# Patient Record
Sex: FEMALE | Race: ASIAN | NOT HISPANIC OR LATINO | Employment: UNEMPLOYED | URBAN - METROPOLITAN AREA
[De-identification: names, ages, dates, MRNs, and addresses within clinical notes are randomized per-mention and may not be internally consistent; named-entity substitution may affect disease eponyms.]

---

## 2017-05-05 ENCOUNTER — OFFICE VISIT (OUTPATIENT)
Dept: INTERNAL MEDICINE | Facility: CLINIC | Age: 47
End: 2017-05-05
Payer: COMMERCIAL

## 2017-05-05 ENCOUNTER — RADIANT APPOINTMENT (OUTPATIENT)
Dept: GENERAL RADIOLOGY | Facility: CLINIC | Age: 47
End: 2017-05-05
Attending: INTERNAL MEDICINE
Payer: COMMERCIAL

## 2017-05-05 VITALS
SYSTOLIC BLOOD PRESSURE: 124 MMHG | TEMPERATURE: 98.4 F | DIASTOLIC BLOOD PRESSURE: 80 MMHG | BODY MASS INDEX: 31.89 KG/M2 | HEART RATE: 98 BPM | WEIGHT: 173.3 LBS | HEIGHT: 62 IN | OXYGEN SATURATION: 99 %

## 2017-05-05 DIAGNOSIS — D17.30 LIPOMA OF SKIN AND SUBCUTANEOUS TISSUE: Primary | ICD-10-CM

## 2017-05-05 DIAGNOSIS — M25.562 CHRONIC PAIN OF LEFT KNEE: ICD-10-CM

## 2017-05-05 DIAGNOSIS — G89.29 CHRONIC PAIN OF LEFT KNEE: ICD-10-CM

## 2017-05-05 DIAGNOSIS — M25.562 CHRONIC PAIN OF LEFT KNEE: Primary | ICD-10-CM

## 2017-05-05 DIAGNOSIS — G89.29 CHRONIC PAIN OF LEFT KNEE: Primary | ICD-10-CM

## 2017-05-05 PROCEDURE — 73560 X-RAY EXAM OF KNEE 1 OR 2: CPT | Mod: LT

## 2017-05-05 PROCEDURE — 99214 OFFICE O/P EST MOD 30 MIN: CPT | Performed by: INTERNAL MEDICINE

## 2017-05-05 NOTE — PATIENT INSTRUCTIONS
I have placed a general surgery referral for you.    Please schedule an appointment at your earliest convenience - phone number below.    ---    For left knee:    START with left knee xray.    If negative, next step is trial of physical therapy.

## 2017-05-05 NOTE — MR AVS SNAPSHOT
After Visit Summary   5/5/2017    Truman Foreman    MRN: [id number]           Patient Information     Date Of Birth          1970        Visit Information        Provider Department      5/5/2017 2:30 PM Jacki Renner MD [facility]        Today's Diagnoses     Lipoma of skin and subcutaneous tissue    -  1    Chronic pain of left knee          Care Instructions    I have placed a general surgery referral for you.    Please schedule an appointment at your earliest convenience - phone number below.    ---    For left knee:    START with left knee xray.    If negative, next step is trial of physical therapy.             Follow-ups after your visit        Additional Services     GENERAL SURG ADULT REFERRAL       Your provider has referred you to: SURESH: [facility] Steve Vences [phone]   [email or web address]    Please be aware that coverage of these services is subject to the terms and limitations of your health insurance plan.  Call member services at your health plan with any benefit or coverage questions.      Please bring the following with you to your appointment:    (1) Any X-Rays, CTs or MRIs which have been performed.  Contact the facility where they were done to arrange for  prior to your scheduled appointment.   (2) List of current medications   (3) This referral request   (4) Any documents/labs given to you for this referral                  Future tests that were ordered for you today     Open Future Orders        Priority Expected Expires Ordered    XR Knee Standing Left 2 Views Routine 5/5/2017 5/5/2018 5/5/2017            Who to contact     If you have questions or need follow up information about today's clinic visit or your schedule please contact [facility] directly at [phone].  Normal or non-critical lab and imaging results will be communicated to you by Viki  "letter or phone within 4 business days after the clinic has received the results. If you do not hear from us within 7 days, please contact the clinic through [email or web address] or phone. If you have a critical or abnormal lab result, we will notify you by phone as soon as possible.  Submit refill requests through [email or web address] or call your pharmacy and they will forward the refill request to us. Please allow 3 business days for your refill to be completed.          Additional Information About Your Visit        [email or web address] Information     [email or web address] lets you send messages to your doctor, view your test results, renew your prescriptions, schedule appointments and more. To sign up, go to [email or web address] . Click on \"Log in\" on the left side of the screen, which will take you to the Welcome page. Then click on \"Sign up Now\" on the right side of the page.     You will be asked to enter the access code listed below, as well as some personal information. Please follow the directions to create your username and password.     Your access code is: [email or web address]  Expires: 8/3/2017  3:06 PM     Your access code will  in 90 days. If you need help or a new code, please call your [place] clinic or [phone].        Care EveryWhere ID     This is your Care EveryWhere ID. This could be used by other organizations to access your [place] medical records  [id number]        Your Vitals Were     Pulse Temperature Height Last Period Pulse Oximetry BMI (Body Mass Index)    98 98.4  F (36.9  C) (Oral) 5' 1.5\" (1.562 m) 2017 (Approximate) 99% 32.21 kg/m2       Blood Pressure from Last 3 Encounters:   17 124/80    Weight from Last 3 Encounters:   17 173 lb 4.8 oz (78.6 kg)              We Performed the Following     GENERAL SURG ADULT REFERRAL        Primary Care Provider    None Specified       No primary provider on file.        Thank you!     Thank you for choosing [facility]  for your care. Our goal " is always to provide you with excellent care. Hearing back from our patients is one way we can continue to improve our services. Please take a few minutes to complete the written survey that you may receive in the mail after your visit with us. Thank you!             Your Updated Medication List - Protect others around you: Learn how to safely use, store and throw away your medicines at www.disposemymeds.org.      Notice  As of 5/5/2017  3:06 PM    You have not been prescribed any medications.

## 2017-05-05 NOTE — PROGRESS NOTES
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    Patient does not exercise regularly.     The medication, allergy, and problem lists have been reviewed and updated as appropriate.       OBJECTIVE:                                                      /80 (BP Location: Left arm, Patient Position: Chair, Cuff Size: Adult Regular)  Pulse 98  Temp 98.4  F (36.9  C) (Oral)  Ht 5' 1.5\" (1.562 m)  Wt 173 lb 4.8 oz (78.6 kg)  LMP 03/05/2017 (Approximate)  SpO2 99%  BMI 32.21 kg/m2  Constitutional: well-appearing  Integumentary:  - subcutaneous, oval shaped lump, ~1.5x1cm in size, left lateral upper thigh; no overlying skin changes   - subcutaneous, oval shaped lump, ~2cm in diameter, right medial leg above knee; no overlying skin changes  - right posterior thigh: large, abnormally shaped tissue growth  Right knee:  Inspection: mild bony enlargement; alignment normal; no redness, swelling, or effusions  Non-tender: patellar facets, MCL, LCL, lateral joint line, medial joint line, IT band, posterior knee "   Active Range of Motion: normal  Strength: normal  Special tests: normal valgus stress test, normal varus, negative posterior drawer  Left knee:  Inspection: mild bony enlargement; alignment normal; no redness, swelling, or effusions  Tender: along MCL  Non-tender: patellar facets, LCL, lateral joint line, medial joint line, IT band, posterior knee   Active Range of Motion: normal  Strength: normal  Special tests: normal valgus stress test, normal varus, negative posterior drawer      ASSESSMENT/PLAN:                                                      (D17.30) Lipoma of skin and subcutaneous tissue  (primary encounter diagnosis)  Comment:    - suspect left lateral upper thigh and right medial leg lumps are benign lipomas.   - suspect abnormal tissue growth right posterior thigh is also benign adipose tissue.    - since latter is symptomatic, patient may benefit from resection.   Plan: referred to general surgery for further evaluation and possible resection.     (M25.562,  G89.29) Chronic pain of left knee  Comment: etiology unclear, but differential includes mild OA, MCL strain or tear, and (less likely) medial meniscal tear.  Plan:    - left knee xray.   - if unrevealing, recommend trial of PT evaluation and treatment.   - if symptoms worsen, change, or do not improve with PT, consider ortho referral and/or knee MRI.    The instructions on the AVS were discussed and explained to the patient. Patient expressed understanding of instructions.    A total of 25 minutes were spent face-to-face with this patient during this encounter and over half of that time was spent on counseling and coordination of care re: above diagnoses and plans of care.     Jacki Renner MD   [address] [address]  T: [phone], F: [phone]

## 2017-05-05 NOTE — NURSING NOTE
"Chief Complaint   Patient presents with     Swelling     3 Swellings on the L upper thigh, above R knee and behind the R thigh.       Initial /80 (BP Location: Left arm, Patient Position: Chair, Cuff Size: Adult Regular)  Pulse 98  Temp 98.4  F (36.9  C) (Oral)  Ht 5' 1.5\" (1.562 m)  Wt 173 lb 4.8 oz (78.6 kg)  LMP 03/05/2017 (Approximate)  SpO2 99%  BMI 32.21 kg/m2 Estimated body mass index is 32.21 kg/(m^2) as calculated from the following:    Height as of this encounter: 5' 1.5\" (1.562 m).    Weight as of this encounter: 173 lb 4.8 oz (78.6 kg).  Medication Reconciliation: complete       Kaminibose MA      "

## 2017-05-26 ENCOUNTER — OFFICE VISIT (OUTPATIENT)
Dept: SURGERY | Facility: CLINIC | Age: 47
End: 2017-05-26
Payer: COMMERCIAL

## 2017-05-26 ENCOUNTER — THERAPY VISIT (OUTPATIENT)
Dept: PHYSICAL THERAPY | Facility: CLINIC | Age: 47
End: 2017-05-26
Payer: COMMERCIAL

## 2017-05-26 VITALS
HEART RATE: 81 BPM | WEIGHT: 174 LBS | DIASTOLIC BLOOD PRESSURE: 79 MMHG | BODY MASS INDEX: 32.85 KG/M2 | SYSTOLIC BLOOD PRESSURE: 131 MMHG | HEIGHT: 61 IN

## 2017-05-26 DIAGNOSIS — M25.562 LEFT MEDIAL KNEE PAIN: Primary | ICD-10-CM

## 2017-05-26 DIAGNOSIS — M79.89 SOFT TISSUE MASS: Primary | ICD-10-CM

## 2017-05-26 PROCEDURE — 97161 PT EVAL LOW COMPLEX 20 MIN: CPT | Mod: GP | Performed by: PHYSICAL THERAPIST

## 2017-05-26 PROCEDURE — 99203 OFFICE O/P NEW LOW 30 MIN: CPT | Performed by: SURGERY

## 2017-05-26 PROCEDURE — 97110 THERAPEUTIC EXERCISES: CPT | Mod: GP | Performed by: PHYSICAL THERAPIST

## 2017-05-26 ASSESSMENT — ACTIVITIES OF DAILY LIVING (ADL)
RISE FROM A CHAIR: ACTIVITY IS MINIMALLY DIFFICULT
KNEE_ACTIVITY_OF_DAILY_LIVING_SCORE: 81.43
GO UP STAIRS: ACTIVITY IS NOT DIFFICULT
STIFFNESS: THE SYMPTOM AFFECTS MY ACTIVITY SLIGHTLY
KNEE_ACTIVITY_OF_DAILY_LIVING_SUM: 57
SQUAT: ACTIVITY IS MINIMALLY DIFFICULT
WALK: ACTIVITY IS NOT DIFFICULT
SIT WITH YOUR KNEE BENT: ACTIVITY IS MINIMALLY DIFFICULT
KNEEL ON THE FRONT OF YOUR KNEE: ACTIVITY IS NOT DIFFICULT
GO DOWN STAIRS: ACTIVITY IS NOT DIFFICULT
GIVING WAY, BUCKLING OR SHIFTING OF KNEE: THE SYMPTOM AFFECTS MY ACTIVITY SLIGHTLY
PAIN: I HAVE THE SYMPTOM BUT IT DOES NOT AFFECT MY ACTIVITY
AS_A_RESULT_OF_YOUR_KNEE_INJURY,_HOW_WOULD_YOU_RATE_YOUR_CURRENT_LEVEL_OF_DAILY_ACTIVITY?: NEARLY NORMAL
HOW_WOULD_YOU_RATE_THE_OVERALL_FUNCTION_OF_YOUR_KNEE_DURING_YOUR_USUAL_DAILY_ACTIVITIES?: NEARLY NORMAL
STAND: ACTIVITY IS NOT DIFFICULT
WEAKNESS: THE SYMPTOM AFFECTS MY ACTIVITY SLIGHTLY
RAW_SCORE: 57
SWELLING: I HAVE THE SYMPTOM BUT IT DOES NOT AFFECT MY ACTIVITY
HOW_WOULD_YOU_RATE_THE_CURRENT_FUNCTION_OF_YOUR_KNEE_DURING_YOUR_USUAL_DAILY_ACTIVITIES_ON_A_SCALE_FROM_0_TO_100_WITH_100_BEING_YOUR_LEVEL_OF_KNEE_FUNCTION_PRIOR_TO_YOUR_INJURY_AND_0_BEING_THE_INABILITY_TO_PERFORM_ANY_OF_YOUR_USUAL_DAILY_ACTIVITIES?: 20
LIMPING: THE SYMPTOM AFFECTS MY ACTIVITY SLIGHTLY

## 2017-05-26 NOTE — LETTER
"May 26, 2017      RE:  Hortencia Baldwin-:  [birth year]    HISTORY OF PRESENT ILLNESS:  Hortencia Baldwin is a 46 year old female who is seen in consultation at the request of Dr. Renner for evaluation of multiple soft tissue masses concerning for lipomas. She reports she has three areas of concern. One on her right lower inner buttock has been present for >20years. It has been asymptomatic until recently. She has a new job and has been sitting >10hrs/day and with this the area is very uncomfortable. She does not think it has changed in size considerably. She has another area on her right lower thigh just above the knee, this is much smaller and asymptomatic. A third spot is on her left upper outer thigh which is occasionally tender.      REVIEW OF SYSTEMS:  10 point review of systems completed and otherwise negative aside from as listed in HPI.      PMH: none     PSH: left breast excisional biopsy >20years ago     FH: reviewed, noncontributory    Vitals: /79  Pulse 81  Ht 5' 1\" (1.549 m)  Wt 174 lb (78.9 kg)  LMP 2017 (Approximate)  BMI 32.88 kg/m2  BMI= Body mass index is 32.88 kg/(m^2).     EXAM:  GENERAL: healthy, alert and no distress   PSYCH: pleasant, normal affect  HEENT: moist mucus membranes, no scleral icterus  CARDIOVASCULAR:  RRR  RESPIRATORY: non labored breathing  NECK: Neck supple. No adenopathy. Thyroid symmetric, normal size,  GI: soft, nontender, nondistended, no hernias palpable, no hepatosplenomegaly, normal bowel sounds  Extremities: warm and well perfused, no edema  SKIN: 3cm soft tissue mass on right lower buttock, feels well circumscribed, nontender to palpation. 1.5cm soft tissue mass on right lower anterior thigh just proximal to knee, well circumscribed. 2cm soft tissue mass on left upper outer thigh.   LYMPH: no axillary adenopathy        ASSESSMENT:  Hortencia Baldwin is a 46 year old female who presents with multiple soft tissue masses which are clinically consistent with lipoma. " We had a discussion of the risks, benefits, indications and alternatives to surgery and she would like to proceed with excision. She would like to have conscious sedation for the procedure. .         PLAN:  Excision of three soft tissue masses     It was my pleasure to participate in the care of Hortencia Baldwin in clinic today. Thank you for this consultation.         Avani Szymanski MD

## 2017-05-26 NOTE — PROGRESS NOTES
"[facility] General Surgery Clinic Consultation    CHIEF COMPLAINT:  Chief Complaint   Patient presents with     Consult     New pt. Lump on posterior thigh (RIGHT THIGH       HISTORY OF PRESENT ILLNESS:  Hortencia Baldwin is a 46 year old female who is seen in consultation at the request of Dr. Renner for evaluation of multiple soft tissue masses concerning for lipomas. She reports she has three areas of concern. One on her right lower inner buttock has been present for >20years. It has been asymptomatic until recently. She has a new job and has been sitting >10hrs/day and with this the area is very uncomfortable. She does not think it has changed in size considerably. She has another area on her right lower thigh just above the knee, this is much smaller and asymptomatic. A third spot is on her left upper outer thigh which is occasionally tender.     REVIEW OF SYSTEMS:  10 point review of systems completed and otherwise negative aside from as listed in HPI.     PMH: none    PSH: left breast excisional biopsy >20years ago    FH: reviewed, noncontributory    Social History   Substance Use Topics     Smoking status: Current Every Day Smoker     Packs/day: 0.50     Years: 10.00     Smokeless tobacco: Not on file     Alcohol use Yes      Comment: Occasionally       Patient Active Problem List   Diagnosis     Left medial knee pain       No Known Allergies    No current outpatient prescriptions on file.       Vitals: /79  Pulse 81  Ht 5' 1\" (1.549 m)  Wt 174 lb (78.9 kg)  LMP 03/05/2017 (Approximate)  BMI 32.88 kg/m2  BMI= Body mass index is 32.88 kg/(m^2).    EXAM:  GENERAL: healthy, alert and no distress   PSYCH: pleasant, normal affect  HEENT: moist mucus membranes, no scleral icterus  CARDIOVASCULAR:  RRR  RESPIRATORY: non labored breathing  NECK: Neck supple. No adenopathy. Thyroid symmetric, normal size,  GI: soft, nontender, nondistended, no hernias palpable, no hepatosplenomegaly, normal bowel " sounds  Extremities: warm and well perfused, no edema  SKIN: 3cm soft tissue mass on right lower buttock, feels well circumscribed, nontender to palpation. 1.5cm soft tissue mass on right lower anterior thigh just proximal to knee, well circumscribed. 2cm soft tissue mass on left upper outer thigh.   LYMPH: no axillary adenopathy      ASSESSMENT:  Hortencia Baldwin is a 46 year old female who presents with multiple soft tissue masses which are clinically consistent with lipoma. We had a discussion of the risks, benefits, indications and alternatives to surgery and she would like to proceed with excision. She would like to have conscious sedation for the procedure. .       PLAN:  Excision of three soft tissue masses    It was my pleasure to participate in the care of Hortencia Baldwin in clinic today. Thank you for this consultation.     Total time with patient visit: 25 minutes including discussions about the plan of care and care coordination with the patient.    Avani Szymanski MD    Please route or send letter to:  Primary Care Provider (PCP) and Referring Provider

## 2017-05-26 NOTE — PROGRESS NOTES
Initial evaluation was completed.  Subjective:    Patient is a 46 year old female presenting with rehab left knee hpi.           She reports insidious onset of left knee pain about 2 years.  Pain is not noted every day but the problem seems to be getting worse.  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 Walking feels fine.  Pain ranges from 0/10 to 8/10.  Sometimes she gets to pain at rest lying down. Rubbing it or getting up to walk is helpful.  She walks on a treadmill 15 minutes twice per week..    Patient reports pain:  Medial.         Pain is worse during the day.   and relieved by rest.          General health as reported by patient is good and fair.                      Red flags:  None as reported by the patient.                        Objective:    System                                                Knee Evaluation:  ROM:  AROM: normal  PROM: normal            Strength:     Extension:  Left: 5-/5    Pain:+      Right: 5/5   Strong/pain free  Pain:  Flexion:  Left: 4+/5    Pain:-      Right: 4+/5    Pain:-    Quad Set Left: Good    Pain:   Quad Set Right: Good    Pain:  Ligament Testing:  Normal                  Palpation:    Left knee tenderness present at:  Medial Joint Line and Patellar Medial  Left knee tenderness not present at:  Lateral Joint Line; Patellar Tendon; Popliteal; Patellar Lateral; Patellar Superior and Patellar Inferior    Right knee tenderness not present at:  Medial Joint Line; Lateral Joint Line; Patellar Tendon; Popliteal; Patellar Medial; Patellar Lateral; Patellar Superior and Patellar Inferior  Edema:  Normal            General     ROS    Assessment/Plan:      Patient is a 46 year old female with left side knee complaints. Her pain, like the 'cracking' form the left knee appears to be an inconsistent issue.  Pain was duplicated most with palpation of the left medial joint  line and distally 3 inches.  Pain was also duplicated after multiple knee bends ans squats but not on the first few reps.  She has fairly good strength but further improving this along with more frequent movement at work should be helpful..   Patient has the following significant findings with corresponding treatment plan.                Diagnosis 1:  knee  Pain -  self management and education  Decreased strength - therapeutic exercise, therapeutic activities and home program  Decreased function - therapeutic activities and home program    Therapy Evaluation Codes:   1) History comprised of:   Personal factors that impact the plan of care:      None.    Comorbidity factors that impact the plan of care are:      None.     Medications impacting care: None.  2) Examination of Body Systems comprised of:   Body structures and functions that impact the plan of care:      Knee.   Activity limitations that impact the plan of care are:      transitions.  3) Clinical presentation characteristics are:   Stable/Uncomplicated.  4) Decision-Making    Low complexity using standardized patient assessment instrument and/or measureable assessment of functional outcome.  Cumulative Therapy Evaluation is: Low complexity.    Previous and current functional limitations:  (See Goal Flow Sheet for this information)    Short term and Long term goals: (See Goal Flow Sheet for this information)     Communication ability:  Patient appears to be able to clearly communicate and understand verbal and written communication and follow directions correctly.  Treatment Explanation - The following has been discussed with the patient:   RX ordered/plan of care  Anticipated outcomes  Possible risks and side effects  This patient would benefit from PT intervention to resume normal activities.   Rehab potential is good.    Frequency:  1 X week, once daily  Duration:  for 3-6 weeks  Discharge Plan:  Achieve all LTG.  Independent in home treatment  program.  Reach maximal therapeutic benefit.    Please refer to the daily flowsheet for treatment today, total treatment time and time spent performing 1:1 timed codes.

## 2017-05-26 NOTE — MR AVS SNAPSHOT
"              After Visit Summary   5/26/2017    Hortencia Baldwin    MRN: [id number]           Patient Information     Date Of Birth          1970        Visit Information        Provider Department      5/26/2017 10:50 AM Praveen Desai, PT [facility]        Today's Diagnoses     Left medial knee pain    -  1       Follow-ups after your visit        Your next 10 appointments already scheduled     May 26, 2017  1:00 PM CDT   CONSULT with Avani Szymanski MD   [facility]    [address]  [facility] [address]   [phone]            Jun 02, 2017  9:30 AM CDT   INO Extremity with Praveen Desai PT   [facility] Therapy ([facility]  )    [address] [id number] [phone]              Who to contact     If you have questions or need follow up information about today's clinic visit or your schedule please contact [facility] PHYSICAL THERAPY directly at [phone].  Normal or non-critical lab and imaging results will be communicated to you by Learn with Homerhart, letter or phone within 4 business days after the clinic has received the results. If you do not hear from us within 7 days, please contact the clinic through Learn with Homerhart or phone. If you have a critical or abnormal lab result, we will notify you by phone as soon as possible.  Submit refill requests through ForeUp or call your pharmacy and they will forward the refill request to us. Please allow 3 business days for your refill to be completed.          Additional Information About Your Visit        MyChart Information     ForeUp lets you send messages to your doctor, view your test results, renew your prescriptions, schedule appointments and more. To sign up, go to [email or web address] . Click on \"Log in\" on the left side of the " "screen, which will take you to the Welcome page. Then click on \"Sign up Now\" on the right side of the page.     You will be asked to enter the access code listed below, as well as some personal information. Please follow the directions to create your username and password.     Your access code is: [email or web address]  Expires: 8/3/2017  3:06 PM     Your access code will  in 90 days. If you need help or a new code, please call your [place] clinic or [phone].        Care EveryWhere ID     This is your Care EveryWhere ID. This could be used by other organizations to access your [place] medical records  [id number]        Your Vitals Were     Last Period                   2017 (Approximate)            Blood Pressure from Last 3 Encounters:   17 124/80    Weight from Last 3 Encounters:   17 78.6 kg (173 lb 4.8 oz)              We Performed the Following     HC PT EVAL, LOW COMPLEXITY     INO INITIAL EVAL REPORT     THERAPEUTIC EXERCISES        Primary Care Provider Office Phone # Fax #    Jacki Renner [phone]       [facility] [address] [facility] [address]        Thank you!     Thank you for choosing [facility] PH[facility]  for your care. Our goal is always to provide you with excellent care. Hearing back from our patients is one way we can continue to improve our services. Please take a few minutes to complete the written survey that you may receive in the mail after your visit with us. Thank you!             Your Updated Medication List - Protect others around you: Learn how to safely use, store and throw away your medicines at www.disposemymeds.org.      Notice  As of 2017 12:16 PM    You have not been prescribed any medications.      "

## 2017-05-26 NOTE — MR AVS SNAPSHOT
"              After Visit Summary   5/26/2017    Hortencia Baldwin    MRN: [id number]           Patient Information     Date Of Birth          1970        Visit Information        Provider Department      5/26/2017 1:00 PM Avani Szymanski MD [facility] [place] [facility] General Surgery      Today's Diagnoses     Soft tissue mass    -  1       Follow-ups after your visit        Your next 10 appointments already scheduled     Jun 02, 2017  9:30 AM CDT   INO Extremity with Praveen Desai, SOLANGE   [place] [facility] ([facility]  )    [address] [id number] [phone]              Who to contact     If you have questions or need follow up information about today's clinic visit or your schedule please contact [facility] [place] directly at [phone].  Normal or non-critical lab and imaging results will be communicated to you by MyChart, letter or phone within 4 business days after the clinic has received the results. If you do not hear from us within 7 days, please contact the clinic through [email or web address] or phone. If you have a critical or abnormal lab result, we will notify you by phone as soon as possible.  Submit refill requests through Widdle or call your pharmacy and they will forward the refill request to us. Please allow 3 business days for your refill to be completed.          Additional Information About Your Visit        MyChart Information     Widdle lets you send messages to your doctor, view your test results, renew your prescriptions, schedule appointments and more. To sign up, go to [email or web address] . Click on \"Log in\" on the left side of the screen, which will take you to the Welcome page. Then click on \"Sign up Now\" on the right side of the page.     You will be asked to enter the access code listed below, as well as some personal information. Please follow the directions to " "create your username and password.     Your access code is: [email or web address]  Expires: 8/3/2017  3:06 PM     Your access code will  in 90 days. If you need help or a new code, please call your [facility] or [phone].        Care EveryWhere ID     This is your Care EveryWhere ID. This could be used by other organizations to access your [place] medical records  [id number]        Your Vitals Were     Pulse Height Last Period BMI (Body Mass Index)          81 5' 1\" (1.549 m) 2017 (Approximate) 32.88 kg/m2         Blood Pressure from Last 3 Encounters:   17 131/79   17 124/80    Weight from Last 3 Encounters:   17 174 lb (78.9 kg)   17 173 lb 4.8 oz (78.6 kg)              Today, you had the following     No orders found for display       Primary Care Provider Office Phone # Fax #    Jacki Renner [phone]       [facility] [address] [facility] [address]        Thank you!     Thank you for choosing [facility] [place]  for your care. Our goal is always to provide you with excellent care. Hearing back from our patients is one way we can continue to improve our services. Please take a few minutes to complete the written survey that you may receive in the mail after your visit with us. Thank you!             Your Updated Medication List - Protect others around you: Learn how to safely use, store and throw away your medicines at www.disposemymeds.org.      Notice  As of 2017  1:09 PM    You have not been prescribed any medications.      "

## 2017-05-30 ENCOUNTER — OFFICE VISIT (OUTPATIENT)
Dept: INTERNAL MEDICINE | Facility: CLINIC | Age: 47
End: 2017-05-30
Payer: COMMERCIAL

## 2017-05-30 VITALS
WEIGHT: 180 LBS | TEMPERATURE: 98.4 F | BODY MASS INDEX: 33.99 KG/M2 | HEIGHT: 61 IN | HEART RATE: 82 BPM | OXYGEN SATURATION: 100 % | SYSTOLIC BLOOD PRESSURE: 128 MMHG | DIASTOLIC BLOOD PRESSURE: 70 MMHG

## 2017-05-30 DIAGNOSIS — D17.9 MULTIPLE LIPOMAS: ICD-10-CM

## 2017-05-30 DIAGNOSIS — D64.9 ANEMIA, UNSPECIFIED: ICD-10-CM

## 2017-05-30 DIAGNOSIS — D64.9 ANEMIA, UNSPECIFIED: Primary | ICD-10-CM

## 2017-05-30 DIAGNOSIS — Z01.818 PREOPERATIVE EXAMINATION: Primary | ICD-10-CM

## 2017-05-30 LAB
ALBUMIN SERPL-MCNC: 3.7 G/DL (ref 3.4–5)
ALP SERPL-CCNC: 72 U/L (ref 40–150)
ALT SERPL W P-5'-P-CCNC: 16 U/L (ref 0–50)
ANION GAP SERPL CALCULATED.3IONS-SCNC: 8 MMOL/L (ref 3–14)
AST SERPL W P-5'-P-CCNC: 11 U/L (ref 0–45)
BILIRUB SERPL-MCNC: 0.4 MG/DL (ref 0.2–1.3)
BUN SERPL-MCNC: 11 MG/DL (ref 7–30)
CALCIUM SERPL-MCNC: 8.1 MG/DL (ref 8.5–10.1)
CHLORIDE SERPL-SCNC: 107 MMOL/L (ref 94–109)
CO2 SERPL-SCNC: 26 MMOL/L (ref 20–32)
CREAT SERPL-MCNC: 0.63 MG/DL (ref 0.52–1.04)
ERYTHROCYTE [DISTWIDTH] IN BLOOD BY AUTOMATED COUNT: 14.8 % (ref 10–15)
FERRITIN SERPL-MCNC: 20 NG/ML (ref 8–252)
GFR SERPL CREATININE-BSD FRML MDRD: ABNORMAL ML/MIN/1.7M2
GLUCOSE SERPL-MCNC: 85 MG/DL (ref 70–99)
HCT VFR BLD AUTO: 33.7 % (ref 35–47)
HGB BLD-MCNC: 10.7 G/DL (ref 11.7–15.7)
IRON SATN MFR SERPL: 27 % (ref 15–46)
IRON SERPL-MCNC: 95 UG/DL (ref 35–180)
MCH RBC QN AUTO: 35.4 PG (ref 26.5–33)
MCHC RBC AUTO-ENTMCNC: 31.8 G/DL (ref 31.5–36.5)
MCV RBC AUTO: 112 FL (ref 78–100)
MRSA DNA SPEC QL NAA+PROBE: NORMAL
PLATELET # BLD AUTO: 291 10E9/L (ref 150–450)
POTASSIUM SERPL-SCNC: 3.7 MMOL/L (ref 3.4–5.3)
PROT SERPL-MCNC: 6.9 G/DL (ref 6.8–8.8)
RBC # BLD AUTO: 3.02 10E12/L (ref 3.8–5.2)
SODIUM SERPL-SCNC: 141 MMOL/L (ref 133–144)
SPECIMEN SOURCE: NORMAL
TIBC SERPL-MCNC: 357 UG/DL (ref 240–430)
VIT B12 SERPL-MCNC: 776 PG/ML (ref 193–986)
WBC # BLD AUTO: 7.4 10E9/L (ref 4–11)

## 2017-05-30 PROCEDURE — 83540 ASSAY OF IRON: CPT | Performed by: INTERNAL MEDICINE

## 2017-05-30 PROCEDURE — 99214 OFFICE O/P EST MOD 30 MIN: CPT | Performed by: INTERNAL MEDICINE

## 2017-05-30 PROCEDURE — 87641 MR-STAPH DNA AMP PROBE: CPT | Performed by: INTERNAL MEDICINE

## 2017-05-30 PROCEDURE — 83550 IRON BINDING TEST: CPT | Performed by: INTERNAL MEDICINE

## 2017-05-30 PROCEDURE — 82728 ASSAY OF FERRITIN: CPT | Performed by: INTERNAL MEDICINE

## 2017-05-30 PROCEDURE — 80053 COMPREHEN METABOLIC PANEL: CPT | Performed by: INTERNAL MEDICINE

## 2017-05-30 PROCEDURE — 87640 STAPH A DNA AMP PROBE: CPT | Mod: 59 | Performed by: INTERNAL MEDICINE

## 2017-05-30 PROCEDURE — 36415 COLL VENOUS BLD VENIPUNCTURE: CPT | Performed by: INTERNAL MEDICINE

## 2017-05-30 PROCEDURE — 85027 COMPLETE CBC AUTOMATED: CPT | Performed by: INTERNAL MEDICINE

## 2017-05-30 PROCEDURE — 82607 VITAMIN B-12: CPT | Performed by: INTERNAL MEDICINE

## 2017-05-30 NOTE — LETTER
[address] [address]  [phone]      05/30/17      Hortencia Baldwin  [address] [place] [facility] [address]        Dear ,    It was a pleasure seeing you again the other day! I hope this finds you well.    I wanted to let you know that your labs came back.    You have a mildly low red blood cell count, or anemia. I'm not sure what this is due to, but you may proceed with surgery and we can follow-up on it after surgery (please come back to see me for follow-up).     The remainder or your labs are normal and negative.     Please feel free to contact me with any questions or concerns.      Take care,    Jacki Renner MD   [address] [place] [address]  T: [phone], F: [phone]

## 2017-05-30 NOTE — PATIENT INSTRUCTIONS
Nasal swab today.    ---    Labs - please proceed to our first floor laboratory to have these drawn (show them your orange ticket).     Results:    If normal: we will release results in TransUniont or send them in the mail. You will not be called for these results.    If abnormal, but non-urgent: we will release results in MyChart or send them in the mail. You will not be called for these results.    If abnormal and urgent: we will call you.

## 2017-05-30 NOTE — NURSING NOTE
"Chief Complaint   Patient presents with     Pre-Op Exam     5/31/17 [facility] - Lipoma removal.       Initial /70 (BP Location: Left arm, Patient Position: Chair, Cuff Size: Adult Regular)  Pulse 82  Temp 98.4  F (36.9  C) (Oral)  Ht 5' 1\" (1.549 m)  Wt 180 lb (81.6 kg)  LMP 05/24/2017 (Exact Date)  SpO2 100%  Breastfeeding? No  BMI 34.01 kg/m2 Estimated body mass index is 34.01 kg/(m^2) as calculated from the following:    Height as of this encounter: 5' 1\" (1.549 m).    Weight as of this encounter: 180 lb (81.6 kg).  Medication Reconciliation: complete     Kaminibose MA      "

## 2017-05-30 NOTE — PROGRESS NOTES
Subjective:    Patient is a 46 year old female presenting with rehab left ankle/foot hpi.                                      Pertinent medical history includes:  Overweight, smoking, sleep disorder/apnea and other (chest pain, dizziness/concussions).        Current occupation is Assembly  .    Primary job tasks include:  Prolonged sitting.                   Knee Activity of Daily Living Score: 81.43            Objective:    System    Physical Exam    General     ROS    Assessment/Plan:

## 2017-05-30 NOTE — MR AVS SNAPSHOT
After Visit Summary   5/30/2017    Hortencia Baldwin    MRN: [id number]           Patient Information     Date Of Birth          1970        Visit Information        Provider Department      5/30/2017 11:30 AM Jacki Renner MD [facility]        Today's Diagnoses     Preoperative examination    -  1    Lipoma of lower extremity, unspecified laterality          Care Instructions    Nasal swab today.    ---    Labs - please proceed to our first floor laboratory to have these drawn (show them your orange ticket).     Results:    If normal: we will release results in MyChart or send them in the mail. You will not be called for these results.    If abnormal, but non-urgent: we will release results in MyChart or send them in the mail. You will not be called for these results.    If abnormal and urgent: we will call you.                  Follow-ups after your visit        Your next 10 appointments already scheduled     May 31, 2017 10:15 AM CDT   [facility] Same Day Surgery with Avani Szymanski MD   [facility] ([facility])    [facility] ([facility])   [phone]            May 31, 2017   Procedure with Avani Szymanski MD   [facility] PeriOP Services (--)    [address] [place] [address] [address], [address]  [facility] [address]   [phone]            Jun 02, 2017  9:30 AM CDT   INO Extremity with Praveen Desai PT   [place] [facility]  )    [address] [place] [id number] [phone]              Who to contact     If you have questions or need follow up information about today's clinic visit or your schedule please contact [facility] directly at [phone].  Normal or non-critical lab and imaging results will be communicated to you by MyChart, letter or phone within 4 business  "days after the clinic has received the results. If you do not hear from us within 7 days, please contact the clinic through CelePost or phone. If you have a critical or abnormal lab result, we will notify you by phone as soon as possible.  Submit refill requests through CelePost or call your pharmacy and they will forward the refill request to us. Please allow 3 business days for your refill to be completed.          Additional Information About Your Visit        CelePost Information     CelePost lets you send messages to your doctor, view your test results, renew your prescriptions, schedule appointments and more. To sign up, go to [email or web address] . Click on \"Log in\" on the left side of the screen, which will take you to the Welcome page. Then click on \"Sign up Now\" on the right side of the page.     You will be asked to enter the access code listed below, as well as some personal information. Please follow the directions to create your username and password.     Your access code is: [email or web address]  Expires: 8/3/2017  3:06 PM     Your access code will  in 90 days. If you need help or a new code, please call your [facility] clinic or [phone].        Care EveryWhere ID     This is your Care EveryWhere ID. This could be used by other organizations to access your [facility] medical records  [id number]        Your Vitals Were     Pulse Temperature Height Last Period Pulse Oximetry Breastfeeding?    82 98.4  F (36.9  C) (Oral) 5' 1\" (1.549 m) 2017 (Exact Date) 100% No    BMI (Body Mass Index)                   34.01 kg/m2            Blood Pressure from Last 3 Encounters:   17 128/70   17 131/79   17 124/80    Weight from Last 3 Encounters:   17 180 lb (81.6 kg)   17 174 lb (78.9 kg)   17 173 lb 4.8 oz (78.6 kg)              We Performed the Following     CBC with platelets     Comprehensive metabolic panel     Methicillin Resistant Staph Aureus PCR        Primary Care " Provider Office Phone # Fax #    Jacki Renner [phone]       [facility] [address] [facility] [address]        Thank you!     Thank you for choosing [facility]  for your care. Our goal is always to provide you with excellent care. Hearing back from our patients is one way we can continue to improve our services. Please take a few minutes to complete the written survey that you may receive in the mail after your visit with us. Thank you!             Your Updated Medication List - Protect others around you: Learn how to safely use, store and throw away your medicines at www.disposemymeds.org.      Notice  As of 5/30/2017 11:48 AM    You have not been prescribed any medications.

## 2017-05-30 NOTE — PROGRESS NOTES
SUBJECTIVE:                                                      HPI: Hortencia Baldwin is a pleasant 46 year old female who presents for preoperative evaluation.    Surgeon: Stephon  Date: 5/31/17  Location: [facility]  Surgery: excision of lipoma, right buttock, right lower thigh, left upper outer thigh (low risk)  Indication: multiple lipomas    Past Medical History:   Diagnosis Date     Obesity (BMI 30-39.9)      Personal or family history of excessive or prolonged bleeding? No  Personal or family history of blood clots? No  History of snoring/Sleep Apnea? Snores, but no known SHADI  History of blood transfusions? Yes - no adverse reactions    Clinical Predictors of Increased Risk: none    Past Surgical History:   Procedure Laterality Date     LUMPECTOMY BREAST Left 1990s    benign     Artificial assistive devices, such as pacemakers, artificial cardiac valves, or artificial joints? No    No Known Allergies     Personal or family history of allergy to anesthesia? No  Allergy to local or topical analgesics? No  Allergy to latex? No  Allergy to adhesives/skin preparations (chlorhexadine)? No    MEDS: None    Over the counter medications? Advil as needed - last dose a couple of weeks ago  Vitamin Supplements? No  Herbal Supplements? No    Family History   Problem Relation Age of Onset     Hypertension Mother      DIABETES No family hx of      CEREBROVASCULAR DISEASE No family hx of      Myocardial Infarction No family hx of      Coronary Artery Disease Early Onset No family hx of      Breast Cancer No family hx of      Ovarian Cancer No family hx of      Colon Cancer No family hx of      Occupational History     Assembly for [employer]      Social History Main Topics     Smoking status: Current Every Day Smoker     Packs/day: 0.50     Years: 10.00     Types: Cigarettes     Smokeless tobacco: Never Used     Alcohol use Yes      Comment: 1-2 drinks/month     Drug use: No     Sexual activity: Yes     Partners: Male  "    Social History Narrative    .    3 kids - 2 adult, one is 14 (as of 2017).     Exercises 1-2x/week.          Functional capacity: Can do heavy work around the house such as scrubbing floors or lifting or moving heavy furniture (4-10 METs)    ROS:  Constitutional: denies unintentional weight loss or gain; denies fevers, chills, or sweats     Cardiovascular: denies chest pain, palpitations, or edema  Respiratory: denies cough, wheezing, shortness of breath, or dyspnea on exertion  Gastrointestinal: denies nausea, vomiting, constipation, diarrhea, or abdominal pain  Genitourinary: denies urinary frequency, urgency, dysuria, or hematuria  Integumentary: denies rash or pruritus  Musculoskeletal: denies back pain, muscle pain, joint pain, or joint swelling  Neurologic: denies focal weakness, numbness, or tingling  Hematologic/Immunologic: denies history of anemia or blood transfusions  Endocrine: denies heat or cold intolerance; denies polyuria, polydipsia  Psychiatric: denies depression or anxiety    OBJECTIVE:                                                      /70 (BP Location: Left arm, Patient Position: Chair, Cuff Size: Adult Regular)  Pulse 82  Temp 98.4  F (36.9  C) (Oral)  Ht 5' 1\" (1.549 m)  Wt 180 lb (81.6 kg)  LMP 05/24/2017 (Exact Date)  SpO2 100%  Breastfeeding? No  BMI 34.01 kg/m2  Constitutional: well-appearing  Eyes: normal conjunctivae and lids; pupils equal, round, and reactive to light  Ears, Nose, Mouth, and Throat: normal ears and nose; tympanic membranes visualized and normal; normal lips, teeth, and gums; no oropharyngeal lesions or ulcers  Neck: supple and symmetric; no lymphadenopathy; no thyromegaly or masses  Respiratory: normal respiratory effort; clear to auscultation bilaterally  Cardiovascular: regular rate and rhythm; pedal pulses palpable; no edema  Gastrointestinal: soft, non-tender, non-distended, and bowel sounds present; no organomegaly or " masses  Musculoskeletal: normal gait and station; no clubbing or cyanosis  Psych: normal judgment and insight; normal mood and affect; recent and remote memory intact; oriented to time, place, and person    ASSESSMENT/PLAN:                                                      Hortencia Baldwin is a pleasant 46 year old female who presents for a preoperative evaluation. She is at low clinical risk for a low risk procedure.    (Z01.818) Preoperative examination  (primary encounter diagnosis)  (D17.9) Multiple lipomas  Plan:    - CBC, CMP, and nasal staff PCR today.   - no additional workup, cardiac or otherwise, indicated.    RECOMMEND PROCEEDING WITH SURGERY: YES.    Thank you for referring Hortencia Baldwin to me for consultation and allowing me to participate in her care.    The instructions on the AVS were discussed and explained to the patient. Patient expressed understanding of instructions.    A total of 25 minutes were spent face-to-face with this patient during this encounter and over half of that time was spent on counseling and coordination of care re: above diagnoses and plans of care.     Jacki Renner MD   [address] [address]  T: [phone], F: [phone]

## 2017-05-31 ENCOUNTER — HOSPITAL ENCOUNTER (OUTPATIENT)
Facility: CLINIC | Age: 47
Discharge: HOME | End: 2017-05-31
Attending: SURGERY | Admitting: SURGERY
Payer: COMMERCIAL

## 2017-05-31 ENCOUNTER — ANESTHESIA EVENT (OUTPATIENT)
Dept: SURGERY | Facility: CLINIC | Age: 47
End: 2017-05-31
Payer: COMMERCIAL

## 2017-05-31 ENCOUNTER — ANESTHESIA (OUTPATIENT)
Dept: SURGERY | Facility: CLINIC | Age: 47
End: 2017-05-31
Payer: COMMERCIAL

## 2017-05-31 ENCOUNTER — OFFICE VISIT (OUTPATIENT)
Dept: SURGERY | Facility: PHYSICIAN GROUP | Age: 47
End: 2017-05-31
Payer: COMMERCIAL

## 2017-05-31 ENCOUNTER — SURGERY (OUTPATIENT)
Age: 47
End: 2017-05-31

## 2017-05-31 VITALS
HEIGHT: 61 IN | DIASTOLIC BLOOD PRESSURE: 69 MMHG | TEMPERATURE: 97.7 F | WEIGHT: 178.5 LBS | RESPIRATION RATE: 16 BRPM | SYSTOLIC BLOOD PRESSURE: 110 MMHG | OXYGEN SATURATION: 99 % | BODY MASS INDEX: 33.7 KG/M2

## 2017-05-31 DIAGNOSIS — M79.89 SOFT TISSUE MASS: Primary | ICD-10-CM

## 2017-05-31 LAB — HCG SERPL QL: NEGATIVE

## 2017-05-31 PROCEDURE — 37000008 ZZH ANESTHESIA TECHNICAL FEE, 1ST 30 MIN: Performed by: SURGERY

## 2017-05-31 PROCEDURE — 27043 EXC HIP PELVIS LES SC 3 CM/>: CPT | Performed by: SURGERY

## 2017-05-31 PROCEDURE — 88304 TISSUE EXAM BY PATHOLOGIST: CPT | Mod: 26 | Performed by: SURGERY

## 2017-05-31 PROCEDURE — 25000128 H RX IP 250 OP 636: Performed by: SURGERY

## 2017-05-31 PROCEDURE — 25000125 ZZHC RX 250: Performed by: NURSE ANESTHETIST, CERTIFIED REGISTERED

## 2017-05-31 PROCEDURE — 27327 EXC THIGH/KNEE LES SC < 3 CM: CPT | Performed by: SURGERY

## 2017-05-31 PROCEDURE — 25000128 H RX IP 250 OP 636: Performed by: NURSE ANESTHETIST, CERTIFIED REGISTERED

## 2017-05-31 PROCEDURE — 37000009 ZZH ANESTHESIA TECHNICAL FEE, EACH ADDTL 15 MIN: Performed by: SURGERY

## 2017-05-31 PROCEDURE — 36000052 ZZH SURGERY LEVEL 2 EA 15 ADDTL MIN: Performed by: SURGERY

## 2017-05-31 PROCEDURE — 27210794 ZZH OR GENERAL SUPPLY STERILE: Performed by: SURGERY

## 2017-05-31 PROCEDURE — 88304 TISSUE EXAM BY PATHOLOGIST: CPT | Performed by: SURGERY

## 2017-05-31 PROCEDURE — 25000132 ZZH RX MED GY IP 250 OP 250 PS 637: Performed by: PHYSICIAN ASSISTANT

## 2017-05-31 PROCEDURE — 71000012 ZZH RECOVERY PHASE 1 LEVEL 1 FIRST HR: Performed by: SURGERY

## 2017-05-31 PROCEDURE — 36000050 ZZH SURGERY LEVEL 2 1ST 30 MIN: Performed by: SURGERY

## 2017-05-31 PROCEDURE — 40000170 ZZH STATISTIC PRE-PROCEDURE ASSESSMENT II: Performed by: SURGERY

## 2017-05-31 PROCEDURE — 84703 CHORIONIC GONADOTROPIN ASSAY: CPT | Performed by: ANESTHESIOLOGY

## 2017-05-31 PROCEDURE — 25000125 ZZHC RX 250: Performed by: SURGERY

## 2017-05-31 PROCEDURE — S0020 INJECTION, BUPIVICAINE HYDRO: HCPCS | Performed by: SURGERY

## 2017-05-31 PROCEDURE — 36415 COLL VENOUS BLD VENIPUNCTURE: CPT | Performed by: ANESTHESIOLOGY

## 2017-05-31 PROCEDURE — 71000027 ZZH RECOVERY PHASE 2 EACH 15 MINS: Performed by: SURGERY

## 2017-05-31 RX ORDER — ASPIRIN 81 MG
100 TABLET, DELAYED RELEASE (ENTERIC COATED) ORAL 2 TIMES DAILY PRN
Qty: 60 TABLET | Refills: 1 | Status: SHIPPED | OUTPATIENT
Start: 2017-05-31 | End: 2018-01-23

## 2017-05-31 RX ORDER — PROPOFOL 10 MG/ML
INJECTION, EMULSION INTRAVENOUS CONTINUOUS PRN
Status: DISCONTINUED | OUTPATIENT
Start: 2017-05-31 | End: 2017-05-31

## 2017-05-31 RX ORDER — MEPERIDINE HYDROCHLORIDE 25 MG/ML
12.5 INJECTION INTRAMUSCULAR; INTRAVENOUS; SUBCUTANEOUS
Status: DISCONTINUED | OUTPATIENT
Start: 2017-05-31 | End: 2017-05-31 | Stop reason: HOSPADM

## 2017-05-31 RX ORDER — ONDANSETRON 2 MG/ML
4 INJECTION INTRAMUSCULAR; INTRAVENOUS EVERY 30 MIN PRN
Status: DISCONTINUED | OUTPATIENT
Start: 2017-05-31 | End: 2017-05-31 | Stop reason: HOSPADM

## 2017-05-31 RX ORDER — FENTANYL CITRATE 50 UG/ML
25-50 INJECTION, SOLUTION INTRAMUSCULAR; INTRAVENOUS EVERY 5 MIN PRN
Status: DISCONTINUED | OUTPATIENT
Start: 2017-05-31 | End: 2017-05-31 | Stop reason: HOSPADM

## 2017-05-31 RX ORDER — SODIUM CHLORIDE, SODIUM LACTATE, POTASSIUM CHLORIDE, CALCIUM CHLORIDE 600; 310; 30; 20 MG/100ML; MG/100ML; MG/100ML; MG/100ML
INJECTION, SOLUTION INTRAVENOUS CONTINUOUS PRN
Status: DISCONTINUED | OUTPATIENT
Start: 2017-05-31 | End: 2017-05-31

## 2017-05-31 RX ORDER — PHYSOSTIGMINE SALICYLATE 1 MG/ML
1.2 INJECTION INTRAVENOUS
Status: DISCONTINUED | OUTPATIENT
Start: 2017-05-31 | End: 2017-05-31 | Stop reason: HOSPADM

## 2017-05-31 RX ORDER — LIDOCAINE HYDROCHLORIDE 20 MG/ML
INJECTION, SOLUTION INFILTRATION; PERINEURAL PRN
Status: DISCONTINUED | OUTPATIENT
Start: 2017-05-31 | End: 2017-05-31

## 2017-05-31 RX ORDER — ONDANSETRON 4 MG/1
4 TABLET, ORALLY DISINTEGRATING ORAL EVERY 30 MIN PRN
Status: DISCONTINUED | OUTPATIENT
Start: 2017-05-31 | End: 2017-05-31 | Stop reason: HOSPADM

## 2017-05-31 RX ORDER — FENTANYL CITRATE 50 UG/ML
INJECTION, SOLUTION INTRAMUSCULAR; INTRAVENOUS PRN
Status: DISCONTINUED | OUTPATIENT
Start: 2017-05-31 | End: 2017-05-31

## 2017-05-31 RX ORDER — HYDROCODONE BITARTRATE AND ACETAMINOPHEN 5; 325 MG/1; MG/1
1-2 TABLET ORAL
Status: COMPLETED | OUTPATIENT
Start: 2017-05-31 | End: 2017-05-31

## 2017-05-31 RX ORDER — SODIUM CHLORIDE, SODIUM LACTATE, POTASSIUM CHLORIDE, CALCIUM CHLORIDE 600; 310; 30; 20 MG/100ML; MG/100ML; MG/100ML; MG/100ML
INJECTION, SOLUTION INTRAVENOUS CONTINUOUS
Status: DISCONTINUED | OUTPATIENT
Start: 2017-05-31 | End: 2017-05-31 | Stop reason: HOSPADM

## 2017-05-31 RX ORDER — HYDROCODONE BITARTRATE AND ACETAMINOPHEN 5; 325 MG/1; MG/1
1-2 TABLET ORAL EVERY 4 HOURS PRN
Qty: 30 TABLET | Refills: 0 | Status: SHIPPED | OUTPATIENT
Start: 2017-05-31 | End: 2018-01-23

## 2017-05-31 RX ORDER — NALOXONE HYDROCHLORIDE 0.4 MG/ML
.1-.4 INJECTION, SOLUTION INTRAMUSCULAR; INTRAVENOUS; SUBCUTANEOUS
Status: DISCONTINUED | OUTPATIENT
Start: 2017-05-31 | End: 2017-05-31 | Stop reason: HOSPADM

## 2017-05-31 RX ORDER — ONDANSETRON 2 MG/ML
INJECTION INTRAMUSCULAR; INTRAVENOUS PRN
Status: DISCONTINUED | OUTPATIENT
Start: 2017-05-31 | End: 2017-05-31

## 2017-05-31 RX ORDER — FENTANYL CITRATE 50 UG/ML
25-50 INJECTION, SOLUTION INTRAMUSCULAR; INTRAVENOUS
Status: DISCONTINUED | OUTPATIENT
Start: 2017-05-31 | End: 2017-05-31 | Stop reason: HOSPADM

## 2017-05-31 RX ORDER — LIDOCAINE HYDROCHLORIDE 10 MG/ML
INJECTION, SOLUTION EPIDURAL; INFILTRATION; INTRACAUDAL; PERINEURAL
Status: DISCONTINUED
Start: 2017-05-31 | End: 2017-05-31 | Stop reason: HOSPADM

## 2017-05-31 RX ADMIN — MIDAZOLAM HYDROCHLORIDE 2 MG: 1 INJECTION, SOLUTION INTRAMUSCULAR; INTRAVENOUS at 10:48

## 2017-05-31 RX ADMIN — FENTANYL CITRATE 25 MCG: 50 INJECTION, SOLUTION INTRAMUSCULAR; INTRAVENOUS at 10:57

## 2017-05-31 RX ADMIN — DEXMEDETOMIDINE HYDROCHLORIDE 4 MCG: 100 INJECTION, SOLUTION INTRAVENOUS at 11:09

## 2017-05-31 RX ADMIN — LIDOCAINE HYDROCHLORIDE 40 MG: 20 INJECTION, SOLUTION INFILTRATION; PERINEURAL at 10:49

## 2017-05-31 RX ADMIN — SODIUM CHLORIDE, POTASSIUM CHLORIDE, SODIUM LACTATE AND CALCIUM CHLORIDE: 600; 310; 30; 20 INJECTION, SOLUTION INTRAVENOUS at 10:48

## 2017-05-31 RX ADMIN — HYDROCODONE BITARTRATE AND ACETAMINOPHEN 1 TABLET: 5; 325 TABLET ORAL at 12:41

## 2017-05-31 RX ADMIN — PROPOFOL 100 MCG/KG/MIN: 10 INJECTION, EMULSION INTRAVENOUS at 10:49

## 2017-05-31 RX ADMIN — DEXMEDETOMIDINE HYDROCHLORIDE 4 MCG: 100 INJECTION, SOLUTION INTRAVENOUS at 11:07

## 2017-05-31 RX ADMIN — FENTANYL CITRATE 50 MCG: 50 INJECTION, SOLUTION INTRAMUSCULAR; INTRAVENOUS at 10:48

## 2017-05-31 RX ADMIN — BUPIVACAINE HYDROCHLORIDE 8 ML GIVEN: 5 INJECTION, SOLUTION EPIDURAL; INTRACAUDAL; PERINEURAL at 11:19

## 2017-05-31 RX ADMIN — BUPIVACAINE HYDROCHLORIDE 6 ML GIVEN: 5 INJECTION, SOLUTION EPIDURAL; INTRACAUDAL; PERINEURAL at 11:33

## 2017-05-31 RX ADMIN — ONDANSETRON 4 MG: 2 INJECTION INTRAMUSCULAR; INTRAVENOUS at 11:35

## 2017-05-31 RX ADMIN — FENTANYL CITRATE 25 MCG: 50 INJECTION, SOLUTION INTRAMUSCULAR; INTRAVENOUS at 11:04

## 2017-05-31 ASSESSMENT — LIFESTYLE VARIABLES: TOBACCO_USE: 1

## 2017-05-31 NOTE — IP AVS SNAPSHOT
[facility] Same Day Surgery    [address]    [place] [address]    Phone:  [phone]    Fax:  [phone]                                       After Visit Summary   5/31/2017    Hortencia Baldwin    MRN: [id number]           After Visit Summary Signature Page     I have received my discharge instructions, and my questions have been answered. I have discussed any challenges I see with this plan with the nurse or doctor.    ..........................................................................................................................................  Patient/Patient Representative Signature      ..........................................................................................................................................  Patient Representative Print Name and Relationship to Patient    ..................................................               ................................................  Date                                            Time    ..........................................................................................................................................  Reviewed by Signature/Title    ...................................................              ..............................................  Date                                                            Time

## 2017-05-31 NOTE — ANESTHESIA POSTPROCEDURE EVALUATION
Patient: Hortencia Baldwin    Procedure(s):  EXCISION OF LIPOMAS RIGHT BUTTOCKS RIGHT LOWER THIGH, LEFT UPPER OUTER THIGH - Wound Class: I-Clean    Diagnosis:MULTIPLE LIPOMAS  Diagnosis Additional Information: No value filed.    Anesthesia Type:  MAC    Note:  Anesthesia Post Evaluation    Patient location during evaluation: PACU  Patient participation: Able to fully participate in evaluation  Level of consciousness: awake  Pain management: adequate  Airway patency: patent  Cardiovascular status: acceptable  Respiratory status: acceptable  Hydration status: acceptable  PONV: controlled     Anesthetic complications: None          Last vitals:  Vitals:    05/31/17 0931 05/31/17 1209 05/31/17 1215   BP: 126/81 112/76 115/68   Resp: 16 16 15   Temp:  36.5  C (97.7  F)    SpO2: 98% 96% 98%         Electronically Signed By: Hany uDff MD  May 31, 2017  12:30 PM

## 2017-05-31 NOTE — IP AVS SNAPSHOT
MRN:[id number]                      After Visit Summary   5/31/2017    Hortencia Baldwin    MRN: [id number]           Thank you!     Thank you for choosing [facility] for your care. Our goal is always to provide you with excellent care. Hearing back from our patients is one way we can continue to improve our services. Please take a few minutes to complete the written survey that you may receive in the mail after you visit with us. Thank you!        Patient Information     Date Of Birth          1970        About your hospital stay     You were admitted on:  May 31, 2017 [facility]    You were discharged on:  May 31, 2017       Who to Call     For medical emergencies, please call 911.  For non-urgent questions about your medical care, please call your primary care provider or clinic, [phone]  For questions related to your surgery, please call your surgery clinic        Attending Provider     Provider Specialty    Avani Szymanski MD Surgery       Primary Care Provider Office Phone # Fax #    Jacki Renner [phone]      After Care Instructions     Diet Instructions       Resume pre-procedure diet            Discharge Instructions       Please follow-up in 2 weeks in Dr. Szymanski's office for your first postoperative appointment. Call [phone] to schedule.  Our [facility] name [facility]. The address is [address] [place] [address] [address], [place], [address]            Discharge Instructions       CONSTIPATION PREVENTION  Fill the prescription for docusate (colace) provided to you at the hospital in order to prevent constipation. You can stop taking this medication once you are no longer taking your narcotic pain medication and are having regular bowel movements.  If you need an additional stool softener or laxative in order to have regular bowel movements go to a pharmacy and purchase one of the  following:    - Senokot oral once daily  - Milk of magnesium once daily  - Miralax 1 scoop/packet 1-2 times daily (laxative)    In addition to the above options, if constipation continues, you can add:   - 4 oz Prune juice or Plum juice daily for constipation            Dressing       Keep your dressings clean and dry.  Remove outer gauze dressing on Friday. White steri strips that are over the incision are to remain in place and will typically fall off on their own in 7-10 days. Do not attempt to replace these if they should fall off sooner. Do not submerge or soak your incision under water for 2 weeks. You can shower normally, allowing warm water and soap to run over the incision on Friday after you have removed the outer dressing. After showering always pat the incision dry.            No Alcohol       For 24 hours post procedure            No driving or operating machinery        until the day after procedure            Weight bearing status - As tolerated                 Your next 10 appointments already scheduled     Jun 02, 2017  9:30 AM CDT   INO Extremity with Praveen Desai, PT   [facility]    [address] [place] [id number] [phone]              Further instructions from your care team       Same Day Surgery Discharge Instructions for  Sedation and General Anesthesia       It's not unusual to feel dizzy, light-headed or faint for up to 24 hours after surgery or while taking pain medication.  If you have these symptoms: sit for a few minutes before standing and have someone assist you when you get up to walk or use the bathroom.      You should rest and relax for the next 24 hours. We recommend you make arrangements to have an adult stay with you for at least 24 hours after your discharge.  Avoid hazardous and strenuous activity.      DO NOT DRIVE any vehicle or operate mechanical equipment for 24 hours  "following the end of your surgery.  Even though you may feel normal, your reactions may be affected by the medication you have received.      Do not drink alcoholic beverages for 24 hours following surgery.       Slowly progress to your regular diet as you feel able. It's not unusual to feel nauseated and/or vomit after receiving anesthesia.  If you develop these symptoms, drink clear liquids (apple juice, ginger ale, broth, 7-up, etc. ) until you feel better.  If your nausea and vomiting persists for 24 hours, please notify your surgeon.        All narcotic pain medications, along with inactivity and anesthesia, can cause constipation. Drinking plenty of liquids and increasing fiber intake will help.      For any questions of a medical nature, call your surgeon.      Do not make important decisions for 24 hours.      If you had general anesthesia, you may have a sore throat for a couple of days related to the breathing tube used during surgery.  You may use Cepacol lozenges to help with this discomfort.  If it worsens or if you develop a fever, contact your surgeon.       If you feel your pain is not well managed with the pain medications prescribed by your surgeon, please contact your surgeon's office to let them know so they can address your concerns.           Pending Results     Date and Time Order Name Status Description    5/31/2017 1102 Surgical pathology exam In process             Admission Information     Date & Time Provider Department Dept. Phone    5/31/2017 Avani Szymanski MD [facility] Same Day Surgery [phone]      Your Vitals Were     Blood Pressure Temperature Respirations Height Weight Last Period    106/81 97.7  F (36.5  C) (Temporal) 14 1.549 m (5' 1\") 81 kg (178 lb 8 oz) 05/24/2017 (Exact Date)    Pulse Oximetry BMI (Body Mass Index)                99% 33.73 kg/m2          MyChart Information     GIVTED lets you send messages to your doctor, view your test results, renew your " "prescriptions, schedule appointments and more. To sign up, go to [email or web address] . Click on \"Log in\" on the left side of the screen, which will take you to the Welcome page. Then click on \"Sign up Now\" on the right side of the page.     You will be asked to enter the access code listed below, as well as some personal information. Please follow the directions to create your username and password.     Your access code is: [email or web address]  Expires: 8/3/2017  3:06 PM     Your access code will  in 90 days. If you need help or a new code, please call your [facility] clinic or [phone].        Care EveryWhere ID     This is your Care EveryWhere ID. This could be used by other organizations to access your [facility] medical records  [id number]           Review of your medicines      START taking        Dose / Directions    docusate sodium 100 MG tablet   Commonly known as:  COLACE   Used for:  Soft tissue mass        Dose:  100 mg   Take 100 mg by mouth 2 times daily as needed for constipation   Quantity:  60 tablet   Refills:  1       HYDROcodone-acetaminophen 5-325 MG per tablet   Commonly known as:  NORCO   Used for:  Soft tissue mass   Notes to Patient:  ONE TABLET @ 12:41 HOURS        Dose:  1-2 tablet   Take 1-2 tablets by mouth every 4 hours as needed for other (Moderate to Severe Pain)   Quantity:  30 tablet   Refills:  0            Where to get your medicines      These medications were sent to [facility] - [address]  [address] [facility] [address], [place] [address]     Phone:  [phone]     docusate sodium 100 MG tablet         Some of these will need a paper prescription and others can be bought over the counter. Ask your nurse if you have questions.     Bring a paper prescription for each of these medications     HYDROcodone-acetaminophen 5-325 MG per tablet                Protect others around you: Learn how to safely use, store and throw away your medicines at " www.disposemymeds.org.             Medication List: This is a list of all your medications and when to take them. Check marks below indicate your daily home schedule. Keep this list as a reference.      Medications           Morning Afternoon Evening Bedtime As Needed    docusate sodium 100 MG tablet   Commonly known as:  COLACE   Take 100 mg by mouth 2 times daily as needed for constipation                                HYDROcodone-acetaminophen 5-325 MG per tablet   Commonly known as:  NORCO   Take 1-2 tablets by mouth every 4 hours as needed for other (Moderate to Severe Pain)   Last time this was given:  1 tablet on 5/31/2017 12:41 PM   Notes to Patient:  ONE TABLET @ 12:41 HOURS

## 2017-05-31 NOTE — OP NOTE
General Surgery Operative Note     Pre-Operative Diagnosis- multiple soft tissue masses    Post-Operative Diagnosis- same    Procedure- excision of right upper inner thigh soft tissue mass, excision of right lower anterior thigh soft tissue mass, excision of left upper outer thigh soft tissue mass    Surgeon- Avani Szymanski MD    Assistant- Dari Castillo PA-C, a PA was needed for exposure, assistance with hemostasis and wound closure.     Anesthesia- 1% lidocaine with epi    Specimen-  1. right upper inner thigh soft tissue mass, 2. right lower anterior thigh soft tissue mass, 3.  left upper outer thigh soft tissue mass    Procedure  Consent was obtained. Conscious sedation was administered per anesthesia. A surgical time out was performed. The patient was positioned supine with her right knee flexed and externally rotated. The patient was prepped and draped in the usual sterile fashion with Chloraprep. Using sterile technique, local anesthetic was used to infiltrate the area. After adequate anesthesia was confirmed, a number 15 scalpel was used to make an incision overlying the lesion including an ellipse of skin. Using sharp dissection a 3 cm x 2cm soft tissue mass consistent with lipoma was found and circumferentially freed.  It was removed in two sections. Electrocautery was used for hemostasis. There was no bleeding and the wound bed was dry.   The skin was closed in multiple layers with a 3-0 vicryl and 4-0 monocryl. Steri Strips and a sterile dressing were placed.  The drapes were removed and patient repositioned with right leg straight on the bed. The right lower thigh and left upper thigh were then prepped and draped in standard fashion. Again a 15 blade was used to make a skin incision directly overlying each lesion. Each lesion was removed in its entirety and appeared consistent with a lipoma. hemostasis was achieved with electrocautery. The incisions were then closed in multiple layers using 3-0  vicryl and 4-0 monocryl. The patient tolerated the procedure well without complications. All sponge, instrument, and needle counts were correct at the conclusion of the case.      Avani Szymanski MD  [facility]  [phone]    Please route or send letter to:  Primary Care Provider (PCP) and Referring Provider

## 2017-05-31 NOTE — LETTER
[facility] SAME DAY SURGERY  [address]  [place] [address]  [phone]          May 31, 2017    RE:  Hortencia Baldwin                                                                                                                                                       [address] [facility] [address]            To whom it may concern:    Hortencia Baldwin is under my professional care as she had surgery today, 5/31/2017. She will need to be out of work for 1 week. After that time she is able to return without restrictions.     Sincerely,      Avani Szymanski MD  [facility]  [phone]

## 2017-05-31 NOTE — ANESTHESIA PREPROCEDURE EVALUATION
Anesthesia Evaluation     . Pt has had prior anesthetic. Type: General           ROS/MED HX    ENT/Pulmonary:     (+)tobacco use, Current use , . .    Neurologic:       Cardiovascular:         METS/Exercise Tolerance:     Hematologic:         Musculoskeletal:         GI/Hepatic:         Renal/Genitourinary:         Endo:     (+) Obesity, .      Psychiatric:         Infectious Disease:         Malignancy:         Other:                                    Anesthesia Plan      History & Physical Review  History and physical reviewed and following examination; no interval change.    ASA Status:  2 .        Plan for MAC with Intravenous induction. Maintenance will be TIVA.    PONV prophylaxis:  Ondansetron (or other 5HT-3) and Dexamethasone or Solumedrol       Postoperative Care  Postoperative pain management:  IV analgesics and Peripheral nerve block (Continuous).      Consents  Anesthetic plan, risks, benefits and alternatives discussed with:  Patient..                          .

## 2017-05-31 NOTE — H&P (VIEW-ONLY)
SUBJECTIVE:                                                      HPI: Hortencia Baldwin is a pleasant 46 year old female who presents for preoperative evaluation.    Surgeon: Stephon  Date: 5/31/17  Location: [facility]  Surgery: excision of lipoma, right buttock, right lower thigh, left upper outer thigh (low risk)  Indication: multiple lipomas    Past Medical History:   Diagnosis Date     Obesity (BMI 30-39.9)      Personal or family history of excessive or prolonged bleeding? No  Personal or family history of blood clots? No  History of snoring/Sleep Apnea? Snores, but no known SHADI  History of blood transfusions? Yes - no adverse reactions    Clinical Predictors of Increased Risk: none    Past Surgical History:   Procedure Laterality Date     LUMPECTOMY BREAST Left 1990s    benign     Artificial assistive devices, such as pacemakers, artificial cardiac valves, or artificial joints? No    No Known Allergies     Personal or family history of allergy to anesthesia? No  Allergy to local or topical analgesics? No  Allergy to latex? No  Allergy to adhesives/skin preparations (chlorhexadine)? No    MEDS: None    Over the counter medications? Advil as needed - last dose a couple of weeks ago  Vitamin Supplements? No  Herbal Supplements? No    Family History   Problem Relation Age of Onset     Hypertension Mother      DIABETES No family hx of      CEREBROVASCULAR DISEASE No family hx of      Myocardial Infarction No family hx of      Coronary Artery Disease Early Onset No family hx of      Breast Cancer No family hx of      Ovarian Cancer No family hx of      Colon Cancer No family hx of      Occupational History     Assembly for [employer]      Social History Main Topics     Smoking status: Current Every Day Smoker     Packs/day: 0.50     Years: 10.00     Types: Cigarettes     Smokeless tobacco: Never Used     Alcohol use Yes      Comment: 1-2 drinks/month     Drug use: No     Sexual activity: Yes     Partners: Male  "    Social History Narrative    .    3 kids - 2 adult, one is 14 (as of 2017).     Exercises 1-2x/week.          Functional capacity: Can do heavy work around the house such as scrubbing floors or lifting or moving heavy furniture (4-10 METs)    ROS:  Constitutional: denies unintentional weight loss or gain; denies fevers, chills, or sweats     Cardiovascular: denies chest pain, palpitations, or edema  Respiratory: denies cough, wheezing, shortness of breath, or dyspnea on exertion  Gastrointestinal: denies nausea, vomiting, constipation, diarrhea, or abdominal pain  Genitourinary: denies urinary frequency, urgency, dysuria, or hematuria  Integumentary: denies rash or pruritus  Musculoskeletal: denies back pain, muscle pain, joint pain, or joint swelling  Neurologic: denies focal weakness, numbness, or tingling  Hematologic/Immunologic: denies history of anemia or blood transfusions  Endocrine: denies heat or cold intolerance; denies polyuria, polydipsia  Psychiatric: denies depression or anxiety    OBJECTIVE:                                                      /70 (BP Location: Left arm, Patient Position: Chair, Cuff Size: Adult Regular)  Pulse 82  Temp 98.4  F (36.9  C) (Oral)  Ht 5' 1\" (1.549 m)  Wt 180 lb (81.6 kg)  LMP 05/24/2017 (Exact Date)  SpO2 100%  Breastfeeding? No  BMI 34.01 kg/m2  Constitutional: well-appearing  Eyes: normal conjunctivae and lids; pupils equal, round, and reactive to light  Ears, Nose, Mouth, and Throat: normal ears and nose; tympanic membranes visualized and normal; normal lips, teeth, and gums; no oropharyngeal lesions or ulcers  Neck: supple and symmetric; no lymphadenopathy; no thyromegaly or masses  Respiratory: normal respiratory effort; clear to auscultation bilaterally  Cardiovascular: regular rate and rhythm; pedal pulses palpable; no edema  Gastrointestinal: soft, non-tender, non-distended, and bowel sounds present; no organomegaly or " masses  Musculoskeletal: normal gait and station; no clubbing or cyanosis  Psych: normal judgment and insight; normal mood and affect; recent and remote memory intact; oriented to time, place, and person    ASSESSMENT/PLAN:                                                      Hortencia Baldwin is a pleasant 46 year old female who presents for a preoperative evaluation. She is at low clinical risk for a low risk procedure.    (Z01.818) Preoperative examination  (primary encounter diagnosis)  (D17.9) Multiple lipomas  Plan:    - CBC, CMP, and nasal staff PCR today.   - no additional workup, cardiac or otherwise, indicated.    RECOMMEND PROCEEDING WITH SURGERY: YES.    Thank you for referring Hortencia Baldwin to me for consultation and allowing me to participate in her care.    The instructions on the AVS were discussed and explained to the patient. Patient expressed understanding of instructions.    A total of 25 minutes were spent face-to-face with this patient during this encounter and over half of that time was spent on counseling and coordination of care re: above diagnoses and plans of care.     Jacki Renner MD   [address] [address]  T: [phone], F: [phone]

## 2017-06-01 LAB — COPATH REPORT: NORMAL

## 2017-06-03 ENCOUNTER — HEALTH MAINTENANCE LETTER (OUTPATIENT)
Age: 47
End: 2017-06-03

## 2017-06-16 ENCOUNTER — OFFICE VISIT (OUTPATIENT)
Dept: SURGERY | Facility: CLINIC | Age: 47
End: 2017-06-16
Payer: COMMERCIAL

## 2017-06-16 DIAGNOSIS — Z09 SURGERY FOLLOW-UP: Primary | ICD-10-CM

## 2017-06-16 PROCEDURE — 99024 POSTOP FOLLOW-UP VISIT: CPT | Performed by: SURGERY

## 2017-06-16 NOTE — MR AVS SNAPSHOT
"              After Visit Summary   2017    Hortencia Baldwin    MRN: [id number]           Patient Information     Date Of Birth          1970        Visit Information        Provider Department      2017 1:00 PM Avani Szymanski MD [facility] [place] [facility] General Surgery      Today's Diagnoses     Surgery follow-up    -  1       Follow-ups after your visit        Who to contact     If you have questions or need follow up information about today's clinic visit or your schedule please contact [facility] directly at [phone].  Normal or non-critical lab and imaging results will be communicated to you by WHOOPhart, letter or phone within 4 business days after the clinic has received the results. If you do not hear from us within 7 days, please contact the clinic through WHOOPhart or phone. If you have a critical or abnormal lab result, we will notify you by phone as soon as possible.  Submit refill requests through Ayudarum or call your pharmacy and they will forward the refill request to us. Please allow 3 business days for your refill to be completed.          Additional Information About Your Visit        MyChart Information     Ayudarum lets you send messages to your doctor, view your test results, renew your prescriptions, schedule appointments and more. To sign up, go to [email or web address] . Click on \"Log in\" on the left side of the screen, which will take you to the Welcome page. Then click on \"Sign up Now\" on the right side of the page.     You will be asked to enter the access code listed below, as well as some personal information. Please follow the directions to create your username and password.     Your access code is: [email or web address]  Expires: 8/3/2017  3:06 PM     Your access code will  in 90 days. If you need help or a new code, please call your [facility] clinic or [phone].        Care EveryWhere ID     This is your Care " EveryWhere ID. This could be used by other organizations to access your [facility] medical records  [id number]        Your Vitals Were     Last Period                   05/24/2017 (Exact Date)            Blood Pressure from Last 3 Encounters:   05/31/17 110/69   05/30/17 128/70   05/26/17 131/79    Weight from Last 3 Encounters:   05/31/17 178 lb 8 oz (81 kg)   05/30/17 180 lb (81.6 kg)   05/26/17 174 lb (78.9 kg)              Today, you had the following     No orders found for display       Primary Care Provider Office Phone # Fax #    Jacki Renner [phone]       [facility] [address] [place] [address]        Thank you!     Thank you for choosing [facility]  for your care. Our goal is always to provide you with excellent care. Hearing back from our patients is one way we can continue to improve our services. Please take a few minutes to complete the written survey that you may receive in the mail after your visit with us. Thank you!             Your Updated Medication List - Protect others around you: Learn how to safely use, store and throw away your medicines at www.disposemymeds.org.          This list is accurate as of: 6/16/17  1:06 PM.  Always use your most recent med list.                   Brand Name Dispense Instructions for use    docusate sodium 100 MG tablet    COLACE    60 tablet    Take 100 mg by mouth 2 times daily as needed for constipation       HYDROcodone-acetaminophen 5-325 MG per tablet    NORCO    30 tablet    Take 1-2 tablets by mouth every 4 hours as needed for other (Moderate to Severe Pain)

## 2017-06-16 NOTE — PROGRESS NOTES
Surgery Postoperative Note    S: Ms. Foreman returns after having three lipomas removed. She reports she is doing well. Minimal pain at incision sites. The right upper thigh/buttock lesion is occasionally tender after sitting at work for long periods of time. She continues to cover this incision with a bandaid while at work. No drainage from any sites.     Right leg: incisions healing well  Left leg: incisions healing well     Pathology: A: Right buttock, lipoma, excision-   - Benign lipoma.  Negative for malignancy.     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     A/P  Hortencia Baldwin is recovering from a excision of multiple lipomas. I advised her to slowly return to regular activity. I expect she will make a complete recovery without issues. We reviewed her pathology today as well.     Thank you for the opportunity to help in her care.    Avani Szymanski  [facility]  [phone]    Please route or send letter to:  Primary Care Provider (PCP) and Referring Provider

## 2017-06-16 NOTE — LETTER
2017    Surgery Postoperative Note    RE: Hortencia Baldwin-:  [birth year]     S: Ms. Foreman returns after having three lipomas removed. She reports she is doing well. Minimal pain at incision sites. The right upper thigh/buttock lesion is occasionally tender after sitting at work for long periods of time. She continues to cover this incision with a bandaid while at work. No drainage from any sites.      Right leg: incisions healing well  Left leg: incisions healing well      Pathology: A: Right buttock, lipoma, excision-   - Benign lipoma.  Negative for malignancy.     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      A/P  Hortencia Baldwin is recovering from a excision of multiple lipomas. I advised her to slowly return to regular activity. I expect she will make a complete recovery without issues. We reviewed her pathology today as well.      Thank you for the opportunity to help in her care.     Avani Szymanski  [facility]  [phone]

## 2017-11-28 ENCOUNTER — OFFICE VISIT (OUTPATIENT)
Dept: ORTHOPEDICS | Facility: CLINIC | Age: 47
End: 2017-11-28
Payer: COMMERCIAL

## 2017-11-28 VITALS
WEIGHT: 182 LBS | DIASTOLIC BLOOD PRESSURE: 74 MMHG | HEIGHT: 61 IN | BODY MASS INDEX: 34.36 KG/M2 | SYSTOLIC BLOOD PRESSURE: 126 MMHG

## 2017-11-28 DIAGNOSIS — M77.01 MEDIAL EPICONDYLITIS, RIGHT: Primary | ICD-10-CM

## 2017-11-28 DIAGNOSIS — M79.631 PAIN OF RIGHT FOREARM: ICD-10-CM

## 2017-11-28 PROCEDURE — 99203 OFFICE O/P NEW LOW 30 MIN: CPT | Performed by: PEDIATRICS

## 2017-11-28 NOTE — LETTER
11/28/2017         RE: Hortencia Baldwin  [address]  [facility] [address]        Dear Colleague,    Thank you for referring your patient, Hortencia Baldwin, to the [facility]. Please see a copy of my visit note below.    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  Pain began on the medial aspect of her elbow.  She does now have pain on the lateral aspect and down into her forearm.  Pain has been present for about 4 months with no known injury.  Did have some deep massage done recently and this has left bruising over her arm.   Patient is right hand dominant.     **  Patient feels that her right elbow and forearm pain may be related to the repetitive nature of her work.  Does have pain with flexion at the elbow.  Pain seems to be more medial.  Does not have pain in the lateral elbow. Does not have any numbness and tingling.     Tried coining with not relief.     Injury: gradual onset    Location of Pain: right elbow and forearm   Duration of Pain: 4 month(s)  Rating of Pain at worst: 8/10  Rating of Pain Currently: 8/10  Symptoms are better with: Nothing  Symptoms are worse with: use, any activity or movement.   Additional Features:   Positive: swelling and bruising   Negative: popping, grinding, catching, locking, instability, paresthesias, numbness, weakness, pain in other joints and systemic symptoms  Other evaluation and/or treatments so far consists of: Nothing  Prior History of related problems: denies     Social History: , currently unemployed.     Review of Systems  Musculoskeletal: as above  Remainder of review of systems is negative including constitutional, CV, pulmonary, GI, Skin and Neurologic except as noted in HPI or medical history.    This document serves as a record of the services and decisions personally  "performed and made by Coleman Gutierrez DO, CAQ. It was created on his behalf by Ant Brandon, a trained medical scribe. The creation of this document is based the provider's statements to the medical scribe.  Ant Brandon November 28, 2017 5:08 PM       Past Medical History:   Diagnosis Date     Obesity (BMI 30-39.9)      Past Surgical History:   Procedure Laterality Date     EXCISE MASS LOWER EXTREMITY Bilateral 5/31/2017    Procedure: EXCISE MASS LOWER EXTREMITY;  EXCISION OF LIPOMAS RIGHT BUTTOCKS RIGHT LOWER THIGH, LEFT UPPER OUTER THIGH;  Surgeon: Avani Szymanski MD;  Location:  [facility]     LUMPECTOMY BREAST Left 1990s    benign     Family History   Problem Relation Age of Onset     Hypertension Mother      DIABETES No family hx of      CEREBROVASCULAR DISEASE No family hx of      Myocardial Infarction No family hx of      Coronary Artery Disease Early Onset No family hx of      Breast Cancer No family hx of      Ovarian Cancer No family hx of      Colon Cancer No family hx of      Social History     Social History     Marital status: Single     Spouse name: N/A     Number of children: N/A     Years of education: N/A     Occupational History     Assembly for [employer]      Social History Main Topics     Smoking status: Current Every Day Smoker     Packs/day: 0.50     Years: 10.00     Types: Cigarettes     Smokeless tobacco: Never Used     Alcohol use Yes      Comment: 1-2 drinks/month     Drug use: No     Sexual activity: Yes     Partners: Male     Other Topics Concern     Not on file     Social History Narrative    .    3 kids - 2 adult, one is 14 (as of 2017).     Exercises 1-2x/week.            Objective  /74  Ht 5' 1\" (1.549 m)  Wt 182 lb (82.6 kg)  BMI 34.39 kg/m2    GENERAL APPEARANCE: healthy, alert and no distress   GAIT: NORMAL  SKIN: ecchymosis several linear areas on right forearm from coining   NEURO: Normal strength and tone, mentation intact and speech " normal  PSYCH:  mentation appears normal and affect normal/bright  HEENT: no scleral icterus  CV: no extremity edema   RESP: nonlabored breathing      Right Elbow exam:    Inspection:       no ecchymosis       no edema or effusion    ROM:       flexion symmetric, does have pain in the forearm        extension full, symmetric       forearm supination symmetric, no change in pain        forearm pronation symmetric, no change in pain        Elbows at side pronation and supination, pain in the medial elbow        Does have increased pain with arm extended gripping        Does have pain with arm extended flexion at the wrist     Tender:       medial epicondyle       Volar and radial forearm       Common flexor tendon     Non-Tender:       remainder of elbow area    Sensation:       intact throughout forearm and hand     Special Test:       Medial elbow pain with resisted rotation    Skin:       well perfused       capillary refill less than 2 seconds  See above        Radiology:  None today    Assessment:  1. Medial epicondylitis, right    2. Pain of right forearm        Plan:  Discussed the assessment with the patient and her significant other.    Discussed:  *Symptom Treatment   *Activity Modification   *Rehab - Home exercises vs Formal OT  *Support - forearm strap  *Imaging - not necessary at this point      Topical Treatments: Ice prn   Over the counter medication: Patient's preferred OTC medication as directed on packaging.  Activity Modification: as discussed   Rehab: Occupational Therapy: [facility] - [phone]; referred   Medical Equipment: forearm strap shown to the patient; provided. May use with activities for comfort.   Follow up: in ~4 weeks if not improving; otherwise, as needed   Questions answered. The patient indicates understanding of these issues and agrees with the plan.     Coleman Gutierrez, , CAQ       Disclaimer: This note consists of symbols derived from keyboarding,  dictation and/or voice recognition software. As a result, there may be errors in the script that have gone undetected. Please consider this when interpreting information found in this chart.    The information in this document, created by the medical scribe for me, accurately reflects the services I personally performed and the decisions made by me. I have reviewed and approved this document for accuracy.   Coleman Gutierrez DO, SAMIRA      Again, thank you for allowing me to participate in the care of your patient.        Sincerely,        Coleman Gutierrez DO

## 2017-11-28 NOTE — PROGRESS NOTES
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  Pain began on the medial aspect of her elbow.  She does now have pain on the lateral aspect and down into her forearm.  Pain has been present for about 4 months with no known injury.  Did have some deep massage done recently and this has left bruising over her arm.   Patient is right hand dominant.     **  Patient feels that her right elbow and forearm pain may be related to the repetitive nature of her work.  Does have pain with flexion at the elbow.  Pain seems to be more medial.  Does not have pain in the lateral elbow. Does not have any numbness and tingling.     Tried coining with not relief.     Injury: gradual onset    Location of Pain: right elbow and forearm   Duration of Pain: 4 month(s)  Rating of Pain at worst: 8/10  Rating of Pain Currently: 8/10  Symptoms are better with: Nothing  Symptoms are worse with: use, any activity or movement.   Additional Features:   Positive: swelling and bruising   Negative: popping, grinding, catching, locking, instability, paresthesias, numbness, weakness, pain in other joints and systemic symptoms  Other evaluation and/or treatments so far consists of: Nothing  Prior History of related problems: denies     Social History: , currently unemployed.     Review of Systems  Musculoskeletal: as above  Remainder of review of systems is negative including constitutional, CV, pulmonary, GI, Skin and Neurologic except as noted in HPI or medical history.    This document serves as a record of the services and decisions personally performed and made by Coleman Gutierrez DO, CAQ. It was created on his behalf by Ant Brandon, a trained medical scribe. The creation of this document is based the provider's statements to the medical scribe.  Ant Brandon November 28, 2017 5:08 PM       Past  "Medical History:   Diagnosis Date     Obesity (BMI 30-39.9)      Past Surgical History:   Procedure Laterality Date     EXCISE MASS LOWER EXTREMITY Bilateral 5/31/2017    Procedure: EXCISE MASS LOWER EXTREMITY;  EXCISION OF LIPOMAS RIGHT BUTTOCKS RIGHT LOWER THIGH, LEFT UPPER OUTER THIGH;  Surgeon: Avani Szymanski MD;  Location: [facility]     LUMPECTOMY BREAST Left 1990s    benign     Family History   Problem Relation Age of Onset     Hypertension Mother      DIABETES No family hx of      CEREBROVASCULAR DISEASE No family hx of      Myocardial Infarction No family hx of      Coronary Artery Disease Early Onset No family hx of      Breast Cancer No family hx of      Ovarian Cancer No family hx of      Colon Cancer No family hx of      Social History     Social History     Marital status: Single     Spouse name: N/A     Number of children: N/A     Years of education: N/A     Occupational History     Assembly for [employer]      Social History Main Topics     Smoking status: Current Every Day Smoker     Packs/day: 0.50     Years: 10.00     Types: Cigarettes     Smokeless tobacco: Never Used     Alcohol use Yes      Comment: 1-2 drinks/month     Drug use: No     Sexual activity: Yes     Partners: Male     Other Topics Concern     Not on file     Social History Narrative    .    3 kids - 2 adult, one is 14 (as of 2017).     Exercises 1-2x/week.            Objective  /74  Ht 5' 1\" (1.549 m)  Wt 182 lb (82.6 kg)  BMI 34.39 kg/m2    GENERAL APPEARANCE: healthy, alert and no distress   GAIT: NORMAL  SKIN: ecchymosis several linear areas on right forearm from coining   NEURO: Normal strength and tone, mentation intact and speech normal  PSYCH:  mentation appears normal and affect normal/bright  HEENT: no scleral icterus  CV: no extremity edema   RESP: nonlabored breathing      Right Elbow exam:    Inspection:       no ecchymosis       no edema or effusion    ROM:       flexion symmetric, does have pain " in the forearm        extension full, symmetric       forearm supination symmetric, no change in pain        forearm pronation symmetric, no change in pain        Elbows at side pronation and supination, pain in the medial elbow        Does have increased pain with arm extended gripping        Does have pain with arm extended flexion at the wrist     Tender:       medial epicondyle       Volar and radial forearm       Common flexor tendon     Non-Tender:       remainder of elbow area    Sensation:       intact throughout forearm and hand     Special Test:       Medial elbow pain with resisted rotation    Skin:       well perfused       capillary refill less than 2 seconds  See above        Radiology:  None today    Assessment:  1. Medial epicondylitis, right    2. Pain of right forearm        Plan:  Discussed the assessment with the patient and her significant other.    Discussed:  *Symptom Treatment   *Activity Modification   *Rehab - Home exercises vs Formal OT  *Support - forearm strap  *Imaging - not necessary at this point      Topical Treatments: Ice prn   Over the counter medication: Patient's preferred OTC medication as directed on packaging.  Activity Modification: as discussed   Rehab: Occupational Therapy: [facility] - [phone]; referred   Medical Equipment: forearm strap shown to the patient; provided. May use with activities for comfort.   Follow up: in ~4 weeks if not improving; otherwise, as needed   Questions answered. The patient indicates understanding of these issues and agrees with the plan.     Coleman Gutierrez, DO, CAQ       Disclaimer: This note consists of symbols derived from keyboarding, dictation and/or voice recognition software. As a result, there may be errors in the script that have gone undetected. Please consider this when interpreting information found in this chart.    The information in this document, created by the medical scribe for me, accurately  reflects the services I personally performed and the decisions made by me. I have reviewed and approved this document for accuracy.   Coleman Gutierrez DO, CAQ

## 2017-11-28 NOTE — MR AVS SNAPSHOT
After Visit Summary   11/28/2017    Hortencia Baldwin    MRN: [id number]           Patient Information     Date Of Birth          1970        Visit Information        Provider Department      11/28/2017 4:20 PM Coleman Gutierrez,  [facility]        Today's Diagnoses     Medial epicondylitis, right    -  1    Pain of right forearm           Follow-ups after your visit        Additional Services     [facility] PT, HAND, AND CHIROPRACTIC REFERRAL       **This order will print in the [facility] Scheduling Office**    Physical Therapy, Hand Therapy and Chiropractic Care are available through:    *[facility]  *[facility]  *[facility]    Call one number to schedule at any of the above locations: [phone].    Your provider has referred you to: Physical Therapy/Occupational Theraoy at [facility] or [facility]    Indication/Reason for Referral: Elbow Pain  Onset of Illness:   Therapy Orders: Evaluate and Treat  Special Programs: None  Special Request: None    Bina Curran      Additional Comments for the Therapist or Chiropractor:       Please be aware that coverage of these services is subject to the terms and limitations of your health insurance plan.  Call member services at your health plan with any benefit or coverage questions.      Please bring the following to your appointment:    *Your personal calendar for scheduling future appointments  *Comfortable clothing                  Who to contact     If you have questions or need follow up information about today's clinic visit or your schedule please contact [facility] directly at [phone].  Normal or non-critical lab and imaging results will be communicated to you by MyChart, letter or phone within 4 business days after the clinic has received the results. If you do not hear from us within 7 days, please contact the clinic through [employer] or  "phone. If you have a critical or abnormal lab result, we will notify you by phone as soon as possible.  Submit refill requests through [employer] or call your pharmacy and they will forward the refill request to us. Please allow 3 business days for your refill to be completed.          Additional Information About Your Visit        [employer]ter [employer]art Information     Re[employer]ie lets you send messages to your doctor, view your test results, renew your prescriptions, schedule appointments and more. To sign up, go to [email or web address] . Click on \"Log in\" on the left side of the screen, which will take you to the Welcome page. Then click on \"Sign up Now\" on the right side of the page.     You will be asked to enter the access code listed below, as well as some personal information. Please follow the directions to create your username and password.     Your access code is: [email or web address]  Expires: 2018  5:24 PM     Your access code will  in 90 days. If you need help or a new code, please call your [facility] clinic or [phone].        Care EveryWhere ID     This is your Care EveryWhere ID. This could be used by other organizations to access your [facility] medical records  [id number]        Your Vitals Were     Height BMI (Body Mass Index)                5' 1\" (1.549 m) 34.39 kg/m2           Blood Pressure from Last 3 Encounters:   17 126/74   17 110/69   17 128/70    Weight from Last 3 Encounters:   17 182 lb (82.6 kg)   17 178 lb 8 oz (81 kg)   17 180 lb (81.6 kg)              We Performed the Following     [facility] PT, HAND, AND CHIROPRACTIC REFERRAL        Primary Care Provider Office Phone # Fax #    Jacki Renner [phone]       [address] [place] [address]        Equal Access to Services     GIL LUA : Jimi Marquez, sirena johnstno, jaquelin garcia. So [facility] " [phone].    ATENCIÓN: Si joan echols, tiene a phillips disposición servicios gratuitos de asistencia lingüística. [place] [facility] [phone].    We comply with applicable federal civil rights laws and [place] laws. We do not discriminate on the basis of race, color, national origin, age, disability, sex, sexual orientation, or gender identity.            Thank you!     Thank you for choosing [facility]  for your care. Our goal is always to provide you with excellent care. Hearing back from our patients is one way we can continue to improve our services. Please take a few minutes to complete the written survey that you may receive in the mail after your visit with us. Thank you!             Your Updated Medication List - Protect others around you: Learn how to safely use, store and throw away your medicines at www.disposemymeds.org.          This list is accurate as of: 11/28/17  5:24 PM.  Always use your most recent med list.                   Brand Name Dispense Instructions for use Diagnosis    docusate sodium 100 MG tablet    COLACE    60 tablet    Take 100 mg by mouth 2 times daily as needed for constipation    Soft tissue mass       HYDROcodone-acetaminophen 5-325 MG per tablet    NORCO    30 tablet    Take 1-2 tablets by mouth every 4 hours as needed for other (Moderate to Severe Pain)    Soft tissue mass

## 2017-12-14 ENCOUNTER — THERAPY VISIT (OUTPATIENT)
Dept: OCCUPATIONAL THERAPY | Facility: CLINIC | Age: 47
End: 2017-12-14
Payer: COMMERCIAL

## 2017-12-14 DIAGNOSIS — M77.01 MEDIAL EPICONDYLITIS OF ELBOW, RIGHT: ICD-10-CM

## 2017-12-14 DIAGNOSIS — M25.521 RIGHT ELBOW PAIN: Primary | ICD-10-CM

## 2017-12-14 PROCEDURE — G8985 CARRY GOAL STATUS: HCPCS | Mod: GO | Performed by: OCCUPATIONAL THERAPIST

## 2017-12-14 PROCEDURE — G8984 CARRY CURRENT STATUS: HCPCS | Mod: GO | Performed by: OCCUPATIONAL THERAPIST

## 2017-12-14 PROCEDURE — 97110 THERAPEUTIC EXERCISES: CPT | Mod: GO | Performed by: OCCUPATIONAL THERAPIST

## 2017-12-14 PROCEDURE — 97165 OT EVAL LOW COMPLEX 30 MIN: CPT | Mod: GO | Performed by: OCCUPATIONAL THERAPIST

## 2017-12-14 PROCEDURE — 97140 MANUAL THERAPY 1/> REGIONS: CPT | Mod: GO | Performed by: OCCUPATIONAL THERAPIST

## 2017-12-14 NOTE — PROGRESS NOTES
Hand Therapy Initial Evaluation    Current Date:  2017    Subjective:  Hortencia Baldwin is a 47 year old right hand dominant female.    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 Since onset symptoms are gradually getting worse.  Special tests:  none.  Previous treatment: Self treatment with icy/hot patch, exercise and NSAID's.  General health as reported by patient is fair.  Pertinent medical history includes:overweight, smoking, dizziness/concussions, chest pain  Medical allergies:none.  Surgical history: other: breat and thigh.  Medication history: pain.    Occupational Profile Information:  Current occupation is    Currently working in normal job without restrictions  Job Tasks: prolonged sitting, repetitive tasks  Prior functional level:  no limitations  Barriers include:none  Mobility: No difficulty  Transportation: drives    Functional Outcome Measure:  Upper Extremity Functional Index  SCORE:   Column Totals: 46/80  (A lower score indicates greater disability.)    Objective:  Resisted Testin17   Elbow Ext -   Elbow Flex +   Supination +   Pronation ++   Wrist Ext and RD -   Wrist Ext and UD -   Wrist Flex and RD ++   Wrist Flex and UD ++   FDS + slight index finger     Strength:   (Measured in pounds)    17   Trials Left Right   1  2  3 42  47  45 58-  58-  57-   Average: 45 58     With elbow extended:    17   Trials Left Right   1 42 53+     Special Tests:  Date 17   Elbow Flexion Test -   Tinels Guyon's Canal -   Tinels Cubital Tunnel -     Palpation:  TP trigger point, + mild pain, ++ moderate pain, +++ severe pain  Date 17   Triceps + slight   MEP ++   Flexors -   Volar wrist -   Cubital Tunnel -   Pronator  Teres -     Sensation:  WNL throughout all nerve distributions; per patient report    Pain Report:  VAS(0-10) 12/14/17   At Rest: 0/10   With Use: 7-8/10   Location:  Medial elbow and forearm  Pain Quality:  Sharp  Frequency: intermittent    Pain is worst:  daytime  Exacerbated by:  Gripping, twisting, lifting, carrying  Relieved by:  none  Progression:  Gradually worsening  Assessment:  Patient presents with symptoms consistent with diagnosis of medial epicondylitis, with conservative intervention.     Patient's limitations or Problem List includes:  Pain, Weakness and Tightness in musculature of the right elbow which interferes with the patient's ability to perform Self Care Tasks (dressing, eating, bathing, hygiene), Work Tasks, Sleep Patterns, Recreational Activities and Household Chores as compared to previous level of function.    Rehab Potential:  Excellent - Return to full activity, no limitations    Patient will benefit from skilled Occupational Therapy to increase flexibility and overall strength and decrease pain to return to previous activity level and resume normal daily tasks and to reach their rehab potential.    Barriers to Learning:  No barrier    Communication Issues:  Patient appears to be able to clearly communicate and understand verbal and written communication and follow directions correctly.    Chart Review: Chart Review and Simple history review with patient    Identified Performance Deficits: bathing/showering, dressing, hygiene and grooming, home establishment and management, meal preparation and cleanup, sleep, work and leisure activities    Assessment of Occupational Performance:  5 or more Performance Deficits     Clinical Decision Making (Complexity): Low complexity    Treatment Explanation:  The following has been discussed with the patient:  RX ordered/plan of care  Anticipated outcomes  Possible risks and side effects    Plan:  Frequency:  1 X week, once daily  Duration:  for 6  weeks  Treatment Plan:    Modalities:  US  Therapeutic Exercise:  AROM, PROM, Tendon Gliding, Isotonics and Isometrics  Neuromuscular re-education:  Nerve Gliding, Posture and Kinesiotaping  Manual Techniques:  Friction massage and Myofascial release  Orthotic Fabrication:  Forearm based orthosis  Self Care:  Self Care Tasks, Ergonomic Considerations and Diagnostic Education    Discharge Plan:  Achieve all LTG.  Independent in home treatment program.  Reach maximal therapeutic benefit.    Home Exercise Program:  Warmth for stiffness to flexors  Ice after activity for pain to MEP  TFM to MEP  MFR to forearm flexors with tennis ball  Flexor and extensor wad stretches  Trial kinesiotaping to MEP  Lift with elbows at sides and forearms neutral     Next Visit:  See in 1 week  Assess response to HEP and kinesiotaping   Consider US to MEP and wrist neutral orthosis sleeping if no change  Progress to eccentric wrist flexor and pronation strengthening as tolerated  Trial kinesiotaping to MEP/flexors

## 2017-12-14 NOTE — MR AVS SNAPSHOT
"              After Visit Summary   12/14/2017    Hortencia Baldwin    MRN: [id number]           Patient Information     Date Of Birth          1970        Visit Information        Provider Department      12/14/2017 11:30 AM Shellie Jolly [facility] [place]        Today's Diagnoses     Right elbow pain    -  1    Medial epicondylitis of elbow, right           Follow-ups after your visit        Your next 10 appointments already scheduled     Dec 19, 2017 10:30 AM CST   INO Hand with Shellie Jolly   [facility] [place] ([facility] [place] [facility])    [address]  [place] [address]   [phone]            Dec 26, 2017 10:00 AM CST   INO Hand with Shellie Jolly   [facility] [place] ([facility] [place] [facility])    [address]  [place] [address]   [phone]              Who to contact     If you have questions or need follow up information about today's clinic visit or your schedule please contact [facility] [place] directly at [phone].  Normal or non-critical lab and imaging results will be communicated to you by MyChart, letter or phone within 4 business days after the clinic has received the results. If you do not hear from us within 7 days, please contact the clinic through [email or web address].comhart or phone. If you have a critical or abnormal lab result, we will notify you by phone as soon as possible.  Submit refill requests through Alaris or call your pharmacy and they will forward the refill request to us. Please allow 3 business days for your refill to be completed.          Additional Information About Your Visit        MyChart Information     Alaris lets you send messages to your doctor, view your test results, renew your prescriptions, schedule appointments and more. To sign up, go to [email or web address] . Click on \"Log in\" on the " "left side of the screen, which will take you to the Welcome page. Then click on \"Sign up Now\" on the right side of the page.     You will be asked to enter the access code listed below, as well as some personal information. Please follow the directions to create your username and password.     Your access code is: [email or web address]  Expires: 2018  5:24 PM     Your access code will  in 90 days. If you need help or a new code, please call your [place] clinic or [phone].        Care EveryWhere ID     This is your Care EveryWhere ID. This could be used by other organizations to access your [place] medical records  [id number]         Blood Pressure from Last 3 Encounters:   17 126/74   17 110/69   17 128/70    Weight from Last 3 Encounters:   17 82.6 kg (182 lb)   17 81 kg (178 lb 8 oz)   17 81.6 kg (180 lb)              We Performed the Following     HC OT EVAL, LOW COMPLEXITY     INO INITIAL EVAL REPORT     MANUAL THER TECH,1+REGIONS,EA 15 MIN     THERAPEUTIC EXERCISES        Primary Care Provider Office Phone # Fax #    Jacki Renner [phone]       [address] [place] [address]        Equal Access to Services     GIL LUA : Hadii aad ku hadasho Soomaali, waaxda luqadaha, qaybta kaalmada adeegyada, jaquelin ceravntes. So [facility] [phone].    ATENCIÓN: Si habla español, tiene a phillips disposición servicios gratuitos de asistencia lingüística. Llame al [phone].    We comply with applicable federal civil rights laws and [place] laws. We do not discriminate on the basis of race, color, national origin, age, disability, sex, sexual orientation, or gender identity.            Thank you!     Thank you for choosing [facility]  for your care. Our goal is always to provide you with excellent care. Hearing back from our patients is one way we can continue to improve our " services. Please take a few minutes to complete the written survey that you may receive in the mail after your visit with us. Thank you!             Your Updated Medication List - Protect others around you: Learn how to safely use, store and throw away your medicines at www.disposemymeds.org.          This list is accurate as of: 12/14/17 12:13 PM.  Always use your most recent med list.                   Brand Name Dispense Instructions for use Diagnosis    docusate sodium 100 MG tablet    COLACE    60 tablet    Take 100 mg by mouth 2 times daily as needed for constipation    Soft tissue mass       HYDROcodone-acetaminophen 5-325 MG per tablet    NORCO    30 tablet    Take 1-2 tablets by mouth every 4 hours as needed for other (Moderate to Severe Pain)    Soft tissue mass       order for DME     1 Device    Equipment being ordered: arm band    Medial epicondylitis, right

## 2017-12-19 ENCOUNTER — THERAPY VISIT (OUTPATIENT)
Dept: OCCUPATIONAL THERAPY | Facility: CLINIC | Age: 47
End: 2017-12-19
Payer: COMMERCIAL

## 2017-12-19 DIAGNOSIS — M25.521 RIGHT ELBOW PAIN: ICD-10-CM

## 2017-12-19 DIAGNOSIS — M77.01 MEDIAL EPICONDYLITIS OF ELBOW, RIGHT: ICD-10-CM

## 2017-12-19 PROCEDURE — 97110 THERAPEUTIC EXERCISES: CPT | Mod: GO | Performed by: OCCUPATIONAL THERAPIST

## 2017-12-19 PROCEDURE — 97140 MANUAL THERAPY 1/> REGIONS: CPT | Mod: GO | Performed by: OCCUPATIONAL THERAPIST

## 2017-12-19 NOTE — MR AVS SNAPSHOT
"              After Visit Summary   12/19/2017    Hortencia Baldwin    MRN: [id number]           Patient Information     Date Of Birth          1970        Visit Information        Provider Department      12/19/2017 10:30 AM Shellie Jolly [facility] [place]        Today's Diagnoses     Right elbow pain        Medial epicondylitis of elbow, right           Follow-ups after your visit        Your next 10 appointments already scheduled     Dec 22, 2017  4:20 PM CST   SHORT with Esther Holloway PA-C   [facility] ([facility]    [address]  [place] [address]   [phone]            Dec 26, 2017 10:00 AM CST   INO Hand with Shellie Jolly   [facility] [place] ([facility] [place] [facility])    [address]  [place] [address] [phone]              Who to contact     If you have questions or need follow up information about today's clinic visit or your schedule please contact [facility] [place] directly at [phone].  Normal or non-critical lab and imaging results will be communicated to you by MyChart, letter or phone within 4 business days after the clinic has received the results. If you do not hear from us within 7 days, please contact the clinic through Splendid Labhart or phone. If you have a critical or abnormal lab result, we will notify you by phone as soon as possible.  Submit refill requests through Eka [employer] or call your pharmacy and they will forward the refill request to us. Please allow 3 business days for your refill to be completed.          Additional Information About Your Visit        MyChart Information     [employer] lets you send messages to your doctor, view your test results, renew your prescriptions, schedule appointments and more. To sign up, go to [email or web address] . Click on \"Log in\" on the left side of the screen, " "which will take you to the Welcome page. Then click on \"Sign up Now\" on the right side of the page.     You will be asked to enter the access code listed below, as well as some personal information. Please follow the directions to create your username and password.     Your access code is: [email or web address]  Expires: 2018  5:24 PM     Your access code will  in 90 days. If you need help or a new code, please call your [place] clinic or [phone].        Care EveryWhere ID     This is your Care EveryWhere ID. This could be used by other organizations to access your [place] medical records  [id number]         Blood Pressure from Last 3 Encounters:   17 126/74   17 110/69   17 128/70    Weight from Last 3 Encounters:   17 82.6 kg (182 lb)   17 81 kg (178 lb 8 oz)   17 81.6 kg (180 lb)              We Performed the Following     MANUAL THER TECH,1+REGIONS,EA 15 MIN     THERAPEUTIC EXERCISES        Primary Care Provider Office Phone # Fax #    Jacki Renner [phone]       [address] [facility] [address]        Equal Access to Services     GIL LUA : Hadii aad ku hadasho Soomaali, waaxda luqadaha, qaybta kaalmada adeegyada, waxay idiin haykhadran megha cervantes. So [facility] [phone].    ATENCIÓN: Si habla español, tiene a phillips disposición servicios gratuitos de asistencia lingüística. Llame al [phone].    We comply with applicable federal civil rights laws and [place] laws. We do not discriminate on the basis of race, color, national origin, age, disability, sex, sexual orientation, or gender identity.            Thank you!     Thank you for choosing [facility] [place]  for your care. Our goal is always to provide you with excellent care. Hearing back from our patients is one way we can continue to improve our services. Please take a few minutes to complete the written survey that you may receive in " the mail after your visit with us. Thank you!             Your Updated Medication List - Protect others around you: Learn how to safely use, store and throw away your medicines at www.disposemymeds.org.          This list is accurate as of: 12/19/17 10:57 AM.  Always use your most recent med list.                   Brand Name Dispense Instructions for use Diagnosis    docusate sodium 100 MG tablet    COLACE    60 tablet    Take 100 mg by mouth 2 times daily as needed for constipation    Soft tissue mass       HYDROcodone-acetaminophen 5-325 MG per tablet    NORCO    30 tablet    Take 1-2 tablets by mouth every 4 hours as needed for other (Moderate to Severe Pain)    Soft tissue mass       order for DME     1 Device    Equipment being ordered: arm band    Medial epicondylitis, right

## 2017-12-19 NOTE — PROGRESS NOTES
SOAP note objective information for 12/19/2017:    Palpation:  TP trigger point, + mild pain, ++ moderate pain, +++ severe pain  Date 12/14/17 12/19/17   Triceps + slight -   MEP ++ +   Flexors - +   Volar wrist - -   Cubital Tunnel - -   Pronator Teres - -     Pain Report:  VAS(0-10) 12/14/17 12/19/17   At Rest: 0/10    With Use: 7-8/10 5-6/10   Location:  Medial elbow and forearm    Home Exercise Program:  Warmth for stiffness to flexors  Ice after activity for pain to MEP  TFM to MEP  MFR to forearm flexors with tennis ball  Flexor and extensor wad stretches  Eccentric wrist flexor strengthening  Kinesiotaping to MEP  Lift with elbows at sides and forearms neutral     Next Visit:  Continue 1x/week with US to MEP, TFM, MFR and kinesiotaping  Assess response to eccentric wrist flexor strengthening  Progress to pronation strengthening    Please refer to the daily flowsheet for treatment today, total treatment time and time spent performing 1:1 timed codes.

## 2017-12-21 NOTE — PROGRESS NOTES
"  SUBJECTIVE:   Hortencia Baldwin is a 47 year old female who presents to clinic today for the following health issues:      Concern - Derm  Onset: x2-3 days, x6 months    Description:   Lump on back and thigh. Tender to touch.    Intensity: mild    Has had a few lipomas removed in the past  Interested in having these removed also        Problem list and histories reviewed & adjusted, as indicated.  Additional history: as documented    Patient Active Problem List   Diagnosis     Left medial knee pain     Obesity (BMI 30-39.9)     Right elbow pain     Medial epicondylitis of elbow, right     Past Surgical History:   Procedure Laterality Date     EXCISE MASS LOWER EXTREMITY Bilateral 5/31/2017    Procedure: EXCISE MASS LOWER EXTREMITY;  EXCISION OF LIPOMAS RIGHT BUTTOCKS RIGHT LOWER THIGH, LEFT UPPER OUTER THIGH;  Surgeon: Avani Szymanski MD;  Location:  [facility]     LUMPECTOMY BREAST Left 1990s    benign       Social History   Substance Use Topics     Smoking status: Current Every Day Smoker     Packs/day: 0.50     Years: 10.00     Types: Cigarettes     Smokeless tobacco: Never Used     Alcohol use Yes      Comment: 1-2 drinks/month     Family History   Problem Relation Age of Onset     Hypertension Mother      DIABETES No family hx of      CEREBROVASCULAR DISEASE No family hx of      Myocardial Infarction No family hx of      Coronary Artery Disease Early Onset No family hx of      Breast Cancer No family hx of      Ovarian Cancer No family hx of      Colon Cancer No family hx of              Reviewed and updated as needed this visit by clinical staffTobacco  Allergies  Meds  Med Hx  Surg Hx  Fam Hx  Soc Hx      Reviewed and updated as needed this visit by Provider         ROS:  Constitutional, skin systems are negative, except as otherwise noted.      OBJECTIVE:                                                    /83  Pulse 106  Ht 5' 1\" (1.549 m)  Wt 183 lb (83 kg)  SpO2 98%  BMI 34.58 kg/m2  Body mass " index is 34.58 kg/(m^2).  GENERAL APPEARANCE: healthy, alert and no distress  SKIN: small subQ masses noted of low back and right thigh  PSYCH: mentation appears normal and affect normal/bright       ASSESSMENT:                                                      1. Lipoma, unspecified site    2. Need for vaccination         PLAN:                                                    Gen surgery referral for removal of lipomas.     The patient was in agreement with the plan today and had no questions or concerns prior to leaving the clinic.     Esther Holloway PA-C  [facility]

## 2017-12-22 ENCOUNTER — OFFICE VISIT (OUTPATIENT)
Dept: FAMILY MEDICINE | Facility: CLINIC | Age: 47
End: 2017-12-22
Payer: COMMERCIAL

## 2017-12-22 VITALS
BODY MASS INDEX: 34.55 KG/M2 | DIASTOLIC BLOOD PRESSURE: 83 MMHG | WEIGHT: 183 LBS | HEIGHT: 61 IN | HEART RATE: 106 BPM | OXYGEN SATURATION: 98 % | SYSTOLIC BLOOD PRESSURE: 138 MMHG

## 2017-12-22 DIAGNOSIS — Z23 NEED FOR VACCINATION: ICD-10-CM

## 2017-12-22 DIAGNOSIS — D17.9 LIPOMA, UNSPECIFIED SITE: Primary | ICD-10-CM

## 2017-12-22 PROCEDURE — 99213 OFFICE O/P EST LOW 20 MIN: CPT | Mod: 25 | Performed by: PHYSICIAN ASSISTANT

## 2017-12-22 PROCEDURE — 90471 IMMUNIZATION ADMIN: CPT | Performed by: PHYSICIAN ASSISTANT

## 2017-12-22 PROCEDURE — 90715 TDAP VACCINE 7 YRS/> IM: CPT | Performed by: PHYSICIAN ASSISTANT

## 2017-12-22 NOTE — MR AVS SNAPSHOT
After Visit Summary   12/22/2017    Hortencia Baldwin    MRN: [id number]           Patient Information     Date Of Birth          1970        Visit Information        Provider Department      12/22/2017 4:20 PM Esther Holloway PA-C [facility] [place]        Today's Diagnoses     Need for vaccination    -  1    Lipoma, unspecified site           Follow-ups after your visit        Additional Services     GENERAL SURG ADULT REFERRAL       Your provider has referred you to: [facility]: [facility] [place] [phone]   [email or web address]    Has seen Dr. Avani Szymanski in the past    Please be aware that coverage of these services is subject to the terms and limitations of your health insurance plan.  Call member services at your health plan with any benefit or coverage questions.      Please bring the following with you to your appointment:    (1) Any X-Rays, CTs or MRIs which have been performed.  Contact the facility where they were done to arrange for  prior to your scheduled appointment.   (2) List of current medications   (3) This referral request   (4) Any documents/labs given to you for this referral                  Your next 10 appointments already scheduled     Dec 26, 2017 10:00 AM CST   [place] with Shellie Jolly   [facility] [place] [facility] [place]    [address]  [place] [address] [phone]              Who to contact     Normal or non-critical lab and imaging results will be communicated to you by MyChart, letter or phone within 4 business days after the clinic has received the results. If you do not hear from us within 7 days, please contact the clinic through MyChart or phone. If you have a critical or abnormal lab result, we will notify you by phone as soon as possible.  Submit refill requests through [employer] or call your pharmacy and they will forward the refill  "request to us. Please allow 3 business days for your refill to be completed.          If you need to speak with a  for additional information , please call: [phone]             Additional Information About Your Visit        [employer] Information     [employer] lets you send messages to your doctor, view your test results, renew your prescriptions, schedule appointments and more. To sign up, go to [email or web address] . Click on \"Log in\" on the left side of the screen, which will take you to the Welcome page. Then click on \"Sign up Now\" on the right side of the page.     You will be asked to enter the access code listed below, as well as some personal information. Please follow the directions to create your username and password.     Your access code is: [email or web address]  Expires: 2018  5:24 PM     Your access code will  in 90 days. If you need help or a new code, please call your [place] clinic or [phone].        Care EveryWhere ID     This is your Care EveryWhere ID. This could be used by other organizations to access your [place] medical records  [id number]        Your Vitals Were     Pulse Height Pulse Oximetry BMI (Body Mass Index)          106 5' 1\" (1.549 m) 98% 34.58 kg/m2         Blood Pressure from Last 3 Encounters:   17 138/83   17 126/74   17 110/69    Weight from Last 3 Encounters:   17 183 lb (83 kg)   17 182 lb (82.6 kg)   17 178 lb 8 oz (81 kg)              We Performed the Following     GENERAL SURG ADULT REFERRAL     TDAP VACCINE (ADACEL)     VACCINE ADMINISTRATION, INITIAL        Primary Care Provider Office Phone # Fax #    Jacki Renner [phone]       [address] [facility] [address]        Equal Access to Services     GIL LUA : Jimi Marquez, sirena johnston, qajaquelin pena . [facility] [phone].    ATENCIÓN: Si joan " español, tiene a phillips disposición servicios gratuitos de asistencia lingüística. Cortez marcano [phone].    We comply with applicable federal civil rights laws and [place] laws. We do not discriminate on the basis of race, color, national origin, age, disability, sex, sexual orientation, or gender identity.            Thank you!     Thank you for choosing [facility]  for your care. Our goal is always to provide you with excellent care. Hearing back from our patients is one way we can continue to improve our services. Please take a few minutes to complete the written survey that you may receive in the mail after your visit with us. Thank you!             Your Updated Medication List - Protect others around you: Learn how to safely use, store and throw away your medicines at www.disposemymeds.org.          This list is accurate as of: 12/22/17  4:55 PM.  Always use your most recent med list.                   Brand Name Dispense Instructions for use Diagnosis    docusate sodium 100 MG tablet    COLACE    60 tablet    Take 100 mg by mouth 2 times daily as needed for constipation    Soft tissue mass       HYDROcodone-acetaminophen 5-325 MG per tablet    NORCO    30 tablet    Take 1-2 tablets by mouth every 4 hours as needed for other (Moderate to Severe Pain)    Soft tissue mass       order for DME     1 Device    Equipment being ordered: arm band    Medial epicondylitis, right

## 2018-01-23 ENCOUNTER — OFFICE VISIT (OUTPATIENT)
Dept: SURGERY | Facility: CLINIC | Age: 48
End: 2018-01-23
Payer: COMMERCIAL

## 2018-01-23 VITALS
BODY MASS INDEX: 33.13 KG/M2 | WEIGHT: 180 LBS | HEART RATE: 85 BPM | DIASTOLIC BLOOD PRESSURE: 70 MMHG | HEIGHT: 62 IN | SYSTOLIC BLOOD PRESSURE: 110 MMHG

## 2018-01-23 DIAGNOSIS — M79.89 SOFT TISSUE MASS: Primary | ICD-10-CM

## 2018-01-23 PROCEDURE — 99214 OFFICE O/P EST MOD 30 MIN: CPT | Performed by: SURGERY

## 2018-01-23 NOTE — NURSING NOTE
Location: Right upper leg and left side of back    Pain: Yes    Growing size: No    Had it previously removed: No    First noticed it: 2-3 week(s) ago    Laura Harrell MA

## 2018-01-23 NOTE — MR AVS SNAPSHOT
"              After Visit Summary   2018    Hortencia Baldwin    MRN: [id number]           Patient Information     Date Of Birth          1970        Visit Information        Provider Department      2018 9:00 AM Avani Szymanski MD [facility]       Follow-ups after your visit        Who to contact     If you have questions or need follow up information about today's clinic visit or your schedule please contact [facility] directly at [phone].  Normal or non-critical lab and imaging results will be communicated to you by CAPNIAhart, letter or phone within 4 business days after the clinic has received the results. If you do not hear from us within 7 days, please contact the clinic through CAPNIAhart or phone. If you have a critical or abnormal lab result, we will notify you by phone as soon as possible.  Submit refill requests through SpeakingPal or call your pharmacy and they will forward the refill request to us. Please allow 3 business days for your refill to be completed.          Additional Information About Your Visit        MyChart Information     [employer] lets you send messages to your doctor, view your test results, renew your prescriptions, schedule appointments and more. To sign up, go to [email or web address] . Click on \"Log in\" on the left side of the screen, which will take you to the Welcome page. Then click on \"Sign up Now\" on the right side of the page.     You will be asked to enter the access code listed below, as well as some personal information. Please follow the directions to create your username and password.     Your access code is: [email or web address]  Expires: 2018  5:24 PM     Your access code will  in 90 days. If you need help or a new code, please call your [place] clinic or [phone].        Care EveryWhere ID     This is your Care EveryWhere ID. This could be used by other organizations to " "access your [place] medical records  [id number]        Your Vitals Were     Pulse Height BMI (Body Mass Index)             85 5' 2\" (1.575 m) 32.92 kg/m2          Blood Pressure from Last 3 Encounters:   01/23/18 110/70   12/22/17 138/83   11/28/17 126/74    Weight from Last 3 Encounters:   01/23/18 180 lb (81.6 kg)   12/22/17 183 lb (83 kg)   11/28/17 182 lb (82.6 kg)              Today, you had the following     No orders found for display       Primary Care Provider Office Phone # Fax #    Jacki Renner [phone]       [address] [facility] [address]        Equal Access to Services     GIL LUA : Hadii irma bealo Sofelisha, waaxda luqadaha, qaybta kaalmada adeegyada, jaquelin dixon . So [facility] [phone].    ATENCIÓN: Si habla español, tiene a phillips disposición servicios gratuitos de asistencia lingüística. Llame al [phone].    We comply with applicable federal civil rights laws and [place] laws. We do not discriminate on the basis of race, color, national origin, age, disability, sex, sexual orientation, or gender identity.            Thank you!     Thank you for choosing [facility]  for your care. Our goal is always to provide you with excellent care. Hearing back from our patients is one way we can continue to improve our services. Please take a few minutes to complete the written survey that you may receive in the mail after your visit with us. Thank you!             Your Updated Medication List - Protect others around you: Learn how to safely use, store and throw away your medicines at www.disposemymeds.org.          This list is accurate as of: 1/23/18  9:42 AM.  Always use your most recent med list.                   Brand Name Dispense Instructions for use Diagnosis    order for DME     1 Device    Equipment being ordered: arm band    Medial epicondylitis, right         "

## 2018-01-24 NOTE — PROGRESS NOTES
[facility] General Surgery Clinic Consultation    CHIEF COMPLAINT:  Chief Complaint   Patient presents with     Consult     Lump on right leg; left mid back        HISTORY OF PRESENT ILLNESS:  Hortencia Baldwin is a 47 year old female who is seen in consultation at the request of  for evaluation of a new palpable mass on her right upper thigh and a separate mass on her left lower back. These are both small but painful with palpation. She does not think they have changed much in size. I removed a lipoma for Irma previously and she would like these new lesions removed as they are painful.    REVIEW OF SYSTEMS:  10 point review of systems completed and otherwise negative aside from as listed in HPI.     Past Medical History:   Diagnosis Date     Obesity (BMI 30-39.9)        Past Surgical History:   Procedure Laterality Date     EXCISE MASS LOWER EXTREMITY Bilateral 5/31/2017    Procedure: EXCISE MASS LOWER EXTREMITY;  EXCISION OF LIPOMAS RIGHT BUTTOCKS RIGHT LOWER THIGH, LEFT UPPER OUTER THIGH;  Surgeon: Avani Szymanski MD;  [facility]:  [facility]     LUMPECTOMY BREAST Left 1990s    benign       Family History   Problem Relation Age of Onset     Hypertension Mother      DIABETES No family hx of      CEREBROVASCULAR DISEASE No family hx of      Myocardial Infarction No family hx of      Coronary Artery Disease Early Onset No family hx of      Breast Cancer No family hx of      Ovarian Cancer No family hx of      Colon Cancer No family hx of        Social History   Substance Use Topics     Smoking status: Current Every Day Smoker     Packs/day: 0.50     Years: 10.00     Types: Cigarettes     Smokeless tobacco: Never Used     Alcohol use Yes      Comment: 1-2 drinks/month       Patient Active Problem List   Diagnosis     Left medial knee pain     Obesity (BMI 30-39.9)     Right elbow pain     Medial epicondylitis of elbow, right       No Known Allergies    Current Outpatient Prescriptions   Medication Sig Dispense Refill  "    order for DME Equipment being ordered: arm band 1 Device 0       Vitals: /70  Pulse 85  Ht 1.575 m (5' 2\")  Wt 81.6 kg (180 lb)  BMI 32.92 kg/m2  BMI= Body mass index is 32.92 kg/(m^2).    EXAM:  GENERAL: healthy, alert and no distress   PSYCH: pleasant, normal affect  HEENT: moist mucus membranes, no scleral icterus  CARDIOVASCULAR:  RRR  RESPIRATORY: non labored breathing  NECK: Neck supple. No adenopathy. 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     LYMPH: no axillary adenopathy    All labs and imaging personally reviewed and significant for none    ASSESSMENT:  Hortencia Baldwin is a 47 year old with a PMH s/f prior lipoma excision who presents with two symptomatic likely lipomas. We discussed options and she would like them excised. .       PLAN:  Excision of two soft tissue masses at her convenience.     It was my pleasure to participate in the care of Hortencia Baldwin in clinic today. Thank you for this consultation.         Avani Szymanski MD    Please route or send letter to:  Primary Care Provider (PCP) and Referring Provider    "

## 2018-01-26 NOTE — PROGRESS NOTES
[facility]  [address] [address]  [place] [address]  [phone]  Dept: [phone]    PRE-OP EVALUATION:  Today's date: 2018    Hortencia Baldwin (: 1970) presents for pre-operative evaluation assessment as requested by Dr. Szymanski.  She requires evaluation and anesthesia risk assessment prior to undergoing surgery/procedure for treatment of lipoma .  Proposed procedure: EXCISION OF RIGHT UPPER ANTERIOR THIGH SOFT TISSUE MASS,EXCISION OF LEFT LOWER BACK SOFT TISSUE MASS    Date of Surgery/ Procedure: 18  Time of Surgery/ Procedure: 840am  Hospital/Surgical Facility: [facility]  Primary Physician: Jacki Renner  Type of Anesthesia Anticipated: General    Patient has a Health Care Directive or Living Will:  NO    1. NO - Do you have a history of heart attack, stroke, stent, bypass or surgery on an artery in the head, neck, heart or legs?  2. YES - Do you ever have any pain or discomfort in your chest? Randomly, once monthly  3. NO - Do you have a history of  Heart Failure?  4. YES - Are you troubled by shortness of breath when: walking on the level, up a slight hill or at night? Starts after climbing 2 flights of stairs, not accompanied with any chest pain, shortness of breath only when walking on an incline, no problems with dancing  5. NO - Do you currently have a cold, bronchitis or other respiratory infection?  6. NO - Do you have a cough, shortness of breath or wheezing?  7. NO - Do you sometimes get pains in the calves of your legs when you walk?  8. NO - Do you or anyone in your family have previous history of blood clots?  9. NO - Do you or does anyone in your family have a serious bleeding problem such as prolonged bleeding following surgeries or cuts?  10. NO - Have you ever had problems with anemia or been told to take iron pills?  11. NO - Have you had any abnormal blood loss such as black, tarry or bloody stools, or abnormal vaginal bleeding?  12. NO - Have you  ever had a blood transfusion?  13. NO - Have you or any of your relatives ever had problems with anesthesia?  14. NO - Do you have sleep apnea, excessive snoring or daytime drowsiness?  15. NO - Do you have any prosthetic heart valves?  16. NO - Do you have prosthetic joints?  17. NO - Is there any chance that you may be pregnant?      HPI:                                                      Brief HPI related to upcoming procedure: Soft tissue mass of thigh, right, and left lower back      See problem list for active medical problems.  Problems all longstanding and stable, except as noted/documented.  See ROS for pertinent symptoms related to these conditions.                                                                                                  .    MEDICAL HISTORY:                                                      Patient Active Problem List    Diagnosis Date Noted     Right elbow pain 12/14/2017     Priority: Medium     Medial epicondylitis of elbow, right 12/14/2017     Priority: Medium     Obesity (BMI 30-39.9)      Priority: Medium     Left medial knee pain 05/26/2017     Priority: Medium      Past Medical History:   Diagnosis Date     Obesity (BMI 30-39.9)      Past Surgical History:   Procedure Laterality Date     EXCISE MASS LOWER EXTREMITY Bilateral 5/31/2017    Procedure: EXCISE MASS LOWER EXTREMITY;  EXCISION OF LIPOMAS RIGHT BUTTOCKS RIGHT LOWER THIGH, LEFT UPPER OUTER THIGH;  Surgeon: Avani Szymanski MD;  Location: [facility]     LUMPECTOMY BREAST Left 1990s    benign     Current Outpatient Prescriptions   Medication Sig Dispense Refill     order for DME Equipment being ordered: arm band (Patient not taking: Reported on 1/29/2018) 1 Device 0     OTC products: None, except as noted above    No Known Allergies   Latex Allergy: NO    Social History   Substance Use Topics     Smoking status: Current Every Day Smoker     Packs/day: 0.50     Years: 10.00     Types: Cigarettes     Smokeless  "tobacco: Never Used     Alcohol use Yes      Comment: 1-2 drinks/month     History   Drug Use No       REVIEW OF SYSTEMS:                                                    Constitutional, neuro, ENT, endocrine, pulmonary, cardiac, gastrointestinal, genitourinary, musculoskeletal, integument and psychiatric systems are negative, except as otherwise noted.    EXAM:                                                    /84  Pulse 96  Ht 5' 2\" (1.575 m)  Wt 183 lb (83 kg)  SpO2 99%  BMI 33.47 kg/m2    GENERAL APPEARANCE: healthy, alert and no distress     EYES: EOMI, PERRL     HENT: ear canals and TM's normal and nose and mouth without ulcers or lesions     NECK: no adenopathy, no asymmetry, masses, or scars and thyroid normal to palpation     RESP: lungs clear to auscultation - no rales, rhonchi or wheezes     CV: regular rates and rhythm, normal S1 S2, no S3 or S4 and no murmur, click or rub     ABDOMEN:  soft, nontender, no HSM or masses and bowel sounds normal     MS: extremities normal- no gross deformities noted, no evidence of inflammation in joints, FROM in all extremities.     SKIN: no suspicious lesions or rashes     NEURO: Normal strength and tone, sensory exam grossly normal, mentation intact and speech normal     PSYCH: mentation appears normal. and affect normal/bright     LYMPHATICS: No axillary, cervical, or supraclavicular nodes    DIAGNOSTICS:                                                      EKG: appears normal, NSR, normal axis, normal intervals, no acute ST/T changes c/w ischemia, no LVH by voltage criteria  Labs Resulted Today:   Results for orders placed or performed in visit on 01/29/18   CBC with platelets   Result Value Ref Range    WBC 8.3 4.0 - 11.0 10e9/L    RBC Count 3.03 (L) 3.8 - 5.2 10e12/L    Hemoglobin 10.9 (L) 11.7 - 15.7 g/dL    Hematocrit 32.8 (L) 35.0 - 47.0 %     (H) 78 - 100 fl    MCH 36.0 (H) 26.5 - 33.0 pg    MCHC 33.2 31.5 - 36.5 g/dL    RDW 14.0 10.0 - 15.0 " %    Platelet Count 255 150 - 450 10e9/L       Recent Labs   Lab Test  05/30/17   1155   HGB  10.7*   PLT  291   NA  141   POTASSIUM  3.7   CR  0.63        IMPRESSION:                                                    Reason for surgery/procedure: Soft tissue of mass, right thigh and left lower back  Diagnosis/reason for consult: Pre op consult    The proposed surgical procedure is considered INTERMEDIATE risk.    REVISED CARDIAC RISK INDEX  The patient has the following serious cardiovascular risks for perioperative complications such as (MI, PE, VFib and 3  AV Block):  No serious cardiac risks  INTERPRETATION: 0 risks: Class I (very low risk - 0.4% complication rate)    The patient has the following additional risks for perioperative complications:  No identified additional risks      ICD-10-CM    1. Preop general physical exam Z01.818 CBC with platelets   2. Subcutaneous mass R22.9    3. SOB (shortness of breath) R06.02 EKG 12-lead complete w/read - Clinics   4. Anemia, unspecified type D64.9 Folate     Reticulocyte Count     Blood Morphology Pathologist Review       RECOMMENDATIONS:                                                      APPROVAL GIVEN to proceed with proposed procedure, without further diagnostic evaluation       Will obtain a folate and peripheral smear in the next month for anemia. No real changes in her labs from May 2017.    Patient's EKG appears normal. Her shortness of breath with stair climbing is likely related to deconditioning with stairs. She does not note any shortness of breath with walking or dancing. States she goes dancing for 2-3 hours at a time.      Signed Electronically by: Esther Holloway PA-C    Copy of this evaluation report is provided to requesting physician.    [place] Preop Guidelines

## 2018-01-26 NOTE — PATIENT INSTRUCTIONS
Follow up in the next 2 months for repeat blood work.  I would like to check a couple more things regarding your anemia.      Before Your Surgery      Call your surgeon if there is any change in your health. This includes signs of a cold or flu (such as a sore throat, runny nose, cough, rash or fever).    Do not smoke, drink alcohol or take over the counter medicine (unless your surgeon or primary care doctor tells you to) for the 24 hours before and after surgery.    If you take prescribed drugs: Follow your doctor s orders about which medicines to take and which to stop until after surgery.    Eating and drinking prior to surgery: follow the instructions from your surgeon    Take a shower or bath the night before surgery. Use the soap your surgeon gave you to gently clean your skin. If you do not have soap from your surgeon, use your regular soap. Do not shave or scrub the surgery site.  Wear clean pajamas and have clean sheets on your bed.

## 2018-01-29 ENCOUNTER — OFFICE VISIT (OUTPATIENT)
Dept: FAMILY MEDICINE | Facility: CLINIC | Age: 48
End: 2018-01-29
Payer: COMMERCIAL

## 2018-01-29 VITALS
DIASTOLIC BLOOD PRESSURE: 84 MMHG | WEIGHT: 183 LBS | HEIGHT: 62 IN | SYSTOLIC BLOOD PRESSURE: 131 MMHG | BODY MASS INDEX: 33.68 KG/M2 | HEART RATE: 96 BPM | OXYGEN SATURATION: 99 %

## 2018-01-29 DIAGNOSIS — R22.9 SUBCUTANEOUS MASS: ICD-10-CM

## 2018-01-29 DIAGNOSIS — D64.9 ANEMIA, UNSPECIFIED TYPE: ICD-10-CM

## 2018-01-29 DIAGNOSIS — R06.02 SOB (SHORTNESS OF BREATH): ICD-10-CM

## 2018-01-29 DIAGNOSIS — Z01.818 PREOP GENERAL PHYSICAL EXAM: Primary | ICD-10-CM

## 2018-01-29 LAB
ERYTHROCYTE [DISTWIDTH] IN BLOOD BY AUTOMATED COUNT: 14 % (ref 10–15)
HCT VFR BLD AUTO: 32.8 % (ref 35–47)
HGB BLD-MCNC: 10.9 G/DL (ref 11.7–15.7)
MCH RBC QN AUTO: 36 PG (ref 26.5–33)
MCHC RBC AUTO-ENTMCNC: 33.2 G/DL (ref 31.5–36.5)
MCV RBC AUTO: 108 FL (ref 78–100)
PLATELET # BLD AUTO: 255 10E9/L (ref 150–450)
RBC # BLD AUTO: 3.03 10E12/L (ref 3.8–5.2)
WBC # BLD AUTO: 8.3 10E9/L (ref 4–11)

## 2018-01-29 PROCEDURE — 36415 COLL VENOUS BLD VENIPUNCTURE: CPT | Performed by: PHYSICIAN ASSISTANT

## 2018-01-29 PROCEDURE — 85027 COMPLETE CBC AUTOMATED: CPT | Performed by: PHYSICIAN ASSISTANT

## 2018-01-29 PROCEDURE — 93000 ELECTROCARDIOGRAM COMPLETE: CPT | Performed by: PHYSICIAN ASSISTANT

## 2018-01-29 PROCEDURE — 99214 OFFICE O/P EST MOD 30 MIN: CPT | Performed by: PHYSICIAN ASSISTANT

## 2018-01-29 NOTE — MR AVS SNAPSHOT
After Visit Summary   1/29/2018    Hortencia Baldwin    MRN: [id number]           Patient Information     Date Of Birth          1970        Visit Information        Provider Department      1/29/2018 3:00 PM Esther Holloway PA-C [facility]        Today's Diagnoses     Preop general physical exam    -  1    Subcutaneous mass        SOB (shortness of breath)        Anemia, unspecified type          Care Instructions    Follow up in the next 2 months for repeat blood work.  I would like to check a couple more things regarding your anemia.      Before Your Surgery      Call your surgeon if there is any change in your health. This includes signs of a cold or flu (such as a sore throat, runny nose, cough, rash or fever).    Do not smoke, drink alcohol or take over the counter medicine (unless your surgeon or primary care doctor tells you to) for the 24 hours before and after surgery.    If you take prescribed drugs: Follow your doctor s orders about which medicines to take and which to stop until after surgery.    Eating and drinking prior to surgery: follow the instructions from your surgeon    Take a shower or bath the night before surgery. Use the soap your surgeon gave you to gently clean your skin. If you do not have soap from your surgeon, use your regular soap. Do not shave or scrub the surgery site.  Wear clean pajamas and have clean sheets on your bed.           Follow-ups after your visit        Your next 10 appointments already scheduled     Feb 05, 2018   Procedure with Avani Szymanski MD   [facility] (--)    [address]  [facility] [address]   [phone]            Feb 05, 2018  8:45 AM CST   [facility] Same Day Surgery with Avani Szymanski MD   [facility] Surgery [facility]uling ([facility])    [facility] Surgery [facility] ([facility])   [phone]              Future tests  "that were ordered for you today     Open Future Orders        Priority Expected Expires Ordered    Folate Routine  3/29/2018 2018    Reticulocyte Count Routine  3/29/2018 2018    Blood Morphology Pathologist Review Routine  3/29/2018 2018            Who to contact     Normal or non-critical lab and imaging results will be communicated to you by [employer], letter or phone within 4 business days after the clinic has received the results. If you do not hear from us within 7 days, please contact the clinic through [email or web address] or phone. If you have a critical or abnormal lab result, we will notify you by phone as soon as possible.  Submit refill requests through [employer] or call your pharmacy and they will forward the refill request to us. Please allow 3 business days for your refill to be completed.          If you need to speak with a  for additional information , please call: [phone]             Additional Information About Your Visit        [employer] Information     [employer] lets you send messages to your doctor, view your test results, renew your prescriptions, schedule appointments and more. To sign up, go to [email or web address] . Click on \"Log in\" on the left side of the screen, which will take you to the Welcome page. Then click on \"Sign up Now\" on the right side of the page.     You will be asked to enter the access code listed below, as well as some personal information. Please follow the directions to create your username and password.     Your access code is: [email or web address]  Expires: 2018  5:24 PM     Your access code will  in 90 days. If you need help or a new code, please call your [place] clinic or [phone].        Care EveryWhere ID     This is your Care EveryWhere ID. This could be used by other organizations to access your [place] medical records  [id number]        Your Vitals Were     Pulse Height Pulse Oximetry BMI (Body Mass Index)          96 5' 2\" " (1.575 m) 99% 33.47 kg/m2         Blood Pressure from Last 3 Encounters:   01/29/18 131/84   01/23/18 110/70   12/22/17 138/83    Weight from Last 3 Encounters:   01/29/18 183 lb (83 kg)   01/23/18 180 lb (81.6 kg)   12/22/17 183 lb (83 kg)              We Performed the Following     CBC with platelets     EKG 12-lead complete w/read - Clinics        Primary Care Provider Office Phone # Fax #    Jacki Renner [phone]       [address] [facility] [address]        Equal Access to Services     [facility] : Hadii aad ku hadasho Soleftyali, waaxda luqadaha, qaybta kaalmada adeorlyyada, jaquelin dixon . [facility] [phone].    ATENCIÓN: Si habla español, tiene a phillips disposición servicios gratuitos de asistencia lingüística. Llame al [phone].    We comply with applicable federal civil rights laws and [place] laws. We do not discriminate on the basis of race, color, national origin, age, disability, sex, sexual orientation, or gender identity.            Thank you!     Thank you for choosing [facility]  for your care. Our goal is always to provide you with excellent care. Hearing back from our patients is one way we can continue to improve our services. Please take a few minutes to complete the written survey that you may receive in the mail after your visit with us. Thank you!             Your Updated Medication List - Protect others around you: Learn how to safely use, store and throw away your medicines at www.disposemymeds.org.          This list is accurate as of 1/29/18  4:15 PM.  Always use your most recent med list.                   Brand Name Dispense Instructions for use Diagnosis    order for DME     1 Device    Equipment being ordered: arm band    Medial epicondylitis, right

## 2018-02-01 NOTE — H&P (VIEW-ONLY)
[facility]  [address] [address] [facility]  [place] [address]  [phone]  Dept: [phone]    PRE-OP EVALUATION:  Today's date: 2018    Hortencia Baldwin (: 1970) presents for pre-operative evaluation assessment as requested by Dr. Szymanski.  She requires evaluation and anesthesia risk assessment prior to undergoing surgery/procedure for treatment of lipoma .  Proposed procedure: EXCISION OF RIGHT UPPER ANTERIOR THIGH SOFT TISSUE MASS,EXCISION OF LEFT LOWER BACK SOFT TISSUE MASS    Date of Surgery/ Procedure: 18  Time of Surgery/ Procedure: 840am  Hospital/Surgical Facility: [facility]  Primary Physician: Jacki Renner  Type of Anesthesia Anticipated: General    Patient has a Health Care Directive or Living Will:  NO    1. NO - Do you have a history of heart attack, stroke, stent, bypass or surgery on an artery in the head, neck, heart or legs?  2. YES - Do you ever have any pain or discomfort in your chest? Randomly, once monthly  3. NO - Do you have a history of  Heart Failure?  4. YES - Are you troubled by shortness of breath when: walking on the level, up a slight hill or at night? Starts after climbing 2 flights of stairs, not accompanied with any chest pain, shortness of breath only when walking on an incline, no problems with dancing  5. NO - Do you currently have a cold, bronchitis or other respiratory infection?  6. NO - Do you have a cough, shortness of breath or wheezing?  7. NO - Do you sometimes get pains in the calves of your legs when you walk?  8. NO - Do you or anyone in your family have previous history of blood clots?  9. NO - Do you or does anyone in your family have a serious bleeding problem such as prolonged bleeding following surgeries or cuts?  10. NO - Have you ever had problems with anemia or been told to take iron pills?  11. NO - Have you had any abnormal blood loss such as black, tarry or bloody stools, or abnormal vaginal bleeding?  12. NO - Have you  ever had a blood transfusion?  13. NO - Have you or any of your relatives ever had problems with anesthesia?  14. NO - Do you have sleep apnea, excessive snoring or daytime drowsiness?  15. NO - Do you have any prosthetic heart valves?  16. NO - Do you have prosthetic joints?  17. NO - Is there any chance that you may be pregnant?      HPI:                                                      Brief HPI related to upcoming procedure: Soft tissue mass of thigh, right, and left lower back      See problem list for active medical problems.  Problems all longstanding and stable, except as noted/documented.  See ROS for pertinent symptoms related to these conditions.                                                                                                  .    MEDICAL HISTORY:                                                      Patient Active Problem List    Diagnosis Date Noted     Right elbow pain 12/14/2017     Priority: Medium     Medial epicondylitis of elbow, right 12/14/2017     Priority: Medium     Obesity (BMI 30-39.9)      Priority: Medium     Left medial knee pain 05/26/2017     Priority: Medium      Past Medical History:   Diagnosis Date     Obesity (BMI 30-39.9)      Past Surgical History:   Procedure Laterality Date     EXCISE MASS LOWER EXTREMITY Bilateral 5/31/2017    Procedure: EXCISE MASS LOWER EXTREMITY;  EXCISION OF LIPOMAS RIGHT BUTTOCKS RIGHT LOWER THIGH, LEFT UPPER OUTER THIGH;  Surgeon: Avani Szymanski MD;  Location: [facility]     LUMPECTOMY BREAST Left 1990s    benign     Current Outpatient Prescriptions   Medication Sig Dispense Refill     order for DME Equipment being ordered: arm band (Patient not taking: Reported on 1/29/2018) 1 Device 0     OTC products: None, except as noted above    No Known Allergies   Latex Allergy: NO    Social History   Substance Use Topics     Smoking status: Current Every Day Smoker     Packs/day: 0.50     Years: 10.00     Types: Cigarettes     Smokeless  "tobacco: Never Used     Alcohol use Yes      Comment: 1-2 drinks/month     History   Drug Use No       REVIEW OF SYSTEMS:                                                    Constitutional, neuro, ENT, endocrine, pulmonary, cardiac, gastrointestinal, genitourinary, musculoskeletal, integument and psychiatric systems are negative, except as otherwise noted.    EXAM:                                                    /84  Pulse 96  Ht 5' 2\" (1.575 m)  Wt 183 lb (83 kg)  SpO2 99%  BMI 33.47 kg/m2    GENERAL APPEARANCE: healthy, alert and no distress     EYES: EOMI, PERRL     HENT: ear canals and TM's normal and nose and mouth without ulcers or lesions     NECK: no adenopathy, no asymmetry, masses, or scars and thyroid normal to palpation     RESP: lungs clear to auscultation - no rales, rhonchi or wheezes     CV: regular rates and rhythm, normal S1 S2, no S3 or S4 and no murmur, click or rub     ABDOMEN:  soft, nontender, no HSM or masses and bowel sounds normal     MS: extremities normal- no gross deformities noted, no evidence of inflammation in joints, FROM in all extremities.     SKIN: no suspicious lesions or rashes     NEURO: Normal strength and tone, sensory exam grossly normal, mentation intact and speech normal     PSYCH: mentation appears normal. and affect normal/bright     LYMPHATICS: No axillary, cervical, or supraclavicular nodes    DIAGNOSTICS:                                                      EKG: appears normal, NSR, normal axis, normal intervals, no acute ST/T changes c/w ischemia, no LVH by voltage criteria  Labs Resulted Today:   Results for orders placed or performed in visit on 01/29/18   CBC with platelets   Result Value Ref Range    WBC 8.3 4.0 - 11.0 10e9/L    RBC Count 3.03 (L) 3.8 - 5.2 10e12/L    Hemoglobin 10.9 (L) 11.7 - 15.7 g/dL    Hematocrit 32.8 (L) 35.0 - 47.0 %     (H) 78 - 100 fl    MCH 36.0 (H) 26.5 - 33.0 pg    MCHC 33.2 31.5 - 36.5 g/dL    RDW 14.0 10.0 - 15.0 " %    Platelet Count 255 150 - 450 10e9/L       Recent Labs   Lab Test  05/30/17   1155   HGB  10.7*   PLT  291   NA  141   POTASSIUM  3.7   CR  0.63        IMPRESSION:                                                    Reason for surgery/procedure: Soft tissue of mass, right thigh and left lower back  Diagnosis/reason for consult: Pre op consult    The proposed surgical procedure is considered INTERMEDIATE risk.    REVISED CARDIAC RISK INDEX  The patient has the following serious cardiovascular risks for perioperative complications such as (MI, PE, VFib and 3  AV Block):  No serious cardiac risks  INTERPRETATION: 0 risks: Class I (very low risk - 0.4% complication rate)    The patient has the following additional risks for perioperative complications:  No identified additional risks      ICD-10-CM    1. Preop general physical exam Z01.818 CBC with platelets   2. Subcutaneous mass R22.9    3. SOB (shortness of breath) R06.02 EKG 12-lead complete w/read - Clinics   4. Anemia, unspecified type D64.9 Folate     Reticulocyte Count     Blood Morphology Pathologist Review       RECOMMENDATIONS:                                                      APPROVAL GIVEN to proceed with proposed procedure, without further diagnostic evaluation       Will obtain a folate and peripheral smear in the next month for anemia. No real changes in her labs from May 2017.    Patient's EKG appears normal. Her shortness of breath with stair climbing is likely related to deconditioning with stairs. She does not note any shortness of breath with walking or dancing. States she goes dancing for 2-3 hours at a time.      Signed Electronically by: Esther Holloway PA-C    Copy of this evaluation report is provided to requesting physician.    [place] Preop Guidelines

## 2018-02-05 ENCOUNTER — ANESTHESIA EVENT (OUTPATIENT)
Dept: SURGERY | Facility: CLINIC | Age: 48
End: 2018-02-05
Payer: COMMERCIAL

## 2018-02-05 ENCOUNTER — SURGERY (OUTPATIENT)
Age: 48
End: 2018-02-05

## 2018-02-05 ENCOUNTER — ANESTHESIA (OUTPATIENT)
Dept: SURGERY | Facility: CLINIC | Age: 48
End: 2018-02-05
Payer: COMMERCIAL

## 2018-02-05 ENCOUNTER — HOSPITAL ENCOUNTER (OUTPATIENT)
Facility: CLINIC | Age: 48
Discharge: HOME | End: 2018-02-05
Attending: SURGERY | Admitting: SURGERY
Payer: COMMERCIAL

## 2018-02-05 ENCOUNTER — APPOINTMENT (OUTPATIENT)
Dept: SURGERY | Facility: PHYSICIAN GROUP | Age: 48
End: 2018-02-05
Payer: COMMERCIAL

## 2018-02-05 VITALS
OXYGEN SATURATION: 99 % | WEIGHT: 184.9 LBS | TEMPERATURE: 96.4 F | DIASTOLIC BLOOD PRESSURE: 63 MMHG | RESPIRATION RATE: 16 BRPM | BODY MASS INDEX: 34.03 KG/M2 | HEIGHT: 62 IN | SYSTOLIC BLOOD PRESSURE: 107 MMHG

## 2018-02-05 LAB — HCG UR QL: NEGATIVE

## 2018-02-05 PROCEDURE — 37000009 ZZH ANESTHESIA TECHNICAL FEE, EACH ADDTL 15 MIN: Performed by: SURGERY

## 2018-02-05 PROCEDURE — 81025 URINE PREGNANCY TEST: CPT | Performed by: ANESTHESIOLOGY

## 2018-02-05 PROCEDURE — 88304 TISSUE EXAM BY PATHOLOGIST: CPT | Mod: 26 | Performed by: SURGERY

## 2018-02-05 PROCEDURE — 27210995 ZZH RX 272: Performed by: SURGERY

## 2018-02-05 PROCEDURE — 71000012 ZZH RECOVERY PHASE 1 LEVEL 1 FIRST HR: Performed by: SURGERY

## 2018-02-05 PROCEDURE — 25000125 ZZHC RX 250: Performed by: NURSE ANESTHETIST, CERTIFIED REGISTERED

## 2018-02-05 PROCEDURE — 36000052 ZZH SURGERY LEVEL 2 EA 15 ADDTL MIN: Performed by: SURGERY

## 2018-02-05 PROCEDURE — 71000027 ZZH RECOVERY PHASE 2 EACH 15 MINS: Performed by: SURGERY

## 2018-02-05 PROCEDURE — 27327 EXC THIGH/KNEE LES SC < 3 CM: CPT | Mod: 51 | Performed by: SURGERY

## 2018-02-05 PROCEDURE — 21930 EXC BACK LES SC < 3 CM: CPT | Performed by: SURGERY

## 2018-02-05 PROCEDURE — 36000050 ZZH SURGERY LEVEL 2 1ST 30 MIN: Performed by: SURGERY

## 2018-02-05 PROCEDURE — 27210794 ZZH OR GENERAL SUPPLY STERILE: Performed by: SURGERY

## 2018-02-05 PROCEDURE — 88304 TISSUE EXAM BY PATHOLOGIST: CPT | Performed by: SURGERY

## 2018-02-05 PROCEDURE — 25000128 H RX IP 250 OP 636: Performed by: NURSE ANESTHETIST, CERTIFIED REGISTERED

## 2018-02-05 PROCEDURE — 40000170 ZZH STATISTIC PRE-PROCEDURE ASSESSMENT II: Performed by: SURGERY

## 2018-02-05 PROCEDURE — 25000128 H RX IP 250 OP 636: Performed by: SURGERY

## 2018-02-05 PROCEDURE — 25000125 ZZHC RX 250: Performed by: SURGERY

## 2018-02-05 PROCEDURE — 37000008 ZZH ANESTHESIA TECHNICAL FEE, 1ST 30 MIN: Performed by: SURGERY

## 2018-02-05 RX ORDER — LIDOCAINE HYDROCHLORIDE 10 MG/ML
INJECTION, SOLUTION EPIDURAL; INFILTRATION; INTRACAUDAL; PERINEURAL
Status: DISCONTINUED
Start: 2018-02-05 | End: 2018-02-05 | Stop reason: HOSPADM

## 2018-02-05 RX ORDER — ONDANSETRON 4 MG/1
4 TABLET, ORALLY DISINTEGRATING ORAL EVERY 30 MIN PRN
Status: DISCONTINUED | OUTPATIENT
Start: 2018-02-05 | End: 2018-02-05 | Stop reason: HOSPADM

## 2018-02-05 RX ORDER — FENTANYL CITRATE 50 UG/ML
25-50 INJECTION, SOLUTION INTRAMUSCULAR; INTRAVENOUS
Status: DISCONTINUED | OUTPATIENT
Start: 2018-02-05 | End: 2018-02-05 | Stop reason: HOSPADM

## 2018-02-05 RX ORDER — FENTANYL CITRATE 50 UG/ML
INJECTION, SOLUTION INTRAMUSCULAR; INTRAVENOUS PRN
Status: DISCONTINUED | OUTPATIENT
Start: 2018-02-05 | End: 2018-02-05

## 2018-02-05 RX ORDER — ONDANSETRON 2 MG/ML
INJECTION INTRAMUSCULAR; INTRAVENOUS PRN
Status: DISCONTINUED | OUTPATIENT
Start: 2018-02-05 | End: 2018-02-05

## 2018-02-05 RX ORDER — HYDROCODONE BITARTRATE AND ACETAMINOPHEN 5; 325 MG/1; MG/1
1 TABLET ORAL
Status: DISCONTINUED | OUTPATIENT
Start: 2018-02-05 | End: 2018-02-05 | Stop reason: HOSPADM

## 2018-02-05 RX ORDER — BUPIVACAINE HYDROCHLORIDE 5 MG/ML
INJECTION, SOLUTION EPIDURAL; INTRACAUDAL
Status: DISCONTINUED
Start: 2018-02-05 | End: 2018-02-05 | Stop reason: HOSPADM

## 2018-02-05 RX ORDER — SODIUM CHLORIDE, SODIUM LACTATE, POTASSIUM CHLORIDE, CALCIUM CHLORIDE 600; 310; 30; 20 MG/100ML; MG/100ML; MG/100ML; MG/100ML
INJECTION, SOLUTION INTRAVENOUS CONTINUOUS
Status: DISCONTINUED | OUTPATIENT
Start: 2018-02-05 | End: 2018-02-05 | Stop reason: HOSPADM

## 2018-02-05 RX ORDER — FENTANYL CITRATE 50 UG/ML
25-50 INJECTION, SOLUTION INTRAMUSCULAR; INTRAVENOUS EVERY 5 MIN PRN
Status: DISCONTINUED | OUTPATIENT
Start: 2018-02-05 | End: 2018-02-05 | Stop reason: HOSPADM

## 2018-02-05 RX ORDER — ONDANSETRON 2 MG/ML
4 INJECTION INTRAMUSCULAR; INTRAVENOUS EVERY 30 MIN PRN
Status: DISCONTINUED | OUTPATIENT
Start: 2018-02-05 | End: 2018-02-05 | Stop reason: HOSPADM

## 2018-02-05 RX ORDER — PROPOFOL 10 MG/ML
INJECTION, EMULSION INTRAVENOUS CONTINUOUS PRN
Status: DISCONTINUED | OUTPATIENT
Start: 2018-02-05 | End: 2018-02-05

## 2018-02-05 RX ORDER — DEXAMETHASONE SODIUM PHOSPHATE 4 MG/ML
INJECTION, SOLUTION INTRA-ARTICULAR; INTRALESIONAL; INTRAMUSCULAR; INTRAVENOUS; SOFT TISSUE PRN
Status: DISCONTINUED | OUTPATIENT
Start: 2018-02-05 | End: 2018-02-05

## 2018-02-05 RX ORDER — MAGNESIUM HYDROXIDE 1200 MG/15ML
LIQUID ORAL PRN
Status: DISCONTINUED | OUTPATIENT
Start: 2018-02-05 | End: 2018-02-05 | Stop reason: HOSPADM

## 2018-02-05 RX ORDER — SODIUM CHLORIDE, SODIUM LACTATE, POTASSIUM CHLORIDE, CALCIUM CHLORIDE 600; 310; 30; 20 MG/100ML; MG/100ML; MG/100ML; MG/100ML
INJECTION, SOLUTION INTRAVENOUS CONTINUOUS PRN
Status: DISCONTINUED | OUTPATIENT
Start: 2018-02-05 | End: 2018-02-05

## 2018-02-05 RX ORDER — NALOXONE HYDROCHLORIDE 0.4 MG/ML
.1-.4 INJECTION, SOLUTION INTRAMUSCULAR; INTRAVENOUS; SUBCUTANEOUS
Status: DISCONTINUED | OUTPATIENT
Start: 2018-02-05 | End: 2018-02-05 | Stop reason: HOSPADM

## 2018-02-05 RX ORDER — KETOROLAC TROMETHAMINE 30 MG/ML
30 INJECTION, SOLUTION INTRAMUSCULAR; INTRAVENOUS ONCE
Status: DISCONTINUED | OUTPATIENT
Start: 2018-02-05 | End: 2018-02-05 | Stop reason: HOSPADM

## 2018-02-05 RX ORDER — EPHEDRINE SULFATE 50 MG/ML
INJECTION, SOLUTION INTRAMUSCULAR; INTRAVENOUS; SUBCUTANEOUS PRN
Status: DISCONTINUED | OUTPATIENT
Start: 2018-02-05 | End: 2018-02-05

## 2018-02-05 RX ORDER — LIDOCAINE HYDROCHLORIDE 20 MG/ML
INJECTION, SOLUTION INFILTRATION; PERINEURAL PRN
Status: DISCONTINUED | OUTPATIENT
Start: 2018-02-05 | End: 2018-02-05

## 2018-02-05 RX ADMIN — Medication 5 MG: at 08:51

## 2018-02-05 RX ADMIN — LIDOCAINE HYDROCHLORIDE 50 MG: 20 INJECTION, SOLUTION INFILTRATION; PERINEURAL at 08:33

## 2018-02-05 RX ADMIN — DEXMEDETOMIDINE HYDROCHLORIDE 8 MCG: 100 INJECTION, SOLUTION INTRAVENOUS at 08:33

## 2018-02-05 RX ADMIN — SODIUM CHLORIDE, POTASSIUM CHLORIDE, SODIUM LACTATE AND CALCIUM CHLORIDE: 600; 310; 30; 20 INJECTION, SOLUTION INTRAVENOUS at 08:31

## 2018-02-05 RX ADMIN — DEXMEDETOMIDINE HYDROCHLORIDE 8 MCG: 100 INJECTION, SOLUTION INTRAVENOUS at 08:37

## 2018-02-05 RX ADMIN — MIDAZOLAM 2 MG: 1 INJECTION INTRAMUSCULAR; INTRAVENOUS at 08:31

## 2018-02-05 RX ADMIN — Medication 2 APPLICATOR: at 08:49

## 2018-02-05 RX ADMIN — ONDANSETRON 4 MG: 2 INJECTION INTRAMUSCULAR; INTRAVENOUS at 08:42

## 2018-02-05 RX ADMIN — SODIUM CHLORIDE 1000 ML: 900 IRRIGANT IRRIGATION at 08:41

## 2018-02-05 RX ADMIN — FENTANYL CITRATE 50 MCG: 50 INJECTION, SOLUTION INTRAMUSCULAR; INTRAVENOUS at 08:31

## 2018-02-05 RX ADMIN — PROPOFOL 100 MCG/KG/MIN: 10 INJECTION, EMULSION INTRAVENOUS at 08:34

## 2018-02-05 RX ADMIN — BUPIVACAINE HYDROCHLORIDE 4 ML GIVEN: 5 INJECTION, SOLUTION EPIDURAL; INTRACAUDAL; PERINEURAL at 08:45

## 2018-02-05 RX ADMIN — DEXAMETHASONE SODIUM PHOSPHATE 4 MG: 4 INJECTION, SOLUTION INTRA-ARTICULAR; INTRALESIONAL; INTRAMUSCULAR; INTRAVENOUS; SOFT TISSUE at 08:42

## 2018-02-05 ASSESSMENT — LIFESTYLE VARIABLES: TOBACCO_USE: 1

## 2018-02-05 NOTE — ANESTHESIA POSTPROCEDURE EVALUATION
Patient: Hortencia Baldwin    Procedure(s):  EXCISION OF RIGHT UPPER ANTERIOR THIGH SOFT TISSUE MASS,EXCISION OF LEFT LOWER BACK SOFT TISSUE MASS - Wound Class: II-Clean Contaminated   - Wound Class: I-Clean    Diagnosis:LIPOMAS  Diagnosis Additional Information: No value filed.    Anesthesia Type:  MAC    Note:  Anesthesia Post Evaluation    Patient location during evaluation: PACU  Patient participation: Able to fully participate in evaluation  Level of consciousness: awake  Pain management: adequate  Airway patency: patent  Cardiovascular status: acceptable  Respiratory status: acceptable  Hydration status: acceptable  PONV: controlled     Anesthetic complications: None          Last vitals:  Vitals:    02/05/18 0808   BP: 122/73   Resp: 16   Temp: 36.4  C (97.5  F)   SpO2: 98%         Electronically Signed By: Hany Duff MD  February 5, 2018  9:11 AM

## 2018-02-05 NOTE — ANESTHESIA CARE TRANSFER NOTE
Patient: Hortencia Baldwin    Procedure(s):  EXCISION OF RIGHT UPPER ANTERIOR THIGH SOFT TISSUE MASS,EXCISION OF LEFT LOWER BACK SOFT TISSUE MASS - Wound Class: II-Clean Contaminated   - Wound Class: I-Clean    Diagnosis: LIPOMAS  Diagnosis Additional Information: No value filed.    Anesthesia Type:   MAC     Note:  Airway :Face Mask  Patient transferred to:PACU  Comments: Pt exhibits spont resps, all monitors and alarms on in pacu, report given to RN, vss.Handoff Report: Identifed the Patient, Identified the Reponsible Provider, Reviewed the pertinent medical history, Discussed the surgical course, Reviewed Intra-OP anesthesia mangement and issues during anesthesia, Set expectations for post-procedure period and Allowed opportunity for questions and acknowledgement of understanding      Vitals: (Last set prior to Anesthesia Care Transfer)    CRNA VITALS  2/5/2018 0832 - 2/5/2018 0908      2/5/2018             Pulse: 85    SpO2: 100 %    Resp Rate (set): 10                Electronically Signed By: NEEL Hylton CRNA  February 5, 2018  9:08 AM

## 2018-02-05 NOTE — IP AVS SNAPSHOT
MRN:[id number]                      After Visit Summary   2/5/2018    Hortencia Baldwin    MRN: [id number]           Thank you!     Thank you for choosing [facility] for your care. Our goal is always to provide you with excellent care. Hearing back from our patients is one way we can continue to improve our services. Please take a few minutes to complete the written survey that you may receive in the mail after you visit with us. Thank you!        Patient Information     Date Of Birth          1970        About your hospital stay     You were admitted on:  February 5, 2018 You last received care in the:  [facility] Same Day [facility]    You were discharged on:  February 5, 2018       Who to Call     For medical emergencies, please call 911.  For non-urgent questions about your medical care, please call your primary care provider or clinic, [phone]  For questions related to your surgery, please call your surgery clinic        Attending Provider     Provider Specialty    Avani Szymanski MD Surgery       Primary Care Provider Office Phone # Fax #    Esther Holloway PA-C [phone]      After Care Instructions     Discharge Instructions       No follow up needed.  Call office if you have any questions or concerns at [phone]            Dressing       Dermabond   General Care:  Keep the wound clean and dry. You may shower or bathe tomorrow as usual, but do not use soaps, lotions, or ointments on the wound area. Do not scrub the wound. After bathing, pat the wound dry with a soft towel.  Do not scratch, rub, or pick at the strips or film. Do not place tape directly over the strips or film.  Do not apply liquids (such as peroxide), ointments, or creams to the wound while the strips or film are in place.  Most  wounds heal without problems. However, an infection sometimes occurs despite proper treatment. Therefore, watch for the signs of infection (redness, increased  pain, drainage).  The skin glue strips or film will fall off naturally in 5 to 10 days.                  Your next 10 appointments already scheduled     Feb 05, 2018   Procedure with Avani Szymanski MD   [facility] [place] [facility] (--)    [address], [address]  [place] [id number] [phone]              Further instructions from your care team         DISCHARGE INSTRUCTIONS  Before your discharge from the hospital, your nurse will review the following instructions for your home care.  Please read this and ask your nurse or doctor any questions before your discharge.  WOUND CARE  Dermabond   General Care:  Keep the wound clean and dry. You may shower or bathe tomorrow as usual, but do not use soaps, lotions, or ointments on the wound area. Do not scrub the wound. After bathing, pat the wound dry with a soft towel.  Do not scratch, rub, or pick at the strips or film. Do not place tape directly over the strips or film.  Do not apply liquids (such as peroxide), ointments, or creams to the wound while the strips or film are in place.  Most  wounds heal without problems. However, an infection sometimes occurs despite proper treatment. Therefore, watch for the signs of infection (redness, increased pain, drainage).  The skin glue strips or film will fall off naturally in 5 to 10 days    ACTIVITY  You may climb stairs.  You may  exercise if you are comfortable.  You may drive without restrictions when you are not using prescription pain medication and are comfortable in the car.  You may return to work / school when you are comfortable without prescription pain medication.  BATHING  You may shower.  Do not soak the wound or swim until the incision has been dry for 2-3 days.   DIET  Return to the diet you were on before surgery.  CALL YOUR PHYSICIAN IF YOU HAVE  Chills or fever above 101  F.  Significant or malodorous drainage from the incision(s)   Significant bleeding  Pain not relieved by your pain  medication or rest.   Increasing pain after the first 48 hours  HELPFUL HINTS  Prescription pain medications make most people constipated.  It is never a bad idea to take a mild laxative (Miralax / Milk of magnesia) while you are using your prescription medication.  You may take ibuprofen instead of or in addition to your prescription.  If you are taking the maximum amount of your prescription medication, you should not take any additional Tylenol.  FOLLOW-UP  No follow up is needed. You should call if you have questions of concerns. ([phone]).          Same Day Surgery Discharge Instructions for  Sedation and General Anesthesia       It's not unusual to feel dizzy, light-headed or faint for up to 24 hours after surgery or while taking pain medication.  If you have these symptoms: sit for a few minutes before standing and have someone assist you when you get up to walk or use the bathroom.      You should rest and relax for the next 24 hours. We recommend you make arrangements to have an adult stay with you for at least 24 hours after your discharge.  Avoid hazardous and strenuous activity.      DO NOT DRIVE any vehicle or operate mechanical equipment for 24 hours following the end of your surgery.  Even though you may feel normal, your reactions may be affected by the medication you have received.      Do not drink alcoholic beverages for 24 hours following surgery.       Slowly progress to your regular diet as you feel able. It's not unusual to feel nauseated and/or vomit after receiving anesthesia.  If you develop these symptoms, drink clear liquids (apple juice, ginger ale, broth, 7-up, etc. ) until you feel better.  If your nausea and vomiting persists for 24 hours, please notify your surgeon.        All narcotic pain medications, along with inactivity and anesthesia, can cause constipation. Drinking plenty of liquids and increasing fiber intake will help.      For any questions of a medical nature, call  "your surgeon.      Do not make important decisions for 24 hours.      If you had general anesthesia, you may have a sore throat for a couple of days related to the breathing tube used during surgery.  You may use Cepacol lozenges to help with this discomfort.  If it worsens or if you develop a fever, contact your surgeon.       If you feel your pain is not well managed with the pain medications prescribed by your surgeon, please contact your surgeon's office to let them know so they can address your concerns.          **If you have concerns or questions about your procedure, please contact Dr Szymanski at  [phone]**           Pending Results     No orders found from 2/3/2018 to 2018.            Admission Information     Date & Time Provider Department Dept. Phone    2018 Avani Szymanski MD [facility] Same Day Surgery [phone]      Your Vitals Were     Blood Pressure Temperature Respirations Height Weight Last Period    107/57 96.8  F (36  C) 16 1.575 m (5' 2\") 83.9 kg (184 lb 14.4 oz) 2017    Pulse Oximetry BMI (Body Mass Index)                100% 33.82 kg/m2          MyChart Information     [employer] lets you send messages to your doctor, view your test results, renew your prescriptions, schedule appointments and more. To sign up, go to [email or web address] . Click on \"Log in\" on the left side of the screen, which will take you to the Welcome page. Then click on \"Sign up Now\" on the right side of the page.     You will be asked to enter the access code listed below, as well as some personal information. Please follow the directions to create your username and password.     Your access code is: [email or web address]  Expires: 2018  5:24 PM     Your access code will  in 90 days. If you need help or a new code, please call your [facility] or [phone].        Care EveryWhere ID     This is your Care EveryWhere ID. This could be used by other organizations to access your " [facility] medical records  [id number]        Equal Access to Services     GIL LUA : Hadii imra Marquez, wahuber johnston, qaanumta yohanmaradha grace, jaquelin chanelwilmantammy cervantes. So [facility] [phone].    ATENCIÓN: Si habla español, tiene a phillips disposición servicios gratuitos de asistencia lingüística. Llame al [phone].    We comply with applicable federal civil rights laws and [place] laws. We do not discriminate on the basis of race, color, national origin, age, disability, sex, sexual orientation, or gender identity.               Review of your medicines      CONTINUE these medicines which have NOT CHANGED        Dose / Directions    order for DME   Used for:  Medial epicondylitis, right        Equipment being ordered: arm band   Quantity:  1 Device   Refills:  0                Protect others around you: Learn how to safely use, store and throw away your medicines at www.disposemymeds.org.             Medication List: This is a list of all your medications and when to take them. Check marks below indicate your daily home schedule. Keep this list as a reference.      Medications           Morning Afternoon Evening Bedtime As Needed    order for DME   Equipment being ordered: arm band

## 2018-02-05 NOTE — ANESTHESIA PREPROCEDURE EVALUATION
Anesthesia Evaluation     . Pt has had prior anesthetic.     No history of anesthetic complications          ROS/MED HX    ENT/Pulmonary:     (+)tobacco use, Current use 1/2 packs/day  , . .    Neurologic:       Cardiovascular:         METS/Exercise Tolerance:     Hematologic:     (+) Anemia (HGB 10.9 on 1/29/2018), -      Musculoskeletal:         GI/Hepatic:         Renal/Genitourinary:         Endo:     (+) Obesity (BMI 34), .      Psychiatric:         Infectious Disease:         Malignancy:         Other:                     Physical Exam  Normal systems: cardiovascular and pulmonary    Airway   Mallampati: II  TM distance: >3 FB  Neck ROM: full    Dental     Cardiovascular       Pulmonary                     Anesthesia Plan      History & Physical Review  History and physical reviewed and following examination; no interval change.    ASA Status:  2 .    NPO Status:  > 8 hours    Plan for MAC          Postoperative Care  Postoperative pain management:  Oral pain medications.      Consents  Anesthetic plan, risks, benefits and alternatives discussed with:  Patient..        DPreop diagnosis: LIPOMAS  Procedure(s):  EXCISE MASS LOWER EXTREMITY  EXCISE LESION TRUNK  No Known Allergies    No current facility-administered medications on file prior to encounter.   Current Outpatient Prescriptions on File Prior to Encounter:  order for DME Equipment being ordered: arm band (Patient not taking: Reported on 1/29/2018)     Hemoglobin   Date Value Ref Range Status   01/29/2018 10.9 (L) 11.7 - 15.7 g/dL Final                         .

## 2018-02-05 NOTE — DISCHARGE INSTRUCTIONS
DISCHARGE INSTRUCTIONS  Before your discharge from the hospital, your nurse will review the following instructions for your home care.  Please read this and ask your nurse or doctor any questions before your discharge.  WOUND CARE  Dermabond   General Care:  Keep the wound clean and dry. You may shower or bathe tomorrow as usual, but do not use soaps, lotions, or ointments on the wound area. Do not scrub the wound. After bathing, pat the wound dry with a soft towel.  Do not scratch, rub, or pick at the strips or film. Do not place tape directly over the strips or film.  Do not apply liquids (such as peroxide), ointments, or creams to the wound while the strips or film are in place.  Most  wounds heal without problems. However, an infection sometimes occurs despite proper treatment. Therefore, watch for the signs of infection (redness, increased pain, drainage).  The skin glue strips or film will fall off naturally in 5 to 10 days    ACTIVITY  You may climb stairs.  You may  exercise if you are comfortable.  You may drive without restrictions when you are not using prescription pain medication and are comfortable in the car.  You may return to work / school when you are comfortable without prescription pain medication.  BATHING  You may shower.  Do not soak the wound or swim until the incision has been dry for 2-3 days.   DIET  Return to the diet you were on before surgery.  CALL YOUR PHYSICIAN IF YOU HAVE  Chills or fever above 101  F.  Significant or malodorous drainage from the incision(s)   Significant bleeding  Pain not relieved by your pain medication or rest.   Increasing pain after the first 48 hours  HELPFUL HINTS  Prescription pain medications make most people constipated.  It is never a bad idea to take a mild laxative (Miralax / Milk of magnesia) while you are using your prescription medication.  You may take ibuprofen instead of or in addition to your prescription.  If you are taking the maximum amount  of your prescription medication, you should not take any additional Tylenol.  FOLLOW-UP  No follow up is needed. You should call if you have questions of concerns. ([phone]).          Same Day Surgery Discharge Instructions for  Sedation and General Anesthesia       It's not unusual to feel dizzy, light-headed or faint for up to 24 hours after surgery or while taking pain medication.  If you have these symptoms: sit for a few minutes before standing and have someone assist you when you get up to walk or use the bathroom.      You should rest and relax for the next 24 hours. We recommend you make arrangements to have an adult stay with you for at least 24 hours after your discharge.  Avoid hazardous and strenuous activity.      DO NOT DRIVE any vehicle or operate mechanical equipment for 24 hours following the end of your surgery.  Even though you may feel normal, your reactions may be affected by the medication you have received.      Do not drink alcoholic beverages for 24 hours following surgery.       Slowly progress to your regular diet as you feel able. It's not unusual to feel nauseated and/or vomit after receiving anesthesia.  If you develop these symptoms, drink clear liquids (apple juice, ginger ale, broth, 7-up, etc. ) until you feel better.  If your nausea and vomiting persists for 24 hours, please notify your surgeon.        All narcotic pain medications, along with inactivity and anesthesia, can cause constipation. Drinking plenty of liquids and increasing fiber intake will help.      For any questions of a medical nature, call your surgeon.      Do not make important decisions for 24 hours.      If you had general anesthesia, you may have a sore throat for a couple of days related to the breathing tube used during surgery.  You may use Cepacol lozenges to help with this discomfort.  If it worsens or if you develop a fever, contact your surgeon.       If you feel your pain is not well managed with  the pain medications prescribed by your surgeon, please contact your surgeon's office to let them know so they can address your concerns.          **If you have concerns or questions about your procedure, please contact Dr Szymanski at  [phone]**

## 2018-02-05 NOTE — OP NOTE
General Surgery Operative Note     Pre-Operative Diagnosis- two soft tissue masses    Post-Operative Diagnosis- same    Procedure- excision of soft tissue mass from upper inner right thigh, excision of soft tissue mass from left back    Surgeon- Avani Szymanski MD    Assistant- Alva Edmond PA-C, A PA assistance was needed for retraction and wound closure.     Anesthesia- Conscious sedation and local anesthetic    Specimen-1. Soft tissue mass right anterior thigh, 2. Soft tissue mass from left back.     Procedure  Consent was obtained. Conscious sedation was given. A surgical time out was performed. The patient was positioned supine initially and then rolled to right lateral for excision of the 2nd mass. The patient was prepped and draped in the usual sterile fashion with Chloraprep. Using sterile technique, 1% lidocaine with epinephrine was used to infiltrate the area. After adequate anesthesia was confirmed, a number 15 scalpel was used to make an incision overlying the lesion on the right anterior thigh. Using sharp dissection a 1 cm x 1cm soft tissue mass consistent with lipoma was found and circumferentially freed.  It was removed in its entirety. There was no bleeding and the wound bed was dry.   The skin was closed in multiple layers with a 3-0 vicryl and 4-0 monocryl. The patient was then rolled onto her right side. The lesion on her left lower back was excised in the same fashion. 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 There was some inflammation surrounding this lesion. Cautery was used to obtain hemostasis. The incision was closed with 3-0 vicryl and 4-0 monocryl. dermabond was applied to both incisions.   Patient tolerated the procedure well without complications. All sponge, instrument, and needle counts were correct at the conclusion of the case.      Avani Szymanski MD  [facility]  [phone]    Please route or send letter to:  Primary Care Provider  (PCP) and Referring Provider

## 2018-02-05 NOTE — IP AVS SNAPSHOT
[facility] Same Day Surgery    [address]    [place] [address]    Phone:  [phone]    Fax:  [phone]                                       After Visit Summary   2/5/2018    Hortencia Baldwin    MRN: [id number]           After Visit Summary Signature Page     I have received my discharge instructions, and my questions have been answered. I have discussed any challenges I see with this plan with the nurse or doctor.    ..........................................................................................................................................  Patient/Patient Representative Signature      ..........................................................................................................................................  Patient Representative Print Name and Relationship to Patient    ..................................................               ................................................  Date                                            Time    ..........................................................................................................................................  Reviewed by Signature/Title    ...................................................              ..............................................  Date                                                            Time

## 2018-02-05 NOTE — BRIEF OP NOTE
[facility] Operative Note    Pre-operative diagnosis: LIPOMAS   Post-operative diagnosis Lipoma upper right thigh, left back      Procedure: Procedure(s):  EXCISION OF RIGHT UPPER ANTERIOR THIGH SOFT TISSUE MASS,EXCISION OF LEFT LOWER BACK SOFT TISSUE MASS - Wound Class: II-Clean Contaminated   - Wound Class: I-Clean   Surgeon(s): Surgeon(s) and Role:  Panel 1:     * Avani Szymanski MD - Primary     * Alva Edmond PA-C - Assisting    Panel 2:     * Avani Szymanski MD - Primary     * Alva Edmond PA-C - Assisting   Estimated blood loss: 2 mL    Specimens:   ID Type Source Tests Collected by Time Destination   A : Right upper thigh mass Tissue Leg, Right SURGICAL PATHOLOGY EXAM Avani Szymanski MD 2/5/2018  8:39 AM    B : Left lower back mass Tissue Back SURGICAL PATHOLOGY EXAM Avani Szymanski MD 2/5/2018  8:50 AM       Findings: Same as above.      Alva Edmond PA-C

## 2018-02-06 LAB — COPATH REPORT: NORMAL

## 2018-02-08 ENCOUNTER — TELEPHONE (OUTPATIENT)
Dept: SURGERY | Facility: CLINIC | Age: 48
End: 2018-02-08

## 2018-02-08 NOTE — TELEPHONE ENCOUNTER
"Post Surgical Follow Up Call -     \"Hi, my name is Deisy Pina, I'm a registered nurse, and I am calling from [facility] to follow up and see how things are going for you after your recent surgery.\"    Tell me how you are doing now that you are home?\" Patient reports doing well.  No questions or concerns.      Discharge Instructions    \"Do you have any questions regarding your discharge instructions?\"  Denies    If applicable:  \"Are you currently taking your post op medication?\"  Patient reports that she had some pain pills left over from her prior surgery, but she has not had to use them.  She has only been using advil.     If yes, review dosing schedule with patient.  NA    If applicable:  Discuss any pathology results within normal limits.  Yes: Results reviewed with patient, benign findings.    Check EPIC schedule to see if patient has Post Op visit already scheduled.  Per discharge instructions, patient does not need to follow up.    Patient will call with any additional questions or concerns.     Call Summary      \"If you have questions, we encourage you contact us through the main clinic number (give number).\"    \"Thank you for your time and take care!\"    Deisy Pina RN    "

## 2018-03-19 ENCOUNTER — OFFICE VISIT (OUTPATIENT)
Dept: FAMILY MEDICINE | Facility: CLINIC | Age: 48
End: 2018-03-19
Payer: COMMERCIAL

## 2018-03-19 VITALS
OXYGEN SATURATION: 98 % | TEMPERATURE: 98.6 F | HEART RATE: 94 BPM | DIASTOLIC BLOOD PRESSURE: 85 MMHG | BODY MASS INDEX: 33.58 KG/M2 | WEIGHT: 183.6 LBS | SYSTOLIC BLOOD PRESSURE: 132 MMHG

## 2018-03-19 DIAGNOSIS — D64.9 ANEMIA, UNSPECIFIED TYPE: ICD-10-CM

## 2018-03-19 LAB
FOLATE SERPL-MCNC: 18.7 NG/ML
RETICS # AUTO: 77.6 10E9/L (ref 25–95)
RETICS/RBC NFR AUTO: 2.6 % (ref 0.5–2)

## 2018-03-19 PROCEDURE — 82746 ASSAY OF FOLIC ACID SERUM: CPT | Performed by: PHYSICIAN ASSISTANT

## 2018-03-19 PROCEDURE — 85045 AUTOMATED RETICULOCYTE COUNT: CPT | Performed by: PHYSICIAN ASSISTANT

## 2018-03-19 PROCEDURE — 85025 COMPLETE CBC W/AUTO DIFF WBC: CPT | Performed by: PHYSICIAN ASSISTANT

## 2018-03-19 PROCEDURE — 99213 OFFICE O/P EST LOW 20 MIN: CPT | Performed by: PHYSICIAN ASSISTANT

## 2018-03-19 PROCEDURE — 85060 BLOOD SMEAR INTERPRETATION: CPT | Performed by: PHYSICIAN ASSISTANT

## 2018-03-19 PROCEDURE — 36415 COLL VENOUS BLD VENIPUNCTURE: CPT | Performed by: PHYSICIAN ASSISTANT

## 2018-03-19 NOTE — PATIENT INSTRUCTIONS
If your labs come back normal with a stable hemoglobin I'll have you see our hematologist for further evaluation.

## 2018-03-19 NOTE — MR AVS SNAPSHOT
"              After Visit Summary   3/19/2018    Hortencia Baldwin    MRN: [id number]           Patient Information     Date Of Birth          1970        Visit Information        Provider Department      3/19/2018 1:40 PM Esther Holloway PA-C [facility]        Today's Diagnoses     Anemia, unspecified type          Care Instructions    If your labs come back normal with a stable hemoglobin I'll have you see our hematologist for further evaluation.           Follow-ups after your visit        Who to contact     Normal or non-critical lab and imaging results will be communicated to you by [email or web address], letter or phone within 4 business days after the clinic has received the results. If you do not hear from us within 7 days, please contact the clinic through [email or web address] or phone. If you have a critical or abnormal lab result, we will notify you by phone as soon as possible.  Submit refill requests through Kleo or call your pharmacy and they will forward the refill request to us. Please allow 3 business days for your refill to be completed.          If you need to speak with a  for additional information , please call: [phone]             Additional Information About Your Visit        MyChart Information     Kleo lets you send messages to your doctor, view your test results, renew your prescriptions, schedule appointments and more. To sign up, go to [email or web address] . Click on \"Log in\" on the left side of the screen, which will take you to the Welcome page. Then click on \"Sign up Now\" on the right side of the page.     You will be asked to enter the access code listed below, as well as some personal information. Please follow the directions to create your username and password.     Your access code is: [email or web address]  Expires: 2018  1:56 PM     Your access code will  in 90 days. If you need help or a new code, please call your [facility] clinic or [phone].   "      Care EveryWhere ID     This is your Care EveryWhere ID. This could be used by other organizations to access your [facility] medical records  [id number]        Your Vitals Were     Pulse Temperature Pulse Oximetry BMI (Body Mass Index)          94 98.6  F (37  C) (Tympanic) 98% 33.58 kg/m2         Blood Pressure from Last 3 Encounters:   03/19/18 132/85   02/05/18 107/63   01/29/18 131/84    Weight from Last 3 Encounters:   03/19/18 183 lb 9.6 oz (83.3 kg)   02/05/18 184 lb 14.4 oz (83.9 kg)   01/29/18 183 lb (83 kg)              We Performed the Following     Blood Morphology Pathologist Review     CBC with platelets differential     Folate     Reticulocyte Count        Primary Care Provider Office Phone # Fax #    Esther Holloway PA-C [phone]       [address]  [place] [address]        Equal Access to Services     DOLORES LUA : Hadii aad ku hadasho Soomaali, waaxda luqadaha, qaybta kaalmada adeegyada, waxay idiin hayaan megha dixon . So [facility] [phone].    ATENCIÓN: Si habla español, tiene a phillips disposición servicios gratuitos de asistencia lingüística. Llame al [phone].    We comply with applicable federal civil rights laws and [place] laws. We do not discriminate on the basis of race, color, national origin, age, disability, sex, sexual orientation, or gender identity.            Thank you!     Thank you for choosing [facility]  for your care. Our goal is always to provide you with excellent care. Hearing back from our patients is one way we can continue to improve our services. Please take a few minutes to complete the written survey that you may receive in the mail after your visit with us. Thank you!             Your Updated Medication List - Protect others around you: Learn how to safely use, store and throw away your medicines at www.disposemymeds.org.          This list is accurate as of 3/19/18  1:56 PM.  Always use your most recent med  list.                   Brand Name Dispense Instructions for use Diagnosis    order for DME     1 Device    Equipment being ordered: arm band    Medial epicondylitis, right

## 2018-03-19 NOTE — PROGRESS NOTES
SUBJECTIVE:   Hortencia Baldwin is a 47 year old female who presents to clinic today for the following health issues:    Anemia      Amount of exercise or physical activity: 2-3 days/week for an average of 15-30 minutes    Problems taking medications regularly: No    Medication side effects: none    Diet: regular (no restrictions)      HGB stable since at least 2010:  Vitamin B12, TIBC, and ferritin levels normal in May 2017            Problem list and histories reviewed & adjusted, as indicated.  Additional history: as documented    Patient Active Problem List   Diagnosis     Left medial knee pain     Obesity (BMI 30-39.9)     Right elbow pain     Medial epicondylitis of elbow, right     Anemia, unspecified type     Past Surgical History:   Procedure Laterality Date     EXCISE LESION TRUNK N/A 2/5/2018    Procedure: EXCISE LESION TRUNK;;  Surgeon: Avani Szymanski MD;  Location: [facility]     EXCISE MASS LOWER EXTREMITY Bilateral 5/31/2017    Procedure: EXCISE MASS LOWER EXTREMITY;  EXCISION OF LIPOMAS RIGHT BUTTOCKS RIGHT LOWER THIGH, LEFT UPPER OUTER THIGH;  Surgeon: Avani Szymanski MD;  Location: [facility]     EXCISE MASS LOWER EXTREMITY Right 2/5/2018    Procedure: EXCISE MASS LOWER EXTREMITY;  EXCISION OF RIGHT UPPER ANTERIOR THIGH SOFT TISSUE MASS,EXCISION OF LEFT LOWER BACK SOFT TISSUE MASS;  Surgeon: Avani Szymanski MD;  Location: [facility]     LUMPECTOMY BREAST Left 1990s    benign       Social History   Substance Use Topics     Smoking status: Current Every Day Smoker     Packs/day: 0.50     Years: 10.00     Types: Cigarettes     Smokeless tobacco: Never Used     Alcohol use Yes      Comment: 1-2 drinks/month     Family History   Problem Relation Age of Onset     Hypertension Mother      DIABETES No family hx of      CEREBROVASCULAR DISEASE No family hx of      Myocardial Infarction No family hx of      Coronary Artery Disease Early Onset No family hx of      Breast Cancer No family hx of      Ovarian Cancer No  family hx of      Colon Cancer No family hx of            Reviewed and updated as needed this visit by clinical staff  Tobacco  Allergies  Meds       Reviewed and updated as needed this visit by Provider         ROS:  Constitutional, hematology systems are negative, except as otherwise noted.    OBJECTIVE:                                                    /85  Pulse 94  Temp 98.6  F (37  C) (Tympanic)  Wt 183 lb 9.6 oz (83.3 kg)  SpO2 98%  BMI 33.58 kg/m2  Body mass index is 33.58 kg/(m^2).  GENERAL APPEARANCE: healthy, alert and no distress  MS: extremities normal- no gross deformities noted  SKIN: no suspicious lesions or rashes  NEURO: Normal strength and tone  PSYCH: mentation appears normal and affect normal/bright       ASSESSMENT:                                                      1. Anemia, unspecified type         PLAN:                                                    Care Everywhere reviewed. Patient has had macrocytic anemia dating at least back to 2010. She did have labs in 2015 as well. HGB has always been stable.    Patient Instructions   If your labs come back normal with a stable hemoglobin I'll have you see our hematologist for further evaluation.        The patient was in agreement with the plan today and had no questions or concerns prior to leaving the clinic.     Esther Holloway PA-C  [facility]

## 2018-03-20 LAB — COPATH REPORT: NORMAL

## 2018-03-21 ENCOUNTER — TELEPHONE (OUTPATIENT)
Dept: FAMILY MEDICINE | Facility: CLINIC | Age: 48
End: 2018-03-21

## 2018-03-21 DIAGNOSIS — D53.9 MACROCYTIC ANEMIA: Primary | ICD-10-CM

## 2018-03-22 LAB
BASOPHILS # BLD AUTO: 0.2 10E9/L (ref 0–0.2)
BASOPHILS NFR BLD AUTO: 3 %
DIFFERENTIAL METHOD BLD: ABNORMAL
EOSINOPHIL # BLD AUTO: 0.2 10E9/L (ref 0–0.7)
EOSINOPHIL NFR BLD AUTO: 4 %
ERYTHROCYTE [DISTWIDTH] IN BLOOD BY AUTOMATED COUNT: 14.3 % (ref 10–15)
HCT VFR BLD AUTO: 31.7 % (ref 35–47)
HGB BLD-MCNC: 10.4 G/DL (ref 11.7–15.7)
LYMPHOCYTES # BLD AUTO: 2.1 10E9/L (ref 0.8–5.3)
LYMPHOCYTES NFR BLD AUTO: 38 %
MCH RBC QN AUTO: 35.7 PG (ref 26.5–33)
MCHC RBC AUTO-ENTMCNC: 32.8 G/DL (ref 31.5–36.5)
MCV RBC AUTO: 109 FL (ref 78–100)
MONOCYTES # BLD AUTO: 0.4 10E9/L (ref 0–1.3)
MONOCYTES NFR BLD AUTO: 7 %
NEUTROPHILS # BLD AUTO: 2.5 10E9/L (ref 1.6–8.3)
NEUTROPHILS NFR BLD AUTO: 48 %
PLATELET # BLD AUTO: 271 10E9/L (ref 150–450)
RBC # BLD AUTO: 2.91 10E12/L (ref 3.8–5.2)
WBC # BLD AUTO: 5.4 10E9/L (ref 4–11)

## 2018-03-25 PROBLEM — M77.01 MEDIAL EPICONDYLITIS OF ELBOW, RIGHT: Status: RESOLVED | Noted: 2017-12-14 | Resolved: 2018-03-25

## 2018-03-25 PROBLEM — M25.521 RIGHT ELBOW PAIN: Status: RESOLVED | Noted: 2017-12-14 | Resolved: 2018-03-25

## 2018-03-26 NOTE — PROGRESS NOTES
Discharge Summary - Hand Therapy  Pt no showed for appointment 12/26/17 and has not returned for therapy.  Assume all goals met to pt satisfaction.  D/C Hand Therapy.

## 2018-03-30 NOTE — PROGRESS NOTES
Patient no showed for his appt.  Records reviewed: He has long standing history of macrocytic anemia, dating back and stable since 2010 (for 8 years).  Results for MARSHALL CABRERA (MRN [id number]) as of 4/5/2018 13:40   Ref. Range 5/30/2017 11:55 1/29/2018 15:44 3/19/2018 13:38   Hemoglobin Latest Ref Range: 11.7 - 15.7 g/dL 10.7 (L) 10.9 (L) 10.4 (L)   Results for MARSHALL CABRERA (MRN [id number]) as of 4/5/2018 13:40   Ref. Range 5/30/2017 11:55 1/29/2018 15:44 3/19/2018 13:38   MCV Latest Ref Range: 78 - 100 fl 112 (H) 108 (H) 109 (H)     Of note, I reviewed outside medical records and she had a CBC in [facility] on 3/5/2010 and her Hb was 10.9 g/dl at that time, with MCV of 109. RBC folate was checked in 2015 in [facility] and was normal.  Absolute reticulocyte count is normal.  WBC and absolute differential counts have remained normal. Platelet count has been normal. Folate, B12 level, iron studies have been unremarkable in the past. LFTs were within normal limits in May 2017.  peripheral blood smear on 3/20/2018 showed findings c/w mild macrocytic anemia. The red blood cells are decreased in number, and macrocytic. The white blood cells are normal in number, morphology and absolute differential count.  The platelets were normal in number with normal morphology and occasional larger well granulated forms.   He will need macrocytosis workup. ? ETOH use.

## 2018-04-05 ENCOUNTER — ONCOLOGY VISIT (OUTPATIENT)
Dept: ONCOLOGY | Facility: CLINIC | Age: 48
End: 2018-04-05
Payer: COMMERCIAL

## 2018-04-05 DIAGNOSIS — D53.9 MACROCYTIC ANEMIA: Primary | ICD-10-CM

## 2018-04-05 PROCEDURE — 99207 ZZC NO SHOW FOR SCHEDULED APPT: CPT | Performed by: INTERNAL MEDICINE

## 2018-04-05 NOTE — MR AVS SNAPSHOT
After Visit Summary   2018    Hortencia Baldwin    MRN: [id number]           Patient Information     Date Of Birth          1970        Visit Information        Provider Department      2018 3:00 PM Mia Stewart MD [facility]        Today's Diagnoses     Macrocytic anemia    -  1       Follow-ups after your visit        Who to contact     If you have questions or need follow up information about today's clinic visit or your schedule please contact [facility] directly at [phone].  Normal or non-critical lab and imaging results will be communicated to you by Finale Dessertshart, letter or phone within 4 business days after the clinic has received the results. If you do not hear from us within 7 days, please contact the clinic through Finale Dessertshart or phone. If you have a critical or abnormal lab result, we will notify you by phone as soon as possible.  Submit refill requests through Social Club Hub or call your pharmacy and they will forward the refill request to us. Please allow 3 business days for your refill to be completed.          Additional Information About Your Visit        MyChart Information     Social [employer] Hub is an electronic gateway that provides easy, online access to your medical records. With [employer] Hub, you can request a clinic appointment, read your test results, renew a prescription or communicate with your care team.     To sign up for [employer] Hub visit the website at [email or web address]   You will be asked to enter the access code listed below, as well as some personal information. Please follow the directions to create your username and password.     Your access code is: [email or web address]  Expires: 2018  1:56 PM     Your access code will  in 90 days. If you need help or a new code, please contact your [facility] or call [phone] for assistance.        Care EveryWhere ID     This is your Care EveryWhere ID. This could  be used by other organizations to access your [facility] medical records  [id number]         Blood Pressure from Last 3 Encounters:   03/19/18 132/85   02/05/18 107/63   01/29/18 131/84    Weight from Last 3 Encounters:   03/19/18 83.3 kg (183 lb 9.6 oz)   02/05/18 83.9 kg (184 lb 14.4 oz)   01/29/18 83 kg (183 lb)              Today, you had the following     No orders found for display       Primary Care Provider Office Phone # Fax #    Esther Holloway PA-C [phone]       [address]  [place] [address]        Equal Access to Services     [facility] : Hadii aad ku hadasho Soomaali, waaxda luqadaha, qaybta kaalmada adeegyada, waxmarie turnerin hayолег dixon . So [facility] [phone].    ATENCIÓN: Si habla español, tiene a phillips disposición servicios gratuitos de asistencia lingüística. [facility] [phone].    We comply with applicable federal civil rights laws and [place] laws. We do not discriminate on the basis of race, color, national origin, age, disability, sex, sexual orientation, or gender identity.            Thank you!     Thank you for choosing [facility]  for your care. Our goal is always to provide you with excellent care. Hearing back from our patients is one way we can continue to improve our services. Please take a few minutes to complete the written survey that you may receive in the mail after your visit with us. Thank you!             Your Updated Medication List - Protect others around you: Learn how to safely use, store and throw away your medicines at www.disposemymeds.org.          This list is accurate as of 4/5/18  3:20 PM.  Always use your most recent med list.                   Brand Name Dispense Instructions for use Diagnosis    order for DME     1 Device    Equipment being ordered: arm band    Medial epicondylitis, right

## 2018-04-05 NOTE — LETTER
4/5/2018         RE: Marshall Baldwin  [id number] [facility] [address]        Dear Colleague,    Thank you for referring your patient, Marshall Baldwin, to the [facility]. Please see a copy of my visit note below.    Patient no showed for his appt.  Records reviewed: He has long standing history of macrocytic anemia, dating back and stable since 2010 (for 8 years).  Results for MARSHALL CABRERA (MRN [id number]) as of 4/5/2018 13:40   Ref. Range 5/30/2017 11:55 1/29/2018 15:44 3/19/2018 13:38   Hemoglobin Latest Ref Range: 11.7 - 15.7 g/dL 10.7 (L) 10.9 (L) 10.4 (L)   Results for MARSHALL CABRERA (MRN [id number]) as of 4/5/2018 13:40   Ref. Range 5/30/2017 11:55 1/29/2018 15:44 3/19/2018 13:38   MCV Latest Ref Range: 78 - 100 fl 112 (H) 108 (H) 109 (H)     Of note, I reviewed outside medical records and she had a CBC in [facility] on 3/5/2010 and her Hb was 10.9 g/dl at that time, with MCV of 109. RBC folate was checked in 2015 in [facility] and was normal.  Absolute reticulocyte count is normal.  WBC and absolute differential counts have remained normal. Platelet count has been normal. Folate, B12 level, iron studies have been unremarkable in the past. LFTs were within normal limits in May 2017.  peripheral blood smear on 3/20/2018 showed findings c/w mild macrocytic anemia. The red blood cells are decreased in number, and macrocytic. The white blood cells are normal in number, morphology and absolute differential count.  The platelets were normal in number with normal morphology and occasional larger well granulated forms.   He will need macrocytosis workup. ? ETOH use.      Again, thank you for allowing me to participate in the care of your patient.        Sincerely,        Mia Stewart MD, MD

## 2021-12-09 ENCOUNTER — TRANSFERRED RECORDS (OUTPATIENT)
Dept: HEALTH INFORMATION MANAGEMENT | Facility: CLINIC | Age: 51
End: 2021-12-09

## 2022-09-13 NOTE — DISCHARGE INSTRUCTIONS
Same Day Surgery Discharge Instructions for  Sedation and General Anesthesia       It's not unusual to feel dizzy, light-headed or faint for up to 24 hours after surgery or while taking pain medication.  If you have these symptoms: sit for a few minutes before standing and have someone assist you when you get up to walk or use the bathroom.      You should rest and relax for the next 24 hours. We recommend you make arrangements to have an adult stay with you for at least 24 hours after your discharge.  Avoid hazardous and strenuous activity.      DO NOT DRIVE any vehicle or operate mechanical equipment for 24 hours following the end of your surgery.  Even though you may feel normal, your reactions may be affected by the medication you have received.      Do not drink alcoholic beverages for 24 hours following surgery.       Slowly progress to your regular diet as you feel able. It's not unusual to feel nauseated and/or vomit after receiving anesthesia.  If you develop these symptoms, drink clear liquids (apple juice, ginger ale, broth, 7-up, etc. ) until you feel better.  If your nausea and vomiting persists for 24 hours, please notify your surgeon.        All narcotic pain medications, along with inactivity and anesthesia, can cause constipation. Drinking plenty of liquids and increasing fiber intake will help.      For any questions of a medical nature, call your surgeon.      Do not make important decisions for 24 hours.      If you had general anesthesia, you may have a sore throat for a couple of days related to the breathing tube used during surgery.  You may use Cepacol lozenges to help with this discomfort.  If it worsens or if you develop a fever, contact your surgeon.       If you feel your pain is not well managed with the pain medications prescribed by your surgeon, please contact your surgeon's office to let them know so they can address your concerns.         
complains of pain/discomfort

## 2023-03-29 ENCOUNTER — TELEPHONE (OUTPATIENT)
Dept: TRANSPLANT | Facility: CLINIC | Age: 53
End: 2023-03-29
Payer: COMMERCIAL

## 2023-03-29 DIAGNOSIS — D46.9 MDS (MYELODYSPLASTIC SYNDROME) (H): Primary | ICD-10-CM

## 2023-04-24 ENCOUNTER — CARE COORDINATION (OUTPATIENT)
Dept: TRANSPLANT | Facility: CLINIC | Age: 53
End: 2023-04-24
Payer: COMMERCIAL

## 2023-04-24 DIAGNOSIS — D46.9 MDS (MYELODYSPLASTIC SYNDROME) (H): Primary | ICD-10-CM

## 2023-04-24 NOTE — PROGRESS NOTES
[facility] BMT and Cell Therapy Program  RN Coordinator Pre-Visit Documentation      Hortencia Baldwin is a 52 year old female who has been referred to the [facility] BMT and Cell Therapy Program for hematopoietic cell transplant or immune effector cell therapy.      Referring MD Name: Shaheed Ramey, telephone [phone]    Referring RN Coordinator: n/a    Reason for referral: allo consult for MDS/ warm hemolytic anemia    Link to BMT & CT Program Algorithms      For allos only:    Previous HLA typing? No    Previous formal donor search? No    PRA needed? Yes    CMV IGG needed? Yes    and ABO needed? Yes    Potential family donors to type? Unknown    Need URD consents? Yes    For CAR-T Candidates Only:    Orders placed for virologies: N/A      All relevant clinical notes, labs, imaging, and pathology may be reviewed in Epic Bookmarks under name: Janelle Harvey      Patient Care Team       Relationship Specialty Notifications Start End    Esther Holloway PA-C PCP - General Physician Assistant  2/5/18     Phone: [phone] Fax: [phone]         [address] [place] [address]            Janelle Harvey, ZENIA

## 2023-04-25 ENCOUNTER — ALLIED HEALTH/NURSE VISIT (OUTPATIENT)
Dept: TRANSPLANT | Facility: CLINIC | Age: 53
End: 2023-04-25
Attending: INTERNAL MEDICINE
Payer: COMMERCIAL

## 2023-04-25 VITALS
BODY MASS INDEX: 32.74 KG/M2 | HEART RATE: 91 BPM | SYSTOLIC BLOOD PRESSURE: 138 MMHG | RESPIRATION RATE: 16 BRPM | DIASTOLIC BLOOD PRESSURE: 82 MMHG | HEIGHT: 62 IN | TEMPERATURE: 98.5 F | OXYGEN SATURATION: 100 % | WEIGHT: 177.9 LBS

## 2023-04-25 DIAGNOSIS — D46.9 MDS (MYELODYSPLASTIC SYNDROME) (H): Primary | ICD-10-CM

## 2023-04-25 DIAGNOSIS — Z71.9 VISIT FOR COUNSELING: Primary | ICD-10-CM

## 2023-04-25 PROCEDURE — 81382 HLA II TYPING 1 LOC HR: CPT | Performed by: INTERNAL MEDICINE

## 2023-04-25 PROCEDURE — G0463 HOSPITAL OUTPT CLINIC VISIT: HCPCS | Performed by: INTERNAL MEDICINE

## 2023-04-25 PROCEDURE — 36415 COLL VENOUS BLD VENIPUNCTURE: CPT | Performed by: INTERNAL MEDICINE

## 2023-04-25 PROCEDURE — 99205 OFFICE O/P NEW HI 60 MIN: CPT | Performed by: INTERNAL MEDICINE

## 2023-04-25 RX ORDER — AMOXICILLIN 500 MG
1200 CAPSULE ORAL DAILY
COMMUNITY
End: 2024-03-01

## 2023-04-25 RX ORDER — FOLIC ACID 1 MG/1
1 TABLET ORAL DAILY
COMMUNITY
Start: 2023-03-24 | End: 2023-12-20

## 2023-04-25 RX ORDER — MYCOPHENOLATE MOFETIL 500 MG/1
500 TABLET ORAL 2 TIMES DAILY
COMMUNITY
End: 2023-12-20

## 2023-04-25 ASSESSMENT — PAIN SCALES - GENERAL: PAINLEVEL: NO PAIN (0)

## 2023-04-25 NOTE — PROGRESS NOTES
Blood and Marrow Transplant   New Transplant Visit with   Clinical     Focus: Emotional support check in and resources     Data: Pt is a 52 year old female diagnosed with MDS. Pt presents for an allogeneic stem cell transplant discussion.Pt is not a current candidate for transplant, but may be in the future.     Interventions: BMT Social Work Intern met with Pt over the phone to see how she was going, check on any questions she may have, and inquire about interest in BMT resources being mailed to her. Pt reported that she does not have any questions at this time and she would like the resources sent out to her. SW confirmed address.     Assessment: Pt presented as pleasant. Pt continues to be supported by her sister and family.     Plan: Repeat NT with social work will be needed if pt becomes a transplant candidate. SW will be mailing pt Transplantation Guidebook and resources. SW intern encouraged pt to contact SW with any additional questions, concerns, resources and/or for support.       JONATHON Elizabeth.  BMT Social Work Intern

## 2023-04-25 NOTE — PROGRESS NOTES
BMT / Cell Therapy Consultation      Hortencia Baldwin is a 52 year old female referred by Dr. Ramey for MDS and AIHA.      Disease presentation and baseline characteristics:      DIAGNOSIS:  1.  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  Molecular: 1. ASXL1: (6%) 2. SF3B1: (45%), see separate report in epic  3.  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  Flow cytometry: No monoclonal B or atypical T cell population identified  5.  Peripheral blood: Macrocytic anemia with increased anisopoikilocytosis  6.  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 Transfusions as needed  2. 3/2022 to 4/2022, EPO without response  3. 4/2022 to present: luspatercept  4. 9/2022 to 12/2022: weekly rituximab with steroids, now tapered off  5. 12/2022 to present: MMF currently on 500mg 2xd         HPI:  Please see my entry above for hematologic history. Irma relates her diagnosis and recent history. She was being evaluated to have a benign lump removed, but her PCP checked labs, and she had a hemoglobin of 4.  She had a workup that revealed a diagnosis of MDS, and subsequent AIHA. Hemoglobin is running in the 6-7 range, and she gets RBCs about once every 4-6 weeks. She can tell when she is anemic when she feels her heartbeat in her. Sometimes she gets very orthostatic when anemic.       ASSESSMENT AND PLAN:    Hortencia Baldwin is a 52 year old female with a malignancy that is currently incurable by standard chemotherapeutic approaches. To date, the only modality that offers a chance at long-term survival and potential cure is allogeneic hematopoietic stem cell transplantation.  Based upon my assessment of disease risk and comorbidities, Hortencia Sethi is a suitable candidate for allogeneic HCT.  However, the optimal timing of transplantation is  yet to be determined.    We had a detailed discussion about the rationale for transplant in this situation, requirements for proceeding, which include insurance approval, identification of an adequate source of donor hematopoetic progenitor cells, availability of a full-time caregiver along with residence within 30 minutes of the [facility] through at least day +100 post transplant, and compliance with the immunosuppression and supportive care medication regimen prescribed by providers at the [facility].      We had a alexandro discussion about the risks of the transplant itself, including toxicities from the preparative regimen, severe infections, the need for red blood cell and platelet transfusion support, the risk of graft rejection, the risk of acute and chronic yskzr-larwlk-qhzx disease, the need for immunosuppressive medications, the potential for severe organ toxicity including lungs, liver, skin, and kidneys, the possibility of long-term disability, and the risk of death due to complications of the transplant.  We discussed the risk of disease relapse despite going forward with a transplant.  Hortencia Sethi expressed understanding of these risks.    Under consideration at this time is a myeloablative preparative regimen, with matched related/unrelated PBSC as the donor source.  Clinical trial options discussed include 2015-29.  Timing of transplant is likely to be after assessing response to MDS and ABELARDO therapies, pending adequate disease control and organ function.      Will a transplant be safe?  HCTCI estimated to be 1-2. Simplified comorbidity index estimated to be 0-1. Estimated 2-year non-relapse mortality is 7-15%.  With PTCy as the backbone of GVHD prophylaxis,  life-threatening grade III-IV acute GVHD and moderate/severe chronic GVHD incidence is <10%.      Will a transplant be efficacious?   CIBMTR Survival Calculator estimates 1 year overall survival to be 74-90%. Relapse of the underlying disease is the most  frequent cause of transplant failure, which might be mitigated by maintenance strategies.      Recommendations:     Agree with consideration to start HMA given suboptimal response to AIHA therapies. Could consider oral decitabine.    Consider repeat bone marrow biopsy with molecular studies as new baseline before HMA start.    Her MDS is low risk but refractory, transfusion-dependent anemia would be a reason to consider allogeneic HSCT earlier in disease course.    HLA type and begin preliminary donor search.    Optimal timing of transplant is still to be determined. Would await response to HMA after 3-4 cycles to determine whether we should consider an earlier allogeneic HSCT in her case.        I spent 60 minutes in the care of this patient today, which included time necessary for preparation for the visit, obtaining history, ordering medications/tests/procedures as medically indicated, review of pertinent medical literature, counseling of the patient, communication of recommendations to the care team, and documentation time.    Stephanie Yi MD    Securely message me with the Vocera Web Console ([email or web address] or Microsoft Teams        ROS:    10 point ROS neg other than the symptoms noted above in the HPI.        Past Medical History:   Diagnosis Date     Left medial knee pain      Medial epicondylitis, right elbow      Obesity (BMI 30-39.9)      Right elbow pain        Past Surgical History:   Procedure Laterality Date     EXCISE LESION TRUNK N/A 2/5/2018    Procedure: EXCISE LESION TRUNK;;  Surgeon: Avani Szymanski MD;  Location:  [facility]     EXCISE MASS LOWER EXTREMITY Bilateral 5/31/2017    Procedure: EXCISE MASS LOWER EXTREMITY;  EXCISION OF LIPOMAS RIGHT BUTTOCKS RIGHT LOWER THIGH, LEFT UPPER OUTER THIGH;  Surgeon: Avani Szymanski MD;  Location:  [facility]     EXCISE MASS LOWER EXTREMITY Right 2/5/2018    Procedure: EXCISE MASS LOWER EXTREMITY;  EXCISION OF RIGHT UPPER ANTERIOR  THIGH SOFT TISSUE MASS,EXCISION OF LEFT LOWER BACK SOFT TISSUE MASS;  Surgeon: Avani Szymanski MD;  Location: [facility]     LUMPECTOMY BREAST Left 1990s    benign       Family History   Problem Relation Age of Onset     Hypertension Mother      Diabetes No family hx of      Cerebrovascular Disease No family hx of      Myocardial Infarction No family hx of      Coronary Artery Disease Early Onset No family hx of      Breast Cancer No family hx of      Ovarian Cancer No family hx of      Colon Cancer No family hx of      Father had prostate cancer, doing well. She has one half-brother and one half-sister. , 3 kids, aging in range from 20 - 33.        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 She spends 2-3 hours a day on her feet.       Social History     Tobacco Use     Smoking status: Every Day     Packs/day: 0.50     Years: 10.00     Pack years: 5.00     Types: Cigarettes     Smokeless tobacco: Never   Substance Use Topics     Alcohol use: Yes     Comment: 1-2 drinks/month     Drug use: No         No Known Allergies     Current Outpatient Medications   Medication Sig Dispense Refill     order for DME Equipment being ordered: arm band (Patient not taking: Reported on 1/29/2018) 1 Device 0         Physical Exam:     Vital Signs: There were no vitals taken for this visit.        KPS:  80    General Appearance: Normal posture, gait, alertness, and orientation.  Eyes: Conjunctiva are normal, not injected or icteric.  Mouth and Throat: No mucositis or lichenoid change.  Respiratory: Normal respiratory effort, no wheeze.  Cardiovascular: Normal perfusion, no significant edema.  Abdomen: No distention or mass.  Musculoskeletal: Muscle mass normal, range of motion normal.  Neurological: Grossly normal motor and sensory function.  Skin: No changes in color, texture, or any lesions or abnormalities.  Psych: Mood and affect are appropriate.          Blood Counts       Recent Labs   Lab Test 03/19/18  1338 01/29/18  1544  05/30/17  1155   HGB 10.4* 10.9* 10.7*   HCT 31.7* 32.8* 33.7*   WBC 5.4 8.3 7.4    255 291       ABO/RH    No lab results found.      Chemistries     Basic Panel  Recent Labs   Lab Test 05/30/17  1155      POTASSIUM 3.7   CHLORIDE 107   CO2 26   BUN 11   CR 0.63   GLC 85        Calcium, Magnesium, Phosphorus  Recent Labs   Lab Test 05/30/17  1155   NIKO 8.1*        LFTs  Recent Labs   Lab Test 05/30/17  1155   BILITOTAL 0.4   ALKPHOS 72   AST 11   ALT 16   ALBUMIN 3.7       Known issues that I take into account for medical decisions, with salient changes to the plan considering these complexities noted above.    Patient Active Problem List   Diagnosis     Left medial knee pain     Obesity (BMI 30-39.9)     Anemia, unspecified type     Macrocytic anemia       ------------------------------------------------------------------------------------------------------------------------------------------------    Patient Care Team       Relationship Specialty Notifications Start End    Esther Holloway PA-C PCP - General Physician Assistant  2/5/18     Phone: [phone] Fax: [phone]         [address] [place] [address]

## 2023-04-25 NOTE — LETTER
4/25/2023         RE: Hortencia Baldwin  [address]  [place] [address]        Dear Colleague,    Thank you for referring your patient, Hortencia Baldwin, to the [facility]OW [facility] [place]. Please see a copy of my visit note below.           BMT / Cell Therapy Consultation      Hortencia Baldwin is a 52 year old female referred by Dr. Ramey for MDS and AIHA.      Disease presentation and baseline characteristics:      DIAGNOSIS:  1.  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  Molecular: 1. ASXL1: (6%) 2. SF3B1: (45%), see separate report in epic  3.  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  Flow cytometry: No monoclonal B or atypical T cell population identified  5.  Peripheral blood: Macrocytic anemia with increased anisopoikilocytosis  6.  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 Transfusions as needed  2. 3/2022 to 4/2022, EPO without response  3. 4/2022 to present: luspatercept  4. 9/2022 to 12/2022: weekly rituximab with steroids, now tapered off  5. 12/2022 to present: MMF currently on 500mg 2xd         HPI:  Please see my entry above for hematologic history. Irma relates her diagnosis and recent history. She was being evaluated to have a benign lump removed, but her PCP checked labs, and she had a hemoglobin of 4.  She had a workup that revealed a diagnosis of MDS, and subsequent AIHA. Hemoglobin is running in the 6-7 range, and she gets RBCs about once every 4-6 weeks. She can tell when she is anemic when she feels her heartbeat in her. Sometimes she gets very orthostatic when anemic.       ASSESSMENT AND PLAN:    Hortencia Baldwin is a 52 year old female with a malignancy that is currently incurable by standard chemotherapeutic approaches. To date, the only  modality that offers a chance at long-term survival and potential cure is allogeneic hematopoietic stem cell transplantation.  Based upon my assessment of disease risk and comorbidities, Hortencia Sethi is a suitable candidate for allogeneic HCT.  However, the optimal timing of transplantation is yet to be determined.    We had a detailed discussion about the rationale for transplant in this situation, requirements for proceeding, which include insurance approval, identification of an adequate source of donor hematopoetic progenitor cells, availability of a full-time caregiver along with residence within 30 minutes of the [facility]  through at least day +100 post transplant, and compliance with the immunosuppression and supportive care medication regimen prescribed by providers at the [facility].      We had a alexandro discussion about the risks of the transplant itself, including toxicities from the preparative regimen, severe infections, the need for red blood cell and platelet transfusion support, the risk of graft rejection, the risk of acute and chronic eflut-tynlim-ewwc disease, the need for immunosuppressive medications, the potential for severe organ toxicity including lungs, liver, skin, and kidneys, the possibility of long-term disability, and the risk of death due to complications of the transplant.  We discussed the risk of disease relapse despite going forward with a transplant.  Hortencia Sethi expressed understanding of these risks.    Under consideration at this time is a myeloablative preparative regimen, with matched related/unrelated PBSC as the donor source.  Clinical trial options discussed include 2015-29.  Timing of transplant is likely to be after assessing response to MDS and ABELARDO therapies, pending adequate disease control and organ function.      Will a transplant be safe?  HCTCI estimated to be 1-2. Simplified comorbidity index estimated to be 0-1. Estimated 2-year non-relapse mortality is 7-15%.  With PTCy as the  backbone of GVHD prophylaxis,  life-threatening grade III-IV acute GVHD and moderate/severe chronic GVHD incidence is <10%.      Will a transplant be efficacious?   CIBMTR Survival Calculator estimates 1 year overall survival to be 74-90%. Relapse of the underlying disease is the most frequent cause of transplant failure, which might be mitigated by maintenance strategies.      Recommendations:   Agree with consideration to start HMA given suboptimal response to AIHA therapies. Could consider oral decitabine.  Consider repeat bone marrow biopsy with molecular studies as new baseline before HMA start.  Her MDS is low risk but refractory, transfusion-dependent anemia would be a reason to consider allogeneic HSCT earlier in disease course.  HLA type and begin preliminary donor search.  Optimal timing of transplant is still to be determined. Would await response to HMA after 3-4 cycles to determine whether we should consider an earlier allogeneic HSCT in her case.        I spent 60 minutes in the care of this patient today, which included time necessary for preparation for the visit, obtaining history, ordering medications/tests/procedures as medically indicated, review of pertinent medical literature, counseling of the patient, communication of recommendations to the care team, and documentation time.    Stephanie Yi MD    Securely message me with the Vocera Web Console ([email or web address] or Microsoft Teams        ROS:    10 point ROS neg other than the symptoms noted above in the HPI.        Past Medical History:   Diagnosis Date    Left medial knee pain     Medial epicondylitis, right elbow     Obesity (BMI 30-39.9)     Right elbow pain        Past Surgical History:   Procedure Laterality Date    EXCISE LESION TRUNK N/A 2/5/2018    Procedure: EXCISE LESION TRUNK;;  Surgeon: Avani Szymanski MD;  [facility]    EXCISE MASS LOWER EXTREMITY Bilateral 5/31/2017    Procedure: EXCISE MASS  LOWER EXTREMITY;  EXCISION OF LIPOMAS RIGHT BUTTOCKS RIGHT LOWER THIGH, LEFT UPPER OUTER THIGH;  Surgeon: Avani Szymanski MD;  [facility]: [facility]    EXCISE MASS LOWER EXTREMITY Right 2/5/2018    Procedure: EXCISE MASS LOWER EXTREMITY;  EXCISION OF RIGHT UPPER ANTERIOR THIGH SOFT TISSUE MASS,EXCISION OF LEFT LOWER BACK SOFT TISSUE MASS;  Surgeon: Avani Szymanski MD;  Location: [facility]    LUMPECTOMY BREAST Left 1990s    benign       Family History   Problem Relation Age of Onset    Hypertension Mother     Diabetes No family hx of     Cerebrovascular Disease No family hx of     Myocardial Infarction No family hx of     Coronary Artery Disease Early Onset No family hx of     Breast Cancer No family hx of     Ovarian Cancer No family hx of     Colon Cancer No family hx of      Father had prostate cancer, doing well. She has one half-brother and one half-sister. , 3 kids, aging in range from 20 - 33.        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       Social History     Tobacco Use    Smoking status: Every Day     Packs/day: 0.50     Years: 10.00     Pack years: 5.00     Types: Cigarettes    Smokeless tobacco: Never   Substance Use Topics    Alcohol use: Yes     Comment: 1-2 drinks/month    Drug use: No         No Known Allergies     Current Outpatient Medications   Medication Sig Dispense Refill    order for DME Equipment being ordered: arm band (Patient not taking: Reported on 1/29/2018) 1 Device 0         Physical Exam:     Vital Signs: There were no vitals taken for this visit.        KPS:  80    General Appearance: Normal posture, gait, alertness, and orientation.  Eyes: Conjunctiva are normal, not injected or icteric.  Mouth and Throat: No mucositis or lichenoid change.  Respiratory: Normal respiratory effort, no wheeze.  Cardiovascular: Normal perfusion, no significant edema.  Abdomen: No distention or mass.  Musculoskeletal: Muscle mass normal, range of motion  normal.  Neurological: Grossly normal motor and sensory function.  Skin: No changes in color, texture, or any lesions or abnormalities.  Psych: Mood and affect are appropriate.          Blood Counts       Recent Labs   Lab Test 03/19/18  1338 01/29/18  1544 05/30/17  1155   HGB 10.4* 10.9* 10.7*   HCT 31.7* 32.8* 33.7*   WBC 5.4 8.3 7.4    255 291       ABO/RH    No lab results found.      Chemistries     Basic Panel  Recent Labs   Lab Test 05/30/17  1155      POTASSIUM 3.7   CHLORIDE 107   CO2 26   BUN 11   CR 0.63   GLC 85        Calcium, Magnesium, Phosphorus  Recent Labs   Lab Test 05/30/17  1155   NIKO 8.1*        LFTs  Recent Labs   Lab Test 05/30/17  1155   BILITOTAL 0.4   ALKPHOS 72   AST 11   ALT 16   ALBUMIN 3.7       Known issues that I take into account for medical decisions, with salient changes to the plan considering these complexities noted above.    Patient Active Problem List   Diagnosis    Left medial knee pain    Obesity (BMI 30-39.9)    Anemia, unspecified type    Macrocytic anemia       ------------------------------------------------------------------------------------------------------------------------------------------------    Patient Care Team         Relationship Specialty Notifications Start End    Esther Holloway PA-C PCP - General Physician Assistant  2/5/18     Phone: [phone] Fax: [phone]         [address] [place] [address]                Stephanie Yi MD

## 2023-04-25 NOTE — NURSING NOTE
Labs drawn in clinic by LPN via venipuncture in the right hand with 21 G.    Patient tolerated well and had no issues.    Shady Tai LPN   SBIRT referral

## 2023-04-25 NOTE — NURSING NOTE
"Oncology Rooming Note    April 25, 2023 12:06 PM   Hortencia Baldwin is a 52 year old female who presents for:    Chief Complaint   Patient presents with     Oncology Clinic Visit     MDS     Initial Vitals: /82 (BP Location: Right arm, Patient Position: Sitting, Cuff Size: Adult Large)   Pulse 91   Temp 98.5  F (36.9  C) (Oral)   Resp 16   Ht 1.575 m (5' 2\")   Wt 80.7 kg (177 lb 14.4 oz)   SpO2 100%   BMI 32.54 kg/m   Estimated body mass index is 32.54 kg/m  as calculated from the following:    Height as of this encounter: 1.575 m (5' 2\").    Weight as of this encounter: 80.7 kg (177 lb 14.4 oz). Body surface area is 1.88 meters squared.  No Pain (0) Comment: Data Unavailable   No LMP recorded. (Menstrual status: Irregular Periods).  Allergies reviewed: Yes  Medications reviewed: Yes    Medications: Medication refills not needed today.  Pharmacy name entered into [facility]:    [id number] - [place], [place] - [address] AT [facility] [id number] - [place], [place] - [address] AT [facility]    Clinical concerns: Pt is new today with Dr Yi.       Tiffany Boles, RAY  4/25/2023              "

## 2023-04-26 NOTE — PROGRESS NOTES
Spoke with Irma and her sister following new transplant visit with Dr. Yi. Reviewed plan of care per NT conversation for Stem Cell Transplant. Explained role of the Nurse Coordinator throughout the BMT process as well as general time line and expectations for transplant. Discussed necessity of caregiver and program's proximity requirements. All questions were answered.     Plan: No Treatment, pending treatment response.    Timeline Notes:Dr Yi is not recommending transplant at this time. Irma may need a transplant in the future so it was decided to get HLA typing to have on file in case it is needed in the future.    Contact information provided for :  yes    Allo:    HLA typing drawn: Yes      PRA typing drawn:  No      CMV-IgG and ABO-Rh drawn or in record: No      Contact information provided for : No      Will sibling typing kits need to be sent? Yes - has half siblings only. Sister was going to call in for testing.      Financial Release for URD search obtained:  No    CAR-T:    Virologies drawn:  N/A    AUTO for MM:    Outpatient Infusion: N/A    EOC Reason updated: yes

## 2023-05-01 LAB
A*: NORMAL
A*LOCUS SEROLOGIC EQUIVALENT: 11
A*LOCUS: NORMAL
A*SEROLOGIC EQUIVALENT: 68
ABTEST METHOD: NORMAL
B*: NORMAL
B*LOCUS SEROLOGIC EQUIVALENT: 35
B*LOCUS: NORMAL
B*SEROLOGIC EQUIVALENT: 3901
BW-1: NORMAL
C*: NORMAL
C*LOCUS SEROLOGIC EQUIVALENT: 4
C*LOCUS: NORMAL
C*SEROLOGIC EQUIVALENT: 7
DPA1*: NORMAL
DPA1*LOCUS: NORMAL
DPB1*: NORMAL
DPB1*LOCUS NMDP: NORMAL
DPB1*LOCUS: NORMAL
DPB1*NMDP: NORMAL
DQA1*: NORMAL
DQA1*LOCUS: NORMAL
DQB1*: NORMAL
DQB1*LOCUS SEROLOGIC EQUIVALENT: 4
DQB1*LOCUS: NORMAL
DQB1*SEROLOGIC EQUIVALENT: 5
DRB1*: NORMAL
DRB1*LOCUS SEROLOGIC EQUIVALENT: 1
DRB1*LOCUS: NORMAL
DRB1*SEROLOGIC EQUIVALENT: 4
DRB4*LOCUS SEROLOGIC EQUIVALENT: 53
DRB4*LOCUS: NORMAL
DRSSO TEST METHOD: NORMAL

## 2023-09-27 ENCOUNTER — TELEPHONE (OUTPATIENT)
Dept: TRANSPLANT | Facility: CLINIC | Age: 53
End: 2023-09-27
Payer: COMMERCIAL

## 2023-09-27 DIAGNOSIS — D46.9 MDS (MYELODYSPLASTIC SYNDROME) (H): Primary | ICD-10-CM

## 2023-11-06 LAB
ABO/RH(D): NORMAL
ANTIBODY SCREEN: NEGATIVE
SPECIMEN EXPIRATION DATE: NORMAL

## 2023-11-06 NOTE — PROGRESS NOTES
BMT / Cell Therapy Consultation      Hortencia Baldwin is a 53 year old female referred by Dr. Ramey for MDS and AIHA.      Disease presentation and baseline characteristics:  She was being evaluated to have a benign lump removed, but her PCP checked labs, and she had a hemoglobin of 4.  She had a workup that revealed a diagnosis of MDS, and subsequent AIHA. Hemoglobin is running in the 6-7 range, and she gets RBCs about once every 4-6 weeks. She can tell when she is anemic when she feels her heartbeat in her. Sometimes she gets very orthostatic when anemic.     DIAGNOSIS:  1.  Myelodysplastic syndrome with ring sideroblasts, SF3B1 and ASXL1 mutations present on bone marrow biopsy, diagnosed 12/2021. 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  Molecular: 1. ASXL1: (6%) 2. SF3B1: (45%), see separate report in epic  3.  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  Flow cytometry: No monoclonal B or atypical T cell population identified  5.  Peripheral blood: Macrocytic anemia with increased anisopoikilocytosis  6.  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 Transfusions as needed  2. 3/2022 to 4/2022, EPO without response  3. 4/2022 to present: luspatercept  4. 9/2022 to 12/2022: weekly rituximab with steroids, now tapered off  12/2022 to 5/2023: MMF, then tapered off  6. 5/2023 to present: Decitabine and cedazuridine -- complicated by cytopenias, requiring transfusions every 2-3 weeks.         HPI:  Please see my entry above for hematologic history.   Irma returns returns with her boyfriend today after 4 cycles of oral decitabine.  Unfortunately, she has been feeling fatigued and continues to need blood transfusions every few weeks and has required some platelet transfusions as well.  Overall, she is interested in moving forward with  transplant, but has some logistical questions for our .       ASSESSMENT AND PLAN:  Hortencia Baldwin is a 53 year old woman returns today to again discuss transplant. She has a malignancy (MDS) that is currently incurable by standard chemotherapeutic approaches. To date, the only modality that offers a chance at long-term survival and potential cure is allogeneic hematopoietic stem cell transplantation.  Based upon my assessment of disease risk and comorbidities, Hortencia Sethi is a suitable candidate for allogeneic HCT.      As in her initial visit, we discussed that although she has low risk MDS by IPSS-RA, she has several factors that merit early consideration of transplant. Namely, her known ASXL1 mutation, which is an independent risk factor in MDS (See N Engl J Med. 2011; 364(63): 1696-0882, or Leuk Res. 2018 Au:60-62. doi: 10.1016/j.leukres.2018.07.010. Epub 2018 ) and her refractory anemia. Further, she developed multi-lineage dysplasia on BMBx from 2023, which was new from previous biopsies, which suggests clonal evolution. Although we have yet to see a BMBx after 4 cycles of HMA, she has continued to need transfusions due to pancytopenia, which is possibly related to HMA, although we would have hoped that her counts would have improved to the point that she would not need transfusions related to HMA. This further suggests that HMA may not be an appropriate long-term option. Overall, we suggest moving forward with transplant upon identification of a donor and adequate organ testing.    In addition to the above, we reviewed the rationale for transplant in this situation, requirements for proceeding, which include insurance approval, identification of an adequate source of donor hematopoetic progenitor cells, availability of a full-time caregiver along with residence within 30 minutes of the [facility] through at least day +100 post transplant, and compliance with the immunosuppression and  supportive care medication regimen prescribed by providers at the [facility].      We reiterated the risks of the transplant itself, including toxicities from the preparative regimen, severe infections, the need for red blood cell and platelet transfusion support, the risk of graft rejection, the risk of acute and chronic tljuw-lxvotm-fcbh disease, the need for immunosuppressive medications, the potential for severe organ toxicity including lungs, liver, skin, and kidneys, the possibility of long-term disability, and the risk of death due to complications of the transplant.  We discussed the risk of disease relapse despite going forward with a transplant.  Hortencia Sethi expressed understanding of these risks.    Under consideration at this time is a myeloablative preparative regimen, with matched related/unrelated PBSC as the donor source.  Clinical trial options discussed include 2015-29.  Timing of transplant depends upon re-assessing response to HMA, and  further evaluation of organ function.      Will a transplant be safe?  HCTCI estimated to be 1-2. Simplified comorbidity index estimated to be 0. Estimated 2-year non-relapse mortality is 7-15%.  With PTCy as the backbone of GVHD prophylaxis,  life-threatening grade III-IV acute GVHD and moderate/severe chronic GVHD incidence is <10%.      Will a transplant be efficacious?   CIBMTR Survival Calculator estimates 1 year overall survival to be 78-91%. Relapse of the underlying disease is the most frequent cause of transplant failure, which might be mitigated by maintenance strategies.      Recommendations:   While her MDS is low risk, she has an ASXL1 mutation, new multilineage dysplasia on BMBx from May (evolving clonal hematopoiesis), and refractory, transfusion-dependent anemia, all of which support proceeding with allogeneic HSCT at the current time  We will coordinate MUD search, ideally with ABO matched donor  Plan for 1 more cycle of HMA as bridge to transplant, which  we will discuss with Dr. Ramey      I spent 60 minutes in the care of this patient today, which included time necessary for preparation for the visit, obtaining history, ordering medications/tests/procedures as medically indicated, review of pertinent medical literature, counseling of the patient, communication of recommendations to the care team, and documentation time.    Patient seen and staffed with Dr. Yi who agrees with the above assessment and plan.     Jasper Underwood MD PhD  Advanced Fellow in Heme/Onc/Transplant    _______________________________________________    ATTENDING NOTE    I have seen and personally evaluated the patient today.  I reviewed vitals, medications, laboratory results, and I viewed pertinent imaging studies.  After doing so, I formulated a plan with Dr. Bush as documented in this note.  I spent >50% of a 60 minute in person visit personally communicating my assessment and plan. I personally performed all of the medical decision making associated with this visit regarding monitoring on immune function, prevention of infections, prevention/management of GVHD, and organ toxicities of transplantation. I was face to face with the patient for the entire visit.       Stephanie Yi MD         ROS: Pertinent positive and negative systems described in HPI; the remainder of the 14 systems are negative       Past Medical History:   Diagnosis Date    Left medial knee pain     Medial epicondylitis, right elbow     Obesity (BMI 30-39.9)     Right elbow pain        Past Surgical History:   Procedure Laterality Date    EXCISE LESION TRUNK N/A 2/5/2018    Procedure: EXCISE LESION TRUNK;;  Surgeon: Avani Szymanski MD;  Location: [facility]    EXCISE MASS LOWER EXTREMITY Bilateral 5/31/2017    Procedure: EXCISE MASS LOWER EXTREMITY;  EXCISION OF LIPOMAS RIGHT BUTTOCKS RIGHT LOWER THIGH, LEFT UPPER OUTER THIGH;  Surgeon: Avani Szymanski MD;  Location: [facility]    EXCISE MASS LOWER  "EXTREMITY Right 2/5/2018    Procedure: EXCISE MASS LOWER EXTREMITY;  EXCISION OF RIGHT UPPER ANTERIOR THIGH SOFT TISSUE MASS,EXCISION OF LEFT LOWER BACK SOFT TISSUE MASS;  Surgeon: Avani Szymanski MD;  [facility]    LUMPECTOMY BREAST Left 1990s    benign       Family History   Problem Relation Age of Onset    Hypertension Mother     Diabetes No family hx of     Cerebrovascular Disease No family hx of     Myocardial Infarction No family hx of     Coronary Artery Disease Early Onset No family hx of     Breast Cancer No family hx of     Ovarian Cancer No family hx of     Colon Cancer No family hx of      Father had prostate cancer, doing well. She has one half-brother and one half-sister. , 3 kids, aging in range from 20 - 33.          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     No Known Allergies     Current Outpatient Medications   Medication Sig Dispense Refill    cholecalciferol (VITAMIN D3) 25 mcg (1000 units) capsule Take 1,000 Units by mouth daily      Omega-3 Fatty Acids (FISH OIL) 1200 MG capsule Take 1,200 mg by mouth daily      folic acid (FOLVITE) 1 MG tablet Take 1 mg by mouth daily (Patient not taking: Reported on 11/7/2023)      mycophenolate (GENERIC EQUIVALENT) 500 MG tablet Take 500 mg by mouth 2 times daily (Patient not taking: Reported on 11/7/2023)           Physical Exam:     /82 (BP Location: Right arm, Patient Position: Sitting, Cuff Size: Adult Regular)   Pulse 95   Temp 98.5  F (36.9  C) (Oral)   Resp 16   Ht 1.562 m (5' 1.5\")   Wt 83.6 kg (184 lb 6.4 oz)   LMP 11/05/2017   SpO2 100%   BMI 34.28 kg/m  "   General: Well appearing.  HEENT: Sclerae anicteric.  Lungs: Breathing comfortably. No cough.  CV: Warm and well perfused  MSK: Moving all extremities without deficits.  Neuro: Grossly non-focal.  Skin/access: Normal skin tone.  Psych: Alert and oriented. No distress.    Blood Counts       Recent Labs   Lab Test 03/19/18  1338 01/29/18  1544 05/30/17  1155   HGB 10.4* 10.9* 10.7*   HCT 31.7* 32.8* 33.7*   WBC 5.4 8.3 7.4    255 291         Chemistries     Basic Panel  Recent Labs   Lab Test 05/30/17  1155      POTASSIUM 3.7   CHLORIDE 107   CO2 26   BUN 11   CR 0.63   GLC 85        Calcium, Magnesium, Phosphorus  Recent Labs   Lab Test 05/30/17  1155   NIKO 8.1*        LFTs  Recent Labs   Lab Test 05/30/17  1155   BILITOTAL 0.4   ALKPHOS 72   AST 11   ALT 16   ALBUMIN 3.7       Known issues that I take into account for medical decisions, with salient changes to the plan considering these complexities noted above.    Patient Active Problem List   Diagnosis    Left medial knee pain    Obesity (BMI 30-39.9)    Anemia, unspecified type    Macrocytic anemia       ------------------------------------------------------------------------------------------------------------------------------------------------    Patient Care Team         Relationship Specialty Notifications Start End    Esther Holloway PA-C PCP - General Physician Assistant  2/5/18     Phone: [phone] Fax: [phone]         [address] [place] [address]

## 2023-11-07 ENCOUNTER — OFFICE VISIT (OUTPATIENT)
Dept: TRANSPLANT | Facility: CLINIC | Age: 53
End: 2023-11-07
Attending: INTERNAL MEDICINE
Payer: COMMERCIAL

## 2023-11-07 ENCOUNTER — LAB (OUTPATIENT)
Dept: LAB | Facility: CLINIC | Age: 53
End: 2023-11-07
Attending: INTERNAL MEDICINE
Payer: COMMERCIAL

## 2023-11-07 VITALS
DIASTOLIC BLOOD PRESSURE: 82 MMHG | HEART RATE: 95 BPM | BODY MASS INDEX: 34.81 KG/M2 | SYSTOLIC BLOOD PRESSURE: 130 MMHG | HEIGHT: 61 IN | WEIGHT: 184.4 LBS | TEMPERATURE: 98.5 F | RESPIRATION RATE: 16 BRPM | OXYGEN SATURATION: 100 %

## 2023-11-07 DIAGNOSIS — D46.9 MDS (MYELODYSPLASTIC SYNDROME) (H): Primary | ICD-10-CM

## 2023-11-07 DIAGNOSIS — D46.9 MDS (MYELODYSPLASTIC SYNDROME) (H): ICD-10-CM

## 2023-11-07 PROCEDURE — 99213 OFFICE O/P EST LOW 20 MIN: CPT | Performed by: INTERNAL MEDICINE

## 2023-11-07 PROCEDURE — 86901 BLOOD TYPING SEROLOGIC RH(D): CPT

## 2023-11-07 PROCEDURE — 36415 COLL VENOUS BLD VENIPUNCTURE: CPT

## 2023-11-07 PROCEDURE — 86828 HLA CLASS I&II ANTIBODY QUAL: CPT

## 2023-11-07 PROCEDURE — 86833 HLA CLASS II HIGH DEFIN QUAL: CPT

## 2023-11-07 PROCEDURE — 86832 HLA CLASS I HIGH DEFIN QUAL: CPT

## 2023-11-07 PROCEDURE — 86828 HLA CLASS I&II ANTIBODY QUAL: CPT | Mod: XU

## 2023-11-07 PROCEDURE — 86850 RBC ANTIBODY SCREEN: CPT

## 2023-11-07 PROCEDURE — 99215 OFFICE O/P EST HI 40 MIN: CPT | Mod: GC | Performed by: INTERNAL MEDICINE

## 2023-11-07 PROCEDURE — 86644 CMV ANTIBODY: CPT

## 2023-11-07 ASSESSMENT — PAIN SCALES - GENERAL: PAINLEVEL: NO PAIN (0)

## 2023-11-07 NOTE — NURSING NOTE
"Oncology Rooming Note    November 7, 2023 3:41 PM   Hortencia Baldwin is a 53 year old female who presents for:    Chief Complaint   Patient presents with    Oncology Clinic Visit     myelodysplastic syndrome     Initial Vitals: /82 (BP Location: Right arm, Patient Position: Sitting, Cuff Size: Adult Regular)   Pulse 95   Temp 98.5  F (36.9  C) (Oral)   Resp 16   Ht 1.562 m (5' 1.5\")   Wt 83.6 kg (184 lb 6.4 oz)   LMP 11/05/2017   SpO2 100%   BMI 34.28 kg/m   Estimated body mass index is 34.28 kg/m  as calculated from the following:    Height as of this encounter: 1.562 m (5' 1.5\").    Weight as of this encounter: 83.6 kg (184 lb 6.4 oz). Body surface area is 1.9 meters squared.  No Pain (0) Comment: Data Unavailable   Patient's last menstrual period was 11/05/2017.  Allergies reviewed: Yes  Medications reviewed: Yes    Medications: Medication refills not needed today.  Pharmacy name entered into [facility]:    [id number] - [place], [place] - [address] AT [facility] [address] & [facility]  [facility] #[id number] - [place], [place] - [address] AT [facility] [address]    Clinical concerns: New patient consult.        Robel Vaz"

## 2023-11-07 NOTE — LETTER
11/7/2023         RE: Hortencia Baldwin  [address]  [address]  [facility] [place] [address]        Dear Colleague,    Thank you for referring your patient, Hortencia Baldwin, to the [facility] BLOOD AND MARROW TRANSPLANT PROGRAM [place]. Please see a copy of my visit note below.           BMT / Cell Therapy Consultation      Hortencia Balwdin is a 53 year old female referred by Dr. Ramey for MDS and AIHA.      Disease presentation and baseline characteristics:  She was being evaluated to have a benign lump removed, but her PCP checked labs, and she had a hemoglobin of 4.  She had a workup that revealed a diagnosis of MDS, and subsequent AIHA. Hemoglobin is running in the 6-7 range, and she gets RBCs about once every 4-6 weeks. She can tell when she is anemic when she feels her heartbeat in her. Sometimes she gets very orthostatic when anemic.     DIAGNOSIS:  1.  Myelodysplastic syndrome with ring sideroblasts, SF3B1 and ASXL1 mutations present on bone marrow biopsy, diagnosed 12/2021. 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  Molecular: 1. ASXL1: (6%) 2. SF3B1: (45%), see separate report in epic  3.  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  Flow cytometry: No monoclonal B or atypical T cell population identified  5.  Peripheral blood: Macrocytic anemia with increased anisopoikilocytosis  6.  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 Transfusions as needed  2. 3/2022 to 4/2022, EPO without response  3. 4/2022 to present: luspatercept  4. 9/2022 to 12/2022: weekly rituximab with steroids, now tapered off  12/2022 to 5/2023: MMF, then tapered off  6. 5/2023 to present: Decitabine and cedazuridine -- complicated by cytopenias, requiring transfusions every 2-3 weeks.         HPI:  Please see my entry above for hematologic history.    Irma returns returns with her boyfriend today after 4 cycles of oral decitabine.  Unfortunately, she has been feeling fatigued and continues to need blood transfusions every few weeks and has required some platelet transfusions as well.  Overall, she is interested in moving forward with transplant, but has some logistical questions for our .       ASSESSMENT AND PLAN:  Hortencia Baldwin is a 53 year old woman returns today to again discuss transplant. She has a malignancy (MDS) that is currently incurable by standard chemotherapeutic approaches. To date, the only modality that offers a chance at long-term survival and potential cure is allogeneic hematopoietic stem cell transplantation.  Based upon my assessment of disease risk and comorbidities, Hortencia Sethi is a suitable candidate for allogeneic HCT.      As in her initial visit, we discussed that although she has low risk MDS by IPSS-RA, she has several factors that merit early consideration of transplant. Namely, her known ASXL1 mutation, which is an independent risk factor in MDS (See N Engl J Med. 2011; 364(41): 0694-1785, or Leuk Res. 2018 Au:60-62. doi: 10.1016/j.leukres.2018.07.010. Epub 2018 ) and her refractory anemia. Further, she developed multi-lineage dysplasia on BMBx from 2023, which was new from previous biopsies, which suggests clonal evolution. Although we have yet to see a BMBx after 4 cycles of HMA, she has continued to need transfusions due to pancytopenia, which is possibly related to HMA, although we would have hoped that her counts would have improved to the point that she would not need transfusions related to HMA. This further suggests that HMA may not be an appropriate long-term option. Overall, we suggest moving forward with transplant upon identification of a donor and adequate organ testing.    In addition to the above, we reviewed the rationale for transplant in this situation, requirements for proceeding,  which include insurance approval, identification of an adequate source of donor hematopoetic progenitor cells, availability of a full-time caregiver along with residence within 30 minutes of the [facility] through at least day +100 post transplant, and compliance with the immunosuppression and supportive care medication regimen prescribed by providers at the [facility]      We reiterated the risks of the transplant itself, including toxicities from the preparative regimen, severe infections, the need for red blood cell and platelet transfusion support, the risk of graft rejection, the risk of acute and chronic wgyxm-gvjnae-xdxp disease, the need for immunosuppressive medications, the potential for severe organ toxicity including lungs, liver, skin, and kidneys, the possibility of long-term disability, and the risk of death due to complications of the transplant.  We discussed the risk of disease relapse despite going forward with a transplant.  Hortencia Sethi expressed understanding of these risks.    Under consideration at this time is a myeloablative preparative regimen, with matched related/unrelated PBSC as the donor source.  Clinical trial options discussed include 2015-29.  Timing of transplant depends upon re-assessing response to HMA, and  further evaluation of organ function.      Will a transplant be safe?  HCTCI estimated to be 1-2. Simplified comorbidity index estimated to be 0. Estimated 2-year non-relapse mortality is 7-15%.  With PTCy as the backbone of GVHD prophylaxis,  life-threatening grade III-IV acute GVHD and moderate/severe chronic GVHD incidence is <10%.      Will a transplant be efficacious?   CIBMTR Survival Calculator estimates 1 year overall survival to be 78-91%. Relapse of the underlying disease is the most frequent cause of transplant failure, which might be mitigated by maintenance strategies.      Recommendations:   While her MDS is low risk, she has an ASXL1 mutation, new multilineage dysplasia  on BMBx from May (evolving clonal hematopoiesis), and refractory, transfusion-dependent anemia, all of which support proceeding with allogeneic HSCT at the current time  We will coordinate MUD search, ideally with ABO matched donor  Plan for 1 more cycle of HMA as bridge to transplant, which we will discuss with Dr. Ramey      I spent 60 minutes in the care of this patient today, which included time necessary for preparation for the visit, obtaining history, ordering medications/tests/procedures as medically indicated, review of pertinent medical literature, counseling of the patient, communication of recommendations to the care team, and documentation time.    Patient seen and staffed with Dr. Yi who agrees with the above assessment and plan.     Jasper Underwood MD PhD  Advanced Fellow in Heme/Onc/Transplant    _______________________________________________    ATTENDING NOTE    I have seen and personally evaluated the patient today.  I reviewed vitals, medications, laboratory results, and I viewed pertinent imaging studies.  After doing so, I formulated a plan with Dr. Bush as documented in this note.  I spent >50% of a 60 minute in person visit personally communicating my assessment and plan. I personally performed all of the medical decision making associated with this visit regarding monitoring on immune function, prevention of infections, prevention/management of GVHD, and organ toxicities of transplantation. I was face to face with the patient for the entire visit.       Stephanie Yi MD         ROS: Pertinent positive and negative systems described in HPI; the remainder of the 14 systems are negative       Past Medical History:   Diagnosis Date    Left medial knee pain     Medial epicondylitis, right elbow     Obesity (BMI 30-39.9)     Right elbow pain        Past Surgical History:   Procedure Laterality Date    EXCISE LESION TRUNK N/A 2/5/2018    Procedure: EXCISE LESION TRUNK;;   Surgeon: Avani Szymanski MD;  Location: [facility]    EXCISE MASS LOWER EXTREMITY Bilateral 5/31/2017    Procedure: EXCISE MASS LOWER EXTREMITY;  EXCISION OF LIPOMAS RIGHT BUTTOCKS RIGHT LOWER THIGH, LEFT UPPER OUTER THIGH;  Surgeon: Avani Szymanski MD;  Location: [facility]    EXCISE MASS LOWER EXTREMITY Right 2/5/2018    Procedure: EXCISE MASS LOWER EXTREMITY;  EXCISION OF RIGHT UPPER ANTERIOR THIGH SOFT TISSUE MASS,EXCISION OF LEFT LOWER BACK SOFT TISSUE MASS;  Surgeon: Avani Szymanski MD;  Location: [facility]    LUMPECTOMY BREAST Left 1990s    benign       Family History   Problem Relation Age of Onset    Hypertension Mother     Diabetes No family hx of     Cerebrovascular Disease No family hx of     Myocardial Infarction No family hx of     Coronary Artery Disease Early Onset No family hx of     Breast Cancer No family hx of     Ovarian Cancer No family hx of     Colon Cancer No family hx of      Father had prostate cancer, doing well. She has one half-brother and one half-sister. , 3 kids, aging in range from 20 - 33.          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     No Known Allergies     Current Outpatient Medications   Medication Sig Dispense Refill    cholecalciferol (VITAMIN D3) 25 mcg (1000 units) capsule Take 1,000 Units by mouth daily      Omega-3 Fatty Acids (FISH OIL) 1200 MG capsule Take 1,200 mg by mouth daily      folic acid (FOLVITE) 1 MG tablet Take 1 mg by mouth daily (Patient not taking: Reported on 11/7/2023)      mycophenolate (GENERIC EQUIVALENT) 500 MG tablet Take 500 mg by mouth 2 times daily (Patient not taking: Reported  "on 11/7/2023)           Physical Exam:     /82 (BP Location: Right arm, Patient Position: Sitting, Cuff Size: Adult Regular)   Pulse 95   Temp 98.5  F (36.9  C) (Oral)   Resp 16   Ht 1.562 m (5' 1.5\")   Wt 83.6 kg (184 lb 6.4 oz)   LMP 11/05/2017   SpO2 100%   BMI 34.28 kg/m    General: Well appearing.  HEENT: Sclerae anicteric.  Lungs: Breathing comfortably. No cough.  CV: Warm and well perfused  MSK: Moving all extremities without deficits.  Neuro: Grossly non-focal.  Skin/access: Normal skin tone.  Psych: Alert and oriented. No distress.    Blood Counts       Recent Labs   Lab Test 03/19/18  1338 01/29/18  1544 05/30/17  1155   HGB 10.4* 10.9* 10.7*   HCT 31.7* 32.8* 33.7*   WBC 5.4 8.3 7.4    255 291         Chemistries     Basic Panel  Recent Labs   Lab Test 05/30/17  1155      POTASSIUM 3.7   CHLORIDE 107   CO2 26   BUN 11   CR 0.63   GLC 85        Calcium, Magnesium, Phosphorus  Recent Labs   Lab Test 05/30/17  1155   NIKO 8.1*        LFTs  Recent Labs   Lab Test 05/30/17  1155   BILITOTAL 0.4   ALKPHOS 72   AST 11   ALT 16   ALBUMIN 3.7       Known issues that I take into account for medical decisions, with salient changes to the plan considering these complexities noted above.    Patient Active Problem List   Diagnosis    Left medial knee pain    Obesity (BMI 30-39.9)    Anemia, unspecified type    Macrocytic anemia       ------------------------------------------------------------------------------------------------------------------------------------------------    Patient Care Team         Relationship Specialty Notifications Start End    Esther Holloway PA-C PCP - General Physician Assistant  2/5/18     Phone: [phone] Fax: [phone] [address] [place] [address]            Stephanie Yi MD  "

## 2023-11-07 NOTE — NURSING NOTE
Chief Complaint   Patient presents with    Lab Only     Labs drawn via  by vascular access.     Raquel Grimm RN

## 2023-11-08 ENCOUNTER — VIRTUAL VISIT (OUTPATIENT)
Dept: TRANSPLANT | Facility: CLINIC | Age: 53
End: 2023-11-08
Attending: INTERNAL MEDICINE
Payer: COMMERCIAL

## 2023-11-08 DIAGNOSIS — Z71.9 VISIT FOR COUNSELING: Primary | ICD-10-CM

## 2023-11-08 LAB
CMV IGG SERPL IA-ACNC: 5.6 U/ML
CMV IGG SERPL IA-ACNC: ABNORMAL

## 2023-11-08 NOTE — PROGRESS NOTES
Blood and Marrow Transplant - New Evaluation Appointment    Spoke with Irma and her partner following visit with Dr. Yi. I explained the role of the nurse coordinator throughout the process, as well as general time line and expectations for next steps. We discussed the necessity of a caregiver and the program's proximity requirements. All questions were answered.     Plan: Allogeneic Transplant, pending identification of donor    Timeline Notes:approximately 2 months     Contact information provided for :  no    Allo:  HLA typing drawn: No - previously on file    PRA typing drawn:  Yes    CMV-IgG and ABO-Rh drawn or in record: Yes    Contact information provided for : Yes    Will sibling typing kits need to be sent? No    Financial Release for URD search obtained:  Yes    CAR-T:  Virologies drawn:  N/A    Auto for MM/Amyloidosis:  Outpatient Infusion: N/A    Phase Status updated: yes

## 2023-11-08 NOTE — PROGRESS NOTES
BMT Clinical Social Work New Transplant Evaluation    Information for this evaluation on November 8, 2023 was collected via Pt report, consultation with medical team, and medical chart review. Today's visit took place via telephone.    Present:  Patient: Hortencia Baldwin   Clinical  (CSW): CLIFTON Ryan, [facility]    Medical Team:   Nurse Coordinator: Janelle Harvey RN  BMT Physician: Stephanie Yi MD    Diagnosis: Myelodysplastic Syndrome (MDS)  Diagnosis Date: 12/21      Presenting Information:   Pt is a 53 year old female diagnosed with MDS who presents to [facility] for consultation regarding an allogeneic stem cell transplant. Pt was alone for today's telephone visit.      Contact Information:  Pt Phone: [phone]  Pt Email: [email or web address]  Pt's son Rickey Phone: [phone]    Special Needs:  No needs identified at this time. Hortencia Sethi lives in [place], [place] with her boyfriend Srinivas and does not need to relocate.    Family Constellation:   Significant Other: Srinivas uSggs  Children: Rickey Neal, Ezra Oakleyuyen and Mariana Neal  Parents: Mother is local in a [place], father lives in [place]  Siblings: 2- 1/2 siblings; sister Yoana is local    Education/Employment:  Hortencia Sethi is currently working as a . She is uncertain if she has any short or long term disability options. She is currently using FMLA as needed.  Srinivas is also working full time.     Finances/Insurance:  Hortencia Sethi shared that she is worried about income due to the length she and Srinivas maybe out of work. We discussed options including talking to her HR about [employer], applying for SSDI, and/or applying for grants. We discussed there are limited grants available, but some would help support some expenses. CSW discussed that we could talk about these more once she comes for work-up week.     CSW provided information regarding the insurance authorization process and the role of the BMT financial  case-mangers. CSW provided contact info for the BMT financial case-managers and referred pt to them for future insurance questions.     Caregiver:  CSW discussed with Pt the caregiver role and expectation at length. Pt is agreeable to having a full time caregiver for a minimum of 100 days until cleared by the BMT physician. Pt's identified caregiver is her boyfriend Srinivas. Caregiver education and information provided. No caregiver concerns identified.     Healthcare Directive:  No. CSW provided education and forms. CSW encouraged Pt to have discussions with their family regarding their health care wishes.     Resources Provided:  BMT Information Book   BMT Resources Packet:   Healthcare Directive:   Tour of Unit NO   Transplant unit description and information provided.     Identified Concerns:   No concerns identified at this time.     Summary:   Pt presents to [facility] for consultation regarding an allogeneic stem cell transplant. Pt/family asked good questions regarding psychosocial factors related to BMT; all of which were answered. Pt presented as friendly and open. Pt's affect was engaged, but expressed worry about finances.     Plan:   CSW provided contact information and encouraged Pt to contact CSW with questions, concerns, resources and for support. CSW will continue to follow Pt to provide supportive counseling and assistance with resources as needed.      CLIFTON Ryan, [facility]  Phone [phone]  Pager [phone]

## 2023-11-08 NOTE — LETTER
11/8/2023         RE: Hortencia Baldwin  [address]  [address]  [facility] [address]        Dear Colleague,    Thank you for referring your patient, Hortencia Baldwin, to the [facility] BLOOD AND MARROW TRANSPLANT PROGRAM [place]. Please see a copy of my visit note below.    BMT Clinical Social Work New Transplant Evaluation    Information for this evaluation on November 8, 2023 was collected via Pt report, consultation with medical team, and medical chart review. Today's visit took place via telephone.    Present:  Patient: Hortencia Baldwin   Clinical  (CSW): CLIFTON Ryan, [facility]    Medical Team:   Nurse Coordinator: Janelle Harvey RN  BMT Physician: Stephanie Yi MD    Diagnosis: Myelodysplastic Syndrome (MDS)  Diagnosis Date: 12/21      Presenting Information:   Pt is a 53 year old female diagnosed with MDS who presents to [facility] for consultation regarding an allogeneic stem cell transplant. Pt was alone for today's telephone visit.      Contact Information:  Pt Phone: [phone]  Pt Email: [email or web address]  Pt's son Rickey Phone: [phone]    Special Needs:  No needs identified at this time. Hortencia Sethi lives in [place], [place] with her boyfriend Srinivas and does not need to relocate.    Family Constellation:   Significant Other: Srinivas Suggs  Children: Rickey Neal, Ezra Foreman and Mariana Neal  Parents: Mother is local in a [place], father lives in [place]  Siblings: 2- 1/2 siblings; sister Yoana is local    Education/Employment:  Hortencia Sethi is currently working as a . She is uncertain if she has any short or long term disability options. She is currently using FMLA as needed.  Srinivas is also working full time.     Finances/Insurance:  Hortencia Sethi shared that she is worried about income due to the length she and Srinivas maybe out of work. We discussed options including talking to her HR about [employer] and L[employer], applying for SSDI, and/or applying for  grants. We discussed there are limited grants available, but some would help support some expenses. CSW discussed that we could talk about these more once she comes for work-up week.     CSW provided information regarding the insurance authorization process and the role of the BMT financial case-mangers. CSW provided contact info for the BMT financial case-managers and referred pt to them for future insurance questions.     Caregiver:  CSW discussed with Pt the caregiver role and expectation at length. Pt is agreeable to having a full time caregiver for a minimum of 100 days until cleared by the BMT physician. Pt's identified caregiver is her boyfriend Srinivas. Caregiver education and information provided. No caregiver concerns identified.     Healthcare Directive:  No. CSW provided education and forms. CSW encouraged Pt to have discussions with their family regarding their health care wishes.     Resources Provided:  BMT Information Book   BMT Resources Packet:   Healthcare Directive:   Tour of Unit NO   Transplant unit description and information provided.     Identified Concerns:   No concerns identified at this time.     Summary:   Pt presents to [facility] for consultation regarding an allogeneic stem cell transplant. Pt/family asked good questions regarding psychosocial factors related to BMT; all of which were answered. Pt presented as friendly and open. Pt's affect was engaged, but expressed worry about finances.     Plan:   CSW provided contact information and encouraged Pt to contact CSW with questions, concerns, resources and for support. CSW will continue to follow Pt to provide supportive counseling and assistance with resources as needed.      CLIFTON Ryan, [facility]  Phone [phone]  Pager [phone]

## 2023-11-10 LAB
FLOWPRA1 CELL: NORMAL
FLOWPRA1 RESULT: NORMAL
FLOWPRA1 TEST METHOD: NORMAL
FLOWPRA2 CELL: NORMAL
FLOWPRA2 RESULT: NORMAL
FLOWPRA2 TEST METHOD: NORMAL
SA 1 CELL: NORMAL
SA 1 TEST METHOD: NORMAL
SA 2 CELL: NORMAL
SA 2 TEST METHOD: NORMAL
SA1 HI RISK ABY: NORMAL
SA1 MOD RISK ABY: NORMAL
SA2 HI RISK ABY: NORMAL
SA2 MOD RISK ABY: NORMAL
ZZZFLOWPRA1 COMMENTS: NORMAL
ZZZFLOWPRA2 COMMENTS: NORMAL
ZZZSA 1  COMMENTS: NORMAL
ZZZSA 2 COMMENTS: NORMAL

## 2023-12-07 ENCOUNTER — TRANSFERRED RECORDS (OUTPATIENT)
Dept: HEALTH INFORMATION MANAGEMENT | Facility: CLINIC | Age: 53
End: 2023-12-07

## 2023-12-13 NOTE — ANESTHESIA CARE TRANSFER NOTE
Patient: Hortencia Baldwin    Procedure(s):  EXCISION OF LIPOMAS RIGHT BUTTOCKS RIGHT LOWER THIGH, LEFT UPPER OUTER THIGH - Wound Class: I-Clean    Diagnosis: MULTIPLE LIPOMAS  Diagnosis Additional Information: No value filed.    Anesthesia Type:   MAC     Note:  Airway :Room Air  Patient transferred to:PACU  Comments: Patient to PACU on room air, report to RN, VSS.      Vitals: (Last set prior to Anesthesia Care Transfer)    CRNA VITALS  5/31/2017 1137 - 5/31/2017 1211      5/31/2017             Pulse: 68    SpO2: 98 %    Resp Rate (set): 10                Electronically Signed By: NEEL Montes CRNA  May 31, 2017  12:11 PM   .

## 2023-12-14 ENCOUNTER — MEDICAL CORRESPONDENCE (OUTPATIENT)
Dept: TRANSPLANT | Facility: CLINIC | Age: 53
End: 2023-12-14
Payer: COMMERCIAL

## 2023-12-14 DIAGNOSIS — Z01.818 PREOP EXAMINATION: Primary | ICD-10-CM

## 2023-12-14 DIAGNOSIS — D46.9 MDS (MYELODYSPLASTIC SYNDROME) (H): ICD-10-CM

## 2023-12-14 DIAGNOSIS — Z11.59 SCREENING FOR VIRAL DISEASE: ICD-10-CM

## 2023-12-14 DIAGNOSIS — Z86.2 PERSONAL HISTORY OF DISEASES OF BLOOD AND BLOOD-FORMING ORGANS: ICD-10-CM

## 2023-12-15 DIAGNOSIS — D46.9 MDS (MYELODYSPLASTIC SYNDROME) (H): Primary | ICD-10-CM

## 2023-12-15 RX ORDER — HEPARIN SODIUM,PORCINE 10 UNIT/ML
5-20 VIAL (ML) INTRAVENOUS DAILY PRN
Status: CANCELLED | OUTPATIENT
Start: 2023-12-19

## 2023-12-15 RX ORDER — HEPARIN SODIUM (PORCINE) LOCK FLUSH IV SOLN 100 UNIT/ML 100 UNIT/ML
5 SOLUTION INTRAVENOUS
Status: CANCELLED | OUTPATIENT
Start: 2023-12-19

## 2023-12-15 NOTE — TELEPHONE ENCOUNTER
Action Taken  Date/Description  TJ     Pre Visit December 15, 2023  1:57 PM   Referring Provider/Location:    Dx and Code: D46.9 (ICD-10-CM) - MDS (myelodysplastic syndrome) (H)   Appt Date:  12.22.23  Provider: Smita    Records from [facility] are in [facility]  Images at [facility]  BMBx's @ [facility]   12.9.21 - Case: [id number]     2.11.23 - Case: [id number]   12.7.23 - Case: [id number]     Call to Pt and confirmed no previous radiation and that all outside records are at [facility]     Records Requested  TJ   Clinic name Comments/Action Taken   [facility] December 15, 2023 2:09 PM    Faxed request for all imaging to be pushed to PACS     Imaging Received  December 22, 2023  8:11 AM  TJ   Action: Confirmed images from [facility] received and resolved to PACS.

## 2023-12-18 ENCOUNTER — TELEPHONE (OUTPATIENT)
Dept: TRANSPLANT | Facility: CLINIC | Age: 53
End: 2023-12-18
Payer: COMMERCIAL

## 2023-12-18 ENCOUNTER — LAB (OUTPATIENT)
Dept: LAB | Facility: CLINIC | Age: 53
End: 2023-12-18
Payer: COMMERCIAL

## 2023-12-18 DIAGNOSIS — D46.9 MDS (MYELODYSPLASTIC SYNDROME) (H): Primary | ICD-10-CM

## 2023-12-18 LAB
ABO/RH(D): NORMAL
ANTIBODY SCREEN: NEGATIVE
BMT WORKUP IRRADIATED BLOOD REQUIRED: NORMAL
SPECIMEN EXPIRATION DATE: NORMAL
SPECIMEN EXPIRATION DATE: NORMAL

## 2023-12-18 PROCEDURE — 86833 HLA CLASS II HIGH DEFIN QUAL: CPT | Performed by: INTERNAL MEDICINE

## 2023-12-18 PROCEDURE — 88321 CONSLTJ&REPRT SLD PREP ELSWR: CPT | Performed by: STUDENT IN AN ORGANIZED HEALTH CARE EDUCATION/TRAINING PROGRAM

## 2023-12-18 PROCEDURE — 999N001093 HLA VIRTUAL CROSSMATCH (VXM), LIVING DONOR: Performed by: INTERNAL MEDICINE

## 2023-12-18 PROCEDURE — 86832 HLA CLASS I HIGH DEFIN QUAL: CPT | Performed by: INTERNAL MEDICINE

## 2023-12-19 ENCOUNTER — ALLIED HEALTH/NURSE VISIT (OUTPATIENT)
Dept: TRANSPLANT | Facility: CLINIC | Age: 53
End: 2023-12-19
Attending: INTERNAL MEDICINE
Payer: COMMERCIAL

## 2023-12-19 ENCOUNTER — RESULTS ONLY (OUTPATIENT)
Dept: TRANSPLANT | Facility: CLINIC | Age: 53
End: 2023-12-19

## 2023-12-19 ENCOUNTER — LAB (OUTPATIENT)
Dept: LAB | Facility: CLINIC | Age: 53
End: 2023-12-19
Attending: INTERNAL MEDICINE
Payer: COMMERCIAL

## 2023-12-19 ENCOUNTER — OFFICE VISIT (OUTPATIENT)
Dept: CONSULT | Facility: CLINIC | Age: 53
End: 2023-12-19
Attending: INTERNAL MEDICINE
Payer: COMMERCIAL

## 2023-12-19 VITALS
OXYGEN SATURATION: 97 % | BODY MASS INDEX: 34.21 KG/M2 | TEMPERATURE: 98.2 F | SYSTOLIC BLOOD PRESSURE: 138 MMHG | HEART RATE: 109 BPM | DIASTOLIC BLOOD PRESSURE: 81 MMHG | WEIGHT: 184 LBS | RESPIRATION RATE: 16 BRPM

## 2023-12-19 DIAGNOSIS — Z01.818 PREOP EXAMINATION: ICD-10-CM

## 2023-12-19 DIAGNOSIS — D46.9 MDS (MYELODYSPLASTIC SYNDROME) (H): Primary | ICD-10-CM

## 2023-12-19 DIAGNOSIS — Z11.59 SCREENING FOR VIRAL DISEASE: ICD-10-CM

## 2023-12-19 DIAGNOSIS — Z71.9 VISIT FOR COUNSELING: Primary | ICD-10-CM

## 2023-12-19 DIAGNOSIS — D46.9 MDS (MYELODYSPLASTIC SYNDROME) (H): ICD-10-CM

## 2023-12-19 DIAGNOSIS — Z86.2 PERSONAL HISTORY OF DISEASES OF BLOOD AND BLOOD-FORMING ORGANS: ICD-10-CM

## 2023-12-19 LAB
ALBUMIN SERPL BCG-MCNC: 4.2 G/DL (ref 3.5–5.2)
ALBUMIN UR-MCNC: NEGATIVE MG/DL
ALP SERPL-CCNC: 154 U/L (ref 40–150)
ALT SERPL W P-5'-P-CCNC: 155 U/L (ref 0–50)
ANION GAP SERPL CALCULATED.3IONS-SCNC: 11 MMOL/L (ref 7–15)
APPEARANCE UR: CLEAR
APTT PPP: 28 SECONDS (ref 22–38)
AST SERPL W P-5'-P-CCNC: 79 U/L (ref 0–45)
ATRIAL RATE - MUSE: 97 BPM
BASOPHILS # BLD AUTO: 0 10E3/UL (ref 0–0.2)
BASOPHILS NFR BLD AUTO: 1 %
BILIRUB SERPL-MCNC: 0.5 MG/DL
BILIRUB UR QL STRIP: NEGATIVE
BUN SERPL-MCNC: 8 MG/DL (ref 6–20)
CALCIUM SERPL-MCNC: 9.4 MG/DL (ref 8.6–10)
CHLORIDE SERPL-SCNC: 102 MMOL/L (ref 98–107)
CMV IGG SERPL IA-ACNC: 6.8 U/ML
CMV IGG SERPL IA-ACNC: ABNORMAL
COLOR UR AUTO: ABNORMAL
CREAT SERPL-MCNC: 0.5 MG/DL (ref 0.51–0.95)
DEPRECATED HCO3 PLAS-SCNC: 27 MMOL/L (ref 22–29)
DIASTOLIC BLOOD PRESSURE - MUSE: NORMAL MMHG
EBV VCA IGG SER IA-ACNC: >750 U/ML
EBV VCA IGG SER IA-ACNC: POSITIVE
EGFRCR SERPLBLD CKD-EPI 2021: >90 ML/MIN/1.73M2
EOSINOPHIL # BLD AUTO: 0 10E3/UL (ref 0–0.7)
EOSINOPHIL NFR BLD AUTO: 1 %
ERYTHROCYTE [DISTWIDTH] IN BLOOD BY AUTOMATED COUNT: 22.7 % (ref 10–15)
FERRITIN SERPL-MCNC: 5191 NG/ML (ref 11–328)
GLUCOSE SERPL-MCNC: 113 MG/DL (ref 70–99)
GLUCOSE UR STRIP-MCNC: NEGATIVE MG/DL
HBV CORE AB SERPL QL IA: NONREACTIVE
HBV SURFACE AB SERPL IA-ACNC: 42.2 M[IU]/ML
HBV SURFACE AB SERPL IA-ACNC: REACTIVE M[IU]/ML
HBV SURFACE AG SERPL QL IA: NONREACTIVE
HCG SERPL QL: NEGATIVE
HCT VFR BLD AUTO: 25.5 % (ref 35–47)
HCV AB SERPL QL IA: NONREACTIVE
HGB BLD-MCNC: 8.6 G/DL (ref 11.7–15.7)
HGB UR QL STRIP: NEGATIVE
HIV 1+2 AB+HIV1 P24 AG SERPL QL IA: NONREACTIVE
HSV1 IGG SERPL QL IA: 35.9 INDEX
HSV1 IGG SERPL QL IA: ABNORMAL
HSV2 IGG SERPL QL IA: 5.88 INDEX
HSV2 IGG SERPL QL IA: ABNORMAL
IMM GRANULOCYTES # BLD: 0 10E3/UL
IMM GRANULOCYTES NFR BLD: 1 %
INR PPP: 0.93 (ref 0.85–1.15)
INTERPRETATION ECG - MUSE: NORMAL
KETONES UR STRIP-MCNC: NEGATIVE MG/DL
LAB DIRECTOR DISCLAIMER: NORMAL
LAB DIRECTOR METHODOLOGY: NORMAL
LAB DIRECTOR RESULTS: NORMAL
LEUKOCYTE ESTERASE UR QL STRIP: NEGATIVE
LYMPHOCYTES # BLD AUTO: 0.8 10E3/UL (ref 0.8–5.3)
LYMPHOCYTES NFR BLD AUTO: 24 %
MCH RBC QN AUTO: 35.2 PG (ref 26.5–33)
MCHC RBC AUTO-ENTMCNC: 33.7 G/DL (ref 31.5–36.5)
MCV RBC AUTO: 105 FL (ref 78–100)
MONOCYTES # BLD AUTO: 0.4 10E3/UL (ref 0–1.3)
MONOCYTES NFR BLD AUTO: 12 %
NEUTROPHILS # BLD AUTO: 2 10E3/UL (ref 1.6–8.3)
NEUTROPHILS NFR BLD AUTO: 61 %
NITRATE UR QL: NEGATIVE
NRBC # BLD AUTO: 0 10E3/UL
NRBC BLD AUTO-RTO: 1 /100
P AXIS - MUSE: 60 DEGREES
PH UR STRIP: 5.5 [PH] (ref 5–7)
PLATELET # BLD AUTO: 124 10E3/UL (ref 150–450)
POTASSIUM SERPL-SCNC: 3.5 MMOL/L (ref 3.4–5.3)
PR INTERVAL - MUSE: 160 MS
PROT SERPL-MCNC: 7 G/DL (ref 6.4–8.3)
QRS DURATION - MUSE: 86 MS
QT - MUSE: 356 MS
QTC - MUSE: 452 MS
R AXIS - MUSE: 42 DEGREES
RBC # BLD AUTO: 2.44 10E6/UL (ref 3.8–5.2)
RBC URINE: 0 /HPF
SODIUM SERPL-SCNC: 140 MMOL/L (ref 135–145)
SP GR UR STRIP: 1.01 (ref 1–1.03)
SPECIMEN DESCRIPTION: NORMAL
SQUAMOUS EPITHELIAL: 2 /HPF
SYSTOLIC BLOOD PRESSURE - MUSE: NORMAL MMHG
T AXIS - MUSE: 46 DEGREES
T PALLIDUM AB SER QL: NONREACTIVE
URATE SERPL-MCNC: 4.4 MG/DL (ref 2.4–5.7)
UROBILINOGEN UR STRIP-MCNC: NORMAL MG/DL
VENTRICULAR RATE- MUSE: 97 BPM
WBC # BLD AUTO: 3.3 10E3/UL (ref 4–11)
WBC URINE: <1 /HPF

## 2023-12-19 PROCEDURE — 86753 PROTOZOA ANTIBODY NOS: CPT

## 2023-12-19 PROCEDURE — 87340 HEPATITIS B SURFACE AG IA: CPT

## 2023-12-19 PROCEDURE — 86828 HLA CLASS I&II ANTIBODY QUAL: CPT

## 2023-12-19 PROCEDURE — 86803 HEPATITIS C AB TEST: CPT

## 2023-12-19 PROCEDURE — 86704 HEP B CORE ANTIBODY TOTAL: CPT

## 2023-12-19 PROCEDURE — 82310 ASSAY OF CALCIUM: CPT

## 2023-12-19 PROCEDURE — 86901 BLOOD TYPING SEROLOGIC RH(D): CPT

## 2023-12-19 PROCEDURE — 81001 URINALYSIS AUTO W/SCOPE: CPT

## 2023-12-19 PROCEDURE — 85025 COMPLETE CBC W/AUTO DIFF WBC: CPT

## 2023-12-19 PROCEDURE — 86696 HERPES SIMPLEX TYPE 2 TEST: CPT

## 2023-12-19 PROCEDURE — 87389 HIV-1 AG W/HIV-1&-2 AB AG IA: CPT

## 2023-12-19 PROCEDURE — 87798 DETECT AGENT NOS DNA AMP: CPT

## 2023-12-19 PROCEDURE — 81372 HLA I TYPING COMPLETE LR: CPT

## 2023-12-19 PROCEDURE — 81375 HLA II TYPING AG EQUIV LR: CPT

## 2023-12-19 PROCEDURE — 84703 CHORIONIC GONADOTROPIN ASSAY: CPT

## 2023-12-19 PROCEDURE — 93010 ELECTROCARDIOGRAM REPORT: CPT | Performed by: INTERNAL MEDICINE

## 2023-12-19 PROCEDURE — 86706 HEP B SURFACE ANTIBODY: CPT

## 2023-12-19 PROCEDURE — 86778 TOXOPLASMA ANTIBODY IGM: CPT

## 2023-12-19 PROCEDURE — 85610 PROTHROMBIN TIME: CPT

## 2023-12-19 PROCEDURE — 36415 COLL VENOUS BLD VENIPUNCTURE: CPT

## 2023-12-19 PROCEDURE — 82728 ASSAY OF FERRITIN: CPT

## 2023-12-19 PROCEDURE — 86665 EPSTEIN-BARR CAPSID VCA: CPT

## 2023-12-19 PROCEDURE — 86644 CMV ANTIBODY: CPT

## 2023-12-19 PROCEDURE — 84550 ASSAY OF BLOOD/URIC ACID: CPT

## 2023-12-19 PROCEDURE — 86850 RBC ANTIBODY SCREEN: CPT

## 2023-12-19 PROCEDURE — 86780 TREPONEMA PALLIDUM: CPT

## 2023-12-19 PROCEDURE — 99214 OFFICE O/P EST MOD 30 MIN: CPT

## 2023-12-19 PROCEDURE — 81265 STR MARKERS SPECIMEN ANAL: CPT

## 2023-12-19 PROCEDURE — 86777 TOXOPLASMA ANTIBODY: CPT

## 2023-12-19 PROCEDURE — 99211 OFF/OP EST MAY X REQ PHY/QHP: CPT | Mod: 25

## 2023-12-19 PROCEDURE — 85730 THROMBOPLASTIN TIME PARTIAL: CPT

## 2023-12-19 PROCEDURE — 82947 ASSAY GLUCOSE BLOOD QUANT: CPT

## 2023-12-19 PROCEDURE — 81382 HLA II TYPING 1 LOC HR: CPT

## 2023-12-19 PROCEDURE — 86790 VIRUS ANTIBODY NOS: CPT

## 2023-12-19 PROCEDURE — 93005 ELECTROCARDIOGRAM TRACING: CPT

## 2023-12-19 ASSESSMENT — ANXIETY QUESTIONNAIRES
GAD7 TOTAL SCORE: 1
1. FEELING NERVOUS, ANXIOUS, OR ON EDGE: SEVERAL DAYS
IF YOU CHECKED OFF ANY PROBLEMS ON THIS QUESTIONNAIRE, HOW DIFFICULT HAVE THESE PROBLEMS MADE IT FOR YOU TO DO YOUR WORK, TAKE CARE OF THINGS AT HOME, OR GET ALONG WITH OTHER PEOPLE: NOT DIFFICULT AT ALL
5. BEING SO RESTLESS THAT IT IS HARD TO SIT STILL: NOT AT ALL
2. NOT BEING ABLE TO STOP OR CONTROL WORRYING: NOT AT ALL
GAD7 TOTAL SCORE: 1
7. FEELING AFRAID AS IF SOMETHING AWFUL MIGHT HAPPEN: NOT AT ALL
6. BECOMING EASILY ANNOYED OR IRRITABLE: NOT AT ALL
3. WORRYING TOO MUCH ABOUT DIFFERENT THINGS: NOT AT ALL

## 2023-12-19 ASSESSMENT — PAIN SCALES - GENERAL: PAINLEVEL: EXTREME PAIN (8)

## 2023-12-19 ASSESSMENT — PATIENT HEALTH QUESTIONNAIRE - PHQ9
SUM OF ALL RESPONSES TO PHQ QUESTIONS 1-9: 5
5. POOR APPETITE OR OVEREATING: NOT AT ALL

## 2023-12-19 NOTE — NURSING NOTE
I verified name and date of birth. Then performed an EKG and transmitted  the results right away.  Brooke Sofia MA

## 2023-12-19 NOTE — PROGRESS NOTES
Blood and Marrow Transplant - Workup Calendar Review    Hortencia Baldwin is a 53 year old female  The encounter diagnosis was MDS (myelodysplastic syndrome) (H).    Teaching Topic: work up routine  Person(s) involved: Patient    Patient demonstrates understanding of the following:  - Reason for the appointment, diagnosis and treatment plan: Yes    Reviewed calendar and locations of procedures. Patient understands to check in for appointments at the  and to contact the BMT Office [phone] if there are schedule questions.    24 hr urine collection needed? No    Instructional Materials Used/Given: copy of calendar, map of campus    Specific Concerns: No    Time spent with patient: 10 minutes     BMT Teaching Flowsheet    Hortencia Baldwin is a 53 year old female  The encounter diagnosis was MDS (myelodysplastic syndrome) (H).    Teaching Topic: 2015-29 Arm Bu/Flu    Person(s) involved in teaching: Patient    Motivation Level  Asks Questions: Yes  Eager to Learn: Yes  Cooperative: Yes  Receptive (willing/able to accept information): Yes  Any cultural factors/Anabaptist beliefs that may influence understanding or compliance? No    Patient demonstrates understanding of the following:   Reason for the appointment, diagnosis and treatment plan: Yes  Knowledge of proper use of medications and conditions for which they are ordered (with special attention to potential side effects or drug interactions): Yes  Which situations necessitate calling provider and whom to contact: Yes  Proper use and care of (medical equipment, care aids, etc.) Yes  Pain management techniques: Yes  How and/when to access community resources: Yes    Teaching/ learning concerns addressed: None at this time.     Infection Control:  Patient instructed on hand hygiene: Yes  Signs and symptoms of infection taught: Yes    Instructional Materials Used/Given:   Patient was given and reviewed BMT Allo Transplant Teaching Binder, including: medication  "pamphlets, sample treatment calendars, consents, contact information for \"When to Call for Help\" (including after-hours contact), hospital inpatient information, graft versus host disease (GVHD) informational, and post-transplant precautions and guidelines.  Signs, symptoms, and treatment of GVHD was reviewed specifically.  Patient was encouraged to call with any additional questions.    Patient was provided with handouts for caring for their central venous catheter (proper hand hygiene, changing end caps, flushing line, changing CVC dressing). Yes    Patient was encouraged to view step-by-step instructional videos for central venous catheter care and report any questions or concerns. Yes .    For CAR-T patient: Product specific wallet card was given. Patient instructed to remain within close proximity of facility (within 30-40 minutes per program standard) for at least four weeks post infusion; Must remain within 2 hours of facility until 8 weeks post-infusion. CAR-T patient is instructed not to operate motorized vehicle or heavy machinery for a period of 8 weeks.    Time spent with patient: 75 minutes.  Specific Concerns: No, explain: None at this time.    Bobbi Ruth RN, MSN  BMT Nurse Coordinator  Phone: [phone]  "

## 2023-12-19 NOTE — LETTER
12/19/2023         RE: Hortencia Baldwin  [address]  [address]  [facility] [address]        Dear Colleague,    Thank you for referring your patient, Hortencia Baldwin, to the [facility] BLOOD AND MARROW TRANSPLANT PROGRAM [place]. Please see a copy of my visit note below.    [facility]  BMTCT OPEN VISIT    December 19, 2023      Hortencia Baldwin is a 53 year old female undergoing evaluation prior to hematopoietic cell transplant or immune effector cell therapy.    Reason for BMTCT: MDS AIHA    Recent chemotherapy: 5/2023 to present: Decitabine and cedazuridine -- complicated by cytopenias, requiring transfusions every 2-3 weeks.     Recent infections: none    Blood thinner use? If yes, why? No    Treatment for diabetes? No      Today, the patient notes the following symptoms:  Review Of Systems  Skin: negative  Eyes: negative  Ears/Nose/Throat: negative  Respiratory: Shortness of breath- with exertion  Cardiovascular: tachycardia, dyspnea on exertion, and syncope or near-syncope  Gastrointestinal: diarrhea  Genitourinary: negative  Musculoskeletal: joint pain, achy knee pain, neck pain with work  Neurologic: headaches  Psychiatric: negative  Hematologic/Lymphatic/Immunologic: night sweats every night  Endocrine: night sweats      Hortencia Baldwin's History    Past Medical History:   Diagnosis Date    Left medial knee pain     Medial epicondylitis, right elbow     Obesity (BMI 30-39.9)     Right elbow pain        Past Surgical History:   Procedure Laterality Date    EXCISE LESION TRUNK N/A 2/5/2018    Procedure: EXCISE LESION TRUNK;;  Surgeon: Avani Szymanski MD;  Location:  [facility]    EXCISE MASS LOWER EXTREMITY Bilateral 5/31/2017    Procedure: EXCISE MASS LOWER EXTREMITY;  EXCISION OF LIPOMAS RIGHT BUTTOCKS RIGHT LOWER THIGH, LEFT UPPER OUTER THIGH;  Surgeon: Avani Szymanski MD;  Location:  [facility]    EXCISE MASS LOWER EXTREMITY Right 2/5/2018    Procedure: EXCISE MASS LOWER EXTREMITY;   EXCISION OF RIGHT UPPER ANTERIOR THIGH SOFT TISSUE MASS,EXCISION OF LEFT LOWER BACK SOFT TISSUE MASS;  Surgeon: Avani Szymanski MD;  Location: [facility]    LUMPECTOMY BREAST Left 1990s    benign       Family History   Problem Relation Age of Onset    Hypertension Mother     Diabetes No family hx of     Cerebrovascular Disease No family hx of     Myocardial Infarction No family hx of     Coronary Artery Disease Early Onset No family hx of     Breast Cancer No family hx of     Ovarian Cancer No family hx of     Colon Cancer No family hx of    Father had colon cancer    Social History     Tobacco Use    Smoking status: Every Day     Packs/day: 0.50     Years: 10.00     Additional pack years: 0.00     Total pack years: 5.00     Types: Cigarettes     Passive exposure: Current    Smokeless tobacco: Never   Substance Use Topics    Alcohol use: Yes     Comment: 1-2 drinks/month   Current smoker, 0.5 packs per day. No concerns about abstaining from cigarettes while hospitalized.   No current alcohol use.     Hortencia Baldwin's Medications and Allergies    Current Outpatient Medications   Medication    cholecalciferol (VITAMIN D3) 25 mcg (1000 units) capsule    folic acid (FOLVITE) 1 MG tablet    mycophenolate (GENERIC EQUIVALENT) 500 MG tablet    Omega-3 Fatty Acids (FISH OIL) 1200 MG capsule     No current facility-administered medications for this visit.        No Known Allergies      Physical Examination    /81 (BP Location: Left arm, Patient Position: Sitting, Cuff Size: Adult Large)   Pulse 109   Temp 98.2  F (36.8  C) (Oral)   Resp 16   Wt 83.5 kg (184 lb)   LMP 11/05/2017   SpO2 97%   BMI 34.21 kg/m      Exam:  Constitutional: healthy, alert, and no distress  Head: Normocephalic. No masses, lesions, tenderness or abnormalities  ENT: ENT exam normal, no neck nodes or sinus tenderness. Note bone loss to teeth per pt report.  Cardiovascular: negative, No lifts, heaves, or thrills. RRR. No murmurs, clicks gallops  or rub  Respiratory: negative, Good diaphragmatic excursion. Lungs clear  Gastrointestinal: Abdomen soft, non-tender. BS normal. No masses, organomegaly  : Deferred  Musculoskeletal: extremities normal- no gross deformities noted, gait normal, and normal muscle tone  Skin: no suspicious lesions or rashes  Neurologic: Gait normal.  Sensation grossly WNL.  Psychiatric: mentation appears normal and affect normal/bright  Hematologic/Lymphatic/Immunologic: Normal cervical lymph nodes        Frailty Screening    Weight loss: Have you lost >10 pounds (or >5% body weight) unintentionally over the last year? No      Exhaustion: How often in the past week did you feel that:  I feel that everything I do is an effort : .Exhaustion: 2 = a moderate amount of the time (3-4 days = frailty)  I feel I cannot get going: Exhaustion: 0 = rarely or none of the time (<1 day)    Weakness:  Hand  strength (measured by MA; calculate average): 10.6     Male BMI Frailty Criteria for  Male  Strength Female BMI Frailty Criteria for  Female  Strength   ?24 ?29 ?23 ?17   24.1 - 26 ?30 23.1 - 26 ?17.3   26.1 - 28 ?30 26.1 - 29 ?18   >28 ?32 >29 ?21     Slowness:  15 foot walk time (measured by MA):  4    Height Frailty Criteria for  15 Foot Walk Time   Men   ?173 cm ? 7 seconds    >173 cm  ? 6 seconds   Women   ?159 cm ? 7 seconds   >159 cm  ? 6 seconds     Physical activity:     *Please complete 2 calculations for kcal (see frailty worksheet for equations)     Energy expenditure for frailty: 125.4 kcal expended per week     Gender Frailty Criteria for Low Physical Activity   Male <383 kcal/week   Female <270 kcal/weel     IPAQ score: 100 MET minutes per week       Hortencia Baldwin met the following criteria for prefrailty (score 1-2) or frailty (score 3+): Frailty: Exhaustion, Weakness, and Physical Activity  Frailty Score is: 3      Additional assessments not to be used in frailty calculation:   What types of physical activity can you  tolerate? Very short walks, usually walks less than 3-5 minutes at a time    Sit to stand test (time to complete 5 reps): 16 seconds    Standing balance in 10 seconds:  Record the Total number of seconds(0-30)--add a+b+c ( take best attempt for each)  First attempt: 30 seconds      Overall Assessment    I have reviewed the diagnostic data, medications, frailty screening, and general processes prior to BMTCT.  I have notified the Primary BMT Physician/and or Attending Physician in the clinic of any issues. We also discussed in detail the database and biorepository research for which Hortencia Baldwin is eligible. We discussed the potential risks and potential benefits of each of these protocols individually. We explained potential alternatives to the protocols discussed. We explained to the patient that participation is voluntary and that consent may be withdrawn at any time.       Consents Signed:  Blood transfusion consent form  Ethnicity form  [facility] database  [facility] biorepository  [facility] BMTCT Database    Present during the discussion was Hortencia Baldwin. Copies of the signed consent forms will be provided to the patient on admission. No procedures specific to any studies were performed prior to the patient signing the consent form.    Hortencia Baldwin had the opportunity to ask questions, and I answered all of the questions to the best of my ability.      Ronit Ruth NP

## 2023-12-19 NOTE — PROGRESS NOTES
CLINICAL SOCIAL WORK   PSYCHOSOCIAL ASSESSMENT  BLOOD AND MARROW TRANSPLANT SERVICE      Assessment completed on December 19, 2023 of living situation, support system, financial status, functional status, coping, stressors, need for resources and social work intervention provided as needed.  Information for this assessment was provided by Pt report in addition to medical chart review and consultation with medical team.     Present at assessment: Patient, Hortencia Baldwin was present for this assessment conducted by SALTY Ryan .     Diagnosis: Myelodysplastic Syndrome (MDS)    Date of Diagnosis: 12/21    Transplant type: Allogeneic    Donor: Unrelated  allogeneic donor stem cell transplant    Physician: Stephanie Yi MD    Nurse Coordinator: Janelle Harvey RN    Work-up Nurse Coordinator: Bobbi Ruth RN    : CLIFTON Ryan, [facility]     Permanent Address:   [facility] [address]  [address] [address]    Contact Information:  Pt Cell Phone: [phone]  Pt Email: [email or web address]  Pt's son Rickey Phone: [phone]    Presenting Information:  Hortencia Sethi is a 53 year old female diagnosed with MDS who presents for evaluation for an allogeneic transplant at the [facility] ([facility]  Pt was alone for today's visit.     Decision Making:   Self     Health Care Directive:   Declined completing We discussed the value of a HCD and the process if a HCD is not present regarding decision making, pt will think about completing.     Relationship Status:   Partnered to Srinivas. Hortencia Sethi indicated their relationship as stable and supportive.    Special Lodging Needs: None identified at this time. Hortencia Sethi lives in [place] with Srinivas and does not need to relocate.     Family/Support System: Pt endorsed a good support system including family and close friends who will be available to support Pt throughout transplant process.     Partner: Srinivas  Tram    Children: Rickey Neal, Ezra Foreman and Mariana Neal    Grandchildren: not discussed    Parents: Mother Julio Foreman and Father Francis Umana lives in [place], they just connected 4 years ago    Siblings: 2 1/2 siblings. 1 brother and 1 sister Yoana Whelan    Friends: some close friends and family    Caregiver: LUZ MARIA discussed with pt the caregiver role and expectation at length. Pt is agreeable to having a full time caregiver for a minimum of 100 days until cleared by the BMT physician. Pt's identified caregivers are her daughter, partner and son. Pt signed the caregiver contract which will be scanned into the EMR. Caregiver education and resources provided. No caregiver concerns identified. Pt confirmed understanding caregiving requirement, including driving restrictions, as discussed during psychosocial assessment.     Name & Numbers  Srinivas Suggs [phone]  Keira Neal [phone]  Rickey Neal [phone]    Transportation Mode:  Private Car . Pt is aware of driving restrictions post-BMT and the need for the caregiver is to drive until cleared to drive by the  BMT physician. SW provided information on parking info and monthly parking pass options. Pt will utilize the [employer] for transportation to and from the [facility] and BMT Clinic/Hospital.    Insurance:  No Insurance issues identified. The pt noted some confusion on what will happen once she loses her job in 12 weeks. SW shared with the pt that she will need to talk to her HR about this and if she will go on COBRA or has benefits through her LTC (which she believes she has). Pt aware to reach out and update LUZ MARIA should something change.  Pt denied specific insurance concerns at this time. SW reiterated information about the BMT Financial  should specific insurance questions arise as Pt moves through transplant process.     Sources of Income:  Income concerns identified  Hortencia Sethi shared she is currently working, but will stop once  admitted. She has FMLA benefits for 11 weeks and then STD for 12 weeks which pays 60%. The pt does think she has LTD, but is not certain. She did share that she will lose her job after her FMLA benefits ends. Srinivas is planning to maintain working during the BMT process to keep some income coming into the home.     We discussed freda options including [employer], [id number] and [employer] Pre/post freda. Applications were provided for all the grants and encouraged her to complete them and return.     Employment:   Position:   Last Day of Work: currently working still     Spouse's Employment:  Employer:    Position: currently working     Mental Health: No mental health issues identified       PHQ-9 assessment, score was 5 ,which indicates mild signs of depression.  GAD7 assessment, score was 1, which indicates no current signs of anxiety.    Hortencia Sethi shared that she has no history of depression or anxiety. She identifies that at times she feels sad or anxious, but nothing that others have notices or has been impactful in her life. The pt shared that her youngest son's father just  a few months ago and this has been very sad for her. She feels overall she is coping well, but thinks about this at times.      We talked about how some patients may see an increase in feelings of anxiety or depression while hospitalized for extended periods along with isolation. Encouraged Hortencia Sethi and her family to let us know if they are noticing an increase in symptoms. We talked about the variety of modalities available to use as coping mechanisms (including but not limited to guided imagery, relaxation techniques, progressive muscle relaxation, counseling/talk therapy and medication).    Chemical Use: Current use of tobacco by patient.  Hortencia Sethi shared she is currently smoking a 1/2 pack of cigarettes a day. She is aware she will need to abstain during the BMT process and does not feel this will be an  "issue for her. She denies the use of alcohol, marijuana or other drugs.   Based on the information provided, there appear to be no specific risks or concerns identified at this time.     Trauma/Loss/Abuse History: Multiple losses associated with cancer diagnosis and treatment, including health, employment, changes to physical appearance, etc.     Spirituality:  Patient identifies with [place] community. Hortencia Sethi shared she believes in God and is also Synagogue.      Coping: Pt noted that she is currently feeling \"positive, fearful, hopeful, nervous, and ready to begin\".  Pt shared that her main coping mechanisms are talking with friends and family and prayer/spiritual beliefs.  Pt noted that she also trey by positive self-talk and smoking. SW and Pt discussed additional positive coping mechanisms that Pt can utilize while in the hospital.     Caregiver Coping: No caregiver was present for this assessment    Education Provided: Transplant process expectations, Caregiver requirements, Caregiver self-care, Financial issues related to transplant, Financial resources/grants available, Common psychosocial stressors pre/post transplant, Support group(s) available, Tour/layout of the inpatient unit/non-use of cell phones, Hospital resources available, Web site information, Resources for transplant patients and their families as well as the Clinical Social Work role.     Interventions Provided: Supportive counseling and education     Recreation/Leisure Activities:  Hortencia Sethi shared she enjoys watching movies, being with friends/family and playing [employer]    Plans for Hospital Stay Leisure:  While IP Hortencia Sethi plans to watch TV, play games on her phone and talk to friends.    Assessment and Recommendations for Team:  Pt is a 53 year old female diagnosed with MDS who is here undergoing preparation for a planned allogeneic transplant.     Pt is a pleasant and articulate female who overall feels comfortable communicating with the " medical team, but is uncertain at times. She shared that she came to the [place] from [place] when she was 15 so does feels her language is limited at times, but overall does expressed understanding of the BMT process. Pt is pleasant, calm and able to articulate concerns/coping mechanisms in an appropriate manner. Hortencia Sethi was alert, interactive, and affect was full, they displayed appropriate eye contact, memory and thought processes. Pt has a strong supportive network of family and friends who are involved.     Pt will benefit from ongoing psychosocial support in regards to coping with the adjustment to the BMT process. CSW has discussed  psychosocial support options in regards to coping with the adjustment to the BMT process and support groups opportunities.      Pt has a good support system and a good caregiver plan. Pt verbalizes understanding of the transplant process and wanting to proceed. SW provided contact information and encouraged Pt to contact SW with questions, concerns, resources and for support. Per this assessment, I did not identify any barriers to this patient moving forward with transplant.        Important Information:   - Hortencia Sethi was given freda applications to complete.  - Hortencia Sethi would like a bike in her room.     Follow up Planned:   Initiate financial resources  Psychosocial support    CLIFTON Ryan, [facility]  Pager: [phone]  Phone: [phone]

## 2023-12-19 NOTE — PROGRESS NOTES
[facility]  BMTCT OPEN VISIT    December 19, 2023      Hortencia Baldwin is a 53 year old female undergoing evaluation prior to hematopoietic cell transplant or immune effector cell therapy.    Reason for BMTCT: MDS AIHA    Recent chemotherapy: 5/2023 to present: Decitabine and cedazuridine -- complicated by cytopenias, requiring transfusions every 2-3 weeks.     Recent infections: none    Blood thinner use? If yes, why? No    Treatment for diabetes? No      Today, the patient notes the following symptoms:  Review Of Systems  Skin: negative  Eyes: negative  Ears/Nose/Throat: negative  Respiratory: Shortness of breath- with exertion  Cardiovascular: tachycardia, dyspnea on exertion, and syncope or near-syncope  Gastrointestinal: diarrhea  Genitourinary: negative  Musculoskeletal: joint pain, achy knee pain, neck pain with work  Neurologic: headaches  Psychiatric: negative  Hematologic/Lymphatic/Immunologic: night sweats every night  Endocrine: night sweats      Hortencia Baldwin's History    Past Medical History:   Diagnosis Date    Left medial knee pain     Medial epicondylitis, right elbow     Obesity (BMI 30-39.9)     Right elbow pain        Past Surgical History:   Procedure Laterality Date    EXCISE LESION TRUNK N/A 2/5/2018    Procedure: EXCISE LESION TRUNK;;  Surgeon: Avani Szymanski MD;  Location: [facility]    EXCISE MASS LOWER EXTREMITY Bilateral 5/31/2017    Procedure: EXCISE MASS LOWER EXTREMITY;  EXCISION OF LIPOMAS RIGHT BUTTOCKS RIGHT LOWER THIGH, LEFT UPPER OUTER THIGH;  Surgeon: Avani Szymanski MD;  Location: [facility]    EXCISE MASS LOWER EXTREMITY Right 2/5/2018    Procedure: EXCISE MASS LOWER EXTREMITY;  EXCISION OF RIGHT UPPER ANTERIOR THIGH SOFT TISSUE MASS,EXCISION OF LEFT LOWER BACK SOFT TISSUE MASS;  Surgeon: Avani Szymanski MD;  Location: [facility]    LUMPECTOMY BREAST Left 1990s    benign       Family History   Problem Relation Age of Onset    Hypertension Mother     Diabetes No family hx of      Cerebrovascular Disease No family hx of     Myocardial Infarction No family hx of     Coronary Artery Disease Early Onset No family hx of     Breast Cancer No family hx of     Ovarian Cancer No family hx of     Colon Cancer No family hx of    Father had colon cancer    Social History     Tobacco Use    Smoking status: Every Day     Packs/day: 0.50     Years: 10.00     Additional pack years: 0.00     Total pack years: 5.00     Types: Cigarettes     Passive exposure: Current    Smokeless tobacco: Never   Substance Use Topics    Alcohol use: Yes     Comment: 1-2 drinks/month   Current smoker, 0.5 packs per day. No concerns about abstaining from cigarettes while hospitalized.   No current alcohol use.     Hortencia Baldwin's Medications and Allergies    Current Outpatient Medications   Medication    cholecalciferol (VITAMIN D3) 25 mcg (1000 units) capsule    folic acid (FOLVITE) 1 MG tablet    mycophenolate (GENERIC EQUIVALENT) 500 MG tablet    Omega-3 Fatty Acids (FISH OIL) 1200 MG capsule     No current facility-administered medications for this visit.        No Known Allergies      Physical Examination    /81 (BP Location: Left arm, Patient Position: Sitting, Cuff Size: Adult Large)   Pulse 109   Temp 98.2  F (36.8  C) (Oral)   Resp 16   Wt 83.5 kg (184 lb)   LMP 11/05/2017   SpO2 97%   BMI 34.21 kg/m      Exam:  Constitutional: healthy, alert, and no distress  Head: Normocephalic. No masses, lesions, tenderness or abnormalities  ENT: ENT exam normal, no neck nodes or sinus tenderness. Note bone loss to teeth per pt report.  Cardiovascular: negative, No lifts, heaves, or thrills. RRR. No murmurs, clicks gallops or rub  Respiratory: negative, Good diaphragmatic excursion. Lungs clear  Gastrointestinal: Abdomen soft, non-tender. BS normal. No masses, organomegaly  : Deferred  Musculoskeletal: extremities normal- no gross deformities noted, gait normal, and normal muscle tone  Skin: no suspicious lesions or  rashes  Neurologic: Gait normal.  Sensation grossly WNL.  Psychiatric: mentation appears normal and affect normal/bright  Hematologic/Lymphatic/Immunologic: Normal cervical lymph nodes        Frailty Screening    Weight loss: Have you lost >10 pounds (or >5% body weight) unintentionally over the last year? No      Exhaustion: How often in the past week did you feel that:  I feel that everything I do is an effort : .Exhaustion: 2 = a moderate amount of the time (3-4 days = frailty)  I feel I cannot get going: Exhaustion: 0 = rarely or none of the time (<1 day)    Weakness:  Hand  strength (measured by MA; calculate average): 10.6     Male BMI Frailty Criteria for  Male  Strength Female BMI Frailty Criteria for  Female  Strength   ?24 ?29 ?23 ?17   24.1 - 26 ?30 23.1 - 26 ?17.3   26.1 - 28 ?30 26.1 - 29 ?18   >28 ?32 >29 ?21     Slowness:  15 foot walk time (measured by MA):  4    Height Frailty Criteria for  15 Foot Walk Time   Men   ?173 cm ? 7 seconds    >173 cm  ? 6 seconds   Women   ?159 cm ? 7 seconds   >159 cm  ? 6 seconds     Physical activity:     *Please complete 2 calculations for kcal (see frailty worksheet for equations)     Energy expenditure for frailty: 125.4 kcal expended per week     Gender Frailty Criteria for Low Physical Activity   Male <383 kcal/week   Female <270 kcal/weel     IPAQ score: 100 MET minutes per week       Hortencia Baldwin met the following criteria for prefrailty (score 1-2) or frailty (score 3+): Frailty: Exhaustion, Weakness, and Physical Activity  Frailty Score is: 3      Additional assessments not to be used in frailty calculation:   What types of physical activity can you tolerate? Very short walks, usually walks less than 3-5 minutes at a time    Sit to stand test (time to complete 5 reps): 16 seconds    Standing balance in 10 seconds:  Record the Total number of seconds(0-30)--add a+b+c ( take best attempt for each)  First attempt: 30 seconds      Overall  Assessment    I have reviewed the diagnostic data, medications, frailty screening, and general processes prior to BMTCT.  I have notified the Primary BMT Physician/and or Attending Physician in the clinic of any issues. We also discussed in detail the database and biorepository research for which Hortencia Baldwin is eligible. We discussed the potential risks and potential benefits of each of these protocols individually. We explained potential alternatives to the protocols discussed. We explained to the patient that participation is voluntary and that consent may be withdrawn at any time.       Consents Signed:  Blood transfusion consent form  Ethnicity form  [facility] database  [facility]  [facility] Database    Present during the discussion was Hortencia Baldwin. Copies of the signed consent forms will be provided to the patient on admission. No procedures specific to any studies were performed prior to the patient signing the consent form.    Hortencia Baldwin had the opportunity to ask questions, and I answered all of the questions to the best of my ability.      Ronit Ruth NP

## 2023-12-19 NOTE — NURSING NOTE
Chief Complaint   Patient presents with    Blood Draw     Labs drawn via  by RN. VS taken.     Labs collected from venipuncture by RN. Vitals taken. Checked in for appointment(s).     Deidra Corbett RN

## 2023-12-20 ENCOUNTER — HOSPITAL ENCOUNTER (OUTPATIENT)
Dept: CARDIOLOGY | Facility: CLINIC | Age: 53
Discharge: HOME | End: 2023-12-20
Attending: INTERNAL MEDICINE
Payer: COMMERCIAL

## 2023-12-20 ENCOUNTER — OFFICE VISIT (OUTPATIENT)
Dept: TRANSPLANT | Facility: CLINIC | Age: 53
End: 2023-12-20
Attending: INTERNAL MEDICINE
Payer: COMMERCIAL

## 2023-12-20 ENCOUNTER — HOSPITAL ENCOUNTER (OUTPATIENT)
Dept: CT IMAGING | Facility: CLINIC | Age: 53
Discharge: HOME | End: 2023-12-20
Attending: INTERNAL MEDICINE
Payer: COMMERCIAL

## 2023-12-20 DIAGNOSIS — Z86.2 PERSONAL HISTORY OF DISEASES OF BLOOD AND BLOOD-FORMING ORGANS: ICD-10-CM

## 2023-12-20 DIAGNOSIS — D46.9 MDS (MYELODYSPLASTIC SYNDROME) (H): Primary | ICD-10-CM

## 2023-12-20 DIAGNOSIS — Z01.818 PREOP EXAMINATION: ICD-10-CM

## 2023-12-20 DIAGNOSIS — Z11.59 SCREENING FOR VIRAL DISEASE: ICD-10-CM

## 2023-12-20 DIAGNOSIS — D46.9 MDS (MYELODYSPLASTIC SYNDROME) (H): ICD-10-CM

## 2023-12-20 LAB
ABSSPTEST METHOD: NORMAL
ATRIAL RATE - MUSE: 95 BPM
DIASTOLIC BLOOD PRESSURE - MUSE: NORMAL MMHG
DRSSPDPA1*: NORMAL
DRSSPDPA1*LOCUS: NORMAL
DRSSPDPB1*2 NMDP: NORMAL
DRSSPDPB1*2: NORMAL
DRSSPDPB1*LOCUS: NORMAL
DRSSPDPB1*LOCUSNMDP: NORMAL
DRSSPTEST METHOD: NORMAL
HTLV I+II AB SER QL IA: NEGATIVE
INTERPRETATION ECG - MUSE: NORMAL
LVEF ECHO: NORMAL
P AXIS - MUSE: 59 DEGREES
PR INTERVAL - MUSE: 160 MS
QRS DURATION - MUSE: 90 MS
QT - MUSE: 356 MS
QTC - MUSE: 447 MS
R AXIS - MUSE: 40 DEGREES
SSPA* LOCUS: NORMAL
SSPA*: NORMAL
SSPB* LOCUS: NORMAL
SSPB*: NORMAL
SSPBW-1: NORMAL
SSPC* LOCUS: NORMAL
SSPC*: NORMAL
SSPDQA1*: NORMAL
SSPDQA1*LOCUS: NORMAL
SSPDQB1*: NORMAL
SSPDQB1*LOCUS: NORMAL
SSPDRB1* LOCUS: NORMAL
SSPDRB1*: NORMAL
SSPDRB4* LOCUS: NORMAL
SSPTEST METHOD: NORMAL
SYSTOLIC BLOOD PRESSURE - MUSE: NORMAL MMHG
T AXIS - MUSE: 47 DEGREES
VENTRICULAR RATE- MUSE: 95 BPM
ZZZABSSP COMMENTS: NORMAL
ZZZDRSSP COMMENTS: NORMAL
ZZZSSP COMMENTS: NORMAL

## 2023-12-20 PROCEDURE — 71250 CT THORAX DX C-: CPT

## 2023-12-20 PROCEDURE — 71250 CT THORAX DX C-: CPT | Mod: 26 | Performed by: RADIOLOGY

## 2023-12-20 PROCEDURE — 93356 MYOCRD STRAIN IMG SPCKL TRCK: CPT

## 2023-12-20 PROCEDURE — 93306 TTE W/DOPPLER COMPLETE: CPT | Mod: 26 | Performed by: INTERNAL MEDICINE

## 2023-12-20 PROCEDURE — 93356 MYOCRD STRAIN IMG SPCKL TRCK: CPT | Mod: GC | Performed by: INTERNAL MEDICINE

## 2023-12-20 NOTE — PROGRESS NOTES
"Pharmacy Assessment - Pre-Stem Cell Transplant    Assessments & Recommendations:  1) CMV+ letermovir indicated  2) Avoid Tylenol 72h before and after busulfan.  Entered as \"allergy\" in chart.   3) Utilize ABW25 for busulfan dosing and IBW for post-tx Cytoxan.   4) Hold fish oil when plt<50k.     If this patient is admitted under observation, the patient may bring in their own supply of the following medication for use in the hospital:  1) none  -Per \"Medications Not Supplied by Pharmacy\" policy (available on [employer])    History of Present Illness:  Hortencia Baldwin is a 53 year old year old female diagnosed with MDS.  she has been treated with oral decitabine/cedazuridine.  she is now being work up for [facility] allogeneic Stem Cell Transplant on protocol 2015-29, which utilizes busulfan/fludarabine as a conditioning regimen.    Pertinent labs/tests:  Viral Testing:  CMV(+) / HSV(+) / EBV(+)   Ejection Fraction: pending  QTc: 452msec (12/19/23)    Weights:   Wt Readings from Last 3 Encounters:   12/19/23 83.5 kg (184 lb)   11/07/23 83.6 kg (184 lb 6.4 oz)   04/25/23 80.7 kg (177 lb 14.4 oz)   Ideal body weight: 48.9 kg (107 lb 14.3 oz)  Adjusted ideal body weight: 62.7 kg (138 lb 5.4 oz)  % IBW:  171%  There is no height or weight on file to calculate BMI.    Primary BMT Physician: Dr Yi  BMT RN Coordinator:  Bobbi Ruth    Past Medical History:  Past Medical History:   Diagnosis Date    Left medial knee pain     Medial epicondylitis, right elbow     Obesity (BMI 30-39.9)     Right elbow pain        Medication Allergies:  Allergies   Allergen Reactions    Acetaminophen Other (See Comments)     Avoid acetaminophen 72h before busulfan until 72h after busulfan.  \"Allergy\" can be removed 72h after busulfan.        Current Medications (pre-admit):  Current Outpatient Medications   Medication Sig Dispense Refill    cholecalciferol (VITAMIN D3) 25 mcg (1000 units) capsule Take 1,000 Units by mouth daily      Omega-3 Fatty " Acids (FISH OIL) 1200 MG capsule Take 1,200 mg by mouth daily         Herbal Medication/Nutritional Supplements:vitD3, fish oil    Smoking/Past Drug Use: Didn't discuss.     Nausea/Vomiting, Pain, or other issues: None noted.      Summary:  I met with Hortencia Baldwin for approximately 35 minutes.  We discussed allergies, home medications, preparative regimen (busulfan, fludarabine), GVH prophylaxis (siro, MMF, ptCy), anti-infective prophylaxis (acyclovir/letermovir, levofloxacin, micafungin, Bactrim), supportive meds (Keppra, ursodiol, allopurinol, GCSF), vaccines, med box/pharmacy expectations.     Harjeet Erwin, PharmD

## 2023-12-20 NOTE — LETTER
"    12/20/2023         RE: Hortencia Baldwin  [address]  [address]  [place] [address]        Dear Colleague,    Thank you for referring your patient, Hortencia Baldwin, to the [facility] [place]. Please see a copy of my visit note below.    Pharmacy Assessment - Pre-Stem Cell Transplant    Assessments & Recommendations:  1) CMV+ letermovir indicated  2) Avoid Tylenol 72h before and after busulfan.  Entered as \"allergy\" in chart.   3) Utilize ABW25 for busulfan dosing and IBW for post-tx Cytoxan.   4) Hold fish oil when plt<50k.     If this patient is admitted under observation, the patient may bring in their own supply of the following medication for use in the hospital:  1) none  -Per \"Medications Not Supplied by Pharmacy\" policy (available on [employer])    History of Present Illness:  Hortencia Baldwin is a 53 year old year old female diagnosed with MDS.  she has been treated with oral decitabine/cedazuridine.  she is now being work up for [facility] allogeneic Stem Cell Transplant on protocol 2015-29, which utilizes busulfan/fludarabine as a conditioning regimen.    Pertinent labs/tests:  Viral Testing:  CMV(+) / HSV(+) / EBV(+)   Ejection Fraction: pending  QTc: 452msec (12/19/23)    Weights:   Wt Readings from Last 3 Encounters:   12/19/23 83.5 kg (184 lb)   11/07/23 83.6 kg (184 lb 6.4 oz)   04/25/23 80.7 kg (177 lb 14.4 oz)   Ideal body weight: 48.9 kg (107 lb 14.3 oz)  Adjusted ideal body weight: 62.7 kg (138 lb 5.4 oz)  % IBW:  171%  There is no height or weight on file to calculate BMI.    Primary BMT Physician: Dr Yi  BMT RN Coordinator:  Bobbi Ruth    Past Medical History:  Past Medical History:   Diagnosis Date    Left medial knee pain     Medial epicondylitis, right elbow     Obesity (BMI 30-39.9)     Right elbow pain        Medication Allergies:  Allergies   Allergen Reactions    Acetaminophen Other (See Comments)     Avoid acetaminophen 72h before " "busulfan until 72h after busulfan.  \"Allergy\" can be removed 72h after busulfan.        Current Medications (pre-admit):  Current Outpatient Medications   Medication Sig Dispense Refill    cholecalciferol (VITAMIN D3) 25 mcg (1000 units) capsule Take 1,000 Units by mouth daily      Omega-3 Fatty Acids (FISH OIL) 1200 MG capsule Take 1,200 mg by mouth daily         Herbal Medication/Nutritional Supplements:vitD3, fish oil    Smoking/Past Drug Use: Didn't discuss.     Nausea/Vomiting, Pain, or other issues: None noted.      Summary:  I met with Hortencia Baldwin for approximately 35 minutes.  We discussed allergies, home medications, preparative regimen (busulfan, fludarabine), GVH prophylaxis (siro, MMF, ptCy), anti-infective prophylaxis (acyclovir/letermovir, levofloxacin, micafungin, Bactrim), supportive meds (Keppra, ursodiol, allopurinol, GCSF), vaccines, med box/pharmacy expectations.     Harjeet Erwin, PharmD    "

## 2023-12-21 LAB
FLOWPRA1 CELL: NORMAL
FLOWPRA1 RESULT: NORMAL
FLOWPRA1 TEST METHOD: NORMAL
FLOWPRA2 CELL: NORMAL
FLOWPRA2 RESULT: NORMAL
FLOWPRA2 TEST METHOD: NORMAL
SA 1 CELL: NORMAL
SA 1 TEST METHOD: NORMAL
SA 2 CELL: NORMAL
SA 2 TEST METHOD: NORMAL
SA1 HI RISK ABY: NORMAL
SA1 MOD RISK ABY: NORMAL
SA2 HI RISK ABY: NORMAL
SA2 MOD RISK ABY: NORMAL
T GONDII IGG SER-ACNC: <3 IU/ML
T GONDII IGM SER-ACNC: <3 AU/ML
ZZZFLOWPRA1 COMMENTS: NORMAL
ZZZFLOWPRA2 COMMENTS: NORMAL
ZZZSA 1  COMMENTS: NORMAL
ZZZSA 2 COMMENTS: NORMAL

## 2023-12-22 ENCOUNTER — TELEPHONE (OUTPATIENT)
Dept: TRANSPLANT | Facility: CLINIC | Age: 53
End: 2023-12-22

## 2023-12-22 ENCOUNTER — PRE VISIT (OUTPATIENT)
Dept: RADIATION ONCOLOGY | Facility: CLINIC | Age: 53
End: 2023-12-22

## 2023-12-22 LAB
CROSSMATCHDATEVXM: NORMAL
DONOR VXM: NORMAL
DSA VXM B1: NORMAL
DSA VXM B2: NORMAL
DSA VXMT1: NORMAL
DSA VXMT2: NORMAL
HBV DNA SERPL QL NAA+PROBE: NORMAL
HCV RNA SERPL QL NAA+PROBE: NORMAL
HIV1+2 RNA SERPL QL NAA+PROBE: NORMAL
PATH REPORT.COMMENTS IMP SPEC: ABNORMAL
PATH REPORT.COMMENTS IMP SPEC: YES
PATH REPORT.FINAL DX SPEC: ABNORMAL
PATH REPORT.GROSS SPEC: ABNORMAL
PATH REPORT.MICROSCOPIC SPEC OTHER STN: ABNORMAL
PATH REPORT.RELEVANT HX SPEC: ABNORMAL
PATH REPORT.SITE OF ORIGIN SPEC: ABNORMAL
RESULT VXM B1: NORMAL
RESULT VXM B2: NORMAL
RESULT VXM T1: NORMAL
RESULT VXM T2: NORMAL
SERUM DATE VXM B1: NORMAL
SERUM DATE VXM B2: NORMAL
SERUM DATE VXM T1: NORMAL
SERUM DATE VXM T2: NORMAL
TRYPANOSOMA CRUZI: NORMAL
WNV RNA SERPL DONR QL NAA+PROBE: NORMAL
ZZZCOMMENT VXMB1: NORMAL
ZZZCOMMENT VXMB2: NORMAL
ZZZCOMMENT VXMT1: NORMAL
ZZZCOMMENT VXMT2: NORMAL

## 2023-12-22 NOTE — TELEPHONE ENCOUNTER
Called patient to notify her of rescheduled PFT appointment on 12/27. Patient verbalized understanding with no questions or concerns at this time. Email appointment reminder sent to patient, per Irma's request.    Bobbi Ruth RN, MSN  BMT Nurse Coordinator  Phone: [phone]

## 2023-12-26 ENCOUNTER — MEDICAL CORRESPONDENCE (OUTPATIENT)
Dept: TRANSPLANT | Facility: CLINIC | Age: 53
End: 2023-12-26

## 2023-12-26 ENCOUNTER — LAB (OUTPATIENT)
Dept: LAB | Facility: CLINIC | Age: 53
End: 2023-12-26
Attending: STUDENT IN AN ORGANIZED HEALTH CARE EDUCATION/TRAINING PROGRAM
Payer: COMMERCIAL

## 2023-12-26 ENCOUNTER — OFFICE VISIT (OUTPATIENT)
Dept: TRANSPLANT | Facility: CLINIC | Age: 53
End: 2023-12-26
Attending: STUDENT IN AN ORGANIZED HEALTH CARE EDUCATION/TRAINING PROGRAM
Payer: COMMERCIAL

## 2023-12-26 VITALS
TEMPERATURE: 98.3 F | HEART RATE: 91 BPM | DIASTOLIC BLOOD PRESSURE: 85 MMHG | BODY MASS INDEX: 34.08 KG/M2 | OXYGEN SATURATION: 99 % | RESPIRATION RATE: 16 BRPM | WEIGHT: 183.3 LBS | SYSTOLIC BLOOD PRESSURE: 137 MMHG

## 2023-12-26 DIAGNOSIS — D46.9 MDS (MYELODYSPLASTIC SYNDROME) (H): Primary | ICD-10-CM

## 2023-12-26 DIAGNOSIS — D46.9 MDS (MYELODYSPLASTIC SYNDROME) (H): ICD-10-CM

## 2023-12-26 LAB
ALBUMIN SERPL BCG-MCNC: 4.3 G/DL (ref 3.5–5.2)
ALP SERPL-CCNC: 176 U/L (ref 40–150)
ALT SERPL W P-5'-P-CCNC: 295 U/L (ref 0–50)
ANION GAP SERPL CALCULATED.3IONS-SCNC: 10 MMOL/L (ref 7–15)
AST SERPL W P-5'-P-CCNC: 104 U/L (ref 0–45)
BILIRUB SERPL-MCNC: 0.4 MG/DL
BUN SERPL-MCNC: 14.1 MG/DL (ref 6–20)
C PNEUM DNA SPEC QL NAA+PROBE: ABNORMAL
CALCIUM SERPL-MCNC: 9.3 MG/DL (ref 8.6–10)
CHLORIDE SERPL-SCNC: 102 MMOL/L (ref 98–107)
CREAT SERPL-MCNC: 0.53 MG/DL (ref 0.51–0.95)
DEPRECATED HCO3 PLAS-SCNC: 28 MMOL/L (ref 22–29)
EGFRCR SERPLBLD CKD-EPI 2021: >90 ML/MIN/1.73M2
FLUAV AG SPEC QL IA: NEGATIVE
FLUAV H1 2009 PAND RNA SPEC QL NAA+PROBE: ABNORMAL
FLUAV H1 RNA SPEC QL NAA+PROBE: ABNORMAL
FLUAV H3 RNA SPEC QL NAA+PROBE: ABNORMAL
FLUAV RNA SPEC QL NAA+PROBE: ABNORMAL
FLUBV AG SPEC QL IA: NEGATIVE
FLUBV RNA SPEC QL NAA+PROBE: ABNORMAL
GLUCOSE SERPL-MCNC: 106 MG/DL (ref 70–99)
HADV DNA SPEC QL NAA+PROBE: ABNORMAL
HCOV PNL SPEC NAA+PROBE: ABNORMAL
HMPV RNA SPEC QL NAA+PROBE: ABNORMAL
HPIV1 RNA SPEC QL NAA+PROBE: ABNORMAL
HPIV2 RNA SPEC QL NAA+PROBE: ABNORMAL
HPIV3 RNA SPEC QL NAA+PROBE: ABNORMAL
HPIV4 RNA SPEC QL NAA+PROBE: ABNORMAL
M PNEUMO DNA SPEC QL NAA+PROBE: ABNORMAL
POTASSIUM SERPL-SCNC: 3.9 MMOL/L (ref 3.4–5.3)
PROT SERPL-MCNC: 6.7 G/DL (ref 6.4–8.3)
RSV RNA SPEC QL NAA+PROBE: ABNORMAL
RSV RNA SPEC QL NAA+PROBE: ABNORMAL
RV+EV RNA SPEC QL NAA+PROBE: ABNORMAL
SODIUM SERPL-SCNC: 140 MMOL/L (ref 135–145)

## 2023-12-26 PROCEDURE — 87635 SARS-COV-2 COVID-19 AMP PRB: CPT | Performed by: STUDENT IN AN ORGANIZED HEALTH CARE EDUCATION/TRAINING PROGRAM

## 2023-12-26 PROCEDURE — 36415 COLL VENOUS BLD VENIPUNCTURE: CPT

## 2023-12-26 PROCEDURE — 99000 SPECIMEN HANDLING OFFICE-LAB: CPT | Performed by: PATHOLOGY

## 2023-12-26 PROCEDURE — 99215 OFFICE O/P EST HI 40 MIN: CPT

## 2023-12-26 PROCEDURE — 36415 COLL VENOUS BLD VENIPUNCTURE: CPT | Performed by: PATHOLOGY

## 2023-12-26 PROCEDURE — 80053 COMPREHEN METABOLIC PANEL: CPT | Performed by: STUDENT IN AN ORGANIZED HEALTH CARE EDUCATION/TRAINING PROGRAM

## 2023-12-26 PROCEDURE — 99213 OFFICE O/P EST LOW 20 MIN: CPT

## 2023-12-26 PROCEDURE — 83615 LACTATE (LD) (LDH) ENZYME: CPT | Performed by: STUDENT IN AN ORGANIZED HEALTH CARE EDUCATION/TRAINING PROGRAM

## 2023-12-26 PROCEDURE — 87804 INFLUENZA ASSAY W/OPTIC: CPT | Performed by: PATHOLOGY

## 2023-12-26 ASSESSMENT — PAIN SCALES - GENERAL: PAINLEVEL: NO PAIN (0)

## 2023-12-26 NOTE — LETTER
12/26/2023         RE: Hortencia Baldwin  [address]  [address]  [facility] [address]        Dear Colleague,    Thank you for referring your patient, Hortencia Baldwin, to the [facility] BLOOD AND MARROW TRANSPLANT PROGRAM [place]. Please see a copy of my visit note below.           BMT / Cell Therapy Follow Up       Hortencia Baldwin is a 53 year old female referred by Dr. Ramey for MDS and AIHA.    History     Disease presentation and baseline characteristics:  She was being evaluated to have a benign lump removed, but her PCP checked labs, and she had a hemoglobin of 4.  She had a workup that revealed a diagnosis of MDS, and subsequent AIHA. Hemoglobin is running in the 6-7 range, and she gets RBCs about once every 4-6 weeks. She can tell when she is anemic when she feels her heartbeat in her. Sometimes she gets very orthostatic when anemic.     DIAGNOSIS:  1.  Myelodysplastic syndrome with ring sideroblasts, SF3B1 and ASXL1 mutations present on bone marrow biopsy, diagnosed 12/2021. 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  Molecular: 1. ASXL1: (6%) 2. SF3B1: (45%), see separate report in epic  3.  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  Flow cytometry: No monoclonal B or atypical T cell population identified  5.  Peripheral blood: Macrocytic anemia with increased anisopoikilocytosis  6.  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 Transfusions as needed  2. 3/2022 to 4/2022, EPO without response  3. 4/2022 to present: luspatercept  4. 9/2022 to 12/2022: weekly rituximab with steroids, now tapered off  5. 12/2022 to 5/2023: MMF, then tapered off  6. 5/2023 to 12/1/2023: Decitabine and cedazuridine -- complicated by cytopenias, requiring transfusions every 2-3 weeks.         Interval History  Irma presents to clinic with  her significant other/ boyfriend. She has been feeling well except for runny nose and cough for the past 2-3 days. Her boyfriend has similar symptoms too. Her last cycle of decitabine-cedazridine was on 12/1/2023. She has not required any transfusions in the past month.     Her LFTs are notably elevated today. She denies any right upper quadrant pain, fevers. She has not been on any medications for the past several weeks. She used to take tylenol for pain (500mg every couple of days) but has not taken any for the past 3 days. No other over the counter or herbal medications.     Review of Systems: Negative except as mentioned above    Past Medical History   Past Medical History:   Diagnosis Date    Left medial knee pain     Medial epicondylitis, right elbow     Obesity (BMI 30-39.9)     Right elbow pain       Past Surgical History   Past Surgical History:   Procedure Laterality Date    EXCISE LESION TRUNK N/A 2/5/2018    Procedure: EXCISE LESION TRUNK;;  Surgeon: Avani Szymanski MD;  Location: [facility]    EXCISE MASS LOWER EXTREMITY Bilateral 5/31/2017    Procedure: EXCISE MASS LOWER EXTREMITY;  EXCISION OF LIPOMAS RIGHT BUTTOCKS RIGHT LOWER THIGH, LEFT UPPER OUTER THIGH;  Surgeon: Avani Szymanski MD;  Location: [facility]    EXCISE MASS LOWER EXTREMITY Right 2/5/2018    Procedure: EXCISE MASS LOWER EXTREMITY;  EXCISION OF RIGHT UPPER ANTERIOR THIGH SOFT TISSUE MASS,EXCISION OF LEFT LOWER BACK SOFT TISSUE MASS;  Surgeon: Avani Szymanski MD;  Location: [facility]    LUMPECTOMY BREAST Left 1990s    benign      Family History   Family History   Problem Relation Age of Onset    Hypertension Mother     Diabetes No family hx of     Cerebrovascular Disease No family hx of     Myocardial Infarction No family hx of     Coronary Artery Disease Early Onset No family hx of     Breast Cancer No family hx of     Ovarian Cancer No family hx of     Colon Cancer No family hx of         Social History  Social History     Tobacco Use     "Smoking status: Every Day     Packs/day: 0.50     Years: 10.00     Additional pack years: 0.00     Total pack years: 5.00     Types: Cigarettes     Passive exposure: Current    Smokeless tobacco: Never   Substance Use Topics    Alcohol use: Yes     Comment: 1-2 drinks/month    Drug use: No   Significant Other: Srinivas Es  Children: Rickey Neal, Ezra Foreman and Mariana Neal  Parents: Mother is local in a NH, father lives in [place]  Siblings: 2- 1/2 siblings; sister Yoana is local     Education/Employment:  Hortencia Sethi is currently working as a .     Medications  None      Allergies   Allergies   Allergen Reactions    Acetaminophen Other (See Comments)     Avoid acetaminophen 72h before busulfan until 72h after busulfan.  \"Allergy\" can be removed 72h after busulfan.           Physical Exam     Vital Signs: /85 (BP Location: Right arm, Patient Position: Sitting, Cuff Size: Adult Regular)   Pulse 91   Temp 98.3  F (36.8  C) (Oral)   Resp 16   Wt 83.1 kg (183 lb 4.8 oz)   LMP 11/05/2017   SpO2 99%   BMI 34.08 kg/m      General: Alert, no distress  Eyes: Conjunctiva normal  Respiratory: Normal respiratory effort.  Cardiovascular: Normal perfusion, no significant edema.  Neurological: Grossly normal.  Psych: Mood and affect are appropriate.    Assessment and Plan     MDS with del20q, ASXL1 and SF3B1 mutations, low risk, transfusion dependant  -Planned for allogenic transplant:  6/8 unrelated donor, peripheral blood stem cell graft, ABO matched, CMV donor negative, recipient positive.   -She has a positive DSA with to HLA-DRB1 with MFI 1654. Okay to proceed since the Mfi is low and only against one allele.  - Since she has a 6/8 donor, she would be transplanted according to [id number] but not on trial. Discussed with Dr. Yi and Dr. Paez  - Prep regimen: Flu/Bu   - GVHD ppx: PTCy on D+3 and D+4, MMF (till D35) and TACROLIMUS (avoiding sirolimus due to high risk of VOD)    URI due to COVID  - " Cycle threshold 31. Very mild symptoms. Okay to monitor clinically     Elevated liver enzymes  - RUQ US (12/27) was normal. No recent medication use. Does have iron overload. Could also be related to acute viral illness  - Repeat LFTs prior to transplant along with acute Hepatitis panel and ferritin.    Iron overload  - Ferritin checked pre-transplant is around 5000.  - Would recommend phlebotomy post transplant when retic count is restored and Hb > 10g/dL    Infectious disease prophylaxis  - Micafungin high dose till D+45 (elevated ferritin and current smoking)    Current smoker - advised smoking cessation     BMT and Cell Therapy Informed Consent Discussion     In today's visit, we discussed in detail the research for which Hortencia Baldwin is eligible. We discussed the potential risks and potential benefits of each protocol individually. We explained potential alternatives to the protocols discussed. We explained to the patient that participation is voluntary and that consent may be withdrawn at any time.     The patient completed the last round of treatment on 12/1/2023.    HCT-CI score: 0 We counseled the patient about the impact of this on the risk of treatment related and overall mortality. The score fit within treatment protocol eligibility criteria.    KPS: 90% Can perform normal activity, minor signs of disease    Active infections:  None  Prior infections that require additional special prophylaxis considerations. I reviewed and discussed infectious disease evaluation with the patient and the management plan during treatment. Would recommend high dose micafungin for 45 days due to high ferritin.     Reproductive status: What methods of birth control does the patient plan to use during the treatment period beginning with conditioning and ending with the discontinuation of immune suppression (indicate with an X all that apply):  _x_ The patient is confirmed to be sterile or post-menopausal  __ Sexual  abstinence  __ Condoms  __ Implants  __ Injectables  __ Oral contraceptives  __ Intrauterine devices (IUD)  __ Other (describe)    The patient received appropriate reproductive counseling and agreed with the need for effective contraception during the treatment procedures.    Dental health suitable to proceed: Yes    After our detailed discussion above, the patient signed the following consents for treatment and protocols:  [place] 2015-29 (according to but no on)    Known issues that I take into account for medical decisions, with salient changes to the plan considering these complexities noted above.    Patient Active Problem List   Diagnosis    Left medial knee pain    Obesity (BMI 30-39.9)    Anemia, unspecified type    Macrocytic anemia    MDS (myelodysplastic syndrome) (H)         I spent 60 minutes in the care of this patient today, which included time necessary for preparation for the visit, obtaining history, ordering medications/tests/procedures as medically indicated, review of pertinent medical literature, counseling of the patient, communication of recommendations to the care team, and documentation time.      ____________________________________________________________________    BMT/Cell Therapy Workup Summary    Workup Nurse Coordinator: Francy Crawford  Primary BMT Physician: Stephanie Yi  [facility] BMT Physician (if different): Coco Garay   Date of Summary:  12/26/2023    Patient Demographics     Patient ID:  Hortencia Baldwin   Age:  53 year old   Sex:  female  Reason for Transplant: MDS  Protocol: [place]2015-29 Bu/Flu (according to but not on)    Donor Characteristics     Self or Related or Unrelated Donor: Unrelated donor   Donor Match: 6/8  Donor Age: 28  Donor Sex: M  Donor ABO/Rh: O positive   Donor CMV Serostatus: Negative     Donor-Specific Antibodies:  Recent Labs   Lab Test 12/18/23  0928   RM6HATJWJ A:1 2 23 24 25 32 69 B:7 8 13 27 37 38 42 44 45 47 48 49 51 52 53 55 56 57 58 59 60 61 62 63 67 73  75 76 77 81 82 Cw:8 9   NW7GGIXKUG A:80B:41 46 54 64 78Cw:1 10   II3LWHUHZ DR:7 8 9 11 12 13 14 17 18 DRw:52 DQ:7 8 9   RR4BWLFPHF DR:103 16DRw:51       Virtual Crossmatch:    Recent Labs   Lab Test 12/18/23  0928   RESVXMT1 DSA Absent   DSAVXMT1 None   RESVXMB1 DSA Present   DSAVXMB1 HLA DRB5 oyw=7390       Blood Counts     Recent Labs   Lab Test 12/19/23  1347 03/19/18  1338 01/29/18  1544   HGB 8.6* 10.4* 10.9*   HCT 25.5* 31.7* 32.8*   WBC 3.3* 5.4 8.3   ANEUTAUTO 2.0  --   --    ALYMPAUTO 0.8  --   --    AMONOAUTO 0.4  --   --    AEOSAUTO 0.0  --   --    ABSBASO 0.0  --   --    NRBCMAN 0.0  --   --    * 271 255       Recent Labs   Lab Test 12/19/23  1347   ABORH O POS       No lab results found.    Chemistries     Basic Panel  Recent Labs   Lab Test 12/19/23  1347 05/30/17  1155    141   POTASSIUM 3.5 3.7   CHLORIDE 102 107   CO2 27 26   BUN 8.0 11   CR 0.50* 0.63   * 85        Calcium, Magnesium, Phosphorus  Recent Labs   Lab Test 12/19/23  1347 05/30/17  1155   NIKO 9.4 8.1*        LFTs  Recent Labs   Lab Test 12/19/23  1347 05/30/17  1155   BILITOTAL 0.5 0.4   ALKPHOS 154* 72   AST 79* 11   * 16   ALBUMIN 4.2 3.7       Urine Studies     Recent Labs   Lab Test 12/19/23  1347   COLOR Light Yellow   APPEARANCE Clear   URINEGLC Negative   URINEBILI Negative   URINEKETONE Negative   SG 1.012   UBLD Negative   URINEPH 5.5   PROTEIN Negative   UUROI Normal   NITRITE Negative   LEUKEST Negative   RBCU 0   WBCU <1   USQEI 2*       Creatinine Clearance    No lab results found.    Infectious Disease Markers     [facility] IDM  Recent Labs   Lab Test 12/19/23  1347   TCRUZI Non-reactive       CMV  Recent Labs   Lab Test 12/19/23  1347   CMVIGG Positive, suggests recent or past exposure.*       EBV  Recent Labs   Lab Test 12/19/23  1347   EBVCAG Positive*       HSV 1/2  Recent Labs   Lab Test 12/19/23  1347   J7OBQUJ 35.90*   H1IGG Positive.  IgG antibody to HSV-1 detected.*   H0OQSCA  5.88*   H2IGG Positive.  IgG antibody to HSV-2 detected.*       VZV  No lab results found.    HTLV  No lab results found.    Toxoplasma  Recent Labs   Lab Test 12/19/23  1347   TOXAM <3.0   TOXGONGIAB <3.0       COVID  Recent Labs   Lab Test 12/26/23  1709   [id number] Positive*         Bone Marrow Biopsy     Bone marrow biopsy 12/7/2023 (done at [facility])  Persistent myelodysplastic syndrome. The bone marrow is 70% cellular and blasts comprise 1% the aspirate smear differential. Sideroblasts are increased in proportion with frequent ring sideroblasts. Aspirate shows trilineage dysplasia.   Cytogenetics:  46,XX,del[20(q11.2)[1]/46,XX[20].      Peripheral blood: Normocytic anemia, neutropenia and mild thrombocytopenia  NGS: Not done      Bone marrow biopsy 12/09/2021  Lab Results   Component Value Date    FINALDX  12/18/2023     Bone marrow, posterior iliac crest, decalcified trephine biopsy, aspirate clot, touch imprints, aspirate smears, and peripheral blood ([id number], obtained 12/09/2021):    Myelodysplastic syndrome with mutated SF3B1(per ICC 2022)/myelodysplastic neoplasm (MDS) with low blasts and SF3B1 mutation (WHO 2022)/myelodysplastic syndrome with ring sideroblasts and single lineage dysplasia (WHO 2017) with the following features:  - Hypercellular marrow for age (overall 70% to 80%) with trilineage hematopoiesis, dyserythropoiesis with increased number of ring sideroblasts, and less than 1% blasts  - Peripheral blood showing macrocytic anemia  - See comment        COMDX  12/18/2023     Per submitted report, flow cytometry analysis on concurrent specimen showed mixed lymphocytes, but no atypical T cells or monoclonal B-cell population.  No further information regarding the myelodysplastic provide for our review.    Per submitted report, cytogenetic analysis on concurrent the specimen showed abnormal female karyotype; the abnormal clone contained a deletion of most of the long arm of one of  chromosome 20 as the sole chromosomal abnormality.    Per submitted report, molecular studies by next generation sequencing on concurrent the specimen detected the following mutations:  -SF3B1 mutation (VAF: 45%)  -ASXL1 mutation (VAF: 6%)  Further details regarding mutations are not provided for us.    Please note that ASXL1 mutation is generally associated with poor prognosis in myeloid neoplasm. Therefore, close follow up is recommended.     The full outside diagnostic report(s) will be scanned into Epic under the Media tab. Overall, we agree with the reported diagnosis and appreciate the opportunity to review this case.           Chest X-Ray - 2 view     No results found for this or any previous visit.      Chest CT without Contrast     Results for orders placed during the hospital encounter of 12/20/23    CT CHEST W/O CONTRAST    Status: Normal 12/20/2023    Narrative  CT chest without contrast    INDICATION: Pre bone marrow transplant workup. Myelodysplastic  syndrome.    COMPARISON: None available on PACS.    FINDINGS: No contrast. Included thyroid appears normal. Breast tissues  appear grossly unremarkable. Focal atherosclerosis of the right  brachiocephalic artery as well as aortic arch. Trace coronary artery  calcification in the left anterior descending artery. Heart size  normal. Thoracic aorta and main pulmonary artery calibers are normal.  No pleural or pericardial effusion. Included upper abdomen shows trace  abdominal aortic catheter is chronic calcification. No suspicious  upper abdominal mass.  Bone detail show mild osteopenia. Degenerative spurring in the lower  thoracic spine.    Detail of the lungs shows no suspicious nodule or other opacity or  abnormal air collection.    Impression  IMPRESSION: Atherosclerotic calcifications including coronary artery  calcifications. Osteopenia.    RACHEL VILLALPANDO MD      SYSTEM ID:  [id number]      PFTs     FVC%  Recent Labs   Lab Test 12/27/23  1202     90       FEV1%  Recent Labs   Lab Test 23 91       DLCO%  Recent Labs   Lab Test 23  1202   [id number] 119       EKG     ECG results from 23   EKG 12-lead complete w/read - Clinics     Value    Systolic Blood Pressure     Diastolic Blood Pressure     Ventricular Rate 95    Atrial Rate 95    NY Interval 160    QRS Duration 90        QTc 447    P Axis 59    R AXIS 40    T Axis 47    Interpretation ECG      Sinus rhythm  Normal ECG  When compared with ECG of 19-DEC-2023 15:07, (unconfirmed)  No significant change was found  Confirmed by MD LILY, SINGH ([id number]) on 2023 8:45:18 PM         ECHOCARDIOGRAM     Results for orders placed during the hospital encounter of 23    ECHOCARDIOGRAM COMPLETE    Status: Normal 2023    Narrative  [id number]  [id number]  [id number]  [id number]^ALPHONSE^TALHA^NONA    [facility]  Echocardiography Laboratory  [address] [place] [id number]    Name: MARSHALL CABRERA  MRN: [id number]  : 1970  Study Date: 2023 01:03 PM  Age: 53 yrs  Gender: Female  Patient Location: [facility]  Reason For Study: Preop examination, Personal history of diseases of blood and  blo  Ordering Physician: TALHA CUNHA  Referring Physician: TALHA CUNHA  Performed By: Maria Elena Yepez    BSA: 1.8 m2  Height: 61 in  Weight: 184 lb  HR: 95  BP: 127/54 mmHg  ______________________________________________________________________________  Procedure  Echocardiogram with two-dimensional, color and spectral Doppler performed.  ______________________________________________________________________________  Interpretation Summary  Global and regional left ventricular function is normal with an EF of 60-65%.  Global peak LV longitudinal strain is averaged at -17.3%. This suggests  abnormal strain (normal <-18%). Though this is likely confounded by abnormal  tracking  Mild concentric wall thickening consistent with left  ventricular hypertrophy  is present.  The right ventricle is normal size. Global right ventricular function is  normal.  The right ventricular systolic pressure is 29mmHg above the right atrial  pressure. Pulmonary artery systolic pressure is normal.  No significant valvular abnormalities present.  No pericardial effusion is present.  There is no prior study for direct comparison.  ______________________________________________________________________________  Left Ventricle  Global and regional left ventricular function is normal with an EF of 60-65%.  Left ventricular size is normal. Mild concentric wall thickening consistent  with left ventricular hypertrophy is present. Left ventricular diastolic  function is normal. Global peak LV longitudinal strain is averaged at -17.3%.  This suggests abnormal strain (normal <-18%).    Right Ventricle  The right ventricle is normal size. Global right ventricular function is  normal.    Atria  Both atria appear normal. The atrial septum is intact as assessed by color  Doppler .    Mitral Valve  The mitral valve is normal. On Doppler interrogation, there is no significant  stenosis or regurgitation.    Aortic Valve  Aortic valve is normal in structure and function. On Doppler interrogation,  there is no significant stenosis or regurgitation.    Tricuspid Valve  The tricuspid valve is normal. Mild to moderate tricuspid insufficiency is  present. The right ventricular systolic pressure is 29mmHg above the right  atrial pressure. Pulmonary artery systolic pressure is normal.    Pulmonic Valve  The pulmonic valve is normal. Trace pulmonic insufficiency is present.    Vessels  The thoracic aorta is normal. The pulmonary artery and bifurcation cannot be  assessed. Sinuses of Valsalva 2.7 cm. Ascending aorta 3.1 cm. IVC diameter and  respiratory changes fall into an intermediate range suggesting an RA pressure  of 8 mmHg.    Pericardium  No pericardial effusion is  present.    Miscellaneous  No significant valvular abnormalities present.    Compared to Previous Study  There is no prior study for direct comparison.    Attestation  I have personally viewed the imaging and agree with the interpretation and  report as documented by the fellow, Clay Paniagua, and/or edited by me.  ______________________________________________________________________________  MMode/2D Measurements & Calculations  IVSd: 1.2 cm  LVIDd: 4.3 cm  LVIDs: 3.0 cm  LVPWd: 1.5 cm  FS: 29.3 %  LV mass(C)d: 213.5 grams  LV mass(C)dI: 117.1 grams/m2  Ao root diam: 2.7 cm  LA dimension: 3.5 cm  asc Aorta Diam: 3.1 cm  LA/Ao: 1.3  LVOT diam: 2.1 cm  LVOT area: 3.4 cm2  Ao root diam index Ht(cm/m): 1.7  Ao root diam index BSA (cm/m2): 1.5  Asc Ao diam index BSA (cm/m2): 1.7  Asc Ao diam index Ht(cm/m): 2.0    LA Volume (BP): 58.8 ml  LA Volume Index (BP): 32.3 ml/m2  RV Base: 3.6 cm  RWT: 0.69  TAPSE: 2.0 cm    Doppler Measurements & Calculations  MV E max saud: 62.7 cm/sec  MV A max saud: 88.7 cm/sec  MV E/A: 0.71  MV max PG: 3.1 mmHg  MV mean P.1 mmHg  MV V2 VTI: 21.5 cm  MVA(VTI): 3.6 cm2  MV dec time: 0.15 sec    Ao V2 max: 159.8 cm/sec  Ao max PG: 10.2 mmHg  Ao V2 mean: 113.4 cm/sec  Ao mean P.8 mmHg  Ao V2 VTI: 30.5 cm  JOSH(I,D): 2.5 cm2  JOSH(V,D): 2.7 cm2  LV V1 max P.2 mmHg  LV V1 max: 124.8 cm/sec  LV V1 VTI: 22.7 cm  SV(LVOT): 77.3 ml  SI(LVOT): 42.4 ml/m2  PA V2 max: 139.2 cm/sec  PA max P.8 mmHg  PA acc time: 0.12 sec  TR max saud: 270.4 cm/sec  TR max P.3 mmHg  AV Saud Ratio (DI): 0.78  JOSH Index (cm2/m2): 1.4  E/E' av.2  Lateral E/e': 5.0  Medial E/e': 9.5  RV S Saud: 11.2 cm/sec    Measurements from QLAB  LV GLS Endo Peak A2C (AS): -17.4 %  LV GLS Endo Peak A3C (AS): -16.1 %  LV GLS Endo Peak A4C (AS): -18.2 %    LV GLS Endo Peak Avg (AS): -17.3 %  ______________________________________________________________________________  Report approved by: Heidi Flowers 2023 02:31  PM

## 2023-12-26 NOTE — NURSING NOTE
"Oncology Rooming Note    December 26, 2023 2:53 PM   Hortencia Baldwin is a 53 year old female who presents for:    Chief Complaint   Patient presents with    Oncology Clinic Visit     BMT     Initial Vitals: /85 (BP Location: Right arm, Patient Position: Sitting, Cuff Size: Adult Regular)   Pulse 91   Temp 98.3  F (36.8  C) (Oral)   Resp 16   Wt 83.1 kg (183 lb 4.8 oz)   LMP 11/05/2017   SpO2 99%   BMI 34.08 kg/m   Estimated body mass index is 34.08 kg/m  as calculated from the following:    Height as of 11/7/23: 1.562 m (5' 1.5\").    Weight as of this encounter: 83.1 kg (183 lb 4.8 oz). Body surface area is 1.9 meters squared.  No Pain (0) Comment: Data Unavailable   Patient's last menstrual period was 11/05/2017.  Allergies reviewed: Yes  Medications reviewed: Yes    Medications: Medication refills not needed today.  Pharmacy name entered into LogLogic:    [id number] - [place], [place] - [address] [facility]  [id number] - [place], [place] - [address] [facility]    Frailty Screening:   Is the patient here for a new oncology consult visit in cancer care? 2. No      Clinical concerns: Pt is recovering from a cold that started a few days ago. They still have a slight runny nose and a little cough.      January Hopper"

## 2023-12-26 NOTE — PROGRESS NOTES
BMT / Cell Therapy Follow Up       Hortencia Baldwin is a 53 year old female referred by Dr. Ramey for MDS and AIHA.    History     Disease presentation and baseline characteristics:  She was being evaluated to have a benign lump removed, but her PCP checked labs, and she had a hemoglobin of 4.  She had a workup that revealed a diagnosis of MDS, and subsequent AIHA. Hemoglobin is running in the 6-7 range, and she gets RBCs about once every 4-6 weeks. She can tell when she is anemic when she feels her heartbeat in her. Sometimes she gets very orthostatic when anemic.     DIAGNOSIS:  1.  Myelodysplastic syndrome with ring sideroblasts, SF3B1 and ASXL1 mutations present on bone marrow biopsy, diagnosed 12/2021. 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  Molecular: 1. ASXL1: (6%) 2. SF3B1: (45%), see separate report in epic  3.  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  Flow cytometry: No monoclonal B or atypical T cell population identified  5.  Peripheral blood: Macrocytic anemia with increased anisopoikilocytosis  6.  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 Transfusions as needed  2. 3/2022 to 4/2022, EPO without response  3. 4/2022 to present: luspatercept  4. 9/2022 to 12/2022: weekly rituximab with steroids, now tapered off  5. 12/2022 to 5/2023: MMF, then tapered off  6. 5/2023 to 12/1/2023: Decitabine and cedazuridine -- complicated by cytopenias, requiring transfusions every 2-3 weeks.         Interval History  Irma presents to clinic with her significant other/ boyfriend. She has been feeling well except for runny nose and cough for the past 2-3 days. Her boyfriend has similar symptoms too. Her last cycle of decitabine-cedazridine was on 12/1/2023. She has not required any transfusions in the past month.     Her LFTs are notably elevated today. She denies any right upper quadrant pain, fevers. She has not been on  any medications for the past several weeks. She used to take tylenol for pain (500mg every couple of days) but has not taken any for the past 3 days. No other over the counter or herbal medications.     Review of Systems: Negative except as mentioned above    Past Medical History    Past Medical History:   Diagnosis Date    Left medial knee pain     Medial epicondylitis, right elbow     Obesity (BMI 30-39.9)     Right elbow pain       Past Surgical History    Past Surgical History:   Procedure Laterality Date    EXCISE LESION TRUNK N/A 2/5/2018    Procedure: EXCISE LESION TRUNK;;  Surgeon: Avani Szymanski MD;  Location: [facility]    EXCISE MASS LOWER EXTREMITY Bilateral 5/31/2017    Procedure: EXCISE MASS LOWER EXTREMITY;  EXCISION OF LIPOMAS RIGHT BUTTOCKS RIGHT LOWER THIGH, LEFT UPPER OUTER THIGH;  Surgeon: Avani Szymanski MD;  Location: [facility]    EXCISE MASS LOWER EXTREMITY Right 2/5/2018    Procedure: EXCISE MASS LOWER EXTREMITY;  EXCISION OF RIGHT UPPER ANTERIOR THIGH SOFT TISSUE MASS,EXCISION OF LEFT LOWER BACK SOFT TISSUE MASS;  Surgeon: Avani Szymanski MD;  Location: [facility]    LUMPECTOMY BREAST Left 1990s    benign      Family History    Family History   Problem Relation Age of Onset    Hypertension Mother     Diabetes No family hx of     Cerebrovascular Disease No family hx of     Myocardial Infarction No family hx of     Coronary Artery Disease Early Onset No family hx of     Breast Cancer No family hx of     Ovarian Cancer No family hx of     Colon Cancer No family hx of         Social History   Social History     Tobacco Use    Smoking status: Every Day     Packs/day: 0.50     Years: 10.00     Additional pack years: 0.00     Total pack years: 5.00     Types: Cigarettes     Passive exposure: Current    Smokeless tobacco: Never   Substance Use Topics    Alcohol use: Yes     Comment: 1-2 drinks/month    Drug use: No   Significant Other: Srinivas Suggs  Children: Rickey Neal, Ezra Foreman and Mariana  "Neal  Parents: Mother is local in a NH, father lives in [place]  Siblings: 2- 1/2 siblings; sister Yoana is local     Education/Employment:  Hortencia Sethi is currently working as a .     Medications   None      Allergies    Allergies   Allergen Reactions    Acetaminophen Other (See Comments)     Avoid acetaminophen 72h before busulfan until 72h after busulfan.  \"Allergy\" can be removed 72h after busulfan.           Physical Exam     Vital Signs: /85 (BP Location: Right arm, Patient Position: Sitting, Cuff Size: Adult Regular)   Pulse 91   Temp 98.3  F (36.8  C) (Oral)   Resp 16   Wt 83.1 kg (183 lb 4.8 oz)   LMP 2017   SpO2 99%   BMI 34.08 kg/m      General: Alert, no distress  Eyes: Conjunctiva normal  Respiratory: Normal respiratory effort.  Cardiovascular: Normal perfusion, no significant edema.  Neurological: Grossly normal.  Psych: Mood and affect are appropriate.    Assessment and Plan     MDS with del20q, ASXL1 and SF3B1 mutations, transfusion dependant  - Hortencia had low risk MDS when she was initially diagnosed in . She has received several lines of treatment including Epo, luspatercept and decitabine cedazuridine without improvement. She is now transfusion dependant. Based on her low Hb without transfusions and high risk ASXL1 mutation she falls in the moderate high risk category per IPSS-M.   - In addition, please note the following documentation from the prior visit with Dr. Yi on 2023 \"As in her initial visit, we discussed that although she has low risk MDS by IPSS-RA, she has several factors that merit early consideration of transplant. Namely, her known ASXL1 mutation, which is an independent risk factor in MDS (See N Engl J Med. 2011; 364(26): 4906-0692, or Leuk Res. 2018 Au:60-62. doi: 10.1016/j.leukres.2018.07.010. Epub 2018) and her refractory anemia. Further, she developed multi-lineage dysplasia on BMBx from 2023, which was new from previous " "biopsies, which suggests clonal evolution. Although we have yet to see a BMBx after 4 cycles of HMA, she has continued to need transfusions due to pancytopenia, which is possibly related to HMA, although we would have hoped that her counts would have improved to the point that she would not need transfusions related to HMA. This further suggests that HMA may not be an appropriate long-term option. Overall, we suggest moving forward with transplant upon identification of a donor and adequate organ testing.\"  - Please note that her bone marrow biopsy after 4 cycles of HMA continues to show multi-lineage dysplasia.   -Planned for allogenic transplant:  6/8 unrelated donor, peripheral blood stem cell graft, ABO matched, CMV donor negative, recipient positive.   -She has a positive DSA with to HLA-DRB1 with MFI 1654. Okay to proceed since the Mfi is low and only against one allele.  - Since she has a 6/8 donor, she would be transplanted according to EW1600-20 but not on trial.   - Prep regimen: Flu/Bu   - GVHD ppx: PTCy on D+3 and D+4, MMF (till D35) and TACROLIMUS (avoiding sirolimus due to high risk of VOD)  - Above discussed with Dr. Yi and Dr. Philippe POWERS due to COVID  - Cycle threshold 31. Very mild symptoms. Okay to monitor clinically     Elevated liver enzymes  - RUQ US (12/27) was normal. No recent medication use. Does have iron overload. Could also be related to acute viral illness  - Repeat LFTs prior to transplant along with acute Hepatitis panel and ferritin.    Iron overload  - Ferritin checked pre-transplant is around 5000.  - Would recommend phlebotomy post transplant when retic count is restored and Hb > 10g/dL    Infectious disease prophylaxis  - Micafungin high dose till D+45 (elevated ferritin and current smoking)    Current smoker - advised smoking cessation     BMT and Cell Therapy Informed Consent Discussion     In today's visit, we discussed in detail the research for which Hortencia Baldwin is " eligible. We discussed the potential risks and potential benefits of each protocol individually. We explained potential alternatives to the protocols discussed. We explained to the patient that participation is voluntary and that consent may be withdrawn at any time.     The patient completed the last round of treatment on 12/1/2023.    HCT-CI score: 0 We counseled the patient about the impact of this on the risk of treatment related and overall mortality. The score fit within treatment protocol eligibility criteria.    KPS: 90% Can perform normal activity, minor signs of disease    Active infections:  None  Prior infections that require additional special prophylaxis considerations. I reviewed and discussed infectious disease evaluation with the patient and the management plan during treatment. Would recommend high dose micafungin for 45 days due to high ferritin.     Reproductive status: What methods of birth control does the patient plan to use during the treatment period beginning with conditioning and ending with the discontinuation of immune suppression (indicate with an X all that apply):  _x_ The patient is confirmed to be sterile or post-menopausal  __ Sexual abstinence  __ Condoms  __ Implants  __ Injectables  __ Oral contraceptives  __ Intrauterine devices (IUD)  __ Other (describe)    The patient received appropriate reproductive counseling and agreed with the need for effective contraception during the treatment procedures.    Dental health suitable to proceed: Yes    After our detailed discussion above, the patient signed the following consents for treatment and protocols:  [place] 2015-29 (according to but no on)    Known issues that I take into account for medical decisions, with salient changes to the plan considering these complexities noted above.    Patient Active Problem List   Diagnosis    Left medial knee pain    Obesity (BMI 30-39.9)    Anemia, unspecified type    Macrocytic anemia    MDS  (myelodysplastic syndrome) (H)         I spent 60 minutes in the care of this patient today, which included time necessary for preparation for the visit, obtaining history, ordering medications/tests/procedures as medically indicated, review of pertinent medical literature, counseling of the patient, communication of recommendations to the care team, and documentation time.      ____________________________________________________________________    BMT/Cell Therapy Workup Summary    Workup Nurse Coordinator: Francy Crawford  Primary BMT Physician: Stephanie Yi  Closing BMT Physician (if different): Coco Garay   Date of Summary:  12/26/2023    Patient Demographics     Patient ID:  Hortencia Baldwin   Age:  53 year old   Sex:  female  Reason for Transplant: MDS  Protocol: IF1370-56 Bu/Flu (according to but not on)    Donor Characteristics     Self or Related or Unrelated Donor: Unrelated donor   Donor Match: 6/8  Donor Age: 28  Donor Sex: M  Donor ABO/Rh: O positive   Donor CMV Serostatus: Negative     Donor-Specific Antibodies:  Recent Labs   Lab Test 12/18/23  0928   UT8LKNMHS A:1 2 23 24 25 32 69 B:7 8 13 27 37 38 42 44 45 47 48 49 51 52 53 55 56 57 58 59 60 61 62 63 67 73 75 76 77 81 82 Cw:8 9   GC4EYSIEGB A:80B:41 46 54 64 78Cw:1 10   SC9UVOGRN DR:7 8 9 11 12 13 14 17 18 DRw:52 DQ:7 8 9   BD2XZCMOVG DR:103 16DRw:51       Virtual Crossmatch:    Recent Labs   Lab Test 12/18/23  0928   RESVXMT1 DSA Absent   DSAVXMT1 None   RESVXMB1 DSA Present   DSAVXMB1 HLA DRB5 anp=8112       Blood Counts     Recent Labs   Lab Test 12/19/23  1347 03/19/18  1338 01/29/18  1544   HGB 8.6* 10.4* 10.9*   HCT 25.5* 31.7* 32.8*   WBC 3.3* 5.4 8.3   ANEUTAUTO 2.0  --   --    ALYMPAUTO 0.8  --   --    AMONOAUTO 0.4  --   --    AEOSAUTO 0.0  --   --    ABSBASO 0.0  --   --    NRBCMAN 0.0  --   --    * 271 867       Recent Labs   Lab Test 12/19/23  1347   ABORH O POS       No lab results found.    Chemistries     Basic  Panel  Recent Labs   Lab Test 12/19/23  1347 05/30/17  1155    141   POTASSIUM 3.5 3.7   CHLORIDE 102 107   CO2 27 26   BUN 8.0 11   CR 0.50* 0.63   * 85        Calcium, Magnesium, Phosphorus  Recent Labs   Lab Test 12/19/23  1347 05/30/17  1155   NIKO 9.4 8.1*        LFTs  Recent Labs   Lab Test 12/19/23  1347 05/30/17  1155   BILITOTAL 0.5 0.4   ALKPHOS 154* 72   AST 79* 11   * 16   ALBUMIN 4.2 3.7       Urine Studies     Recent Labs   Lab Test 12/19/23  1347   COLOR Light Yellow   APPEARANCE Clear   URINEGLC Negative   URINEBILI Negative   URINEKETONE Negative   SG 1.012   UBLD Negative   URINEPH 5.5   PROTEIN Negative   UUROI Normal   NITRITE Negative   LEUKEST Negative   RBCU 0   WBCU <1   USQEI 2*       Creatinine Clearance    No lab results found.    Infectious Disease Markers     [facility] IDM  Recent Labs   Lab Test 12/19/23  1347   TCRUZI Non-reactive       CMV  Recent Labs   Lab Test 12/19/23  1347   CMVIGG Positive, suggests recent or past exposure.*       EBV  Recent Labs   Lab Test 12/19/23  1347   EBVCAG Positive*       HSV 1/2  Recent Labs   Lab Test 12/19/23  1347   X0YTRJH 35.90*   H1IGG Positive.  IgG antibody to HSV-1 detected.*   I1GFHHU 5.88*   H2IGG Positive.  IgG antibody to HSV-2 detected.*       VZV  No lab results found.    HTLV  No lab results found.    Toxoplasma  Recent Labs   Lab Test 12/19/23  1347   TOXAM <3.0   TOXGONGIAB <3.0       COVID  Recent Labs   Lab Test 12/26/23  1709   PAJIY89IFY Positive*         Bone Marrow Biopsy     Bone marrow biopsy 12/7/2023 ([facility] at [facility])  Persistent myelodysplastic syndrome. The bone marrow is 70% cellular and blasts comprise 1% the aspirate smear differential. Sideroblasts are increased in proportion with frequent ring sideroblasts. Aspirate shows trilineage dysplasia.   Cytogenetics:  46,XX,del[20(q11.2)[1]/46,XX[20].      Peripheral blood: Normocytic anemia, neutropenia and mild  thrombocytopenia  NGS: Not done      Bone marrow biopsy 12/09/2021  Lab Results   Component Value Date    FINALDX  12/18/2023     Bone marrow, posterior iliac crest, decalcified trephine biopsy, aspirate clot, touch imprints, aspirate smears, and peripheral blood ([id number], obtained 12/09/2021):    Myelodysplastic syndrome with mutated SF3B1(per ICC 2022)/myelodysplastic neoplasm (MDS) with low blasts and SF3B1 mutation (WHO 2022)/myelodysplastic syndrome with ring sideroblasts and single lineage dysplasia (WHO 2017) with the following features:  - Hypercellular marrow for age (overall 70% to 80%) with trilineage hematopoiesis, dyserythropoiesis with increased number of ring sideroblasts, and less than 1% blasts  - Peripheral blood showing macrocytic anemia  - See comment        COMDX  12/18/2023     Per submitted report, flow cytometry analysis on concurrent specimen showed mixed lymphocytes, but no atypical T cells or monoclonal B-cell population.  No further information regarding the myelodysplastic provide for our review.    Per submitted report, cytogenetic analysis on concurrent the specimen showed abnormal female karyotype; the abnormal clone contained a deletion of most of the long arm of one of chromosome 20 as the sole chromosomal abnormality.    Per submitted report, molecular studies by next generation sequencing on concurrent the specimen detected the following mutations:  -SF3B1 mutation (VAF: 45%)  -ASXL1 mutation (VAF: 6%)  Further details regarding mutations are not provided for us.    Please note that ASXL1 mutation is generally associated with poor prognosis in myeloid neoplasm. Therefore, close follow up is recommended.     The full outside diagnostic report(s) will be scanned into Epic under the Media tab. Overall, we agree with the reported diagnosis and appreciate the opportunity to review this case.           Chest X-Ray - 2 view     No results found for this or any previous  visit.      Chest CT without Contrast     Results for orders placed during the hospital encounter of 12/20/23    CT CHEST W/O CONTRAST    Status: Normal 12/20/2023    Narrative  CT chest without contrast    INDICATION: Pre bone marrow transplant workup. Myelodysplastic  syndrome.    COMPARISON: None available on PACS.    FINDINGS: No contrast. Included thyroid appears normal. Breast tissues  appear grossly unremarkable. Focal atherosclerosis of the right  brachiocephalic artery as well as aortic arch. Trace coronary artery  calcification in the left anterior descending artery. Heart size  normal. Thoracic aorta and main pulmonary artery calibers are normal.  No pleural or pericardial effusion. Included upper abdomen shows trace  abdominal aortic catheter is chronic calcification. No suspicious  upper abdominal mass.  Bone detail show mild osteopenia. Degenerative spurring in the lower  thoracic spine.    Detail of the lungs shows no suspicious nodule or other opacity or  abnormal air collection.    Impression  IMPRESSION: Atherosclerotic calcifications including coronary artery  calcifications. Osteopenia.    RACHEL VILLALPANDO MD      SYSTEM ID:  [id number]      PFTs     FVC%  Recent Labs   Lab Test 12/27/23  1202   20003 90       FEV1%  Recent Labs   Lab Test 12/27/23  1202   20016 91       DLCO%  Recent Labs   Lab Test 12/27/23  1202   34895 119       EKG     ECG results from 12/19/23   EKG 12-lead complete w/read - Clinics     Value    Systolic Blood Pressure     Diastolic Blood Pressure     Ventricular Rate 95    Atrial Rate 95    MT Interval 160    QRS Duration 90        QTc 447    P Axis 59    R AXIS 40    T Axis 47    Interpretation ECG      Sinus rhythm  Normal ECG  When compared with ECG of 19-DEC-2023 15:07, (unconfirmed)  No significant change was found  Confirmed by MD LILY, SINGH ([id number]) on 12/20/2023 8:45:18 PM         ECHOCARDIOGRAM     Results for orders placed during the hospital encounter  of 23    ECHOCARDIOGRAM COMPLETE    Status: Normal 2023    Narrative  [id number]  [id number]  [id number]  [id number]^ALPHONSE^TALHA^NONA    [facility],[place]  Echocardiography Laboratory  [address] [place] [id number]    Name: MARSHALL CABRERA  MRN: [id number]  : 1970  Study Date: 2023 01:03 PM  Age: 53 yrs  Gender: Female  Patient Location: [facility]  Reason For Study: Preop examination, Personal history of diseases of blood and  blo  Ordering Physician: TALHA CUNHA  Referring Physician: TALHA CUNHA  Performed By: Maria Elena Yepez    BSA: 1.8 m2  Height: 61 in  Weight: 184 lb  HR: 95  BP: 127/54 mmHg  ______________________________________________________________________________  Procedure  Echocardiogram with two-dimensional, color and spectral Doppler performed.  ______________________________________________________________________________  Interpretation Summary  Global and regional left ventricular function is normal with an EF of 60-65%.  Global peak LV longitudinal strain is averaged at -17.3%. This suggests  abnormal strain (normal <-18%). Though this is likely confounded by abnormal  tracking  Mild concentric wall thickening consistent with left ventricular hypertrophy  is present.  The right ventricle is normal size. Global right ventricular function is  normal.  The right ventricular systolic pressure is 29mmHg above the right atrial  pressure. Pulmonary artery systolic pressure is normal.  No significant valvular abnormalities present.  No pericardial effusion is present.  There is no prior study for direct comparison.  ______________________________________________________________________________  Left Ventricle  Global and regional left ventricular function is normal with an EF of 60-65%.  Left ventricular size is normal. Mild concentric wall thickening consistent  with left ventricular hypertrophy is present. Left ventricular  diastolic  function is normal. Global peak LV longitudinal strain is averaged at -17.3%.  This suggests abnormal strain (normal <-18%).    Right Ventricle  The right ventricle is normal size. Global right ventricular function is  normal.    Atria  Both atria appear normal. The atrial septum is intact as assessed by color  Doppler .    Mitral Valve  The mitral valve is normal. On Doppler interrogation, there is no significant  stenosis or regurgitation.    Aortic Valve  Aortic valve is normal in structure and function. On Doppler interrogation,  there is no significant stenosis or regurgitation.    Tricuspid Valve  The tricuspid valve is normal. Mild to moderate tricuspid insufficiency is  present. The right ventricular systolic pressure is 29mmHg above the right  atrial pressure. Pulmonary artery systolic pressure is normal.    Pulmonic Valve  The pulmonic valve is normal. Trace pulmonic insufficiency is present.    Vessels  The thoracic aorta is normal. The pulmonary artery and bifurcation cannot be  assessed. Sinuses of Valsalva 2.7 cm. Ascending aorta 3.1 cm. IVC diameter and  respiratory changes fall into an intermediate range suggesting an RA pressure  of 8 mmHg.    Pericardium  No pericardial effusion is present.    Miscellaneous  No significant valvular abnormalities present.    Compared to Previous Study  There is no prior study for direct comparison.    Attestation  I have personally viewed the imaging and agree with the interpretation and  report as documented by the fellow, Clay Paniagua, and/or edited by me.  ______________________________________________________________________________  MMode/2D Measurements & Calculations  IVSd: 1.2 cm  LVIDd: 4.3 cm  LVIDs: 3.0 cm  LVPWd: 1.5 cm  FS: 29.3 %  LV mass(C)d: 213.5 grams  LV mass(C)dI: 117.1 grams/m2  Ao root diam: 2.7 cm  LA dimension: 3.5 cm  asc Aorta Diam: 3.1 cm  LA/Ao: 1.3  LVOT diam: 2.1 cm  LVOT area: 3.4 cm2  Ao root diam index Ht(cm/m): 1.7  Ao root  diam index BSA (cm/m2): 1.5  Asc Ao diam index BSA (cm/m2): 1.7  Asc Ao diam index Ht(cm/m): 2.0    LA Volume (BP): 58.8 ml  LA Volume Index (BP): 32.3 ml/m2  RV Base: 3.6 cm  RWT: 0.69  TAPSE: 2.0 cm    Doppler Measurements & Calculations  MV E max saud: 62.7 cm/sec  MV A max saud: 88.7 cm/sec  MV E/A: 0.71  MV max PG: 3.1 mmHg  MV mean P.1 mmHg  MV V2 VTI: 21.5 cm  MVA(VTI): 3.6 cm2  MV dec time: 0.15 sec    Ao V2 max: 159.8 cm/sec  Ao max PG: 10.2 mmHg  Ao V2 mean: 113.4 cm/sec  Ao mean P.8 mmHg  Ao V2 VTI: 30.5 cm  JOSH(I,D): 2.5 cm2  JOSH(V,D): 2.7 cm2  LV V1 max P.2 mmHg  LV V1 max: 124.8 cm/sec  LV V1 VTI: 22.7 cm  SV(LVOT): 77.3 ml  SI(LVOT): 42.4 ml/m2  PA V2 max: 139.2 cm/sec  PA max P.8 mmHg  PA acc time: 0.12 sec  TR max saud: 270.4 cm/sec  TR max P.3 mmHg  AV Saud Ratio (DI): 0.78  JOSH Index (cm2/m2): 1.4  E/E' av.2  Lateral E/e': 5.0  Medial E/e': 9.5  RV S Saud: 11.2 cm/sec    Measurements from QLAB  LV GLS Endo Peak A2C (AS): -17.4 %  LV GLS Endo Peak A3C (AS): -16.1 %  LV GLS Endo Peak A4C (AS): -18.2 %    LV GLS Endo Peak Avg (AS): -17.3 %  ______________________________________________________________________________  Report approved by: Heidi Flowers 2023 02:31 PM

## 2023-12-27 ENCOUNTER — OFFICE VISIT (OUTPATIENT)
Dept: PULMONOLOGY | Facility: CLINIC | Age: 53
End: 2023-12-27
Payer: COMMERCIAL

## 2023-12-27 ENCOUNTER — ANCILLARY PROCEDURE (OUTPATIENT)
Dept: ULTRASOUND IMAGING | Facility: CLINIC | Age: 53
End: 2023-12-27
Attending: STUDENT IN AN ORGANIZED HEALTH CARE EDUCATION/TRAINING PROGRAM
Payer: COMMERCIAL

## 2023-12-27 ENCOUNTER — TELEPHONE (OUTPATIENT)
Dept: NURSING | Facility: CLINIC | Age: 53
End: 2023-12-27

## 2023-12-27 ENCOUNTER — CARE COORDINATION (OUTPATIENT)
Dept: TRANSPLANT | Facility: CLINIC | Age: 53
End: 2023-12-27

## 2023-12-27 DIAGNOSIS — Z01.818 PREOP EXAMINATION: ICD-10-CM

## 2023-12-27 DIAGNOSIS — D46.9 MDS (MYELODYSPLASTIC SYNDROME) (H): ICD-10-CM

## 2023-12-27 DIAGNOSIS — Z86.2 PERSONAL HISTORY OF DISEASES OF BLOOD AND BLOOD-FORMING ORGANS: ICD-10-CM

## 2023-12-27 DIAGNOSIS — D46.9 MDS (MYELODYSPLASTIC SYNDROME) (H): Primary | ICD-10-CM

## 2023-12-27 DIAGNOSIS — Z11.59 SCREENING FOR VIRAL DISEASE: ICD-10-CM

## 2023-12-27 LAB
CYCLE THRESHOLD (CT): 31.7
SARS-COV-2 RNA RESP QL NAA+PROBE: POSITIVE

## 2023-12-27 PROCEDURE — 94375 RESPIRATORY FLOW VOLUME LOOP: CPT | Performed by: INTERNAL MEDICINE

## 2023-12-27 PROCEDURE — 93975 VASCULAR STUDY: CPT | Performed by: RADIOLOGY

## 2023-12-27 PROCEDURE — 94729 DIFFUSING CAPACITY: CPT | Performed by: INTERNAL MEDICINE

## 2023-12-27 PROCEDURE — 76705 ECHO EXAM OF ABDOMEN: CPT | Mod: XU | Performed by: RADIOLOGY

## 2023-12-27 PROCEDURE — 94726 PLETHYSMOGRAPHY LUNG VOLUMES: CPT | Performed by: INTERNAL MEDICINE

## 2023-12-27 NOTE — TELEPHONE ENCOUNTER
Coronavirus (COVID-19) Notification    Caller Name (Patient, parent, daughter/son, grandparent, etc)  Irma    Reason for call  Notify of Positive Coronavirus (COVID-19) lab results, assess symptoms,  review [facility] recommendations    Lab Result    Lab test:  2019-nCoV rRt-PCR or SARS-CoV-2 PCR    Oropharyngeal AND/OR nasopharyngeal swabs is POSITIVE for 2019-nCoV RNA/SARS-COV-2 PCR (COVID-19 virus)      Gather patient reported symptoms   Assessment   Current Symptoms at time of phone call, reported by patient: (if no symptoms, document: No symptoms] Runny nose, cough   Date of symptom(s) onset (if applicable) 12/23/23     If at time of call, Patients symptoms have worsened, the Patient should contact 911 or have someone drive them to Emergency Dept promptly:    If Patient calling 911, inform 911 personal that you have tested positive for the Coronavirus (COVID-19).  Place mask on and await 911 to arrive.  If Emergency Dept, If possible, please have another adult drive you to the Emergency Dept but you need to wear mask when in contact with other people.      Treatment Options:   Is patient interested in discussing COVID treatment? No.        Review information with Patient    Your result was positive. This means you have COVID-19 (coronavirus).    How can I protect others?    These guidelines are for isolating before returning to work, school or .    If you DO have symptoms  Stay home and away from others   For at least 5 days after your symptoms started, AND  You are fever free for 24 hours (with no medicine that reduces fever), AND  Your other symptoms are better  Wear a mask for 10 full days anytime you are around others    If you DON'T have symptoms  Stay home and away from others for at least 5 days after your positive test  Wear a mask for 10 full days anytime you are around others    There may be different guidelines for healthcare facilities.  Please check with the specific sites before  arriving.    If you have been told by a doctor that you were severely ill with COVID-19 or are immunocompromised, you should isolate for at least 10 days.    You should not go back to work until you meet the guidelines above for ending your home isolation. You don't need to be retested for COVID-19 before going back to work--studies show that you won't spread the virus if it's been at least 10 days since your symptoms started (or 20 days, if you have a weak immune system).    Employers, schools, and daycares: This is an official notice for this person's medical guidelines for returning in-person.  They must meet the above guidelines before going back to work, school or  in person.    You will receive a positive COVID-19 letter via [employer] or the mail soon with additional self-care information.    Would you like me to review some of that information with you now?  No    If you were tested for an upcoming procedure, please contact your provider for next steps.    Davy Renee

## 2023-12-27 NOTE — PROGRESS NOTES
[facility] - Care Coordination    Patient with positive COVID test. RNCC discussed with Dr. Garay. Will plan to bring patient on 1/2 for repeat labs, COVID test and a visit with Dr. Garay. Called Micro lab to run cycle threshold on sample drawn 12/26. This will be resulted later this evening or 12/28 morning.     RNCC called patient who reported she was feeling better. Will call if symptoms worsen.     Francy Crawford RN BSN  BMT RN Care Coordinator   Phone [phone]

## 2023-12-28 LAB
DLCOCOR-%PRED-PRE: 119 %
DLCOCOR-PRE: 23.06 ML/MIN/MMHG
DLCOUNC-%PRED-PRE: 97 %
DLCOUNC-PRE: 18.75 ML/MIN/MMHG
DLCOUNC-PRED: 19.32 ML/MIN/MMHG
ERV-%PRED-PRE: 21 %
ERV-PRE: 0.23 L
ERV-PRED: 1.07 L
EXPTIME-PRE: 7.22 SEC
FEF2575-%PRED-PRE: 101 %
FEF2575-PRE: 2.37 L/SEC
FEF2575-PRED: 2.34 L/SEC
FEFMAX-%PRED-PRE: 89 %
FEFMAX-PRE: 5.66 L/SEC
FEFMAX-PRED: 6.31 L/SEC
FEV1-%PRED-PRE: 91 %
FEV1-PRE: 2.15 L
FEV1FEV6-PRE: 82 %
FEV1FEV6-PRED: 82 %
FEV1FVC-PRE: 82 %
FEV1FVC-PRED: 81 %
FEV1SVC-PRE: 81 %
FEV1SVC-PRED: 75 %
FIFMAX-PRE: 4.64 L/SEC
FRCPLETH-%PRED-PRE: 83 %
FRCPLETH-PRE: 2.15 L
FRCPLETH-PRED: 2.58 L
FVC-%PRED-PRE: 90 %
FVC-PRE: 2.62 L
FVC-PRED: 2.88 L
IC-%PRED-PRE: 114 %
IC-PRE: 2.44 L
IC-PRED: 2.13 L
RVPLETH-%PRED-PRE: 113 %
RVPLETH-PRE: 1.93 L
RVPLETH-PRED: 1.7 L
TLCPLETH-%PRED-PRE: 99 %
TLCPLETH-PRE: 4.59 L
TLCPLETH-PRED: 4.6 L
VA-%PRED-PRE: 88 %
VA-PRE: 3.96 L
VC-%PRED-PRE: 85 %
VC-PRE: 2.67 L
VC-PRED: 3.12 L

## 2023-12-29 ENCOUNTER — TELEPHONE (OUTPATIENT)
Dept: TRANSPLANT | Facility: CLINIC | Age: 53
End: 2023-12-29

## 2023-12-29 LAB — LDH SERPL L TO P-CCNC: 244 U/L (ref 0–250)

## 2024-01-02 ENCOUNTER — APPOINTMENT (OUTPATIENT)
Dept: LAB | Facility: CLINIC | Age: 54
End: 2024-01-02
Attending: STUDENT IN AN ORGANIZED HEALTH CARE EDUCATION/TRAINING PROGRAM
Payer: COMMERCIAL

## 2024-01-02 ENCOUNTER — APPOINTMENT (OUTPATIENT)
Dept: LAB | Facility: CLINIC | Age: 54
End: 2024-01-02
Payer: COMMERCIAL

## 2024-01-02 ENCOUNTER — ONCOLOGY VISIT (OUTPATIENT)
Dept: TRANSPLANT | Facility: CLINIC | Age: 54
End: 2024-01-02
Attending: STUDENT IN AN ORGANIZED HEALTH CARE EDUCATION/TRAINING PROGRAM
Payer: COMMERCIAL

## 2024-01-02 VITALS
SYSTOLIC BLOOD PRESSURE: 123 MMHG | OXYGEN SATURATION: 99 % | DIASTOLIC BLOOD PRESSURE: 83 MMHG | HEART RATE: 78 BPM | TEMPERATURE: 98.4 F | RESPIRATION RATE: 16 BRPM

## 2024-01-02 DIAGNOSIS — D46.9 MDS (MYELODYSPLASTIC SYNDROME) (H): Primary | ICD-10-CM

## 2024-01-02 LAB
ALBUMIN SERPL BCG-MCNC: 4.1 G/DL (ref 3.5–5.2)
ALBUMIN SERPL BCG-MCNC: 4.3 G/DL (ref 3.5–5.2)
ALP SERPL-CCNC: 144 U/L (ref 40–150)
ALP SERPL-CCNC: 152 U/L (ref 40–150)
ALT SERPL W P-5'-P-CCNC: 111 U/L (ref 0–50)
ALT SERPL W P-5'-P-CCNC: 117 U/L (ref 0–50)
ANION GAP SERPL CALCULATED.3IONS-SCNC: 10 MMOL/L (ref 7–15)
AST SERPL W P-5'-P-CCNC: 49 U/L (ref 0–45)
AST SERPL W P-5'-P-CCNC: ABNORMAL U/L
BASOPHILS # BLD AUTO: 0.1 10E3/UL (ref 0–0.2)
BASOPHILS NFR BLD AUTO: 1 %
BILIRUB DIRECT SERPL-MCNC: <0.2 MG/DL (ref 0–0.3)
BILIRUB SERPL-MCNC: 0.4 MG/DL
BILIRUB SERPL-MCNC: 0.5 MG/DL
BUN SERPL-MCNC: 7.7 MG/DL (ref 6–20)
CALCIUM SERPL-MCNC: 8.9 MG/DL (ref 8.6–10)
CHLORIDE SERPL-SCNC: 106 MMOL/L (ref 98–107)
CREAT SERPL-MCNC: 0.47 MG/DL (ref 0.51–0.95)
DEPRECATED HCO3 PLAS-SCNC: 24 MMOL/L (ref 22–29)
EGFRCR SERPLBLD CKD-EPI 2021: >90 ML/MIN/1.73M2
EOSINOPHIL # BLD AUTO: 0.1 10E3/UL (ref 0–0.7)
EOSINOPHIL NFR BLD AUTO: 2 %
ERYTHROCYTE [DISTWIDTH] IN BLOOD BY AUTOMATED COUNT: ABNORMAL %
FERRITIN SERPL-MCNC: 4341 NG/ML (ref 11–328)
GLUCOSE SERPL-MCNC: 107 MG/DL (ref 70–99)
HAV IGM SERPL QL IA: NONREACTIVE
HBV CORE IGM SERPL QL IA: NONREACTIVE
HBV SURFACE AG SERPL QL IA: NONREACTIVE
HCT VFR BLD AUTO: 26.2 % (ref 35–47)
HCV AB SERPL QL IA: NONREACTIVE
HGB BLD-MCNC: 8.9 G/DL (ref 11.7–15.7)
IMM GRANULOCYTES # BLD: 0.1 10E3/UL
IMM GRANULOCYTES NFR BLD: 1 %
IRON BINDING CAPACITY (ROCHE): ABNORMAL
IRON SATN MFR SERPL: ABNORMAL %
IRON SERPL-MCNC: 220 UG/DL (ref 37–145)
LYMPHOCYTES # BLD AUTO: 1.7 10E3/UL (ref 0.8–5.3)
LYMPHOCYTES NFR BLD AUTO: 30 %
MCH RBC QN AUTO: 36.6 PG (ref 26.5–33)
MCHC RBC AUTO-ENTMCNC: 34 G/DL (ref 31.5–36.5)
MCV RBC AUTO: 108 FL (ref 78–100)
MONOCYTES # BLD AUTO: 0.6 10E3/UL (ref 0–1.3)
MONOCYTES NFR BLD AUTO: 12 %
NEUTROPHILS # BLD AUTO: 3 10E3/UL (ref 1.6–8.3)
NEUTROPHILS NFR BLD AUTO: 54 %
NRBC # BLD AUTO: 0.1 10E3/UL
NRBC BLD AUTO-RTO: 1 /100
PLATELET # BLD AUTO: 199 10E3/UL (ref 150–450)
POTASSIUM SERPL-SCNC: 4.1 MMOL/L (ref 3.4–5.3)
PROT SERPL-MCNC: 6.6 G/DL (ref 6.4–8.3)
PROT SERPL-MCNC: 6.8 G/DL (ref 6.4–8.3)
RBC # BLD AUTO: 2.43 10E6/UL (ref 3.8–5.2)
SARS-COV-2 RNA RESP QL NAA+PROBE: NEGATIVE
SODIUM SERPL-SCNC: 140 MMOL/L (ref 135–145)
WBC # BLD AUTO: 5.5 10E3/UL (ref 4–11)

## 2024-01-02 PROCEDURE — 83550 IRON BINDING TEST: CPT | Performed by: STUDENT IN AN ORGANIZED HEALTH CARE EDUCATION/TRAINING PROGRAM

## 2024-01-02 PROCEDURE — 82247 BILIRUBIN TOTAL: CPT | Performed by: STUDENT IN AN ORGANIZED HEALTH CARE EDUCATION/TRAINING PROGRAM

## 2024-01-02 PROCEDURE — 99214 OFFICE O/P EST MOD 30 MIN: CPT | Performed by: STUDENT IN AN ORGANIZED HEALTH CARE EDUCATION/TRAINING PROGRAM

## 2024-01-02 PROCEDURE — 84155 ASSAY OF PROTEIN SERUM: CPT | Performed by: STUDENT IN AN ORGANIZED HEALTH CARE EDUCATION/TRAINING PROGRAM

## 2024-01-02 PROCEDURE — 80074 ACUTE HEPATITIS PANEL: CPT | Performed by: STUDENT IN AN ORGANIZED HEALTH CARE EDUCATION/TRAINING PROGRAM

## 2024-01-02 PROCEDURE — 36415 COLL VENOUS BLD VENIPUNCTURE: CPT | Performed by: STUDENT IN AN ORGANIZED HEALTH CARE EDUCATION/TRAINING PROGRAM

## 2024-01-02 PROCEDURE — 87635 SARS-COV-2 COVID-19 AMP PRB: CPT | Performed by: STUDENT IN AN ORGANIZED HEALTH CARE EDUCATION/TRAINING PROGRAM

## 2024-01-02 PROCEDURE — 82728 ASSAY OF FERRITIN: CPT | Performed by: STUDENT IN AN ORGANIZED HEALTH CARE EDUCATION/TRAINING PROGRAM

## 2024-01-02 PROCEDURE — 83540 ASSAY OF IRON: CPT | Performed by: STUDENT IN AN ORGANIZED HEALTH CARE EDUCATION/TRAINING PROGRAM

## 2024-01-02 PROCEDURE — 99213 OFFICE O/P EST LOW 20 MIN: CPT | Performed by: STUDENT IN AN ORGANIZED HEALTH CARE EDUCATION/TRAINING PROGRAM

## 2024-01-02 PROCEDURE — 84155 ASSAY OF PROTEIN SERUM: CPT | Mod: 91 | Performed by: STUDENT IN AN ORGANIZED HEALTH CARE EDUCATION/TRAINING PROGRAM

## 2024-01-02 PROCEDURE — 85025 COMPLETE CBC W/AUTO DIFF WBC: CPT | Performed by: STUDENT IN AN ORGANIZED HEALTH CARE EDUCATION/TRAINING PROGRAM

## 2024-01-02 ASSESSMENT — PAIN SCALES - GENERAL: PAINLEVEL: MILD PAIN (2)

## 2024-01-02 NOTE — LETTER
"    1/2/2024         RE: Hortencia Baldwin  [address]  [address]  [facility] [address]        Dear Colleague,    Thank you for referring your patient, Hortencia Baldwin, to the [facility] BLOOD [facility]OW [facility]ANS[facility] PROGRAM [place]. Please see a copy of my visit note below.        BMT / Cell Therapy Follow Up     Interval History  Irma presents to clinic for a follow up visit. Please see the note from 12/26 for close visit. She is here for repeat labs, including COVID test and to sign consent forms.   She states that her URI symptoms are significantly improved. She does not have a runny nose or sinus congestion. She continues to have an occasional dry cough. No other new complaints.     Review of Systems: Negative except as mentioned above    Medications  None      Allergies   Allergies   Allergen Reactions    Acetaminophen Other (See Comments)     Avoid acetaminophen 72h before busulfan until 72h after busulfan.  \"Allergy\" can be removed 72h after busulfan.           Physical Exam     Vital Signs: /83 (BP Location: Left arm, Patient Position: Sitting, Cuff Size: Adult Regular)   Pulse 78   Temp 98.4  F (36.9  C) (Oral)   Resp 16   LMP 11/05/2017   SpO2 99%     General: Alert, no distress  Eyes: Conjunctiva normal  Respiratory: Normal respiratory effort.  Cardiovascular: Normal perfusion, no significant edema.  Neurological: Grossly normal.  Psych: Mood and affect are appropriate.    Assessment and Plan     MDS with del20q, ASXL1 and SF3B1 mutations, low risk, transfusion dependant  - Planned for allogenic transplant:  6/8 unrelated donor, peripheral blood stem cell graft, ABO matched, CMV donor negative, recipient positive.   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 Okay to proceed since the Mfi is low and only against one allele.  - Since she has a 6/8 donor, she would be transplanted according to [id number] but not on trial.  - Prep regimen: Flu/Bu   - GVHD ppx: PTCy on " D+3 and D+4, MMF (till D35) and TACROLIMUS (avoiding sirolimus due to high risk of VOD)  - Above discussed with  and Dr. Paez.     URI due to COVID  - Tested COVID positive on 12/26/23. Symptoms have now resolved. Repeat COVID test on 1/2/24 is negative.      Elevated liver enzymes  - Transaminases improved today compared to prior, though ALT still 2.5x baseline.   - RUQ US (12/27) was normal. Elevated ferritin and transfusion dependence, likely has a component of iron overload. Acute hepatitis panel was negative. LFTs may have been elevated transiently in the setting of acute viral illness (Covid) on 12/27/23    Iron overload  - Ferritin checked pre-transplant is around 5000.  - Phlebotomy post transplant when retic count is restored and Hb > 10g/dL    Infectious disease prophylaxis  - Micafungin high dose till D+45 (elevated ferritin and current smoking)      I spent 30 minutes in the care of this patient today, which included time necessary for preparation for the visit, obtaining history, ordering medications/tests/procedures as medically indicated, review of pertinent medical literature, counseling of the patient, communication of recommendations to the care team, and documentation time.     Ccoo Garay  Department of Hematology, Oncology and Transplantation  [employer] Pager 1300/Text via George

## 2024-01-02 NOTE — NURSING NOTE
"Oncology Rooming Note    January 2, 2024 3:31 PM   Hortencia Baldwin is a 53 year old female who presents for:    Chief Complaint   Patient presents with    Blood Draw     Labs drawn via  by RN in lab.     Oncology Clinic Visit     MDS     Initial Vitals: /83 (BP Location: Left arm, Patient Position: Sitting, Cuff Size: Adult Regular)   Pulse 78   Temp 98.4  F (36.9  C) (Oral)   Resp 16   LMP 11/05/2017   SpO2 99%  Estimated body mass index is 34.08 kg/m  as calculated from the following:    Height as of 11/7/23: 1.562 m (5' 1.5\").    Weight as of 12/26/23: 83.1 kg (183 lb 4.8 oz). There is no height or weight on file to calculate BSA.  Mild Pain (2) Comment: 2   Patient's last menstrual period was 11/05/2017.  Allergies reviewed: Yes  Medications reviewed: Yes    Medications: Medication refills not needed today.  Pharmacy name entered into [facility]:    [id number] - [place], [place] - [address] AT [facility] [address] [facility]  [id number] - [place], [place] - [address] AT [facility] [address]    Frailty Screening:   Is the patient here for a new oncology consult visit in cancer care? 2. No      Clinical concerns: None       Tiffany Boles LPN  1/2/2024              "

## 2024-01-02 NOTE — NURSING NOTE
Chief Complaint   Patient presents with    Blood Draw     Labs drawn via  by RN in lab.      Dari Rodas RN

## 2024-01-02 NOTE — PROGRESS NOTES
"    BMT / Cell Therapy Follow Up     Interval History  Irma presents to clinic for a follow up visit. Please see the note from 12/26 for close visit. She is here for repeat labs, including COVID test and to sign consent forms.   She states that her URI symptoms are significantly improved. She does not have a runny nose or sinus congestion. She continues to have an occasional dry cough. No other new complaints.     Review of Systems: Negative except as mentioned above    Medications   None      Allergies    Allergies   Allergen Reactions    Acetaminophen Other (See Comments)     Avoid acetaminophen 72h before busulfan until 72h after busulfan.  \"Allergy\" can be removed 72h after busulfan.           Physical Exam     Vital Signs: /83 (BP Location: Left arm, Patient Position: Sitting, Cuff Size: Adult Regular)   Pulse 78   Temp 98.4  F (36.9  C) (Oral)   Resp 16   LMP 11/05/2017   SpO2 99%     General: Alert, no distress  Eyes: Conjunctiva normal  Respiratory: Normal respiratory effort.  Cardiovascular: Normal perfusion, no significant edema.  Neurological: Grossly normal.  Psych: Mood and affect are appropriate.    Assessment and Plan     MDS with del20q, ASXL1 and SF3B1 mutations, low risk, transfusion dependant  - Planned for allogenic transplant:  6/8 unrelated donor, peripheral blood stem cell graft, ABO matched, CMV donor negative, recipient positive.   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 Okay to proceed since the Mfi is low and only against one allele.  - Since she has a 6/8 donor, she would be transplanted according to [id number] but not on trial.  - Prep regimen: Flu/Bu   - GVHD ppx: PTCy on D+3 and D+4, MMF (till D35) and TACROLIMUS (avoiding sirolimus due to high risk of VOD)  - Above discussed with  and Dr. Paez.     URI due to COVID  - Tested COVID positive on 12/26/23. Symptoms have now resolved. Repeat COVID test on 1/2/24 is negative.      Elevated liver " enzymes  - Transaminases improved today compared to prior, though ALT still 2.5x baseline.   - RUQ US (12/27) was normal. Elevated ferritin and transfusion dependence, likely has a component of iron overload. Acute hepatitis panel was negative. LFTs may have been elevated transiently in the setting of acute viral illness (Covid) on 12/27/23    Iron overload  - Ferritin checked pre-transplant is around 5000.  - Phlebotomy post transplant when retic count is restored and Hb > 10g/dL    Infectious disease prophylaxis  - Micafungin high dose till D+45 (elevated ferritin and current smoking)      I spent 30 minutes in the care of this patient today, which included time necessary for preparation for the visit, obtaining history, ordering medications/tests/procedures as medically indicated, review of pertinent medical literature, counseling of the patient, communication of recommendations to the care team, and documentation time.     Coco Garay  Department of Hematology, Oncology and Transplantation  [facility] Pager 1300/Text via George

## 2024-01-03 ENCOUNTER — TELEPHONE (OUTPATIENT)
Dept: NURSING | Facility: CLINIC | Age: 54
End: 2024-01-03
Payer: COMMERCIAL

## 2024-01-03 ENCOUNTER — LAB (OUTPATIENT)
Dept: LAB | Facility: CLINIC | Age: 54
End: 2024-01-03
Payer: COMMERCIAL

## 2024-01-03 DIAGNOSIS — C94.6 MYELODYSPLASTIC DISEASE (H): Primary | ICD-10-CM

## 2024-01-03 DIAGNOSIS — D46.9 MDS (MYELODYSPLASTIC SYNDROME) (H): Primary | ICD-10-CM

## 2024-01-03 DIAGNOSIS — Z86.2 PERSONAL HISTORY OF DISEASES OF BLOOD AND BLOOD-FORMING ORGANS: ICD-10-CM

## 2024-01-03 PROCEDURE — 88321 CONSLTJ&REPRT SLD PREP ELSWR: CPT | Performed by: STUDENT IN AN ORGANIZED HEALTH CARE EDUCATION/TRAINING PROGRAM

## 2024-01-04 ENCOUNTER — TELEPHONE (OUTPATIENT)
Dept: INTERVENTIONAL RADIOLOGY/VASCULAR | Facility: CLINIC | Age: 54
End: 2024-01-04
Payer: COMMERCIAL

## 2024-01-04 ENCOUNTER — CARE COORDINATION (OUTPATIENT)
Dept: TRANSPLANT | Facility: CLINIC | Age: 54
End: 2024-01-04
Payer: COMMERCIAL

## 2024-01-04 NOTE — TELEPHONE ENCOUNTER
Writer has spoken with Hortencia Sethi regarding planned procedure with IR via telephone.      Hortencia Sethi  acknowledges understanding of pre-procedure instructions.         I have provided Hortencia Sethi with IR number ([phone]) for questions or concerns.    Iliana ZAMORA  Interventional Radiology RN   [phone]

## 2024-01-04 NOTE — TELEPHONE ENCOUNTER
INTERVENTIONAL RADIOLOGY INSTRUCTIONS     You are scheduled for an upcoming procedure in the   Interventional Radiology Department at [facility].     Date: Friday January 5      Procedure: Placement of Tunneled Central Venous Catheter     Address: [facility]                 [address] [place] [address]         There is  parking for patients with limited mobility located at the front entrance of [facility].    [facility] Patient/Visitor Parking Ramp   is located on the corner of [address]:   [address] [place] [address].                                                                                   [place] [phone]                                                                                                         Check into the [facility] Waiting Room at: 07:30 am    On the date of procedure, please do not eat any food after: 11:30 pm on Thursday evening January 4    On the date of this procedure, you may drink clear liquids until 05:30 am     Clear liquid examples are: water, apple juice, black coffee or tea (no cream, sugar or milk), Gatorade & Jello.    Please take all of your medications as prescribed on the day of this procedure, unless you are contacted by a nurse from our department to hold.    Please take a shower on Thursday evening and Friday morning.     After your shower, using a clean damp washcloth, place 1-2 ounces of a surgical soap on the washcloth.     Apply the surgical soap to your chest (nipple line to ear lobe on both sides of your chest and neck) and leave on your skin for 5 minutes, then rinse off completely.     Put on clean clothes.     If you do not have surgical soap, you may purchase it from any pharmacy ([facility], [facility], [facility]).     One common brand is Hibiclens.      Please confirm that you have received these instructions  by replying to this [employer] message, or if you have any questions, please call the Interventional Radiology team at [phone].         Thank you!    Iliana ZAMORA  Interventional Radiology Intake Nurse Coordinator  [phone]

## 2024-01-04 NOTE — PROGRESS NOTES
Inpatient Admission Information:      Admit Date:  1/5/2024   Diagnosis:  MDS   Transplant Type:  Allo URD   Protocol:  [id number] ATBNO   Sedated bmbx needed?  No   NMDP lab (lab 7033) needed?  Yes  **If YES, JEN to please order with admission labs.  **If YES, this lab MUST BE DRAWN PRIOR TO ANY BMT PREP (chemo/TBI).   Notes:  COVID positive on 12/26, negative on 1/2       New Eval Work-Up   MD Kd Luis Bobbi         Consult Type Date   1     2     3           Long Term Follow-Up   MD Jarrod Dupree      EOC updated? Yes  Care team updated? Yes     Notified patient of admission to hospital on 1/5 for planned Allogeneic transplant.  Irma understands to check-in to the [facility] Waiting Room at 7:30am with a line time of 9:00am.  Via teachback, patient reiterated the instructions provided by writer which included  pre-procedure instructions for the line placement.  Irma verbalized that she has been feeling well, no symptoms of concern at this time.  Patient has no questions or concerns.   Bobbi Ruth, RN, MSN  BMT Nurse Coordinator  Phone: [phone]

## 2024-01-05 ENCOUNTER — APPOINTMENT (OUTPATIENT)
Dept: MEDSURG UNIT | Facility: CLINIC | Age: 54
DRG: 014 | End: 2024-01-05
Attending: STUDENT IN AN ORGANIZED HEALTH CARE EDUCATION/TRAINING PROGRAM
Payer: COMMERCIAL

## 2024-01-05 ENCOUNTER — APPOINTMENT (OUTPATIENT)
Dept: INTERVENTIONAL RADIOLOGY/VASCULAR | Facility: CLINIC | Age: 54
DRG: 014 | End: 2024-01-05
Attending: STUDENT IN AN ORGANIZED HEALTH CARE EDUCATION/TRAINING PROGRAM
Payer: COMMERCIAL

## 2024-01-05 ENCOUNTER — HOSPITAL ENCOUNTER (INPATIENT)
Facility: CLINIC | Age: 54
LOS: 30 days | Discharge: HOME | DRG: 014 | End: 2024-02-04
Attending: STUDENT IN AN ORGANIZED HEALTH CARE EDUCATION/TRAINING PROGRAM | Admitting: INTERNAL MEDICINE
Payer: COMMERCIAL

## 2024-01-05 DIAGNOSIS — D46.9 MDS (MYELODYSPLASTIC SYNDROME) (H): Primary | ICD-10-CM

## 2024-01-05 LAB
ABO/RH(D): NORMAL
ALBUMIN SERPL BCG-MCNC: 3.8 G/DL (ref 3.5–5.2)
ALP SERPL-CCNC: 132 U/L (ref 40–150)
ALT SERPL W P-5'-P-CCNC: 88 U/L (ref 0–50)
ANION GAP SERPL CALCULATED.3IONS-SCNC: 10 MMOL/L (ref 7–15)
ANTIBODY SCREEN: NEGATIVE
APTT PPP: 28 SECONDS (ref 22–38)
AST SERPL W P-5'-P-CCNC: 43 U/L (ref 0–45)
BASOPHILS # BLD AUTO: 0 10E3/UL (ref 0–0.2)
BASOPHILS NFR BLD AUTO: 1 %
BILIRUB SERPL-MCNC: 0.5 MG/DL
BUN SERPL-MCNC: 8.3 MG/DL (ref 6–20)
CALCIUM SERPL-MCNC: 8.7 MG/DL (ref 8.6–10)
CHLORIDE SERPL-SCNC: 105 MMOL/L (ref 98–107)
CREAT SERPL-MCNC: 0.47 MG/DL (ref 0.51–0.95)
DEPRECATED HCO3 PLAS-SCNC: 26 MMOL/L (ref 22–29)
EGFRCR SERPLBLD CKD-EPI 2021: >90 ML/MIN/1.73M2
EOSINOPHIL # BLD AUTO: 0.1 10E3/UL (ref 0–0.7)
EOSINOPHIL NFR BLD AUTO: 2 %
ERYTHROCYTE [DISTWIDTH] IN BLOOD BY AUTOMATED COUNT: ABNORMAL %
GLUCOSE SERPL-MCNC: 134 MG/DL (ref 70–99)
HCT VFR BLD AUTO: 24 % (ref 35–47)
HGB BLD-MCNC: 8.1 G/DL (ref 11.7–15.7)
IMM GRANULOCYTES # BLD: 0 10E3/UL
IMM GRANULOCYTES NFR BLD: 1 %
INR PPP: 1.01 (ref 0.85–1.15)
LYMPHOCYTES # BLD AUTO: 1.6 10E3/UL (ref 0.8–5.3)
LYMPHOCYTES NFR BLD AUTO: 35 %
MAGNESIUM SERPL-MCNC: 1.8 MG/DL (ref 1.7–2.3)
MCH RBC QN AUTO: 38 PG (ref 26.5–33)
MCHC RBC AUTO-ENTMCNC: 33.8 G/DL (ref 31.5–36.5)
MCV RBC AUTO: 113 FL (ref 78–100)
MONOCYTES # BLD AUTO: 0.5 10E3/UL (ref 0–1.3)
MONOCYTES NFR BLD AUTO: 10 %
NEUTROPHILS # BLD AUTO: 2.3 10E3/UL (ref 1.6–8.3)
NEUTROPHILS NFR BLD AUTO: 51 %
NRBC # BLD AUTO: 0 10E3/UL
NRBC BLD AUTO-RTO: 1 /100
PLATELET # BLD AUTO: 154 10E3/UL (ref 150–450)
POTASSIUM SERPL-SCNC: 3.5 MMOL/L (ref 3.4–5.3)
PROT SERPL-MCNC: 5.9 G/DL (ref 6.4–8.3)
RBC # BLD AUTO: 2.13 10E6/UL (ref 3.8–5.2)
SODIUM SERPL-SCNC: 141 MMOL/L (ref 135–145)
SPECIMEN EXPIRATION DATE: NORMAL
SPECIMEN STATUS: NORMAL
URATE SERPL-MCNC: 4.8 MG/DL (ref 2.4–5.7)
WBC # BLD AUTO: 4.4 10E3/UL (ref 4–11)

## 2024-01-05 PROCEDURE — 76937 US GUIDE VASCULAR ACCESS: CPT | Mod: 26 | Performed by: PHYSICIAN ASSISTANT

## 2024-01-05 PROCEDURE — 99152 MOD SED SAME PHYS/QHP 5/>YRS: CPT | Performed by: PHYSICIAN ASSISTANT

## 2024-01-05 PROCEDURE — 999N000142 HC STATISTIC PROCEDURE PREP ONLY

## 2024-01-05 PROCEDURE — 250N000013 HC RX MED GY IP 250 OP 250 PS 637

## 2024-01-05 PROCEDURE — 83735 ASSAY OF MAGNESIUM: CPT

## 2024-01-05 PROCEDURE — 85610 PROTHROMBIN TIME: CPT

## 2024-01-05 PROCEDURE — 99152 MOD SED SAME PHYS/QHP 5/>YRS: CPT

## 2024-01-05 PROCEDURE — 206N000001 HC R&B BMT UMMC

## 2024-01-05 PROCEDURE — 87081 CULTURE SCREEN ONLY: CPT

## 2024-01-05 PROCEDURE — 250N000012 HC RX MED GY IP 250 OP 636 PS 637

## 2024-01-05 PROCEDURE — 258N000003 HC RX IP 258 OP 636

## 2024-01-05 PROCEDURE — 250N000009 HC RX 250: Performed by: PHYSICIAN ASSISTANT

## 2024-01-05 PROCEDURE — 99418 PROLNG IP/OBS E/M EA 15 MIN: CPT | Mod: AI

## 2024-01-05 PROCEDURE — 99153 MOD SED SAME PHYS/QHP EA: CPT

## 2024-01-05 PROCEDURE — 80053 COMPREHEN METABOLIC PANEL: CPT

## 2024-01-05 PROCEDURE — C1750 CATH, HEMODIALYSIS,LONG-TERM: HCPCS

## 2024-01-05 PROCEDURE — 0JH63XZ INSERTION OF TUNNELED VASCULAR ACCESS DEVICE INTO CHEST SUBCUTANEOUS TISSUE AND FASCIA, PERCUTANEOUS APPROACH: ICD-10-PCS | Performed by: PHYSICIAN ASSISTANT

## 2024-01-05 PROCEDURE — 272N000504 HC NEEDLE CR4

## 2024-01-05 PROCEDURE — C1751 CATH, INF, PER/CENT/MIDLINE: HCPCS

## 2024-01-05 PROCEDURE — 250N000011 HC RX IP 250 OP 636

## 2024-01-05 PROCEDURE — 85730 THROMBOPLASTIN TIME PARTIAL: CPT

## 2024-01-05 PROCEDURE — 999N000128 HC STATISTIC PERIPHERAL IV START W/O US GUIDANCE

## 2024-01-05 PROCEDURE — 999N000132 HC STATISTIC PP CARE STAGE 1

## 2024-01-05 PROCEDURE — 272N000192 HC ACCESSORY CR2

## 2024-01-05 PROCEDURE — 250N000011 HC RX IP 250 OP 636: Performed by: NURSE PRACTITIONER

## 2024-01-05 PROCEDURE — 99223 1ST HOSP IP/OBS HIGH 75: CPT | Mod: AI

## 2024-01-05 PROCEDURE — 84550 ASSAY OF BLOOD/URIC ACID: CPT

## 2024-01-05 PROCEDURE — 258N000003 HC RX IP 258 OP 636: Performed by: NURSE PRACTITIONER

## 2024-01-05 PROCEDURE — 85025 COMPLETE CBC W/AUTO DIFF WBC: CPT

## 2024-01-05 PROCEDURE — 77001 FLUOROGUIDE FOR VEIN DEVICE: CPT | Mod: 26 | Performed by: PHYSICIAN ASSISTANT

## 2024-01-05 PROCEDURE — 250N000011 HC RX IP 250 OP 636: Performed by: PHYSICIAN ASSISTANT

## 2024-01-05 PROCEDURE — 36558 INSERT TUNNELED CV CATH: CPT | Performed by: PHYSICIAN ASSISTANT

## 2024-01-05 PROCEDURE — 02H633Z INSERTION OF INFUSION DEVICE INTO RIGHT ATRIUM, PERCUTANEOUS APPROACH: ICD-10-PCS | Performed by: PHYSICIAN ASSISTANT

## 2024-01-05 PROCEDURE — C1769 GUIDE WIRE: HCPCS

## 2024-01-05 PROCEDURE — 86900 BLOOD TYPING SEROLOGIC ABO: CPT

## 2024-01-05 RX ORDER — DEXTROSE MONOHYDRATE 50 MG/ML
10-20 INJECTION, SOLUTION INTRAVENOUS
Status: DISCONTINUED | OUTPATIENT
Start: 2024-01-16 | End: 2024-01-27

## 2024-01-05 RX ORDER — NALOXONE HYDROCHLORIDE 0.4 MG/ML
0.2 INJECTION, SOLUTION INTRAMUSCULAR; INTRAVENOUS; SUBCUTANEOUS
Status: DISCONTINUED | OUTPATIENT
Start: 2024-01-05 | End: 2024-01-05

## 2024-01-05 RX ORDER — LEVOFLOXACIN 250 MG/1
250 TABLET, FILM COATED ORAL
Status: DISCONTINUED | OUTPATIENT
Start: 2024-01-10 | End: 2024-01-18

## 2024-01-05 RX ORDER — MEPERIDINE HYDROCHLORIDE 25 MG/ML
25-50 INJECTION INTRAMUSCULAR; INTRAVENOUS; SUBCUTANEOUS
Status: ACTIVE | OUTPATIENT
Start: 2024-01-11 | End: 2024-01-12

## 2024-01-05 RX ORDER — CEFEPIME HYDROCHLORIDE 2 G/1
2 INJECTION, POWDER, FOR SOLUTION INTRAVENOUS
Status: DISCONTINUED | OUTPATIENT
Start: 2024-01-05 | End: 2024-01-12 | Stop reason: ALTCHOICE

## 2024-01-05 RX ORDER — ACETAMINOPHEN 500 MG
500 TABLET ORAL ONCE
Status: COMPLETED | OUTPATIENT
Start: 2024-01-11 | End: 2024-01-11

## 2024-01-05 RX ORDER — LORAZEPAM 0.5 MG/1
.5-1 TABLET ORAL EVERY 4 HOURS PRN
Status: DISCONTINUED | OUTPATIENT
Start: 2024-01-05 | End: 2024-02-04 | Stop reason: HOSPADM

## 2024-01-05 RX ORDER — FLUMAZENIL 0.1 MG/ML
0.2 INJECTION, SOLUTION INTRAVENOUS
Status: DISCONTINUED | OUTPATIENT
Start: 2024-01-05 | End: 2024-01-05

## 2024-01-05 RX ORDER — NALOXONE HYDROCHLORIDE 0.4 MG/ML
0.2 INJECTION, SOLUTION INTRAMUSCULAR; INTRAVENOUS; SUBCUTANEOUS
Status: DISCONTINUED | OUTPATIENT
Start: 2024-01-05 | End: 2024-02-04 | Stop reason: HOSPADM

## 2024-01-05 RX ORDER — ACETAMINOPHEN 325 MG/1
650 TABLET ORAL EVERY 4 HOURS PRN
Status: DISCONTINUED | OUTPATIENT
Start: 2024-01-12 | End: 2024-01-12

## 2024-01-05 RX ORDER — ACYCLOVIR 800 MG/1
800 TABLET ORAL
Status: DISCONTINUED | OUTPATIENT
Start: 2024-01-07 | End: 2024-01-25

## 2024-01-05 RX ORDER — CEFAZOLIN SODIUM 2 G/100ML
2 INJECTION, SOLUTION INTRAVENOUS
Status: COMPLETED | OUTPATIENT
Start: 2024-01-05 | End: 2024-01-05

## 2024-01-05 RX ORDER — FUROSEMIDE 10 MG/ML
20 INJECTION INTRAMUSCULAR; INTRAVENOUS EVERY 8 HOURS PRN
Status: DISPENSED | OUTPATIENT
Start: 2024-01-14 | End: 2024-01-16

## 2024-01-05 RX ORDER — PROCHLORPERAZINE MALEATE 5 MG
5 TABLET ORAL EVERY 6 HOURS PRN
Status: DISCONTINUED | OUTPATIENT
Start: 2024-01-05 | End: 2024-01-27

## 2024-01-05 RX ORDER — URSODIOL 300 MG/1
300 CAPSULE ORAL 3 TIMES DAILY
Status: DISCONTINUED | OUTPATIENT
Start: 2024-01-05 | End: 2024-01-23

## 2024-01-05 RX ORDER — NALOXONE HYDROCHLORIDE 0.4 MG/ML
0.4 INJECTION, SOLUTION INTRAMUSCULAR; INTRAVENOUS; SUBCUTANEOUS
Status: DISCONTINUED | OUTPATIENT
Start: 2024-01-05 | End: 2024-01-05

## 2024-01-05 RX ORDER — NALOXONE HYDROCHLORIDE 0.4 MG/ML
0.4 INJECTION, SOLUTION INTRAMUSCULAR; INTRAVENOUS; SUBCUTANEOUS
Status: DISCONTINUED | OUTPATIENT
Start: 2024-01-05 | End: 2024-02-04 | Stop reason: HOSPADM

## 2024-01-05 RX ORDER — ONDANSETRON 8 MG/1
8 TABLET, FILM COATED ORAL EVERY 8 HOURS
Status: COMPLETED | OUTPATIENT
Start: 2024-01-14 | End: 2024-01-18

## 2024-01-05 RX ORDER — PANTOPRAZOLE SODIUM 40 MG/1
40 TABLET, DELAYED RELEASE ORAL DAILY
Status: DISCONTINUED | OUTPATIENT
Start: 2024-01-05 | End: 2024-01-19

## 2024-01-05 RX ORDER — DEXAMETHASONE 4 MG/1
8 TABLET ORAL ONCE
Qty: 2 TABLET | Refills: 0 | Status: COMPLETED | OUTPATIENT
Start: 2024-01-09 | End: 2024-01-09

## 2024-01-05 RX ORDER — ONDANSETRON 8 MG/1
8 TABLET, FILM COATED ORAL EVERY 8 HOURS
Status: COMPLETED | OUTPATIENT
Start: 2024-01-05 | End: 2024-01-11

## 2024-01-05 RX ORDER — OXYCODONE HYDROCHLORIDE 5 MG/1
5 TABLET ORAL EVERY 4 HOURS PRN
Status: DISCONTINUED | OUTPATIENT
Start: 2024-01-05 | End: 2024-01-20

## 2024-01-05 RX ORDER — HEPARIN SODIUM (PORCINE) LOCK FLUSH IV SOLN 100 UNIT/ML 100 UNIT/ML
5 SOLUTION INTRAVENOUS
Status: COMPLETED | OUTPATIENT
Start: 2024-01-05 | End: 2024-01-05

## 2024-01-05 RX ORDER — SODIUM CHLORIDE 9 MG/ML
INJECTION, SOLUTION INTRAVENOUS CONTINUOUS
Status: DISCONTINUED | OUTPATIENT
Start: 2024-01-05 | End: 2024-01-16

## 2024-01-05 RX ORDER — LEVETIRACETAM 500 MG/1
500 TABLET ORAL 2 TIMES DAILY
Status: COMPLETED | OUTPATIENT
Start: 2024-01-05 | End: 2024-01-10

## 2024-01-05 RX ORDER — SULFAMETHOXAZOLE/TRIMETHOPRIM 800-160 MG
1 TABLET ORAL
Status: DISCONTINUED | OUTPATIENT
Start: 2024-02-12 | End: 2024-02-04 | Stop reason: HOSPADM

## 2024-01-05 RX ORDER — LORAZEPAM 2 MG/ML
.5-1 INJECTION INTRAMUSCULAR EVERY 4 HOURS PRN
Status: DISCONTINUED | OUTPATIENT
Start: 2024-01-05 | End: 2024-02-04 | Stop reason: HOSPADM

## 2024-01-05 RX ORDER — FUROSEMIDE 10 MG/ML
10 INJECTION INTRAMUSCULAR; INTRAVENOUS
Status: DISPENSED | OUTPATIENT
Start: 2024-01-14 | End: 2024-01-16

## 2024-01-05 RX ORDER — ACYCLOVIR 800 MG/1
800 TABLET ORAL 2 TIMES DAILY
Status: DISCONTINUED | OUTPATIENT
Start: 2024-01-25 | End: 2024-01-29

## 2024-01-05 RX ORDER — FENTANYL CITRATE 50 UG/ML
25-50 INJECTION, SOLUTION INTRAMUSCULAR; INTRAVENOUS EVERY 5 MIN PRN
Status: DISCONTINUED | OUTPATIENT
Start: 2024-01-05 | End: 2024-01-05

## 2024-01-05 RX ORDER — ALLOPURINOL 300 MG/1
300 TABLET ORAL DAILY
Qty: 6 TABLET | Refills: 0 | Status: COMPLETED | OUTPATIENT
Start: 2024-01-05 | End: 2024-01-10

## 2024-01-05 RX ORDER — LIDOCAINE 40 MG/G
CREAM TOPICAL
Status: DISCONTINUED | OUTPATIENT
Start: 2024-01-05 | End: 2024-01-05

## 2024-01-05 RX ORDER — DIPHENHYDRAMINE HCL 25 MG
25 CAPSULE ORAL ONCE
Status: COMPLETED | OUTPATIENT
Start: 2024-01-11 | End: 2024-01-11

## 2024-01-05 RX ORDER — HEPARIN SODIUM,PORCINE 10 UNIT/ML
5 VIAL (ML) INTRAVENOUS
Status: DISCONTINUED | OUTPATIENT
Start: 2024-01-05 | End: 2024-02-04 | Stop reason: HOSPADM

## 2024-01-05 RX ADMIN — OXYCODONE HYDROCHLORIDE 5 MG: 5 TABLET ORAL at 15:36

## 2024-01-05 RX ADMIN — OXYCODONE HYDROCHLORIDE 5 MG: 5 TABLET ORAL at 20:27

## 2024-01-05 RX ADMIN — SODIUM CHLORIDE: 9 INJECTION, SOLUTION INTRAVENOUS at 08:58

## 2024-01-05 RX ADMIN — PANTOPRAZOLE SODIUM 40 MG: 40 TABLET, DELAYED RELEASE ORAL at 11:44

## 2024-01-05 RX ADMIN — TRAMADOL HYDROCHLORIDE 25 MG: 50 TABLET, COATED ORAL at 13:37

## 2024-01-05 RX ADMIN — URSODIOL 300 MG: 300 CAPSULE ORAL at 20:27

## 2024-01-05 RX ADMIN — MICAFUNGIN SODIUM 150 MG: 50 INJECTION, POWDER, LYOPHILIZED, FOR SOLUTION INTRAVENOUS at 17:40

## 2024-01-05 RX ADMIN — MIDAZOLAM 1 MG: 1 INJECTION INTRAMUSCULAR; INTRAVENOUS at 09:38

## 2024-01-05 RX ADMIN — ALLOPURINOL 300 MG: 300 TABLET ORAL at 15:36

## 2024-01-05 RX ADMIN — Medication: at 17:40

## 2024-01-05 RX ADMIN — Medication 5 ML: at 09:37

## 2024-01-05 RX ADMIN — CEFAZOLIN SODIUM 2 G: 2 INJECTION, SOLUTION INTRAVENOUS at 08:57

## 2024-01-05 RX ADMIN — Medication 5 ML: at 11:49

## 2024-01-05 RX ADMIN — MIDAZOLAM 1 MG: 1 INJECTION INTRAMUSCULAR; INTRAVENOUS at 09:17

## 2024-01-05 RX ADMIN — ONDANSETRON HYDROCHLORIDE 8 MG: 8 TABLET, FILM COATED ORAL at 23:24

## 2024-01-05 RX ADMIN — FENTANYL CITRATE 50 MCG: 50 INJECTION, SOLUTION INTRAMUSCULAR; INTRAVENOUS at 09:16

## 2024-01-05 RX ADMIN — LEVETIRACETAM 500 MG: 500 TABLET, FILM COATED ORAL at 20:27

## 2024-01-05 RX ADMIN — FENTANYL CITRATE 50 MCG: 50 INJECTION, SOLUTION INTRAMUSCULAR; INTRAVENOUS at 09:32

## 2024-01-05 RX ADMIN — LIDOCAINE HYDROCHLORIDE 30 ML: 10 INJECTION, SOLUTION EPIDURAL; INFILTRATION; INTRACAUDAL; PERINEURAL at 09:16

## 2024-01-05 RX ADMIN — Medication 5 ML: at 17:40

## 2024-01-05 RX ADMIN — URSODIOL 300 MG: 300 CAPSULE ORAL at 14:59

## 2024-01-05 RX ADMIN — DEXAMETHASONE 10 MG: 2 TABLET ORAL at 23:24

## 2024-01-05 ASSESSMENT — ACTIVITIES OF DAILY LIVING (ADL)
ADLS_ACUITY_SCORE: 21
WEAR_GLASSES_OR_BLIND: YES
DIFFICULTY_EATING/SWALLOWING: NO
DOING_ERRANDS_INDEPENDENTLY_DIFFICULTY: NO
FALL_HISTORY_WITHIN_LAST_SIX_MONTHS: NO
ADLS_ACUITY_SCORE: 21
WALKING_OR_CLIMBING_STAIRS_DIFFICULTY: NO
HEARING_DIFFICULTY_OR_DEAF: NO
DIFFICULTY_COMMUNICATING: NO
CONCENTRATING,_REMEMBERING_OR_MAKING_DECISIONS_DIFFICULTY: YES
ADLS_ACUITY_SCORE: 35
ADLS_ACUITY_SCORE: 21
ADLS_ACUITY_SCORE: 21
CHANGE_IN_FUNCTIONAL_STATUS_SINCE_ONSET_OF_CURRENT_ILLNESS/INJURY: YES
TOILETING_ISSUES: NO
DRESSING/BATHING_DIFFICULTY: NO
ADLS_ACUITY_SCORE: 35
ADLS_ACUITY_SCORE: 21
ADLS_ACUITY_SCORE: 35

## 2024-01-05 NOTE — PLAN OF CARE
Goal Outcome Evaluation:      Plan of Care Reviewed With: patient    Overall Patient Progress: no changeOverall Patient Progress: no change    Outcome Evaluation: IDT will continue to follow and support the pt for safe d/c planning and emotional support.

## 2024-01-05 NOTE — PROCEDURES
[facility]    Procedure: IR Procedure Note    Date/Time: 1/5/2024 9:50 AM    Performed by: Cristian Del Valle PA-C  Authorized by: Cristian Del Valle PA-C      UNIVERSAL PROTOCOL   Site Marked: NA  Prior Images Obtained and Reviewed:  Yes  Required items: Required blood products, implants, devices and special equipment available    Patient identity confirmed:  Verbally with patient, arm band, provided demographic data and hospital-assigned identification number  Patient was reevaluated immediately before administering moderate or deep sedation or anesthesia  Confirmation Checklist:  Patient's identity using two indicators, relevant allergies, procedure was appropriate and matched the consent or emergent situation and correct equipment/implants were available  Time out: Immediately prior to the procedure a time out was called    Universal Protocol: the Joint Commission Universal Protocol was followed    Preparation: Patient was prepped and draped in usual sterile fashion       ANESTHESIA    Anesthesia:  Local infiltration  Local Anesthetic:  Lidocaine 1% without epinephrine  Anesthetic Total (mL):  8      SEDATION  Patient Sedated: Yes    Sedation Type:  Moderate (conscious) sedation  Sedation:  Fentanyl and midazolam  Vital signs: Vital signs monitored during sedation    Fluoroscopy Time: 0 minute(s)  See dictated procedure note for full details.  Findings: Moderate sedation for TCVC - 2 mg of midazolam and 100 mcg of fentanyl given over 30 minutes. Patient tolerate procedure well. No immediate complications.    Specimens: none    Complications: None    Condition: Stable    Plan: 1 hour bedrest      PROCEDURE  Describe Procedure: Completed image-guided placement of 9.5 Serbian, 25 cm double lumen tunneled central venous catheter via right IJ. Aspirates and flushes freely, heparin locked and ready for immediate use. Tip in high right atrium.      Patient Tolerance:   Patient tolerated the procedure well with no immediate complications  Length of time physician/provider present for 1:1 monitoring during sedation: 30

## 2024-01-05 NOTE — H&P
BMT History & Physical       Patient Demographics   Patient ID:  Hortencia Baldwin   Age:  53 year old   Sex:  female  Reason for Admission/CC: MDS  Date:  1/5/2024  Service: BMT   Informant:  Patient and Chart  Resuscitation Status: Full Code    Patient ID:  Hortencia Baldwin is a 53 year old female with a history of MDS, undergoing MA (Bu/Flu) 6/8 URD Allo transplant, day -6    Transplant Essential Data:   Diagnosis MDS    BMTCT Type ALLO    Prep Regimen Bu/Flu     Donor Match and  Source 6/8 URD    GVHD Prophylaxis PT Cy, Tac/MMF    Primary BMT MD Garay     Clinical Trials MT 2015-29 (according to but not on trial)            HPI: Irma Foreman is a 53 year old female with a PMH of MDS, warm AIHA, transfusion and transfusion dependent anemia presented to  to undergo a myeloablative allogeneic transplant. She was recently COVID+ on 12/26 but tested negative on 1/2 with resolution of nearly all URI symptoms. She reports a slight runny nose and intermittent loose stools, which she reports is her baseline. She is a current smoker but said that she is quitting as of today. Denies sinus congestion, sore throat, CP, SOB, abdominal pain, or urinary symptoms. She is nervous about chemotherapy but ready to proceed with transplant.       Diagnosis and Treatment Summary     DIAGNOSIS:  1.  Myelodysplastic syndrome with ring sideroblasts, SF3B1 and ASXL1 mutations present on bone marrow biopsy, diagnosed 12/2021. 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  Molecular: 1. ASXL1: (6%) 2. SF3B1: (45%), see separate report in epic  3.  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  Flow cytometry: No monoclonal B or atypical T cell population identified  5.  Peripheral blood: Macrocytic anemia with increased anisopoikilocytosis  6.  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 Transfusions as needed  2. 3/2022 to 4/2022, EPO without response  3. 4/2022  to present: luspatercept  4. 9/2022 to 12/2022: weekly rituximab with steroids, now tapered off  5. 12/2022 to 5/2023: MMF, then tapered off  6. 5/2023 to 12/1/2023: Decitabine and cedazuridine -- complicated by cytopenias, requiring transfusions every 2-3 weeks.       Donor Characteristics       Self or Related or Unrelated Donor: Unrelated donor   Donor Match: 6/8  Donor Age: 28  Donor Sex: M  Donor ABO/Rh: O+  Donor CMV Serostatus: Negative     Donor-Specific Antibodies:  Recent Labs   Lab Test 12/18/23  0928   [id number] A:1 2 23 24 25 32 69 B:7 8 13 27 37 38 42 44 45 47 48 49 51 52 53 55 56 57 58 59 60 61 62 63 67 73 75 76 77 81 82 Cw:8 9   CR5CJBKCYS A:80B:41 46 54 64 78Cw:1 10   KH8LTJOXB DR:7 8 9 11 12 13 14 17 18 DRw:52 DQ:7 8 9   EM9YHLLCMQ DR:103 16DRw:51         Virtual Crossmatch:    Recent Labs   Lab Test 12/18/23  0928   RESVXMT1 DSA Absent   DSAVXMT1 None   RESVXMB1 DSA Present   DSAVXMB1 HLA DRB5 jvt=9278         Blood Counts       Recent Labs   Lab Test 01/02/24  1432 12/19/23  1347 03/19/18  1338   HGB 8.9* 8.6* 10.4*   HCT 26.2* 25.5* 31.7*   WBC 5.5 3.3* 5.4   ANEUTAUTO 3.0 2.0  --    ALYMPAUTO 1.7 0.8  --    AMONOAUTO 0.6 0.4  --    AEOSAUTO 0.1 0.0  --    ABSBASO 0.1 0.0  --    NRBCMAN 0.1 0.0  --     124* 271         Recent Labs   Lab Test 12/19/23  1347   ABORH O POS         No lab results found.      Chemistries     Basic Panel  Recent Labs   Lab Test 01/02/24  1432 12/26/23  1709 12/19/23  1347    140 140   POTASSIUM 4.1 3.9 3.5   CHLORIDE 106 102 102   CO2 24 28 27   BUN 7.7 14.1 8.0   CR 0.47* 0.53 0.50*   * 106* 113*        Calcium, Magnesium, Phosphorus  Recent Labs   Lab Test 01/02/24  1432 12/26/23  1709 12/19/23  1347   NIKO 8.9 9.3 9.4        LFTs  Recent Labs   Lab Test 01/02/24  1718 01/02/24  1432 12/26/23  1709 12/19/23  1347   BILITOTAL 0.5 0.4 0.4 0.5   ALKPHOS 152* 144 176* 154*   AST 49*  --  104* 79*   * 111* 295* 155*   ALBUMIN 4.3 4.1 4.3 4.2        LDH  Recent Labs   Lab Test 12/26/23  1709          B2-Microglobulin  No lab results found.    Vitamin D  No lab results found.      Urine Studies       Recent Labs   Lab Test 12/19/23  1347   COLOR Light Yellow   APPEARANCE Clear   URINEGLC Negative   URINEBILI Negative   URINEKETONE Negative   SG 1.012   UBLD Negative   URINEPH 5.5   PROTEIN Negative   UUROI Normal   NITRITE Negative   LEUKEST Negative   RBCU 0   WBCU <1   USQEI 2*       Creatinine Clearance    No lab results found.      Infectious Disease Markers     [facility] IDM    Recent Labs   Lab Test 12/19/23  1347   TCRUZI Non-reactive       CMV  Recent Labs   Lab Test 12/19/23  1347   CMVIGG Positive, suggests recent or past exposure.*         EBV    Recent Labs   Lab Test 12/19/23  1347   EBVCAG Positive*       HSV 1/2    Recent Labs   Lab Test 12/19/23  1347   X7JONLM 35.90*   H1IGG Positive.  IgG antibody to HSV-1 detected.*   Y3DSKBT 5.88*   H2IGG Positive.  IgG antibody to HSV-2 detected.*         VZV    No lab results found.      HTLV    No lab results found.      Toxoplasma  (not routinely checked)      COVID    Recent Labs   Lab Test 01/02/24  1432   [id number] Negative       Bone Marrow Biopsy     Bone marrow biopsy 12/7/2023 ([place] at [facility] [place] [facility] [place]  Persistent myelodysplastic syndrome. The bone marrow is 70% cellular and blasts comprise 1% the aspirate smear differential. Sideroblasts are increased in proportion with frequent ring sideroblasts. Aspirate shows trilineage dysplasia.   Cytogenetics:  46,XX,del[20(q11.2)[1]/46,XX[20].      Peripheral blood: Normocytic anemia, neutropenia and mild thrombocytopenia  NGS: Not done        Bone marrow biopsy 12/09/2021         Lab Results   Component Value Date     FINALDX   12/18/2023       Bone marrow, posterior iliac crest, decalcified trephine biopsy, aspirate clot, touch imprints, aspirate smears, and peripheral blood ([id number], obtained 12/09/2021):      Myelodysplastic syndrome with mutated SF3B1(per ICC 2022)/myelodysplastic neoplasm (MDS) with low blasts and SF3B1 mutation (WHO 2022)/myelodysplastic syndrome with ring sideroblasts and single lineage dysplasia (WHO 2017) with the following features:  - Hypercellular marrow for age (overall 70% to 80%) with trilineage hematopoiesis, dyserythropoiesis with increased number of ring sideroblasts, and less than 1% blasts  - Peripheral blood showing macrocytic anemia  - See comment           COMDX   12/18/2023       Per submitted report, flow cytometry analysis on concurrent specimen showed mixed lymphocytes, but no atypical T cells or monoclonal B-cell population.  No further information regarding the myelodysplastic provide for our review.     Per submitted report, cytogenetic analysis on concurrent the specimen showed abnormal female karyotype; the abnormal clone contained a deletion of most of the long arm of one of chromosome 20 as the sole chromosomal abnormality.     Per submitted report, molecular studies by next generation sequencing on concurrent the specimen detected the following mutations:  -SF3B1 mutation (VAF: 45%)  -ASXL1 mutation (VAF: 6%)  Further details regarding mutations are not provided for us.     Please note that ASXL1 mutation is generally associated with poor prognosis in myeloid neoplasm. Therefore, close follow up is recommended.      The full outside diagnostic report(s) will be scanned into Epic under the Media tab. Overall, we agree with the reported diagnosis and appreciate the opportunity to review this case.         Chest CT without Contrast       Results for orders placed during the hospital encounter of 12/20/23    CT CHEST W/O CONTRAST    Status: Normal 12/20/2023    Narrative  CT chest without contrast    INDICATION: Pre bone marrow transplant workup. Myelodysplastic  syndrome.    COMPARISON: None available on PACS.    FINDINGS: No contrast. Included thyroid appears normal.  Breast tissues  appear grossly unremarkable. Focal atherosclerosis of the right  brachiocephalic artery as well as aortic arch. Trace coronary artery  calcification in the left anterior descending artery. Heart size  normal. Thoracic aorta and main pulmonary artery calibers are normal.  No pleural or pericardial effusion. Included upper abdomen shows trace  abdominal aortic catheter is chronic calcification. No suspicious  upper abdominal mass.  Bone detail show mild osteopenia. Degenerative spurring in the lower  thoracic spine.    Detail of the lungs shows no suspicious nodule or other opacity or  abnormal air collection.    Impression  IMPRESSION: Atherosclerotic calcifications including coronary artery  calcifications. Osteopenia.    RACHEL VILLALPANDO MD      SYSTEM ID:  [id number]        PFTs     FVC%  Recent Labs   Lab Test 23  1202    90       FEV1%  Recent Labs   Lab Test 23 91       DLCO%  Recent Labs   Lab Test 23  1202    119       EKG       ECG results from 23   EKG 12-lead complete w/read - Clinics     Value    Systolic Blood Pressure     Diastolic Blood Pressure     Ventricular Rate 95    Atrial Rate 95    NE Interval 160    QRS Duration 90        QTc 447    P Axis 59    R AXIS 40    T Axis 47    Interpretation ECG      Sinus rhythm  Normal ECG  When compared with ECG of 19-DEC-2023 15:07, (unconfirmed)  No significant change was found  Confirmed by MD LILY, SINGH ([id number]) on 2023 8:45:18 PM           ECHOCARDIOGRAM       Results for orders placed during the hospital encounter of 23    ECHOCARDIOGRAM COMPLETE    Status: Normal 2023    Narrative  [id number]  [id number]  [id number]  [id number]^ALPHONSE^TALHA^NOAN    [facility],[place]  Echocardiography Laboratory  [address] [place] [id number]    Name: MARSHALL CABRERA  MRN: [id number]  : 1970  Study Date: 2023 01:03 PM  Age: 53 yrs  Gender:  Female  Patient Location: [facility]  Reason For Study: Preop examination, Personal history of diseases of blood and  blo  Ordering Physician: TALHA CUNHA  Referring Physician: TALAH CUNHA  Performed By: Maria Elena Yepez    BSA: 1.8 m2  Height: 61 in  Weight: 184 lb  HR: 95  BP: 127/54 mmHg  ______________________________________________________________________________  Procedure  Echocardiogram with two-dimensional, color and spectral Doppler performed.  ______________________________________________________________________________  Interpretation Summary  Global and regional left ventricular function is normal with an EF of 60-65%.  Global peak LV longitudinal strain is averaged at -17.3%. This suggests  abnormal strain (normal <-18%). Though this is likely confounded by abnormal  tracking  Mild concentric wall thickening consistent with left ventricular hypertrophy  is present.  The right ventricle is normal size. Global right ventricular function is  normal.  The right ventricular systolic pressure is 29mmHg above the right atrial  pressure. Pulmonary artery systolic pressure is normal.  No significant valvular abnormalities present.  No pericardial effusion is present.  There is no prior study for direct comparison.  ______________________________________________________________________________  Left Ventricle  Global and regional left ventricular function is normal with an EF of 60-65%.  Left ventricular size is normal. Mild concentric wall thickening consistent  with left ventricular hypertrophy is present. Left ventricular diastolic  function is normal. Global peak LV longitudinal strain is averaged at -17.3%.  This suggests abnormal strain (normal <-18%).    Right Ventricle  The right ventricle is normal size. Global right ventricular function is  normal.    Atria  Both atria appear normal. The atrial septum is intact as assessed by color  Doppler .    Mitral Valve  The mitral valve is normal. On  Doppler interrogation, there is no significant  stenosis or regurgitation.    Aortic Valve  Aortic valve is normal in structure and function. On Doppler interrogation,  there is no significant stenosis or regurgitation.    Tricuspid Valve  The tricuspid valve is normal. Mild to moderate tricuspid insufficiency is  present. The right ventricular systolic pressure is 29mmHg above the right  atrial pressure. Pulmonary artery systolic pressure is normal.    Pulmonic Valve  The pulmonic valve is normal. Trace pulmonic insufficiency is present.    Vessels  The thoracic aorta is normal. The pulmonary artery and bifurcation cannot be  assessed. Sinuses of Valsalva 2.7 cm. Ascending aorta 3.1 cm. IVC diameter and  respiratory changes fall into an intermediate range suggesting an RA pressure  of 8 mmHg.    Pericardium  No pericardial effusion is present.    Miscellaneous  No significant valvular abnormalities present.    Compared to Previous Study  There is no prior study for direct comparison.    Attestation  I have personally viewed the imaging and agree with the interpretation and  report as documented by the fellow, Clay Paniagua, and/or edited by me.  ______________________________________________________________________________  MMode/2D Measurements & Calculations  IVSd: 1.2 cm  LVIDd: 4.3 cm  LVIDs: 3.0 cm  LVPWd: 1.5 cm  FS: 29.3 %  LV mass(C)d: 213.5 grams  LV mass(C)dI: 117.1 grams/m2  Ao root diam: 2.7 cm  LA dimension: 3.5 cm  asc Aorta Diam: 3.1 cm  LA/Ao: 1.3  LVOT diam: 2.1 cm  LVOT area: 3.4 cm2  Ao root diam index Ht(cm/m): 1.7  Ao root diam index BSA (cm/m2): 1.5  Asc Ao diam index BSA (cm/m2): 1.7  Asc Ao diam index Ht(cm/m): 2.0    LA Volume (BP): 58.8 ml  LA Volume Index (BP): 32.3 ml/m2  RV Base: 3.6 cm  RWT: 0.69  TAPSE: 2.0 cm    Doppler Measurements & Calculations  MV E max qi: 62.7 cm/sec  MV A max qi: 88.7 cm/sec  MV E/A: 0.71  MV max PG: 3.1 mmHg  MV mean P.1 mmHg  MV V2 VTI: 21.5 cm  MVA(VTI):  3.6 cm2  MV dec time: 0.15 sec    Ao V2 max: 159.8 cm/sec  Ao max PG: 10.2 mmHg  Ao V2 mean: 113.4 cm/sec  Ao mean P.8 mmHg  Ao V2 VTI: 30.5 cm  JOSH(I,D): 2.5 cm2  JOSH(V,D): 2.7 cm2  LV V1 max P.2 mmHg  LV V1 max: 124.8 cm/sec  LV V1 VTI: 22.7 cm  SV(LVOT): 77.3 ml  SI(LVOT): 42.4 ml/m2  PA V2 max: 139.2 cm/sec  PA max P.8 mmHg  PA acc time: 0.12 sec  TR max saud: 270.4 cm/sec  TR max P.3 mmHg  AV Saud Ratio (DI): 0.78  JOSH Index (cm2/m2): 1.4  E/E' av.2  Lateral E/e': 5.0  Medial E/e': 9.5  RV S Saud: 11.2 cm/sec    Measurements from QLAB  LV GLS Endo Peak A2C (AS): -17.4 %  LV GLS Endo Peak A3C (AS): -16.1 %  LV GLS Endo Peak A4C (AS): -18.2 %    LV GLS Endo Peak Avg (AS): -17.3 %  ______________________________________________________________________________  Report approved by: Heidi Flowers 2023 02:31 PM      Family History:   Family History   Problem Relation Age of Onset    Hypertension Mother     Diabetes No family hx of     Cerebrovascular Disease No family hx of     Myocardial Infarction No family hx of     Coronary Artery Disease Early Onset No family hx of     Breast Cancer No family hx of     Ovarian Cancer No family hx of     Colon Cancer No family hx of        Social History:   Social History     Socioeconomic History    Marital status: Single     Spouse name: Not on file    Number of children: Not on file    Years of education: Not on file    Highest education level: Not on file   Occupational History    Occupation: Assembly [employer]   Tobacco Use    Smoking status: Every Day     Packs/day: 0.50     Years: 10.00     Additional pack years: 0.00     Total pack years: 5.00     Types: Cigarettes     Passive exposure: Current    Smokeless tobacco: Never   Substance and Sexual Activity    Alcohol use: Yes     Comment: 1-2 drinks/month    Drug use: No    Sexual activity: Yes     Partners: Male   Other Topics Concern    Parent/sibling w/ CABG, MI or angioplasty  "before 65F 55M? Not Asked   Social History Narrative    .    3 kids - 2 adult, one is 14 (as of 2017).     Exercises 1-2x/week.      Social Determinants of Health     Financial Resource Strain: Not on file   Food Insecurity: Not on file   Transportation Needs: Not on file   Physical Activity: Not on file   Stress: Not on file   Social Connections: Not on file   Interpersonal Safety: Not on file   Housing Stability: Not on file       Past Medical History:   Past Medical History:   Diagnosis Date    Left medial knee pain     Medial epicondylitis, right elbow     Obesity (BMI 30-39.9)     Right elbow pain         Past Surgical History:   Past Surgical History:   Procedure Laterality Date    EXCISE LESION TRUNK N/A 2/5/2018    Procedure: EXCISE LESION TRUNK;;  Surgeon: Avani Szymanski MD;  Location: [facility]    EXCISE MASS LOWER EXTREMITY Bilateral 5/31/2017    Procedure: EXCISE MASS LOWER EXTREMITY;  EXCISION OF LIPOMAS RIGHT BUTTOCKS RIGHT LOWER THIGH, LEFT UPPER OUTER THIGH;  Surgeon: Avani Szymanski MD;  Location:  [facility]    EXCISE MASS LOWER EXTREMITY Right 2/5/2018    Procedure: EXCISE MASS LOWER EXTREMITY;  EXCISION OF RIGHT UPPER ANTERIOR THIGH SOFT TISSUE MASS,EXCISION OF LEFT LOWER BACK SOFT TISSUE MASS;  Surgeon: Avani Szymanski MD;  Location: [facility]    IR CVC TUNNEL PLACEMENT > 5 YRS OF AGE  1/5/2024    LUMPECTOMY BREAST Left 1990s    benign       Allergies:   Allergies   Allergen Reactions    Acetaminophen Other (See Comments)     Avoid acetaminophen 72h before busulfan until 72h after busulfan.  \"Allergy\" can be removed 72h after busulfan.        Home Medications      Prior to Admission medications    Medication Sig Start Date End Date Taking? Authorizing Provider   cholecalciferol (VITAMIN D3) 25 mcg (1000 units) capsule Take 1,000 Units by mouth daily   Yes Reported, Patient   Omega-3 Fatty Acids (FISH OIL) 1200 MG capsule Take 1,200 mg by mouth daily   Yes Reported, Patient       Review of " "Systems    Review of Systems:  Negative unless stated in the HPI.     PHYSICAL EXAM      Weight     Wt Readings from Last 3 Encounters:   01/05/24 83.8 kg (184 lb 11.2 oz)   12/26/23 83.1 kg (183 lb 4.8 oz)   12/19/23 83.5 kg (184 lb)        KPS: 80    /73   Pulse 74   Temp (!) 96.5  F (35.8  C) (Oral)   Resp 16   Ht 1.575 m (5' 2.01\")   Wt 83.8 kg (184 lb 11.2 oz)   LMP 11/05/2017   SpO2 98%   BMI 33.77 kg/m       General: NAD   Eyes: KEVON, sclera anicteric   Nose/Mouth/Throat: OP clear, buccal mucosa moist, no ulcerations   Lungs: CTA bilaterally  Cardiovascular: RRR, no M/R/G   Abdominal/Rectal: +BS, soft, NT, ND  Lymphatics: No edema  Skin: wart under right foot   Neuro: A&O   Additional Findings: Gastelum site NT, no drainage.    Current aGVHD staging:  Skin 0, UGI 0, LGI 0, Liver 0 (keep in note through day +180 for allos)      LABS AND IMAGING: I have assessed all abnormal lab values for their clinical significance and any values considered clinically significant have been addressed in the assessment and plan.        Lab Results   Component Value Date    WBC 5.5 01/02/2024    ANEUTAUTO 3.0 01/02/2024    HGB 8.9 (L) 01/02/2024    HCT 26.2 (L) 01/02/2024     01/02/2024     01/02/2024    POTASSIUM 4.1 01/02/2024    CHLORIDE 106 01/02/2024    CO2 24 01/02/2024     (H) 01/02/2024    BUN 7.7 01/02/2024    CR 0.47 (L) 01/02/2024    INR 0.93 12/19/2023           SYSTEMS-BASED ASSESSMENT AND PLAN     Hortencia Baldwin is a 53 year old female with a history of MDS, undergoing MA (Bu/Flu) 6/8 URD Allo transplant, day -6        BMT/IEC PROTOCOL for MT 2015-29 **according to but not on trial**   - Chemo protocol:  Day -6 through day -1: Keppra and Allopurinol   Day -5 through -2: Bu/Flu   Day -1: Rest day   Day 0: Transplant. Recipient: O+, CMV+. Donor: O+, CMV-   - Restaging plan: per protocol       HEME/COAG  # Iron overload: hx of transfusion dependent anemia, pre-transplant ferritin " around 5,000. Recommend phlebotomy post transplant when retic count is restored and Hgb >10    - Risk of cytopenias due to chemotherapy and radiation  - Transfusion parameters: hemoglobin <7, platelets <10    IMMUNOCOMPROMISED  - Relevant infection history:  URI from COVID (12/26), negative test on 1/2/24   - Vaccination status: Influenza vaccination after day +60, COVID vaccination after day +100, followed by remaining vaccinations at 12, 14, 24, and 26 months.  - Prophylaxis plan: ACV/letermovir, Micafungin through day +45 (current smoker), levofloxacin while neutropenic, Bactrim to start at day +28    RISK OF GVHD  - Prophylaxis: PT Cy, Tac/MMF **avoiding sirolimus d/t high risk VOD**    CARDIOVASCULAR  - Risk of cardiomyopathy:  Baseline EF 60-65%  - Risk of arrhythmia: Baseline EKG showed sinus rhythm     RESPIRATORY  - Current smoker: states she quit today (1/5/24), offered nicotine replacement but she declined.   - Baseline PFTs: The FEV1, FVC, FEV1/FVC ratio and BLW52-14% are within normal limits.  The inspiratory flow rates are within normal limits.  Lung volumes are within normal limits.  The diffusing capacity is normal.   - Risk of respiratory complications: Frequent ambulation and incentive spirometer.    GI/NUTRITION  # Elevated liver enzymes: down trending ALT: 88 (117), AST: 43 (49) could be related to Tylenol use, iron overload or recent viral illness   RUQ U/S (12/27) was normal  Acute hepatitis panel negative   - Ulcer prophylaxis: PPI  - VOD ppx: Ursodiol   - Risk of malnutrition: dietitian to follow     RENAL/ELECTROLYTES/  - Risk of renal injury: IV hydration   - Electrolyte management: replace per sliding scale    DIABETES/ENDOCRINE  - Risk of steroid-induced hyperglyemia: Monitor BG, sliding scale if needed    MUSCULOSKELETAL/FRAILTY  - Baseline Frailty Score: 3  - Patient with substantial risk of sarcopenia  - Daily PT/OT as needed while inpatient  - Cancer Rehab as needed  "outpatient    SYMPTOM MANAGEMENT  - Nausea from chemo/radiation: Prochlorperazine, ondansetron, lorazepam.  - # Pain Assessment:      1/5/2024     9:30 AM   Current Pain Score   Patient currently in pain? denies   - Hortencia Sethi is experiencing pain due to bone marrow biopsy and line placement. Pain management was discussed and the plan was created in a collaborative fashion.  Hortencia Sethi's response to the current recommendations: engaged  - Please see the plan for pain management as documented above      SOCIAL DETERMINANTS  - Caregiver: Srinivas Suggs, Keira Neal & Rickey Neal     Disposition: remain admitted through engraftment     Known issues that I take into account for medical decisions, with salient changes to the plan considering these complexities noted above.    Patient Active Problem List   Diagnosis    Left medial knee pain    Obesity (BMI 30-39.9)    Anemia, unspecified type    Macrocytic anemia    MDS (myelodysplastic syndrome) (H)     Clinically Significant Risk Factors Present on Admission                       # Obesity: Estimated body mass index is 33.77 kg/m  as calculated from the following:    Height as of this encounter: 1.575 m (5' 2.01\").    Weight as of this encounter: 83.8 kg (184 lb 11.2 oz).                 Today's summary:     I spent 60 minutes in the care of this patient today, which included time necessary for preparation for the visit, obtaining history, ordering medications/tests/procedures as medically indicated, review of pertinent medical literature, counseling of the patient, communication of recommendations to the care team, and documentation time.    King Vaz PA-C        "

## 2024-01-05 NOTE — CONSULTS
Below is the pt's psychosocial assessment for the staff to review as needed.    CLINICAL SOCIAL WORK   PSYCHOSOCIAL ASSESSMENT  BLOOD AND MARROW TRANSPLANT SERVICE        Assessment completed on December 19, 2023 of living situation, support system, financial status, functional status, coping, stressors, need for resources and social work intervention provided as needed.  Information for this assessment was provided by Pt report in addition to medical chart review and consultation with medical team.      Present at assessment: Patient, Hortencia Baldwin was present for this assessment conducted by Violet Robles [facility] .      Diagnosis: Myelodysplastic Syndrome (MDS)     Date of Diagnosis: 12/21     Transplant type: Allogeneic     Donor: Unrelated  allogeneic donor stem cell transplant     Physician: Stephanie Yi MD     Nurse Coordinator: Janelle Harvey RN     Work-up Nurse Coordinator: Bobbi Ruth RN     : CLIFTON Ryan, [facility]      Permanent Address:   [facility] [address] [place]  [address] [address]     Contact Information:  Pt Cell Phone: [phone]  Pt Email: [email or web address]  Pt's son Rickey Phone: [phone]     Presenting Information:  Hortencia Sethi is a 53 year old female diagnosed with MDS who presents for evaluation for an allogeneic transplant at the [facility] ([facility]).  Pt was alone for today's visit.      Decision Making:   Self      Health Care Directive:   Declined completing We discussed the value of a HCD and the process if a HCD is not present regarding decision making, pt will think about completing.      Relationship Status:   Partnered to Srinivas. Hortencia Sethi indicated their relationship as stable and supportive.     Special Lodging Needs: None identified at this time. Hortencia Sethi lives in [place] with Srinivas and does not need to relocate.      Family/Support System: Pt endorsed a good support system including family and close friends  who will be available to support Pt throughout transplant process.      Partner: Srinivas Suggs     Children: Rickey Neal, Ezra Foreman and Mariana Nela     Grandchildren: not discussed     Parents: Mother Julio Foreman and Father Francis Umana lives in [place], they just connected 4 years ago     Siblings: 2 1/2 siblings. 1 brother and 1 sister Yoana Whelan     Friends: some close friends and family     Caregiver: LUZ MARIA discussed with pt the caregiver role and expectation at length. Pt is agreeable to having a full time caregiver for a minimum of 100 days until cleared by the BMT physician. Pt's identified caregivers are her daughter, partner and son. Pt signed the caregiver contract which will be scanned into the EMR. Caregiver education and resources provided. No caregiver concerns identified. Pt confirmed understanding caregiving requirement, including driving restrictions, as discussed during psychosocial assessment.      Name & Numbers  Srinivas Suggs [phone]  Keira Neal [phone]  Rickey Neal [phone]     Transportation Mode:  Private Car . Pt is aware of driving restrictions post-BMT and the need for the caregiver is to drive until cleared to drive by the  BMT physician. SW provided information on parking info and monthly parking pass options. Pt will utilize the [email or web address] security shuttle for transportation to and from the [facility] and BMT Clinic/Hospital.     Insurance:  No Insurance issues identified. The pt noted some confusion on what will happen once she loses her job in 12 weeks. LUZ MARIA shared with the pt that she will need to talk to her HR about this and if she will go on COBRA or has benefits through her LTC (which she believes she has). Pt aware to reach out and update LUZ MARIA should something change.  Pt denied specific insurance concerns at this time. SW reiterated information about the BMT Financial  should specific insurance questions arise as Pt moves through transplant process.      Sources of  Income:  Income concerns identified  Hortencia Sethi shared she is currently working, but will stop once admitted. She has FMLA benefits for 11 weeks and then STD for 12 weeks which pays 60%. The pt does think she has LTD, but is not certain. She did share that she will lose her job after her FMLA benefits ends. Srinivas is planning to maintain working during the BMT process to keep some income coming into the home.      We discussed freda options including [employer], [facility] and [facility] Pre/post freda. Applications were provided for all the grants and encouraged her to complete them and return.      Employment:   Position:   Last Day of Work: currently working still     Spouse's Employment:  Employer:    Position: currently working      Mental Health: No mental health issues identified        PHQ-9 assessment, score was 5 ,which indicates mild signs of depression.  GAD7 assessment, score was 1, which indicates no current signs of anxiety.     Hortencia Sethi shared that she has no history of depression or anxiety. She identifies that at times she feels sad or anxious, but nothing that others have notices or has been impactful in her life. The pt shared that her youngest son's father just  a few months ago and this has been very sad for her. She feels overall she is coping well, but thinks about this at times.       We talked about how some patients may see an increase in feelings of anxiety or depression while hospitalized for extended periods along with isolation. Encouraged Hortencia Sethi and her family to let us know if they are noticing an increase in symptoms. We talked about the variety of modalities available to use as coping mechanisms (including but not limited to guided imagery, relaxation techniques, progressive muscle relaxation, counseling/talk therapy and medication).     Chemical Use: Current use of tobacco by patient.  Hortencia Sethi shared she is currently smoking a 1/2 pack of cigarettes  "a day. She is aware she will need to abstain during the BMT process and does not feel this will be an issue for her. She denies the use of alcohol, marijuana or other drugs.   Based on the information provided, there appear to be no specific risks or concerns identified at this time.      Trauma/Loss/Abuse History: Multiple losses associated with cancer diagnosis and treatment, including health, employment, changes to physical appearance, etc.      Spirituality:  Patient identifies with [place] community. Hortencia Sethi shared she believes in God and is also Latter day.       Coping: Pt noted that she is currently feeling \"positive, fearful, hopeful, nervous, and ready to begin\".  Pt shared that her main coping mechanisms are talking with friends and family and prayer/spiritual beliefs.  Pt noted that she also trey by positive self-talk and smoking. SW and Pt discussed additional positive coping mechanisms that Pt can utilize while in the hospital.      Caregiver Coping: No caregiver was present for this assessment     Education Provided: Transplant process expectations, Caregiver requirements, Caregiver self-care, Financial issues related to transplant, Financial resources/grants available, Common psychosocial stressors pre/post transplant, Support group(s) available, Tour/layout of the inpatient unit/non-use of cell phones, Hospital resources available, Web site information, Resources for transplant patients and their families as well as the Clinical Social Work role.      Interventions Provided: Supportive counseling and education      Recreation/Leisure Activities:  Hortencia Sethi shared she enjoys watching movies, being with friends/family and playing guitar     Plans for Hospital Stay Leisure:  While IP Hortencia Sethi plans to watch TV, play games on her phone and talk to friends.     Assessment and Recommendations for Team:  Pt is a 53 year old female diagnosed with MDS who is here undergoing preparation for a planned allogeneic " transplant.      Pt is a pleasant and articulate female who overall feels comfortable communicating with the medical team, but is uncertain at times. She shared that she came to the [place] from [place] when she was 15 so does feels her language is limited at times, but overall does expressed understanding of the BMT process. Pt is pleasant, calm and able to articulate concerns/coping mechanisms in an appropriate manner. Hortencia Sethi was alert, interactive, and affect was full, they displayed appropriate eye contact, memory and thought processes. Pt has a strong supportive network of family and friends who are involved.      Pt will benefit from ongoing psychosocial support in regards to coping with the adjustment to the BMT process. CSW has discussed  psychosocial support options in regards to coping with the adjustment to the BMT process and support groups opportunities.       Pt has a good support system and a good caregiver plan. Pt verbalizes understanding of the transplant process and wanting to proceed. SW provided contact information and encouraged Pt to contact SW with questions, concerns, resources and for support. Per this assessment, I did not identify any barriers to this patient moving forward with transplant.          Important Information:   - Hortencia Sethi was given freda applications to complete.  - Hortencia Sethi would like a bike in her room.      Follow up Planned:   Initiate financial resources  Psychosocial support     CLIFTON Ryan, [facility]  Pager: [phone]  Phone: [phone]

## 2024-01-05 NOTE — IR NOTE
Patient Name: Hortencia Baldwin  Medical Record Number: [id number]  Today's Date: 1/5/2024    Procedure: Tunneled line placement  Proceduralist: Cristian Del Valle PA-C  Pathology present: no    Procedure Start: 0925  Procedure end: 0945  Sedation Time: 0915 - 0945 (30 min)  Sedation Medications: 2 mg Versed and 100 mg Fentanyl    ** Flushed with Heparin and ready to use **    Report given to: LEO, RN  : no    Other Notes: Pt arrived to IR room 2 from 2a. Consent reviewed. Pt denies any questions or concerns regarding procedure. Pt positioned supine and monitored per protocol. Pt tolerated procedure without any noted complications. Pt transferred back to .

## 2024-01-05 NOTE — PROGRESS NOTES
Patient admitted to: 5409  Admitted from: IR  Arrived by: Liter  Reason for admission: Scheduled Allogenic transplant  Patient accompanied by: unaccompanied  Belongings: clothing, cell phone kept with patient  Teaching: Unit routine, visiting policy, treatment plan  Skin double check completed by: Daly Fairbanks on bottom of Right foot  Wart on Outside of left foot

## 2024-01-05 NOTE — PHARMACY-ADMISSION MEDICATION HISTORY
Pharmacist Admission Medication History    Admission medication history is complete. The information provided in this note is only as accurate as the sources available at the time of the update.    Information Source(s): Clinic pharmacist note, RN assessment, Dispense history (Surescripts)     Medication History Completed By: Marya Abbott [facility] 1/5/2024 5:49 PM    PTA Med List   Medication Sig Last Dose    cholecalciferol (VITAMIN D3) 25 mcg (1000 units) capsule Take 1,000 Units by mouth daily Past Month    Omega-3 Fatty Acids (FISH OIL) 1200 MG capsule Take 1,200 mg by mouth daily Past Month

## 2024-01-05 NOTE — PROGRESS NOTES
Pt arrived to 2A from home for CVC placement. VSS. Denies pain. Awaiting consent. Lab resulted 1/2/24. H&P current, updated 1/4/24. Allergies reviewed with pt. Appropriately NPO.  Prep completed. PIV infusing NS and Ancef. Patient to be admitted to 5C room 1043 post procedure.

## 2024-01-05 NOTE — PLAN OF CARE
"Afebrile, vital signs stable. Pain at CVC insertion site 4/10 received 25 mg tramadol to no relief. 7/10 this afternoon received 5 mg oxycodone to some relief. Eating and drinking without issue. Reports no cough, fever.   Up independently.  Patient's insurance company has called her related to line placement procedure. Social consult placed for  to follow up with patients insurance questions.  Plan for Fludarabine and Busulfan overnight.          Problem: Adult Inpatient Plan of Care  Goal: Plan of Care Review  Description: The Plan of Care Review/Shift note should be completed every shift.  The Outcome Evaluation is a brief statement about your assessment that the patient is improving, declining, or no change.  This information will be displayed automatically on your shift  note.  Outcome: Progressing  Goal: Patient-Specific Goal (Individualized)  Description: You can add care plan individualizations to a care plan. Examples of Individualization might be:  \"Parent requests to be called daily at 9am for status\", \"I have a hard time hearing out of my right ear\", or \"Do not touch me to wake me up as it startles  me\".  Outcome: Progressing  Goal: Absence of Hospital-Acquired Illness or Injury  Outcome: Progressing  Goal: Optimal Comfort and Wellbeing  Outcome: Progressing  Goal: Readiness for Transition of Care  Outcome: Progressing     "

## 2024-01-05 NOTE — PRE-PROCEDURE
GENERAL PRE-PROCEDURE:   Procedure:  TCVC Placement  Date/Time:  1/5/2024 8:19 AM    Verbal consent obtained?: Yes    Written consent obtained?: Yes    Risks and benefits: Risks, benefits and alternatives were discussed    Consent given by:  Patient  Patient states understanding of procedure being performed: Yes    Patient's understanding of procedure matches consent: Yes    Procedure consent matches procedure scheduled: Yes    Expected level of sedation:  Moderate  Appropriately NPO:  Yes  Mallampati  :  Grade 2- soft palate, base of uvula, tonsillar pillars, and portion of posterior pharyngeal wall visible  Lungs:  Lungs clear with good breath sounds bilaterally  Heart:  Normal heart sounds and rate  History & Physical reviewed:  History and physical reviewed and no updates needed  Statement of review:  I have reviewed the lab findings, diagnostic data, medications, and the plan for sedation

## 2024-01-06 LAB
ANION GAP SERPL CALCULATED.3IONS-SCNC: 10 MMOL/L (ref 7–15)
BASOPHILS # BLD AUTO: 0 10E3/UL (ref 0–0.2)
BASOPHILS NFR BLD AUTO: 1 %
BUN SERPL-MCNC: 10.4 MG/DL (ref 6–20)
BUSULFAN SERPL-MCNC: 1186 NG/ML
BUSULFAN SERPL-MCNC: 2320 NG/ML
BUSULFAN SERPL-MCNC: 3071 NG/ML
BUSULFAN SERPL-MCNC: 3722 NG/ML
CALCIUM SERPL-MCNC: 9.2 MG/DL (ref 8.6–10)
CHLORIDE SERPL-SCNC: 105 MMOL/L (ref 98–107)
CREAT SERPL-MCNC: 0.5 MG/DL (ref 0.51–0.95)
DEPRECATED HCO3 PLAS-SCNC: 26 MMOL/L (ref 22–29)
EGFRCR SERPLBLD CKD-EPI 2021: >90 ML/MIN/1.73M2
EOSINOPHIL # BLD AUTO: 0 10E3/UL (ref 0–0.7)
EOSINOPHIL NFR BLD AUTO: 0 %
ERYTHROCYTE [DISTWIDTH] IN BLOOD BY AUTOMATED COUNT: ABNORMAL %
GLUCOSE BLDC GLUCOMTR-MCNC: 127 MG/DL (ref 70–99)
GLUCOSE SERPL-MCNC: 135 MG/DL (ref 70–99)
HCT VFR BLD AUTO: 25.3 % (ref 35–47)
HGB BLD-MCNC: 8.2 G/DL (ref 11.7–15.7)
IMM GRANULOCYTES # BLD: 0.1 10E3/UL
IMM GRANULOCYTES NFR BLD: 1 %
LYMPHOCYTES # BLD AUTO: 0.6 10E3/UL (ref 0.8–5.3)
LYMPHOCYTES NFR BLD AUTO: 11 %
MAGNESIUM SERPL-MCNC: 1.8 MG/DL (ref 1.7–2.3)
MCH RBC QN AUTO: 36.9 PG (ref 26.5–33)
MCHC RBC AUTO-ENTMCNC: 32.4 G/DL (ref 31.5–36.5)
MCV RBC AUTO: 114 FL (ref 78–100)
MONOCYTES # BLD AUTO: 0.2 10E3/UL (ref 0–1.3)
MONOCYTES NFR BLD AUTO: 3 %
NEUTROPHILS # BLD AUTO: 4.8 10E3/UL (ref 1.6–8.3)
NEUTROPHILS NFR BLD AUTO: 84 %
NRBC # BLD AUTO: 0.1 10E3/UL
NRBC BLD AUTO-RTO: 1 /100
PHOSPHATE SERPL-MCNC: 3.6 MG/DL (ref 2.5–4.5)
PLATELET # BLD AUTO: 154 10E3/UL (ref 150–450)
POTASSIUM SERPL-SCNC: 4.4 MMOL/L (ref 3.4–5.3)
RBC # BLD AUTO: 2.22 10E6/UL (ref 3.8–5.2)
SODIUM SERPL-SCNC: 141 MMOL/L (ref 135–145)
WBC # BLD AUTO: 5.7 10E3/UL (ref 4–11)

## 2024-01-06 PROCEDURE — 250N000011 HC RX IP 250 OP 636: Mod: JZ | Performed by: INTERNAL MEDICINE

## 2024-01-06 PROCEDURE — 250N000013 HC RX MED GY IP 250 OP 250 PS 637

## 2024-01-06 PROCEDURE — 99418 PROLNG IP/OBS E/M EA 15 MIN: CPT | Mod: 24 | Performed by: STUDENT IN AN ORGANIZED HEALTH CARE EDUCATION/TRAINING PROGRAM

## 2024-01-06 PROCEDURE — 206N000001 HC R&B BMT UMMC

## 2024-01-06 PROCEDURE — 258N000003 HC RX IP 258 OP 636

## 2024-01-06 PROCEDURE — 84100 ASSAY OF PHOSPHORUS: CPT | Performed by: INTERNAL MEDICINE

## 2024-01-06 PROCEDURE — 99233 SBSQ HOSP IP/OBS HIGH 50: CPT | Mod: 24 | Performed by: STUDENT IN AN ORGANIZED HEALTH CARE EDUCATION/TRAINING PROGRAM

## 2024-01-06 PROCEDURE — 83735 ASSAY OF MAGNESIUM: CPT | Performed by: INTERNAL MEDICINE

## 2024-01-06 PROCEDURE — 250N000011 HC RX IP 250 OP 636

## 2024-01-06 PROCEDURE — 80299 QUANTITATIVE ASSAY DRUG: CPT

## 2024-01-06 PROCEDURE — 250N000012 HC RX MED GY IP 250 OP 636 PS 637

## 2024-01-06 PROCEDURE — 85025 COMPLETE CBC W/AUTO DIFF WBC: CPT

## 2024-01-06 PROCEDURE — 258N000003 HC RX IP 258 OP 636: Performed by: INTERNAL MEDICINE

## 2024-01-06 PROCEDURE — 3E04305 INTRODUCTION OF OTHER ANTINEOPLASTIC INTO CENTRAL VEIN, PERCUTANEOUS APPROACH: ICD-10-PCS | Performed by: INTERNAL MEDICINE

## 2024-01-06 PROCEDURE — 80048 BASIC METABOLIC PNL TOTAL CA: CPT

## 2024-01-06 RX ADMIN — URSODIOL 300 MG: 300 CAPSULE ORAL at 09:00

## 2024-01-06 RX ADMIN — MICAFUNGIN SODIUM 150 MG: 50 INJECTION, POWDER, LYOPHILIZED, FOR SOLUTION INTRAVENOUS at 17:43

## 2024-01-06 RX ADMIN — ONDANSETRON HYDROCHLORIDE 8 MG: 8 TABLET, FILM COATED ORAL at 14:57

## 2024-01-06 RX ADMIN — LEVETIRACETAM 500 MG: 500 TABLET, FILM COATED ORAL at 19:48

## 2024-01-06 RX ADMIN — FLUDARABINE PHOSPHATE 76 MG: 25 INJECTION, SOLUTION INTRAVENOUS at 00:00

## 2024-01-06 RX ADMIN — ONDANSETRON HYDROCHLORIDE 8 MG: 8 TABLET, FILM COATED ORAL at 23:29

## 2024-01-06 RX ADMIN — URSODIOL 300 MG: 300 CAPSULE ORAL at 14:57

## 2024-01-06 RX ADMIN — LEVETIRACETAM 500 MG: 500 TABLET, FILM COATED ORAL at 08:59

## 2024-01-06 RX ADMIN — URSODIOL 300 MG: 300 CAPSULE ORAL at 19:48

## 2024-01-06 RX ADMIN — OXYCODONE HYDROCHLORIDE 5 MG: 5 TABLET ORAL at 06:28

## 2024-01-06 RX ADMIN — PANTOPRAZOLE SODIUM 40 MG: 40 TABLET, DELAYED RELEASE ORAL at 08:59

## 2024-01-06 RX ADMIN — ONDANSETRON HYDROCHLORIDE 8 MG: 8 TABLET, FILM COATED ORAL at 09:00

## 2024-01-06 RX ADMIN — DEXAMETHASONE 10 MG: 2 TABLET ORAL at 23:29

## 2024-01-06 RX ADMIN — OXYCODONE HYDROCHLORIDE 5 MG: 5 TABLET ORAL at 01:20

## 2024-01-06 RX ADMIN — ALLOPURINOL 300 MG: 300 TABLET ORAL at 09:00

## 2024-01-06 RX ADMIN — Medication 5 ML: at 04:25

## 2024-01-06 RX ADMIN — BUSULFAN 204 MG: 6 INJECTION INTRAVENOUS at 01:09

## 2024-01-06 ASSESSMENT — ACTIVITIES OF DAILY LIVING (ADL)
ADLS_ACUITY_SCORE: 21

## 2024-01-06 NOTE — PLAN OF CARE
"BP (!) 141/82 (BP Location: Right arm)   Pulse 82   Temp 98.2  F (36.8  C) (Oral)   Resp 16   Ht 1.575 m (5' 2.01\")   Wt 84.3 kg (185 lb 12.8 oz)   LMP 11/05/2017   SpO2 97%   BMI 33.97 kg/m      Afebrile; VSS on RA. Discomfort at CVC insertion site improved today from yesterday per pt; declined pharmacological intervention. Nausea controlled on scheduled Zofran. Eating and drinking with encouragement. No BM on shift; still needs stool sample sent for C.diff r/o. Activity level changed to SBA given report of lightheadedness; pt calling appropriately OOB. Continue POC.     Goal Outcome Evaluation:    Plan of Care Reviewed With: patient  Overall Patient Progress: improvingOverall Patient Progress: improving  Problem: Adult Inpatient Plan of Care  Goal: Plan of Care Review  Description: The Plan of Care Review/Shift note should be completed every shift.  The Outcome Evaluation is a brief statement about your assessment that the patient is improving, declining, or no change.  This information will be displayed automatically on your shift  note.  Outcome: Progressing  Flowsheets (Taken 1/6/2024 5314)  Plan of Care Reviewed With: patient  Overall Patient Progress: improving  Goal: Patient-Specific Goal (Individualized)  Description: You can add care plan individualizations to a care plan. Examples of Individualization might be:  \"Parent requests to be called daily at 9am for status\", \"I have a hard time hearing out of my right ear\", or \"Do not touch me to wake me up as it startles  me\".  Outcome: Progressing  Goal: Absence of Hospital-Acquired Illness or Injury  Outcome: Progressing  Intervention: Identify and Manage Fall Risk  Recent Flowsheet Documentation  Taken 1/6/2024 1242 by Renée Bedoya RN  Safety Promotion/Fall Prevention:   safety round/check completed   clutter free environment maintained   nonskid shoes/slippers when out of bed   patient and family education   room near nurse's station   room " organization consistent  Taken 1/6/2024 0900 by Renée Bedoya RN  Safety Promotion/Fall Prevention:   safety round/check completed   clutter free environment maintained   nonskid shoes/slippers when out of bed   patient and family education   room near nurse's station   room organization consistent  Intervention: Prevent Skin Injury  Recent Flowsheet Documentation  Taken 1/6/2024 0900 by Renée Bedoya RN  Body Position: position changed independently  Skin Protection: adhesive use limited  Device Skin Pressure Protection: tubing/devices free from skin contact  Intervention: Prevent Infection  Recent Flowsheet Documentation  Taken 1/6/2024 1242 by Renée Bedoya RN  Infection Prevention:   cohorting utilized   environmental surveillance performed   equipment surfaces disinfected   hand hygiene promoted   personal protective equipment utilized   rest/sleep promoted   single patient room provided   visitors restricted/screened  Taken 1/6/2024 0900 by Renée Bedoya RN  Infection Prevention:   cohorting utilized   environmental surveillance performed   equipment surfaces disinfected   hand hygiene promoted   personal protective equipment utilized   rest/sleep promoted   single patient room provided   visitors restricted/screened  Goal: Optimal Comfort and Wellbeing  Outcome: Progressing  Intervention: Monitor Pain and Promote Comfort  Recent Flowsheet Documentation  Taken 1/6/2024 1242 by Renée Bedoya RN  Pain Management Interventions: medication offered but refused  Taken 1/6/2024 0900 by Renée Bedoya RN  Pain Management Interventions: repositioned  Goal: Readiness for Transition of Care  Outcome: Progressing     Problem: Stem Cell/Bone Marrow Transplant  Goal: Optimal Coping with Transplant  Outcome: Progressing  Goal: Symptom-Free Urinary Elimination  Outcome: Progressing  Intervention: Prevent or Manage Bladder Irritation  Recent Flowsheet Documentation  Taken 1/6/2024 1242 by Aureliano  ZENIA Chinchilla  Pain Management Interventions: medication offered but refused  Taken 1/6/2024 0900 by Renée Bedoya RN  Pain Management Interventions: repositioned  Goal: Diarrhea Symptom Control  Outcome: Progressing  Intervention: Manage Diarrhea  Recent Flowsheet Documentation  Taken 1/6/2024 0900 by Renée Bedoya RN  Skin Protection: adhesive use limited  Perineal Care: perineal hygiene encouraged  Goal: Improved Activity Tolerance  Outcome: Progressing  Intervention: Promote Improved Energy  Recent Flowsheet Documentation  Taken 1/6/2024 0900 by Renée Bedoya RN  Activity Management: activity adjusted per tolerance  Goal: Blood Counts Within Acceptable Range  Outcome: Progressing  Intervention: Monitor and Manage Hematologic Symptoms  Recent Flowsheet Documentation  Taken 1/6/2024 1242 by Renée Bedoya RN  Medication Review/Management:   medications reviewed   high-risk medications identified  Taken 1/6/2024 0900 by Renée Bedoya RN  Bleeding Precautions:   blood pressure closely monitored   foot protection facilitated   gentle oral care promoted   monitored for signs of bleeding  Medication Review/Management:   medications reviewed   high-risk medications identified  Goal: Absence of Hypersensitivity Reaction  Outcome: Progressing  Goal: Absence of Infection  Outcome: Progressing  Intervention: Prevent and Manage Infection  Recent Flowsheet Documentation  Taken 1/6/2024 1242 by Renée Bedoya RN  Infection Prevention:   cohorting utilized   environmental surveillance performed   equipment surfaces disinfected   hand hygiene promoted   personal protective equipment utilized   rest/sleep promoted   single patient room provided   visitors restricted/screened  Isolation Precautions:   cytotoxic precautions maintained   protective environment maintained  Taken 1/6/2024 0900 by Renée Bedoya RN  Infection Prevention:   cohorting utilized   environmental surveillance performed   equipment  surfaces disinfected   hand hygiene promoted   personal protective equipment utilized   rest/sleep promoted   single patient room provided   visitors restricted/screened  Isolation Precautions:   cytotoxic precautions maintained   protective environment maintained  Goal: Improved Oral Mucous Membrane Health and Integrity  Outcome: Progressing  Intervention: Promote Oral Comfort and Health  Recent Flowsheet Documentation  Taken 1/6/2024 0900 by Renée Bedoya RN  Oral Mucous Membrane Protection: nonirritating oral fluids promoted  Oral Care:   oral rinse provided   teeth brushed  Goal: Nausea and Vomiting Symptom Relief  Outcome: Progressing  Goal: Optimal Nutrition Intake  Outcome: Progressing

## 2024-01-06 NOTE — PLAN OF CARE
"/67 (BP Location: Right arm)   Pulse 76   Temp 97.8  F (36.6  C) (Oral)   Resp 16   Ht 1.575 m (5' 2.01\")   Wt 83.8 kg (184 lb 11.2 oz)   LMP 11/05/2017   SpO2 94%   BMI 33.77 kg/m      AVSS on RA. Pt reports painful CVC site from placement yesterday, 5 mg oxycodone given x3 with relief. She denies N/V, diarrhea, SOB, dizziness. Independent and steady on feet. First doses of fludarabine and busulfan given and tolerated well. Adequate/spontaneous UOP, no BM, still needs c diff sample. No replacements needed per labs. Pt slept a little in between cares and busulfan lab draws. First 3 busulfan labs drawn, day nurse to draw 4th. Plan for fludarabine and busulfan again tonight.    Problem: Adult Inpatient Plan of Care  Goal: Plan of Care Review  Description: The Plan of Care Review/Shift note should be completed every shift.  The Outcome Evaluation is a brief statement about your assessment that the patient is improving, declining, or no change.  This information will be displayed automatically on your shift  note.  Outcome: Progressing  Goal: Absence of Hospital-Acquired Illness or Injury  Outcome: Progressing  Intervention: Identify and Manage Fall Risk  Recent Flowsheet Documentation  Taken 1/6/2024 0100 by Sonya Ascencio RN  Safety Promotion/Fall Prevention:   assistive device/personal items within reach   clutter free environment maintained   nonskid shoes/slippers when out of bed   patient and family education   room organization consistent   safety round/check completed  Taken 1/5/2024 2000 by Sonya Ascencio RN  Safety Promotion/Fall Prevention:   assistive device/personal items within reach   clutter free environment maintained   nonskid shoes/slippers when out of bed   patient and family education   room organization consistent   safety round/check completed  Intervention: Prevent Skin Injury  Recent Flowsheet Documentation  Taken 1/5/2024 2000 by Sonya Ascencio RN  Body Position: position " changed independently  Intervention: Prevent Infection  Recent Flowsheet Documentation  Taken 1/6/2024 0100 by Sonya Ascencio RN  Infection Prevention:   environmental surveillance performed   hand hygiene promoted   personal protective equipment utilized   rest/sleep promoted   single patient room provided   visitors restricted/screened  Taken 1/5/2024 2000 by Sonya Ascencio RN  Infection Prevention:   environmental surveillance performed   equipment surfaces disinfected   hand hygiene promoted   personal protective equipment utilized   rest/sleep promoted   single patient room provided   visitors restricted/screened  Goal: Optimal Comfort and Wellbeing  Outcome: Progressing  Intervention: Monitor Pain and Promote Comfort  Recent Flowsheet Documentation  Taken 1/6/2024 0422 by Sonya Ascencio RN  Pain Management Interventions:   care clustered   quiet environment facilitated   rest  Taken 1/6/2024 0116 by Sonya Ascencio RN  Pain Management Interventions:   medication (see MAR)   care clustered   quiet environment facilitated   rest  Taken 1/5/2024 2027 by Sonya Ascencio RN  Pain Management Interventions:   medication (see MAR)   quiet environment facilitated   rest     Problem: Stem Cell/Bone Marrow Transplant  Goal: Symptom-Free Urinary Elimination  Outcome: Progressing  Intervention: Prevent or Manage Bladder Irritation  Recent Flowsheet Documentation  Taken 1/6/2024 0422 by Sonya Ascencio RN  Pain Management Interventions:   care clustered   quiet environment facilitated   rest  Taken 1/6/2024 0116 by Sonya Ascencio RN  Pain Management Interventions:   medication (see MAR)   care clustered   quiet environment facilitated   rest  Taken 1/5/2024 2027 by Sonya Ascencio RN  Pain Management Interventions:   medication (see MAR)   quiet environment facilitated   rest  Goal: Diarrhea Symptom Control  Outcome: Progressing  Intervention: Manage Diarrhea  Recent Flowsheet Documentation  Taken 1/5/2024 2000  by Sonya Ascencio RN  Fluid/Electrolyte Management: fluids provided  Goal: Improved Activity Tolerance  Outcome: Progressing  Intervention: Promote Improved Energy  Recent Flowsheet Documentation  Taken 1/5/2024 2000 by Sonya Ascencio RN  Sleep/Rest Enhancement:   awakenings minimized   consistent schedule promoted   noise level reduced   regular sleep/rest pattern promoted   room darkened  Activity Management:   up ad jeff   activity encouraged   activity adjusted per tolerance  Environmental Support:   calm environment promoted   distractions minimized   environmental consistency promoted   personal routine supported  Goal: Absence of Infection  Outcome: Progressing  Intervention: Prevent and Manage Infection  Recent Flowsheet Documentation  Taken 1/6/2024 0100 by Sonya Ascencio RN  Infection Prevention:   environmental surveillance performed   hand hygiene promoted   personal protective equipment utilized   rest/sleep promoted   single patient room provided   visitors restricted/screened  Isolation Precautions:   cytotoxic precautions maintained   enteric precautions maintained   protective environment maintained  Taken 1/5/2024 2000 by Sonya Ascencio RN  Infection Prevention:   environmental surveillance performed   equipment surfaces disinfected   hand hygiene promoted   personal protective equipment utilized   rest/sleep promoted   single patient room provided   visitors restricted/screened  Isolation Precautions:   cytotoxic precautions maintained   enteric precautions maintained   protective environment maintained  Goal: Nausea and Vomiting Symptom Relief  Outcome: Progressing  Goal: Optimal Nutrition Intake  Outcome: Progressing

## 2024-01-06 NOTE — PROGRESS NOTES
"  BMT Daily Progress Note  Patient ID:  Irma Foreman is a 53 year old female with a PMH of MDS, warm AIHA, transfusion and transfusion dependent anemia presented to  to undergo a myeloablative allogeneic transplant. She was recently COVID+ on 12/26 but tested negative on 1/2 with resolution of nearly all URI symptoms. Undergoing MA (Bu/Flu) 6/8 URD Allo transplant, day -5      HPI Ms Foreman is up eating full breakfast. No gi complaints. She states she has quit harder drugs in the past so is not so worried about quitting smoking, she declines nicotine patch or gum again today. She has a wart on the bottom of her right foot, and one on her right hand. Foot wart is very tender when she walks on it.   Her hands are dry, tight skin. She had some lotion from her PCP that was helping but isn't sure what it was.    Review of Systems    Negative unless stated in the HPI.     PHYSICAL EXAM      Weight     Wt Readings from Last 3 Encounters:   01/05/24 83.8 kg (184 lb 11.2 oz)   12/26/23 83.1 kg (183 lb 4.8 oz)   12/19/23 83.5 kg (184 lb)        KPS: 80    /67 (BP Location: Right arm)   Pulse 76   Temp 97.8  F (36.6  C) (Oral)   Resp 16   Ht 1.575 m (5' 2.01\")   Wt 83.8 kg (184 lb 11.2 oz)   LMP 11/05/2017   SpO2 94%   BMI 33.77 kg/m       General: NAD   Eyes: KEVON, sclera anicteric   Nose/Mouth/Throat: OP clear, buccal mucosa moist, no ulcerations   Lungs: CTA bilaterally  Cardiovascular: RRR, no M/R/G   Abdominal/Rectal: +BS, soft, NT, ND  Lymphatics: No edema  Skin: wart under right foot dime sized white, cauliflower and slightly ulcerated. Pin point sized, flesh colored wart on right hand. Hands dry, one excoriation on left dorsum, palms slightly hyperpigmented  Neuro: A&O   Additional Findings: Gastelum site NT, no drainage.    Current aGVHD staging:  Skin 0, UGI 0, LGI 0, Liver 0 (keep in note through day +180 for allos)      LABS AND IMAGING: I have assessed all abnormal lab values for their clinical " significance and any values considered clinically significant have been addressed in the assessment and plan.        Lab Results   Component Value Date    WBC 5.7 01/06/2024    ANEUTAUTO 4.8 01/06/2024    HGB 8.2 (L) 01/06/2024    HCT 25.3 (L) 01/06/2024     01/06/2024     01/06/2024    POTASSIUM 4.4 01/06/2024    CHLORIDE 105 01/06/2024    CO2 26 01/06/2024     (H) 01/06/2024    BUN 10.4 01/06/2024    CR 0.50 (L) 01/06/2024    MAG 1.8 01/06/2024    INR 1.01 01/05/2024           SYSTEMS-BASED ASSESSMENT AND PLAN     Hortencia Baldwin is a 53 year old female with a history of MDS, undergoing MA (Bu/Flu) 6/8 URD Allo transplant, day -5      BMT/IEC PROTOCOL for MT 2015-29 **according to but not on trial**   - Chemo protocol:  Day -6 through day -1: Keppra and Allopurinol   Day -5 through -2: Bu/Flu   Day -1: Rest day   Day 0: Transplant. Recipient: O+, CMV+. Donor: O+, CMV-   - Restaging plan: per protocol       HEME/COAG  # Iron overload: hx of transfusion dependent anemia, pre-transplant ferritin around 5,000. Recommend phlebotomy post transplant when retic count is restored and Hgb >10    - Risk of cytopenias due to chemotherapy and radiation  - Transfusion parameters: hemoglobin <7, platelets <10    IMMUNOCOMPROMISED  - Relevant infection history:  URI from COVID (12/26), negative test on 1/2/24   - Vaccination status: Influenza vaccination after day +60, COVID vaccination after day +100, followed by remaining vaccinations at 12, 14, 24, and 26 months.  - Prophylaxis plan: ACV/letermovir, Micafungin through day +45 (current smoker), levofloxacin while neutropenic, Bactrim to start at day +28  Foot, hand warts. Could consider derm consult Monday    RISK OF GVHD  - Prophylaxis: PT Cy, Tac/MMF **avoiding sirolimus d/t high risk VOD**    CARDIOVASCULAR  - Risk of cardiomyopathy:  Baseline EF 60-65%  - Risk of arrhythmia: Baseline EKG showed sinus rhythm     RESPIRATORY  - Current smoker: states she  quit today (1/5/24), offered nicotine replacement but she declined.   - Baseline PFTs: The FEV1, FVC, FEV1/FVC ratio and NRP87-26% are within normal limits.  The inspiratory flow rates are within normal limits.  Lung volumes are within normal limits.  The diffusing capacity is normal.   - Risk of respiratory complications: Frequent ambulation and incentive spirometer.    GI/NUTRITION  # Elevated liver enzymes: down trending ALT: 88 (117), AST: 43 (49) could be related to Tylenol use, iron overload or recent viral illness   RUQ U/S (12/27) was normal  Acute hepatitis panel negative   - Ulcer prophylaxis: PPI  - VOD ppx: Ursodiol   - Risk of malnutrition: dietitian to follow     RENAL/ELECTROLYTES/  - Risk of renal injury: IV hydration   - Electrolyte management: replace per sliding scale    DERM:  Right hand dryness, try aquafor with gloves on overnight and as tolerated during the day  Wart on foot, one on hand, see ID    DIABETES/ENDOCRINE  - Risk of steroid-induced hyperglyemia: Monitor BG, sliding scale if needed    MUSCULOSKELETAL/FRAILTY  - Baseline Frailty Score: 3  - Patient with substantial risk of sarcopenia  - Daily PT/OT as needed while inpatient  - Cancer Rehab as needed outpatient    SYMPTOM MANAGEMENT  - Nausea from chemo/radiation: Prochlorperazine, ondansetron, lorazepam.  - # Pain Assessment:      1/6/2024     7:13 AM   Current Pain Score   Patient currently in pain? denies   - Hortencia Sethi is experiencing pain due to bone marrow biopsy and line placement. Pain management was discussed and the plan was created in a collaborative fashion.  Hortencia Sethi's response to the current recommendations: engaged  - Please see the plan for pain management as documented above      SOCIAL DETERMINANTS  - Caregiver: Srinivas Suggs, Keira Neal & Rickey Neal     Disposition: remain admitted through engraftment     Known issues that I take into account for medical decisions, with salient changes to the plan considering these  "complexities noted above.    Patient Active Problem List   Diagnosis    Left medial knee pain    Obesity (BMI 30-39.9)    Anemia, unspecified type    Macrocytic anemia    MDS (myelodysplastic syndrome) (H)     Clinically Significant Risk Factors Present on Admission                       # Obesity: Estimated body mass index is 33.77 kg/m  as calculated from the following:    Height as of this encounter: 1.575 m (5' 2.01\").    Weight as of this encounter: 83.8 kg (184 lb 11.2 oz).                 Today's summary: remain admitted through chemo prep, transplant and engraftment    I spent 30 minutes in the care of this patient today, which included time necessary for preparation for the visit, obtaining history, ordering medications/tests/procedures as medically indicated, review of pertinent medical literature, counseling of the patient, communication of recommendations to the care team, and documentation time.    MARIIA BoggsC  [phone]      "

## 2024-01-06 NOTE — PROGRESS NOTES
Stop time on MAR & chart I & O  Chemo drug: fludarabine  Tolerated: well  Intervention: Decadron and Zofran given as premeds, good blood return noted before and after infusion  Response: pt denies N/V, offers no complaints  Plan: busulfan next      Stop time on MAR & chart I & O  Chemo drug: busulfan (into purple lumen)  Tolerated: well  Intervention: Decadron and Zofran given as premeds, good blood return noted before and after infusion  Response: pt denies N/V, offers no complaints  Plan: serial lab draws x4 per protocol (from red lumen). Fludarabine and busulfan again tonight.

## 2024-01-06 NOTE — PHARMACY-CONSULT NOTE
Busulfan - Area Under the Curve  Therapeutic Drug Monitoring Pharmacokinetic Note      Busulfan is a chemotherapeutic agent used for conditioning regimens in HSCT patients.    Therapeutic drug monitoring (TDM) using area under the plasma concentration curve (AUC) analysis is recommended due to high inter-individual variability in plasma levels.       A high busulfan AUC is associated with an increased risk for sinusoidal obstruction syndrome, and a suboptimal AUC is associated with an increased risk for graft rejection or disease relapse. TDM uses busulfan levels to optimize the targeted drug exposure and minimize drug-related toxicity.      The Goal Cumulative AUC (cAUC) for all 4 doses for this protocol is 82.1 mghr/L (range 78 - 86.2 mghr/L).  Predicted cAUC outside of this range require a dose adjustment.    Per protocol 2015-29, AUC calculations will be performed on Day 1 following dose 1 and if needed on Day(s) 2/3 following dose(s) 2/3.      Initial Dose = 204 mg IV q24h according to protocol is based on Weight-Based/BSA-Based/Nomogram-Based Dosing. (ADJUST FOR OBESITY: These busulfan dosing regimens REQUIRE adjustment for actual body weight (ABW) >125% of ideal body weight (IBW).)  Model used to determine initial dose: weight based per protocol 130 mg/m2/dose based on AdjBW25 BSA.    Current Dose = 204 mg IV q24h     Date levels were drawn: 1/6/24 Dose number: 1   Model used for TDM: [employer] Clin Canc Res 2014  Based on busulfan drug levels and current dose, predicted cAUC = 99.1 mghr/L  T1/2 = 2.64 hr   Clearance = 0.105 L/hr/kg     ASSESSMENT: The predicted busulfan cAUC is outside the goal range.     A new dose of 156 mg IV q24h will result in a predicted cAUC within goal range.     PLAN: Discussed result with BMT attending physician Dr. Ruth.     Recommend to decrease current busulfan dose 204 mg to 156 mg IV Q24H. This recommendation is based on an ideal AUC cumulative goal of 82.1 mghr/L.  Repeat  levels will be performed on 1/7/24 after dose #2.    Thank you,   Yissel Escobar, PharmD  Hematology/Oncology & BMT Clinical Pharmacist  January 6, 2024

## 2024-01-07 LAB
ANION GAP SERPL CALCULATED.3IONS-SCNC: 11 MMOL/L (ref 7–15)
BACTERIA SPEC CULT: NORMAL
BASOPHILS # BLD AUTO: ABNORMAL 10*3/UL
BASOPHILS # BLD MANUAL: 0 10E3/UL (ref 0–0.2)
BASOPHILS NFR BLD AUTO: ABNORMAL %
BASOPHILS NFR BLD MANUAL: 0 %
BUN SERPL-MCNC: 10.6 MG/DL (ref 6–20)
BUSULFAN SERPL-MCNC: 1049 NG/ML
BUSULFAN SERPL-MCNC: 1621 NG/ML
BUSULFAN SERPL-MCNC: 2232 NG/ML
BUSULFAN SERPL-MCNC: 2511 NG/ML
BUSULFAN SERPL-MCNC: 3000 NG/ML
BUSULFAN SERPL-MCNC: <20 NG/ML
CALCIUM SERPL-MCNC: 9 MG/DL (ref 8.6–10)
CHLORIDE SERPL-SCNC: 106 MMOL/L (ref 98–107)
CREAT SERPL-MCNC: 0.5 MG/DL (ref 0.51–0.95)
DEPRECATED HCO3 PLAS-SCNC: 24 MMOL/L (ref 22–29)
EGFRCR SERPLBLD CKD-EPI 2021: >90 ML/MIN/1.73M2
EOSINOPHIL # BLD AUTO: ABNORMAL 10*3/UL
EOSINOPHIL # BLD MANUAL: 0 10E3/UL (ref 0–0.7)
EOSINOPHIL NFR BLD AUTO: ABNORMAL %
EOSINOPHIL NFR BLD MANUAL: 0 %
ERYTHROCYTE [DISTWIDTH] IN BLOOD BY AUTOMATED COUNT: ABNORMAL %
GLUCOSE SERPL-MCNC: 171 MG/DL (ref 70–99)
HCT VFR BLD AUTO: 25.8 % (ref 35–47)
HGB BLD-MCNC: 8.4 G/DL (ref 11.7–15.7)
IMM GRANULOCYTES # BLD: ABNORMAL 10*3/UL
IMM GRANULOCYTES NFR BLD: ABNORMAL %
LYMPHOCYTES # BLD AUTO: ABNORMAL 10*3/UL
LYMPHOCYTES # BLD MANUAL: 0.4 10E3/UL (ref 0.8–5.3)
LYMPHOCYTES NFR BLD AUTO: ABNORMAL %
LYMPHOCYTES NFR BLD MANUAL: 7 %
MAGNESIUM SERPL-MCNC: 1.9 MG/DL (ref 1.7–2.3)
MCH RBC QN AUTO: 37.5 PG (ref 26.5–33)
MCHC RBC AUTO-ENTMCNC: 32.6 G/DL (ref 31.5–36.5)
MCV RBC AUTO: 115 FL (ref 78–100)
METAMYELOCYTES # BLD MANUAL: 0.1 10E3/UL
METAMYELOCYTES NFR BLD MANUAL: 2 %
MONOCYTES # BLD AUTO: ABNORMAL 10*3/UL
MONOCYTES # BLD MANUAL: 0 10E3/UL (ref 0–1.3)
MONOCYTES NFR BLD AUTO: ABNORMAL %
MONOCYTES NFR BLD MANUAL: 0 %
NEUTROPHILS # BLD AUTO: ABNORMAL 10*3/UL
NEUTROPHILS # BLD MANUAL: 5.5 10E3/UL (ref 1.6–8.3)
NEUTROPHILS NFR BLD AUTO: ABNORMAL %
NEUTROPHILS NFR BLD MANUAL: 91 %
NRBC # BLD AUTO: 0.1 10E3/UL
NRBC # BLD AUTO: 0.1 10E3/UL
NRBC BLD AUTO-RTO: 1 /100
NRBC BLD MANUAL-RTO: 1 %
PHOSPHATE SERPL-MCNC: 2.8 MG/DL (ref 2.5–4.5)
PLAT MORPH BLD: ABNORMAL
PLATELET # BLD AUTO: 165 10E3/UL (ref 150–450)
POLYCHROMASIA BLD QL SMEAR: SLIGHT
POTASSIUM SERPL-SCNC: 4.2 MMOL/L (ref 3.4–5.3)
RBC # BLD AUTO: 2.24 10E6/UL (ref 3.8–5.2)
RBC MORPH BLD: ABNORMAL
SODIUM SERPL-SCNC: 141 MMOL/L (ref 135–145)
TOXIC GRANULES BLD QL SMEAR: PRESENT
WBC # BLD AUTO: 6 10E3/UL (ref 4–11)

## 2024-01-07 PROCEDURE — 80299 QUANTITATIVE ASSAY DRUG: CPT | Performed by: HOSPITALIST

## 2024-01-07 PROCEDURE — 80299 QUANTITATIVE ASSAY DRUG: CPT

## 2024-01-07 PROCEDURE — 258N000003 HC RX IP 258 OP 636: Performed by: INTERNAL MEDICINE

## 2024-01-07 PROCEDURE — 99418 PROLNG IP/OBS E/M EA 15 MIN: CPT | Mod: 24 | Performed by: STUDENT IN AN ORGANIZED HEALTH CARE EDUCATION/TRAINING PROGRAM

## 2024-01-07 PROCEDURE — 206N000001 HC R&B BMT UMMC

## 2024-01-07 PROCEDURE — 250N000011 HC RX IP 250 OP 636

## 2024-01-07 PROCEDURE — 250N000013 HC RX MED GY IP 250 OP 250 PS 637

## 2024-01-07 PROCEDURE — 99233 SBSQ HOSP IP/OBS HIGH 50: CPT | Mod: 24 | Performed by: STUDENT IN AN ORGANIZED HEALTH CARE EDUCATION/TRAINING PROGRAM

## 2024-01-07 PROCEDURE — 84100 ASSAY OF PHOSPHORUS: CPT | Performed by: INTERNAL MEDICINE

## 2024-01-07 PROCEDURE — 83735 ASSAY OF MAGNESIUM: CPT | Performed by: INTERNAL MEDICINE

## 2024-01-07 PROCEDURE — 258N000003 HC RX IP 258 OP 636

## 2024-01-07 PROCEDURE — 250N000011 HC RX IP 250 OP 636: Mod: JZ | Performed by: INTERNAL MEDICINE

## 2024-01-07 PROCEDURE — 250N000012 HC RX MED GY IP 250 OP 636 PS 637

## 2024-01-07 PROCEDURE — 85007 BL SMEAR W/DIFF WBC COUNT: CPT

## 2024-01-07 PROCEDURE — 85027 COMPLETE CBC AUTOMATED: CPT

## 2024-01-07 PROCEDURE — 80048 BASIC METABOLIC PNL TOTAL CA: CPT

## 2024-01-07 RX ADMIN — Medication 5 ML: at 05:47

## 2024-01-07 RX ADMIN — ONDANSETRON HYDROCHLORIDE 8 MG: 8 TABLET, FILM COATED ORAL at 23:27

## 2024-01-07 RX ADMIN — URSODIOL 300 MG: 300 CAPSULE ORAL at 08:51

## 2024-01-07 RX ADMIN — ACYCLOVIR 800 MG: 800 TABLET ORAL at 11:36

## 2024-01-07 RX ADMIN — ACYCLOVIR 800 MG: 800 TABLET ORAL at 17:49

## 2024-01-07 RX ADMIN — MICAFUNGIN SODIUM 150 MG: 50 INJECTION, POWDER, LYOPHILIZED, FOR SOLUTION INTRAVENOUS at 17:49

## 2024-01-07 RX ADMIN — DEXAMETHASONE 10 MG: 2 TABLET ORAL at 23:26

## 2024-01-07 RX ADMIN — Medication 5 ML: at 19:50

## 2024-01-07 RX ADMIN — PANTOPRAZOLE SODIUM 40 MG: 40 TABLET, DELAYED RELEASE ORAL at 08:51

## 2024-01-07 RX ADMIN — ONDANSETRON HYDROCHLORIDE 8 MG: 8 TABLET, FILM COATED ORAL at 14:40

## 2024-01-07 RX ADMIN — ONDANSETRON HYDROCHLORIDE 8 MG: 8 TABLET, FILM COATED ORAL at 08:51

## 2024-01-07 RX ADMIN — URSODIOL 300 MG: 300 CAPSULE ORAL at 14:40

## 2024-01-07 RX ADMIN — ACYCLOVIR 800 MG: 800 TABLET ORAL at 14:40

## 2024-01-07 RX ADMIN — URSODIOL 300 MG: 300 CAPSULE ORAL at 19:30

## 2024-01-07 RX ADMIN — FLUDARABINE PHOSPHATE 76 MG: 25 INJECTION, SOLUTION INTRAVENOUS at 00:08

## 2024-01-07 RX ADMIN — LEVETIRACETAM 500 MG: 500 TABLET, FILM COATED ORAL at 19:31

## 2024-01-07 RX ADMIN — ALLOPURINOL 300 MG: 300 TABLET ORAL at 08:51

## 2024-01-07 RX ADMIN — LEVETIRACETAM 500 MG: 500 TABLET, FILM COATED ORAL at 08:52

## 2024-01-07 RX ADMIN — ACYCLOVIR 800 MG: 800 TABLET ORAL at 08:52

## 2024-01-07 RX ADMIN — ACYCLOVIR 800 MG: 800 TABLET ORAL at 22:14

## 2024-01-07 RX ADMIN — BUSULFAN 156 MG: 6 INJECTION INTRAVENOUS at 01:34

## 2024-01-07 ASSESSMENT — ACTIVITIES OF DAILY LIVING (ADL)
ADLS_ACUITY_SCORE: 21

## 2024-01-07 NOTE — PHARMACY-CONSULT NOTE
Busulfan - Area Under the Curve  Therapeutic Drug Monitoring Pharmacokinetic Note        Busulfan is a chemotherapeutic agent used for conditioning regimens in HSCT patients. Therapeutic drug monitoring (TDM) using area under the plasma concentration curve (AUC) analysis is recommended due to high inter-individual variability in plasma levels.        A high busulfan AUC is associated with an increased risk for sinusoidal obstruction syndrome, and a suboptimal AUC is associated with an increased risk for graft rejection or disease relapse. TDM uses busulfan levels to optimize the targeted drug exposure and minimize drug-related toxicity.       The Goal Cumulative AUC (cAUC) for all 4 doses for this protocol is 82.1 mghr/L (range 78 - 86.2 mghr/L).  Predicted cAUC outside of this range require a dose adjustment.    Per protocol 2015-29, AUC calculations will be performed on Day 1 following dose 1 and if needed on Day(s) 2/3 following dose(s) 2/3.       Initial Dose = 204 mg IV q24h according to protocol is based on weight/BSA based dosing. (ADJUST FOR OBESITY: These busulfan dosing regimens REQUIRE adjustment for actual body weight (ABW) >125% of ideal body weight (IBW).)    Model used to determine initial dose: weight/BSA based per protocol 130 mg/m2/dose based on AdjBW25 BSA.    Date levels were drawn: 1/6/24  Dose number: 1   Model used for TDM: [employer] Clin Canc Res 2014  Based on busulfan drug levels and current dose, predicted cAUC = 99.1 mghr/L  T1/2 = 2.64 hr   Clearance = 0.105 L/hr/kg     Current Dose = 156 mg IV q24h     Date levels were drawn: 1/7/24  Dose number: 2  Model used for TDM: [employer] Clin Canc Res 2014  Based on busulfan drug levels and current dose, predicted cAUC = 78 mghr/L  T1/2 = 2.57 hr   Clearance = 0.11 L/hr/kg      ASSESSMENT: The predicted busulfan cAUC is within the goal range of range 78 - 86.2 mghr/L.        PLAN: Discussed result with BMT attending physician Dr. Ruth.     Recommend  to continue current busulfan dose of 156 mg IV q24h for the patient's remaining 2 doses. This recommendation is based on an ideal AUC cumulative goal of 82.1 mghr/L (range 78 - 86.2 mghr/L). No additional levels will be collected for this patient.     Thank you,   Rickey Metzger, PharmD

## 2024-01-07 NOTE — PROGRESS NOTES
Stop time on MAR & chart I & O  Chemo drug: Fludarabine  Tolerated: Well  Intervention: Decadron and Zofran given as premeds, blood return noted pre and post infusion.  Response: Patient denies pain or nausea  Plan: Continue with BMT plan of care/ Calendar, notify BMT provider with changes

## 2024-01-07 NOTE — PROGRESS NOTES
"  BMT Daily Progress Note  Patient ID:  Irma Foreman is a 53 year old female with a PMH of MDS, warm AIHA, transfusion and transfusion dependent anemia presented to  to undergo a myeloablative allogeneic transplant. She was recently COVID+ on 12/26 but tested negative on 1/2 with resolution of nearly all URI symptoms. Undergoing MA (Bu/Flu) 6/8 URD Allo transplant, day -4      HPI Ms Foreman is up eating full breakfast. No GI issues. Notes some difficulty with sleep on steroids, declines melatonin or other meds for now.   Her hands are dry, tight skin.     Review of Systems    Negative unless stated in the HPI.     PHYSICAL EXAM      Weight     Wt Readings from Last 3 Encounters:   01/06/24 84.3 kg (185 lb 12.8 oz)   12/26/23 83.1 kg (183 lb 4.8 oz)   12/19/23 83.5 kg (184 lb)        KPS: 80    /74 (BP Location: Right arm)   Pulse 86   Temp 98  F (36.7  C) (Oral)   Resp 16   Ht 1.575 m (5' 2.01\")   Wt 84.3 kg (185 lb 12.8 oz)   LMP 11/05/2017   SpO2 97%   BMI 33.97 kg/m       General: NAD   Eyes: KEVON, sclera anicteric   Nose/Mouth/Throat: OP clear, buccal mucosa moist, no ulcerations   Lungs: CTA bilaterally  Cardiovascular: RRR, no M/R/G   Abdominal/Rectal: +BS, soft, NT, ND  Lymphatics: No edema  Skin: wart under right foot dime sized white, cauliflower and slightly ulcerated. Two pin point sized, flesh colored warts on right hand. Hands dry, one excoriation on left dorsum, palms slightly hyperpigmented  Neuro: A&O   Additional Findings: Gastelum site NT, no drainage.    Current aGVHD staging:  Skin 0, UGI 0, LGI 0, Liver 0 (keep in note through day +180 for allos)      LABS AND IMAGING: I have assessed all abnormal lab values for their clinical significance and any values considered clinically significant have been addressed in the assessment and plan.        Lab Results   Component Value Date    WBC 6.0 01/07/2024    ANEUTAUTO 4.8 01/06/2024    HGB 8.4 (L) 01/07/2024    HCT 25.8 (L) 01/07/2024    "  01/07/2024     01/07/2024    POTASSIUM 4.2 01/07/2024    CHLORIDE 106 01/07/2024    CO2 24 01/07/2024     (H) 01/07/2024    BUN 10.6 01/07/2024    CR 0.50 (L) 01/07/2024    MAG 1.9 01/07/2024    INR 1.01 01/05/2024           SYSTEMS-BASED ASSESSMENT AND PLAN     Hortencia Baldwin is a 53 year old female with a history of MDS, undergoing MA (Bu/Flu) 6/8 URD Allo transplant, day -4      BMT/IEC PROTOCOL for MT 2015-29 **according to but not on trial**   - Chemo protocol:  Day -6 through day -1: Keppra and Allopurinol   Day -5 through -2: Bu/Flu. Busulfan levels adjusted by pharm, attending  Day -1: Rest day   Day 0: Transplant. Recipient: O+, CMV+. Donor: O+, CMV-   - Restaging plan: per protocol       HEME/COAG  # Iron overload: hx of transfusion dependent anemia, pre-transplant ferritin around 5,000. Recommend phlebotomy post transplant when retic count is restored and Hgb >10    - Risk of cytopenias due to chemotherapy and radiation  - Transfusion parameters: hemoglobin <7, platelets <10    IMMUNOCOMPROMISED  - Relevant infection history:  URI from COVID (12/26), negative test on 1/2/24   - Vaccination status: Influenza vaccination after day +60, COVID vaccination after day +100, followed by remaining vaccinations at 12, 14, 24, and 26 months.  - Prophylaxis plan: ACV/letermovir, Micafungin through day +45 (current smoker), levofloxacin while neutropenic, Bactrim to start at day +28  Foot, hand warts. Could consider derm consult Monday just to see if they have any recommendations with upcoming neutropenia    RISK OF GVHD  - Prophylaxis: PT Cy, Tac/MMF **avoiding sirolimus d/t high risk VOD**    CARDIOVASCULAR  - Risk of cardiomyopathy:  Baseline EF 60-65%  - Risk of arrhythmia: Baseline EKG showed sinus rhythm     RESPIRATORY  - Current smoker: states she quit today (1/5/24), offered nicotine replacement but she declined.   - Baseline PFTs: The FEV1, FVC, FEV1/FVC ratio and DDD93-43% are within  normal limits.  The inspiratory flow rates are within normal limits.  Lung volumes are within normal limits.  The diffusing capacity is normal.   - Risk of respiratory complications: Frequent ambulation and incentive spirometer.    GI/NUTRITION  # Elevated liver enzymes: down trending ALT: 88 (117), AST: 43 (49) could be related to Tylenol use, iron overload or recent viral illness   RUQ U/S (12/27) was normal  Acute hepatitis panel negative   - Ulcer prophylaxis: PPI  - VOD ppx: Ursodiol   - Risk of malnutrition: dietitian to follow     RENAL/ELECTROLYTES/  - Risk of renal injury: IV hydration   - Electrolyte management: replace per sliding scale    DERM:  Right hand dryness, try aquafor with gloves on overnight and as tolerated during the day  Wart on foot, one on hand, see ID    DIABETES/ENDOCRINE  - Risk of steroid-induced hyperglyemia: Monitor BG, sliding scale if needed    MUSCULOSKELETAL/FRAILTY  - Baseline Frailty Score: 3  - Patient with substantial risk of sarcopenia  - Daily PT/OT as needed while inpatient  - Cancer Rehab as needed outpatient    SYMPTOM MANAGEMENT  - Nausea from chemo/radiation: Prochlorperazine, ondansetron, lorazepam.  - # Pain Assessment:      1/7/2024     4:00 AM   Current Pain Score   Patient currently in pain? denies   - Hortencia Sethi is experiencing pain due to bone marrow biopsy and line placement. Pain management was discussed and the plan was created in a collaborative fashion.  Hortencia Sethi's response to the current recommendations: engaged  - Please see the plan for pain management as documented above      SOCIAL DETERMINANTS  - Caregiver: Keira Cardenas & Rickey Neal     Disposition: remain admitted through engraftment     Known issues that I take into account for medical decisions, with salient changes to the plan considering these complexities noted above.    Patient Active Problem List   Diagnosis    Left medial knee pain    Obesity (BMI 30-39.9)    Anemia, unspecified type  "   Macrocytic anemia    MDS (myelodysplastic syndrome) (H)     Clinically Significant Risk Factors                         # Obesity: Estimated body mass index is 33.97 kg/m  as calculated from the following:    Height as of this encounter: 1.575 m (5' 2.01\").    Weight as of this encounter: 84.3 kg (185 lb 12.8 oz)., PRESENT ON ADMISSION               Today's summary: remain admitted through chemo prep, transplant and engraftment    I spent 30 minutes in the care of this patient today, which included time necessary for preparation for the visit, obtaining history, ordering medications/tests/procedures as medically indicated, review of pertinent medical literature, counseling of the patient, communication of recommendations to the care team, and documentation time.    CHLOE Boggs-C  [phone]      "

## 2024-01-07 NOTE — PROGRESS NOTES
Stop time on MAR & chart I & O  Chemo drug: Busulfan  Tolerated: Well  Intervention: Decadron and Zofran given as premeds. Blood return was noted before and after infusion.  Response: Patient denies pain and nausea.  Plan: Lab draws from red lumen per protocol, Continue with BMT plan of care/calendar, notify BMT provider with changes.

## 2024-01-07 NOTE — PLAN OF CARE
"/74 (BP Location: Right arm)   Pulse 86   Temp 98  F (36.7  C) (Oral)   Resp 16   Ht 1.575 m (5' 2.01\")   Wt 84.3 kg (185 lb 12.8 oz)   LMP 11/05/2017   SpO2 97%   BMI 33.97 kg/m       Patient afebrile, vital signs stable, no reports of pain or nausea. Patient received fludarabine and busulfan overnight. Final Busulfan lab with occur at 0901. No replacements this morning. Patient denies lightheadedness, patient assist level independent. Continue with plan of care.      Goal Outcome Evaluation:  Problem: Adult Inpatient Plan of Care  Goal: Optimal Comfort and Wellbeing  Outcome: Progressing     Problem: Stem Cell/Bone Marrow Transplant  Goal: Optimal Coping with Transplant  Outcome: Progressing  Intervention: Optimize Patient/Family Adjustment to Transplant  Recent Flowsheet Documentation  Taken 1/6/2024 2000 by Christ Jensen, RN  Supportive Measures:   active listening utilized   positive reinforcement provided   self-care encouraged   verbalization of feelings encouraged  Goal: Symptom-Free Urinary Elimination  Outcome: Progressing                           "

## 2024-01-08 ENCOUNTER — HOME INFUSION (PRE-WILLOW HOME INFUSION) (OUTPATIENT)
Dept: PHARMACY | Facility: CLINIC | Age: 54
End: 2024-01-08
Payer: COMMERCIAL

## 2024-01-08 ENCOUNTER — APPOINTMENT (OUTPATIENT)
Dept: PHYSICAL THERAPY | Facility: CLINIC | Age: 54
DRG: 014 | End: 2024-01-08
Payer: COMMERCIAL

## 2024-01-08 LAB
ABO/RH(D): NORMAL
ALBUMIN SERPL BCG-MCNC: 4 G/DL (ref 3.5–5.2)
ALP SERPL-CCNC: 122 U/L (ref 40–150)
ALT SERPL W P-5'-P-CCNC: 87 U/L (ref 0–50)
ANION GAP SERPL CALCULATED.3IONS-SCNC: 11 MMOL/L (ref 7–15)
ANTIBODY SCREEN: NEGATIVE
AST SERPL W P-5'-P-CCNC: 39 U/L (ref 0–45)
BASOPHILS # BLD AUTO: 0 10E3/UL (ref 0–0.2)
BASOPHILS NFR BLD AUTO: 0 %
BILIRUB DIRECT SERPL-MCNC: <0.2 MG/DL (ref 0–0.3)
BILIRUB SERPL-MCNC: 0.5 MG/DL
BUN SERPL-MCNC: 13.8 MG/DL (ref 6–20)
C DIFF TOX B STL QL: NEGATIVE
CALCIUM SERPL-MCNC: 9.2 MG/DL (ref 8.6–10)
CHLORIDE SERPL-SCNC: 107 MMOL/L (ref 98–107)
CREAT SERPL-MCNC: 0.53 MG/DL (ref 0.51–0.95)
DEPRECATED HCO3 PLAS-SCNC: 24 MMOL/L (ref 22–29)
EGFRCR SERPLBLD CKD-EPI 2021: >90 ML/MIN/1.73M2
EOSINOPHIL # BLD AUTO: 0 10E3/UL (ref 0–0.7)
EOSINOPHIL NFR BLD AUTO: 0 %
ERYTHROCYTE [DISTWIDTH] IN BLOOD BY AUTOMATED COUNT: ABNORMAL %
GLUCOSE SERPL-MCNC: 149 MG/DL (ref 70–99)
HCT VFR BLD AUTO: 24.3 % (ref 35–47)
HGB BLD-MCNC: 7.8 G/DL (ref 11.7–15.7)
IMM GRANULOCYTES # BLD: 0.1 10E3/UL
IMM GRANULOCYTES NFR BLD: 1 %
INR PPP: 1.05 (ref 0.85–1.15)
LYMPHOCYTES # BLD AUTO: 0.2 10E3/UL (ref 0.8–5.3)
LYMPHOCYTES NFR BLD AUTO: 4 %
MAGNESIUM SERPL-MCNC: 1.9 MG/DL (ref 1.7–2.3)
MCH RBC QN AUTO: 37 PG (ref 26.5–33)
MCHC RBC AUTO-ENTMCNC: 32.1 G/DL (ref 31.5–36.5)
MCV RBC AUTO: 115 FL (ref 78–100)
MONOCYTES # BLD AUTO: 0 10E3/UL (ref 0–1.3)
MONOCYTES NFR BLD AUTO: 1 %
NEUTROPHILS # BLD AUTO: 5.4 10E3/UL (ref 1.6–8.3)
NEUTROPHILS NFR BLD AUTO: 94 %
NRBC # BLD AUTO: 0 10E3/UL
NRBC BLD AUTO-RTO: 0 /100
PATH REPORT.COMMENTS IMP SPEC: ABNORMAL
PATH REPORT.COMMENTS IMP SPEC: YES
PATH REPORT.FINAL DX SPEC: ABNORMAL
PATH REPORT.GROSS SPEC: ABNORMAL
PATH REPORT.MICROSCOPIC SPEC OTHER STN: ABNORMAL
PATH REPORT.RELEVANT HX SPEC: ABNORMAL
PATH REPORT.RELEVANT HX SPEC: ABNORMAL
PATH REPORT.SITE OF ORIGIN SPEC: ABNORMAL
PHOSPHATE SERPL-MCNC: 3.4 MG/DL (ref 2.5–4.5)
PLATELET # BLD AUTO: 159 10E3/UL (ref 150–450)
POTASSIUM SERPL-SCNC: 4 MMOL/L (ref 3.4–5.3)
PROT SERPL-MCNC: 6.2 G/DL (ref 6.4–8.3)
RBC # BLD AUTO: 2.11 10E6/UL (ref 3.8–5.2)
SODIUM SERPL-SCNC: 142 MMOL/L (ref 135–145)
SPECIMEN EXPIRATION DATE: NORMAL
WBC # BLD AUTO: 5.7 10E3/UL (ref 4–11)

## 2024-01-08 PROCEDURE — 87493 C DIFF AMPLIFIED PROBE: CPT | Performed by: INTERNAL MEDICINE

## 2024-01-08 PROCEDURE — 250N000012 HC RX MED GY IP 250 OP 636 PS 637

## 2024-01-08 PROCEDURE — 97530 THERAPEUTIC ACTIVITIES: CPT | Mod: GP

## 2024-01-08 PROCEDURE — 82248 BILIRUBIN DIRECT: CPT

## 2024-01-08 PROCEDURE — 99418 PROLNG IP/OBS E/M EA 15 MIN: CPT | Mod: 24

## 2024-01-08 PROCEDURE — 85025 COMPLETE CBC W/AUTO DIFF WBC: CPT

## 2024-01-08 PROCEDURE — 86900 BLOOD TYPING SEROLOGIC ABO: CPT

## 2024-01-08 PROCEDURE — 206N000001 HC R&B BMT UMMC

## 2024-01-08 PROCEDURE — 250N000011 HC RX IP 250 OP 636

## 2024-01-08 PROCEDURE — 85610 PROTHROMBIN TIME: CPT

## 2024-01-08 PROCEDURE — 258N000003 HC RX IP 258 OP 636: Performed by: INTERNAL MEDICINE

## 2024-01-08 PROCEDURE — 83735 ASSAY OF MAGNESIUM: CPT

## 2024-01-08 PROCEDURE — 80053 COMPREHEN METABOLIC PANEL: CPT

## 2024-01-08 PROCEDURE — 250N000013 HC RX MED GY IP 250 OP 250 PS 637

## 2024-01-08 PROCEDURE — 250N000011 HC RX IP 250 OP 636: Mod: JZ | Performed by: INTERNAL MEDICINE

## 2024-01-08 PROCEDURE — 258N000003 HC RX IP 258 OP 636

## 2024-01-08 PROCEDURE — 84100 ASSAY OF PHOSPHORUS: CPT

## 2024-01-08 PROCEDURE — 99233 SBSQ HOSP IP/OBS HIGH 50: CPT | Mod: 24

## 2024-01-08 PROCEDURE — 97161 PT EVAL LOW COMPLEX 20 MIN: CPT | Mod: GP

## 2024-01-08 RX ADMIN — FLUDARABINE PHOSPHATE 76 MG: 25 INJECTION, SOLUTION INTRAVENOUS at 23:50

## 2024-01-08 RX ADMIN — LEVETIRACETAM 500 MG: 500 TABLET, FILM COATED ORAL at 07:52

## 2024-01-08 RX ADMIN — ONDANSETRON HYDROCHLORIDE 8 MG: 8 TABLET, FILM COATED ORAL at 07:53

## 2024-01-08 RX ADMIN — ONDANSETRON HYDROCHLORIDE 8 MG: 8 TABLET, FILM COATED ORAL at 16:05

## 2024-01-08 RX ADMIN — ACYCLOVIR 800 MG: 800 TABLET ORAL at 13:32

## 2024-01-08 RX ADMIN — URSODIOL 300 MG: 300 CAPSULE ORAL at 07:52

## 2024-01-08 RX ADMIN — ACYCLOVIR 800 MG: 800 TABLET ORAL at 11:07

## 2024-01-08 RX ADMIN — ACYCLOVIR 800 MG: 800 TABLET ORAL at 17:21

## 2024-01-08 RX ADMIN — Medication 5 ML: at 05:24

## 2024-01-08 RX ADMIN — ONDANSETRON HYDROCHLORIDE 8 MG: 8 TABLET, FILM COATED ORAL at 23:09

## 2024-01-08 RX ADMIN — FLUDARABINE PHOSPHATE 76 MG: 25 INJECTION, SOLUTION INTRAVENOUS at 00:01

## 2024-01-08 RX ADMIN — URSODIOL 300 MG: 300 CAPSULE ORAL at 13:32

## 2024-01-08 RX ADMIN — ALLOPURINOL 300 MG: 300 TABLET ORAL at 07:53

## 2024-01-08 RX ADMIN — BUSULFAN 156 MG: 6 INJECTION INTRAVENOUS at 01:43

## 2024-01-08 RX ADMIN — ACYCLOVIR 800 MG: 800 TABLET ORAL at 07:52

## 2024-01-08 RX ADMIN — PANTOPRAZOLE SODIUM 40 MG: 40 TABLET, DELAYED RELEASE ORAL at 07:53

## 2024-01-08 RX ADMIN — MICAFUNGIN SODIUM 150 MG: 50 INJECTION, POWDER, LYOPHILIZED, FOR SOLUTION INTRAVENOUS at 17:21

## 2024-01-08 RX ADMIN — LEVETIRACETAM 500 MG: 500 TABLET, FILM COATED ORAL at 20:18

## 2024-01-08 RX ADMIN — DEXAMETHASONE 10 MG: 2 TABLET ORAL at 23:10

## 2024-01-08 RX ADMIN — ACYCLOVIR 800 MG: 800 TABLET ORAL at 21:50

## 2024-01-08 RX ADMIN — URSODIOL 300 MG: 300 CAPSULE ORAL at 20:18

## 2024-01-08 ASSESSMENT — ACTIVITIES OF DAILY LIVING (ADL)
ADLS_ACUITY_SCORE: 21

## 2024-01-08 NOTE — PROGRESS NOTES
Stop time on MAR & chart I & O  Chemo drug: Fludarabine  Tolerated: Well  Intervention: Decadron and Zofran given as premeds. Blood return was noted before and after infusion.  Response: Patient denies pain and nausea. No symptoms reported  Plan: Continue with BMT plan of care/calendar, notify BMT provider with changes.

## 2024-01-08 NOTE — PROGRESS NOTES
"   01/08/24 1046   Appointment Info   Signing Clinician's Name / Credentials (PT) Parris Padilla PT, DPT   Rehab Comments (PT) monitor BP   Living Environment   People in Home significant other   Current Living Arrangements apartment   Home Accessibility stairs to enter home   Number of Stairs, Main Entrance 5   Stair Railings, Main Entrance railings on both sides of stairs   Transportation Anticipated car, drives self;family or friend will provide   Living Environment Comments Pt lives in 2nd floor apartment with partner, dtr will be staying with them post transplant to coordinate 24 hr cares. 5 stairs to 2nd level, then all needs met, no elevator   Self-Care   Usual Activity Tolerance good   Current Activity Tolerance good   Regular Exercise No   Equipment Currently Used at Home none   Fall history within last six months no   Activity/Exercise/Self-Care Comment Pt IND with all mobility and ADLs, works as  full time. partner also works full time out of the home. Denies falls or mobility concerns at baseline.   General Information   Onset of Illness/Injury or Date of Surgery 01/05/24   Referring Physician King Vaz PA-C   Patient/Family Therapy Goals Statement (PT) maintain strength and ind   Pertinent History of Current Problem (include personal factors and/or comorbidities that impact the POC) per EMR \"Irma Foreman is a 53 year old female with a PMH of MDS, warm AIHA, transfusion and transfusion dependent anemia presented to [facility] to undergo a myeloablative allogeneic transplant. She was recently COVID+ on 12/26 but tested negative on 1/2 with resolution of nearly all URI symptoms. Undergoing MA (Bu/Flu) 6/8 URD Allo transplant, \"   Existing Precautions/Restrictions immunosuppressed   General Observations ambulate BMT adult   Cognition   Affect/Mental Status (Cognition) WFL   Cognitive Status Comments limited medical literacy and language barrier, states some challenge when discussing " "higher medical terminology in English. Reinforced use of  as needed to increase confidence and understanding   Pain Assessment   Patient Currently in Pain No   Integumentary/Edema   Integumentary/Edema other (describe)   Integumentary/Edema Comments Wart present bottom of R foot, noted in chart previously   Posture    Posture Forward head position   Range of Motion (ROM)   Range of Motion ROM is WFL   Strength (Manual Muscle Testing)   Strength (Manual Muscle Testing) strength is WFL;Able to perform R SLR;Able to perform L SLR   Strength Comments quick to fatigue   Bed Mobility   Comment, (Bed Mobility) ind   Transfers   Comment, (Transfers) ind   Gait/Stairs (Locomotion)   Comment, (Gait/Stairs) ind in room   Balance   Balance Comments impaired higher level balance, supervision with dynamic tasks   Sensory Examination   Sensory Perception patient reports no sensory changes   Sensory Perception Comments states feeling \"light\" all over   Coordination   Coordination no deficits were identified   Clinical Impression   Criteria for Skilled Therapeutic Intervention Yes, treatment indicated   PT Diagnosis (PT) at risk for deconditioning related to prolonged hospital admission   Influenced by the following impairments higher level balance, mm fatigue and endurance   Functional limitations due to impairments community mobility, functional endurance   Clinical Presentation (PT Evaluation Complexity) stable   Clinical Presentation Rationale clinical judgement   Clinical Decision Making (Complexity) low complexity   Planned Therapy Interventions (PT) balance training;bed mobility training;gait training;home exercise program;motor coordination training;neuromuscular re-education;patient/family education;ROM (range of motion);stair training;strengthening;transfer training;progressive activity/exercise;risk factor education;home program guidelines   Risk & Benefits of therapy have been explained evaluation/treatment " results reviewed;care plan/treatment goals reviewed;risks/benefits reviewed;current/potential barriers reviewed;participants voiced agreement with care plan;participants included;patient

## 2024-01-08 NOTE — PROGRESS NOTES
Therapy: Line care and IV ABX (Micafungin)  Insurance: [employer]   Ded: $Does not apply    Co-Insurance: 80%  Max Out of Pocket: $$2000.00  Met: $0.00    This patient has coverage for Line care and IV ABX (Micafungin) through their [employer] plan.The patient has is covered at 80% until out of pocket max of $2000.00 met $0.0 .Once the out of pocket is met pt is covered at 100%  .   If line care therapy only nursing is not covered. Line care supplies would still be covered and above benefits apply.    In reference to hospital admission at [facility] on 01/05/2024 and referral from Natasha Barroso.  Please contact Intake with any questions, [phone] or In Basket pool,  Home Infusion ([phone]).    O-Z Flap Text: The defect edges were debeveled with a #15 scalpel blade.  Given the location of the defect, shape of the defect and the proximity to free margins an O-Z flap was deemed most appropriate.  Using a sterile surgical marker, an appropriate transposition flap was drawn incorporating the defect and placing the expected incisions within the relaxed skin tension lines where possible. The area thus outlined was incised deep to adipose tissue with a #15 scalpel blade.  The skin margins were undermined to an appropriate distance in all directions utilizing iris scissors.

## 2024-01-08 NOTE — PLAN OF CARE
"/87 (BP Location: Right arm)   Pulse 83   Temp 98.1  F (36.7  C) (Oral)   Resp 16   Ht 1.575 m (5' 2.01\")   Wt 83.4 kg (183 lb 14.4 oz)   LMP 11/05/2017   SpO2 99%   BMI 33.63 kg/m      Irma is here for an allo transplant, currently day -3. Will have one more dose of Fludarabine and busulfan overnight before transplant on Thursday. Reported some lightheadedness this morning, stated this happened the previous two mornings as well, orthos negative. Denies nausea and pain. Good urine output. Two hard BMs this morning. Cdiff negative. Good oral intake. CHG wipes done and linens changed.    Problem: Adult Inpatient Plan of Care  Goal: Plan of Care Review  Description: The Plan of Care Review/Shift note should be completed every shift.  The Outcome Evaluation is a brief statement about your assessment that the patient is improving, declining, or no change.  This information will be displayed automatically on your shift  note.  Outcome: Progressing  Goal: Patient-Specific Goal (Individualized)  Description: You can add care plan individualizations to a care plan. Examples of Individualization might be:  \"Parent requests to be called daily at 9am for status\", \"I have a hard time hearing out of my right ear\", or \"Do not touch me to wake me up as it startles  me\".  Outcome: Progressing  Goal: Absence of Hospital-Acquired Illness or Injury  Outcome: Progressing  Intervention: Identify and Manage Fall Risk  Recent Flowsheet Documentation  Taken 1/8/2024 1200 by Kami Bains, RN  Safety Promotion/Fall Prevention:   assistive device/personal items within reach   nonskid shoes/slippers when out of bed   clutter free environment maintained   safety round/check completed   patient and family education  Taken 1/8/2024 1100 by Kami Bains, RN  Safety Promotion/Fall Prevention: safety round/check completed  Taken 1/8/2024 1000 by Kami Bains, RN  Safety Promotion/Fall Prevention: safety round/check " completed  Taken 1/8/2024 0748 by Kami Bains RN  Safety Promotion/Fall Prevention:   safety round/check completed   clutter free environment maintained   nonskid shoes/slippers when out of bed   assistive device/personal items within reach  Intervention: Prevent Skin Injury  Recent Flowsheet Documentation  Taken 1/8/2024 1200 by Kami Bains RN  Body Position: position changed independently  Taken 1/8/2024 0748 by Kami Bains RN  Body Position: position changed independently  Skin Protection: adhesive use limited  Device Skin Pressure Protection: tubing/devices free from skin contact  Intervention: Prevent Infection  Recent Flowsheet Documentation  Taken 1/8/2024 1200 by Kami Bains RN  Infection Prevention:   cohorting utilized   environmental surveillance performed   equipment surfaces disinfected   hand hygiene promoted   personal protective equipment utilized   rest/sleep promoted   single patient room provided   visitors restricted/screened  Taken 1/8/2024 0748 by Kami Bains RN  Infection Prevention:   cohorting utilized   environmental surveillance performed   equipment surfaces disinfected   hand hygiene promoted   personal protective equipment utilized   rest/sleep promoted   single patient room provided   visitors restricted/screened  Goal: Optimal Comfort and Wellbeing  Outcome: Progressing  Goal: Readiness for Transition of Care  Outcome: Progressing

## 2024-01-08 NOTE — PROGRESS NOTES
CLINICAL NUTRITION SERVICES - ASSESSMENT NOTE     Nutrition Prescription    RECOMMENDATIONS FOR MDs/PROVIDERS TO ORDER:  Encourage oral intake     Malnutrition Status:    Patient does not meet two criteria at this time but is at risk     Recommendations already ordered by Registered Dietitian (RD):  Continue high kcal/protein diet + snack/supplements PRN     Future/Additional Recommendations:  Monitor appetite, oral intake, use of supplements  Monitor ability to maintain weight     If unable to take adequate     Dosing weight:  58.3 kg  Access: Central    Initial parameters (per day)  Volume:  1080 mL  Dextrose: 125 g  AA: 90 g  Lipids: 250 mL 20%, 5 days per week     Dextrose titration:   Monitor lytes and if within acceptable parameters (Mg++ > or = 1.5, K+ is > or = 3, and PO4 > or = 1.9), increase dextrose by 50-55 g/day to goal of 230 g dextrose.    Additives: 10 mL infuvite + 1 mL MTE-4    Goal PN provides 230 g dextrose, 90 g AA, and 250 mL 20% lipids 5 days per week for total provision of 1499 Kcals (25 Kcals/kg), 1.5 g/kg protein, GIR =2.73 mg/kg/minute, and 23 % fat kcals on average daily.        REASON FOR ASSESSMENT  Hortencia Baldwin is a/an 53 year old female assessed by the dietitian for Nutrition Risk Monitoring    CLINICAL HISTORY   53 year old female with a history of MDS, undergoing MA (Bu/Flu) 6/8 URD Allo transplant, admitted on 1/5/24 (day -6 of transplant)    NUTRITION HISTORY  Irma reported she feels she is eating ok. She has lost her taste- recent Covid-19 infection on 12/26- negative. She usually consumes about 2 meals and per day and she found she very much enjoys the breakfast sandwich, tater tots, salads and cream of rice cereal on the menu. No chewing/swallowing concerns. No issues with bowels at this time.     CURRENT NUTRITION ORDERS  Diet: High Kcal/High Protein + snacks/supplements PRN   Intake/Tolerance: 100%     LABS  Na 142, K+ 4, Mg 1.9, Po4 3.4 (WNL)  Glucose 149  "(H)    MEDICATIONS  Acyclovir  Allopurinol   Decadron  Fludarabine  Zofran  Protonix  Ursodiol    ANTHROPOMETRICS  Height: 157.5 cm (5' 2.008\")  Most Recent Weight: 83.4 kg (183 lb 14.4 oz)    IBW: 50 kg  BMI: Obesity Grade I BMI 30-34.9  Weight History:   Wt Readings from Last 25 Encounters:   01/08/24 83.4 kg (183 lb 14.4 oz)   12/26/23 83.1 kg (183 lb 4.8 oz)   12/19/23 83.5 kg (184 lb)   11/07/23 83.6 kg (184 lb 6.4 oz)   04/25/23 80.7 kg (177 lb 14.4 oz)   03/19/18 83.3 kg (183 lb 9.6 oz)   02/05/18 83.9 kg (184 lb 14.4 oz)   01/29/18 83 kg (183 lb)   01/23/18 81.6 kg (180 lb)   12/22/17 83 kg (183 lb)   11/28/17 82.6 kg (182 lb)   05/31/17 81 kg (178 lb 8 oz)   05/30/17 81.6 kg (180 lb)   05/26/17 78.9 kg (174 lb)   05/05/17 78.6 kg (173 lb 4.8 oz)     Dosing Weight: 58.3 kg (adjusted)    ASSESSED NUTRITION NEEDS  Estimated Energy Needs: 8462-2715 kcals/day (25 - 30 kcals/kg)  Justification: Maintenance  Estimated Protein Needs: 90 grams protein/day (1.5 )  Justification: Increased needs  Estimated Fluid Needs: 4946-8691 mL/day (1 mL/kcal)   Justification: Maintenance    PHYSICAL FINDINGS  See malnutrition section below.    MALNUTRITION  % Intake: No decreased intake noted  % Weight Loss: None noted  Subcutaneous Fat Loss: None observed  Muscle Loss: Dorsal hand:  mild   Fluid Accumulation/Edema: None noted  Malnutrition Diagnosis: Patient does not meet two of the established criteria necessary for diagnosing malnutrition but is at risk for malnutrition    NUTRITION DIAGNOSIS  Predicted inadequate nutrient intake (energy/protein) related to upcoming transplant, dysgeusia and increased metabolic demand with potential for nutrition side effects      INTERVENTIONS  Implementation  Nutrition Education: RD role in care, 5c menu hacks, supplement list    Collaboration with other providers- 5c rounds      Goals  Patient to consume % of nutritionally adequate meal trays TID, or the equivalent with " supplements/snacks.    Weight to maintain >/= 79 kg      Monitoring/Evaluation  Progress toward goals will be monitored and evaluated per protocol.    Bessie Alvarenga RD, LD  5[facility]/BMT pager: [phone]

## 2024-01-08 NOTE — PLAN OF CARE
"/71 (BP Location: Right arm)   Pulse 68   Temp 98.3  F (36.8  C) (Oral)   Resp 16   Ht 1.575 m (5' 2.01\")   Wt 84.1 kg (185 lb 4.8 oz)   LMP 11/05/2017   SpO2 96%   BMI 33.88 kg/m       Patient afebrile, vital signs stable, no reports of pain or nausea. Patient received Fludarabine and Busulfan overnight and tolerated them well. No replacements needed this morning. Patient will need C-diff sample today. Patient assist level independent. Continue with plan of care.     Goal Outcome Evaluation:  Problem: Adult Inpatient Plan of Care  Goal: Absence of Hospital-Acquired Illness or Injury  Outcome: Progressing  Intervention: Identify and Manage Fall Risk  Recent Flowsheet Documentation  Taken 1/8/2024 0400 by Christ Jensen RN  Safety Promotion/Fall Prevention:   safety round/check completed   nonskid shoes/slippers when out of bed   lighting adjusted   clutter free environment maintained  Taken 1/8/2024 0000 by Christ Jensen RN  Safety Promotion/Fall Prevention:   safety round/check completed   nonskid shoes/slippers when out of bed   lighting adjusted   clutter free environment maintained  Taken 1/7/2024 2200 by Christ Jensen RN  Safety Promotion/Fall Prevention: safety round/check completed  Taken 1/7/2024 2000 by Christ Jnesen RN  Safety Promotion/Fall Prevention:   safety round/check completed   nonskid shoes/slippers when out of bed   lighting adjusted   patient and family education  Intervention: Prevent Skin Injury  Recent Flowsheet Documentation  Taken 1/8/2024 0400 by Christ Jensen RN  Body Position: position changed independently  Taken 1/8/2024 0000 by Christ Jensen RN  Body Position: position changed independently  Taken 1/7/2024 2000 by Christ Jensen RN  Body Position: position changed independently  Intervention: Prevent and Manage VTE (Venous Thromboembolism) Risk  Recent Flowsheet Documentation  Taken 1/7/2024 2000 by Christ Jensen RN  VTE " Prevention/Management: SCDs (sequential compression devices) off  Intervention: Prevent Infection  Recent Flowsheet Documentation  Taken 1/8/2024 0400 by Christ Jensen RN  Infection Prevention:   cohorting utilized   environmental surveillance performed   equipment surfaces disinfected   hand hygiene promoted   personal protective equipment utilized   rest/sleep promoted   single patient room provided   visitors restricted/screened  Taken 1/8/2024 0000 by Christ Jensen RN  Infection Prevention:   cohorting utilized   environmental surveillance performed   equipment surfaces disinfected   hand hygiene promoted   personal protective equipment utilized   rest/sleep promoted   single patient room provided   visitors restricted/screened  Taken 1/7/2024 2000 by Christ Jensen RN  Infection Prevention:   cohorting utilized   environmental surveillance performed   equipment surfaces disinfected   hand hygiene promoted   personal protective equipment utilized   rest/sleep promoted   single patient room provided   visitors restricted/screened  Goal: Optimal Comfort and Wellbeing  Outcome: Progressing     Problem: Stem Cell/Bone Marrow Transplant  Goal: Optimal Coping with Transplant  Outcome: Progressing  Goal: Symptom-Free Urinary Elimination  Outcome: Progressing  Goal: Diarrhea Symptom Control  Outcome: Progressing

## 2024-01-08 NOTE — PLAN OF CARE
"/75 (BP Location: Right arm)   Pulse 81   Temp 98.5  F (36.9  C) (Oral)   Resp 16   Ht 1.575 m (5' 2.01\")   Wt 84.1 kg (185 lb 4.8 oz)   LMP 11/05/2017   SpO2 99%   BMI 33.88 kg/m      Afebrile; VSS on RA. Pt had some lightheadedness yesterday, though denied lightheadedness or dizziness today. Mild discomfort at CVC insertion site; gradually improving per pt and pt declined pharmacological intervention throughout shift. Denied nausea; on scheduled antiemetics. Eating and drinking. No BM on shift; still needs stool sample sent for C.diff r/o. Continue POC.      Goal Outcome Evaluation:  Overall Patient Progress: improvingOverall Patient Progress: improving    Problem: Adult Inpatient Plan of Care  Goal: Plan of Care Review  Description: The Plan of Care Review/Shift note should be completed every shift.  The Outcome Evaluation is a brief statement about your assessment that the patient is improving, declining, or no change.  This information will be displayed automatically on your shift  note.  Outcome: Progressing  Flowsheets (Taken 1/7/2024 1812)  Overall Patient Progress: improving  Goal: Patient-Specific Goal (Individualized)  Description: You can add care plan individualizations to a care plan. Examples of Individualization might be:  \"Parent requests to be called daily at 9am for status\", \"I have a hard time hearing out of my right ear\", or \"Do not touch me to wake me up as it startles  me\".  Outcome: Progressing  Goal: Absence of Hospital-Acquired Illness or Injury  Outcome: Progressing  Intervention: Identify and Manage Fall Risk  Recent Flowsheet Documentation  Taken 1/7/2024 1214 by Renée Bedoya, RN  Safety Promotion/Fall Prevention:   safety round/check completed   clutter free environment maintained   nonskid shoes/slippers when out of bed   patient and family education   room organization consistent   room near nurse's station  Taken 1/7/2024 0900 by Renée Bedoya, RN  Safety " Promotion/Fall Prevention:   safety round/check completed   clutter free environment maintained   nonskid shoes/slippers when out of bed   patient and family education   room organization consistent   room near nurse's station  Intervention: Prevent Skin Injury  Recent Flowsheet Documentation  Taken 1/7/2024 0900 by Renée Bedoya RN  Body Position: position changed independently  Skin Protection: adhesive use limited  Device Skin Pressure Protection: tubing/devices free from skin contact  Intervention: Prevent Infection  Recent Flowsheet Documentation  Taken 1/7/2024 1214 by Renée Bedoya RN  Infection Prevention:   cohorting utilized   environmental surveillance performed   equipment surfaces disinfected   hand hygiene promoted   personal protective equipment utilized   rest/sleep promoted   single patient room provided   visitors restricted/screened  Taken 1/7/2024 0900 by Renée Bedoya RN  Infection Prevention:   cohorting utilized   environmental surveillance performed   equipment surfaces disinfected   hand hygiene promoted   personal protective equipment utilized   rest/sleep promoted   single patient room provided   visitors restricted/screened  Goal: Optimal Comfort and Wellbeing  Outcome: Progressing  Intervention: Monitor Pain and Promote Comfort  Recent Flowsheet Documentation  Taken 1/7/2024 0800 by Renée Bedoya RN  Pain Management Interventions: medication offered but refused  Goal: Readiness for Transition of Care  Outcome: Progressing     Problem: Stem Cell/Bone Marrow Transplant  Goal: Optimal Coping with Transplant  Outcome: Progressing  Goal: Symptom-Free Urinary Elimination  Outcome: Progressing  Intervention: Prevent or Manage Bladder Irritation  Recent Flowsheet Documentation  Taken 1/7/2024 0800 by Renée Bedoya RN  Pain Management Interventions: medication offered but refused  Goal: Diarrhea Symptom Control  Outcome: Progressing  Intervention: Manage Diarrhea  Recent  Flowsheet Documentation  Taken 1/7/2024 0900 by Renée Bedoya RN  Skin Protection: adhesive use limited  Perineal Care: perineal hygiene encouraged  Goal: Improved Activity Tolerance  Outcome: Progressing  Intervention: Promote Improved Energy  Recent Flowsheet Documentation  Taken 1/7/2024 0900 by Renée Bedoya RN  Activity Management: activity adjusted per tolerance  Goal: Blood Counts Within Acceptable Range  Outcome: Progressing  Intervention: Monitor and Manage Hematologic Symptoms  Recent Flowsheet Documentation  Taken 1/7/2024 1214 by Renée Bedoya RN  Medication Review/Management:   medications reviewed   high-risk medications identified  Taken 1/7/2024 0900 by Renée Bedoya RN  Bleeding Precautions:   blood pressure closely monitored   foot protection facilitated   gentle oral care promoted   monitored for signs of bleeding  Medication Review/Management:   medications reviewed   high-risk medications identified  Goal: Absence of Hypersensitivity Reaction  Outcome: Progressing  Goal: Absence of Infection  Outcome: Progressing  Intervention: Prevent and Manage Infection  Recent Flowsheet Documentation  Taken 1/7/2024 1214 by Renée Bedoya RN  Infection Prevention:   cohorting utilized   environmental surveillance performed   equipment surfaces disinfected   hand hygiene promoted   personal protective equipment utilized   rest/sleep promoted   single patient room provided   visitors restricted/screened  Isolation Precautions:   protective environment maintained   cytotoxic precautions maintained  Taken 1/7/2024 0900 by Renée Bedoya RN  Infection Prevention:   cohorting utilized   environmental surveillance performed   equipment surfaces disinfected   hand hygiene promoted   personal protective equipment utilized   rest/sleep promoted   single patient room provided   visitors restricted/screened  Isolation Precautions:   protective environment maintained   cytotoxic precautions  maintained  Goal: Improved Oral Mucous Membrane Health and Integrity  Outcome: Progressing  Intervention: Promote Oral Comfort and Health  Recent Flowsheet Documentation  Taken 1/7/2024 0900 by Renée Bedoya, RN  Oral Care:   oral rinse provided   teeth brushed  Goal: Nausea and Vomiting Symptom Relief  Outcome: Progressing  Goal: Optimal Nutrition Intake  Outcome: Progressing

## 2024-01-08 NOTE — PROGRESS NOTES
Stop time on MAR & chart I & O  Chemo drug: Busulfan  Tolerated: Well  Intervention: Decadron and Zofran given as premeds. Blood return was noted before and after infusion.  Response: Patient denies pain and nausea. No other symptoms reported.  Plan: Busulfan level within therapeutic range per pharm note, continue with BMT plan of care/calendar, notify BMT provider with changes.

## 2024-01-09 ENCOUNTER — APPOINTMENT (OUTPATIENT)
Dept: PHYSICAL THERAPY | Facility: CLINIC | Age: 54
DRG: 014 | End: 2024-01-09
Attending: STUDENT IN AN ORGANIZED HEALTH CARE EDUCATION/TRAINING PROGRAM
Payer: COMMERCIAL

## 2024-01-09 LAB
ANION GAP SERPL CALCULATED.3IONS-SCNC: 8 MMOL/L (ref 7–15)
BASOPHILS # BLD AUTO: ABNORMAL 10*3/UL
BASOPHILS # BLD MANUAL: 0 10E3/UL (ref 0–0.2)
BASOPHILS NFR BLD AUTO: ABNORMAL %
BASOPHILS NFR BLD MANUAL: 0 %
BUN SERPL-MCNC: 14.2 MG/DL (ref 6–20)
CALCIUM SERPL-MCNC: 8.9 MG/DL (ref 8.6–10)
CHLORIDE SERPL-SCNC: 105 MMOL/L (ref 98–107)
CREAT SERPL-MCNC: 0.56 MG/DL (ref 0.51–0.95)
DEPRECATED HCO3 PLAS-SCNC: 27 MMOL/L (ref 22–29)
EGFRCR SERPLBLD CKD-EPI 2021: >90 ML/MIN/1.73M2
EOSINOPHIL # BLD AUTO: ABNORMAL 10*3/UL
EOSINOPHIL # BLD MANUAL: 0 10E3/UL (ref 0–0.7)
EOSINOPHIL NFR BLD AUTO: ABNORMAL %
EOSINOPHIL NFR BLD MANUAL: 0 %
ERYTHROCYTE [DISTWIDTH] IN BLOOD BY AUTOMATED COUNT: 22.8 % (ref 10–15)
GLUCOSE SERPL-MCNC: 139 MG/DL (ref 70–99)
HCT VFR BLD AUTO: 24.4 % (ref 35–47)
HGB BLD-MCNC: 8.2 G/DL (ref 11.7–15.7)
IMM GRANULOCYTES # BLD: ABNORMAL 10*3/UL
IMM GRANULOCYTES NFR BLD: ABNORMAL %
LYMPHOCYTES # BLD AUTO: ABNORMAL 10*3/UL
LYMPHOCYTES # BLD MANUAL: 0.1 10E3/UL (ref 0.8–5.3)
LYMPHOCYTES NFR BLD AUTO: ABNORMAL %
LYMPHOCYTES NFR BLD MANUAL: 2 %
MCH RBC QN AUTO: 38.1 PG (ref 26.5–33)
MCHC RBC AUTO-ENTMCNC: 33.6 G/DL (ref 31.5–36.5)
MCV RBC AUTO: 114 FL (ref 78–100)
MONOCYTES # BLD AUTO: ABNORMAL 10*3/UL
MONOCYTES # BLD MANUAL: 0.1 10E3/UL (ref 0–1.3)
MONOCYTES NFR BLD AUTO: ABNORMAL %
MONOCYTES NFR BLD MANUAL: 1 %
NEUTROPHILS # BLD AUTO: ABNORMAL 10*3/UL
NEUTROPHILS # BLD MANUAL: 5.6 10E3/UL (ref 1.6–8.3)
NEUTROPHILS NFR BLD AUTO: ABNORMAL %
NEUTROPHILS NFR BLD MANUAL: 97 %
NRBC # BLD AUTO: 0 10E3/UL
NRBC BLD AUTO-RTO: 1 /100
PLAT MORPH BLD: ABNORMAL
PLATELET # BLD AUTO: 168 10E3/UL (ref 150–450)
POTASSIUM SERPL-SCNC: 4.1 MMOL/L (ref 3.4–5.3)
RBC # BLD AUTO: 2.15 10E6/UL (ref 3.8–5.2)
RBC MORPH BLD: ABNORMAL
SODIUM SERPL-SCNC: 140 MMOL/L (ref 135–145)
TOXIC GRANULES BLD QL SMEAR: PRESENT
WBC # BLD AUTO: 5.8 10E3/UL (ref 4–11)

## 2024-01-09 PROCEDURE — 258N000003 HC RX IP 258 OP 636: Performed by: INTERNAL MEDICINE

## 2024-01-09 PROCEDURE — 206N000001 HC R&B BMT UMMC

## 2024-01-09 PROCEDURE — 250N000013 HC RX MED GY IP 250 OP 250 PS 637

## 2024-01-09 PROCEDURE — 85027 COMPLETE CBC AUTOMATED: CPT

## 2024-01-09 PROCEDURE — 99233 SBSQ HOSP IP/OBS HIGH 50: CPT | Mod: 24

## 2024-01-09 PROCEDURE — 85007 BL SMEAR W/DIFF WBC COUNT: CPT

## 2024-01-09 PROCEDURE — 258N000003 HC RX IP 258 OP 636

## 2024-01-09 PROCEDURE — 250N000011 HC RX IP 250 OP 636: Mod: JZ

## 2024-01-09 PROCEDURE — 250N000012 HC RX MED GY IP 250 OP 636 PS 637

## 2024-01-09 PROCEDURE — 80048 BASIC METABOLIC PNL TOTAL CA: CPT

## 2024-01-09 PROCEDURE — 97110 THERAPEUTIC EXERCISES: CPT | Mod: GP

## 2024-01-09 PROCEDURE — 250N000011 HC RX IP 250 OP 636: Mod: JZ | Performed by: INTERNAL MEDICINE

## 2024-01-09 PROCEDURE — 99418 PROLNG IP/OBS E/M EA 15 MIN: CPT | Mod: 24

## 2024-01-09 PROCEDURE — 97530 THERAPEUTIC ACTIVITIES: CPT | Mod: GP

## 2024-01-09 RX ADMIN — ACYCLOVIR 800 MG: 800 TABLET ORAL at 08:17

## 2024-01-09 RX ADMIN — PANTOPRAZOLE SODIUM 40 MG: 40 TABLET, DELAYED RELEASE ORAL at 08:16

## 2024-01-09 RX ADMIN — LEVETIRACETAM 500 MG: 500 TABLET, FILM COATED ORAL at 19:28

## 2024-01-09 RX ADMIN — ONDANSETRON HYDROCHLORIDE 8 MG: 8 TABLET, FILM COATED ORAL at 17:06

## 2024-01-09 RX ADMIN — URSODIOL 300 MG: 300 CAPSULE ORAL at 08:16

## 2024-01-09 RX ADMIN — LEVETIRACETAM 500 MG: 500 TABLET, FILM COATED ORAL at 08:16

## 2024-01-09 RX ADMIN — ALLOPURINOL 300 MG: 300 TABLET ORAL at 08:16

## 2024-01-09 RX ADMIN — URSODIOL 300 MG: 300 CAPSULE ORAL at 13:58

## 2024-01-09 RX ADMIN — ONDANSETRON HYDROCHLORIDE 8 MG: 8 TABLET, FILM COATED ORAL at 22:57

## 2024-01-09 RX ADMIN — ACYCLOVIR 800 MG: 800 TABLET ORAL at 22:57

## 2024-01-09 RX ADMIN — URSODIOL 300 MG: 300 CAPSULE ORAL at 19:28

## 2024-01-09 RX ADMIN — ONDANSETRON HYDROCHLORIDE 8 MG: 8 TABLET, FILM COATED ORAL at 08:19

## 2024-01-09 RX ADMIN — ACYCLOVIR 800 MG: 800 TABLET ORAL at 11:21

## 2024-01-09 RX ADMIN — Medication 5 ML: at 23:07

## 2024-01-09 RX ADMIN — Medication 5 ML: at 18:19

## 2024-01-09 RX ADMIN — MICAFUNGIN SODIUM 150 MG: 50 INJECTION, POWDER, LYOPHILIZED, FOR SOLUTION INTRAVENOUS at 17:06

## 2024-01-09 RX ADMIN — DEXAMETHASONE 8 MG: 4 TABLET ORAL at 13:58

## 2024-01-09 RX ADMIN — Medication 5 ML: at 08:19

## 2024-01-09 RX ADMIN — ACYCLOVIR 800 MG: 800 TABLET ORAL at 13:58

## 2024-01-09 RX ADMIN — BUSULFAN 156 MG: 6 INJECTION INTRAVENOUS at 01:00

## 2024-01-09 RX ADMIN — ACYCLOVIR 800 MG: 800 TABLET ORAL at 17:06

## 2024-01-09 ASSESSMENT — ACTIVITIES OF DAILY LIVING (ADL)
ADLS_ACUITY_SCORE: 21

## 2024-01-09 NOTE — PROGRESS NOTES
"  BMT Daily Progress Note  Patient ID:  Irma Foreman is a 53 year old female with a PMH of MDS, warm AIHA, transfusion and transfusion dependent anemia presented to  to undergo a myeloablative allogeneic transplant. She was recently COVID+ on 12/26 but tested negative on 1/2 with resolution of nearly all URI symptoms. Undergoing MA (Bu/Flu) 6/8 URD Allo transplant, day -2      HPI   Irma is doing generally well. She was sitting up in bed ordering breakfast. No issues with nausea. Her stools are small and hard but she declined stool softeners. She has mild discomfort that responded well to a heat pack.     Review of Systems    Negative unless stated in the HPI.     PHYSICAL EXAM      Weight     Wt Readings from Last 3 Encounters:   01/09/24 83.4 kg (183 lb 12.8 oz)   12/26/23 83.1 kg (183 lb 4.8 oz)   12/19/23 83.5 kg (184 lb)        KPS: 80    /81 (BP Location: Right arm)   Pulse 64   Temp 98.1  F (36.7  C) (Oral)   Resp 16   Ht 1.575 m (5' 2.01\")   Wt 83.4 kg (183 lb 12.8 oz)   LMP 11/05/2017   SpO2 98%   BMI 33.61 kg/m       General: NAD   Eyes: KEVON, sclera anicteric   Nose/Mouth/Throat: OP clear, buccal mucosa moist, no ulcerations   Lungs: CTA bilaterally  Cardiovascular: RRR, no M/R/G   Abdominal/Rectal: +BS, soft, NT, ND  Lymphatics: No edema  Skin: wart under right foot dime sized white, cauliflower and slightly ulcerated. Two pin point sized, flesh colored warts on right hand. Hands dry, one excoriation on left dorsum, palms slightly hyperpigmented  Neuro: A&O   Additional Findings: Gastelum site NT, no drainage.    Current aGVHD staging:  Skin 0, UGI 0, LGI 0, Liver 0 (keep in note through day +180 for allos)      LABS AND IMAGING: I have assessed all abnormal lab values for their clinical significance and any values considered clinically significant have been addressed in the assessment and plan.        Lab Results   Component Value Date    WBC 5.8 01/09/2024    ANEUTAUTO 5.4 01/08/2024    " HGB 8.2 (L) 01/09/2024    HCT 24.4 (L) 01/09/2024     01/09/2024     01/09/2024    POTASSIUM 4.1 01/09/2024    CHLORIDE 105 01/09/2024    CO2 27 01/09/2024     (H) 01/09/2024    BUN 14.2 01/09/2024    CR 0.56 01/09/2024    MAG 1.9 01/08/2024    INR 1.05 01/08/2024           SYSTEMS-BASED ASSESSMENT AND PLAN     Hortencia Baldwin is a 53 year old female with a history of MDS, undergoing MA (Bu/Flu) 6/8 URD Allo transplant, day -2      BMT/IEC PROTOCOL for MT 2015-29 **according to but not on trial**   - Chemo protocol:  Day -6 through day -1: Keppra and Allopurinol   Day -5 through -2: Bu/Flu. Busulfan levels adjusted by pharm, attending  Day -1: Rest day   Day 0: Transplant. Recipient: O+, CMV+. Donor: O+, CMV-   - Restaging plan: per protocol       HEME/COAG  # Iron overload: hx of transfusion dependent anemia, pre-transplant ferritin around 5,000. Recommend phlebotomy post transplant when retic count is restored and Hgb >10    - Risk of cytopenias due to chemotherapy and radiation  - Transfusion parameters: hemoglobin <7, platelets <10    IMMUNOCOMPROMISED  - Relevant infection history:  URI from COVID (12/26), negative test on 1/2/24   - Vaccination status: Influenza vaccination after day +60, COVID vaccination after day +100, followed by remaining vaccinations at 12, 14, 24, and 26 months.  - Prophylaxis plan: ACV/letermovir, Micafungin through day +45 (current smoker), levofloxacin while neutropenic, Bactrim to start at day +28  Foot, hand warts. Curbside derm on 1/8, no recs while inpatient, typically managed OP.      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   - Baseline PFTs: The FEV1, FVC, FEV1/FVC ratio and URW47-75% are  within normal limits.  The inspiratory flow rates are within normal limits.  Lung volumes are within normal limits.  The diffusing capacity is normal.   - Risk of respiratory complications: Frequent ambulation and incentive spirometer.    GI/NUTRITION  # Elevated liver enzymes: down trending ALT: 88 (117), AST: 43 (49) could be related to Tylenol use, iron overload or recent viral illness   RUQ U/S (12/27) was normal  Acute hepatitis panel negative   - Ulcer prophylaxis: PPI  - VOD ppx: Ursodiol   - Risk of malnutrition: dietitian to follow     RENAL/ELECTROLYTES/  - Risk of renal injury: IV hydration   - Electrolyte management: replace per sliding scale    DERM:  Right hand dryness, try aquafor with gloves on overnight and as tolerated during the day  Wart on foot, one on hand, see ID    DIABETES/ENDOCRINE  - Risk of steroid-induced hyperglyemia: Monitor BG, sliding scale if needed    MUSCULOSKELETAL/FRAILTY  - Baseline Frailty Score: 3  - Patient with substantial risk of sarcopenia  - Daily PT/OT as needed while inpatient  - Cancer Rehab as needed outpatient    SYMPTOM MANAGEMENT  - Nausea from chemo/radiation: Prochlorperazine, ondansetron, lorazepam.  - # Pain Assessment:      1/9/2024     8:03 AM   Current Pain Score   Patient currently in pain? denies   - Hortencia Sethi is experiencing pain due to bone marrow biopsy and line placement. Pain management was discussed and the plan was created in a collaborative fashion.  Hortencia Sethi's response to the current recommendations: engaged  - Please see the plan for pain management as documented above      SOCIAL DETERMINANTS  - Caregiver: Keira Cardenas & Rickey Neal     Disposition: remain admitted through engraftment     Known issues that I take into account for medical decisions, with salient changes to the plan considering these complexities noted above.    Patient Active Problem List   Diagnosis    Left medial knee pain    Obesity (BMI 30-39.9)    Anemia,  "unspecified type    Macrocytic anemia    MDS (myelodysplastic syndrome) (H)     Clinically Significant Risk Factors                         # Obesity: Estimated body mass index is 33.61 kg/m  as calculated from the following:    Height as of this encounter: 1.575 m (5' 2.01\").    Weight as of this encounter: 83.4 kg (183 lb 12.8 oz)., PRESENT ON ADMISSION               Today's summary: remain admitted through chemo prep, transplant and engraftment, no change to plan of care     I spent 30 minutes in the care of this patient today, which included time necessary for preparation for the visit, obtaining history, ordering medications/tests/procedures as medically indicated, review of pertinent medical literature, counseling of the patient, communication of recommendations to the care team, and documentation time.    King Vaz PA-C  [phone]      "

## 2024-01-09 NOTE — PLAN OF CARE
"Goal Outcome Evaluation:     Blood pressure 126/74, pulse 66, temperature 98.8  F (37.1  C), temperature source Oral, resp. rate 16, height 1.575 m (5' 2.01\"), weight 83.4 kg (183 lb 14.4 oz), last menstrual period 11/05/2017, SpO2 96%, not currently breastfeeding.    AVSS, A/O x 4. Pt is on room air and saturating well. She is up independently to the bathroom and voiding freely and recording outputs on the white board. Pt complained of neck discomfort with touch but OK at rest. She declined to have medication for the neck pain. Warm packs applied with some relief. She denies any chest pain, Shortness of breath and lightheadedness. Pt got last doses of Fludarabine and Busulfan  after premedication , Zofran and decadron given and tolerated them well. Positive blood returns from lines. Tomorrow is rest day then she will be getting transplant of Thursday. Continue to monitor pt and follow plan of care.      Problem: Adult Inpatient Plan of Care  Goal: Plan of Care Review  Description: The Plan of Care Review/Shift note should be completed every shift.  The Outcome Evaluation is a brief statement about your assessment that the patient is improving, declining, or no change.  This information will be displayed automatically on your shift  note.  Outcome: Progressing     Problem: Adult Inpatient Plan of Care  Goal: Patient-Specific Goal (Individualized)  Description: You can add care plan individualizations to a care plan. Examples of Individualization might be:  \"Parent requests to be called daily at 9am for status\", \"I have a hard time hearing out of my right ear\", or \"Do not touch me to wake me up as it startles  me\".  Outcome: Progressing     Problem: Adult Inpatient Plan of Care  Goal: Absence of Hospital-Acquired Illness or Injury  Outcome: Progressing  Intervention: Identify and Manage Fall Risk  Recent Flowsheet Documentation  Taken 1/9/2024 0400 by Ama Larkin RN  Safety Promotion/Fall Prevention:   " lighting adjusted   clutter free environment maintained   chemotherapeutic precautions   nonskid shoes/slippers when out of bed   room near nurse's station   room organization consistent  Taken 1/8/2024 2000 by Ama Larkin RN  Safety Promotion/Fall Prevention:   lighting adjusted   clutter free environment maintained   chemotherapeutic precautions   nonskid shoes/slippers when out of bed   room near nurse's station   room organization consistent  Intervention: Prevent Skin Injury  Recent Flowsheet Documentation  Taken 1/9/2024 0400 by Ama Larkin RN  Body Position: position changed independently  Taken 1/8/2024 2000 by Ama Larkin RN  Body Position: position changed independently  Intervention: Prevent and Manage VTE (Venous Thromboembolism) Risk  Recent Flowsheet Documentation  Taken 1/9/2024 0400 by Ama Larkin RN  VTE Prevention/Management:   compression stockings off   SCDs (sequential compression devices) off  Taken 1/8/2024 2000 by Ama Larkin RN  VTE Prevention/Management:   compression stockings off   SCDs (sequential compression devices) off  Intervention: Prevent Infection  Recent Flowsheet Documentation  Taken 1/9/2024 0400 by Ama Larkin RN  Infection Prevention:   hand hygiene promoted   personal protective equipment utilized   rest/sleep promoted   single patient room provided   visitors restricted/screened   equipment surfaces disinfected   environmental surveillance performed  Taken 1/8/2024 2000 by Ama Larkin RN  Infection Prevention:   hand hygiene promoted   personal protective equipment utilized   rest/sleep promoted   single patient room provided   visitors restricted/screened   equipment surfaces disinfected   environmental surveillance performed     Problem: Adult Inpatient Plan of Care  Goal: Absence of Hospital-Acquired Illness or Injury  Intervention: Prevent Skin Injury  Recent Flowsheet Documentation  Taken 1/9/2024 0400 by Ama Larkin  O, RN  Body Position: position changed independently  Taken 1/8/2024 2000 by Ama Larkin RN  Body Position: position changed independently     Problem: Adult Inpatient Plan of Care  Goal: Absence of Hospital-Acquired Illness or Injury  Intervention: Prevent and Manage VTE (Venous Thromboembolism) Risk  Recent Flowsheet Documentation  Taken 1/9/2024 0400 by Ama Larkin RN  VTE Prevention/Management:   compression stockings off   SCDs (sequential compression devices) off  Taken 1/8/2024 2000 by Ama Larkin RN  VTE Prevention/Management:   compression stockings off   SCDs (sequential compression devices) off     Problem: Adult Inpatient Plan of Care  Goal: Optimal Comfort and Wellbeing  Outcome: Progressing     Problem: Stem Cell/Bone Marrow Transplant  Goal: Optimal Coping with Transplant  Outcome: Progressing     Problem: Stem Cell/Bone Marrow Transplant  Goal: Symptom-Free Urinary Elimination  Outcome: Progressing     Problem: Stem Cell/Bone Marrow Transplant  Goal: Blood Counts Within Acceptable Range  Intervention: Monitor and Manage Hematologic Symptoms  Recent Flowsheet Documentation  Taken 1/9/2024 0400 by Ama Larkin RN  Bleeding Precautions: blood pressure closely monitored  Medication Review/Management: medications reviewed  Taken 1/8/2024 2000 by Ama Larkin RN  Bleeding Precautions: blood pressure closely monitored  Medication Review/Management: medications reviewed     Problem: Stem Cell/Bone Marrow Transplant  Goal: Blood Counts Within Acceptable Range  Outcome: Progressing  Intervention: Monitor and Manage Hematologic Symptoms  Recent Flowsheet Documentation  Taken 1/9/2024 0400 by Ama Larkin RN  Bleeding Precautions: blood pressure closely monitored  Medication Review/Management: medications reviewed  Taken 1/8/2024 2000 by Ama Larkin RN  Bleeding Precautions: blood pressure closely monitored  Medication Review/Management: medications reviewed      Problem: Stem Cell/Bone Marrow Transplant  Goal: Absence of Infection  Outcome: Progressing  Intervention: Prevent and Manage Infection  Recent Flowsheet Documentation  Taken 1/9/2024 0400 by Ama Larkin RN  Infection Prevention:   hand hygiene promoted   personal protective equipment utilized   rest/sleep promoted   single patient room provided   visitors restricted/screened   equipment surfaces disinfected   environmental surveillance performed  Isolation Precautions:   cytotoxic precautions maintained   protective environment maintained  Taken 1/8/2024 2000 by Ama Larkin RN  Infection Prevention:   hand hygiene promoted   personal protective equipment utilized   rest/sleep promoted   single patient room provided   visitors restricted/screened   equipment surfaces disinfected   environmental surveillance performed  Isolation Precautions:   cytotoxic precautions maintained   protective environment maintained     Problem: Stem Cell/Bone Marrow Transplant  Goal: Absence of Infection  Intervention: Prevent and Manage Infection  Recent Flowsheet Documentation  Taken 1/9/2024 0400 by Ama Larkin RN  Infection Prevention:   hand hygiene promoted   personal protective equipment utilized   rest/sleep promoted   single patient room provided   visitors restricted/screened   equipment surfaces disinfected   environmental surveillance performed  Isolation Precautions:   cytotoxic precautions maintained   protective environment maintained  Taken 1/8/2024 2000 by Ama Larkin RN  Infection Prevention:   hand hygiene promoted   personal protective equipment utilized   rest/sleep promoted   single patient room provided   visitors restricted/screened   equipment surfaces disinfected   environmental surveillance performed  Isolation Precautions:   cytotoxic precautions maintained   protective environment maintained     Problem: Stem Cell/Bone Marrow Transplant  Goal: Improved Oral Mucous Membrane Health  and Integrity  Outcome: Progressing  Intervention: Promote Oral Comfort and Health  Recent Flowsheet Documentation  Taken 1/9/2024 0400 by Ama Larkni, RN  Oral Mucous Membrane Protection: lip/mouth moisturizer applied  Taken 1/8/2024 2000 by Ama Larkin RN  Oral Mucous Membrane Protection: lip/mouth moisturizer applied     Problem: Stem Cell/Bone Marrow Transplant  Goal: Optimal Nutrition Intake  Outcome: Progressing     Problem: Stem Cell/Bone Marrow Transplant  Goal: Nausea and Vomiting Symptom Relief  Outcome: Progressing

## 2024-01-09 NOTE — PLAN OF CARE
Goal Outcome Evaluation:      Stop time on MAR & chart I & O  Chemo drug : FLUdarabine and Busulfan  Tolerated: Tolerated well  Intervention: Pre medications, Decadran and Zofran  Response: Denies Nausea/vomiting/diarrhea  Plan: Continue to monitor pt

## 2024-01-09 NOTE — PROGRESS NOTES
"30-Second Sit to Stand Test:  The test is designed to be conducted with a straight back chair, without armrests, with a 17-inch seat height.  (Chair heights: High back & Folding = 18\", ICU/5C recliner & window seat = 18 1/2\"  Actual height of chair used: 18 \"    Patient Score (score =0 if must use arms) 10 reps    The 30 Second Sit to Stand Test is considered a test of fall risk.  Data from [place] [employer], cosponsored by [place] [facility]:  If must use arms = High Fall Risk regardless of reps  8 or less times = High Fall Risk   9 to 12 times = Moderate Risk  13 or more times = Low Risk    The 30 Second Sit to Stand Test is also considered a test of leg strength and endurance.   Normative Data from Zack et al,. 2001  Age                 Reps: Men        Reps: Women  60-64                14-19                       12-17                               65-69                12-18                       11-16                    70-74                12-17                       10-15              75-79                11-17                       10-15                    80-84                10-15                         9-14  85-89                 8-14                          8-13  90-94                 7-12                          4-11    Assessment: Performed to assess patients leg strength and endurance and risk for falls prior to transplant for goal setting, discharge recommendations, and guided treatment intervention. Per assessment, pt demonstrates decreased functional strength, is at a moderate risk for falls, and will benefit from continued IP PT intervention while IP to address deficits.     "

## 2024-01-10 LAB
ALBUMIN SERPL BCG-MCNC: 3.8 G/DL (ref 3.5–5.2)
ALP SERPL-CCNC: 110 U/L (ref 40–150)
ALT SERPL W P-5'-P-CCNC: 56 U/L (ref 0–50)
ANION GAP SERPL CALCULATED.3IONS-SCNC: 7 MMOL/L (ref 7–15)
AST SERPL W P-5'-P-CCNC: 19 U/L (ref 0–45)
BASOPHILS # BLD AUTO: ABNORMAL 10*3/UL
BASOPHILS # BLD MANUAL: 0 10E3/UL (ref 0–0.2)
BASOPHILS NFR BLD AUTO: ABNORMAL %
BASOPHILS NFR BLD MANUAL: 0 %
BILIRUB DIRECT SERPL-MCNC: 0.2 MG/DL (ref 0–0.3)
BILIRUB SERPL-MCNC: 0.7 MG/DL
BUN SERPL-MCNC: 12.1 MG/DL (ref 6–20)
CALCIUM SERPL-MCNC: 9.2 MG/DL (ref 8.6–10)
CHLORIDE SERPL-SCNC: 105 MMOL/L (ref 98–107)
CREAT SERPL-MCNC: 0.51 MG/DL (ref 0.51–0.95)
DEPRECATED HCO3 PLAS-SCNC: 28 MMOL/L (ref 22–29)
EGFRCR SERPLBLD CKD-EPI 2021: >90 ML/MIN/1.73M2
EOSINOPHIL # BLD AUTO: ABNORMAL 10*3/UL
EOSINOPHIL # BLD MANUAL: 0 10E3/UL (ref 0–0.7)
EOSINOPHIL NFR BLD AUTO: ABNORMAL %
EOSINOPHIL NFR BLD MANUAL: 0 %
ERYTHROCYTE [DISTWIDTH] IN BLOOD BY AUTOMATED COUNT: 22.5 % (ref 10–15)
GLUCOSE SERPL-MCNC: 129 MG/DL (ref 70–99)
HCT VFR BLD AUTO: 24.5 % (ref 35–47)
HGB BLD-MCNC: 8.1 G/DL (ref 11.7–15.7)
IMM GRANULOCYTES # BLD: ABNORMAL 10*3/UL
IMM GRANULOCYTES NFR BLD: ABNORMAL %
LYMPHOCYTES # BLD AUTO: ABNORMAL 10*3/UL
LYMPHOCYTES # BLD MANUAL: 0.1 10E3/UL (ref 0.8–5.3)
LYMPHOCYTES NFR BLD AUTO: ABNORMAL %
LYMPHOCYTES NFR BLD MANUAL: 3 %
MAGNESIUM SERPL-MCNC: 2 MG/DL (ref 1.7–2.3)
MCH RBC QN AUTO: 37.3 PG (ref 26.5–33)
MCHC RBC AUTO-ENTMCNC: 33.1 G/DL (ref 31.5–36.5)
MCV RBC AUTO: 113 FL (ref 78–100)
MONOCYTES # BLD AUTO: ABNORMAL 10*3/UL
MONOCYTES # BLD MANUAL: 0 10E3/UL (ref 0–1.3)
MONOCYTES NFR BLD AUTO: ABNORMAL %
MONOCYTES NFR BLD MANUAL: 1 %
MYELOCYTES # BLD MANUAL: 0.1 10E3/UL
MYELOCYTES NFR BLD MANUAL: 3 %
NEUTROPHILS # BLD AUTO: ABNORMAL 10*3/UL
NEUTROPHILS # BLD MANUAL: 3.3 10E3/UL (ref 1.6–8.3)
NEUTROPHILS NFR BLD AUTO: ABNORMAL %
NEUTROPHILS NFR BLD MANUAL: 93 %
NRBC # BLD AUTO: 0 10E3/UL
NRBC BLD AUTO-RTO: 1 /100
PLAT MORPH BLD: ABNORMAL
PLATELET # BLD AUTO: 162 10E3/UL (ref 150–450)
POTASSIUM SERPL-SCNC: 4.1 MMOL/L (ref 3.4–5.3)
PROT SERPL-MCNC: 5.7 G/DL (ref 6.4–8.3)
RBC # BLD AUTO: 2.17 10E6/UL (ref 3.8–5.2)
RBC MORPH BLD: ABNORMAL
SODIUM SERPL-SCNC: 140 MMOL/L (ref 135–145)
TOXIC GRANULES BLD QL SMEAR: PRESENT
WBC # BLD AUTO: 3.5 10E3/UL (ref 4–11)

## 2024-01-10 PROCEDURE — 85014 HEMATOCRIT: CPT

## 2024-01-10 PROCEDURE — 99233 SBSQ HOSP IP/OBS HIGH 50: CPT | Mod: 24

## 2024-01-10 PROCEDURE — 250N000012 HC RX MED GY IP 250 OP 636 PS 637

## 2024-01-10 PROCEDURE — 250N000013 HC RX MED GY IP 250 OP 250 PS 637

## 2024-01-10 PROCEDURE — 250N000011 HC RX IP 250 OP 636: Mod: JZ

## 2024-01-10 PROCEDURE — 85007 BL SMEAR W/DIFF WBC COUNT: CPT

## 2024-01-10 PROCEDURE — 99418 PROLNG IP/OBS E/M EA 15 MIN: CPT | Mod: 24

## 2024-01-10 PROCEDURE — 258N000003 HC RX IP 258 OP 636

## 2024-01-10 PROCEDURE — 80048 BASIC METABOLIC PNL TOTAL CA: CPT

## 2024-01-10 PROCEDURE — 83735 ASSAY OF MAGNESIUM: CPT

## 2024-01-10 PROCEDURE — 206N000001 HC R&B BMT UMMC

## 2024-01-10 PROCEDURE — 82248 BILIRUBIN DIRECT: CPT

## 2024-01-10 RX ADMIN — URSODIOL 300 MG: 300 CAPSULE ORAL at 19:28

## 2024-01-10 RX ADMIN — ONDANSETRON HYDROCHLORIDE 8 MG: 8 TABLET, FILM COATED ORAL at 22:55

## 2024-01-10 RX ADMIN — ACYCLOVIR 800 MG: 800 TABLET ORAL at 10:37

## 2024-01-10 RX ADMIN — Medication 5 ML: at 18:40

## 2024-01-10 RX ADMIN — ONDANSETRON HYDROCHLORIDE 8 MG: 8 TABLET, FILM COATED ORAL at 07:43

## 2024-01-10 RX ADMIN — ACYCLOVIR 800 MG: 800 TABLET ORAL at 22:55

## 2024-01-10 RX ADMIN — LEVETIRACETAM 500 MG: 500 TABLET, FILM COATED ORAL at 07:43

## 2024-01-10 RX ADMIN — LEVETIRACETAM 500 MG: 500 TABLET, FILM COATED ORAL at 19:28

## 2024-01-10 RX ADMIN — ALLOPURINOL 300 MG: 300 TABLET ORAL at 07:43

## 2024-01-10 RX ADMIN — PANTOPRAZOLE SODIUM 40 MG: 40 TABLET, DELAYED RELEASE ORAL at 07:43

## 2024-01-10 RX ADMIN — ACYCLOVIR 800 MG: 800 TABLET ORAL at 17:44

## 2024-01-10 RX ADMIN — ONDANSETRON HYDROCHLORIDE 8 MG: 8 TABLET, FILM COATED ORAL at 15:35

## 2024-01-10 RX ADMIN — DEXAMETHASONE 6 MG: 2 TABLET ORAL at 07:43

## 2024-01-10 RX ADMIN — ACYCLOVIR 800 MG: 800 TABLET ORAL at 07:43

## 2024-01-10 RX ADMIN — URSODIOL 300 MG: 300 CAPSULE ORAL at 14:10

## 2024-01-10 RX ADMIN — ACYCLOVIR 800 MG: 800 TABLET ORAL at 14:10

## 2024-01-10 RX ADMIN — URSODIOL 300 MG: 300 CAPSULE ORAL at 07:43

## 2024-01-10 RX ADMIN — Medication 5 ML: at 03:39

## 2024-01-10 RX ADMIN — MICAFUNGIN SODIUM 150 MG: 50 INJECTION, POWDER, LYOPHILIZED, FOR SOLUTION INTRAVENOUS at 17:44

## 2024-01-10 RX ADMIN — LEVOFLOXACIN 250 MG: 250 TABLET, FILM COATED ORAL at 10:37

## 2024-01-10 ASSESSMENT — ACTIVITIES OF DAILY LIVING (ADL)
ADLS_ACUITY_SCORE: 21

## 2024-01-10 NOTE — PLAN OF CARE
Patient vital signs stable, no complaints offered. She denies nausea, she denies pain. She stated her BM more formed today. Tomorrow rest day, Thursday transplant. Continue to monitor.  Problem: Adult Inpatient Plan of Care  Goal: Plan of Care Review  Description: The Plan of Care Review/Shift note should be completed every shift.  The Outcome Evaluation is a brief statement about your assessment that the patient is improving, declining, or no change.  This information will be displayed automatically on your shift  note.  Outcome: Progressing   Goal Outcome Evaluation:

## 2024-01-10 NOTE — PROGRESS NOTES
"  BMT Daily Progress Note  Patient ID:  Irma Foreman is a 53 year old female with a PMH of MDS, warm AIHA, transfusion and transfusion dependent anemia presented to  to undergo a myeloablative allogeneic transplant. She was recently COVID+ on 12/26 but tested negative on 1/2 with resolution of nearly all URI symptoms. Undergoing MA (Bu/Flu) 6/8 URD Allo transplant, day -1      HPI   Irma is doing well this morning. She was sitting up at her table eating breakfast. Good appetite, no N/V/D. No acute concerns.     Review of Systems    Negative unless stated in the HPI.     PHYSICAL EXAM      Weight     Wt Readings from Last 3 Encounters:   01/10/24 83.1 kg (183 lb 3.2 oz)   12/26/23 83.1 kg (183 lb 4.8 oz)   12/19/23 83.5 kg (184 lb)        KPS: 80    /74 (BP Location: Right arm)   Pulse 71   Temp 97.4  F (36.3  C) (Axillary)   Resp 18   Ht 1.575 m (5' 2.01\")   Wt 83.1 kg (183 lb 3.2 oz)   LMP 11/05/2017   SpO2 99%   BMI 33.50 kg/m       General: NAD   Eyes: KEVON, sclera anicteric   Nose/Mouth/Throat: OP clear, buccal mucosa moist, no ulcerations   Lungs: CTA bilaterally  Cardiovascular: RRR, no M/R/G   Abdominal/Rectal: +BS, soft, NT, ND  Lymphatics: No edema  Skin: wart under right foot dime sized white, cauliflower and slightly ulcerated. Two pin point sized, flesh colored warts on right hand. Hands dry, one excoriation on left dorsum, palms slightly hyperpigmented  Neuro: A&O   Additional Findings: Gastelum site NT, no drainage.    Current aGVHD staging:  Skin 0, UGI 0, LGI 0, Liver 0 (keep in note through day +180 for allos)      LABS AND IMAGING: I have assessed all abnormal lab values for their clinical significance and any values considered clinically significant have been addressed in the assessment and plan.        Lab Results   Component Value Date    WBC 3.5 (L) 01/10/2024    ANEUTAUTO 5.4 01/08/2024    HGB 8.1 (L) 01/10/2024    HCT 24.5 (L) 01/10/2024     01/10/2024     " 01/10/2024    POTASSIUM 4.1 01/10/2024    CHLORIDE 105 01/10/2024    CO2 28 01/10/2024     (H) 01/10/2024    BUN 12.1 01/10/2024    CR 0.51 01/10/2024    MAG 2.0 01/10/2024    INR 1.05 01/08/2024           SYSTEMS-BASED ASSESSMENT AND PLAN     Hortencia Baldwin is a 53 year old female with a history of MDS, undergoing MA (Bu/Flu) 6/8 URD Allo transplant, day -1      BMT/IEC PROTOCOL for MT 2015-29 **according to but not on trial**   - Chemo protocol:  ? Day -6 through day -1: Keppra and Allopurinol   ? Day -5 through -2: Bu/Flu. Busulfan levels adjusted by pharm, attending  ? Day -1: Rest day   ? Day 0: Transplant. Recipient: O+, CMV+. Donor: O+, CMV-   - Restaging plan: per protocol       HEME/COAG  # Iron overload: hx of transfusion dependent anemia, pre-transplant ferritin around 5,000. Recommend phlebotomy post transplant when retic count is restored and Hgb >10    - Risk of cytopenias due to chemotherapy and radiation  - Transfusion parameters: hemoglobin <7, platelets <10    IMMUNOCOMPROMISED  - Relevant infection history:  URI from COVID (12/26), negative test on 1/2/24   - Vaccination status: Influenza vaccination after day +60, COVID vaccination after day +100, followed by remaining vaccinations at 12, 14, 24, and 26 months.  - Prophylaxis plan: ACV/letermovir, Micafungin through day +45 (current smoker), levofloxacin while neutropenic, Bactrim to start at day +28  Foot, hand warts. Curbside derm on 1/8, no recs while inpatient, typically managed OP.      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   - Baseline PFTs: The FEV1, FVC, FEV1/FVC ratio and YDM36-87% are within normal limits.  The inspiratory flow rates are within normal limits.   Lung volumes are within normal limits.  The diffusing capacity is normal.   - Risk of respiratory complications: Frequent ambulation and incentive spirometer.    GI/NUTRITION  # Elevated liver enzymes: down trending ALT: 88 (117), AST: 43 (49) could be related to Tylenol use, iron overload or recent viral illness   ? RUQ U/S (12/27) was normal  ? Acute hepatitis panel negative   - Ulcer prophylaxis: PPI  - VOD ppx: Ursodiol   - Risk of malnutrition: dietitian to follow     RENAL/ELECTROLYTES/  - Risk of renal injury: IV hydration   - Electrolyte management: replace per sliding scale    DERM:  Right hand dryness, try aquafor with gloves on overnight and as tolerated during the day  Wart on foot, one on hand, see ID    DIABETES/ENDOCRINE  - Risk of steroid-induced hyperglyemia: Monitor BG, sliding scale if needed    MUSCULOSKELETAL/FRAILTY  - Baseline Frailty Score: 3  - Patient with substantial risk of sarcopenia  - Daily PT/OT as needed while inpatient  - Cancer Rehab as needed outpatient    SYMPTOM MANAGEMENT  - Nausea from chemo/radiation: Prochlorperazine, ondansetron, lorazepam.  - # Pain Assessment:      1/10/2024     9:00 AM   Current Pain Score   Patient currently in pain? denies   - Hortencia Sethi is experiencing pain due to bone marrow biopsy and line placement. Pain management was discussed and the plan was created in a collaborative fashion.  Hortencia Sethi's response to the current recommendations: engaged  - Please see the plan for pain management as documented above      SOCIAL DETERMINANTS  - Caregiver: Keira Cardenas & Rickey Neal     Disposition: remain admitted through engraftment     Known issues that I take into account for medical decisions, with salient changes to the plan considering these complexities noted above.    Patient Active Problem List   Diagnosis     Left medial knee pain     Obesity (BMI 30-39.9)     Anemia, unspecified type     Macrocytic anemia     MDS (myelodysplastic syndrome) (H)  "    Clinically Significant Risk Factors                         # Obesity: Estimated body mass index is 33.5 kg/m  as calculated from the following:    Height as of this encounter: 1.575 m (5' 2.01\").    Weight as of this encounter: 83.1 kg (183 lb 3.2 oz).                Today's summary: remain admitted through chemo prep, transplant tomorrow     I spent 30 minutes in the care of this patient today, which included time necessary for preparation for the visit, obtaining history, ordering medications/tests/procedures as medically indicated, review of pertinent medical literature, counseling of the patient, communication of recommendations to the care team, and documentation time.    King Vaz PA-C  [phone]      ______________________________________________      BMT ATTENDING NOTE    Hortencia Baldwin is a 53 year old female, who is day -1 of transplant for MDS.      Subjective:  Reports feeling well overall, no new concerns, decent energy level and appetite.  Ambulating.  No respiratory concerns.  No nausea no vomiting no diarrhea.  Wants to talk to  regarding short and long-term disability transition.       Vitals reviewed, labs reviewed  PE:  NAD, Alert, oriented x3  HEENT: no visible oral ulcers  Heart: RRR  Lungs: CTAB  Abdomen: +BS, soft non tender, non distended  LE: no edema  Skin: Noted warts on the right sole and upper extremity nonconcerning in appearance     Assessment and Plan:    1. Heme/BMT: MDS, here for MAC 6/8 MUD, flu/busulfan prep, PTCy/Tac/MMF GVHD prophylaxis, transfusion as needed, history of secondary iron overload  2. ID: Afebrile to date  3. CV: no active issues  4. GI/ FEN: monitor regimen related toxicity, encourage fluid PO intake, I/O and daily weights, at risk of VOD due to history of abnormal LFTs and secondary hemochromatosis baseline ultrasound on 12/27/2023  5. Renal: Maintain euvolemic  6. PPx: Levo, jani, ACV/letermovir  7.  Supportive care :PT, baseline frailty " score 3, encourage ambulation, optimize nutrition, avoid sarcopenia, other supportive care as detailed above      I spent 50 minutes in the care of Irma today, including an independent face-to-face assessment and time monitoring for restoration of hematopoiesis, high-risk organ toxicities from chemotherapy, and GVHD, managing infectious risk due to his immunocompromised state, and encouraging nutrition and physical activity to prevent debility and sarcopenia.  I personally performed all of the medical decision making associated with this visit, counseled Praveen on my overall assessment and recommendations, communicated my plan to the care team, and edited the above note to reflect my current plan of care.     Liam Guajardo MD

## 2024-01-10 NOTE — PROGRESS NOTES
BMT CLINICAL SOCIAL WORK NOTE:    Focus: Supportive Counseling    Data: Pt is a 53 year old female, currently day -1 for her planned allo BMT.    Interventions: Clinical  (CSW) met with Pt to assess coping, provide supportive counseling and assist with resources as needed.  Pt shared that overall she is doing well so far. We talked about her grants and that she was approved for both the [facility] pre-transplant and [facility]. The pt discussed she is adapting to being in the hospital, but is finding it hard to sleep due to many factors. She is hopeful she will adjust more once she does not need to be woken up as much. Her boyfriend and children are coming to visit at night which she is thankful for.  CSW provided empathic listening, validation of concerns, and encouragement. CSW encouraged Pt to contact CSW for support, questions and/or resources.     Assessment: Pt presented as friendly and open.  Pt appears to be coping well at this time. Pt continues to be supported by her boyfriend and children.     Plan: CSW will continue to provide supportive counseling and assistance with resources as needed. CSW will continue to collaborate with multidisciplinary team regarding Pt's plan of care.     CLIFTON Ryan, [facility]  Pager: [phone]  Phone: [phone]

## 2024-01-10 NOTE — PLAN OF CARE
"/68 (BP Location: Right arm)   Pulse 73   Temp 97.1  F (36.2  C) (Axillary)   Resp 16   Ht 1.575 m (5' 2.01\")   Wt 83.4 kg (183 lb 12.8 oz)   LMP 11/05/2017   SpO2 97%   BMI 33.61 kg/m       Patient afebrile, vital signs stable, no reports of pain or nausea. No replacements this morning. Patient would like to speak with the  today. Patient assist level independent, continue with plan of care.    Goal Outcome Evaluation:  Problem: Adult Inpatient Plan of Care  Goal: Optimal Comfort and Wellbeing  Outcome: Progressing     Problem: Stem Cell/Bone Marrow Transplant  Goal: Optimal Coping with Transplant  Outcome: Progressing  Intervention: Optimize Patient/Family Adjustment to Transplant  Recent Flowsheet Documentation  Taken 1/9/2024 2000 by Christ Jensen RN  Supportive Measures:   active listening utilized   positive reinforcement provided   self-care encouraged   verbalization of feelings encouraged  Goal: Symptom-Free Urinary Elimination  Outcome: Progressing  Goal: Diarrhea Symptom Control  Outcome: Progressing  Goal: Improved Activity Tolerance  Outcome: Progressing  Intervention: Promote Improved Energy  Recent Flowsheet Documentation  Taken 1/9/2024 2000 by Christ Jensen RN  Activity Management:   activity adjusted per tolerance   up ad jeff   ambulated to bathroom  Environmental Support:   calm environment promoted   distractions minimized   environmental consistency promoted   personal routine supported  Goal: Blood Counts Within Acceptable Range  Outcome: Progressing  Intervention: Monitor and Manage Hematologic Symptoms  Recent Flowsheet Documentation  Taken 1/9/2024 2000 by Christ Jensen RN  Bleeding Precautions: blood pressure closely monitored  Medication Review/Management: medications reviewed  Goal: Absence of Hypersensitivity Reaction  Outcome: Progressing  Goal: Absence of Infection  Outcome: Progressing  Intervention: Prevent and Manage Infection  Recent " Flowsheet Documentation  Taken 1/9/2024 2000 by Christ Jensen, RN  Infection Prevention:   hand hygiene promoted   personal protective equipment utilized   rest/sleep promoted   single patient room provided   visitors restricted/screened   equipment surfaces disinfected   environmental surveillance performed  Isolation Precautions:   cytotoxic precautions maintained   protective environment maintained  Goal: Improved Oral Mucous Membrane Health and Integrity  Outcome: Progressing  Goal: Nausea and Vomiting Symptom Relief  Outcome: Progressing  Goal: Optimal Nutrition Intake  Outcome: Progressing

## 2024-01-11 LAB
ABO/RH(D): NORMAL
ALBUMIN UR-MCNC: NEGATIVE MG/DL
ANION GAP SERPL CALCULATED.3IONS-SCNC: 6 MMOL/L (ref 7–15)
ANTIBODY SCREEN: NEGATIVE
APPEARANCE UR: CLEAR
BASOPHILS # BLD AUTO: ABNORMAL 10*3/UL
BASOPHILS # BLD MANUAL: 0 10E3/UL (ref 0–0.2)
BASOPHILS NFR BLD AUTO: ABNORMAL %
BASOPHILS NFR BLD MANUAL: 0 %
BILIRUB UR QL STRIP: NEGATIVE
BILL ONLY ALLO PBPC PROCESSING: NORMAL
BUN SERPL-MCNC: 13.6 MG/DL (ref 6–20)
CALCIUM SERPL-MCNC: 8.9 MG/DL (ref 8.6–10)
CHLORIDE SERPL-SCNC: 105 MMOL/L (ref 98–107)
COLOR UR AUTO: ABNORMAL
CREAT SERPL-MCNC: 0.52 MG/DL (ref 0.51–0.95)
DEPRECATED HCO3 PLAS-SCNC: 29 MMOL/L (ref 22–29)
EGFRCR SERPLBLD CKD-EPI 2021: >90 ML/MIN/1.73M2
EOSINOPHIL # BLD AUTO: ABNORMAL 10*3/UL
EOSINOPHIL # BLD MANUAL: 0 10E3/UL (ref 0–0.7)
EOSINOPHIL NFR BLD AUTO: ABNORMAL %
EOSINOPHIL NFR BLD MANUAL: 0 %
ERYTHROCYTE [DISTWIDTH] IN BLOOD BY AUTOMATED COUNT: 21.9 % (ref 10–15)
GLUCOSE SERPL-MCNC: 100 MG/DL (ref 70–99)
GLUCOSE UR STRIP-MCNC: NEGATIVE MG/DL
HCT VFR BLD AUTO: 22.6 % (ref 35–47)
HGB BLD-MCNC: 7.4 G/DL (ref 11.7–15.7)
HGB UR QL STRIP: NEGATIVE
IMM GRANULOCYTES # BLD: ABNORMAL 10*3/UL
IMM GRANULOCYTES NFR BLD: ABNORMAL %
KETONES UR STRIP-MCNC: NEGATIVE MG/DL
LEUKOCYTE ESTERASE UR QL STRIP: NEGATIVE
LYMPHOCYTES # BLD AUTO: ABNORMAL 10*3/UL
LYMPHOCYTES # BLD MANUAL: 0.1 10E3/UL (ref 0.8–5.3)
LYMPHOCYTES NFR BLD AUTO: ABNORMAL %
LYMPHOCYTES NFR BLD MANUAL: 6 %
MCH RBC QN AUTO: 37 PG (ref 26.5–33)
MCHC RBC AUTO-ENTMCNC: 32.7 G/DL (ref 31.5–36.5)
MCV RBC AUTO: 113 FL (ref 78–100)
MONOCYTES # BLD AUTO: ABNORMAL 10*3/UL
MONOCYTES # BLD MANUAL: 0 10E3/UL (ref 0–1.3)
MONOCYTES NFR BLD AUTO: ABNORMAL %
MONOCYTES NFR BLD MANUAL: 0 %
MUCOUS THREADS #/AREA URNS LPF: PRESENT /LPF
NEUTROPHILS # BLD AUTO: ABNORMAL 10*3/UL
NEUTROPHILS # BLD MANUAL: 2.2 10E3/UL (ref 1.6–8.3)
NEUTROPHILS NFR BLD AUTO: ABNORMAL %
NEUTROPHILS NFR BLD MANUAL: 94 %
NITRATE UR QL: NEGATIVE
NRBC # BLD AUTO: 0 10E3/UL
NRBC # BLD AUTO: 0 10E3/UL
NRBC BLD AUTO-RTO: 1 /100
NRBC BLD MANUAL-RTO: 2 %
PH UR STRIP: 7 [PH] (ref 5–7)
PLAT MORPH BLD: ABNORMAL
PLATELET # BLD AUTO: 153 10E3/UL (ref 150–450)
POTASSIUM SERPL-SCNC: 4.1 MMOL/L (ref 3.4–5.3)
RBC # BLD AUTO: 2 10E6/UL (ref 3.8–5.2)
RBC MORPH BLD: ABNORMAL
RBC URINE: 0 /HPF
SODIUM SERPL-SCNC: 140 MMOL/L (ref 135–145)
SP GR UR STRIP: 1.01 (ref 1–1.03)
SPECIMEN EXPIRATION DATE: NORMAL
SQUAMOUS EPITHELIAL: <1 /HPF
TOXIC GRANULES BLD QL SMEAR: PRESENT
UROBILINOGEN UR STRIP-MCNC: NORMAL MG/DL
WBC # BLD AUTO: 2.3 10E3/UL (ref 4–11)
WBC URINE: <1 /HPF

## 2024-01-11 PROCEDURE — 86922 COMPATIBILITY TEST ANTIGLOB: CPT

## 2024-01-11 PROCEDURE — 38214 VOLUME DEPLETE OF HARVEST: CPT | Performed by: STUDENT IN AN ORGANIZED HEALTH CARE EDUCATION/TRAINING PROGRAM

## 2024-01-11 PROCEDURE — 86900 BLOOD TYPING SEROLOGIC ABO: CPT

## 2024-01-11 PROCEDURE — 250N000011 HC RX IP 250 OP 636

## 2024-01-11 PROCEDURE — 258N000003 HC RX IP 258 OP 636

## 2024-01-11 PROCEDURE — 80048 BASIC METABOLIC PNL TOTAL CA: CPT

## 2024-01-11 PROCEDURE — 815N000004 HC ACQUISITION BONE MARROW NMDP

## 2024-01-11 PROCEDURE — 85007 BL SMEAR W/DIFF WBC COUNT: CPT

## 2024-01-11 PROCEDURE — 206N000001 HC R&B BMT UMMC

## 2024-01-11 PROCEDURE — 87103 BLOOD FUNGUS CULTURE: CPT

## 2024-01-11 PROCEDURE — 250N000013 HC RX MED GY IP 250 OP 250 PS 637

## 2024-01-11 PROCEDURE — 87086 URINE CULTURE/COLONY COUNT: CPT

## 2024-01-11 PROCEDURE — 81001 URINALYSIS AUTO W/SCOPE: CPT

## 2024-01-11 PROCEDURE — 85027 COMPLETE CBC AUTOMATED: CPT

## 2024-01-11 PROCEDURE — 86923 COMPATIBILITY TEST ELECTRIC: CPT

## 2024-01-11 PROCEDURE — 30243Y3 TRANSFUSION OF ALLOGENEIC UNRELATED HEMATOPOIETIC STEM CELLS INTO CENTRAL VEIN, PERCUTANEOUS APPROACH: ICD-10-PCS | Performed by: INTERNAL MEDICINE

## 2024-01-11 PROCEDURE — 38240 TRANSPLT ALLO HCT/DONOR: CPT | Performed by: INTERNAL MEDICINE

## 2024-01-11 RX ORDER — CEFEPIME HYDROCHLORIDE 2 G/1
2 INJECTION, POWDER, FOR SOLUTION INTRAVENOUS EVERY 8 HOURS
Status: DISCONTINUED | OUTPATIENT
Start: 2024-01-12 | End: 2024-01-18

## 2024-01-11 RX ADMIN — ACYCLOVIR 800 MG: 800 TABLET ORAL at 18:11

## 2024-01-11 RX ADMIN — MICAFUNGIN SODIUM 150 MG: 50 INJECTION, POWDER, LYOPHILIZED, FOR SOLUTION INTRAVENOUS at 16:58

## 2024-01-11 RX ADMIN — URSODIOL 300 MG: 300 CAPSULE ORAL at 08:54

## 2024-01-11 RX ADMIN — URSODIOL 300 MG: 300 CAPSULE ORAL at 19:23

## 2024-01-11 RX ADMIN — Medication 5 ML: at 23:20

## 2024-01-11 RX ADMIN — ACYCLOVIR 800 MG: 800 TABLET ORAL at 13:53

## 2024-01-11 RX ADMIN — Medication 5 ML: at 03:20

## 2024-01-11 RX ADMIN — ACYCLOVIR 800 MG: 800 TABLET ORAL at 08:54

## 2024-01-11 RX ADMIN — ACYCLOVIR 800 MG: 800 TABLET ORAL at 11:12

## 2024-01-11 RX ADMIN — DIPHENHYDRAMINE HYDROCHLORIDE 25 MG: 25 CAPSULE ORAL at 13:52

## 2024-01-11 RX ADMIN — LEVOFLOXACIN 250 MG: 250 TABLET, FILM COATED ORAL at 11:12

## 2024-01-11 RX ADMIN — ONDANSETRON HYDROCHLORIDE 8 MG: 8 TABLET, FILM COATED ORAL at 08:54

## 2024-01-11 RX ADMIN — ACYCLOVIR 800 MG: 800 TABLET ORAL at 21:30

## 2024-01-11 RX ADMIN — Medication 5 ML: at 18:11

## 2024-01-11 RX ADMIN — Medication 5 ML: at 03:16

## 2024-01-11 RX ADMIN — PANTOPRAZOLE SODIUM 40 MG: 40 TABLET, DELAYED RELEASE ORAL at 08:54

## 2024-01-11 RX ADMIN — URSODIOL 300 MG: 300 CAPSULE ORAL at 13:52

## 2024-01-11 RX ADMIN — ONDANSETRON HYDROCHLORIDE 8 MG: 8 TABLET, FILM COATED ORAL at 14:30

## 2024-01-11 RX ADMIN — ACETAMINOPHEN 500 MG: 500 TABLET ORAL at 13:52

## 2024-01-11 ASSESSMENT — ACTIVITIES OF DAILY LIVING (ADL)
ADLS_ACUITY_SCORE: 21

## 2024-01-11 NOTE — PLAN OF CARE
"/69 (BP Location: Right arm)   Pulse 98   Temp 97.3  F (36.3  C) (Oral)   Resp 14   Ht 1.575 m (5' 2.01\")   Wt 84.4 kg (186 lb 1.1 oz)   LMP 11/05/2017   SpO2 100%   BMI 34.02 kg/m      VSS. Patient on room air, independent in the room. Eating and drinking well. 1 BM this shift. Denies pain or nausea. Transplant given at 1427 today. Completed. Patient feeling slightly flushed a couple minutes after transplant - Md was in room and reports that is normal. Vital signs consistent with prior to transplant.     Goal Outcome Evaluation:    Problem: Adult Inpatient Plan of Care  Goal: Plan of Care Review  Description: The Plan of Care Review/Shift note should be completed every shift.  The Outcome Evaluation is a brief statement about your assessment that the patient is improving, declining, or no change.  This information will be displayed automatically on your shift  note.  Outcome: Progressing  Goal: Patient-Specific Goal (Individualized)  Description: You can add care plan individualizations to a care plan. Examples of Individualization might be:  \"Parent requests to be called daily at 9am for status\", \"I have a hard time hearing out of my right ear\", or \"Do not touch me to wake me up as it startles  me\".  Outcome: Progressing  Goal: Absence of Hospital-Acquired Illness or Injury  Outcome: Progressing  Intervention: Prevent Skin Injury  Recent Flowsheet Documentation  Taken 1/11/2024 1121 by Monse Kamara, RN  Body Position: position changed independently  Taken 1/11/2024 0900 by Monse Kamara, RN  Body Position: position changed independently  Intervention: Prevent Infection  Recent Flowsheet Documentation  Taken 1/11/2024 0900 by Monse Kamara, RN  Infection Prevention:   hand hygiene promoted   personal protective equipment utilized   rest/sleep promoted   single patient room provided   visitors restricted/screened   equipment surfaces disinfected   environmental surveillance performed  Goal: Optimal " Comfort and Wellbeing  Outcome: Progressing  Goal: Readiness for Transition of Care  Outcome: Progressing     Problem: Stem Cell/Bone Marrow Transplant  Goal: Optimal Coping with Transplant  Outcome: Progressing  Goal: Symptom-Free Urinary Elimination  Outcome: Progressing  Goal: Diarrhea Symptom Control  Outcome: Progressing  Intervention: Manage Diarrhea  Recent Flowsheet Documentation  Taken 1/11/2024 1121 by Monse Kamara RN  Perineal Care: perineum cleansed  Goal: Improved Activity Tolerance  Outcome: Progressing  Intervention: Promote Improved Energy  Recent Flowsheet Documentation  Taken 1/11/2024 0900 by Monse Kamara RN  Activity Management: up ad jeff  Goal: Blood Counts Within Acceptable Range  Outcome: Progressing  Goal: Absence of Hypersensitivity Reaction  Outcome: Progressing  Goal: Absence of Infection  Outcome: Progressing  Intervention: Prevent and Manage Infection  Recent Flowsheet Documentation  Taken 1/11/2024 0900 by Monse Kamara RN  Infection Prevention:   hand hygiene promoted   personal protective equipment utilized   rest/sleep promoted   single patient room provided   visitors restricted/screened   equipment surfaces disinfected   environmental surveillance performed  Isolation Precautions:   cytotoxic precautions maintained   protective environment maintained  Goal: Improved Oral Mucous Membrane Health and Integrity  Outcome: Progressing  Intervention: Promote Oral Comfort and Health  Recent Flowsheet Documentation  Taken 1/11/2024 1121 by Monse Kamara RN  Oral Care:   tongue brushed   teeth brushed  Goal: Nausea and Vomiting Symptom Relief  Outcome: Progressing  Goal: Optimal Nutrition Intake  Outcome: Progressing

## 2024-01-11 NOTE — PLAN OF CARE
Patient had a uneventful day no complaints offered. Patient rest day today, she states having a hard time sleeping with the decadron. Patient eating well and is up and about. Transplant tomorrow afternoon, cells arrive tomorrow morning to cell processing therapy. Follow plan of care,  Problem: Adult Inpatient Plan of Care  Goal: Plan of Care Review  Description: The Plan of Care Review/Shift note should be completed every shift.  The Outcome Evaluation is a brief statement about your assessment that the patient is improving, declining, or no change.  This information will be displayed automatically on your shift  note.  Outcome: Progressing   Goal Outcome Evaluation:

## 2024-01-11 NOTE — PROGRESS NOTES
Type of transplant: Donor: Allogeneic - Unrelated  Product:   BMT INFUSION DOCUMENTATION (last 48 hours)       BMT/Cellular Product Infusion       Row Name 01/11/24 1100                [REMOVED] Product 01/11/24 1235 HPC, Apheresis    Product Details Product Release Date: 01/11/24  -NH Product Release Time: 1235  -CV Product Type: HPC, Apheresis  -NH DIN: [id number]  -NH Product Description Code: [id number]  -NH Volume Dispensed (mL): 291 mL  -NH Completion Date (RN to complete): 01/11/24  -AD Completion Time (RN to complete): 1447  -AD    Checked by (Patient RN) Monse Kamara RN  -AD       Checked by (Witness) Dari Alva RN  -SUNIL       Product Volume Infused (mL) 291 mL  -AD       Flush Volume (mL) 60 mL  -AD       Volume Dispensed (mL) --                 User Key  (r) = Recorded By, (t) = Taken By, (c) = Cosigned By      Initials Name Effective Dates    MISAEL Robbinsfernanda Radha 04/16/23 -     Nayeli Ellis 04/06/23 -     oMnse Merino RN 08/16/22 -     Dari Ross RN 12/29/23 -                   Preparation: RN Documentation  Patient was premedicated as ordered: yes  Line Type: central line, right  Patient Stable Prior to Infusion: yes  Time Infusion Started: 1427  Teaching: side effects/monitoring  Tolerated/Reaction: Patient tolerance of product infusion  Immediate suspected transfusion reaction to the product: none  Did patient have prior history of similar signs/symptoms during this hospitalization?: no  Did the patient tolerate the infusion well: yes  Did the symptoms resolve?:  (NA)  Flush until: no flush - fresh cells  Plan: Continue to monitor patient

## 2024-01-11 NOTE — PROGRESS NOTES
"  BMT Daily Progress Note  Patient ID:  Irma Foreman is a 53 year old female with a PMH of MDS, warm AIHA, transfusion and transfusion dependent anemia presented to  to undergo a myeloablative allogeneic transplant. She was recently COVID+ on 12/26 but tested negative on 1/2 with resolution of nearly all URI symptoms. Undergoing MA (Bu/Flu) 6/8 URD Allo transplant, day 0      HPI   Irma continues to do well. No acute concerns today. Transplant this afternoon.      Review of Systems    Negative unless stated in the HPI.     PHYSICAL EXAM      Weight     Wt Readings from Last 3 Encounters:   01/11/24 84.4 kg (186 lb 1.1 oz)   12/26/23 83.1 kg (183 lb 4.8 oz)   12/19/23 83.5 kg (184 lb)        KPS: 80    /72 (BP Location: Right arm)   Pulse 75   Temp 97.6  F (36.4  C) (Axillary)   Resp 14   Ht 1.575 m (5' 2.01\")   Wt 84.4 kg (186 lb 1.1 oz)   LMP 11/05/2017   SpO2 100%   BMI 34.02 kg/m       General: NAD   Eyes: KEVON, sclera anicteric   Nose/Mouth/Throat: OP clear, buccal mucosa moist, no ulcerations   Lungs: CTA bilaterally  Cardiovascular: RRR, no M/R/G   Abdominal/Rectal: +BS, soft, NT, ND  Lymphatics: No edema  Skin: wart under right foot dime sized white, cauliflower and slightly ulcerated. Two pin point sized, flesh colored warts on right hand. Hands dry, one excoriation on left dorsum.   Neuro: A&O   Additional Findings: Gastelum site NT, no drainage.    Current aGVHD staging:  Skin 0, UGI 0, LGI 0, Liver 0 (keep in note through day +180 for allos)      LABS AND IMAGING: I have assessed all abnormal lab values for their clinical significance and any values considered clinically significant have been addressed in the assessment and plan.        Lab Results   Component Value Date    WBC 2.3 (L) 01/11/2024    ANEUTAUTO 5.4 01/08/2024    HGB 7.4 (L) 01/11/2024    HCT 22.6 (L) 01/11/2024     01/11/2024     01/11/2024    POTASSIUM 4.1 01/11/2024    CHLORIDE 105 01/11/2024    CO2 29 " 01/11/2024     (H) 01/11/2024    BUN 13.6 01/11/2024    CR 0.52 01/11/2024    MAG 2.0 01/10/2024    INR 1.05 01/08/2024           SYSTEMS-BASED ASSESSMENT AND PLAN     Hortencia Baldwin is a 53 year old female with a history of MDS, undergoing MA (Bu/Flu) 6/8 URD Allo transplant, day 0      BMT/IEC PROTOCOL for MT 2015-29 **according to but not on trial**   - Chemo protocol:  ? Day -6 through day -1: Keppra and Allopurinol   ? Day -5 through -2: Bu/Flu. Busulfan levels adjusted by pharm, attending  ? Day -1: Rest day   ? Day 0: Transplant. Recipient: O+, CMV+. Donor: O+, CMV-. No flush. Cell dose TBD.   - Restaging plan: per protocol       HEME/COAG  # Iron overload: hx of transfusion dependent anemia, pre-transplant ferritin around 5,000. Recommend phlebotomy post transplant when retic count is restored and Hgb >10    - Risk of cytopenias due to chemotherapy and radiation  - Transfusion parameters: hemoglobin <7, platelets <10    IMMUNOCOMPROMISED  - Relevant infection history:  URI from COVID (12/26), negative test on 1/2/24   - Vaccination status: Influenza vaccination after day +60, COVID vaccination after day +100, followed by remaining vaccinations at 12, 14, 24, and 26 months.  - Prophylaxis plan: ACV/letermovir, Micafungin through day +45 (current smoker), levofloxacin while neutropenic, Bactrim to start at day +28  Foot, hand warts. Curbside derm on 1/8, no recs while inpatient, typically managed OP.      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   - Baseline PFTs: The FEV1, FVC, FEV1/FVC ratio and SHR89-47% are within normal limits.  The inspiratory flow rates are within normal limits.  Lung volumes are within normal limits.  The  diffusing capacity is normal.   - Risk of respiratory complications: Frequent ambulation and incentive spirometer.    GI/NUTRITION  # Elevated liver enzymes: down trending ALT: 88 (117), AST: 43 (49) could be related to Tylenol use, iron overload or recent viral illness   ? RUQ U/S (12/27) was normal  ? Acute hepatitis panel negative   - Ulcer prophylaxis: PPI  - VOD ppx: Ursodiol   - Risk of malnutrition: dietitian to follow     RENAL/ELECTROLYTES/  - Risk of renal injury: IV hydration   - Electrolyte management: replace per sliding scale    DERM:  Right hand dryness, try aquafor with gloves on overnight and as tolerated during the day  Wart on foot, one on hand, see ID    DIABETES/ENDOCRINE  - Risk of steroid-induced hyperglyemia: Monitor BG, sliding scale if needed    MUSCULOSKELETAL/FRAILTY  - Baseline Frailty Score: 3  - Patient with substantial risk of sarcopenia  - Daily PT/OT as needed while inpatient  - Cancer Rehab as needed outpatient    SYMPTOM MANAGEMENT  - Nausea from chemo/radiation: Prochlorperazine, ondansetron, lorazepam.  - # Pain Assessment:      1/11/2024    11:10 AM   Current Pain Score   Patient currently in pain? denies   - Hortencia Sethi is experiencing pain due to bone marrow biopsy and line placement. Pain management was discussed and the plan was created in a collaborative fashion.  Hortencia Sethi's response to the current recommendations: engaged  - Please see the plan for pain management as documented above      SOCIAL DETERMINANTS  - Caregiver: Keira Cardenas & Rickey Neal     Disposition: remain admitted through engraftment     Known issues that I take into account for medical decisions, with salient changes to the plan considering these complexities noted above.    Patient Active Problem List   Diagnosis     Left medial knee pain     Obesity (BMI 30-39.9)     Anemia, unspecified type     Macrocytic anemia     MDS (myelodysplastic syndrome) (H)     Clinically Significant Risk Factors     "                     # Obesity: Estimated body mass index is 34.02 kg/m  as calculated from the following:    Height as of this encounter: 1.575 m (5' 2.01\").    Weight as of this encounter: 84.4 kg (186 lb 1.1 oz).                Today's summary: remain admitted through chemo prep, transplant today     I spent 30 minutes in the care of this patient today, which included time necessary for preparation for the visit, obtaining history, ordering medications/tests/procedures as medically indicated, review of pertinent medical literature, counseling of the patient, communication of recommendations to the care team, and documentation time.    King Vaz PA-C  [phone]      ______________________________________________      BMT ATTENDING NOTE    Hortencia Baldwin is a 53 year old female, who is day 0 of transplant for MDS.      Subjective:  Reports feeling well overall, no new concerns, decent energy level and appetite.  Ambulating.  No respiratory concerns.  No nausea no vomiting no diarrhea.       Vitals reviewed, labs reviewed  PE:  NAD, Alert, oriented x3  HEENT: no visible oral ulcers  Heart: RRR  Lungs: CTAB  Abdomen: +BS, soft non tender, non distended  LE: no edema  Skin: Noted warts on the right sole and upper extremity nonconcerning in appearance     Assessment and Plan:    1. Heme/BMT: MDS, here for MAC 6/8 MUD, flu/busulfan prep, PTCy/Tac/MMF GVHD prophylaxis, transfusion as needed, history of secondary iron overload  2. ID: Afebrile to date  3. CV: no active issues  4. GI/ FEN: monitor regimen related toxicity, encourage fluid PO intake, I/O and daily weights, at risk of VOD due to history of abnormal LFTs and secondary hemochromatosis baseline ultrasound on 12/27/2023- monitor mild elevated ALT  5. Renal: Maintain euvolemic  6. PPx: Levo, jani, ACV/letermovir  7.  Supportive care: PT, baseline frailty score 3, encourage ambulation, optimize nutrition, avoid sarcopenia, other supportive care as detailed " above      I spent 50 minutes in the care of Shakila today, including an independent face-to-face assessment and time monitoring for restoration of hematopoiesis, high-risk organ toxicities from chemotherapy, and GVHD, managing infectious risk due to his immunocompromised state, and encouraging nutrition and physical activity to prevent debility and sarcopenia.  I personally performed all of the medical decision making associated with this visit, counseled Praveen on my overall assessment and recommendations, communicated my plan to the care team, and edited the above note to reflect my current plan of care.     Liam Guajardo MD

## 2024-01-11 NOTE — PLAN OF CARE
"/88   Pulse 68   Temp 97.5  F (36.4  C) (Axillary)   Resp 16   Ht 1.575 m (5' 2.01\")   Wt 83.1 kg (183 lb 3.2 oz)   LMP 11/05/2017   SpO2 98%   BMI 33.50 kg/m       Patient afebrile, vital signs stable, no reports of pain or nausea. Plan for transplant today. Patient assist level independent. Continue with plan of care.    Goal Outcome Evaluation:  Problem: Adult Inpatient Plan of Care  Goal: Optimal Comfort and Wellbeing  Outcome: Progressing     Problem: Stem Cell/Bone Marrow Transplant  Goal: Optimal Coping with Transplant  Outcome: Progressing  Goal: Symptom-Free Urinary Elimination  Outcome: Progressing  Goal: Diarrhea Symptom Control  Outcome: Progressing  Goal: Improved Activity Tolerance  Outcome: Progressing  Intervention: Promote Improved Energy  Recent Flowsheet Documentation  Taken 1/10/2024 2300 by Christ Jensen RN  Activity Management: up ad jeff  Taken 1/10/2024 1900 by Christ Jensen RN  Activity Management: up ad jeff  Goal: Blood Counts Within Acceptable Range  Outcome: Progressing  Intervention: Monitor and Manage Hematologic Symptoms  Recent Flowsheet Documentation  Taken 1/11/2024 0300 by Christ Jensen RN  Medication Review/Management: medications reviewed  Taken 1/10/2024 2300 by Christ Jensen RN  Medication Review/Management: medications reviewed  Taken 1/10/2024 1900 by Christ Jensen RN  Medication Review/Management: medications reviewed  Goal: Absence of Hypersensitivity Reaction  Outcome: Progressing  Goal: Absence of Infection  Outcome: Progressing  Intervention: Prevent and Manage Infection  Recent Flowsheet Documentation  Taken 1/11/2024 0300 by Christ Jensen RN  Infection Prevention:   hand hygiene promoted   personal protective equipment utilized   rest/sleep promoted   single patient room provided   visitors restricted/screened   equipment surfaces disinfected   environmental surveillance performed  Isolation Precautions:   cytotoxic " precautions maintained   protective environment maintained  Taken 1/10/2024 2300 by Christ Jensen, RN  Infection Prevention:   hand hygiene promoted   personal protective equipment utilized   rest/sleep promoted   single patient room provided   visitors restricted/screened   equipment surfaces disinfected   environmental surveillance performed  Isolation Precautions:   cytotoxic precautions maintained   protective environment maintained  Taken 1/10/2024 1900 by Christ Jensen, RN  Infection Prevention:   hand hygiene promoted   personal protective equipment utilized   rest/sleep promoted   single patient room provided   visitors restricted/screened   equipment surfaces disinfected   environmental surveillance performed  Isolation Precautions:   cytotoxic precautions maintained   protective environment maintained  Goal: Improved Oral Mucous Membrane Health and Integrity  Outcome: Progressing  Goal: Nausea and Vomiting Symptom Relief  Outcome: Progressing  Goal: Optimal Nutrition Intake  Outcome: Progressing

## 2024-01-12 ENCOUNTER — APPOINTMENT (OUTPATIENT)
Dept: GENERAL RADIOLOGY | Facility: CLINIC | Age: 54
DRG: 014 | End: 2024-01-12
Payer: COMMERCIAL

## 2024-01-12 ENCOUNTER — DOCUMENTATION ONLY (OUTPATIENT)
Dept: ONCOLOGY | Facility: CLINIC | Age: 54
End: 2024-01-12
Payer: COMMERCIAL

## 2024-01-12 LAB
ANION GAP SERPL CALCULATED.3IONS-SCNC: 8 MMOL/L (ref 7–15)
BASOPHILS # BLD AUTO: ABNORMAL 10*3/UL
BASOPHILS # BLD MANUAL: 0 10E3/UL (ref 0–0.2)
BASOPHILS NFR BLD AUTO: ABNORMAL %
BASOPHILS NFR BLD MANUAL: 0 %
BLD PROD TYP BPU: NORMAL
BLOOD COMPONENT TYPE: NORMAL
BUN SERPL-MCNC: 12 MG/DL (ref 6–20)
C PNEUM DNA SPEC QL NAA+PROBE: NOT DETECTED
CALCIUM SERPL-MCNC: 8.6 MG/DL (ref 8.6–10)
CHLORIDE SERPL-SCNC: 99 MMOL/L (ref 98–107)
CMV DNA SPEC NAA+PROBE-ACNC: NOT DETECTED IU/ML
CODING SYSTEM: NORMAL
CREAT SERPL-MCNC: 0.57 MG/DL (ref 0.51–0.95)
CROSSMATCH: NORMAL
DEPRECATED HCO3 PLAS-SCNC: 28 MMOL/L (ref 22–29)
EGFRCR SERPLBLD CKD-EPI 2021: >90 ML/MIN/1.73M2
EOSINOPHIL # BLD AUTO: ABNORMAL 10*3/UL
EOSINOPHIL # BLD MANUAL: 0.1 10E3/UL (ref 0–0.7)
EOSINOPHIL NFR BLD AUTO: ABNORMAL %
EOSINOPHIL NFR BLD MANUAL: 2 %
ERYTHROCYTE [DISTWIDTH] IN BLOOD BY AUTOMATED COUNT: 21.9 % (ref 10–15)
FLUAV H1 2009 PAND RNA SPEC QL NAA+PROBE: NOT DETECTED
FLUAV H1 RNA SPEC QL NAA+PROBE: NOT DETECTED
FLUAV H3 RNA SPEC QL NAA+PROBE: NOT DETECTED
FLUAV RNA SPEC QL NAA+PROBE: NOT DETECTED
FLUBV RNA SPEC QL NAA+PROBE: NOT DETECTED
GLUCOSE SERPL-MCNC: 105 MG/DL (ref 70–99)
HADV DNA SPEC QL NAA+PROBE: NOT DETECTED
HCOV PNL SPEC NAA+PROBE: NOT DETECTED
HCT VFR BLD AUTO: 20.8 % (ref 35–47)
HGB BLD-MCNC: 7 G/DL (ref 11.7–15.7)
HMPV RNA SPEC QL NAA+PROBE: NOT DETECTED
HPIV1 RNA SPEC QL NAA+PROBE: NOT DETECTED
HPIV2 RNA SPEC QL NAA+PROBE: NOT DETECTED
HPIV3 RNA SPEC QL NAA+PROBE: NOT DETECTED
HPIV4 RNA SPEC QL NAA+PROBE: NOT DETECTED
IMM GRANULOCYTES # BLD: ABNORMAL 10*3/UL
IMM GRANULOCYTES NFR BLD: ABNORMAL %
ISSUE DATE AND TIME: NORMAL
LYMPHOCYTES # BLD AUTO: ABNORMAL 10*3/UL
LYMPHOCYTES # BLD MANUAL: 0.1 10E3/UL (ref 0.8–5.3)
LYMPHOCYTES NFR BLD AUTO: ABNORMAL %
LYMPHOCYTES NFR BLD MANUAL: 1 %
M PNEUMO DNA SPEC QL NAA+PROBE: NOT DETECTED
MAGNESIUM SERPL-MCNC: 1.8 MG/DL (ref 1.7–2.3)
MCH RBC QN AUTO: 37.6 PG (ref 26.5–33)
MCHC RBC AUTO-ENTMCNC: 33.7 G/DL (ref 31.5–36.5)
MCV RBC AUTO: 112 FL (ref 78–100)
MONOCYTES # BLD AUTO: ABNORMAL 10*3/UL
MONOCYTES # BLD MANUAL: 0 10E3/UL (ref 0–1.3)
MONOCYTES NFR BLD AUTO: ABNORMAL %
MONOCYTES NFR BLD MANUAL: 0 %
MRSA DNA SPEC QL NAA+PROBE: NEGATIVE
NEUTROPHILS # BLD AUTO: ABNORMAL 10*3/UL
NEUTROPHILS # BLD MANUAL: 5.1 10E3/UL (ref 1.6–8.3)
NEUTROPHILS NFR BLD AUTO: ABNORMAL %
NEUTROPHILS NFR BLD MANUAL: 97 %
NRBC # BLD AUTO: 0 10E3/UL
NRBC # BLD AUTO: 0.1 10E3/UL
NRBC BLD AUTO-RTO: 0 /100
NRBC BLD MANUAL-RTO: 1 %
PLAT MORPH BLD: ABNORMAL
PLATELET # BLD AUTO: 134 10E3/UL (ref 150–450)
POTASSIUM SERPL-SCNC: 4.2 MMOL/L (ref 3.4–5.3)
RBC # BLD AUTO: 1.86 10E6/UL (ref 3.8–5.2)
RBC MORPH BLD: ABNORMAL
RSV RNA SPEC QL NAA+PROBE: NOT DETECTED
RSV RNA SPEC QL NAA+PROBE: NOT DETECTED
RV+EV RNA SPEC QL NAA+PROBE: NOT DETECTED
SA TARGET DNA: NEGATIVE
SODIUM SERPL-SCNC: 135 MMOL/L (ref 135–145)
TOXIC GRANULES BLD QL SMEAR: PRESENT
UNIT ABO/RH: NORMAL
UNIT NUMBER: NORMAL
UNIT STATUS: NORMAL
UNIT TYPE ISBT: 5100
WBC # BLD AUTO: 5.3 10E3/UL (ref 4–11)

## 2024-01-12 PROCEDURE — 206N000001 HC R&B BMT UMMC

## 2024-01-12 PROCEDURE — 99233 SBSQ HOSP IP/OBS HIGH 50: CPT | Mod: FS

## 2024-01-12 PROCEDURE — 250N000011 HC RX IP 250 OP 636: Performed by: INTERNAL MEDICINE

## 2024-01-12 PROCEDURE — 71046 X-RAY EXAM CHEST 2 VIEWS: CPT | Mod: 26 | Performed by: RADIOLOGY

## 2024-01-12 PROCEDURE — 87633 RESP VIRUS 12-25 TARGETS: CPT

## 2024-01-12 PROCEDURE — 250N000011 HC RX IP 250 OP 636: Performed by: STUDENT IN AN ORGANIZED HEALTH CARE EDUCATION/TRAINING PROGRAM

## 2024-01-12 PROCEDURE — 99418 PROLNG IP/OBS E/M EA 15 MIN: CPT | Mod: FS

## 2024-01-12 PROCEDURE — 71046 X-RAY EXAM CHEST 2 VIEWS: CPT

## 2024-01-12 PROCEDURE — 258N000003 HC RX IP 258 OP 636: Performed by: STUDENT IN AN ORGANIZED HEALTH CARE EDUCATION/TRAINING PROGRAM

## 2024-01-12 PROCEDURE — 250N000013 HC RX MED GY IP 250 OP 250 PS 637

## 2024-01-12 PROCEDURE — 85027 COMPLETE CBC AUTOMATED: CPT

## 2024-01-12 PROCEDURE — 258N000003 HC RX IP 258 OP 636: Performed by: INTERNAL MEDICINE

## 2024-01-12 PROCEDURE — 85007 BL SMEAR W/DIFF WBC COUNT: CPT

## 2024-01-12 PROCEDURE — P9040 RBC LEUKOREDUCED IRRADIATED: HCPCS

## 2024-01-12 PROCEDURE — 87641 MR-STAPH DNA AMP PROBE: CPT

## 2024-01-12 PROCEDURE — 250N000011 HC RX IP 250 OP 636: Mod: JZ

## 2024-01-12 PROCEDURE — 258N000003 HC RX IP 258 OP 636

## 2024-01-12 PROCEDURE — 83735 ASSAY OF MAGNESIUM: CPT

## 2024-01-12 PROCEDURE — 80048 BASIC METABOLIC PNL TOTAL CA: CPT

## 2024-01-12 PROCEDURE — 87103 BLOOD FUNGUS CULTURE: CPT

## 2024-01-12 RX ORDER — CELECOXIB 100 MG/1
100 CAPSULE ORAL 2 TIMES DAILY PRN
Status: DISCONTINUED | OUTPATIENT
Start: 2024-01-12 | End: 2024-01-24

## 2024-01-12 RX ORDER — ACETAMINOPHEN 325 MG/1
650 TABLET ORAL EVERY 4 HOURS PRN
Status: DISCONTINUED | OUTPATIENT
Start: 2024-01-12 | End: 2024-01-18

## 2024-01-12 RX ORDER — VANCOMYCIN HYDROCHLORIDE 1 G/200ML
1000 INJECTION, SOLUTION INTRAVENOUS EVERY 8 HOURS
Status: DISCONTINUED | OUTPATIENT
Start: 2024-01-12 | End: 2024-01-12

## 2024-01-12 RX ADMIN — CEFEPIME HYDROCHLORIDE 2 G: 2 INJECTION, POWDER, FOR SOLUTION INTRAVENOUS at 08:15

## 2024-01-12 RX ADMIN — URSODIOL 300 MG: 300 CAPSULE ORAL at 19:32

## 2024-01-12 RX ADMIN — MICAFUNGIN SODIUM 150 MG: 50 INJECTION, POWDER, LYOPHILIZED, FOR SOLUTION INTRAVENOUS at 17:34

## 2024-01-12 RX ADMIN — SODIUM CHLORIDE, POTASSIUM CHLORIDE, SODIUM LACTATE AND CALCIUM CHLORIDE 500 ML: 600; 310; 30; 20 INJECTION, SOLUTION INTRAVENOUS at 05:09

## 2024-01-12 RX ADMIN — ACETAMINOPHEN 650 MG: 325 TABLET, FILM COATED ORAL at 17:37

## 2024-01-12 RX ADMIN — CELECOXIB 100 MG: 100 CAPSULE ORAL at 09:59

## 2024-01-12 RX ADMIN — URSODIOL 300 MG: 300 CAPSULE ORAL at 14:56

## 2024-01-12 RX ADMIN — Medication 5 ML: at 03:57

## 2024-01-12 RX ADMIN — ACYCLOVIR 800 MG: 800 TABLET ORAL at 12:46

## 2024-01-12 RX ADMIN — ACYCLOVIR 800 MG: 800 TABLET ORAL at 19:32

## 2024-01-12 RX ADMIN — ACYCLOVIR 800 MG: 800 TABLET ORAL at 14:56

## 2024-01-12 RX ADMIN — ACYCLOVIR 800 MG: 800 TABLET ORAL at 08:15

## 2024-01-12 RX ADMIN — PANTOPRAZOLE SODIUM 40 MG: 40 TABLET, DELAYED RELEASE ORAL at 08:15

## 2024-01-12 RX ADMIN — CEFEPIME HYDROCHLORIDE 2 G: 2 INJECTION, POWDER, FOR SOLUTION INTRAVENOUS at 00:03

## 2024-01-12 RX ADMIN — CEFEPIME HYDROCHLORIDE 2 G: 2 INJECTION, POWDER, FOR SOLUTION INTRAVENOUS at 16:20

## 2024-01-12 RX ADMIN — URSODIOL 300 MG: 300 CAPSULE ORAL at 08:15

## 2024-01-12 RX ADMIN — VANCOMYCIN HYDROCHLORIDE 2000 MG: 1 INJECTION, POWDER, LYOPHILIZED, FOR SOLUTION INTRAVENOUS at 06:16

## 2024-01-12 ASSESSMENT — ACTIVITIES OF DAILY LIVING (ADL)
ADLS_ACUITY_SCORE: 21

## 2024-01-12 NOTE — PROGRESS NOTES
BMT/Cellular Allogeneic Product Infusion       Patient Vitals for the past 24 hrs:   Temp Temp src Pulse Resp BP   01/11/24 1110 97.6  F (36.4  C) Axillary 75 14 113/72   01/11/24 1420 98.2  F (36.8  C) Oral 85 14 131/69   01/11/24 1439 97.5  F (36.4  C) Oral 99 14 115/74   01/11/24 1445 97.3  F (36.3  C) Oral 98 14 128/69   01/11/24 1531 98.2  F (36.8  C) Oral 91 14 127/67   01/11/24 1536 -- -- 98 -- 126/71   01/11/24 1813 -- -- 97 -- --   01/11/24 1856 98.9  F (37.2  C) Oral 94 16 121/61   01/11/24 1900 99.1  F (37.3  C) Axillary -- -- --   01/11/24 1924 98.9  F (37.2  C) Axillary -- -- --   01/11/24 2130 98.9  F (37.2  C) Axillary -- -- --   01/11/24 2300 (!) 101.6  F (38.7  C) Axillary -- -- --   01/11/24 2339 (!) 102.6  F (39.2  C) Axillary 99 20 125/66   01/12/24 0345 (!) 102  F (38.9  C) Axillary 104 18 125/64   01/12/24 0600 99  F (37.2  C) Oral -- -- --   01/12/24 0744 99.9  F (37.7  C) Axillary 103 18 130/67   01/12/24 0900 (!) 101.3  F (38.5  C) Axillary -- -- --      BMT INFUSION DOCUMENTATION (last 48 hours)       BMT/Cellular Product Infusion       Row Name 01/11/24 1100                [REMOVED] Product 01/11/24 1235 HPC, Apheresis    Product Details Product Release Date: 01/11/24  -NH Product Release Time: 1235  -CV Product Type: HPC, Apheresis  -NH DIN: [id number]  -NH Product Description Code: [id number]  -NH Volume Dispensed (mL): 291 mL  -NH Completion Date (RN to complete): 01/11/24  -AD Completion Time (RN to complete): 1447  -AD    Checked by (Patient RN) Monse Kamara RN  -AD       Checked by (Witness) Dari Alva RN  -       Product Volume Infused (mL) 291 mL  -AD       Flush Volume (mL) 60 mL  -AD       Volume Dispensed (mL) --                 User Key  (r) = Recorded By, (t) = Taken By, (c) = Cosigned By      Initials Name Effective Dates    Radha Doss 04/16/23 -     NH Nayeli Bradley 04/06/23 -     Monse Merino RN 08/16/22 -     Dari Ross RN 12/29/23 -                    Allogeneic Donor Eligibility Determination and Summary of Records: Eligible        Type of Infusion: Allogeneic      Baseline Pre-Infusion Evaluation (to be completed by Provider):   Dyspnea: Grade 0 - none  Hypoxia: Grade 0 - not present  Fever: Grade 0 - afebrile  Chills: Grade 0 - none  Febrile Neutropenia: Grade 0 - not present  Sinus Bradycardia: Grade 0 - none  Hypertension: Grade 0 - none  Hypotension: Grade 0 - none  Chest Pain: Grade 0 - none  Bronchospasm: Grade 0 - none  Pain: Grade 0 - none  Rash: Grade 0 - None  Neurologic Specify: none    If adverse reactions, events or complications occur (fever greater than 2 degrees fahrenheit increase, and severe reactions of the following types: chills, dyspnea, bronchospasm, hyper/hypotension, hypoxia, bradycardia, chest pain, back/flank pain, hypoxia, and any other reaction deemed severe or life threatening; any instance of product bag breakage or unusual product appearance)    Any other events that are >= grade 3, then immediately contact the BMT Attending physician, the Cell Therapy Laboratory Medical Director (pager [phone]) and the Cell Therapy Laboratory ([phone]).  After midnight, holidays & weekends contact the [facility] on the appropriate campus ([facility] [place]: [phone]; [facility] [place]: [phone]).    King Vaz PA-C     After verifying patient identity and given pre-medication and IV fluid flush the patient received an allogeneic hematopoietic stem cell transplant through the central line. The patient tolerated the procedure well with no immediate severe complications. I supervised the critical steps of this procedure.    Liam Guajardo MD

## 2024-01-12 NOTE — PHARMACY-VANCOMYCIN DOSING SERVICE
Pharmacy Vancomycin Initial Note  Date of Service 2024  Patient's  1970  53 year old, female    Indication: Febrile Neutropenia    Current estimated CrCl = Estimated Creatinine Clearance: 115 mL/min (based on SCr of 0.57 mg/dL).    Creatinine for last 3 days  1/10/2024:  3:38 AM Creatinine 0.51 mg/dL  2024:  3:17 AM Creatinine 0.52 mg/dL  2024:  3:45 AM Creatinine 0.57 mg/dL    Recent Vancomycin Level(s) for last 3 days  No results found for requested labs within last 3 days.      Vancomycin IV Administrations (past 72 hours)                     vancomycin (VANCOCIN) 2,000 mg in sodium chloride 0.9 % 250 mL intermittent infusion (mg) 2,000 mg New Bag 24 0616                    Nephrotoxins and other renal medications (From now, onward)      Start     Dose/Rate Route Frequency Ordered Stop    24 0800  acyclovir (ZOVIRAX) tablet 800 mg         800 mg Oral 2 TIMES DAILY 24 1456      24 0900  tacrolimus (PROGRAF) 20 mcg/mL in D5W in non-PVC container 250 mL        Note to Pharmacy: Use 5 mg/250 mL preparation for all doses prepared at non-[facility] sites.    84 mcg/hr  4.2 mL/hr  Intravenous CONTINUOUS 24 1455      24 1200  furosemide (LASIX) injection 10 mg         10 mg Intravenous EVERY 2 HOURS PRN 24 1455 24 1159    24 1200  furosemide (LASIX) injection 20 mg         20 mg Intravenous EVERY 8 HOURS PRN 24 1455 24 1159    24 1000  cycloPHOSphamide (CYTOXAN) 2,505 mg in sodium chloride 0.9 % 500 mL infusion        Note to Pharmacy: Cyclophosphamide dosing is based on ideal body weight (IBW), unless the patient weighs less than their IBW, in which case the drug will be dosed according to actual body weight.    50 mg/kg × 50.1 kg (Treatment Plan Ideal)  250 mL/hr over 2 Hours Intravenous EVERY 24 HOURS 24 1634 24 0959    24 0600  vancomycin (VANCOCIN) 2,000 mg in sodium chloride 0.9 % 250 mL intermittent  infusion         2,000 mg (central catheter)  over 90 Minutes Intravenous ONCE 01/12/24 0538      01/07/24 0800  acyclovir (ZOVIRAX) tablet 800 mg         800 mg Oral 5 TIMES DAILY 01/05/24 1456 01/25/24 0759            Contrast Orders - past 72 hours (72h ago, onward)      None            [employer] Prediction of Planned Initial Vancomycin Regimen  Loading dose: 2000 mg IV @ 0600  Regimen: 1000 mg IV every 8 hours.  Start time: 14:00 on 01/12/2024  Exposure target: AUC24 (range)400-600 mg/L.hr   AUC24,ss: 554 mg/L.hr  Probability of AUC24 > 400: 80 %  Ctrough,ss: 18 mg/L  Probability of Ctrough,ss > 20: 42 %  Probability of nephrotoxicity (Lodise CATHY 2009): 14 %     Plan:  Start vancomycin  2000 mg IV once, followed by 1000 mg IV q8h.  Vancomycin monitoring method: AUC  Vancomycin therapeutic monitoring goal: 400-600 mg*h/L  Pharmacy will check vancomycin levels as appropriate in 1-3 Days.    Serum creatinine levels will be ordered daily for the first week of therapy and at least twice weekly for subsequent weeks.      Coleman Abbott [facility]

## 2024-01-12 NOTE — PROVIDER NOTIFICATION
"Paged provider Abdiel, \"Pt spiked first fever and no active order for tylenol. No order due to interaction with busulfan for 72hrs. Do you want to do another option or skip tylenol?\"     - Received page back and unable to do nsaids or tylenol. Give external options cold towel, ice packs, etc  "

## 2024-01-12 NOTE — PROGRESS NOTES
"  BMT Daily Progress Note  Patient ID:  Irma Foreman is a 53 year old female with a PMH of MDS, warm AIHA, transfusion and transfusion dependent anemia presented to  to undergo a myeloablative allogeneic transplant. She was recently COVID+ on 12/26 but tested negative on 1/2 with resolution of nearly all URI symptoms. Undergoing MA (Bu/Flu) 6/8 URD Allo transplant, day 1      HPI   Irma reported flushing with her transplant and spiked a fever overnight, Tmax 102.6. She had blood/urine cultures draw, started on Cefepime and then IV Vancomycin d/t persistent fevers later in the night. She was not able to get Tylenol at that time d/t the interaction with busulfan. That restriction will be lifted this afternoon. She complained of chills and body aches when the fevers started, no other localizing infectious symptoms. She said her appetite is still good, no N/V/D. The IV vancomycin was discontinued this AM. Received celebrex for a fever this morning.     Review of Systems    Negative unless stated in the HPI.     PHYSICAL EXAM      Weight     Wt Readings from Last 3 Encounters:   01/12/24 83.8 kg (184 lb 12.8 oz)   12/26/23 83.1 kg (183 lb 4.8 oz)   12/19/23 83.5 kg (184 lb)        KPS: 70    /67 (BP Location: Right arm)   Pulse 103   Temp (!) 101.3  F (38.5  C) (Axillary)   Resp 18   Ht 1.575 m (5' 2.01\")   Wt 83.8 kg (184 lb 12.8 oz)   [facility] 11/05/2017   SpO2 96%   BMI 33.79 kg/m       General: NAD  Eyes: KEVON, sclera anicteric   Nose/Mouth/Throat: OP clear, buccal mucosa moist, no ulcerations   Lungs: CTA bilaterally  Cardiovascular: RRR, no M/R/G   Abdominal/Rectal: +BS, soft, NT, ND  Lymphatics: No edema  Skin: wart under right foot dime sized white, cauliflower and slightly ulcerated. Two pin point sized, flesh colored warts on right hand. Hands dry, one excoriation on left dorsum.   Neuro: A&O   Additional Findings: Gastelum site NT, no drainage.    Current aGVHD staging:  Skin 0, UGI 0, LGI 0, Liver " 0 (keep in note through day +180 for allos)      LABS AND IMAGING: I have assessed all abnormal lab values for their clinical significance and any values considered clinically significant have been addressed in the assessment and plan.        Lab Results   Component Value Date    WBC 5.3 01/12/2024    ANEUTAUTO 5.4 01/08/2024    HGB 7.0 (L) 01/12/2024    HCT 20.8 (L) 01/12/2024     (L) 01/12/2024     01/12/2024    POTASSIUM 4.2 01/12/2024    CHLORIDE 99 01/12/2024    CO2 28 01/12/2024     (H) 01/12/2024    BUN 12.0 01/12/2024    CR 0.57 01/12/2024    MAG 1.8 01/12/2024    INR 1.05 01/08/2024           SYSTEMS-BASED ASSESSMENT AND PLAN     Hortencia Baldwin is a 53 year old female with a history of MDS, undergoing MA (Bu/Flu) 6/8 URD Allo transplant, day 1      BMT/IEC PROTOCOL for MT 2015-29 **according to but not on trial**   - Chemo protocol:  ? Day -6 through day -1: Keppra and Allopurinol   ? Day -5 through -2: Bu/Flu. Busulfan levels adjusted by pharm, attending  ? Day -1: Rest day   ? Day 0: Transplant. Recipient: O+, CMV+. Donor: O+, CMV-. No flush. Cell dose TBD.   - Restaging plan: per protocol       HEME/COAG  # Iron overload: hx of transfusion dependent anemia, pre-transplant ferritin around 5,000. Recommend phlebotomy post transplant when retic count is restored and Hgb >10    - Risk of cytopenias due to chemotherapy and radiation  - Transfusion parameters: hemoglobin <7, platelets <10    IMMUNOCOMPROMISED  # Fever: not yet neutropenic   - UA: negative for LE & nitrites   - Blood cultures pending    - CXR pending   - RVP pending    - Cefepime (1/11 - x). Brief course of IV Vancomycin. MRSA nasal swab negative.   - Relevant infection history:  URI from COVID (12/26), negative test on 1/2/24   - Vaccination status: Influenza vaccination after day +60, COVID vaccination after day +100, followed by remaining vaccinations at 12, 14, 24, and 26 months.  - Prophylaxis plan: ACV/letermovir,  Micafungin through day +45 (current smoker), levofloxacin while neutropenic, Bactrim to start at day +28  Foot, hand warts. Curbside derm on 1/8, no recs while inpatient, typically managed OP.      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   - Baseline PFTs: The FEV1, FVC, FEV1/FVC ratio and ZBG89-86% are within normal limits.  The inspiratory flow rates are within normal limits.  Lung volumes are within normal limits.  The diffusing capacity is normal.   - Risk of respiratory complications: Frequent ambulation and incentive spirometer.    GI/NUTRITION  # Elevated liver enzymes: down trending ALT: 56 (87), AST: 19 (43) could be related to Tylenol use, iron overload or recent viral illness   ? RUQ U/S (12/27) was normal  ? Acute hepatitis panel negative   - Ulcer prophylaxis: PPI  - VOD ppx: Ursodiol   - Risk of malnutrition: dietitian to follow     RENAL/ELECTROLYTES/  - Risk of renal injury: IV hydration   - Electrolyte management: replace per sliding scale    DERM:  Right hand dryness, try aquafor with gloves on overnight and as tolerated during the day  Wart on foot, one on hand, see ID    DIABETES/ENDOCRINE  - Risk of steroid-induced hyperglyemia: Monitor BG, sliding scale if needed    MUSCULOSKELETAL/FRAILTY  - Baseline Frailty Score: 3  - Patient with substantial risk of sarcopenia  - Daily PT/OT as needed while inpatient  - Cancer Rehab as needed outpatient    SYMPTOM MANAGEMENT  - Nausea from chemo/radiation: Prochlorperazine, ondansetron, lorazepam.  - # Pain Assessment:      1/12/2024     3:45 AM   Current Pain Score   Patient currently in pain? yes   - Hortencia Sethi is experiencing pain due to bone marrow biopsy and line placement. Pain  "management was discussed and the plan was created in a collaborative fashion.  Hortencia Sethi's response to the current recommendations: engaged  - Please see the plan for pain management as documented above      SOCIAL DETERMINANTS  - Caregiver: Srinivas Suggs, Keira Neal & Rickey Neal     Disposition: remain admitted through engraftment     Summary (1/11):   - First fever 1/11, Tmax 102.6   - On Cefepime  - Blood cultures pending   - RVP pending     Known issues that I take into account for medical decisions, with salient changes to the plan considering these complexities noted above.    Patient Active Problem List   Diagnosis     Left medial knee pain     Obesity (BMI 30-39.9)     Anemia, unspecified type     Macrocytic anemia     MDS (myelodysplastic syndrome) (H)     Clinically Significant Risk Factors                         # Obesity: Estimated body mass index is 33.79 kg/m  as calculated from the following:    Height as of this encounter: 1.575 m (5' 2.01\").    Weight as of this encounter: 83.8 kg (184 lb 12.8 oz).                Today's summary: remain admitted through chemo prep, transplant today     I spent 30 minutes in the care of this patient today, which included time necessary for preparation for the visit, obtaining history, ordering medications/tests/procedures as medically indicated, review of pertinent medical literature, counseling of the patient, communication of recommendations to the care team, and documentation time.    King Vaz PA-C  [phone]      ______________________________________________      BMT ATTENDING NOTE    Hortencia Baldwin is a 53 year old female, who is day 1 of transplant for MDS.      Subjective:  Reports feeling well overall, more fatigued, no new concerns, decent appetite.  Ambulating.  No respiratory concerns.  No nausea no vomiting no diarrhea.       Vitals reviewed, labs reviewed  PE:  NAD, Alert, oriented x3  HEENT: no visible oral ulcers  Heart: RRR  Lungs: " CTAB  Abdomen: +BS, soft non tender, non distended  LE: no edema  Skin: Noted warts on the right sole and upper extremity nonconcerning in appearance     Assessment and Plan:    1. Heme/BMT: MDS, here for MAC 6/8 MUD, flu/busulfan prep, PTCy/Tac/MMF GVHD prophylaxis, transfusion as needed, history of secondary iron overload  2. ID: 1/11 first neut fever, cefepime, follow workup  3. CV: no active issues  4. GI/ FEN: monitor regimen related toxicity, encourage fluid PO intake, I/O and daily weights, at risk of VOD due to history of abnormal LFTs and secondary hemochromatosis baseline ultrasound on 12/27/2023- monitor mild elevated ALT  5. Renal: Maintain euvolemic  6. PPx: Levo, jani, ACV/letermovir  7.  Supportive care: PT, baseline frailty score 3, encourage ambulation, optimize nutrition, avoid sarcopenia, other supportive care as detailed above      I spent 50 minutes in the care of Irma today, including an independent face-to-face assessment and time monitoring for restoration of hematopoiesis, high-risk organ toxicities from chemotherapy, and GVHD, managing infectious risk due to his immunocompromised state, and encouraging nutrition and physical activity to prevent debility and sarcopenia.  I personally performed all of the medical decision making associated with this visit, counseled Praveen on my overall assessment and recommendations, communicated my plan to the care team, and edited the above note to reflect my current plan of care.     Liam Guajardo MD

## 2024-01-12 NOTE — INTERIM SUMMARY
Paged by RN about fever earlier in night ~11:30pm. Has conditionals for CXR, UA, cultures--RN to release. Started cefepime IV. No tylenol or NSAIDs per chart, try external/supportive measures for now and start abx, monitor fever curve.  Coleman Meadows, DO 2:30 AM 01/12/24     Notified by RN continues to fever despite ice packs as tolerated by pt, has positive orthostatics as well. Will add vanc and give 500cc bolus IVF.  Coleman Meadows, DO 4:50 AM 01/12/24    2.15

## 2024-01-12 NOTE — PROGRESS NOTES
Prior Authorization Approval    Medication: PREVYMIS 480 MG PO TABS  PA Initiated: 1/12/2024  PA Type: Clinical    Insurance: [employer] - Phone [phone] Fax [phone]  Critical access hospital Key / Reference #: [id number] / [id number]   Authorization Effective Dates: 12/16/2023 - 1/16/2025    Expected CoPay: $ 45.00  CoPay Card Eligible: Yes   Member ID: [id number]  BIN: [id number]  PCN: [id number]  Group: [id number]  Expected Copay: $ 15.00  Copay Card Monthly Max Amount: $ 2,500  Copay Card Annual Amount: $ 20,000    Filling Pharmacy: [facility] - [address]  Comments:  Proactive PA. Please send a script to the discharge pharmacy. Added copay card to patient's [place] pharmacy profile.      Yoana Zamora  [facility] Pharmacy Liaison  Ph: [phone]  Fax: [phone]  Available on Plei (learn more here)    Prior Authorization Initiated    Medication: PREVYMIS 480 MG PO TABS  Insurance Company: [employer] - Phone [phone] Fax [phone]  Filling Pharmacy: [facility] - [address]  Start Date: 1/12/2024  Critical access hospital Key / Reference #: [id number] / [id number]     Yoana Zamora  [facility] Pharmacy Liaison  Ph: [phone]  Fax: [phone]  Available on Plei (learn more here)

## 2024-01-12 NOTE — PROVIDER NOTIFICATION
Notifying per parameters that patient still has a  fever of 102.0 and has positive orthotic BP. Patient is asymptomatic.

## 2024-01-12 NOTE — PROGRESS NOTES
BMT Post Infusion Documentation    Data   Patient Vitals for the past 72 hrs:   Temp Temp src Pulse Resp BP   01/09/24 1125 98.4  F (36.9  C) Oral 72 16 116/69   01/09/24 1548 99  F (37.2  C) Oral 84 20 124/81   01/09/24 1932 97.8  F (36.6  C) Axillary 82 18 107/84   01/09/24 2300 97.5  F (36.4  C) Oral 63 18 122/71   01/10/24 0300 97.1  F (36.2  C) Axillary 73 16 118/68   01/10/24 0734 98  F (36.7  C) Axillary 68 16 115/76   01/10/24 1141 97.4  F (36.3  C) Axillary 71 18 115/74   01/10/24 1618 98.2  F (36.8  C) Axillary 82 22 116/71   01/10/24 2011 97.8  F (36.6  C) Axillary 86 16 120/69   01/10/24 2300 97.2  F (36.2  C) Axillary 75 18 --   01/11/24 0300 97.5  F (36.4  C) Axillary 68 16 126/88   01/11/24 0901 97  F (36.1  C) Axillary 79 14 121/74   01/11/24 1110 97.6  F (36.4  C) Axillary 75 14 113/72   01/11/24 1420 98.2  F (36.8  C) Oral 85 14 131/69   01/11/24 1439 97.5  F (36.4  C) Oral 99 14 115/74   01/11/24 1445 97.3  F (36.3  C) Oral 98 14 128/69   01/11/24 1531 98.2  F (36.8  C) Oral 91 14 127/67   01/11/24 1536 -- -- 98 -- 126/71   01/11/24 1813 -- -- 97 -- --   01/11/24 1856 98.9  F (37.2  C) Oral 94 16 121/61   01/11/24 1900 99.1  F (37.3  C) Axillary -- -- --   01/11/24 1924 98.9  F (37.2  C) Axillary -- -- --   01/11/24 2130 98.9  F (37.2  C) Axillary -- -- --   01/11/24 2300 (!) 101.6  F (38.7  C) Axillary -- -- --   01/11/24 2339 (!) 102.6  F (39.2  C) Axillary 99 20 125/66   01/12/24 0345 (!) 102  F (38.9  C) Axillary 104 18 125/64   01/12/24 0600 99  F (37.2  C) Oral -- -- --   01/12/24 0744 99.9  F (37.7  C) Axillary 103 18 130/67   01/12/24 0900 (!) 101.3  F (38.5  C) Axillary -- -- --     BMT INFUSION DOCUMENTATION (last 24 hours)       BMT/Cellular Product Infusion       Row Name 01/11/24 1100                Cell Therapy Documentation    Product Release Date 01/11/24  -NH       Recipient Study ID NA  -NH       Donor Allogeneic - Unrelated  -NH       Donor MRN/ID [id number]  -NH        Donor ABO/Rh O pos  -NH       Allogeneic Donor Eligibility Determination and Summary of Records Eligible  -NH       Type of Infusion Allogeneic  -NH       Total Volume Dispensed (mL) 291  -NH       Total NC Dose 1.23E+09  -NH       Total CD34 Dose 6.50E+06  -NH       Total CD3 dose 4.31E+08  -NH       Total NC Dose Left in Storage None  -NH       Comments for Product Issues NA  -NH       Donor ABO/Rh --       Product Types --       Product Numbers --       Product Types and Numbers --       Volume --       ABO Mismatch --       ZZTotal NC Dose --       ZZTotal CD34 Dose --       ZZTotal NC Dose Left in Storage --          [REMOVED] Product 01/11/24 1235 HPC, Apheresis    Product Details Product Release Date: 01/11/24  -NH Product Release Time: 1235  -CV Product Type: HPC, Apheresis  -NH DIN: [id number]  -NH Product Description Code: [id number]  -NH Volume Dispensed (mL): 291 mL  -NH Completion Date (RN to complete): 01/11/24  -AD Completion Time (RN to complete): 1447  -AD    Checked by (Patient RN) Monse Kamara RN  -AD       Checked by (Witness) Dari Alva RN  -       Product Volume Infused (mL) 291 mL  -AD       Flush Volume (mL) 60 mL  -AD       Volume Dispensed (mL) --          RN Documentation    Patient was premedicated as ordered yes  -AD       Line Type central line, right  -AD       Patient Stable Prior to Infusion yes  -AD       Time Infusion Started 1427  -AD       Checked by (Patient RN) --       Checked by (RN 2) --       Broken Bag? --       Immediate suspected transfusion reaction to the product --       Time Infusion Stopped --       Total Flush Volume (mL) --       Checked by (Witness) --       Date Infusion Started --       Date Infusion Stopped --       Volume Infused (mL) --       Total Volume Infused (cc) --          Patient tolerance of product infusion    Immediate suspected transfusion reaction to the product none  -AD       Did patient have prior history of similar signs/symptoms  during this hospitalization? no  -AD       Symptoms during/after infusion --       Did the patient tolerate the infusion well yes  -AD       Medications and treatment for symptoms --       Did the symptoms resolve? --  NA  -AD       Enter comments if clots, leaks, broken bag, infusion delays, other issues with bag/infusion --       Describe symptoms --                 User Key  (r) = Recorded By, (t) = Taken By, (c) = Cosigned By      Initials Name Effective Dates    Radha Doss 04/16/23 -     Nayeli Ellis 04/06/23 -     AD Monse Kamara, RN 08/16/22 -      Dari Alva RN 12/29/23 -                       Post-Infusion Evaluation:   Infusion Related Reaction: Grade 1 - Mild transient reaction; infusion interruption not indicated; intervention not indicated  Dyspnea: Grade 0 - none  Hypoxia: Grade 0 - not present  Fever: Grade 0 - afebrile but did spike a fever 8 hours after the transplant finished.   Chills: Grade 0 - none  Febrile Neutropenia: Grade 0 - not present  Sinus Bradycardia: Grade 0 - none  Hypertension: Grade 0 - none  Hypotension: Grade 0 - none  Chest Pain: Grade 0 - none  Bronchospasm: Grade 0 - none  Pain: Grade 0 - none  Rash: Grade 0 - None  Neurologic Specify: none    If this was a cord blood transplant, was more than one cord blood unit infused? no    King Vaz PA-C

## 2024-01-12 NOTE — PLAN OF CARE
VSS. Patient denies pain or nausea. Eating well. HR slightly higher this evening than her normal- in the 90s. Continue to monitor.    Problem: Stem Cell/Bone Marrow Transplant  Goal: Optimal Coping with Transplant  Outcome: Progressing     Problem: Stem Cell/Bone Marrow Transplant  Goal: Optimal Nutrition Intake  Outcome: Progressing   Goal Outcome Evaluation:

## 2024-01-13 LAB
ALBUMIN UR-MCNC: 20 MG/DL
ANION GAP SERPL CALCULATED.3IONS-SCNC: 8 MMOL/L (ref 7–15)
APPEARANCE UR: CLEAR
BACTERIA UR CULT: NO GROWTH
BASOPHILS # BLD AUTO: ABNORMAL 10*3/UL
BASOPHILS # BLD MANUAL: 0 10E3/UL (ref 0–0.2)
BASOPHILS NFR BLD AUTO: ABNORMAL %
BASOPHILS NFR BLD MANUAL: 0 %
BILIRUB UR QL STRIP: NEGATIVE
BUN SERPL-MCNC: 10.6 MG/DL (ref 6–20)
CALCIUM SERPL-MCNC: 8.7 MG/DL (ref 8.6–10)
CHLORIDE SERPL-SCNC: 102 MMOL/L (ref 98–107)
COLOR UR AUTO: ABNORMAL
CREAT SERPL-MCNC: 0.49 MG/DL (ref 0.51–0.95)
DEPRECATED HCO3 PLAS-SCNC: 27 MMOL/L (ref 22–29)
EGFRCR SERPLBLD CKD-EPI 2021: >90 ML/MIN/1.73M2
EOSINOPHIL # BLD AUTO: ABNORMAL 10*3/UL
EOSINOPHIL # BLD MANUAL: 0.1 10E3/UL (ref 0–0.7)
EOSINOPHIL NFR BLD AUTO: ABNORMAL %
EOSINOPHIL NFR BLD MANUAL: 2 %
ERYTHROCYTE [DISTWIDTH] IN BLOOD BY AUTOMATED COUNT: 22.5 % (ref 10–15)
GLUCOSE SERPL-MCNC: 129 MG/DL (ref 70–99)
GLUCOSE UR STRIP-MCNC: NEGATIVE MG/DL
HCT VFR BLD AUTO: 24 % (ref 35–47)
HGB BLD-MCNC: 8 G/DL (ref 11.7–15.7)
HGB UR QL STRIP: NEGATIVE
IMM GRANULOCYTES # BLD: ABNORMAL 10*3/UL
IMM GRANULOCYTES NFR BLD: ABNORMAL %
KETONES UR STRIP-MCNC: NEGATIVE MG/DL
LEUKOCYTE ESTERASE UR QL STRIP: NEGATIVE
LYMPHOCYTES # BLD AUTO: ABNORMAL 10*3/UL
LYMPHOCYTES # BLD MANUAL: 0 10E3/UL (ref 0.8–5.3)
LYMPHOCYTES NFR BLD AUTO: ABNORMAL %
LYMPHOCYTES NFR BLD MANUAL: 0 %
MAGNESIUM SERPL-MCNC: 2.1 MG/DL (ref 1.7–2.3)
MCH RBC QN AUTO: 35.6 PG (ref 26.5–33)
MCHC RBC AUTO-ENTMCNC: 33.3 G/DL (ref 31.5–36.5)
MCV RBC AUTO: 107 FL (ref 78–100)
MONOCYTES # BLD AUTO: ABNORMAL 10*3/UL
MONOCYTES # BLD MANUAL: 0 10E3/UL (ref 0–1.3)
MONOCYTES NFR BLD AUTO: ABNORMAL %
MONOCYTES NFR BLD MANUAL: 0 %
MUCOUS THREADS #/AREA URNS LPF: PRESENT /LPF
NEUTROPHILS # BLD AUTO: ABNORMAL 10*3/UL
NEUTROPHILS # BLD MANUAL: 4.2 10E3/UL (ref 1.6–8.3)
NEUTROPHILS NFR BLD AUTO: ABNORMAL %
NEUTROPHILS NFR BLD MANUAL: 98 %
NITRATE UR QL: NEGATIVE
NRBC # BLD AUTO: 0 10E3/UL
NRBC BLD AUTO-RTO: 0 /100
PH UR STRIP: 6.5 [PH] (ref 5–7)
PHOSPHATE SERPL-MCNC: 3 MG/DL (ref 2.5–4.5)
PLAT MORPH BLD: ABNORMAL
PLATELET # BLD AUTO: 90 10E3/UL (ref 150–450)
POTASSIUM SERPL-SCNC: 3.9 MMOL/L (ref 3.4–5.3)
RBC # BLD AUTO: 2.25 10E6/UL (ref 3.8–5.2)
RBC MORPH BLD: ABNORMAL
RBC URINE: <1 /HPF
SODIUM SERPL-SCNC: 137 MMOL/L (ref 135–145)
SP GR UR STRIP: 1.01 (ref 1–1.03)
TOXIC GRANULES BLD QL SMEAR: PRESENT
UROBILINOGEN UR STRIP-MCNC: NORMAL MG/DL
WBC # BLD AUTO: 4.3 10E3/UL (ref 4–11)
WBC URINE: 1 /HPF

## 2024-01-13 PROCEDURE — 87103 BLOOD FUNGUS CULTURE: CPT

## 2024-01-13 PROCEDURE — 99233 SBSQ HOSP IP/OBS HIGH 50: CPT | Mod: FS | Performed by: NURSE PRACTITIONER

## 2024-01-13 PROCEDURE — 250N000011 HC RX IP 250 OP 636: Performed by: STUDENT IN AN ORGANIZED HEALTH CARE EDUCATION/TRAINING PROGRAM

## 2024-01-13 PROCEDURE — 81001 URINALYSIS AUTO W/SCOPE: CPT | Performed by: NURSE PRACTITIONER

## 2024-01-13 PROCEDURE — 84100 ASSAY OF PHOSPHORUS: CPT | Performed by: INTERNAL MEDICINE

## 2024-01-13 PROCEDURE — 83735 ASSAY OF MAGNESIUM: CPT | Performed by: INTERNAL MEDICINE

## 2024-01-13 PROCEDURE — 258N000003 HC RX IP 258 OP 636

## 2024-01-13 PROCEDURE — 85007 BL SMEAR W/DIFF WBC COUNT: CPT

## 2024-01-13 PROCEDURE — 80048 BASIC METABOLIC PNL TOTAL CA: CPT

## 2024-01-13 PROCEDURE — 85027 COMPLETE CBC AUTOMATED: CPT

## 2024-01-13 PROCEDURE — 99418 PROLNG IP/OBS E/M EA 15 MIN: CPT | Mod: FS | Performed by: NURSE PRACTITIONER

## 2024-01-13 PROCEDURE — 206N000001 HC R&B BMT UMMC

## 2024-01-13 PROCEDURE — 87799 DETECT AGENT NOS DNA QUANT: CPT | Performed by: NURSE PRACTITIONER

## 2024-01-13 PROCEDURE — 250N000013 HC RX MED GY IP 250 OP 250 PS 637

## 2024-01-13 PROCEDURE — 250N000011 HC RX IP 250 OP 636

## 2024-01-13 RX ADMIN — ACETAMINOPHEN 650 MG: 325 TABLET, FILM COATED ORAL at 00:20

## 2024-01-13 RX ADMIN — ACYCLOVIR 800 MG: 800 TABLET ORAL at 00:21

## 2024-01-13 RX ADMIN — PANTOPRAZOLE SODIUM 40 MG: 40 TABLET, DELAYED RELEASE ORAL at 09:11

## 2024-01-13 RX ADMIN — CEFEPIME HYDROCHLORIDE 2 G: 2 INJECTION, POWDER, FOR SOLUTION INTRAVENOUS at 09:12

## 2024-01-13 RX ADMIN — DEXTROSE AND SODIUM CHLORIDE 1000 ML: 5; 450 INJECTION, SOLUTION INTRAVENOUS at 21:14

## 2024-01-13 RX ADMIN — URSODIOL 300 MG: 300 CAPSULE ORAL at 09:12

## 2024-01-13 RX ADMIN — CEFEPIME HYDROCHLORIDE 2 G: 2 INJECTION, POWDER, FOR SOLUTION INTRAVENOUS at 16:42

## 2024-01-13 RX ADMIN — ACYCLOVIR 800 MG: 800 TABLET ORAL at 09:11

## 2024-01-13 RX ADMIN — Medication 5 ML: at 03:26

## 2024-01-13 RX ADMIN — MICAFUNGIN SODIUM 150 MG: 50 INJECTION, POWDER, LYOPHILIZED, FOR SOLUTION INTRAVENOUS at 16:53

## 2024-01-13 RX ADMIN — ACYCLOVIR 800 MG: 800 TABLET ORAL at 13:17

## 2024-01-13 RX ADMIN — ACETAMINOPHEN 650 MG: 325 TABLET, FILM COATED ORAL at 13:17

## 2024-01-13 RX ADMIN — URSODIOL 300 MG: 300 CAPSULE ORAL at 13:17

## 2024-01-13 RX ADMIN — URSODIOL 300 MG: 300 CAPSULE ORAL at 19:45

## 2024-01-13 RX ADMIN — ACYCLOVIR 800 MG: 800 TABLET ORAL at 18:27

## 2024-01-13 RX ADMIN — CEFEPIME HYDROCHLORIDE 2 G: 2 INJECTION, POWDER, FOR SOLUTION INTRAVENOUS at 00:33

## 2024-01-13 RX ADMIN — ACETAMINOPHEN 650 MG: 325 TABLET, FILM COATED ORAL at 19:45

## 2024-01-13 RX ADMIN — ACYCLOVIR 800 MG: 800 TABLET ORAL at 11:07

## 2024-01-13 ASSESSMENT — ACTIVITIES OF DAILY LIVING (ADL)
ADLS_ACUITY_SCORE: 21

## 2024-01-13 NOTE — PROGRESS NOTES
"  BMT Daily Progress Note  Patient ID:  Irma Foreman is a 53 year old female with a PMH of MDS, warm AIHA, transfusion and transfusion dependent anemia presented to  to undergo a myeloablative allogeneic transplant. She was recently COVID+ on 12/26 but tested negative on 1/2 with resolution of nearly all URI symptoms. Undergoing MA (Bu/Flu) 6/8 URD Allo transplant, day 2      HPI   Irma still spiking intermittent fevers. Notes reddish/orange urine. No pain with urination. Eating/drinking okay. Denies diarrhea.     Review of Systems    Negative unless stated in the HPI.     PHYSICAL EXAM      Weight     Wt Readings from Last 3 Encounters:   01/13/24 82.3 kg (181 lb 6.4 oz)   12/26/23 83.1 kg (183 lb 4.8 oz)   12/19/23 83.5 kg (184 lb)        KPS: 70    /77 (BP Location: Right arm)   Pulse 110   Temp 100.2  F (37.9  C) (Axillary)   Resp 18   Ht 1.575 m (5' 2.01\")   Wt 82.3 kg (181 lb 6.4 oz)   LMP 11/05/2017   SpO2 96%   BMI 33.17 kg/m       General: NAD  Eyes: KEVON, sclera anicteric   Nose/Mouth/Throat: OP clear, buccal mucosa moist, no ulcerations   Lungs: CTA bilaterally  Cardiovascular: RRR, no M/R/G   Abdominal/Rectal: +BS, soft, NT, ND  Lymphatics: No edema  Skin: wart under right foot dime sized white, cauliflower and slightly ulcerated. Two pin point sized, flesh colored warts on right hand. Hands dry, one excoriation on left dorsum.   Neuro: A&O   Additional Findings: Gastelum site NT, no drainage.    Current aGVHD staging:  Skin 0, UGI 0, LGI 0, Liver 0 (keep in note through day +180 for allos)      LABS AND IMAGING: I have assessed all abnormal lab values for their clinical significance and any values considered clinically significant have been addressed in the assessment and plan.        Lab Results   Component Value Date    WBC 4.3 01/13/2024    ANEUTAUTO 5.4 01/08/2024    HGB 8.0 (L) 01/13/2024    HCT 24.0 (L) 01/13/2024    PLT 90 (L) 01/13/2024     01/13/2024    POTASSIUM 3.9 " 01/13/2024    CHLORIDE 102 01/13/2024    CO2 27 01/13/2024     (H) 01/13/2024    BUN 10.6 01/13/2024    CR 0.49 (L) 01/13/2024    MAG 2.1 01/13/2024    INR 1.05 01/08/2024           SYSTEMS-BASED ASSESSMENT AND PLAN     Hortencia Baldwin is a 53 year old female with a history of MDS, undergoing MA (Bu/Flu) 6/8 URD Allo transplant, day 2      BMT/IEC PROTOCOL for MT 2015-29 **according to but not on trial**   - Chemo protocol:  ? Day -6 through day -1: Keppra and Allopurinol   ? Day -5 through -2: Bu/Flu. Busulfan levels adjusted by pharm, attending  ? Day -1: Rest day   ? Day 0: Transplant. Recipient: O+, CMV+. Donor: O+, CMV-. No flush. Cell dose TBD.   - Restaging plan: per protocol       HEME/COAG  # Iron overload: hx of transfusion dependent anemia, pre-transplant ferritin around 5,000. Recommend phlebotomy post transplant when retic count is restored and Hgb >10    - Risk of cytopenias due to chemotherapy and radiation  - Transfusion parameters: hemoglobin <7, platelets <10    IMMUNOCOMPROMISED  # Fever: not yet neutropenic   - UA: negative for LE & nitrites. 1/13 check ua/bk/adeno with new red urine.    - Blood cultures NGTD    - CXR with likely pulmonary edema   - RVP neg    - Cefepime (1/11 - x). Brief course of IV Vancomycin. MRSA nasal swab negative.   - Relevant infection history:  URI from COVID (12/26), negative test on 1/2/24   - Vaccination status: Influenza vaccination after day +60, COVID vaccination after day +100, followed by remaining vaccinations at 12, 14, 24, and 26 months.  - Prophylaxis plan: ACV/letermovir, Micafungin through day +45 (current smoker), levofloxacin while neutropenic, Bactrim to start at day +28  Foot, hand warts. Curbside derm on 1/8, no recs while inpatient, typically managed OP.      RISK OF GVHD  - Prophylaxis: PT Cy, Tac/MMF **avoiding sirolimus d/t high risk VOD**    CARDIOVASCULAR  - Risk of cardiomyopathy:  Baseline EF 60-65%  - Risk of arrhythmia: Baseline EKG  showed sinus rhythm     RESPIRATORY  - Current smoker: states she quit (1/5/24), offered nicotine replacement but she declined.   - Baseline PFTs: The FEV1, FVC, FEV1/FVC ratio and HLQ93-19% are within normal limits.  The inspiratory flow rates are within normal limits.  Lung volumes are within normal limits.  The diffusing capacity is normal.   - Risk of respiratory complications: Frequent ambulation and incentive spirometer.    GI/NUTRITION  # Elevated liver enzymes: down trending ALT: 56 (87), AST: 19 (43) could be related to Tylenol use, iron overload or recent viral illness   ? RUQ U/S (12/27) was normal  ? Acute hepatitis panel negative   - Ulcer prophylaxis: PPI  - VOD ppx: Ursodiol   - Risk of malnutrition: dietitian to follow     RENAL/ELECTROLYTES/  - Risk of renal injury: IV hydration   - Electrolyte management: replace per sliding scale    DERM:  Right hand dryness, try aquafor with gloves on overnight and as tolerated during the day  Wart on foot, one on hand, see ID    DIABETES/ENDOCRINE  - Risk of steroid-induced hyperglyemia: Monitor BG, sliding scale if needed    MUSCULOSKELETAL/FRAILTY  - Baseline Frailty Score: 3  - Patient with substantial risk of sarcopenia  - Daily PT/OT as needed while inpatient  - Cancer Rehab as needed outpatient    SYMPTOM MANAGEMENT  - Nausea from chemo/radiation: Prochlorperazine, ondansetron, lorazepam.  - # Pain Assessment:      1/12/2024     7:29 PM   Current Pain Score   Patient currently in pain? denies   - Hortencia Sethi is experiencing pain due to bone marrow biopsy and line placement. Pain management was discussed and the plan was created in a collaborative fashion.  Hortencia Sethi's response to the current recommendations: engaged  - Please see the plan for pain management as documented above      SOCIAL DETERMINANTS  - Caregiver: Srinivas Suggs, Keira Neal & Rickey Neal     Disposition: remain admitted through engraftment     Summary (1/13):     - cont Cefepime  - check  "ua/uc/bk/adeno     Known issues that I take into account for medical decisions, with salient changes to the plan considering these complexities noted above.    Patient Active Problem List   Diagnosis     Left medial knee pain     Obesity (BMI 30-39.9)     Anemia, unspecified type     Macrocytic anemia     MDS (myelodysplastic syndrome) (H)     Clinically Significant Risk Factors                # Thrombocytopenia: Lowest platelets = 90 in last 2 days, will monitor for bleeding          # Obesity: Estimated body mass index is 33.17 kg/m  as calculated from the following:    Height as of this encounter: 1.575 m (5' 2.01\").    Weight as of this encounter: 82.3 kg (181 lb 6.4 oz).                Today's summary: remain admitted through chemo prep, transplant today     I spent 30 minutes in the care of this patient today, which included time necessary for preparation for the visit, obtaining history, ordering medications/tests/procedures as medically indicated, review of pertinent medical literature, counseling of the patient, communication of recommendations to the care team, and documentation time.    Karma Diaz, APRN CNP  [phone]      ______________________________________________      BMT ATTENDING NOTE    Hortencia Baldwin is a 53 year old female, who is day 2 of transplant for MDS.      Subjective:  Reports dark possibly red-colored urine since yesterday evening, reports otherwise feeling well overall, more fatigued, no new concerns, decent appetite.  Ambulating.  No respiratory concerns.  No nausea no vomiting no diarrhea.       Vitals reviewed, labs reviewed  PE:  NAD, Alert, oriented x3  HEENT: no visible oral ulcers  Heart: RRR  Lungs: CTAB  Abdomen: +BS, soft non tender, non distended  LE: no edema  Skin: Noted warts on the right sole and upper extremity nonconcerning in appearance     Assessment and Plan:    1. Heme/BMT: MDS, here for MAC 6/8 MUD, flu/busulfan prep, PTCy/Tac/MMF GVHD prophylaxis, " transfusion as needed, history of secondary iron overload  2. ID: 1/11 first neut fever, cefepime, follow workup, 1/13 send UA and possible viral workup   3. CV: no active issues  4. GI/ FEN: monitor regimen related toxicity, encourage fluid PO intake, I/O and daily weights, at risk of VOD due to history of abnormal LFTs and secondary hemochromatosis baseline ultrasound on 12/27/2023- monitor mild elevated ALT  5. Renal: Maintain euvolemic  6. PPx: Levo, jani, ACV/letermovir  7.  Supportive care: PT, baseline frailty score 3, encourage ambulation, optimize nutrition, avoid sarcopenia, other supportive care as detailed above      I spent 50 minutes in the care of Irma today, including an independent face-to-face assessment and time monitoring for restoration of hematopoiesis, high-risk organ toxicities from chemotherapy, and GVHD, managing infectious risk due to his immunocompromised state, and encouraging nutrition and physical activity to prevent debility and sarcopenia.  I personally performed all of the medical decision making associated with this visit, counseled Praveen on my overall assessment and recommendations, communicated my plan to the care team, and edited the above note to reflect my current plan of care.     Liam Guajardo MD

## 2024-01-13 NOTE — PROGRESS NOTES
"BP (!) 144/87   Pulse 113   Temp (!) 101.7  F (38.7  C) (Axillary)   Resp 16   Ht 1.575 m (5' 2.01\")   Wt 83.8 kg (184 lb 12.8 oz)   LMP 11/05/2017   SpO2 97%   BMI 33.79 kg/m      Pt febrile again this afternoon 101.7. Tylenol given. Paged MD, no new intervention. Pt also positive for orthostatic but asymptomatic. MD paged. Awaiting for response. MRSA/VRE obtained. 1 unit(s) PRBC given without issues. Denies n/v. No pain. Continue with POC.   "

## 2024-01-13 NOTE — PLAN OF CARE
Day +2 MUD for MDS.  Pt febrile to tmax 102.9 with 0000 vitals. Blood cultures drawn, tylenol given, provider notified - no new orders. OVSS, pt tachy and hypertensive at times, but within call parameters. A&Ox4, independent.  No reports of pain or nausea.  No other PRNs given.  No blood products or electrolyte replacements needed after AM labs.  Will continue to monitor.

## 2024-01-13 NOTE — PROGRESS NOTES
"BP (!) 140/87 (BP Location: Right arm)   Pulse 100   Temp 97.5  F (36.4  C) (Axillary)   Resp 16   Ht 1.575 m (5' 2.01\")   Wt 82.3 kg (181 lb 6.4 oz)   LMP 11/05/2017   SpO2 99%   BMI 33.17 kg/m      Pt febrile again around noon with temp of 101.4 with chills. Tylenol given. Also noticed a little bit of cough. Provider informed and aware. No new orders. Orthostatic negative. Urine sample sent for ua/uc, adeno and BkVirus. Will start cytoxan flush tonight. Up ad jeff. Urine still orange but clear no blood noticed. Reports had x2 soft stool and described it \"very yellow color\".  Denies burning/pain with urination. Continue with POC.   "

## 2024-01-14 LAB
ABO/RH(D): NORMAL
ANION GAP SERPL CALCULATED.3IONS-SCNC: 8 MMOL/L (ref 7–15)
ANTIBODY SCREEN: NEGATIVE
BASOPHILS # BLD AUTO: 0 10E3/UL (ref 0–0.2)
BASOPHILS NFR BLD AUTO: 0 %
BK VIRUS DNA IU/ML, INSTRUMENT (6800): 2890 IU/ML
BK VIRUS SPECIMEN TYPE: ABNORMAL
BKV DNA SPEC NAA+PROBE-LOG#: 3.5 {LOG_COPIES}/ML
BUN SERPL-MCNC: 9 MG/DL (ref 6–20)
CALCIUM SERPL-MCNC: 8.3 MG/DL (ref 8.6–10)
CHLORIDE SERPL-SCNC: 102 MMOL/L (ref 98–107)
CREAT SERPL-MCNC: 0.4 MG/DL (ref 0.51–0.95)
DEPRECATED HCO3 PLAS-SCNC: 24 MMOL/L (ref 22–29)
EGFRCR SERPLBLD CKD-EPI 2021: >90 ML/MIN/1.73M2
EOSINOPHIL # BLD AUTO: 0.1 10E3/UL (ref 0–0.7)
EOSINOPHIL NFR BLD AUTO: 3 %
ERYTHROCYTE [DISTWIDTH] IN BLOOD BY AUTOMATED COUNT: 21 % (ref 10–15)
GLUCOSE SERPL-MCNC: 151 MG/DL (ref 70–99)
HCT VFR BLD AUTO: 22.1 % (ref 35–47)
HGB BLD-MCNC: 7.5 G/DL (ref 11.7–15.7)
IMM GRANULOCYTES # BLD: 0.1 10E3/UL
IMM GRANULOCYTES NFR BLD: 3 %
LYMPHOCYTES # BLD AUTO: 0.1 10E3/UL (ref 0.8–5.3)
LYMPHOCYTES NFR BLD AUTO: 2 %
MAGNESIUM SERPL-MCNC: 2 MG/DL (ref 1.7–2.3)
MCH RBC QN AUTO: 36.1 PG (ref 26.5–33)
MCHC RBC AUTO-ENTMCNC: 33.9 G/DL (ref 31.5–36.5)
MCV RBC AUTO: 106 FL (ref 78–100)
MONOCYTES # BLD AUTO: 0.2 10E3/UL (ref 0–1.3)
MONOCYTES NFR BLD AUTO: 6 %
NEUTROPHILS # BLD AUTO: 2.5 10E3/UL (ref 1.6–8.3)
NEUTROPHILS NFR BLD AUTO: 86 %
NRBC # BLD AUTO: 0 10E3/UL
NRBC BLD AUTO-RTO: 0 /100
PHOSPHATE SERPL-MCNC: 1.5 MG/DL (ref 2.5–4.5)
PHOSPHATE SERPL-MCNC: 1.9 MG/DL (ref 2.5–4.5)
PLATELET # BLD AUTO: 52 10E3/UL (ref 150–450)
POTASSIUM SERPL-SCNC: 3.8 MMOL/L (ref 3.4–5.3)
POTASSIUM SERPL-SCNC: 4.1 MMOL/L (ref 3.4–5.3)
RBC # BLD AUTO: 2.08 10E6/UL (ref 3.8–5.2)
SODIUM SERPL-SCNC: 134 MMOL/L (ref 135–145)
SODIUM SERPL-SCNC: 136 MMOL/L (ref 135–145)
SPECIMEN EXPIRATION DATE: NORMAL
WBC # BLD AUTO: 2.9 10E3/UL (ref 4–11)

## 2024-01-14 PROCEDURE — 258N000003 HC RX IP 258 OP 636: Performed by: INTERNAL MEDICINE

## 2024-01-14 PROCEDURE — 250N000011 HC RX IP 250 OP 636

## 2024-01-14 PROCEDURE — 206N000001 HC R&B BMT UMMC

## 2024-01-14 PROCEDURE — 86900 BLOOD TYPING SEROLOGIC ABO: CPT

## 2024-01-14 PROCEDURE — 85025 COMPLETE CBC W/AUTO DIFF WBC: CPT

## 2024-01-14 PROCEDURE — 250N000011 HC RX IP 250 OP 636: Performed by: STUDENT IN AN ORGANIZED HEALTH CARE EDUCATION/TRAINING PROGRAM

## 2024-01-14 PROCEDURE — 250N000011 HC RX IP 250 OP 636: Mod: JZ | Performed by: INTERNAL MEDICINE

## 2024-01-14 PROCEDURE — 84295 ASSAY OF SERUM SODIUM: CPT

## 2024-01-14 PROCEDURE — 258N000003 HC RX IP 258 OP 636: Performed by: NURSE PRACTITIONER

## 2024-01-14 PROCEDURE — 86923 COMPATIBILITY TEST ELECTRIC: CPT

## 2024-01-14 PROCEDURE — 84100 ASSAY OF PHOSPHORUS: CPT | Performed by: INTERNAL MEDICINE

## 2024-01-14 PROCEDURE — 83735 ASSAY OF MAGNESIUM: CPT | Performed by: INTERNAL MEDICINE

## 2024-01-14 PROCEDURE — 250N000009 HC RX 250

## 2024-01-14 PROCEDURE — 258N000003 HC RX IP 258 OP 636

## 2024-01-14 PROCEDURE — 86922 COMPATIBILITY TEST ANTIGLOB: CPT

## 2024-01-14 PROCEDURE — 99418 PROLNG IP/OBS E/M EA 15 MIN: CPT | Mod: FS | Performed by: NURSE PRACTITIONER

## 2024-01-14 PROCEDURE — 99233 SBSQ HOSP IP/OBS HIGH 50: CPT | Mod: FS | Performed by: NURSE PRACTITIONER

## 2024-01-14 PROCEDURE — 250N000009 HC RX 250: Performed by: INTERNAL MEDICINE

## 2024-01-14 PROCEDURE — 87103 BLOOD FUNGUS CULTURE: CPT

## 2024-01-14 PROCEDURE — 250N000013 HC RX MED GY IP 250 OP 250 PS 637

## 2024-01-14 PROCEDURE — 84132 ASSAY OF SERUM POTASSIUM: CPT

## 2024-01-14 PROCEDURE — 80048 BASIC METABOLIC PNL TOTAL CA: CPT

## 2024-01-14 RX ORDER — SODIUM CHLORIDE 9 MG/ML
INJECTION, SOLUTION INTRAVENOUS CONTINUOUS
Status: ACTIVE | OUTPATIENT
Start: 2024-01-14 | End: 2024-01-16

## 2024-01-14 RX ORDER — PHENAZOPYRIDINE HYDROCHLORIDE 100 MG/1
100 TABLET, FILM COATED ORAL
Status: DISCONTINUED | OUTPATIENT
Start: 2024-01-14 | End: 2024-01-15

## 2024-01-14 RX ADMIN — SODIUM CHLORIDE: 9 INJECTION, SOLUTION INTRAVENOUS at 09:29

## 2024-01-14 RX ADMIN — POTASSIUM PHOSPHATE, MONOBASIC POTASSIUM PHOSPHATE, DIBASIC 9 MMOL: 224; 236 INJECTION, SOLUTION, CONCENTRATE INTRAVENOUS at 20:59

## 2024-01-14 RX ADMIN — ACETAMINOPHEN 650 MG: 325 TABLET, FILM COATED ORAL at 23:21

## 2024-01-14 RX ADMIN — CEFEPIME HYDROCHLORIDE 2 G: 2 INJECTION, POWDER, FOR SOLUTION INTRAVENOUS at 08:21

## 2024-01-14 RX ADMIN — CELECOXIB 100 MG: 100 CAPSULE ORAL at 13:04

## 2024-01-14 RX ADMIN — PANTOPRAZOLE SODIUM 40 MG: 40 TABLET, DELAYED RELEASE ORAL at 08:21

## 2024-01-14 RX ADMIN — POTASSIUM PHOSPHATE, MONOBASIC POTASSIUM PHOSPHATE, DIBASIC 9 MMOL: 224; 236 INJECTION, SOLUTION, CONCENTRATE INTRAVENOUS at 18:40

## 2024-01-14 RX ADMIN — DEXTROSE AND SODIUM CHLORIDE 1000 ML: 5; 450 INJECTION, SOLUTION INTRAVENOUS at 02:40

## 2024-01-14 RX ADMIN — FUROSEMIDE 20 MG: 10 INJECTION, SOLUTION INTRAMUSCULAR; INTRAVENOUS at 16:58

## 2024-01-14 RX ADMIN — FUROSEMIDE 10 MG: 10 INJECTION, SOLUTION INTRAMUSCULAR; INTRAVENOUS at 14:18

## 2024-01-14 RX ADMIN — CEFEPIME HYDROCHLORIDE 2 G: 2 INJECTION, POWDER, FOR SOLUTION INTRAVENOUS at 23:21

## 2024-01-14 RX ADMIN — MESNA 2510 MG: 100 INJECTION, SOLUTION INTRAVENOUS at 08:02

## 2024-01-14 RX ADMIN — ONDANSETRON HYDROCHLORIDE 8 MG: 8 TABLET, FILM COATED ORAL at 09:30

## 2024-01-14 RX ADMIN — ACETAMINOPHEN 650 MG: 325 TABLET, FILM COATED ORAL at 12:21

## 2024-01-14 RX ADMIN — ACYCLOVIR 800 MG: 800 TABLET ORAL at 08:21

## 2024-01-14 RX ADMIN — POTASSIUM PHOSPHATE, MONOBASIC POTASSIUM PHOSPHATE, DIBASIC 9 MMOL: 224; 236 INJECTION, SOLUTION, CONCENTRATE INTRAVENOUS at 08:54

## 2024-01-14 RX ADMIN — ONDANSETRON HYDROCHLORIDE 8 MG: 8 TABLET, FILM COATED ORAL at 16:58

## 2024-01-14 RX ADMIN — CEFEPIME HYDROCHLORIDE 2 G: 2 INJECTION, POWDER, FOR SOLUTION INTRAVENOUS at 00:11

## 2024-01-14 RX ADMIN — CEFEPIME HYDROCHLORIDE 2 G: 2 INJECTION, POWDER, FOR SOLUTION INTRAVENOUS at 16:39

## 2024-01-14 RX ADMIN — ACETAMINOPHEN 650 MG: 325 TABLET, FILM COATED ORAL at 04:33

## 2024-01-14 RX ADMIN — ACYCLOVIR 800 MG: 800 TABLET ORAL at 10:10

## 2024-01-14 RX ADMIN — MICAFUNGIN SODIUM 150 MG: 50 INJECTION, POWDER, LYOPHILIZED, FOR SOLUTION INTRAVENOUS at 17:16

## 2024-01-14 RX ADMIN — CYCLOPHOSPHAMIDE 2505 MG: 2 INJECTION, POWDER, FOR SOLUTION INTRAVENOUS; ORAL at 09:57

## 2024-01-14 RX ADMIN — ACYCLOVIR 800 MG: 800 TABLET ORAL at 00:11

## 2024-01-14 RX ADMIN — URSODIOL 300 MG: 300 CAPSULE ORAL at 08:21

## 2024-01-14 RX ADMIN — DEXTROSE AND SODIUM CHLORIDE 1000 ML: 5; 450 INJECTION, SOLUTION INTRAVENOUS at 02:42

## 2024-01-14 RX ADMIN — URSODIOL 300 MG: 300 CAPSULE ORAL at 20:20

## 2024-01-14 RX ADMIN — POTASSIUM PHOSPHATE, MONOBASIC POTASSIUM PHOSPHATE, DIBASIC 9 MMOL: 224; 236 INJECTION, SOLUTION, CONCENTRATE INTRAVENOUS at 05:35

## 2024-01-14 RX ADMIN — URSODIOL 300 MG: 300 CAPSULE ORAL at 13:04

## 2024-01-14 RX ADMIN — ACYCLOVIR 800 MG: 800 TABLET ORAL at 23:18

## 2024-01-14 RX ADMIN — ACYCLOVIR 800 MG: 800 TABLET ORAL at 20:20

## 2024-01-14 RX ADMIN — ACYCLOVIR 800 MG: 800 TABLET ORAL at 13:04

## 2024-01-14 ASSESSMENT — ACTIVITIES OF DAILY LIVING (ADL)
ADLS_ACUITY_SCORE: 21

## 2024-01-14 NOTE — PLAN OF CARE
Day +3 MUD for MDS.  Pt febrile to t-max 102.3, tachy to 130s. Provider notified each time. Tylenol given x2 for fever. With 0400 vitals blood cultures drawn per conditional orders. A&Ox4, independent.  No reports of pain or nausea.  No other PRNs given.  Cytoxan flush started at 2200. Plan to start mesna at 0800 and cytoxan at 1000.  No blood products needed after AM labs. First dose of phos replacement given, 2nd to be given by oncoming shift with recheck later today.  Will continue to monitor.

## 2024-01-14 NOTE — PROGRESS NOTES
Stop time on MAR & chart I & O  Chemo drug: Cyclophosphamide  Tolerated: Abdominal cramping  Intervention: Gave zofran prior to infusion. Patient declined oxy. Offered and gave a heating pad.  Response: Good  Plan: Continue to monitor patient  Lab: Na, K, UpH drug level: Will collect levels at end of shift per order.  Wt/intervention: Will get a second weight at the end of the shift per order.      Cytoxan Primer  Cytoxan infused at: 250 ml/hr  Mesna infusing at 44.8ml/hr   Flush infusing at 200mL/hr

## 2024-01-14 NOTE — PROGRESS NOTES
"  BMT Daily Progress Note  Patient ID:  Irma Foreman is a 53 year old female with a PMH of MDS, warm AIHA, transfusion and transfusion dependent anemia presented to  to undergo a myeloablative allogeneic transplant. She was recently COVID+ on 12/26 but tested negative on 1/2 with resolution of nearly all URI symptoms. Undergoing MA (Bu/Flu) 6/8 URD Allo transplant, day 3      HPI   Irma still spiking intermittent fevers. Urinating frequently due to increased flush. No dysuria. No respiratory sxs. Appetite decreased but did drink 3 protein drinks yesterday with small meals. Stools are soft. PtCy to start today.    Review of Systems    Negative unless stated in the HPI.     PHYSICAL EXAM      Weight     Wt Readings from Last 3 Encounters:   01/14/24 82.1 kg (181 lb)   12/26/23 83.1 kg (183 lb 4.8 oz)   12/19/23 83.5 kg (184 lb)        KPS: 70    /78 (BP Location: Right arm)   Pulse 86   Temp 97.9  F (36.6  C) (Axillary)   Resp 16   Ht 1.575 m (5' 2.01\")   Wt 82.1 kg (181 lb)   LMP 11/05/2017   SpO2 98%   BMI 33.10 kg/m       General: NAD  Eyes: KEVON, sclera anicteric   Nose/Mouth/Throat: OP clear, buccal mucosa moist, no ulcerations   Lungs: CTA bilaterally  Cardiovascular: RRR, no M/R/G   Abdominal/Rectal: +BS, soft, NT, ND  Lymphatics: No edema  Skin: wart under right foot dime sized white, cauliflower and slightly ulcerated. Two pin point sized, flesh colored warts on right hand. Hands dry, one excoriation on left dorsum.   Neuro: A&O   Additional Findings: Gastelum site NT, no drainage.    Current aGVHD staging:  Skin 0, UGI 0, LGI 0, Liver 0 (keep in note through day +180 for allos)      LABS AND IMAGING: I have assessed all abnormal lab values for their clinical significance and any values considered clinically significant have been addressed in the assessment and plan.        Lab Results   Component Value Date    WBC 2.9 (L) 01/14/2024    ANEUTAUTO 2.5 01/14/2024    HGB 7.5 (L) 01/14/2024    HCT " 22.1 (L) 01/14/2024    PLT 52 (L) 01/14/2024     (L) 01/14/2024    POTASSIUM 4.1 01/14/2024    CHLORIDE 102 01/14/2024    CO2 24 01/14/2024     (H) 01/14/2024    BUN 9.0 01/14/2024    CR 0.40 (L) 01/14/2024    MAG 2.0 01/14/2024    INR 1.05 01/08/2024           SYSTEMS-BASED ASSESSMENT AND PLAN     Hortencia Baldwin is a 53 year old female with a history of MDS, undergoing MA (Bu/Flu) 6/8 URD Allo transplant, day 3      BMT/IEC PROTOCOL for MT 2015-29 **according to but not on trial**   - Chemo protocol:  Day -6 through day -1: Keppra and Allopurinol   Day -5 through -2: Bu/Flu. Busulfan levels adjusted by pharm, attending  Day -1: Rest day   Day 0: Transplant. Recipient: O+, CMV+. Donor: O+, CMV-. No flush. Cell dose TBD.   - Restaging plan: per protocol       HEME/COAG  # Iron overload: hx of transfusion dependent anemia, pre-transplant ferritin around 5,000. Recommend phlebotomy post transplant when retic count is restored and Hgb >10    - Risk of cytopenias due to chemotherapy and radiation  - Transfusion parameters: hemoglobin <7, platelets <10    IMMUNOCOMPROMISED  # Fever: not yet neutropenic   - UA: negative for LE & nitrites. 1/13 ua negative; bk/adeno pending (red/orange urine)   - Blood cultures NGTD    - CXR with likely pulmonary edema   - RVP neg    - Cefepime (1/11 - x). Brief course of IV Vancomycin. MRSA nasal swab negative.   - Relevant infection history:  URI from COVID (12/26), negative test on 1/2/24   - Vaccination status: Influenza vaccination after day +60, COVID vaccination after day +100, followed by remaining vaccinations at 12, 14, 24, and 26 months.  - Prophylaxis plan: ACV/letermovir, Micafungin through day +45 (current smoker), levofloxacin while neutropenic, Bactrim to start at day +28  Foot, hand warts. Curbside derm on 1/8, no recs while inpatient, typically managed OP.      RISK OF GVHD  - Prophylaxis: PT Cy, Tac/MMF **avoiding sirolimus d/t high risk  VOD**    CARDIOVASCULAR  - Risk of cardiomyopathy:  Baseline EF 60-65%  - Risk of arrhythmia: Baseline EKG showed sinus rhythm     RESPIRATORY  - Current smoker: states she quit (1/5/24), offered nicotine replacement but she declined.   - Baseline PFTs: The FEV1, FVC, FEV1/FVC ratio and NZN02-17% are within normal limits.  The inspiratory flow rates are within normal limits.  Lung volumes are within normal limits.  The diffusing capacity is normal.   - Risk of respiratory complications: Frequent ambulation and incentive spirometer.    GI/NUTRITION  # Elevated liver enzymes: down trending ALT: 56 (87), AST: 19 (43) could be related to Tylenol use, iron overload or recent viral illness   RUQ U/S (12/27) was normal  Acute hepatitis panel negative   - Ulcer prophylaxis: PPI  - VOD ppx: Ursodiol   - Risk of malnutrition: dietitian to follow     RENAL/ELECTROLYTES/  - Risk of renal injury: IV hydration   - Electrolyte management: replace per sliding scale  - Change Cytoxan flush to NS today as Na downtrending    DERM:  Right hand dryness, try aquafor with gloves on overnight and as tolerated during the day  Wart on foot, one on hand, see ID    DIABETES/ENDOCRINE  - Risk of steroid-induced hyperglyemia: Monitor BG, sliding scale if needed    MUSCULOSKELETAL/FRAILTY  - Baseline Frailty Score: 3  - Patient with substantial risk of sarcopenia  - Daily PT/OT as needed while inpatient  - Cancer Rehab as needed outpatient    SYMPTOM MANAGEMENT  - Nausea from chemo/radiation: Prochlorperazine, ondansetron, lorazepam.  - # Pain Assessment:      1/13/2024     7:41 PM   Current Pain Score   Patient currently in pain? denies   - Hortencia Sethi is experiencing pain due to bone marrow biopsy and line placement. Pain management was discussed and the plan was created in a collaborative fashion.  Hortencia Navdeep's response to the current recommendations: engaged  - Please see the plan for pain management as documented above      SOCIAL DETERMINANTS  -  "Caregiver: Srinivas Suggs, Keira Neal & Rickey Neal     Disposition: remain admitted through engraftment     Known issues that I take into account for medical decisions, with salient changes to the plan considering these complexities noted above.    Patient Active Problem List   Diagnosis    Left medial knee pain    Obesity (BMI 30-39.9)    Anemia, unspecified type    Macrocytic anemia    MDS (myelodysplastic syndrome) (H)     Clinically Significant Risk Factors                # Thrombocytopenia: Lowest platelets = 52 in last 2 days, will monitor for bleeding          # Obesity: Estimated body mass index is 33.1 kg/m  as calculated from the following:    Height as of this encounter: 1.575 m (5' 2.01\").    Weight as of this encounter: 82.1 kg (181 lb).                  I spent 30 minutes in the care of this patient today, which included time necessary for preparation for the visit, obtaining history, ordering medications/tests/procedures as medically indicated, review of pertinent medical literature, counseling of the patient, communication of recommendations to the care team, and documentation time.    Karma Diaz, APRN CNP  [phone]      ______________________________________________      BMT ATTENDING NOTE    Hortencia Baldwin is a 53 year old female, who is day 3 of transplant for MDS.      Subjective:  Reports clear urine now, urinating frequently with fluids, reports otherwise feeling well overall, more fatigued, no new concerns, decent appetite.  Ambulating.  No respiratory concerns.  No nausea no vomiting no diarrhea.       Vitals reviewed, labs reviewed  PE:  NAD, Alert, oriented x3  HEENT: no visible oral ulcers  Heart: RRR  Lungs: CTAB  Abdomen: +BS, soft non tender, non distended  LE: no edema  Skin: Noted warts on the right sole and upper extremity nonconcerning in appearance     Assessment and Plan:    1. Heme/BMT: MDS, here for MAC 6/8 MUD, flu/busulfan prep, PTCy/Tac/MMF GVHD prophylaxis, transfusion as " needed, history of secondary iron overload  2. ID: 1/11 first neut fever, cefepime, follow workup, 1/13 send UA and possible viral workup   3. CV: no active issues  4. GI/ FEN: monitor regimen related toxicity, encourage fluid PO intake, I/O and daily weights, at risk of VOD due to history of abnormal LFTs and secondary hemochromatosis baseline ultrasound on 12/27/2023- monitor mild elevated ALT  5. Renal: Maintain euvolemic  6. PPx: Levo, jani, ACV/letermovir  7.  Supportive care: PT, baseline frailty score 3, encourage ambulation, optimize nutrition, avoid sarcopenia, other supportive care as detailed above      I spent 50 minutes in the care of Irma today, including an independent face-to-face assessment and time monitoring for restoration of hematopoiesis, high-risk organ toxicities from chemotherapy, and GVHD, managing infectious risk due to his immunocompromised state, and encouraging nutrition and physical activity to prevent debility and sarcopenia.  I personally performed all of the medical decision making associated with this visit, counseled Praveen on my overall assessment and recommendations, communicated my plan to the care team, and edited the above note to reflect my current plan of care.     Liam Guajardo MD

## 2024-01-15 ENCOUNTER — TELEPHONE (OUTPATIENT)
Dept: TRANSPLANT | Facility: CLINIC | Age: 54
End: 2024-01-15
Payer: COMMERCIAL

## 2024-01-15 ENCOUNTER — APPOINTMENT (OUTPATIENT)
Dept: GENERAL RADIOLOGY | Facility: CLINIC | Age: 54
DRG: 014 | End: 2024-01-15
Payer: COMMERCIAL

## 2024-01-15 ENCOUNTER — APPOINTMENT (OUTPATIENT)
Dept: CARDIOLOGY | Facility: CLINIC | Age: 54
DRG: 014 | End: 2024-01-15
Payer: COMMERCIAL

## 2024-01-15 LAB
ACANTHOCYTES BLD QL SMEAR: ABNORMAL
ALBUMIN SERPL BCG-MCNC: 3.3 G/DL (ref 3.5–5.2)
ALP SERPL-CCNC: 85 U/L (ref 40–150)
ALT SERPL W P-5'-P-CCNC: 55 U/L (ref 0–50)
ANION GAP SERPL CALCULATED.3IONS-SCNC: 8 MMOL/L (ref 7–15)
AST SERPL W P-5'-P-CCNC: 37 U/L (ref 0–45)
AUER BODIES BLD QL SMEAR: ABNORMAL
BASO STIPL BLD QL SMEAR: ABNORMAL
BASOPHILS # BLD AUTO: 0 10E3/UL (ref 0–0.2)
BASOPHILS NFR BLD AUTO: 0 %
BILIRUB DIRECT SERPL-MCNC: 0.62 MG/DL (ref 0–0.3)
BILIRUB SERPL-MCNC: 1.1 MG/DL
BITE CELLS BLD QL SMEAR: ABNORMAL
BLD PROD TYP BPU: NORMAL
BLISTER CELLS BLD QL SMEAR: ABNORMAL
BLOOD COMPONENT TYPE: NORMAL
BUN SERPL-MCNC: 9.1 MG/DL (ref 6–20)
BURR CELLS BLD QL SMEAR: ABNORMAL
CALCIUM SERPL-MCNC: 8.3 MG/DL (ref 8.6–10)
CHLORIDE SERPL-SCNC: 105 MMOL/L (ref 98–107)
CODING SYSTEM: NORMAL
CREAT SERPL-MCNC: 0.51 MG/DL (ref 0.51–0.95)
CROSSMATCH: NORMAL
DACRYOCYTES BLD QL SMEAR: ABNORMAL
DEPRECATED HCO3 PLAS-SCNC: 24 MMOL/L (ref 22–29)
EGFRCR SERPLBLD CKD-EPI 2021: >90 ML/MIN/1.73M2
ELLIPTOCYTES BLD QL SMEAR: ABNORMAL
EOSINOPHIL # BLD AUTO: 0.1 10E3/UL (ref 0–0.7)
EOSINOPHIL NFR BLD AUTO: 5 %
ERYTHROCYTE [DISTWIDTH] IN BLOOD BY AUTOMATED COUNT: 20.2 % (ref 10–15)
ERYTHROCYTE [DISTWIDTH] IN BLOOD BY AUTOMATED COUNT: 20.4 % (ref 10–15)
FRAGMENTS BLD QL SMEAR: ABNORMAL
GLUCOSE SERPL-MCNC: 112 MG/DL (ref 70–99)
HADV DNA # SPEC NAA+PROBE: NOT DETECTED COPIES/ML
HCT VFR BLD AUTO: 19.1 % (ref 35–47)
HCT VFR BLD AUTO: 24.1 % (ref 35–47)
HGB BLD-MCNC: 6.3 G/DL (ref 11.7–15.7)
HGB BLD-MCNC: 8.3 G/DL (ref 11.7–15.7)
HGB C CRYSTALS: ABNORMAL
HOWELL-JOLLY BOD BLD QL SMEAR: ABNORMAL
IMM GRANULOCYTES # BLD: 0 10E3/UL
IMM GRANULOCYTES NFR BLD: 2 %
INR PPP: 1.03 (ref 0.85–1.15)
ISSUE DATE AND TIME: NORMAL
LACTATE SERPL-SCNC: 0.9 MMOL/L (ref 0.7–2)
LVEF ECHO: NORMAL
LYMPHOCYTES # BLD AUTO: 0.1 10E3/UL (ref 0.8–5.3)
LYMPHOCYTES NFR BLD AUTO: 6 %
MAGNESIUM SERPL-MCNC: 1.6 MG/DL (ref 1.7–2.3)
MCH RBC QN AUTO: 35.6 PG (ref 26.5–33)
MCH RBC QN AUTO: 35.9 PG (ref 26.5–33)
MCHC RBC AUTO-ENTMCNC: 33 G/DL (ref 31.5–36.5)
MCHC RBC AUTO-ENTMCNC: 34.4 G/DL (ref 31.5–36.5)
MCV RBC AUTO: 104 FL (ref 78–100)
MCV RBC AUTO: 108 FL (ref 78–100)
MONOCYTES # BLD AUTO: 0 10E3/UL (ref 0–1.3)
MONOCYTES NFR BLD AUTO: 2 %
NEUTROPHILS # BLD AUTO: 1 10E3/UL (ref 1.6–8.3)
NEUTROPHILS NFR BLD AUTO: 85 %
NEUTS HYPERSEG BLD QL SMEAR: ABNORMAL
NRBC # BLD AUTO: 0 10E3/UL
NRBC BLD AUTO-RTO: 0 /100
PHOSPHATE SERPL-MCNC: 2.7 MG/DL (ref 2.5–4.5)
PLAT MORPH BLD: ABNORMAL
PLATELET # BLD AUTO: 20 10E3/UL (ref 150–450)
PLATELET # BLD AUTO: 24 10E3/UL (ref 150–450)
POLYCHROMASIA BLD QL SMEAR: ABNORMAL
POTASSIUM SERPL-SCNC: 3.6 MMOL/L (ref 3.4–5.3)
POTASSIUM SERPL-SCNC: 3.8 MMOL/L (ref 3.4–5.3)
POTASSIUM SERPL-SCNC: 4 MMOL/L (ref 3.4–5.3)
POTASSIUM SERPL-SCNC: 4 MMOL/L (ref 3.4–5.3)
PROCALCITONIN SERPL IA-MCNC: 1.08 NG/ML
PROT SERPL-MCNC: 5.6 G/DL (ref 6.4–8.3)
RBC # BLD AUTO: 1.77 10E6/UL (ref 3.8–5.2)
RBC # BLD AUTO: 2.31 10E6/UL (ref 3.8–5.2)
RBC AGGLUT BLD QL: ABNORMAL
RBC MORPH BLD: ABNORMAL
ROULEAUX BLD QL SMEAR: ABNORMAL
SICKLE CELLS BLD QL SMEAR: ABNORMAL
SMUDGE CELLS BLD QL SMEAR: ABNORMAL
SODIUM SERPL-SCNC: 135 MMOL/L (ref 135–145)
SODIUM SERPL-SCNC: 136 MMOL/L (ref 135–145)
SODIUM SERPL-SCNC: 137 MMOL/L (ref 135–145)
SODIUM SERPL-SCNC: 137 MMOL/L (ref 135–145)
SPHEROCYTES BLD QL SMEAR: ABNORMAL
STOMATOCYTES BLD QL SMEAR: ABNORMAL
TARGETS BLD QL SMEAR: ABNORMAL
TOXIC GRANULES BLD QL SMEAR: PRESENT
TROPONIN T SERPL HS-MCNC: <6 NG/L
UNIT ABO/RH: NORMAL
UNIT NUMBER: NORMAL
UNIT STATUS: NORMAL
UNIT TYPE ISBT: 5100
VARIANT LYMPHS BLD QL SMEAR: ABNORMAL
WBC # BLD AUTO: 1.2 10E3/UL (ref 4–11)
WBC # BLD AUTO: 1.3 10E3/UL (ref 4–11)

## 2024-01-15 PROCEDURE — 83735 ASSAY OF MAGNESIUM: CPT

## 2024-01-15 PROCEDURE — 71046 X-RAY EXAM CHEST 2 VIEWS: CPT

## 2024-01-15 PROCEDURE — 84295 ASSAY OF SERUM SODIUM: CPT

## 2024-01-15 PROCEDURE — 84484 ASSAY OF TROPONIN QUANT: CPT

## 2024-01-15 PROCEDURE — 85027 COMPLETE CBC AUTOMATED: CPT

## 2024-01-15 PROCEDURE — 258N000003 HC RX IP 258 OP 636: Performed by: INTERNAL MEDICINE

## 2024-01-15 PROCEDURE — 87103 BLOOD FUNGUS CULTURE: CPT

## 2024-01-15 PROCEDURE — 250N000011 HC RX IP 250 OP 636

## 2024-01-15 PROCEDURE — 250N000011 HC RX IP 250 OP 636: Performed by: STUDENT IN AN ORGANIZED HEALTH CARE EDUCATION/TRAINING PROGRAM

## 2024-01-15 PROCEDURE — 85610 PROTHROMBIN TIME: CPT

## 2024-01-15 PROCEDURE — 93356 MYOCRD STRAIN IMG SPCKL TRCK: CPT

## 2024-01-15 PROCEDURE — 99222 1ST HOSP IP/OBS MODERATE 55: CPT | Mod: 24 | Performed by: INTERNAL MEDICINE

## 2024-01-15 PROCEDURE — 93306 TTE W/DOPPLER COMPLETE: CPT | Mod: 26 | Performed by: STUDENT IN AN ORGANIZED HEALTH CARE EDUCATION/TRAINING PROGRAM

## 2024-01-15 PROCEDURE — 84132 ASSAY OF SERUM POTASSIUM: CPT | Performed by: INTERNAL MEDICINE

## 2024-01-15 PROCEDURE — 84295 ASSAY OF SERUM SODIUM: CPT | Performed by: HOSPITALIST

## 2024-01-15 PROCEDURE — 84100 ASSAY OF PHOSPHORUS: CPT

## 2024-01-15 PROCEDURE — 99233 SBSQ HOSP IP/OBS HIGH 50: CPT | Performed by: INTERNAL MEDICINE

## 2024-01-15 PROCEDURE — P9040 RBC LEUKOREDUCED IRRADIATED: HCPCS

## 2024-01-15 PROCEDURE — 93010 ELECTROCARDIOGRAM REPORT: CPT | Performed by: INTERNAL MEDICINE

## 2024-01-15 PROCEDURE — 84132 ASSAY OF SERUM POTASSIUM: CPT

## 2024-01-15 PROCEDURE — 80053 COMPREHEN METABOLIC PANEL: CPT

## 2024-01-15 PROCEDURE — 71046 X-RAY EXAM CHEST 2 VIEWS: CPT | Mod: 26 | Performed by: RADIOLOGY

## 2024-01-15 PROCEDURE — 84145 PROCALCITONIN (PCT): CPT

## 2024-01-15 PROCEDURE — 250N000013 HC RX MED GY IP 250 OP 250 PS 637

## 2024-01-15 PROCEDURE — 93005 ELECTROCARDIOGRAM TRACING: CPT

## 2024-01-15 PROCEDURE — 258N000003 HC RX IP 258 OP 636: Performed by: NURSE PRACTITIONER

## 2024-01-15 PROCEDURE — 93356 MYOCRD STRAIN IMG SPCKL TRCK: CPT | Performed by: STUDENT IN AN ORGANIZED HEALTH CARE EDUCATION/TRAINING PROGRAM

## 2024-01-15 PROCEDURE — 82248 BILIRUBIN DIRECT: CPT

## 2024-01-15 PROCEDURE — 83605 ASSAY OF LACTIC ACID: CPT

## 2024-01-15 PROCEDURE — 206N000001 HC R&B BMT UMMC

## 2024-01-15 PROCEDURE — 250N000011 HC RX IP 250 OP 636: Mod: JZ | Performed by: INTERNAL MEDICINE

## 2024-01-15 PROCEDURE — 85025 COMPLETE CBC W/AUTO DIFF WBC: CPT

## 2024-01-15 PROCEDURE — 258N000003 HC RX IP 258 OP 636

## 2024-01-15 RX ORDER — PHENAZOPYRIDINE HYDROCHLORIDE 100 MG/1
100 TABLET, FILM COATED ORAL 3 TIMES DAILY PRN
Status: DISCONTINUED | OUTPATIENT
Start: 2024-01-15 | End: 2024-01-24

## 2024-01-15 RX ADMIN — CELECOXIB 100 MG: 100 CAPSULE ORAL at 00:30

## 2024-01-15 RX ADMIN — ONDANSETRON HYDROCHLORIDE 8 MG: 8 TABLET, FILM COATED ORAL at 10:00

## 2024-01-15 RX ADMIN — MESNA 2510 MG: 100 INJECTION, SOLUTION INTRAVENOUS at 06:39

## 2024-01-15 RX ADMIN — CEFEPIME HYDROCHLORIDE 2 G: 2 INJECTION, POWDER, FOR SOLUTION INTRAVENOUS at 15:21

## 2024-01-15 RX ADMIN — SODIUM CHLORIDE: 9 INJECTION, SOLUTION INTRAVENOUS at 03:35

## 2024-01-15 RX ADMIN — ACYCLOVIR 800 MG: 800 TABLET ORAL at 11:11

## 2024-01-15 RX ADMIN — ONDANSETRON HYDROCHLORIDE 8 MG: 8 TABLET, FILM COATED ORAL at 00:30

## 2024-01-15 RX ADMIN — CEFEPIME HYDROCHLORIDE 2 G: 2 INJECTION, POWDER, FOR SOLUTION INTRAVENOUS at 23:13

## 2024-01-15 RX ADMIN — ACYCLOVIR 800 MG: 800 TABLET ORAL at 15:21

## 2024-01-15 RX ADMIN — FUROSEMIDE 10 MG: 10 INJECTION, SOLUTION INTRAMUSCULAR; INTRAVENOUS at 18:04

## 2024-01-15 RX ADMIN — ACYCLOVIR 800 MG: 800 TABLET ORAL at 18:04

## 2024-01-15 RX ADMIN — CEFEPIME HYDROCHLORIDE 2 G: 2 INJECTION, POWDER, FOR SOLUTION INTRAVENOUS at 07:52

## 2024-01-15 RX ADMIN — CYCLOPHOSPHAMIDE 2505 MG: 2 INJECTION, POWDER, FOR SOLUTION INTRAVENOUS; ORAL at 10:06

## 2024-01-15 RX ADMIN — ACYCLOVIR 800 MG: 800 TABLET ORAL at 07:51

## 2024-01-15 RX ADMIN — ONDANSETRON HYDROCHLORIDE 8 MG: 8 TABLET, FILM COATED ORAL at 18:04

## 2024-01-15 RX ADMIN — ACYCLOVIR 800 MG: 800 TABLET ORAL at 23:16

## 2024-01-15 RX ADMIN — URSODIOL 300 MG: 300 CAPSULE ORAL at 15:21

## 2024-01-15 RX ADMIN — SODIUM CHLORIDE: 9 INJECTION, SOLUTION INTRAVENOUS at 19:51

## 2024-01-15 RX ADMIN — URSODIOL 300 MG: 300 CAPSULE ORAL at 07:51

## 2024-01-15 RX ADMIN — ACETAMINOPHEN 650 MG: 325 TABLET, FILM COATED ORAL at 12:44

## 2024-01-15 RX ADMIN — MICAFUNGIN SODIUM 150 MG: 50 INJECTION, POWDER, LYOPHILIZED, FOR SOLUTION INTRAVENOUS at 18:04

## 2024-01-15 RX ADMIN — URSODIOL 300 MG: 300 CAPSULE ORAL at 19:56

## 2024-01-15 RX ADMIN — SODIUM CHLORIDE: 9 INJECTION, SOLUTION INTRAVENOUS at 03:34

## 2024-01-15 RX ADMIN — FUROSEMIDE 10 MG: 10 INJECTION, SOLUTION INTRAMUSCULAR; INTRAVENOUS at 12:03

## 2024-01-15 RX ADMIN — PANTOPRAZOLE SODIUM 40 MG: 40 TABLET, DELAYED RELEASE ORAL at 07:51

## 2024-01-15 ASSESSMENT — ACTIVITIES OF DAILY LIVING (ADL)
ADLS_ACUITY_SCORE: 21

## 2024-01-15 NOTE — PROGRESS NOTES
AM Orthostatics:      01/15/24 0932 01/15/24 0933 01/15/24 0934   Lying Orthostatic BP   Lying Orthostatic /82  --   --    Lying Orthostatic Pulse 102 bpm  --   --    Sitting Orthostatic BP   Sitting Orthostatic BP  --  128/87  --    Sitting Orthostatic Pulse  --  104 bpm  --    Standing Orthostatic BP   Standing Orthostatic BP  --   --  129/78   Standing Orthostatic Pulse  --   --  104 bpm

## 2024-01-15 NOTE — CONSULTS
Cardiology Inpatient Consultation  January 15, 2024    Reason for Consult:  A cardiology consult was requested by Jt Vaz PA-C from the heme/onc service to provide clinical guidance regarding EKG changes.    Assessment:  Sinus tachycardia   Undetectable high-sensitivity troponin  Neutropenic fever   Pancytopenia due to MDS and chemotherapy     Discussion:  The EKG changes seen on 1/15 early am appear to be from precordial (v1-v2) lead positioning. She has a sinus tachycardia that is secondary to her infection, chemotherapy, and pancytopenia. She has some palpitations but no chest pain or dyspnea. Her echo is normal and high sensitivity troponin is <6.     Recommendations:  Continue supportive cares per BMT service     Plan of care discussed with Dr. Ricks, who agrees with above plan.    Thank you for consulting the cardiovascular services at the [facility]. Please do not hesitate to call us with any questions. We will sign off at this time.     Barron Walker MD  Cardiology Fellow  Pager: [phone]    HPI:   Hortencia Baldwin is a 53 year old female with pmhx including MDS, AIHA, transfusion dependent anemia admitted on 1/5/24 for myeloablative therapies in anticipation of stem cell transplant. She developed neutropenic fever on 1/11, on cefepime. Overnight on 1/14 she was more tachycardic and had some EKG changes and a cardiology consult was placed.     At the time of interview, the patient denies chest pain, dyspnea at rest or with exertion, orthopnea, PND, palpitations, lightheadedness, or syncope.     Review of Systems:    Complete review of systems was performed and negative except per HPI.    PMH:  Past Medical History:   Diagnosis Date    Left medial knee pain     Medial epicondylitis, right elbow     Obesity (BMI 30-39.9)     Right elbow pain      Active Problems:  Patient Active Problem List    Diagnosis Date Noted    MDS (myelodysplastic syndrome) (H) 12/15/2023      Priority: Medium    Macrocytic anemia 04/05/2018     Priority: Medium    Anemia, unspecified type 03/19/2018     Priority: Medium    Obesity (BMI 30-39.9)      Priority: Medium    Left medial knee pain 05/26/2017     Priority: Medium     Social History:  Social History     Tobacco Use    Smoking status: Every Day     Packs/day: 0.50     Years: 10.00     Additional pack years: 0.00     Total pack years: 5.00     Types: Cigarettes     Passive exposure: Current    Smokeless tobacco: Never   Substance Use Topics    Alcohol use: Yes     Comment: 1-2 drinks/month    Drug use: No     Family History:  Family History   Problem Relation Age of Onset    Hypertension Mother     Diabetes No family hx of     Cerebrovascular Disease No family hx of     Myocardial Infarction No family hx of     Coronary Artery Disease Early Onset No family hx of     Breast Cancer No family hx of     Ovarian Cancer No family hx of     Colon Cancer No family hx of        Medications:   acyclovir  800 mg Oral 5x Daily    [START ON 1/25/2024] acyclovir  800 mg Oral BID    ceFEPIme  2 g Intravenous Q8H    Chemotherapy Infusing-Continuous Infusion   Does not apply Q8H    cycloPHOSphamide (CYTOXAN) 2,505 mg in sodium chloride 0.9 % 500 mL infusion  50 mg/kg (Treatment Plan Ideal) Intravenous Q24H    [START ON 1/16/2024] dextrose 5% water  10-20 mL Intracatheter Daily at 8 pm    [START ON 1/16/2024] dextrose 5% water  10-20 mL Intracatheter Daily at 8 pm    [START ON 1/16/2024] filgrastim-aafi  5 mcg/kg (Treatment Plan Recorded) Intravenous Daily at 8 pm    [START ON 1/25/2024] letermovir  480 mg Oral Daily    [Held by provider] levofloxacin  250 mg Oral Daily at 10 am    mesna (MESNEX) 2,510 mg in sodium chloride 0.9 % 1,075.1 mL infusion  50 mg/kg (Treatment Plan Ideal) Intravenous Q24H    micafungin (MYCAMINE) 150 mg in sodium chloride 0.9 % 100 mL intermittent infusion  150 mg Intravenous Daily    [START ON 1/16/2024] mycophenolate mofetil  1,250 mg  Intravenous Q12H [facility] (08/20)    ondansetron  8 mg Oral Q8H    pantoprazole  40 mg Oral Daily    [START ON 2/12/2024] sulfamethoxazole-trimethoprim  1 tablet Oral Q Mon Tues BID    ursodiol  300 mg Oral TID        [START ON 1/16/2024] - MEDICATION INSTRUCTIONS -      sodium chloride 200 mL/hr at 01/15/24 0335    sodium chloride 75 mL/hr at 01/05/24 0858    sodium chloride 0.9%      [START ON 1/16/2024] tacrolimus         Physical Exam:  Temp:  [96.9  F (36.1  C)-102.2  F (39  C)] 96.9  F (36.1  C)  Pulse:  [] 104  Resp:  [18-20] 18  BP: (113-148)/(71-87) 128/87  SpO2:  [97 %-100 %] 98 %    Intake/Output Summary (Last 24 hours) at 1/15/2024 1129  Last data filed at 1/15/2024 1000  Gross per 24 hour   Intake 7044.2 ml   Output 5300 ml   Net 1744.2 ml     GEN: pleasant, no acute distress  HEENT: No discharge  EYES: no icterus  CV: tachycardic, regular, normal s1/s2, systolic flow murmur at RUSB, no heave. JVP normal.   CHEST: clear to ausculation bilaterally, no rales or wheezing  ABD: soft, non-tender, normal active bowel sounds  EXTR: pulses 2+ radial. No clubbing, cyanosis or edema.   NEURO: alert oriented, speech fluent/appropriate, motor grossly nonfocal  PSYCH: cooperative, affect appropriate, pleasant      Diagnostics:  All labs and imaging were reviewed, of note:    CMP  Recent Labs   Lab 01/15/24  0235 01/14/24  1637 01/14/24  0430 01/13/24  0326 01/12/24  0345 01/11/24  0317 01/10/24  0338     137 136 134* 137 135   < > 140   POTASSIUM 4.0  4.0 3.8 4.1 3.9 4.2   < > 4.1   CHLORIDE 105  --  102 102 99   < > 105   CO2 24  --  24 27 28   < > 28   ANIONGAP 8  --  8 8 8   < > 7   *  --  151* 129* 105*   < > 129*   BUN 9.1  --  9.0 10.6 12.0   < > 12.1   CR 0.51  --  0.40* 0.49* 0.57   < > 0.51   GFRESTIMATED >90  --  >90 >90 >90   < > >90   NIKO 8.3*  --  8.3* 8.7 8.6   < > 9.2   MAG 1.6*  --  2.0 2.1 1.8  --  2.0   PHOS 2.7 1.9* 1.5* 3.0  --   --   --    PROTTOTAL 5.6*  --   --   --   --   --   "5.7*   ALBUMIN 3.3*  --   --   --   --   --  3.8   BILITOTAL 1.1  --   --   --   --   --  0.7   ALKPHOS 85  --   --   --   --   --  110   AST 37  --   --   --   --   --  19   ALT 55*  --   --   --   --   --  56*    < > = values in this interval not displayed.     CBC  Recent Labs   Lab 01/15/24  1015 01/15/24  0326 01/14/24  0430 01/13/24  0326   WBC 1.3* 1.2* 2.9* 4.3   RBC 2.31* 1.77* 2.08* 2.25*   HGB 8.3* 6.3* 7.5* 8.0*   HCT 24.1* 19.1* 22.1* 24.0*   * 108* 106* 107*   MCH 35.9* 35.6* 36.1* 35.6*   MCHC 34.4 33.0 33.9 33.3   RDW 20.2* 20.4* 21.0* 22.5*   PLT 20* 24* 52* 90*     INR  Recent Labs   Lab 01/15/24  0235   INR 1.03     Arterial Blood GasNo lab results found in last 7 days.    No results found for: \"TROPI\", \"TROPONIN\", \"TROPR\", \"TROPN\"  "

## 2024-01-15 NOTE — PROGRESS NOTES
Chemo drug: Cytoxan.   Tolerated: Well; no immediate issues.   Intervention: Premeds administered per order. Positive blood return pre- and post- infusion.   Response: N/A.   Plan: Continue fluid flush until tomorrow 1/16 at 13:00.   Lab: Na and K this afternoon as scheduled.   Wt/intervention: Continue BID weights through tomorrow evening. PRN lasix per protocol.

## 2024-01-15 NOTE — PROGRESS NOTES
"/73   Pulse 87   Temp 98.3  F (36.8  C) (Oral)   Resp 18   Ht 1.575 m (5' 2.01\")   Wt 84.2 kg (185 lb 11.2 oz)   LMP 11/05/2017   SpO2 98%   BMI 33.96 kg/m      0698-8198 Shift Update:  Afternoon weight up about 1Kg from AM. Additional 10mg Lasix given per protocol for unmet urine parameter; subsequent UOP at goal. PM electrolyte rechecks WDL. Remains on continuous Mesna with fluid flush until tomorrow 1/16 at 1300. Continue POC.    PM Orthostatics:   01/15/24 1619 01/15/24 1623 01/15/24 1625   Lying Orthostatic BP   Lying Orthostatic /73  --   --    Lying Orthostatic Pulse 87 bpm  --   --    Sitting Orthostatic BP   Sitting Orthostatic BP  --  128/75  --    Sitting Orthostatic Pulse  --  91 bpm  --    Standing Orthostatic BP   Standing Orthostatic BP  --   --  126/70   Standing Orthostatic Pulse  --   --  93 bpm       "

## 2024-01-15 NOTE — TELEPHONE ENCOUNTER
----- Message from Natasha Barroso RN sent at 1/11/2024 10:26 AM CST -----  Regarding: BAN Scheduling  Hey,    This patient is ready to schedule for BAN visits.    LT RNCC: Leia  MD: Jarrod    Bmbx: In clinic  Weekly lab day preference: unknown at this time    Clonoseq: no    Restage per protocol. Orders are scanned into the media tab.  For allos only: please schedule with primary MD at D28, D60, D100, and D180.    Thanks!     Natasha

## 2024-01-15 NOTE — PLAN OF CARE
Goal Outcome Evaluation:      Plan of Care Reviewed With: patient    Overall Patient Progress: no changeOverall Patient Progress: no change    Outcome Evaluation: Lower appetite but using 2 ensure daily- which meets  slightly less than 50% of needs, encouraging conitnuing supplement and discussed small frequent meals/snakcing and non-meat protein foods

## 2024-01-15 NOTE — PLAN OF CARE
Day +4 MUD for MDS.  Pt febrile to t-max 102.2, tachy to 140s. Blood cultures drawn per orders, provider notified. Tylenol given x1, upon recheck pt still febrile to 102.2, celebrex given x1 with positive effect. Pt reported heart racing and feeling palpitations, provider notified and EKG ordered. Sinus tach noted on EKG.  A&Ox4, independent.  No pain or nausea reported.  No other PRNs given.  Pt continues with cytoxan flush of normal saline and mesna. Pt meeting voiding parameters. No lasix given this shift.  1 unit PRBCs transfusing for hgb 6.3. Pt tolerating transfusion well. To be completed by oncoming shift. No electrolyte replacements needed after AM labs.  Will continue to monitor.

## 2024-01-15 NOTE — PROGRESS NOTES
"  BMT Daily Progress Note  Patient ID:  Irma Foreman is a 53 year old female with a PMH of MDS, warm AIHA, transfusion and transfusion dependent anemia presented to  to undergo a myeloablative allogeneic transplant. She was recently COVID+ on 12/26 but tested negative on 1/2 with resolution of nearly all URI symptoms. Undergoing MA (Bu/Flu) 6/8 URD Allo transplant, day 4      HPI   Irma spiked another fever last night, Tmax 102.2, she was also tachycardic in the 140's, an EKG showed sinus tachycardia. This morning she reported new chest pressure and prominent palpitations that are exacerbated by sitting up and with activity but nearly resolves when laying flat. EKG repeated and showed a new QR pattern in V1 suggestive of right ventricular conduction delay, troponin negative. Cardiology consulted. Given 1 unit PRBC's for a Hgb of 6.3, up to 8.3 on repeat CBC. She is on day +4 of her PT cytoxan, weight stable. Appetite diminishing, supplementing with ensures, no nausea, mild abdominal cramping with loose stools x3.     Review of Systems    Negative unless stated in the HPI.     PHYSICAL EXAM      Weight     Wt Readings from Last 3 Encounters:   01/15/24 83.5 kg (184 lb)   12/26/23 83.1 kg (183 lb 4.8 oz)   12/19/23 83.5 kg (184 lb)        KPS: 70    /87 (BP Location: Right arm)   Pulse 104   Temp 96.9  F (36.1  C) (Axillary)   Resp 18   Ht 1.575 m (5' 2.01\")   Wt 83.5 kg (184 lb)   LMP 11/05/2017   SpO2 98%   BMI 33.65 kg/m       General: NAD  Eyes: KEVON, sclera anicteric   Nose/Mouth/Throat: OP clear, buccal mucosa moist, no ulcerations   Lungs: CTA bilaterally  Cardiovascular: RRR, no M/R/G   Abdominal/Rectal: +BS, soft, NT, ND  Lymphatics: No edema  Skin: wart under right foot dime sized white, cauliflower and slightly ulcerated. Two pin point sized, flesh colored warts on right hand. Hands dry, one excoriation on left dorsum.   Neuro: A&O   Additional Findings: Gastelum site NT, no " drainage.    Current aGVHD staging:  Skin 0, UGI 0, LGI 0, Liver 0 (keep in note through day +180 for allos)      LABS AND IMAGING: I have assessed all abnormal lab values for their clinical significance and any values considered clinically significant have been addressed in the assessment and plan.        Lab Results   Component Value Date    WBC 1.3 (L) 01/15/2024    ANEUTAUTO 1.0 (L) 01/15/2024    HGB 8.3 (L) 01/15/2024    HCT 24.1 (L) 01/15/2024    PLT 20 (LL) 01/15/2024     01/15/2024     01/15/2024    POTASSIUM 4.0 01/15/2024    POTASSIUM 4.0 01/15/2024    CHLORIDE 105 01/15/2024    CO2 24 01/15/2024     (H) 01/15/2024    BUN 9.1 01/15/2024    CR 0.51 01/15/2024    MAG 1.6 (L) 01/15/2024    INR 1.03 01/15/2024           SYSTEMS-BASED ASSESSMENT AND PLAN     Hortencia Baldwin is a 53 year old female with a history of MDS, undergoing MA (Bu/Flu) 6/8 URD Allo transplant, day 4      BMT/IEC PROTOCOL for MT 2015-29 **according to but not on trial**   - Chemo protocol:  ? Day -6 through day -1: Keppra and Allopurinol   ? Day -5 through -2: Bu/Flu. Busulfan levels adjusted by pharm, attending  ? Day -1: Rest day   ? Day 0: Transplant. Recipient: O+, CMV+. Donor: O+, CMV-. No flush. Cell dose TBD.   - Restaging plan: per protocol       HEME/COAG  # Iron overload: hx of transfusion dependent anemia, pre-transplant ferritin around 5,000. Recommend phlebotomy post transplant when retic count is restored and Hgb >10    - Risk of cytopenias due to chemotherapy and radiation  - Transfusion parameters: hemoglobin <7, platelets <10    IMMUNOCOMPROMISED  # Fever: not yet neutropenic   - UA: negative for LE & nitrites. 1/13 ua negative; BK urine positive/adeno pending (red/orange urine)   - Blood cultures NGTD    - Procal elevated 1.08    - CXR with likely pulmonary edema   - RVP neg    - Cefepime (1/11 - x). Brief course of IV Vancomycin. MRSA nasal swab negative.   - Relevant infection history:  URI from COVID  (12/26), negative test on 1/2/24   - Vaccination status: Influenza vaccination after day +60, COVID vaccination after day +100, followed by remaining vaccinations at 12, 14, 24, and 26 months.  - Prophylaxis plan: ACV/letermovir, Micafungin through day +45 (current smoker), levofloxacin while neutropenic, Bactrim to start at day +28  Foot, hand warts. Curbside derm on 1/8, no recs while inpatient, typically managed OP.      RISK OF GVHD  - Prophylaxis: PT Cy, Tac/MMF **avoiding sirolimus d/t high risk VOD**    CARDIOVASCULAR  # Chest pressure: new on 1/15, worse with sitting up/activity, better while laying flat.   ? EKG: new QR pattern in V1, suggests right ventricular conduction delay   ? Troponin negative x1   ? On cardiac monitoring. Echo pending 1/15   ? Cardiology consulted.   - Risk of cardiomyopathy:  Baseline EF 60-65%  - Risk of arrhythmia: Baseline EKG showed sinus rhythm     RESPIRATORY  - Current smoker: states she quit (1/5/24), offered nicotine replacement but she declined.   - Baseline PFTs: The FEV1, FVC, FEV1/FVC ratio and RQQ18-46% are within normal limits.  The inspiratory flow rates are within normal limits.  Lung volumes are within normal limits.  The diffusing capacity is normal.   - Risk of respiratory complications: Frequent ambulation and incentive spirometer.    GI/NUTRITION  # Elevated liver enzymes: down trending ALT: 56 (87), AST: 19 (43) could be related to Tylenol use, iron overload or recent viral illness   ? RUQ U/S (12/27) was normal  ? Acute hepatitis panel negative   - Ulcer prophylaxis: PPI  - VOD ppx: Ursodiol   - Risk of malnutrition: dietitian to follow     RENAL/ELECTROLYTES/  - Risk of renal injury: IV hydration   - Electrolyte management: replace per sliding scale  - Change Cytoxan flush to NS today as Na downtrending    DERM:  Right hand dryness, try aquafor with gloves on overnight and as tolerated during the day  Wart on foot, one on hand, see  "ID    DIABETES/ENDOCRINE  - Risk of steroid-induced hyperglyemia: Monitor BG, sliding scale if needed    MUSCULOSKELETAL/FRAILTY  - Baseline Frailty Score: 3  - Patient with substantial risk of sarcopenia  - Daily PT/OT as needed while inpatient  - Cancer Rehab as needed outpatient    SYMPTOM MANAGEMENT  - Nausea from chemo/radiation: Prochlorperazine, ondansetron, lorazepam.  - # Pain Assessment:      1/15/2024     7:00 AM   Current Pain Score   Patient currently in pain? denies   - Hortencia Sethi is experiencing pain due to bone marrow biopsy and line placement. Pain management was discussed and the plan was created in a collaborative fashion.  Hortencia Sethi's response to the current recommendations: engaged  - Please see the plan for pain management as documented above      SOCIAL DETERMINANTS  - Caregiver: Srinivas Suggs, Keira Neal & Rickey Neal     Disposition: remain admitted through engraftment     Known issues that I take into account for medical decisions, with salient changes to the plan considering these complexities noted above.    Patient Active Problem List   Diagnosis     Left medial knee pain     Obesity (BMI 30-39.9)     Anemia, unspecified type     Macrocytic anemia     MDS (myelodysplastic syndrome) (H)     Clinically Significant Risk Factors          # Hypocalcemia: Lowest Ca = 8.3 mg/dL in last 2 days, will monitor and replace as appropriate   # Hypomagnesemia: Lowest Mg = 1.6 mg/dL in last 2 days, will replace as needed   # Hypoalbuminemia: Lowest albumin = 3.3 g/dL at 1/15/2024  2:35 AM, will monitor as appropriate   # Thrombocytopenia: Lowest platelets = 20 in last 2 days, will monitor for bleeding          # Obesity: Estimated body mass index is 33.65 kg/m  as calculated from the following:    Height as of this encounter: 1.575 m (5' 2.01\").    Weight as of this encounter: 83.5 kg (184 lb).                  I spent 45 minutes in the care of this patient today, which included time necessary for preparation " for the visit, obtaining history, ordering medications/tests/procedures as medically indicated, review of pertinent medical literature, counseling of the patient, communication of recommendations to the care team, and documentation time.    King Vaz PA-C  [id number]      ______________________________________________      BMT ATTENDING NOTE    Hortencia Baldwin is a 53 year old female, who is day 4 of transplant for MDS.      Subjective:  Reports new chest tightness, clear urine now, urinating frequently with fluids, reports otherwise feeling well overall, more fatigued, no new concerns, decent appetite.  Ambulating.  No respiratory concerns.  No nausea no vomiting no diarrhea.       Vitals reviewed, labs reviewed  PE:  NAD, Alert, oriented x3  HEENT: no visible oral ulcers  Heart: RRR  Lungs: CTAB  Abdomen: +BS, soft non tender, non distended  LE: no edema  Skin: Noted warts on the right sole and upper extremity nonconcerning in appearance     Assessment and Plan:    1. Heme/BMT: MDS, here for MAC 6/8 MUD, flu/busulfan prep, PTCy/Tac/MMF GVHD prophylaxis, transfusion as needed, history of secondary iron overload  2. ID: 1/11 first neut fever, cefepime, follow workup, 1/13 send UA and possible viral workup   3. CV: no active issues till CP on 1/15 s/p cardiac workup  4. GI/ FEN: monitor regimen related toxicity, encourage fluid PO intake, I/O and daily weights, at risk of VOD due to history of abnormal LFTs and secondary hemochromatosis baseline ultrasound on 12/27/2023- monitor mild elevated ALT  5. Renal: Maintain euvolemic  6. PPx: Levo, jani, ACV/letermovir  7.  Supportive care: PT, baseline frailty score 3, encourage ambulation, optimize nutrition, avoid sarcopenia, other supportive care as detailed above      I spent 50 minutes in the care of Irma today, including an independent face-to-face assessment and time monitoring for restoration of hematopoiesis, high-risk organ toxicities from chemotherapy, and  GVHD, managing infectious risk due to his immunocompromised state, and encouraging nutrition and physical activity to prevent debility and sarcopenia.  I personally performed all of the medical decision making associated with this visit, counseled Praveen on my overall assessment and recommendations, communicated my plan to the care team, and edited the above note to reflect my current plan of care.     Liam Guajardo MD

## 2024-01-15 NOTE — PLAN OF CARE
"/86 (BP Location: Right arm)   Pulse 96   Temp 98.2  F (36.8  C) (Oral)   Resp 18   Ht 1.575 m (5' 2.01\")   Wt 83.5 kg (184 lb)   LMP 11/05/2017   SpO2 99%   BMI 33.65 kg/m     Temp max 100.9, slightly tachy. All other VS stable  Tolerated cytoxan well. Fluid flush continues until 1300 1/16  Pt reporting chest tightness. Provider notified. Chest xray, troponin, Cont. Tele, EKG and lactic ordered and completed. Cardiac consulted.   Tylenol given PRN for fever with reduction of temp. (See MAR)  10mg lasix given for 1 unmet urine parameter. Voiding well following dose.   Encouraged good oral intake  Continue with plan of care    Problem: Stem Cell/Bone Marrow Transplant  Goal: Improved Oral Mucous Membrane Health and Integrity  Outcome: Progressing     Problem: Stem Cell/Bone Marrow Transplant  Goal: Improved Oral Mucous Membrane Health and Integrity  Intervention: Promote Oral Comfort and Health  Recent Flowsheet Documentation  Taken 1/15/2024 0700 by Daly Velarde RN  Oral Mucous Membrane Protection:   nonirritating oral fluids promoted   nonirritating oral foods promoted     Problem: Stem Cell/Bone Marrow Transplant  Goal: Nausea and Vomiting Symptom Relief  Outcome: Progressing     Problem: Stem Cell/Bone Marrow Transplant  Goal: Optimal Nutrition Intake  Outcome: Progressing     Problem: Stem Cell/Bone Marrow Transplant  Goal: Optimal Nutrition Intake  Intervention: Minimize and Manage Barriers to Oral Intake  Recent Flowsheet Documentation  Taken 1/15/2024 0700 by Daly Velarde RN  Oral Nutrition Promotion: calorie-dense liquids provided     Problem: Adult Inpatient Plan of Care  Goal: Absence of Hospital-Acquired Illness or Injury  Intervention: Identify and Manage Fall Risk  Recent Flowsheet Documentation  Taken 1/15/2024 1300 by Daly Velarde RN  Safety Promotion/Fall Prevention: safety round/check completed  Taken 1/15/2024 1100 by Daly Velarde RN  Safety Promotion/Fall " Prevention: safety round/check completed  Taken 1/15/2024 0700 by Daly Velarde RN  Safety Promotion/Fall Prevention:   assistive device/personal items within reach   clutter free environment maintained   nonskid shoes/slippers when out of bed   safety round/check completed     Problem: Adult Inpatient Plan of Care  Goal: Absence of Hospital-Acquired Illness or Injury  Intervention: Identify and Manage Fall Risk  Recent Flowsheet Documentation  Taken 1/15/2024 1300 by Daly Velarde RN  Safety Promotion/Fall Prevention: safety round/check completed  Taken 1/15/2024 1100 by Daly Velarde RN  Safety Promotion/Fall Prevention: safety round/check completed  Taken 1/15/2024 0700 by Daly Velarde RN  Safety Promotion/Fall Prevention:   assistive device/personal items within reach   clutter free environment maintained   nonskid shoes/slippers when out of bed   safety round/check completed     Problem: Stem Cell/Bone Marrow Transplant  Goal: Optimal Nutrition Intake  Intervention: Minimize and Manage Barriers to Oral Intake  Recent Flowsheet Documentation  Taken 1/15/2024 0700 by Daly Velarde RN  Oral Nutrition Promotion: calorie-dense liquids provided     Problem: Adult Inpatient Plan of Care  Goal: Absence of Hospital-Acquired Illness or Injury  Intervention: Prevent Skin Injury  Recent Flowsheet Documentation  Taken 1/15/2024 0700 by Daly Velarde RN  Body Position: position changed independently  Skin Protection: adhesive use limited  Device Skin Pressure Protection: tubing/devices free from skin contact     Problem: Adult Inpatient Plan of Care  Goal: Absence of Hospital-Acquired Illness or Injury  Intervention: Prevent and Manage VTE (Venous Thromboembolism) Risk  Recent Flowsheet Documentation  Taken 1/15/2024 0700 by Daly Velarde RN  VTE Prevention/Management: SCDs (sequential compression devices) off     Problem: Adult Inpatient Plan of Care  Goal: Absence of Hospital-Acquired  Illness or Injury  Intervention: Prevent Infection  Recent Flowsheet Documentation  Taken 1/15/2024 0700 by Daly Velarde RN  Infection Prevention:   cohorting utilized   rest/sleep promoted     Problem: Stem Cell/Bone Marrow Transplant  Goal: Optimal Coping with Transplant  Intervention: Optimize Patient/Family Adjustment to Transplant  Recent Flowsheet Documentation  Taken 1/15/2024 0700 by Daly Velarde RN  Supportive Measures: active listening utilized     Problem: Stem Cell/Bone Marrow Transplant  Goal: Symptom-Free Urinary Elimination  Intervention: Prevent or Manage Bladder Irritation  Recent Flowsheet Documentation  Taken 1/15/2024 0700 by Daly Velarde RN  Urinary Elimination Promotion:   frequent voiding encouraged   toileting scheduled  Hyperhydration Management: encouraged to void     Problem: Stem Cell/Bone Marrow Transplant  Goal: Diarrhea Symptom Control  Intervention: Manage Diarrhea  Recent Flowsheet Documentation  Taken 1/15/2024 0700 by Daly Velarde RN  Skin Protection: adhesive use limited  Fluid/Electrolyte Management: fluids provided     Problem: Stem Cell/Bone Marrow Transplant  Goal: Improved Activity Tolerance  Intervention: Promote Improved Energy  Recent Flowsheet Documentation  Taken 1/15/2024 0700 by Daly Velarde RN  Fatigue Management:   activity schedule adjusted   frequent rest breaks encouraged  Sleep/Rest Enhancement:   awakenings minimized   consistent schedule promoted   family presence promoted   regular sleep/rest pattern promoted  Activity Management:   activity adjusted per tolerance   up ad jeff  Environmental Support: calm environment promoted     Problem: Stem Cell/Bone Marrow Transplant  Goal: Blood Counts Within Acceptable Range  Intervention: Monitor and Manage Hematologic Symptoms  Recent Flowsheet Documentation  Taken 1/15/2024 0700 by Daly Velarde RN  Sleep/Rest Enhancement:   awakenings minimized   consistent schedule promoted   family  presence promoted   regular sleep/rest pattern promoted  Bleeding Precautions: blood pressure closely monitored  Medication Review/Management: medications reviewed     Problem: Stem Cell/Bone Marrow Transplant  Goal: Absence of Infection  Intervention: Prevent and Manage Infection  Recent Flowsheet Documentation  Taken 1/15/2024 0700 by Daly Velarde, RN  Infection Prevention:   cohorting utilized   rest/sleep promoted  Infection Management: aseptic technique maintained  Isolation Precautions:   cytotoxic precautions maintained   protective environment maintained   Goal Outcome Evaluation:

## 2024-01-15 NOTE — INTERIM SUMMARY
Paged by RN that pt is having tachycardia and palpitations, as well as fever to 102 F. EKG obtained with sinus tach ~110, saw pt who says her palpitations and HR have improved. Likely stress response from fever. Tylenol tried without help, RN gave celebrex. Continue to monitor. RN to release blood cultures, pt already on cefepime.   Coleman Meadows, DO 1:34 AM 01/15/24

## 2024-01-15 NOTE — PLAN OF CARE
"Assumed care: 0700 - 1930    /71   Pulse 91   Temp 96.9  F (36.1  C) (Axillary)   Resp 20   Ht 1.575 m (5' 2.01\")   Wt 83.2 kg (183 lb 6.4 oz)   LMP 11/05/2017   SpO2 99%   BMI 33.54 kg/m      BMT Date: 1/11/2024   Day post-transplant: 3     Shift summary:  Hortencia Baldwin is alert and oriented with stable vitals on room air.  Her Tmax today was 100.7;tylenol and celebrex were given. Orthostatics were completed x2 and are WNL. Patient denies pain, nausea/ vomiting and had two diarrhea stools. Her cytoxan flush is infusing at the ordered rate. She required lasix 10mg x1  for not meeting voiding parameters and also received lasix 20mg at the end of the shift. She showered, did CHGs, and had her  cvc dressing and caps changed. Her phos is being replaced. She will get cytoxan again tomorrow.       Recent Labs   Lab 01/14/24  0430 01/13/24  0326   * 129*      Hemoglobin   Date Value Ref Range Status   01/14/2024 7.5 (L) 11.7 - 15.7 g/dL Final   03/19/2018 10.4 (L) 11.7 - 15.7 g/dL Final     Platelet Count   Date Value Ref Range Status   01/14/2024 52 (L) 150 - 450 10e3/uL Final   03/19/2018 271 150 - 450 10e9/L Final     WBC   Date Value Ref Range Status   03/19/2018 5.4 4.0 - 11.0 10e9/L Final     WBC Count   Date Value Ref Range Status   01/14/2024 2.9 (L) 4.0 - 11.0 10e3/uL Final     Potassium   Date Value Ref Range Status   01/14/2024 3.8 3.4 - 5.3 mmol/L Final   05/30/2017 3.7 3.4 - 5.3 mmol/L Final      Magnesium   Date Value Ref Range Status   01/14/2024 2.0 1.7 - 2.3 mg/dL Final      Phosphorus   Date Value Ref Range Status   01/14/2024 1.9 (L) 2.5 - 4.5 mg/dL Final                            "

## 2024-01-15 NOTE — PROGRESS NOTES
CLINICAL NUTRITION SERVICES - REASSESSMENT NOTE     Nutrition Prescription    RECOMMENDATIONS FOR MDs/PROVIDERS TO ORDER:  Encourage oral intake    Malnutrition Status:    Moderate malnutrition in the context of acute illness     Recommendations already ordered by Registered Dietitian (RD):  Continue high kcal/protein diet + snack/supplements PRN      Future/Additional Recommendations:  Monitor appetite, oral intake, use of supplements  Monitor ability to maintain weight      If unable to take adequate      Dosing weight:  58.3 kg  Access: Central     Initial parameters (per day)  Volume:  1080 mL  Dextrose: 125 g  AA: 90 g  Lipids: 250 mL 20%, 5 days per week      Dextrose titration:   Monitor lytes and if within acceptable parameters (Mg++ > or = 1.5, K+ is > or = 3, and PO4 > or = 1.9), increase dextrose by 50-55 g/day to goal of 230 g dextrose.     Additives: 10 mL infuvite + 1 mL MTE-4     Goal PN provides 230 g dextrose, 90 g AA, and 250 mL 20% lipids 5 days per week for total provision of 1499 Kcals (25 Kcals/kg), 1.5 g/kg protein, GIR =2.73 mg/kg/minute, and 23 % fat kcals on average daily.      EVALUATION OF THE PROGRESS TOWARD GOALS   Diet: High Kcal/High Protein + ensure enlive @ 10:00 + supplements PRN  Intake: 0-100%        NEW FINDINGS   Day + 4 - Irma reported she is not eating as much but is drinking 2 ensure per day. Meat is still not sounding good to her but she is tolerating fruit, cereal.      Weight Trends:  Date/Time Weight Weight Method   01/15/24 0932 83.7 kg (184 lb 8 oz) Standing scale   01/14/24 1600 83.2 kg (183 lb 6.4 oz) Standing scale   01/14/24 0814 82.1 kg (181 lb) Standing scale   01/13/24 0827 82.3 kg (181 lb 6.4 oz) Standing scale   01/12/24 0744 83.8 kg (184 lb 12.8 oz) Standing scale   01/11/24 0930 84.4 kg (186 lb 1.1 oz) Standing scale   01/10/24 0734 83.1 kg (183 lb 3.2 oz) Standing scale   01/09/24 0803 83.4 kg (183 lb 12.8 oz) Standing scale   01/08/24 0747 83.4 kg (183 lb  14.4 oz) Standing scale   01/07/24 0815 84.1 kg (185 lb 4.8 oz) Standing scale   01/06/24 0824 84.3 kg (185 lb 12.8 oz) Standing scale   01/05/24 1052 83.8 kg (184 lb 11.2 oz) Standing scale   01/05/24 0734 83.8 kg (184 lb 11.2 oz) Standing scale       GI: No nausea noted. Last bowel movement, 1/14  Skin: Fermin 21. No rash or wounds     Labs:   Na 137, K+ 4, Po4 2.7 (WN), Mg 1.6 (L)  Direct Bili 0.62 (H)  WBC 1.2 (L)    Medications:   Acyclovir  Cytoxan  Mesna  Zofran  Protonix  Pyridium   Ursodiol    MALNUTRITION  % Intake: < 75% for > 7 days (moderate)  % Weight Loss: None noted  Subcutaneous Fat Loss: None observed  Muscle Loss: Dorsal hand:  mild   Fluid Accumulation/Edema: Does not meet criteria  Malnutrition Diagnosis: Moderate malnutrition in the context of acute illness     Previous Goals   Patient to consume % of nutritionally adequate meal trays TID, or the equivalent with supplements/snacks.   Evaluation: Not Met    Weight to maintain >/= 79 kg   Evaluation: Met    Previous Nutrition Diagnosis  Predicted inadequate nutrient intake (energy/protein) related to upcoming transplant, dysgeusia and increased metabolic demand with potential for nutrition side effects    Evaluation: modified     CURRENT NUTRITION DIAGNOSIS  Inadequate oral intake related to decreased appetite as evidenced by patient reported, variable PO, overall <75% for 1 week and mild muscle loss     INTERVENTIONS  Implementation  Collaboration with other providers- 5c rounds   Medical food supplement therapy- encouraged 2 daily     Goals  Patient to consume % of nutritionally adequate meal trays TID, or the equivalent with supplements/snacks.    Weight to maintain >/= 79 kg     Monitoring/Evaluation  Progress toward goals will be monitored and evaluated per protocol.    Bessie Alvarenga RD, LD  5C/BMT pager: [phone]

## 2024-01-16 ENCOUNTER — APPOINTMENT (OUTPATIENT)
Dept: PHYSICAL THERAPY | Facility: CLINIC | Age: 54
DRG: 014 | End: 2024-01-16
Attending: STUDENT IN AN ORGANIZED HEALTH CARE EDUCATION/TRAINING PROGRAM
Payer: COMMERCIAL

## 2024-01-16 LAB
ANION GAP SERPL CALCULATED.3IONS-SCNC: 8 MMOL/L (ref 7–15)
ATRIAL RATE - MUSE: 110 BPM
BASOPHILS # BLD AUTO: ABNORMAL 10*3/UL
BASOPHILS # BLD MANUAL: 0 10E3/UL (ref 0–0.2)
BASOPHILS NFR BLD AUTO: ABNORMAL %
BASOPHILS NFR BLD MANUAL: 0 %
BLD PROD TYP BPU: NORMAL
BLOOD COMPONENT TYPE: NORMAL
BUN SERPL-MCNC: 7 MG/DL (ref 6–20)
CALCIUM SERPL-MCNC: 8.7 MG/DL (ref 8.6–10)
CHLORIDE SERPL-SCNC: 105 MMOL/L (ref 98–107)
CODING SYSTEM: NORMAL
CREAT SERPL-MCNC: 0.42 MG/DL (ref 0.51–0.95)
DEPRECATED HCO3 PLAS-SCNC: 25 MMOL/L (ref 22–29)
DIASTOLIC BLOOD PRESSURE - MUSE: NORMAL MMHG
EGFRCR SERPLBLD CKD-EPI 2021: >90 ML/MIN/1.73M2
EOSINOPHIL # BLD AUTO: ABNORMAL 10*3/UL
EOSINOPHIL # BLD MANUAL: 0 10E3/UL (ref 0–0.7)
EOSINOPHIL NFR BLD AUTO: ABNORMAL %
EOSINOPHIL NFR BLD MANUAL: 4 %
ERYTHROCYTE [DISTWIDTH] IN BLOOD BY AUTOMATED COUNT: 19.9 % (ref 10–15)
GLUCOSE SERPL-MCNC: 100 MG/DL (ref 70–99)
HCT VFR BLD AUTO: 21.9 % (ref 35–47)
HGB BLD-MCNC: 7.6 G/DL (ref 11.7–15.7)
IMM GRANULOCYTES # BLD: ABNORMAL 10*3/UL
IMM GRANULOCYTES NFR BLD: ABNORMAL %
INTERPRETATION ECG - MUSE: NORMAL
ISSUE DATE AND TIME: NORMAL
LYMPHOCYTES # BLD AUTO: ABNORMAL 10*3/UL
LYMPHOCYTES # BLD MANUAL: 0.1 10E3/UL (ref 0.8–5.3)
LYMPHOCYTES NFR BLD AUTO: ABNORMAL %
LYMPHOCYTES NFR BLD MANUAL: 19 %
MAGNESIUM SERPL-MCNC: 1.7 MG/DL (ref 1.7–2.3)
MCH RBC QN AUTO: 35.2 PG (ref 26.5–33)
MCHC RBC AUTO-ENTMCNC: 34.7 G/DL (ref 31.5–36.5)
MCV RBC AUTO: 101 FL (ref 78–100)
MONOCYTES # BLD AUTO: ABNORMAL 10*3/UL
MONOCYTES # BLD MANUAL: 0 10E3/UL (ref 0–1.3)
MONOCYTES NFR BLD AUTO: ABNORMAL %
MONOCYTES NFR BLD MANUAL: 0 %
NEUTROPHILS # BLD AUTO: ABNORMAL 10*3/UL
NEUTROPHILS # BLD MANUAL: 0.4 10E3/UL (ref 1.6–8.3)
NEUTROPHILS NFR BLD AUTO: ABNORMAL %
NEUTROPHILS NFR BLD MANUAL: 77 %
NRBC # BLD AUTO: 0 10E3/UL
NRBC BLD AUTO-RTO: 0 /100
P AXIS - MUSE: 58 DEGREES
PHOSPHATE SERPL-MCNC: 2.6 MG/DL (ref 2.5–4.5)
PLAT MORPH BLD: ABNORMAL
PLATELET # BLD AUTO: 9 10E3/UL (ref 150–450)
POTASSIUM SERPL-SCNC: 3.6 MMOL/L (ref 3.4–5.3)
POTASSIUM SERPL-SCNC: 3.6 MMOL/L (ref 3.4–5.3)
POTASSIUM SERPL-SCNC: 3.9 MMOL/L (ref 3.4–5.3)
PR INTERVAL - MUSE: 148 MS
QRS DURATION - MUSE: 80 MS
QT - MUSE: 314 MS
QTC - MUSE: 424 MS
R AXIS - MUSE: 42 DEGREES
RBC # BLD AUTO: 2.16 10E6/UL (ref 3.8–5.2)
RBC MORPH BLD: ABNORMAL
SODIUM SERPL-SCNC: 138 MMOL/L (ref 135–145)
SYSTOLIC BLOOD PRESSURE - MUSE: NORMAL MMHG
T AXIS - MUSE: 48 DEGREES
UNIT ABO/RH: NORMAL
UNIT NUMBER: NORMAL
UNIT STATUS: NORMAL
UNIT TYPE ISBT: 5100
VENTRICULAR RATE- MUSE: 110 BPM
WBC # BLD AUTO: 0.5 10E3/UL (ref 4–11)

## 2024-01-16 PROCEDURE — 84132 ASSAY OF SERUM POTASSIUM: CPT

## 2024-01-16 PROCEDURE — 85007 BL SMEAR W/DIFF WBC COUNT: CPT

## 2024-01-16 PROCEDURE — 258N000003 HC RX IP 258 OP 636

## 2024-01-16 PROCEDURE — 258N000003 HC RX IP 258 OP 636: Performed by: NURSE PRACTITIONER

## 2024-01-16 PROCEDURE — 84295 ASSAY OF SERUM SODIUM: CPT

## 2024-01-16 PROCEDURE — 206N000001 HC R&B BMT UMMC

## 2024-01-16 PROCEDURE — 250N000013 HC RX MED GY IP 250 OP 250 PS 637: Performed by: STUDENT IN AN ORGANIZED HEALTH CARE EDUCATION/TRAINING PROGRAM

## 2024-01-16 PROCEDURE — 250N000013 HC RX MED GY IP 250 OP 250 PS 637

## 2024-01-16 PROCEDURE — 250N000011 HC RX IP 250 OP 636: Performed by: STUDENT IN AN ORGANIZED HEALTH CARE EDUCATION/TRAINING PROGRAM

## 2024-01-16 PROCEDURE — 84100 ASSAY OF PHOSPHORUS: CPT | Performed by: INTERNAL MEDICINE

## 2024-01-16 PROCEDURE — P9037 PLATE PHERES LEUKOREDU IRRAD: HCPCS

## 2024-01-16 PROCEDURE — 97530 THERAPEUTIC ACTIVITIES: CPT | Mod: GP

## 2024-01-16 PROCEDURE — 85027 COMPLETE CBC AUTOMATED: CPT

## 2024-01-16 PROCEDURE — 250N000011 HC RX IP 250 OP 636

## 2024-01-16 PROCEDURE — 83735 ASSAY OF MAGNESIUM: CPT | Performed by: INTERNAL MEDICINE

## 2024-01-16 PROCEDURE — 80048 BASIC METABOLIC PNL TOTAL CA: CPT

## 2024-01-16 PROCEDURE — 99233 SBSQ HOSP IP/OBS HIGH 50: CPT | Performed by: INTERNAL MEDICINE

## 2024-01-16 RX ORDER — SIMETHICONE 80 MG
80 TABLET,CHEWABLE ORAL EVERY 6 HOURS PRN
Status: DISCONTINUED | OUTPATIENT
Start: 2024-01-16 | End: 2024-02-04 | Stop reason: HOSPADM

## 2024-01-16 RX ORDER — ACETAMINOPHEN 325 MG/1
650 TABLET ORAL DAILY PRN
Status: DISCONTINUED | OUTPATIENT
Start: 2024-01-16 | End: 2024-01-18

## 2024-01-16 RX ORDER — DIPHENHYDRAMINE HCL 25 MG
25 CAPSULE ORAL EVERY 6 HOURS PRN
Status: DISCONTINUED | OUTPATIENT
Start: 2024-01-16 | End: 2024-02-04 | Stop reason: HOSPADM

## 2024-01-16 RX ORDER — DIPHENHYDRAMINE HYDROCHLORIDE 50 MG/ML
25 INJECTION INTRAMUSCULAR; INTRAVENOUS DAILY PRN
Status: DISCONTINUED | OUTPATIENT
Start: 2024-01-16 | End: 2024-01-16

## 2024-01-16 RX ORDER — DIPHENHYDRAMINE HYDROCHLORIDE 50 MG/ML
25 INJECTION INTRAMUSCULAR; INTRAVENOUS EVERY 6 HOURS PRN
Status: DISCONTINUED | OUTPATIENT
Start: 2024-01-16 | End: 2024-02-04 | Stop reason: HOSPADM

## 2024-01-16 RX ADMIN — URSODIOL 300 MG: 300 CAPSULE ORAL at 14:40

## 2024-01-16 RX ADMIN — URSODIOL 300 MG: 300 CAPSULE ORAL at 20:41

## 2024-01-16 RX ADMIN — ONDANSETRON HYDROCHLORIDE 8 MG: 8 TABLET, FILM COATED ORAL at 00:48

## 2024-01-16 RX ADMIN — DIPHENHYDRAMINE HYDROCHLORIDE 25 MG: 50 INJECTION, SOLUTION INTRAMUSCULAR; INTRAVENOUS at 09:11

## 2024-01-16 RX ADMIN — MICAFUNGIN SODIUM 150 MG: 50 INJECTION, POWDER, LYOPHILIZED, FOR SOLUTION INTRAVENOUS at 16:23

## 2024-01-16 RX ADMIN — TACROLIMUS 84 MCG/HR: 5 INJECTION, SOLUTION INTRAVENOUS at 08:59

## 2024-01-16 RX ADMIN — SODIUM CHLORIDE: 9 INJECTION, SOLUTION INTRAVENOUS at 04:12

## 2024-01-16 RX ADMIN — DEXTROSE MONOHYDRATE 420 MCG: 50 INJECTION, SOLUTION INTRAVENOUS at 20:11

## 2024-01-16 RX ADMIN — ACYCLOVIR 800 MG: 800 TABLET ORAL at 21:55

## 2024-01-16 RX ADMIN — MYCOPHENOLATE MOFETIL 1250 MG: 500 INJECTION, POWDER, LYOPHILIZED, FOR SOLUTION INTRAVENOUS at 20:25

## 2024-01-16 RX ADMIN — ONDANSETRON HYDROCHLORIDE 8 MG: 8 TABLET, FILM COATED ORAL at 08:47

## 2024-01-16 RX ADMIN — MYCOPHENOLATE MOFETIL 1250 MG: 500 INJECTION, POWDER, LYOPHILIZED, FOR SOLUTION INTRAVENOUS at 08:59

## 2024-01-16 RX ADMIN — ACYCLOVIR 800 MG: 800 TABLET ORAL at 14:41

## 2024-01-16 RX ADMIN — SODIUM CHLORIDE: 9 INJECTION, SOLUTION INTRAVENOUS at 00:49

## 2024-01-16 RX ADMIN — DEXTROSE MONOHYDRATE 20 ML: 50 INJECTION, SOLUTION INTRAVENOUS at 20:16

## 2024-01-16 RX ADMIN — SIMETHICONE 80 MG: 80 TABLET, CHEWABLE ORAL at 02:34

## 2024-01-16 RX ADMIN — CEFEPIME HYDROCHLORIDE 2 G: 2 INJECTION, POWDER, FOR SOLUTION INTRAVENOUS at 08:47

## 2024-01-16 RX ADMIN — PANTOPRAZOLE SODIUM 40 MG: 40 TABLET, DELAYED RELEASE ORAL at 08:46

## 2024-01-16 RX ADMIN — CEFEPIME HYDROCHLORIDE 2 G: 2 INJECTION, POWDER, FOR SOLUTION INTRAVENOUS at 15:35

## 2024-01-16 RX ADMIN — DEXTROSE MONOHYDRATE 20 ML: 50 INJECTION, SOLUTION INTRAVENOUS at 20:07

## 2024-01-16 RX ADMIN — ACYCLOVIR 800 MG: 800 TABLET ORAL at 10:51

## 2024-01-16 RX ADMIN — TACROLIMUS 84 MCG/HR: 5 INJECTION, SOLUTION INTRAVENOUS at 20:33

## 2024-01-16 RX ADMIN — ACETAMINOPHEN 650 MG: 325 TABLET, FILM COATED ORAL at 09:11

## 2024-01-16 RX ADMIN — ACYCLOVIR 800 MG: 800 TABLET ORAL at 16:24

## 2024-01-16 RX ADMIN — ACYCLOVIR 800 MG: 800 TABLET ORAL at 08:46

## 2024-01-16 RX ADMIN — URSODIOL 300 MG: 300 CAPSULE ORAL at 08:47

## 2024-01-16 RX ADMIN — ONDANSETRON HYDROCHLORIDE 8 MG: 8 TABLET, FILM COATED ORAL at 16:24

## 2024-01-16 ASSESSMENT — ACTIVITIES OF DAILY LIVING (ADL)
ADLS_ACUITY_SCORE: 21

## 2024-01-16 NOTE — PLAN OF CARE
"/78 (BP Location: Right arm)   Pulse 96   Temp 98.3  F (36.8  C) (Oral)   Resp 16   Ht 1.575 m (5' 2.01\")   Wt 84.2 kg (185 lb 11.2 oz)   LMP 11/05/2017   SpO2 97%   BMI 33.96 kg/m       Patient afebrile, vital signs stable, patient received simethicone once for abdominal cramping. No reports of pain or nausea. Patient met urine parameters overnight and did not need lasix, next check at 0800. Patient will need platelets this morning. No other replacements needed. Patient's mesna finished and was taken down. Continue with plan of care.     Goal Outcome Evaluation:  Problem: Stem Cell/Bone Marrow Transplant  Goal: Optimal Nutrition Intake  Outcome: Not Progressing     Problem: Adult Inpatient Plan of Care  Goal: Optimal Comfort and Wellbeing  Outcome: Progressing  Intervention: Monitor Pain and Promote Comfort  Recent Flowsheet Documentation  Taken 1/15/2024 2300 by Christ Jensen RN  Pain Management Interventions: heat applied  Taken 1/15/2024 2100 by Christ Jensen RN  Pain Management Interventions: heat applied     Problem: Stem Cell/Bone Marrow Transplant  Goal: Optimal Coping with Transplant  Outcome: Progressing  Intervention: Optimize Patient/Family Adjustment to Transplant  Recent Flowsheet Documentation  Taken 1/15/2024 2000 by Christ Jensen RN  Supportive Measures: active listening utilized  Goal: Symptom-Free Urinary Elimination  Outcome: Progressing  Intervention: Prevent or Manage Bladder Irritation  Recent Flowsheet Documentation  Taken 1/15/2024 2300 by Christ Jensen RN  Pain Management Interventions: heat applied  Taken 1/15/2024 2100 by Christ Jensen RN  Pain Management Interventions: heat applied  Taken 1/15/2024 2000 by Christ Jensen RN  Urinary Elimination Promotion:   frequent voiding encouraged   toileting scheduled  Hyperhydration Management: encouraged to void  Goal: Diarrhea Symptom Control  Outcome: Progressing  Goal: Improved Activity " Tolerance  Outcome: Progressing  Intervention: Promote Improved Energy  Recent Flowsheet Documentation  Taken 1/15/2024 2000 by Christ Jensen RN  Environmental Support: calm environment promoted  Goal: Blood Counts Within Acceptable Range  Outcome: Progressing  Intervention: Monitor and Manage Hematologic Symptoms  Recent Flowsheet Documentation  Taken 1/16/2024 0000 by Christ Jensen RN  Medication Review/Management:   medications reviewed   high-risk medications identified  Taken 1/15/2024 2000 by Christ Jensen RN  Bleeding Precautions:   blood pressure closely monitored   foot protection facilitated   gentle oral care promoted   monitored for signs of bleeding  Medication Review/Management:   medications reviewed   high-risk medications identified  Goal: Absence of Hypersensitivity Reaction  Outcome: Progressing  Goal: Absence of Infection  Outcome: Progressing  Intervention: Prevent and Manage Infection  Recent Flowsheet Documentation  Taken 1/16/2024 0000 by Christ Jensen RN  Infection Prevention:   cohorting utilized   environmental surveillance performed   equipment surfaces disinfected   hand hygiene promoted   personal protective equipment utilized   rest/sleep promoted   single patient room provided   visitors restricted/screened  Isolation Precautions:   cytotoxic precautions maintained   protective environment maintained  Taken 1/15/2024 2000 by Christ Jensen RN  Infection Prevention:   cohorting utilized   environmental surveillance performed   equipment surfaces disinfected   hand hygiene promoted   personal protective equipment utilized   rest/sleep promoted   single patient room provided   visitors restricted/screened  Isolation Precautions:   cytotoxic precautions maintained   protective environment maintained  Goal: Improved Oral Mucous Membrane Health and Integrity  Outcome: Progressing  Goal: Nausea and Vomiting Symptom Relief  Outcome: Progressing

## 2024-01-16 NOTE — PROVIDER NOTIFICATION
Provider notified per parameters. On previous shift (3187-1085) patient's output>intake by 986.6 ml.Patient did receive lasix during that time period.    Provider stated to watch I&O for now.

## 2024-01-16 NOTE — PLAN OF CARE
"/82 (BP Location: Right arm)   Pulse 82   Temp 98  F (36.7  C) (Axillary)   Resp 16   Ht 1.575 m (5' 2.01\")   Wt 82.8 kg (182 lb 9.6 oz)   LMP 11/05/2017   SpO2 98%   BMI 33.39 kg/m     AVSS  Pt resting between cares  A&Ox4  Cytoxan flush ended 1300, all voiding parameters met  Tacrolimus started at 4.2 (see MAR)  1U platelets given this AM  Pt reports cramping in stomach with 2 loose stools  Pt reports scant bleeding from mouth and pain starting in mouth  Oral rinse and use of soft bristle brush encouraged  Tele discontinued  Instructed pt not to use essential oils on skin at this time  Continue with plan of care    Problem: Adult Inpatient Plan of Care  Goal: Plan of Care Review  Description: The Plan of Care Review/Shift note should be completed every shift.  The Outcome Evaluation is a brief statement about your assessment that the patient is improving, declining, or no change.  This information will be displayed automatically on your shift  note.  Outcome: Progressing  Flowsheets (Taken 1/16/2024 1504)  Plan of Care Reviewed With: patient  Overall Patient Progress: improving  Goal: Patient-Specific Goal (Individualized)  Description: You can add care plan individualizations to a care plan. Examples of Individualization might be:  \"Parent requests to be called daily at 9am for status\", \"I have a hard time hearing out of my right ear\", or \"Do not touch me to wake me up as it startles  me\".  Outcome: Progressing  Goal: Absence of Hospital-Acquired Illness or Injury  Outcome: Progressing  Intervention: Identify and Manage Fall Risk  Recent Flowsheet Documentation  Taken 1/16/2024 1300 by Daly Velarde, RN  Safety Promotion/Fall Prevention: safety round/check completed  Taken 1/16/2024 1200 by Daly Velarde, RN  Safety Promotion/Fall Prevention: safety round/check completed  Taken 1/16/2024 0900 by Daly Velarde, RN  Safety Promotion/Fall Prevention: safety round/check " completed  Intervention: Prevent Skin Injury  Recent Flowsheet Documentation  Taken 1/16/2024 0900 by Daly Velarde RN  Body Position: position changed independently  Skin Protection: adhesive use limited  Device Skin Pressure Protection: tubing/devices free from skin contact  Intervention: Prevent and Manage VTE (Venous Thromboembolism) Risk  Recent Flowsheet Documentation  Taken 1/16/2024 0900 by Daly Velarde RN  VTE Prevention/Management: SCDs (sequential compression devices) off  Intervention: Prevent Infection  Recent Flowsheet Documentation  Taken 1/16/2024 0900 by Daly Velarde RN  Infection Prevention:   cohorting utilized   environmental surveillance performed   equipment surfaces disinfected   hand hygiene promoted   personal protective equipment utilized   rest/sleep promoted   single patient room provided   visitors restricted/screened  Goal: Optimal Comfort and Wellbeing  Outcome: Progressing  Goal: Readiness for Transition of Care  Outcome: Progressing     Problem: Stem Cell/Bone Marrow Transplant  Goal: Optimal Coping with Transplant  Outcome: Progressing  Intervention: Optimize Patient/Family Adjustment to Transplant  Recent Flowsheet Documentation  Taken 1/16/2024 0900 by Daly Velarde RN  Supportive Measures: active listening utilized  Goal: Symptom-Free Urinary Elimination  Outcome: Progressing  Intervention: Prevent or Manage Bladder Irritation  Recent Flowsheet Documentation  Taken 1/16/2024 0900 by Daly Velarde RN  Urinary Elimination Promotion:   frequent voiding encouraged   toileting scheduled  Hyperhydration Management: encouraged to void  Goal: Diarrhea Symptom Control  Outcome: Progressing  Intervention: Manage Diarrhea  Recent Flowsheet Documentation  Taken 1/16/2024 0900 by Daly Velarde RN  Skin Protection: adhesive use limited  Fluid/Electrolyte Management: fluids provided  Goal: Improved Activity Tolerance  Outcome: Progressing  Intervention: Promote  Improved Energy  Recent Flowsheet Documentation  Taken 1/16/2024 0900 by Daly Velarde RN  Fatigue Management:   activity schedule adjusted   frequent rest breaks encouraged  Sleep/Rest Enhancement:   awakenings minimized   consistent schedule promoted   family presence promoted   regular sleep/rest pattern promoted  Environmental Support: calm environment promoted  Goal: Blood Counts Within Acceptable Range  Outcome: Progressing  Intervention: Monitor and Manage Hematologic Symptoms  Recent Flowsheet Documentation  Taken 1/16/2024 0900 by Daly Velarde RN  Sleep/Rest Enhancement:   awakenings minimized   consistent schedule promoted   family presence promoted   regular sleep/rest pattern promoted  Bleeding Precautions:   blood pressure closely monitored   foot protection facilitated   gentle oral care promoted   monitored for signs of bleeding  Medication Review/Management:   medications reviewed   high-risk medications identified  Goal: Absence of Hypersensitivity Reaction  Outcome: Progressing  Goal: Absence of Infection  Outcome: Progressing  Intervention: Prevent and Manage Infection  Recent Flowsheet Documentation  Taken 1/16/2024 0900 by Daly Velarde RN  Infection Prevention:   cohorting utilized   environmental surveillance performed   equipment surfaces disinfected   hand hygiene promoted   personal protective equipment utilized   rest/sleep promoted   single patient room provided   visitors restricted/screened  Infection Management: aseptic technique maintained  Isolation Precautions:   cytotoxic precautions maintained   protective environment maintained  Goal: Improved Oral Mucous Membrane Health and Integrity  Outcome: Progressing  Intervention: Promote Oral Comfort and Health  Recent Flowsheet Documentation  Taken 1/16/2024 0900 by Daly Velarde RN  Oral Mucous Membrane Protection:   nonirritating oral fluids promoted   nonirritating oral foods promoted  Oral Care: oral rinse  provided  Goal: Nausea and Vomiting Symptom Relief  Outcome: Progressing  Goal: Optimal Nutrition Intake  Outcome: Progressing  Intervention: Minimize and Manage Barriers to Oral Intake  Recent Flowsheet Documentation  Taken 1/16/2024 0900 by Daly Velarde RN  Oral Nutrition Promotion: calorie-dense liquids provided   Goal Outcome Evaluation:      Plan of Care Reviewed With: patient    Overall Patient Progress: improvingOverall Patient Progress: improving

## 2024-01-16 NOTE — PROGRESS NOTES
"  BMT Daily Progress Note  Patient ID:  Irma Foreman is a 53 year old female with a PMH of MDS, warm AIHA, transfusion and transfusion dependent anemia presented to  to undergo a myeloablative allogeneic transplant. She was recently COVID+ on 12/26 but tested negative on 1/2 with resolution of nearly all URI symptoms. Undergoing MA (Bu/Flu) 6/8 URD Allo transplant, day 5      HPI   Irma was afebrile overnight. She did complain of crampy abdominal pain that resolved with simethicone. The chest pressure and palpitations she experienced yesterday have resolved. She at soup, fruit and fried rice yesterday. No nausea. Loose stools x3.     Review of Systems    Negative unless stated in the HPI.     PHYSICAL EXAM      Weight     Wt Readings from Last 3 Encounters:   01/16/24 82.8 kg (182 lb 9.6 oz)   12/26/23 83.1 kg (183 lb 4.8 oz)   12/19/23 83.5 kg (184 lb)        KPS: 70    BP (!) 134/92   Pulse 83   Temp 97.1  F (36.2  C) (Axillary)   Resp 12   Ht 1.575 m (5' 2.01\")   Wt 82.8 kg (182 lb 9.6 oz)   LMP 11/05/2017   SpO2 98%   BMI 33.39 kg/m       General: NAD  Eyes: KEVON, sclera anicteric   Nose/Mouth/Throat: OP clear, buccal mucosa moist, no ulcerations   Lungs: CTA bilaterally  Cardiovascular: RRR, no M/R/G   Abdominal/Rectal: +BS, soft, NT, ND  Lymphatics: No edema  Skin: wart under right foot dime sized white, cauliflower and slightly ulcerated. Two pin point sized, flesh colored warts on right hand. Hands dry, one excoriation on left dorsum.   Neuro: A&O   Additional Findings: Gastelum site NT, no drainage.    Current aGVHD staging:  Skin 0, UGI 0, LGI 0, Liver 0 (keep in note through day +180 for allos)      LABS AND IMAGING: I have assessed all abnormal lab values for their clinical significance and any values considered clinically significant have been addressed in the assessment and plan.        Lab Results   Component Value Date    WBC 0.5 (LL) 01/16/2024    ANEUTAUTO 1.0 (L) 01/15/2024    HGB 7.6 " (L) 01/16/2024    HCT 21.9 (L) 01/16/2024    PLT 9 (LL) 01/16/2024     01/16/2024     01/16/2024    POTASSIUM 3.6 01/16/2024    POTASSIUM 3.6 01/16/2024    CHLORIDE 105 01/16/2024    CO2 25 01/16/2024     (H) 01/16/2024    BUN 7.0 01/16/2024    CR 0.42 (L) 01/16/2024    MAG 1.7 01/16/2024    INR 1.03 01/15/2024           SYSTEMS-BASED ASSESSMENT AND PLAN     Hortencia Baldwin is a 53 year old female with a history of MDS, undergoing MA (Bu/Flu) 6/8 URD Allo transplant, day 5      BMT/IEC PROTOCOL for MT 2015-29 **according to but not on trial**   - Chemo protocol:  ? Day -6 through day -1: Keppra and Allopurinol   ? Day -5 through -2: Bu/Flu. Busulfan levels adjusted by pharm, attending  ? Day -1: Rest day   ? Day 0: Transplant. Recipient: O+, CMV+. Donor: O+, CMV-. No flush. Cell dose 6.5 x10^6.   o Gram+ bacilli cultured for stem cell product. On cefepime now, which should provide adequate coverage.   - Restaging plan: per protocol       HEME/COAG  # Iron overload: hx of transfusion dependent anemia, pre-transplant ferritin around 5,000. Recommend phlebotomy post transplant when retic count is restored and Hgb >10    - Risk of cytopenias due to chemotherapy and radiation  - Transfusion parameters: hemoglobin <7, platelets <10    IMMUNOCOMPROMISED  # Fever: not yet neutropenic   - UA: negative for LE & nitrites. 1/13 ua negative; BK urine positive/adeno negative (red/orange urine)   - Blood cultures NGTD    - Procal elevated 1.08    - CXR with likely pulmonary edema   - RVP neg    - Cefepime (1/11 - x). Brief course of IV Vancomycin. MRSA nasal swab negative.   - Relevant infection history:  URI from COVID (12/26), negative test on 1/2/24   - Vaccination status: Influenza vaccination after day +60, COVID vaccination after day +100, followed by remaining vaccinations at 12, 14, 24, and 26 months.  - Prophylaxis plan: ACV/letermovir, Micafungin through day +45 (current smoker), levofloxacin while  neutropenic, Bactrim to start at day +28  Foot, hand warts. Curbside derm on 1/8, no recs while inpatient, typically managed OP.      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   ? EKG: new QR pattern in V1, suggests right ventricular conduction delay   ? Troponin negative x1   ? Echo (1/15): LVEF 55-60%, no pericardial effusion present   ? Cardiology consulted: no further recs   - Risk of cardiomyopathy:  Baseline EF 60-65%  - Risk of arrhythmia: Baseline EKG showed sinus rhythm     RESPIRATORY  - Current smoker: states she quit (1/5/24), offered nicotine replacement but she declined.   - Baseline PFTs: The FEV1, FVC, FEV1/FVC ratio and FOU69-48% are within normal limits.  The inspiratory flow rates are within normal limits.  Lung volumes are within normal limits.  The diffusing capacity is normal.   - Risk of respiratory complications: Frequent ambulation and incentive spirometer.    GI/NUTRITION  # Elevated liver enzymes: down trending ALT: 56 (87), AST: 19 (43) could be related to Tylenol use, iron overload or recent viral illness   ? RUQ U/S (12/27) was normal  ? Acute hepatitis panel negative   - Ulcer prophylaxis: PPI  - VOD ppx: Ursodiol   # Moderate malnutrition in the context of acute illness: dietitian following    RENAL/ELECTROLYTES/  - Risk of renal injury: IV hydration   - Electrolyte management: replace per sliding scale  - Change Cytoxan flush to NS today as Na downtrending    DERM:  Right hand dryness, try aquafor with gloves on overnight and as tolerated during the day  Wart on foot, one on hand, see ID    DIABETES/ENDOCRINE  - Risk of steroid-induced hyperglyemia: Monitor BG, sliding scale if needed    MUSCULOSKELETAL/FRAILTY  - Baseline Frailty Score: 3  - Patient with substantial risk of sarcopenia  - Daily PT/OT as needed while  "inpatient  - Cancer Rehab as needed outpatient    SYMPTOM MANAGEMENT  - Nausea from chemo/radiation: Prochlorperazine, ondansetron, lorazepam.  - # Pain Assessment:      1/16/2024     4:00 AM   Current Pain Score   Patient currently in pain? denies   - Hortencia Sethi is experiencing pain due to bone marrow biopsy and line placement. Pain management was discussed and the plan was created in a collaborative fashion.  Hortencia Sethi's response to the current recommendations: engaged  - Please see the plan for pain management as documented above      SOCIAL DETERMINANTS  - Caregiver: Srinivas Suggs, Keira Neal & Rickey eNal     Disposition: remain admitted through engraftment     Known issues that I take into account for medical decisions, with salient changes to the plan considering these complexities noted above.    Patient Active Problem List   Diagnosis     Left medial knee pain     Obesity (BMI 30-39.9)     Anemia, unspecified type     Macrocytic anemia     MDS (myelodysplastic syndrome) (H)     Clinically Significant Risk Factors          # Hypocalcemia: Lowest Ca = 8.3 mg/dL in last 2 days, will monitor and replace as appropriate   # Hypomagnesemia: Lowest Mg = 1.6 mg/dL in last 2 days, will replace as needed   # Hypoalbuminemia: Lowest albumin = 3.3 g/dL at 1/15/2024  2:35 AM, will monitor as appropriate   # Thrombocytopenia: Lowest platelets = 9 in last 2 days, will monitor for bleeding          # Obesity: Estimated body mass index is 33.39 kg/m  as calculated from the following:    Height as of this encounter: 1.575 m (5' 2.01\").    Weight as of this encounter: 82.8 kg (182 lb 9.6 oz).   # Moderate Malnutrition: based on nutrition assessment                I spent 45 minutes in the care of this patient today, which included time necessary for preparation for the visit, obtaining history, ordering medications/tests/procedures as medically indicated, review of pertinent medical literature, counseling of the patient, communication " of recommendations to the care team, and documentation time.    King Vaz PA-C  [id number]      ______________________________________________      BMT ATTENDING NOTE    Hortencia Baldwin is a 53 year old female, who is day 5 of transplant for MDS.      Subjective:  Reports new chest tightness, clear urine now, urinating frequently with fluids, reports otherwise feeling well overall, more fatigued, no new concerns, decent appetite.  Ambulating.  No respiratory concerns.  No nausea no vomiting no diarrhea.       Vitals reviewed, labs reviewed  PE:  NAD, Alert, oriented x3  HEENT: no visible oral ulcers  Heart: RRR  Lungs: CTAB  Abdomen: +BS, soft non tender, non distended  LE: no edema  Skin: Noted warts on the right sole and upper extremity nonconcerning in appearance     Assessment and Plan:    1. Heme/BMT: MDS, here for MAC 6/8 MUD, flu/busulfan prep, PTCy/Tac/MMF GVHD prophylaxis, transfusion as needed, history of secondary iron overload  2. ID: 1/11 first neut fever, cefepime, follow workup, afebrile now, , follow up graft cx notified 1/16   3. CV: no active issues till CP on 1/15 s/p cardiac workup  4. GI/ FEN: monitor regimen related toxicity, encourage fluid PO intake, I/O and daily weights, at risk of VOD due to history of abnormal LFTs and secondary hemochromatosis baseline ultrasound on 12/27/2023- monitor mild elevated ALT  5. Renal: Maintain euvolemic  6. PPx: Levo, jani, ACV/letermovir  7.  Supportive care: PT, baseline frailty score 3, encourage ambulation, optimize nutrition, avoid sarcopenia, other supportive care as detailed above      I spent 50 minutes in the care of Irma today, including an independent face-to-face assessment and time monitoring for restoration of hematopoiesis, high-risk organ toxicities from chemotherapy, and GVHD, managing infectious risk due to his immunocompromised state, and encouraging nutrition and physical activity to prevent debility and sarcopenia.  I personally  performed all of the medical decision making associated with this visit, counseled Praveen on my overall assessment and recommendations, communicated my plan to the care team, and edited the above note to reflect my current plan of care.     Liam Guajardo MD

## 2024-01-17 LAB
ABO/RH(D): NORMAL
ACANTHOCYTES BLD QL SMEAR: NORMAL
ANION GAP SERPL CALCULATED.3IONS-SCNC: 7 MMOL/L (ref 7–15)
ANTIBODY SCREEN: NEGATIVE
ATRIAL RATE - MUSE: 95 BPM
AUER BODIES BLD QL SMEAR: NORMAL
BASO STIPL BLD QL SMEAR: NORMAL
BASOPHILS # BLD AUTO: ABNORMAL 10*3/UL
BASOPHILS NFR BLD AUTO: ABNORMAL %
BITE CELLS BLD QL SMEAR: NORMAL
BLD PROD TYP BPU: NORMAL
BLD PROD TYP BPU: NORMAL
BLISTER CELLS BLD QL SMEAR: NORMAL
BLOOD COMPONENT TYPE: NORMAL
BLOOD COMPONENT TYPE: NORMAL
BUN SERPL-MCNC: 8.3 MG/DL (ref 6–20)
BURR CELLS BLD QL SMEAR: NORMAL
C DIFF TOX B STL QL: NEGATIVE
CALCIUM SERPL-MCNC: 8.7 MG/DL (ref 8.6–10)
CHLORIDE SERPL-SCNC: 108 MMOL/L (ref 98–107)
CODING SYSTEM: NORMAL
CODING SYSTEM: NORMAL
CREAT SERPL-MCNC: 0.39 MG/DL (ref 0.51–0.95)
DACRYOCYTES BLD QL SMEAR: NORMAL
DEPRECATED HCO3 PLAS-SCNC: 26 MMOL/L (ref 22–29)
DIASTOLIC BLOOD PRESSURE - MUSE: NORMAL MMHG
EGFRCR SERPLBLD CKD-EPI 2021: >90 ML/MIN/1.73M2
ELLIPTOCYTES BLD QL SMEAR: NORMAL
EOSINOPHIL # BLD AUTO: ABNORMAL 10*3/UL
EOSINOPHIL NFR BLD AUTO: ABNORMAL %
ERYTHROCYTE [DISTWIDTH] IN BLOOD BY AUTOMATED COUNT: 18.9 % (ref 10–15)
FRAGMENTS BLD QL SMEAR: NORMAL
GLUCOSE SERPL-MCNC: 91 MG/DL (ref 70–99)
HCT VFR BLD AUTO: 21 % (ref 35–47)
HGB BLD-MCNC: 7.1 G/DL (ref 11.7–15.7)
HGB C CRYSTALS: NORMAL
HOWELL-JOLLY BOD BLD QL SMEAR: NORMAL
IMM GRANULOCYTES # BLD: ABNORMAL 10*3/UL
IMM GRANULOCYTES NFR BLD: ABNORMAL %
INTERPRETATION ECG - MUSE: NORMAL
ISSUE DATE AND TIME: NORMAL
ISSUE DATE AND TIME: NORMAL
LYMPHOCYTES # BLD AUTO: ABNORMAL 10*3/UL
LYMPHOCYTES NFR BLD AUTO: ABNORMAL %
MAGNESIUM SERPL-MCNC: 1.8 MG/DL (ref 1.7–2.3)
MCH RBC QN AUTO: 35.3 PG (ref 26.5–33)
MCHC RBC AUTO-ENTMCNC: 33.8 G/DL (ref 31.5–36.5)
MCV RBC AUTO: 105 FL (ref 78–100)
MONOCYTES # BLD AUTO: ABNORMAL 10*3/UL
MONOCYTES NFR BLD AUTO: ABNORMAL %
NEUTROPHILS # BLD AUTO: ABNORMAL 10*3/UL
NEUTROPHILS NFR BLD AUTO: ABNORMAL %
NEUTS HYPERSEG BLD QL SMEAR: NORMAL
P AXIS - MUSE: 35 DEGREES
PHOSPHATE SERPL-MCNC: 2.5 MG/DL (ref 2.5–4.5)
PLAT MORPH BLD: NORMAL
PLATELET # BLD AUTO: 3 10E3/UL (ref 150–450)
PLATELET # BLD AUTO: 4 10E3/UL (ref 150–450)
POLYCHROMASIA BLD QL SMEAR: NORMAL
POTASSIUM SERPL-SCNC: 3.8 MMOL/L (ref 3.4–5.3)
POTASSIUM SERPL-SCNC: 3.8 MMOL/L (ref 3.4–5.3)
PR INTERVAL - MUSE: 142 MS
QRS DURATION - MUSE: 94 MS
QT - MUSE: 348 MS
QTC - MUSE: 437 MS
R AXIS - MUSE: 59 DEGREES
RBC # BLD AUTO: 2.01 10E6/UL (ref 3.8–5.2)
RBC AGGLUT BLD QL: NORMAL
RBC MORPH BLD: NORMAL
ROULEAUX BLD QL SMEAR: NORMAL
SICKLE CELLS BLD QL SMEAR: NORMAL
SMUDGE CELLS BLD QL SMEAR: NORMAL
SODIUM SERPL-SCNC: 141 MMOL/L (ref 135–145)
SODIUM SERPL-SCNC: 141 MMOL/L (ref 135–145)
SPECIMEN EXPIRATION DATE: NORMAL
SPHEROCYTES BLD QL SMEAR: NORMAL
STOMATOCYTES BLD QL SMEAR: NORMAL
SYSTOLIC BLOOD PRESSURE - MUSE: NORMAL MMHG
T AXIS - MUSE: 53 DEGREES
TARGETS BLD QL SMEAR: NORMAL
TOXIC GRANULES BLD QL SMEAR: NORMAL
UNIT ABO/RH: NORMAL
UNIT ABO/RH: NORMAL
UNIT NUMBER: NORMAL
UNIT NUMBER: NORMAL
UNIT STATUS: NORMAL
UNIT STATUS: NORMAL
UNIT TYPE ISBT: 5100
UNIT TYPE ISBT: 6200
VARIANT LYMPHS BLD QL SMEAR: NORMAL
VENTRICULAR RATE- MUSE: 95 BPM
WBC # BLD AUTO: 0.4 10E3/UL (ref 4–11)

## 2024-01-17 PROCEDURE — 250N000013 HC RX MED GY IP 250 OP 250 PS 637: Performed by: INTERNAL MEDICINE

## 2024-01-17 PROCEDURE — 258N000003 HC RX IP 258 OP 636

## 2024-01-17 PROCEDURE — 99233 SBSQ HOSP IP/OBS HIGH 50: CPT | Mod: FS

## 2024-01-17 PROCEDURE — 85027 COMPLETE CBC AUTOMATED: CPT

## 2024-01-17 PROCEDURE — 80048 BASIC METABOLIC PNL TOTAL CA: CPT

## 2024-01-17 PROCEDURE — 87493 C DIFF AMPLIFIED PROBE: CPT

## 2024-01-17 PROCEDURE — 250N000011 HC RX IP 250 OP 636

## 2024-01-17 PROCEDURE — 250N000013 HC RX MED GY IP 250 OP 250 PS 637

## 2024-01-17 PROCEDURE — 86922 COMPATIBILITY TEST ANTIGLOB: CPT

## 2024-01-17 PROCEDURE — 250N000013 HC RX MED GY IP 250 OP 250 PS 637: Performed by: STUDENT IN AN ORGANIZED HEALTH CARE EDUCATION/TRAINING PROGRAM

## 2024-01-17 PROCEDURE — 206N000001 HC R&B BMT UMMC

## 2024-01-17 PROCEDURE — 86900 BLOOD TYPING SEROLOGIC ABO: CPT

## 2024-01-17 PROCEDURE — 85049 AUTOMATED PLATELET COUNT: CPT

## 2024-01-17 PROCEDURE — 99418 PROLNG IP/OBS E/M EA 15 MIN: CPT

## 2024-01-17 PROCEDURE — 86923 COMPATIBILITY TEST ELECTRIC: CPT

## 2024-01-17 PROCEDURE — 84100 ASSAY OF PHOSPHORUS: CPT | Performed by: INTERNAL MEDICINE

## 2024-01-17 PROCEDURE — 84295 ASSAY OF SERUM SODIUM: CPT

## 2024-01-17 PROCEDURE — 83735 ASSAY OF MAGNESIUM: CPT

## 2024-01-17 PROCEDURE — P9037 PLATE PHERES LEUKOREDU IRRAD: HCPCS

## 2024-01-17 PROCEDURE — 250N000011 HC RX IP 250 OP 636: Performed by: STUDENT IN AN ORGANIZED HEALTH CARE EDUCATION/TRAINING PROGRAM

## 2024-01-17 RX ORDER — LOPERAMIDE HCL 2 MG
2 CAPSULE ORAL 4 TIMES DAILY PRN
Status: COMPLETED | OUTPATIENT
Start: 2024-01-17 | End: 2024-01-28

## 2024-01-17 RX ORDER — BENZOCAINE/MENTHOL 6 MG-10 MG
LOZENGE MUCOUS MEMBRANE 2 TIMES DAILY PRN
Status: DISCONTINUED | OUTPATIENT
Start: 2024-01-17 | End: 2024-01-18

## 2024-01-17 RX ADMIN — MYCOPHENOLATE MOFETIL 1250 MG: 500 INJECTION, POWDER, LYOPHILIZED, FOR SOLUTION INTRAVENOUS at 08:45

## 2024-01-17 RX ADMIN — CEFEPIME HYDROCHLORIDE 2 G: 2 INJECTION, POWDER, FOR SOLUTION INTRAVENOUS at 00:42

## 2024-01-17 RX ADMIN — SIMETHICONE 80 MG: 80 TABLET, CHEWABLE ORAL at 17:05

## 2024-01-17 RX ADMIN — TACROLIMUS 84 MCG/HR: 5 INJECTION, SOLUTION INTRAVENOUS at 21:09

## 2024-01-17 RX ADMIN — CEFEPIME HYDROCHLORIDE 2 G: 2 INJECTION, POWDER, FOR SOLUTION INTRAVENOUS at 23:25

## 2024-01-17 RX ADMIN — CEFEPIME HYDROCHLORIDE 2 G: 2 INJECTION, POWDER, FOR SOLUTION INTRAVENOUS at 08:45

## 2024-01-17 RX ADMIN — MICAFUNGIN SODIUM 150 MG: 50 INJECTION, POWDER, LYOPHILIZED, FOR SOLUTION INTRAVENOUS at 16:49

## 2024-01-17 RX ADMIN — CEFEPIME HYDROCHLORIDE 2 G: 2 INJECTION, POWDER, FOR SOLUTION INTRAVENOUS at 16:49

## 2024-01-17 RX ADMIN — ACETAMINOPHEN 650 MG: 325 TABLET, FILM COATED ORAL at 17:58

## 2024-01-17 RX ADMIN — DEXTROSE MONOHYDRATE 10 ML: 50 INJECTION, SOLUTION INTRAVENOUS at 21:09

## 2024-01-17 RX ADMIN — LOPERAMIDE HYDROCHLORIDE 2 MG: 2 CAPSULE ORAL at 19:21

## 2024-01-17 RX ADMIN — URSODIOL 300 MG: 300 CAPSULE ORAL at 13:42

## 2024-01-17 RX ADMIN — URSODIOL 300 MG: 300 CAPSULE ORAL at 20:17

## 2024-01-17 RX ADMIN — ACYCLOVIR 800 MG: 800 TABLET ORAL at 08:39

## 2024-01-17 RX ADMIN — ACYCLOVIR 800 MG: 800 TABLET ORAL at 21:22

## 2024-01-17 RX ADMIN — LOPERAMIDE HYDROCHLORIDE 2 MG: 2 CAPSULE ORAL at 23:25

## 2024-01-17 RX ADMIN — URSODIOL 300 MG: 300 CAPSULE ORAL at 08:39

## 2024-01-17 RX ADMIN — DIPHENHYDRAMINE HYDROCHLORIDE 25 MG: 25 CAPSULE ORAL at 05:18

## 2024-01-17 RX ADMIN — ACETAMINOPHEN 650 MG: 325 TABLET, FILM COATED ORAL at 05:18

## 2024-01-17 RX ADMIN — ONDANSETRON HYDROCHLORIDE 8 MG: 8 TABLET, FILM COATED ORAL at 16:53

## 2024-01-17 RX ADMIN — DEXTROSE MONOHYDRATE 420 MCG: 50 INJECTION, SOLUTION INTRAVENOUS at 20:25

## 2024-01-17 RX ADMIN — ACYCLOVIR 800 MG: 800 TABLET ORAL at 13:42

## 2024-01-17 RX ADMIN — HYDROCORTISONE: 1 CREAM TOPICAL at 17:01

## 2024-01-17 RX ADMIN — SALINE NASAL SPRAY 2 SPRAY: 1.5 SOLUTION NASAL at 08:47

## 2024-01-17 RX ADMIN — MYCOPHENOLATE MOFETIL 1250 MG: 500 INJECTION, POWDER, LYOPHILIZED, FOR SOLUTION INTRAVENOUS at 20:22

## 2024-01-17 RX ADMIN — DIPHENHYDRAMINE HYDROCHLORIDE 25 MG: 25 CAPSULE ORAL at 17:58

## 2024-01-17 RX ADMIN — PANTOPRAZOLE SODIUM 40 MG: 40 TABLET, DELAYED RELEASE ORAL at 08:39

## 2024-01-17 RX ADMIN — ONDANSETRON HYDROCHLORIDE 8 MG: 8 TABLET, FILM COATED ORAL at 08:39

## 2024-01-17 RX ADMIN — DEXTROSE MONOHYDRATE 20 ML: 50 INJECTION, SOLUTION INTRAVENOUS at 20:25

## 2024-01-17 RX ADMIN — ONDANSETRON HYDROCHLORIDE 8 MG: 8 TABLET, FILM COATED ORAL at 00:45

## 2024-01-17 RX ADMIN — ACYCLOVIR 800 MG: 800 TABLET ORAL at 10:50

## 2024-01-17 RX ADMIN — ACYCLOVIR 800 MG: 800 TABLET ORAL at 17:01

## 2024-01-17 ASSESSMENT — ACTIVITIES OF DAILY LIVING (ADL)
ADLS_ACUITY_SCORE: 21

## 2024-01-17 NOTE — PLAN OF CARE
"BP (!) 147/83 (BP Location: Right arm)   Pulse 77   Temp 99.1  F (37.3  C) (Oral)   Resp 16   Ht 1.575 m (5' 2.01\")   Wt 83 kg (182 lb 14.4 oz)   LMP 11/05/2017   SpO2 99%   BMI 33.44 kg/m     AVSS  A&Ox4  Small ulcer in mucosa on right side  Started NS nasal spray   No reports of pain or nausea  Encouraged pt to increase oral intake especially with protein  2x watery BM, C. Diff sample ordered  Rash developing on chest, under breasts and waist band area (See LDA)  Continue with plan of care    Problem: Adult Inpatient Plan of Care  Goal: Plan of Care Review  Description: The Plan of Care Review/Shift note should be completed every shift.  The Outcome Evaluation is a brief statement about your assessment that the patient is improving, declining, or no change.  This information will be displayed automatically on your shift  note.  Outcome: Progressing  Goal: Patient-Specific Goal (Individualized)  Description: You can add care plan individualizations to a care plan. Examples of Individualization might be:  \"Parent requests to be called daily at 9am for status\", \"I have a hard time hearing out of my right ear\", or \"Do not touch me to wake me up as it startles  me\".  Outcome: Progressing  Goal: Absence of Hospital-Acquired Illness or Injury  Outcome: Progressing  Intervention: Identify and Manage Fall Risk  Recent Flowsheet Documentation  Taken 1/17/2024 0813 by Daly Velarde RN  Safety Promotion/Fall Prevention: safety round/check completed  Intervention: Prevent Skin Injury  Recent Flowsheet Documentation  Taken 1/17/2024 0813 by Daly Velarde RN  Skin Protection: adhesive use limited  Device Skin Pressure Protection: tubing/devices free from skin contact  Intervention: Prevent and Manage VTE (Venous Thromboembolism) Risk  Recent Flowsheet Documentation  Taken 1/17/2024 0813 by Daly Velarde RN  VTE Prevention/Management: SCDs (sequential compression devices) off  Intervention: Prevent " Infection  Recent Flowsheet Documentation  Taken 1/17/2024 0813 by Daly Velarde RN  Infection Prevention:   cohorting utilized   environmental surveillance performed   equipment surfaces disinfected   hand hygiene promoted   personal protective equipment utilized   rest/sleep promoted   single patient room provided   visitors restricted/screened  Goal: Optimal Comfort and Wellbeing  Outcome: Progressing  Goal: Readiness for Transition of Care  Outcome: Progressing     Problem: Stem Cell/Bone Marrow Transplant  Goal: Optimal Coping with Transplant  Outcome: Progressing  Intervention: Optimize Patient/Family Adjustment to Transplant  Recent Flowsheet Documentation  Taken 1/17/2024 0813 by Daly Velarde RN  Supportive Measures: active listening utilized  Goal: Symptom-Free Urinary Elimination  Outcome: Progressing  Intervention: Prevent or Manage Bladder Irritation  Recent Flowsheet Documentation  Taken 1/17/2024 0813 by Daly Velarde RN  Urinary Elimination Promotion:   frequent voiding encouraged   toileting scheduled  Hyperhydration Management:   encouraged to void   fluids provided  Goal: Diarrhea Symptom Control  Outcome: Progressing  Intervention: Manage Diarrhea  Recent Flowsheet Documentation  Taken 1/17/2024 0813 by Daly Velarde RN  Skin Protection: adhesive use limited  Fluid/Electrolyte Management: fluids provided  Goal: Improved Activity Tolerance  Outcome: Progressing  Intervention: Promote Improved Energy  Recent Flowsheet Documentation  Taken 1/17/2024 0813 by Daly Velarde RN  Fatigue Management:   activity schedule adjusted   frequent rest breaks encouraged  Sleep/Rest Enhancement:   awakenings minimized   consistent schedule promoted   family presence promoted   regular sleep/rest pattern promoted  Environmental Support: calm environment promoted  Goal: Blood Counts Within Acceptable Range  Outcome: Progressing  Intervention: Monitor and Manage Hematologic Symptoms  Recent  Flowsheet Documentation  Taken 1/17/2024 0813 by Daly Velarde RN  Sleep/Rest Enhancement:   awakenings minimized   consistent schedule promoted   family presence promoted   regular sleep/rest pattern promoted  Bleeding Precautions:   blood pressure closely monitored   foot protection facilitated   gentle oral care promoted   monitored for signs of bleeding  Medication Review/Management:   medications reviewed   high-risk medications identified  Goal: Absence of Hypersensitivity Reaction  Outcome: Progressing  Goal: Absence of Infection  Outcome: Progressing  Intervention: Prevent and Manage Infection  Recent Flowsheet Documentation  Taken 1/17/2024 0813 by Daly Velarde RN  Infection Prevention:   cohorting utilized   environmental surveillance performed   equipment surfaces disinfected   hand hygiene promoted   personal protective equipment utilized   rest/sleep promoted   single patient room provided   visitors restricted/screened  Infection Management: aseptic technique maintained  Isolation Precautions:   cytotoxic precautions maintained   protective environment maintained  Goal: Improved Oral Mucous Membrane Health and Integrity  Outcome: Progressing  Intervention: Promote Oral Comfort and Health  Recent Flowsheet Documentation  Taken 1/17/2024 0813 by Daly Velarde RN  Oral Mucous Membrane Protection:   nonirritating oral fluids promoted   nonirritating oral foods promoted  Goal: Nausea and Vomiting Symptom Relief  Outcome: Progressing  Goal: Optimal Nutrition Intake  Outcome: Progressing  Intervention: Minimize and Manage Barriers to Oral Intake  Recent Flowsheet Documentation  Taken 1/17/2024 0813 by Daly Velarde RN  Oral Nutrition Promotion: calorie-dense liquids provided   Goal Outcome Evaluation:      Plan of Care Reviewed With: patient    Overall Patient Progress: improvingOverall Patient Progress: improving

## 2024-01-17 NOTE — PLAN OF CARE
"/86   Pulse 77   Temp 97  F (36.1  C)   Resp 16   Ht 1.575 m (5' 2.01\")   Wt 83.6 kg (184 lb 6.4 oz)   LMP 11/05/2017   SpO2 97%   BMI 33.72 kg/m       Patient afebrile, hypertensive but within parameters, other vital signs stable. No reports of pain or nausea. Patient is receiving platelets this morning. No other replacements needed. Patient wiped nose last night and noticed a small amount of blood. Patient assist level independent. Continue with plan of care.     Goal Outcome Evaluation:  Problem: Adult Inpatient Plan of Care  Goal: Optimal Comfort and Wellbeing  Outcome: Progressing     Problem: Stem Cell/Bone Marrow Transplant  Goal: Optimal Coping with Transplant  Outcome: Progressing  Goal: Symptom-Free Urinary Elimination  Outcome: Progressing  Goal: Diarrhea Symptom Control  Outcome: Progressing  Goal: Improved Activity Tolerance  Outcome: Progressing  Intervention: Promote Improved Energy  Recent Flowsheet Documentation  Taken 1/17/2024 0400 by Christ Jensen, RN  Activity Management: ambulated in room  Taken 1/17/2024 0000 by Christ Jensen, RN  Activity Management: ambulated in room  Taken 1/16/2024 2000 by Christ Jensen, RN  Activity Management: ambulated in room  Goal: Nausea and Vomiting Symptom Relief  Outcome: Progressing                           "

## 2024-01-17 NOTE — PROGRESS NOTES
"  BMT Daily Progress Note  Patient ID:  Irma Foreman is a 53 year old female with a PMH of MDS, warm AIHA, transfusion and transfusion dependent anemia presented to  to undergo a myeloablative allogeneic transplant. She was recently COVID+ on 12/26 but tested negative on 1/2 with resolution of nearly all URI symptoms. Undergoing MA (Bu/Flu) 6/8 URD Allo transplant, day 6      HPI   Irma is doing okay this morning. Her appetite is low but she is eating enough to delay TPN for the time being, intermittent abdominal pain throughout the day, she had been using simethicone but declined intervention yesterday. Her stools are loose x3 (not watery, still some consistency). Her platelets have been in the single digits the last 2 days and we will order BID platelet checks.     Notified about rash on shoulder and abdomen this afternoon. Pink macular rash and petechial from scratching. Hydrocortisone cream provided for itch relief.       Review of Systems    Negative unless stated in the HPI.     PHYSICAL EXAM      Weight     Wt Readings from Last 3 Encounters:   01/17/24 83 kg (182 lb 14.4 oz)   12/26/23 83.1 kg (183 lb 4.8 oz)   12/19/23 83.5 kg (184 lb)        KPS: 70    BP (!) 147/83 (BP Location: Right arm)   Pulse 77   Temp (!) 96.7  F (35.9  C) (Axillary)   Resp 16   Ht 1.575 m (5' 2.01\")   Wt 83 kg (182 lb 14.4 oz)   LMP 11/05/2017   SpO2 99%   BMI 33.44 kg/m       General: NAD  Eyes: KEVON, sclera anicteric   Nose/Mouth/Throat: OP clear, buccal mucosa moist, no ulcerations   Lungs: CTA bilaterally  Cardiovascular: RRR, no M/R/G   Abdominal/Rectal: +BS, soft, NT, ND  Lymphatics: No edema  Skin: wart under right foot dime sized white, cauliflower and slightly ulcerated. Two pin point sized, flesh colored warts on right hand. Hands dry, one excoriation on left dorsum.   Neuro: A&O   Additional Findings: Gastelum site NT, no drainage.    Current aGVHD staging:  Skin 0, UGI 0, LGI 0, Liver 0 (keep in note through " day +180 for allos)      LABS AND IMAGING: I have assessed all abnormal lab values for their clinical significance and any values considered clinically significant have been addressed in the assessment and plan.        Lab Results   Component Value Date    WBC 0.4 (LL) 01/17/2024    ANEUTAUTO 1.0 (L) 01/15/2024    HGB 7.1 (L) 01/17/2024    HCT 21.0 (L) 01/17/2024    PLT 4 (LL) 01/17/2024     01/17/2024     01/17/2024    POTASSIUM 3.8 01/17/2024    POTASSIUM 3.8 01/17/2024    CHLORIDE 108 (H) 01/17/2024    CO2 26 01/17/2024    GLC 91 01/17/2024    BUN 8.3 01/17/2024    CR 0.39 (L) 01/17/2024    MAG 1.8 01/17/2024    INR 1.03 01/15/2024           SYSTEMS-BASED ASSESSMENT AND PLAN     Hortencia Baldwin is a 53 year old female with a history of MDS, undergoing MA (Bu/Flu) 6/8 URD Allo transplant, day 6      BMT/IEC PROTOCOL for MT 2015-29 **according to but not on trial**   - Chemo protocol:  Day -6 through day -1: Keppra and Allopurinol   Day -5 through -2: Bu/Flu. Busulfan levels adjusted by pharm, attending  Day -1: Rest day   Day 0: Transplant. Recipient: O+, CMV+. Donor: O+, CMV-. No flush. Cell dose 6.5 x10^6.   Gram+ bacilli cultured for stem cell product. On cefepime now, which should provide adequate coverage.   - Restaging plan: per protocol       HEME/COAG  # Iron overload: hx of transfusion dependent anemia, pre-transplant ferritin around 5,000. Recommend phlebotomy post transplant when retic count is restored and Hgb >10    - Risk of cytopenias due to chemotherapy and radiation. BID platelet checks.   - Transfusion parameters: hemoglobin <7, platelets <10    IMMUNOCOMPROMISED  # Fever: not yet neutropenic   - UA: negative for LE & nitrites. 1/13 ua negative; BK urine positive/adeno negative (red/orange urine)   - Blood cultures NGTD    - Procal elevated 1.08    - CXR with likely pulmonary edema   - RVP neg    - Cefepime (1/11 - x). Brief course of IV Vancomycin. MRSA nasal swab negative.   -  Relevant infection history:  URI from COVID (12/26), negative test on 1/2/24   - Vaccination status: Influenza vaccination after day +60, COVID vaccination after day +100, followed by remaining vaccinations at 12, 14, 24, and 26 months.  - Prophylaxis plan: ACV/letermovir, Micafungin through day +45 (current smoker), levofloxacin while neutropenic, Bactrim to start at day +28  Foot, hand warts. Curbside derm on 1/8, no recs while inpatient, typically managed OP.      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   EKG: new QR pattern in V1, suggests right ventricular conduction delay   Troponin negative x1   Echo (1/15): LVEF 55-60%, no pericardial effusion present   Cardiology consulted: no further recs   - Risk of cardiomyopathy:  Baseline EF 60-65%  - Risk of arrhythmia: Baseline EKG showed sinus rhythm     RESPIRATORY  - Current smoker: states she quit (1/5/24), offered nicotine replacement but she declined.   - Baseline PFTs: The FEV1, FVC, FEV1/FVC ratio and ERT70-17% are within normal limits.  The inspiratory flow rates are within normal limits.  Lung volumes are within normal limits.  The diffusing capacity is normal.   - Risk of respiratory complications: Frequent ambulation and incentive spirometer.    GI/NUTRITION  # Elevated liver enzymes: down trending ALT: 56 (87), AST: 19 (43) could be related to Tylenol use, iron overload or recent viral illness   RUQ U/S (12/27) was normal  Acute hepatitis panel negative   - Ulcer prophylaxis: PPI  - VOD ppx: Ursodiol   # Moderate malnutrition in the context of acute illness: dietitian following    RENAL/ELECTROLYTES/  - Risk of renal injury: IV hydration   - Electrolyte management: replace per sliding scale    DERM:  Right hand dryness, try aquafor with gloves on overnight and as  "tolerated during the day  Wart on foot, one on hand, see ID    DIABETES/ENDOCRINE  - Risk of steroid-induced hyperglyemia: Monitor BG, sliding scale if needed    MUSCULOSKELETAL/FRAILTY  - Baseline Frailty Score: 3  - Patient with substantial risk of sarcopenia  - Daily PT/OT as needed while inpatient  - Cancer Rehab as needed outpatient    SYMPTOM MANAGEMENT  - Nausea from chemo/radiation: Prochlorperazine, ondansetron, lorazepam.  - # Pain Assessment:      1/17/2024     8:41 AM   Current Pain Score   Patient currently in pain? denies   - Hortencia Sethi is experiencing pain due to bone marrow biopsy and line placement. Pain management was discussed and the plan was created in a collaborative fashion.  Hortencia Sethi's response to the current recommendations: engaged  - Please see the plan for pain management as documented above      SOCIAL DETERMINANTS  - Caregiver: Srinivas Suggs, Keira Neal & Rickey Neal     Disposition: remain admitted through engraftment     Known issues that I take into account for medical decisions, with salient changes to the plan considering these complexities noted above.    Patient Active Problem List   Diagnosis    Left medial knee pain    Obesity (BMI 30-39.9)    Anemia, unspecified type    Macrocytic anemia    MDS (myelodysplastic syndrome) (H)     Clinically Significant Risk Factors              # Hypoalbuminemia: Lowest albumin = 3.3 g/dL at 1/15/2024  2:35 AM, will monitor as appropriate   # Thrombocytopenia: Lowest platelets = 4 in last 2 days, will monitor for bleeding          # Obesity: Estimated body mass index is 33.44 kg/m  as calculated from the following:    Height as of this encounter: 1.575 m (5' 2.01\").    Weight as of this encounter: 83 kg (182 lb 14.4 oz).   # Moderate Malnutrition: based on nutrition assessment                I spent 45 minutes in the care of this patient today, which included time necessary for preparation for the visit, obtaining history, ordering " medications/tests/procedures as medically indicated, review of pertinent medical literature, counseling of the patient, communication of recommendations to the care team, and documentation time.    King Vaz PA-C  [id number]      ______________________________________________      BMT ATTENDING NOTE    Hortencia Baldwin is a 53 year old female, who is day 6 of transplant for MDS, complicated by iron overload.     My overall impression is that Hortencia Sethi is at her arlene, awaiting engraftment.  We are continuing transfusion support.  She has completed posttransplant cyclophosphamide.  She has had a history of neutropenic fever currently on cefepime.  She is at risk of malnutrition and has had some loose stools, likely a complication of her conditioning.  We will monitor her nutritional status, provide antiemetics, monitor for further infections, and continue to provide G-CSF to hasten engraftment.  Ambulation encouraged.  Remainder as above.    I spent 50 minutes in the care of Hortencia Sethi today, including an independent face-to-face assessment and time monitoring for restoration of hematopoiesis, high-risk organ toxicities from chemotherapy, and GVHD, managing infectious risk due to her immunocompromised state, and encouraging nutrition and physical activity to prevent debility and sarcopenia.  I personally performed all of the medical decision making associated with this visit, counseled Hortencia Sethi on my overall assessment and recommendations, communicated my plan to the care team, and edited the above note to reflect my current plan of care.       Stephanie Yi MD      Securely message with the Vocera Web Console

## 2024-01-17 NOTE — PROGRESS NOTES
BMT CLINICAL SOCIAL WORK NOTE:    Focus: Supportive Counseling    Data: Pt is a 53 year old female, currently day +6 s/p allo BMT.    Interventions: Clinical  (CSW) met with Pt to assess coping, provide supportive counseling and assist with resources as needed.  Pt shared she is overall doing well, not complaints at this time. She asked about her As[employer] Abhay and wondering if she could change her selection of how to use the money. She noted she is hopeful to have it go to her rent now. Encouraged the pt to get the information for her landlord and confirm they will accept a check and CSW would reach out to the [employer] to see if her abhay was processed yet.  CSW provided empathic listening, validation of concerns, and encouragement. CSW encouraged Pt to contact CSW for support, questions and/or resources.     Assessment: Pt presented as friendly and open.  Pt appears to be coping well at this time. Pt continues to be supported by her boyfriend and children.     Plan: CSW will continue to provide supportive counseling and assistance with resources as needed. CSW will continue to collaborate with multidisciplinary team regarding Pt's plan of care.     CLIFTON Ryan, [facility]  Pager: [phone]  Phone: [phone]

## 2024-01-18 ENCOUNTER — APPOINTMENT (OUTPATIENT)
Dept: ULTRASOUND IMAGING | Facility: CLINIC | Age: 54
DRG: 014 | End: 2024-01-18
Payer: COMMERCIAL

## 2024-01-18 ENCOUNTER — APPOINTMENT (OUTPATIENT)
Dept: GENERAL RADIOLOGY | Facility: CLINIC | Age: 54
DRG: 014 | End: 2024-01-18
Attending: PHYSICIAN ASSISTANT
Payer: COMMERCIAL

## 2024-01-18 LAB
ACANTHOCYTES BLD QL SMEAR: NORMAL
ALBUMIN SERPL BCG-MCNC: 3.4 G/DL (ref 3.5–5.2)
ALP SERPL-CCNC: 93 U/L (ref 40–150)
ALT SERPL W P-5'-P-CCNC: 61 U/L (ref 0–50)
ANION GAP SERPL CALCULATED.3IONS-SCNC: 8 MMOL/L (ref 7–15)
AST SERPL W P-5'-P-CCNC: 32 U/L (ref 0–45)
AUER BODIES BLD QL SMEAR: NORMAL
BASO STIPL BLD QL SMEAR: NORMAL
BASOPHILS # BLD AUTO: ABNORMAL 10*3/UL
BASOPHILS NFR BLD AUTO: ABNORMAL %
BILIRUB SERPL-MCNC: 0.6 MG/DL
BITE CELLS BLD QL SMEAR: NORMAL
BLD PROD TYP BPU: NORMAL
BLISTER CELLS BLD QL SMEAR: NORMAL
BLOOD COMPONENT TYPE: NORMAL
BUN SERPL-MCNC: 12.4 MG/DL (ref 6–20)
BURR CELLS BLD QL SMEAR: NORMAL
CALCIUM SERPL-MCNC: 8.5 MG/DL (ref 8.6–10)
CHLORIDE SERPL-SCNC: 108 MMOL/L (ref 98–107)
CODING SYSTEM: NORMAL
CREAT SERPL-MCNC: 0.4 MG/DL (ref 0.51–0.95)
CROSSMATCH: NORMAL
DACRYOCYTES BLD QL SMEAR: NORMAL
DEPRECATED HCO3 PLAS-SCNC: 26 MMOL/L (ref 22–29)
EGFRCR SERPLBLD CKD-EPI 2021: >90 ML/MIN/1.73M2
ELLIPTOCYTES BLD QL SMEAR: NORMAL
EOSINOPHIL # BLD AUTO: ABNORMAL 10*3/UL
EOSINOPHIL NFR BLD AUTO: ABNORMAL %
ERYTHROCYTE [DISTWIDTH] IN BLOOD BY AUTOMATED COUNT: 18.4 % (ref 10–15)
FRAGMENTS BLD QL SMEAR: NORMAL
GLUCOSE SERPL-MCNC: 102 MG/DL (ref 70–99)
HCT VFR BLD AUTO: 19 % (ref 35–47)
HEMOCCULT STL QL: POSITIVE
HGB BLD-MCNC: 6.4 G/DL (ref 11.7–15.7)
HGB C CRYSTALS: NORMAL
HOWELL-JOLLY BOD BLD QL SMEAR: NORMAL
IMM GRANULOCYTES # BLD: ABNORMAL 10*3/UL
IMM GRANULOCYTES NFR BLD: ABNORMAL %
ISSUE DATE AND TIME: NORMAL
LYMPHOCYTES # BLD AUTO: ABNORMAL 10*3/UL
LYMPHOCYTES NFR BLD AUTO: ABNORMAL %
MAGNESIUM SERPL-MCNC: 1.5 MG/DL (ref 1.7–2.3)
MAGNESIUM SERPL-MCNC: 3.2 MG/DL (ref 1.7–2.3)
MCH RBC QN AUTO: 34.8 PG (ref 26.5–33)
MCHC RBC AUTO-ENTMCNC: 33.7 G/DL (ref 31.5–36.5)
MCV RBC AUTO: 103 FL (ref 78–100)
MONOCYTES # BLD AUTO: ABNORMAL 10*3/UL
MONOCYTES NFR BLD AUTO: ABNORMAL %
NEUTROPHILS # BLD AUTO: ABNORMAL 10*3/UL
NEUTROPHILS NFR BLD AUTO: ABNORMAL %
NEUTS HYPERSEG BLD QL SMEAR: NORMAL
PHOSPHATE SERPL-MCNC: 2.7 MG/DL (ref 2.5–4.5)
PLAT MORPH BLD: NORMAL
PLATELET # BLD AUTO: 2 10E3/UL (ref 150–450)
PLATELET # BLD AUTO: <2 10E3/UL (ref 150–450)
POLYCHROMASIA BLD QL SMEAR: NORMAL
POTASSIUM SERPL-SCNC: 3.5 MMOL/L (ref 3.4–5.3)
PROT SERPL-MCNC: 5.6 G/DL (ref 6.4–8.3)
RBC # BLD AUTO: 1.84 10E6/UL (ref 3.8–5.2)
RBC AGGLUT BLD QL: NORMAL
RBC MORPH BLD: NORMAL
ROULEAUX BLD QL SMEAR: NORMAL
SICKLE CELLS BLD QL SMEAR: NORMAL
SMUDGE CELLS BLD QL SMEAR: NORMAL
SODIUM SERPL-SCNC: 142 MMOL/L (ref 135–145)
SPHEROCYTES BLD QL SMEAR: NORMAL
STOMATOCYTES BLD QL SMEAR: NORMAL
TACROLIMUS BLD-MCNC: 5.2 UG/L (ref 5–15)
TARGETS BLD QL SMEAR: NORMAL
TME LAST DOSE: NORMAL H
TME LAST DOSE: NORMAL H
TOXIC GRANULES BLD QL SMEAR: NORMAL
UNIT ABO/RH: NORMAL
UNIT NUMBER: NORMAL
UNIT STATUS: NORMAL
UNIT TYPE ISBT: 5100
UNIT TYPE ISBT: 5100
UNIT TYPE ISBT: 7300
UNIT TYPE ISBT: 7300
VARIANT LYMPHS BLD QL SMEAR: NORMAL
WBC # BLD AUTO: 0.1 10E3/UL (ref 4–11)

## 2024-01-18 PROCEDURE — 83735 ASSAY OF MAGNESIUM: CPT | Performed by: INTERNAL MEDICINE

## 2024-01-18 PROCEDURE — 93975 VASCULAR STUDY: CPT | Mod: 26 | Performed by: STUDENT IN AN ORGANIZED HEALTH CARE EDUCATION/TRAINING PROGRAM

## 2024-01-18 PROCEDURE — 85049 AUTOMATED PLATELET COUNT: CPT

## 2024-01-18 PROCEDURE — 250N000011 HC RX IP 250 OP 636: Performed by: INTERNAL MEDICINE

## 2024-01-18 PROCEDURE — 250N000013 HC RX MED GY IP 250 OP 250 PS 637: Performed by: INTERNAL MEDICINE

## 2024-01-18 PROCEDURE — 99418 PROLNG IP/OBS E/M EA 15 MIN: CPT

## 2024-01-18 PROCEDURE — 84100 ASSAY OF PHOSPHORUS: CPT | Performed by: INTERNAL MEDICINE

## 2024-01-18 PROCEDURE — 250N000013 HC RX MED GY IP 250 OP 250 PS 637

## 2024-01-18 PROCEDURE — 86832 HLA CLASS I HIGH DEFIN QUAL: CPT

## 2024-01-18 PROCEDURE — 99233 SBSQ HOSP IP/OBS HIGH 50: CPT | Mod: FS

## 2024-01-18 PROCEDURE — P9037 PLATE PHERES LEUKOREDU IRRAD: HCPCS

## 2024-01-18 PROCEDURE — 82272 OCCULT BLD FECES 1-3 TESTS: CPT | Performed by: PHYSICIAN ASSISTANT

## 2024-01-18 PROCEDURE — P9040 RBC LEUKOREDUCED IRRADIATED: HCPCS

## 2024-01-18 PROCEDURE — 250N000011 HC RX IP 250 OP 636: Mod: JZ

## 2024-01-18 PROCEDURE — 93975 VASCULAR STUDY: CPT

## 2024-01-18 PROCEDURE — 85027 COMPLETE CBC AUTOMATED: CPT

## 2024-01-18 PROCEDURE — 74018 RADEX ABDOMEN 1 VIEW: CPT

## 2024-01-18 PROCEDURE — 206N000001 HC R&B BMT UMMC

## 2024-01-18 PROCEDURE — 80053 COMPREHEN METABOLIC PANEL: CPT

## 2024-01-18 PROCEDURE — 86828 HLA CLASS I&II ANTIBODY QUAL: CPT

## 2024-01-18 PROCEDURE — 258N000003 HC RX IP 258 OP 636

## 2024-01-18 PROCEDURE — 250N000011 HC RX IP 250 OP 636

## 2024-01-18 PROCEDURE — 80197 ASSAY OF TACROLIMUS: CPT | Performed by: INTERNAL MEDICINE

## 2024-01-18 PROCEDURE — 74018 RADEX ABDOMEN 1 VIEW: CPT | Mod: 26 | Performed by: RADIOLOGY

## 2024-01-18 PROCEDURE — 86904 BLOOD TYPING PATIENT SERUM: CPT

## 2024-01-18 PROCEDURE — 250N000011 HC RX IP 250 OP 636: Performed by: STUDENT IN AN ORGANIZED HEALTH CARE EDUCATION/TRAINING PROGRAM

## 2024-01-18 RX ORDER — LABETALOL HYDROCHLORIDE 5 MG/ML
5 INJECTION, SOLUTION INTRAVENOUS 2 TIMES DAILY PRN
Status: DISCONTINUED | OUTPATIENT
Start: 2024-01-18 | End: 2024-02-04 | Stop reason: HOSPADM

## 2024-01-18 RX ORDER — CEFEPIME HYDROCHLORIDE 2 G/1
2 INJECTION, POWDER, FOR SOLUTION INTRAVENOUS
Status: DISCONTINUED | OUTPATIENT
Start: 2024-01-18 | End: 2024-01-22

## 2024-01-18 RX ORDER — DIPHENHYDRAMINE HYDROCHLORIDE AND LIDOCAINE HYDROCHLORIDE AND ALUMINUM HYDROXIDE AND MAGNESIUM HYDRO
10 KIT EVERY 6 HOURS PRN
Status: DISCONTINUED | OUTPATIENT
Start: 2024-01-18 | End: 2024-02-04 | Stop reason: HOSPADM

## 2024-01-18 RX ORDER — ACETAMINOPHEN 325 MG/1
650 TABLET ORAL EVERY 4 HOURS PRN
Status: DISCONTINUED | OUTPATIENT
Start: 2024-01-18 | End: 2024-02-04 | Stop reason: HOSPADM

## 2024-01-18 RX ORDER — LEVOFLOXACIN 250 MG/1
250 TABLET, FILM COATED ORAL
Status: DISCONTINUED | OUTPATIENT
Start: 2024-01-18 | End: 2024-01-21

## 2024-01-18 RX ORDER — BENZOCAINE/MENTHOL 6 MG-10 MG
LOZENGE MUCOUS MEMBRANE 2 TIMES DAILY
Status: DISCONTINUED | OUTPATIENT
Start: 2024-01-18 | End: 2024-02-01

## 2024-01-18 RX ORDER — AMLODIPINE BESYLATE 5 MG/1
5 TABLET ORAL DAILY
Status: DISCONTINUED | OUTPATIENT
Start: 2024-01-18 | End: 2024-01-20

## 2024-01-18 RX ORDER — MAGNESIUM SULFATE HEPTAHYDRATE 40 MG/ML
4 INJECTION, SOLUTION INTRAVENOUS ONCE
Status: COMPLETED | OUTPATIENT
Start: 2024-01-18 | End: 2024-01-18

## 2024-01-18 RX ADMIN — ACETAMINOPHEN 650 MG: 325 TABLET, FILM COATED ORAL at 10:52

## 2024-01-18 RX ADMIN — ACYCLOVIR 800 MG: 800 TABLET ORAL at 14:09

## 2024-01-18 RX ADMIN — OXYCODONE HYDROCHLORIDE 5 MG: 5 TABLET ORAL at 15:56

## 2024-01-18 RX ADMIN — DIPHENHYDRAMINE HYDROCHLORIDE 25 MG: 25 CAPSULE ORAL at 10:51

## 2024-01-18 RX ADMIN — TACROLIMUS 102 MCG/HR: 5 INJECTION, SOLUTION INTRAVENOUS at 21:17

## 2024-01-18 RX ADMIN — ONDANSETRON HYDROCHLORIDE 8 MG: 8 TABLET, FILM COATED ORAL at 00:39

## 2024-01-18 RX ADMIN — ACYCLOVIR 800 MG: 800 TABLET ORAL at 21:37

## 2024-01-18 RX ADMIN — PANTOPRAZOLE SODIUM 40 MG: 40 TABLET, DELAYED RELEASE ORAL at 08:16

## 2024-01-18 RX ADMIN — ACETAMINOPHEN 650 MG: 325 TABLET, FILM COATED ORAL at 01:56

## 2024-01-18 RX ADMIN — ACETAMINOPHEN 650 MG: 325 TABLET, FILM COATED ORAL at 23:31

## 2024-01-18 RX ADMIN — ACYCLOVIR 800 MG: 800 TABLET ORAL at 17:07

## 2024-01-18 RX ADMIN — HYDROCORTISONE: 1 CREAM TOPICAL at 19:56

## 2024-01-18 RX ADMIN — MAGNESIUM SULFATE IN WATER 4 G: 4 INJECTION, SOLUTION INTRAVENOUS at 05:58

## 2024-01-18 RX ADMIN — ACETAMINOPHEN 650 MG: 325 TABLET, FILM COATED ORAL at 05:57

## 2024-01-18 RX ADMIN — DEXTROSE MONOHYDRATE 420 MCG: 50 INJECTION, SOLUTION INTRAVENOUS at 21:34

## 2024-01-18 RX ADMIN — DIPHENHYDRAMINE HYDROCHLORIDE 25 MG: 25 CAPSULE ORAL at 23:31

## 2024-01-18 RX ADMIN — OXYCODONE HYDROCHLORIDE 5 MG: 5 TABLET ORAL at 21:45

## 2024-01-18 RX ADMIN — MICAFUNGIN SODIUM 150 MG: 50 INJECTION, POWDER, LYOPHILIZED, FOR SOLUTION INTRAVENOUS at 17:07

## 2024-01-18 RX ADMIN — AMLODIPINE BESYLATE 5 MG: 5 TABLET ORAL at 11:39

## 2024-01-18 RX ADMIN — MYCOPHENOLATE MOFETIL 1250 MG: 500 INJECTION, POWDER, LYOPHILIZED, FOR SOLUTION INTRAVENOUS at 08:16

## 2024-01-18 RX ADMIN — DIPHENHYDRAMINE HYDROCHLORIDE AND LIDOCAINE HYDROCHLORIDE AND ALUMINUM HYDROXIDE AND MAGNESIUM HYDRO 10 ML: KIT at 21:47

## 2024-01-18 RX ADMIN — DIPHENHYDRAMINE HYDROCHLORIDE 25 MG: 25 CAPSULE ORAL at 05:58

## 2024-01-18 RX ADMIN — ACYCLOVIR 800 MG: 800 TABLET ORAL at 08:16

## 2024-01-18 RX ADMIN — DEXTROSE MONOHYDRATE 20 ML: 50 INJECTION, SOLUTION INTRAVENOUS at 21:34

## 2024-01-18 RX ADMIN — CEFEPIME HYDROCHLORIDE 2 G: 2 INJECTION, POWDER, FOR SOLUTION INTRAVENOUS at 08:16

## 2024-01-18 RX ADMIN — URSODIOL 300 MG: 300 CAPSULE ORAL at 14:08

## 2024-01-18 RX ADMIN — MYCOPHENOLATE MOFETIL 1250 MG: 500 INJECTION, POWDER, LYOPHILIZED, FOR SOLUTION INTRAVENOUS at 21:24

## 2024-01-18 RX ADMIN — DEXTROSE MONOHYDRATE 20 ML: 50 INJECTION, SOLUTION INTRAVENOUS at 21:46

## 2024-01-18 RX ADMIN — URSODIOL 300 MG: 300 CAPSULE ORAL at 08:16

## 2024-01-18 RX ADMIN — OXYCODONE HYDROCHLORIDE 5 MG: 5 TABLET ORAL at 08:47

## 2024-01-18 RX ADMIN — HYDROCORTISONE: 1 CREAM TOPICAL at 11:04

## 2024-01-18 RX ADMIN — DIPHENHYDRAMINE HYDROCHLORIDE AND LIDOCAINE HYDROCHLORIDE AND ALUMINUM HYDROXIDE AND MAGNESIUM HYDRO 10 ML: KIT at 15:35

## 2024-01-18 RX ADMIN — ACYCLOVIR 800 MG: 800 TABLET ORAL at 10:52

## 2024-01-18 RX ADMIN — URSODIOL 300 MG: 300 CAPSULE ORAL at 21:37

## 2024-01-18 RX ADMIN — LEVOFLOXACIN 250 MG: 250 TABLET, FILM COATED ORAL at 10:52

## 2024-01-18 ASSESSMENT — ACTIVITIES OF DAILY LIVING (ADL)
ADLS_ACUITY_SCORE: 22
ADLS_ACUITY_SCORE: 21
ADLS_ACUITY_SCORE: 22
ADLS_ACUITY_SCORE: 21
ADLS_ACUITY_SCORE: 22
ADLS_ACUITY_SCORE: 21
ADLS_ACUITY_SCORE: 22
ADLS_ACUITY_SCORE: 22
ADLS_ACUITY_SCORE: 21
ADLS_ACUITY_SCORE: 22
ADLS_ACUITY_SCORE: 22
ADLS_ACUITY_SCORE: 21

## 2024-01-18 NOTE — PLAN OF CARE
"Hours of care [id number]    VSS, afebrile. A&Ox4, independent in room. Loose BM x1 this shift. Rash under breasts and around waistband improving with hydrocortisone cream. Ate a bowl of cheerios. Tacrolimus line changed.    Continue with plan of care.    BP (!) 145/87 (BP Location: Right arm)   Pulse 76   Temp 97.2  F (36.2  C) (Axillary)   Resp 16   Ht 1.575 m (5' 2.01\")   Wt 83 kg (182 lb 14.4 oz)   LMP 11/05/2017   SpO2 98%   BMI 33.44 kg/m                            "

## 2024-01-18 NOTE — PROGRESS NOTES
"  BMT Daily Progress Note  Patient ID:  Irma Foreman is a 53 year old female with a PMH of MDS, warm AIHA, transfusion and transfusion dependent anemia presented to  to undergo a myeloablative allogeneic transplant. She was recently COVID+ on 12/26 but tested negative on 1/2 with resolution of nearly all URI symptoms. Undergoing MA (Bu/Flu) 6/8 URD Allo transplant, day +7      HPI   Irma has developed epigastric pain this morning. She was having some diarrhea yesterday for which she received a total of 4mg of imodium. Has not had a BM since but continues to pass gas. Reports not eating much d/t food not tasting good. Denies N/V. Some mouth pain present on right inner cheek.       Review of Systems    Negative unless stated in the HPI.     PHYSICAL EXAM      Weight     Wt Readings from Last 3 Encounters:   01/17/24 83 kg (182 lb 14.4 oz)   12/26/23 83.1 kg (183 lb 4.8 oz)   12/19/23 83.5 kg (184 lb)        KPS: 70    BP (!) 143/80   Pulse 74   Temp 97.3  F (36.3  C) (Axillary)   Resp 18   Ht 1.575 m (5' 2.01\")   Wt 83 kg (182 lb 14.4 oz)   LMP 11/05/2017   SpO2 99%   BMI 33.44 kg/m       General: NAD  Eyes: KEVON, sclera anicteric   Nose/Mouth/Throat: Mucosa pink and moist. Right inner cheek slightly erythematous.   Lungs: CTA bilaterally  Cardiovascular: RRR, no M/R/G   Abdominal/Rectal: +BS, soft, epigastric area mildly tender to palpation   Lymphatics: No edema  Skin: wart under right foot dime sized white, cauliflower and slightly ulcerated; Two pin point sized, flesh colored warts on right hand; Hands dry, one excoriation on left dorsum (not examined today). Very faint maculopapular rash present under breasts and abdomen.   Neuro: A&O   Additional Findings: Gastelum site NT, no drainage.    Current aGVHD staging:  Skin 0, UGI 0, LGI 0, Liver 0 (keep in note through day +180 for allos)      LABS AND IMAGING: I have assessed all abnormal lab values for their clinical significance and any values considered " clinically significant have been addressed in the assessment and plan.        Lab Results   Component Value Date    WBC 0.1 (LL) 01/18/2024    ANEUTAUTO 1.0 (L) 01/15/2024    HGB 6.4 (LL) 01/18/2024    HCT 19.0 (L) 01/18/2024    PLT 2 (LL) 01/18/2024     01/18/2024    POTASSIUM 3.5 01/18/2024    CHLORIDE 108 (H) 01/18/2024    CO2 26 01/18/2024     (H) 01/18/2024    BUN 12.4 01/18/2024    CR 0.40 (L) 01/18/2024    MAG 1.5 (L) 01/18/2024    INR 1.03 01/15/2024       US Abdominal with Doppler (1/18/24)  Impression:   1. Patent Doppler evaluation of the abdomen with antegrade flow throughout.  2. Elevated hepatic artery resistive index of 0.81, nonspecific though can be seen with hepatic venous congestion which could be related to venous occlusive disease..    SYSTEMS-BASED ASSESSMENT AND PLAN     Hortencia Baldwin is a 53 year old female with a history of MDS, undergoing MA (Bu/Flu) 6/8 URD Allo transplant, day +7      BMT/IEC PROTOCOL for MT 2015-29 **according to but not on trial**   - Chemo protocol:  Day -6 through day -1: Keppra and Allopurinol   Day -5 through -2: Bu/Flu. Busulfan levels adjusted by pharm, attending  Day -1: Rest day   Day 0: Transplant. Recipient: O+, CMV+. Donor: O+, CMV-. No flush. Cell dose 6.5 x10^6.   Gram+ bacilli cultured for stem cell product. On cefepime now, which should provide adequate coverage.   - Restaging plan: per protocol     HEME/COAG  # Iron overload: hx of transfusion dependent anemia, pre-transplant ferritin around 5,000. Recommend phlebotomy post transplant when retic count is restored and Hgb >10      - Pancytopenia due to chemotherapy and radiation.   # Platelet refractoriness: Platelets in single digits since 1/16. BID platelet checks.   Transfusion medicine consulted 1/18: pt is also being worked-up for VOD (see GI below). HLA typing completed; will likely be able to receive HLA matched plts tomorrow (1/19). Once patient has HLA matched plts, will need 1-hour  post check (not yet ordered as blood bank fellow unsure if pt will have HLA typed plt by tomorrow).  **Spoke with blood bank fellow 1/18 who advised against giving patient more than two units of platelets in a day d/t risk of TACO. If patient develops bleeding, give third bag of plts / per clinical discretion.   - Transfusion parameters: hemoglobin <7, platelets <10    IMMUNOCOMPROMISED  # Fever: (Last on 1/15) Resolved.  - UA: negative for LE & nitrites. 1/13 ua negative; BK urine positive/adeno negative (red/orange urine)  - Blood cultures NGTD. Procal elevated 1.08   - CXR with likely pulmonary edema  - RVP neg   - Cefepime (1/11 - 1/18). Brief course of IV Vancomycin. MRSA nasal swab negative.   - Relevant infection history:  URI from COVID (12/26), negative test on 1/2/24   - Vaccination status: Influenza vaccination after day +60, COVID vaccination after day +100, followed by remaining vaccinations at 12, 14, 24, and 26 months.    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   **avoiding sirolimus d/t high risk VOD**    CARDIOVASCULAR  # Chest pressure: new on 1/15, worse with sitting up/activity, better while laying flat.   EKG: new QR pattern in V1, suggests right ventricular conduction delay   Troponin negative x1   Echo (1/15): LVEF 55-60%, no pericardial effusion present   Cardiology consulted: no further recs   # Hypertension with initiation of tacrolimus: start Amlodipine 5 mg daily (1/18). Labetalol 5 mg IV prn with parameters.   - Risk of cardiomyopathy:  Baseline EF 60-65%  - Risk of arrhythmia: Baseline EKG showed sinus rhythm     RESPIRATORY  - Current smoker: states she quit (1/5/24), offered nicotine replacement  but she declined.   - Baseline PFTs: The FEV1, FVC, FEV1/FVC ratio and UOC82-92% are within normal limits.  The inspiratory flow rates are within normal limits.  Lung volumes are within normal limits.  The diffusing capacity is normal.   - Risk of respiratory complications: Frequent ambulation and incentive spirometer.    GI/NUTRITION  # Epigastric pain (1/18): RUQ ultrasound showing patent doppler throughout, no ascites, nonspecific elevated hepatic artery restrictive index (see full impression above). Patient does not appear fluid overloaded and LFTs relatively stable. Thoroughly discussed with attending and will continue to monitor closely for VOD; checking hepatic panel daily.      # Previously elevated liver enzymes that began to improve: could be related to Tylenol use, iron overload or recent viral illness. RUQ U/S (12/27) was normal. Acute hepatitis panel negative. Monitoring daily as above.    - Ulcer prophylaxis: PPI  - VOD ppx: Ursodiol   # Moderate malnutrition in the context of acute illness: dietitian following  # Mouth pain likely 2/2 the start of oral mucositis: added MMW prn (1/18).     RENAL/ELECTROLYTES/  - Risk of renal injury: IV hydration   - Electrolyte management: replace per sliding scale    DERM:  # Right hand dryness, try aquafor with gloves on overnight and as tolerated during the day  # Foot, hand warts. Curbside derm on 1/8, no recs while inpatient, typically managed OP.    # Pink maculopapular rash present under breasts and across abdomen: possible cefepime rash vs other? (1/18) start Hydrocortisone BID.     DIABETES/ENDOCRINE  - Risk of steroid-induced hyperglyemia: Monitor BG, sliding scale if needed    MUSCULOSKELETAL/FRAILTY  - Baseline Frailty Score: 3  - Daily PT/OT as needed while inpatient  - Cancer Rehab as needed outpatient    SYMPTOM MANAGEMENT  - Nausea from chemo/radiation: Prochlorperazine, ondansetron, lorazepam.    SOCIAL DETERMINANTS  - Caregiver: Keira Cardenas  "Neal & Rickey Neal       Disposition: remain admitted through engraftment    Known issues that I take into account for medical decisions, with salient changes to the plan considering these complexities noted above.    Patient Active Problem List   Diagnosis    Left medial knee pain    Obesity (BMI 30-39.9)    Anemia, unspecified type    Macrocytic anemia    MDS (myelodysplastic syndrome) (H)     Clinically Significant Risk Factors            # Hypomagnesemia: Lowest Mg = 1.5 mg/dL in last 2 days, will replace as needed   # Hypoalbuminemia: Lowest albumin = 3.3 g/dL at 1/15/2024  2:35 AM, will monitor as appropriate   # Thrombocytopenia: Lowest platelets = 2 in last 2 days, will monitor for bleeding          # Obesity: Estimated body mass index is 33.44 kg/m  as calculated from the following:    Height as of this encounter: 1.575 m (5' 2.01\").    Weight as of this encounter: 83 kg (182 lb 14.4 oz).   # Moderate Malnutrition: based on nutrition assessment            I spent 45 minutes in the care of this patient today, which included time necessary for preparation for the visit, obtaining history, ordering medications/tests/procedures as medically indicated, review of pertinent medical literature, counseling of the patient, communication of recommendations to the care team, and documentation time.    Annamarie Costa PA-C  [phone]      ______________________________________________      BMT ATTENDING NOTE    Hortencia Baldwin is a 53 year old female, who is day 7 of transplant for MDS, complicated by iron overload.     My overall impression is that Hortencia Sethi is at her arlene, awaiting engraftment.  We are continuing transfusion support.  She has completed posttransplant cyclophosphamide.  She has had a history of neutropenic fever currently on cefepime.  She has developed abdominal pain and platelet refractoriness. We assess for VOD by US which shows antegrade for by high arterial resistance index. She did not have " hepatomegaly or ascites. She does not meet VOD criteria at this time, but requires close monitoring for this. Transfusion Medicine workup for platelet refractoriness. She is at risk of malnutrition and has had some loose stools, likely a complication of her conditioning.  Check abdominal plain film today. We will monitor her nutritional status, provide antiemetics, monitor for further infections, and continue to provide G-CSF to hasten engraftment.  Ambulation encouraged.  Remainder as above.    I spent 50 minutes in the care of Hortencia Navdeep today, including an independent face-to-face assessment and time monitoring for restoration of hematopoiesis, high-risk organ toxicities from chemotherapy, and GVHD, managing infectious risk due to her immunocompromised state, and encouraging nutrition and physical activity to prevent debility and sarcopenia.  I personally performed all of the medical decision making associated with this visit, counseled Hortencia Sethi on my overall assessment and recommendations, communicated my plan to the care team, and edited the above note to reflect my current plan of care.       Stephanie Yi MD      Securely message with the Vocera Web Console

## 2024-01-18 NOTE — CONSULTS
Transfusion Consultation for Platelet Refractoriness.    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 The patient recently became refractory to platelet transfusions.   The medical record has been reviewed for other possible causes of thrombocytopenia.  After record review and discussions with the BMT team, no potential causes for platelet refractoriness have been identified, including:  Factor Present Not Identified   Sepsis  X   Fever  X   Hypersplenism  X   DIC  X   Drugs eg Amphotericin B  X   Massive Bleeding  X     Analysis:  The following Corrected Count Increments (CCIs) have been obtained on :  CCI #1: 478  Transfusion:  Date:1/17/23 Time: 8:44   BSA: 1.91   Pre-Count (<4 hours prior to transfusion): 4  Post-Count (10  - 60  post transfusion): 3  CCI #2: 0  Transfusion Date:1/18/23  Time: 6:05  BSA: 1.91  Pre-Count (<4 hours prior to transfusion): 2  Post-Count (10  - 60  post transfusion): 2  The CCI(s) are <5000 with random platelet units.   Conclusion:  These results indicate that the patient is refractory due to immune causes.   A test of crossmatch compatible platelets is indicated. If this method fails to increase the CCI >5000, a trial of HLA-matched platelets is indicated.   Recommend testing to identify compatible or matched platelet units:  Clinical team should order:  1) Platelet incompatibility screen (PLT XM) - [id number] platelet crossmatch test  2) HLA platelet class I antibody (PLTABY) - [id number]   Thank you for this interesting consult.   Grace Corbin MD ([place]), PhD  Transfusion Medicine/Blood Bank Fellow  Pager: [phone]

## 2024-01-18 NOTE — PLAN OF CARE
"BP (!) 152/87 (BP Location: Right arm)   Pulse 79   Temp 98.2  F (36.8  C) (Axillary)   Resp 18   Ht 1.575 m (5' 2.01\")   Wt 83.3 kg (183 lb 11.2 oz)   LMP 11/05/2017   SpO2 97%   BMI 33.59 kg/m      Afebrile. Persistently hypertensive, though did not meet parameters for PRN Labetalol. Remainder of VSS on RA. Generalized upper abdominal pain continues; PRN Oxycodone x2 during shift with short-lived relief. Heating packs additionally in use. Abdominal US completed this AM. Abdominal XR done this afternoon. Received plts this AM; recheck below goal. Additional unit transfused and, again, recheck below goal. Additional labs sent for workup per Transfusion Med. Plan to recheck plts again this evening around 20:00 and replace per protocol. Pt had one soft, very dark brown stool this evening; JEN notified and FOB subsequently sent. Otherwise, eating and drinking with encouragement. Pt developing mouth sore; MMW in use with some relief. Adequate UOP. Tac drip increased per order; currently infusing at 102mcg/hr. Continue POC.     Goal Outcome Evaluation:    Plan of Care Reviewed With: patient  Overall Patient Progress: no changeOverall Patient Progress: no change    Problem: Adult Inpatient Plan of Care  Goal: Optimal Comfort and Wellbeing  Outcome: Not Progressing  Intervention: Monitor Pain and Promote Comfort  Recent Flowsheet Documentation  Taken 1/18/2024 0810 by Renée Bedoya, RN  Pain Management Interventions: heat applied     Problem: Stem Cell/Bone Marrow Transplant  Goal: Blood Counts Within Acceptable Range  Outcome: Not Progressing  Intervention: Monitor and Manage Hematologic Symptoms  Recent Flowsheet Documentation  Taken 1/18/2024 1138 by Renée Bedoya, RN  Medication Review/Management:   medications reviewed   high-risk medications identified  Taken 1/18/2024 0800 by Renée Bedoya, RN  Bleeding Precautions: blood pressure closely monitored  Medication Review/Management:   medications " reviewed   high-risk medications identified  Goal: Improved Oral Mucous Membrane Health and Integrity  Outcome: Not Progressing  Intervention: Promote Oral Comfort and Health  Recent Flowsheet Documentation  Taken 1/18/2024 0800 by Renée Bedoya RN  Oral Mucous Membrane Protection:   lip/mouth moisturizer applied   nonirritating oral fluids promoted  Oral Care:   oral rinse provided   lip/mouth moisturizer applied

## 2024-01-18 NOTE — PROGRESS NOTES
BMT CLINICAL SOCIAL WORK NOTE:    Focus: Supportive Counseling    Data: Pt is a 53 year old female, currently day +7 s/p allo BMT.    Interventions: Clinical  (CSW) met with Pt to assess coping, provide supportive counseling and assist with resources as needed. Pt was able to get her apt information and this was sent to the [facility] for her freda. CSW encouraged Pt to contact CSW for support, questions and/or resources.     Assessment: Pt presented as friendly and open.  Pt appears to be coping well at this time. Pt continues to be supported by her boyfriend and children.     Plan: CSW will continue to provide supportive counseling and assistance with resources as needed. CSW will continue to collaborate with multidisciplinary team regarding Pt's plan of care.     CLIFTON Ryan, [facility]  Pager: [phone]  Phone: [phone]

## 2024-01-18 NOTE — PLAN OF CARE
Day +7 MUD for MDS.  Pt AVSS throughout shift on room air. A&Ox4, indpendent.  Pt reporting abdominal pain, x1 PRN tylenol given with moderate relief. No nausea reported.  Pt experiencing nosebleeds and small amount of bleeding from her gums/tongue. No large clots noted, bleeds are self resolving. Provider notified, no new orders at this time.  X1 unit PRBCs transfused for hgb of 6.4. Pt tolerated transfusion well. X1 unit platelets transfusing for count of 2. Pt premedicated with tylenol and benadryl prior to platelets. Tolerating transfusion well. To be completed by oncoming shift.   Magnesium replaced.  Will continue to monitor.

## 2024-01-19 ENCOUNTER — APPOINTMENT (OUTPATIENT)
Dept: PHYSICAL THERAPY | Facility: CLINIC | Age: 54
DRG: 014 | End: 2024-01-19
Attending: STUDENT IN AN ORGANIZED HEALTH CARE EDUCATION/TRAINING PROGRAM
Payer: COMMERCIAL

## 2024-01-19 LAB
ACANTHOCYTES BLD QL SMEAR: NORMAL
ALBUMIN SERPL BCG-MCNC: 3.5 G/DL (ref 3.5–5.2)
ALP SERPL-CCNC: 100 U/L (ref 40–150)
ALT SERPL W P-5'-P-CCNC: 44 U/L (ref 0–50)
ANION GAP SERPL CALCULATED.3IONS-SCNC: 6 MMOL/L (ref 7–15)
AST SERPL W P-5'-P-CCNC: 19 U/L (ref 0–45)
AUER BODIES BLD QL SMEAR: NORMAL
BASO STIPL BLD QL SMEAR: NORMAL
BASOPHILS # BLD AUTO: ABNORMAL 10*3/UL
BASOPHILS NFR BLD AUTO: ABNORMAL %
BILIRUB SERPL-MCNC: 0.6 MG/DL
BITE CELLS BLD QL SMEAR: NORMAL
BLD PROD TYP BPU: NORMAL
BLISTER CELLS BLD QL SMEAR: NORMAL
BLOOD COMPONENT TYPE: NORMAL
BUN SERPL-MCNC: 10.7 MG/DL (ref 6–20)
BURR CELLS BLD QL SMEAR: NORMAL
CALCIUM SERPL-MCNC: 8.5 MG/DL (ref 8.6–10)
CHLORIDE SERPL-SCNC: 107 MMOL/L (ref 98–107)
CMV DNA SPEC NAA+PROBE-ACNC: NOT DETECTED IU/ML
CODING SYSTEM: NORMAL
CREAT SERPL-MCNC: 0.37 MG/DL (ref 0.51–0.95)
CROSSMATCH: NORMAL
DACRYOCYTES BLD QL SMEAR: NORMAL
DEPRECATED HCO3 PLAS-SCNC: 29 MMOL/L (ref 22–29)
EGFRCR SERPLBLD CKD-EPI 2021: >90 ML/MIN/1.73M2
ELLIPTOCYTES BLD QL SMEAR: NORMAL
EOSINOPHIL # BLD AUTO: ABNORMAL 10*3/UL
EOSINOPHIL NFR BLD AUTO: ABNORMAL %
ERYTHROCYTE [DISTWIDTH] IN BLOOD BY AUTOMATED COUNT: 18.8 % (ref 10–15)
FRAGMENTS BLD QL SMEAR: NORMAL
GLUCOSE SERPL-MCNC: 88 MG/DL (ref 70–99)
HCT VFR BLD AUTO: 19.2 % (ref 35–47)
HGB BLD-MCNC: 6.8 G/DL (ref 11.7–15.7)
HGB C CRYSTALS: NORMAL
HOWELL-JOLLY BOD BLD QL SMEAR: NORMAL
IMM GRANULOCYTES # BLD: ABNORMAL 10*3/UL
IMM GRANULOCYTES NFR BLD: ABNORMAL %
ISSUE DATE AND TIME: NORMAL
LYMPHOCYTES # BLD AUTO: ABNORMAL 10*3/UL
LYMPHOCYTES NFR BLD AUTO: ABNORMAL %
MAGNESIUM SERPL-MCNC: 1.8 MG/DL (ref 1.7–2.3)
MCH RBC QN AUTO: 35.6 PG (ref 26.5–33)
MCHC RBC AUTO-ENTMCNC: 35.4 G/DL (ref 31.5–36.5)
MCV RBC AUTO: 101 FL (ref 78–100)
MONOCYTES # BLD AUTO: ABNORMAL 10*3/UL
MONOCYTES NFR BLD AUTO: ABNORMAL %
NEUTROPHILS # BLD AUTO: ABNORMAL 10*3/UL
NEUTROPHILS NFR BLD AUTO: ABNORMAL %
NEUTS HYPERSEG BLD QL SMEAR: NORMAL
PHOSPHATE SERPL-MCNC: 3.2 MG/DL (ref 2.5–4.5)
PLAT MORPH BLD: NORMAL
PLATELET # BLD AUTO: 13 10E3/UL (ref 150–450)
PLATELET # BLD AUTO: 21 10E3/UL (ref 150–450)
POLYCHROMASIA BLD QL SMEAR: NORMAL
POTASSIUM SERPL-SCNC: 3.8 MMOL/L (ref 3.4–5.3)
PROT SERPL-MCNC: 5.7 G/DL (ref 6.4–8.3)
RBC # BLD AUTO: 1.91 10E6/UL (ref 3.8–5.2)
RBC AGGLUT BLD QL: NORMAL
RBC MORPH BLD: NORMAL
ROULEAUX BLD QL SMEAR: NORMAL
SA 1 CELL: NORMAL
SA 1 TEST METHOD: NORMAL
SA1 HI RISK ABY: NORMAL
SA1 MOD RISK ABY: NORMAL
SCR 1 TEST METHOD: NORMAL
SCR1 CELL: NORMAL
SCR1 RESULT: NORMAL
SICKLE CELLS BLD QL SMEAR: NORMAL
SMUDGE CELLS BLD QL SMEAR: NORMAL
SODIUM SERPL-SCNC: 142 MMOL/L (ref 135–145)
SPHEROCYTES BLD QL SMEAR: NORMAL
STOMATOCYTES BLD QL SMEAR: NORMAL
TACROLIMUS BLD-MCNC: 8 UG/L (ref 5–15)
TARGETS BLD QL SMEAR: NORMAL
TME LAST DOSE: NORMAL H
TME LAST DOSE: NORMAL H
TOXIC GRANULES BLD QL SMEAR: NORMAL
UNIT ABO/RH: NORMAL
UNIT NUMBER: NORMAL
UNIT STATUS: NORMAL
UNIT TYPE ISBT: 5100
VARIANT LYMPHS BLD QL SMEAR: NORMAL
WBC # BLD AUTO: <0.1 10E3/UL (ref 4–11)
ZZZSA 1  COMMENTS: NORMAL
ZZZSCR1 COMMENTS: NORMAL

## 2024-01-19 PROCEDURE — P9040 RBC LEUKOREDUCED IRRADIATED: HCPCS

## 2024-01-19 PROCEDURE — 84100 ASSAY OF PHOSPHORUS: CPT | Performed by: INTERNAL MEDICINE

## 2024-01-19 PROCEDURE — 258N000003 HC RX IP 258 OP 636

## 2024-01-19 PROCEDURE — 250N000013 HC RX MED GY IP 250 OP 250 PS 637

## 2024-01-19 PROCEDURE — 85049 AUTOMATED PLATELET COUNT: CPT

## 2024-01-19 PROCEDURE — 99418 PROLNG IP/OBS E/M EA 15 MIN: CPT

## 2024-01-19 PROCEDURE — P9037 PLATE PHERES LEUKOREDU IRRAD: HCPCS | Performed by: INTERNAL MEDICINE

## 2024-01-19 PROCEDURE — 97530 THERAPEUTIC ACTIVITIES: CPT | Mod: GP

## 2024-01-19 PROCEDURE — 83735 ASSAY OF MAGNESIUM: CPT

## 2024-01-19 PROCEDURE — 999N000111 HC STATISTIC OT IP EVAL DEFER

## 2024-01-19 PROCEDURE — 97110 THERAPEUTIC EXERCISES: CPT | Mod: GP

## 2024-01-19 PROCEDURE — 80053 COMPREHEN METABOLIC PANEL: CPT

## 2024-01-19 PROCEDURE — C9113 INJ PANTOPRAZOLE SODIUM, VIA: HCPCS

## 2024-01-19 PROCEDURE — P9052 PLATELETS, HLA-M, L/R, UNIT: HCPCS

## 2024-01-19 PROCEDURE — 85027 COMPLETE CBC AUTOMATED: CPT

## 2024-01-19 PROCEDURE — 250N000013 HC RX MED GY IP 250 OP 250 PS 637: Performed by: INTERNAL MEDICINE

## 2024-01-19 PROCEDURE — 206N000001 HC R&B BMT UMMC

## 2024-01-19 PROCEDURE — 99233 SBSQ HOSP IP/OBS HIGH 50: CPT | Mod: FS

## 2024-01-19 PROCEDURE — 250N000011 HC RX IP 250 OP 636: Performed by: PHYSICIAN ASSISTANT

## 2024-01-19 PROCEDURE — 258N000003 HC RX IP 258 OP 636: Performed by: PHYSICIAN ASSISTANT

## 2024-01-19 PROCEDURE — 250N000011 HC RX IP 250 OP 636: Mod: JZ

## 2024-01-19 PROCEDURE — 80197 ASSAY OF TACROLIMUS: CPT | Performed by: INTERNAL MEDICINE

## 2024-01-19 RX ORDER — SALIVA STIMULANT COMB. NO.3
1 SPRAY, NON-AEROSOL (ML) MUCOUS MEMBRANE 4 TIMES DAILY PRN
Status: DISCONTINUED | OUTPATIENT
Start: 2024-01-19 | End: 2024-01-22

## 2024-01-19 RX ADMIN — ACYCLOVIR SODIUM 850 MG: 50 INJECTION, SOLUTION INTRAVENOUS at 11:23

## 2024-01-19 RX ADMIN — TACROLIMUS 118 MCG/HR: 5 INJECTION, SOLUTION INTRAVENOUS at 20:38

## 2024-01-19 RX ADMIN — DEXTROSE MONOHYDRATE 420 MCG: 50 INJECTION, SOLUTION INTRAVENOUS at 19:53

## 2024-01-19 RX ADMIN — DEXTROSE MONOHYDRATE 20 ML: 50 INJECTION, SOLUTION INTRAVENOUS at 20:54

## 2024-01-19 RX ADMIN — DIPHENHYDRAMINE HYDROCHLORIDE AND LIDOCAINE HYDROCHLORIDE AND ALUMINUM HYDROXIDE AND MAGNESIUM HYDRO 10 ML: KIT at 13:05

## 2024-01-19 RX ADMIN — LEVOFLOXACIN 250 MG: 250 TABLET, FILM COATED ORAL at 10:02

## 2024-01-19 RX ADMIN — PANTOPRAZOLE SODIUM 40 MG: 40 TABLET, DELAYED RELEASE ORAL at 07:55

## 2024-01-19 RX ADMIN — DEXTROSE MONOHYDRATE 10 ML: 50 INJECTION, SOLUTION INTRAVENOUS at 19:53

## 2024-01-19 RX ADMIN — URSODIOL 300 MG: 300 CAPSULE ORAL at 07:54

## 2024-01-19 RX ADMIN — MICAFUNGIN SODIUM 150 MG: 50 INJECTION, POWDER, LYOPHILIZED, FOR SOLUTION INTRAVENOUS at 17:50

## 2024-01-19 RX ADMIN — MYCOPHENOLATE MOFETIL 1250 MG: 500 INJECTION, POWDER, LYOPHILIZED, FOR SOLUTION INTRAVENOUS at 20:38

## 2024-01-19 RX ADMIN — OXYCODONE HYDROCHLORIDE 5 MG: 5 TABLET ORAL at 16:35

## 2024-01-19 RX ADMIN — ACYCLOVIR 800 MG: 800 TABLET ORAL at 07:54

## 2024-01-19 RX ADMIN — AMLODIPINE BESYLATE 5 MG: 5 TABLET ORAL at 07:54

## 2024-01-19 RX ADMIN — HYDROCORTISONE: 1 CREAM TOPICAL at 19:53

## 2024-01-19 RX ADMIN — URSODIOL 300 MG: 300 CAPSULE ORAL at 14:14

## 2024-01-19 RX ADMIN — HYDROCORTISONE: 1 CREAM TOPICAL at 07:51

## 2024-01-19 RX ADMIN — DIPHENHYDRAMINE HYDROCHLORIDE AND LIDOCAINE HYDROCHLORIDE AND ALUMINUM HYDROXIDE AND MAGNESIUM HYDRO 10 ML: KIT at 06:50

## 2024-01-19 RX ADMIN — PANTOPRAZOLE SODIUM 40 MG: 40 INJECTION, POWDER, FOR SOLUTION INTRAVENOUS at 19:53

## 2024-01-19 RX ADMIN — ACYCLOVIR SODIUM 850 MG: 50 INJECTION, SOLUTION INTRAVENOUS at 18:22

## 2024-01-19 RX ADMIN — URSODIOL 300 MG: 300 CAPSULE ORAL at 19:53

## 2024-01-19 RX ADMIN — OXYCODONE HYDROCHLORIDE 5 MG: 5 TABLET ORAL at 20:36

## 2024-01-19 RX ADMIN — MYCOPHENOLATE MOFETIL 1250 MG: 500 INJECTION, POWDER, LYOPHILIZED, FOR SOLUTION INTRAVENOUS at 07:55

## 2024-01-19 ASSESSMENT — ACTIVITIES OF DAILY LIVING (ADL)
ADLS_ACUITY_SCORE: 22

## 2024-01-19 NOTE — PROGRESS NOTES
"  BMT Daily Progress Note  Patient ID:  Irma Foreman is a 53 year old female with a PMH of MDS, warm AIHA, transfusion and transfusion dependent anemia presented to  to undergo a myeloablative allogeneic transplant. She was recently COVID+ on 12/26 but tested negative on 1/2 with resolution of nearly all URI symptoms. Undergoing MA (Bu/Flu) 6/8 URD Allo transplant, day +8      HPI   Overnight Irma had a darker than usual loose stool, occult+ and her platelets were <2 x24 hours. She received HLA matched platelets and her platelet count now is 21. She has epigastric pain, no heartburn or reflux symptoms, using oxycodone 5 mg TID for pain relief. Abdominal U/S showed antegrade flow with an elevated hepatic artery resistance index, LFT's are WNL. Monitoring closely for VOD. PO intake is diminishing, she was able to drink an ensure & milk x2. Will hold off on TPN for now d/t interaction with the liver. Faint macular and petechial rash on torso remains the same. Hydrocortisone helps with pruritus.       Review of Systems    Negative unless stated in the HPI.     PHYSICAL EXAM      Weight     Wt Readings from Last 3 Encounters:   01/19/24 82.9 kg (182 lb 11.2 oz)   12/26/23 83.1 kg (183 lb 4.8 oz)   12/19/23 83.5 kg (184 lb)        KPS: 70    BP (!) 155/96 (BP Location: Right arm)   Pulse 82   Temp 97.1  F (36.2  C) (Axillary)   Resp 16   Ht 1.575 m (5' 2.01\")   Wt 82.9 kg (182 lb 11.2 oz)   LMP 11/05/2017   SpO2 98%   BMI 33.41 kg/m       General: NAD  Eyes: KEVON, sclera anicteric   Nose/Mouth/Throat: Mucosa pink and moist. Right inner cheek slightly erythematous. Ulceration on R-posterior pharynx, tongue with ridging.   Lungs: CTA bilaterally   Cardiovascular: RRR, no M/R/G   Abdominal/Rectal: +BS, soft, epigastric area mildly tender to palpation   Lymphatics: No edema  Skin: wart under right foot dime sized white, cauliflower and slightly ulcerated; Two pin point sized, flesh colored warts on right hand; " Hands dry, one excoriation on left dorsum (not examined today). Very faint maculopapular rash present under breasts and abdomen.   Neuro: A&O   Additional Findings: Gastelum site NT, no drainage.    Current aGVHD staging:  Skin 0, UGI 0, LGI 0, Liver 0 (keep in note through day +180 for allos)      LABS AND IMAGING: I have assessed all abnormal lab values for their clinical significance and any values considered clinically significant have been addressed in the assessment and plan.        Lab Results   Component Value Date    WBC <0.1 (LL) 01/19/2024    ANEUTAUTO 1.0 (L) 01/15/2024    HGB 6.8 (LL) 01/19/2024    HCT 19.2 (L) 01/19/2024    PLT 21 (LL) 01/19/2024     01/19/2024    POTASSIUM 3.8 01/19/2024    CHLORIDE 107 01/19/2024    CO2 29 01/19/2024    GLC 88 01/19/2024    BUN 10.7 01/19/2024    CR 0.37 (L) 01/19/2024    MAG 1.8 01/19/2024    INR 1.03 01/15/2024       US Abdominal with Doppler (1/18/24)  Impression:   1. Patent Doppler evaluation of the abdomen with antegrade flow throughout.  2. Elevated hepatic artery resistive index of 0.81, nonspecific though can be seen with hepatic venous congestion which could be related to venous occlusive disease..    SYSTEMS-BASED ASSESSMENT AND PLAN     Hortencia Baldwin is a 53 year old female with a history of MDS, undergoing MA (Bu/Flu) 6/8 URD Allo transplant, day +8      BMT/IEC PROTOCOL for MT 2015-29 **according to but not on trial**   - Chemo protocol:  Day -6 through day -1: Keppra and Allopurinol   Day -5 through -2: Bu/Flu. Busulfan levels adjusted by pharm, attending  Day -1: Rest day   Day 0: Transplant. Recipient: O+, CMV+. Donor: O+, CMV-. No flush. Cell dose 6.5 x10^6.   Gram+ bacilli cultured for stem cell product. On cefepime (1/11 - 1/18)    - Restaging plan: per protocol     HEME/COAG  # Iron overload: hx of transfusion dependent anemia, pre-transplant ferritin around 5,000. Recommend phlebotomy post transplant when retic count is restored and Hgb  >10      - Pancytopenia due to chemotherapy and radiation.   # Platelet refractoriness: Platelets in single digits since 1/16. BID platelet checks.   Transfusion medicine consulted 1/18: pt is also being worked-up for VOD (see GI below). HLA typing completed; will likely be able to receive HLA matched plts tomorrow (1/19). Once patient has HLA matched plts, will need 1-hour post check (not yet ordered as blood bank fellow unsure if pt will have HLA typed plt by tomorrow).  **Spoke with blood bank fellow 1/18 who advised against giving patient more than two units of platelets in a day d/t risk of TACO. If patient develops bleeding, give third bag of plts / per clinical discretion.   1/19: good response from HLA plts, <2 to 21 on post plt check   - Transfusion parameters: hemoglobin <7, platelets <10    IMMUNOCOMPROMISED  # Fever: (Last on 1/15) Resolved.  - UA: negative for LE & nitrites. 1/13 ua negative; BK urine positive/adeno negative (red/orange urine)  - Blood cultures NGTD. Procal elevated 1.08   - CXR with likely pulmonary edema  - RVP neg   - Cefepime (1/11 - 1/18). Brief course of IV Vancomycin. MRSA nasal swab negative.   - Relevant infection history:  URI from COVID (12/26), negative test on 1/2/24   - Vaccination status: Influenza vaccination after day +60, COVID vaccination after day +100, followed by remaining vaccinations at 12, 14, 24, and 26 months.    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   **avoiding sirolimus d/t high risk VOD**    CARDIOVASCULAR  # Chest pressure: new on 1/15, worse with sitting up/activity, better while laying flat.   EKG: new QR pattern in V1, suggests right ventricular conduction delay    Troponin negative x1   Echo (1/15): LVEF 55-60%, no pericardial effusion present   Cardiology consulted: no further recs   # Hypertension with initiation of tacrolimus: start Amlodipine 5 mg daily (1/18). Labetalol 5 mg IV prn with parameters.   - Risk of cardiomyopathy:  Baseline EF 60-65%  - Risk of arrhythmia: Baseline EKG showed sinus rhythm     RESPIRATORY  - Current smoker: states she quit (1/5/24), offered nicotine replacement but she declined.   - Baseline PFTs: The FEV1, FVC, FEV1/FVC ratio and EEA53-27% are within normal limits.  The inspiratory flow rates are within normal limits.  Lung volumes are within normal limits.  The diffusing capacity is normal.   - Risk of respiratory complications: Frequent ambulation and incentive spirometer.    GI/NUTRITION  # Epigastric pain (1/18): RUQ ultrasound showing patent doppler throughout, no ascites, nonspecific elevated hepatic artery restrictive index (see full impression above). Patient does not appear fluid overloaded and LFTs relatively stable. Thoroughly discussed with attending and will continue to monitor closely for VOD; checking hepatic panel daily.     - IV PPI BID (1/19)     # Previously elevated liver enzymes that began to improve: could be related to Tylenol use, iron overload or recent viral illness. RUQ U/S (12/27) was normal. Acute hepatitis panel negative. Monitoring daily as above.    - Ulcer prophylaxis: PPI  - VOD ppx: Ursodiol   # Moderate malnutrition in the context of acute illness: dietitian following  # Mouth pain likely 2/2 the start of oral mucositis: added MMW prn (1/18).     DERM:  # Right hand dryness, try aquafor with gloves on overnight and as tolerated during the day  # Foot, hand warts. Curbside derm on 1/8, no recs while inpatient, typically managed OP.    # Pink maculopapular rash present under breasts and across abdomen: possible cefepime rash vs other? (1/18) start Hydrocortisone BID.     DIABETES/ENDOCRINE  - Risk of  "steroid-induced hyperglyemia: Monitor BG, sliding scale if needed    MUSCULOSKELETAL/FRAILTY  - Baseline Frailty Score: 3  - Daily PT/OT as needed while inpatient  - Cancer Rehab as needed outpatient    SYMPTOM MANAGEMENT  - Nausea from chemo/radiation: Prochlorperazine, ondansetron, lorazepam.    SOCIAL DETERMINANTS  - Caregiver: Keira Cardenas & Rickey Neal       Disposition: remain admitted through engraftment    Known issues that I take into account for medical decisions, with salient changes to the plan considering these complexities noted above.    Patient Active Problem List   Diagnosis    Left medial knee pain    Obesity (BMI 30-39.9)    Anemia, unspecified type    Macrocytic anemia    MDS (myelodysplastic syndrome) (H)     Clinically Significant Risk Factors            # Hypomagnesemia: Lowest Mg = 1.5 mg/dL in last 2 days, will replace as needed   # Hypoalbuminemia: Lowest albumin = 3.3 g/dL at 1/15/2024  2:35 AM, will monitor as appropriate   # Thrombocytopenia: Lowest platelets = 1.8 in last 2 days, will monitor for bleeding          # Obesity: Estimated body mass index is 33.41 kg/m  as calculated from the following:    Height as of this encounter: 1.575 m (5' 2.01\").    Weight as of this encounter: 82.9 kg (182 lb 11.2 oz).   # Moderate Malnutrition: based on nutrition assessment            I spent 45 minutes in the care of this patient today, which included time necessary for preparation for the visit, obtaining history, ordering medications/tests/procedures as medically indicated, review of pertinent medical literature, counseling of the patient, communication of recommendations to the care team, and documentation time.    King Vaz PA-C         ______________________________________________      BMT ATTENDING NOTE    Hortencia Baldwin is a 53 year old female, who is day 8 of transplant for MDS, complicated by iron overload.     My overall impression is that Hortencia Sethi is at her arlene, " awaiting engraftment.  We are continuing transfusion support; responded well to HLA matched platelets. She has had a history of neutropenic fever currently on cefepime.  She has developed abdominal pain and platelet refractoriness. We assessed for VOD by US which shows antegrade for by high arterial resistance index. She did not have hepatomegaly or ascites. She does not meet VOD criteria at this time, but requires close monitoring for this. She is at risk of malnutrition and has had some loose stools, likely a complication of her conditioning. Monitor for neutropenic enterocolitis, encouraged oral nutrition to avoid further insult to her liver from TPN if possible. She will try more small meals today. Provide antiemetics, monitor for further infections, and continue to provide G-CSF to hasten engraftment.  Ambulation encouraged.  Remainder as above.    I spent 50 minutes in the care of Hortencia Navdeep today, including an independent face-to-face assessment and time monitoring for restoration of hematopoiesis, high-risk organ toxicities from chemotherapy, and GVHD, managing infectious risk due to her immunocompromised state, and encouraging nutrition and physical activity to prevent debility and sarcopenia.  I personally performed all of the medical decision making associated with this visit, counseled Hortencia Navdeep on my overall assessment and recommendations, communicated my plan to the care team, and edited the above note to reflect my current plan of care.       Stephanie Yi MD      Securely message with the Vocera Web Console

## 2024-01-19 NOTE — PLAN OF CARE
"30-Second Sit to Stand Test:  The test is designed to be conducted with a straight back chair, without armrests, with a 17-inch seat height.  (Chair heights: High back & Folding = 18\", ICU/5C recliner & window seat = 18 1/2\"  Actual height of chair used: 18.5 \"    Patient Score (score =0 if must use arms) 10 reps    The 30 Second Sit to Stand Test is considered a test of fall risk.  Data from [place] [employer], cosponsored by [place] [facility]:  If must use arms = High Fall Risk regardless of reps  8 or less times = High Fall Risk   9 to 12 times = Moderate Risk  13 or more times = Low Risk    The 30 Second Sit to Stand Test is also considered a test of leg strength and endurance.   Normative Data from Zack et al,. 2001  Age                 Reps: Men        Reps: Women  60-64                14-19                       12-17                               65-69                12-18                       11-16                    70-74                12-17                       10-15              75-79                11-17                       10-15                    80-84                10-15                         9-14  85-89                 8-14                          8-13  90-94                 7-12                          4-11    Assessment (rationale for performing, application to patient s function & care plan): Pt scoring same as initial assessment, indicating moderate risk for falls. Below age related norms for strength and endurance  (Physical Therapist: Minutes billed as physical performance)   "

## 2024-01-19 NOTE — PROGRESS NOTES
Occupational Therapy: Orders received. Chart reviewed and discussed with physical therapy.? Occupational Therapy not indicated due to limited inpatient therapy needs identified. PT following and able to manage current needs.? Defer discharge recommendations to PT and medical team.? Will complete orders.

## 2024-01-19 NOTE — PLAN OF CARE
"BP (!) 141/90   Pulse 76   Temp 97.8  F (36.6  C) (Oral)   Resp 16   Ht 1.575 m (5' 2.01\")   Wt 83.3 kg (183 lb 11.2 oz)   LMP 11/05/2017   SpO2 98%   BMI 33.59 kg/m       Patient afebrile, hypertensive but within parameters, other vital signs stable. Patient received oxycodone x1 for abdominal pain and magic mouth wash x2 for mouth pain and pain was managed. Patient received HLA platelets overnight and platelets resulted at 21. Patient is currently receiving blood for a hemoglobin level of 6.8. Patient assist level independent. Continue with plan of care.    Goal Outcome Evaluation:  Problem: Adult Inpatient Plan of Care  Goal: Optimal Comfort and Wellbeing  Outcome: Not Progressing  Intervention: Monitor Pain and Promote Comfort  Recent Flowsheet Documentation  Taken 1/18/2024 2145 by Christ Jensen, RN  Pain Management Interventions: medication (see MAR)     Problem: Stem Cell/Bone Marrow Transplant  Goal: Blood Counts Within Acceptable Range  Outcome: Not Progressing  Goal: Improved Oral Mucous Membrane Health and Integrity  Outcome: Not Progressing  Intervention: Promote Oral Comfort and Health  Recent Flowsheet Documentation  Taken 1/19/2024 0000 by Christ Jensen, RN  Oral Care:   oral rinse provided   lip/mouth moisturizer applied     Problem: Adult Inpatient Plan of Care  Goal: Absence of Hospital-Acquired Illness or Injury  Outcome: Progressing  Intervention: Identify and Manage Fall Risk  Recent Flowsheet Documentation  Taken 1/19/2024 0000 by Christ eJnsen, RN  Safety Promotion/Fall Prevention:   safety round/check completed   patient and family education   nonskid shoes/slippers when out of bed   lighting adjusted   clutter free environment maintained  Taken 1/18/2024 2000 by Christ Jensen, RN  Safety Promotion/Fall Prevention:   safety round/check completed   patient and family education   nonskid shoes/slippers when out of bed   lighting adjusted   clutter free environment " maintained  Intervention: Prevent Skin Injury  Recent Flowsheet Documentation  Taken 1/19/2024 0000 by Christ Jensen RN  Body Position: position changed independently  Taken 1/18/2024 2000 by Christ Jensen RN  Body Position: position changed independently  Intervention: Prevent and Manage VTE (Venous Thromboembolism) Risk  Recent Flowsheet Documentation  Taken 1/18/2024 2000 by Christ Jensen RN  VTE Prevention/Management: SCDs (sequential compression devices) off  Intervention: Prevent Infection  Recent Flowsheet Documentation  Taken 1/19/2024 0000 by Christ Jensen RN  Infection Prevention:   cohorting utilized   environmental surveillance performed   equipment surfaces disinfected   hand hygiene promoted   personal protective equipment utilized   rest/sleep promoted   single patient room provided   visitors restricted/screened  Taken 1/18/2024 2000 by Christ Jensen RN  Infection Prevention:   cohorting utilized   environmental surveillance performed   equipment surfaces disinfected   hand hygiene promoted   personal protective equipment utilized   rest/sleep promoted   single patient room provided   visitors restricted/screened     Problem: Stem Cell/Bone Marrow Transplant  Goal: Optimal Coping with Transplant  Outcome: Progressing  Intervention: Optimize Patient/Family Adjustment to Transplant  Recent Flowsheet Documentation  Taken 1/18/2024 2000 by Christ Jensen RN  Supportive Measures:   active listening utilized   decision-making supported  Goal: Symptom-Free Urinary Elimination  Outcome: Progressing  Intervention: Prevent or Manage Bladder Irritation  Recent Flowsheet Documentation  Taken 1/18/2024 2145 by Christ Jensen RN  Pain Management Interventions: medication (see MAR)  Goal: Diarrhea Symptom Control  Outcome: Progressing  Intervention: Manage Diarrhea  Recent Flowsheet Documentation  Taken 1/19/2024 0000 by Christ Jensen RN  Perineal Care: perineal hygiene  encouraged  Goal: Improved Activity Tolerance  Outcome: Progressing  Intervention: Promote Improved Energy  Recent Flowsheet Documentation  Taken 1/19/2024 0000 by Christ Jensen, RN  Activity Management: activity adjusted per tolerance  Taken 1/18/2024 2000 by Christ Jensen RN  Activity Management: activity adjusted per tolerance  Environmental Support: calm environment promoted  Goal: Absence of Hypersensitivity Reaction  Outcome: Progressing  Goal: Absence of Infection  Outcome: Progressing  Intervention: Prevent and Manage Infection  Recent Flowsheet Documentation  Taken 1/19/2024 0000 by Christ Jensen RN  Infection Prevention:   cohorting utilized   environmental surveillance performed   equipment surfaces disinfected   hand hygiene promoted   personal protective equipment utilized   rest/sleep promoted   single patient room provided   visitors restricted/screened  Isolation Precautions: protective environment maintained  Taken 1/18/2024 2000 by Christ Jensen RN  Infection Prevention:   cohorting utilized   environmental surveillance performed   equipment surfaces disinfected   hand hygiene promoted   personal protective equipment utilized   rest/sleep promoted   single patient room provided   visitors restricted/screened  Isolation Precautions: protective environment maintained  Goal: Nausea and Vomiting Symptom Relief  Outcome: Progressing  Goal: Optimal Nutrition Intake  Outcome: Progressing

## 2024-01-20 LAB
ABO/RH(D): NORMAL
ACANTHOCYTES BLD QL SMEAR: NORMAL
ALBUMIN SERPL BCG-MCNC: 3.7 G/DL (ref 3.5–5.2)
ALP SERPL-CCNC: 117 U/L (ref 40–150)
ALT SERPL W P-5'-P-CCNC: 39 U/L (ref 0–50)
ANION GAP SERPL CALCULATED.3IONS-SCNC: 7 MMOL/L (ref 7–15)
ANTIBODY SCREEN: NEGATIVE
AST SERPL W P-5'-P-CCNC: 19 U/L (ref 0–45)
AUER BODIES BLD QL SMEAR: NORMAL
BASO STIPL BLD QL SMEAR: NORMAL
BASOPHILS # BLD AUTO: ABNORMAL 10*3/UL
BASOPHILS NFR BLD AUTO: ABNORMAL %
BILIRUB SERPL-MCNC: 0.8 MG/DL
BITE CELLS BLD QL SMEAR: NORMAL
BLD PROD TYP BPU: NORMAL
BLISTER CELLS BLD QL SMEAR: NORMAL
BLOOD COMPONENT TYPE: NORMAL
BUN SERPL-MCNC: 8.1 MG/DL (ref 6–20)
BURR CELLS BLD QL SMEAR: NORMAL
CALCIUM SERPL-MCNC: 9 MG/DL (ref 8.6–10)
CHLORIDE SERPL-SCNC: 104 MMOL/L (ref 98–107)
CODING SYSTEM: NORMAL
CREAT SERPL-MCNC: 0.35 MG/DL (ref 0.51–0.95)
DACRYOCYTES BLD QL SMEAR: NORMAL
DEPRECATED HCO3 PLAS-SCNC: 29 MMOL/L (ref 22–29)
EGFRCR SERPLBLD CKD-EPI 2021: >90 ML/MIN/1.73M2
ELLIPTOCYTES BLD QL SMEAR: NORMAL
EOSINOPHIL # BLD AUTO: ABNORMAL 10*3/UL
EOSINOPHIL NFR BLD AUTO: ABNORMAL %
ERYTHROCYTE [DISTWIDTH] IN BLOOD BY AUTOMATED COUNT: 17.2 % (ref 10–15)
FRAGMENTS BLD QL SMEAR: NORMAL
GLUCOSE SERPL-MCNC: 97 MG/DL (ref 70–99)
HCT VFR BLD AUTO: 23.6 % (ref 35–47)
HGB BLD-MCNC: 8.5 G/DL (ref 11.7–15.7)
HGB C CRYSTALS: NORMAL
HOWELL-JOLLY BOD BLD QL SMEAR: NORMAL
IMM GRANULOCYTES # BLD: ABNORMAL 10*3/UL
IMM GRANULOCYTES NFR BLD: ABNORMAL %
ISSUE DATE AND TIME: NORMAL
LYMPHOCYTES # BLD AUTO: ABNORMAL 10*3/UL
LYMPHOCYTES NFR BLD AUTO: ABNORMAL %
MAGNESIUM SERPL-MCNC: 1.4 MG/DL (ref 1.7–2.3)
MAGNESIUM SERPL-MCNC: 2 MG/DL (ref 1.7–2.3)
MCH RBC QN AUTO: 34.4 PG (ref 26.5–33)
MCHC RBC AUTO-ENTMCNC: 36 G/DL (ref 31.5–36.5)
MCV RBC AUTO: 96 FL (ref 78–100)
MONOCYTES # BLD AUTO: ABNORMAL 10*3/UL
MONOCYTES NFR BLD AUTO: ABNORMAL %
NEUTROPHILS # BLD AUTO: ABNORMAL 10*3/UL
NEUTROPHILS NFR BLD AUTO: ABNORMAL %
NEUTS HYPERSEG BLD QL SMEAR: NORMAL
PHOSPHATE SERPL-MCNC: 3.2 MG/DL (ref 2.5–4.5)
PLAT MORPH BLD: NORMAL
PLATELET # BLD AUTO: 11 10E3/UL (ref 150–450)
PLATELET # BLD AUTO: 6 10E3/UL (ref 150–450)
POLYCHROMASIA BLD QL SMEAR: NORMAL
POTASSIUM SERPL-SCNC: 4.1 MMOL/L (ref 3.4–5.3)
PROT SERPL-MCNC: 6.2 G/DL (ref 6.4–8.3)
RBC # BLD AUTO: 2.47 10E6/UL (ref 3.8–5.2)
RBC AGGLUT BLD QL: NORMAL
RBC MORPH BLD: NORMAL
ROULEAUX BLD QL SMEAR: NORMAL
SICKLE CELLS BLD QL SMEAR: NORMAL
SMUDGE CELLS BLD QL SMEAR: NORMAL
SODIUM SERPL-SCNC: 140 MMOL/L (ref 135–145)
SPECIMEN EXPIRATION DATE: NORMAL
SPHEROCYTES BLD QL SMEAR: NORMAL
STOMATOCYTES BLD QL SMEAR: NORMAL
TARGETS BLD QL SMEAR: NORMAL
TOXIC GRANULES BLD QL SMEAR: NORMAL
UNIT ABO/RH: NORMAL
UNIT NUMBER: NORMAL
UNIT STATUS: NORMAL
UNIT TYPE ISBT: 5100
VARIANT LYMPHS BLD QL SMEAR: NORMAL
WBC # BLD AUTO: <0.1 10E3/UL (ref 4–11)

## 2024-01-20 PROCEDURE — C9113 INJ PANTOPRAZOLE SODIUM, VIA: HCPCS

## 2024-01-20 PROCEDURE — 250N000013 HC RX MED GY IP 250 OP 250 PS 637

## 2024-01-20 PROCEDURE — 86900 BLOOD TYPING SEROLOGIC ABO: CPT

## 2024-01-20 PROCEDURE — 206N000001 HC R&B BMT UMMC

## 2024-01-20 PROCEDURE — 250N000013 HC RX MED GY IP 250 OP 250 PS 637: Performed by: INTERNAL MEDICINE

## 2024-01-20 PROCEDURE — 83735 ASSAY OF MAGNESIUM: CPT | Performed by: INTERNAL MEDICINE

## 2024-01-20 PROCEDURE — P9037 PLATE PHERES LEUKOREDU IRRAD: HCPCS | Performed by: INTERNAL MEDICINE

## 2024-01-20 PROCEDURE — 84100 ASSAY OF PHOSPHORUS: CPT | Performed by: INTERNAL MEDICINE

## 2024-01-20 PROCEDURE — 250N000011 HC RX IP 250 OP 636

## 2024-01-20 PROCEDURE — 258N000003 HC RX IP 258 OP 636

## 2024-01-20 PROCEDURE — 250N000011 HC RX IP 250 OP 636: Performed by: INTERNAL MEDICINE

## 2024-01-20 PROCEDURE — 99233 SBSQ HOSP IP/OBS HIGH 50: CPT | Mod: FS

## 2024-01-20 PROCEDURE — P9052 PLATELETS, HLA-M, L/R, UNIT: HCPCS

## 2024-01-20 PROCEDURE — 86922 COMPATIBILITY TEST ANTIGLOB: CPT

## 2024-01-20 PROCEDURE — 85027 COMPLETE CBC AUTOMATED: CPT

## 2024-01-20 PROCEDURE — 85049 AUTOMATED PLATELET COUNT: CPT

## 2024-01-20 PROCEDURE — 99418 PROLNG IP/OBS E/M EA 15 MIN: CPT

## 2024-01-20 PROCEDURE — 86923 COMPATIBILITY TEST ELECTRIC: CPT

## 2024-01-20 PROCEDURE — 80053 COMPREHEN METABOLIC PANEL: CPT

## 2024-01-20 RX ORDER — AMLODIPINE BESYLATE 5 MG/1
5 TABLET ORAL 2 TIMES DAILY
Status: DISCONTINUED | OUTPATIENT
Start: 2024-01-20 | End: 2024-02-03

## 2024-01-20 RX ORDER — HYDROMORPHONE HYDROCHLORIDE 1 MG/ML
.5-1 INJECTION, SOLUTION INTRAMUSCULAR; INTRAVENOUS; SUBCUTANEOUS EVERY 4 HOURS PRN
Status: DISCONTINUED | OUTPATIENT
Start: 2024-01-20 | End: 2024-01-23

## 2024-01-20 RX ORDER — MAGNESIUM SULFATE HEPTAHYDRATE 40 MG/ML
4 INJECTION, SOLUTION INTRAVENOUS ONCE
Status: COMPLETED | OUTPATIENT
Start: 2024-01-20 | End: 2024-01-20

## 2024-01-20 RX ADMIN — MYCOPHENOLATE MOFETIL 1250 MG: 500 INJECTION, POWDER, LYOPHILIZED, FOR SOLUTION INTRAVENOUS at 08:28

## 2024-01-20 RX ADMIN — HYDROMORPHONE HYDROCHLORIDE 0.5 MG: 1 INJECTION, SOLUTION INTRAMUSCULAR; INTRAVENOUS; SUBCUTANEOUS at 18:37

## 2024-01-20 RX ADMIN — DEXTROSE MONOHYDRATE 10 ML: 50 INJECTION, SOLUTION INTRAVENOUS at 19:54

## 2024-01-20 RX ADMIN — DIPHENHYDRAMINE HYDROCHLORIDE 25 MG: 50 INJECTION, SOLUTION INTRAMUSCULAR; INTRAVENOUS at 17:09

## 2024-01-20 RX ADMIN — ACETAMINOPHEN 650 MG: 325 TABLET, FILM COATED ORAL at 17:09

## 2024-01-20 RX ADMIN — LEVOFLOXACIN 250 MG: 250 TABLET, FILM COATED ORAL at 10:36

## 2024-01-20 RX ADMIN — HYDROMORPHONE HYDROCHLORIDE 0.5 MG: 1 INJECTION, SOLUTION INTRAMUSCULAR; INTRAVENOUS; SUBCUTANEOUS at 16:11

## 2024-01-20 RX ADMIN — ACYCLOVIR SODIUM 850 MG: 50 INJECTION, SOLUTION INTRAVENOUS at 10:40

## 2024-01-20 RX ADMIN — URSODIOL 300 MG: 300 CAPSULE ORAL at 08:17

## 2024-01-20 RX ADMIN — MAGNESIUM SULFATE IN WATER 4 G: 4 INJECTION, SOLUTION INTRAVENOUS at 05:04

## 2024-01-20 RX ADMIN — PANTOPRAZOLE SODIUM 40 MG: 40 INJECTION, POWDER, FOR SOLUTION INTRAVENOUS at 08:23

## 2024-01-20 RX ADMIN — OXYCODONE HYDROCHLORIDE 5 MG: 5 TABLET ORAL at 00:49

## 2024-01-20 RX ADMIN — AMLODIPINE BESYLATE 5 MG: 5 TABLET ORAL at 08:17

## 2024-01-20 RX ADMIN — MICAFUNGIN SODIUM 150 MG: 50 INJECTION, POWDER, LYOPHILIZED, FOR SOLUTION INTRAVENOUS at 17:13

## 2024-01-20 RX ADMIN — ACYCLOVIR SODIUM 850 MG: 50 INJECTION, SOLUTION INTRAVENOUS at 03:19

## 2024-01-20 RX ADMIN — DIPHENHYDRAMINE HYDROCHLORIDE AND LIDOCAINE HYDROCHLORIDE AND ALUMINUM HYDROXIDE AND MAGNESIUM HYDRO 10 ML: KIT at 19:54

## 2024-01-20 RX ADMIN — URSODIOL 300 MG: 300 CAPSULE ORAL at 14:26

## 2024-01-20 RX ADMIN — AMLODIPINE BESYLATE 5 MG: 5 TABLET ORAL at 19:53

## 2024-01-20 RX ADMIN — HYDROMORPHONE HYDROCHLORIDE 0.5 MG: 1 INJECTION, SOLUTION INTRAMUSCULAR; INTRAVENOUS; SUBCUTANEOUS at 12:15

## 2024-01-20 RX ADMIN — HYDROMORPHONE HYDROCHLORIDE 0.5 MG: 1 INJECTION, SOLUTION INTRAMUSCULAR; INTRAVENOUS; SUBCUTANEOUS at 10:36

## 2024-01-20 RX ADMIN — OXYCODONE HYDROCHLORIDE 5 MG: 5 TABLET ORAL at 09:31

## 2024-01-20 RX ADMIN — URSODIOL 300 MG: 300 CAPSULE ORAL at 19:53

## 2024-01-20 RX ADMIN — MYCOPHENOLATE MOFETIL 1250 MG: 500 INJECTION, POWDER, LYOPHILIZED, FOR SOLUTION INTRAVENOUS at 19:54

## 2024-01-20 RX ADMIN — HYDROCORTISONE: 1 CREAM TOPICAL at 08:23

## 2024-01-20 RX ADMIN — DEXTROSE MONOHYDRATE 420 MCG: 50 INJECTION, SOLUTION INTRAVENOUS at 19:53

## 2024-01-20 RX ADMIN — OXYCODONE HYDROCHLORIDE 5 MG: 5 TABLET ORAL at 05:04

## 2024-01-20 RX ADMIN — ACYCLOVIR SODIUM 850 MG: 50 INJECTION, SOLUTION INTRAVENOUS at 18:33

## 2024-01-20 RX ADMIN — PANTOPRAZOLE SODIUM 40 MG: 40 INJECTION, POWDER, FOR SOLUTION INTRAVENOUS at 19:53

## 2024-01-20 RX ADMIN — HYDROMORPHONE HYDROCHLORIDE 1 MG: 1 INJECTION, SOLUTION INTRAMUSCULAR; INTRAVENOUS; SUBCUTANEOUS at 22:49

## 2024-01-20 RX ADMIN — DEXTROSE MONOHYDRATE 10 ML: 50 INJECTION, SOLUTION INTRAVENOUS at 20:24

## 2024-01-20 RX ADMIN — HYDROCORTISONE: 1 CREAM TOPICAL at 20:23

## 2024-01-20 ASSESSMENT — ACTIVITIES OF DAILY LIVING (ADL)
ADLS_ACUITY_SCORE: 21
ADLS_ACUITY_SCORE: 22
ADLS_ACUITY_SCORE: 21
ADLS_ACUITY_SCORE: 22
ADLS_ACUITY_SCORE: 21
ADLS_ACUITY_SCORE: 22
ADLS_ACUITY_SCORE: 22
ADLS_ACUITY_SCORE: 21
ADLS_ACUITY_SCORE: 22
ADLS_ACUITY_SCORE: 21

## 2024-01-20 NOTE — PLAN OF CARE
Patient remains hospitalized following stem cell transplant, currently day +9. Awaiting return of counts. Continues on daily growth factor. Received 1 unit HLA-matched plt this evening. Continues on ppx antimicrobials. Continues on IV MMF & tac gtt. Appetite poor, due to mucositis pain. Switched to IV Dilaudid for pain control - patient feels like this is more helpful. Was able to get down 2 Ensures this shift. Denies nausea. 1 formed BM this shift. Mag replaced early this AM - recheck planned for tomorrow AM. Rash on trunk and ankles noted - utilizing hydrocortisone cream for itching. Ambulating independently. VSS, though BP intermittently high, elevated, running 150s/90s (amlodipine frequency increased to BID per providers).      Problem: Adult Inpatient Plan of Care  Goal: Optimal Comfort and Wellbeing  Outcome: Not Progressing  Intervention: Monitor Pain and Promote Comfort  Recent Flowsheet Documentation  Taken 1/20/2024 1611 by Minerva Vaz RN  Pain Management Interventions: medication (see MAR)  Taken 1/20/2024 1215 by Minerva Vaz RN  Pain Management Interventions: medication (see MAR)  Taken 1/20/2024 1036 by Minerva Vaz RN  Pain Management Interventions: medication (see MAR)  Taken 1/20/2024 1020 by Minerva Vaz RN  Pain Management Interventions: MD notified (comment)  Taken 1/20/2024 0931 by Minerva Vaz RN  Pain Management Interventions: medication (see MAR)     Problem: Stem Cell/Bone Marrow Transplant  Goal: Blood Counts Within Acceptable Range  Outcome: Not Progressing  Intervention: Monitor and Manage Hematologic Symptoms  Recent Flowsheet Documentation  Taken 1/20/2024 1511 by Minerva Vaz RN  Medication Review/Management:   medications reviewed   high-risk medications identified  Taken 1/20/2024 1100 by Minerva Vaz RN  Medication Review/Management:   medications reviewed   high-risk medications identified  Taken 1/20/2024 0800 by Minerva Vaz RN  Medication  "Review/Management:   medications reviewed   high-risk medications identified  Goal: Improved Oral Mucous Membrane Health and Integrity  Outcome: Not Progressing  Goal: Optimal Nutrition Intake  Outcome: Not Progressing     Problem: Adult Inpatient Plan of Care  Goal: Plan of Care Review  Description: The Plan of Care Review/Shift note should be completed every shift.  The Outcome Evaluation is a brief statement about your assessment that the patient is improving, declining, or no change.  This information will be displayed automatically on your shift  note.  Outcome: Progressing  Goal: Patient-Specific Goal (Individualized)  Description: You can add care plan individualizations to a care plan. Examples of Individualization might be:  \"Parent requests to be called daily at 9am for status\", \"I have a hard time hearing out of my right ear\", or \"Do not touch me to wake me up as it startles  me\".  Outcome: Progressing  Goal: Absence of Hospital-Acquired Illness or Injury  Outcome: Progressing  Intervention: Identify and Manage Fall Risk  Recent Flowsheet Documentation  Taken 1/20/2024 1511 by Minerva Vaz, RN  Safety Promotion/Fall Prevention:   assistive device/personal items within reach   chemotherapeutic precautions   clutter free environment maintained   lighting adjusted   nonskid shoes/slippers when out of bed   patient and family education   room organization consistent   safety round/check completed  Taken 1/20/2024 1100 by Minerva Vaz, RN  Safety Promotion/Fall Prevention:   assistive device/personal items within reach   chemotherapeutic precautions   clutter free environment maintained   lighting adjusted   nonskid shoes/slippers when out of bed   patient and family education   room organization consistent   safety round/check completed  Taken 1/20/2024 0800 by Minerva Vaz, RN  Safety Promotion/Fall Prevention:   assistive device/personal items within reach   chemotherapeutic precautions   " clutter free environment maintained   lighting adjusted   nonskid shoes/slippers when out of bed   patient and family education   room organization consistent   safety round/check completed  Intervention: Prevent Skin Injury  Recent Flowsheet Documentation  Taken 1/20/2024 1600 by Minerva Vaz RN  Body Position:   position changed independently   right   side-lying  Taken 1/20/2024 1300 by Minerva Vaz RN  Body Position:   position changed independently   right   side-lying  Taken 1/20/2024 1215 by Minerva Vaz RN  Body Position:   position changed independently   left   side-lying  Taken 1/20/2024 1036 by Minerva Vaz RN  Body Position:   position changed independently   left   side-lying  Taken 1/20/2024 0931 by Minerva Vaz RN  Body Position:   position changed independently   supine  Taken 1/20/2024 0813 by Minerva Vaz RN  Body Position:   position changed independently   supine  Goal: Readiness for Transition of Care  Outcome: Progressing     Problem: Stem Cell/Bone Marrow Transplant  Goal: Optimal Coping with Transplant  Outcome: Progressing  Goal: Symptom-Free Urinary Elimination  Outcome: Progressing  Intervention: Prevent or Manage Bladder Irritation  Recent Flowsheet Documentation  Taken 1/20/2024 1611 by Minerva Vaz RN  Pain Management Interventions: medication (see MAR)  Taken 1/20/2024 1215 by Minerva Vaz RN  Pain Management Interventions: medication (see MAR)  Taken 1/20/2024 1036 by Minerva Vaz RN  Pain Management Interventions: medication (see MAR)  Taken 1/20/2024 1020 by Minerva Vaz RN  Pain Management Interventions: MD notified (comment)  Taken 1/20/2024 0931 by Minerva Vaz RN  Pain Management Interventions: medication (see MAR)  Goal: Diarrhea Symptom Control  Outcome: Progressing  Goal: Improved Activity Tolerance  Outcome: Progressing  Intervention: Promote Improved Energy  Recent Flowsheet Documentation  Taken 1/20/2024 1700 by  Minerva Vaz RN  Activity Management: ambulated to bathroom  Taken 1/20/2024 1511 by Minerva Vaz RN  Activity Management: ambulated in room  Taken 1/20/2024 1400 by Minerva Vaz RN  Activity Management: ambulated to bathroom  Taken 1/20/2024 1100 by Minerva Vaz RN  Activity Management: ambulated to bathroom  Goal: Absence of Hypersensitivity Reaction  Outcome: Progressing  Goal: Absence of Infection  Outcome: Progressing  Intervention: Prevent and Manage Infection  Recent Flowsheet Documentation  Taken 1/20/2024 1511 by Minerva Vaz RN  Isolation Precautions:   cytotoxic precautions maintained   protective environment maintained  Taken 1/20/2024 1100 by Minerva Vaz RN  Isolation Precautions:   cytotoxic precautions maintained   protective environment maintained  Taken 1/20/2024 0800 by Minerva Vaz RN  Isolation Precautions:   cytotoxic precautions maintained   protective environment maintained  Goal: Nausea and Vomiting Symptom Relief  Outcome: Progressing   Goal Outcome Evaluation:

## 2024-01-20 NOTE — PLAN OF CARE
"Patient remains hospitalized following stem cell transplant, currently day +8. Awaiting return of counts. Continues on daily growth factor. Received 1 unit PRBC early this AM. Continues on ppx antimicrobials. Continues on IV MMF & tac gtt - level checked today, gtt rate increased this evening. Appetite poor, due to mucositis pain and abdominal discomfort. Denies nausea. 1 formed BM this shift. Mouth sores noted - utilizing MMW & Oxy for pain control with some improvement. Rash on trunk noted - utilizing hydrocortisone cream for itching. Ambulating independently. VSS, though BP has remained slightly elevated, running 150s/90s.        Problem: Adult Inpatient Plan of Care  Goal: Plan of Care Review  Description: The Plan of Care Review/Shift note should be completed every shift.  The Outcome Evaluation is a brief statement about your assessment that the patient is improving, declining, or no change.  This information will be displayed automatically on your shift  note.  Outcome: Progressing  Goal: Patient-Specific Goal (Individualized)  Description: You can add care plan individualizations to a care plan. Examples of Individualization might be:  \"Parent requests to be called daily at 9am for status\", \"I have a hard time hearing out of my right ear\", or \"Do not touch me to wake me up as it startles  me\".  Outcome: Progressing  Goal: Absence of Hospital-Acquired Illness or Injury  Outcome: Progressing  Intervention: Identify and Manage Fall Risk  Recent Flowsheet Documentation  Taken 1/19/2024 1600 by Minerva Vaz, RN  Safety Promotion/Fall Prevention:   assistive device/personal items within reach   chemotherapeutic precautions   clutter free environment maintained   lighting adjusted   nonskid shoes/slippers when out of bed   patient and family education   room organization consistent   safety round/check completed  Taken 1/19/2024 1100 by Minerva Vaz, RN  Safety Promotion/Fall Prevention:   assistive " device/personal items within reach   chemotherapeutic precautions   clutter free environment maintained   lighting adjusted   nonskid shoes/slippers when out of bed   patient and family education   room organization consistent   safety round/check completed  Taken 1/19/2024 0800 by Minerva Vaz RN  Safety Promotion/Fall Prevention:   assistive device/personal items within reach   chemotherapeutic precautions   clutter free environment maintained   lighting adjusted   nonskid shoes/slippers when out of bed   patient and family education   room organization consistent   safety round/check completed  Intervention: Prevent Skin Injury  Recent Flowsheet Documentation  Taken 1/19/2024 1800 by Minerva Vaz RN  Body Position:   position changed independently   right   side-lying  Taken 1/19/2024 1400 by Minerva Vaz RN  Body Position:   position changed independently   supine  Taken 1/19/2024 1200 by Minerva Vaz RN  Body Position:   position changed independently   left   side-lying  Taken 1/19/2024 1100 by Minerva Vaz RN  Body Position:   position changed independently   left   side-lying  Taken 1/19/2024 0829 by Minerva Vaz RN  Body Position:   position changed independently   supine  Goal: Optimal Comfort and Wellbeing  Outcome: Progressing  Intervention: Monitor Pain and Promote Comfort  Recent Flowsheet Documentation  Taken 1/19/2024 1635 by Minerva Vaz RN  Pain Management Interventions: medication (see MAR)  Taken 1/19/2024 1126 by iMnerva Vaz RN  Pain Management Interventions: declines  Taken 1/19/2024 0829 by Minerva Vaz RN  Pain Management Interventions: declines  Goal: Readiness for Transition of Care  Outcome: Progressing     Problem: Stem Cell/Bone Marrow Transplant  Goal: Optimal Coping with Transplant  Outcome: Progressing  Goal: Symptom-Free Urinary Elimination  Outcome: Progressing  Intervention: Prevent or Manage Bladder Irritation  Recent Flowsheet  Documentation  Taken 1/19/2024 1635 by Minerva Vaz RN  Pain Management Interventions: medication (see MAR)  Taken 1/19/2024 1126 by Minerva Vaz RN  Pain Management Interventions: declines  Taken 1/19/2024 0829 by Minerva Vaz RN  Pain Management Interventions: declines  Goal: Diarrhea Symptom Control  Outcome: Progressing  Goal: Improved Activity Tolerance  Outcome: Progressing  Intervention: Promote Improved Energy  Recent Flowsheet Documentation  Taken 1/19/2024 1600 by Minerva Vaz RN  Activity Management: sitting, edge of bed  Taken 1/19/2024 1300 by Minerva Vaz RN  Activity Management: ambulated to bathroom  Taken 1/19/2024 1000 by Minerva Vaz RN  Activity Management: ambulated to bathroom  Goal: Blood Counts Within Acceptable Range  Outcome: Progressing  Intervention: Monitor and Manage Hematologic Symptoms  Recent Flowsheet Documentation  Taken 1/19/2024 1600 by Minerva Vaz RN  Medication Review/Management:   medications reviewed   high-risk medications identified  Taken 1/19/2024 1100 by Minerva Vaz RN  Medication Review/Management:   medications reviewed   high-risk medications identified  Taken 1/19/2024 0800 by Minerva Vaz RN  Medication Review/Management:   medications reviewed   high-risk medications identified  Goal: Absence of Hypersensitivity Reaction  Outcome: Progressing  Goal: Absence of Infection  Outcome: Progressing  Intervention: Prevent and Manage Infection  Recent Flowsheet Documentation  Taken 1/19/2024 1600 by Minerva Vaz RN  Isolation Precautions:   cytotoxic precautions maintained   protective environment maintained  Taken 1/19/2024 1100 by Minerva Vaz RN  Isolation Precautions:   cytotoxic precautions maintained   protective environment maintained  Taken 1/19/2024 0800 by Minerva Vaz RN  Isolation Precautions:   cytotoxic precautions maintained   protective environment maintained  Goal: Improved Oral Mucous Membrane  Health and Integrity  Outcome: Progressing  Goal: Nausea and Vomiting Symptom Relief  Outcome: Progressing  Goal: Optimal Nutrition Intake  Outcome: Progressing   Goal Outcome Evaluation:

## 2024-01-20 NOTE — PROGRESS NOTES
"  BMT Daily Progress Note  Patient ID:  Irma Foreman is a 53 year old female with a PMH of MDS, warm AIHA, transfusion and transfusion dependent anemia presented to  to undergo a myeloablative allogeneic transplant. She was recently COVID+ on 12/26 but tested negative on 1/2 with resolution of nearly all URI symptoms. Undergoing MA (Bu/Flu) 6/8 URD Allo transplant, day +9      HPI   Irma reports worsening of her oral mucositis and more pain with swallowing. Her abdominal pain is better, rash is about the same and has been treating with hydrocortisone cream. Her stools are a dark green but not black. Plan to increase her amlodipine to 5 mg BID, change oxycodone to IV dilaudid and check her platelets at 1600.       Review of Systems    Negative unless stated in the HPI.     PHYSICAL EXAM      Weight     Wt Readings from Last 3 Encounters:   01/20/24 81.7 kg (180 lb 1.6 oz)   12/26/23 83.1 kg (183 lb 4.8 oz)   12/19/23 83.5 kg (184 lb)        KPS: 70    BP (!) 147/90 (BP Location: Right arm)   Pulse 80   Temp 97.2  F (36.2  C) (Axillary)   Resp 16   Ht 1.575 m (5' 2.01\")   Wt 81.7 kg (180 lb 1.6 oz)   LMP 11/05/2017   SpO2 97%   BMI 32.93 kg/m       General: NAD  Eyes: KEVON, sclera anicteric   Nose/Mouth/Throat: Mucosa pink and moist. Right inner cheek slightly erythematous. Ulceration on R-posterior pharynx, tongue with ridging.   Lungs: CTA bilaterally   Cardiovascular: RRR, no M/R/G   Abdominal/Rectal: +BS, soft, epigastric area less tender to palpation   Lymphatics: No edema  Skin: wart under right foot dime sized white, cauliflower and slightly ulcerated; Two pin point sized, flesh colored warts on right hand; Hands dry, one excoriation on left dorsum (not examined today). Very faint maculopapular rash present under breasts, abdomen and low back.    Neuro: A&O   Additional Findings: Gastelum site NT, no drainage.    Current aGVHD staging:  Skin 0, UGI 0, LGI 0, Liver 0 (keep in note through day +180 for " allos)      LABS AND IMAGING: I have assessed all abnormal lab values for their clinical significance and any values considered clinically significant have been addressed in the assessment and plan.        Lab Results   Component Value Date    WBC <0.1 (LL) 01/20/2024    ANEUTAUTO 1.0 (L) 01/15/2024    HGB 8.5 (L) 01/20/2024    HCT 23.6 (L) 01/20/2024    PLT 11 (LL) 01/20/2024     01/20/2024    POTASSIUM 4.1 01/20/2024    CHLORIDE 104 01/20/2024    CO2 29 01/20/2024    GLC 97 01/20/2024    BUN 8.1 01/20/2024    CR 0.35 (L) 01/20/2024    MAG 1.4 (L) 01/20/2024    INR 1.03 01/15/2024       US Abdominal with Doppler (1/18/24)  Impression:   1. Patent Doppler evaluation of the abdomen with antegrade flow throughout.  2. Elevated hepatic artery resistive index of 0.81, nonspecific though can be seen with hepatic venous congestion which could be related to venous occlusive disease..    SYSTEMS-BASED ASSESSMENT AND PLAN     Hortencia Baldwin is a 53 year old female with a history of MDS, undergoing MA (Bu/Flu) 6/8 URD Allo transplant, day +9      BMT/IEC PROTOCOL for MT 2015-29 **according to but not on trial**   - Chemo protocol:  Day -6 through day -1: Keppra and Allopurinol   Day -5 through -2: Bu/Flu. Busulfan levels adjusted by pharm, attending  Day -1: Rest day   Day 0: Transplant. Recipient: O+, CMV+. Donor: O+, CMV-. No flush. Cell dose 6.5 x10^6.   Gram+ bacilli cultured for stem cell product. On cefepime (1/11 - 1/18)    - Restaging plan: per protocol     HEME/COAG  # Iron overload: hx of transfusion dependent anemia, pre-transplant ferritin around 5,000. Recommend phlebotomy post transplant when retic count is restored and Hgb >10      - Pancytopenia due to chemotherapy and radiation.   # Platelet refractoriness: Platelets in single digits since 1/16. BID platelet checks.   Transfusion medicine consulted 1/18: pt is also being worked-up for VOD (see GI below). HLA typing completed; will likely be able to  receive HLA matched plts tomorrow (1/19).   **Spoke with blood bank fellow 1/18 who advised against giving patient more than two units of platelets in a day d/t risk of TACO. If patient develops bleeding, give third bag of plts / per clinical discretion.   1/19: good response from HLA plts, <2 to 21 on post plt check   1/20: Plt check 1600   - Transfusion parameters: hemoglobin <7, platelets <10    IMMUNOCOMPROMISED  # Fever: (Last on 1/15) Resolved.  - UA: negative for LE & nitrites. 1/13 ua negative; BK urine positive/adeno negative (red/orange urine)  - Blood cultures NGTD. Procal elevated 1.08   - CXR with likely pulmonary edema  - RVP neg   - Cefepime (1/11 - 1/18). Brief course of IV Vancomycin. MRSA nasal swab negative.   - Relevant infection history:  URI from COVID (12/26), negative test on 1/2/24   - Vaccination status: Influenza vaccination after day +60, COVID vaccination after day +100, followed by remaining vaccinations at 12, 14, 24, and 26 months.    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   **avoiding sirolimus d/t high risk VOD**    CARDIOVASCULAR  # Chest pressure: new on 1/15, worse with sitting up/activity, better while laying flat.   EKG: new QR pattern in V1, suggests right ventricular conduction delay   Troponin negative x1   Echo (1/15): LVEF 55-60%, no pericardial effusion present   Cardiology consulted: no further recs   # Hypertension with initiation of tacrolimus: start Amlodipine 5 mg daily (1/18), increased to BID (1/20). Labetalol 5 mg IV prn with parameters.   - Risk of cardiomyopathy:  Baseline EF 60-65%  - Risk of arrhythmia: Baseline EKG showed sinus rhythm     RESPIRATORY  - Current smoker:  states she quit (1/5/24), offered nicotine replacement but she declined.   - Baseline PFTs: The FEV1, FVC, FEV1/FVC ratio and KRN10-71% are within normal limits.  The inspiratory flow rates are within normal limits.  Lung volumes are within normal limits.  The diffusing capacity is normal.   - Risk of respiratory complications: Frequent ambulation and incentive spirometer.    GI/NUTRITION  # Epigastric pain (1/18): RUQ ultrasound showing patent doppler throughout, no ascites, nonspecific elevated hepatic artery restrictive index (see full impression above). Patient does not appear fluid overloaded and LFTs relatively stable. Thoroughly discussed with attending and will continue to monitor closely for VOD; checking hepatic panel daily.     - IV PPI BID (1/19)     # Previously elevated liver enzymes that began to improve: could be related to Tylenol use, iron overload or recent viral illness. RUQ U/S (12/27) was normal. Acute hepatitis panel negative. Monitoring daily as above.    - Ulcer prophylaxis: PPI  - VOD ppx: Ursodiol   # Moderate malnutrition in the context of acute illness: dietitian following  # Mouth pain likely 2/2 the start of oral mucositis: added MMW prn (1/18), IV dilaudid 0.5 - 1.0 mg Q4H.      DERM:  # Right hand dryness, try aquafor with gloves on overnight and as tolerated during the day  # Foot, hand warts. Curbside derm on 1/8, no recs while inpatient, typically managed OP.    # Pink maculopapular rash present under breasts and across abdomen: possible cefepime rash vs other? (1/18) start Hydrocortisone BID.     DIABETES/ENDOCRINE  - Risk of steroid-induced hyperglyemia: Monitor BG, sliding scale if needed    MUSCULOSKELETAL/FRAILTY  - Baseline Frailty Score: 3  - Daily PT/OT as needed while inpatient  - Cancer Rehab as needed outpatient    SYMPTOM MANAGEMENT  - Nausea from chemo/radiation: Prochlorperazine, ondansetron, lorazepam.    SOCIAL DETERMINANTS  - Caregiver: Keira Cardenas &  "Rickey Neal       Disposition: remain admitted through engraftment    Known issues that I take into account for medical decisions, with salient changes to the plan considering these complexities noted above.    Patient Active Problem List   Diagnosis    Left medial knee pain    Obesity (BMI 30-39.9)    Anemia, unspecified type    Macrocytic anemia    MDS (myelodysplastic syndrome) (H)     Clinically Significant Risk Factors            # Hypomagnesemia: Lowest Mg = 1.4 mg/dL in last 2 days, will replace as needed   # Hypoalbuminemia: Lowest albumin = 3.3 g/dL at 1/15/2024  2:35 AM, will monitor as appropriate   # Thrombocytopenia: Lowest platelets = 1.8 in last 2 days, will monitor for bleeding          # Obesity: Estimated body mass index is 32.93 kg/m  as calculated from the following:    Height as of this encounter: 1.575 m (5' 2.01\").    Weight as of this encounter: 81.7 kg (180 lb 1.6 oz).   # Moderate Malnutrition: based on nutrition assessment            I spent 45 minutes in the care of this patient today, which included time necessary for preparation for the visit, obtaining history, ordering medications/tests/procedures as medically indicated, review of pertinent medical literature, counseling of the patient, communication of recommendations to the care team, and documentation time.    King Vaz PA-C         ______________________________________________      BMT ATTENDING NOTE    Hortencia Baldwin is a 53 year old female, who is day 9 of transplant for MDS, complicated by iron overload.     My overall impression is that Hortencia Sethi is at her arlene, awaiting engraftment.  We are continuing transfusion support; responded well to HLA matched platelets. She has had a history of neutropenic fever currently on cefepime.  She has developed abdominal pain and platelet refractoriness. We assessed for VOD by US which shows antegrade for by high arterial resistance index. She did not have hepatomegaly or " ascites. She does not meet VOD criteria at this time, but requires close monitoring for this. She is at risk of malnutrition and has had some loose stools, likely a complication of her conditioning. Monitor for neutropenic enterocolitis, encouraged oral nutrition to avoid further insult to her liver from TPN if possible. More mucositis pain; provide IV analgesia and consider PCA if worsening. Provide antiemetics, monitor for further infections, and continue to provide G-CSF to hasten engraftment.  Ambulation encouraged.  Remainder as above.    I spent 50 minutes in the care of Hortencia Sethi today, including an independent face-to-face assessment and time monitoring for restoration of hematopoiesis, high-risk organ toxicities from chemotherapy, and GVHD, managing infectious risk due to her immunocompromised state, and encouraging nutrition and physical activity to prevent debility and sarcopenia.  I personally performed all of the medical decision making associated with this visit, counseled Hortencia Sethi on my overall assessment and recommendations, communicated my plan to the care team, and edited the above note to reflect my current plan of care.       Stephanie Yi MD      Securely message with the Vocera Web Console

## 2024-01-20 NOTE — PLAN OF CARE
Alert & Oriented x4. AVSS on RA. BP elevated. Max 160/95. Labetalol parameters not met. Denies nausea, numbness or tingling. C/O of mucositis and throat pain. Rated pain as 8/10. Oxycodone administered x 3. On reassessment, verbalized pain improvement and rated 4/10. C/O of itchiness from rash. Hydrocortisone cream applied. Verbalized relief. No BM this shift. Mag 1.4. One bag of mag infusing. Continue plan of care.    Problem: Stem Cell/Bone Marrow Transplant  Goal: Optimal Coping with Transplant  Outcome: Progressing  Intervention: Optimize Patient/Family Adjustment to Transplant  Recent Flowsheet Documentation  Taken 1/19/2024 2000 by Shireen Hopkins, RN  Supportive Measures:   active listening utilized   verbalization of feelings encouraged   relaxation techniques promoted   positive reinforcement provided

## 2024-01-21 LAB
ACANTHOCYTES BLD QL SMEAR: NORMAL
ALBUMIN SERPL BCG-MCNC: 3.7 G/DL (ref 3.5–5.2)
ALP SERPL-CCNC: 112 U/L (ref 40–150)
ALT SERPL W P-5'-P-CCNC: 28 U/L (ref 0–50)
ANION GAP SERPL CALCULATED.3IONS-SCNC: 8 MMOL/L (ref 7–15)
AST SERPL W P-5'-P-CCNC: 13 U/L (ref 0–45)
AUER BODIES BLD QL SMEAR: NORMAL
BASO STIPL BLD QL SMEAR: NORMAL
BASOPHILS # BLD AUTO: ABNORMAL 10*3/UL
BASOPHILS NFR BLD AUTO: ABNORMAL %
BILIRUB SERPL-MCNC: 0.6 MG/DL
BITE CELLS BLD QL SMEAR: NORMAL
BLD PROD TYP BPU: NORMAL
BLISTER CELLS BLD QL SMEAR: NORMAL
BLOOD COMPONENT TYPE: NORMAL
BUN SERPL-MCNC: 7.4 MG/DL (ref 6–20)
BURR CELLS BLD QL SMEAR: NORMAL
CALCIUM SERPL-MCNC: 8.8 MG/DL (ref 8.6–10)
CHLORIDE SERPL-SCNC: 101 MMOL/L (ref 98–107)
CODING SYSTEM: NORMAL
CREAT SERPL-MCNC: 0.33 MG/DL (ref 0.51–0.95)
DACRYOCYTES BLD QL SMEAR: NORMAL
DEPRECATED HCO3 PLAS-SCNC: 29 MMOL/L (ref 22–29)
EGFRCR SERPLBLD CKD-EPI 2021: >90 ML/MIN/1.73M2
ELLIPTOCYTES BLD QL SMEAR: NORMAL
EOSINOPHIL # BLD AUTO: ABNORMAL 10*3/UL
EOSINOPHIL NFR BLD AUTO: ABNORMAL %
ERYTHROCYTE [DISTWIDTH] IN BLOOD BY AUTOMATED COUNT: 16.4 % (ref 10–15)
FRAGMENTS BLD QL SMEAR: NORMAL
GLUCOSE SERPL-MCNC: 115 MG/DL (ref 70–99)
HCT VFR BLD AUTO: 22.1 % (ref 35–47)
HGB BLD-MCNC: 7.8 G/DL (ref 11.7–15.7)
HGB C CRYSTALS: NORMAL
HOWELL-JOLLY BOD BLD QL SMEAR: NORMAL
IMM GRANULOCYTES # BLD: ABNORMAL 10*3/UL
IMM GRANULOCYTES NFR BLD: ABNORMAL %
ISSUE DATE AND TIME: NORMAL
LYMPHOCYTES # BLD AUTO: ABNORMAL 10*3/UL
LYMPHOCYTES NFR BLD AUTO: ABNORMAL %
MAGNESIUM SERPL-MCNC: 1.3 MG/DL (ref 1.7–2.3)
MAGNESIUM SERPL-MCNC: 1.9 MG/DL (ref 1.7–2.3)
MCH RBC QN AUTO: 33.8 PG (ref 26.5–33)
MCHC RBC AUTO-ENTMCNC: 35.3 G/DL (ref 31.5–36.5)
MCV RBC AUTO: 96 FL (ref 78–100)
MONOCYTES # BLD AUTO: ABNORMAL 10*3/UL
MONOCYTES NFR BLD AUTO: ABNORMAL %
NEUTROPHILS # BLD AUTO: ABNORMAL 10*3/UL
NEUTROPHILS NFR BLD AUTO: ABNORMAL %
NEUTS HYPERSEG BLD QL SMEAR: NORMAL
PHOSPHATE SERPL-MCNC: 2.5 MG/DL (ref 2.5–4.5)
PLAT MORPH BLD: NORMAL
PLATELET # BLD AUTO: 12 10E3/UL (ref 150–450)
PLATELET # BLD AUTO: 7 10E3/UL (ref 150–450)
POLYCHROMASIA BLD QL SMEAR: NORMAL
POTASSIUM SERPL-SCNC: 3.9 MMOL/L (ref 3.4–5.3)
PROT SERPL-MCNC: 6.3 G/DL (ref 6.4–8.3)
RBC # BLD AUTO: 2.31 10E6/UL (ref 3.8–5.2)
RBC AGGLUT BLD QL: NORMAL
RBC MORPH BLD: NORMAL
ROULEAUX BLD QL SMEAR: NORMAL
SICKLE CELLS BLD QL SMEAR: NORMAL
SMUDGE CELLS BLD QL SMEAR: NORMAL
SODIUM SERPL-SCNC: 138 MMOL/L (ref 135–145)
SPHEROCYTES BLD QL SMEAR: NORMAL
STOMATOCYTES BLD QL SMEAR: NORMAL
TARGETS BLD QL SMEAR: NORMAL
TOXIC GRANULES BLD QL SMEAR: NORMAL
UNIT ABO/RH: NORMAL
UNIT NUMBER: NORMAL
UNIT STATUS: NORMAL
UNIT TYPE ISBT: 5100
VARIANT LYMPHS BLD QL SMEAR: NORMAL
WBC # BLD AUTO: <0.1 10E3/UL (ref 4–11)

## 2024-01-21 PROCEDURE — 83735 ASSAY OF MAGNESIUM: CPT | Performed by: INTERNAL MEDICINE

## 2024-01-21 PROCEDURE — 80053 COMPREHEN METABOLIC PANEL: CPT

## 2024-01-21 PROCEDURE — 250N000013 HC RX MED GY IP 250 OP 250 PS 637: Performed by: INTERNAL MEDICINE

## 2024-01-21 PROCEDURE — 99233 SBSQ HOSP IP/OBS HIGH 50: CPT | Mod: FS

## 2024-01-21 PROCEDURE — 99418 PROLNG IP/OBS E/M EA 15 MIN: CPT

## 2024-01-21 PROCEDURE — 250N000011 HC RX IP 250 OP 636

## 2024-01-21 PROCEDURE — 250N000013 HC RX MED GY IP 250 OP 250 PS 637

## 2024-01-21 PROCEDURE — P9052 PLATELETS, HLA-M, L/R, UNIT: HCPCS

## 2024-01-21 PROCEDURE — 250N000011 HC RX IP 250 OP 636: Performed by: PHYSICIAN ASSISTANT

## 2024-01-21 PROCEDURE — 93005 ELECTROCARDIOGRAM TRACING: CPT

## 2024-01-21 PROCEDURE — 206N000001 HC R&B BMT UMMC

## 2024-01-21 PROCEDURE — 258N000003 HC RX IP 258 OP 636: Performed by: PHYSICIAN ASSISTANT

## 2024-01-21 PROCEDURE — P9037 PLATE PHERES LEUKOREDU IRRAD: HCPCS | Performed by: INTERNAL MEDICINE

## 2024-01-21 PROCEDURE — 250N000013 HC RX MED GY IP 250 OP 250 PS 637: Performed by: PHYSICIAN ASSISTANT

## 2024-01-21 PROCEDURE — 258N000003 HC RX IP 258 OP 636

## 2024-01-21 PROCEDURE — 84100 ASSAY OF PHOSPHORUS: CPT | Performed by: INTERNAL MEDICINE

## 2024-01-21 PROCEDURE — 85049 AUTOMATED PLATELET COUNT: CPT

## 2024-01-21 PROCEDURE — C9113 INJ PANTOPRAZOLE SODIUM, VIA: HCPCS

## 2024-01-21 PROCEDURE — 93010 ELECTROCARDIOGRAM REPORT: CPT | Performed by: INTERNAL MEDICINE

## 2024-01-21 PROCEDURE — 250N000011 HC RX IP 250 OP 636: Mod: JZ

## 2024-01-21 PROCEDURE — 250N000011 HC RX IP 250 OP 636: Performed by: INTERNAL MEDICINE

## 2024-01-21 PROCEDURE — 85027 COMPLETE CBC AUTOMATED: CPT

## 2024-01-21 RX ORDER — CEFPODOXIME PROXETIL 200 MG/1
200 TABLET, FILM COATED ORAL 2 TIMES DAILY
Status: DISCONTINUED | OUTPATIENT
Start: 2024-01-21 | End: 2024-01-27

## 2024-01-21 RX ORDER — MAGNESIUM SULFATE HEPTAHYDRATE 40 MG/ML
4 INJECTION, SOLUTION INTRAVENOUS ONCE
Status: COMPLETED | OUTPATIENT
Start: 2024-01-21 | End: 2024-01-21

## 2024-01-21 RX ORDER — MAGNESIUM SULFATE HEPTAHYDRATE 40 MG/ML
2 INJECTION, SOLUTION INTRAVENOUS ONCE
Status: COMPLETED | OUTPATIENT
Start: 2024-01-21 | End: 2024-01-21

## 2024-01-21 RX ADMIN — HYDROMORPHONE HYDROCHLORIDE 0.5 MG: 1 INJECTION, SOLUTION INTRAMUSCULAR; INTRAVENOUS; SUBCUTANEOUS at 18:27

## 2024-01-21 RX ADMIN — HYDROCORTISONE: 1 CREAM TOPICAL at 20:02

## 2024-01-21 RX ADMIN — MICAFUNGIN SODIUM 150 MG: 50 INJECTION, POWDER, LYOPHILIZED, FOR SOLUTION INTRAVENOUS at 16:00

## 2024-01-21 RX ADMIN — DEXTROSE MONOHYDRATE 10 ML: 50 INJECTION, SOLUTION INTRAVENOUS at 20:01

## 2024-01-21 RX ADMIN — ACETAMINOPHEN 650 MG: 325 TABLET, FILM COATED ORAL at 16:37

## 2024-01-21 RX ADMIN — ACYCLOVIR SODIUM 850 MG: 50 INJECTION, SOLUTION INTRAVENOUS at 18:27

## 2024-01-21 RX ADMIN — AMLODIPINE BESYLATE 5 MG: 5 TABLET ORAL at 20:02

## 2024-01-21 RX ADMIN — MYCOPHENOLATE MOFETIL 1250 MG: 500 INJECTION, POWDER, LYOPHILIZED, FOR SOLUTION INTRAVENOUS at 20:01

## 2024-01-21 RX ADMIN — DIPHENHYDRAMINE HYDROCHLORIDE 25 MG: 50 INJECTION, SOLUTION INTRAMUSCULAR; INTRAVENOUS at 16:37

## 2024-01-21 RX ADMIN — HYDROMORPHONE HYDROCHLORIDE 0.5 MG: 1 INJECTION, SOLUTION INTRAMUSCULAR; INTRAVENOUS; SUBCUTANEOUS at 16:00

## 2024-01-21 RX ADMIN — CEFPODOXIME PROXETIL 200 MG: 200 TABLET, FILM COATED ORAL at 20:02

## 2024-01-21 RX ADMIN — HYDROMORPHONE HYDROCHLORIDE 1 MG: 1 INJECTION, SOLUTION INTRAMUSCULAR; INTRAVENOUS; SUBCUTANEOUS at 08:01

## 2024-01-21 RX ADMIN — HYDROMORPHONE HYDROCHLORIDE 1 MG: 1 INJECTION, SOLUTION INTRAMUSCULAR; INTRAVENOUS; SUBCUTANEOUS at 11:49

## 2024-01-21 RX ADMIN — MAGNESIUM SULFATE IN WATER 2 G: 40 INJECTION, SOLUTION INTRAVENOUS at 13:32

## 2024-01-21 RX ADMIN — ACYCLOVIR SODIUM 850 MG: 50 INJECTION, SOLUTION INTRAVENOUS at 03:29

## 2024-01-21 RX ADMIN — DEXTROSE MONOHYDRATE 10 ML: 50 INJECTION, SOLUTION INTRAVENOUS at 20:25

## 2024-01-21 RX ADMIN — TACROLIMUS 118 MCG/HR: 5 INJECTION, SOLUTION INTRAVENOUS at 20:25

## 2024-01-21 RX ADMIN — LEVOFLOXACIN 250 MG: 250 TABLET, FILM COATED ORAL at 09:40

## 2024-01-21 RX ADMIN — MYCOPHENOLATE MOFETIL 1250 MG: 500 INJECTION, POWDER, LYOPHILIZED, FOR SOLUTION INTRAVENOUS at 08:02

## 2024-01-21 RX ADMIN — URSODIOL 300 MG: 300 CAPSULE ORAL at 08:01

## 2024-01-21 RX ADMIN — SODIUM CHLORIDE 1000 ML: 9 INJECTION, SOLUTION INTRAVENOUS at 12:14

## 2024-01-21 RX ADMIN — PANTOPRAZOLE SODIUM 40 MG: 40 INJECTION, POWDER, FOR SOLUTION INTRAVENOUS at 20:02

## 2024-01-21 RX ADMIN — PANTOPRAZOLE SODIUM 40 MG: 40 INJECTION, POWDER, FOR SOLUTION INTRAVENOUS at 08:01

## 2024-01-21 RX ADMIN — URSODIOL 300 MG: 300 CAPSULE ORAL at 13:32

## 2024-01-21 RX ADMIN — DEXTROSE MONOHYDRATE 420 MCG: 50 INJECTION, SOLUTION INTRAVENOUS at 20:01

## 2024-01-21 RX ADMIN — HYDROMORPHONE HYDROCHLORIDE 1 MG: 1 INJECTION, SOLUTION INTRAMUSCULAR; INTRAVENOUS; SUBCUTANEOUS at 03:36

## 2024-01-21 RX ADMIN — HYDROCORTISONE: 1 CREAM TOPICAL at 09:40

## 2024-01-21 RX ADMIN — AMLODIPINE BESYLATE 5 MG: 5 TABLET ORAL at 08:01

## 2024-01-21 RX ADMIN — URSODIOL 300 MG: 300 CAPSULE ORAL at 20:02

## 2024-01-21 RX ADMIN — ACYCLOVIR SODIUM 850 MG: 50 INJECTION, SOLUTION INTRAVENOUS at 10:48

## 2024-01-21 RX ADMIN — MAGNESIUM SULFATE IN WATER 4 G: 4 INJECTION, SOLUTION INTRAVENOUS at 05:45

## 2024-01-21 ASSESSMENT — ACTIVITIES OF DAILY LIVING (ADL)
ADLS_ACUITY_SCORE: 21

## 2024-01-21 NOTE — PROGRESS NOTES
Notified by RN patient developed palpitations, with HR in 120s. Otherwise vitally stable.  Ordered EKG revealing prolonged Qtc of 607 causing concern for development of TdP.     Plan  - Avoid Qtc prolonging medications  - Stopped levaquin and start Vantin   - 1L N.S.   - Repeat EKG tomorrow to follow up on Qtc prolongation  - High magnesium and potassium replacement scale  - Place on telemetry for 24 hours     Pili Wood PA-C  [id number]

## 2024-01-21 NOTE — PROGRESS NOTES
"  BMT Daily Progress Note  Patient ID:  Irma Foreman is a 53 year old female with a PMH of MDS, warm AIHA, transfusion and transfusion dependent anemia presented to  to undergo a myeloablative allogeneic transplant. She was recently COVID+ on 12/26 but tested negative on 1/2 with resolution of nearly all URI symptoms. Undergoing MA (Bu/Flu) 6/8 URD Allo transplant, day +10      HPI   Irma reports better pain control with IV dilaudid, abdominal pain 3/10 describes it as a pressure, mildly tender on her abdominal exam. She drank an ensure x2 yesterday.       Review of Systems    Negative unless stated in the HPI.     PHYSICAL EXAM      Weight     Wt Readings from Last 3 Encounters:   01/21/24 81.3 kg (179 lb 4.8 oz)   12/26/23 83.1 kg (183 lb 4.8 oz)   12/19/23 83.5 kg (184 lb)        KPS: 70    /76 (BP Location: Right arm)   Pulse 95   Temp 97  F (36.1  C) (Axillary)   Resp 16   Ht 1.575 m (5' 2.01\")   Wt 81.3 kg (179 lb 4.8 oz)   LMP 11/05/2017   SpO2 96%   BMI 32.79 kg/m       General: NAD  Eyes: KEVON, sclera anicteric   Nose/Mouth/Throat: Mucosa pink and moist. Right inner cheek slightly erythematous. Ulceration on R-posterior pharynx, tongue with ridging.   Lungs: CTA bilaterally   Cardiovascular: RRR, no M/R/G   Abdominal/Rectal: +BS, soft, mild generalized abdominal pain   Lymphatics: No edema  Skin: wart under right foot dime sized white, cauliflower and slightly ulcerated; Two pin point sized, flesh colored warts on right hand; Hands dry, one excoriation on left dorsum (not examined today). Very faint maculopapular rash present under breasts, abdomen and low back.    Neuro: A&O   Additional Findings: Gastelum site NT, no drainage.    Current aGVHD staging:  Skin 0, UGI 0, LGI 0, Liver 0 (keep in note through day +180 for allos)      LABS AND IMAGING: I have assessed all abnormal lab values for their clinical significance and any values considered clinically significant have been addressed in " the assessment and plan.        Lab Results   Component Value Date    WBC <0.1 (LL) 01/21/2024    ANEUTAUTO 1.0 (L) 01/15/2024    HGB 7.8 (L) 01/21/2024    HCT 22.1 (L) 01/21/2024    PLT 12 (LL) 01/21/2024     01/21/2024    POTASSIUM 3.9 01/21/2024    CHLORIDE 101 01/21/2024    CO2 29 01/21/2024     (H) 01/21/2024    BUN 7.4 01/21/2024    CR 0.33 (L) 01/21/2024    MAG 1.3 (L) 01/21/2024    INR 1.03 01/15/2024       US Abdominal with Doppler (1/18/24)  Impression:   1. Patent Doppler evaluation of the abdomen with antegrade flow throughout.  2. Elevated hepatic artery resistive index of 0.81, nonspecific though can be seen with hepatic venous congestion which could be related to venous occlusive disease..    SYSTEMS-BASED ASSESSMENT AND PLAN     Hortencia Baldwin is a 53 year old female with a history of MDS, undergoing MA (Bu/Flu) 6/8 URD Allo transplant, day +10      BMT/IEC PROTOCOL for MT 2015-29 **according to but not on trial**   - Chemo protocol:  Day -6 through day -1: Keppra and Allopurinol   Day -5 through -2: Bu/Flu. Busulfan levels adjusted by pharm, attending  Day -1: Rest day   Day 0: Transplant. Recipient: O+, CMV+. Donor: O+, CMV-. No flush. Cell dose 6.5 x10^6.   Gram+ bacilli cultured for stem cell product. On cefepime (1/11 - 1/18)    - Restaging plan: per protocol     HEME/COAG  # Iron overload: hx of transfusion dependent anemia, pre-transplant ferritin around 5,000. Recommend phlebotomy post transplant when retic count is restored and Hgb >10      - Pancytopenia due to chemotherapy and radiation.   # Platelet refractoriness: Platelets in single digits since 1/16. BID platelet checks.   Transfusion medicine consulted 1/18: pt is also being worked-up for VOD (see GI below). HLA typing completed; will likely be able to receive HLA matched plts tomorrow (1/19).   **Spoke with blood bank fellow 1/18 who advised against giving patient more than two units of platelets in a day d/t risk of  TACO. If patient develops bleeding, give third bag of plts / per clinical discretion.   1/19: good response from HLA plts, <2 to 21 on post plt check   1/21: Plt check 1600 x3 days  - Transfusion parameters: hemoglobin <7, platelets <10    IMMUNOCOMPROMISED  # Fever: (Last on 1/15) Resolved.  - UA: negative for LE & nitrites. 1/13 ua negative; BK urine positive/adeno negative (red/orange urine)  - Blood cultures NGTD. Procal elevated 1.08   - CXR with likely pulmonary edema  - RVP neg   - Cefepime (1/11 - 1/18). Brief course of IV Vancomycin. MRSA nasal swab negative.   - Relevant infection history:  URI from COVID (12/26), negative test on 1/2/24   - Vaccination status: Influenza vaccination after day +60, COVID vaccination after day +100, followed by remaining vaccinations at 12, 14, 24, and 26 months.    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   **avoiding sirolimus d/t high risk VOD**    CARDIOVASCULAR  # Chest pressure: new on 1/15, worse with sitting up/activity, better while laying flat.   EKG: new QR pattern in V1, suggests right ventricular conduction delay   Troponin negative x1   Echo (1/15): LVEF 55-60%, no pericardial effusion present   Cardiology consulted: no further recs   # Hypertension with initiation of tacrolimus: start Amlodipine 5 mg daily (1/18), increased to BID (1/20). Labetalol 5 mg IV prn with parameters.   - Risk of cardiomyopathy:  Baseline EF 60-65%  - Risk of arrhythmia: Baseline EKG showed sinus rhythm     RESPIRATORY  - Current smoker: states she quit (1/5/24), offered nicotine replacement but she declined.   - Baseline PFTs: The FEV1, FVC, FEV1/FVC ratio and VTZ36-73% are within normal limits.   The inspiratory flow rates are within normal limits.  Lung volumes are within normal limits.  The diffusing capacity is normal.   - Risk of respiratory complications: Frequent ambulation and incentive spirometer.    GI/NUTRITION  # Epigastric pain (1/18): RUQ ultrasound showing patent doppler throughout, no ascites, nonspecific elevated hepatic artery restrictive index (see full impression above). Patient does not appear fluid overloaded and LFTs relatively stable. Thoroughly discussed with attending and will continue to monitor closely for VOD; checking hepatic panel daily.     - IV PPI BID (1/19)     # Previously elevated liver enzymes that began to improve: could be related to Tylenol use, iron overload or recent viral illness. RUQ U/S (12/27) was normal. Acute hepatitis panel negative. Monitoring daily as above.    - Ulcer prophylaxis: PPI  - VOD ppx: Ursodiol   # Moderate malnutrition in the context of acute illness: dietitian following  # Mouth pain likely 2/2 the start of oral mucositis: added MMW prn (1/18), IV dilaudid 0.5 - 1.0 mg Q4H.      DERM:  # Right hand dryness, try aquafor with gloves on overnight and as tolerated during the day  # Foot, hand warts. Curbside derm on 1/8, no recs while inpatient, typically managed OP.    # Pink maculopapular rash present under breasts and across abdomen: possible cefepime rash vs other? (1/18) start Hydrocortisone BID.     DIABETES/ENDOCRINE  - Risk of steroid-induced hyperglyemia: Monitor BG, sliding scale if needed    MUSCULOSKELETAL/FRAILTY  - Baseline Frailty Score: 3  - Daily PT/OT as needed while inpatient  - Cancer Rehab as needed outpatient    SYMPTOM MANAGEMENT  - Nausea from chemo/radiation: Prochlorperazine, ondansetron, lorazepam.    SOCIAL DETERMINANTS  - Caregiver: Keira Cardenas & Rickey Neal       Disposition: remain admitted through engraftment    Known issues that I take into account for medical decisions, with salient changes to the plan  "considering these complexities noted above.    Patient Active Problem List   Diagnosis    Left medial knee pain    Obesity (BMI 30-39.9)    Anemia, unspecified type    Macrocytic anemia    MDS (myelodysplastic syndrome) (H)     Clinically Significant Risk Factors            # Hypomagnesemia: Lowest Mg = 1.3 mg/dL in last 2 days, will replace as needed   # Hypoalbuminemia: Lowest albumin = 3.3 g/dL at 1/15/2024  2:35 AM, will monitor as appropriate   # Thrombocytopenia: Lowest platelets = 6 in last 2 days, will monitor for bleeding          # Obesity: Estimated body mass index is 32.79 kg/m  as calculated from the following:    Height as of this encounter: 1.575 m (5' 2.01\").    Weight as of this encounter: 81.3 kg (179 lb 4.8 oz).   # Moderate Malnutrition: based on nutrition assessment            I spent 45 minutes in the care of this patient today, which included time necessary for preparation for the visit, obtaining history, ordering medications/tests/procedures as medically indicated, review of pertinent medical literature, counseling of the patient, communication of recommendations to the care team, and documentation time.    King Vaz PA-C         ______________________________________________      BMT ATTENDING NOTE    Hortencia Baldwin is a 53 year old female, who is day 10 of transplant for MDS, complicated by iron overload.     My overall impression is that Hortencia Sethi is at her arlene, awaiting engraftment.  We are continuing transfusion support; responded well to HLA matched platelets. She has had a history of neutropenic fever currently on cefepime.  She has developed abdominal pain and platelet refractoriness. We assessed for VOD by US which shows antegrade for by high arterial resistance index. She did not have hepatomegaly or ascites. She does not meet VOD criteria at this time, but requires close monitoring for this. She is at risk of malnutrition and has had some loose stools, likely a " complication of her conditioning. Monitor for neutropenic enterocolitis, encouraged oral nutrition to avoid further insult to her liver from TPN if possible. More mucositis pain; provide IV analgesia and consider PCA if worsening. Provide antiemetics, monitor for further infections, and continue to provide G-CSF to hasten engraftment.  Palpitations and tachycardic today with prolonged QTC; give IV fluids for dehydration, minimize QT prolonging agents, place on telemetry overnight. Remainder as above.    I spent 50 minutes in the care of Hortencia Navdeep today, including an independent face-to-face assessment and time monitoring for restoration of hematopoiesis, high-risk organ toxicities from chemotherapy, and GVHD, managing infectious risk due to her immunocompromised state, and encouraging nutrition and physical activity to prevent debility and sarcopenia.  I personally performed all of the medical decision making associated with this visit, counseled Hortencia Sethi on my overall assessment and recommendations, communicated my plan to the care team, and edited the above note to reflect my current plan of care.       Stephanie Yi MD      Securely message with the Vocera Web Console

## 2024-01-21 NOTE — PROVIDER NOTIFICATION
"Notified CHLOE Dhillon- Pt c/o \"heart racing fast\" upon returning from bathroom.  Denies any SOB, chest pain.  HR 120s, apical and radial pulses regular.  EKG and IVF bolus to be completed.  "

## 2024-01-21 NOTE — PLAN OF CARE
Alert & oriented x4. AVSS on room air. Elevated BP, max 150/80. Labetalol parameters not met. Denies nausea, numbness or tingling. Denied stomach pain. C/O of mouth and throat pain. Iv dilaudid given x 2. Magic mouthwash given. Rash itchiness managed with hydrocortisone cream.  Mag 1.3. One bag of mag replaced. Continue plan of care.    Problem: Adult Inpatient Plan of Care  Goal: Plan of Care Review  Description: The Plan of Care Review/Shift note should be completed every shift.  The Outcome Evaluation is a brief statement about your assessment that the patient is improving, declining, or no change.  This information will be displayed automatically on your shift  note.  Outcome: Progressing  Flowsheets (Taken 1/21/2024 0110)  Plan of Care Reviewed With: patient     Problem: Stem Cell/Bone Marrow Transplant  Goal: Optimal Coping with Transplant  Outcome: Progressing  Intervention: Optimize Patient/Family Adjustment to Transplant  Recent Flowsheet Documentation  Taken 1/20/2024 2000 by Shireen Hopkins, RN  Supportive Measures:   active listening utilized   verbalization of feelings encouraged   relaxation techniques promoted   positive reinforcement provided

## 2024-01-22 LAB
ACANTHOCYTES BLD QL SMEAR: NORMAL
ALBUMIN SERPL BCG-MCNC: 3.4 G/DL (ref 3.5–5.2)
ALP SERPL-CCNC: 106 U/L (ref 40–150)
ALT SERPL W P-5'-P-CCNC: 21 U/L (ref 0–50)
ANION GAP SERPL CALCULATED.3IONS-SCNC: 7 MMOL/L (ref 7–15)
AST SERPL W P-5'-P-CCNC: 9 U/L (ref 0–45)
ATRIAL RATE - MUSE: 119 BPM
AUER BODIES BLD QL SMEAR: NORMAL
BASO STIPL BLD QL SMEAR: NORMAL
BASOPHILS # BLD AUTO: ABNORMAL 10*3/UL
BASOPHILS NFR BLD AUTO: ABNORMAL %
BILIRUB SERPL-MCNC: 0.6 MG/DL
BITE CELLS BLD QL SMEAR: NORMAL
BLD PROD TYP BPU: NORMAL
BLISTER CELLS BLD QL SMEAR: NORMAL
BLOOD COMPONENT TYPE: NORMAL
BUN SERPL-MCNC: 6.5 MG/DL (ref 6–20)
BURR CELLS BLD QL SMEAR: NORMAL
CALCIUM SERPL-MCNC: 8.6 MG/DL (ref 8.6–10)
CHLORIDE SERPL-SCNC: 100 MMOL/L (ref 98–107)
CODING SYSTEM: NORMAL
CREAT SERPL-MCNC: 0.34 MG/DL (ref 0.51–0.95)
CROSSMATCH: NORMAL
DACRYOCYTES BLD QL SMEAR: NORMAL
DEPRECATED HCO3 PLAS-SCNC: 27 MMOL/L (ref 22–29)
DIASTOLIC BLOOD PRESSURE - MUSE: NORMAL MMHG
EGFRCR SERPLBLD CKD-EPI 2021: >90 ML/MIN/1.73M2
ELLIPTOCYTES BLD QL SMEAR: NORMAL
EOSINOPHIL # BLD AUTO: ABNORMAL 10*3/UL
EOSINOPHIL NFR BLD AUTO: ABNORMAL %
ERYTHROCYTE [DISTWIDTH] IN BLOOD BY AUTOMATED COUNT: 15.8 % (ref 10–15)
FRAGMENTS BLD QL SMEAR: NORMAL
GLUCOSE SERPL-MCNC: 123 MG/DL (ref 70–99)
HCT VFR BLD AUTO: 20 % (ref 35–47)
HGB BLD-MCNC: 7 G/DL (ref 11.7–15.7)
HGB C CRYSTALS: NORMAL
HOWELL-JOLLY BOD BLD QL SMEAR: NORMAL
IMM GRANULOCYTES # BLD: ABNORMAL 10*3/UL
IMM GRANULOCYTES NFR BLD: ABNORMAL %
INR PPP: 1.16 (ref 0.85–1.15)
INTERPRETATION ECG - MUSE: NORMAL
ISSUE DATE AND TIME: NORMAL
LACTATE SERPL-SCNC: 0.5 MMOL/L (ref 0.7–2)
LYMPHOCYTES # BLD AUTO: ABNORMAL 10*3/UL
LYMPHOCYTES NFR BLD AUTO: ABNORMAL %
MAGNESIUM SERPL-MCNC: 1.5 MG/DL (ref 1.7–2.3)
MAGNESIUM SERPL-MCNC: 2.5 MG/DL (ref 1.7–2.3)
MCH RBC QN AUTO: 33.5 PG (ref 26.5–33)
MCHC RBC AUTO-ENTMCNC: 35 G/DL (ref 31.5–36.5)
MCV RBC AUTO: 96 FL (ref 78–100)
MONOCYTES # BLD AUTO: ABNORMAL 10*3/UL
MONOCYTES NFR BLD AUTO: ABNORMAL %
NEUTROPHILS # BLD AUTO: ABNORMAL 10*3/UL
NEUTROPHILS NFR BLD AUTO: ABNORMAL %
NEUTS HYPERSEG BLD QL SMEAR: NORMAL
P AXIS - MUSE: 54 DEGREES
PHOSPHATE SERPL-MCNC: 2.3 MG/DL (ref 2.5–4.5)
PLAT MORPH BLD: NORMAL
PLATELET # BLD AUTO: 11 10E3/UL (ref 150–450)
PLATELET # BLD AUTO: 16 10E3/UL (ref 150–450)
POLYCHROMASIA BLD QL SMEAR: NORMAL
POTASSIUM SERPL-SCNC: 3.6 MMOL/L (ref 3.4–5.3)
PR INTERVAL - MUSE: 120 MS
PROT SERPL-MCNC: 6.1 G/DL (ref 6.4–8.3)
QRS DURATION - MUSE: 82 MS
QT - MUSE: 360 MS
QTC - MUSE: 507 MS
R AXIS - MUSE: 72 DEGREES
RBC # BLD AUTO: 2.09 10E6/UL (ref 3.8–5.2)
RBC AGGLUT BLD QL: NORMAL
RBC MORPH BLD: NORMAL
ROULEAUX BLD QL SMEAR: NORMAL
SICKLE CELLS BLD QL SMEAR: NORMAL
SMUDGE CELLS BLD QL SMEAR: NORMAL
SODIUM SERPL-SCNC: 134 MMOL/L (ref 135–145)
SPHEROCYTES BLD QL SMEAR: NORMAL
STOMATOCYTES BLD QL SMEAR: NORMAL
SYSTOLIC BLOOD PRESSURE - MUSE: NORMAL MMHG
T AXIS - MUSE: 67 DEGREES
TACROLIMUS BLD-MCNC: 11.1 UG/L (ref 5–15)
TARGETS BLD QL SMEAR: NORMAL
TME LAST DOSE: NORMAL H
TME LAST DOSE: NORMAL H
TOXIC GRANULES BLD QL SMEAR: NORMAL
UNIT ABO/RH: NORMAL
UNIT NUMBER: NORMAL
UNIT STATUS: NORMAL
UNIT TYPE ISBT: 9500
VARIANT LYMPHS BLD QL SMEAR: NORMAL
VENTRICULAR RATE- MUSE: 119 BPM
WBC # BLD AUTO: <0.1 10E3/UL (ref 4–11)

## 2024-01-22 PROCEDURE — 250N000009 HC RX 250: Performed by: INTERNAL MEDICINE

## 2024-01-22 PROCEDURE — 80197 ASSAY OF TACROLIMUS: CPT | Performed by: INTERNAL MEDICINE

## 2024-01-22 PROCEDURE — 250N000011 HC RX IP 250 OP 636: Performed by: INTERNAL MEDICINE

## 2024-01-22 PROCEDURE — 250N000013 HC RX MED GY IP 250 OP 250 PS 637: Performed by: INTERNAL MEDICINE

## 2024-01-22 PROCEDURE — 250N000011 HC RX IP 250 OP 636: Mod: JZ | Performed by: STUDENT IN AN ORGANIZED HEALTH CARE EDUCATION/TRAINING PROGRAM

## 2024-01-22 PROCEDURE — P9040 RBC LEUKOREDUCED IRRADIATED: HCPCS

## 2024-01-22 PROCEDURE — 99418 PROLNG IP/OBS E/M EA 15 MIN: CPT

## 2024-01-22 PROCEDURE — 250N000011 HC RX IP 250 OP 636: Performed by: PHYSICIAN ASSISTANT

## 2024-01-22 PROCEDURE — 250N000011 HC RX IP 250 OP 636

## 2024-01-22 PROCEDURE — 80053 COMPREHEN METABOLIC PANEL: CPT

## 2024-01-22 PROCEDURE — 83735 ASSAY OF MAGNESIUM: CPT

## 2024-01-22 PROCEDURE — 87103 BLOOD FUNGUS CULTURE: CPT

## 2024-01-22 PROCEDURE — 93010 ELECTROCARDIOGRAM REPORT: CPT | Performed by: INTERNAL MEDICINE

## 2024-01-22 PROCEDURE — C9113 INJ PANTOPRAZOLE SODIUM, VIA: HCPCS

## 2024-01-22 PROCEDURE — 93005 ELECTROCARDIOGRAM TRACING: CPT

## 2024-01-22 PROCEDURE — 83735 ASSAY OF MAGNESIUM: CPT | Performed by: INTERNAL MEDICINE

## 2024-01-22 PROCEDURE — 85049 AUTOMATED PLATELET COUNT: CPT

## 2024-01-22 PROCEDURE — 84100 ASSAY OF PHOSPHORUS: CPT

## 2024-01-22 PROCEDURE — 85027 COMPLETE CBC AUTOMATED: CPT

## 2024-01-22 PROCEDURE — 258N000003 HC RX IP 258 OP 636

## 2024-01-22 PROCEDURE — 258N000003 HC RX IP 258 OP 636: Performed by: PHYSICIAN ASSISTANT

## 2024-01-22 PROCEDURE — 83605 ASSAY OF LACTIC ACID: CPT | Performed by: INTERNAL MEDICINE

## 2024-01-22 PROCEDURE — 85610 PROTHROMBIN TIME: CPT

## 2024-01-22 PROCEDURE — 250N000013 HC RX MED GY IP 250 OP 250 PS 637

## 2024-01-22 PROCEDURE — 99233 SBSQ HOSP IP/OBS HIGH 50: CPT | Mod: FS

## 2024-01-22 PROCEDURE — 206N000001 HC R&B BMT UMMC

## 2024-01-22 PROCEDURE — 258N000003 HC RX IP 258 OP 636: Performed by: INTERNAL MEDICINE

## 2024-01-22 RX ORDER — SALIVA STIMULANT COMB. NO.3
1 SPRAY, NON-AEROSOL (ML) MUCOUS MEMBRANE
Status: DISCONTINUED | OUTPATIENT
Start: 2024-01-22 | End: 2024-02-04 | Stop reason: HOSPADM

## 2024-01-22 RX ORDER — MAGNESIUM SULFATE HEPTAHYDRATE 40 MG/ML
4 INJECTION, SOLUTION INTRAVENOUS ONCE
Status: COMPLETED | OUTPATIENT
Start: 2024-01-22 | End: 2024-01-22

## 2024-01-22 RX ORDER — POTASSIUM PHOS IN 0.9 % NACL 15MMOL/250
15 PLASTIC BAG, INJECTION (ML) INTRAVENOUS ONCE
Status: COMPLETED | OUTPATIENT
Start: 2024-01-22 | End: 2024-01-22

## 2024-01-22 RX ORDER — CEFEPIME HYDROCHLORIDE 2 G/1
2 INJECTION, POWDER, FOR SOLUTION INTRAVENOUS EVERY 8 HOURS
Status: DISCONTINUED | OUTPATIENT
Start: 2024-01-22 | End: 2024-01-26

## 2024-01-22 RX ORDER — POTASSIUM CHLORIDE 750 MG/1
20 TABLET, EXTENDED RELEASE ORAL ONCE
Status: COMPLETED | OUTPATIENT
Start: 2024-01-22 | End: 2024-01-22

## 2024-01-22 RX ADMIN — URSODIOL 300 MG: 300 CAPSULE ORAL at 19:42

## 2024-01-22 RX ADMIN — PANTOPRAZOLE SODIUM 40 MG: 40 INJECTION, POWDER, FOR SOLUTION INTRAVENOUS at 07:52

## 2024-01-22 RX ADMIN — ACETAMINOPHEN 650 MG: 325 TABLET, FILM COATED ORAL at 01:06

## 2024-01-22 RX ADMIN — URSODIOL 300 MG: 300 CAPSULE ORAL at 07:52

## 2024-01-22 RX ADMIN — POTASSIUM PHOSPHATE, MONOBASIC POTASSIUM PHOSPHATE, DIBASIC 15 MMOL: 224; 236 INJECTION, SOLUTION, CONCENTRATE INTRAVENOUS at 07:52

## 2024-01-22 RX ADMIN — ACYCLOVIR SODIUM 850 MG: 50 INJECTION, SOLUTION INTRAVENOUS at 18:10

## 2024-01-22 RX ADMIN — POTASSIUM CHLORIDE 20 MEQ: 750 TABLET, EXTENDED RELEASE ORAL at 05:12

## 2024-01-22 RX ADMIN — HYDROMORPHONE HYDROCHLORIDE 0.5 MG: 1 INJECTION, SOLUTION INTRAMUSCULAR; INTRAVENOUS; SUBCUTANEOUS at 00:30

## 2024-01-22 RX ADMIN — DIPHENHYDRAMINE HYDROCHLORIDE AND LIDOCAINE HYDROCHLORIDE AND ALUMINUM HYDROXIDE AND MAGNESIUM HYDRO 10 ML: KIT at 13:33

## 2024-01-22 RX ADMIN — DEXTROSE MONOHYDRATE 20 ML: 50 INJECTION, SOLUTION INTRAVENOUS at 19:40

## 2024-01-22 RX ADMIN — CEFEPIME 2 G: 2 INJECTION, POWDER, FOR SOLUTION INTRAVENOUS at 17:27

## 2024-01-22 RX ADMIN — HYDROMORPHONE HYDROCHLORIDE 0.5 MG: 1 INJECTION, SOLUTION INTRAMUSCULAR; INTRAVENOUS; SUBCUTANEOUS at 13:43

## 2024-01-22 RX ADMIN — CEFEPIME 2 G: 2 INJECTION, POWDER, FOR SOLUTION INTRAVENOUS at 02:01

## 2024-01-22 RX ADMIN — MAGNESIUM SULFATE IN WATER 4 G: 40 INJECTION, SOLUTION INTRAVENOUS at 05:12

## 2024-01-22 RX ADMIN — MYCOPHENOLATE MOFETIL 1250 MG: 500 INJECTION, POWDER, LYOPHILIZED, FOR SOLUTION INTRAVENOUS at 08:07

## 2024-01-22 RX ADMIN — ACYCLOVIR SODIUM 850 MG: 50 INJECTION, SOLUTION INTRAVENOUS at 11:15

## 2024-01-22 RX ADMIN — URSODIOL 300 MG: 300 CAPSULE ORAL at 13:33

## 2024-01-22 RX ADMIN — HYDROMORPHONE HYDROCHLORIDE 0.5 MG: 1 INJECTION, SOLUTION INTRAMUSCULAR; INTRAVENOUS; SUBCUTANEOUS at 07:51

## 2024-01-22 RX ADMIN — DIPHENHYDRAMINE HYDROCHLORIDE AND LIDOCAINE HYDROCHLORIDE AND ALUMINUM HYDROXIDE AND MAGNESIUM HYDRO 10 ML: KIT at 19:53

## 2024-01-22 RX ADMIN — AMLODIPINE BESYLATE 5 MG: 5 TABLET ORAL at 19:42

## 2024-01-22 RX ADMIN — MICAFUNGIN SODIUM 150 MG: 50 INJECTION, POWDER, LYOPHILIZED, FOR SOLUTION INTRAVENOUS at 16:03

## 2024-01-22 RX ADMIN — TACROLIMUS 118 MCG/HR: 5 INJECTION, SOLUTION INTRAVENOUS at 19:43

## 2024-01-22 RX ADMIN — AMLODIPINE BESYLATE 5 MG: 5 TABLET ORAL at 07:52

## 2024-01-22 RX ADMIN — DIPHENHYDRAMINE HYDROCHLORIDE AND LIDOCAINE HYDROCHLORIDE AND ALUMINUM HYDROXIDE AND MAGNESIUM HYDRO 10 ML: KIT at 07:51

## 2024-01-22 RX ADMIN — DEXTROSE MONOHYDRATE 420 MCG: 50 INJECTION, SOLUTION INTRAVENOUS at 19:40

## 2024-01-22 RX ADMIN — ACYCLOVIR SODIUM 850 MG: 50 INJECTION, SOLUTION INTRAVENOUS at 03:16

## 2024-01-22 RX ADMIN — CEFEPIME 2 G: 2 INJECTION, POWDER, FOR SOLUTION INTRAVENOUS at 10:25

## 2024-01-22 RX ADMIN — PANTOPRAZOLE SODIUM 40 MG: 40 INJECTION, POWDER, FOR SOLUTION INTRAVENOUS at 19:49

## 2024-01-22 RX ADMIN — MYCOPHENOLATE MOFETIL 1250 MG: 500 INJECTION, POWDER, LYOPHILIZED, FOR SOLUTION INTRAVENOUS at 19:45

## 2024-01-22 ASSESSMENT — ACTIVITIES OF DAILY LIVING (ADL)
ADLS_ACUITY_SCORE: 23
ADLS_ACUITY_SCORE: 21
ADLS_ACUITY_SCORE: 23
ADLS_ACUITY_SCORE: 23
ADLS_ACUITY_SCORE: 21
ADLS_ACUITY_SCORE: 23
ADLS_ACUITY_SCORE: 23
ADLS_ACUITY_SCORE: 21
ADLS_ACUITY_SCORE: 21
ADLS_ACUITY_SCORE: 23
ADLS_ACUITY_SCORE: 21
ADLS_ACUITY_SCORE: 23

## 2024-01-22 NOTE — PLAN OF CARE
"Shift Summary:     Vitals: BP (!) 146/87 (BP Location: Right arm)   Pulse 98   Temp 97.9  F (36.6  C) (Axillary)   Resp 14   Ht 1.575 m (5' 2.01\")   Wt 81.8 kg (180 lb 6.4 oz)   LMP 11/05/2017   SpO2 96%   BMI 32.99 kg/m    Body mass index is 32.99 kg/m .    Pain: Patient continues to report constant pain r/t mucositis ranging from 5-7/10; 0.5mg IV Dilaudid utilized x2 with relief. Magic Mouthwash utilized x2 prior to oral pills. Ice chips utilized for alternative pain management.     Neuro: Aox4, significant fatigue  HEENT: Lesion/ plaque tongue, several lesions of buccal mucosa. Patient reported light sensitivity/ photophobia in the afternoon.  Resp: O2 satting above 95% on RA  Cardiac: Sinus tachycardic, low 100s. EKG completed this AM. Cardiac monitoring discontinued. Patient denies racing heart, palpitations, or chest pain. Continues to have HTN; did not meet PRN labetalol order.  Diet: Regular  GI: Intermittent nausea/indigestion/ acid reflux r/t to PRN IV Dilaudid, slow push and dilution required. Declined antiemetics. Very poor appetite; patient encouraged to drink and eat as tolerated. Patient reported dry mouth, PRN Biotene spray encouraged.  : No BM today. Urine output tea-colored.  Skin: Chest/ abdominal rash improving per patient. Patient declined hydrocortisone cream.  Activity: SBA  Drains: N/A  Lines: R LTCVC    Tacrolimus level this AM within normal range. Afternoon Plt draw @ 11.    LTCVC:  Red Lumen: 0.9NS @ TKO  Purple Lumen: Continuous IV Tacrolimus @ 5.9mL/ hr w/ 0.9NS @ TKO    Replacements: RBC and K Phos completed this AM.    Problem: Adult Inpatient Plan of Care  Goal: Optimal Comfort and Wellbeing  Outcome: Not Progressing       Problem: Stem Cell/Bone Marrow Transplant  Goal: Optimal Nutrition Intake  Outcome: Not Progressing    Intervention: Minimize and Manage Barriers to Oral Intake  Oral Nutrition Promotion: calorie-dense liquids provided    Goal Outcome Evaluation:      Plan " of Care Reviewed With: patient    Overall Patient Progress: decliningOverall Patient Progress: declining

## 2024-01-22 NOTE — PLAN OF CARE
Alert & oriented x4. AVSS on room air. Elevated BP. Max 154/82. Labetalol parameters not met. Tachycardic. Max 112. On tele monitoring. Tmax 100.7. Cultures drawn. Administered IV cefepime per provider order. Sepsis triggered. Lactic 0.5.     C/O mouth pain. IV dilaudid administered x1. One episode of nausea and dry heaves after Dilaudid administration. Refused antinausea meds and has denied nausea since then.     Hgb= 7. One unit RBC running. Mag 1.5. One bag of  mag running. K+ 2.3. Oral K+ administered. Phos 2.3. Will need 1 bag of Phos replacement. Continue plan of care.    Problem: Stem Cell/Bone Marrow Transplant  Goal: Optimal Coping with Transplant  Outcome: Progressing  Intervention: Optimize Patient/Family Adjustment to Transplant  Recent Flowsheet Documentation  Taken 1/21/2024 2000 by Shireen Hopkins, RN  Supportive Measures:   active listening utilized   verbalization of feelings encouraged   relaxation techniques promoted   positive reinforcement provided

## 2024-01-22 NOTE — PROGRESS NOTES
"  BMT Daily Progress Note  Patient ID:  Irma Foreman is a 53 year old female with a PMH of MDS, warm AIHA, transfusion and transfusion dependent anemia presented to  to undergo a myeloablative allogeneic transplant. She was recently COVID+ on 12/26 but tested negative on 1/2 with resolution of nearly all URI symptoms. Undergoing MA (Bu/Flu) 6/8 URD Allo transplant, day +11      HPI   Irma reports better pain control with IV dilaudid, though her abdominal and throat pain are ongoing. She has been focusing on drinking 2 ensures/day to avoid TPN. No new or worsening symptoms. Rash remains present to her abdomen. Did have another fever to 100.7 overnight. No new infectious symptoms to report today.        Review of Systems    Negative unless stated in the HPI.     PHYSICAL EXAM      Weight     Wt Readings from Last 3 Encounters:   01/22/24 81.8 kg (180 lb 6.4 oz)   12/26/23 83.1 kg (183 lb 4.8 oz)   12/19/23 83.5 kg (184 lb)        KPS: 70    BP (!) 154/88 (BP Location: Right arm)   Pulse 100   Temp 98.1  F (36.7  C) (Axillary)   Resp 10   Ht 1.575 m (5' 2.01\")   Wt 81.8 kg (180 lb 6.4 oz)   LMP 11/05/2017   SpO2 97%   BMI 32.99 kg/m       General: NAD  Eyes: KEVON, sclera anicteric   Nose/Mouth/Throat: Mucosa pink and moist. Right inner cheek slightly erythematous. Ulceration on R-posterior pharynx, tongue with ridging.   Lungs: CTA bilaterally   Cardiovascular: RRR, no M/R/G   Abdominal/Rectal: +BS, soft, mild generalized abdominal pain   Lymphatics: No edema  Skin: wart under right foot dime sized white, cauliflower and slightly ulcerated; Two pin point sized, flesh colored warts on right hand; Hands dry, one excoriation on left dorsum (not examined today). Very faint maculopapular rash present under breasts, abdomen and low back.    Neuro: A&O   Additional Findings: Gastelum site NT, no drainage.    Current aGVHD staging:  Skin 0, UGI 0, LGI 0, Liver 0 (keep in note through day +180 for allos)      LABS AND " IMAGING: I have assessed all abnormal lab values for their clinical significance and any values considered clinically significant have been addressed in the assessment and plan.        Lab Results   Component Value Date    WBC <0.1 (LL) 01/22/2024    ANEUTAUTO 1.0 (L) 01/15/2024    HGB 7.0 (L) 01/22/2024    HCT 20.0 (L) 01/22/2024    PLT 16 (LL) 01/22/2024     (L) 01/22/2024    POTASSIUM 3.6 01/22/2024    CHLORIDE 100 01/22/2024    CO2 27 01/22/2024     (H) 01/22/2024    BUN 6.5 01/22/2024    CR 0.34 (L) 01/22/2024    MAG 2.5 (H) 01/22/2024    INR 1.16 (H) 01/22/2024       US Abdominal with Doppler (1/18/24)  Impression:   1. Patent Doppler evaluation of the abdomen with antegrade flow throughout.  2. Elevated hepatic artery resistive index of 0.81, nonspecific though can be seen with hepatic venous congestion which could be related to venous occlusive disease..    SYSTEMS-BASED ASSESSMENT AND PLAN     Hortencia Baldwin is a 53 year old female with a history of MDS, undergoing MA (Bu/Flu) 6/8 URD Allo transplant, day +11      BMT/IEC PROTOCOL for MT 2015-29 **according to but not on trial**   - Chemo protocol:  Day -6 through day -1: Keppra and Allopurinol   Day -5 through -2: Bu/Flu. Busulfan levels adjusted by pharm, attending  Day -1: Rest day   Day 0: Transplant. Recipient: O+, CMV+. Donor: O+, CMV-. No flush. Cell dose 6.5 x10^6.   Gram+ bacilli cultured for stem cell product. On cefepime (1/11 - 1/18)    - Restaging plan: per protocol     HEME/COAG  # Iron overload: hx of transfusion dependent anemia, pre-transplant ferritin around 5,000. Recommend phlebotomy post transplant when retic count is restored and Hgb >10      - Pancytopenia due to chemotherapy and radiation.   # Platelet refractoriness: Platelets in single digits since 1/16. BID platelet checks. Responding to HLA platelets. Continue 1600 platelet checks x 3 days (1/21-1/23).    Transfusion medicine consulted 1/18: HLA typing completed;  will likely be able to receive HLA matched plts tomorrow (1/19).   **Blood bank fellow recommends no more than 2 units platelets/day d/t risk of TACO.  Discussed with blood bank fellow again 1/22. Will plan for 1 unit HLA platelets on 1/22, 1/23, and 1/24.    - Transfusion parameters: hemoglobin <7, platelets <10    IMMUNOCOMPROMISED  # Fever: (Last on 1/15) Recurrent on 1/21- tmax 100.7.  - UA: negative for LE & nitrites. 1/13 ua negative; BK urine positive/adeno negative (red/orange urine)  - Blood cultures NGTD. Procal elevated 1.08   - CXR with likely pulmonary edema  - RVP neg   - Cefepime (1/11 - 1/18; resumed 1/22-x). Brief course of IV Vancomycin. MRSA nasal swab negative.   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   EKG: new QR pattern in V1, suggests right ventricular conduction delay  Troponin negative x1   Echo (1/15): LVEF 55-60%, no pericardial effusion present   Cardiology consulted: no further recs   #Prolonged QTc to 607 on 1/22.    - Change levaquin to cefpodoxime. Repeated QTc on 1/22: 466.   # Hypertension with initiation of tacrolimus: start Amlodipine 5 mg daily (1/18), increased to BID (1/20). Labetalol 5 mg IV prn with parameters.   - Risk of cardiomyopathy:  Baseline EF 60-65%  - Risk of arrhythmia: Baseline EKG showed sinus rhythm     RESPIRATORY  - Current smoker: states she quit (1/5/24), offered nicotine replacement but she declined.   - Baseline PFTs: The FEV1, FVC, FEV1/FVC ratio and  FON06-31% are within normal limits.  The inspiratory flow rates are within normal limits.  Lung volumes are within normal limits.  The diffusing capacity is normal.   - Risk of respiratory complications: Frequent ambulation and incentive spirometer.    GI/NUTRITION  # Epigastric pain (1/18): RUQ ultrasound showing patent doppler throughout, no ascites, nonspecific elevated hepatic artery restrictive index (see full impression above). Patient does not appear fluid overloaded and LFTs and weight are relatively stable. Thoroughly discussed with attending and will continue to monitor closely for VOD; checking hepatic panel daily.     - IV PPI BID (1/19)     # Previously elevated liver enzymes that began to improve: could be related to Tylenol use, iron overload or recent viral illness. RUQ U/S (12/27) was normal. Acute hepatitis panel negative. Monitoring daily as above.    - Ulcer prophylaxis: PPI  - VOD ppx: Ursodiol   # Moderate malnutrition in the context of acute illness: dietitian following. Avoiding TPN for now with concern for VOD, will continue to encourage PO intake  # Mouth pain likely 2/2 the start of oral mucositis: added MMW prn (1/18), IV dilaudid 0.5 - 1.0 mg Q4H.      DERM:  # Right hand dryness, try aquafor with gloves on overnight and as tolerated during the day  # Foot, hand warts. Curbside derm on 1/8, no recs while inpatient, typically managed OP.    # Pink maculopapular rash present under breasts and across abdomen: possible cefepime rash vs other? (1/18) Continue Hydrocortisone BID.     DIABETES/ENDOCRINE  - Risk of steroid-induced hyperglyemia: Monitor BG, sliding scale if needed    MUSCULOSKELETAL/FRAILTY  - Baseline Frailty Score: 3  - Daily PT/OT as needed while inpatient  - Cancer Rehab as needed outpatient    SYMPTOM MANAGEMENT  - Nausea from chemo/radiation: Prochlorperazine, ondansetron, lorazepam.    SOCIAL DETERMINANTS  - Caregiver: Keira Cardenas & Rickey Neal  "      Disposition: remain admitted through engraftment    Known issues that I take into account for medical decisions, with salient changes to the plan considering these complexities noted above.    Patient Active Problem List   Diagnosis    Left medial knee pain    Obesity (BMI 30-39.9)    Anemia, unspecified type    Macrocytic anemia    MDS (myelodysplastic syndrome) (H)     Clinically Significant Risk Factors            # Hypomagnesemia: Lowest Mg = 1.3 mg/dL in last 2 days, will replace as needed   # Hypoalbuminemia: Lowest albumin = 3.3 g/dL at 1/15/2024  2:35 AM, will monitor as appropriate  # Coagulation Defect: INR = 1.16 (Ref range: 0.85 - 1.15) and/or PTT = 28 Seconds (Ref range: 22 - 38 Seconds), will monitor for bleeding  # Thrombocytopenia: Lowest platelets = 6 in last 2 days, will monitor for bleeding          # Obesity: Estimated body mass index is 32.99 kg/m  as calculated from the following:    Height as of this encounter: 1.575 m (5' 2.01\").    Weight as of this encounter: 81.8 kg (180 lb 6.4 oz).   # Moderate Malnutrition: based on nutrition assessment            I spent 40 minutes in the care of this patient today, which included time necessary for preparation for the visit, obtaining history, ordering medications/tests/procedures as medically indicated, review of pertinent medical literature, counseling of the patient, communication of recommendations to the care team, and documentation time.    Ronit Ruth, SIMONE        ______________________________________________      BMT ATTENDING NOTE    Hortencia Baldwin is a 53 year old female, who is day 11 of transplant for MDS, complicated by iron overload.     My overall impression is that Hortencia Sethi is at her arlene, awaiting engraftment.  We are continuing transfusion support; responded well to HLA matched platelets. She has neutropenic fever currently on cefepime.  She has developed abdominal pain and platelet refractoriness. We assessed for VOD " by US which shows antegrade for by high arterial resistance index. She did not have hepatomegaly or ascites. She does not meet VOD criteria at this time, but requires close monitoring for this. She is at risk of malnutrition and has had some loose stools, likely a complication of her conditioning. Monitor for neutropenic enterocolitis, encouraged oral nutrition to avoid further insult to her liver from TPN if possible. More mucositis pain; provide IV analgesia and consider PCA if worsening. Provide antiemetics, monitor for further infections, and continue to provide G-CSF to hasten engraftment.  Palpitations and tachycardic today with prolonged QTC; give IV fluids for dehydration, minimize QT prolonging agents. Remainder as above.    I spent 50 minutes in the care of Hortencia Navdeep today, including an independent face-to-face assessment and time monitoring for restoration of hematopoiesis, high-risk organ toxicities from chemotherapy, and GVHD, managing infectious risk due to her immunocompromised state, and encouraging nutrition and physical activity to prevent debility and sarcopenia.  I personally performed all of the medical decision making associated with this visit, counseled Hortencia Navdeep on my overall assessment and recommendations, communicated my plan to the care team, and edited the above note to reflect my current plan of care.       Stephanie Yi MD      Securely message with the Vocera Web Console

## 2024-01-22 NOTE — PLAN OF CARE
"BP (!) 141/81   Pulse 110   Temp 98.2  F (36.8  C) (Axillary)   Resp 18   Ht 1.575 m (5' 2.01\")   Wt 81.3 kg (179 lb 4.8 oz)   LMP 11/05/2017   SpO2 98%   BMI 32.79 kg/m    Tmax 100.1, HTN continues, pt c/o \"fast heart rate\" after coming back from bathroom this afternoon, HR regular, but 120s-PA notified.  EKG performed, telemetry placed, 1L IVF bolus given, Mg replaced (increased to high scale).  Pt up independently in room, c/o being \"tired\", declined shower, but head was shaved, will do CHG this evening.  Pt had 1 Ensure, working on second-goal is to have 2/day.  Dilaudid 1mg x2, 0.5mg x2 with some relief, pt reports less pain when drinking.  HLA platelets transfused for count 7.  Continue to encourage PO intake, activity.  Problem: Stem Cell/Bone Marrow Transplant  Goal: Improved Activity Tolerance  Outcome: Not Progressing     Problem: Stem Cell/Bone Marrow Transplant  Goal: Optimal Nutrition Intake  Outcome: Not Progressing     Problem: Adult Inpatient Plan of Care  Goal: Plan of Care Review  Description: The Plan of Care Review/Shift note should be completed every shift.  The Outcome Evaluation is a brief statement about your assessment that the patient is improving, declining, or no change.  This information will be displayed automatically on your shift  note.  Outcome: Progressing     Problem: Stem Cell/Bone Marrow Transplant  Goal: Optimal Coping with Transplant  Outcome: Progressing     Problem: Stem Cell/Bone Marrow Transplant  Goal: Diarrhea Symptom Control  Outcome: Progressing     Problem: Stem Cell/Bone Marrow Transplant  Goal: Absence of Infection  Outcome: Progressing     Problem: Stem Cell/Bone Marrow Transplant  Goal: Improved Oral Mucous Membrane Health and Integrity  Outcome: Progressing   Goal Outcome Evaluation:                        "

## 2024-01-22 NOTE — PLAN OF CARE
Goal Outcome Evaluation:      Plan of Care Reviewed With: patient    Overall Patient Progress: decliningOverall Patient Progress: declining    Outcome Evaluation: appetite further declined over weekend, increased mouth pain. Encouraged intake post pain medication. Trial of magic cup

## 2024-01-22 NOTE — PROGRESS NOTES
CLINICAL NUTRITION SERVICES - REASSESSMENT NOTE     Nutrition Prescription    RECOMMENDATIONS FOR MDs/PROVIDERS TO ORDER:  Encourage oral intake, if not able to increase PO in the next 2-3 days, strongly recommend nutrition support   -Continue to optimize pain control to assist in PO intake     Malnutrition Status:    Moderate malnutrition in the context of chronic illness     Recommendations already ordered by Registered Dietitian (RD):  Continue ensure daily   Trial of chocolate magic cup, ensure clear (both flavors) and boost soothe    Future/Additional Recommendations:  Monitor appetite, oral intake, use of supplements  Monitor ability to maintain weight      If unable to take adequate      Dosing weight:  58.3 kg  Access: Central     Initial parameters (per day)  Volume:  1080 mL  Dextrose: 125 g  AA: 90 g  Lipids: 250 mL 20%, 5 days per week      Dextrose titration:   Monitor lytes and if within acceptable parameters (Mg++ > or = 1.5, K+ is > or = 3, and PO4 > or = 1.9), increase dextrose by 50-55 g/day to goal of 230 g dextrose.     Additives: 10 mL infuvite + 1 mL MTE-4     Goal PN provides 230 g dextrose, 90 g AA, and 250 mL 20% lipids 5 days per week for total provision of 1499 Kcals (25 Kcals/kg), 1.5 g/kg protein, GIR =2.73 mg/kg/minute, and 23 % fat kcals on average daily.      EVALUATION OF THE PROGRESS TOWARD GOALS   Diet: High Kcal/High Protein + ensure daily @ 10:00/2:00 and   Intake: No intakes noted since 1/19  - 25% on 1/18, 100% x 3 meals 1/17      NEW FINDINGS   Day + 11     Per review of chart, Irma's intake significantly declined over the weekend. She ws resting on bed at time of visit reporting mouth pain and appetite are worse. Last week she was able to consume about 3 ensure per day in addition to small meals, but today she has not been able to do that.     Weight Trends:   Date/Time Weight Weight Method   01/21/24 0822 81.3 kg (179 lb 4.8 oz) Standing scale   01/19/24 0829 82.9 kg (182  lb 11.2 oz) Standing scale   01/16/24 0835 82.8 kg (182 lb 9.6 oz) Standing scale   01/14/24 0814 82.1 kg (181 lb) Standing scale   01/11/24 0930 84.4 kg (186 lb 1.1 oz) Standing scale   01/09/24 0803 83.4 kg (183 lb 12.8 oz) Standing scale   01/07/24 0815 84.1 kg (185 lb 4.8 oz) Standing scale   01/05/24 0734 83.8 kg (184 lb 11.2 oz) Standing scale     GI: Dry mouth, abdominal discomfort/pain. Continues to have taste changes. Looser stools per report, Per I/O 1 stool today.   Skin: Fermin 19.     Labs:   Na 134 (L), K+ 3.6, Mg 2.5 (H), Po4 2.3 (L)  Creatinine 0.34 (L)  WBC <0.1 (L)    Medications:   Mycophenolate  Protonix  Potassium Phosphate  Ursodiol  Tacrolimus    MALNUTRITION  % Intake: < 50% for > 5 days (severe)  % Weight Loss: 1-2% in 1 week (moderate)  Subcutaneous Fat Loss: None observed  Muscle Loss: Dorsal hand:  mild, per last assessment, limited due to position   Fluid Accumulation/Edema: Does not meet criteria  Malnutrition Diagnosis: Moderate malnutrition in the context of chronic illness     Previous Goals   Patient to consume % of nutritionally adequate meal trays TID, or the equivalent with supplements/snacks.   Evaluation: Not Met    Weight to maintain >/= 79 kg   Evaluation: Met    Previous Nutrition Diagnosis  Inadequate oral intake related to decreased appetite as evidenced by patient reported, variable PO, overall <75% for 1 week and mild muscle loss    Evaluation: Declining    CURRENT NUTRITION DIAGNOSIS  Inadequate oral intake related to worsening dry mouth, mucositis and decreased appetite as evidenced by minimal intakes since 1/18    INTERVENTIONS  Implementation  Collaboration with other providers  Nutrition education for recommended modifications  -Encouraged trial of biotene and PO intake after pain medication   -Encouraged trial of calorie dense foods/cold foods for throat pain     Goals  Patient to consume % of nutritionally adequate meal trays TID, or the equivalent  with supplements/snacks.    Monitoring/Evaluation  Progress toward goals will be monitored and evaluated per protocol.    Bessie Alvarenga RD, LD  5C/BMT pager: [phone]

## 2024-01-22 NOTE — PROVIDER NOTIFICATION
"Provider Keaton Arnold paged:\" Pt's temp 100.7. Cultures drawn. Sepsis triggered. Lactic 0.5\".    Response: \"Will restart Cefepime\".  "

## 2024-01-23 ENCOUNTER — APPOINTMENT (OUTPATIENT)
Dept: PHYSICAL THERAPY | Facility: CLINIC | Age: 54
DRG: 014 | End: 2024-01-23
Attending: STUDENT IN AN ORGANIZED HEALTH CARE EDUCATION/TRAINING PROGRAM
Payer: COMMERCIAL

## 2024-01-23 LAB
ABO/RH(D): NORMAL
ACANTHOCYTES BLD QL SMEAR: ABNORMAL
ALBUMIN SERPL BCG-MCNC: 3.2 G/DL (ref 3.5–5.2)
ALP SERPL-CCNC: 104 U/L (ref 40–150)
ALT SERPL W P-5'-P-CCNC: 21 U/L (ref 0–50)
ANION GAP SERPL CALCULATED.3IONS-SCNC: 8 MMOL/L (ref 7–15)
ANTIBODY SCREEN: NEGATIVE
AST SERPL W P-5'-P-CCNC: 13 U/L (ref 0–45)
AUER BODIES BLD QL SMEAR: ABNORMAL
BASO STIPL BLD QL SMEAR: ABNORMAL
BASOPHILS # BLD AUTO: ABNORMAL 10*3/UL
BASOPHILS NFR BLD AUTO: ABNORMAL %
BILIRUB SERPL-MCNC: 0.5 MG/DL
BITE CELLS BLD QL SMEAR: ABNORMAL
BLD PROD TYP BPU: NORMAL
BLD PROD TYP BPU: NORMAL
BLISTER CELLS BLD QL SMEAR: ABNORMAL
BLOOD COMPONENT TYPE: NORMAL
BLOOD COMPONENT TYPE: NORMAL
BUN SERPL-MCNC: 7.8 MG/DL (ref 6–20)
BURR CELLS BLD QL SMEAR: ABNORMAL
CALCIUM SERPL-MCNC: 8.9 MG/DL (ref 8.6–10)
CHLORIDE SERPL-SCNC: 103 MMOL/L (ref 98–107)
CODING SYSTEM: NORMAL
CODING SYSTEM: NORMAL
CREAT SERPL-MCNC: 0.39 MG/DL (ref 0.51–0.95)
DACRYOCYTES BLD QL SMEAR: SLIGHT
DEPRECATED HCO3 PLAS-SCNC: 26 MMOL/L (ref 22–29)
EGFRCR SERPLBLD CKD-EPI 2021: >90 ML/MIN/1.73M2
ELLIPTOCYTES BLD QL SMEAR: ABNORMAL
EOSINOPHIL # BLD AUTO: ABNORMAL 10*3/UL
EOSINOPHIL NFR BLD AUTO: ABNORMAL %
ERYTHROCYTE [DISTWIDTH] IN BLOOD BY AUTOMATED COUNT: 17.2 % (ref 10–15)
FRAGMENTS BLD QL SMEAR: ABNORMAL
GLUCOSE SERPL-MCNC: 115 MG/DL (ref 70–99)
HCT VFR BLD AUTO: 22.7 % (ref 35–47)
HGB BLD-MCNC: 8.1 G/DL (ref 11.7–15.7)
HGB C CRYSTALS: ABNORMAL
HOWELL-JOLLY BOD BLD QL SMEAR: ABNORMAL
IMM GRANULOCYTES # BLD: ABNORMAL 10*3/UL
IMM GRANULOCYTES NFR BLD: ABNORMAL %
ISSUE DATE AND TIME: NORMAL
ISSUE DATE AND TIME: NORMAL
LYMPHOCYTES # BLD AUTO: ABNORMAL 10*3/UL
LYMPHOCYTES NFR BLD AUTO: ABNORMAL %
MAGNESIUM SERPL-MCNC: 1.5 MG/DL (ref 1.7–2.3)
MAGNESIUM SERPL-MCNC: 2.2 MG/DL (ref 1.7–2.3)
MCH RBC QN AUTO: 32 PG (ref 26.5–33)
MCHC RBC AUTO-ENTMCNC: 35.7 G/DL (ref 31.5–36.5)
MCV RBC AUTO: 90 FL (ref 78–100)
MONOCYTES # BLD AUTO: ABNORMAL 10*3/UL
MONOCYTES NFR BLD AUTO: ABNORMAL %
NEUTROPHILS # BLD AUTO: ABNORMAL 10*3/UL
NEUTROPHILS NFR BLD AUTO: ABNORMAL %
NEUTS HYPERSEG BLD QL SMEAR: ABNORMAL
PHOSPHATE SERPL-MCNC: 2.3 MG/DL (ref 2.5–4.5)
PLAT MORPH BLD: ABNORMAL
PLATELET # BLD AUTO: 7 10E3/UL (ref 150–450)
PLATELET # BLD AUTO: 8 10E3/UL (ref 150–450)
POLYCHROMASIA BLD QL SMEAR: ABNORMAL
POTASSIUM SERPL-SCNC: 3.7 MMOL/L (ref 3.4–5.3)
PROT SERPL-MCNC: 6.2 G/DL (ref 6.4–8.3)
RBC # BLD AUTO: 2.53 10E6/UL (ref 3.8–5.2)
RBC AGGLUT BLD QL: ABNORMAL
RBC MORPH BLD: ABNORMAL
ROULEAUX BLD QL SMEAR: ABNORMAL
SICKLE CELLS BLD QL SMEAR: ABNORMAL
SMUDGE CELLS BLD QL SMEAR: ABNORMAL
SODIUM SERPL-SCNC: 137 MMOL/L (ref 135–145)
SPECIMEN EXPIRATION DATE: NORMAL
SPHEROCYTES BLD QL SMEAR: ABNORMAL
STOMATOCYTES BLD QL SMEAR: ABNORMAL
TARGETS BLD QL SMEAR: SLIGHT
TOXIC GRANULES BLD QL SMEAR: ABNORMAL
UNIT ABO/RH: NORMAL
UNIT ABO/RH: NORMAL
UNIT NUMBER: NORMAL
UNIT NUMBER: NORMAL
UNIT STATUS: NORMAL
UNIT STATUS: NORMAL
UNIT TYPE ISBT: 6200
UNIT TYPE ISBT: 6200
VARIANT LYMPHS BLD QL SMEAR: ABNORMAL
WBC # BLD AUTO: 0.1 10E3/UL (ref 4–11)

## 2024-01-23 PROCEDURE — 206N000001 HC R&B BMT UMMC

## 2024-01-23 PROCEDURE — 250N000011 HC RX IP 250 OP 636: Mod: JZ

## 2024-01-23 PROCEDURE — 99233 SBSQ HOSP IP/OBS HIGH 50: CPT | Mod: FS

## 2024-01-23 PROCEDURE — 250N000013 HC RX MED GY IP 250 OP 250 PS 637

## 2024-01-23 PROCEDURE — 97110 THERAPEUTIC EXERCISES: CPT | Mod: GP

## 2024-01-23 PROCEDURE — 258N000003 HC RX IP 258 OP 636: Performed by: INTERNAL MEDICINE

## 2024-01-23 PROCEDURE — 86900 BLOOD TYPING SEROLOGIC ABO: CPT

## 2024-01-23 PROCEDURE — 85027 COMPLETE CBC AUTOMATED: CPT

## 2024-01-23 PROCEDURE — 83735 ASSAY OF MAGNESIUM: CPT | Performed by: INTERNAL MEDICINE

## 2024-01-23 PROCEDURE — 80053 COMPREHEN METABOLIC PANEL: CPT

## 2024-01-23 PROCEDURE — 250N000009 HC RX 250: Performed by: INTERNAL MEDICINE

## 2024-01-23 PROCEDURE — 85049 AUTOMATED PLATELET COUNT: CPT

## 2024-01-23 PROCEDURE — 97530 THERAPEUTIC ACTIVITIES: CPT | Mod: GP

## 2024-01-23 PROCEDURE — 250N000011 HC RX IP 250 OP 636

## 2024-01-23 PROCEDURE — P9037 PLATE PHERES LEUKOREDU IRRAD: HCPCS | Performed by: INTERNAL MEDICINE

## 2024-01-23 PROCEDURE — 250N000011 HC RX IP 250 OP 636: Performed by: INTERNAL MEDICINE

## 2024-01-23 PROCEDURE — C9113 INJ PANTOPRAZOLE SODIUM, VIA: HCPCS

## 2024-01-23 PROCEDURE — 258N000003 HC RX IP 258 OP 636

## 2024-01-23 PROCEDURE — 99418 PROLNG IP/OBS E/M EA 15 MIN: CPT

## 2024-01-23 PROCEDURE — 250N000009 HC RX 250

## 2024-01-23 PROCEDURE — 250N000013 HC RX MED GY IP 250 OP 250 PS 637: Performed by: INTERNAL MEDICINE

## 2024-01-23 PROCEDURE — 84100 ASSAY OF PHOSPHORUS: CPT | Performed by: INTERNAL MEDICINE

## 2024-01-23 PROCEDURE — 250N000011 HC RX IP 250 OP 636: Mod: JZ | Performed by: STUDENT IN AN ORGANIZED HEALTH CARE EDUCATION/TRAINING PROGRAM

## 2024-01-23 RX ORDER — URSODIOL 300 MG/1
300 CAPSULE ORAL 3 TIMES DAILY
Status: DISCONTINUED | OUTPATIENT
Start: 2024-01-23 | End: 2024-02-04 | Stop reason: HOSPADM

## 2024-01-23 RX ORDER — MAGNESIUM HYDROXIDE/ALUMINUM HYDROXICE/SIMETHICONE 120; 1200; 1200 MG/30ML; MG/30ML; MG/30ML
30 SUSPENSION ORAL EVERY 4 HOURS PRN
Status: DISCONTINUED | OUTPATIENT
Start: 2024-01-23 | End: 2024-02-04 | Stop reason: HOSPADM

## 2024-01-23 RX ORDER — POTASSIUM PHOS IN 0.9 % NACL 15MMOL/250
15 PLASTIC BAG, INJECTION (ML) INTRAVENOUS ONCE
Qty: 250 ML | Refills: 0 | Status: COMPLETED | OUTPATIENT
Start: 2024-01-23 | End: 2024-01-23

## 2024-01-23 RX ORDER — POTASSIUM CHLORIDE 29.8 MG/ML
20 INJECTION INTRAVENOUS ONCE
Status: COMPLETED | OUTPATIENT
Start: 2024-01-23 | End: 2024-01-23

## 2024-01-23 RX ORDER — HYDROMORPHONE HYDROCHLORIDE 1 MG/ML
0.5 INJECTION, SOLUTION INTRAMUSCULAR; INTRAVENOUS; SUBCUTANEOUS EVERY 4 HOURS PRN
Status: DISCONTINUED | OUTPATIENT
Start: 2024-01-23 | End: 2024-01-24

## 2024-01-23 RX ORDER — MAGNESIUM SULFATE HEPTAHYDRATE 40 MG/ML
4 INJECTION, SOLUTION INTRAVENOUS ONCE
Status: COMPLETED | OUTPATIENT
Start: 2024-01-23 | End: 2024-01-23

## 2024-01-23 RX ADMIN — HYDROMORPHONE HYDROCHLORIDE 0.5 MG: 1 INJECTION, SOLUTION INTRAMUSCULAR; INTRAVENOUS; SUBCUTANEOUS at 19:57

## 2024-01-23 RX ADMIN — SODIUM CHLORIDE 1000 ML: 9 INJECTION, SOLUTION INTRAVENOUS at 14:07

## 2024-01-23 RX ADMIN — MYCOPHENOLATE MOFETIL 1250 MG: 500 INJECTION, POWDER, LYOPHILIZED, FOR SOLUTION INTRAVENOUS at 20:53

## 2024-01-23 RX ADMIN — ACETAMINOPHEN 650 MG: 325 TABLET, FILM COATED ORAL at 04:38

## 2024-01-23 RX ADMIN — HYDROCORTISONE: 1 CREAM TOPICAL at 08:21

## 2024-01-23 RX ADMIN — PROCHLORPERAZINE EDISYLATE 5 MG: 5 INJECTION INTRAMUSCULAR; INTRAVENOUS at 02:38

## 2024-01-23 RX ADMIN — MICAFUNGIN SODIUM 150 MG: 50 INJECTION, POWDER, LYOPHILIZED, FOR SOLUTION INTRAVENOUS at 17:00

## 2024-01-23 RX ADMIN — PANTOPRAZOLE SODIUM 40 MG: 40 INJECTION, POWDER, FOR SOLUTION INTRAVENOUS at 08:20

## 2024-01-23 RX ADMIN — DIPHENHYDRAMINE HYDROCHLORIDE 25 MG: 25 CAPSULE ORAL at 18:25

## 2024-01-23 RX ADMIN — DEXTROSE MONOHYDRATE 20 ML: 50 INJECTION, SOLUTION INTRAVENOUS at 20:53

## 2024-01-23 RX ADMIN — DIPHENHYDRAMINE HYDROCHLORIDE 25 MG: 50 INJECTION, SOLUTION INTRAMUSCULAR; INTRAVENOUS at 04:38

## 2024-01-23 RX ADMIN — DEXTROSE MONOHYDRATE 420 MCG: 50 INJECTION, SOLUTION INTRAVENOUS at 20:02

## 2024-01-23 RX ADMIN — DIPHENHYDRAMINE HYDROCHLORIDE AND LIDOCAINE HYDROCHLORIDE AND ALUMINUM HYDROXIDE AND MAGNESIUM HYDRO 10 ML: KIT at 14:18

## 2024-01-23 RX ADMIN — PANTOPRAZOLE SODIUM 40 MG: 40 INJECTION, POWDER, FOR SOLUTION INTRAVENOUS at 19:52

## 2024-01-23 RX ADMIN — ALUMINUM HYDROXIDE, MAGNESIUM HYDROXIDE, AND SIMETHICONE 30 ML: 200; 200; 20 SUSPENSION ORAL at 14:20

## 2024-01-23 RX ADMIN — URSODIOL 300 MG: 300 CAPSULE ORAL at 14:09

## 2024-01-23 RX ADMIN — HYDROMORPHONE HYDROCHLORIDE 0.5 MG: 1 INJECTION, SOLUTION INTRAMUSCULAR; INTRAVENOUS; SUBCUTANEOUS at 02:38

## 2024-01-23 RX ADMIN — URSODIOL 300 MG: 300 CAPSULE ORAL at 19:49

## 2024-01-23 RX ADMIN — POTASSIUM PHOSPHATE, MONOBASIC POTASSIUM PHOSPHATE, DIBASIC 15 MMOL: 224; 236 INJECTION, SOLUTION, CONCENTRATE INTRAVENOUS at 06:44

## 2024-01-23 RX ADMIN — CEFEPIME 2 G: 2 INJECTION, POWDER, FOR SOLUTION INTRAVENOUS at 02:31

## 2024-01-23 RX ADMIN — ACYCLOVIR SODIUM 850 MG: 50 INJECTION, SOLUTION INTRAVENOUS at 17:47

## 2024-01-23 RX ADMIN — HYDROMORPHONE HYDROCHLORIDE 0.5 MG: 1 INJECTION, SOLUTION INTRAMUSCULAR; INTRAVENOUS; SUBCUTANEOUS at 14:07

## 2024-01-23 RX ADMIN — POTASSIUM CHLORIDE 20 MEQ: 29.8 INJECTION, SOLUTION INTRAVENOUS at 04:38

## 2024-01-23 RX ADMIN — URSODIOL 300 MG: 300 CAPSULE ORAL at 08:21

## 2024-01-23 RX ADMIN — DEXTROSE MONOHYDRATE 20 ML: 50 INJECTION, SOLUTION INTRAVENOUS at 19:56

## 2024-01-23 RX ADMIN — AMLODIPINE BESYLATE 5 MG: 5 TABLET ORAL at 08:21

## 2024-01-23 RX ADMIN — PROCHLORPERAZINE EDISYLATE 5 MG: 5 INJECTION INTRAMUSCULAR; INTRAVENOUS at 19:44

## 2024-01-23 RX ADMIN — HYDROMORPHONE HYDROCHLORIDE 0.5 MG: 1 INJECTION, SOLUTION INTRAMUSCULAR; INTRAVENOUS; SUBCUTANEOUS at 08:20

## 2024-01-23 RX ADMIN — MYCOPHENOLATE MOFETIL 1250 MG: 500 INJECTION, POWDER, LYOPHILIZED, FOR SOLUTION INTRAVENOUS at 08:20

## 2024-01-23 RX ADMIN — ACYCLOVIR SODIUM 850 MG: 50 INJECTION, SOLUTION INTRAVENOUS at 03:12

## 2024-01-23 RX ADMIN — DIPHENHYDRAMINE HYDROCHLORIDE AND LIDOCAINE HYDROCHLORIDE AND ALUMINUM HYDROXIDE AND MAGNESIUM HYDRO 10 ML: KIT at 19:50

## 2024-01-23 RX ADMIN — DIPHENHYDRAMINE HYDROCHLORIDE AND LIDOCAINE HYDROCHLORIDE AND ALUMINUM HYDROXIDE AND MAGNESIUM HYDRO 10 ML: KIT at 08:20

## 2024-01-23 RX ADMIN — CEFEPIME 2 G: 2 INJECTION, POWDER, FOR SOLUTION INTRAVENOUS at 10:57

## 2024-01-23 RX ADMIN — AMLODIPINE BESYLATE 5 MG: 5 TABLET ORAL at 19:49

## 2024-01-23 RX ADMIN — ACYCLOVIR SODIUM 850 MG: 50 INJECTION, SOLUTION INTRAVENOUS at 12:32

## 2024-01-23 RX ADMIN — ACETAMINOPHEN 650 MG: 325 TABLET, FILM COATED ORAL at 18:25

## 2024-01-23 RX ADMIN — CEFEPIME 2 G: 2 INJECTION, POWDER, FOR SOLUTION INTRAVENOUS at 17:00

## 2024-01-23 RX ADMIN — MAGNESIUM SULFATE IN WATER 4 G: 4 INJECTION, SOLUTION INTRAVENOUS at 06:13

## 2024-01-23 ASSESSMENT — ACTIVITIES OF DAILY LIVING (ADL)
ADLS_ACUITY_SCORE: 23

## 2024-01-23 NOTE — PLAN OF CARE
"Shift Summary:     Vitals:  BP (!) 158/88 (BP Location: Right arm)   Pulse 110   Temp 97.1  F (36.2  C) (Axillary)   Resp 16   Ht 1.575 m (5' 2.01\")   Wt 82 kg (180 lb 12.8 oz)   LMP 11/05/2017   SpO2 96%   BMI 33.06 kg/m    Body mass index is 33.06 kg/m .    Pain: Patient continues to have mucositis/ esophagitis pain, rating 5-7/10. Administered 0.5mg IV Dilaudid x2 during shift, diluted and slow push d/t indigestion/ heartburn. PRN MMW utilized x2. PRN Maalox utilized 1x. Biotene spray for dry mouth, and ice chips for alternative pain management.    Neuro: Aox4. Continues to report significant fatigue.   HEENT: Lesions, Tongue coating, and buccal ulcerations noted r/t mucositis.  Resp: Denies shortness of breath. Satting >95% on RA.  Cardiac: Denies chest pain, and palpitations. Remains tachycardic, and HTN; primary service is aware.  Diet: Regular.  GI: Very minimal oral intake. Couple bites of mandarin oranges fruit cup. Heavily encouraged to drink and eat as able. Intermittent nausea r/t IV Dilaudid; dilution, slow push, and utilizing aromatherapy alleviates this.   : 2 BM during shift. Stool mucous, and loose.  Skin: Abdominal/ Back rash resolving.  Activity: SBA  Drains: N/A  Lines: R LTCVC    LTCVC:  Red Lumen: 0.9NS @ TKO.  Purple Lumen: Continuous IV Tacrolimus @ 5.9mL/hr w/ 0.9NS @ TKO.    Replacements: Afternoon Plt Recheck was 8, pre-meded, infusing 1U HLA-matched Plt.      Problem: Adult Inpatient Plan of Care  Goal: Optimal Comfort and Wellbeing  Intervention: Monitor Pain and Promote Comfort  Recent Flowsheet Documentation  Taken 1/23/2024 1407 by Chari Ramsey, RN  Pain Management Interventions:   medication (see MAR)   cold applied  Taken 1/23/2024 0820 by Chari Ramsey, RN  Pain Management Interventions:   medication (see MAR)   cold applied     Problem: Stem Cell/Bone Marrow Transplant  Goal: Improved Activity Tolerance  Intervention: Promote Improved Energy  Recent Flowsheet " Documentation  Taken 1/23/2024 0820 by Chari Ramsey RN  Activity Management: activity adjusted per tolerance     Problem: Stem Cell/Bone Marrow Transplant  Goal: Nausea and Vomiting Symptom Relief  Intervention: Prevent and Manage Nausea and Vomiting  Recent Flowsheet Documentation  Taken 1/23/2024 0756 by Chari Ramsey RN  Nausea/Vomiting Interventions:   slow deep breathing encouraged   nausea triggers minimized     Problem: Stem Cell/Bone Marrow Transplant  Goal: Optimal Nutrition Intake  Outcome: Not Progressing  Intervention: Minimize and Manage Barriers to Oral Intake  Recent Flowsheet Documentation  Taken 1/23/2024 0756 by Chari Ramsey RN  Oral Nutrition Promotion: calorie-dense liquids provided       Goal Outcome Evaluation:      Plan of Care Reviewed With: patient    Overall Patient Progress: decliningOverall Patient Progress: declining

## 2024-01-23 NOTE — PROGRESS NOTES
"  BMT Daily Progress Note  Patient ID:  Irma Foreman is a 53 year old female with a PMH of MDS, warm AIHA, transfusion and transfusion dependent anemia presented to  to undergo a myeloablative allogeneic transplant. She was recently COVID+ on 12/26 but tested negative on 1/2 with resolution of nearly all URI symptoms. Undergoing MA (Bu/Flu) 6/8 URD Allo transplant, day +12      HPI     Irma is feeling worse today. Oral mucositis is hindering her ability to eat or drink anything. She is receiving IV dilaudid for pain control and declined starting a dilaudid PCA. She has been maintaining her weight and we have been holding off on TPN d/t its effects on the liver. Plan to watch her oral intake over the next few days because she may engraft soon. Reports resolution abdominal pain but describes symptoms of reflux.       Review of Systems    Negative unless stated in the HPI.     PHYSICAL EXAM      Weight     Wt Readings from Last 3 Encounters:   01/23/24 82 kg (180 lb 12.8 oz)   12/26/23 83.1 kg (183 lb 4.8 oz)   12/19/23 83.5 kg (184 lb)        KPS: 70    BP (!) 144/86 (BP Location: Right arm)   Pulse 104   Temp 97.3  F (36.3  C) (Axillary)   Resp 16   Ht 1.575 m (5' 2.01\")   Wt 82 kg (180 lb 12.8 oz)   LMP 11/05/2017   SpO2 97%   BMI 33.06 kg/m       General: NAD   Eyes: KEVON, sclera anicteric   Nose/Mouth/Throat: Mucosa pink and moist. Right inner cheek slightly erythematous. Ulceration on R-posterior pharynx, tongue with ridging.   Lungs: CTA bilaterally   Cardiovascular: RRR, no M/R/G   Abdominal/Rectal: +BS, soft, non tender   Lymphatics: No edema  Skin: wart under right foot dime sized white, cauliflower and slightly ulcerated; Two pin point sized, flesh colored warts on right hand; Hands dry, one excoriation on left dorsum (not examined today). Very faint maculopapular rash present under breasts, abdomen and low back.    Neuro: A&O   Additional Findings: Gastelum site NT, no drainage.    Current aGVHD " staging:  Skin 0, UGI 0, LGI 0, Liver 0 (keep in note through day +180 for allos)      LABS AND IMAGING: I have assessed all abnormal lab values for their clinical significance and any values considered clinically significant have been addressed in the assessment and plan.        Lab Results   Component Value Date    WBC 0.1 (LL) 01/23/2024    ANEUTAUTO 1.0 (L) 01/15/2024    HGB 8.1 (L) 01/23/2024    HCT 22.7 (L) 01/23/2024    PLT 7 (LL) 01/23/2024     01/23/2024    POTASSIUM 3.7 01/23/2024    CHLORIDE 103 01/23/2024    CO2 26 01/23/2024     (H) 01/23/2024    BUN 7.8 01/23/2024    CR 0.39 (L) 01/23/2024    MAG 2.2 01/23/2024    INR 1.16 (H) 01/22/2024       US Abdominal with Doppler (1/18/24)  Impression:   1. Patent Doppler evaluation of the abdomen with antegrade flow throughout.  2. Elevated hepatic artery resistive index of 0.81, nonspecific though can be seen with hepatic venous congestion which could be related to venous occlusive disease..    SYSTEMS-BASED ASSESSMENT AND PLAN     Hortencia Baldwin is a 53 year old female with a history of MDS, undergoing MA (Bu/Flu) 6/8 URD Allo transplant, day +12      BMT/IEC PROTOCOL for MT 2015-29 **according to but not on trial**   - Chemo protocol:  Day -6 through day -1: Keppra and Allopurinol   Day -5 through -2: Bu/Flu. Busulfan levels adjusted by pharm, attending  Day -1: Rest day   Day 0: Transplant. Recipient: O+, CMV+. Donor: O+, CMV-. No flush. Cell dose 6.5 x10^6.   Gram+ bacilli (cutibacterium proprionibacterium) cultured for stem cell product. On cefepime (1/11 - 1/18). Blood cx no growth x10 bottles.   - Restaging plan: per protocol     HEME/COAG  # Iron overload: hx of transfusion dependent anemia, pre-transplant ferritin around 5,000. Recommend phlebotomy post transplant when retic count is restored and Hgb >10      - Pancytopenia due to chemotherapy and radiation.   # Platelet refractoriness: Platelets in single digits since 1/16. BID platelet  checks. Responding to HLA platelets. Continue 1600 platelet checks x 3 days (1/21-1/23).    Transfusion medicine consulted 1/18: HLA typing completed; will likely be able to receive HLA matched plts tomorrow (1/19).   **Blood bank fellow recommends no more than 2 units platelets/day d/t risk of TACO.  Discussed with blood bank fellow again 1/22. Will plan for 1 unit HLA platelets on 1/22, 1/23, and 1/24.    - Transfusion parameters: hemoglobin <7, platelets <10    IMMUNOCOMPROMISED  # Fever: (Last on 1/15) Recurrent on 1/21- tmax 100.7.  - UA: negative for LE & nitrites. 1/13 ua negative; BK urine positive/adeno negative (red/orange urine)  - Blood cultures NGTD. Procal elevated 1.08   - CXR with likely pulmonary edema  - RVP neg   - Cefepime (1/11 - 1/18; resumed 1/22-x). Brief course of IV Vancomycin. MRSA nasal swab negative.   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 RESOLVED.   EKG: new QR pattern in V1, suggests right ventricular conduction delay  Troponin negative x1   Echo (1/15): LVEF 55-60%, no pericardial effusion present   Cardiology consulted: no further recs   #Prolonged QTc to 507 on 1/22.    - Change levaquin to cefpodoxime. Repeated QTc on 1/22: 466.   # Hypertension with initiation of tacrolimus: start Amlodipine 5 mg daily (1/18), increased to BID (1/20). Labetalol 5 mg IV prn with parameters.   - Risk of cardiomyopathy:  Baseline EF 60-65%  - Risk of arrhythmia: Baseline EKG showed sinus rhythm      RESPIRATORY  - Current smoker: states she quit (1/5/24), offered nicotine replacement but she declined.   - Baseline PFTs: The FEV1, FVC, FEV1/FVC ratio and SAI21-92% are within normal limits.  The inspiratory flow rates are within normal limits.  Lung volumes are within normal limits.  The diffusing capacity is normal.   - Risk of respiratory complications: Frequent ambulation and incentive spirometer.    GI/NUTRITION  # Epigastric pain (1/18): RUQ ultrasound showing patent doppler throughout, no ascites, nonspecific elevated hepatic artery restrictive index (see full impression above). Patient does not appear fluid overloaded and LFTs and weight are relatively stable. Thoroughly discussed with attending and will continue to monitor closely for VOD; checking hepatic panel daily.     - IV PPI BID (1/19)     # Previously elevated liver enzymes that began to improve: could be related to Tylenol use, iron overload or recent viral illness. RUQ U/S (12/27) was normal. Acute hepatitis panel negative. Monitoring daily as above.    - Ulcer prophylaxis: PPI  - VOD ppx: Ursodiol   # Moderate malnutrition in the context of acute illness: dietitian following. Avoiding TPN for now with concern for VOD, will continue to encourage PO intake. 1L NS bolus on 1/23.   # Mouth pain likely 2/2 the start of oral mucositis: added MMW prn (1/18), IV dilaudid 0.5 - 1.0 mg Q4H.      DERM:  # Right hand dryness, try aquafor with gloves on overnight and as tolerated during the day  # Foot, hand warts. Curbside derm on 1/8, no recs while inpatient, typically managed OP.    # Pink maculopapular rash present under breasts and across abdomen: possible cefepime rash vs other? (1/18) Continue Hydrocortisone BID.     DIABETES/ENDOCRINE  - Risk of steroid-induced hyperglyemia: Monitor BG, sliding scale if needed    MUSCULOSKELETAL/FRAILTY  - Baseline Frailty Score: 3  - Daily PT/OT as needed while inpatient  - Cancer Rehab as needed  "outpatient    SYMPTOM MANAGEMENT  - Nausea from chemo/radiation: Prochlorperazine, ondansetron, lorazepam.    SOCIAL DETERMINANTS  - Caregiver: Srinivas Suggs, Keira Neal & Rickey Neal       Disposition: remain admitted through engraftment    Known issues that I take into account for medical decisions, with salient changes to the plan considering these complexities noted above.    Patient Active Problem List   Diagnosis    Left medial knee pain    Obesity (BMI 30-39.9)    Anemia, unspecified type    Macrocytic anemia    MDS (myelodysplastic syndrome) (H)     Clinically Significant Risk Factors            # Hypomagnesemia: Lowest Mg = 1.5 mg/dL in last 2 days, will replace as needed   # Hypoalbuminemia: Lowest albumin = 3.2 g/dL at 1/23/2024  2:30 AM, will monitor as appropriate  # Coagulation Defect: INR = 1.16 (Ref range: 0.85 - 1.15) and/or PTT = 28 Seconds (Ref range: 22 - 38 Seconds), will monitor for bleeding  # Thrombocytopenia: Lowest platelets = 7 in last 2 days, will monitor for bleeding          # Obesity: Estimated body mass index is 33.06 kg/m  as calculated from the following:    Height as of this encounter: 1.575 m (5' 2.01\").    Weight as of this encounter: 82 kg (180 lb 12.8 oz).   # Moderate Malnutrition: based on nutrition assessment            I spent 40 minutes in the care of this patient today, which included time necessary for preparation for the visit, obtaining history, ordering medications/tests/procedures as medically indicated, review of pertinent medical literature, counseling of the patient, communication of recommendations to the care team, and documentation time.    King Vaz PA-C        ______________________________________________      BMT ATTENDING NOTE    Hortencia Baldwin is a 53 year old female, who is day 12 of transplant for MDS, complicated by iron overload.     My overall impression is that Hortencia Sethi is at her arlene, awaiting engraftment.  We are continuing transfusion " support; responded well to HLA matched platelets. She has neutropenic fever currently on cefepime.  She has developed abdominal pain and platelet refractoriness. We assessed for VOD by US which shows antegrade for by high arterial resistance index. She did not have hepatomegaly or ascites. She does not meet VOD criteria at this time, but requires close monitoring for this. She is at risk of malnutrition and has had some loose stools, likely a complication of her conditioning. Monitor for neutropenic enterocolitis, encouraged oral nutrition to avoid further insult to her liver from TPN if possible. More mucositis pain; provide IV analgesia and consider PCA if worsening. Provide antiemetics, monitor for further infections, and continue to provide G-CSF to hasten engraftment. Minimize QT prolonging agents given prolonged QT. Remainder as above.    I spent 35 minutes in the care of Hortencia Sethi today, including an independent face-to-face assessment and time monitoring for restoration of hematopoiesis, high-risk organ toxicities from chemotherapy, and GVHD, managing infectious risk due to her immunocompromised state, and encouraging nutrition and physical activity to prevent debility and sarcopenia.  I personally performed all of the medical decision making associated with this visit, counseled Hortencia Sethi on my overall assessment and recommendations, communicated my plan to the care team, and edited the above note to reflect my current plan of care.       Stephanie Yi MD      Securely message with the Vocera Web Console

## 2024-01-23 NOTE — PLAN OF CARE
[id number]: BP slightly elevated. All other VSS. C/o pain and nausea overnight. PRN dilaudid, compazine, & MMF given. Infusing platelets this morning. Premedicated with tylenol & benadryl. Replaced mag & K, phos replacing currently. 1 BM last evening, formed/mucousy. Urine is dark edilberto/tea colored. Needs a lot of encouragement to eat/drink. Very tired last evening, cluster cares as able. Continue to follow plan of care.     Problem: Adult Inpatient Plan of Care  Goal: Plan of Care Review  Description: The Plan of Care Review/Shift note should be completed every shift.  The Outcome Evaluation is a brief statement about your assessment that the patient is improving, declining, or no change.  This information will be displayed automatically on your shift  note.  Outcome: Progressing     Problem: Stem Cell/Bone Marrow Transplant  Goal: Blood Counts Within Acceptable Range  Outcome: Progressing  Intervention: Monitor and Manage Hematologic Symptoms  Recent Flowsheet Documentation  Taken 1/22/2024 2000 by Eneida Rojo RN  Sleep/Rest Enhancement: awakenings minimized  Bleeding Precautions: monitored for signs of bleeding  Medication Review/Management:   medications reviewed   high-risk medications identified     Problem: Stem Cell/Bone Marrow Transplant  Goal: Nausea and Vomiting Symptom Relief  Outcome: Progressing  Intervention: Prevent and Manage Nausea and Vomiting  Recent Flowsheet Documentation  Taken 1/22/2024 2000 by Eneida Rojo, RN  Nausea/Vomiting Interventions:   slow deep breathing encouraged   nausea triggers minimized     Problem: Stem Cell/Bone Marrow Transplant  Goal: Optimal Nutrition Intake  Outcome: Progressing  Intervention: Minimize and Manage Barriers to Oral Intake  Recent Flowsheet Documentation  Taken 1/22/2024 2000 by Eneida Rojo, RN  Oral Nutrition Promotion: calorie-dense liquids provided   Goal Outcome Evaluation:

## 2024-01-23 NOTE — PROVIDER NOTIFICATION
"Text page provider Alexys, \"Received fax of positive blood culture on 5th day of incubation. Cutibacterium (propionibacterium) acnes (AA). Please let me know how to proceed.\"     - Received text page back, \"Ok will talk to day team shortly.\" Fax placed in patient's chart   "

## 2024-01-24 ENCOUNTER — APPOINTMENT (OUTPATIENT)
Dept: GENERAL RADIOLOGY | Facility: CLINIC | Age: 54
DRG: 014 | End: 2024-01-24
Payer: COMMERCIAL

## 2024-01-24 LAB
ALBUMIN SERPL BCG-MCNC: 3.2 G/DL (ref 3.5–5.2)
ALP SERPL-CCNC: 94 U/L (ref 40–150)
ALT SERPL W P-5'-P-CCNC: 17 U/L (ref 0–50)
ANION GAP SERPL CALCULATED.3IONS-SCNC: 9 MMOL/L (ref 7–15)
AST SERPL W P-5'-P-CCNC: 11 U/L (ref 0–45)
BASOPHILS # BLD AUTO: ABNORMAL 10*3/UL
BASOPHILS # BLD MANUAL: 0 10E3/UL (ref 0–0.2)
BASOPHILS NFR BLD AUTO: ABNORMAL %
BASOPHILS NFR BLD MANUAL: 0 %
BILIRUB SERPL-MCNC: 0.6 MG/DL
BLD PROD TYP BPU: NORMAL
BLOOD COMPONENT TYPE: NORMAL
BUN SERPL-MCNC: 8 MG/DL (ref 6–20)
CALCIUM SERPL-MCNC: 8.7 MG/DL (ref 8.6–10)
CHLORIDE SERPL-SCNC: 104 MMOL/L (ref 98–107)
CODING SYSTEM: NORMAL
CREAT SERPL-MCNC: 0.4 MG/DL (ref 0.51–0.95)
DEPRECATED HCO3 PLAS-SCNC: 26 MMOL/L (ref 22–29)
EGFRCR SERPLBLD CKD-EPI 2021: >90 ML/MIN/1.73M2
EOSINOPHIL # BLD AUTO: ABNORMAL 10*3/UL
EOSINOPHIL # BLD MANUAL: 0 10E3/UL (ref 0–0.7)
EOSINOPHIL NFR BLD AUTO: ABNORMAL %
EOSINOPHIL NFR BLD MANUAL: 0 %
ERYTHROCYTE [DISTWIDTH] IN BLOOD BY AUTOMATED COUNT: 17 % (ref 10–15)
GLUCOSE SERPL-MCNC: 114 MG/DL (ref 70–99)
HCT VFR BLD AUTO: 22.4 % (ref 35–47)
HGB BLD-MCNC: 7.9 G/DL (ref 11.7–15.7)
IMM GRANULOCYTES # BLD: ABNORMAL 10*3/UL
IMM GRANULOCYTES NFR BLD: ABNORMAL %
ISSUE DATE AND TIME: NORMAL
LYMPHOCYTES # BLD AUTO: ABNORMAL 10*3/UL
LYMPHOCYTES # BLD MANUAL: 0.1 10E3/UL (ref 0.8–5.3)
LYMPHOCYTES NFR BLD AUTO: ABNORMAL %
LYMPHOCYTES NFR BLD MANUAL: 9 %
MAGNESIUM SERPL-MCNC: 1.4 MG/DL (ref 1.7–2.3)
MAGNESIUM SERPL-MCNC: 2.1 MG/DL (ref 1.7–2.3)
MCH RBC QN AUTO: 32 PG (ref 26.5–33)
MCHC RBC AUTO-ENTMCNC: 35.3 G/DL (ref 31.5–36.5)
MCV RBC AUTO: 91 FL (ref 78–100)
METAMYELOCYTES # BLD MANUAL: 0 10E3/UL
METAMYELOCYTES NFR BLD MANUAL: 2 %
MONOCYTES # BLD AUTO: ABNORMAL 10*3/UL
MONOCYTES # BLD MANUAL: 0.1 10E3/UL (ref 0–1.3)
MONOCYTES NFR BLD AUTO: ABNORMAL %
MONOCYTES NFR BLD MANUAL: 19 %
MYELOCYTES # BLD MANUAL: 0 10E3/UL
MYELOCYTES NFR BLD MANUAL: 3 %
NEUTROPHILS # BLD AUTO: ABNORMAL 10*3/UL
NEUTROPHILS # BLD MANUAL: 0.4 10E3/UL (ref 1.6–8.3)
NEUTROPHILS NFR BLD AUTO: ABNORMAL %
NEUTROPHILS NFR BLD MANUAL: 67 %
NRBC # BLD AUTO: 0 10E3/UL
NRBC BLD AUTO-RTO: 0 /100
PHOSPHATE SERPL-MCNC: 2.6 MG/DL (ref 2.5–4.5)
PLAT MORPH BLD: ABNORMAL
PLATELET # BLD AUTO: 10 10E3/UL (ref 150–450)
PLATELET # BLD AUTO: 11 10E3/UL (ref 150–450)
PLATELET # BLD AUTO: 8 10E3/UL (ref 150–450)
POTASSIUM SERPL-SCNC: 3.5 MMOL/L (ref 3.4–5.3)
PROT SERPL-MCNC: 6 G/DL (ref 6.4–8.3)
RBC # BLD AUTO: 2.47 10E6/UL (ref 3.8–5.2)
RBC MORPH BLD: ABNORMAL
SCANNED LAB RESULT: NORMAL
SODIUM SERPL-SCNC: 139 MMOL/L (ref 135–145)
TACROLIMUS BLD-MCNC: 13.3 UG/L (ref 5–15)
TARGETS BLD QL SMEAR: SLIGHT
TME LAST DOSE: NORMAL H
TME LAST DOSE: NORMAL H
TOXIC GRANULES BLD QL SMEAR: PRESENT
UNIT ABO/RH: NORMAL
UNIT NUMBER: NORMAL
UNIT STATUS: NORMAL
UNIT TYPE ISBT: 6200
VARIANT LYMPHS BLD QL SMEAR: PRESENT
WBC # BLD AUTO: 0.6 10E3/UL (ref 4–11)

## 2024-01-24 PROCEDURE — 85049 AUTOMATED PLATELET COUNT: CPT

## 2024-01-24 PROCEDURE — 250N000011 HC RX IP 250 OP 636: Mod: JZ | Performed by: STUDENT IN AN ORGANIZED HEALTH CARE EDUCATION/TRAINING PROGRAM

## 2024-01-24 PROCEDURE — 71045 X-RAY EXAM CHEST 1 VIEW: CPT | Mod: 26 | Performed by: RADIOLOGY

## 2024-01-24 PROCEDURE — P9037 PLATE PHERES LEUKOREDU IRRAD: HCPCS | Performed by: INTERNAL MEDICINE

## 2024-01-24 PROCEDURE — 99233 SBSQ HOSP IP/OBS HIGH 50: CPT | Mod: 25

## 2024-01-24 PROCEDURE — 250N000011 HC RX IP 250 OP 636: Performed by: INTERNAL MEDICINE

## 2024-01-24 PROCEDURE — 258N000003 HC RX IP 258 OP 636

## 2024-01-24 PROCEDURE — 250N000013 HC RX MED GY IP 250 OP 250 PS 637

## 2024-01-24 PROCEDURE — 250N000011 HC RX IP 250 OP 636: Performed by: PHYSICIAN ASSISTANT

## 2024-01-24 PROCEDURE — 71045 X-RAY EXAM CHEST 1 VIEW: CPT

## 2024-01-24 PROCEDURE — 250N000013 HC RX MED GY IP 250 OP 250 PS 637: Performed by: STUDENT IN AN ORGANIZED HEALTH CARE EDUCATION/TRAINING PROGRAM

## 2024-01-24 PROCEDURE — 83735 ASSAY OF MAGNESIUM: CPT

## 2024-01-24 PROCEDURE — 99418 PROLNG IP/OBS E/M EA 15 MIN: CPT

## 2024-01-24 PROCEDURE — 80053 COMPREHEN METABOLIC PANEL: CPT

## 2024-01-24 PROCEDURE — 85007 BL SMEAR W/DIFF WBC COUNT: CPT

## 2024-01-24 PROCEDURE — 250N000011 HC RX IP 250 OP 636

## 2024-01-24 PROCEDURE — 250N000013 HC RX MED GY IP 250 OP 250 PS 637: Performed by: INTERNAL MEDICINE

## 2024-01-24 PROCEDURE — 85027 COMPLETE CBC AUTOMATED: CPT

## 2024-01-24 PROCEDURE — 80197 ASSAY OF TACROLIMUS: CPT | Performed by: INTERNAL MEDICINE

## 2024-01-24 PROCEDURE — 83735 ASSAY OF MAGNESIUM: CPT | Performed by: INTERNAL MEDICINE

## 2024-01-24 PROCEDURE — 206N000001 HC R&B BMT UMMC

## 2024-01-24 PROCEDURE — C9113 INJ PANTOPRAZOLE SODIUM, VIA: HCPCS

## 2024-01-24 PROCEDURE — 258N000003 HC RX IP 258 OP 636: Performed by: PHYSICIAN ASSISTANT

## 2024-01-24 PROCEDURE — 84100 ASSAY OF PHOSPHORUS: CPT | Performed by: INTERNAL MEDICINE

## 2024-01-24 RX ORDER — HYDROMORPHONE HYDROCHLORIDE 1 MG/ML
0.5 INJECTION, SOLUTION INTRAMUSCULAR; INTRAVENOUS; SUBCUTANEOUS
Status: DISCONTINUED | OUTPATIENT
Start: 2024-01-24 | End: 2024-01-25

## 2024-01-24 RX ORDER — POTASSIUM CHLORIDE 29.8 MG/ML
20 INJECTION INTRAVENOUS ONCE
Status: COMPLETED | OUTPATIENT
Start: 2024-01-24 | End: 2024-01-24

## 2024-01-24 RX ORDER — MAGNESIUM SULFATE HEPTAHYDRATE 40 MG/ML
4 INJECTION, SOLUTION INTRAVENOUS ONCE
Status: COMPLETED | OUTPATIENT
Start: 2024-01-24 | End: 2024-01-24

## 2024-01-24 RX ADMIN — HYDROMORPHONE HYDROCHLORIDE 1 MG: 1 INJECTION, SOLUTION INTRAMUSCULAR; INTRAVENOUS; SUBCUTANEOUS at 21:02

## 2024-01-24 RX ADMIN — Medication 1 LOZENGE: at 10:32

## 2024-01-24 RX ADMIN — MAGNESIUM SULFATE IN WATER 4 G: 40 INJECTION, SOLUTION INTRAVENOUS at 04:34

## 2024-01-24 RX ADMIN — MYCOPHENOLATE MOFETIL 1250 MG: 500 INJECTION, POWDER, LYOPHILIZED, FOR SOLUTION INTRAVENOUS at 08:10

## 2024-01-24 RX ADMIN — DEXTROSE MONOHYDRATE 420 MCG: 50 INJECTION, SOLUTION INTRAVENOUS at 20:00

## 2024-01-24 RX ADMIN — CEFEPIME 2 G: 2 INJECTION, POWDER, FOR SOLUTION INTRAVENOUS at 17:26

## 2024-01-24 RX ADMIN — CEFEPIME 2 G: 2 INJECTION, POWDER, FOR SOLUTION INTRAVENOUS at 10:33

## 2024-01-24 RX ADMIN — POTASSIUM CHLORIDE 20 MEQ: 29.8 INJECTION, SOLUTION INTRAVENOUS at 04:34

## 2024-01-24 RX ADMIN — LOPERAMIDE HYDROCHLORIDE 2 MG: 2 CAPSULE ORAL at 19:56

## 2024-01-24 RX ADMIN — HYDROCORTISONE: 1 CREAM TOPICAL at 08:18

## 2024-01-24 RX ADMIN — HYDROMORPHONE HYDROCHLORIDE 1 MG: 1 INJECTION, SOLUTION INTRAMUSCULAR; INTRAVENOUS; SUBCUTANEOUS at 05:57

## 2024-01-24 RX ADMIN — TACROLIMUS 118 MCG/HR: 5 INJECTION, SOLUTION INTRAVENOUS at 03:22

## 2024-01-24 RX ADMIN — PANTOPRAZOLE SODIUM 40 MG: 40 INJECTION, POWDER, FOR SOLUTION INTRAVENOUS at 19:57

## 2024-01-24 RX ADMIN — DEXTROSE MONOHYDRATE 20 ML: 50 INJECTION, SOLUTION INTRAVENOUS at 20:56

## 2024-01-24 RX ADMIN — DEXTROSE MONOHYDRATE 20 ML: 50 INJECTION, SOLUTION INTRAVENOUS at 19:59

## 2024-01-24 RX ADMIN — HYDROMORPHONE HYDROCHLORIDE 0.5 MG: 1 INJECTION, SOLUTION INTRAMUSCULAR; INTRAVENOUS; SUBCUTANEOUS at 01:36

## 2024-01-24 RX ADMIN — AMLODIPINE BESYLATE 5 MG: 5 TABLET ORAL at 08:10

## 2024-01-24 RX ADMIN — LORAZEPAM 0.5 MG: 2 INJECTION INTRAMUSCULAR; INTRAVENOUS at 01:33

## 2024-01-24 RX ADMIN — LORAZEPAM 0.5 MG: 2 INJECTION INTRAMUSCULAR; INTRAVENOUS at 20:57

## 2024-01-24 RX ADMIN — LORAZEPAM 0.5 MG: 2 INJECTION INTRAMUSCULAR; INTRAVENOUS at 09:54

## 2024-01-24 RX ADMIN — MYCOPHENOLATE MOFETIL 1250 MG: 500 INJECTION, POWDER, LYOPHILIZED, FOR SOLUTION INTRAVENOUS at 20:59

## 2024-01-24 RX ADMIN — URSODIOL 300 MG: 300 CAPSULE ORAL at 14:23

## 2024-01-24 RX ADMIN — HYDROMORPHONE HYDROCHLORIDE 1 MG: 1 INJECTION, SOLUTION INTRAMUSCULAR; INTRAVENOUS; SUBCUTANEOUS at 10:52

## 2024-01-24 RX ADMIN — PANTOPRAZOLE SODIUM 40 MG: 40 INJECTION, POWDER, FOR SOLUTION INTRAVENOUS at 08:10

## 2024-01-24 RX ADMIN — MICAFUNGIN SODIUM 150 MG: 50 INJECTION, POWDER, LYOPHILIZED, FOR SOLUTION INTRAVENOUS at 16:25

## 2024-01-24 RX ADMIN — URSODIOL 300 MG: 300 CAPSULE ORAL at 08:10

## 2024-01-24 RX ADMIN — ACYCLOVIR SODIUM 850 MG: 50 INJECTION, SOLUTION INTRAVENOUS at 03:13

## 2024-01-24 RX ADMIN — AMLODIPINE BESYLATE 5 MG: 5 TABLET ORAL at 19:56

## 2024-01-24 RX ADMIN — DIPHENHYDRAMINE HYDROCHLORIDE 25 MG: 25 CAPSULE ORAL at 08:10

## 2024-01-24 RX ADMIN — HYDROMORPHONE HYDROCHLORIDE 1 MG: 1 INJECTION, SOLUTION INTRAMUSCULAR; INTRAVENOUS; SUBCUTANEOUS at 18:29

## 2024-01-24 RX ADMIN — PROCHLORPERAZINE EDISYLATE 5 MG: 5 INJECTION INTRAMUSCULAR; INTRAVENOUS at 05:38

## 2024-01-24 RX ADMIN — ACYCLOVIR SODIUM 850 MG: 50 INJECTION, SOLUTION INTRAVENOUS at 10:55

## 2024-01-24 RX ADMIN — ACETAMINOPHEN 650 MG: 325 TABLET, FILM COATED ORAL at 08:09

## 2024-01-24 RX ADMIN — CEFEPIME 2 G: 2 INJECTION, POWDER, FOR SOLUTION INTRAVENOUS at 01:45

## 2024-01-24 RX ADMIN — HYDROMORPHONE HYDROCHLORIDE 1 MG: 1 INJECTION, SOLUTION INTRAMUSCULAR; INTRAVENOUS; SUBCUTANEOUS at 14:22

## 2024-01-24 RX ADMIN — URSODIOL 300 MG: 300 CAPSULE ORAL at 19:56

## 2024-01-24 RX ADMIN — ACYCLOVIR SODIUM 850 MG: 50 INJECTION, SOLUTION INTRAVENOUS at 18:29

## 2024-01-24 RX ADMIN — TACROLIMUS 118 MCG/HR: 5 INJECTION, SOLUTION INTRAVENOUS at 21:13

## 2024-01-24 ASSESSMENT — ACTIVITIES OF DAILY LIVING (ADL)
ADLS_ACUITY_SCORE: 24
ADLS_ACUITY_SCORE: 24
ADLS_ACUITY_SCORE: 23
ADLS_ACUITY_SCORE: 24
ADLS_ACUITY_SCORE: 24
ADLS_ACUITY_SCORE: 23
ADLS_ACUITY_SCORE: 24
ADLS_ACUITY_SCORE: 23
ADLS_ACUITY_SCORE: 24
ADLS_ACUITY_SCORE: 23

## 2024-01-24 NOTE — PROGRESS NOTES
"  BMT Daily Progress Note  Patient ID:  Irma Foreman is a 53 year old female with a PMH of MDS, warm AIHA, transfusion and transfusion dependent anemia presented to  to undergo a myeloablative allogeneic transplant. She was recently COVID+ on 12/26 but tested negative on 1/2 with resolution of nearly all URI symptoms. Undergoing MA (Bu/Flu) 6/8 URD Allo transplant, day +13      HPI     Irma is doing about the same today. Oral mucositis still bothersome, using IV dilaudid for pain control, increased for Q4H to Q3H today. She tried eating mandarin oranges yesterday without much success. She said everything tastes sour to her. Her abdominal pain has resolved. 1-2 loose stools yesterday. Decreased breath sounds on exam, CXR showed increased basilar streaky opacities \"likely atelectasis\". She was provided with an incentive spirometer and instructed on its use.       Review of Systems    Negative unless stated in the HPI.     PHYSICAL EXAM      Weight     Wt Readings from Last 3 Encounters:   01/24/24 81.9 kg (180 lb 8 oz)   12/26/23 83.1 kg (183 lb 4.8 oz)   12/19/23 83.5 kg (184 lb)        KPS: 70    BP (!) 144/85 (BP Location: Left arm)   Pulse 98   Temp 97.4  F (36.3  C) (Axillary)   Resp 18   Ht 1.575 m (5' 2.01\")   Wt 81.9 kg (180 lb 8 oz)   LMP 11/05/2017   SpO2 97%   BMI 33.01 kg/m       General: NAD   Eyes: KEVON, sclera anicteric   Nose/Mouth/Throat: Mucosa pink and moist. Right inner cheek slightly erythematous. Ulceration on R-posterior pharynx, tongue with ridging.   Lungs: diminished breath sounds at the base bilaterally   Cardiovascular: RRR, no M/R/G   Abdominal/Rectal: +BS, soft, non tender   Lymphatics: No edema  Skin: wart under right foot dime sized white, cauliflower and slightly ulcerated; Two pin point sized, flesh colored warts on right hand; Hands dry, one excoriation on left dorsum (not examined today). Very faint maculopapular rash present under breasts, abdomen and low back.    Neuro: " A&O   Additional Findings: Gastelum site NT, no drainage.    Current aGVHD staging:  Skin 0, UGI 0, LGI 0, Liver 0 (keep in note through day +180 for allos)      LABS AND IMAGING: I have assessed all abnormal lab values for their clinical significance and any values considered clinically significant have been addressed in the assessment and plan.        Lab Results   Component Value Date    WBC 0.6 (LL) 01/24/2024    ANEUTAUTO 1.0 (L) 01/15/2024    HGB 7.9 (L) 01/24/2024    HCT 22.4 (L) 01/24/2024    PLT 10 (LL) 01/24/2024     01/24/2024    POTASSIUM 3.5 01/24/2024    CHLORIDE 104 01/24/2024    CO2 26 01/24/2024     (H) 01/24/2024    BUN 8.0 01/24/2024    CR 0.40 (L) 01/24/2024    MAG 2.1 01/24/2024    INR 1.16 (H) 01/22/2024       US Abdominal with Doppler (1/18/24)  Impression:   1. Patent Doppler evaluation of the abdomen with antegrade flow throughout.  2. Elevated hepatic artery resistive index of 0.81, nonspecific though can be seen with hepatic venous congestion which could be related to venous occlusive disease..    SYSTEMS-BASED ASSESSMENT AND PLAN     Hortencia Baldwin is a 53 year old female with a history of MDS, undergoing MA (Bu/Flu) 6/8 URD Allo transplant, day +13      BMT/IEC PROTOCOL for MT 2015-29 **according to but not on trial**   - Chemo protocol:  Day -6 through day -1: Keppra and Allopurinol   Day -5 through -2: Bu/Flu. Busulfan levels adjusted by pharm, attending  Day -1: Rest day   Day 0: Transplant. Recipient: O+, CMV+. Donor: O+, CMV-. No flush. Cell dose 6.5 x10^6.   Gram+ bacilli (cutibacterium proprionibacterium) cultured for stem cell product. On cefepime (1/11 - 1/18). Blood cx no growth x10 bottles.   - Restaging plan: per protocol     HEME/COAG  # Iron overload: hx of transfusion dependent anemia, pre-transplant ferritin around 5,000. Recommend phlebotomy post transplant when retic count is restored and Hgb >10      - Pancytopenia due to chemotherapy   # Platelet  refractoriness: Platelets in single digits since 1/16. BID platelet checks. Responding to HLA platelets. Continue 1600 platelet checks.    Transfusion medicine consulted 1/18: HLA typing completed; will likely be able to receive HLA matched plts tomorrow (1/19).   **Blood bank fellow recommends no more than 2 units platelets/day d/t risk of TACO.  Discussed with blood bank fellow again 1/22. Will plan for 1 unit HLA platelets on 1/22, 1/23, and 1/24.    - Transfusion parameters: hemoglobin <7, platelets <10    IMMUNOCOMPROMISED  # Fever: (Last on 1/15) Recurrent on 1/21- tmax 100.7.  - UA: negative for LE & nitrites. 1/13 ua negative; BK urine positive/adeno negative (red/orange urine)  - Blood cultures NGTD. Procal elevated 1.08   - CXR with likely pulmonary edema  - RVP neg   - Cefepime (1/11 - 1/18; resumed 1/22-x). Brief course of IV Vancomycin. MRSA nasal swab negative.   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 RESOLVED.   EKG: new QR pattern in V1, suggests right ventricular conduction delay  Troponin negative x1   Echo (1/15): LVEF 55-60%, no pericardial effusion present   Cardiology consulted: no further recs   #Prolonged QTc to 507 on 1/22.    - Change levaquin to cefpodoxime. Repeated QTc on 1/22: 466.   # Hypertension with initiation of tacrolimus: start Amlodipine 5 mg daily (1/18), increased to BID (1/20). Labetalol 5 mg IV prn with parameters.   - Risk of cardiomyopathy:  Baseline EF 60-65%  - Risk  of arrhythmia: Baseline EKG showed sinus rhythm     RESPIRATORY  - Current smoker: states she quit (1/5/24), offered nicotine replacement but she declined.   - Baseline PFTs: The FEV1, FVC, FEV1/FVC ratio and JPN57-13% are within normal limits.  The inspiratory flow rates are within normal limits.  Lung volumes are within normal limits.  The diffusing capacity is normal.   - Risk of respiratory complications: Frequent ambulation and incentive spirometer. CXR (1/24) showed increased left basilar streaky opacities, likely atelectasis.     GI/NUTRITION  # Epigastric pain (1/18): RUQ ultrasound showing patent doppler throughout, no ascites, nonspecific elevated hepatic artery restrictive index (see full impression above). Patient does not appear fluid overloaded and LFTs and weight are relatively stable. Continue to monitor closely for VOD; checking hepatic panel daily.     - IV PPI BID (1/19)     # Previously elevated liver enzymes that began to improve: could be related to Tylenol use, iron overload or recent viral illness. RUQ U/S (12/27) was normal. Acute hepatitis panel negative. Monitoring daily as above.    - Ulcer prophylaxis: PPI  - VOD ppx: Ursodiol   # Moderate malnutrition in the context of acute illness: dietitian following. Avoiding TPN for now with concern for VOD, will continue to encourage PO intake. 1L NS bolus on 1/23.   # Mouth pain likely 2/2 the start of oral mucositis: added MMW prn (1/18), IV dilaudid 0.5 - 1.0 mg Q3H.      DERM:  # Right hand dryness, try aquafor with gloves on overnight and as tolerated during the day  # Foot, hand warts. Curbside derm on 1/8, no recs while inpatient, typically managed OP.    # Pink maculopapular rash present under breasts and across abdomen: possible cefepime rash vs other? (1/18) Continue Hydrocortisone BID.     MUSCULOSKELETAL/FRAILTY  - Baseline Frailty Score: 3  - Daily PT/OT as needed while inpatient  - Cancer Rehab as needed outpatient    SYMPTOM  "MANAGEMENT  - Nausea from chemo/radiation: Prochlorperazine, ondansetron, lorazepam.    SOCIAL DETERMINANTS  - Caregiver: Srinivas Suggs, Keira Neal & Rickey Neal       Disposition: remain admitted through engraftment    Known issues that I take into account for medical decisions, with salient changes to the plan considering these complexities noted above.    Patient Active Problem List   Diagnosis    Left medial knee pain    Obesity (BMI 30-39.9)    Anemia, unspecified type    Macrocytic anemia    MDS (myelodysplastic syndrome) (H)     Clinically Significant Risk Factors            # Hypomagnesemia: Lowest Mg = 1.4 mg/dL in last 2 days, will replace as needed   # Hypoalbuminemia: Lowest albumin = 3.2 g/dL at 1/24/2024  3:17 AM, will monitor as appropriate  # Coagulation Defect: INR = 1.16 (Ref range: 0.85 - 1.15) and/or PTT = 28 Seconds (Ref range: 22 - 38 Seconds), will monitor for bleeding  # Thrombocytopenia: Lowest platelets = 7 in last 2 days, will monitor for bleeding          # Obesity: Estimated body mass index is 33.01 kg/m  as calculated from the following:    Height as of this encounter: 1.575 m (5' 2.01\").    Weight as of this encounter: 81.9 kg (180 lb 8 oz).   # Moderate Malnutrition: based on nutrition assessment            I spent 40 minutes in the care of this patient today, which included time necessary for preparation for the visit, obtaining history, ordering medications/tests/procedures as medically indicated, review of pertinent medical literature, counseling of the patient, communication of recommendations to the care team, and documentation time.    King Vaz PA-C        ______________________________________________      BMT ATTENDING NOTE    Hortencia Baldwin is a 53 year old female, who is day 13 of transplant for MDS, complicated by iron overload. She is admitted for myeloablative (Bu/Flu) conditioning, 6/8 unrelated donor, GVHD ppx with PTCy/ Tac/ MMF, ABO matched, CMV donor -/ " recipient + .    Hortencia continues to have mucositis, trouble swallowing and poor PO intake. PRN dilaudid provides relief but also causes nausea. On exam, ridging on the tongue and buccal mucosa. Mild abdominal tenderness in the left lower quadrant. No pedal edema.     BMT: D13   Heme: Engrafting! WBC 0.6 today.   ID: Neutropenic fever (last febrile on 1/22). On cefepime. Infectious workup negative.   Stem cell culture was positive for cutibacterium. Treated with cefepime X 7 days. Patient's blood cultures have been negative  Mucositis: Continue current pain medication regimen. Can try morphine tomorrow since dilaudid causes nausea or consider PCA. Able to handle secretions but unable to tolerate any PO intake. Holding off on TPN, expect improvement with engraftment.  Risk of VOD: US abdomen on 1/18 showed elevated hepatic artery resistance index but no other findings consistent with VOD. Monitor closely.   HTN: Amlodipine 10mg daily. Tac goal is 10-15 till D14, likely reason for HTN.  PPx: jani, acyclovir, letermovir   Supportive care: encourage ambulation, PT/OT, optimize nutrition. Chest x-Ray showed atelectasis, incentive spirometer given.     On the date of service, 01/24/2024, I spent 40 minutes on the patient unit personally reviewing medical records and medications, reviewing vital signs, labs, and imaging results as summarized above, discussing the patient's case on rounds with the JEN, obtaining a history from the patient, performing a physical exam, intensively monitoring treatments with high risk of toxicity, coordinating care, and documenting in the electronic medical record.    Coco Garay  Department of Hematology, Oncology and Transplantation  Amcom Pager 1300/Text via George

## 2024-01-24 NOTE — PLAN OF CARE
"/76 (BP Location: Left arm)   Pulse 102   Temp 97.6  F (36.4  C) (Axillary)   Resp 18   Ht 1.575 m (5' 2.01\")   Wt 81.9 kg (180 lb 8 oz)   LMP 11/05/2017   SpO2 97%   BMI 33.01 kg/m    Afebrile, BPs 130-140s/70-80s, other VSS.  Minimal nausea, per pt, sometimes triggered from Dilaudid.  Dilaudid x3 for throat pain, mouth pain improved, also tried lozenge which she thinks mildly helped-pt did have some broth and orange sherbert.  Ativan x1.  Mg recheck WNL, redraw tomorrow.  Platelets given.  LS diminished, CXR obtained, pt using IS with encouragement.  Showered, encourage to be OOB-but day spent in bed.    Problem: Adult Inpatient Plan of Care  Goal: Plan of Care Review  Description: The Plan of Care Review/Shift note should be completed every shift.  The Outcome Evaluation is a brief statement about your assessment that the patient is improving, declining, or no change.  This information will be displayed automatically on your shift  note.  Outcome: Not Progressing     Problem: Stem Cell/Bone Marrow Transplant  Goal: Improved Oral Mucous Membrane Health and Integrity  Outcome: Not Progressing     Problem: Stem Cell/Bone Marrow Transplant  Goal: Diarrhea Symptom Control  Outcome: Progressing     Problem: Stem Cell/Bone Marrow Transplant  Goal: Nausea and Vomiting Symptom Relief  Outcome: Progressing   Goal Outcome Evaluation:                        "

## 2024-01-24 NOTE — PLAN OF CARE
"Afebrile, hypertensive (150's/80's), tachycardic (100's), OVSS. Pain managed with PRN dilaudid given x3. PRN compazine given x2 and ativan given x1 for nausea associated with dilaudid administration, pt reporting relief. Magic mouth wash given x1 before pill administration. No blood products needed. Potassium infused. Magnesium infused, recheck 2-4 hours post. Caps changed. Continue with plan of care.      Problem: Adult Inpatient Plan of Care  Goal: Plan of Care Review  Description: The Plan of Care Review/Shift note should be completed every shift.  The Outcome Evaluation is a brief statement about your assessment that the patient is improving, declining, or no change.  This information will be displayed automatically on your shift  note.  Outcome: Progressing  Goal: Patient-Specific Goal (Individualized)  Description: You can add care plan individualizations to a care plan. Examples of Individualization might be:  \"Parent requests to be called daily at 9am for status\", \"I have a hard time hearing out of my right ear\", or \"Do not touch me to wake me up as it startles  me\".  Outcome: Progressing  Goal: Absence of Hospital-Acquired Illness or Injury  Outcome: Progressing  Intervention: Identify and Manage Fall Risk  Recent Flowsheet Documentation  Taken 1/24/2024 0339 by Aliyah Rodriguez, RN  Safety Promotion/Fall Prevention:   safety round/check completed   activity supervised   clutter free environment maintained   lighting adjusted   nonskid shoes/slippers when out of bed   assistive device/personal items within reach  Taken 1/24/2024 0148 by Aliyah Rodriguez, RN  Safety Promotion/Fall Prevention:   safety round/check completed   activity supervised   clutter free environment maintained   lighting adjusted   nonskid shoes/slippers when out of bed   assistive device/personal items within reach  Taken 1/24/2024 0000 by Aliyah Rodriguez, RN  Safety Promotion/Fall Prevention:   safety round/check completed   " activity supervised   clutter free environment maintained   lighting adjusted   nonskid shoes/slippers when out of bed   assistive device/personal items within reach  Taken 1/23/2024 1943 by Aliyah Rodriguez RN  Safety Promotion/Fall Prevention:   safety round/check completed   activity supervised   clutter free environment maintained   lighting adjusted   nonskid shoes/slippers when out of bed   assistive device/personal items within reach  Intervention: Prevent Skin Injury  Recent Flowsheet Documentation  Taken 1/23/2024 2346 by Aliyah Rodriguez RN  Body Position: position maintained  Taken 1/23/2024 1943 by Aliyah Rodriguez RN  Body Position: position changed independently  Intervention: Prevent and Manage VTE (Venous Thromboembolism) Risk  Recent Flowsheet Documentation  Taken 1/23/2024 1943 by Aliyah Rodriguez RN  VTE Prevention/Management: SCDs (sequential compression devices) off  Intervention: Prevent Infection  Recent Flowsheet Documentation  Taken 1/24/2024 0339 by Aliyah Rodriguez RN  Infection Prevention:   single patient room provided   rest/sleep promoted   hand hygiene promoted   cohorting utilized  Taken 1/24/2024 0000 by Aliyah Rodriguez RN  Infection Prevention:   single patient room provided   rest/sleep promoted   hand hygiene promoted   cohorting utilized  Taken 1/23/2024 1943 by Aliyah Rodriguez RN  Infection Prevention:   single patient room provided   rest/sleep promoted   hand hygiene promoted   cohorting utilized  Goal: Optimal Comfort and Wellbeing  Outcome: Progressing  Intervention: Monitor Pain and Promote Comfort  Recent Flowsheet Documentation  Taken 1/24/2024 0150 by Aliyah Rodriguez RN  Pain Management Interventions: medication (see MAR)  Taken 1/24/2024 0136 by Aliyah Rodriguez RN  Pain Management Interventions: medication (see MAR)  Taken 1/23/2024 2346 by Aliyah Rodriguez RN  Pain Management Interventions: declines  Taken 1/23/2024 1943 by Aliyah Rodriguez  RN  Pain Management Interventions: medication (see MAR)  Goal: Readiness for Transition of Care  Outcome: Progressing     Problem: Stem Cell/Bone Marrow Transplant  Goal: Optimal Coping with Transplant  Outcome: Progressing  Goal: Symptom-Free Urinary Elimination  Outcome: Progressing  Intervention: Prevent or Manage Bladder Irritation  Recent Flowsheet Documentation  Taken 1/24/2024 0150 by Aliyah Rodriguez RN  Pain Management Interventions: medication (see MAR)  Taken 1/24/2024 0136 by Aliyah Rodriguez RN  Pain Management Interventions: medication (see MAR)  Taken 1/23/2024 2346 by Aliyah Rodriguez RN  Pain Management Interventions: declines  Taken 1/23/2024 1943 by Aliyah Rodriguez RN  Pain Management Interventions: medication (see MAR)  Goal: Diarrhea Symptom Control  Outcome: Progressing  Goal: Improved Activity Tolerance  Outcome: Progressing  Intervention: Promote Improved Energy  Recent Flowsheet Documentation  Taken 1/23/2024 1943 by Aliyah Rodriguez RN  Activity Management: activity adjusted per tolerance  Goal: Blood Counts Within Acceptable Range  Outcome: Progressing  Intervention: Monitor and Manage Hematologic Symptoms  Recent Flowsheet Documentation  Taken 1/24/2024 0339 by Aliyah Rodriguez RN  Bleeding Precautions: monitored for signs of bleeding  Medication Review/Management:   medications reviewed   high-risk medications identified  Taken 1/24/2024 0000 by Aliyah Rodriguez RN  Bleeding Precautions: monitored for signs of bleeding  Medication Review/Management:   medications reviewed   high-risk medications identified  Taken 1/23/2024 1943 by Aliyah Rodriguez RN  Bleeding Precautions: monitored for signs of bleeding  Medication Review/Management:   medications reviewed   high-risk medications identified  Goal: Absence of Hypersensitivity Reaction  Outcome: Progressing  Goal: Absence of Infection  Outcome: Progressing  Intervention: Prevent and Manage Infection  Recent Flowsheet  Documentation  Taken 1/24/2024 0339 by Aliyah Rodriguez RN  Infection Prevention:   single patient room provided   rest/sleep promoted   hand hygiene promoted   cohorting utilized  Infection Management: aseptic technique maintained  Isolation Precautions: protective environment maintained  Taken 1/24/2024 0000 by Aliyah Rodriguez RN  Infection Prevention:   single patient room provided   rest/sleep promoted   hand hygiene promoted   cohorting utilized  Infection Management: aseptic technique maintained  Isolation Precautions: protective environment maintained  Taken 1/23/2024 1943 by Aliyah Rodriguez RN  Infection Prevention:   single patient room provided   rest/sleep promoted   hand hygiene promoted   cohorting utilized  Infection Management: aseptic technique maintained  Isolation Precautions: protective environment maintained  Goal: Improved Oral Mucous Membrane Health and Integrity  Outcome: Progressing  Goal: Nausea and Vomiting Symptom Relief  Outcome: Progressing  Intervention: Prevent and Manage Nausea and Vomiting  Recent Flowsheet Documentation  Taken 1/23/2024 1943 by Aliyah Rodriguez RN  Nausea/Vomiting Interventions: antiemetic  Goal: Optimal Nutrition Intake  Outcome: Progressing

## 2024-01-25 ENCOUNTER — APPOINTMENT (OUTPATIENT)
Dept: PHYSICAL THERAPY | Facility: CLINIC | Age: 54
DRG: 014 | End: 2024-01-25
Attending: STUDENT IN AN ORGANIZED HEALTH CARE EDUCATION/TRAINING PROGRAM
Payer: COMMERCIAL

## 2024-01-25 LAB
ALBUMIN SERPL BCG-MCNC: 3.2 G/DL (ref 3.5–5.2)
ALBUMIN UR-MCNC: 50 MG/DL
ALP SERPL-CCNC: 91 U/L (ref 40–150)
ALT SERPL W P-5'-P-CCNC: 16 U/L (ref 0–50)
ANION GAP SERPL CALCULATED.3IONS-SCNC: 9 MMOL/L (ref 7–15)
APPEARANCE UR: CLEAR
AST SERPL W P-5'-P-CCNC: 11 U/L (ref 0–45)
BACTERIA BLD CULT: NO GROWTH
BACTERIA BLD CULT: NO GROWTH
BASOPHILS # BLD AUTO: ABNORMAL 10*3/UL
BASOPHILS # BLD MANUAL: 0 10E3/UL (ref 0–0.2)
BASOPHILS NFR BLD AUTO: ABNORMAL %
BASOPHILS NFR BLD MANUAL: 0 %
BILIRUB SERPL-MCNC: 0.6 MG/DL
BILIRUB UR QL STRIP: NEGATIVE
BUN SERPL-MCNC: 8.4 MG/DL (ref 6–20)
CALCIUM SERPL-MCNC: 8.6 MG/DL (ref 8.6–10)
CHLORIDE SERPL-SCNC: 105 MMOL/L (ref 98–107)
COLOR UR AUTO: ABNORMAL
CREAT SERPL-MCNC: 0.4 MG/DL (ref 0.51–0.95)
DEPRECATED HCO3 PLAS-SCNC: 26 MMOL/L (ref 22–29)
EGFRCR SERPLBLD CKD-EPI 2021: >90 ML/MIN/1.73M2
EOSINOPHIL # BLD AUTO: ABNORMAL 10*3/UL
EOSINOPHIL # BLD MANUAL: 0 10E3/UL (ref 0–0.7)
EOSINOPHIL NFR BLD AUTO: ABNORMAL %
EOSINOPHIL NFR BLD MANUAL: 0 %
ERYTHROCYTE [DISTWIDTH] IN BLOOD BY AUTOMATED COUNT: 16.9 % (ref 10–15)
GLUCOSE SERPL-MCNC: 94 MG/DL (ref 70–99)
GLUCOSE UR STRIP-MCNC: NEGATIVE MG/DL
HCT VFR BLD AUTO: 21.8 % (ref 35–47)
HGB BLD-MCNC: 7.6 G/DL (ref 11.7–15.7)
HGB UR QL STRIP: ABNORMAL
IMM GRANULOCYTES # BLD: ABNORMAL 10*3/UL
IMM GRANULOCYTES NFR BLD: ABNORMAL %
KETONES UR STRIP-MCNC: NEGATIVE MG/DL
LEUKOCYTE ESTERASE UR QL STRIP: NEGATIVE
LYMPHOCYTES # BLD AUTO: ABNORMAL 10*3/UL
LYMPHOCYTES # BLD MANUAL: 0.1 10E3/UL (ref 0.8–5.3)
LYMPHOCYTES NFR BLD AUTO: ABNORMAL %
LYMPHOCYTES NFR BLD MANUAL: 4 %
MAGNESIUM SERPL-MCNC: 1.3 MG/DL (ref 1.7–2.3)
MAGNESIUM SERPL-MCNC: 1.9 MG/DL (ref 1.7–2.3)
MCH RBC QN AUTO: 31.8 PG (ref 26.5–33)
MCHC RBC AUTO-ENTMCNC: 34.9 G/DL (ref 31.5–36.5)
MCV RBC AUTO: 91 FL (ref 78–100)
METAMYELOCYTES # BLD MANUAL: 0 10E3/UL
METAMYELOCYTES NFR BLD MANUAL: 2 %
MONOCYTES # BLD AUTO: ABNORMAL 10*3/UL
MONOCYTES # BLD MANUAL: 0.2 10E3/UL (ref 0–1.3)
MONOCYTES NFR BLD AUTO: ABNORMAL %
MONOCYTES NFR BLD MANUAL: 9 %
MUCOUS THREADS #/AREA URNS LPF: PRESENT /LPF
MYELOCYTES # BLD MANUAL: 0 10E3/UL
MYELOCYTES NFR BLD MANUAL: 1 %
NEUTROPHILS # BLD AUTO: ABNORMAL 10*3/UL
NEUTROPHILS # BLD MANUAL: 1.8 10E3/UL (ref 1.6–8.3)
NEUTROPHILS NFR BLD AUTO: ABNORMAL %
NEUTROPHILS NFR BLD MANUAL: 84 %
NITRATE UR QL: NEGATIVE
NRBC # BLD AUTO: 0 10E3/UL
NRBC # BLD AUTO: 0 10E3/UL
NRBC BLD AUTO-RTO: 1 /100
NRBC BLD MANUAL-RTO: 2 %
PH UR STRIP: 6.5 [PH] (ref 5–7)
PHOSPHATE SERPL-MCNC: 3.3 MG/DL (ref 2.5–4.5)
PLAT MORPH BLD: ABNORMAL
PLATELET # BLD AUTO: 11 10E3/UL (ref 150–450)
PLATELET # BLD AUTO: 12 10E3/UL (ref 150–450)
POTASSIUM SERPL-SCNC: 3.6 MMOL/L (ref 3.4–5.3)
PROT SERPL-MCNC: 5.9 G/DL (ref 6.4–8.3)
RBC # BLD AUTO: 2.39 10E6/UL (ref 3.8–5.2)
RBC MORPH BLD: ABNORMAL
RBC URINE: 58 /HPF
SODIUM SERPL-SCNC: 140 MMOL/L (ref 135–145)
SP GR UR STRIP: 1.01 (ref 1–1.03)
SQUAMOUS EPITHELIAL: 1 /HPF
UROBILINOGEN UR STRIP-MCNC: NORMAL MG/DL
WBC # BLD AUTO: 2.2 10E3/UL (ref 4–11)
WBC URINE: <1 /HPF

## 2024-01-25 PROCEDURE — 250N000011 HC RX IP 250 OP 636

## 2024-01-25 PROCEDURE — 99418 PROLNG IP/OBS E/M EA 15 MIN: CPT

## 2024-01-25 PROCEDURE — 84100 ASSAY OF PHOSPHORUS: CPT | Performed by: INTERNAL MEDICINE

## 2024-01-25 PROCEDURE — 250N000011 HC RX IP 250 OP 636: Performed by: STUDENT IN AN ORGANIZED HEALTH CARE EDUCATION/TRAINING PROGRAM

## 2024-01-25 PROCEDURE — 83735 ASSAY OF MAGNESIUM: CPT | Performed by: STUDENT IN AN ORGANIZED HEALTH CARE EDUCATION/TRAINING PROGRAM

## 2024-01-25 PROCEDURE — 83735 ASSAY OF MAGNESIUM: CPT

## 2024-01-25 PROCEDURE — 85027 COMPLETE CBC AUTOMATED: CPT

## 2024-01-25 PROCEDURE — 250N000013 HC RX MED GY IP 250 OP 250 PS 637

## 2024-01-25 PROCEDURE — 97530 THERAPEUTIC ACTIVITIES: CPT | Mod: GP

## 2024-01-25 PROCEDURE — 99233 SBSQ HOSP IP/OBS HIGH 50: CPT | Mod: 25

## 2024-01-25 PROCEDURE — 81001 URINALYSIS AUTO W/SCOPE: CPT | Performed by: INTERNAL MEDICINE

## 2024-01-25 PROCEDURE — 250N000011 HC RX IP 250 OP 636: Performed by: PHYSICIAN ASSISTANT

## 2024-01-25 PROCEDURE — 97110 THERAPEUTIC EXERCISES: CPT | Mod: GP

## 2024-01-25 PROCEDURE — 258N000003 HC RX IP 258 OP 636: Performed by: PHYSICIAN ASSISTANT

## 2024-01-25 PROCEDURE — 258N000003 HC RX IP 258 OP 636

## 2024-01-25 PROCEDURE — 206N000001 HC R&B BMT UMMC

## 2024-01-25 PROCEDURE — 82040 ASSAY OF SERUM ALBUMIN: CPT

## 2024-01-25 PROCEDURE — 85049 AUTOMATED PLATELET COUNT: CPT

## 2024-01-25 PROCEDURE — 85007 BL SMEAR W/DIFF WBC COUNT: CPT

## 2024-01-25 PROCEDURE — C9113 INJ PANTOPRAZOLE SODIUM, VIA: HCPCS

## 2024-01-25 RX ORDER — MAGNESIUM SULFATE HEPTAHYDRATE 40 MG/ML
4 INJECTION, SOLUTION INTRAVENOUS ONCE
Status: COMPLETED | OUTPATIENT
Start: 2024-01-25 | End: 2024-01-25

## 2024-01-25 RX ORDER — POTASSIUM CHLORIDE 29.8 MG/ML
20 INJECTION INTRAVENOUS ONCE
Status: COMPLETED | OUTPATIENT
Start: 2024-01-25 | End: 2024-01-25

## 2024-01-25 RX ORDER — AMOXICILLIN 250 MG
2 CAPSULE ORAL 2 TIMES DAILY
Status: DISCONTINUED | OUTPATIENT
Start: 2024-01-25 | End: 2024-02-01

## 2024-01-25 RX ORDER — HYDROMORPHONE HYDROCHLORIDE 1 MG/ML
0.5 INJECTION, SOLUTION INTRAMUSCULAR; INTRAVENOUS; SUBCUTANEOUS
Status: DISCONTINUED | OUTPATIENT
Start: 2024-01-25 | End: 2024-01-28

## 2024-01-25 RX ORDER — MAGNESIUM SULFATE HEPTAHYDRATE 40 MG/ML
2 INJECTION, SOLUTION INTRAVENOUS ONCE
Status: COMPLETED | OUTPATIENT
Start: 2024-01-25 | End: 2024-01-25

## 2024-01-25 RX ORDER — AMOXICILLIN 250 MG
1 CAPSULE ORAL 2 TIMES DAILY
Status: DISCONTINUED | OUTPATIENT
Start: 2024-01-25 | End: 2024-02-01

## 2024-01-25 RX ADMIN — LETERMOVIR 480 MG: 480 TABLET, FILM COATED ORAL at 08:44

## 2024-01-25 RX ADMIN — PANTOPRAZOLE SODIUM 40 MG: 40 INJECTION, POWDER, FOR SOLUTION INTRAVENOUS at 20:30

## 2024-01-25 RX ADMIN — ACYCLOVIR 800 MG: 800 TABLET ORAL at 20:06

## 2024-01-25 RX ADMIN — PANTOPRAZOLE SODIUM 40 MG: 40 INJECTION, POWDER, FOR SOLUTION INTRAVENOUS at 08:41

## 2024-01-25 RX ADMIN — URSODIOL 300 MG: 300 CAPSULE ORAL at 08:40

## 2024-01-25 RX ADMIN — MYCOPHENOLATE MOFETIL 1250 MG: 500 INJECTION, POWDER, LYOPHILIZED, FOR SOLUTION INTRAVENOUS at 21:01

## 2024-01-25 RX ADMIN — MAGNESIUM SULFATE IN WATER 2 G: 40 INJECTION, SOLUTION INTRAVENOUS at 11:32

## 2024-01-25 RX ADMIN — AMLODIPINE BESYLATE 5 MG: 5 TABLET ORAL at 20:06

## 2024-01-25 RX ADMIN — LORAZEPAM 0.5 MG: 2 INJECTION INTRAMUSCULAR; INTRAVENOUS at 12:03

## 2024-01-25 RX ADMIN — HYDROCORTISONE: 1 CREAM TOPICAL at 20:32

## 2024-01-25 RX ADMIN — TACROLIMUS 118 MCG/HR: 5 INJECTION, SOLUTION INTRAVENOUS at 20:14

## 2024-01-25 RX ADMIN — HYDROMORPHONE HYDROCHLORIDE 1 MG: 1 INJECTION, SOLUTION INTRAMUSCULAR; INTRAVENOUS; SUBCUTANEOUS at 08:28

## 2024-01-25 RX ADMIN — HYDROMORPHONE HYDROCHLORIDE 1 MG: 1 INJECTION, SOLUTION INTRAMUSCULAR; INTRAVENOUS; SUBCUTANEOUS at 11:43

## 2024-01-25 RX ADMIN — CEFEPIME 2 G: 2 INJECTION, POWDER, FOR SOLUTION INTRAVENOUS at 11:30

## 2024-01-25 RX ADMIN — MYCOPHENOLATE MOFETIL 1250 MG: 500 INJECTION, POWDER, LYOPHILIZED, FOR SOLUTION INTRAVENOUS at 08:37

## 2024-01-25 RX ADMIN — CEFEPIME 2 G: 2 INJECTION, POWDER, FOR SOLUTION INTRAVENOUS at 01:36

## 2024-01-25 RX ADMIN — POTASSIUM CHLORIDE 20 MEQ: 29.8 INJECTION, SOLUTION INTRAVENOUS at 04:37

## 2024-01-25 RX ADMIN — ACYCLOVIR 800 MG: 800 TABLET ORAL at 08:44

## 2024-01-25 RX ADMIN — URSODIOL 300 MG: 300 CAPSULE ORAL at 14:38

## 2024-01-25 RX ADMIN — DOCUSATE SODIUM 50 MG AND SENNOSIDES 8.6 MG 2 TABLET: 8.6; 5 TABLET, FILM COATED ORAL at 20:13

## 2024-01-25 RX ADMIN — DEXTROSE MONOHYDRATE 20 ML: 50 INJECTION, SOLUTION INTRAVENOUS at 21:02

## 2024-01-25 RX ADMIN — DEXTROSE MONOHYDRATE 420 MCG: 50 INJECTION, SOLUTION INTRAVENOUS at 20:05

## 2024-01-25 RX ADMIN — HYDROMORPHONE HYDROCHLORIDE 1 MG: 1 INJECTION, SOLUTION INTRAMUSCULAR; INTRAVENOUS; SUBCUTANEOUS at 01:38

## 2024-01-25 RX ADMIN — URSODIOL 300 MG: 300 CAPSULE ORAL at 20:06

## 2024-01-25 RX ADMIN — PROCHLORPERAZINE EDISYLATE 5 MG: 5 INJECTION INTRAMUSCULAR; INTRAVENOUS at 01:34

## 2024-01-25 RX ADMIN — LORAZEPAM 0.5 MG: 2 INJECTION INTRAMUSCULAR; INTRAVENOUS at 20:05

## 2024-01-25 RX ADMIN — Medication: at 15:34

## 2024-01-25 RX ADMIN — MAGNESIUM SULFATE IN WATER 4 G: 40 INJECTION, SOLUTION INTRAVENOUS at 04:37

## 2024-01-25 RX ADMIN — AMLODIPINE BESYLATE 5 MG: 5 TABLET ORAL at 08:40

## 2024-01-25 RX ADMIN — MICAFUNGIN SODIUM 150 MG: 50 INJECTION, POWDER, LYOPHILIZED, FOR SOLUTION INTRAVENOUS at 17:30

## 2024-01-25 RX ADMIN — HYDROMORPHONE HYDROCHLORIDE 1 MG: 1 INJECTION, SOLUTION INTRAMUSCULAR; INTRAVENOUS; SUBCUTANEOUS at 04:35

## 2024-01-25 RX ADMIN — DEXTROSE MONOHYDRATE 20 ML: 50 INJECTION, SOLUTION INTRAVENOUS at 20:05

## 2024-01-25 RX ADMIN — CEFEPIME 2 G: 2 INJECTION, POWDER, FOR SOLUTION INTRAVENOUS at 17:31

## 2024-01-25 RX ADMIN — ACYCLOVIR SODIUM 850 MG: 50 INJECTION, SOLUTION INTRAVENOUS at 03:03

## 2024-01-25 RX ADMIN — HYDROCORTISONE: 1 CREAM TOPICAL at 08:34

## 2024-01-25 ASSESSMENT — ACTIVITIES OF DAILY LIVING (ADL)
ADLS_ACUITY_SCORE: 23
ADLS_ACUITY_SCORE: 23
ADLS_ACUITY_SCORE: 24
ADLS_ACUITY_SCORE: 24
ADLS_ACUITY_SCORE: 23
ADLS_ACUITY_SCORE: 24
ADLS_ACUITY_SCORE: 23
ADLS_ACUITY_SCORE: 23
ADLS_ACUITY_SCORE: 24
ADLS_ACUITY_SCORE: 23
ADLS_ACUITY_SCORE: 24
ADLS_ACUITY_SCORE: 23

## 2024-01-25 NOTE — PROGRESS NOTES
BMT CLINICAL SOCIAL WORK NOTE:    Focus: Supportive Counseling    Data: Pt is a 53 year old female, currently day +14 s/p allo BMT.    Interventions: Clinical  (CSW) met with Pt to assess coping, provide supportive counseling and assist with resources as needed. Pt shared that overall she is doing ok, she shared that she is having a lot of throat pain which is making it hard to eat. She discussed feeling worried about taking narcotics due to liver and currently she feels it is tolerable. The pt discussed that overall she is adapting well in the hospital, but does hope she will be able to return home soon. CSW provided empathic listening, validation of concerns, and encouragement. CSW encouraged Pt to contact CSW for support, questions and/or resources.     Assessment: Pt presented as friendly and open.  Pt appears to be coping well at this time. Pt continues to be supported by her significant other and children.     Plan: CSW will continue to provide supportive counseling and assistance with resources as needed. CSW will continue to collaborate with multidisciplinary team regarding Pt's plan of care.     CLIFTON Ryan, [facility]  Pager: [phone]  Phone: [phone]

## 2024-01-25 NOTE — PROGRESS NOTES
"  BMT Daily Progress Note  Patient ID:  Irma Foreman is a 53 year old female with a PMH of MDS, warm AIHA, transfusion and transfusion dependent anemia presented to  to undergo a myeloablative allogeneic transplant. She was recently COVID+ on 12/26 but tested negative on 1/2 with resolution of nearly all URI symptoms. Undergoing MA (Bu/Flu) 6/8 URD Allo transplant, day +14      HPI     Irma looks better today. Her oral mucositis is still quite bothersome but IV dilaudid seems to provide adequate relief, though it does make her nauseated at times. She was able to eat small amounts of food yesterday (sherbet, broth and a rice dish). Small loose stools, no abdominal pain. Encouraged her to ambulated in the hallways today and keep using incentive spirometer.       Review of Systems    Negative unless stated in the HPI.     PHYSICAL EXAM      Weight     Wt Readings from Last 3 Encounters:   01/25/24 81.7 kg (180 lb 3.2 oz)   12/26/23 83.1 kg (183 lb 4.8 oz)   12/19/23 83.5 kg (184 lb)        KPS: 70    /65 (BP Location: Left arm)   Pulse 96   Temp 97.8  F (36.6  C) (Axillary)   Resp 18   Ht 1.575 m (5' 2.01\")   Wt 81.7 kg (180 lb 3.2 oz)   LMP 11/05/2017   SpO2 97%   BMI 32.95 kg/m       General: NAD   Eyes: KEVON, sclera anicteric   Nose/Mouth/Throat: Mucosa pink and moist. Right inner cheek slightly erythematous. Ulceration on R-posterior pharynx, tongue with ridging.   Lungs: CTAB   Cardiovascular: RRR, no M/R/G   Abdominal/Rectal: +BS, soft, non tender   Lymphatics: No edema  Skin: wart under right foot dime sized white, cauliflower and slightly ulcerated; Two pin point sized, flesh colored warts on right hand; Hands dry, one excoriation on left dorsum (not examined today). Very faint maculopapular rash present under breasts, abdomen and low back.    Neuro: A&O   Additional Findings: Gastelum site NT, no drainage.    Current aGVHD staging:  Skin 0, UGI 0, LGI 0, Liver 0 (keep in note through day +180 " for allos)      LABS AND IMAGING: I have assessed all abnormal lab values for their clinical significance and any values considered clinically significant have been addressed in the assessment and plan.        Lab Results   Component Value Date    WBC 2.2 (L) 01/25/2024    ANEUTAUTO 1.0 (L) 01/15/2024    HGB 7.6 (L) 01/25/2024    HCT 21.8 (L) 01/25/2024    PLT 11 (LL) 01/25/2024     01/25/2024    POTASSIUM 3.6 01/25/2024    CHLORIDE 105 01/25/2024    CO2 26 01/25/2024    GLC 94 01/25/2024    BUN 8.4 01/25/2024    CR 0.40 (L) 01/25/2024    MAG 1.9 01/25/2024    INR 1.16 (H) 01/22/2024       US Abdominal with Doppler (1/18/24)  Impression:   1. Patent Doppler evaluation of the abdomen with antegrade flow throughout.  2. Elevated hepatic artery resistive index of 0.81, nonspecific though can be seen with hepatic venous congestion which could be related to venous occlusive disease..    SYSTEMS-BASED ASSESSMENT AND PLAN     Hortencia Baldwin is a 53 year old female with a history of MDS, undergoing MA (Bu/Flu) 6/8 URD Allo transplant, day +14      BMT/IEC PROTOCOL for MT 2015-29 **according to but not on trial**   - Chemo protocol:  Day -6 through day -1: Keppra and Allopurinol   Day -5 through -2: Bu/Flu. Busulfan levels adjusted by pharm, attending  Day -1: Rest day   Day 0: Transplant. Recipient: O+, CMV+. Donor: O+, CMV-. No flush. Cell dose 6.5 x10^6.   Gram+ bacilli (cutibacterium proprionibacterium) cultured for stem cell product. On cefepime (1/11 - 1/18). Blood cx no growth x10 bottles.   - Restaging plan: per protocol     HEME/COAG  # Iron overload: hx of transfusion dependent anemia, pre-transplant ferritin around 5,000. Recommend phlebotomy post transplant when retic count is restored and Hgb >10      - Pancytopenia due to chemotherapy   # Platelet refractoriness: Platelets in single digits since 1/16. BID platelet checks. Responding to HLA platelets. Continue 1600 platelet checks.    Transfusion medicine  consulted 1/18: HLA typing completed  **Blood bank fellow recommends no more than 2 units platelets/day d/t risk of TACO.    - Transfusion parameters: hemoglobin <7, platelets <10    IMMUNOCOMPROMISED  # Fever: (Last on 1/15) Recurrent on 1/21- tmax 100.7.  - UA: negative for LE & nitrites. 1/13 ua negative; BK urine positive/adeno negative (red/orange urine)  - Blood cultures NGTD. Procal elevated 1.08   - CXR with likely pulmonary edema  - RVP neg   - Cefepime (1/11 - 1/18; resumed 1/22-x). Brief course of IV Vancomycin. MRSA nasal swab negative.   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   **avoiding sirolimus d/t high risk VOD**    CARDIOVASCULAR  # Chest pressure: new on 1/15, worse with sitting up/activity, better while laying flat. RESOLVED.   EKG: new QR pattern in V1, suggests right ventricular conduction delay  Troponin negative x1   Echo (1/15): LVEF 55-60%, no pericardial effusion present   Cardiology consulted: no further recs   #Prolonged QTc to 507 on 1/22.    - Change levaquin to cefpodoxime. Repeated QTc on 1/22: 466.   # Hypertension with initiation of tacrolimus: Amlodipine 5 mg BID. Labetalol 5 mg IV prn with parameters.   - Risk of cardiomyopathy:  Baseline EF 60-65%  - Risk of arrhythmia: Baseline EKG showed sinus rhythm     RESPIRATORY  - Current smoker: states she quit (1/5/24), offered nicotine replacement but she declined.   - Baseline PFTs: The FEV1, FVC, FEV1/FVC ratio and BEX94-89% are within normal limits.  The inspiratory flow rates are within normal limits.  Lung volumes are within  normal limits.  The diffusing capacity is normal.   - Risk of respiratory complications: Frequent ambulation and incentive spirometer. CXR (1/24) showed increased left basilar streaky opacities, likely atelectasis.     GI/NUTRITION  # Epigastric pain (1/18): RUQ ultrasound showing patent doppler throughout, no ascites, nonspecific elevated hepatic artery restrictive index (see full impression above). Weight down, LFT's WNL, no abdominal pain. Continue to monitor closely for VOD; checking hepatic panel daily.     - IV PPI BID (1/19)   - Ulcer prophylaxis: PPI  - VOD ppx: Ursodiol   # Moderate malnutrition in the context of acute illness: dietitian following. Avoiding TPN for now with concern for VOD, will continue to encourage PO intake. 1L NS bolus on 1/23.   # Mouth pain likely 2/2 the start of oral mucositis: added MMW prn (1/18), IV dilaudid 0.5 - 1.0 mg Q3H.      DERM:  # Right hand dryness, try aquafor with gloves on overnight and as tolerated during the day  # Foot, hand warts. Curbside derm on 1/8, no recs while inpatient, typically managed OP.    # Pink maculopapular rash present under breasts and across abdomen: possible cefepime rash vs other? (1/18) Continue Hydrocortisone BID.     MUSCULOSKELETAL/FRAILTY  - Baseline Frailty Score: 3  - Daily PT/OT as needed while inpatient  - Cancer Rehab as needed outpatient    SYMPTOM MANAGEMENT  - Nausea from chemo/radiation: Prochlorperazine, ondansetron, lorazepam.    SOCIAL DETERMINANTS  - Caregiver: Keira Cardenas & Rickey Neal       Disposition: remain admitted through engraftment    Known issues that I take into account for medical decisions, with salient changes to the plan considering these complexities noted above.    Patient Active Problem List   Diagnosis    Left medial knee pain    Obesity (BMI 30-39.9)    Anemia, unspecified type    Macrocytic anemia    MDS (myelodysplastic syndrome) (H)     Clinically Significant Risk Factors            #  "Hypomagnesemia: Lowest Mg = 1.3 mg/dL in last 2 days, will replace as needed   # Hypoalbuminemia: Lowest albumin = 3.2 g/dL at 1/25/2024  3:06 AM, will monitor as appropriate   # Thrombocytopenia: Lowest platelets = 8 in last 2 days, will monitor for bleeding          # Obesity: Estimated body mass index is 32.95 kg/m  as calculated from the following:    Height as of this encounter: 1.575 m (5' 2.01\").    Weight as of this encounter: 81.7 kg (180 lb 3.2 oz).   # Moderate Malnutrition: based on nutrition assessment            I spent 40 minutes in the care of this patient today, which included time necessary for preparation for the visit, obtaining history, ordering medications/tests/procedures as medically indicated, review of pertinent medical literature, counseling of the patient, communication of recommendations to the care team, and documentation time.    King Vaz PA-C        ______________________________________________      BMT ATTENDING NOTE    Hortencia Baldwin is a 53 year old female, who is day 14 of transplant for MDS, complicated by iron overload. She is admitted for myeloablative (Bu/Flu) conditioning, 6/8 unrelated donor, GVHD ppx with PTCy/ Tac/ MMF, ABO matched, CMV donor -/ recipient + .    Continue to have throat pain and trouble swallowing. She did eat some sherbet and broth yesterday. No abdominal pain. Overall looks better than yesterday.    BMT: D14   Heme: Engrafting   ID: Neutropenic fever (last febrile on 1/22). On cefepime. Infectious workup negative.   Stem cell culture was positive for cutibacterium. Treated with cefepime X 7 days. Patient's blood cultures have been negative  Mucositis: She is willing to try dilaudid PCA  Risk of VOD: US abdomen on 1/18 showed elevated hepatic artery resistance index but no other findings consistent with VOD. Monitor closely.   HTN: Amlodipine 10mg daily. Tac goal 5-10 after D15, will likely improve HTN/  PPx: jani, acyclovir, letermovir "   Supportive care: encourage ambulation, PT/OT, optimize nutrition. Chest x-Ray showed atelectasis, incentive spirometer given.     On the date of service, 01/25/2024, I spent 40 minutes on the patient unit personally reviewing medical records and medications, reviewing vital signs, labs, and imaging results as summarized above, discussing the patient's case on rounds with the JEN, obtaining a history from the patient, performing a physical exam, intensively monitoring treatments with high risk of toxicity, coordinating care, and documenting in the electronic medical record.    Coco Garay  Department of Hematology, Oncology and Transplantation  [facility] Pager [phone]/Text via [employer]

## 2024-01-25 NOTE — PLAN OF CARE
"Afebrile, hypertensive (140's/80's), OVSS. Pt still complaining of mucositis pain, PRN diluadid given x3, with relief. PRN ativan given x1, PRN compazine given x1 for nausea, with relief. PRN imodium given  x1. No blood products needed. Potassium infused, magnesium infused. Continue with plan of care.     Problem: Adult Inpatient Plan of Care  Goal: Plan of Care Review  Description: The Plan of Care Review/Shift note should be completed every shift.  The Outcome Evaluation is a brief statement about your assessment that the patient is improving, declining, or no change.  This information will be displayed automatically on your shift  note.  Outcome: Progressing  Goal: Patient-Specific Goal (Individualized)  Description: You can add care plan individualizations to a care plan. Examples of Individualization might be:  \"Parent requests to be called daily at 9am for status\", \"I have a hard time hearing out of my right ear\", or \"Do not touch me to wake me up as it startles  me\".  Outcome: Progressing  Goal: Absence of Hospital-Acquired Illness or Injury  Outcome: Progressing  Intervention: Identify and Manage Fall Risk  Recent Flowsheet Documentation  Taken 1/25/2024 0313 by Aliyah Rodriguez, RN  Safety Promotion/Fall Prevention:   safety round/check completed   activity supervised   clutter free environment maintained   lighting adjusted  Taken 1/25/2024 0141 by Aliyah Rodriguez, RN  Safety Promotion/Fall Prevention:   safety round/check completed   activity supervised   clutter free environment maintained   lighting adjusted  Taken 1/25/2024 0003 by Aliyah Rodriguez, RN  Safety Promotion/Fall Prevention:   safety round/check completed   activity supervised   clutter free environment maintained   lighting adjusted  Taken 1/24/2024 2116 by Aliyah Rodriguez, RN  Safety Promotion/Fall Prevention:   safety round/check completed   activity supervised   clutter free environment maintained   nonskid shoes/slippers when out " of bed   assistive device/personal items within reach  Taken 1/24/2024 2003 by Aliyah Rodriguez RN  Safety Promotion/Fall Prevention:   safety round/check completed   activity supervised   clutter free environment maintained   lighting adjusted  Intervention: Prevent Skin Injury  Recent Flowsheet Documentation  Taken 1/24/2024 1953 by Aliyah Rodriguez RN  Body Position: position changed independently  Intervention: Prevent and Manage VTE (Venous Thromboembolism) Risk  Recent Flowsheet Documentation  Taken 1/24/2024 2003 by Aliyah Rodriguez RN  VTE Prevention/Management: compression stockings off  Intervention: Prevent Infection  Recent Flowsheet Documentation  Taken 1/25/2024 0313 by Aliyah Rodriguez RN  Infection Prevention:   rest/sleep promoted   single patient room provided   visitors restricted/screened   hand hygiene promoted   equipment surfaces disinfected  Taken 1/25/2024 0003 by Aliyah Rodriguez RN  Infection Prevention:   rest/sleep promoted   single patient room provided   visitors restricted/screened   hand hygiene promoted   equipment surfaces disinfected  Taken 1/24/2024 2003 by Aliyah Rodriguez RN  Infection Prevention:   rest/sleep promoted   single patient room provided   visitors restricted/screened   hand hygiene promoted   equipment surfaces disinfected  Goal: Optimal Comfort and Wellbeing  Outcome: Progressing  Intervention: Monitor Pain and Promote Comfort  Recent Flowsheet Documentation  Taken 1/25/2024 0304 by Aliyah Rodriguez RN  Pain Management Interventions:   rest   cold applied  Taken 1/25/2024 0200 by Aliyah Rodriguez RN  Pain Management Interventions: rest  Taken 1/25/2024 0138 by Aliyah Rodriguez RN  Pain Management Interventions: medication (see MAR)  Taken 1/25/2024 0003 by Aliyah Rodriguez RN  Pain Management Interventions: rest  Taken 1/24/2024 2137 by Aliyah Rodriguez RN  Pain Management Interventions: medication (see MAR)  Taken 1/24/2024 2102 by Jennifer  ZENIA Ly  Pain Management Interventions: medication (see MAR)  Taken 1/24/2024 1953 by Aliyah Rodriguez RN  Pain Management Interventions: cold applied  Goal: Readiness for Transition of Care  Outcome: Progressing     Problem: Stem Cell/Bone Marrow Transplant  Goal: Optimal Coping with Transplant  Outcome: Progressing  Goal: Symptom-Free Urinary Elimination  Outcome: Progressing  Intervention: Prevent or Manage Bladder Irritation  Recent Flowsheet Documentation  Taken 1/25/2024 0304 by Aliyah Rodriguez RN  Pain Management Interventions:   rest   cold applied  Taken 1/25/2024 0200 by Aliyah Rodriguez RN  Pain Management Interventions: rest  Taken 1/25/2024 0138 by Aliyah Rodriguez RN  Pain Management Interventions: medication (see MAR)  Taken 1/25/2024 0003 by Aliyah Rodriguez RN  Pain Management Interventions: rest  Taken 1/24/2024 2137 by Aliyah Rodriguez RN  Pain Management Interventions: medication (see MAR)  Taken 1/24/2024 2102 by Aliyah Rodriguez RN  Pain Management Interventions: medication (see MAR)  Taken 1/24/2024 1953 by Aliyah Rodriguez RN  Pain Management Interventions: cold applied  Goal: Diarrhea Symptom Control  Outcome: Progressing  Goal: Improved Activity Tolerance  Outcome: Progressing  Intervention: Promote Improved Energy  Recent Flowsheet Documentation  Taken 1/25/2024 0313 by Aliyah Rodriguez RN  Activity Management:   activity adjusted per tolerance   ambulated to bathroom  Taken 1/24/2024 1953 by Aliyah Rodriguez RN  Activity Management:   activity adjusted per tolerance   ambulated to bathroom  Goal: Blood Counts Within Acceptable Range  Outcome: Progressing  Intervention: Monitor and Manage Hematologic Symptoms  Recent Flowsheet Documentation  Taken 1/25/2024 0313 by Aliyah Rodriguez RN  Bleeding Precautions:   monitored for signs of bleeding   gentle oral care promoted   blood pressure closely monitored  Medication Review/Management:   medications reviewed    high-risk medications identified  Taken 1/25/2024 0003 by Aliyah Rodriguez RN  Bleeding Precautions:   monitored for signs of bleeding   gentle oral care promoted   blood pressure closely monitored  Medication Review/Management:   medications reviewed   high-risk medications identified  Taken 1/24/2024 2003 by Aliyah Rodriguez RN  Bleeding Precautions:   monitored for signs of bleeding   gentle oral care promoted   blood pressure closely monitored  Medication Review/Management:   medications reviewed   high-risk medications identified  Goal: Absence of Hypersensitivity Reaction  Outcome: Progressing  Goal: Absence of Infection  Outcome: Progressing  Intervention: Prevent and Manage Infection  Recent Flowsheet Documentation  Taken 1/25/2024 0313 by Aliyah Rodriguez RN  Infection Prevention:   rest/sleep promoted   single patient room provided   visitors restricted/screened   hand hygiene promoted   equipment surfaces disinfected  Infection Management: aseptic technique maintained  Isolation Precautions: protective environment maintained  Taken 1/25/2024 0003 by Aliyah Rodriguez RN  Infection Prevention:   rest/sleep promoted   single patient room provided   visitors restricted/screened   hand hygiene promoted   equipment surfaces disinfected  Infection Management: aseptic technique maintained  Isolation Precautions: protective environment maintained  Taken 1/24/2024 2003 by Aliyah Rodriguez RN  Infection Prevention:   rest/sleep promoted   single patient room provided   visitors restricted/screened   hand hygiene promoted   equipment surfaces disinfected  Infection Management: aseptic technique maintained  Isolation Precautions: protective environment maintained  Goal: Improved Oral Mucous Membrane Health and Integrity  Outcome: Progressing  Goal: Nausea and Vomiting Symptom Relief  Outcome: Progressing  Intervention: Prevent and Manage Nausea and Vomiting  Recent Flowsheet Documentation  Taken 1/24/2024  2003 by Aliyah Rodriguez, RN  Nausea/Vomiting Interventions: antiemetic  Goal: Optimal Nutrition Intake  Outcome: Progressing

## 2024-01-26 LAB
ABO/RH(D): NORMAL
ALBUMIN SERPL BCG-MCNC: 3.3 G/DL (ref 3.5–5.2)
ALP SERPL-CCNC: 100 U/L (ref 40–150)
ALT SERPL W P-5'-P-CCNC: 17 U/L (ref 0–50)
ANION GAP SERPL CALCULATED.3IONS-SCNC: 8 MMOL/L (ref 7–15)
ANTIBODY SCREEN: NEGATIVE
AST SERPL W P-5'-P-CCNC: 16 U/L (ref 0–45)
ATRIAL RATE - MUSE: 103 BPM
BACTERIA BLD CULT: NO GROWTH
BACTERIA BLD CULT: NO GROWTH
BASOPHILS # BLD AUTO: ABNORMAL 10*3/UL
BASOPHILS # BLD MANUAL: 0 10E3/UL (ref 0–0.2)
BASOPHILS NFR BLD AUTO: ABNORMAL %
BASOPHILS NFR BLD MANUAL: 0 %
BILIRUB SERPL-MCNC: 0.7 MG/DL
BK VIRUS DNA IU/ML, INSTRUMENT (6800): ABNORMAL IU/ML
BK VIRUS SPECIMEN TYPE: ABNORMAL
BKV DNA SPEC NAA+PROBE-LOG#: 6.7 {LOG_COPIES}/ML
BUN SERPL-MCNC: 5.9 MG/DL (ref 6–20)
CALCIUM SERPL-MCNC: 8.6 MG/DL (ref 8.6–10)
CHLORIDE SERPL-SCNC: 104 MMOL/L (ref 98–107)
CMV DNA SPEC NAA+PROBE-ACNC: NOT DETECTED IU/ML
CREAT SERPL-MCNC: 0.34 MG/DL (ref 0.51–0.95)
DEPRECATED HCO3 PLAS-SCNC: 27 MMOL/L (ref 22–29)
DIASTOLIC BLOOD PRESSURE - MUSE: NORMAL MMHG
EGFRCR SERPLBLD CKD-EPI 2021: >90 ML/MIN/1.73M2
ELLIPTOCYTES BLD QL SMEAR: SLIGHT
EOSINOPHIL # BLD AUTO: ABNORMAL 10*3/UL
EOSINOPHIL # BLD MANUAL: 0 10E3/UL (ref 0–0.7)
EOSINOPHIL NFR BLD AUTO: ABNORMAL %
EOSINOPHIL NFR BLD MANUAL: 0 %
ERYTHROCYTE [DISTWIDTH] IN BLOOD BY AUTOMATED COUNT: 16.6 % (ref 10–15)
GLUCOSE SERPL-MCNC: 99 MG/DL (ref 70–99)
HCT VFR BLD AUTO: 22 % (ref 35–47)
HGB BLD-MCNC: 7.7 G/DL (ref 11.7–15.7)
IMM GRANULOCYTES # BLD: ABNORMAL 10*3/UL
IMM GRANULOCYTES NFR BLD: ABNORMAL %
INTERPRETATION ECG - MUSE: NORMAL
LYMPHOCYTES # BLD AUTO: ABNORMAL 10*3/UL
LYMPHOCYTES # BLD MANUAL: 0.1 10E3/UL (ref 0.8–5.3)
LYMPHOCYTES NFR BLD AUTO: ABNORMAL %
LYMPHOCYTES NFR BLD MANUAL: 1 %
MAGNESIUM SERPL-MCNC: 1.3 MG/DL (ref 1.7–2.3)
MAGNESIUM SERPL-MCNC: 1.9 MG/DL (ref 1.7–2.3)
MCH RBC QN AUTO: 32 PG (ref 26.5–33)
MCHC RBC AUTO-ENTMCNC: 35 G/DL (ref 31.5–36.5)
MCV RBC AUTO: 91 FL (ref 78–100)
METAMYELOCYTES # BLD MANUAL: 0.1 10E3/UL
METAMYELOCYTES NFR BLD MANUAL: 2 %
MONOCYTES # BLD AUTO: ABNORMAL 10*3/UL
MONOCYTES # BLD MANUAL: 0.2 10E3/UL (ref 0–1.3)
MONOCYTES NFR BLD AUTO: ABNORMAL %
MONOCYTES NFR BLD MANUAL: 4 %
MYELOCYTES # BLD MANUAL: 0.1 10E3/UL
MYELOCYTES NFR BLD MANUAL: 1 %
NEUTROPHILS # BLD AUTO: ABNORMAL 10*3/UL
NEUTROPHILS # BLD MANUAL: 5.2 10E3/UL (ref 1.6–8.3)
NEUTROPHILS NFR BLD AUTO: ABNORMAL %
NEUTROPHILS NFR BLD MANUAL: 92 %
NRBC # BLD AUTO: 0 10E3/UL
NRBC # BLD AUTO: 0.1 10E3/UL
NRBC BLD AUTO-RTO: 1 /100
NRBC BLD MANUAL-RTO: 1 %
P AXIS - MUSE: 59 DEGREES
PHOSPHATE SERPL-MCNC: 2.9 MG/DL (ref 2.5–4.5)
PLAT MORPH BLD: ABNORMAL
PLATELET # BLD AUTO: 15 10E3/UL (ref 150–450)
POTASSIUM SERPL-SCNC: 3.6 MMOL/L (ref 3.4–5.3)
PR INTERVAL - MUSE: 162 MS
PROT SERPL-MCNC: 5.8 G/DL (ref 6.4–8.3)
QRS DURATION - MUSE: 88 MS
QT - MUSE: 356 MS
QTC - MUSE: 466 MS
R AXIS - MUSE: 38 DEGREES
RBC # BLD AUTO: 2.41 10E6/UL (ref 3.8–5.2)
RBC MORPH BLD: ABNORMAL
SODIUM SERPL-SCNC: 139 MMOL/L (ref 135–145)
SPECIMEN EXPIRATION DATE: NORMAL
SYSTOLIC BLOOD PRESSURE - MUSE: NORMAL MMHG
T AXIS - MUSE: 52 DEGREES
TACROLIMUS BLD-MCNC: 17.9 UG/L (ref 5–15)
TME LAST DOSE: ABNORMAL H
TME LAST DOSE: ABNORMAL H
VENTRICULAR RATE- MUSE: 103 BPM
WBC # BLD AUTO: 5.6 10E3/UL (ref 4–11)

## 2024-01-26 PROCEDURE — 99233 SBSQ HOSP IP/OBS HIGH 50: CPT | Mod: 25

## 2024-01-26 PROCEDURE — 250N000013 HC RX MED GY IP 250 OP 250 PS 637

## 2024-01-26 PROCEDURE — 86900 BLOOD TYPING SEROLOGIC ABO: CPT

## 2024-01-26 PROCEDURE — 85048 AUTOMATED LEUKOCYTE COUNT: CPT

## 2024-01-26 PROCEDURE — 87799 DETECT AGENT NOS DNA QUANT: CPT

## 2024-01-26 PROCEDURE — 80197 ASSAY OF TACROLIMUS: CPT | Performed by: INTERNAL MEDICINE

## 2024-01-26 PROCEDURE — 83735 ASSAY OF MAGNESIUM: CPT

## 2024-01-26 PROCEDURE — 85007 BL SMEAR W/DIFF WBC COUNT: CPT

## 2024-01-26 PROCEDURE — 250N000011 HC RX IP 250 OP 636

## 2024-01-26 PROCEDURE — 80053 COMPREHEN METABOLIC PANEL: CPT

## 2024-01-26 PROCEDURE — 84100 ASSAY OF PHOSPHORUS: CPT | Performed by: STUDENT IN AN ORGANIZED HEALTH CARE EDUCATION/TRAINING PROGRAM

## 2024-01-26 PROCEDURE — 99418 PROLNG IP/OBS E/M EA 15 MIN: CPT

## 2024-01-26 PROCEDURE — 83735 ASSAY OF MAGNESIUM: CPT | Performed by: STUDENT IN AN ORGANIZED HEALTH CARE EDUCATION/TRAINING PROGRAM

## 2024-01-26 PROCEDURE — 250N000011 HC RX IP 250 OP 636: Performed by: STUDENT IN AN ORGANIZED HEALTH CARE EDUCATION/TRAINING PROGRAM

## 2024-01-26 PROCEDURE — 206N000001 HC R&B BMT UMMC

## 2024-01-26 PROCEDURE — 258N000003 HC RX IP 258 OP 636

## 2024-01-26 PROCEDURE — 250N000011 HC RX IP 250 OP 636: Performed by: INTERNAL MEDICINE

## 2024-01-26 PROCEDURE — C9113 INJ PANTOPRAZOLE SODIUM, VIA: HCPCS

## 2024-01-26 RX ORDER — POTASSIUM CHLORIDE 29.8 MG/ML
20 INJECTION INTRAVENOUS ONCE
Qty: 50 ML | Refills: 0 | Status: COMPLETED | OUTPATIENT
Start: 2024-01-26 | End: 2024-01-26

## 2024-01-26 RX ORDER — MAGNESIUM SULFATE HEPTAHYDRATE 40 MG/ML
2 INJECTION, SOLUTION INTRAVENOUS ONCE
Status: COMPLETED | OUTPATIENT
Start: 2024-01-26 | End: 2024-01-26

## 2024-01-26 RX ORDER — PHENAZOPYRIDINE HYDROCHLORIDE 200 MG/1
200 TABLET, FILM COATED ORAL
Status: DISCONTINUED | OUTPATIENT
Start: 2024-01-26 | End: 2024-01-28

## 2024-01-26 RX ORDER — MAGNESIUM SULFATE HEPTAHYDRATE 40 MG/ML
4 INJECTION, SOLUTION INTRAVENOUS ONCE
Qty: 100 ML | Refills: 0 | Status: COMPLETED | OUTPATIENT
Start: 2024-01-26 | End: 2024-01-26

## 2024-01-26 RX ADMIN — AMLODIPINE BESYLATE 5 MG: 5 TABLET ORAL at 08:14

## 2024-01-26 RX ADMIN — HYDROMORPHONE HYDROCHLORIDE 0.5 MG: 1 INJECTION, SOLUTION INTRAMUSCULAR; INTRAVENOUS; SUBCUTANEOUS at 11:40

## 2024-01-26 RX ADMIN — URSODIOL 300 MG: 300 CAPSULE ORAL at 14:50

## 2024-01-26 RX ADMIN — URSODIOL 300 MG: 300 CAPSULE ORAL at 08:14

## 2024-01-26 RX ADMIN — LETERMOVIR 480 MG: 480 TABLET, FILM COATED ORAL at 08:15

## 2024-01-26 RX ADMIN — PHENAZOPYRIDINE HYDROCHLORIDE 200 MG: 200 TABLET ORAL at 10:44

## 2024-01-26 RX ADMIN — MYCOPHENOLATE MOFETIL 1250 MG: 500 INJECTION, POWDER, LYOPHILIZED, FOR SOLUTION INTRAVENOUS at 08:17

## 2024-01-26 RX ADMIN — MAGNESIUM SULFATE IN WATER 4 G: 4 INJECTION, SOLUTION INTRAVENOUS at 05:31

## 2024-01-26 RX ADMIN — HYDROMORPHONE HYDROCHLORIDE 0.5 MG: 1 INJECTION, SOLUTION INTRAMUSCULAR; INTRAVENOUS; SUBCUTANEOUS at 14:51

## 2024-01-26 RX ADMIN — HYDROMORPHONE HYDROCHLORIDE 0.5 MG: 1 INJECTION, SOLUTION INTRAMUSCULAR; INTRAVENOUS; SUBCUTANEOUS at 03:03

## 2024-01-26 RX ADMIN — LORAZEPAM 0.5 MG: 2 INJECTION INTRAMUSCULAR; INTRAVENOUS at 20:23

## 2024-01-26 RX ADMIN — PANTOPRAZOLE SODIUM 40 MG: 40 INJECTION, POWDER, FOR SOLUTION INTRAVENOUS at 08:19

## 2024-01-26 RX ADMIN — MICAFUNGIN SODIUM 150 MG: 50 INJECTION, POWDER, LYOPHILIZED, FOR SOLUTION INTRAVENOUS at 17:16

## 2024-01-26 RX ADMIN — ACYCLOVIR 800 MG: 800 TABLET ORAL at 08:14

## 2024-01-26 RX ADMIN — MAGNESIUM SULFATE IN WATER 2 G: 40 INJECTION, SOLUTION INTRAVENOUS at 13:31

## 2024-01-26 RX ADMIN — LORAZEPAM 0.5 MG: 2 INJECTION INTRAMUSCULAR; INTRAVENOUS at 15:16

## 2024-01-26 RX ADMIN — PHENAZOPYRIDINE HYDROCHLORIDE 200 MG: 200 TABLET ORAL at 13:31

## 2024-01-26 RX ADMIN — LORAZEPAM 1 MG: 2 INJECTION INTRAMUSCULAR; INTRAVENOUS at 00:04

## 2024-01-26 RX ADMIN — HYDROMORPHONE HYDROCHLORIDE 0.5 MG: 1 INJECTION, SOLUTION INTRAMUSCULAR; INTRAVENOUS; SUBCUTANEOUS at 08:20

## 2024-01-26 RX ADMIN — CEFEPIME 2 G: 2 INJECTION, POWDER, FOR SOLUTION INTRAVENOUS at 01:59

## 2024-01-26 RX ADMIN — HYDROMORPHONE HYDROCHLORIDE 0.5 MG: 1 INJECTION, SOLUTION INTRAMUSCULAR; INTRAVENOUS; SUBCUTANEOUS at 18:09

## 2024-01-26 RX ADMIN — DEXTROSE MONOHYDRATE 20 ML: 50 INJECTION, SOLUTION INTRAVENOUS at 19:59

## 2024-01-26 RX ADMIN — POTASSIUM CHLORIDE 20 MEQ: 29.8 INJECTION, SOLUTION INTRAVENOUS at 05:29

## 2024-01-26 RX ADMIN — HYDROCORTISONE: 1 CREAM TOPICAL at 20:11

## 2024-01-26 RX ADMIN — DEXTROSE MONOHYDRATE 420 MCG: 50 INJECTION, SOLUTION INTRAVENOUS at 19:58

## 2024-01-26 RX ADMIN — URSODIOL 300 MG: 300 CAPSULE ORAL at 20:02

## 2024-01-26 RX ADMIN — AMLODIPINE BESYLATE 5 MG: 5 TABLET ORAL at 20:03

## 2024-01-26 RX ADMIN — TACROLIMUS 80 MCG/HR: 5 INJECTION, SOLUTION INTRAVENOUS at 20:04

## 2024-01-26 RX ADMIN — HYDROMORPHONE HYDROCHLORIDE 0.5 MG: 1 INJECTION, SOLUTION INTRAMUSCULAR; INTRAVENOUS; SUBCUTANEOUS at 00:05

## 2024-01-26 RX ADMIN — MYCOPHENOLATE MOFETIL 1250 MG: 500 INJECTION, POWDER, LYOPHILIZED, FOR SOLUTION INTRAVENOUS at 20:23

## 2024-01-26 RX ADMIN — PANTOPRAZOLE SODIUM 40 MG: 40 INJECTION, POWDER, FOR SOLUTION INTRAVENOUS at 20:03

## 2024-01-26 RX ADMIN — ACYCLOVIR 800 MG: 800 TABLET ORAL at 20:02

## 2024-01-26 ASSESSMENT — ACTIVITIES OF DAILY LIVING (ADL)
ADLS_ACUITY_SCORE: 23

## 2024-01-26 NOTE — PROVIDER NOTIFICATION
"Provider Notified (Carolina Banks) \"FYI pt refusing pulse ox. Pt on PCA hydromorphone 0.1mg/mL. Pt educated on importance of monitoring O2 while on PCA\"    \"Ask them if they want us to discontinue the PCA and let me know\" S. J.    \"Pt would like to discontinue the PCA and go back to dilaudid Q3. PCA pump stopped per patient request, pt would like to go back to the Q3H dilaudid.\"    Orders obtained to discontinue the PCA pump. New dilaudid orders obtained.  "

## 2024-01-26 NOTE — PROGRESS NOTES
Patient does not like dilaudid PCA pump and due to repeated requests from patient to stop it despite education PCA dilaudid discontinued and PRN IV dilaudid 0.5 mg q3 PRN ordered

## 2024-01-26 NOTE — PLAN OF CARE
"Patient remains hospitalized following stem cell transplant, currently day +14. Monitoring return of counts. Continues on daily growth factor. Continues on IV cefepime as well as ppx antimicrobials. Afebrile. Continues on IV MMF & tac gtt. Appetite poor, due to mucositis pain. Transitioned to Dilaudid PCA this afternoon - patient feels pain control is similar to prior to PCA pump, declines any escalation of pain meds at this time. Reported intermittent nausea/reflux - IV Ativan given x1. No vomiting. 1 loose BM this shift. Complained of burning with urination this evening (states it's been going on since yesterday - UA sent). K & Mag replaced early this AM. Mag needed to replaced again this shift - recheck planned for tomorrow AM. Rash on trunk noted - utilizing hydrocortisone cream for itching. Ambulating independently. VSS, though BP intermittently high.     Problem: Adult Inpatient Plan of Care  Goal: Plan of Care Review  Description: The Plan of Care Review/Shift note should be completed every shift.  The Outcome Evaluation is a brief statement about your assessment that the patient is improving, declining, or no change.  This information will be displayed automatically on your shift  note.  Outcome: Progressing  Goal: Patient-Specific Goal (Individualized)  Description: You can add care plan individualizations to a care plan. Examples of Individualization might be:  \"Parent requests to be called daily at 9am for status\", \"I have a hard time hearing out of my right ear\", or \"Do not touch me to wake me up as it startles  me\".  Outcome: Progressing  Goal: Absence of Hospital-Acquired Illness or Injury  Outcome: Progressing  Intervention: Identify and Manage Fall Risk  Recent Flowsheet Documentation  Taken 1/25/2024 1600 by Minerva Vaz, RN  Safety Promotion/Fall Prevention:   assistive device/personal items within reach   chemotherapeutic precautions   clutter free environment maintained   lighting adjusted   " nonskid shoes/slippers when out of bed   patient and family education   room organization consistent   safety round/check completed  Taken 1/25/2024 1200 by Minerva Vaz RN  Safety Promotion/Fall Prevention:   assistive device/personal items within reach   chemotherapeutic precautions   clutter free environment maintained   lighting adjusted   nonskid shoes/slippers when out of bed   patient and family education   room organization consistent   safety round/check completed  Taken 1/25/2024 0800 by Minerva Vaz RN  Safety Promotion/Fall Prevention:   assistive device/personal items within reach   chemotherapeutic precautions   clutter free environment maintained   lighting adjusted   nonskid shoes/slippers when out of bed   patient and family education   room organization consistent   safety round/check completed  Intervention: Prevent Skin Injury  Recent Flowsheet Documentation  Taken 1/25/2024 1700 by Minerva Vaz RN  Body Position:   position changed independently   supine  Taken 1/25/2024 1600 by Minerva Vaz RN  Body Position: sitting up in bed  Taken 1/25/2024 1500 by Minerva Vaz RN  Body Position:   position changed independently   supine  Taken 1/25/2024 1400 by Minerva Vaz RN  Body Position:   position changed independently   left   side-lying  Taken 1/25/2024 1300 by Minerva Vaz RN  Body Position:   position changed independently   left   side-lying  Taken 1/25/2024 1200 by Minerva Vaz RN  Body Position: sitting up in bed  Taken 1/25/2024 1100 by Minerva Vaz RN  Body Position:   position changed independently   supine, head elevated  Taken 1/25/2024 1000 by Minerva Vaz RN  Body Position: (encouraged repositioning)   position changed independently   supine, head elevated  Taken 1/25/2024 0900 by Minerva Vaz RN  Body Position:   position changed independently   supine, head elevated  Taken 1/25/2024 0800 by Minerva Vaz RN  Body Position:    position changed independently   supine, head elevated  Goal: Optimal Comfort and Wellbeing  Outcome: Progressing  Intervention: Monitor Pain and Promote Comfort  Recent Flowsheet Documentation  Taken 1/25/2024 1534 by Minerva Vaz RN  Pain Management Interventions: pain pump in use  Taken 1/25/2024 1143 by Minerva Vaz RN  Pain Management Interventions: medication (see MAR)  Taken 1/25/2024 0828 by Minerva Vaz RN  Pain Management Interventions: medication (see MAR)  Goal: Readiness for Transition of Care  Outcome: Progressing     Problem: Stem Cell/Bone Marrow Transplant  Goal: Optimal Coping with Transplant  Outcome: Progressing  Goal: Symptom-Free Urinary Elimination  Outcome: Progressing  Intervention: Prevent or Manage Bladder Irritation  Recent Flowsheet Documentation  Taken 1/25/2024 1534 by Minerva Vaz RN  Pain Management Interventions: pain pump in use  Taken 1/25/2024 1143 by Minerva Vaz RN  Pain Management Interventions: medication (see MAR)  Taken 1/25/2024 0828 by Minerva Vaz RN  Pain Management Interventions: medication (see MAR)  Goal: Diarrhea Symptom Control  Outcome: Progressing  Goal: Improved Activity Tolerance  Outcome: Progressing  Intervention: Promote Improved Energy  Recent Flowsheet Documentation  Taken 1/25/2024 1200 by Minerva Vaz RN  Activity Management:   up in chair   ambulated in room  Goal: Blood Counts Within Acceptable Range  Outcome: Progressing  Intervention: Monitor and Manage Hematologic Symptoms  Recent Flowsheet Documentation  Taken 1/25/2024 1600 by Minerva Vaz RN  Medication Review/Management:   medications reviewed   high-risk medications identified  Taken 1/25/2024 1200 by Minerva Vaz RN  Medication Review/Management:   medications reviewed   high-risk medications identified  Taken 1/25/2024 0800 by Minerva Vaz RN  Medication Review/Management:   medications reviewed   high-risk medications identified  Goal:  Absence of Hypersensitivity Reaction  Outcome: Progressing  Goal: Absence of Infection  Outcome: Progressing  Intervention: Prevent and Manage Infection  Recent Flowsheet Documentation  Taken 1/25/2024 1600 by Minerva Vaz RN  Isolation Precautions: cytotoxic precautions maintained  Taken 1/25/2024 1200 by Minerva Vaz RN  Isolation Precautions: cytotoxic precautions maintained  Taken 1/25/2024 0800 by Minerva Vaz RN  Isolation Precautions: cytotoxic precautions maintained  Goal: Improved Oral Mucous Membrane Health and Integrity  Outcome: Progressing  Goal: Nausea and Vomiting Symptom Relief  Outcome: Progressing  Goal: Optimal Nutrition Intake  Outcome: Progressing   Goal Outcome Evaluation:

## 2024-01-26 NOTE — PROGRESS NOTES
"  BMT Daily Progress Note  Patient ID:  Irma Foremna is a 53 year old female with a PMH of MDS, warm AIHA, transfusion and transfusion dependent anemia presented to  to undergo a myeloablative allogeneic transplant. She was recently COVID+ on 12/26 but tested negative on 1/2 with resolution of nearly all URI symptoms. Undergoing MA (Bu/Flu) 6/8 URD Allo transplant, day +15      HPI   Irma reports urinary frequency and dysuria that started yesterday. UA shows blood but no signs of infection. Her oral mucositis is still painful, she was briefly on a dilaudid PCA then switched back to IV Q3H. She eats small bites of food, intake is still poor in general. The rash on her abdomen & back has resolved. She did ambulated around her room a few times with her boyfriend.     She will receive 1 more dose of G-CSF today, discontinue cefepime, discontinue Q12H platelet checks, she should have an HLA unit of platelets available this weekend if needed. BK/Adeno urine ordered and started on pyridium.       Review of Systems    Negative unless stated in the HPI.     PHYSICAL EXAM      Weight     Wt Readings from Last 3 Encounters:   01/26/24 80.8 kg (178 lb 1.6 oz)   12/26/23 83.1 kg (183 lb 4.8 oz)   12/19/23 83.5 kg (184 lb)        KPS: 70    /75 (BP Location: Left arm)   Pulse 98   Temp 97.7  F (36.5  C) (Axillary)   Resp 18   Ht 1.575 m (5' 2.01\")   Wt 80.8 kg (178 lb 1.6 oz)   [facility] 11/05/2017   SpO2 97%   BMI 32.57 kg/m       General: NAD   Eyes: KEVON, sclera anicteric   Nose/Mouth/Throat: Mucosa pink and moist. Right inner cheek slightly erythematous. Ulceration on R-posterior pharynx, tongue with ridging.   Lungs: CTAB   Cardiovascular: RRR, no M/R/G   Abdominal/Rectal: +BS, soft, non tender   Lymphatics: No edema  Skin: wart under right foot dime sized white, cauliflower and slightly ulcerated; Two pin point sized, flesh colored warts on right hand; Hands dry, one excoriation on left dorsum (not examined " today). Very faint maculopapular rash present under breasts, abdomen and low back.    Neuro: A&O   Additional Findings: Gastelum site NT, no drainage.    Current aGVHD staging:  Skin 0, UGI 0, LGI 0, Liver 0 (keep in note through day +180 for allos)      LABS AND IMAGING: I have assessed all abnormal lab values for their clinical significance and any values considered clinically significant have been addressed in the assessment and plan.        Lab Results   Component Value Date    WBC 5.6 01/26/2024    ANEUTAUTO 1.0 (L) 01/15/2024    HGB 7.7 (L) 01/26/2024    HCT 22.0 (L) 01/26/2024    PLT 15 (LL) 01/26/2024     01/26/2024    POTASSIUM 3.6 01/26/2024    CHLORIDE 104 01/26/2024    CO2 27 01/26/2024    GLC 99 01/26/2024    BUN 5.9 (L) 01/26/2024    CR 0.34 (L) 01/26/2024    MAG 1.9 01/26/2024    INR 1.16 (H) 01/22/2024       US Abdominal with Doppler (1/18/24)  Impression:   1. Patent Doppler evaluation of the abdomen with antegrade flow throughout.  2. Elevated hepatic artery resistive index of 0.81, nonspecific though can be seen with hepatic venous congestion which could be related to venous occlusive disease..    SYSTEMS-BASED ASSESSMENT AND PLAN     Hortencia Baldwin is a 53 year old female with a history of MDS, undergoing MA (Bu/Flu) 6/8 URD Allo transplant, day +15      BMT/IEC PROTOCOL for MT 2015-29 **according to but not on trial**   - Chemo protocol:  Day -6 through day -1: Keppra and Allopurinol   Day -5 through -2: Bu/Flu. Busulfan levels adjusted by pharm, attending  Day -1: Rest day   Day 0: Transplant. Recipient: O+, CMV+. Donor: O+, CMV-. No flush. Cell dose 6.5 x10^6.   Gram+ bacilli (cutibacterium proprionibacterium) cultured for stem cell product. On cefepime (1/11 - 1/18). Blood cx no growth x10 bottles.   - Restaging plan: per protocol     HEME/COAG  # Iron overload: hx of transfusion dependent anemia, pre-transplant ferritin around 5,000. Recommend phlebotomy post transplant when retic count  is restored and Hgb >10      - Pancytopenia due to chemotherapy   # Platelet refractoriness: Platelets in single digits since 1/16. BID platelet checks. Responding to HLA platelets. Continue 1600 platelet checks.    Transfusion medicine consulted 1/18: HLA typing completed  **Blood bank fellow recommends no more than 2 units platelets/day d/t risk of TACO.    Discussed with blood bank 1/26: HLA platelets available 1/27 and 1/30   - Transfusion parameters: hemoglobin <7, platelets <10    IMMUNOCOMPROMISED  # Fever: (Last on 1/15) Recurrent on 1/21- tmax 100.7.  - UA: negative for LE & nitrites. 1/13 ua negative; BK urine positive/adeno negative (red/orange urine)  - Blood cultures NGTD. Procal elevated 1.08   - CXR with likely pulmonary edema  - RVP neg   - Cefepime (1/11 - 1/18; resumed 1/22-1/26). Brief course of IV Vancomycin. MRSA nasal swab negative.   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   **avoiding sirolimus d/t high risk VOD**    CARDIOVASCULAR  # Chest pressure: new on 1/15, worse with sitting up/activity, better while laying flat. RESOLVED.   EKG: new QR pattern in V1, suggests right ventricular conduction delay  Troponin negative x1   Echo (1/15): LVEF 55-60%, no pericardial effusion present   Cardiology consulted: no further recs   #Prolonged QTc to 507 on 1/22.    - Change levaquin to cefpodoxime. Repeated QTc on 1/22: 466.   # Hypertension with initiation of tacrolimus: Amlodipine 5 mg BID. Labetalol 5 mg IV prn with parameters.   - Risk of cardiomyopathy:  Baseline EF 60-65%  - Risk of arrhythmia:  Baseline EKG showed sinus rhythm     RESPIRATORY  - Current smoker: states she quit (1/5/24), offered nicotine replacement but she declined.   - Baseline PFTs: The FEV1, FVC, FEV1/FVC ratio and LKY48-23% are within normal limits.  The inspiratory flow rates are within normal limits.  Lung volumes are within normal limits.  The diffusing capacity is normal.   - Risk of respiratory complications: Frequent ambulation and incentive spirometer. CXR (1/24) showed increased left basilar streaky opacities, likely atelectasis.     GI/NUTRITION  # Epigastric pain (1/18): RUQ ultrasound showing patent doppler throughout, no ascites, nonspecific elevated hepatic artery restrictive index (see full impression above). Weight down, LFT's WNL, no abdominal pain. Continue to monitor closely for VOD; checking hepatic panel daily.     - IV PPI BID (1/19)   - Ulcer prophylaxis: PPI  - VOD ppx: Ursodiol   # Moderate malnutrition in the context of acute illness: dietitian following. Avoiding TPN for now with concern for VOD, will continue to encourage PO intake. 1L NS bolus on 1/23.   # Mouth pain likely 2/2 the start of oral mucositis: added MMW prn (1/18), IV dilaudid 0.5 - 1.0 mg Q3H.      :  # Dysuria: UA: small blood, 58 RBC's. BK/Adeno pending. Start pyridium x4 days.     DERM:  # Right hand dryness, try aquafor with gloves on overnight and as tolerated during the day  # Foot, hand warts. Curbside derm on 1/8, no recs while inpatient, typically managed OP.    # Pink maculopapular rash present under breasts and across abdomen: possible cefepime rash vs other? (1/18) Continue Hydrocortisone BID.     MUSCULOSKELETAL/FRAILTY  - Baseline Frailty Score: 3  - Daily PT/OT as needed while inpatient  - Cancer Rehab as needed outpatient    SYMPTOM MANAGEMENT  - Nausea from chemo/radiation: Prochlorperazine, ondansetron, lorazepam.    SOCIAL DETERMINANTS  - Caregiver: Keira Cardenas & Rickey Neal       Disposition: remain admitted  "through engraftment, resolution of oral mucositis, & appropriate PO intake     Known issues that I take into account for medical decisions, with salient changes to the plan considering these complexities noted above.    Patient Active Problem List   Diagnosis    Left medial knee pain    Obesity (BMI 30-39.9)    Anemia, unspecified type    Macrocytic anemia    MDS (myelodysplastic syndrome) (H)     Clinically Significant Risk Factors            # Hypomagnesemia: Lowest Mg = 1.3 mg/dL in last 2 days, will replace as needed   # Hypoalbuminemia: Lowest albumin = 3.2 g/dL at 1/25/2024  3:06 AM, will monitor as appropriate   # Thrombocytopenia: Lowest platelets = 11 in last 2 days, will monitor for bleeding          # Obesity: Estimated body mass index is 32.57 kg/m  as calculated from the following:    Height as of this encounter: 1.575 m (5' 2.01\").    Weight as of this encounter: 80.8 kg (178 lb 1.6 oz).   # Moderate Malnutrition: based on nutrition assessment            I spent 40 minutes in the care of this patient today, which included time necessary for preparation for the visit, obtaining history, ordering medications/tests/procedures as medically indicated, review of pertinent medical literature, counseling of the patient, communication of recommendations to the care team, and documentation time.    King Vaz PA-C        ______________________________________________      BMT ATTENDING NOTE    Hortencia Baldwin is a 53 year old female, who is day 15 of transplant for MDS, complicated by iron overload. She is admitted for myeloablative (Bu/Flu) conditioning, 6/8 unrelated donor, GVHD ppx with PTCy/ Tac/ MMF, ABO matched, CMV donor -/ recipient + .    Hortencia continues to have mucositis pain and painful swallowing. Did not like the PCA and is back to IV dilaudid PRN. She was able to drink ensure this morning. She has started having dysuria this morning.     BMT: D15   Heme: WBC engrafted.   Tacrolimus level " 17, dose adjusted. Goal 5-10.  ID: Neutropenic fever (last febrile on 1/22). Infectious workup negative. Stop cefepime.   Stem cell culture was positive for cutibacterium. Treated with cefepime X 7 days. Patient's blood cultures have been negative  Mucositis: Continue PRN IV dilaudid  Dysuria and hematuria: UA with RBCs. Check urine BK, adeno. Pyridium and pain medication as above.   No signs of VOD: US abdomen on 1/18 showed elevated hepatic artery resistance index but no other findings consistent with VOD.   HTN: Amlodipine 10mg daily.   PPx: jani, acyclovir, letermovir   Supportive care: encourage ambulation, PT/OT, optimize nutrition. Chest x-Ray showed atelectasis, incentive spirometer given.     On the date of service, 01/26/2024, I spent 40 minutes on the patient unit personally reviewing medical records and medications, reviewing vital signs, labs, and imaging results as summarized above, discussing the patient's case on rounds with the JEN, obtaining a history from the patient, performing a physical exam, intensively monitoring treatments with high risk of toxicity, coordinating care, and documenting in the electronic medical record.    Coco Garay  Department of Hematology, Oncology and Transplantation  [facility] Pager 1300/Text via Secure Computing

## 2024-01-26 NOTE — PLAN OF CARE
"Afebrile, OVSS. PCA pump discontinued per patient request, PRN dilaudid ordered. PRN dilaudid given x2, PRN ativan given x2. UA came back negative, pt still complaining of dysuria, scant amounts of blood visible in urine. No blood products needed. Magnesium infused recheck 2-4 hours post, potassium infused. Continue with plan of care.       Problem: Adult Inpatient Plan of Care  Goal: Plan of Care Review  Description: The Plan of Care Review/Shift note should be completed every shift.  The Outcome Evaluation is a brief statement about your assessment that the patient is improving, declining, or no change.  This information will be displayed automatically on your shift  note.  Outcome: Progressing  Goal: Patient-Specific Goal (Individualized)  Description: You can add care plan individualizations to a care plan. Examples of Individualization might be:  \"Parent requests to be called daily at 9am for status\", \"I have a hard time hearing out of my right ear\", or \"Do not touch me to wake me up as it startles  me\".  Outcome: Progressing  Goal: Absence of Hospital-Acquired Illness or Injury  Outcome: Progressing  Intervention: Identify and Manage Fall Risk  Recent Flowsheet Documentation  Taken 1/26/2024 0306 by Aliyah Rodriguez, RN  Safety Promotion/Fall Prevention:   safety round/check completed   activity supervised   clutter free environment maintained   lighting adjusted   nonskid shoes/slippers when out of bed   assistive device/personal items within reach  Taken 1/26/2024 0159 by Aliyah Rodriguez, RN  Safety Promotion/Fall Prevention:   safety round/check completed   activity supervised   clutter free environment maintained   lighting adjusted   nonskid shoes/slippers when out of bed   assistive device/personal items within reach  Taken 1/26/2024 0009 by Aliyah Rodriguez, RN  Safety Promotion/Fall Prevention:   safety round/check completed   activity supervised   clutter free environment maintained   lighting " adjusted   nonskid shoes/slippers when out of bed   assistive device/personal items within reach  Taken 1/25/2024 2131 by Aliyah Rodriguez, RN  Safety Promotion/Fall Prevention:   safety round/check completed   activity supervised   clutter free environment maintained   lighting adjusted   nonskid shoes/slippers when out of bed   assistive device/personal items within reach  Intervention: Prevent Skin Injury  Recent Flowsheet Documentation  Taken 1/25/2024 2358 by Aliyah Rodriguez RN  Body Position: position changed independently  Taken 1/25/2024 2131 by Aliyah Rdoriguez RN  Body Position: position changed independently  Intervention: Prevent and Manage VTE (Venous Thromboembolism) Risk  Recent Flowsheet Documentation  Taken 1/25/2024 2131 by Aliyah Rodriguez RN  VTE Prevention/Management: compression stockings off  Intervention: Prevent Infection  Recent Flowsheet Documentation  Taken 1/26/2024 0306 by Aliyah Rodriguez RN  Infection Prevention:   rest/sleep promoted   single patient room provided   visitors restricted/screened   hand hygiene promoted   equipment surfaces disinfected  Taken 1/26/2024 0009 by Aliyah Rodriguez RN  Infection Prevention:   rest/sleep promoted   single patient room provided   visitors restricted/screened   hand hygiene promoted   equipment surfaces disinfected  Taken 1/25/2024 2131 by Aliyah Rodriguez RN  Infection Prevention:   rest/sleep promoted   single patient room provided   visitors restricted/screened   hand hygiene promoted   equipment surfaces disinfected  Goal: Optimal Comfort and Wellbeing  Outcome: Progressing  Intervention: Monitor Pain and Promote Comfort  Recent Flowsheet Documentation  Taken 1/26/2024 0255 by Aliyah Rodriguez, RN  Pain Management Interventions: medication (see MAR)  Taken 1/26/2024 0032 by Aliyah Rodriguez, RN  Pain Management Interventions:   rest   cold applied   distraction  Taken 1/25/2024 2358 by Aliyah Rodriguez, RN  Pain Management  Interventions: medication (see MAR)  Taken 1/25/2024 2131 by Aliyah Rodriguez RN  Pain Management Interventions: (PCA continuous rates stopped per pt request) MD notified (comment)  Taken 1/25/2024 2105 by Aliyah Rodriguez RN  Pain Management Interventions: (oral intake encouraged)   distraction   emotional support   other (see comments)  Taken 1/25/2024 1955 by Aliyah Rodriguez RN  Pain Management Interventions: pain pump in use  Goal: Readiness for Transition of Care  Outcome: Progressing     Problem: Stem Cell/Bone Marrow Transplant  Goal: Optimal Coping with Transplant  Outcome: Progressing  Goal: Symptom-Free Urinary Elimination  Outcome: Progressing  Intervention: Prevent or Manage Bladder Irritation  Recent Flowsheet Documentation  Taken 1/26/2024 0255 by Aliyah Rodriguez RN  Pain Management Interventions: medication (see MAR)  Taken 1/26/2024 0032 by Aliyah Rodriguez RN  Pain Management Interventions:   rest   cold applied   distraction  Taken 1/25/2024 2358 by Aliyah Rodriguez RN  Pain Management Interventions: medication (see MAR)  Taken 1/25/2024 2131 by Aliyah Rodriguez RN  Pain Management Interventions: (PCA continuous rates stopped per pt request) MD notified (comment)  Taken 1/25/2024 2105 by Aliyah Rodriguez RN  Pain Management Interventions: (oral intake encouraged)   distraction   emotional support   other (see comments)  Taken 1/25/2024 1955 by Aliyah Rodriguez RN  Pain Management Interventions: pain pump in use  Goal: Diarrhea Symptom Control  Outcome: Progressing  Goal: Improved Activity Tolerance  Outcome: Progressing  Intervention: Promote Improved Energy  Recent Flowsheet Documentation  Taken 1/25/2024 2358 by Aliyah Rodriguez RN  Activity Management:   activity adjusted per tolerance   ambulated to bathroom  Taken 1/25/2024 2131 by Aliyah Rodriguez RN  Activity Management:   activity adjusted per tolerance   up ad jfef  Goal: Blood Counts Within Acceptable  Range  Outcome: Progressing  Intervention: Monitor and Manage Hematologic Symptoms  Recent Flowsheet Documentation  Taken 1/26/2024 0306 by Aliyah Rodriguez RN  Bleeding Precautions:   monitored for signs of bleeding   gentle oral care promoted   blood pressure closely monitored  Medication Review/Management:   medications reviewed   high-risk medications identified  Taken 1/26/2024 0009 by Aliyah Rodriguez RN  Bleeding Precautions:   monitored for signs of bleeding   gentle oral care promoted   blood pressure closely monitored  Medication Review/Management:   medications reviewed   high-risk medications identified  Taken 1/25/2024 2131 by Aliyah Rodriguez RN  Bleeding Precautions:   monitored for signs of bleeding   gentle oral care promoted   blood pressure closely monitored  Medication Review/Management:   medications reviewed   high-risk medications identified  Goal: Absence of Hypersensitivity Reaction  Outcome: Progressing  Goal: Absence of Infection  Outcome: Progressing  Intervention: Prevent and Manage Infection  Recent Flowsheet Documentation  Taken 1/26/2024 0306 by Aliyah Rodriguez RN  Infection Prevention:   rest/sleep promoted   single patient room provided   visitors restricted/screened   hand hygiene promoted   equipment surfaces disinfected  Isolation Precautions: cytotoxic precautions maintained  Taken 1/26/2024 0009 by Aliyah Rodriguez RN  Infection Prevention:   rest/sleep promoted   single patient room provided   visitors restricted/screened   hand hygiene promoted   equipment surfaces disinfected  Isolation Precautions: cytotoxic precautions maintained  Taken 1/25/2024 2131 by Aliyah Rodriguez RN  Infection Prevention:   rest/sleep promoted   single patient room provided   visitors restricted/screened   hand hygiene promoted   equipment surfaces disinfected  Isolation Precautions: cytotoxic precautions maintained  Goal: Improved Oral Mucous Membrane Health and Integrity  Outcome:  Progressing  Intervention: Promote Oral Comfort and Health  Recent Flowsheet Documentation  Taken 1/25/2024 2131 by Aliyah Rodriguez, RN  Oral Mucous Membrane Protection:   lip/mouth moisturizer applied   nonirritating oral fluids promoted  Goal: Nausea and Vomiting Symptom Relief  Outcome: Progressing  Intervention: Prevent and Manage Nausea and Vomiting  Recent Flowsheet Documentation  Taken 1/25/2024 2131 by Aliyah Rodriguez, RN  Nausea/Vomiting Interventions: antiemetic  Goal: Optimal Nutrition Intake  Outcome: Progressing

## 2024-01-27 LAB
ALBUMIN SERPL BCG-MCNC: 3.5 G/DL (ref 3.5–5.2)
ALP SERPL-CCNC: 117 U/L (ref 40–150)
ALT SERPL W P-5'-P-CCNC: 19 U/L (ref 0–50)
ANION GAP SERPL CALCULATED.3IONS-SCNC: 8 MMOL/L (ref 7–15)
AST SERPL W P-5'-P-CCNC: 18 U/L (ref 0–45)
BACTERIA BLD CULT: NO GROWTH
BACTERIA BLD CULT: NO GROWTH
BASOPHILS # BLD AUTO: 0 10E3/UL (ref 0–0.2)
BASOPHILS NFR BLD AUTO: 0 %
BILIRUB SERPL-MCNC: 0.6 MG/DL
BUN SERPL-MCNC: 5 MG/DL (ref 6–20)
CALCIUM SERPL-MCNC: 8.7 MG/DL (ref 8.6–10)
CHLORIDE SERPL-SCNC: 104 MMOL/L (ref 98–107)
CREAT SERPL-MCNC: 0.35 MG/DL (ref 0.51–0.95)
DEPRECATED HCO3 PLAS-SCNC: 29 MMOL/L (ref 22–29)
EGFRCR SERPLBLD CKD-EPI 2021: >90 ML/MIN/1.73M2
EOSINOPHIL # BLD AUTO: 0 10E3/UL (ref 0–0.7)
EOSINOPHIL NFR BLD AUTO: 0 %
ERYTHROCYTE [DISTWIDTH] IN BLOOD BY AUTOMATED COUNT: 16.5 % (ref 10–15)
GLUCOSE SERPL-MCNC: 96 MG/DL (ref 70–99)
HCT VFR BLD AUTO: 23.1 % (ref 35–47)
HGB BLD-MCNC: 8 G/DL (ref 11.7–15.7)
IMM GRANULOCYTES # BLD: 0.4 10E3/UL
IMM GRANULOCYTES NFR BLD: 4 %
LYMPHOCYTES # BLD AUTO: 0.1 10E3/UL (ref 0.8–5.3)
LYMPHOCYTES NFR BLD AUTO: 1 %
MAGNESIUM SERPL-MCNC: 1.4 MG/DL (ref 1.7–2.3)
MAGNESIUM SERPL-MCNC: 2 MG/DL (ref 1.7–2.3)
MCH RBC QN AUTO: 32.5 PG (ref 26.5–33)
MCHC RBC AUTO-ENTMCNC: 34.6 G/DL (ref 31.5–36.5)
MCV RBC AUTO: 94 FL (ref 78–100)
MONOCYTES # BLD AUTO: 1.1 10E3/UL (ref 0–1.3)
MONOCYTES NFR BLD AUTO: 10 %
NEUTROPHILS # BLD AUTO: 9.5 10E3/UL (ref 1.6–8.3)
NEUTROPHILS NFR BLD AUTO: 85 %
NRBC # BLD AUTO: 0 10E3/UL
NRBC BLD AUTO-RTO: 0 /100
PHOSPHATE SERPL-MCNC: 2.7 MG/DL (ref 2.5–4.5)
PLATELET # BLD AUTO: 21 10E3/UL (ref 150–450)
POTASSIUM SERPL-SCNC: 3.7 MMOL/L (ref 3.4–5.3)
PROT SERPL-MCNC: 5.9 G/DL (ref 6.4–8.3)
RBC # BLD AUTO: 2.46 10E6/UL (ref 3.8–5.2)
SODIUM SERPL-SCNC: 141 MMOL/L (ref 135–145)
WBC # BLD AUTO: 11.1 10E3/UL (ref 4–11)

## 2024-01-27 PROCEDURE — 250N000011 HC RX IP 250 OP 636

## 2024-01-27 PROCEDURE — 99418 PROLNG IP/OBS E/M EA 15 MIN: CPT

## 2024-01-27 PROCEDURE — 85025 COMPLETE CBC W/AUTO DIFF WBC: CPT

## 2024-01-27 PROCEDURE — C9113 INJ PANTOPRAZOLE SODIUM, VIA: HCPCS

## 2024-01-27 PROCEDURE — 80053 COMPREHEN METABOLIC PANEL: CPT

## 2024-01-27 PROCEDURE — 258N000003 HC RX IP 258 OP 636

## 2024-01-27 PROCEDURE — 250N000011 HC RX IP 250 OP 636: Performed by: STUDENT IN AN ORGANIZED HEALTH CARE EDUCATION/TRAINING PROGRAM

## 2024-01-27 PROCEDURE — 250N000013 HC RX MED GY IP 250 OP 250 PS 637

## 2024-01-27 PROCEDURE — 99233 SBSQ HOSP IP/OBS HIGH 50: CPT | Mod: 25

## 2024-01-27 PROCEDURE — 206N000001 HC R&B BMT UMMC

## 2024-01-27 PROCEDURE — 250N000011 HC RX IP 250 OP 636: Performed by: INTERNAL MEDICINE

## 2024-01-27 PROCEDURE — 84100 ASSAY OF PHOSPHORUS: CPT | Performed by: STUDENT IN AN ORGANIZED HEALTH CARE EDUCATION/TRAINING PROGRAM

## 2024-01-27 PROCEDURE — 83735 ASSAY OF MAGNESIUM: CPT | Performed by: INTERNAL MEDICINE

## 2024-01-27 PROCEDURE — 83735 ASSAY OF MAGNESIUM: CPT | Performed by: STUDENT IN AN ORGANIZED HEALTH CARE EDUCATION/TRAINING PROGRAM

## 2024-01-27 RX ORDER — PROCHLORPERAZINE MALEATE 5 MG
5 TABLET ORAL EVERY 8 HOURS
Status: DISCONTINUED | OUTPATIENT
Start: 2024-01-27 | End: 2024-01-29

## 2024-01-27 RX ORDER — POTASSIUM CHLORIDE 29.8 MG/ML
20 INJECTION INTRAVENOUS ONCE
Qty: 50 ML | Refills: 0 | Status: COMPLETED | OUTPATIENT
Start: 2024-01-27 | End: 2024-01-27

## 2024-01-27 RX ORDER — MAGNESIUM SULFATE HEPTAHYDRATE 40 MG/ML
2 INJECTION, SOLUTION INTRAVENOUS ONCE
Status: COMPLETED | OUTPATIENT
Start: 2024-01-27 | End: 2024-01-27

## 2024-01-27 RX ORDER — MAGNESIUM SULFATE HEPTAHYDRATE 40 MG/ML
4 INJECTION, SOLUTION INTRAVENOUS ONCE
Status: COMPLETED | OUTPATIENT
Start: 2024-01-27 | End: 2024-01-27

## 2024-01-27 RX ADMIN — MAGNESIUM SULFATE IN WATER 4 G: 4 INJECTION, SOLUTION INTRAVENOUS at 04:55

## 2024-01-27 RX ADMIN — PROCHLORPERAZINE EDISYLATE 5 MG: 5 INJECTION INTRAMUSCULAR; INTRAVENOUS at 10:36

## 2024-01-27 RX ADMIN — URSODIOL 300 MG: 300 CAPSULE ORAL at 14:48

## 2024-01-27 RX ADMIN — AMLODIPINE BESYLATE 5 MG: 5 TABLET ORAL at 08:09

## 2024-01-27 RX ADMIN — TACROLIMUS 80 MCG/HR: 5 INJECTION, SOLUTION INTRAVENOUS at 19:37

## 2024-01-27 RX ADMIN — ACYCLOVIR 800 MG: 800 TABLET ORAL at 08:09

## 2024-01-27 RX ADMIN — LORAZEPAM 0.5 MG: 0.5 TABLET ORAL at 08:08

## 2024-01-27 RX ADMIN — LETERMOVIR 480 MG: 480 TABLET, FILM COATED ORAL at 08:09

## 2024-01-27 RX ADMIN — AMLODIPINE BESYLATE 5 MG: 5 TABLET ORAL at 19:37

## 2024-01-27 RX ADMIN — MYCOPHENOLATE MOFETIL 1250 MG: 500 INJECTION, POWDER, LYOPHILIZED, FOR SOLUTION INTRAVENOUS at 08:16

## 2024-01-27 RX ADMIN — MYCOPHENOLATE MOFETIL 1250 MG: 500 INJECTION, POWDER, LYOPHILIZED, FOR SOLUTION INTRAVENOUS at 19:37

## 2024-01-27 RX ADMIN — PHENAZOPYRIDINE HYDROCHLORIDE 200 MG: 200 TABLET ORAL at 11:52

## 2024-01-27 RX ADMIN — POTASSIUM CHLORIDE 20 MEQ: 29.8 INJECTION, SOLUTION INTRAVENOUS at 07:01

## 2024-01-27 RX ADMIN — HYDROMORPHONE HYDROCHLORIDE 0.5 MG: 1 INJECTION, SOLUTION INTRAMUSCULAR; INTRAVENOUS; SUBCUTANEOUS at 13:16

## 2024-01-27 RX ADMIN — PANTOPRAZOLE SODIUM 40 MG: 40 INJECTION, POWDER, FOR SOLUTION INTRAVENOUS at 19:37

## 2024-01-27 RX ADMIN — PHENAZOPYRIDINE HYDROCHLORIDE 200 MG: 200 TABLET ORAL at 17:35

## 2024-01-27 RX ADMIN — MAGNESIUM SULFATE IN WATER 2 G: 40 INJECTION, SOLUTION INTRAVENOUS at 14:48

## 2024-01-27 RX ADMIN — URSODIOL 300 MG: 300 CAPSULE ORAL at 19:37

## 2024-01-27 RX ADMIN — URSODIOL 300 MG: 300 CAPSULE ORAL at 08:09

## 2024-01-27 RX ADMIN — HYDROCORTISONE: 1 CREAM TOPICAL at 19:40

## 2024-01-27 RX ADMIN — ACYCLOVIR 800 MG: 800 TABLET ORAL at 19:37

## 2024-01-27 RX ADMIN — LORAZEPAM 0.5 MG: 2 INJECTION INTRAMUSCULAR; INTRAVENOUS at 13:45

## 2024-01-27 RX ADMIN — PANTOPRAZOLE SODIUM 40 MG: 40 INJECTION, POWDER, FOR SOLUTION INTRAVENOUS at 08:10

## 2024-01-27 RX ADMIN — MICAFUNGIN SODIUM 150 MG: 50 INJECTION, POWDER, LYOPHILIZED, FOR SOLUTION INTRAVENOUS at 17:36

## 2024-01-27 RX ADMIN — PROCHLORPERAZINE MALEATE 5 MG: 5 TABLET ORAL at 19:37

## 2024-01-27 RX ADMIN — HYDROMORPHONE HYDROCHLORIDE 0.5 MG: 1 INJECTION, SOLUTION INTRAMUSCULAR; INTRAVENOUS; SUBCUTANEOUS at 19:55

## 2024-01-27 ASSESSMENT — ACTIVITIES OF DAILY LIVING (ADL)
ADLS_ACUITY_SCORE: 23

## 2024-01-27 NOTE — PLAN OF CARE
Patient remains hospitalized following stem cell transplant, currently day +15. Monitoring return of counts. Continues on daily growth factor - last dose tonight. Continues on ppx antimicrobials. IV Cefepime discontinued today. Afebrile. Continues on IV MMF & tac gtt - level checked today, drip rate decreased. Appetite poor, due to mucositis pain. Pain controlled with IV Dilaudid x4 this shift. Patient reports that mucositis is slightly better today. 2 loose BMs this shift. Nausea with vomiting x2 today - around Pyridium doses. Ativan IV given x1. Only ate about 1/3 of bowl of soup, and drank 1 Ensure this shift. Continues to complain of burning with urination, though this evening reports some mild improvement. BK & Adeno urine studies pending. Pyridium initiated today, but patient declined evening dose due to nausea/vomiting. K & Mag replaced early this AM. Mag needed to replaced again this shift - recheck planned for tomorrow AM. Ambulating independently - activity encouraged, as patient not out of bed much this shift. VSS.    Problem: Stem Cell/Bone Marrow Transplant  Goal: Symptom-Free Urinary Elimination  Outcome: Not Progressing  Intervention: Prevent or Manage Bladder Irritation  Recent Flowsheet Documentation  Taken 1/26/2024 1809 by Minerva Vaz, RN  Pain Management Interventions: medication (see MAR)  Taken 1/26/2024 1451 by Minerva Vaz, RN  Pain Management Interventions: medication (see MAR)  Taken 1/26/2024 1140 by Minerva Vaz, RN  Pain Management Interventions: medication (see MAR)  Taken 1/26/2024 0820 by Minerva Vaz, RN  Pain Management Interventions: medication (see MAR)  Goal: Improved Oral Mucous Membrane Health and Integrity  Outcome: Not Progressing  Goal: Optimal Nutrition Intake  Outcome: Not Progressing     Problem: Adult Inpatient Plan of Care  Goal: Plan of Care Review  Description: The Plan of Care Review/Shift note should be completed every shift.  The Outcome Evaluation  "is a brief statement about your assessment that the patient is improving, declining, or no change.  This information will be displayed automatically on your shift  note.  Outcome: Progressing  Goal: Patient-Specific Goal (Individualized)  Description: You can add care plan individualizations to a care plan. Examples of Individualization might be:  \"Parent requests to be called daily at 9am for status\", \"I have a hard time hearing out of my right ear\", or \"Do not touch me to wake me up as it startles  me\".  Outcome: Progressing  Goal: Absence of Hospital-Acquired Illness or Injury  Outcome: Progressing  Intervention: Identify and Manage Fall Risk  Recent Flowsheet Documentation  Taken 1/26/2024 1600 by Minerva Vaz RN  Safety Promotion/Fall Prevention:   assistive device/personal items within reach   chemotherapeutic precautions   clutter free environment maintained   lighting adjusted   nonskid shoes/slippers when out of bed   patient and family education   room organization consistent   safety round/check completed  Taken 1/26/2024 1200 by Minerva Vaz RN  Safety Promotion/Fall Prevention:   assistive device/personal items within reach   chemotherapeutic precautions   clutter free environment maintained   lighting adjusted   nonskid shoes/slippers when out of bed   patient and family education   room organization consistent   safety round/check completed  Taken 1/26/2024 0820 by Minerva Vaz RN  Safety Promotion/Fall Prevention:   assistive device/personal items within reach   chemotherapeutic precautions   clutter free environment maintained   lighting adjusted   nonskid shoes/slippers when out of bed   patient and family education   room organization consistent   safety round/check completed  Intervention: Prevent Skin Injury  Recent Flowsheet Documentation  Taken 1/26/2024 1809 by Minerva Vaz, RN  Body Position: supine  Taken 1/26/2024 1700 by Minerva Vaz, RN  Body Position: " supine  Taken 1/26/2024 1600 by Minerva Vaz RN  Body Position: sitting up in bed  Taken 1/26/2024 1510 by Minerva Vaz RN  Body Position:   position changed independently   supine  Taken 1/26/2024 1300 by Minerva Vaz RN  Body Position:   position changed independently   supine  Taken 1/26/2024 1200 by Minerva Vaz RN  Body Position:   position changed independently   supine  Taken 1/26/2024 0900 by Minerva Vaz RN  Body Position:   position changed independently   left   side-lying  Taken 1/26/2024 0820 by Minerva Vaz RN  Body Position: sitting up in bed  Goal: Optimal Comfort and Wellbeing  Outcome: Progressing  Intervention: Monitor Pain and Promote Comfort  Recent Flowsheet Documentation  Taken 1/26/2024 1809 by Minerva Vaz RN  Pain Management Interventions: medication (see MAR)  Taken 1/26/2024 1451 by Minerva Vaz RN  Pain Management Interventions: medication (see MAR)  Taken 1/26/2024 1140 by Minerva Vaz RN  Pain Management Interventions: medication (see MAR)  Taken 1/26/2024 0820 by Minerva Vaz RN  Pain Management Interventions: medication (see MAR)  Goal: Readiness for Transition of Care  Outcome: Progressing     Problem: Stem Cell/Bone Marrow Transplant  Goal: Optimal Coping with Transplant  Outcome: Progressing  Goal: Diarrhea Symptom Control  Outcome: Progressing  Goal: Improved Activity Tolerance  Outcome: Progressing  Intervention: Promote Improved Energy  Recent Flowsheet Documentation  Taken 1/26/2024 1400 by Minerva Vaz RN  Activity Management: ambulated to bathroom  Taken 1/26/2024 1140 by Minerva Vaz RN  Activity Management: ambulated to bathroom  Taken 1/26/2024 1000 by Minerva Vaz RN  Activity Management: ambulated to bathroom  Goal: Blood Counts Within Acceptable Range  Outcome: Progressing  Intervention: Monitor and Manage Hematologic Symptoms  Recent Flowsheet Documentation  Taken 1/26/2024 1600 by Minerva Vaz  RN  Medication Review/Management:   medications reviewed   high-risk medications identified  Taken 1/26/2024 1200 by Minerva Vaz RN  Medication Review/Management:   medications reviewed   high-risk medications identified  Taken 1/26/2024 0820 by Minerva Vaz RN  Medication Review/Management:   medications reviewed   high-risk medications identified  Goal: Absence of Hypersensitivity Reaction  Outcome: Progressing  Goal: Absence of Infection  Outcome: Progressing  Intervention: Prevent and Manage Infection  Recent Flowsheet Documentation  Taken 1/26/2024 1600 by Minerva Vaz RN  Isolation Precautions: cytotoxic precautions maintained  Taken 1/26/2024 1200 by Minerva Vaz RN  Isolation Precautions: cytotoxic precautions maintained  Taken 1/26/2024 0820 by Minerva Vaz RN  Isolation Precautions: cytotoxic precautions maintained  Goal: Nausea and Vomiting Symptom Relief  Outcome: Progressing   Goal Outcome Evaluation:

## 2024-01-27 NOTE — PROGRESS NOTES
"  BMT Daily Progress Note  Patient ID:  Irma Foreman is a 53 year old female with a PMH of MDS, warm AIHA, transfusion and transfusion dependent anemia presented to  to undergo a myeloablative allogeneic transplant. She was recently COVID+ on 12/26 but tested negative on 1/2 with resolution of nearly all URI symptoms. Undergoing MA (Bu/Flu) 6/8 URD Allo transplant, day +16      HPI   Irma is feeling nauseous this morning; had two episodes of emesis yesterday. Mouth / throat pain is a 5/10 on pain scale, but she does not wish to try any pain medications at this time. Ate one cup of pudding and one ensure yesterday; reports drinking well. Dysuria is improved today; urine is orange in color; she denies hematuria.       Review of Systems    Negative unless stated in the HPI.     PHYSICAL EXAM      Weight     Wt Readings from Last 3 Encounters:   01/26/24 80.8 kg (178 lb 1.6 oz)   12/26/23 83.1 kg (183 lb 4.8 oz)   12/19/23 83.5 kg (184 lb)        KPS: 70    BP (!) 140/85 (BP Location: Left arm)   Pulse 93   Temp 98.3  F (36.8  C) (Axillary)   Resp 16   Ht 1.575 m (5' 2.01\")   Wt 80.8 kg (178 lb 1.6 oz)   LMP 11/05/2017   SpO2 96%   BMI 32.57 kg/m       General: NAD   Eyes: KEVON, sclera anicteric   Nose/Mouth/Throat: Right inner cheek slightly erythematous. Ulceration on R-posterior pharynx. Tongue with white mucosa and bilateral ridging; no sloughing noted on exam.   Lungs: CTAB   Cardiovascular: RRR, no M/R/G   Abdominal/Rectal: +BS, soft, non tender   Lymphatics: No edema  Skin: wart under right foot dime sized white, cauliflower and slightly ulcerated; Two pin point sized, flesh colored warts on right hand; Hands dry, one excoriation on left dorsum (not examined today). Very faint maculopapular rash present on abdomen but fading (resolved under breasts).   Neuro: A&O   Additional Findings: Gastelum site NT, no drainage.    Current aGVHD staging:  Skin 0, UGI 0, LGI 0, Liver 0 (keep in note through day +180 " for allos)      LABS AND IMAGING: I have assessed all abnormal lab values for their clinical significance and any values considered clinically significant have been addressed in the assessment and plan.        Lab Results   Component Value Date    WBC 11.1 (H) 01/27/2024    ANEUTAUTO 9.5 (H) 01/27/2024    HGB 8.0 (L) 01/27/2024    HCT 23.1 (L) 01/27/2024    PLT 21 (LL) 01/27/2024     01/27/2024    POTASSIUM 3.7 01/27/2024    CHLORIDE 104 01/27/2024    CO2 29 01/27/2024    GLC 96 01/27/2024    BUN 5.0 (L) 01/27/2024    CR 0.35 (L) 01/27/2024    MAG 1.4 (L) 01/27/2024    INR 1.16 (H) 01/22/2024       US Abdominal with Doppler (1/18/24)  Impression:   1. Patent Doppler evaluation of the abdomen with antegrade flow throughout.  2. Elevated hepatic artery resistive index of 0.81, nonspecific though can be seen with hepatic venous congestion which could be related to venous occlusive disease..    SYSTEMS-BASED ASSESSMENT AND PLAN     Hortencia Baldwin is a 53 year old female with a history of MDS, undergoing MA (Bu/Flu) 6/8 URD Allo transplant, day +16      BMT/IEC PROTOCOL for MT 2015-29 **according to but not on trial**   - Chemo protocol:  Day -6 through day -1: Keppra and Allopurinol   Day -5 through -2: Bu/Flu. Busulfan levels adjusted by pharm, attending  Day -1: Rest day   Day 0: Transplant. Recipient: O+, CMV+. Donor: O+, CMV-. No flush. Cell dose 6.5 x10^6.   Gram+ bacilli (cutibacterium proprionibacterium) cultured for stem cell product. On cefepime (1/11 - 1/18). Blood cx no growth x10 bottles.   - Restaging plan: per protocol   D+21 BMBX scheduled for 02/02 outpatient. If still admitted, please schedule to be completed inpatient. Sedated marrow not needed.     HEME/COAG  - Pancytopenia due to chemotherapy   # Iron overload: hx of transfusion dependent anemia, pre-transplant ferritin around 5,000. Recommend phlebotomy post transplant when retic count is restored and Hgb >10    # Platelet refractoriness:  Platelets in single digits since 1/16 -- resolved. BID platelet checks discontinued 1/26. Responding to HLA platelets.   Transfusion medicine consulted 1/18: HLA typing completed. **Blood bank fellow recommends no more than 2 units platelets/day d/t risk of TACO.    Discussed with blood bank 1/26: HLA platelets available 1/27 and 1/30   - Transfusion parameters: hemoglobin <7, platelets <10  - Last dose of scheduled GCSF on 1/26. Give PRN for ANC < 1    IMMUNOCOMPROMISED  # Fever: (Last on 1/15) Recurrent on 1/21- tmax 100.7.  - UA: negative for LE & nitrites. 1/13 ua negative; BK urine positive/adeno negative (red/orange urine)  - Blood cultures NGTD. Procal elevated 1.08   - CXR with likely pulmonary edema  - RVP neg   - Cefepime (1/11 - 1/18; 1/22-1/26). Brief course of IV Vancomycin. MRSA nasal swab negative.   # Dysuria: see Renal below.   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   **avoiding sirolimus d/t high risk VOD**    CARDIOVASCULAR  # Chest pressure: new on 1/15, worse with sitting up/activity, better while laying flat. RESOLVED.   EKG: new QR pattern in V1, suggests right ventricular conduction delay  Troponin negative x1   Echo (1/15): LVEF 55-60%, no pericardial effusion present. Cardiology consulted: no further recs   #Prolonged QTc to 507 on 1/22.   - Changed levaquin to cefpodoxime. Repeated QTc on 1/22: 466.   # Hypertension with initiation of tacrolimus: Amlodipine 5 mg BID. Labetalol 5 mg IV prn with parameters.   - Risk of cardiomyopathy: Baseline EF 60-65%  - Risk of arrhythmia: Baseline EKG showed sinus rhythm     RESPIRATORY  -  Current smoker: states she quit (1/5/24), offered nicotine replacement but she declined.   - Baseline PFTs: The FEV1, FVC, FEV1/FVC ratio and WTF65-85% are within normal limits.  The inspiratory flow rates are within normal limits.  Lung volumes are within normal limits.  The diffusing capacity is normal.   - Risk of respiratory complications: Frequent ambulation and incentive spirometer. CXR (1/24) showed increased left basilar streaky opacities, likely atelectasis.     GI/NUTRITION  # Epigastric pain (1/18): RUQ ultrasound showing patent doppler throughout, no ascites, nonspecific elevated hepatic artery restrictive index (see full impression above). Weight down, LFT's WNL, no abdominal pain. Continue to monitor closely for VOD; checking hepatic panel daily.     - IV PPI BID (1/19)   - Ulcer prophylaxis: PPI  - VOD ppx: Ursodiol   # Moderate malnutrition in the context of acute illness: dietitian following.   Avoiding TPN for now with concern for VOD, will continue to encourage PO intake.   1L NS bolus on 1/23.   Start calorie counts 1/28.   # Mouth pain likely 2/2 the start of oral mucositis: added MMW prn (1/18), IV dilaudid 0.5 mg Q3H prn.      # Nausea from chemo/radiation: Start prochlorperazine scheduled TID 1/27. Lorazepam prn. Avoiding zofran given hx of QTc prolongation as above.     RENAL / :  # Dysuria (1/26): UA showing small blood, 58 RBC's. BK 4.52 million -- no hematuria; monitoring closely. Adeno pending. Pyridium x4 days.     DERM:  # Right hand dryness, try aquafor with gloves on overnight and as tolerated during the day  # Foot, hand warts. Curbside derm on 1/8, no recs while inpatient, typically managed OP.    # Pink maculopapular rash present under breasts (resolved) and across abdomen (fading): possible cefepime rash vs other? (1/18) Continue Hydrocortisone BID.     MUSCULOSKELETAL/FRAILTY  - Baseline Frailty Score: 3  - Daily PT/OT as needed while inpatient  - Cancer Rehab as needed  "outpatient    SOCIAL DETERMINANTS  - Caregiver: Srinivas Suggs, Keira Neal & Rickey Neal       Disposition: remain admitted through engraftment, resolution of oral mucositis, & appropriate PO intake     Known issues that I take into account for medical decisions, with salient changes to the plan considering these complexities noted above.    Patient Active Problem List   Diagnosis    Left medial knee pain    Obesity (BMI 30-39.9)    Anemia, unspecified type    Macrocytic anemia    MDS (myelodysplastic syndrome) (H)     Clinically Significant Risk Factors            # Hypomagnesemia: Lowest Mg = 1.3 mg/dL in last 2 days, will replace as needed   # Hypoalbuminemia: Lowest albumin = 3.2 g/dL at 1/25/2024  3:06 AM, will monitor as appropriate   # Thrombocytopenia: Lowest platelets = 12 in last 2 days, will monitor for bleeding          # Obesity: Estimated body mass index is 32.57 kg/m  as calculated from the following:    Height as of this encounter: 1.575 m (5' 2.01\").    Weight as of this encounter: 80.8 kg (178 lb 1.6 oz).   # Moderate Malnutrition: based on nutrition assessment            I spent 40 minutes in the care of this patient today, which included time necessary for preparation for the visit, obtaining history, ordering medications/tests/procedures as medically indicated, review of pertinent medical literature, counseling of the patient, communication of recommendations to the care team, and documentation time.    Annamarie Costa PA-C  [phone]      ______________________________________________      BMT ATTENDING NOTE    Hortencia Baldwin is a 53 year old female, who is day 16 of transplant for MDS, complicated by iron overload. She is admitted for myeloablative (Bu/Flu) conditioning, 6/8 unrelated donor, GVHD ppx with PTCy/ Tac/ MMF, ABO matched, CMV donor -/ recipient + .    Hortencia continues to have mucositis and painful swallowing. Limited PO intake, was able to have ensure and pudding. Nausea and emesis this " morning, compazine added. Dysuria has resolved. Feels weak and unsteady when standing up.     BMT: D16   Heme: WBC engrafted.   Tacrolimus dose adjusted on 1/26 for level 17.   ID: Neutropenic fever (last febrile on 1/22). Infectious workup negative.   Stem cell culture was positive for cutibacterium. Treated with cefepime X 7 days. Patient's blood cultures have been negative  Mucositis: Continue PRN IV dilaudid  Dysuria and hematuria: Resolved. UA with RBCs. Urine BK virus levels very high. Urine adeno pending.   HTN: Amlodipine 10mg daily.   PPx: jani, acyclovir, letermovir   Supportive care: encourage ambulation, PT/OT, optimize nutrition. Spirometer.     On the date of service, 01/27/2024, I spent 40 minutes on the patient unit personally reviewing medical records and medications, reviewing vital signs, labs, and imaging results as summarized above, discussing the patient's case on rounds with the JEN, obtaining a history from the patient, performing a physical exam, intensively monitoring treatments with high risk of toxicity, coordinating care, and documenting in the electronic medical record.    Coco Garay  Department of Hematology, Oncology and Transplantation  Amcom Pager [phone]/Text via [employer] [facility]

## 2024-01-27 NOTE — PLAN OF CARE
"BP (!) 140/85 (BP Location: Left arm)   Pulse 93   Temp 98.3  F (36.8  C) (Axillary)   Resp 16   Ht 1.575 m (5' 2.01\")   Wt 80.8 kg (178 lb 1.6 oz)   LMP 11/05/2017   SpO2 96%   BMI 32.57 kg/m     Neuro: A&Ox4.   Cardiac: Afebrile VSS.   Respiratory: Sating >95% on RA.  GI/: Adequate urine output. Denies dysuria.  No BM this shift   Diet/appetite: Reg diet, poor appetite d/t mucositis   Activity:  Independent up in room   Pain: At acceptable level on current regimen. Denies pain  Skin: No new deficits noted.  LDA's: CVC Right internal jugular double lumen infusing Tac and electrolytes   Labs: Mag replaced, potassium to be replaced.     Plan: Continue with POC. Notify primary team with changes.   Goal Outcome Evaluation:      Plan of Care Reviewed With: patient    Overall Patient Progress: improvingOverall Patient Progress: improving           "

## 2024-01-28 LAB
ALBUMIN SERPL BCG-MCNC: 3.5 G/DL (ref 3.5–5.2)
ALP SERPL-CCNC: 115 U/L (ref 40–150)
ALT SERPL W P-5'-P-CCNC: 22 U/L (ref 0–50)
ANION GAP SERPL CALCULATED.3IONS-SCNC: 8 MMOL/L (ref 7–15)
AST SERPL W P-5'-P-CCNC: 21 U/L (ref 0–45)
BACTERIA BLD CULT: NO GROWTH
BACTERIA BLD CULT: NO GROWTH
BASOPHILS # BLD AUTO: ABNORMAL 10*3/UL
BASOPHILS # BLD MANUAL: 0 10E3/UL (ref 0–0.2)
BASOPHILS NFR BLD AUTO: ABNORMAL %
BASOPHILS NFR BLD MANUAL: 0 %
BILIRUB SERPL-MCNC: 0.5 MG/DL
BUN SERPL-MCNC: 5.6 MG/DL (ref 6–20)
CALCIUM SERPL-MCNC: 8.7 MG/DL (ref 8.6–10)
CHLORIDE SERPL-SCNC: 103 MMOL/L (ref 98–107)
CREAT SERPL-MCNC: 0.38 MG/DL (ref 0.51–0.95)
DEPRECATED HCO3 PLAS-SCNC: 27 MMOL/L (ref 22–29)
EGFRCR SERPLBLD CKD-EPI 2021: >90 ML/MIN/1.73M2
EOSINOPHIL # BLD AUTO: ABNORMAL 10*3/UL
EOSINOPHIL # BLD MANUAL: 0 10E3/UL (ref 0–0.7)
EOSINOPHIL NFR BLD AUTO: ABNORMAL %
EOSINOPHIL NFR BLD MANUAL: 0 %
ERYTHROCYTE [DISTWIDTH] IN BLOOD BY AUTOMATED COUNT: 16.5 % (ref 10–15)
GLUCOSE SERPL-MCNC: 99 MG/DL (ref 70–99)
HCT VFR BLD AUTO: 22.7 % (ref 35–47)
HGB BLD-MCNC: 7.6 G/DL (ref 11.7–15.7)
IMM GRANULOCYTES # BLD: ABNORMAL 10*3/UL
IMM GRANULOCYTES NFR BLD: ABNORMAL %
LYMPHOCYTES # BLD AUTO: ABNORMAL 10*3/UL
LYMPHOCYTES # BLD MANUAL: 0.2 10E3/UL (ref 0.8–5.3)
LYMPHOCYTES NFR BLD AUTO: ABNORMAL %
LYMPHOCYTES NFR BLD MANUAL: 3 %
MAGNESIUM SERPL-MCNC: 1.4 MG/DL (ref 1.7–2.3)
MAGNESIUM SERPL-MCNC: 1.8 MG/DL (ref 1.7–2.3)
MCH RBC QN AUTO: 31.4 PG (ref 26.5–33)
MCHC RBC AUTO-ENTMCNC: 33.5 G/DL (ref 31.5–36.5)
MCV RBC AUTO: 94 FL (ref 78–100)
METAMYELOCYTES # BLD MANUAL: 0.3 10E3/UL
METAMYELOCYTES NFR BLD MANUAL: 4 %
MONOCYTES # BLD AUTO: ABNORMAL 10*3/UL
MONOCYTES # BLD MANUAL: 0.5 10E3/UL (ref 0–1.3)
MONOCYTES NFR BLD AUTO: ABNORMAL %
MONOCYTES NFR BLD MANUAL: 8 %
MYELOCYTES # BLD MANUAL: 0.1 10E3/UL
MYELOCYTES NFR BLD MANUAL: 1 %
NEUTROPHILS # BLD AUTO: ABNORMAL 10*3/UL
NEUTROPHILS # BLD MANUAL: 5.7 10E3/UL (ref 1.6–8.3)
NEUTROPHILS NFR BLD AUTO: ABNORMAL %
NEUTROPHILS NFR BLD MANUAL: 84 %
NRBC # BLD AUTO: 0 10E3/UL
NRBC BLD AUTO-RTO: 1 /100
PHOSPHATE SERPL-MCNC: 2.9 MG/DL (ref 2.5–4.5)
PLAT MORPH BLD: ABNORMAL
PLATELET # BLD AUTO: 24 10E3/UL (ref 150–450)
POTASSIUM SERPL-SCNC: 3.6 MMOL/L (ref 3.4–5.3)
PROT SERPL-MCNC: 5.6 G/DL (ref 6.4–8.3)
RBC # BLD AUTO: 2.42 10E6/UL (ref 3.8–5.2)
RBC MORPH BLD: ABNORMAL
SODIUM SERPL-SCNC: 138 MMOL/L (ref 135–145)
TACROLIMUS BLD-MCNC: 12 UG/L (ref 5–15)
TME LAST DOSE: NORMAL H
TME LAST DOSE: NORMAL H
WBC # BLD AUTO: 6.8 10E3/UL (ref 4–11)

## 2024-01-28 PROCEDURE — 99233 SBSQ HOSP IP/OBS HIGH 50: CPT | Mod: 25

## 2024-01-28 PROCEDURE — 250N000013 HC RX MED GY IP 250 OP 250 PS 637

## 2024-01-28 PROCEDURE — 85007 BL SMEAR W/DIFF WBC COUNT: CPT

## 2024-01-28 PROCEDURE — 83735 ASSAY OF MAGNESIUM: CPT | Performed by: INTERNAL MEDICINE

## 2024-01-28 PROCEDURE — C9113 INJ PANTOPRAZOLE SODIUM, VIA: HCPCS

## 2024-01-28 PROCEDURE — 250N000011 HC RX IP 250 OP 636

## 2024-01-28 PROCEDURE — 85027 COMPLETE CBC AUTOMATED: CPT

## 2024-01-28 PROCEDURE — 83735 ASSAY OF MAGNESIUM: CPT | Performed by: STUDENT IN AN ORGANIZED HEALTH CARE EDUCATION/TRAINING PROGRAM

## 2024-01-28 PROCEDURE — 250N000011 HC RX IP 250 OP 636: Performed by: STUDENT IN AN ORGANIZED HEALTH CARE EDUCATION/TRAINING PROGRAM

## 2024-01-28 PROCEDURE — 250N000011 HC RX IP 250 OP 636: Mod: JZ

## 2024-01-28 PROCEDURE — 250N000013 HC RX MED GY IP 250 OP 250 PS 637: Performed by: STUDENT IN AN ORGANIZED HEALTH CARE EDUCATION/TRAINING PROGRAM

## 2024-01-28 PROCEDURE — 258N000003 HC RX IP 258 OP 636

## 2024-01-28 PROCEDURE — 84100 ASSAY OF PHOSPHORUS: CPT | Performed by: INTERNAL MEDICINE

## 2024-01-28 PROCEDURE — 80197 ASSAY OF TACROLIMUS: CPT | Performed by: STUDENT IN AN ORGANIZED HEALTH CARE EDUCATION/TRAINING PROGRAM

## 2024-01-28 PROCEDURE — 206N000001 HC R&B BMT UMMC

## 2024-01-28 PROCEDURE — 99418 PROLNG IP/OBS E/M EA 15 MIN: CPT

## 2024-01-28 PROCEDURE — 250N000011 HC RX IP 250 OP 636: Performed by: INTERNAL MEDICINE

## 2024-01-28 PROCEDURE — 80053 COMPREHEN METABOLIC PANEL: CPT

## 2024-01-28 RX ORDER — PHENAZOPYRIDINE HYDROCHLORIDE 200 MG/1
200 TABLET, FILM COATED ORAL 3 TIMES DAILY PRN
Status: ACTIVE | OUTPATIENT
Start: 2024-01-28 | End: 2024-01-29

## 2024-01-28 RX ORDER — HYDROMORPHONE HYDROCHLORIDE 1 MG/ML
0.5 INJECTION, SOLUTION INTRAMUSCULAR; INTRAVENOUS; SUBCUTANEOUS EVERY 6 HOURS PRN
Status: DISCONTINUED | OUTPATIENT
Start: 2024-01-28 | End: 2024-01-31

## 2024-01-28 RX ORDER — MAGNESIUM SULFATE HEPTAHYDRATE 40 MG/ML
2 INJECTION, SOLUTION INTRAVENOUS ONCE
Qty: 50 ML | Refills: 0 | Status: COMPLETED | OUTPATIENT
Start: 2024-01-28 | End: 2024-01-28

## 2024-01-28 RX ORDER — MAGNESIUM SULFATE HEPTAHYDRATE 40 MG/ML
4 INJECTION, SOLUTION INTRAVENOUS ONCE
Qty: 100 ML | Refills: 0 | Status: COMPLETED | OUTPATIENT
Start: 2024-01-28 | End: 2024-01-28

## 2024-01-28 RX ORDER — POTASSIUM CHLORIDE 29.8 MG/ML
20 INJECTION INTRAVENOUS ONCE
Qty: 50 ML | Refills: 0 | Status: COMPLETED | OUTPATIENT
Start: 2024-01-28 | End: 2024-01-28

## 2024-01-28 RX ORDER — HYDROMORPHONE HYDROCHLORIDE 1 MG/ML
0.5 INJECTION, SOLUTION INTRAMUSCULAR; INTRAVENOUS; SUBCUTANEOUS EVERY 4 HOURS PRN
Status: DISCONTINUED | OUTPATIENT
Start: 2024-01-28 | End: 2024-01-28

## 2024-01-28 RX ORDER — OXYCODONE HYDROCHLORIDE 5 MG/1
5 TABLET ORAL EVERY 4 HOURS PRN
Status: DISCONTINUED | OUTPATIENT
Start: 2024-01-28 | End: 2024-02-04 | Stop reason: HOSPADM

## 2024-01-28 RX ADMIN — DOCUSATE SODIUM 50 MG AND SENNOSIDES 8.6 MG 2 TABLET: 8.6; 5 TABLET, FILM COATED ORAL at 19:47

## 2024-01-28 RX ADMIN — PHENAZOPYRIDINE HYDROCHLORIDE 200 MG: 200 TABLET ORAL at 08:30

## 2024-01-28 RX ADMIN — LORAZEPAM 0.5 MG: 0.5 TABLET ORAL at 08:28

## 2024-01-28 RX ADMIN — MYCOPHENOLATE MOFETIL 1250 MG: 500 INJECTION, POWDER, LYOPHILIZED, FOR SOLUTION INTRAVENOUS at 10:34

## 2024-01-28 RX ADMIN — PROCHLORPERAZINE MALEATE 5 MG: 5 TABLET ORAL at 11:30

## 2024-01-28 RX ADMIN — POTASSIUM CHLORIDE 20 MEQ: 29.8 INJECTION, SOLUTION INTRAVENOUS at 06:57

## 2024-01-28 RX ADMIN — PROCHLORPERAZINE EDISYLATE 5 MG: 5 INJECTION INTRAMUSCULAR; INTRAVENOUS at 19:47

## 2024-01-28 RX ADMIN — OXYCODONE HYDROCHLORIDE 5 MG: 5 TABLET ORAL at 12:29

## 2024-01-28 RX ADMIN — URSODIOL 300 MG: 300 CAPSULE ORAL at 14:41

## 2024-01-28 RX ADMIN — HYDROCORTISONE: 1 CREAM TOPICAL at 17:15

## 2024-01-28 RX ADMIN — ACYCLOVIR 800 MG: 800 TABLET ORAL at 19:47

## 2024-01-28 RX ADMIN — PANTOPRAZOLE SODIUM 40 MG: 40 INJECTION, POWDER, FOR SOLUTION INTRAVENOUS at 19:47

## 2024-01-28 RX ADMIN — ACYCLOVIR 800 MG: 800 TABLET ORAL at 08:30

## 2024-01-28 RX ADMIN — MAGNESIUM SULFATE IN WATER 2 G: 40 INJECTION, SOLUTION INTRAVENOUS at 14:41

## 2024-01-28 RX ADMIN — MYCOPHENOLATE MOFETIL 1250 MG: 500 INJECTION, POWDER, LYOPHILIZED, FOR SOLUTION INTRAVENOUS at 19:44

## 2024-01-28 RX ADMIN — AMLODIPINE BESYLATE 5 MG: 5 TABLET ORAL at 19:47

## 2024-01-28 RX ADMIN — PANTOPRAZOLE SODIUM 40 MG: 40 INJECTION, POWDER, FOR SOLUTION INTRAVENOUS at 08:22

## 2024-01-28 RX ADMIN — TACROLIMUS 80 MCG/HR: 5 INJECTION, SOLUTION INTRAVENOUS at 19:44

## 2024-01-28 RX ADMIN — URSODIOL 300 MG: 300 CAPSULE ORAL at 08:30

## 2024-01-28 RX ADMIN — LOPERAMIDE HYDROCHLORIDE 2 MG: 2 CAPSULE ORAL at 14:41

## 2024-01-28 RX ADMIN — MAGNESIUM SULFATE IN WATER 4 G: 4 INJECTION, SOLUTION INTRAVENOUS at 04:49

## 2024-01-28 RX ADMIN — AMLODIPINE BESYLATE 5 MG: 5 TABLET ORAL at 08:30

## 2024-01-28 RX ADMIN — PHENAZOPYRIDINE HYDROCHLORIDE 200 MG: 200 TABLET ORAL at 12:29

## 2024-01-28 RX ADMIN — URSODIOL 300 MG: 300 CAPSULE ORAL at 19:47

## 2024-01-28 RX ADMIN — LETERMOVIR 480 MG: 480 TABLET, FILM COATED ORAL at 08:30

## 2024-01-28 RX ADMIN — PROCHLORPERAZINE EDISYLATE 5 MG: 5 INJECTION INTRAMUSCULAR; INTRAVENOUS at 03:27

## 2024-01-28 RX ADMIN — MICAFUNGIN SODIUM 150 MG: 50 INJECTION, POWDER, LYOPHILIZED, FOR SOLUTION INTRAVENOUS at 17:14

## 2024-01-28 ASSESSMENT — ACTIVITIES OF DAILY LIVING (ADL)
ADLS_ACUITY_SCORE: 24
ADLS_ACUITY_SCORE: 23
ADLS_ACUITY_SCORE: 23
ADLS_ACUITY_SCORE: 24
ADLS_ACUITY_SCORE: 23
ADLS_ACUITY_SCORE: 24
ADLS_ACUITY_SCORE: 23
ADLS_ACUITY_SCORE: 23
ADLS_ACUITY_SCORE: 24

## 2024-01-28 NOTE — PLAN OF CARE
"BP (!) 143/78 (BP Location: Right arm)   Pulse 91   Temp 97.9  F (36.6  C) (Axillary)   Resp 16   Ht 1.575 m (5' 2.01\")   Wt 78.9 kg (173 lb 14.4 oz)   LMP 11/05/2017   SpO2 95%   BMI 31.80 kg/m    Afebrile, HTN, other VSS.  Pt reports throat pain has improved since yesterday, did have Oxycodone 5mg x1 with pain relief.  Ativan x1 and Compazine scheduled for nausea-pt reported \"I get nauseous when I think about taking pills.\", denies nausea w/meals, reports just not having the appetite.  Pt has 1/2 cup peaches, 1/4 orange and water today.  Continue to keep encouraging eating, offer variety of foods.  Pt remained in bed most of day, did get up to shower, no walking done.  Mg recheck 1.8, 2gm given with recheck in am.      Problem: Stem Cell/Bone Marrow Transplant  Goal: Diarrhea Symptom Control  Outcome: Not Progressing     Problem: Stem Cell/Bone Marrow Transplant  Goal: Improved Activity Tolerance  Outcome: Not Progressing     Problem: Stem Cell/Bone Marrow Transplant  Goal: Optimal Nutrition Intake  Outcome: Not Progressing     Problem: Adult Inpatient Plan of Care  Goal: Plan of Care Review  Description: The Plan of Care Review/Shift note should be completed every shift.  The Outcome Evaluation is a brief statement about your assessment that the patient is improving, declining, or no change.  This information will be displayed automatically on your shift  note.  Outcome: Progressing     Problem: Stem Cell/Bone Marrow Transplant  Goal: Absence of Infection  Outcome: Progressing     Problem: Stem Cell/Bone Marrow Transplant  Goal: Improved Oral Mucous Membrane Health and Integrity  Outcome: Progressing     Problem: Stem Cell/Bone Marrow Transplant  Goal: Nausea and Vomiting Symptom Relief  Outcome: Progressing     Problem: Adult Inpatient Plan of Care  Goal: Optimal Comfort and Wellbeing  Intervention: Monitor Pain and Promote Comfort  Recent Flowsheet Documentation  Taken 1/28/2024 1228 by Becca Hargrove " HOANG, RN  Pain Management Interventions: medication (see MAR)   Goal Outcome Evaluation:

## 2024-01-28 NOTE — PROGRESS NOTES
"Calorie Count  Intake recorded for: 1/27  Total Kcals: 139 Total Protein: 6g  Kcals from Hospital Food: 107   Protein: 6g  Kcals from Outside Food (average):32 Protein: 0g  # Meals Ordered from Kitchen: 1 Meal  # Meals Recorded: 1 Meal (10% mashed potatoes w/ beef gravy, basil tomato soup)  Outside food: per RN, \"3 bites avocado\"  # Supplements Recorded: 25% of ensure enlive    "

## 2024-01-28 NOTE — PLAN OF CARE
"/87 (BP Location: Right arm)   Pulse 86   Temp 98.2  F (36.8  C) (Axillary)   Resp 16   Ht 1.575 m (5' 2.01\")   Wt 79.6 kg (175 lb 8 oz)   LMP 11/05/2017   SpO2 97%   BMI 32.09 kg/m    Pt has reported improving throat pain, but still present-Dilaudid x1, declined intervention this evening.  N/V x1-Ativan and Compazine given with some relief.  Pt reports the biggest barrier to eating is the throat pain more than nausea.  Encouraged medication for pain control to eat.  Encourage walking in room/halls-pt did not walk halls, reported \"legs feeling weak.\"  Bed alarm placed for safety, pt calls out appropriately, steady on feet.  Mg recheck, another 2gm given, recheck in am.  Pt has denied any further dysuria today.      Problem: Stem Cell/Bone Marrow Transplant  Goal: Improved Activity Tolerance  Outcome: Not Progressing     Problem: Stem Cell/Bone Marrow Transplant  Goal: Nausea and Vomiting Symptom Relief  Outcome: Not Progressing     Problem: Stem Cell/Bone Marrow Transplant  Goal: Optimal Nutrition Intake  Outcome: Not Progressing     Problem: Adult Inpatient Plan of Care  Goal: Optimal Comfort and Wellbeing  Outcome: Progressing     Problem: Stem Cell/Bone Marrow Transplant  Goal: Symptom-Free Urinary Elimination  Outcome: Progressing     Problem: Stem Cell/Bone Marrow Transplant  Goal: Diarrhea Symptom Control  Outcome: Progressing     Problem: Stem Cell/Bone Marrow Transplant  Goal: Improved Oral Mucous Membrane Health and Integrity  Outcome: Progressing   Goal Outcome Evaluation:                        "

## 2024-01-28 NOTE — PROGRESS NOTES
"  BMT Daily Progress Note  Patient ID:  Irma Foreman is a 53 year old female with a PMH of MDS, warm AIHA, transfusion and transfusion dependent anemia presented to  to undergo a myeloablative allogeneic transplant. She was recently COVID+ on 12/26 but tested negative on 1/2 with resolution of nearly all URI symptoms. Undergoing MA (Bu/Flu) 6/8 URD Allo transplant, day +17      HPI   Irma is feeling ok this morning. Had emesis x1 yesterday and x1 overnight. Throat pain is still present, although mildly improved today. Utilizing IV dilaudid minimally as this makes her nauseous -- we discussed trying oxy instead. Has not been able to eat much food d/t the throat pain. Drinking well- I/O euvolemic. She has been able to take PO meds, but it is somewhat difficult d/t throat pain.       Review of Systems    Negative unless stated in the HPI.     PHYSICAL EXAM      Weight     Wt Readings from Last 3 Encounters:   01/27/24 79.6 kg (175 lb 8 oz)   12/26/23 83.1 kg (183 lb 4.8 oz)   12/19/23 83.5 kg (184 lb)        KPS: 70    BP (!) 140/77 (BP Location: Right arm)   Pulse 86   Temp 97.8  F (36.6  C) (Axillary)   Resp 16   Ht 1.575 m (5' 2.01\")   Wt 79.6 kg (175 lb 8 oz)   LMP 11/05/2017   SpO2 97%   BMI 32.09 kg/m       General: NAD   Eyes: KEVON, sclera anicteric   Nose/Mouth/Throat: Right inner cheek slightly erythematous. Ulceration on R-posterior pharynx. Tongue with white mucosa and bilateral ridging; no sloughing noted   Lungs: CTAB   Cardiovascular: RRR, no M/R/G   Abdominal/Rectal: +BS, soft, non tender   Lymphatics: No edema  Skin: wart under right foot dime sized white, cauliflower and slightly ulcerated; Two pin point sized, flesh colored warts on right hand; Hands dry, one excoriation on left dorsum (not examined today). Very faint maculopapular rash present on abdomen but fading (resolved under breasts).   Neuro: A&O   Additional Findings: Gastelum site NT, no drainage.    Current aGVHD staging:  Skin 0, " UGI 0, LGI 0, Liver 0 (keep in note through day +180 for allos)      LABS AND IMAGING: I have assessed all abnormal lab values for their clinical significance and any values considered clinically significant have been addressed in the assessment and plan.        Lab Results   Component Value Date    WBC 6.8 01/28/2024    ANEUTAUTO 9.5 (H) 01/27/2024    HGB 7.6 (L) 01/28/2024    HCT 22.7 (L) 01/28/2024    PLT 24 (LL) 01/28/2024     01/28/2024    POTASSIUM 3.6 01/28/2024    CHLORIDE 103 01/28/2024    CO2 27 01/28/2024    GLC 99 01/28/2024    BUN 5.6 (L) 01/28/2024    CR 0.38 (L) 01/28/2024    MAG 1.4 (L) 01/28/2024    INR 1.16 (H) 01/22/2024       US Abdominal with Doppler (1/18/24)  Impression:   1. Patent Doppler evaluation of the abdomen with antegrade flow throughout.  2. Elevated hepatic artery resistive index of 0.81, nonspecific though can be seen with hepatic venous congestion which could be related to venous occlusive disease..    SYSTEMS-BASED ASSESSMENT AND PLAN     Hortencia Baldwin is a 53 year old female with a history of MDS, undergoing MA (Bu/Flu) 6/8 URD Allo transplant, day +17      BMT/IEC PROTOCOL for MT 2015-29 **according to but not on trial**   - Chemo protocol:  Day -6 through day -1: Keppra and Allopurinol   Day -5 through -2: Bu/Flu. Busulfan levels adjusted by pharm, attending  Day -1: Rest day   Day 0: Transplant. Recipient: O+, CMV+. Donor: O+, CMV-. No flush. Cell dose 6.5 x10^6.   Gram+ bacilli (cutibacterium proprionibacterium) cultured for stem cell product. On cefepime (1/11 - 1/18). Blood cx no growth x10 bottles.   - Restaging plan: per protocol   D+21 BMBX scheduled for 02/02 outpatient. If still admitted, please schedule to be completed inpatient. Sedated marrow not needed.     HEME/COAG  - Pancytopenia due to chemotherapy   # Iron overload: hx of transfusion dependent anemia, pre-transplant ferritin around 5,000. Recommend phlebotomy post transplant when retic count is  restored and Hgb >10    # Platelet refractoriness: Platelets in single digits since 1/16 -- resolved. BID platelet checks discontinued 1/26. Responding to HLA platelets.   Transfusion medicine consulted 1/18: HLA typing completed. **Blood bank fellow recommends no more than 2 units platelets/day d/t risk of TACO.    Discussed with blood bank 1/26: HLA platelets available 1/27 and 1/30   - Transfusion parameters: hemoglobin <7, platelets <10  - Last dose of scheduled GCSF on 1/26. Give PRN for ANC < 1    IMMUNOCOMPROMISED  # Fever: (Last on 1/15) Recurrent on 1/21- tmax 100.7.  - UA: negative for LE & nitrites. 1/13 ua negative; BK urine positive/adeno negative (red/orange urine)  - Blood cultures NGTD. Procal elevated 1.08   - CXR with likely pulmonary edema  - RVP neg   - Cefepime (1/11 - 1/18; 1/22-1/26). Brief course of IV Vancomycin. MRSA nasal swab negative.   # Dysuria: see Renal below.   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   Transition tac and MMF to PO in coming days with resolution of mucositis.   - 1/26 Tac level 17.9 [goal ~10], decreased dose per pharmacy recs. (1/28) Level in process.   **avoiding sirolimus d/t high risk VOD**    CARDIOVASCULAR  # Chest pressure: new on 1/15, worse with sitting up/activity, better while laying flat. RESOLVED.   EKG: new QR pattern in V1, suggests right ventricular conduction delay  Troponin negative x1   Echo (1/15): LVEF 55-60%, no pericardial effusion present. Cardiology consulted: no further recs   #Prolonged QTc to 507 on 1/22.   - Changed levaquin to cefpodoxime. Repeated QTc on 1/22: 466.   # Hypertension with initiation of tacrolimus: Amlodipine 5 mg BID. Labetalol  5 mg IV prn with parameters.   - Risk of cardiomyopathy: Baseline EF 60-65%  - Risk of arrhythmia: Baseline EKG showed sinus rhythm     RESPIRATORY  - Current smoker: states she quit (1/5/24), offered nicotine replacement but she declined.   - Baseline PFTs: The FEV1, FVC, FEV1/FVC ratio and GTD47-01% are within normal limits.  The inspiratory flow rates are within normal limits.  Lung volumes are within normal limits.  The diffusing capacity is normal.   - Risk of respiratory complications: Frequent ambulation and incentive spirometer. CXR (1/24) showed increased left basilar streaky opacities, likely atelectasis.     GI/NUTRITION  # Epigastric pain (1/18): RUQ ultrasound showing patent doppler throughout, no ascites, nonspecific elevated hepatic artery restrictive index (see full impression above). Weight down, LFT's WNL, no abdominal pain. Continue to monitor closely for VOD; checking hepatic panel daily.     - IV PPI BID (1/19)   - Ulcer prophylaxis: PPI  - VOD ppx: Ursodiol   # Moderate malnutrition in the context of acute illness: dietitian following.   Avoiding TPN for now with concern for VOD, will continue to encourage PO intake.   1L NS bolus on 1/23.   Started calorie counts midday 1/27   # Mouth pain likely 2/2 the start of oral mucositis: MMW prn, oxy 5 mg Q4H prn with IV dilaudid 0.5 mg for break through pain.      # Nausea from chemo/radiation: Started prochlorperazine scheduled TID 1/27. Lorazepam prn. Avoiding zofran given hx of QTc prolongation as above.     RENAL / :  # Dysuria (1/26): UA showing small blood, 58 RBC's. BK 4.52 million -- no hematuria; monitoring closely. Adeno pending. Pyridium x4 days.     DERM:  # Right hand dryness, try aquafor with gloves on overnight and as tolerated during the day  # Foot, hand warts. Curbside derm on 1/8, no recs while inpatient, typically managed OP.    # Pink maculopapular rash present under breasts (resolved) and across abdomen (fading): possible  "cefepime rash vs other? (1/18) Continue Hydrocortisone BID.     MUSCULOSKELETAL/FRAILTY  - Baseline Frailty Score: 3  - Daily PT/OT as needed while inpatient  - Cancer Rehab as needed outpatient    SOCIAL DETERMINANTS  - Caregiver: Srinivas Suggs, Keira Neal & Rickey Neal       Disposition: remain admitted through resolution of oral mucositis & appropriate PO intake.         Known issues that I take into account for medical decisions, with salient changes to the plan considering these complexities noted above.    Patient Active Problem List   Diagnosis    Left medial knee pain    Obesity (BMI 30-39.9)    Anemia, unspecified type    Macrocytic anemia    MDS (myelodysplastic syndrome) (H)     Clinically Significant Risk Factors            # Hypomagnesemia: Lowest Mg = 1.4 mg/dL in last 2 days, will replace as needed   # Hypoalbuminemia: Lowest albumin = 3.2 g/dL at 1/25/2024  3:06 AM, will monitor as appropriate   # Thrombocytopenia: Lowest platelets = 21 in last 2 days, will monitor for bleeding          # Obesity: Estimated body mass index is 32.09 kg/m  as calculated from the following:    Height as of this encounter: 1.575 m (5' 2.01\").    Weight as of this encounter: 79.6 kg (175 lb 8 oz).   # Moderate Malnutrition: based on nutrition assessment            I spent 40 minutes in the care of this patient today, which included time necessary for preparation for the visit, obtaining history, ordering medications/tests/procedures as medically indicated, review of pertinent medical literature, counseling of the patient, communication of recommendations to the care team, and documentation time.    Annamarie Costa PA-C  [phone]      ______________________________________________      BMT ATTENDING NOTE    Hortencia Baldwin is a 53 year old female, who is day 17 of transplant for MDS, complicated by iron overload. She is admitted for myeloablative (Bu/Flu) conditioning, 6/8 unrelated donor, GVHD ppx with PTCy/ Tac/ MMF, ABO " matched, CMV donor -/ recipient + .    Continues to have mucositis and painful swallowing. Trying to eat, but limited by pain and nausea. Agreeable to trying PO oxy with PRN IV dilaudid. Gets nauseous when she takes oral medications. Feels weak while ambulating.     BMT: D17   Heme: WBC engrafted.   Tacrolimus dose adjusted on 1/26 for level 17. Repeat level pending.   Mucositis: Oxycodone and PRN IV dilaudid  Nausea: On scheduled compazine. Can try adding zyprexa or changing acyclovir to IV if no improvement.   FEN: continue calorie counts.   Dysuria and hematuria: Resolved. UA with RBCs. Urine BK virus levels very high. Urine adeno pending.   HTN: Amlodipine 10mg daily.   PPx: jani, acyclovir, letermovir   Supportive care: encourage ambulation, PT/OT, optimize nutrition. Spirometer.     On the date of service, 01/28/2024, I spent 40 minutes on the patient unit personally reviewing medical records and medications, reviewing vital signs, labs, and imaging results as summarized above, discussing the patient's case on rounds with the JEN, obtaining a history from the patient, performing a physical exam, intensively monitoring treatments with high risk of toxicity, coordinating care, and documenting in the electronic medical record.    Coco Garay  Department of Hematology, Oncology and Transplantation  [facility] Pager [phone]/Text via [email or web address]

## 2024-01-28 NOTE — PLAN OF CARE
"BP (!) 140/77 (BP Location: Right arm)   Pulse 86   Temp 97.8  F (36.6  C) (Axillary)   Resp 16   Ht 1.575 m (5' 2.01\")   Wt 79.6 kg (175 lb 8 oz)   LMP 11/05/2017   SpO2 97%   BMI 32.09 kg/m     Neuro: A&Ox4. No new neuro deficit. Call appropriately   Cardiac: Afebrile slightly hypertensive BPS 140s. OVSS.             Respiratory: Sating >95% on RA.  GI/: Adequate urine output. Denies dysuria. No BM this shift. Had one episode of emesis this shift   Diet/appetite: Reg diet, poor appetite d/t mucositis. Pt ate 25% of Dinner.   Activity: Pt complained of weakness when standing, bed alarm on with SBA.    Pain: At acceptable level on current regimen. PRN Dilaudid x1 for throat pain.   Skin: No new deficits noted.  LDA's: CVC Right internal jugular double lumen infusing Tac and electrolytes   Labs: Mag replaced, potassium to be replaced.   Goal Outcome Evaluation:      Plan of Care Reviewed With: patient    Overall Patient Progress: improvingOverall Patient Progress: improving           "

## 2024-01-29 ENCOUNTER — APPOINTMENT (OUTPATIENT)
Dept: PHYSICAL THERAPY | Facility: CLINIC | Age: 54
DRG: 014 | End: 2024-01-29
Attending: STUDENT IN AN ORGANIZED HEALTH CARE EDUCATION/TRAINING PROGRAM
Payer: COMMERCIAL

## 2024-01-29 LAB
ABO/RH(D): NORMAL
ALBUMIN SERPL BCG-MCNC: 3.6 G/DL (ref 3.5–5.2)
ALP SERPL-CCNC: 116 U/L (ref 40–150)
ALT SERPL W P-5'-P-CCNC: 29 U/L (ref 0–50)
ANION GAP SERPL CALCULATED.3IONS-SCNC: 8 MMOL/L (ref 7–15)
ANTIBODY SCREEN: NEGATIVE
AST SERPL W P-5'-P-CCNC: 28 U/L (ref 0–45)
BACTERIA BLD CULT: NO GROWTH
BACTERIA BLD CULT: NO GROWTH
BASOPHILS # BLD AUTO: ABNORMAL 10*3/UL
BASOPHILS # BLD MANUAL: 0 10E3/UL (ref 0–0.2)
BASOPHILS NFR BLD AUTO: ABNORMAL %
BASOPHILS NFR BLD MANUAL: 0 %
BILIRUB SERPL-MCNC: 0.4 MG/DL
BUN SERPL-MCNC: 4.2 MG/DL (ref 6–20)
CALCIUM SERPL-MCNC: 8.9 MG/DL (ref 8.6–10)
CHLORIDE SERPL-SCNC: 105 MMOL/L (ref 98–107)
CREAT SERPL-MCNC: 0.4 MG/DL (ref 0.51–0.95)
DEPRECATED HCO3 PLAS-SCNC: 28 MMOL/L (ref 22–29)
EGFRCR SERPLBLD CKD-EPI 2021: >90 ML/MIN/1.73M2
EOSINOPHIL # BLD AUTO: ABNORMAL 10*3/UL
EOSINOPHIL # BLD MANUAL: 0 10E3/UL (ref 0–0.7)
EOSINOPHIL NFR BLD AUTO: ABNORMAL %
EOSINOPHIL NFR BLD MANUAL: 0 %
ERYTHROCYTE [DISTWIDTH] IN BLOOD BY AUTOMATED COUNT: 17.2 % (ref 10–15)
GLUCOSE BLDC GLUCOMTR-MCNC: 105 MG/DL (ref 70–99)
GLUCOSE BLDC GLUCOMTR-MCNC: 132 MG/DL (ref 70–99)
GLUCOSE SERPL-MCNC: 97 MG/DL (ref 70–99)
HADV DNA # SPEC NAA+PROBE: NOT DETECTED COPIES/ML
HCT VFR BLD AUTO: 23.4 % (ref 35–47)
HGB BLD-MCNC: 7.8 G/DL (ref 11.7–15.7)
IMM GRANULOCYTES # BLD: ABNORMAL 10*3/UL
IMM GRANULOCYTES NFR BLD: ABNORMAL %
INR PPP: 1.04 (ref 0.85–1.15)
LYMPHOCYTES # BLD AUTO: ABNORMAL 10*3/UL
LYMPHOCYTES # BLD MANUAL: 0.2 10E3/UL (ref 0.8–5.3)
LYMPHOCYTES NFR BLD AUTO: ABNORMAL %
LYMPHOCYTES NFR BLD MANUAL: 4 %
MAGNESIUM SERPL-MCNC: 1.4 MG/DL (ref 1.7–2.3)
MAGNESIUM SERPL-MCNC: 1.7 MG/DL (ref 1.7–2.3)
MCH RBC QN AUTO: 31.8 PG (ref 26.5–33)
MCHC RBC AUTO-ENTMCNC: 33.3 G/DL (ref 31.5–36.5)
MCV RBC AUTO: 96 FL (ref 78–100)
METAMYELOCYTES # BLD MANUAL: 0.2 10E3/UL
METAMYELOCYTES NFR BLD MANUAL: 4 %
MONOCYTES # BLD AUTO: ABNORMAL 10*3/UL
MONOCYTES # BLD MANUAL: 0.4 10E3/UL (ref 0–1.3)
MONOCYTES NFR BLD AUTO: ABNORMAL %
MONOCYTES NFR BLD MANUAL: 8 %
MYELOCYTES # BLD MANUAL: 0 10E3/UL
MYELOCYTES NFR BLD MANUAL: 1 %
NEUTROPHILS # BLD AUTO: ABNORMAL 10*3/UL
NEUTROPHILS # BLD MANUAL: 4.1 10E3/UL (ref 1.6–8.3)
NEUTROPHILS NFR BLD AUTO: ABNORMAL %
NEUTROPHILS NFR BLD MANUAL: 83 %
NRBC # BLD AUTO: 0.1 10E3/UL
NRBC # BLD AUTO: 0.1 10E3/UL
NRBC BLD AUTO-RTO: 1 /100
NRBC BLD MANUAL-RTO: 3 %
PHOSPHATE SERPL-MCNC: 3.1 MG/DL (ref 2.5–4.5)
PLAT MORPH BLD: ABNORMAL
PLATELET # BLD AUTO: 28 10E3/UL (ref 150–450)
POLYCHROMASIA BLD QL SMEAR: SLIGHT
POTASSIUM SERPL-SCNC: 3.9 MMOL/L (ref 3.4–5.3)
PROT SERPL-MCNC: 5.8 G/DL (ref 6.4–8.3)
RBC # BLD AUTO: 2.45 10E6/UL (ref 3.8–5.2)
RBC MORPH BLD: ABNORMAL
SODIUM SERPL-SCNC: 141 MMOL/L (ref 135–145)
SPECIMEN EXPIRATION DATE: NORMAL
TACROLIMUS BLD-MCNC: 12.5 UG/L (ref 5–15)
TME LAST DOSE: NORMAL H
TME LAST DOSE: NORMAL H
TRIGL SERPL-MCNC: 249 MG/DL
WBC # BLD AUTO: 4.9 10E3/UL (ref 4–11)

## 2024-01-29 PROCEDURE — 250N000009 HC RX 250: Mod: JZ | Performed by: STUDENT IN AN ORGANIZED HEALTH CARE EDUCATION/TRAINING PROGRAM

## 2024-01-29 PROCEDURE — 84478 ASSAY OF TRIGLYCERIDES: CPT

## 2024-01-29 PROCEDURE — 83735 ASSAY OF MAGNESIUM: CPT | Performed by: INTERNAL MEDICINE

## 2024-01-29 PROCEDURE — 80197 ASSAY OF TACROLIMUS: CPT | Performed by: INTERNAL MEDICINE

## 2024-01-29 PROCEDURE — 258N000003 HC RX IP 258 OP 636

## 2024-01-29 PROCEDURE — C9113 INJ PANTOPRAZOLE SODIUM, VIA: HCPCS

## 2024-01-29 PROCEDURE — 85610 PROTHROMBIN TIME: CPT

## 2024-01-29 PROCEDURE — 3E0436Z INTRODUCTION OF NUTRITIONAL SUBSTANCE INTO CENTRAL VEIN, PERCUTANEOUS APPROACH: ICD-10-PCS | Performed by: INTERNAL MEDICINE

## 2024-01-29 PROCEDURE — 250N000011 HC RX IP 250 OP 636

## 2024-01-29 PROCEDURE — B4185 PARENTERAL SOL 10 GM LIPIDS: HCPCS | Mod: JZ | Performed by: STUDENT IN AN ORGANIZED HEALTH CARE EDUCATION/TRAINING PROGRAM

## 2024-01-29 PROCEDURE — 250N000013 HC RX MED GY IP 250 OP 250 PS 637

## 2024-01-29 PROCEDURE — 250N000011 HC RX IP 250 OP 636: Performed by: STUDENT IN AN ORGANIZED HEALTH CARE EDUCATION/TRAINING PROGRAM

## 2024-01-29 PROCEDURE — 86900 BLOOD TYPING SEROLOGIC ABO: CPT

## 2024-01-29 PROCEDURE — 99233 SBSQ HOSP IP/OBS HIGH 50: CPT | Mod: FS | Performed by: STUDENT IN AN ORGANIZED HEALTH CARE EDUCATION/TRAINING PROGRAM

## 2024-01-29 PROCEDURE — 206N000001 HC R&B BMT UMMC

## 2024-01-29 PROCEDURE — 84100 ASSAY OF PHOSPHORUS: CPT

## 2024-01-29 PROCEDURE — 85007 BL SMEAR W/DIFF WBC COUNT: CPT

## 2024-01-29 PROCEDURE — 97530 THERAPEUTIC ACTIVITIES: CPT | Mod: GP

## 2024-01-29 PROCEDURE — 83735 ASSAY OF MAGNESIUM: CPT

## 2024-01-29 PROCEDURE — 250N000011 HC RX IP 250 OP 636: Mod: JZ

## 2024-01-29 PROCEDURE — 258N000003 HC RX IP 258 OP 636: Performed by: STUDENT IN AN ORGANIZED HEALTH CARE EDUCATION/TRAINING PROGRAM

## 2024-01-29 PROCEDURE — 80053 COMPREHEN METABOLIC PANEL: CPT

## 2024-01-29 PROCEDURE — 97110 THERAPEUTIC EXERCISES: CPT | Mod: GP

## 2024-01-29 PROCEDURE — 85014 HEMATOCRIT: CPT

## 2024-01-29 PROCEDURE — 250N000011 HC RX IP 250 OP 636: Performed by: INTERNAL MEDICINE

## 2024-01-29 RX ORDER — PROCHLORPERAZINE MALEATE 5 MG
5 TABLET ORAL EVERY 6 HOURS PRN
Status: DISCONTINUED | OUTPATIENT
Start: 2024-01-29 | End: 2024-02-04 | Stop reason: HOSPADM

## 2024-01-29 RX ORDER — PROCHLORPERAZINE MALEATE 5 MG
5 TABLET ORAL EVERY 8 HOURS
Status: COMPLETED | OUTPATIENT
Start: 2024-01-29 | End: 2024-01-29

## 2024-01-29 RX ORDER — OLANZAPINE 5 MG/1
5 TABLET ORAL AT BEDTIME
Status: DISCONTINUED | OUTPATIENT
Start: 2024-01-29 | End: 2024-02-04 | Stop reason: HOSPADM

## 2024-01-29 RX ORDER — DEXTROSE MONOHYDRATE 100 MG/ML
INJECTION, SOLUTION INTRAVENOUS CONTINUOUS PRN
Status: DISCONTINUED | OUTPATIENT
Start: 2024-01-29 | End: 2024-02-04 | Stop reason: HOSPADM

## 2024-01-29 RX ORDER — MAGNESIUM SULFATE HEPTAHYDRATE 40 MG/ML
4 INJECTION, SOLUTION INTRAVENOUS ONCE
Status: COMPLETED | OUTPATIENT
Start: 2024-01-29 | End: 2024-01-29

## 2024-01-29 RX ORDER — MAGNESIUM SULFATE HEPTAHYDRATE 40 MG/ML
2 INJECTION, SOLUTION INTRAVENOUS ONCE
Status: COMPLETED | OUTPATIENT
Start: 2024-01-29 | End: 2024-01-29

## 2024-01-29 RX ADMIN — URSODIOL 300 MG: 300 CAPSULE ORAL at 14:51

## 2024-01-29 RX ADMIN — LETERMOVIR 480 MG: 480 TABLET, FILM COATED ORAL at 09:31

## 2024-01-29 RX ADMIN — PROCHLORPERAZINE EDISYLATE 5 MG: 5 INJECTION INTRAMUSCULAR; INTRAVENOUS at 03:42

## 2024-01-29 RX ADMIN — MYCOPHENOLATE MOFETIL 1250 MG: 500 INJECTION, POWDER, LYOPHILIZED, FOR SOLUTION INTRAVENOUS at 09:33

## 2024-01-29 RX ADMIN — PANTOPRAZOLE SODIUM 40 MG: 40 INJECTION, POWDER, FOR SOLUTION INTRAVENOUS at 09:35

## 2024-01-29 RX ADMIN — MAGNESIUM SULFATE IN WATER 2 G: 40 INJECTION, SOLUTION INTRAVENOUS at 13:12

## 2024-01-29 RX ADMIN — URSODIOL 300 MG: 300 CAPSULE ORAL at 20:13

## 2024-01-29 RX ADMIN — URSODIOL 300 MG: 300 CAPSULE ORAL at 09:30

## 2024-01-29 RX ADMIN — HYDROCORTISONE: 1 CREAM TOPICAL at 13:17

## 2024-01-29 RX ADMIN — MYCOPHENOLATE MOFETIL 1250 MG: 500 INJECTION, POWDER, LYOPHILIZED, FOR SOLUTION INTRAVENOUS at 22:03

## 2024-01-29 RX ADMIN — OLANZAPINE 5 MG: 5 TABLET, FILM COATED ORAL at 22:02

## 2024-01-29 RX ADMIN — ACYCLOVIR SODIUM 400 MG: 50 INJECTION, SOLUTION INTRAVENOUS at 20:12

## 2024-01-29 RX ADMIN — AMLODIPINE BESYLATE 5 MG: 5 TABLET ORAL at 20:13

## 2024-01-29 RX ADMIN — HYDROCORTISONE: 1 CREAM TOPICAL at 20:18

## 2024-01-29 RX ADMIN — ACYCLOVIR 800 MG: 800 TABLET ORAL at 09:31

## 2024-01-29 RX ADMIN — MICAFUNGIN SODIUM 150 MG: 50 INJECTION, POWDER, LYOPHILIZED, FOR SOLUTION INTRAVENOUS at 18:05

## 2024-01-29 RX ADMIN — MAGNESIUM SULFATE IN WATER 4 G: 4 INJECTION, SOLUTION INTRAVENOUS at 05:18

## 2024-01-29 RX ADMIN — MAGNESIUM SULFATE HEPTAHYDRATE: 500 INJECTION, SOLUTION INTRAMUSCULAR; INTRAVENOUS at 20:07

## 2024-01-29 RX ADMIN — OLIVE OIL AND SOYBEAN OIL 250 ML: 16; 4 INJECTION, EMULSION INTRAVENOUS at 20:07

## 2024-01-29 RX ADMIN — TACROLIMUS 68 MCG/HR: 5 INJECTION, SOLUTION INTRAVENOUS at 20:05

## 2024-01-29 RX ADMIN — PANTOPRAZOLE SODIUM 40 MG: 40 INJECTION, POWDER, FOR SOLUTION INTRAVENOUS at 20:13

## 2024-01-29 RX ADMIN — PROCHLORPERAZINE MALEATE 5 MG: 5 TABLET ORAL at 12:11

## 2024-01-29 RX ADMIN — AMLODIPINE BESYLATE 5 MG: 5 TABLET ORAL at 09:31

## 2024-01-29 ASSESSMENT — ACTIVITIES OF DAILY LIVING (ADL)
ADLS_ACUITY_SCORE: 23

## 2024-01-29 NOTE — PROGRESS NOTES
"  BMT Daily Progress Note  Patient ID:  Irma Foreman is a 53 year old female with a PMH of MDS, warm AIHA, transfusion and transfusion dependent anemia presented to  to undergo a myeloablative allogeneic transplant. She was recently COVID+ on 12/26 but tested negative on 1/2 with resolution of nearly all URI symptoms. Undergoing MA (Bu/Flu) 6/8 URD Allo transplant, day +18      HPI   Irma reports no appetite, she said it is hard for her to eat any types of food, drank 1/2 of an ensure, she is down 9 lbs since admission. Plan to start TPN today and encourage her to increase her PO intake. She is also more nauseated, no BM x2 days, denies urinary pain while on pyridium for BK viruria and her hands are itchy, which is not unusual for her. Today we will consult PT, start Zyprexa HS and switch to her ACV back to IV to decrease her pill burden.       Review of Systems    Negative unless stated in the HPI.     PHYSICAL EXAM      Weight     Wt Readings from Last 3 Encounters:   01/29/24 78.1 kg (172 lb 1.6 oz)   12/26/23 83.1 kg (183 lb 4.8 oz)   12/19/23 83.5 kg (184 lb)        KPS: 70    BP (!) 146/90 (BP Location: Right arm)   Pulse 96   Temp 98.5  F (36.9  C) (Axillary)   Resp 16   Ht 1.575 m (5' 2.01\")   Wt 78.1 kg (172 lb 1.6 oz)   LMP 11/05/2017   SpO2 98%   BMI 31.47 kg/m       General: NAD   Eyes: KEVON, sclera anicteric   Nose/Mouth/Throat: Right inner cheek slightly erythematous. Ulceration on R-posterior pharynx. Tongue with white mucosa and bilateral ridging; no sloughing noted   Lungs: CTAB   Cardiovascular: RRR, no M/R/G   Abdominal/Rectal: +BS, soft, non tender   Lymphatics: No edema  Skin: wart under right foot dime sized white, cauliflower and slightly ulcerated; Two pin point sized, flesh colored warts on right hand; Hands dry, one excoriation on left dorsum (not examined today).   Neuro: A&O   Additional Findings: Gastelum site NT, no drainage.    Current aGVHD staging:  Skin 0, UGI 0, LGI 0, " Liver 0 (keep in note through day +180 for allos)      LABS AND IMAGING: I have assessed all abnormal lab values for their clinical significance and any values considered clinically significant have been addressed in the assessment and plan.        Lab Results   Component Value Date    WBC 4.9 01/29/2024    ANEUTAUTO 9.5 (H) 01/27/2024    HGB 7.8 (L) 01/29/2024    HCT 23.4 (L) 01/29/2024    PLT 28 (LL) 01/29/2024     01/29/2024    POTASSIUM 3.9 01/29/2024    CHLORIDE 105 01/29/2024    CO2 28 01/29/2024    GLC 97 01/29/2024    BUN 4.2 (L) 01/29/2024    CR 0.40 (L) 01/29/2024    MAG 1.4 (L) 01/29/2024    INR 1.04 01/29/2024       US Abdominal with Doppler (1/18/24)  Impression:   1. Patent Doppler evaluation of the abdomen with antegrade flow throughout.  2. Elevated hepatic artery resistive index of 0.81, nonspecific though can be seen with hepatic venous congestion which could be related to venous occlusive disease..    SYSTEMS-BASED ASSESSMENT AND PLAN     Hortencia Baldwin is a 53 year old female with a history of MDS, undergoing MA (Bu/Flu) 6/8 URD Allo transplant, day +18      BMT/IEC PROTOCOL for MT 2015-29 **according to but not on trial**   - Chemo protocol:  Day -6 through day -1: Keppra and Allopurinol   Day -5 through -2: Bu/Flu. Busulfan levels adjusted by pharm, attending  Day -1: Rest day   Day 0: Transplant. Recipient: O+, CMV+. Donor: O+, CMV-. No flush. Cell dose 6.5 x10^6.   Gram+ bacilli (cutibacterium proprionibacterium) cultured for stem cell product. On cefepime (1/11 - 1/18). Blood cx no growth x10 bottles.   - Restaging plan: per protocol   D+21 BMBX scheduled for 02/02 outpatient. If still admitted, please schedule to be completed inpatient. Sedated marrow not needed.     HEME/COAG  - Pancytopenia due to chemotherapy   # Iron overload: hx of transfusion dependent anemia, pre-transplant ferritin around 5,000. Recommend phlebotomy post transplant when retic count is restored and Hgb >10    #  Platelet refractoriness: Platelets in single digits since 1/16 -- resolved. BID platelet checks discontinued 1/26. Responding to HLA platelets.   Transfusion medicine consulted 1/18: HLA typing completed. **Blood bank fellow recommends no more than 2 units platelets/day d/t risk of TACO.    - Transfusion parameters: hemoglobin <7, platelets <10  - Last dose of scheduled GCSF on 1/26. Give PRN for ANC < 1    IMMUNOCOMPROMISED  # Fever: (Last on 1/15) Recurrent on 1/21- tmax 100.7.  - UA: negative for LE & nitrites. 1/13 ua negative; BK urine positive/adeno negative (red/orange urine)  - Blood cultures NGTD. Procal elevated 1.08   - CXR with likely pulmonary edema  - RVP neg   - Cefepime (1/11 - 1/18; 1/22-1/26). Brief course of IV Vancomycin. MRSA nasal swab negative.   # Dysuria: see Renal below.   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   Transition tac and MMF to PO in coming days with resolution of mucositis.   - 1/28 Tac level 12 [goal ~10]  **avoiding sirolimus d/t high risk VOD**    CARDIOVASCULAR  # Chest pressure: new on 1/15, worse with sitting up/activity, better while laying flat. RESOLVED.   EKG: new QR pattern in V1, suggests right ventricular conduction delay  Troponin negative x1   Echo (1/15): LVEF 55-60%, no pericardial effusion present. Cardiology consulted: no further recs   #Prolonged QTc to 507 on 1/22.   - Changed levaquin to cefpodoxime. Repeated QTc on 1/22: 466.   # Hypertension with initiation of tacrolimus: Amlodipine 5 mg BID. Labetalol 5 mg IV prn with parameters.   - Risk of cardiomyopathy: Baseline EF 60-65%  - Risk of arrhythmia: Baseline EKG showed sinus rhythm     RESPIRATORY  - Current  smoker: states she quit (1/5/24), offered nicotine replacement but she declined.   - Baseline PFTs: The FEV1, FVC, FEV1/FVC ratio and ZTE73-73% are within normal limits.  The inspiratory flow rates are within normal limits.  Lung volumes are within normal limits.  The diffusing capacity is normal.   - Risk of respiratory complications: Frequent ambulation and incentive spirometer. CXR (1/24) showed increased left basilar streaky opacities, likely atelectasis.     GI/NUTRITION  # Epigastric pain (1/18): RUQ ultrasound showing patent doppler throughout, no ascites, nonspecific elevated hepatic artery restrictive index (see full impression above). Weight down, LFT's WNL, no abdominal pain. Continue to monitor closely for VOD; checking hepatic panel daily.     - IV PPI BID (1/19)   - Ulcer prophylaxis: PPI  - VOD ppx: Ursodiol   # Moderate malnutrition in the context of acute illness: dietitian following.   Start TPN (1/29 - x)   # Mouth pain likely 2/2 the start of oral mucositis: MMW prn, oxy 5 mg Q4H prn with IV dilaudid 0.5 mg for break through pain.      # Nausea from chemo/radiation: Zyprexa 5 mg HS (1/29), compazine PRN, Lorazepam prn. Avoiding zofran given hx of QTc prolongation as above.     RENAL / :  # Dysuria (1/26): UA showing small blood, 58 RBC's. BK 4.52 million -- no hematuria; monitoring closely. Adeno pending. Pyridium x4 days.     DERM:  # Right hand dryness, try aquafor with gloves on overnight and as tolerated during the day  # Foot, hand warts. Curbside derm on 1/8, no recs while inpatient, typically managed OP.    # Pink maculopapular rash present under breasts (resolved) and across abdomen (Resolved)    MUSCULOSKELETAL/FRAILTY  - Baseline Frailty Score: 3  - Daily PT/OT as needed while inpatient  - Cancer Rehab as needed outpatient    SOCIAL DETERMINANTS  - Caregiver: Keira Cardenas & Rickey Neal       Disposition: remain admitted through resolution of oral mucositis & appropriate PO  "intake.         Known issues that I take into account for medical decisions, with salient changes to the plan considering these complexities noted above.    Patient Active Problem List   Diagnosis    Left medial knee pain    Obesity (BMI 30-39.9)    Anemia, unspecified type    Macrocytic anemia    MDS (myelodysplastic syndrome) (H)     Clinically Significant Risk Factors            # Hypomagnesemia: Lowest Mg = 1.4 mg/dL in last 2 days, will replace as needed   # Hypoalbuminemia: Lowest albumin = 3.2 g/dL at 1/25/2024  3:06 AM, will monitor as appropriate   # Thrombocytopenia: Lowest platelets = 24 in last 2 days, will monitor for bleeding          # Obesity: Estimated body mass index is 31.47 kg/m  as calculated from the following:    Height as of this encounter: 1.575 m (5' 2.01\").    Weight as of this encounter: 78.1 kg (172 lb 1.6 oz).   # Moderate Malnutrition: based on nutrition assessment            I spent 40 minutes in the care of this patient today, which included time necessary for preparation for the visit, obtaining history, ordering medications/tests/procedures as medically indicated, review of pertinent medical literature, counseling of the patient, communication of recommendations to the care team, and documentation time.    King Vaz PA-C  [phone]      ______________________________________________      BMT ATTENDING NOTE    Hortencia Baldwin is a 53 year old female, who is day 18 of transplant for MDS, complicated by iron overload. She is admitted for myeloablative (Bu/Flu) conditioning, 6/8 unrelated donor, GVHD ppx with PTCy/ Tac/ MMF, ABO matched, CMV donor -/ recipient + .    Continues to have low PO intake. 100cals yesterday. Limited by nausea, lack of appetite and mucositis. Mucositis has improved over the past few days but she still has a sore on the left side of her tongue which appears to be healing. Worked with PT today. She was steady on her feet but felt very fatigued and weak. "     BMT: D18   Heme: WBC engrafted.   Mucositis: Oxycodone and PRN IV dilaudid  Nausea: On scheduled compazine. Add zyprexa. Will obtain EKG tomorrow. Change acyclovir to IV since swallowing the pills contributes to nausea.  FEN: Will support with TPN for a few days.   Dysuria and hematuria: Resolved. Urine BK virus levels very high. Urine adeno negative.  HTN: Amlodipine 10mg daily.   PPx: jani, acyclovir, letermovir   Supportive care: encourage ambulation, PT/OT, optimize nutrition. Spirometer.     Dispo: Pending improvement in PO intake. Anticipate by the end of this week.     On the date of service, 01/29/2024, I spent 40 minutes on the patient unit personally reviewing medical records and medications, reviewing vital signs, labs, and imaging results as summarized above, discussing the patient's case on rounds with the JEN, obtaining a history from the patient, performing a physical exam, intensively monitoring treatments with high risk of toxicity, coordinating care, and documenting in the electronic medical record.    Coco Garay  Department of Hematology, Oncology and Transplantation  [facility] Pager 1300/Text via [facility]

## 2024-01-29 NOTE — PROGRESS NOTES
CLINICAL NUTRITION SERVICES - REASSESSMENT NOTE     Nutrition Prescription    RECOMMENDATIONS FOR MDs/PROVIDERS TO ORDER:  Optimize anti-emetic medication     Malnutrition Status:    Severe malnutrition in the context of acute illness     Recommendations already ordered by Registered Dietitian (RD):  Dosing weight:  58.3 kg (adjusted)   Access: Central     Initial parameters (per day)  Volume:  1080 mL  Dextrose: 125 g ( GIR= 1.4)   AA: 90 g  Lipids: 250 mL 20%, 3 days per week (Mon, Tue, Thurs)     Dextrose titration:   Monitor lytes and if within acceptable parameters (Mg++ > or = 1.5, K+ is > or = 3, and PO4 > or = 1.9), increase dextrose by 50 g/day to goal of 175 g dextrose.     Additives: 10 mL infuvite + 1 mL MTE-4, + 100 mg thiamine     Goal PN provides 175 g dextrose, 90 g AA, and 250 mL 20% lipids 3 days per week for total provision of 1169 Kcals (29 Kcals/kg), 1.5 g/kg protein, GIR =2.08 mg/kg/minute, and 18% fat kcals on average daily.      Future/Additional Recommendations:  Monitor appetite, oral intake and use of supplements- will continue to monitor calorie count   Monitor weight- ability to maintain   Monitor electrolytes, liver enzymes and TGs while on TPN      EVALUATION OF THE PROGRESS TOWARD GOALS   Diet: High Kcal/High Protein + ensure @ 10:00 and magic cup @ 2:00  + snacks/supplements PRN   Intake:   1/27       Total Kcals: 139       Total Protein: 6g   1/28       Total Kcals: 220       Total Protein: 11g     NEW FINDINGS   Day +18     Irma has had very minimal oral intake. Plan to start TPN today due to nausea/pill burden limiting intake. Irma reported to writer largest barriers to eating include lack of appetite, nausea, throat pain and taste changes.     Weight Trends:   Date/Time Weight Weight Method   01/29/24 0729 78.1 kg (172 lb 1.6 oz) Standing scale   01/27/24 0831 79.6 kg (175 lb 8 oz) Standing scale   01/25/24 0808 81.7 kg (180 lb 3.2 oz) Standing scale   01/22/24 0857 81.8 kg  (180 lb 6.4 oz) Standing scale   01/19/24 0829 82.9 kg (182 lb 11.2 oz) Standing scale   01/16/24 0835 82.8 kg (182 lb 9.6 oz) Standing scale   01/14/24 0814 82.1 kg (181 lb) Standing scale   01/11/24 0930 84.4 kg (186 lb 1.1 oz) Standing scale   01/08/24 0747 83.4 kg (183 lb 14.4 oz) Standing scale   01/05/24 0734 83.8 kg (184 lb 11.2 oz) Standing scale   6% weight loss in one month     GI: Intermittent nausea. Throat pain. Last bowel movement, 1/28.    Skin: Fermin 18, rash/petechiae on midline.     Labs:   Na 141, K+ 3.9, Po4 3.1 (WNL), Mg 1.4 (L)  Glucose 97 (WNL)  WBC 4.9 (WNL)  Alk Phos 116, ALT 29, AST 28 (WNL)    Medications:   Acyclovir  Magnesium sulfate  Mycophenolate  Protonix  Potassium   Compazine  Senna-docusate  Ursodiol  Tacrolimus     MALNUTRITION  % Intake: </= 50% for >/= 5 days (severe)  % Weight Loss: > 5% in 1 month (severe)  Subcutaneous Fat Loss: Facial region:  mild   Muscle Loss: Temporal:  mild, dorsal hand, mild   Fluid Accumulation/Edema: None noted  Malnutrition Diagnosis: Severe malnutrition in the context of acute illness     Previous Goals   Patient to consume % of nutritionally adequate meal trays TID, or the equivalent with supplements/snacks.   Evaluation: Not met    Previous Nutrition Diagnosis  Inadequate oral intake related to worsening dry mouth, mucositis and decreased appetite as evidenced by minimal intakes since 1/18   Evaluation: Modified     CURRENT NUTRITION DIAGNOSIS  Inadequate oral intake related to decreased appetite, taste changes, throat pain and nausea as evidenced by patient and limited oral intake for the past 2 weeks, significant weight loss and  physical signs of muscle/fat loss     INTERVENTIONS  Implementation  Collaboration with other providers- 5c rounds  Medical food supplement therapy- continue supplements  Nutrition education for recommended modifications- Discussed small frequent meals, eating every 2 hours, focusing on nutrient dense foods,  trial of variety of foods for flavor, softer foods for throat pain   Parenteral Nutrition/IV Fluids - Initiate    Goals  Total avg nutritional intake to meet a minimum of 20 kcal/kg and 1.5 g PRO/kg daily (per dosing wt 58.3 kg).    Monitoring/Evaluation  Progress toward goals will be monitored and evaluated per protocol.    Bessie Alvarenga RD, LD  5C/BMT pager: [phone]

## 2024-01-29 NOTE — PLAN OF CARE
Goal Outcome Evaluation:      Plan of Care Reviewed With: patient    Overall Patient Progress: decliningOverall Patient Progress: declining    Outcome Evaluation: appetite remains very low, starting TPN to help achieve adequate protein and more calories- discussed strategies to improve PO

## 2024-01-29 NOTE — PLAN OF CARE
"Goal Outcome Evaluation:      Blood pressure 134/84, pulse 93, temperature 98.5  F (36.9  C), temperature source Axillary, resp. rate 16, height 1.575 m (5' 2.01\"), weight 78.9 kg (173 lb 14.4 oz), last menstrual period 11/05/2017, SpO2 96%, not currently breastfeeding.    AVSS, Hypertensive 140/91 but did not warrant treatment and came down without treatment to 134/84. Pt is on room air and is sting well. She gets up independently to the bathroom and she is voiding orange color urine due to Pyridium. She continue to have throat discomfort while swallowing.. Nausea is under control with scheduled Compazine. Poor appetite, did not eat anything on supper tray. Low magnesium level, 1.4 this morning and she is getting 4 g of IV Magnesium sulfate over 2 hours. CVC dressing is C/D/I and Tac gtt is going at 80 mcg/ hour = 4 mL /hour via Purple lumen and red lumen is TKO for other medications  Tac level is due this morning.Continue to encourage po intake. Continue to monitor pt and follow plan of care.      Problem: Adult Inpatient Plan of Care  Goal: Plan of Care Review  Description: The Plan of Care Review/Shift note should be completed every shift.  The Outcome Evaluation is a brief statement about your assessment that the patient is improving, declining, or no change.  This information will be displayed automatically on your shift  note.  Outcome: Progressing     Problem: Adult Inpatient Plan of Care  Goal: Patient-Specific Goal (Individualized)  Description: You can add care plan individualizations to a care plan. Examples of Individualization might be:  \"Parent requests to be called daily at 9am for status\", \"I have a hard time hearing out of my right ear\", or \"Do not touch me to wake me up as it startles  me\".  Outcome: Progressing     Problem: Adult Inpatient Plan of Care  Goal: Absence of Hospital-Acquired Illness or Injury  Outcome: Progressing  Intervention: Identify and Manage Fall Risk  Recent Flowsheet " Documentation  Taken 1/29/2024 0400 by Ama Larkin RN  Safety Promotion/Fall Prevention:   lighting adjusted   clutter free environment maintained   increase visualization of patient   nonskid shoes/slippers when out of bed   room near nurse's station   room organization consistent  Taken 1/29/2024 0000 by Ama Larkin RN  Safety Promotion/Fall Prevention:   lighting adjusted   clutter free environment maintained   nonskid shoes/slippers when out of bed   room near nurse's station   room organization consistent  Taken 1/28/2024 2000 by Ama Larkin RN  Safety Promotion/Fall Prevention:   lighting adjusted   clutter free environment maintained   increase visualization of patient   nonskid shoes/slippers when out of bed   room near nurse's station   room organization consistent  Intervention: Prevent Skin Injury  Recent Flowsheet Documentation  Taken 1/29/2024 0400 by Ama Larkin RN  Body Position: position changed independently  Taken 1/29/2024 0000 by Ama Larkin RN  Body Position: position changed independently  Taken 1/28/2024 2000 by Ama Larkin RN  Body Position: position changed independently  Intervention: Prevent Infection  Recent Flowsheet Documentation  Taken 1/29/2024 0400 by Ama Larkin RN  Infection Prevention:   environmental surveillance performed   single patient room provided   hand hygiene promoted   personal protective equipment utilized   equipment surfaces disinfected   rest/sleep promoted   visitors restricted/screened  Taken 1/28/2024 2000 by Ama Larkin RN  Infection Prevention:   environmental surveillance performed   single patient room provided   hand hygiene promoted   personal protective equipment utilized   equipment surfaces disinfected   rest/sleep promoted   visitors restricted/screened     Problem: Adult Inpatient Plan of Care  Goal: Absence of Hospital-Acquired Illness or Injury  Intervention: Identify and Manage Fall  Risk  Recent Flowsheet Documentation  Taken 1/29/2024 0400 by Ama Larkin RN  Safety Promotion/Fall Prevention:   lighting adjusted   clutter free environment maintained   increase visualization of patient   nonskid shoes/slippers when out of bed   room near nurse's station   room organization consistent  Taken 1/29/2024 0000 by Ama Larkin RN  Safety Promotion/Fall Prevention:   lighting adjusted   clutter free environment maintained   nonskid shoes/slippers when out of bed   room near nurse's station   room organization consistent  Taken 1/28/2024 2000 by Ama Larkin RN  Safety Promotion/Fall Prevention:   lighting adjusted   clutter free environment maintained   increase visualization of patient   nonskid shoes/slippers when out of bed   room near nurse's station   room organization consistent     Problem: Adult Inpatient Plan of Care  Goal: Absence of Hospital-Acquired Illness or Injury  Intervention: Prevent Skin Injury  Recent Flowsheet Documentation  Taken 1/29/2024 0400 by Ama Larkin RN  Body Position: position changed independently  Taken 1/29/2024 0000 by Ama Larkin RN  Body Position: position changed independently  Taken 1/28/2024 2000 by Ama Larkin RN  Body Position: position changed independently     Problem: Adult Inpatient Plan of Care  Goal: Absence of Hospital-Acquired Illness or Injury  Intervention: Prevent Infection  Recent Flowsheet Documentation  Taken 1/29/2024 0400 by Ama Larkin RN  Infection Prevention:   environmental surveillance performed   single patient room provided   hand hygiene promoted   personal protective equipment utilized   equipment surfaces disinfected   rest/sleep promoted   visitors restricted/screened  Taken 1/28/2024 2000 by Ama Larkin RN  Infection Prevention:   environmental surveillance performed   single patient room provided   hand hygiene promoted   personal protective equipment utilized   equipment  surfaces disinfected   rest/sleep promoted   visitors restricted/screened     Problem: Adult Inpatient Plan of Care  Goal: Optimal Comfort and Wellbeing  Outcome: Progressing     Problem: Stem Cell/Bone Marrow Transplant  Goal: Optimal Coping with Transplant  Outcome: Progressing  Intervention: Optimize Patient/Family Adjustment to Transplant  Recent Flowsheet Documentation  Taken 1/29/2024 0400 by Ama Larkin RN  Supportive Measures:   active listening utilized   verbalization of feelings encouraged  Taken 1/28/2024 2000 by Ama Larkin RN  Supportive Measures:   active listening utilized   verbalization of feelings encouraged     Problem: Stem Cell/Bone Marrow Transplant  Goal: Optimal Coping with Transplant  Intervention: Optimize Patient/Family Adjustment to Transplant  Recent Flowsheet Documentation  Taken 1/29/2024 0400 by Ama Larkin RN  Supportive Measures:   active listening utilized   verbalization of feelings encouraged  Taken 1/28/2024 2000 by Ama Larkin RN  Supportive Measures:   active listening utilized   verbalization of feelings encouraged     Problem: Stem Cell/Bone Marrow Transplant  Goal: Diarrhea Symptom Control  Outcome: Progressing     Problem: Stem Cell/Bone Marrow Transplant  Goal: Improved Activity Tolerance  Outcome: Progressing  Intervention: Promote Improved Energy  Recent Flowsheet Documentation  Taken 1/29/2024 0400 by Ama Larkin RN  Fatigue Management:   frequent rest breaks encouraged   paced activity encouraged  Activity Management:   activity adjusted per tolerance   up ad jeff   ambulated to bathroom  Taken 1/29/2024 0000 by Ama Larkin RN  Activity Management:   activity adjusted per tolerance   activity encouraged   up ad jeff   ambulated to bathroom  Taken 1/28/2024 2000 by Ama Larkin RN  Fatigue Management:   frequent rest breaks encouraged   paced activity encouraged  Activity Management:   activity adjusted per tolerance   up  ad jeff   ambulated to bathroom     Problem: Stem Cell/Bone Marrow Transplant  Goal: Improved Activity Tolerance  Intervention: Promote Improved Energy  Recent Flowsheet Documentation  Taken 1/29/2024 0400 by Ama Larkin RN  Fatigue Management:   frequent rest breaks encouraged   paced activity encouraged  Activity Management:   activity adjusted per tolerance   up ad jeff   ambulated to bathroom  Taken 1/29/2024 0000 by Ama Larkin RN  Activity Management:   activity adjusted per tolerance   activity encouraged   up ad jeff   ambulated to bathroom  Taken 1/28/2024 2000 by Ama Larkin RN  Fatigue Management:   frequent rest breaks encouraged   paced activity encouraged  Activity Management:   activity adjusted per tolerance   up ad jeff   ambulated to bathroom     Problem: Stem Cell/Bone Marrow Transplant  Goal: Blood Counts Within Acceptable Range  Outcome: Progressing  Intervention: Monitor and Manage Hematologic Symptoms  Recent Flowsheet Documentation  Taken 1/29/2024 0400 by Ama Larkin RN  Bleeding Precautions: blood pressure closely monitored  Medication Review/Management: medications reviewed  Taken 1/29/2024 0000 by Ama Larkin RN  Bleeding Precautions: blood pressure closely monitored  Medication Review/Management: medications reviewed  Taken 1/28/2024 2000 by Ama Larkin RN  Bleeding Precautions: blood pressure closely monitored  Medication Review/Management: medications reviewed     Problem: Stem Cell/Bone Marrow Transplant  Goal: Blood Counts Within Acceptable Range  Intervention: Monitor and Manage Hematologic Symptoms  Recent Flowsheet Documentation  Taken 1/29/2024 0400 by Ama Larkin RN  Bleeding Precautions: blood pressure closely monitored  Medication Review/Management: medications reviewed  Taken 1/29/2024 0000 by Ama Larkin RN  Bleeding Precautions: blood pressure closely monitored  Medication Review/Management: medications reviewed  Taken  1/28/2024 2000 by Ama Larkin RN  Bleeding Precautions: blood pressure closely monitored  Medication Review/Management: medications reviewed     Problem: Stem Cell/Bone Marrow Transplant  Goal: Improved Oral Mucous Membrane Health and Integrity  Outcome: Progressing  Intervention: Promote Oral Comfort and Health  Recent Flowsheet Documentation  Taken 1/29/2024 0400 by Ama Larkin RN  Oral Mucous Membrane Protection: lip/mouth moisturizer applied  Taken 1/29/2024 0000 by Ama Larkin RN  Oral Mucous Membrane Protection: lip/mouth moisturizer applied  Taken 1/28/2024 2000 by Ama Larkin RN  Oral Mucous Membrane Protection: lip/mouth moisturizer applied     Problem: Stem Cell/Bone Marrow Transplant  Goal: Improved Oral Mucous Membrane Health and Integrity  Outcome: Progressing  Intervention: Promote Oral Comfort and Health  Recent Flowsheet Documentation  Taken 1/29/2024 0400 by Ama Larkin RN  Oral Mucous Membrane Protection: lip/mouth moisturizer applied  Taken 1/29/2024 0000 by Ama Larkin RN  Oral Mucous Membrane Protection: lip/mouth moisturizer applied  Taken 1/28/2024 2000 by Ama Larkin RN  Oral Mucous Membrane Protection: lip/mouth moisturizer applied     Problem: Stem Cell/Bone Marrow Transplant  Goal: Improved Oral Mucous Membrane Health and Integrity  Intervention: Promote Oral Comfort and Health  Recent Flowsheet Documentation  Taken 1/29/2024 0400 by Ama Larkin RN  Oral Mucous Membrane Protection: lip/mouth moisturizer applied  Taken 1/29/2024 0000 by Ama Larkin RN  Oral Mucous Membrane Protection: lip/mouth moisturizer applied  Taken 1/28/2024 2000 by Ama Larkin RN  Oral Mucous Membrane Protection: lip/mouth moisturizer applied     Problem: Stem Cell/Bone Marrow Transplant  Goal: Nausea and Vomiting Symptom Relief  Outcome: Progressing  Intervention: Prevent and Manage Nausea and Vomiting  Recent Flowsheet Documentation  Taken  1/29/2024 0400 by Ama Larkin RN  Nausea/Vomiting Interventions: antiemetic  Taken 1/28/2024 2000 by Ama Larkin RN  Nausea/Vomiting Interventions: antiemetic     Problem: Stem Cell/Bone Marrow Transplant  Goal: Optimal Nutrition Intake  Outcome: Progressing

## 2024-01-29 NOTE — PROGRESS NOTES
Calorie Count  Intake recorded for: 1/28  Total Kcals: 220 Total Protein: 11g  Kcals from Hospital Food: 220   Protein: 11g  Kcals from Outside Food (average):0 Protein: 0g  # Meals Ordered from Kitchen: 2 meals  # Meals Recorded: 1 meal - 50% peaches 25% 1 orange  # Supplements Recorded: 50% 1 Ensure Enlive

## 2024-01-30 ENCOUNTER — APPOINTMENT (OUTPATIENT)
Dept: PHYSICAL THERAPY | Facility: CLINIC | Age: 54
DRG: 014 | End: 2024-01-30
Attending: STUDENT IN AN ORGANIZED HEALTH CARE EDUCATION/TRAINING PROGRAM
Payer: COMMERCIAL

## 2024-01-30 LAB
ALBUMIN SERPL BCG-MCNC: 3.7 G/DL (ref 3.5–5.2)
ALP SERPL-CCNC: 110 U/L (ref 40–150)
ALT SERPL W P-5'-P-CCNC: 41 U/L (ref 0–50)
ANION GAP SERPL CALCULATED.3IONS-SCNC: 8 MMOL/L (ref 7–15)
AST SERPL W P-5'-P-CCNC: 34 U/L (ref 0–45)
BASOPHILS # BLD AUTO: ABNORMAL 10*3/UL
BASOPHILS # BLD MANUAL: 0 10E3/UL (ref 0–0.2)
BASOPHILS NFR BLD AUTO: ABNORMAL %
BASOPHILS NFR BLD MANUAL: 0 %
BILIRUB SERPL-MCNC: 0.4 MG/DL
BUN SERPL-MCNC: 6.1 MG/DL (ref 6–20)
CALCIUM SERPL-MCNC: 8.8 MG/DL (ref 8.6–10)
CHLORIDE SERPL-SCNC: 104 MMOL/L (ref 98–107)
CREAT SERPL-MCNC: 0.38 MG/DL (ref 0.51–0.95)
DEPRECATED HCO3 PLAS-SCNC: 28 MMOL/L (ref 22–29)
EGFRCR SERPLBLD CKD-EPI 2021: >90 ML/MIN/1.73M2
EOSINOPHIL # BLD AUTO: ABNORMAL 10*3/UL
EOSINOPHIL # BLD MANUAL: 0 10E3/UL (ref 0–0.7)
EOSINOPHIL NFR BLD AUTO: ABNORMAL %
EOSINOPHIL NFR BLD MANUAL: 0 %
ERYTHROCYTE [DISTWIDTH] IN BLOOD BY AUTOMATED COUNT: 17.5 % (ref 10–15)
FRAGMENTS BLD QL SMEAR: SLIGHT
GLUCOSE BLDC GLUCOMTR-MCNC: 112 MG/DL (ref 70–99)
GLUCOSE BLDC GLUCOMTR-MCNC: 116 MG/DL (ref 70–99)
GLUCOSE BLDC GLUCOMTR-MCNC: 125 MG/DL (ref 70–99)
GLUCOSE SERPL-MCNC: 113 MG/DL (ref 70–99)
HCT VFR BLD AUTO: 22.8 % (ref 35–47)
HGB BLD-MCNC: 7.8 G/DL (ref 11.7–15.7)
IMM GRANULOCYTES # BLD: ABNORMAL 10*3/UL
IMM GRANULOCYTES NFR BLD: ABNORMAL %
LYMPHOCYTES # BLD AUTO: ABNORMAL 10*3/UL
LYMPHOCYTES # BLD MANUAL: 0 10E3/UL (ref 0.8–5.3)
LYMPHOCYTES NFR BLD AUTO: ABNORMAL %
LYMPHOCYTES NFR BLD MANUAL: 1 %
MAGNESIUM SERPL-MCNC: 1.5 MG/DL (ref 1.7–2.3)
MAGNESIUM SERPL-MCNC: 2.4 MG/DL (ref 1.7–2.3)
MCH RBC QN AUTO: 32.4 PG (ref 26.5–33)
MCHC RBC AUTO-ENTMCNC: 34.2 G/DL (ref 31.5–36.5)
MCV RBC AUTO: 95 FL (ref 78–100)
METAMYELOCYTES # BLD MANUAL: 0.3 10E3/UL
METAMYELOCYTES NFR BLD MANUAL: 6 %
MONOCYTES # BLD AUTO: ABNORMAL 10*3/UL
MONOCYTES # BLD MANUAL: 0.8 10E3/UL (ref 0–1.3)
MONOCYTES NFR BLD AUTO: ABNORMAL %
MONOCYTES NFR BLD MANUAL: 17 %
MYELOCYTES # BLD MANUAL: 0.4 10E3/UL
MYELOCYTES NFR BLD MANUAL: 8 %
NEUTROPHILS # BLD AUTO: ABNORMAL 10*3/UL
NEUTROPHILS # BLD MANUAL: 3.1 10E3/UL (ref 1.6–8.3)
NEUTROPHILS NFR BLD AUTO: ABNORMAL %
NEUTROPHILS NFR BLD MANUAL: 66 %
NRBC # BLD AUTO: 0.1 10E3/UL
NRBC BLD AUTO-RTO: 2 /100
PHOSPHATE SERPL-MCNC: 3.1 MG/DL (ref 2.5–4.5)
PLAT MORPH BLD: ABNORMAL
PLATELET # BLD AUTO: 30 10E3/UL (ref 150–450)
POLYCHROMASIA BLD QL SMEAR: SLIGHT
POTASSIUM SERPL-SCNC: 3.3 MMOL/L (ref 3.4–5.3)
POTASSIUM SERPL-SCNC: 4.8 MMOL/L (ref 3.4–5.3)
PROMYELOCYTES # BLD MANUAL: 0.1 10E3/UL
PROMYELOCYTES NFR BLD MANUAL: 2 %
PROT SERPL-MCNC: 5.8 G/DL (ref 6.4–8.3)
RBC # BLD AUTO: 2.41 10E6/UL (ref 3.8–5.2)
RBC MORPH BLD: ABNORMAL
SODIUM SERPL-SCNC: 140 MMOL/L (ref 135–145)
WBC # BLD AUTO: 4.7 10E3/UL (ref 4–11)

## 2024-01-30 PROCEDURE — 258N000003 HC RX IP 258 OP 636: Performed by: STUDENT IN AN ORGANIZED HEALTH CARE EDUCATION/TRAINING PROGRAM

## 2024-01-30 PROCEDURE — 250N000013 HC RX MED GY IP 250 OP 250 PS 637

## 2024-01-30 PROCEDURE — 85027 COMPLETE CBC AUTOMATED: CPT

## 2024-01-30 PROCEDURE — 250N000011 HC RX IP 250 OP 636: Performed by: STUDENT IN AN ORGANIZED HEALTH CARE EDUCATION/TRAINING PROGRAM

## 2024-01-30 PROCEDURE — 250N000011 HC RX IP 250 OP 636: Performed by: INTERNAL MEDICINE

## 2024-01-30 PROCEDURE — 97110 THERAPEUTIC EXERCISES: CPT | Mod: GP

## 2024-01-30 PROCEDURE — C9113 INJ PANTOPRAZOLE SODIUM, VIA: HCPCS

## 2024-01-30 PROCEDURE — 84132 ASSAY OF SERUM POTASSIUM: CPT | Performed by: INTERNAL MEDICINE

## 2024-01-30 PROCEDURE — 82040 ASSAY OF SERUM ALBUMIN: CPT

## 2024-01-30 PROCEDURE — 83735 ASSAY OF MAGNESIUM: CPT | Performed by: INTERNAL MEDICINE

## 2024-01-30 PROCEDURE — 250N000011 HC RX IP 250 OP 636: Mod: JZ

## 2024-01-30 PROCEDURE — 85007 BL SMEAR W/DIFF WBC COUNT: CPT

## 2024-01-30 PROCEDURE — 93010 ELECTROCARDIOGRAM REPORT: CPT | Performed by: INTERNAL MEDICINE

## 2024-01-30 PROCEDURE — 258N000003 HC RX IP 258 OP 636

## 2024-01-30 PROCEDURE — 250N000009 HC RX 250: Mod: JZ | Performed by: STUDENT IN AN ORGANIZED HEALTH CARE EDUCATION/TRAINING PROGRAM

## 2024-01-30 PROCEDURE — 206N000001 HC R&B BMT UMMC

## 2024-01-30 PROCEDURE — 84100 ASSAY OF PHOSPHORUS: CPT | Performed by: INTERNAL MEDICINE

## 2024-01-30 PROCEDURE — 83735 ASSAY OF MAGNESIUM: CPT | Performed by: STUDENT IN AN ORGANIZED HEALTH CARE EDUCATION/TRAINING PROGRAM

## 2024-01-30 PROCEDURE — 97530 THERAPEUTIC ACTIVITIES: CPT | Mod: GP

## 2024-01-30 PROCEDURE — 99233 SBSQ HOSP IP/OBS HIGH 50: CPT | Mod: FS | Performed by: STUDENT IN AN ORGANIZED HEALTH CARE EDUCATION/TRAINING PROGRAM

## 2024-01-30 PROCEDURE — B4185 PARENTERAL SOL 10 GM LIPIDS: HCPCS | Mod: JZ | Performed by: STUDENT IN AN ORGANIZED HEALTH CARE EDUCATION/TRAINING PROGRAM

## 2024-01-30 PROCEDURE — 93005 ELECTROCARDIOGRAM TRACING: CPT

## 2024-01-30 RX ORDER — POTASSIUM CHLORIDE 29.8 MG/ML
20 INJECTION INTRAVENOUS
Status: COMPLETED | OUTPATIENT
Start: 2024-01-30 | End: 2024-01-30

## 2024-01-30 RX ORDER — TRIAMCINOLONE ACETONIDE 1 MG/G
CREAM TOPICAL 2 TIMES DAILY PRN
Status: DISCONTINUED | OUTPATIENT
Start: 2024-01-30 | End: 2024-02-01

## 2024-01-30 RX ORDER — MAGNESIUM SULFATE HEPTAHYDRATE 40 MG/ML
4 INJECTION, SOLUTION INTRAVENOUS ONCE
Status: COMPLETED | OUTPATIENT
Start: 2024-01-30 | End: 2024-01-30

## 2024-01-30 RX ADMIN — PANTOPRAZOLE SODIUM 40 MG: 40 INJECTION, POWDER, FOR SOLUTION INTRAVENOUS at 08:59

## 2024-01-30 RX ADMIN — MAGNESIUM SULFATE 4 G: 4 INJECTION INTRAVENOUS at 04:59

## 2024-01-30 RX ADMIN — OLIVE OIL AND SOYBEAN OIL 250 ML: 16; 4 INJECTION, EMULSION INTRAVENOUS at 19:54

## 2024-01-30 RX ADMIN — OXYCODONE HYDROCHLORIDE 5 MG: 5 TABLET ORAL at 08:52

## 2024-01-30 RX ADMIN — Medication: at 17:26

## 2024-01-30 RX ADMIN — AMLODIPINE BESYLATE 5 MG: 5 TABLET ORAL at 08:49

## 2024-01-30 RX ADMIN — URSODIOL 300 MG: 300 CAPSULE ORAL at 14:25

## 2024-01-30 RX ADMIN — URSODIOL 300 MG: 300 CAPSULE ORAL at 19:54

## 2024-01-30 RX ADMIN — MYCOPHENOLATE MOFETIL 1250 MG: 500 INJECTION, POWDER, LYOPHILIZED, FOR SOLUTION INTRAVENOUS at 08:56

## 2024-01-30 RX ADMIN — HYDROCORTISONE: 1 CREAM TOPICAL at 08:54

## 2024-01-30 RX ADMIN — POTASSIUM CHLORIDE 20 MEQ: 29.8 INJECTION, SOLUTION INTRAVENOUS at 04:59

## 2024-01-30 RX ADMIN — ACYCLOVIR SODIUM 400 MG: 50 INJECTION, SOLUTION INTRAVENOUS at 22:18

## 2024-01-30 RX ADMIN — URSODIOL 300 MG: 300 CAPSULE ORAL at 08:49

## 2024-01-30 RX ADMIN — AMLODIPINE BESYLATE 5 MG: 5 TABLET ORAL at 19:53

## 2024-01-30 RX ADMIN — SODIUM CHLORIDE 480 MG: 9 INJECTION, SOLUTION INTRAVENOUS at 14:26

## 2024-01-30 RX ADMIN — POTASSIUM CHLORIDE 20 MEQ: 29.8 INJECTION, SOLUTION INTRAVENOUS at 06:23

## 2024-01-30 RX ADMIN — OLANZAPINE 5 MG: 5 TABLET, FILM COATED ORAL at 22:18

## 2024-01-30 RX ADMIN — MYCOPHENOLATE MOFETIL 1250 MG: 500 INJECTION, POWDER, LYOPHILIZED, FOR SOLUTION INTRAVENOUS at 19:53

## 2024-01-30 RX ADMIN — TACROLIMUS 68 MCG/HR: 5 INJECTION, SOLUTION INTRAVENOUS at 19:54

## 2024-01-30 RX ADMIN — MICAFUNGIN SODIUM 150 MG: 50 INJECTION, POWDER, LYOPHILIZED, FOR SOLUTION INTRAVENOUS at 17:22

## 2024-01-30 RX ADMIN — ACYCLOVIR SODIUM 400 MG: 50 INJECTION, SOLUTION INTRAVENOUS at 10:55

## 2024-01-30 RX ADMIN — HYDROCORTISONE: 1 CREAM TOPICAL at 19:57

## 2024-01-30 RX ADMIN — MAGNESIUM SULFATE HEPTAHYDRATE: 500 INJECTION, SOLUTION INTRAMUSCULAR; INTRAVENOUS at 19:54

## 2024-01-30 RX ADMIN — PANTOPRAZOLE SODIUM 40 MG: 40 INJECTION, POWDER, FOR SOLUTION INTRAVENOUS at 19:53

## 2024-01-30 ASSESSMENT — ACTIVITIES OF DAILY LIVING (ADL)
ADLS_ACUITY_SCORE: 23

## 2024-01-30 NOTE — PLAN OF CARE
Patient remains hospitalized following stem cell transplant, currently day +18. Monitoring return of counts. Continues on ppx antimicrobials. Continues on IV MMF & tac gtt - level checked today, drip rate decreased. Appetite very poor. Calorie counts continue. Minimal amount eaten today. Starting TPN/lipids tonight. Denied intervention for throat pain this shift. No BM this shift. Nausea with vomiting x1 today - declined PRN medication - feels like all the meds are contributing to nausea. Starting Zyprexa tonight. Mag replaced early this AM. Mag needed to replaced again this shift - recheck planned for tomorrow AM. Complained of back/abdominal itching throughout shift - faint, pink rash noted (unchanged from previous shifts). Hydrocortisone mostly effective. Ambulating independently - activity encouraged, as patient not out of bed much this shift. VSS.     Problem: Stem Cell/Bone Marrow Transplant  Goal: Improved Activity Tolerance  Outcome: Not Progressing  Intervention: Promote Improved Energy  Recent Flowsheet Documentation  Taken 1/29/2024 1100 by Minerva Vaz, RN  Activity Management: ambulated in room  Goal: Nausea and Vomiting Symptom Relief  Outcome: Not Progressing  Intervention: Prevent and Manage Nausea and Vomiting  Recent Flowsheet Documentation  Taken 1/29/2024 0900 by Minerva Vaz, RN  Nausea/Vomiting Interventions: antiemetic  Goal: Optimal Nutrition Intake  Outcome: Not Progressing     Problem: Adult Inpatient Plan of Care  Goal: Plan of Care Review  Description: The Plan of Care Review/Shift note should be completed every shift.  The Outcome Evaluation is a brief statement about your assessment that the patient is improving, declining, or no change.  This information will be displayed automatically on your shift  note.  Outcome: Progressing  Goal: Patient-Specific Goal (Individualized)  Description: You can add care plan individualizations to a care plan. Examples of Individualization might  "be:  \"Parent requests to be called daily at 9am for status\", \"I have a hard time hearing out of my right ear\", or \"Do not touch me to wake me up as it startles  me\".  Outcome: Progressing  Goal: Absence of Hospital-Acquired Illness or Injury  Outcome: Progressing  Intervention: Identify and Manage Fall Risk  Recent Flowsheet Documentation  Taken 1/29/2024 1600 by Minerva Vaz RN  Safety Promotion/Fall Prevention:   assistive device/personal items within reach   chemotherapeutic precautions   clutter free environment maintained   lighting adjusted   nonskid shoes/slippers when out of bed   patient and family education   room organization consistent   safety round/check completed  Taken 1/29/2024 1200 by Minerva Vaz RN  Safety Promotion/Fall Prevention:   assistive device/personal items within reach   chemotherapeutic precautions   clutter free environment maintained   lighting adjusted   nonskid shoes/slippers when out of bed   patient and family education   room organization consistent   safety round/check completed  Taken 1/29/2024 0900 by Minerva Vaz RN  Safety Promotion/Fall Prevention:   safety round/check completed   activity supervised   assistive device/personal items within reach   chemotherapeutic precautions   clutter free environment maintained   lighting adjusted   mobility aid in reach   nonskid shoes/slippers when out of bed   patient and family education   room organization consistent  Intervention: Prevent Skin Injury  Recent Flowsheet Documentation  Taken 1/29/2024 1800 by Minerva Vaz RN  Body Position: supine  Taken 1/29/2024 1700 by Minerva Vaz RN  Body Position: sitting up in bed  Taken 1/29/2024 1600 by Minerva Vaz RN  Body Position: sitting up in bed  Taken 1/29/2024 1538 by Minerva Vaz RN  Body Position: supine  Taken 1/29/2024 1400 by Minerva Vaz RN  Body Position: supine  Taken 1/29/2024 1300 by Minerva Vaz RN  Body Position: sitting up in " bed  Taken 1/29/2024 1200 by Minerva Vaz RN  Body Position:   right   side-lying  Taken 1/29/2024 1100 by Minerva Vaz RN  Body Position:   right   side-lying  Taken 1/29/2024 1000 by Minerva Vaz RN  Body Position: (encouraged activity) position changed independently  Taken 1/29/2024 0900 by Minerva Vaz RN  Body Position:   position changed independently   supine  Goal: Optimal Comfort and Wellbeing  Outcome: Progressing  Intervention: Monitor Pain and Promote Comfort  Recent Flowsheet Documentation  Taken 1/29/2024 0900 by Minerva Vaz RN  Pain Management Interventions: declines  Goal: Readiness for Transition of Care  Outcome: Progressing     Problem: Stem Cell/Bone Marrow Transplant  Goal: Optimal Coping with Transplant  Outcome: Progressing  Goal: Symptom-Free Urinary Elimination  Outcome: Progressing  Intervention: Prevent or Manage Bladder Irritation  Recent Flowsheet Documentation  Taken 1/29/2024 0900 by Minerva Vaz RN  Pain Management Interventions: declines  Goal: Diarrhea Symptom Control  Outcome: Progressing  Goal: Blood Counts Within Acceptable Range  Outcome: Progressing  Intervention: Monitor and Manage Hematologic Symptoms  Recent Flowsheet Documentation  Taken 1/29/2024 1600 by Minerva Vaz RN  Medication Review/Management:   medications reviewed   high-risk medications identified  Taken 1/29/2024 1200 by Minerva Vaz RN  Medication Review/Management:   medications reviewed   high-risk medications identified  Taken 1/29/2024 0900 by Minerva Vaz RN  Medication Review/Management:   medications reviewed   high-risk medications identified  Goal: Absence of Hypersensitivity Reaction  Outcome: Progressing  Goal: Absence of Infection  Outcome: Progressing  Intervention: Prevent and Manage Infection  Recent Flowsheet Documentation  Taken 1/29/2024 1600 by Minerva Vaz RN  Isolation Precautions: cytotoxic precautions maintained  Taken 1/29/2024 1200 by  Minerva Vaz, RN  Isolation Precautions: cytotoxic precautions maintained  Taken 1/29/2024 0900 by Minerva Vaz, RN  Isolation Precautions: cytotoxic precautions maintained  Goal: Improved Oral Mucous Membrane Health and Integrity  Outcome: Progressing   Goal Outcome Evaluation:

## 2024-01-30 NOTE — PROGRESS NOTES
"  BMT Daily Progress Note  Patient ID:  Irma Foreman is a 53 year old female with a PMH of MDS, warm AIHA, transfusion and transfusion dependent anemia presented to  to undergo a myeloablative allogeneic transplant. She was recently COVID+ on 12/26 but tested negative on 1/2 with resolution of nearly all URI symptoms. Undergoing MA (Bu/Flu) 6/8 URD Allo transplant, day +19      HPI   Irma looks better today. She has been struggling with the pill burden and nausea. Her throat pain is getting better, declined oxycodone yesterday. Started Zyprexa and TPN yesterday. She did not eat anything yesterday, just drank some Jeannette-Shana water but will try to eat today. She also worked with PT in the exercise room.       Review of Systems    Negative unless stated in the HPI.     PHYSICAL EXAM      Weight     Wt Readings from Last 3 Encounters:   01/30/24 77.9 kg (171 lb 11.2 oz)   12/26/23 83.1 kg (183 lb 4.8 oz)   12/19/23 83.5 kg (184 lb)        KPS: 70    /80 (BP Location: Right arm)   Pulse 98   Temp 97.6  F (36.4  C) (Axillary)   Resp 18   Ht 1.575 m (5' 2.01\")   Wt 77.9 kg (171 lb 11.2 oz)   LMP 11/05/2017   SpO2 100%   BMI 31.40 kg/m       General: NAD   Eyes: KEVON, sclera anicteric   Nose/Mouth/Throat: Right inner cheek slightly erythematous. Ulceration on R-posterior pharynx. Tongue with white mucosa and bilateral ridging; no sloughing noted   Lungs: CTAB   Cardiovascular: RRR, no M/R/G   Abdominal/Rectal: +BS, soft, non tender   Lymphatics: No edema  Skin: wart under right foot dime sized white, cauliflower and slightly ulcerated; Two pin point sized, flesh colored warts on right hand; Light pink faint macular rash on back   Neuro: A&O   Additional Findings: Gastelum site NT, no drainage.    Current aGVHD staging:  Skin 0, UGI 0, LGI 0, Liver 0 (keep in note through day +180 for allos)      LABS AND IMAGING: I have assessed all abnormal lab values for their clinical significance and any values considered " clinically significant have been addressed in the assessment and plan.        Lab Results   Component Value Date    WBC 4.7 01/30/2024    ANEUTAUTO 9.5 (H) 01/27/2024    HGB 7.8 (L) 01/30/2024    HCT 22.8 (L) 01/30/2024    PLT 30 (LL) 01/30/2024     01/30/2024    POTASSIUM 4.8 01/30/2024    CHLORIDE 104 01/30/2024    CO2 28 01/30/2024     (H) 01/30/2024    BUN 6.1 01/30/2024    CR 0.38 (L) 01/30/2024    MAG 2.4 (H) 01/30/2024    INR 1.04 01/29/2024       US Abdominal with Doppler (1/18/24)  Impression:   1. Patent Doppler evaluation of the abdomen with antegrade flow throughout.  2. Elevated hepatic artery resistive index of 0.81, nonspecific though can be seen with hepatic venous congestion which could be related to venous occlusive disease..    SYSTEMS-BASED ASSESSMENT AND PLAN     Hortencia Baldwin is a 53 year old female with a history of MDS, undergoing MA (Bu/Flu) 6/8 URD Allo transplant, day +19      BMT/IEC PROTOCOL for MT 2015-29 **according to but not on trial**   - Chemo protocol:  Day -6 through day -1: Keppra and Allopurinol   Day -5 through -2: Bu/Flu. Busulfan levels adjusted by pharm, attending  Day -1: Rest day   Day 0: Transplant. Recipient: O+, CMV+. Donor: O+, CMV-. No flush. Cell dose 6.5 x10^6.   Gram+ bacilli (cutibacterium proprionibacterium) cultured for stem cell product. On cefepime (1/11 - 1/18). Blood cx no growth x10 bottles.   - Restaging plan: per protocol   D+21 BMBX scheduled for 02/02 outpatient. If still admitted, please schedule to be completed inpatient. Sedated marrow not needed.     HEME/COAG  - Pancytopenia due to chemotherapy   # Iron overload: hx of transfusion dependent anemia, pre-transplant ferritin around 5,000. Recommend phlebotomy post transplant when retic count is restored and Hgb >10    # Platelet refractoriness: BID platelet checks discontinued 1/26. Responding to HLA platelets.   Transfusion medicine consulted 1/18: HLA typing completed.   - Transfusion  parameters: hemoglobin <7, platelets <10  - Last dose of scheduled GCSF on 1/26. Give PRN for ANC < 1    IMMUNOCOMPROMISED  # Fever: (Last on 1/15) Recurrent on 1/21- tmax 100.7.  - UA: negative for LE & nitrites. 1/13 ua negative; BK urine positive/adeno negative (red/orange urine)  - Blood cultures NGTD. Procal elevated 1.08   - CXR with likely pulmonary edema  - RVP neg   - Cefepime (1/11 - 1/18; 1/22-1/26). Brief course of IV Vancomycin. MRSA nasal swab negative.   # Dysuria: see Renal below.   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   Transition tac and MMF to PO in coming days with resolution of mucositis.   - 1/29 Tac level 12.5 [goal ~10]  **avoiding sirolimus d/t high risk VOD**    CARDIOVASCULAR  # Chest pressure: new on 1/15, worse with sitting up/activity, better while laying flat. RESOLVED.   EKG: new QR pattern in V1, suggests right ventricular conduction delay  Troponin negative x1   Echo (1/15): LVEF 55-60%, no pericardial effusion present. Cardiology consulted: no further recs   #Prolonged QTc to 507 on 1/22.   - Changed levaquin to cefpodoxime. Repeated QTc on 1/22: 466.   # Hypertension with initiation of tacrolimus: Amlodipine 5 mg BID. Labetalol 5 mg IV prn with parameters.   - Risk of cardiomyopathy: Baseline EF 60-65%  - Risk of arrhythmia: Baseline EKG showed sinus rhythm     RESPIRATORY  - Current smoker: states she quit (1/5/24), offered nicotine replacement but she declined.   - Baseline PFTs: The FEV1, FVC, FEV1/FVC ratio and TNR93-17% are within normal limits.  The inspiratory flow rates are within normal limits.  Lung volumes are within normal limits.  The diffusing capacity is normal.   -  Risk of respiratory complications: Frequent ambulation and incentive spirometer. CXR (1/24) showed increased left basilar streaky opacities, likely atelectasis.     GI/NUTRITION  # Epigastric pain (1/18): RUQ ultrasound showing patent doppler throughout, no ascites, nonspecific elevated hepatic artery restrictive index (see full impression above). Weight down, LFT's WNL, no abdominal pain. Continue to monitor closely for VOD; checking hepatic panel daily.     - IV PPI BID (1/19)   - Ulcer prophylaxis: PPI  - VOD ppx: Ursodiol   # Moderate malnutrition in the context of acute illness: dietitian following.   Start TPN (1/29 - x)   Zyprexa started 1/29, EKG (1/30) NSR Qtc 454  # Mouth pain likely 2/2 the start of oral mucositis: MMW prn, oxy 5 mg Q4H prn with IV dilaudid 0.5 mg for break through pain.      # Nausea from chemo/radiation: Zyprexa 5 mg HS (1/29), compazine PRN, Lorazepam prn. Avoiding zofran given hx of QTc prolongation as above.     RENAL / :  # Dysuria (1/26): UA showing small blood, 58 RBC's. BK 4.52 million -- no hematuria; monitoring closely. Adeno pending. Pyridium x4 days.     DERM:  # Right hand dryness, try aquafor with gloves on overnight and as tolerated during the day  # Foot, hand warts. Curbside derm on 1/8, no recs while inpatient, typically managed OP.    # Pink maculopapular rash present under breasts (resolved), across abdomen (Resolved) & lower back (faint)     MUSCULOSKELETAL/FRAILTY  - Baseline Frailty Score: 3  - Daily PT/OT as needed while inpatient  - Cancer Rehab as needed outpatient    SOCIAL DETERMINANTS  - Caregiver: Srinivas Suggs, Keira Neal & Rickey Neal       Disposition: remain admitted through resolution of oral mucositis & appropriate PO intake.         Known issues that I take into account for medical decisions, with salient changes to the plan considering these complexities noted above.    Patient Active Problem List   Diagnosis    Left medial knee pain    Obesity (BMI  "30-39.9)    Anemia, unspecified type    Macrocytic anemia    MDS (myelodysplastic syndrome) (H)     Clinically Significant Risk Factors        # Hypokalemia: Lowest K = 3.3 mmol/L in last 2 days, will replace as needed     # Hypomagnesemia: Lowest Mg = 1.4 mg/dL in last 2 days, will replace as needed   # Hypoalbuminemia: Lowest albumin = 3.2 g/dL at 1/25/2024  3:06 AM, will monitor as appropriate   # Thrombocytopenia: Lowest platelets = 28 in last 2 days, will monitor for bleeding          # Obesity: Estimated body mass index is 31.4 kg/m  as calculated from the following:    Height as of this encounter: 1.575 m (5' 2.01\").    Weight as of this encounter: 77.9 kg (171 lb 11.2 oz).   # Severe Malnutrition: based on nutrition assessment            I spent 40 minutes in the care of this patient today, which included time necessary for preparation for the visit, obtaining history, ordering medications/tests/procedures as medically indicated, review of pertinent medical literature, counseling of the patient, communication of recommendations to the care team, and documentation time.    King Vaz PA-C  [phone]      ______________________________________________      BMT ATTENDING NOTE    Hortencia Baldwin is a 53 year old female, who is day 19 of transplant for MDS, complicated by iron overload. She is admitted for myeloablative (Bu/Flu) conditioning, 6/8 unrelated donor, GVHD ppx with PTCy/ Tac/ MMF, ABO matched, CMV donor -/ recipient + .    Feels better, thinks it may be due to TPN. Intermittent nausea. Mucositis pain has decreased. Notes a new rash on her back. On exam, she has a sore on the left side of the tongue and scalloping along the edges. Skin on back and upper chest - erythematous blanchanble rash    BMT: D19   Heme: WBC engrafted.  Tac: Consider switching to PO tomorrow.   Rash: Pruritic erythematous blanching rash on back and upper chest. Could be due to engraftment vs drug rash vs early GVHD vs other " cause. Currently on topical hydrocortisone. Consider derm consult / skin biopsy if worsening.   Low PO intake: Will support with TPN for a few days, started 1/29.   Mucositis: Improving, Oxycodone use minimal   Nausea: Scheduled compazine, zyprexa. Changed acyclovir and letermovir to IV since swallowing the pills contributes to nausea. Can try emend if needed.   HTN: Amlodipine 10mg daily.   PPx: jani, acyclovir, letermovir   Supportive care: encourage ambulation, PT/OT, optimize nutrition. Spirometer.     Dispo: Pending improvement in PO intake. Anticipate by the end of this week.     On the date of service, 01/30/2024, I spent 40 minutes on the patient unit personally reviewing medical records and medications, reviewing vital signs, labs, and imaging results as summarized above, discussing the patient's case on rounds with the JEN, obtaining a history from the patient, performing a physical exam, intensively monitoring treatments with high risk of toxicity, coordinating care, and documenting in the electronic medical record.    Coco Garay  Department of Hematology, Oncology and Transplantation  [facility] Pager 1300/Text via George

## 2024-01-30 NOTE — PLAN OF CARE
"Goal Outcome Evaluation:    Blood pressure 129/76, pulse 89, temperature 98.4  F (36.9  C), temperature source Axillary, resp. rate 16, height 1.575 m (5' 2.01\"), weight 78.1 kg (172 lb 1.6 oz), last menstrual period 11/05/2017, SpO2 97%, not currently breastfeeding.    AVSS, A/O x4. Pt is on room air with 02 saturations in high 90 % No respiratory issues,  no SOB and no chest pain. Pt gets up independently to the bathroom and she is voiding freely. She had x 1 large loose stool mixed with urine during the night.No complain of nausea/vomiting during this shift. She continue to have poor po intake,  She was started on Zyprexa and TPN/IL initiated last night. Blood glucoses were 132 and 113. Low Magnesium level 1.5 and she is getting 4 g of IV magnesium sulfate over 2 hours and potassium level was 3.3 and 20 mEq of IV Potassium chloride bag # 2 of 2 is going Tacrolimus drip is at 68 mcg/hour =3.4 ML/hour. CVC dressing is clean, dry and intact. Pt will take a shower today. She remain hospitalized for improvement of po intake. Continue to encourage pt with activity level and oral intake. Continue to monitor electrolyte and treat per protocol and continue to follow plan of care.      Problem: Adult Inpatient Plan of Care  Goal: Plan of Care Review  Description: The Plan of Care Review/Shift note should be completed every shift.  The Outcome Evaluation is a brief statement about your assessment that the patient is improving, declining, or no change.  This information will be displayed automatically on your shift  note.  Outcome: Not Progressing     Problem: Adult Inpatient Plan of Care  Goal: Absence of Hospital-Acquired Illness or Injury  Outcome: Not Progressing  Intervention: Identify and Manage Fall Risk  Recent Flowsheet Documentation  Taken 1/30/2024 0000 by Ama Larkin RN  Safety Promotion/Fall Prevention:   clutter free environment maintained   lighting adjusted   room near nurse's station   room " organization consistent   nonskid shoes/slippers when out of bed  Taken 1/29/2024 2000 by Ama Larkin RN  Safety Promotion/Fall Prevention:   lighting adjusted   clutter free environment maintained   increase visualization of patient   nonskid shoes/slippers when out of bed   room near nurse's station   room organization consistent  Intervention: Prevent Skin Injury  Recent Flowsheet Documentation  Taken 1/30/2024 0000 by Ama Larkin RN  Body Position: position changed independently  Taken 1/29/2024 2000 by Ama Larkin RN  Body Position: position changed independently  Intervention: Prevent Infection  Recent Flowsheet Documentation  Taken 1/29/2024 2000 by Ama Larkin RN  Infection Prevention:   environmental surveillance performed   single patient room provided   hand hygiene promoted   personal protective equipment utilized   equipment surfaces disinfected   rest/sleep promoted   visitors restricted/screened     Problem: Adult Inpatient Plan of Care  Goal: Absence of Hospital-Acquired Illness or Injury  Intervention: Identify and Manage Fall Risk  Recent Flowsheet Documentation  Taken 1/30/2024 0000 by Ama Larkin RN  Safety Promotion/Fall Prevention:   clutter free environment maintained   lighting adjusted   room near nurse's station   room organization consistent   nonskid shoes/slippers when out of bed  Taken 1/29/2024 2000 by Ama Larkin RN  Safety Promotion/Fall Prevention:   lighting adjusted   clutter free environment maintained   increase visualization of patient   nonskid shoes/slippers when out of bed   room near nurse's station   room organization consistent     Problem: Adult Inpatient Plan of Care  Goal: Absence of Hospital-Acquired Illness or Injury  Intervention: Prevent Skin Injury  Recent Flowsheet Documentation  Taken 1/30/2024 0000 by Ama Larkin RN  Body Position: position changed independently  Taken 1/29/2024 2000 by Ama Larkin  RN  Body Position: position changed independently     Problem: Adult Inpatient Plan of Care  Goal: Absence of Hospital-Acquired Illness or Injury  Intervention: Prevent Infection  Recent Flowsheet Documentation  Taken 1/29/2024 2000 by Ama Larkin RN  Infection Prevention:   environmental surveillance performed   single patient room provided   hand hygiene promoted   personal protective equipment utilized   equipment surfaces disinfected   rest/sleep promoted   visitors restricted/screened     Problem: Stem Cell/Bone Marrow Transplant  Goal: Improved Activity Tolerance  Outcome: Not Progressing  Intervention: Promote Improved Energy  Recent Flowsheet Documentation  Taken 1/30/2024 0000 by Ama Larkin RN  Activity Management:   activity adjusted per tolerance   up ad jeff   ambulated to bathroom  Taken 1/29/2024 2000 by Ama Larkin RN  Fatigue Management:   frequent rest breaks encouraged   paced activity encouraged  Activity Management:   activity adjusted per tolerance   up ad jeff   ambulated to bathroom     Problem: Stem Cell/Bone Marrow Transplant  Goal: Improved Activity Tolerance  Intervention: Promote Improved Energy  Recent Flowsheet Documentation  Taken 1/30/2024 0000 by Ama Larkin RN  Activity Management:   activity adjusted per tolerance   up ad jeff   ambulated to bathroom  Taken 1/29/2024 2000 by Ama Larkin RN  Fatigue Management:   frequent rest breaks encouraged   paced activity encouraged  Activity Management:   activity adjusted per tolerance   up ad jeff   ambulated to bathroom     Problem: Stem Cell/Bone Marrow Transplant  Goal: Optimal Nutrition Intake  Outcome: Not Progressing     Problem: Adult Inpatient Plan of Care  Goal: Optimal Comfort and Wellbeing  Outcome: Progressing     Problem: Stem Cell/Bone Marrow Transplant  Goal: Symptom-Free Urinary Elimination  Outcome: Progressing  Intervention: Prevent or Manage Bladder Irritation  Recent Flowsheet  Documentation  Taken 1/29/2024 2000 by Ama Larkin RN  Hyperhydration Management: fluids provided     Problem: Stem Cell/Bone Marrow Transplant  Goal: Diarrhea Symptom Control  Outcome: Progressing     Problem: Stem Cell/Bone Marrow Transplant  Goal: Absence of Hypersensitivity Reaction  Outcome: Progressing     Problem: Stem Cell/Bone Marrow Transplant  Goal: Absence of Infection  Outcome: Progressing  Intervention: Prevent and Manage Infection  Recent Flowsheet Documentation  Taken 1/30/2024 0000 by Ama Larkin RN  Infection Management: aseptic technique maintained  Taken 1/29/2024 2000 by Ama Larkin RN  Infection Prevention:   environmental surveillance performed   single patient room provided   hand hygiene promoted   personal protective equipment utilized   equipment surfaces disinfected   rest/sleep promoted   visitors restricted/screened  Infection Management: aseptic technique maintained  Isolation Precautions: protective environment maintained

## 2024-01-30 NOTE — PROGRESS NOTES
Calorie Count  Intake recorded for: 1/29  Total Kcals: 36 Total Protein: 0g  Kcals from Hospital Food: 36   Protein: 0g  Kcals from Outside Food (average):0 Protein: 0g  # Meals Ordered from Kitchen: 1 meal + snack from nursing unit  # Meals Recorded: 50% herbal tea w/ lemon, 25% jello; 25% popsicle form nursing unit   # Supplements Recorded: 0

## 2024-01-31 LAB
ALBUMIN SERPL BCG-MCNC: 3.7 G/DL (ref 3.5–5.2)
ALP SERPL-CCNC: 102 U/L (ref 40–150)
ALT SERPL W P-5'-P-CCNC: 43 U/L (ref 0–50)
ANION GAP SERPL CALCULATED.3IONS-SCNC: 9 MMOL/L (ref 7–15)
AST SERPL W P-5'-P-CCNC: 33 U/L (ref 0–45)
ATRIAL RATE - MUSE: 95 BPM
BASOPHILS # BLD AUTO: ABNORMAL 10*3/UL
BASOPHILS # BLD MANUAL: 0.1 10E3/UL (ref 0–0.2)
BASOPHILS NFR BLD AUTO: ABNORMAL %
BASOPHILS NFR BLD MANUAL: 1 %
BILIRUB SERPL-MCNC: 0.3 MG/DL
BUN SERPL-MCNC: 8.6 MG/DL (ref 6–20)
CALCIUM SERPL-MCNC: 8.9 MG/DL (ref 8.6–10)
CHLORIDE SERPL-SCNC: 106 MMOL/L (ref 98–107)
CREAT SERPL-MCNC: 0.37 MG/DL (ref 0.51–0.95)
DEPRECATED HCO3 PLAS-SCNC: 26 MMOL/L (ref 22–29)
DIASTOLIC BLOOD PRESSURE - MUSE: NORMAL MMHG
EGFRCR SERPLBLD CKD-EPI 2021: >90 ML/MIN/1.73M2
EOSINOPHIL # BLD AUTO: ABNORMAL 10*3/UL
EOSINOPHIL # BLD MANUAL: 0 10E3/UL (ref 0–0.7)
EOSINOPHIL NFR BLD AUTO: ABNORMAL %
EOSINOPHIL NFR BLD MANUAL: 0 %
ERYTHROCYTE [DISTWIDTH] IN BLOOD BY AUTOMATED COUNT: 18.6 % (ref 10–15)
GLUCOSE BLDC GLUCOMTR-MCNC: 103 MG/DL (ref 70–99)
GLUCOSE BLDC GLUCOMTR-MCNC: 122 MG/DL (ref 70–99)
GLUCOSE BLDC GLUCOMTR-MCNC: 145 MG/DL (ref 70–99)
GLUCOSE SERPL-MCNC: 106 MG/DL (ref 70–99)
HCT VFR BLD AUTO: 23.2 % (ref 35–47)
HGB BLD-MCNC: 7.9 G/DL (ref 11.7–15.7)
IMM GRANULOCYTES # BLD: ABNORMAL 10*3/UL
IMM GRANULOCYTES NFR BLD: ABNORMAL %
INTERPRETATION ECG - MUSE: NORMAL
LYMPHOCYTES # BLD AUTO: ABNORMAL 10*3/UL
LYMPHOCYTES # BLD MANUAL: 0.4 10E3/UL (ref 0.8–5.3)
LYMPHOCYTES NFR BLD AUTO: ABNORMAL %
LYMPHOCYTES NFR BLD MANUAL: 7 %
MAGNESIUM SERPL-MCNC: 1.4 MG/DL (ref 1.7–2.3)
MAGNESIUM SERPL-MCNC: 2 MG/DL (ref 1.7–2.3)
MCH RBC QN AUTO: 32.6 PG (ref 26.5–33)
MCHC RBC AUTO-ENTMCNC: 34.1 G/DL (ref 31.5–36.5)
MCV RBC AUTO: 96 FL (ref 78–100)
METAMYELOCYTES # BLD MANUAL: 0.3 10E3/UL
METAMYELOCYTES NFR BLD MANUAL: 6 %
MONOCYTES # BLD AUTO: ABNORMAL 10*3/UL
MONOCYTES # BLD MANUAL: 0.7 10E3/UL (ref 0–1.3)
MONOCYTES NFR BLD AUTO: ABNORMAL %
MONOCYTES NFR BLD MANUAL: 13 %
MYELOCYTES # BLD MANUAL: 0.5 10E3/UL
MYELOCYTES NFR BLD MANUAL: 9 %
NEUTROPHILS # BLD AUTO: ABNORMAL 10*3/UL
NEUTROPHILS # BLD MANUAL: 3.2 10E3/UL (ref 1.6–8.3)
NEUTROPHILS NFR BLD AUTO: ABNORMAL %
NEUTROPHILS NFR BLD MANUAL: 64 %
NRBC # BLD AUTO: 0.1 10E3/UL
NRBC # BLD AUTO: 0.4 10E3/UL
NRBC BLD AUTO-RTO: 2 /100
NRBC BLD MANUAL-RTO: 7 %
P AXIS - MUSE: 28 DEGREES
PHOSPHATE SERPL-MCNC: 3.1 MG/DL (ref 2.5–4.5)
PLAT MORPH BLD: ABNORMAL
PLATELET # BLD AUTO: 32 10E3/UL (ref 150–450)
POTASSIUM SERPL-SCNC: 3.8 MMOL/L (ref 3.4–5.3)
PR INTERVAL - MUSE: 136 MS
PROT SERPL-MCNC: 5.6 G/DL (ref 6.4–8.3)
QRS DURATION - MUSE: 84 MS
QT - MUSE: 362 MS
QTC - MUSE: 454 MS
R AXIS - MUSE: 37 DEGREES
RBC # BLD AUTO: 2.42 10E6/UL (ref 3.8–5.2)
RBC MORPH BLD: ABNORMAL
SODIUM SERPL-SCNC: 141 MMOL/L (ref 135–145)
SYSTOLIC BLOOD PRESSURE - MUSE: NORMAL MMHG
T AXIS - MUSE: 32 DEGREES
TACROLIMUS BLD-MCNC: 7.9 UG/L (ref 5–15)
TME LAST DOSE: NORMAL H
TME LAST DOSE: NORMAL H
VENTRICULAR RATE- MUSE: 95 BPM
WBC # BLD AUTO: 5 10E3/UL (ref 4–11)

## 2024-01-31 PROCEDURE — 250N000013 HC RX MED GY IP 250 OP 250 PS 637

## 2024-01-31 PROCEDURE — 258N000003 HC RX IP 258 OP 636: Performed by: STUDENT IN AN ORGANIZED HEALTH CARE EDUCATION/TRAINING PROGRAM

## 2024-01-31 PROCEDURE — C9113 INJ PANTOPRAZOLE SODIUM, VIA: HCPCS

## 2024-01-31 PROCEDURE — 250N000011 HC RX IP 250 OP 636: Performed by: STUDENT IN AN ORGANIZED HEALTH CARE EDUCATION/TRAINING PROGRAM

## 2024-01-31 PROCEDURE — 206N000001 HC R&B BMT UMMC

## 2024-01-31 PROCEDURE — 83735 ASSAY OF MAGNESIUM: CPT

## 2024-01-31 PROCEDURE — 250N000009 HC RX 250: Performed by: STUDENT IN AN ORGANIZED HEALTH CARE EDUCATION/TRAINING PROGRAM

## 2024-01-31 PROCEDURE — 250N000012 HC RX MED GY IP 250 OP 636 PS 637

## 2024-01-31 PROCEDURE — 85027 COMPLETE CBC AUTOMATED: CPT

## 2024-01-31 PROCEDURE — 258N000003 HC RX IP 258 OP 636

## 2024-01-31 PROCEDURE — 84100 ASSAY OF PHOSPHORUS: CPT | Performed by: INTERNAL MEDICINE

## 2024-01-31 PROCEDURE — 82040 ASSAY OF SERUM ALBUMIN: CPT

## 2024-01-31 PROCEDURE — 99233 SBSQ HOSP IP/OBS HIGH 50: CPT | Mod: FS

## 2024-01-31 PROCEDURE — 85007 BL SMEAR W/DIFF WBC COUNT: CPT

## 2024-01-31 PROCEDURE — 83735 ASSAY OF MAGNESIUM: CPT | Performed by: STUDENT IN AN ORGANIZED HEALTH CARE EDUCATION/TRAINING PROGRAM

## 2024-01-31 PROCEDURE — 250N000011 HC RX IP 250 OP 636

## 2024-01-31 PROCEDURE — 250N000011 HC RX IP 250 OP 636: Performed by: INTERNAL MEDICINE

## 2024-01-31 PROCEDURE — 80197 ASSAY OF TACROLIMUS: CPT | Performed by: STUDENT IN AN ORGANIZED HEALTH CARE EDUCATION/TRAINING PROGRAM

## 2024-01-31 RX ORDER — MAGNESIUM SULFATE HEPTAHYDRATE 40 MG/ML
2 INJECTION, SOLUTION INTRAVENOUS ONCE
Status: COMPLETED | OUTPATIENT
Start: 2024-01-31 | End: 2024-01-31

## 2024-01-31 RX ORDER — MAGNESIUM SULFATE HEPTAHYDRATE 40 MG/ML
4 INJECTION, SOLUTION INTRAVENOUS ONCE
Status: COMPLETED | OUTPATIENT
Start: 2024-01-31 | End: 2024-01-31

## 2024-01-31 RX ORDER — POTASSIUM CHLORIDE 29.8 MG/ML
20 INJECTION INTRAVENOUS ONCE
Status: COMPLETED | OUTPATIENT
Start: 2024-01-31 | End: 2024-01-31

## 2024-01-31 RX ORDER — ONDANSETRON 4 MG/1
4 TABLET, ORALLY DISINTEGRATING ORAL
Status: DISCONTINUED | OUTPATIENT
Start: 2024-02-01 | End: 2024-02-04 | Stop reason: HOSPADM

## 2024-01-31 RX ORDER — SIROLIMUS 2 MG/1
2 TABLET, FILM COATED ORAL DAILY
Status: DISCONTINUED | OUTPATIENT
Start: 2024-02-01 | End: 2024-02-04 | Stop reason: HOSPADM

## 2024-01-31 RX ADMIN — HYDROCORTISONE: 1 CREAM TOPICAL at 20:06

## 2024-01-31 RX ADMIN — MAGNESIUM SULFATE 4 G: 4 INJECTION INTRAVENOUS at 05:26

## 2024-01-31 RX ADMIN — AMLODIPINE BESYLATE 5 MG: 5 TABLET ORAL at 08:53

## 2024-01-31 RX ADMIN — MAGNESIUM SULFATE HEPTAHYDRATE: 500 INJECTION, SOLUTION INTRAMUSCULAR; INTRAVENOUS at 20:00

## 2024-01-31 RX ADMIN — AMLODIPINE BESYLATE 5 MG: 5 TABLET ORAL at 20:00

## 2024-01-31 RX ADMIN — MAGNESIUM SULFATE IN WATER 2 G: 40 INJECTION, SOLUTION INTRAVENOUS at 13:56

## 2024-01-31 RX ADMIN — DIPHENHYDRAMINE HYDROCHLORIDE AND LIDOCAINE HYDROCHLORIDE AND ALUMINUM HYDROXIDE AND MAGNESIUM HYDRO 10 ML: KIT at 22:23

## 2024-01-31 RX ADMIN — MICAFUNGIN SODIUM 150 MG: 50 INJECTION, POWDER, LYOPHILIZED, FOR SOLUTION INTRAVENOUS at 17:08

## 2024-01-31 RX ADMIN — ACYCLOVIR SODIUM 400 MG: 50 INJECTION, SOLUTION INTRAVENOUS at 11:15

## 2024-01-31 RX ADMIN — Medication: at 12:35

## 2024-01-31 RX ADMIN — URSODIOL 300 MG: 300 CAPSULE ORAL at 13:52

## 2024-01-31 RX ADMIN — SIROLIMUS 9 MG: 2 TABLET ORAL at 13:52

## 2024-01-31 RX ADMIN — URSODIOL 300 MG: 300 CAPSULE ORAL at 20:00

## 2024-01-31 RX ADMIN — PANTOPRAZOLE SODIUM 40 MG: 40 INJECTION, POWDER, FOR SOLUTION INTRAVENOUS at 08:55

## 2024-01-31 RX ADMIN — OXYCODONE HYDROCHLORIDE 5 MG: 5 TABLET ORAL at 20:18

## 2024-01-31 RX ADMIN — Medication: at 08:54

## 2024-01-31 RX ADMIN — URSODIOL 300 MG: 300 CAPSULE ORAL at 08:53

## 2024-01-31 RX ADMIN — MYCOPHENOLATE MOFETIL 1250 MG: 500 INJECTION, POWDER, LYOPHILIZED, FOR SOLUTION INTRAVENOUS at 09:03

## 2024-01-31 RX ADMIN — OLANZAPINE 5 MG: 5 TABLET, FILM COATED ORAL at 22:18

## 2024-01-31 RX ADMIN — Medication: at 17:11

## 2024-01-31 RX ADMIN — MYCOPHENOLATE MOFETIL 1250 MG: 500 INJECTION, POWDER, LYOPHILIZED, FOR SOLUTION INTRAVENOUS at 20:00

## 2024-01-31 RX ADMIN — ACYCLOVIR SODIUM 400 MG: 50 INJECTION, SOLUTION INTRAVENOUS at 22:18

## 2024-01-31 RX ADMIN — POTASSIUM CHLORIDE 20 MEQ: 29.8 INJECTION, SOLUTION INTRAVENOUS at 05:26

## 2024-01-31 RX ADMIN — OXYCODONE HYDROCHLORIDE 5 MG: 5 TABLET ORAL at 11:29

## 2024-01-31 RX ADMIN — SODIUM CHLORIDE 480 MG: 9 INJECTION, SOLUTION INTRAVENOUS at 13:57

## 2024-01-31 ASSESSMENT — ACTIVITIES OF DAILY LIVING (ADL)
ADLS_ACUITY_SCORE: 23

## 2024-01-31 NOTE — PROGRESS NOTES
Calorie Count  Intake recorded for: 1/30  Total Kcals: 38 Total Protein: 4g  Kcals from Hospital Food: 38   Protein: 4g  Kcals from Outside Food (average):0 Protein: 0g  # Meals Ordered from Kitchen: 1 meal + food from outside the hospital   # Meals Recorded: 1 meal - 50% coffee, 25% Greek yogurt, less than 25% omelet w/ ham & veggies  # Supplements Recorded: 0  Note: pt also had 1/3 egg a roll and 25% pork with rice noodles & sauce from outside the hospital, but not enough information was given to calculate calories/protein.

## 2024-01-31 NOTE — PLAN OF CARE
"Goal Outcome Evaluation:         Blood pressure 121/71, pulse 91, temperature 98.3  F (36.8  C), temperature source Axillary, resp. rate 16, height 1.575 m (5' 2.01\"), weight 77.9 kg (171 lb 11.2 oz), last menstrual period 11/05/2017, SpO2 97%, not currently breastfeeding.    AVSS Pt is on room air with 02 saturations in high 90 %. A/O x 4. Skin rash same and getting cream per order. She gets up independently to the bathroom and she is voiding freely.. No stool reported / noticed this shift. Tacrolimus drip at 68 mcg /hour =3.4 ML/hour and level is due to be dome today.TPN/IL infusing. Low potassium l evel, 3.8 and she is getting 20 mEq of IV potassium chloride and also 4 g of Magnesium Sulfate is infusing for Magnesium level of 1.4. recheck of Magnesium is due at 10:55 AM. CVC dressing is clean, dry and intact and dressing, lines and caps changes due today. Continue to monitor pt and follow plan of care.      Problem: Adult Inpatient Plan of Care  Goal: Plan of Care Review  Description: The Plan of Care Review/Shift note should be completed every shift.  The Outcome Evaluation is a brief statement about your assessment that the patient is improving, declining, or no change.  This information will be displayed automatically on your shift  note.  Outcome: Progressing     Problem: Adult Inpatient Plan of Care  Goal: Patient-Specific Goal (Individualized)  Description: You can add care plan individualizations to a care plan. Examples of Individualization might be:  \"Parent requests to be called daily at 9am for status\", \"I have a hard time hearing out of my right ear\", or \"Do not touch me to wake me up as it startles  me\".  Outcome: Progressing     Problem: Adult Inpatient Plan of Care  Goal: Absence of Hospital-Acquired Illness or Injury  Outcome: Progressing  Intervention: Identify and Manage Fall Risk  Recent Flowsheet Documentation  Taken 1/31/2024 0400 by Ama Larkin RN  Safety Promotion/Fall Prevention:   " lighting adjusted   clutter free environment maintained   increase visualization of patient   nonskid shoes/slippers when out of bed   room near nurse's station   room organization consistent  Taken 1/31/2024 0000 by Ama Larkin RN  Safety Promotion/Fall Prevention:   clutter free environment maintained   lighting adjusted   nonskid shoes/slippers when out of bed   room organization consistent   safety round/check completed  Taken 1/30/2024 2000 by Ama Larkin RN  Safety Promotion/Fall Prevention:   lighting adjusted   clutter free environment maintained   increase visualization of patient   nonskid shoes/slippers when out of bed   room near nurse's station   room organization consistent  Intervention: Prevent Skin Injury  Recent Flowsheet Documentation  Taken 1/31/2024 0400 by Ama Larkin RN  Body Position: position changed independently  Taken 1/31/2024 0000 by Ama Larkin RN  Body Position: position changed independently  Taken 1/30/2024 2000 by Ama Larkin RN  Body Position: position changed independently  Intervention: Prevent Infection  Recent Flowsheet Documentation  Taken 1/31/2024 0400 by Ama Larkin RN  Infection Prevention:   environmental surveillance performed   single patient room provided   hand hygiene promoted   personal protective equipment utilized   equipment surfaces disinfected   rest/sleep promoted   visitors restricted/screened  Taken 1/30/2024 2000 by Ama Larkin RN  Infection Prevention:   environmental surveillance performed   single patient room provided   hand hygiene promoted   personal protective equipment utilized   equipment surfaces disinfected   rest/sleep promoted   visitors restricted/screened     Problem: Adult Inpatient Plan of Care  Goal: Absence of Hospital-Acquired Illness or Injury  Intervention: Identify and Manage Fall Risk  Recent Flowsheet Documentation  Taken 1/31/2024 0400 by Ama Larkin RN  Safety Promotion/Fall  Prevention:   lighting adjusted   clutter free environment maintained   increase visualization of patient   nonskid shoes/slippers when out of bed   room near nurse's station   room organization consistent  Taken 1/31/2024 0000 by Ama Larkin RN  Safety Promotion/Fall Prevention:   clutter free environment maintained   lighting adjusted   nonskid shoes/slippers when out of bed   room organization consistent   safety round/check completed  Taken 1/30/2024 2000 by Ama Larkin RN  Safety Promotion/Fall Prevention:   lighting adjusted   clutter free environment maintained   increase visualization of patient   nonskid shoes/slippers when out of bed   room near nurse's station   room organization consistent     Problem: Stem Cell/Bone Marrow Transplant  Goal: Optimal Coping with Transplant  Intervention: Optimize Patient/Family Adjustment to Transplant  Recent Flowsheet Documentation  Taken 1/31/2024 0400 by mAa Larkin RN  Supportive Measures:   active listening utilized   verbalization of feelings encouraged  Taken 1/30/2024 2000 by Ama Larkin RN  Supportive Measures:   active listening utilized   verbalization of feelings encouraged     Problem: Stem Cell/Bone Marrow Transplant  Goal: Symptom-Free Urinary Elimination  Outcome: Progressing  Intervention: Prevent or Manage Bladder Irritation  Recent Flowsheet Documentation  Taken 1/31/2024 0400 by Ama Larkin RN  Hyperhydration Management: fluids provided  Taken 1/30/2024 2000 by Ama Larkin RN  Hyperhydration Management: fluids provided     Problem: Stem Cell/Bone Marrow Transplant  Goal: Symptom-Free Urinary Elimination  Outcome: Progressing  Intervention: Prevent or Manage Bladder Irritation  Recent Flowsheet Documentation  Taken 1/31/2024 0400 by Ama Larkin RN  Hyperhydration Management: fluids provided  Taken 1/30/2024 2000 by Ama Larkin RN  Hyperhydration Management: fluids provided     Problem: Stem  Cell/Bone Marrow Transplant  Goal: Symptom-Free Urinary Elimination  Intervention: Prevent or Manage Bladder Irritation  Recent Flowsheet Documentation  Taken 1/31/2024 0400 by Ama Larkin RN  Hyperhydration Management: fluids provided  Taken 1/30/2024 2000 by Ama Larkin RN  Hyperhydration Management: fluids provided     Problem: Stem Cell/Bone Marrow Transplant  Goal: Diarrhea Symptom Control  Outcome: Progressing     Problem: Stem Cell/Bone Marrow Transplant  Goal: Improved Activity Tolerance  Outcome: Progressing  Intervention: Promote Improved Energy  Recent Flowsheet Documentation  Taken 1/31/2024 0400 by Ama Larkin RN  Fatigue Management:   frequent rest breaks encouraged   paced activity encouraged  Activity Management:   activity adjusted per tolerance   up ad jeff   ambulated to bathroom  Taken 1/31/2024 0000 by Ama Larkin RN  Activity Management:   activity adjusted per tolerance   up ad jeff   ambulated to bathroom  Taken 1/30/2024 2000 by Ama Larkin RN  Fatigue Management:   frequent rest breaks encouraged   paced activity encouraged  Activity Management:   activity adjusted per tolerance   up ad jeff   ambulated to bathroom     Problem: Stem Cell/Bone Marrow Transplant  Goal: Improved Activity Tolerance  Intervention: Promote Improved Energy  Recent Flowsheet Documentation  Taken 1/31/2024 0400 by Ama Larkin RN  Fatigue Management:   frequent rest breaks encouraged   paced activity encouraged  Activity Management:   activity adjusted per tolerance   up ad jeff   ambulated to bathroom  Taken 1/31/2024 0000 by Ama Larkin RN  Activity Management:   activity adjusted per tolerance   up ad jeff   ambulated to bathroom  Taken 1/30/2024 2000 by Ama Larkin RN  Fatigue Management:   frequent rest breaks encouraged   paced activity encouraged  Activity Management:   activity adjusted per tolerance   up ad jeff   ambulated to bathroom     Problem: Stem  Cell/Bone Marrow Transplant  Goal: Improved Activity Tolerance  Outcome: Progressing  Intervention: Promote Improved Energy  Recent Flowsheet Documentation  Taken 1/31/2024 0400 by Ama Larkin RN  Fatigue Management:   frequent rest breaks encouraged   paced activity encouraged  Activity Management:   activity adjusted per tolerance   up ad jeff   ambulated to bathroom  Taken 1/31/2024 0000 by Ama Larkin RN  Activity Management:   activity adjusted per tolerance   up ad jeff   ambulated to bathroom  Taken 1/30/2024 2000 by Ama Larkin RN  Fatigue Management:   frequent rest breaks encouraged   paced activity encouraged  Activity Management:   activity adjusted per tolerance   up ad jeff   ambulated to bathroom     Problem: Stem Cell/Bone Marrow Transplant  Goal: Blood Counts Within Acceptable Range  Outcome: Progressing  Intervention: Monitor and Manage Hematologic Symptoms  Recent Flowsheet Documentation  Taken 1/31/2024 0400 by Ama Larkin RN  Bleeding Precautions: blood pressure closely monitored  Medication Review/Management: medications reviewed  Taken 1/31/2024 0000 by Ama Larkin RN  Bleeding Precautions: blood pressure closely monitored  Medication Review/Management: medications reviewed  Taken 1/30/2024 2000 by Ama Larkin RN  Bleeding Precautions: blood pressure closely monitored  Medication Review/Management: medications reviewed     Problem: Stem Cell/Bone Marrow Transplant  Goal: Blood Counts Within Acceptable Range  Intervention: Monitor and Manage Hematologic Symptoms  Recent Flowsheet Documentation  Taken 1/31/2024 0400 by Ama Larkin RN  Bleeding Precautions: blood pressure closely monitored  Medication Review/Management: medications reviewed  Taken 1/31/2024 0000 by Ama Larkin RN  Bleeding Precautions: blood pressure closely monitored  Medication Review/Management: medications reviewed  Taken 1/30/2024 2000 by Ama Larkin RN  Bleeding  Precautions: blood pressure closely monitored  Medication Review/Management: medications reviewed     Problem: Stem Cell/Bone Marrow Transplant  Goal: Absence of Hypersensitivity Reaction  Outcome: Progressing     Problem: Stem Cell/Bone Marrow Transplant  Goal: Absence of Infection  Outcome: Progressing  Intervention: Prevent and Manage Infection  Recent Flowsheet Documentation  Taken 1/31/2024 0400 by Ama Larkin RN  Infection Prevention:   environmental surveillance performed   single patient room provided   hand hygiene promoted   personal protective equipment utilized   equipment surfaces disinfected   rest/sleep promoted   visitors restricted/screened  Infection Management: aseptic technique maintained  Isolation Precautions: protective environment maintained  Taken 1/31/2024 0000 by Ama Larkin RN  Infection Management: aseptic technique maintained  Taken 1/30/2024 2000 by Ama Larkin RN  Infection Prevention:   environmental surveillance performed   single patient room provided   hand hygiene promoted   personal protective equipment utilized   equipment surfaces disinfected   rest/sleep promoted   visitors restricted/screened  Infection Management: aseptic technique maintained  Isolation Precautions: protective environment maintained     Problem: Stem Cell/Bone Marrow Transplant  Goal: Absence of Infection  Intervention: Prevent and Manage Infection  Recent Flowsheet Documentation  Taken 1/31/2024 0400 by Ama Larkin RN  Infection Prevention:   environmental surveillance performed   single patient room provided   hand hygiene promoted   personal protective equipment utilized   equipment surfaces disinfected   rest/sleep promoted   visitors restricted/screened  Infection Management: aseptic technique maintained  Isolation Precautions: protective environment maintained  Taken 1/31/2024 0000 by Ama Larkin RN  Infection Management: aseptic technique maintained  Taken 1/30/2024  2000 by Ama Larkin RN  Infection Prevention:   environmental surveillance performed   single patient room provided   hand hygiene promoted   personal protective equipment utilized   equipment surfaces disinfected   rest/sleep promoted   visitors restricted/screened  Infection Management: aseptic technique maintained  Isolation Precautions: protective environment maintained     Problem: Stem Cell/Bone Marrow Transplant  Goal: Improved Oral Mucous Membrane Health and Integrity  Intervention: Promote Oral Comfort and Health  Recent Flowsheet Documentation  Taken 1/31/2024 0400 by Ama Larkin RN  Oral Mucous Membrane Protection: lip/mouth moisturizer applied  Taken 1/31/2024 0000 by Ama Larkin RN  Oral Mucous Membrane Protection: lip/mouth moisturizer applied  Taken 1/30/2024 2000 by Ama Larkin RN  Oral Mucous Membrane Protection: lip/mouth moisturizer applied     Problem: Stem Cell/Bone Marrow Transplant  Goal: Improved Oral Mucous Membrane Health and Integrity  Outcome: Progressing  Intervention: Promote Oral Comfort and Health  Recent Flowsheet Documentation  Taken 1/31/2024 0400 by Ama Larkin RN  Oral Mucous Membrane Protection: lip/mouth moisturizer applied  Taken 1/31/2024 0000 by Ama Larkin RN  Oral Mucous Membrane Protection: lip/mouth moisturizer applied  Taken 1/30/2024 2000 by Ama Larkin RN  Oral Mucous Membrane Protection: lip/mouth moisturizer applied     Problem: Stem Cell/Bone Marrow Transplant  Goal: Nausea and Vomiting Symptom Relief  Outcome: Progressing  Intervention: Prevent and Manage Nausea and Vomiting  Recent Flowsheet Documentation  Taken 1/31/2024 0400 by Ama Larkin RN  Nausea/Vomiting Interventions: antiemetic  Taken 1/30/2024 2000 by Ama Larkin RN  Nausea/Vomiting Interventions: antiemetic     Problem: Stem Cell/Bone Marrow Transplant  Goal: Nausea and Vomiting Symptom Relief  Intervention: Prevent and Manage Nausea and  Vomiting  Recent Flowsheet Documentation  Taken 1/31/2024 0400 by Ama Larkin, RN  Nausea/Vomiting Interventions: antiemetic  Taken 1/30/2024 2000 by Ama Larkin RN  Nausea/Vomiting Interventions: antiemetic     Problem: Stem Cell/Bone Marrow Transplant  Goal: Optimal Nutrition Intake  Outcome: Progressing

## 2024-01-31 NOTE — PLAN OF CARE
"Patient remains hospitalized following stem cell transplant, currently day +19. Monitoring return of counts. Continues on ppx antimicrobials. Continues on IV MMF & tac gtt. Appetite very poor. Calorie counts continue. Ate slightly more food today. Continues on TPN/lipids. Oxy given x1 for throat pain this shift. 3 loose, watery BMs this shift. Denied nausea today. K & Mag replaced early this AM. Rechecks planned for tomorrow AM. Complained of back, abdominal, chest and BUEs itching throughout shift - faint, pink rash noted (noted to be spreading to BUEs this afternoon). Hydrocortisone mostly effective. Benadryl cream added and also effective. Ambulating independently - activity encouraged, up in [place] with PT today. VSS.      Problem: Adult Inpatient Plan of Care  Goal: Plan of Care Review  Description: The Plan of Care Review/Shift note should be completed every shift.  The Outcome Evaluation is a brief statement about your assessment that the patient is improving, declining, or no change.  This information will be displayed automatically on your shift  note.  Outcome: Progressing  Goal: Patient-Specific Goal (Individualized)  Description: You can add care plan individualizations to a care plan. Examples of Individualization might be:  \"Parent requests to be called daily at 9am for status\", \"I have a hard time hearing out of my right ear\", or \"Do not touch me to wake me up as it startles  me\".  Outcome: Progressing  Goal: Absence of Hospital-Acquired Illness or Injury  Outcome: Progressing  Intervention: Identify and Manage Fall Risk  Recent Flowsheet Documentation  Taken 1/30/2024 1605 by Minerva Vaz RN  Safety Promotion/Fall Prevention:   safety round/check completed   assistive device/personal items within reach   chemotherapeutic precautions   clutter free environment maintained   nonskid shoes/slippers when out of bed   patient and family education   lighting adjusted   room organization consistent  Taken " 1/30/2024 1200 by Minerva Vaz RN  Safety Promotion/Fall Prevention:   safety round/check completed   assistive device/personal items within reach   chemotherapeutic precautions   clutter free environment maintained   nonskid shoes/slippers when out of bed   patient and family education   lighting adjusted   room organization consistent  Taken 1/30/2024 0800 by Minerva Vaz RN  Safety Promotion/Fall Prevention:   assistive device/personal items within reach   chemotherapeutic precautions   clutter free environment maintained   lighting adjusted   nonskid shoes/slippers when out of bed   patient and family education   room organization consistent   safety round/check completed  Intervention: Prevent Skin Injury  Recent Flowsheet Documentation  Taken 1/30/2024 1700 by Minerva Vaz RN  Body Position: sitting up in bed  Taken 1/30/2024 1400 by Minerva Vaz RN  Body Position: sitting up in bed  Taken 1/30/2024 1300 by Minerva Vaz RN  Body Position: supine  Taken 1/30/2024 1100 by Minerva Vaz RN  Body Position: sitting up in bed  Taken 1/30/2024 1000 by Minerva Vaz RN  Body Position:   position changed independently   supine  Taken 1/30/2024 0940 by Minerva Vaz RN  Body Position:   position changed independently   supine  Taken 1/30/2024 0852 by Minerva Vaz RN  Body Position:   position changed independently   right   side-lying  Goal: Optimal Comfort and Wellbeing  Outcome: Progressing  Intervention: Monitor Pain and Promote Comfort  Recent Flowsheet Documentation  Taken 1/30/2024 1605 by Minerva Vaz RN  Pain Management Interventions: declines  Taken 1/30/2024 0852 by Minerva Vaz RN  Pain Management Interventions: medication (see MAR)  Goal: Readiness for Transition of Care  Outcome: Progressing     Problem: Stem Cell/Bone Marrow Transplant  Goal: Optimal Coping with Transplant  Outcome: Progressing  Goal: Symptom-Free Urinary Elimination  Outcome:  Progressing  Intervention: Prevent or Manage Bladder Irritation  Recent Flowsheet Documentation  Taken 1/30/2024 1605 by Minerva Vaz RN  Pain Management Interventions: declines  Taken 1/30/2024 0852 by Minerva Vaz RN  Pain Management Interventions: medication (see MAR)  Goal: Diarrhea Symptom Control  Outcome: Progressing  Goal: Improved Activity Tolerance  Outcome: Progressing  Intervention: Promote Improved Energy  Recent Flowsheet Documentation  Taken 1/30/2024 1800 by Minerva Vaz RN  Activity Management: ambulated to bathroom  Taken 1/30/2024 1605 by Minerva Vaz RN  Activity Management: (with PT) ambulated outside room  Taken 1/30/2024 1500 by Minerva Vaz RN  Activity Management: sitting, edge of bed  Taken 1/30/2024 1200 by Minerva Vaz RN  Activity Management: ambulated to bathroom  Goal: Blood Counts Within Acceptable Range  Outcome: Progressing  Intervention: Monitor and Manage Hematologic Symptoms  Recent Flowsheet Documentation  Taken 1/30/2024 1605 by Minerva Vaz RN  Medication Review/Management:   medications reviewed   high-risk medications identified  Taken 1/30/2024 1200 by Minerva Vaz RN  Medication Review/Management:   medications reviewed   high-risk medications identified  Taken 1/30/2024 0800 by Minerva Vaz RN  Medication Review/Management:   medications reviewed   high-risk medications identified  Goal: Absence of Hypersensitivity Reaction  Outcome: Progressing  Goal: Absence of Infection  Outcome: Progressing  Intervention: Prevent and Manage Infection  Recent Flowsheet Documentation  Taken 1/30/2024 1605 by Minerva Vaz RN  Isolation Precautions: cytotoxic precautions maintained  Taken 1/30/2024 1200 by Minerva Vaz RN  Isolation Precautions: cytotoxic precautions maintained  Taken 1/30/2024 0800 by Minerva Vaz RN  Isolation Precautions: cytotoxic precautions maintained  Goal: Improved Oral Mucous Membrane Health and  Integrity  Outcome: Progressing  Goal: Nausea and Vomiting Symptom Relief  Outcome: Progressing  Intervention: Prevent and Manage Nausea and Vomiting  Recent Flowsheet Documentation  Taken 1/30/2024 0800 by Minerva Vaz RN  Nausea/Vomiting Interventions: antiemetic  Goal: Optimal Nutrition Intake  Outcome: Progressing   Goal Outcome Evaluation:

## 2024-01-31 NOTE — PROGRESS NOTES
"  BMT Daily Progress Note  Patient ID:  Irma Foreman is a 53 year old female with a PMH of MDS, warm AIHA, transfusion and transfusion dependent anemia presented to  to undergo a myeloablative allogeneic transplant. She was recently COVID+ on 12/26 but tested negative on 1/2 with resolution of nearly all URI symptoms. Undergoing MA (Bu/Flu) 6/8 URD Allo transplant, day +20      HPI   Irma reports her appetite is getting better though her oral intake is still poor. Loose stools 2-3x/day and her back, shoulders and thighs are itchy. Will start cycling TPN tonight, change Tac drip to sirolimus today and decrease her PPI from BID to daily.       Review of Systems    Negative unless stated in the HPI.     PHYSICAL EXAM      Weight     Wt Readings from Last 3 Encounters:   01/31/24 78.9 kg (173 lb 14.4 oz)   12/26/23 83.1 kg (183 lb 4.8 oz)   12/19/23 83.5 kg (184 lb)        KPS: 70    BP (!) 132/93 (BP Location: Right arm)   Pulse 85   Temp 98.7  F (37.1  C) (Axillary)   Resp 18   Ht 1.575 m (5' 2.01\")   Wt 78.9 kg (173 lb 14.4 oz)   LMP 11/05/2017   SpO2 100%   BMI 31.80 kg/m       General: NAD   Eyes: KEVON, sclera anicteric   Nose/Mouth/Throat: thick secretions, no ulcers visualized.   Lungs: CTAB   Cardiovascular: RRR, no M/R/G   Abdominal/Rectal: +BS, soft, non tender   Lymphatics: No edema  Skin: wart under right foot dime sized white, cauliflower and slightly ulcerated; Two pin point sized, flesh colored warts on right hand; Light pink faint macular rash on back   Neuro: A&O   Additional Findings: Gastelum site NT, no drainage.    Current aGVHD staging:  Skin 0, UGI 0, LGI 0, Liver 0 (keep in note through day +180 for allos)      LABS AND IMAGING: I have assessed all abnormal lab values for their clinical significance and any values considered clinically significant have been addressed in the assessment and plan.        Lab Results   Component Value Date    WBC 5.0 01/31/2024    ANEUTAUTO 9.5 (H) " 01/27/2024    HGB 7.9 (L) 01/31/2024    HCT 23.2 (L) 01/31/2024    PLT 32 (LL) 01/31/2024     01/31/2024    POTASSIUM 3.8 01/31/2024    CHLORIDE 106 01/31/2024    CO2 26 01/31/2024     (H) 01/31/2024    BUN 8.6 01/31/2024    CR 0.37 (L) 01/31/2024    MAG 1.4 (L) 01/31/2024    INR 1.04 01/29/2024       US Abdominal with Doppler (1/18/24)  Impression:   1. Patent Doppler evaluation of the abdomen with antegrade flow throughout.  2. Elevated hepatic artery resistive index of 0.81, nonspecific though can be seen with hepatic venous congestion which could be related to venous occlusive disease..    SYSTEMS-BASED ASSESSMENT AND PLAN     Hortencia Baldwin is a 53 year old female with a history of MDS, undergoing MA (Bu/Flu) 6/8 URD Allo transplant, day +20      BMT/IEC PROTOCOL for MT 2015-29 **according to but not on trial**   - Chemo protocol:  Day -6 through day -1: Keppra and Allopurinol   Day -5 through -2: Bu/Flu. Busulfan levels adjusted by pharm, attending  Day -1: Rest day   Day 0: Transplant. Recipient: O+, CMV+. Donor: O+, CMV-. No flush. Cell dose 6.5 x10^6.   Gram+ bacilli (cutibacterium proprionibacterium) cultured for stem cell product. On cefepime (1/11 - 1/18). Blood cx no growth x10 bottles.   - Restaging plan: per protocol   D+21 BMBX scheduled for 02/02 outpatient. If still admitted, please schedule to be completed inpatient. Sedated marrow not needed.     HEME/COAG  - Pancytopenia due to chemotherapy   # Iron overload: hx of transfusion dependent anemia, pre-transplant ferritin around 5,000. Recommend phlebotomy post transplant when retic count is restored and Hgb >10    # Platelet refractoriness: BID platelet checks discontinued 1/26. Responding to HLA platelets.   Transfusion medicine consulted 1/18: HLA typing completed.   - Transfusion parameters: hemoglobin <7, platelets <10  - Last dose of scheduled GCSF on 1/26. Give PRN for ANC < 1    IMMUNOCOMPROMISED  # Fever: (Last on 1/15)  Recurrent on 1/21- tmax 100.7.  - UA: negative for LE & nitrites. 1/13 ua negative; BK urine positive/adeno negative (red/orange urine)  - Blood cultures NGTD. Procal elevated 1.08   - CXR with likely pulmonary edema  - RVP neg  - Cefepime (1/11 - 1/18; 1/22-1/26). Brief course of IV Vancomycin. MRSA nasal swab negative.   # Dysuria: see Renal below.   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 Tac drip discontinued 1/31, start Sirolimus.   Transition MMF to PO in coming days with resolution of mucositis.   - 1/31: Sirolimus 9 mg loading dose, start 2 mg/day tomorrow and first level on 2/2     CARDIOVASCULAR  # Chest pressure: new on 1/15, worse with sitting up/activity, better while laying flat. RESOLVED.   EKG: new QR pattern in V1, suggests right ventricular conduction delay  Troponin negative x1   Echo (1/15): LVEF 55-60%, no pericardial effusion present. Cardiology consulted: no further recs   #Prolonged QTc to 507 on 1/22.   - Changed levaquin to cefpodoxime. Repeated QTc on 1/22: 466.   # Hypertension with initiation of tacrolimus: Amlodipine 5 mg BID. Labetalol 5 mg IV prn with parameters.   - Risk of cardiomyopathy: Baseline EF 60-65%  - Risk of arrhythmia: Baseline EKG showed sinus rhythm     RESPIRATORY  - Current smoker: states she quit (1/5/24), offered nicotine replacement but she declined.   - Baseline PFTs: The FEV1, FVC, FEV1/FVC ratio and DFA80-83% are within normal limits.  The inspiratory flow rates are within normal limits.  Lung volumes are within normal limits.  The diffusing capacity is normal.   - Risk of respiratory complications: Frequent ambulation and incentive spirometer. CXR (1/24) showed  increased left basilar streaky opacities, likely atelectasis.     GI/NUTRITION  # Epigastric pain (1/18): RUQ ultrasound showing patent doppler throughout, no ascites, nonspecific elevated hepatic artery restrictive index (see full impression above). Weight down, LFT's WNL, no abdominal pain. Continue to monitor closely for VOD; checking hepatic panel daily.     - IV PPI daily  - Ulcer prophylaxis: PPI  - VOD ppx: Ursodiol   # Moderate malnutrition in the context of acute illness: dietitian following.   Start TPN (1/29 - x), cycling TPN 1/31   Zyprexa started 1/29, EKG (1/30) NSR Qtc 454  Zofran every AM   # Mouth pain likely 2/2 the start of oral mucositis: MMW prn, oxy 5 mg Q4H prn with IV dilaudid 0.5 mg for break through pain.      # Nausea from chemo/radiation: Zyprexa 5 mg HS (1/29), compazine PRN, Lorazepam prn. Avoiding zofran given hx of QTc prolongation as above.     RENAL / :  # Dysuria (1/26): UA showing small blood, 58 RBC's. BK 4.52 million -- no hematuria; monitoring closely. Adeno pending. Pyridium x4 days. Resolved.     DERM:  # Right hand dryness, try aquafor with gloves on overnight and as tolerated during the day  # Foot, hand warts. Curbside derm on 1/8, no recs while inpatient, typically managed OP.    # Pink maculopapular rash present under breasts (resolved), across abdomen (Resolved) & lower back (faint) using hydrocortisone & benadryl cream     MUSCULOSKELETAL/FRAILTY  - Baseline Frailty Score: 3  - Daily PT/OT as needed while inpatient  - Cancer Rehab as needed outpatient    SOCIAL DETERMINANTS  - Caregiver: Srinivas Suggs, Keira Neal & Rickey Neal       Disposition: remain admitted through resolution of oral mucositis & appropriate PO intake.         Known issues that I take into account for medical decisions, with salient changes to the plan considering these complexities noted above.    Patient Active Problem List   Diagnosis    Left medial knee pain    Obesity (BMI 30-39.9)    Anemia,  "unspecified type    Macrocytic anemia    MDS (myelodysplastic syndrome) (H)     Clinically Significant Risk Factors        # Hypokalemia: Lowest K = 3.3 mmol/L in last 2 days, will replace as needed     # Hypomagnesemia: Lowest Mg = 1.4 mg/dL in last 2 days, will replace as needed   # Hypoalbuminemia: Lowest albumin = 3.2 g/dL at 1/25/2024  3:06 AM, will monitor as appropriate   # Thrombocytopenia: Lowest platelets = 30 in last 2 days, will monitor for bleeding          # Obesity: Estimated body mass index is 31.8 kg/m  as calculated from the following:    Height as of this encounter: 1.575 m (5' 2.01\").    Weight as of this encounter: 78.9 kg (173 lb 14.4 oz).   # Severe Malnutrition: based on nutrition assessment            I spent 40 minutes in the care of this patient today, which included time necessary for preparation for the visit, obtaining history, ordering medications/tests/procedures as medically indicated, review of pertinent medical literature, counseling of the patient, communication of recommendations to the care team, and documentation time.    King Vaz PA-C  [phone]      ______________________________________________      BMT ATTENDING NOTE    Hortencia Baldwin is a 53 year old female, who is day 20 of transplant for MDS, complicated by iron overload. She is admitted for myeloablative (Bu/Flu) conditioning, 6/8 unrelated donor, GVHD ppx with PTCy/ Tac/ MMF, ABO matched, CMV donor -/ recipient + .    Feels better, thinks it may be due to TPN. Intermittent nausea. Mucositis pain has decreased. Notes a new rash on her back. On exam, she has a sore on the left side of the tongue and scalloping along the edges. Skin on back and upper chest - erythematous blanchanble rash    BMT: D20   Heme: WBC engrafted.  Tac: Consider switching to PO tomorrow.   Rash: Pruritic erythematous blanching rash on back and upper chest. Could be due to engraftment vs drug rash vs early GVHD vs other cause. Currently on " topical hydrocortisone. Consider derm consult / skin biopsy if worsening.   Low PO intake: Will support with TPN for a few days, started 1/29.   Mucositis: Improving, Oxycodone use minimal   Nausea: Scheduled compazine, zyprexa. Changed acyclovir and letermovir to IV since swallowing the pills contributes to nausea. Can try emend if needed.   HTN: Amlodipine 10mg daily.   PPx: jani, acyclovir, letermovir   Supportive care: encourage ambulation, PT/OT, optimize nutrition. Spirometer.     Dispo: Pending improvement in PO intake. Anticipate by the end of this week.     On the date of service, 01/31/2024, I spent 40 minutes on the patient unit personally reviewing medical records and medications, reviewing vital signs, labs, and imaging results as summarized above, discussing the patient's case on rounds with the JEN, obtaining a history from the patient, performing a physical exam, intensively monitoring treatments with high risk of toxicity, coordinating care, and documenting in the electronic medical record.    Coco Garay  Department of Hematology, Oncology and Transplantation  [facility] Pager 1300/Text via George

## 2024-02-01 ENCOUNTER — APPOINTMENT (OUTPATIENT)
Dept: PHYSICAL THERAPY | Facility: CLINIC | Age: 54
DRG: 014 | End: 2024-02-01
Attending: STUDENT IN AN ORGANIZED HEALTH CARE EDUCATION/TRAINING PROGRAM
Payer: COMMERCIAL

## 2024-02-01 LAB
ABO/RH(D): NORMAL
ALBUMIN SERPL BCG-MCNC: 3.7 G/DL (ref 3.5–5.2)
ALP SERPL-CCNC: 98 U/L (ref 40–150)
ALT SERPL W P-5'-P-CCNC: 48 U/L (ref 0–50)
ANION GAP SERPL CALCULATED.3IONS-SCNC: 7 MMOL/L (ref 7–15)
ANTIBODY SCREEN: NEGATIVE
AST SERPL W P-5'-P-CCNC: 37 U/L (ref 0–45)
BASOPHILS # BLD AUTO: ABNORMAL 10*3/UL
BASOPHILS # BLD MANUAL: 0.1 10E3/UL (ref 0–0.2)
BASOPHILS NFR BLD AUTO: ABNORMAL %
BASOPHILS NFR BLD MANUAL: 2 %
BILIRUB SERPL-MCNC: 0.4 MG/DL
BUN SERPL-MCNC: 15.8 MG/DL (ref 6–20)
CALCIUM SERPL-MCNC: 9 MG/DL (ref 8.6–10)
CHLORIDE SERPL-SCNC: 103 MMOL/L (ref 98–107)
CMV DNA SPEC NAA+PROBE-ACNC: <35 IU/ML
CMV DNA SPEC NAA+PROBE-LOG#: <1.5 {LOG_COPIES}/ML
CREAT SERPL-MCNC: 0.39 MG/DL (ref 0.51–0.95)
DEPRECATED HCO3 PLAS-SCNC: 29 MMOL/L (ref 22–29)
EGFRCR SERPLBLD CKD-EPI 2021: >90 ML/MIN/1.73M2
EOSINOPHIL # BLD AUTO: ABNORMAL 10*3/UL
EOSINOPHIL # BLD MANUAL: 0 10E3/UL (ref 0–0.7)
EOSINOPHIL NFR BLD AUTO: ABNORMAL %
EOSINOPHIL NFR BLD MANUAL: 0 %
ERYTHROCYTE [DISTWIDTH] IN BLOOD BY AUTOMATED COUNT: 19.4 % (ref 10–15)
GLUCOSE BLDC GLUCOMTR-MCNC: 109 MG/DL (ref 70–99)
GLUCOSE BLDC GLUCOMTR-MCNC: 136 MG/DL (ref 70–99)
GLUCOSE SERPL-MCNC: 99 MG/DL (ref 70–99)
HCT VFR BLD AUTO: 24.1 % (ref 35–47)
HGB BLD-MCNC: 8 G/DL (ref 11.7–15.7)
IMM GRANULOCYTES # BLD: ABNORMAL 10*3/UL
IMM GRANULOCYTES NFR BLD: ABNORMAL %
LAB DIRECTOR DISCLAIMER: NORMAL
LAB DIRECTOR DISCLAIMER: NORMAL
LAB DIRECTOR INTERPRETATION: NORMAL
LAB DIRECTOR INTERPRETATION: NORMAL
LAB DIRECTOR METHODOLOGY: NORMAL
LAB DIRECTOR METHODOLOGY: NORMAL
LAB DIRECTOR RESULTS: NORMAL
LAB DIRECTOR RESULTS: NORMAL
LYMPHOCYTES # BLD AUTO: ABNORMAL 10*3/UL
LYMPHOCYTES # BLD MANUAL: 0.1 10E3/UL (ref 0.8–5.3)
LYMPHOCYTES NFR BLD AUTO: ABNORMAL %
LYMPHOCYTES NFR BLD MANUAL: 2 %
MAGNESIUM SERPL-MCNC: 1.7 MG/DL (ref 1.7–2.3)
MCH RBC QN AUTO: 32.9 PG (ref 26.5–33)
MCHC RBC AUTO-ENTMCNC: 33.2 G/DL (ref 31.5–36.5)
MCV RBC AUTO: 99 FL (ref 78–100)
METAMYELOCYTES # BLD MANUAL: 0.1 10E3/UL
METAMYELOCYTES NFR BLD MANUAL: 3 %
MONOCYTES # BLD AUTO: ABNORMAL 10*3/UL
MONOCYTES # BLD MANUAL: 0.7 10E3/UL (ref 0–1.3)
MONOCYTES NFR BLD AUTO: ABNORMAL %
MONOCYTES NFR BLD MANUAL: 17 %
MYELOCYTES # BLD MANUAL: 0.1 10E3/UL
MYELOCYTES NFR BLD MANUAL: 3 %
NEUTROPHILS # BLD AUTO: ABNORMAL 10*3/UL
NEUTROPHILS # BLD MANUAL: 3.1 10E3/UL (ref 1.6–8.3)
NEUTROPHILS NFR BLD AUTO: ABNORMAL %
NEUTROPHILS NFR BLD MANUAL: 73 %
NRBC # BLD AUTO: 0.1 10E3/UL
NRBC # BLD AUTO: 0.2 10E3/UL
NRBC BLD AUTO-RTO: 3 /100
NRBC BLD MANUAL-RTO: 5 %
PHOSPHATE SERPL-MCNC: 3.8 MG/DL (ref 2.5–4.5)
PLAT MORPH BLD: ABNORMAL
PLATELET # BLD AUTO: 39 10E3/UL (ref 150–450)
POLYCHROMASIA BLD QL SMEAR: ABNORMAL
POTASSIUM SERPL-SCNC: 4.7 MMOL/L (ref 3.4–5.3)
PROT SERPL-MCNC: 5.9 G/DL (ref 6.4–8.3)
RBC # BLD AUTO: 2.43 10E6/UL (ref 3.8–5.2)
RBC MORPH BLD: ABNORMAL
SODIUM SERPL-SCNC: 139 MMOL/L (ref 135–145)
SPECIMEN DESCRIPTION: NORMAL
SPECIMEN DESCRIPTION: NORMAL
SPECIMEN EXPIRATION DATE: NORMAL
WBC # BLD AUTO: 4.2 10E3/UL (ref 4–11)

## 2024-02-01 PROCEDURE — 97530 THERAPEUTIC ACTIVITIES: CPT | Mod: GP

## 2024-02-01 PROCEDURE — 250N000012 HC RX MED GY IP 250 OP 636 PS 637

## 2024-02-01 PROCEDURE — B4185 PARENTERAL SOL 10 GM LIPIDS: HCPCS | Mod: JZ | Performed by: STUDENT IN AN ORGANIZED HEALTH CARE EDUCATION/TRAINING PROGRAM

## 2024-02-01 PROCEDURE — 258N000003 HC RX IP 258 OP 636: Performed by: STUDENT IN AN ORGANIZED HEALTH CARE EDUCATION/TRAINING PROGRAM

## 2024-02-01 PROCEDURE — 250N000011 HC RX IP 250 OP 636

## 2024-02-01 PROCEDURE — G0452 MOLECULAR PATHOLOGY INTERPR: HCPCS | Mod: 26 | Performed by: PATHOLOGY

## 2024-02-01 PROCEDURE — 206N000001 HC R&B BMT UMMC

## 2024-02-01 PROCEDURE — 250N000009 HC RX 250: Mod: JZ | Performed by: STUDENT IN AN ORGANIZED HEALTH CARE EDUCATION/TRAINING PROGRAM

## 2024-02-01 PROCEDURE — 86900 BLOOD TYPING SEROLOGIC ABO: CPT

## 2024-02-01 PROCEDURE — 85027 COMPLETE CBC AUTOMATED: CPT

## 2024-02-01 PROCEDURE — 99233 SBSQ HOSP IP/OBS HIGH 50: CPT | Mod: FS

## 2024-02-01 PROCEDURE — 250N000011 HC RX IP 250 OP 636: Performed by: STUDENT IN AN ORGANIZED HEALTH CARE EDUCATION/TRAINING PROGRAM

## 2024-02-01 PROCEDURE — 258N000003 HC RX IP 258 OP 636

## 2024-02-01 PROCEDURE — 83735 ASSAY OF MAGNESIUM: CPT | Performed by: INTERNAL MEDICINE

## 2024-02-01 PROCEDURE — 250N000013 HC RX MED GY IP 250 OP 250 PS 637

## 2024-02-01 PROCEDURE — 85007 BL SMEAR W/DIFF WBC COUNT: CPT

## 2024-02-01 PROCEDURE — 97110 THERAPEUTIC EXERCISES: CPT | Mod: GP

## 2024-02-01 PROCEDURE — 81268 CHIMERISM ANAL W/CELL SELECT: CPT

## 2024-02-01 PROCEDURE — 250N000013 HC RX MED GY IP 250 OP 250 PS 637: Performed by: STUDENT IN AN ORGANIZED HEALTH CARE EDUCATION/TRAINING PROGRAM

## 2024-02-01 PROCEDURE — 84100 ASSAY OF PHOSPHORUS: CPT | Performed by: STUDENT IN AN ORGANIZED HEALTH CARE EDUCATION/TRAINING PROGRAM

## 2024-02-01 PROCEDURE — 80053 COMPREHEN METABOLIC PANEL: CPT

## 2024-02-01 PROCEDURE — 250N000011 HC RX IP 250 OP 636: Performed by: INTERNAL MEDICINE

## 2024-02-01 RX ORDER — MYCOPHENOLATE MOFETIL 500 MG/1
1000 TABLET ORAL
Status: DISCONTINUED | OUTPATIENT
Start: 2024-02-02 | End: 2024-02-04 | Stop reason: HOSPADM

## 2024-02-01 RX ORDER — TRIAMCINOLONE ACETONIDE 1 MG/G
CREAM TOPICAL 3 TIMES DAILY
Status: DISCONTINUED | OUTPATIENT
Start: 2024-02-01 | End: 2024-02-04 | Stop reason: HOSPADM

## 2024-02-01 RX ORDER — ACYCLOVIR 800 MG/1
800 TABLET ORAL 2 TIMES DAILY
Status: DISCONTINUED | OUTPATIENT
Start: 2024-02-01 | End: 2024-02-04 | Stop reason: HOSPADM

## 2024-02-01 RX ORDER — MYCOPHENOLATE MOFETIL 250 MG/1
250 CAPSULE ORAL
Status: DISCONTINUED | OUTPATIENT
Start: 2024-02-02 | End: 2024-02-04 | Stop reason: HOSPADM

## 2024-02-01 RX ORDER — AMOXICILLIN 250 MG
1-2 CAPSULE ORAL DAILY PRN
Status: DISCONTINUED | OUTPATIENT
Start: 2024-02-01 | End: 2024-02-04 | Stop reason: HOSPADM

## 2024-02-01 RX ORDER — MAGNESIUM SULFATE HEPTAHYDRATE 40 MG/ML
2 INJECTION, SOLUTION INTRAVENOUS ONCE
Status: COMPLETED | OUTPATIENT
Start: 2024-02-01 | End: 2024-02-01

## 2024-02-01 RX ORDER — PANTOPRAZOLE SODIUM 40 MG/1
40 TABLET, DELAYED RELEASE ORAL
Status: DISCONTINUED | OUTPATIENT
Start: 2024-02-01 | End: 2024-02-04 | Stop reason: HOSPADM

## 2024-02-01 RX ADMIN — AMLODIPINE BESYLATE 5 MG: 5 TABLET ORAL at 08:05

## 2024-02-01 RX ADMIN — HYDROCORTISONE: 1 CREAM TOPICAL at 08:11

## 2024-02-01 RX ADMIN — SIROLIMUS 2 MG: 2 TABLET ORAL at 08:05

## 2024-02-01 RX ADMIN — SENNOSIDES AND DOCUSATE SODIUM 1 TABLET: 8.6; 5 TABLET ORAL at 19:58

## 2024-02-01 RX ADMIN — MICAFUNGIN SODIUM 150 MG: 50 INJECTION, POWDER, LYOPHILIZED, FOR SOLUTION INTRAVENOUS at 16:36

## 2024-02-01 RX ADMIN — DIPHENHYDRAMINE HYDROCHLORIDE AND LIDOCAINE HYDROCHLORIDE AND ALUMINUM HYDROXIDE AND MAGNESIUM HYDRO 10 ML: KIT at 19:59

## 2024-02-01 RX ADMIN — MAGNESIUM SULFATE HEPTAHYDRATE: 500 INJECTION, SOLUTION INTRAMUSCULAR; INTRAVENOUS at 20:07

## 2024-02-01 RX ADMIN — TRIAMCINOLONE ACETONIDE: 1 CREAM TOPICAL at 20:12

## 2024-02-01 RX ADMIN — URSODIOL 300 MG: 300 CAPSULE ORAL at 14:25

## 2024-02-01 RX ADMIN — URSODIOL 300 MG: 300 CAPSULE ORAL at 19:58

## 2024-02-01 RX ADMIN — PANTOPRAZOLE SODIUM 40 MG: 40 TABLET, DELAYED RELEASE ORAL at 08:05

## 2024-02-01 RX ADMIN — ACYCLOVIR 800 MG: 800 TABLET ORAL at 19:58

## 2024-02-01 RX ADMIN — OLANZAPINE 5 MG: 5 TABLET, FILM COATED ORAL at 21:31

## 2024-02-01 RX ADMIN — MAGNESIUM SULFATE IN WATER 2 G: 40 INJECTION, SOLUTION INTRAVENOUS at 06:26

## 2024-02-01 RX ADMIN — MYCOPHENOLATE MOFETIL 1250 MG: 500 INJECTION, POWDER, LYOPHILIZED, FOR SOLUTION INTRAVENOUS at 20:02

## 2024-02-01 RX ADMIN — Medication 5 ML: at 17:38

## 2024-02-01 RX ADMIN — AMLODIPINE BESYLATE 5 MG: 5 TABLET ORAL at 19:58

## 2024-02-01 RX ADMIN — SODIUM CHLORIDE 480 MG: 9 INJECTION, SOLUTION INTRAVENOUS at 15:28

## 2024-02-01 RX ADMIN — Medication 5 ML: at 11:00

## 2024-02-01 RX ADMIN — TRIAMCINOLONE ACETONIDE: 1 CREAM TOPICAL at 11:02

## 2024-02-01 RX ADMIN — OLIVE OIL AND SOYBEAN OIL 250 ML: 16; 4 INJECTION, EMULSION INTRAVENOUS at 20:11

## 2024-02-01 RX ADMIN — URSODIOL 300 MG: 300 CAPSULE ORAL at 08:05

## 2024-02-01 RX ADMIN — ACYCLOVIR 800 MG: 800 TABLET ORAL at 08:04

## 2024-02-01 RX ADMIN — OXYCODONE HYDROCHLORIDE 5 MG: 5 TABLET ORAL at 19:58

## 2024-02-01 RX ADMIN — MYCOPHENOLATE MOFETIL 1250 MG: 500 INJECTION, POWDER, LYOPHILIZED, FOR SOLUTION INTRAVENOUS at 08:01

## 2024-02-01 RX ADMIN — TRIAMCINOLONE ACETONIDE: 1 CREAM TOPICAL at 14:26

## 2024-02-01 ASSESSMENT — ACTIVITIES OF DAILY LIVING (ADL)
ADLS_ACUITY_SCORE: 23

## 2024-02-01 NOTE — PROGRESS NOTES
BMT CLINICAL SOCIAL WORK NOTE:    Focus: Supportive Counseling    Data: Pt is a 53 year old female, currently day +21 s/p allo BMT.    Interventions: Clinical  (CSW) received a message that the pt had questions. Call placed to the pt and discussed her questions around her Asa Abhay, CSW confirmed that the abhay would be sent to her directly and then she will need to give to her landlord. Pt shared she will have her boyfriend check the mail for this check. Discussed that SW would stop by over the weekend and confirm they found the check. CSW provided empathic listening, validation of concerns, and encouragement. CSW encouraged Pt to contact CSW for support, questions and/or resources.     Assessment: Pt presented as friendly and open.  Pt appears to be coping well at this time. Pt continues to be supported by her boyfriend and family.     Plan: CSW will continue to provide supportive counseling and assistance with resources as needed. CSW will continue to collaborate with multidisciplinary team regarding Pt's plan of care.     CLIFTON Ryan, [facility]  Pager: [phone]  Phone: [phone]

## 2024-02-01 NOTE — PLAN OF CARE
"Vital signs:  Temp: 98.3  F (36.8  C) Temp src: Oral BP: 126/83 Pulse: 98   Resp: 16 SpO2: 97 % O2 Device: None (Room air) Oxygen Delivery: 2 LPM Height: 157.5 cm (5' 2.01\") Weight: 78.5 kg (173 lb)  Estimated body mass index is 31.63 kg/m  as calculated from the following:    Height as of this encounter: 1.575 m (5' 2.01\").    Weight as of this encounter: 78.5 kg (173 lb).     Irma has had a quiet shift.  She endorses feeling tired easily and has been taking frequent naps.  She has also been having diarrhea but she feels like this is slowly improving.  Appetite as well is slowly improving and she is trying to eat more.  Up independently.  Rash unchanged on her upper arms/chest and upper back.  TMC cream applied.  Acyclovir and protonix changed to oral.  To continue her plan of care.      Problem: Adult Inpatient Plan of Care  Goal: Plan of Care Review  Description: The Plan of Care Review/Shift note should be completed every shift.  The Outcome Evaluation is a brief statement about your assessment that the patient is improving, declining, or no change.  This information will be displayed automatically on your shift  note.  Outcome: Progressing  Flowsheets (Taken 2/1/2024 1302)  Plan of Care Reviewed With: patient  Overall Patient Progress: no change  Goal: Optimal Comfort and Wellbeing  Outcome: Progressing  Intervention: Monitor Pain and Promote Comfort  Recent Flowsheet Documentation  Taken 2/1/2024 0812 by Bernarda Vee RN  Pain Management Interventions: declines     Problem: Stem Cell/Bone Marrow Transplant  Goal: Symptom-Free Urinary Elimination  Outcome: Progressing  Intervention: Prevent or Manage Bladder Irritation  Recent Flowsheet Documentation  Taken 2/1/2024 0900 by Bernarda Vee RN  Hyperhydration Management: fluids provided  Taken 2/1/2024 0812 by Bernarda Vee RN  Pain Management Interventions: declines  Goal: Diarrhea Symptom Control  Outcome: Progressing  Intervention: " Manage Diarrhea  Recent Flowsheet Documentation  Taken 2/1/2024 0900 by Bernarda Vee RN  Fluid/Electrolyte Management: fluids provided  Goal: Improved Activity Tolerance  Outcome: Progressing  Intervention: Promote Improved Energy  Recent Flowsheet Documentation  Taken 2/1/2024 0900 by Bernarda Vee RN  Fatigue Management: frequent rest breaks encouraged  Activity Management:   activity adjusted per tolerance   ambulated in room   ambulated to bathroom   up ad jeff  Goal: Absence of Infection  Outcome: Progressing  Intervention: Prevent and Manage Infection  Recent Flowsheet Documentation  Taken 2/1/2024 0900 by Bernarda Vee RN  Infection Prevention:   hand hygiene promoted   personal protective equipment utilized   rest/sleep promoted  Isolation Precautions: protective environment maintained  Goal: Improved Oral Mucous Membrane Health and Integrity  Outcome: Progressing  Intervention: Promote Oral Comfort and Health  Recent Flowsheet Documentation  Taken 2/1/2024 0900 by Bernarda Vee RN  Oral Mucous Membrane Protection: lip/mouth moisturizer applied  Goal: Nausea and Vomiting Symptom Relief  Outcome: Progressing  Goal: Optimal Nutrition Intake  Outcome: Progressing     Problem: Adult Inpatient Plan of Care  Goal: Absence of Hospital-Acquired Illness or Injury  Intervention: Identify and Manage Fall Risk  Recent Flowsheet Documentation  Taken 2/1/2024 0900 by Bernarda Vee RN  Safety Promotion/Fall Prevention:   clutter free environment maintained   assistive device/personal items within reach   lighting adjusted   nonskid shoes/slippers when out of bed   patient and family education   safety round/check completed  Intervention: Prevent Skin Injury  Recent Flowsheet Documentation  Taken 2/1/2024 0900 by Bernarda Vee RN  Body Position: position changed independently  Intervention: Prevent Infection  Recent Flowsheet Documentation  Taken 2/1/2024 0900 by Mariusz  Bernarda CAZARES RN  Infection Prevention:   hand hygiene promoted   personal protective equipment utilized   rest/sleep promoted     Problem: Stem Cell/Bone Marrow Transplant  Goal: Optimal Coping with Transplant  Intervention: Optimize Patient/Family Adjustment to Transplant  Recent Flowsheet Documentation  Taken 2/1/2024 0900 by Bernarda Vee RN  Supportive Measures:   active listening utilized   positive reinforcement provided   self-care encouraged   self-responsibility promoted   verbalization of feelings encouraged  Goal: Blood Counts Within Acceptable Range  Intervention: Monitor and Manage Hematologic Symptoms  Recent Flowsheet Documentation  Taken 2/1/2024 0900 by Bernarda Vee RN  Bleeding Precautions:   blood pressure closely monitored   monitored for signs of bleeding  Medication Review/Management: medications reviewed

## 2024-02-01 NOTE — PLAN OF CARE
"Patient remains hospitalized following stem cell transplant, currently day +20. Continues on ppx antimicrobials. Continues on IV MMF. Tac gtt stopped and sirolimus initiated today. Appetite somewhat better today - eating a few bites for each meal and some snacks. Calorie counts continue. Continues on TPN/lipids. Oxy given x1 for throat pain this shift. 1 loose, watery BMs this shift. Denied nausea today. K & Mag replaced early this AM. Mag replaced this afternoon again, per protocol. Rechecks planned for tomorrow AM. Complained of back, abdominal, chest and BUEs itching throughout shift - faint, pink rash noted. Benadryl cream used 3x today. Ambulating independently. VSS.       Problem: Adult Inpatient Plan of Care  Goal: Plan of Care Review  Description: The Plan of Care Review/Shift note should be completed every shift.  The Outcome Evaluation is a brief statement about your assessment that the patient is improving, declining, or no change.  This information will be displayed automatically on your shift  note.  Outcome: Progressing  Goal: Patient-Specific Goal (Individualized)  Description: You can add care plan individualizations to a care plan. Examples of Individualization might be:  \"Parent requests to be called daily at 9am for status\", \"I have a hard time hearing out of my right ear\", or \"Do not touch me to wake me up as it startles  me\".  Outcome: Progressing  Goal: Absence of Hospital-Acquired Illness or Injury  Outcome: Progressing  Intervention: Identify and Manage Fall Risk  Recent Flowsheet Documentation  Taken 1/31/2024 1600 by Minerva Vaz, RN  Safety Promotion/Fall Prevention:   assistive device/personal items within reach   chemotherapeutic precautions   clutter free environment maintained   lighting adjusted   nonskid shoes/slippers when out of bed   patient and family education   room organization consistent   safety round/check completed  Taken 1/31/2024 1200 by Minerva Vaz, RN  Safety " Promotion/Fall Prevention:   assistive device/personal items within reach   chemotherapeutic precautions   clutter free environment maintained   lighting adjusted   nonskid shoes/slippers when out of bed   patient and family education   room organization consistent   safety round/check completed  Taken 1/31/2024 0900 by Minerva Vaz RN  Safety Promotion/Fall Prevention:   assistive device/personal items within reach   chemotherapeutic precautions   clutter free environment maintained   lighting adjusted   nonskid shoes/slippers when out of bed   patient and family education   room organization consistent   safety round/check completed  Intervention: Prevent Skin Injury  Recent Flowsheet Documentation  Taken 1/31/2024 1700 by Minerva Vaz RN  Body Position: sitting up in bed  Taken 1/31/2024 1518 by Minerva Vaz RN  Body Position:   position changed independently   supine  Taken 1/31/2024 1400 by Minerva Vaz RN  Body Position:   position changed independently   supine  Taken 1/31/2024 1300 by Minerva Vaz RN  Body Position:   position changed independently   supine  Taken 1/31/2024 1200 by Minerva Vaz RN  Body Position: supine  Taken 1/31/2024 0900 by Minerva Vaz RN  Body Position:   position changed independently   supine  Taken 1/31/2024 0800 by Minerva Vaz RN  Body Position:   position changed independently   right   side-lying  Goal: Optimal Comfort and Wellbeing  Outcome: Progressing  Intervention: Monitor Pain and Promote Comfort  Recent Flowsheet Documentation  Taken 1/31/2024 1518 by Minerva Vaz RN  Pain Management Interventions: declines  Taken 1/31/2024 1129 by Minerva Vaz RN  Pain Management Interventions: medication (see MAR)  Taken 1/31/2024 0900 by Minerva Vaz RN  Pain Management Interventions: declines  Goal: Readiness for Transition of Care  Outcome: Progressing     Problem: Stem Cell/Bone Marrow Transplant  Goal: Optimal Coping with  Transplant  Outcome: Progressing  Goal: Symptom-Free Urinary Elimination  Outcome: Progressing  Intervention: Prevent or Manage Bladder Irritation  Recent Flowsheet Documentation  Taken 1/31/2024 1518 by Minerva Vaz RN  Pain Management Interventions: declines  Taken 1/31/2024 1129 by Minerva Vaz RN  Pain Management Interventions: medication (see MAR)  Taken 1/31/2024 0900 by Minerva Vaz RN  Pain Management Interventions: declines  Goal: Diarrhea Symptom Control  Outcome: Progressing  Goal: Improved Activity Tolerance  Outcome: Progressing  Intervention: Promote Improved Energy  Recent Flowsheet Documentation  Taken 1/31/2024 1518 by Minerva Vaz RN  Activity Management: ambulated in room  Taken 1/31/2024 1131 by Minerva Vaz RN  Activity Management: sitting, edge of bed  Taken 1/31/2024 1000 by Minerva Vaz RN  Activity Management: ambulated to bathroom  Goal: Blood Counts Within Acceptable Range  Outcome: Progressing  Intervention: Monitor and Manage Hematologic Symptoms  Recent Flowsheet Documentation  Taken 1/31/2024 1600 by Minerva Vaz RN  Medication Review/Management:   medications reviewed   high-risk medications identified  Taken 1/31/2024 1200 by Minerva Vaz RN  Medication Review/Management:   medications reviewed   high-risk medications identified  Taken 1/31/2024 0900 by Minerva Vaz RN  Medication Review/Management:   medications reviewed   high-risk medications identified  Goal: Absence of Hypersensitivity Reaction  Outcome: Progressing  Goal: Absence of Infection  Outcome: Progressing  Intervention: Prevent and Manage Infection  Recent Flowsheet Documentation  Taken 1/31/2024 1600 by Minerva Vaz RN  Isolation Precautions: cytotoxic precautions maintained  Taken 1/31/2024 1200 by Minerva Vaz RN  Isolation Precautions: cytotoxic precautions maintained  Taken 1/31/2024 0900 by Minerva Vaz RN  Isolation Precautions: cytotoxic precautions  maintained  Goal: Improved Oral Mucous Membrane Health and Integrity  Outcome: Progressing  Goal: Nausea and Vomiting Symptom Relief  Outcome: Progressing  Goal: Optimal Nutrition Intake  Outcome: Progressing   Goal Outcome Evaluation:

## 2024-02-01 NOTE — PROGRESS NOTES
"  BMT Daily Progress Note  Patient ID:  Irma Foreman is a 53 year old female with a PMH of MDS, warm AIHA, transfusion and transfusion dependent anemia presented to  to undergo a myeloablative allogeneic transplant. She was recently COVID+ on 12/26 but tested negative on 1/2 with resolution of nearly all URI symptoms. Undergoing MA (Bu/Flu) 6/8 URD Allo transplant, day +21      HPI   Irma reports a better appetite, no nausea, loose stools x1, persistent itching of her shoulders, thighs, back and chest. Topical hydrocortisone and benadryl somewhat helpful. Starting TCM cream today and will consider PO steroids tomorrow if no response. Changed IV protonix and ACV to PO. Plan to change the remaining meds tomorrow. Cycling TPN, plan to discontinue tomorrow.       Review of Systems    Negative unless stated in the HPI.     PHYSICAL EXAM      Weight     Wt Readings from Last 3 Encounters:   02/01/24 78.5 kg (173 lb)   12/26/23 83.1 kg (183 lb 4.8 oz)   12/19/23 83.5 kg (184 lb)        KPS: 70    /87 (BP Location: Right arm)   Pulse 90   Temp 98.2  F (36.8  C) (Oral)   Resp 16   Ht 1.575 m (5' 2.01\")   Wt 78.5 kg (173 lb)   LMP 11/05/2017   SpO2 100%   BMI 31.63 kg/m       General: NAD   Eyes: KEVON, sclera anicteric   Nose/Mouth/Throat: thick secretions, no ulcers visualized.   Lungs: CTAB   Cardiovascular: RRR, no M/R/G   Abdominal/Rectal: +BS, soft, non tender   Lymphatics: No edema  Skin: wart under right foot dime sized white, cauliflower and slightly ulcerated; Two pin point sized, flesh colored warts on right hand; Light pink faint macular rash on back, maculopapular rash on shoulders and chest.   Neuro: A&O   Additional Findings: Gastelum site NT, no drainage.    Current aGVHD staging:  Skin 0, UGI 0, LGI 0, Liver 0 (keep in note through day +180 for allos)      LABS AND IMAGING: I have assessed all abnormal lab values for their clinical significance and any values considered clinically significant " have been addressed in the assessment and plan.        Lab Results   Component Value Date    WBC 4.2 02/01/2024    ANEUTAUTO 9.5 (H) 01/27/2024    HGB 8.0 (L) 02/01/2024    HCT 24.1 (L) 02/01/2024    PLT 39 (LL) 02/01/2024     02/01/2024    POTASSIUM 4.7 02/01/2024    CHLORIDE 103 02/01/2024    CO2 29 02/01/2024    GLC 99 02/01/2024    BUN 15.8 02/01/2024    CR 0.39 (L) 02/01/2024    MAG 1.7 02/01/2024    INR 1.04 01/29/2024       US Abdominal with Doppler (1/18/24)  Impression:   1. Patent Doppler evaluation of the abdomen with antegrade flow throughout.  2. Elevated hepatic artery resistive index of 0.81, nonspecific though can be seen with hepatic venous congestion which could be related to venous occlusive disease..    SYSTEMS-BASED ASSESSMENT AND PLAN     Hortencia Baldwin is a 53 year old female with a history of MDS, undergoing MA (Bu/Flu) 6/8 URD Allo transplant, day +21      BMT/IEC PROTOCOL for MT 2015-29 **according to but not on trial**   - Chemo protocol:  Day -6 through day -1: Keppra and Allopurinol   Day -5 through -2: Bu/Flu. Busulfan levels adjusted by pharm, attending  Day -1: Rest day   Day 0: Transplant. Recipient: O+, CMV+. Donor: O+, CMV-. No flush. Cell dose 6.5 x10^6.   Gram+ bacilli (cutibacterium proprionibacterium) cultured for stem cell product. On cefepime (1/11 - 1/18). Blood cx no growth x10 bottles.   - Restaging plan: per protocol   Plan to do BM bx outpatient.     HEME/COAG  - Pancytopenia due to chemotherapy   # Iron overload: hx of transfusion dependent anemia, pre-transplant ferritin around 5,000. Recommend phlebotomy post transplant when retic count is restored and Hgb >10    # Platelet refractoriness: BID platelet checks discontinued 1/26. Responding to HLA platelets.   Transfusion medicine consulted 1/18: HLA typing completed.   - Transfusion parameters: hemoglobin <7, platelets <10  - Last dose of scheduled GCSF on 1/26. Give PRN for ANC < 1    IMMUNOCOMPROMISED  # Fever:  (Last on 1/15) Recurrent on 1/21- tmax 100.7.  - UA: negative for LE & nitrites. 1/13 ua negative; BK urine positive/adeno negative (red/orange urine)  - Blood cultures NGTD. Procal elevated 1.08   - CXR with likely pulmonary edema  - RVP neg  - Cefepime (1/11 - 1/18; 1/22-1/26). Brief course of IV Vancomycin. MRSA nasal swab negative.   # Dysuria: see Renal below.   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 Tac drip discontinued 1/31, start Sirolimus.   - 1/31: Sirolimus 9 mg loading dose, 2 mg/day and first level on 2/2     CARDIOVASCULAR  # Chest pressure: new on 1/15, worse with sitting up/activity, better while laying flat. RESOLVED.   EKG: new QR pattern in V1, suggests right ventricular conduction delay  Troponin negative x1   Echo (1/15): LVEF 55-60%, no pericardial effusion present. Cardiology consulted: no further recs   #Prolonged QTc to 507 on 1/22.   - Changed levaquin to cefpodoxime. Repeated QTc on 1/22: 466.   # Hypertension with initiation of tacrolimus: Amlodipine 5 mg BID. Labetalol 5 mg IV prn with parameters.   - Risk of cardiomyopathy: Baseline EF 60-65%  - Risk of arrhythmia: Baseline EKG showed sinus rhythm     RESPIRATORY  - Current smoker: states she quit (1/5/24), offered nicotine replacement but she declined.   - Baseline PFTs: The FEV1, FVC, FEV1/FVC ratio and WXH99-21% are within normal limits.  The inspiratory flow rates are within normal limits.  Lung volumes are within normal limits.  The diffusing capacity is normal.     GI/NUTRITION  # Epigastric pain (1/18): RUQ ultrasound showing patent doppler throughout, no ascites, nonspecific elevated hepatic artery restrictive index (see full  impression above). Weight down, LFT's WNL, no abdominal pain. Continue to monitor closely for VOD; checking hepatic panel daily.     - PPI daily  - Ulcer prophylaxis: PPI  - VOD ppx: Ursodiol   # Moderate malnutrition in the context of acute illness: dietitian following.   Start TPN (1/29 - x), cycling TPN 1/31   Zyprexa started 1/29, EKG (1/30) NSR Qtc 454  Zofran every AM   # Mouth pain likely 2/2 the start of oral mucositis: MMW prn, oxy 5 mg Q4H prn   # Nausea from chemo/radiation: Zyprexa 5 mg HS (1/29), compazine PRN, Lorazepam prn. Avoiding zofran given hx of QTc prolongation as above.     RENAL / :  # Dysuria (1/26): UA showing small blood, 58 RBC's. BK 4.52 million -- no hematuria; monitoring closely. Adeno pending. Pyridium x4 days. Resolved.     DERM:  # Right hand dryness, try aquafor with gloves on overnight and as tolerated during the day  # Foot, hand warts. Curbside derm on 1/8, no recs while inpatient, typically managed OP.    # Pink maculopapular rash present under breasts (resolved), across abdomen (Resolved) & lower back (faint). TCM prescribed.     MUSCULOSKELETAL/FRAILTY  - Baseline Frailty Score: 3  - Daily PT/OT as needed while inpatient  - Cancer Rehab as needed outpatient    SOCIAL DETERMINANTS  - Caregiver: Keira Cardenas & Rickey Neal       Disposition: remain admitted through resolution of oral mucositis & appropriate PO intake. Plan to discharge on 2/4.         Known issues that I take into account for medical decisions, with salient changes to the plan considering these complexities noted above.    Patient Active Problem List   Diagnosis    Left medial knee pain    Obesity (BMI 30-39.9)    Anemia, unspecified type    Macrocytic anemia    MDS (myelodysplastic syndrome) (H)     Clinically Significant Risk Factors            # Hypomagnesemia: Lowest Mg = 1.4 mg/dL in last 2 days, will replace as needed   # Hypoalbuminemia: Lowest albumin = 3.2 g/dL at 1/25/2024  3:06 AM, will  "monitor as appropriate   # Thrombocytopenia: Lowest platelets = 32 in last 2 days, will monitor for bleeding          # Obesity: Estimated body mass index is 31.63 kg/m  as calculated from the following:    Height as of this encounter: 1.575 m (5' 2.01\").    Weight as of this encounter: 78.5 kg (173 lb).   # Severe Malnutrition: based on nutrition assessment            I spent 40 minutes in the care of this patient today, which included time necessary for preparation for the visit, obtaining history, ordering medications/tests/procedures as medically indicated, review of pertinent medical literature, counseling of the patient, communication of recommendations to the care team, and documentation time.    King Vaz PA-C  [phone]    "

## 2024-02-01 NOTE — PLAN OF CARE
"Goal Outcome Evaluation:         Blood pressure 129/78, pulse 89, temperature 98.2  F (36.8  C), temperature source Axillary, resp. rate 16, height 1.575 m (5' 2.01\"), weight 78.9 kg (173 lb 14.4 oz), last menstrual period 11/05/2017, SpO2 98%, not currently breastfeeding.    AVSS, A/O x 4. Pt is on room air with 02 saturations in high 90 %. Pt complained of throat pain and requested Oxydone 5 mg and magic mouthwash with good relief. She gets up independently to the bathroom and she is voiding freely. No stool reported/noted on this shift. Started cycling TPN over 12  hours. CVC dressing is clean dry and intact and with positive blood returns. Low Magnesium level 3.7 and she is getting 2 g of Magnesium Sulfate per protocol. Continue to  monitor pt and follow plan of care.      Problem: Adult Inpatient Plan of Care  Goal: Plan of Care Review  Description: The Plan of Care Review/Shift note should be completed every shift.  The Outcome Evaluation is a brief statement about your assessment that the patient is improving, declining, or no change.  This information will be displayed automatically on your shift  note.  Outcome: Progressing  Goal: Patient-Specific Goal (Individualized)  Description: You can add care plan individualizations to a care plan. Examples of Individualization might be:  \"Parent requests to be called daily at 9am for status\", \"I have a hard time hearing out of my right ear\", or \"Do not touch me to wake me up as it startles  me\".  Outcome: Progressing  Goal: Absence of Hospital-Acquired Illness or Injury  Outcome: Progressing  Intervention: Identify and Manage Fall Risk  Recent Flowsheet Documentation  Taken 2/1/2024 0400 by Ama Larkin RN  Safety Promotion/Fall Prevention:   lighting adjusted   clutter free environment maintained   increase visualization of patient   nonskid shoes/slippers when out of bed   room near nurse's station   room organization consistent  Taken 2/1/2024 0000 by " Nyafli, Ama O, RN  Safety Promotion/Fall Prevention:   clutter free environment maintained   lighting adjusted   nonskid shoes/slippers when out of bed   room near nurse's station   room organization consistent  Taken 1/31/2024 2000 by Ama Larkin RN  Safety Promotion/Fall Prevention:   lighting adjusted   clutter free environment maintained   increase visualization of patient   nonskid shoes/slippers when out of bed   room near nurse's station   room organization consistent  Intervention: Prevent Skin Injury  Recent Flowsheet Documentation  Taken 2/1/2024 0400 by Ama Larkin RN  Body Position: position changed independently  Taken 2/1/2024 0000 by Ama Larkin RN  Body Position: position changed independently  Taken 1/31/2024 2000 by Ama Larkin RN  Body Position: position changed independently  Intervention: Prevent Infection  Recent Flowsheet Documentation  Taken 2/1/2024 0400 by Ama Larkin RN  Infection Prevention:   environmental surveillance performed   single patient room provided   hand hygiene promoted   personal protective equipment utilized   equipment surfaces disinfected   rest/sleep promoted   visitors restricted/screened  Taken 2/1/2024 0000 by Ama Larkin RN  Infection Prevention:   environmental surveillance performed   single patient room provided   hand hygiene promoted   equipment surfaces disinfected   personal protective equipment utilized   rest/sleep promoted   visitors restricted/screened  Taken 1/31/2024 2000 by Ama Larkin RN  Infection Prevention:   environmental surveillance performed   single patient room provided   hand hygiene promoted   personal protective equipment utilized   equipment surfaces disinfected   rest/sleep promoted   visitors restricted/screened  Goal: Optimal Comfort and Wellbeing  Outcome: Progressing     Problem: Stem Cell/Bone Marrow Transplant  Goal: Optimal Coping with Transplant  Outcome:  Progressing  Intervention: Optimize Patient/Family Adjustment to Transplant  Recent Flowsheet Documentation  Taken 2/1/2024 0400 by Ama Larkin RN  Supportive Measures:   active listening utilized   verbalization of feelings encouraged  Taken 1/31/2024 2000 by Ama Larkin RN  Supportive Measures:   active listening utilized   verbalization of feelings encouraged  Goal: Symptom-Free Urinary Elimination  Outcome: Progressing  Intervention: Prevent or Manage Bladder Irritation  Recent Flowsheet Documentation  Taken 2/1/2024 0400 by Ama Larkin RN  Hyperhydration Management: fluids provided  Taken 1/31/2024 2000 by Ama Larkin RN  Hyperhydration Management: fluids provided  Goal: Diarrhea Symptom Control  Outcome: Progressing  Goal: Improved Activity Tolerance  Outcome: Progressing  Intervention: Promote Improved Energy  Recent Flowsheet Documentation  Taken 2/1/2024 0400 by Ama Larkin RN  Fatigue Management:   frequent rest breaks encouraged   paced activity encouraged  Activity Management:   activity adjusted per tolerance   up ad jeff   ambulated to bathroom  Taken 2/1/2024 0000 by Ama Larkin RN  Fatigue Management: frequent rest breaks encouraged  Activity Management:   activity adjusted per tolerance   up ad jeff   ambulated to bathroom  Taken 1/31/2024 2000 by Ama Larkin RN  Fatigue Management:   frequent rest breaks encouraged   paced activity encouraged  Activity Management:   activity adjusted per tolerance   up ad jeff   ambulated to bathroom  Goal: Blood Counts Within Acceptable Range  Outcome: Progressing  Intervention: Monitor and Manage Hematologic Symptoms  Recent Flowsheet Documentation  Taken 2/1/2024 0400 by Ama Larkin RN  Bleeding Precautions: blood pressure closely monitored  Medication Review/Management: medications reviewed  Taken 2/1/2024 0000 by Ama Larkin RN  Bleeding Precautions: blood pressure closely monitored  Medication  Review/Management: medications reviewed  Taken 1/31/2024 2000 by Ama Larkin RN  Bleeding Precautions: blood pressure closely monitored  Medication Review/Management: medications reviewed  Goal: Absence of Hypersensitivity Reaction  Outcome: Progressing  Goal: Absence of Infection  Outcome: Progressing  Intervention: Prevent and Manage Infection  Recent Flowsheet Documentation  Taken 2/1/2024 0400 by Ama Larkin RN  Infection Prevention:   environmental surveillance performed   single patient room provided   hand hygiene promoted   personal protective equipment utilized   equipment surfaces disinfected   rest/sleep promoted   visitors restricted/screened  Infection Management: aseptic technique maintained  Isolation Precautions: protective environment maintained  Taken 2/1/2024 0000 by Ama Larkin RN  Infection Prevention:   environmental surveillance performed   single patient room provided   hand hygiene promoted   equipment surfaces disinfected   personal protective equipment utilized   rest/sleep promoted   visitors restricted/screened  Infection Management: aseptic technique maintained  Isolation Precautions: protective environment maintained  Taken 1/31/2024 2000 by Ama Larkin RN  Infection Prevention:   environmental surveillance performed   single patient room provided   hand hygiene promoted   personal protective equipment utilized   equipment surfaces disinfected   rest/sleep promoted   visitors restricted/screened  Infection Management: aseptic technique maintained  Isolation Precautions: protective environment maintained  Goal: Improved Oral Mucous Membrane Health and Integrity  Outcome: Progressing  Intervention: Promote Oral Comfort and Health  Recent Flowsheet Documentation  Taken 2/1/2024 0400 by Ama Larkin RN  Oral Mucous Membrane Protection: lip/mouth moisturizer applied  Taken 2/1/2024 0000 by Ama Larkin RN  Oral Mucous Membrane Protection: lip/mouth  moisturizer applied  Taken 1/31/2024 2000 by Ama Larkin RN  Oral Mucous Membrane Protection: lip/mouth moisturizer applied  Goal: Nausea and Vomiting Symptom Relief  Outcome: Progressing  Intervention: Prevent and Manage Nausea and Vomiting  Recent Flowsheet Documentation  Taken 2/1/2024 0400 by Ama Larkin RN  Nausea/Vomiting Interventions: antiemetic  Taken 1/31/2024 2000 by Ama Larkin RN  Nausea/Vomiting Interventions: antiemetic  Goal: Optimal Nutrition Intake  Outcome: Progressing

## 2024-02-02 LAB
ALBUMIN SERPL BCG-MCNC: 4 G/DL (ref 3.5–5.2)
ALP SERPL-CCNC: 111 U/L (ref 40–150)
ALT SERPL W P-5'-P-CCNC: 61 U/L (ref 0–50)
ANION GAP SERPL CALCULATED.3IONS-SCNC: 10 MMOL/L (ref 7–15)
AST SERPL W P-5'-P-CCNC: 49 U/L (ref 0–45)
BASOPHILS # BLD AUTO: ABNORMAL 10*3/UL
BASOPHILS # BLD MANUAL: 0 10E3/UL (ref 0–0.2)
BASOPHILS NFR BLD AUTO: ABNORMAL %
BASOPHILS NFR BLD MANUAL: 0 %
BILIRUB SERPL-MCNC: 0.3 MG/DL
BUN SERPL-MCNC: 16.5 MG/DL (ref 6–20)
CALCIUM SERPL-MCNC: 9.2 MG/DL (ref 8.6–10)
CHLORIDE SERPL-SCNC: 102 MMOL/L (ref 98–107)
CMV DNA SPEC NAA+PROBE-ACNC: <35 IU/ML
CMV DNA SPEC NAA+PROBE-LOG#: <1.5 {LOG_COPIES}/ML
CREAT SERPL-MCNC: 0.4 MG/DL (ref 0.51–0.95)
DEPRECATED HCO3 PLAS-SCNC: 28 MMOL/L (ref 22–29)
EGFRCR SERPLBLD CKD-EPI 2021: >90 ML/MIN/1.73M2
EOSINOPHIL # BLD AUTO: ABNORMAL 10*3/UL
EOSINOPHIL # BLD MANUAL: 0 10E3/UL (ref 0–0.7)
EOSINOPHIL NFR BLD AUTO: ABNORMAL %
EOSINOPHIL NFR BLD MANUAL: 0 %
ERYTHROCYTE [DISTWIDTH] IN BLOOD BY AUTOMATED COUNT: 20.7 % (ref 10–15)
GLUCOSE SERPL-MCNC: 109 MG/DL (ref 70–99)
HCT VFR BLD AUTO: 26.4 % (ref 35–47)
HGB BLD-MCNC: 8.8 G/DL (ref 11.7–15.7)
IMM GRANULOCYTES # BLD: ABNORMAL 10*3/UL
IMM GRANULOCYTES NFR BLD: ABNORMAL %
LYMPHOCYTES # BLD AUTO: ABNORMAL 10*3/UL
LYMPHOCYTES # BLD MANUAL: 0.2 10E3/UL (ref 0.8–5.3)
LYMPHOCYTES NFR BLD AUTO: ABNORMAL %
LYMPHOCYTES NFR BLD MANUAL: 6 %
MAGNESIUM SERPL-MCNC: 1.7 MG/DL (ref 1.7–2.3)
MCH RBC QN AUTO: 33.2 PG (ref 26.5–33)
MCHC RBC AUTO-ENTMCNC: 33.3 G/DL (ref 31.5–36.5)
MCV RBC AUTO: 100 FL (ref 78–100)
METAMYELOCYTES # BLD MANUAL: 0.1 10E3/UL
METAMYELOCYTES NFR BLD MANUAL: 3 %
MONOCYTES # BLD AUTO: ABNORMAL 10*3/UL
MONOCYTES # BLD MANUAL: 0.5 10E3/UL (ref 0–1.3)
MONOCYTES NFR BLD AUTO: ABNORMAL %
MONOCYTES NFR BLD MANUAL: 11 %
NEUTROPHILS # BLD AUTO: ABNORMAL 10*3/UL
NEUTROPHILS # BLD MANUAL: 3.3 10E3/UL (ref 1.6–8.3)
NEUTROPHILS NFR BLD AUTO: ABNORMAL %
NEUTROPHILS NFR BLD MANUAL: 80 %
NRBC # BLD AUTO: 0.1 10E3/UL
NRBC # BLD AUTO: 0.1 10E3/UL
NRBC BLD AUTO-RTO: 3 /100
NRBC BLD MANUAL-RTO: 2 %
PHOSPHATE SERPL-MCNC: 4 MG/DL (ref 2.5–4.5)
PLAT MORPH BLD: ABNORMAL
PLATELET # BLD AUTO: 56 10E3/UL (ref 150–450)
POTASSIUM SERPL-SCNC: 4.6 MMOL/L (ref 3.4–5.3)
PROT SERPL-MCNC: 6.4 G/DL (ref 6.4–8.3)
RBC # BLD AUTO: 2.65 10E6/UL (ref 3.8–5.2)
RBC MORPH BLD: ABNORMAL
SIROLIMUS BLD-MCNC: 9.7 UG/L (ref 5–15)
SODIUM SERPL-SCNC: 140 MMOL/L (ref 135–145)
TME LAST DOSE: NORMAL H
TME LAST DOSE: NORMAL H
WBC # BLD AUTO: 4.1 10E3/UL (ref 4–11)

## 2024-02-02 PROCEDURE — 80195 ASSAY OF SIROLIMUS: CPT | Performed by: STUDENT IN AN ORGANIZED HEALTH CARE EDUCATION/TRAINING PROGRAM

## 2024-02-02 PROCEDURE — 250N000013 HC RX MED GY IP 250 OP 250 PS 637

## 2024-02-02 PROCEDURE — 258N000003 HC RX IP 258 OP 636

## 2024-02-02 PROCEDURE — 206N000001 HC R&B BMT UMMC

## 2024-02-02 PROCEDURE — 99233 SBSQ HOSP IP/OBS HIGH 50: CPT | Mod: FS

## 2024-02-02 PROCEDURE — 250N000012 HC RX MED GY IP 250 OP 636 PS 637

## 2024-02-02 PROCEDURE — 83735 ASSAY OF MAGNESIUM: CPT

## 2024-02-02 PROCEDURE — 250N000011 HC RX IP 250 OP 636: Mod: JZ

## 2024-02-02 PROCEDURE — 85027 COMPLETE CBC AUTOMATED: CPT

## 2024-02-02 PROCEDURE — 80053 COMPREHEN METABOLIC PANEL: CPT

## 2024-02-02 PROCEDURE — 250N000013 HC RX MED GY IP 250 OP 250 PS 637: Performed by: STUDENT IN AN ORGANIZED HEALTH CARE EDUCATION/TRAINING PROGRAM

## 2024-02-02 PROCEDURE — 250N000011 HC RX IP 250 OP 636: Performed by: STUDENT IN AN ORGANIZED HEALTH CARE EDUCATION/TRAINING PROGRAM

## 2024-02-02 PROCEDURE — 85007 BL SMEAR W/DIFF WBC COUNT: CPT

## 2024-02-02 PROCEDURE — 84100 ASSAY OF PHOSPHORUS: CPT | Performed by: INTERNAL MEDICINE

## 2024-02-02 RX ORDER — OLANZAPINE 5 MG/1
5 TABLET ORAL AT BEDTIME
Qty: 15 TABLET | Refills: 0 | Status: SHIPPED | OUTPATIENT
Start: 2024-02-02 | End: 2024-03-06

## 2024-02-02 RX ORDER — MYCOPHENOLATE MOFETIL 500 MG/1
1000 TABLET ORAL 2 TIMES DAILY
Qty: 44 TABLET | Refills: 0 | Status: SHIPPED | OUTPATIENT
Start: 2024-02-02 | End: 2024-02-14

## 2024-02-02 RX ORDER — TRIAMCINOLONE ACETONIDE 1 MG/G
CREAM TOPICAL 3 TIMES DAILY
Qty: 453.6 G | Refills: 0 | Status: SHIPPED | OUTPATIENT
Start: 2024-02-02 | End: 2024-02-04

## 2024-02-02 RX ORDER — ONDANSETRON 4 MG/1
4 TABLET, ORALLY DISINTEGRATING ORAL
Qty: 30 TABLET | Refills: 1 | Status: SHIPPED | OUTPATIENT
Start: 2024-02-03 | End: 2024-04-26

## 2024-02-02 RX ORDER — ACYCLOVIR 800 MG/1
800 TABLET ORAL 2 TIMES DAILY
Qty: 60 TABLET | Refills: 3 | Status: SHIPPED | OUTPATIENT
Start: 2024-02-02 | End: 2024-03-06

## 2024-02-02 RX ORDER — PANTOPRAZOLE SODIUM 40 MG/1
40 TABLET, DELAYED RELEASE ORAL
Qty: 30 TABLET | Refills: 0 | Status: SHIPPED | OUTPATIENT
Start: 2024-02-03 | End: 2024-03-06

## 2024-02-02 RX ORDER — SIROLIMUS 2 MG/1
2 TABLET, FILM COATED ORAL DAILY
Qty: 30 TABLET | Refills: 1 | Status: ON HOLD | OUTPATIENT
Start: 2024-02-03 | End: 2024-02-25

## 2024-02-02 RX ORDER — OXYCODONE HYDROCHLORIDE 5 MG/1
5 TABLET ORAL EVERY 4 HOURS PRN
Qty: 10 TABLET | Refills: 0 | Status: SHIPPED | OUTPATIENT
Start: 2024-02-02 | End: 2024-02-19

## 2024-02-02 RX ORDER — AMLODIPINE BESYLATE 5 MG/1
5 TABLET ORAL 2 TIMES DAILY
Qty: 60 TABLET | Refills: 1 | Status: SHIPPED | OUTPATIENT
Start: 2024-02-02 | End: 2024-02-05

## 2024-02-02 RX ORDER — PREDNISONE 20 MG/1
40 TABLET ORAL DAILY
Qty: 8 TABLET | Refills: 0 | Status: DISCONTINUED | OUTPATIENT
Start: 2024-02-02 | End: 2024-02-03

## 2024-02-02 RX ORDER — EPINEPHRINE 1 MG/ML
0.3 INJECTION, SOLUTION, CONCENTRATE INTRAVENOUS EVERY 5 MIN PRN
Status: CANCELLED | OUTPATIENT
Start: 2024-02-05

## 2024-02-02 RX ORDER — MYCOPHENOLATE MOFETIL 250 MG/1
250 CAPSULE ORAL 2 TIMES DAILY
Qty: 22 CAPSULE | Refills: 0 | Status: SHIPPED | OUTPATIENT
Start: 2024-02-02 | End: 2024-02-14

## 2024-02-02 RX ORDER — URSODIOL 300 MG/1
300 CAPSULE ORAL 3 TIMES DAILY
Qty: 108 CAPSULE | Refills: 0 | Status: SHIPPED | OUTPATIENT
Start: 2024-02-02 | End: 2024-03-18

## 2024-02-02 RX ORDER — HEPARIN SODIUM (PORCINE) LOCK FLUSH IV SOLN 100 UNIT/ML 100 UNIT/ML
5 SOLUTION INTRAVENOUS
Status: CANCELLED | OUTPATIENT
Start: 2024-02-05

## 2024-02-02 RX ORDER — HEPARIN SODIUM,PORCINE 10 UNIT/ML
5-20 VIAL (ML) INTRAVENOUS DAILY PRN
Status: CANCELLED | OUTPATIENT
Start: 2024-02-05

## 2024-02-02 RX ORDER — DIPHENHYDRAMINE HYDROCHLORIDE 50 MG/ML
50 INJECTION INTRAMUSCULAR; INTRAVENOUS
Status: CANCELLED
Start: 2024-02-05

## 2024-02-02 RX ORDER — MAGNESIUM SULFATE HEPTAHYDRATE 40 MG/ML
2 INJECTION, SOLUTION INTRAVENOUS ONCE
Status: COMPLETED | OUTPATIENT
Start: 2024-02-02 | End: 2024-02-02

## 2024-02-02 RX ORDER — SULFAMETHOXAZOLE/TRIMETHOPRIM 800-160 MG
1 TABLET ORAL
Qty: 16 TABLET | Refills: 3 | Status: SHIPPED | OUTPATIENT
Start: 2024-02-12 | End: 2024-02-12

## 2024-02-02 RX ADMIN — TRIAMCINOLONE ACETONIDE: 1 CREAM TOPICAL at 17:33

## 2024-02-02 RX ADMIN — PANTOPRAZOLE SODIUM 40 MG: 40 TABLET, DELAYED RELEASE ORAL at 07:47

## 2024-02-02 RX ADMIN — MYCOPHENOLATE MOFETIL 250 MG: 250 CAPSULE ORAL at 17:44

## 2024-02-02 RX ADMIN — SIROLIMUS 2 MG: 2 TABLET ORAL at 07:50

## 2024-02-02 RX ADMIN — AMLODIPINE BESYLATE 5 MG: 5 TABLET ORAL at 07:47

## 2024-02-02 RX ADMIN — Medication 5 MG: at 21:11

## 2024-02-02 RX ADMIN — MICAFUNGIN SODIUM 150 MG: 50 INJECTION, POWDER, LYOPHILIZED, FOR SOLUTION INTRAVENOUS at 15:21

## 2024-02-02 RX ADMIN — ACYCLOVIR 800 MG: 800 TABLET ORAL at 07:47

## 2024-02-02 RX ADMIN — Medication 1 LOZENGE: at 07:47

## 2024-02-02 RX ADMIN — AMLODIPINE BESYLATE 5 MG: 5 TABLET ORAL at 19:52

## 2024-02-02 RX ADMIN — URSODIOL 300 MG: 300 CAPSULE ORAL at 14:54

## 2024-02-02 RX ADMIN — TRIAMCINOLONE ACETONIDE: 1 CREAM TOPICAL at 14:54

## 2024-02-02 RX ADMIN — MAGNESIUM SULFATE IN WATER 2 G: 40 INJECTION, SOLUTION INTRAVENOUS at 05:50

## 2024-02-02 RX ADMIN — MYCOPHENOLATE MOFETIL 1000 MG: 500 TABLET, FILM COATED ORAL at 17:43

## 2024-02-02 RX ADMIN — LETERMOVIR 480 MG: 480 TABLET, FILM COATED ORAL at 07:49

## 2024-02-02 RX ADMIN — TRIAMCINOLONE ACETONIDE: 1 CREAM TOPICAL at 07:46

## 2024-02-02 RX ADMIN — MYCOPHENOLATE MOFETIL 1000 MG: 500 TABLET, FILM COATED ORAL at 07:47

## 2024-02-02 RX ADMIN — URSODIOL 300 MG: 300 CAPSULE ORAL at 19:52

## 2024-02-02 RX ADMIN — MYCOPHENOLATE MOFETIL 250 MG: 250 CAPSULE ORAL at 07:51

## 2024-02-02 RX ADMIN — ACYCLOVIR 800 MG: 800 TABLET ORAL at 19:52

## 2024-02-02 RX ADMIN — OXYCODONE HYDROCHLORIDE 5 MG: 5 TABLET ORAL at 17:42

## 2024-02-02 RX ADMIN — Medication: at 03:40

## 2024-02-02 RX ADMIN — OLANZAPINE 5 MG: 5 TABLET, FILM COATED ORAL at 19:52

## 2024-02-02 RX ADMIN — ONDANSETRON 4 MG: 4 TABLET, ORALLY DISINTEGRATING ORAL at 07:48

## 2024-02-02 RX ADMIN — URSODIOL 300 MG: 300 CAPSULE ORAL at 07:49

## 2024-02-02 RX ADMIN — Medication 5 ML: at 17:00

## 2024-02-02 RX ADMIN — Medication: at 23:50

## 2024-02-02 RX ADMIN — Medication 5 ML: at 12:12

## 2024-02-02 ASSESSMENT — ACTIVITIES OF DAILY LIVING (ADL)
ADLS_ACUITY_SCORE: 23

## 2024-02-02 NOTE — PROGRESS NOTES
CLINICAL NUTRITION SERVICES - BRIEF NOTE      Nutrition Prescription     RECOMMENDATIONS FOR MDs/PROVIDERS TO ORDER:  Encourage oral intake     Recommendations already ordered by Registered Dietitian (RD):  TPN stopping today per team   Encouraged oral intake   Discontinued magic cup per pt preference      Future/Additional Recommendations:  Monitor ability to tolerate PO   If unable to maintain weight or get adequate intake post discharge, recommend follow up with outpatient RD     *Please see full assessment note from 1/29/24    New Findings:  Diet Order: high kcal/high protein   Intake:   1/27       Total Kcals: 139       Total Protein: 6g   1/28       Total Kcals: 220       Total Protein: 11g   1/29       Total Kcals: 36         Total Protein: 0g   1/30       Total Kcals: 38         Total Protein: 4g 1/3 egg a roll and 25% pork with rice noodles & sauce from outside the hospital, but not enough information was given to calculate calories/protein.      Current Nutrition Support:   12 hr cycle: 175 g dex, 90 g AA, and 250 mL 20% lipids 3 days per week =  1169 Kcals (20 Kcals/kg), 1.5 g/kg protein, GIR =2.08 mg/kg/minute, and 18% fat kcals on average daily.       Irma reported taste and appetite are slowly getting better. She ate 50% chicken tikka masala today for lunch, had snacks at bedside.     Interventions  Collaboration with other providers- 5c rounds: Per rounds, possible discharge on Sunday.   Parenteral Nutrition/IV Fluids - Stop   Nutrition education- small frequent meals, use of supplements/calorie dense foods     RD to follow per protocol.    Bessie Alvarenga RD, LD  5C/BMT pager: [phone]

## 2024-02-02 NOTE — PROGRESS NOTES
"  BMT Daily Progress Note  Patient ID:  Irma Foreman is a 53 year old female with a PMH of MDS, warm AIHA, transfusion and transfusion dependent anemia presented to  to undergo a myeloablative allogeneic transplant. She was recently COVID+ on 12/26 but tested negative on 1/2 with resolution of nearly all URI symptoms. Undergoing MA (Bu/Flu) 6/8 URD Allo transplant, day +22      HPI   Irma continues to improve. Appetite improving, ate a fair amount of food yesterday, no nausea, stools are formed. She has an erythematous macular papular rash on her shoulders, torso and thighs. Using TCM cream. Mild throat pain persists, using oxycodone PRN.  On all PO meds & TPN discontinued today.       Review of Systems    Negative unless stated in the HPI.     PHYSICAL EXAM      Weight     Wt Readings from Last 3 Encounters:   02/02/24 77.9 kg (171 lb 12.8 oz)   12/26/23 83.1 kg (183 lb 4.8 oz)   12/19/23 83.5 kg (184 lb)        KPS: 70    /84   Pulse 115   Temp 96.9  F (36.1  C) (Axillary)   Resp 18   Ht 1.575 m (5' 2.01\")   Wt 77.9 kg (171 lb 12.8 oz)   LMP 11/05/2017   SpO2 99%   BMI 31.41 kg/m       General: NAD   Eyes: KEVON, sclera anicteric   Nose/Mouth/Throat: no ulcers visualized.   Lungs: CTAB   Cardiovascular: RRR, no M/R/G   Abdominal/Rectal: +BS, soft, non tender   Lymphatics: No edema  Skin: wart under right foot dime sized white, cauliflower and slightly ulcerated; Two pin point sized, flesh colored warts on right hand; Light pink faint macular rash on back, maculopapular rash on shoulders and chest.   Neuro: A&O   Additional Findings: Gastelum site NT, no drainage.    Current aGVHD staging:  Skin 0, UGI 0, LGI 0, Liver 0 (keep in note through day +180 for allos)      LABS AND IMAGING: I have assessed all abnormal lab values for their clinical significance and any values considered clinically significant have been addressed in the assessment and plan.        Lab Results   Component Value Date    WBC " 4.1 02/02/2024    ANEUTAUTO 9.5 (H) 01/27/2024    HGB 8.8 (L) 02/02/2024    HCT 26.4 (L) 02/02/2024    PLT 56 (L) 02/02/2024     02/02/2024    POTASSIUM 4.6 02/02/2024    CHLORIDE 102 02/02/2024    CO2 28 02/02/2024     (H) 02/02/2024    BUN 16.5 02/02/2024    CR 0.40 (L) 02/02/2024    MAG 1.7 02/02/2024    INR 1.04 01/29/2024       US Abdominal with Doppler (1/18/24)  Impression:   1. Patent Doppler evaluation of the abdomen with antegrade flow throughout.  2. Elevated hepatic artery resistive index of 0.81, nonspecific though can be seen with hepatic venous congestion which could be related to venous occlusive disease..    SYSTEMS-BASED ASSESSMENT AND PLAN     Hortencia Baldwin is a 53 year old female with a history of MDS, undergoing MA (Bu/Flu) 6/8 URD Allo transplant, day +22      BMT/IEC PROTOCOL for MT 2015-29 **according to but not on trial**   - Chemo protocol:  Day -6 through day -1: Keppra and Allopurinol   Day -5 through -2: Bu/Flu. Busulfan levels adjusted by pharm, attending  Day -1: Rest day   Day 0: Transplant. Recipient: O+, CMV+. Donor: O+, CMV-. No flush. Cell dose 6.5 x10^6.   Gram+ bacilli (cutibacterium proprionibacterium) cultured for stem cell product. On cefepime (1/11 - 1/18). Blood cx no growth x10 bottles.   - Restaging plan: per protocol   Plan to do BM bx outpatient on 2/6.     HEME/COAG  - Pancytopenia due to chemotherapy   # Iron overload: hx of transfusion dependent anemia, pre-transplant ferritin around 5,000. Recommend phlebotomy post transplant when retic count is restored and Hgb >10    # Platelet refractoriness: BID platelet checks discontinued 1/26. Responding to HLA platelets.   - Transfusion parameters: hemoglobin <7, platelets <10  - Last dose of scheduled GCSF on 1/26. Give PRN for ANC < 1    IMMUNOCOMPROMISED  # Fever: (Last on 1/15) Recurrent on 1/21- tmax 100.7.  - UA: negative for LE & nitrites. 1/13 ua negative; BK urine positive/adeno negative (red/orange  urine)  - Blood cultures NGTD. Procal elevated 1.08   - CXR with likely pulmonary edema  - RVP neg  - Cefepime (1/11 - 1/18; 1/22-1/26). Brief course of IV Vancomycin. MRSA nasal swab negative.   # Dysuria: see Renal below.   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 Tac drip discontinued 1/31, sirolimus started.    - 1/31: Sirolimus 9 mg loading dose, 2 mg/day and first level on 2/2     CARDIOVASCULAR  # Chest pressure: new on 1/15, worse with sitting up/activity, better while laying flat. RESOLVED.   EKG: new QR pattern in V1, suggests right ventricular conduction delay  Troponin negative x1   Echo (1/15): LVEF 55-60%, no pericardial effusion present. Cardiology consulted: no further recs   #Prolonged QTc to 507 on 1/22.   - Changed levaquin to cefpodoxime. Repeated QTc on 1/22: 466.   # Hypertension with initiation of tacrolimus: Amlodipine 5 mg BID. Labetalol 5 mg IV prn with parameters.   - Risk of cardiomyopathy: Baseline EF 60-65%  - Risk of arrhythmia: Baseline EKG showed sinus rhythm     RESPIRATORY  - Current smoker: states she quit (1/5/24), offered nicotine replacement but she declined.   - Baseline PFTs: The FEV1, FVC, FEV1/FVC ratio and JLL55-42% are within normal limits.  The inspiratory flow rates are within normal limits.  Lung volumes are within normal limits.  The diffusing capacity is normal.     GI/NUTRITION  # Elevated LFT's: ALT & AST mildly elevated, bilirubin WNL. TPN discontinued today, recently started sirolimus (on Tac d/t concern for VOD).  # Epigastric pain (1/18): RUQ ultrasound showing patent doppler throughout, no ascites, nonspecific elevated hepatic artery restrictive index (see full  impression above). Weight down, LFT's WNL, no abdominal pain. Continue to monitor closely for VOD; checking hepatic panel daily.     - PPI daily  - Ulcer prophylaxis: PPI  - VOD ppx: Ursodiol   # Moderate malnutrition in the context of acute illness: dietitian following.   Start TPN (1/29 - 2/2), cycling TPN 1/31   Zyprexa started 1/29, EKG (1/30) NSR Qtc 454  Zofran every AM   # Mouth pain likely 2/2 the start of oral mucositis: MMW prn, oxy 5 mg Q4H prn   # Nausea from chemo/radiation: Zyprexa 5 mg HS (1/29), compazine PRN, Lorazepam prn. Avoiding zofran given hx of QTc prolongation as above.     RENAL / :  # Dysuria (1/26): UA showing small blood, 58 RBC's. BK 4.52 million -- no hematuria; monitoring closely. Adeno pending. Pyridium x4 days. Resolved.     DERM:  # Right hand dryness, try aquafor with gloves on overnight and as tolerated during the day  # Foot, hand warts. Curbside derm on 1/8, no recs while inpatient, typically managed OP.    # Pink maculopapular rash present under breasts (resolved), across abdomen (Resolved) & lower back (faint). TCM prescribed. Planned to start prednisone 40 mg x4 days but patient declined (2/2).     MUSCULOSKELETAL/FRAILTY  - Baseline Frailty Score: 3  - Daily PT/OT as needed while inpatient  - Cancer Rehab as needed outpatient    SOCIAL DETERMINANTS  - Caregiver: Srinivas Suggs, Keira Neal & Rickey Neal       Disposition: remain admitted through resolution of oral mucositis & appropriate PO intake. Plan to discharge on 2/4.     - Follow up scheduled: Queenie MWF through 2/26 in clinic, appts on 2/5, BM bx on 2/6  - Line flushes via [facility]   - Med rec complete (send Prednisone if she is using it)   - Labs sent   - BMT therapy plan (Queenie & G-CSF)   - blood plan         Known issues that I take into account for medical decisions, with salient changes to the plan considering these complexities noted above.    Patient Active Problem List   Diagnosis    Left medial knee pain    Obesity  "(BMI 30-39.9)    Anemia, unspecified type    Macrocytic anemia    MDS (myelodysplastic syndrome) (H)     Clinically Significant Risk Factors              # Hypoalbuminemia: Lowest albumin = 3.2 g/dL at 1/25/2024  3:06 AM, will monitor as appropriate   # Thrombocytopenia: Lowest platelets = 39 in last 2 days, will monitor for bleeding          # Obesity: Estimated body mass index is 31.41 kg/m  as calculated from the following:    Height as of this encounter: 1.575 m (5' 2.01\").    Weight as of this encounter: 77.9 kg (171 lb 12.8 oz).   # Severe Malnutrition: based on nutrition assessment            I spent 40 minutes in the care of this patient today, which included time necessary for preparation for the visit, obtaining history, ordering medications/tests/procedures as medically indicated, review of pertinent medical literature, counseling of the patient, communication of recommendations to the care team, and documentation time.    King Vaz PA-C  [phone]    "

## 2024-02-02 NOTE — PLAN OF CARE
"BP (!) 142/87 (BP Location: Right arm)   Pulse 109   Temp 97.2  F (36.2  C) (Axillary)   Resp 18   Ht 1.575 m (5' 2.01\")   Wt 78.5 kg (173 lb)   LMP 11/05/2017   SpO2 98%   BMI 31.63 kg/m       Patient afebrile, hypertensive and tachycardic but within parameters, other vital signs stable. No reports of pain or nausea. Patient received PRN benadryl cream x1 for itching on arms and chest. Patient will be receiving magnesium this morning. Awaiting other labs to result. Continue with plan of care.    Goal Outcome Evaluation:  Problem: Adult Inpatient Plan of Care  Goal: Optimal Comfort and Wellbeing  Outcome: Progressing     Problem: Stem Cell/Bone Marrow Transplant  Goal: Optimal Coping with Transplant  Outcome: Progressing  Intervention: Optimize Patient/Family Adjustment to Transplant  Recent Flowsheet Documentation  Taken 2/2/2024 0000 by Christ Jensen RN  Supportive Measures: active listening utilized  Goal: Symptom-Free Urinary Elimination  Outcome: Progressing  Intervention: Prevent or Manage Bladder Irritation  Recent Flowsheet Documentation  Taken 2/2/2024 0000 by Christ Jensen RN  Hyperhydration Management: fluids provided  Goal: Absence of Hypersensitivity Reaction  Outcome: Progressing  Goal: Absence of Infection  Outcome: Progressing  Intervention: Prevent and Manage Infection  Recent Flowsheet Documentation  Taken 2/2/2024 0000 by Christ Jensen RN  Infection Prevention:   hand hygiene promoted   single patient room provided   rest/sleep promoted  Infection Management: aseptic technique maintained  Isolation Precautions: protective environment maintained  Goal: Improved Oral Mucous Membrane Health and Integrity  Outcome: Progressing  Goal: Nausea and Vomiting Symptom Relief  Outcome: Progressing                           "

## 2024-02-02 NOTE — PLAN OF CARE
Problem: Adult Inpatient Plan of Care  Goal: Plan of Care Review  Description: The Plan of Care Review/Shift note should be completed every shift.  The Outcome Evaluation is a brief statement about your assessment that the patient is improving, declining, or no change.  This information will be displayed automatically on your shift  note.  Outcome: Progressing  Flowsheets (Taken 2/2/2024 1530)  Plan of Care Reviewed With: patient   Goal Outcome Evaluation:      Plan of Care Reviewed With: patient  Uneventful day spent quietly in room watching TV and talking on the phone, Trunk and upper ext rash present (red, macular, occ itchy) TMC cream applied x2 with significant improvement and less itching. Prednisone ordered but declined, PA notified. Occ tachy with mild htn ovss.  Loose stools x3. With counts returning dschg is planned for Sun Feb 4.

## 2024-02-02 NOTE — PLAN OF CARE
VSS. Patient complaining of mild throat pain. Oxycodone and MMW x1. Trying to increase food intake. TPN cycled overnight. Patient offers no other complaints. Continue plan of care.    Problem: Adult Inpatient Plan of Care  Goal: Optimal Comfort and Wellbeing  Outcome: Progressing     Problem: Stem Cell/Bone Marrow Transplant  Goal: Optimal Coping with Transplant  Outcome: Progressing     Problem: Stem Cell/Bone Marrow Transplant  Goal: Optimal Nutrition Intake  Outcome: Progressing   Goal Outcome Evaluation:

## 2024-02-03 LAB
ABO/RH(D): NORMAL
ALBUMIN SERPL BCG-MCNC: 4 G/DL (ref 3.5–5.2)
ALP SERPL-CCNC: 110 U/L (ref 40–150)
ALT SERPL W P-5'-P-CCNC: 75 U/L (ref 0–50)
ANION GAP SERPL CALCULATED.3IONS-SCNC: 8 MMOL/L (ref 7–15)
ANTIBODY SCREEN: NEGATIVE
AST SERPL W P-5'-P-CCNC: 67 U/L (ref 0–45)
BASOPHILS # BLD AUTO: ABNORMAL 10*3/UL
BASOPHILS # BLD MANUAL: 0 10E3/UL (ref 0–0.2)
BASOPHILS NFR BLD AUTO: ABNORMAL %
BASOPHILS NFR BLD MANUAL: 0 %
BILIRUB DIRECT SERPL-MCNC: <0.2 MG/DL (ref 0–0.3)
BILIRUB SERPL-MCNC: 0.3 MG/DL
BUN SERPL-MCNC: 12.2 MG/DL (ref 6–20)
CALCIUM SERPL-MCNC: 9.4 MG/DL (ref 8.6–10)
CHLORIDE SERPL-SCNC: 100 MMOL/L (ref 98–107)
CREAT SERPL-MCNC: 0.4 MG/DL (ref 0.51–0.95)
DEPRECATED HCO3 PLAS-SCNC: 30 MMOL/L (ref 22–29)
EGFRCR SERPLBLD CKD-EPI 2021: >90 ML/MIN/1.73M2
EOSINOPHIL # BLD AUTO: ABNORMAL 10*3/UL
EOSINOPHIL # BLD MANUAL: 0 10E3/UL (ref 0–0.7)
EOSINOPHIL NFR BLD AUTO: ABNORMAL %
EOSINOPHIL NFR BLD MANUAL: 0 %
ERYTHROCYTE [DISTWIDTH] IN BLOOD BY AUTOMATED COUNT: 21.5 % (ref 10–15)
GLUCOSE SERPL-MCNC: 100 MG/DL (ref 70–99)
HCT VFR BLD AUTO: 25.3 % (ref 35–47)
HGB BLD-MCNC: 8.4 G/DL (ref 11.7–15.7)
IMM GRANULOCYTES # BLD: ABNORMAL 10*3/UL
IMM GRANULOCYTES NFR BLD: ABNORMAL %
LYMPHOCYTES # BLD AUTO: ABNORMAL 10*3/UL
LYMPHOCYTES # BLD MANUAL: 0.1 10E3/UL (ref 0.8–5.3)
LYMPHOCYTES NFR BLD AUTO: ABNORMAL %
LYMPHOCYTES NFR BLD MANUAL: 2 %
MAGNESIUM SERPL-MCNC: 1.5 MG/DL (ref 1.7–2.3)
MAGNESIUM SERPL-MCNC: 1.9 MG/DL (ref 1.7–2.3)
MCH RBC QN AUTO: 32.6 PG (ref 26.5–33)
MCHC RBC AUTO-ENTMCNC: 33.2 G/DL (ref 31.5–36.5)
MCV RBC AUTO: 98 FL (ref 78–100)
METAMYELOCYTES # BLD MANUAL: 0 10E3/UL
METAMYELOCYTES NFR BLD MANUAL: 1 %
MONOCYTES # BLD AUTO: ABNORMAL 10*3/UL
MONOCYTES # BLD MANUAL: 0.5 10E3/UL (ref 0–1.3)
MONOCYTES NFR BLD AUTO: ABNORMAL %
MONOCYTES NFR BLD MANUAL: 15 %
MYELOCYTES # BLD MANUAL: 0.1 10E3/UL
MYELOCYTES NFR BLD MANUAL: 2 %
NEUTROPHILS # BLD AUTO: ABNORMAL 10*3/UL
NEUTROPHILS # BLD MANUAL: 2.9 10E3/UL (ref 1.6–8.3)
NEUTROPHILS NFR BLD AUTO: ABNORMAL %
NEUTROPHILS NFR BLD MANUAL: 80 %
NRBC # BLD AUTO: 0 10E3/UL
NRBC # BLD AUTO: 0.1 10E3/UL
NRBC BLD AUTO-RTO: 1 /100
NRBC BLD MANUAL-RTO: 2 %
PHOSPHATE SERPL-MCNC: 3.8 MG/DL (ref 2.5–4.5)
PLAT MORPH BLD: ABNORMAL
PLATELET # BLD AUTO: 46 10E3/UL (ref 150–450)
POLYCHROMASIA BLD QL SMEAR: ABNORMAL
POTASSIUM SERPL-SCNC: 4.2 MMOL/L (ref 3.4–5.3)
PROT SERPL-MCNC: 6.3 G/DL (ref 6.4–8.3)
RBC # BLD AUTO: 2.58 10E6/UL (ref 3.8–5.2)
RBC MORPH BLD: ABNORMAL
SODIUM SERPL-SCNC: 138 MMOL/L (ref 135–145)
SPECIMEN EXPIRATION DATE: NORMAL
WBC # BLD AUTO: 3.6 10E3/UL (ref 4–11)

## 2024-02-03 PROCEDURE — 250N000013 HC RX MED GY IP 250 OP 250 PS 637

## 2024-02-03 PROCEDURE — 85027 COMPLETE CBC AUTOMATED: CPT

## 2024-02-03 PROCEDURE — 258N000003 HC RX IP 258 OP 636

## 2024-02-03 PROCEDURE — 84100 ASSAY OF PHOSPHORUS: CPT | Performed by: STUDENT IN AN ORGANIZED HEALTH CARE EDUCATION/TRAINING PROGRAM

## 2024-02-03 PROCEDURE — 82040 ASSAY OF SERUM ALBUMIN: CPT

## 2024-02-03 PROCEDURE — 82248 BILIRUBIN DIRECT: CPT

## 2024-02-03 PROCEDURE — 250N000012 HC RX MED GY IP 250 OP 636 PS 637

## 2024-02-03 PROCEDURE — 250N000012 HC RX MED GY IP 250 OP 636 PS 637: Performed by: PHYSICIAN ASSISTANT

## 2024-02-03 PROCEDURE — 99233 SBSQ HOSP IP/OBS HIGH 50: CPT | Mod: FS | Performed by: PHYSICIAN ASSISTANT

## 2024-02-03 PROCEDURE — 83735 ASSAY OF MAGNESIUM: CPT | Performed by: STUDENT IN AN ORGANIZED HEALTH CARE EDUCATION/TRAINING PROGRAM

## 2024-02-03 PROCEDURE — 86922 COMPATIBILITY TEST ANTIGLOB: CPT

## 2024-02-03 PROCEDURE — 250N000011 HC RX IP 250 OP 636

## 2024-02-03 PROCEDURE — 86900 BLOOD TYPING SEROLOGIC ABO: CPT

## 2024-02-03 PROCEDURE — 206N000001 HC R&B BMT UMMC

## 2024-02-03 PROCEDURE — 85007 BL SMEAR W/DIFF WBC COUNT: CPT

## 2024-02-03 PROCEDURE — 250N000011 HC RX IP 250 OP 636: Performed by: STUDENT IN AN ORGANIZED HEALTH CARE EDUCATION/TRAINING PROGRAM

## 2024-02-03 RX ORDER — AMLODIPINE BESYLATE 5 MG/1
5 TABLET ORAL DAILY
Status: DISCONTINUED | OUTPATIENT
Start: 2024-02-04 | End: 2024-02-04 | Stop reason: HOSPADM

## 2024-02-03 RX ORDER — MAGNESIUM SULFATE HEPTAHYDRATE 40 MG/ML
4 INJECTION, SOLUTION INTRAVENOUS ONCE
Status: COMPLETED | OUTPATIENT
Start: 2024-02-03 | End: 2024-02-03

## 2024-02-03 RX ORDER — AMLODIPINE BESYLATE 5 MG/1
5 TABLET ORAL DAILY
Qty: 30 TABLET | Refills: 0 | Status: SHIPPED | OUTPATIENT
Start: 2024-02-04 | End: 2024-03-07

## 2024-02-03 RX ORDER — PREDNISONE 20 MG/1
40 TABLET ORAL DAILY
Status: DISCONTINUED | OUTPATIENT
Start: 2024-02-03 | End: 2024-02-04 | Stop reason: HOSPADM

## 2024-02-03 RX ORDER — PREDNISONE 20 MG/1
40 TABLET ORAL DAILY
Qty: 6 TABLET | Refills: 0 | Status: SHIPPED | OUTPATIENT
Start: 2024-02-03 | End: 2024-02-06

## 2024-02-03 RX ORDER — MAGNESIUM SULFATE HEPTAHYDRATE 40 MG/ML
2 INJECTION, SOLUTION INTRAVENOUS ONCE
Status: COMPLETED | OUTPATIENT
Start: 2024-02-03 | End: 2024-02-03

## 2024-02-03 RX ADMIN — URSODIOL 300 MG: 300 CAPSULE ORAL at 14:20

## 2024-02-03 RX ADMIN — ACYCLOVIR 800 MG: 800 TABLET ORAL at 08:48

## 2024-02-03 RX ADMIN — AMLODIPINE BESYLATE 5 MG: 5 TABLET ORAL at 08:48

## 2024-02-03 RX ADMIN — PREDNISONE 40 MG: 20 TABLET ORAL at 11:39

## 2024-02-03 RX ADMIN — OLANZAPINE 5 MG: 5 TABLET, FILM COATED ORAL at 21:25

## 2024-02-03 RX ADMIN — MYCOPHENOLATE MOFETIL 250 MG: 250 CAPSULE ORAL at 17:58

## 2024-02-03 RX ADMIN — Medication 5 ML: at 04:33

## 2024-02-03 RX ADMIN — URSODIOL 300 MG: 300 CAPSULE ORAL at 19:57

## 2024-02-03 RX ADMIN — SIROLIMUS 2 MG: 2 TABLET ORAL at 08:48

## 2024-02-03 RX ADMIN — PANTOPRAZOLE SODIUM 40 MG: 40 TABLET, DELAYED RELEASE ORAL at 08:49

## 2024-02-03 RX ADMIN — LETERMOVIR 480 MG: 480 TABLET, FILM COATED ORAL at 08:52

## 2024-02-03 RX ADMIN — OXYCODONE HYDROCHLORIDE 5 MG: 5 TABLET ORAL at 04:33

## 2024-02-03 RX ADMIN — URSODIOL 300 MG: 300 CAPSULE ORAL at 08:48

## 2024-02-03 RX ADMIN — Medication 1 LOZENGE: at 19:59

## 2024-02-03 RX ADMIN — ONDANSETRON 4 MG: 4 TABLET, ORALLY DISINTEGRATING ORAL at 08:49

## 2024-02-03 RX ADMIN — MYCOPHENOLATE MOFETIL 250 MG: 250 CAPSULE ORAL at 08:49

## 2024-02-03 RX ADMIN — TRIAMCINOLONE ACETONIDE: 1 CREAM TOPICAL at 14:20

## 2024-02-03 RX ADMIN — TRIAMCINOLONE ACETONIDE: 1 CREAM TOPICAL at 08:49

## 2024-02-03 RX ADMIN — MICAFUNGIN SODIUM 150 MG: 50 INJECTION, POWDER, LYOPHILIZED, FOR SOLUTION INTRAVENOUS at 17:58

## 2024-02-03 RX ADMIN — MYCOPHENOLATE MOFETIL 1000 MG: 500 TABLET, FILM COATED ORAL at 08:48

## 2024-02-03 RX ADMIN — MAGNESIUM SULFATE 4 G: 4 INJECTION INTRAVENOUS at 06:00

## 2024-02-03 RX ADMIN — Medication 5 ML: at 19:59

## 2024-02-03 RX ADMIN — MYCOPHENOLATE MOFETIL 1000 MG: 500 TABLET, FILM COATED ORAL at 17:58

## 2024-02-03 RX ADMIN — OXYCODONE HYDROCHLORIDE 5 MG: 5 TABLET ORAL at 21:24

## 2024-02-03 RX ADMIN — ACYCLOVIR 800 MG: 800 TABLET ORAL at 19:57

## 2024-02-03 RX ADMIN — MAGNESIUM SULFATE IN WATER 2 G: 40 INJECTION, SOLUTION INTRAVENOUS at 15:49

## 2024-02-03 ASSESSMENT — ACTIVITIES OF DAILY LIVING (ADL)
ADLS_ACUITY_SCORE: 23
ADLS_ACUITY_SCORE: 23
ADLS_ACUITY_SCORE: 24
ADLS_ACUITY_SCORE: 23
ADLS_ACUITY_SCORE: 24
ADLS_ACUITY_SCORE: 23

## 2024-02-03 NOTE — PROGRESS NOTES
"  BMT Daily Progress Note  02/03/2024   Patient ID:  Irma Foreman is a 53 year old female with a PMH of MDS, warm AIHA, transfusion and transfusion dependent anemia presented to  to undergo a myeloablative allogeneic transplant. She was recently COVID+ on 12/26 but tested negative on 1/2 with resolution of nearly all URI symptoms. Undergoing MA (Bu/Flu) 6/8 URD Allo transplant, day +23      HPI   Irma continues to feel better every day - she slept pretty well last night. She continues to itch quite a bit mainly on her arms and legs. Her rash is still present on her back and abdomen but not bothersome to her. She had concerns about being on steroids for a long time but I reassured her this would be a short burst of steroids to hopefully help with the itching which she agrees to start today. Appetite continues to improve - she ate some noodles and oatmeal yesterday. No nausea or vomiting. Her stools remain formed. She still has mild throat pain but feels that her current pain management is working well.    Review of Systems    Negative unless stated in the HPI.   PHYSICAL EXAM      Weight     Wt Readings from Last 3 Encounters:   02/03/24 77.6 kg (171 lb)   12/26/23 83.1 kg (183 lb 4.8 oz)   12/19/23 83.5 kg (184 lb)        KPS: 70    /77 (BP Location: Right arm)   Pulse 96   Temp 97.7  F (36.5  C) (Axillary)   Resp 16   Ht 1.575 m (5' 2.01\")   Wt 77.6 kg (171 lb)   LMP 11/05/2017   SpO2 99%   BMI 31.27 kg/m       General: NAD, constantly itching either arms or legs throughout exam   Eyes: KEVON, sclera anicteric   Nose/Mouth/Throat: no ulcers visualized.   Lungs: CTAB   Cardiovascular: RRR, no M/R/G   Abdominal/Rectal: +BS, soft, non tender   Lymphatics: No edema  Skin: wart under right foot dime sized white, cauliflower and slightly ulcerated; Two pin point sized, flesh colored warts on right hand; Light pink faint macular rash on back, maculopapular rash on shoulders, bilateral upper and lower " extremities, and chest.   Neuro: A&O   Additional Findings: Gastelum site NT, no drainage.    Current aGVHD staging:  Skin 0, UGI 0, LGI 0, Liver 0 (keep in note through day +180 for allos)      LABS AND IMAGING: I have assessed all abnormal lab values for their clinical significance and any values considered clinically significant have been addressed in the assessment and plan.        Lab Results   Component Value Date    WBC 3.6 (L) 02/03/2024    ANEUTAUTO 9.5 (H) 01/27/2024    HGB 8.4 (L) 02/03/2024    HCT 25.3 (L) 02/03/2024    PLT 46 (LL) 02/03/2024     02/03/2024    POTASSIUM 4.2 02/03/2024    CHLORIDE 100 02/03/2024    CO2 30 (H) 02/03/2024     (H) 02/03/2024    BUN 12.2 02/03/2024    CR 0.40 (L) 02/03/2024    MAG 1.5 (L) 02/03/2024    INR 1.04 01/29/2024       US Abdominal with Doppler (1/18/24)  Impression:   1. Patent Doppler evaluation of the abdomen with antegrade flow throughout.  2. Elevated hepatic artery resistive index of 0.81, nonspecific though can be seen with hepatic venous congestion which could be related to venous occlusive disease..    SYSTEMS-BASED ASSESSMENT AND PLAN     Hortencia Baldwin is a 53 year old female with a history of MDS, undergoing MA (Bu/Flu) 6/8 URD Allo transplant, day +23      BMT/IEC PROTOCOL for MT 2015-29 **according to but not on trial**   - Chemo protocol:  Day -6 through day -1: Keppra and Allopurinol   Day -5 through -2: Bu/Flu. Busulfan levels adjusted by pharm, attending  Day -1: Rest day   Day 0: Transplant. Recipient: O+, CMV+. Donor: O+, CMV-. No flush. Cell dose 6.5 x10^6.   Gram+ bacilli (cutibacterium proprionibacterium) cultured for stem cell product. On cefepime (1/11 - 1/18). Blood cx no growth x10 bottles.   - Restaging plan: per protocol   Plan to do BM bx outpatient on 2/6.     HEME/COAG  - Pancytopenia due to chemotherapy   # Iron overload: hx of transfusion dependent anemia, pre-transplant ferritin around 5,000. Recommend phlebotomy post  transplant when retic count is restored and Hgb >10    # Platelet refractoriness: BID platelet checks discontinued 1/26. Responding to HLA platelets.   - Transfusion parameters: hemoglobin <7, platelets <10  - Last dose of scheduled GCSF on 1/26. Give PRN for ANC < 1    IMMUNOCOMPROMISED  # Fever: (Last on 1/15) Recurrent on 1/21- tmax 100.7.  - UA: negative for LE & nitrites. 1/13 ua negative; BK urine positive/adeno negative (red/orange urine)  - Blood cultures NGTD. Procal elevated 1.08   - CXR with likely pulmonary edema  - RVP neg  - Cefepime (1/11 - 1/18; 1/22-1/26). Brief course of IV Vancomycin. MRSA nasal swab negative.   # Dysuria: see Renal below.   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 Tac drip discontinued 1/31, sirolimus started.    - 1/31: Sirolimus 9 mg loading dose, 2 mg/day. (2/2) first siro level 9.7.      CARDIOVASCULAR  # Chest pressure: new on 1/15, worse with sitting up/activity, better while laying flat. RESOLVED.   EKG: new QR pattern in V1, suggests right ventricular conduction delay  Troponin negative x1   Echo (1/15): LVEF 55-60%, no pericardial effusion present. Cardiology consulted: no further recs   #Prolonged QTc to 507 on 1/22.   - Changed levaquin to cefpodoxime. Repeated QTc on 1/22: 466.   # Hypertension with initiation of tacrolimus: Amlodipine 5 mg BID -- (2/3) will decrease to daily dosing today. Monitor BP in clinic as she may be able to discontinue this since tac was also discontinued.  - Risk of cardiomyopathy: Baseline EF 60-65%  - Risk of arrhythmia: Baseline EKG showed sinus rhythm     RESPIRATORY  - Current smoker: states she quit (1/5/24), offered nicotine replacement but  she declined.   - Baseline PFTs: The FEV1, FVC, FEV1/FVC ratio and FOO27-32% are within normal limits.  The inspiratory flow rates are within normal limits.  Lung volumes are within normal limits.  The diffusing capacity is normal.     GI/NUTRITION  # Elevated LFT's: ALT & AST mildly elevated, bilirubin WNL. TPN discontinued today, recently started sirolimus (on Tac d/t concern for VOD).  # Epigastric pain (1/18): RUQ ultrasound showing patent doppler throughout, no ascites, nonspecific elevated hepatic artery restrictive index (see full impression above). Weight down, LFT's WNL, no abdominal pain. Continue to monitor closely for VOD; checking hepatic panel daily.     - PPI daily  - Ulcer prophylaxis: PPI  - VOD ppx: Ursodiol   # Moderate malnutrition in the context of acute illness: dietitian following.   Start TPN (1/29 - 2/2), cycling TPN 1/31   Zyprexa started 1/29, EKG (1/30) NSR Qtc 454  Zofran every AM   # Mouth pain likely 2/2 the start of oral mucositis: MMW prn, oxy 5 mg Q4H prn   # Nausea from chemo/radiation: Zyprexa 5 mg HS (1/29), compazine PRN, Lorazepam prn. Avoiding zofran given hx of QTc prolongation as above.     RENAL / :  # Dysuria (1/26): UA showing small blood, 58 RBC's. BK 4.52 million -- no hematuria; monitoring closely. Adeno pending. Pyridium x4 days. Resolved.     DERM:  # Right hand dryness, try aquafor with gloves on overnight and as tolerated during the day  # Foot, hand warts. Curbside derm on 1/8, no recs while inpatient, typically managed OP.    # Pink maculopapular rash present under breasts (resolved), across abdomen (Resolved) & lower back (faint). TCM prescribed. Planned to start prednisone 40 mg x4 days but patient declined (2/2).     MUSCULOSKELETAL/FRAILTY  - Baseline Frailty Score: 3  - Daily PT/OT as needed while inpatient  - Cancer Rehab as needed outpatient    SOCIAL DETERMINANTS  - Caregiver: Srinivas Suggs, Keira Neal & Rickey Neal     Disposition: Plan to discharge on  "2/4. Med rec sent. Appts scheduled.    - Follow up scheduled: Queenie MWF through 2/26 in clinic, appts on 2/5, BM bx on 2/6  - Line flushes via [facility] -- nursing to review with daughter today or tomorrow  - Med rec complete  - Labs sent     Known issues that I take into account for medical decisions, with salient changes to the plan considering these complexities noted above.    Patient Active Problem List   Diagnosis    Left medial knee pain    Obesity (BMI 30-39.9)    Anemia, unspecified type    Macrocytic anemia    MDS (myelodysplastic syndrome) (H)     Clinically Significant Risk Factors            # Hypomagnesemia: Lowest Mg = 1.5 mg/dL in last 2 days, will replace as needed   # Hypoalbuminemia: Lowest albumin = 3.2 g/dL at 1/25/2024  3:06 AM, will monitor as appropriate   # Thrombocytopenia: Lowest platelets = 46 in last 2 days, will monitor for bleeding          # Obesity: Estimated body mass index is 31.27 kg/m  as calculated from the following:    Height as of this encounter: 1.575 m (5' 2.01\").    Weight as of this encounter: 77.6 kg (171 lb).   # Severe Malnutrition: based on nutrition assessment            I spent 40 minutes in the care of this patient today, which included time necessary for preparation for the visit, obtaining history, ordering medications/tests/procedures as medically indicated, review of pertinent medical literature, counseling of the patient, communication of recommendations to the care team, and documentation time.    Gilbert Bains PA-C  [phone]    "

## 2024-02-03 NOTE — PLAN OF CARE
Pt is alert and oriented x4,tachy in the 100's within paramter, OVSS, afebrile on RA. Pt denies nausea, vomiting, numbness and tingling but she c/o of generalized itchiness, benadryl cream applied. Prn oxycodone given r/t to mouth pain. Mag replaced this am. Voiding but not saving, call light within reach, no unmet need at this time.

## 2024-02-03 NOTE — PROGRESS NOTES
S-pt up to BR to shower- walked [place] w/rn 1 1/2 times around unit with walker- states towards end of walk at times legs fell weak. Too walker with seat on it. Instruted that her nutrition is on her ownnow with the TPN off. Encourage to drink 3-4 ensures daily if not able to eat high toro and protein foods at this time. Had 3/4 of 1 ensure this camelia. Daughter is main care giver on RAC cares- daughter to be here for instructions on RAC in the afternoon.on 2/3. Pt's Rash bright red and increased in itchiness after warm shower- instructed pt to take tepid temp shower if makes more confortable. Reviewed experience w/pt using steriod in the past that made her 'puffy' it was to treat her blood problem and was for a few months. Reviewed pt this case it may not create as much of a fluid - edema issue. To review with MD in am the dose and length anticipated to use. It. Pt agreed to try pilar if to help her rash get better.   B-day22 post tx MDS  A- recovery of marrow function. Anticipation of discharge in next few days provided rash improves and pt with not other issues developing.  R-encourage oral consumption of nutritional foods- rac care education w/ pt and daughter. Discharge planning.

## 2024-02-03 NOTE — PLAN OF CARE
Problem: Adult Inpatient Plan of Care  Goal: Plan of Care Review  Description: The Plan of Care Review/Shift note should be completed every shift.  The Outcome Evaluation is a brief statement about your assessment that the patient is improving, declining, or no change.  This information will be displayed automatically on your shift  note.  Outcome: Progressing  Flowsheets (Taken 2/3/2024 1527)  Plan of Care Reviewed With: patient   Goal Outcome Evaluation:      Plan of Care Reviewed With: patient  Uneventful day with red macular rash on trunk and upper ext. Rash is itchy. TMC cream applied and started on pred 40 PO. Rash is fading as day progressed. Less itching. Mg recheck 1.9, pending 2gm replacement this evening. VSS Appetite is improving. Daughter is visiting and will be taught RAC line care this afternoon.[facility] tomorrow planned.

## 2024-02-04 VITALS
RESPIRATION RATE: 16 BRPM | OXYGEN SATURATION: 99 % | DIASTOLIC BLOOD PRESSURE: 83 MMHG | SYSTOLIC BLOOD PRESSURE: 134 MMHG | WEIGHT: 171.4 LBS | BODY MASS INDEX: 31.54 KG/M2 | TEMPERATURE: 97.8 F | HEART RATE: 111 BPM | HEIGHT: 62 IN

## 2024-02-04 LAB
ABO/RH(D): NORMAL
ACANTHOCYTES BLD QL SMEAR: NORMAL
ALBUMIN SERPL BCG-MCNC: 4 G/DL (ref 3.5–5.2)
ALP SERPL-CCNC: 108 U/L (ref 40–150)
ALT SERPL W P-5'-P-CCNC: 89 U/L (ref 0–50)
ANION GAP SERPL CALCULATED.3IONS-SCNC: 8 MMOL/L (ref 7–15)
ANTIBODY SCREEN: NEGATIVE
AST SERPL W P-5'-P-CCNC: 72 U/L (ref 0–45)
AUER BODIES BLD QL SMEAR: NORMAL
BASO STIPL BLD QL SMEAR: NORMAL
BASOPHILS # BLD AUTO: 0 10E3/UL (ref 0–0.2)
BASOPHILS NFR BLD AUTO: 1 %
BILIRUB DIRECT SERPL-MCNC: <0.2 MG/DL (ref 0–0.3)
BILIRUB SERPL-MCNC: 0.3 MG/DL
BITE CELLS BLD QL SMEAR: NORMAL
BLD PROD TYP BPU: NORMAL
BLISTER CELLS BLD QL SMEAR: NORMAL
BLOOD COMPONENT TYPE: NORMAL
BUN SERPL-MCNC: 11.3 MG/DL (ref 6–20)
BURR CELLS BLD QL SMEAR: NORMAL
CALCIUM SERPL-MCNC: 9.2 MG/DL (ref 8.6–10)
CHLORIDE SERPL-SCNC: 102 MMOL/L (ref 98–107)
CODING SYSTEM: NORMAL
CREAT SERPL-MCNC: 0.36 MG/DL (ref 0.51–0.95)
CROSSMATCH: NORMAL
DACRYOCYTES BLD QL SMEAR: NORMAL
DEPRECATED HCO3 PLAS-SCNC: 29 MMOL/L (ref 22–29)
EGFRCR SERPLBLD CKD-EPI 2021: >90 ML/MIN/1.73M2
ELLIPTOCYTES BLD QL SMEAR: NORMAL
EOSINOPHIL # BLD AUTO: 0 10E3/UL (ref 0–0.7)
EOSINOPHIL NFR BLD AUTO: 1 %
ERYTHROCYTE [DISTWIDTH] IN BLOOD BY AUTOMATED COUNT: 22.4 % (ref 10–15)
FRAGMENTS BLD QL SMEAR: NORMAL
GLUCOSE SERPL-MCNC: 106 MG/DL (ref 70–99)
HCT VFR BLD AUTO: 23.6 % (ref 35–47)
HGB BLD-MCNC: 7.9 G/DL (ref 11.7–15.7)
HGB C CRYSTALS: NORMAL
HOWELL-JOLLY BOD BLD QL SMEAR: NORMAL
IMM GRANULOCYTES # BLD: 0.1 10E3/UL
IMM GRANULOCYTES NFR BLD: 3 %
LYMPHOCYTES # BLD AUTO: 0.1 10E3/UL (ref 0.8–5.3)
LYMPHOCYTES NFR BLD AUTO: 4 %
MAGNESIUM SERPL-MCNC: 1.8 MG/DL (ref 1.7–2.3)
MCH RBC QN AUTO: 33.8 PG (ref 26.5–33)
MCHC RBC AUTO-ENTMCNC: 33.5 G/DL (ref 31.5–36.5)
MCV RBC AUTO: 101 FL (ref 78–100)
MONOCYTES # BLD AUTO: 0.8 10E3/UL (ref 0–1.3)
MONOCYTES NFR BLD AUTO: 27 %
NEUTROPHILS # BLD AUTO: 1.8 10E3/UL (ref 1.6–8.3)
NEUTROPHILS NFR BLD AUTO: 64 %
NEUTS HYPERSEG BLD QL SMEAR: NORMAL
NRBC # BLD AUTO: 0.1 10E3/UL
NRBC BLD AUTO-RTO: 2 /100
PLAT MORPH BLD: NORMAL
PLATELET # BLD AUTO: 46 10E3/UL (ref 150–450)
POLYCHROMASIA BLD QL SMEAR: NORMAL
POTASSIUM SERPL-SCNC: 3.8 MMOL/L (ref 3.4–5.3)
PROT SERPL-MCNC: 6.2 G/DL (ref 6.4–8.3)
RBC # BLD AUTO: 2.34 10E6/UL (ref 3.8–5.2)
RBC AGGLUT BLD QL: NORMAL
RBC MORPH BLD: NORMAL
ROULEAUX BLD QL SMEAR: NORMAL
SICKLE CELLS BLD QL SMEAR: NORMAL
SIROLIMUS BLD-MCNC: 9.4 UG/L (ref 5–15)
SMUDGE CELLS BLD QL SMEAR: NORMAL
SODIUM SERPL-SCNC: 139 MMOL/L (ref 135–145)
SPECIMEN EXPIRATION DATE: NORMAL
SPHEROCYTES BLD QL SMEAR: NORMAL
STOMATOCYTES BLD QL SMEAR: NORMAL
TARGETS BLD QL SMEAR: NORMAL
TME LAST DOSE: NORMAL H
TME LAST DOSE: NORMAL H
TOXIC GRANULES BLD QL SMEAR: NORMAL
UNIT ABO/RH: NORMAL
UNIT NUMBER: NORMAL
UNIT STATUS: NORMAL
UNIT TYPE ISBT: 5100
VARIANT LYMPHS BLD QL SMEAR: NORMAL
WBC # BLD AUTO: 2.8 10E3/UL (ref 4–11)

## 2024-02-04 PROCEDURE — 80195 ASSAY OF SIROLIMUS: CPT | Performed by: STUDENT IN AN ORGANIZED HEALTH CARE EDUCATION/TRAINING PROGRAM

## 2024-02-04 PROCEDURE — 85025 COMPLETE CBC W/AUTO DIFF WBC: CPT

## 2024-02-04 PROCEDURE — 250N000012 HC RX MED GY IP 250 OP 636 PS 637

## 2024-02-04 PROCEDURE — 250N000013 HC RX MED GY IP 250 OP 250 PS 637

## 2024-02-04 PROCEDURE — 83735 ASSAY OF MAGNESIUM: CPT | Performed by: STUDENT IN AN ORGANIZED HEALTH CARE EDUCATION/TRAINING PROGRAM

## 2024-02-04 PROCEDURE — 250N000012 HC RX MED GY IP 250 OP 636 PS 637: Performed by: PHYSICIAN ASSISTANT

## 2024-02-04 PROCEDURE — 82248 BILIRUBIN DIRECT: CPT

## 2024-02-04 PROCEDURE — 80053 COMPREHEN METABOLIC PANEL: CPT

## 2024-02-04 PROCEDURE — 99239 HOSP IP/OBS DSCHRG MGMT >30: CPT | Mod: FS | Performed by: PHYSICIAN ASSISTANT

## 2024-02-04 PROCEDURE — 250N000011 HC RX IP 250 OP 636

## 2024-02-04 PROCEDURE — 250N000011 HC RX IP 250 OP 636: Performed by: STUDENT IN AN ORGANIZED HEALTH CARE EDUCATION/TRAINING PROGRAM

## 2024-02-04 PROCEDURE — 250N000013 HC RX MED GY IP 250 OP 250 PS 637: Performed by: PHYSICIAN ASSISTANT

## 2024-02-04 RX ORDER — MAGNESIUM SULFATE HEPTAHYDRATE 40 MG/ML
2 INJECTION, SOLUTION INTRAVENOUS ONCE
Status: COMPLETED | OUTPATIENT
Start: 2024-02-04 | End: 2024-02-04

## 2024-02-04 RX ORDER — DIPHENHYDRAMINE HYDROCHLORIDE 50 MG/ML
50 INJECTION INTRAMUSCULAR; INTRAVENOUS
Status: CANCELLED
Start: 2024-02-04

## 2024-02-04 RX ORDER — HEPARIN SODIUM,PORCINE 10 UNIT/ML
5-20 VIAL (ML) INTRAVENOUS DAILY PRN
Status: CANCELLED | OUTPATIENT
Start: 2024-02-04

## 2024-02-04 RX ORDER — TRIAMCINOLONE ACETONIDE 1 MG/G
CREAM TOPICAL 2 TIMES DAILY
Qty: 453.6 G | Refills: 0 | Status: ON HOLD | OUTPATIENT
Start: 2024-02-04 | End: 2024-02-25

## 2024-02-04 RX ORDER — POTASSIUM CHLORIDE 29.8 MG/ML
20 INJECTION INTRAVENOUS ONCE
Status: COMPLETED | OUTPATIENT
Start: 2024-02-04 | End: 2024-02-04

## 2024-02-04 RX ORDER — EPINEPHRINE 1 MG/ML
0.3 INJECTION, SOLUTION INTRAMUSCULAR; SUBCUTANEOUS EVERY 5 MIN PRN
Status: CANCELLED | OUTPATIENT
Start: 2024-02-04

## 2024-02-04 RX ORDER — HEPARIN SODIUM (PORCINE) LOCK FLUSH IV SOLN 100 UNIT/ML 100 UNIT/ML
5 SOLUTION INTRAVENOUS
Status: CANCELLED | OUTPATIENT
Start: 2024-02-04

## 2024-02-04 RX ADMIN — PANTOPRAZOLE SODIUM 40 MG: 40 TABLET, DELAYED RELEASE ORAL at 07:51

## 2024-02-04 RX ADMIN — ONDANSETRON 4 MG: 4 TABLET, ORALLY DISINTEGRATING ORAL at 07:51

## 2024-02-04 RX ADMIN — LETERMOVIR 480 MG: 480 TABLET, FILM COATED ORAL at 07:51

## 2024-02-04 RX ADMIN — Medication 5 ML: at 04:32

## 2024-02-04 RX ADMIN — MAGNESIUM SULFATE IN WATER 2 G: 40 INJECTION, SOLUTION INTRAVENOUS at 07:52

## 2024-02-04 RX ADMIN — Medication 5 ML: at 10:40

## 2024-02-04 RX ADMIN — SIROLIMUS 2 MG: 2 TABLET ORAL at 07:52

## 2024-02-04 RX ADMIN — AMLODIPINE BESYLATE 5 MG: 5 TABLET ORAL at 07:51

## 2024-02-04 RX ADMIN — ACYCLOVIR 800 MG: 800 TABLET ORAL at 07:51

## 2024-02-04 RX ADMIN — TRIAMCINOLONE ACETONIDE: 1 CREAM TOPICAL at 07:52

## 2024-02-04 RX ADMIN — MYCOPHENOLATE MOFETIL 250 MG: 250 CAPSULE ORAL at 07:51

## 2024-02-04 RX ADMIN — PREDNISONE 40 MG: 20 TABLET ORAL at 07:52

## 2024-02-04 RX ADMIN — MYCOPHENOLATE MOFETIL 1000 MG: 500 TABLET, FILM COATED ORAL at 07:52

## 2024-02-04 RX ADMIN — URSODIOL 300 MG: 300 CAPSULE ORAL at 07:51

## 2024-02-04 RX ADMIN — POTASSIUM CHLORIDE 20 MEQ: 29.8 INJECTION, SOLUTION INTRAVENOUS at 06:32

## 2024-02-04 ASSESSMENT — ACTIVITIES OF DAILY LIVING (ADL)
ADLS_ACUITY_SCORE: 23

## 2024-02-04 NOTE — PROGRESS NOTES
"  BMT Daily Progress Note  02/04/2024   Patient ID:  Irma Foreman is a 53 year old female with a PMH of MDS, warm AIHA, transfusion and transfusion dependent anemia presented to  to undergo a myeloablative allogeneic transplant. She was recently COVID+ on 12/26 but tested negative on 1/2 with resolution of nearly all URI symptoms. Undergoing MA (Bu/Flu) 6/8 URD Allo transplant, day +24      HPI     Irma is feeling well today - she notes that her rash is less itchy after just one dose of prednisone yesterday. She was able to eat two full meals yesterday which is a good improvement of her appetite over the last 48 hrs. No nausea, vomiting, or diarrhea. She still notes of mild throat pain but will have access to oxy and chloraseptic spray at home to manage this. No new pain or fevers overnight. She ready for discharge.    Review of Systems    Negative unless stated in the HPI.   PHYSICAL EXAM      Weight     Wt Readings from Last 3 Encounters:   02/04/24 77.7 kg (171 lb 6.4 oz)   12/26/23 83.1 kg (183 lb 4.8 oz)   12/19/23 83.5 kg (184 lb)        KPS: 70    /78 (BP Location: Right arm)   Pulse 93   Temp 97.7  F (36.5  C) (Axillary)   Resp 16   Ht 1.575 m (5' 2.01\")   Wt 77.7 kg (171 lb 6.4 oz)   LMP 11/05/2017   SpO2 99%   BMI 31.34 kg/m       General: NAD  Eyes: KEVON, sclera anicteric   Nose/Mouth/Throat: no ulcers visualized. Clear posterior OP.  Lungs: CTAB   Cardiovascular: RRR, no M/R/G   Abdominal/Rectal: +BS, soft, non tender   Lymphatics: No edema. No lymphadenopathy.  Skin: wart under right foot dime sized white, cauliflower and slightly ulcerated; Two pin point sized, flesh colored warts on right hand; Light pink faint macular rash on back, maculopapular rash on shoulders, bilateral upper and lower extremities, and chest.   Neuro: A&O   Additional Findings: Gastelum site NT, no drainage.    Current aGVHD staging:  Skin 0, UGI 0, LGI 0, Liver 0 (keep in note through day +180 for " allos)      LABS AND IMAGING: I have assessed all abnormal lab values for their clinical significance and any values considered clinically significant have been addressed in the assessment and plan.        Lab Results   Component Value Date    WBC 2.8 (L) 02/04/2024    ANEUTAUTO 1.8 02/04/2024    HGB 7.9 (L) 02/04/2024    HCT 23.6 (L) 02/04/2024    PLT 46 (LL) 02/04/2024     02/04/2024    POTASSIUM 3.8 02/04/2024    CHLORIDE 102 02/04/2024    CO2 29 02/04/2024     (H) 02/04/2024    BUN 11.3 02/04/2024    CR 0.36 (L) 02/04/2024    MAG 1.8 02/04/2024    INR 1.04 01/29/2024       US Abdominal with Doppler (1/18/24)  Impression:   1. Patent Doppler evaluation of the abdomen with antegrade flow throughout.  2. Elevated hepatic artery resistive index of 0.81, nonspecific though can be seen with hepatic venous congestion which could be related to venous occlusive disease..    SYSTEMS-BASED ASSESSMENT AND PLAN     Hortencia Baldwin is a 53 year old female with a history of MDS, undergoing MA (Bu/Flu) 6/8 URD Allo transplant, day +24      BMT/IEC PROTOCOL for MT 2015-29 **according to but not on trial**   - Chemo protocol:  Day -6 through day -1: Keppra and Allopurinol   Day -5 through -2: Bu/Flu. Busulfan levels adjusted by pharm, attending  Day -1: Rest day   Day 0: Transplant. Recipient: O+, CMV+. Donor: O+, CMV-. No flush. Cell dose 6.5 x10^6.   Gram+ bacilli (cutibacterium proprionibacterium) cultured for stem cell product. On cefepime (1/11 - 1/18). Blood cx no growth x10 bottles.   - Restaging plan: per protocol   Scheduled BM bx outpatient on 2/6 -- Day 28 review with Dr. Garay on 2/9.      HEME/COAG  - Pancytopenia due to chemotherapy/BMT  # Iron overload: hx of transfusion dependent anemia, pre-transplant ferritin around 5,000. Recommend phlebotomy post transplant when retic count is restored and Hgb >10    # Platelet refractoriness: BID platelet checks discontinued 1/26. Responding to HLA platelets.   -  Transfusion parameters: hemoglobin <7, platelets <10  - Last dose of scheduled GCSF on 1/26. Give PRN for ANC < 1     IMMUNOCOMPROMISED  # Fever: (Last on 1/15) Recurrent on 1/21- tmax 100.7.  - UA: negative for LE & nitrites. 1/13 ua negative; BK urine positive/adeno negative (red/orange urine)  - Blood cultures NGTD. Procal elevated 1.08   - CXR with likely pulmonary edema  - RVP neg  - Cefepime (1/11 - 1/18; 1/22-1/26). Brief course of IV Vancomycin. MRSA nasal swab negative.   # Dysuria: see Renal below.   - Relevant infection history:  URI from COVID (12/26), negative test on 1/2/24      Prophylaxis plan:   ACV/letermovir  Micafungin through day +45 (current smoker) - scheduled in clinic MWF thru 2/26, BRIAN not given prior on 2/4.  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 **initially avoided sirolimus d/t high risk VOD**. Tac drip discontinued 1/31, sirolimus started.  - 1/31: Sirolimus 9 mg loading dose, 2 mg/day. (2/2) First siro level 9.7.  (2/4) Siro level pending.     CARDIOVASCULAR  # Chest pressure: new on 1/15, worse with sitting up/activity, better while laying flat. RESOLVED.   EKG: new QR pattern in V1, suggests right ventricular conduction delay  Troponin negative x1   Echo (1/15): LVEF 55-60%, no pericardial effusion present. Cardiology consulted: no further recs   #Prolonged QTc to 507 on 1/22.   - Changed levaquin to cefpodoxime. Repeated QTc on 1/22: 466.   # Hypertension with initiation of tacrolimus: Amlodipine 5 mg BID -- (2/3) will decrease to daily dosing today. Monitor BP in clinic as she may be able to discontinue this since tac was also discontinued.  - Risk of cardiomyopathy: Baseline EF 60-65%  - Risk of arrhythmia: Baseline EKG showed sinus rhythm      RESPIRATORY  - Current smoker: states she quit (1/5/24), offered nicotine replacement but she  declined.   - Baseline PFTs: The FEV1, FVC, FEV1/FVC ratio and XKI48-78% are within normal limits.  The inspiratory flow rates are within normal limits.  Lung volumes are within normal limits.  The diffusing capacity is normal.      GI/NUTRITION  # Elevated LFT's: ALT & AST mildly elevated, bilirubin WNL. TPN discontinued (2/2), recently started sirolimus (previously on Tac d/t concern for VOD).  # Epigastric pain (1/18): RUQ ultrasound showing patent doppler throughout, no ascites, nonspecific elevated hepatic artery restrictive index (see full impression above). Weight down, LFT's WNL, no abdominal pain. Continue to monitor closely for VOD; checking hepatic panel daily.                 - PPI daily  - Ulcer prophylaxis: PPI  - VOD ppx: Ursodiol   # Moderate malnutrition in the context of acute illness: Start TPN 1/29 cycling TPN 1/31, stopped 2/2   # Mouth/Throat pain likely 2/2 the start of oral mucositis: MMW prn, oxy 5 mg Q4H prn -- discharged with #10 tablets and chloraseptic throat spray.  # Nausea from chemo/radiation: Zyprexa 5 mg HS (1/29), compazine PRN, Lorazepam prn. Zofran qAM -- hx of prolonged Qtc, repeat EKG (1/30) NSR, Qtc 454. Okay to continue Zofran.     RENAL / :  # Dysuria (1/26): UA showing small blood, 58 RBC's. BK 4.52 million -- no hematuria; monitoring closely. Adeno pending. Pyridium x4 days. Resolved.      DERM:  # Right hand dryness, try aquafor with gloves on overnight and as tolerated during the day  # Foot, hand warts. Curbside derm on 1/8, no recs while inpatient, typically managed OP.    # Pink maculopapular rash present under breasts (resolved), across abdomen (Resolved) & lower back (faint). TCM prescribed -- decreased to BID at discharge. Planned to start prednisone 40 mg x 5 days - patient agreed to start on 2/3.      MUSCULOSKELETAL/FRAILTY  - Baseline Frailty Score: 3  - Daily PT/OT as needed while inpatient  - Cancer Rehab as needed outpatient     SOCIAL DETERMINANTS  -  "Caregiver: Srinivas Suggs, Keira Neal & Rickey Neal     Disposition: Plan to discharge on 2/4. Med rec sent. Appts scheduled. Queenie MWF through 2/26 in clinic, appts on 2/5 w/ pharmacy, BM bx on 2/6. Siro level pending today    Known issues that I take into account for medical decisions, with salient changes to the plan considering these complexities noted above.    Patient Active Problem List   Diagnosis    Left medial knee pain    Obesity (BMI 30-39.9)    Anemia, unspecified type    Macrocytic anemia    MDS (myelodysplastic syndrome) (H)     Clinically Significant Risk Factors            # Hypomagnesemia: Lowest Mg = 1.5 mg/dL in last 2 days, will replace as needed   # Hypoalbuminemia: Lowest albumin = 3.2 g/dL at 1/25/2024  3:06 AM, will monitor as appropriate   # Thrombocytopenia: Lowest platelets = 46 in last 2 days, will monitor for bleeding          # Obesity: Estimated body mass index is 31.34 kg/m  as calculated from the following:    Height as of this encounter: 1.575 m (5' 2.01\").    Weight as of this encounter: 77.7 kg (171 lb 6.4 oz).   # Severe Malnutrition: based on nutrition assessment            I spent 60 minutes in the care of this patient today, which included time necessary for preparation for the visit, obtaining history, ordering medications/tests/procedures as medically indicated, review of pertinent medical literature, counseling of the patient, communication of recommendations to the care team, and documentation time.    Gilbert Bains PA-C  [phone]    "

## 2024-02-04 NOTE — PLAN OF CARE
"  .BP (!) 142/78 (BP Location: Right arm)   Pulse 105   Temp 98.3  F (36.8  C) (Axillary)   Resp 16   Ht 1.575 m (5' 2.01\")   Wt 77.6 kg (171 lb)   LMP 11/05/2017   SpO2 98%   BMI 31.27 kg/m      Pt alert and oriented. Afebrile. Tachycardiac HR low 100s. HTN sbp 140s. Ovss on room air. Denies nausea. Endorses throat discomfort but declined intervention. Gave 20mEq potassium. She will need Magnesium afterward. Good UOP. Cont with poc.       Problem: Adult Inpatient Plan of Care  Goal: Plan of Care Review  Description: The Plan of Care Review/Shift note should be completed every shift.  The Outcome Evaluation is a brief statement about your assessment that the patient is improving, declining, or no change.  This information will be displayed automatically on your shift  note.  Outcome: Progressing     Problem: Stem Cell/Bone Marrow Transplant  Goal: Optimal Coping with Transplant  Outcome: Progressing     "

## 2024-02-04 NOTE — PROGRESS NOTES
BMT CLINICAL SOCIAL WORK NOTE:    Pt noted she did not find her NMDP check in the mail. CSW will email NMDP and will request a new check be sent.       CLIFTON Ryan, [facility]  Pager: [phone]  Phone: [phone]

## 2024-02-04 NOTE — PROGRESS NOTES
Pt discharged to:home   Via:personal car  Time:1300  Accompanied by: boyfriend  Belongings:sent with pt  Teaching:medication and dschg done  Cap and line flushed with caregiver: done 2/3/2024 with shawanda hernandez  Central line teach back questions complete:  Pill box filled:done  Clinic appointment:2/5/2024

## 2024-02-04 NOTE — DISCHARGE INSTRUCTIONS
BMT Contact Information  For issues including fevers 100.5 or more:  Please call during the week: Monday through Friday between hours of 8:00 am and 4:30 pm- Call BMT office- [phone]  After hours/Weekends: Please call [facility]  and ask for BMT physician on call and the  will have the BMT Fellow Physician call you back: [phone]    [facility] phone #[phone]     Advice for Patients on COVID19:  a. Avoid contact with individuals:   i. Who are sick or have recently been sick  ii. Have traveled to high risk areas (per CDC guidelines) or have been on a cruise in the last 14 days  iii. Who were or could have been exposed to COVID-19   b. If experiencing symptoms such as: Fever, cough or shortness of breath contact BMT at [phone] Mon-Fri 8am-4:30pm or After Hours at [phone] (ask to speak to a BMT Fellow) for guidance on need for clinical assessment  c. Avoid all non- essential travel at this time; if traveling is necessary use mask (N-95)   d. Wear a mask when in public areas  d. Avoid crowded places, if possible  f. Follow CDC advice https://www.cdc.gov/coronavirus/2019-ncov/index.html and travel guidelines https://www.cdc.gov/coronavirus/2019-ncov/travelers/index.html

## 2024-02-04 NOTE — PLAN OF CARE
Goal Outcome Evaluation:    Discharge teaching done for central line cares with daughter, boyfriend and pt present.  All watched videos and daughter did return demo's.  Pt slightly hypertensive (157/90) and tachy, but regular.  Denied nausea and ate well.  Has throat pain - cepacol losenge x 1 little relief.  Oxycodone given with much better relief.  Pruritus from rash greatly improved after prednisone dosing today.  Magnesium replaced.  Will discharge in am - still needs a thermometer.

## 2024-02-05 ENCOUNTER — APPOINTMENT (OUTPATIENT)
Dept: LAB | Facility: CLINIC | Age: 54
End: 2024-02-05
Attending: INTERNAL MEDICINE
Payer: COMMERCIAL

## 2024-02-05 ENCOUNTER — ONCOLOGY VISIT (OUTPATIENT)
Dept: TRANSPLANT | Facility: CLINIC | Age: 54
End: 2024-02-05
Attending: INTERNAL MEDICINE
Payer: COMMERCIAL

## 2024-02-05 VITALS
HEART RATE: 115 BPM | WEIGHT: 173.3 LBS | SYSTOLIC BLOOD PRESSURE: 145 MMHG | OXYGEN SATURATION: 98 % | DIASTOLIC BLOOD PRESSURE: 85 MMHG | BODY MASS INDEX: 31.69 KG/M2 | TEMPERATURE: 97.9 F | RESPIRATION RATE: 16 BRPM

## 2024-02-05 DIAGNOSIS — D46.9 MDS (MYELODYSPLASTIC SYNDROME) (H): Primary | ICD-10-CM

## 2024-02-05 DIAGNOSIS — D46.9 MDS (MYELODYSPLASTIC SYNDROME) (H): ICD-10-CM

## 2024-02-05 LAB
ACANTHOCYTES BLD QL SMEAR: ABNORMAL
ALBUMIN SERPL BCG-MCNC: 4.2 G/DL (ref 3.5–5.2)
ALP SERPL-CCNC: 117 U/L (ref 40–150)
ALT SERPL W P-5'-P-CCNC: 151 U/L (ref 0–50)
ANION GAP SERPL CALCULATED.3IONS-SCNC: 11 MMOL/L (ref 7–15)
AST SERPL W P-5'-P-CCNC: 122 U/L (ref 0–45)
AUER BODIES BLD QL SMEAR: ABNORMAL
BACTERIA BLD CULT: NO GROWTH
BACTERIA BLD CULT: NO GROWTH
BASO STIPL BLD QL SMEAR: ABNORMAL
BASOPHILS # BLD AUTO: 0.1 10E3/UL (ref 0–0.2)
BASOPHILS NFR BLD AUTO: 3 %
BILIRUB SERPL-MCNC: 0.3 MG/DL
BITE CELLS BLD QL SMEAR: ABNORMAL
BLISTER CELLS BLD QL SMEAR: ABNORMAL
BUN SERPL-MCNC: 12.8 MG/DL (ref 6–20)
BURR CELLS BLD QL SMEAR: ABNORMAL
CALCIUM SERPL-MCNC: 9.3 MG/DL (ref 8.6–10)
CHLORIDE SERPL-SCNC: 106 MMOL/L (ref 98–107)
CREAT SERPL-MCNC: 0.43 MG/DL (ref 0.51–0.95)
DACRYOCYTES BLD QL SMEAR: ABNORMAL
DEPRECATED HCO3 PLAS-SCNC: 26 MMOL/L (ref 22–29)
EGFRCR SERPLBLD CKD-EPI 2021: >90 ML/MIN/1.73M2
ELLIPTOCYTES BLD QL SMEAR: ABNORMAL
EOSINOPHIL # BLD AUTO: 0.1 10E3/UL (ref 0–0.7)
EOSINOPHIL NFR BLD AUTO: 2 %
ERYTHROCYTE [DISTWIDTH] IN BLOOD BY AUTOMATED COUNT: 23.7 % (ref 10–15)
FRAGMENTS BLD QL SMEAR: ABNORMAL
GLUCOSE SERPL-MCNC: 112 MG/DL (ref 70–99)
HCT VFR BLD AUTO: 26.3 % (ref 35–47)
HGB BLD-MCNC: 8.4 G/DL (ref 11.7–15.7)
HGB C CRYSTALS: ABNORMAL
HOWELL-JOLLY BOD BLD QL SMEAR: ABNORMAL
IMM GRANULOCYTES # BLD: 0.1 10E3/UL
IMM GRANULOCYTES NFR BLD: 2 %
LYMPHOCYTES # BLD AUTO: 0.1 10E3/UL (ref 0.8–5.3)
LYMPHOCYTES NFR BLD AUTO: 4 %
MCH RBC QN AUTO: 32.8 PG (ref 26.5–33)
MCHC RBC AUTO-ENTMCNC: 31.9 G/DL (ref 31.5–36.5)
MCV RBC AUTO: 103 FL (ref 78–100)
MONOCYTES # BLD AUTO: 0.8 10E3/UL (ref 0–1.3)
MONOCYTES NFR BLD AUTO: 25 %
NEUTROPHILS # BLD AUTO: 2.1 10E3/UL (ref 1.6–8.3)
NEUTROPHILS NFR BLD AUTO: 64 %
NEUTS HYPERSEG BLD QL SMEAR: ABNORMAL
NRBC # BLD AUTO: 0 10E3/UL
NRBC BLD AUTO-RTO: 1 /100
PLAT MORPH BLD: ABNORMAL
PLATELET # BLD AUTO: 45 10E3/UL (ref 150–450)
POLYCHROMASIA BLD QL SMEAR: SLIGHT
POTASSIUM SERPL-SCNC: 3.8 MMOL/L (ref 3.4–5.3)
PROT SERPL-MCNC: 6.5 G/DL (ref 6.4–8.3)
RBC # BLD AUTO: 2.56 10E6/UL (ref 3.8–5.2)
RBC AGGLUT BLD QL: ABNORMAL
RBC MORPH BLD: ABNORMAL
ROULEAUX BLD QL SMEAR: ABNORMAL
SICKLE CELLS BLD QL SMEAR: ABNORMAL
SMUDGE CELLS BLD QL SMEAR: ABNORMAL
SODIUM SERPL-SCNC: 143 MMOL/L (ref 135–145)
SPHEROCYTES BLD QL SMEAR: ABNORMAL
STOMATOCYTES BLD QL SMEAR: ABNORMAL
TARGETS BLD QL SMEAR: ABNORMAL
TOXIC GRANULES BLD QL SMEAR: ABNORMAL
VARIANT LYMPHS BLD QL SMEAR: ABNORMAL
WBC # BLD AUTO: 3.3 10E3/UL (ref 4–11)

## 2024-02-05 PROCEDURE — 250N000011 HC RX IP 250 OP 636: Performed by: INTERNAL MEDICINE

## 2024-02-05 PROCEDURE — 84155 ASSAY OF PROTEIN SERUM: CPT

## 2024-02-05 PROCEDURE — 250N000011 HC RX IP 250 OP 636: Mod: JZ

## 2024-02-05 PROCEDURE — 99417 PROLNG OP E/M EACH 15 MIN: CPT

## 2024-02-05 PROCEDURE — 258N000003 HC RX IP 258 OP 636

## 2024-02-05 PROCEDURE — 85041 AUTOMATED RBC COUNT: CPT

## 2024-02-05 PROCEDURE — 96365 THER/PROPH/DIAG IV INF INIT: CPT

## 2024-02-05 PROCEDURE — 86900 BLOOD TYPING SEROLOGIC ABO: CPT

## 2024-02-05 PROCEDURE — 99215 OFFICE O/P EST HI 40 MIN: CPT

## 2024-02-05 PROCEDURE — 36592 COLLECT BLOOD FROM PICC: CPT

## 2024-02-05 PROCEDURE — 82040 ASSAY OF SERUM ALBUMIN: CPT

## 2024-02-05 PROCEDURE — 99213 OFFICE O/P EST LOW 20 MIN: CPT

## 2024-02-05 PROCEDURE — 86922 COMPATIBILITY TEST ANTIGLOB: CPT

## 2024-02-05 RX ORDER — HEPARIN SODIUM,PORCINE 10 UNIT/ML
5-20 VIAL (ML) INTRAVENOUS DAILY PRN
Status: CANCELLED | OUTPATIENT
Start: 2024-03-24

## 2024-02-05 RX ORDER — HEPARIN SODIUM (PORCINE) LOCK FLUSH IV SOLN 100 UNIT/ML 100 UNIT/ML
5 SOLUTION INTRAVENOUS
Status: CANCELLED | OUTPATIENT
Start: 2024-03-24

## 2024-02-05 RX ORDER — HEPARIN SODIUM,PORCINE 10 UNIT/ML
5 VIAL (ML) INTRAVENOUS ONCE
Status: COMPLETED | OUTPATIENT
Start: 2024-02-05 | End: 2024-02-05

## 2024-02-05 RX ADMIN — Medication 5 ML: at 08:17

## 2024-02-05 RX ADMIN — MICAFUNGIN SODIUM 300 MG: 100 INJECTION, POWDER, LYOPHILIZED, FOR SOLUTION INTRAVENOUS at 08:57

## 2024-02-05 ASSESSMENT — PAIN SCALES - GENERAL: PAINLEVEL: NO PAIN (0)

## 2024-02-05 NOTE — PROGRESS NOTES
I met with Hortencia Baldwin to review her medication list after hospital discharge post-transplant. Hortencia Baldwin and her caregiver had the opportunity to ask questions regarding her therapy which were answered to their satisfaction. We specifically discussed the following items:    Letermovir was missing from discharge medication box. I added and we discussed leaving in foil packaging when adding to medication box.    Changes made to scheduled medication list by today's provider include:  Added: None  Deleted: None  Changed: None    Current Outpatient Medications   Medication Sig Dispense Refill    acyclovir (ZOVIRAX) 800 MG tablet Take 1 tablet (800 mg) by mouth 2 times daily 60 tablet 3    amLODIPine (NORVASC) 5 MG tablet Take 1 tablet (5 mg) by mouth daily for 30 days 30 tablet 0    diphenhydrAMINE-zinc acetate (BENADRYL) 1-0.1 % external cream Apply topically 3 times daily as needed for itching 28 g 0    letermovir (PREVYMIS) 480 MG TABS tablet Take 1 tablet (480 mg) by mouth daily 30 tablet 2    mycophenolate (GENERIC EQUIVALENT) 250 MG capsule Take 1 capsule (250 mg) by mouth 2 times daily Take with 500 mg tablets for a total of 1,250 mg 2x/day 22 capsule 0    mycophenolate (GENERIC EQUIVALENT) 500 MG tablet Take 2 tablets (1,000 mg) by mouth 2 times daily 44 tablet 0    OLANZapine (ZYPREXA) 5 MG tablet Take 1 tablet (5 mg) by mouth at bedtime 15 tablet 0    Omega-3 Fatty Acids (FISH OIL) 1200 MG capsule Take 1,200 mg by mouth daily      ondansetron (ZOFRAN ODT) 4 MG ODT tab Take 1 tablet (4 mg) by mouth daily before breakfast 30 tablet 1    oxyCODONE (ROXICODONE) 5 MG tablet Take 1 tablet (5 mg) by mouth every 4 hours as needed for moderate pain 10 tablet 0    pantoprazole (PROTONIX) 40 MG EC tablet Take 1 tablet (40 mg) by mouth every morning (before breakfast) 30 tablet 0    predniSONE (DELTASONE) 20 MG tablet Take 2 tablets (40 mg) by mouth daily for 3 days 6 tablet 0    sirolimus (GENERIC EQUIVALENT) 2 MG  tablet Take 1 tablet (2 mg) by mouth daily 30 tablet 1    [START ON 2/12/2024] sulfamethoxazole-trimethoprim (BACTRIM DS) 800-160 MG tablet Take 1 tablet by mouth Every Mon, Tues two times daily Do not start until instructed to do so by BMT provider 16 tablet 3    triamcinolone (KENALOG) 0.1 % external cream Apply topically 2 times daily 453.6 g 0    ursodiol (ACTIGALL) 300 MG capsule Take 1 capsule (300 mg) by mouth 3 times daily 108 capsule 0        Pertinent labs considered today:  Lab Results   Component Value Date     02/05/2024     05/30/2017                 normal sodium 136-148  Lab Results   Component Value Date    POTASSIUM 3.8 02/05/2024    POTASSIUM 3.7 05/30/2017       normal potassium 3.5-5.2   Lab Results   Component Value Date    CHLORIDE 106 02/05/2024    CHLORIDE 107 05/30/2017           normal chloride    Lab Results   Component Value Date    CO2 26 02/05/2024    CO2 26 05/30/2017                normal CO2 20-32  Lab Results   Component Value Date    BUN 12.8 02/05/2024    BUN 11 05/30/2017                 normal BUN 5-24  Lab Results   Component Value Date     02/05/2024     02/01/2024    GLC 85 05/30/2017                 normal glucose   Lab Results   Component Value Date    CR 0.43 02/05/2024    CR 0.63 05/30/2017                 normal cr 0.8-1.5  Lab Results   Component Value Date    NIKO 9.3 02/05/2024    NIKO 8.1 05/30/2017               normal calcium  8.5-10.4  Lab Results   Component Value Date    BILITOTAL 0.3 02/05/2024    BILITOTAL 0.4 05/30/2017          normal bilirubin 0.2-1.3  Lab Results   Component Value Date    PROTTOTAL 6.5 02/05/2024    PROTTOTAL 6.9 05/30/2017          normal total protein 6.0-8.2  Lab Results   Component Value Date    ALBUMIN 4.2 02/05/2024    ALBUMIN 3.7 05/30/2017             normal albumin 3.2-4.5  Lab Results   Component Value Date    ALKPHOS 117 02/05/2024    ALKPHOS 72 05/30/2017            normal alkphos    Lab Results   Component Value Date     02/05/2024    ALT 16 05/30/2017         normal ALT 0-65  Lab Results   Component Value Date     02/05/2024    AST 11 05/30/2017         normal AST 0-37  Lab Results   Component Value Date    HGB 8.4 02/05/2024    HGB 10.4 03/19/2018     Lab Results   Component Value Date    WBC 3.3 02/05/2024    WBC 5.4 03/19/2018      Lab Results   Component Value Date    PLT 45 02/05/2024     03/19/2018     Estimated Creatinine Clearance: 146.9 mL/min (A) (based on SCr of 0.43 mg/dL (L)).      JUNAID STONE, [facility], PharmD

## 2024-02-05 NOTE — NURSING NOTE
"Oncology Rooming Note    February 5, 2024 8:27 AM   Hortencia Baldwin is a 53 year old female who presents for:    Chief Complaint   Patient presents with    Blood Draw     CVC blood draw with heparin flush by lab RN. Vitals taken and appointment arrived     Initial Vitals: BP (!) 145/85   Pulse 115   Temp 97.9  F (36.6  C) (Oral)   Resp 16   Wt 78.6 kg (173 lb 4.8 oz)   LMP 11/05/2017   SpO2 98%   BMI 31.69 kg/m   Estimated body mass index is 31.69 kg/m  as calculated from the following:    Height as of 1/5/24: 1.575 m (5' 2.01\").    Weight as of this encounter: 78.6 kg (173 lb 4.8 oz). Body surface area is 1.85 meters squared.  No Pain (0) Comment: Data Unavailable   Patient's last menstrual period was 11/05/2017.  Allergies reviewed: Yes  Medications reviewed: Yes    Medications: Medication refills not needed today.  Pharmacy name entered into [facility]:    [id number] - [place], [place] - [address] [facility]  [id number] - [place], [place] - [address] [facility] AT [facility]    Frailty Screening:   Is the patient here for a new oncology consult visit in cancer care? 2. No      Clinical concerns: Pt states her rash does not hurt as bad, also has a sore throat.    Tuyet was notified.      Irma Cabrera CMA              "

## 2024-02-05 NOTE — PROGRESS NOTES
BMT  Progress Note  Patient ID:  Irma Foreman is a 53 year old female with a PMH of MDS, warm AIHA, transfusion and transfusion dependent anemia presented to  to undergo a myeloablative allogeneic transplant. She was recently COVID+ on 12/26 but tested negative on 1/2 with resolution of nearly all URI symptoms. Undergoing MA (Bu/Flu) 6/8 URD Allo transplant, day +25     HPI:     Discharge went okay.  Rash pretty much resolved.   Drinking plenty of fluid with dry mouth.  Sore throat continuing to improve.  No new N/V/D.         Review of Systems                                                                                                                                         Negative unless stated in the HPI.   PHYSICAL EXAM                                                                                                                                                     KPS: 70      General: NAD  Eyes: KEVON, sclera anicteric   ENT: No oral lesions noted other then a healing lateral tongue ulcer.   Lungs: CTAB   Cardiovascular: RRR, no M/R/G.   Lymphatics: No edema.   Skin: No rash.   Neuro: A&O   Additional Findings: Gastelum site NT, no drainage.    Acute GVHD          2/5/2024    09:39   Acute GVHD   New evidence of Acute Isnrq-Glaxlj-Owhq Disease developed since last entry? No         LABS AND IMAGING: I have assessed all abnormal lab values for their clinical significance and any values considered clinically significant have been addressed in the assessment and plan.       US Abdominal with Doppler (1/18/24)  Impression:   1. Patent Doppler evaluation of the abdomen with antegrade flow throughout.  2. Elevated hepatic artery resistive index of 0.81, nonspecific though can be seen with hepatic venous congestion which could be related to venous occlusive disease..     SYSTEMS-BASED ASSESSMENT AND PLAN      Hortencia Baldwin is a 53 year old female with a history of MDS, undergoing MA (Bu/Flu) 6/8 URD Allo  transplant, day +25        BMT/IEC PROTOCOL for MT 2015-29   - Chemo protocol:  Day -6 through day -1: Keppra and Allopurinol   Day -5 through -2: Bu/Flu.   Day -1: Rest day   Day 0: Transplant. Recipient: O+, CMV+. Donor: O+, CMV-. Cell dose 6.5 x10^6.   - Restaging plan: per protocol   Scheduled BM bx 2/6 -- Day 28 review with Dr. Garay on 2/9.      HEME/COAG  - Pancytopenia due to chemotherapy/BMT  # Iron overload: hx of transfusion dependent anemia, pre-transplant ferritin around 5,000. Recommend phlebotomy post transplant when retic count is restored and Hgb >10    # Platelet refractoriness: HLA platelets when available.   - Transfusion parameters: hemoglobin <7, platelets <10  - GCSF PRN for ANC < 1.     IMMUNOCOMPROMISED  Prophylaxis plan:   ACV/letermovir viral testing ordered as below.  Micafungin through day +45 (current smoker) - scheduled in clinic MWF thru 2/26.  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 **initially avoided sirolimus d/t high risk VOD**. Tac drip discontinued 1/31, sirolimus started.  - 2/4 level therapeutic on 2mg daily. Will get level Friday.  - Pink maculopapular rash.TCM prescribed at discharge and prednisone 40 mg x 5 days (2/3-2/7)     CARDIOVASCULAR  # Chest pressure: new on 1/15, worse with sitting up/activity, better while laying flat. RESOLVED.   EKG: new QR pattern in V1, suggests right ventricular conduction delay  Troponin negative x1   Echo (1/15): LVEF 55-60%, no pericardial effusion present. Cardiology consulted: no further recs   #Prolonged QTc to 507 on 1/22. EKG (1/30) NSR, Qtc 454.  # HTN: On norvasc 5mg daily.     GI/NUTRITION  # Elevated LFT's: ALT & AST, bilirubin WNL. TPN discontinued (2/2), recently started sirolimus (previously on Tac d/t concern for VOD). No abdominal pain/fluid retention. On pred 40mg daily currently. Recent  U/S. Will order viral testing. Known iron overload in setting of MA BMT monitor for now. Liver bx pending course. GVH/VOD/infectious/iron overload w/ MA chemo??  # Epigastric pain (1/18): RUQ ultrasound showing patent doppler throughout, no ascites, nonspecific elevated hepatic artery restrictive index (see full impression above).                - Ulcer prophylaxis: PPI  - VOD ppx: Ursodiol   - Mucositis:  oxy 5 mg Q4H prn -- discharged with #10 tablets and chloraseptic throat spray.  # Nausea from chemo/radiation: Zyprexa 5 mg HS (1/29), compazine PRN, Lorazepam prn. Zofran qAM (QT okay after changing lev).        DERM:  # Foot, hand warts. Curbside derm on 1/8, no recs while inpatient, typically managed OP.    # rash as per above.    RTC:  2/6 BM BX  2/7 labs, provider, infusion jani/trans/lytes.  2/9 labs, Dr. Garay, infusion jani       I spent 60 minutes in the care of this patient today, which included time necessary for preparation for the visit, obtaining history, ordering medications/tests/procedures as medically indicated, review of pertinent medical literature, counseling of the patient, communication of recommendations to the care team, and documentation time.    Tuyet Cotton PA-C  [phone]

## 2024-02-05 NOTE — LETTER
2/5/2024         RE: Hortencia Baldwin  [address]  [address]  [facility] [address]        Dear Colleague,    Thank you for referring your patient, Hortencia Baldwin, to the [facility] BLOOD AND MARROW TRANSPLANT PROGRAM [place]. Please see a copy of my visit note below.    BMT  Progress Note  Patient ID:  Irma Foreman is a 53 year old female with a PMH of MDS, warm AIHA, transfusion and transfusion dependent anemia presented to  to undergo a myeloablative allogeneic transplant. She was recently COVID+ on 12/26 but tested negative on 1/2 with resolution of nearly all URI symptoms. Undergoing MA (Bu/Flu) 6/8 URD Allo transplant, day +25     HPI:     Discharge went okay.  Rash pretty much resolved.   Drinking plenty of fluid with dry mouth.  Sore throat continuing to improve.  No new N/V/D.         Review of Systems                                                                                                                                         Negative unless stated in the HPI.   PHYSICAL EXAM                                                                                                                                                     KPS: 70      General: NAD  Eyes: KEVON, sclera anicteric   ENT: No oral lesions noted other then a healing lateral tongue ulcer.   Lungs: CTAB   Cardiovascular: RRR, no M/R/G.   Lymphatics: No edema.   Skin: No rash.   Neuro: A&O   Additional Findings: Gastelum site NT, no drainage.    Acute GVHD          2/5/2024    09:39   Acute GVHD   New evidence of Acute Sckvy-Wbrozr-Oxog Disease developed since last entry? No         LABS AND IMAGING: I have assessed all abnormal lab values for their clinical significance and any values considered clinically significant have been addressed in the assessment and plan.       US Abdominal with Doppler (1/18/24)  Impression:   1. Patent Doppler evaluation of the abdomen with antegrade flow throughout.  2. Elevated hepatic  artery resistive index of 0.81, nonspecific though can be seen with hepatic venous congestion which could be related to venous occlusive disease..     SYSTEMS-BASED ASSESSMENT AND PLAN      Hortencia Baldwin is a 53 year old female with a history of MDS, undergoing MA (Bu/Flu) 6/8 URD Allo transplant, day +25        BMT/IEC PROTOCOL for MT 2015-29   - Chemo protocol:  Day -6 through day -1: Keppra and Allopurinol   Day -5 through -2: Bu/Flu.   Day -1: Rest day   Day 0: Transplant. Recipient: O+, CMV+. Donor: O+, CMV-. Cell dose 6.5 x10^6.   - Restaging plan: per protocol   Scheduled BM bx 2/6 -- Day 28 review with Dr. Garay on 2/9.      HEME/COAG  - Pancytopenia due to chemotherapy/BMT  # Iron overload: hx of transfusion dependent anemia, pre-transplant ferritin around 5,000. Recommend phlebotomy post transplant when retic count is restored and Hgb >10    # Platelet refractoriness: HLA platelets when available.   - Transfusion parameters: hemoglobin <7, platelets <10  - GCSF PRN for ANC < 1.     IMMUNOCOMPROMISED  Prophylaxis plan:   ACV/letermovir viral testing ordered as below.  Micafungin through day +45 (current smoker) - scheduled in clinic MWF thru 2/26.  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 **initially avoided sirolimus d/t high risk VOD**. Tac drip discontinued 1/31, sirolimus started.  - 2/4 level therapeutic on 2mg daily. Will get level Friday.  - Pink maculopapular rash.TCM prescribed at discharge and prednisone 40 mg x 5 days (2/3-2/7)     CARDIOVASCULAR  # Chest pressure: new on 1/15, worse with sitting up/activity, better while laying flat. RESOLVED.   EKG: new QR pattern in V1, suggests right ventricular conduction delay  Troponin negative x1   Echo (1/15): LVEF 55-60%, no pericardial effusion present. Cardiology consulted: no further recs   #Prolonged QTc to 507 on  1/22. EKG (1/30) NSR, Qtc 454.  # HTN: On norvasc 5mg daily.     GI/NUTRITION  # Elevated LFT's: ALT & AST, bilirubin WNL. TPN discontinued (2/2), recently started sirolimus (previously on Tac d/t concern for VOD). No abdominal pain/fluid retention. On pred 40mg daily currently. Recent U/S. Will order viral testing. Known iron overload in setting of MA BMT monitor for now. Liver bx pending course. GVH/VOD/infectious/iron overload w/ MA chemo??  # Epigastric pain (1/18): RUQ ultrasound showing patent doppler throughout, no ascites, nonspecific elevated hepatic artery restrictive index (see full impression above).                - Ulcer prophylaxis: PPI  - VOD ppx: Ursodiol   - Mucositis:  oxy 5 mg Q4H prn -- discharged with #10 tablets and chloraseptic throat spray.  # Nausea from chemo/radiation: Zyprexa 5 mg HS (1/29), compazine PRN, Lorazepam prn. Zofran qAM (QT okay after changing lev).        DERM:  # Foot, hand warts. Curbside derm on 1/8, no recs while inpatient, typically managed OP.    # rash as per above.    RTC:  2/6 BM BX  2/7 labs, provider, infusion jani/trans/lytes.  2/9 labs, Dr. Garay, infusion jani       I spent 60 minutes in the care of this patient today, which included time necessary for preparation for the visit, obtaining history, ordering medications/tests/procedures as medically indicated, review of pertinent medical literature, counseling of the patient, communication of recommendations to the care team, and documentation time.    Tuyet Cotton PA-C  [phone]

## 2024-02-05 NOTE — PROGRESS NOTES
Infusion Nursing Note:  Hortencia Baldwin presents today for scheduled Micafungin.    Patient seen by provider today: Yes: Tuyet Cotton, JEN   present during visit today: Not Applicable.    Note: Lab results monitored; no other infusions needed. Micafungin infused over 1 hour without complication.    Intravenous Access:  Gastelum.    Treatment Conditions:  Not Applicable.    Post Infusion Assessment:  Patient tolerated infusion without incident.     Discharge Plan:   Patient discharged in stable condition accompanied by: caregiver.    Eneida Obando RN

## 2024-02-05 NOTE — PLAN OF CARE
Physical Therapy Discharge Summary    Reason for therapy discharge:    Discharged to home with outpatient therapy.    Progress towards therapy goal(s). See goals on Care Plan in [facility] electronic health record for goal details.  Goals partially met.  Barriers to achieving goals:   discharge from facility.    Therapy recommendation(s):    Continued therapy is recommended.  Rationale/Recommendations:  strength, endurance, home safety.

## 2024-02-06 ENCOUNTER — LAB (OUTPATIENT)
Dept: LAB | Facility: CLINIC | Age: 54
End: 2024-02-06
Payer: COMMERCIAL

## 2024-02-06 ENCOUNTER — OFFICE VISIT (OUTPATIENT)
Dept: TRANSPLANT | Facility: CLINIC | Age: 54
End: 2024-02-06
Payer: COMMERCIAL

## 2024-02-06 VITALS
BODY MASS INDEX: 32 KG/M2 | HEART RATE: 118 BPM | SYSTOLIC BLOOD PRESSURE: 117 MMHG | RESPIRATION RATE: 16 BRPM | WEIGHT: 175 LBS | OXYGEN SATURATION: 99 % | TEMPERATURE: 98.2 F | DIASTOLIC BLOOD PRESSURE: 76 MMHG

## 2024-02-06 DIAGNOSIS — D46.9 MDS (MYELODYSPLASTIC SYNDROME) (H): ICD-10-CM

## 2024-02-06 DIAGNOSIS — D46.9 MDS (MYELODYSPLASTIC SYNDROME) (H): Primary | ICD-10-CM

## 2024-02-06 LAB
ABO/RH(D): NORMAL
ACANTHOCYTES BLD QL SMEAR: NORMAL
ANTIBODY SCREEN: NEGATIVE
AUER BODIES BLD QL SMEAR: NORMAL
BASO STIPL BLD QL SMEAR: NORMAL
BASOPHILS # BLD AUTO: 0.1 10E3/UL (ref 0–0.2)
BASOPHILS NFR BLD AUTO: 3 %
BITE CELLS BLD QL SMEAR: NORMAL
BLD PROD TYP BPU: NORMAL
BLISTER CELLS BLD QL SMEAR: NORMAL
BLOOD COMPONENT TYPE: NORMAL
BURR CELLS BLD QL SMEAR: NORMAL
CMV DNA SPEC NAA+PROBE-ACNC: <35 IU/ML
CMV DNA SPEC NAA+PROBE-LOG#: <1.5 {LOG_COPIES}/ML
CODING SYSTEM: NORMAL
CROSSMATCH: NORMAL
DACRYOCYTES BLD QL SMEAR: NORMAL
ELLIPTOCYTES BLD QL SMEAR: NORMAL
EOSINOPHIL # BLD AUTO: 0.1 10E3/UL (ref 0–0.7)
EOSINOPHIL NFR BLD AUTO: 5 %
ERYTHROCYTE [DISTWIDTH] IN BLOOD BY AUTOMATED COUNT: 23.9 % (ref 10–15)
FRAGMENTS BLD QL SMEAR: NORMAL
HAV IGM SERPL QL IA: NONREACTIVE
HBV CORE IGM SERPL QL IA: NONREACTIVE
HBV SURFACE AG SERPL QL IA: NONREACTIVE
HCT VFR BLD AUTO: 26.3 % (ref 35–47)
HCV AB SERPL QL IA: NONREACTIVE
HGB BLD-MCNC: 8.6 G/DL (ref 11.7–15.7)
HGB C CRYSTALS: NORMAL
HOWELL-JOLLY BOD BLD QL SMEAR: NORMAL
IMM GRANULOCYTES # BLD: 0 10E3/UL
IMM GRANULOCYTES NFR BLD: 1 %
INTERPRETATION: NORMAL
LAB DIRECTOR DISCLAIMER: NORMAL
LAB DIRECTOR INTERPRETATION: NORMAL
LAB DIRECTOR METHODOLOGY: NORMAL
LAB DIRECTOR RESULTS: NORMAL
LYMPHOCYTES # BLD AUTO: 0.1 10E3/UL (ref 0.8–5.3)
LYMPHOCYTES NFR BLD AUTO: 4 %
MCH RBC QN AUTO: 34 PG (ref 26.5–33)
MCHC RBC AUTO-ENTMCNC: 32.7 G/DL (ref 31.5–36.5)
MCV RBC AUTO: 104 FL (ref 78–100)
MONOCYTES # BLD AUTO: 0.7 10E3/UL (ref 0–1.3)
MONOCYTES NFR BLD AUTO: 24 %
NEUTROPHILS # BLD AUTO: 1.7 10E3/UL (ref 1.6–8.3)
NEUTROPHILS NFR BLD AUTO: 63 %
NEUTS HYPERSEG BLD QL SMEAR: NORMAL
NRBC # BLD AUTO: 0 10E3/UL
NRBC BLD AUTO-RTO: 0 /100
PLAT MORPH BLD: NORMAL
PLATELET # BLD AUTO: 42 10E3/UL (ref 150–450)
POLYCHROMASIA BLD QL SMEAR: NORMAL
RBC # BLD AUTO: 2.53 10E6/UL (ref 3.8–5.2)
RBC AGGLUT BLD QL: NORMAL
RBC MORPH BLD: NORMAL
ROULEAUX BLD QL SMEAR: NORMAL
SICKLE CELLS BLD QL SMEAR: NORMAL
SIGNIFICANT RESULTS: NORMAL
SMUDGE CELLS BLD QL SMEAR: NORMAL
SPECIMEN DESCRIPTION: NORMAL
SPECIMEN DESCRIPTION: NORMAL
SPECIMEN EXPIRATION DATE: NORMAL
SPHEROCYTES BLD QL SMEAR: NORMAL
STOMATOCYTES BLD QL SMEAR: NORMAL
TARGETS BLD QL SMEAR: NORMAL
TEST DETAILS, MDL: NORMAL
TOXIC GRANULES BLD QL SMEAR: NORMAL
UNIT ABO/RH: NORMAL
UNIT ABO/RH: NORMAL
UNIT NUMBER: NORMAL
UNIT STATUS: NORMAL
UNIT TYPE ISBT: 5100
UNIT TYPE ISBT: 6200
VARIANT LYMPHS BLD QL SMEAR: NORMAL
WBC # BLD AUTO: 2.8 10E3/UL (ref 4–11)

## 2024-02-06 PROCEDURE — 85048 AUTOMATED LEUKOCYTE COUNT: CPT

## 2024-02-06 PROCEDURE — 81450 HL NEO GSAP 5-50DNA/DNA&RNA: CPT

## 2024-02-06 PROCEDURE — 88313 SPECIAL STAINS GROUP 2: CPT | Mod: 26 | Performed by: PATHOLOGY

## 2024-02-06 PROCEDURE — 85060 BLOOD SMEAR INTERPRETATION: CPT | Performed by: PATHOLOGY

## 2024-02-06 PROCEDURE — 88305 TISSUE EXAM BY PATHOLOGIST: CPT | Mod: 26 | Performed by: PATHOLOGY

## 2024-02-06 PROCEDURE — 38222 DX BONE MARROW BX & ASPIR: CPT

## 2024-02-06 PROCEDURE — 88311 DECALCIFY TISSUE: CPT | Mod: TC

## 2024-02-06 PROCEDURE — 88311 DECALCIFY TISSUE: CPT | Mod: 26 | Performed by: PATHOLOGY

## 2024-02-06 PROCEDURE — 87533 HHV-6 DNA QUANT: CPT | Mod: XU

## 2024-02-06 PROCEDURE — 87799 DETECT AGENT NOS DNA QUANT: CPT

## 2024-02-06 PROCEDURE — 88237 TISSUE CULTURE BONE MARROW: CPT

## 2024-02-06 PROCEDURE — 88291 CYTO/MOLECULAR REPORT: CPT | Performed by: MEDICAL GENETICS

## 2024-02-06 PROCEDURE — 88341 IMHCHEM/IMCYTCHM EA ADD ANTB: CPT | Mod: 26 | Performed by: PATHOLOGY

## 2024-02-06 PROCEDURE — 88185 FLOWCYTOMETRY/TC ADD-ON: CPT

## 2024-02-06 PROCEDURE — 85097 BONE MARROW INTERPRETATION: CPT | Performed by: PATHOLOGY

## 2024-02-06 PROCEDURE — 250N000011 HC RX IP 250 OP 636

## 2024-02-06 PROCEDURE — 38221 DX BONE MARROW BIOPSIES: CPT

## 2024-02-06 PROCEDURE — 80074 ACUTE HEPATITIS PANEL: CPT

## 2024-02-06 PROCEDURE — 81267 CHIMERISM ANAL NO CELL SELEC: CPT

## 2024-02-06 PROCEDURE — 88275 CYTOGENETICS 100-300: CPT | Mod: XU

## 2024-02-06 PROCEDURE — 87799 DETECT AGENT NOS DNA QUANT: CPT | Mod: XU

## 2024-02-06 PROCEDURE — 88342 IMHCHEM/IMCYTCHM 1ST ANTB: CPT | Mod: TC,LT

## 2024-02-06 PROCEDURE — G0452 MOLECULAR PATHOLOGY INTERPR: HCPCS | Mod: 26 | Performed by: PATHOLOGY

## 2024-02-06 PROCEDURE — 88189 FLOWCYTOMETRY/READ 16 & >: CPT | Mod: GC | Performed by: PATHOLOGY

## 2024-02-06 PROCEDURE — 88368 INSITU HYBRIDIZATION MANUAL: CPT | Mod: 26 | Performed by: MEDICAL GENETICS

## 2024-02-06 PROCEDURE — 36592 COLLECT BLOOD FROM PICC: CPT

## 2024-02-06 PROCEDURE — 88342 IMHCHEM/IMCYTCHM 1ST ANTB: CPT | Mod: 26 | Performed by: PATHOLOGY

## 2024-02-06 RX ORDER — EPINEPHRINE 1 MG/ML
0.3 INJECTION, SOLUTION INTRAMUSCULAR; SUBCUTANEOUS EVERY 5 MIN PRN
OUTPATIENT
Start: 2024-02-06

## 2024-02-06 RX ORDER — HEPARIN SODIUM (PORCINE) LOCK FLUSH IV SOLN 100 UNIT/ML 100 UNIT/ML
5 SOLUTION INTRAVENOUS
OUTPATIENT
Start: 2024-02-06

## 2024-02-06 RX ORDER — DIPHENHYDRAMINE HYDROCHLORIDE 50 MG/ML
50 INJECTION INTRAMUSCULAR; INTRAVENOUS
Start: 2024-02-06

## 2024-02-06 RX ORDER — HEPARIN SODIUM,PORCINE 10 UNIT/ML
5 VIAL (ML) INTRAVENOUS
Status: DISCONTINUED | OUTPATIENT
Start: 2024-02-06 | End: 2024-02-06 | Stop reason: HOSPADM

## 2024-02-06 RX ORDER — HEPARIN SODIUM,PORCINE 10 UNIT/ML
5-20 VIAL (ML) INTRAVENOUS DAILY PRN
OUTPATIENT
Start: 2024-02-06

## 2024-02-06 RX ADMIN — Medication 5 ML: at 07:20

## 2024-02-06 RX ADMIN — MIDAZOLAM 2 MG: 1 INJECTION INTRAMUSCULAR; INTRAVENOUS at 07:54

## 2024-02-06 ASSESSMENT — PAIN SCALES - GENERAL: PAINLEVEL: NO PAIN (0)

## 2024-02-06 NOTE — NURSING NOTE
"Oncology Rooming Note    February 6, 2024 8:20 AM   Hortencia Baldwin is a 53 year old female who presents for:    Chief Complaint   Patient presents with    Blood Draw     Labs drawn via CVC by RN in lab. VS taken.     Bone Marrow Biopsy     BMBx, s/p BMT hx MDS     Initial Vitals: /76   Pulse 118   Temp 98.2  F (36.8  C) (Oral)   Resp 16   Wt 79.4 kg (175 lb)   LMP 11/05/2017   SpO2 99%   BMI 32.00 kg/m   Estimated body mass index is 32 kg/m  as calculated from the following:    Height as of 1/5/24: 1.575 m (5' 2.01\").    Weight as of this encounter: 79.4 kg (175 lb). Body surface area is 1.86 meters squared.  No Pain (0) Comment: Data Unavailable   Patient's last menstrual period was 11/05/2017.  Allergies reviewed: Yes  Medications reviewed: Yes    Medications: Medication refills not needed today.  Pharmacy name entered into [facility]:    [id number] - [place], [place] - [address] AT [facility] [address] [facility]  [id number] - [place], [place] - [address] AT [facility] [address]    Frailty Screening:   Is the patient here for a new oncology consult visit in cancer care? 2. No          Radha Calvo RN              "

## 2024-02-06 NOTE — PROGRESS NOTES
BMT ONC Adult Bone Marrow Biopsy Procedure Note  February 6, 2024  /76   Pulse 118   Temp 98.2  F (36.8  C) (Oral)   Resp 16   Wt 79.4 kg (175 lb)   LMP 11/05/2017   SpO2 99%   BMI 32.00 kg/m       Learning needs assessment complete within 12 months? YES    DIAGNOSIS: MDS     PROCEDURE: Unilateral Bone Marrow Biopsy and Unilateral Aspirate    LOCATION: [facility] 2nd Floor    Patient s identification was positively verified by verbal identification and invasive procedure safety checklist was completed. Informed consent was obtained. Following the administration of Midazolam as pre-medication, patient was placed in the prone position and prepped and draped in a sterile manner. Approximately 15 cc of 1% Lidocaine was used over the left posterior iliac spine. Following this a 3 mm incision was made. Trephine bone marrow core(s) was (were) obtained from the IC. Bone marrow aspirates were obtained from the IC. Aspirates were sent for morphology, immunophenotyping, cytogenetics, and molecular diagnostics RFLP. A total of approximately 12 ml of marrow was aspirated. Following this procedure a sterile dressing was applied to the bone marrow biopsy site(s). The patient was placed in the supine position to maintain pressure on the biopsy site. Post-procedure wound care instructions were given.     Complications: NO    Pre-procedural pain: 0 out of 10 on the numeric pain rating scale.     Procedural pain: 6 out of 10 on the numeric pain rating scale.     Post-procedural pain assessment: 0 out of 10 on the numeric pain rating scale.     Interventions: NO    Length of procedure:21 minutes to 45 minutes    Procedure performed by: King Vaz PA-C [phone]

## 2024-02-06 NOTE — NURSING NOTE
BMBX Teaching and Assessment       Teaching concerns addressed: Bone marrow biopsy and infection prevention.     Person(s) involved in teaching: Patient  Motivation Level  Asks Questions: Yes  Eager to Learn: Yes  Cooperative: Yes  Receptive (willing/able to accept information): Yes    Patient demonstrates understanding of the following:     Reason for the appointment, diagnosis and treatment plan: Yes  Knowledge of proper use of medications and conditions for which they are ordered (with special attention to potential side effects or drug interactions): Yes  Which situations necessitate calling provider and whom to contact: Yes    Teaching concerns addressed:   Reviewed activity restrictions if received premeds, potential for bleeding and actions to take if develops any of the issues below    Pain management techniques: Yes  Patient instructed on hand hygiene: Yes  How and/when to access community resources: Yes    Infection Control:  Patient demonstrates understanding of the following:   Bone marrow procedure site care taught: Yes  Signs and symptoms of infection taught: Yes       Instructional Materials Used/Given: Pt instructed to keep bmbx site clean and dry for 24hrs. Pt educated to monitor site for signs of infection such as redness, rash, oozing, puss, bleeding, pain, and elevated temp. Pt instructed to go to call the [facility] triage line or go to the ER if any signs of infection should occur. Pt educated to not operate machinery if receiving versed. Pt verbalizes understanding.     Pre-procedure labs drawn via CVC. Post procedure: Patient vital signs stable, ambulating, site is clean, dry and intact prior to discharge and line removed. Pt discharged with daughter as .      Provider order received to administer Versed 2 mg IVP as premed for BMBX. Procedural consent discussed and pt's signature obtained.  Allergies reviewed.  PT currently alert and oriented to plan of care.  Pt lying prone in stretcher.   Call light w/in reach.  Provider and  at bedside.      Radha Calvo RN

## 2024-02-06 NOTE — LETTER
2/6/2024         RE: Hortencia Baldwin  [address]  [address]  [facility] [address]        Dear Colleague,    Thank you for referring your patient, Hortencia Baldwin, to the [facility] BLOOD AND MARROW TRANSPLANT PROGRAM [place]. Please see a copy of my visit note below.    BMT ONC Adult Bone Marrow Biopsy Procedure Note  February 6, 2024  /76   Pulse 118   Temp 98.2  F (36.8  C) (Oral)   Resp 16   Wt 79.4 kg (175 lb)   LMP 11/05/2017   SpO2 99%   BMI 32.00 kg/m       Learning needs assessment complete within 12 months? YES    DIAGNOSIS: MDS     PROCEDURE: Unilateral Bone Marrow Biopsy and Unilateral Aspirate    LOCATION: [facility] 2nd Floor    Patient s identification was positively verified by verbal identification and invasive procedure safety checklist was completed. Informed consent was obtained. Following the administration of Midazolam as pre-medication, patient was placed in the prone position and prepped and draped in a sterile manner. Approximately 15 cc of 1% Lidocaine was used over the left posterior iliac spine. Following this a 3 mm incision was made. Trephine bone marrow core(s) was (were) obtained from the IC. Bone marrow aspirates were obtained from the IC. Aspirates were sent for morphology, immunophenotyping, cytogenetics, and molecular diagnostics RFLP. A total of approximately 12 ml of marrow was aspirated. Following this procedure a sterile dressing was applied to the bone marrow biopsy site(s). The patient was placed in the supine position to maintain pressure on the biopsy site. Post-procedure wound care instructions were given.     Complications: NO    Pre-procedural pain: 0 out of 10 on the numeric pain rating scale.     Procedural pain: 6 out of 10 on the numeric pain rating scale.     Post-procedural pain assessment: 0 out of 10 on the numeric pain rating scale.     Interventions: NO    Length of procedure:21 minutes to 45 minutes    Procedure performed by:  King Vaz PA-C [phone]

## 2024-02-06 NOTE — NURSING NOTE
Chief Complaint   Patient presents with    Blood Draw     Labs drawn via CVC by RN in lab. VS taken.      CVC accessed, labs drawn. Line flushed and Heparin locked. Vital signs taken. Checked into next appointment.   Dari Rodas RN

## 2024-02-07 ENCOUNTER — APPOINTMENT (OUTPATIENT)
Dept: LAB | Facility: CLINIC | Age: 54
End: 2024-02-07
Attending: INTERNAL MEDICINE
Payer: COMMERCIAL

## 2024-02-07 ENCOUNTER — INFUSION THERAPY VISIT (OUTPATIENT)
Dept: TRANSPLANT | Facility: CLINIC | Age: 54
End: 2024-02-07
Attending: INTERNAL MEDICINE
Payer: COMMERCIAL

## 2024-02-07 VITALS
WEIGHT: 175 LBS | DIASTOLIC BLOOD PRESSURE: 79 MMHG | OXYGEN SATURATION: 99 % | BODY MASS INDEX: 32 KG/M2 | RESPIRATION RATE: 16 BRPM | SYSTOLIC BLOOD PRESSURE: 124 MMHG | TEMPERATURE: 98.3 F | HEART RATE: 111 BPM

## 2024-02-07 DIAGNOSIS — D46.9 MDS (MYELODYSPLASTIC SYNDROME) (H): Primary | ICD-10-CM

## 2024-02-07 LAB
ALBUMIN SERPL BCG-MCNC: 3.9 G/DL (ref 3.5–5.2)
ALP SERPL-CCNC: 107 U/L (ref 40–150)
ALT SERPL W P-5'-P-CCNC: 135 U/L (ref 0–50)
ANION GAP SERPL CALCULATED.3IONS-SCNC: 10 MMOL/L (ref 7–15)
AST SERPL W P-5'-P-CCNC: 81 U/L (ref 0–45)
BASOPHILS # BLD AUTO: 0.1 10E3/UL (ref 0–0.2)
BASOPHILS NFR BLD AUTO: 2 %
BILIRUB SERPL-MCNC: 0.4 MG/DL
BUN SERPL-MCNC: 8.8 MG/DL (ref 6–20)
CALCIUM SERPL-MCNC: 9 MG/DL (ref 8.6–10)
CHLORIDE SERPL-SCNC: 106 MMOL/L (ref 98–107)
CREAT SERPL-MCNC: 0.45 MG/DL (ref 0.51–0.95)
DEPRECATED HCO3 PLAS-SCNC: 25 MMOL/L (ref 22–29)
EBV DNA # SPEC NAA+PROBE: NOT DETECTED COPIES/ML
EGFRCR SERPLBLD CKD-EPI 2021: >90 ML/MIN/1.73M2
EOSINOPHIL # BLD AUTO: 0.1 10E3/UL (ref 0–0.7)
EOSINOPHIL NFR BLD AUTO: 3 %
ERYTHROCYTE [DISTWIDTH] IN BLOOD BY AUTOMATED COUNT: 23.9 % (ref 10–15)
GLUCOSE SERPL-MCNC: 120 MG/DL (ref 70–99)
HADV DNA # SPEC NAA+PROBE: NOT DETECTED COPIES/ML
HCT VFR BLD AUTO: 25.8 % (ref 35–47)
HGB BLD-MCNC: 8.6 G/DL (ref 11.7–15.7)
HHV6 DNA # SPEC NAA+PROBE: NOT DETECTED COPIES/ML
IMM GRANULOCYTES # BLD: 0.1 10E3/UL
IMM GRANULOCYTES NFR BLD: 1 %
INTERPRETATION: NORMAL
LYMPHOCYTES # BLD AUTO: 0.1 10E3/UL (ref 0.8–5.3)
LYMPHOCYTES NFR BLD AUTO: 2 %
MCH RBC QN AUTO: 34.3 PG (ref 26.5–33)
MCHC RBC AUTO-ENTMCNC: 33.3 G/DL (ref 31.5–36.5)
MCV RBC AUTO: 103 FL (ref 78–100)
MONOCYTES # BLD AUTO: 0.8 10E3/UL (ref 0–1.3)
MONOCYTES NFR BLD AUTO: 18 %
NEUTROPHILS # BLD AUTO: 3 10E3/UL (ref 1.6–8.3)
NEUTROPHILS NFR BLD AUTO: 74 %
NRBC # BLD AUTO: 0 10E3/UL
NRBC BLD AUTO-RTO: 1 /100
PATH REPORT.COMMENTS IMP SPEC: NORMAL
PATH REPORT.FINAL DX SPEC: NORMAL
PATH REPORT.FINAL DX SPEC: NORMAL
PATH REPORT.GROSS SPEC: NORMAL
PATH REPORT.MICROSCOPIC SPEC OTHER STN: NORMAL
PATH REPORT.RELEVANT HX SPEC: NORMAL
PATH REPORT.RELEVANT HX SPEC: NORMAL
PLATELET # BLD AUTO: 37 10E3/UL (ref 150–450)
POTASSIUM SERPL-SCNC: 3.4 MMOL/L (ref 3.4–5.3)
PROT SERPL-MCNC: 6.1 G/DL (ref 6.4–8.3)
RBC # BLD AUTO: 2.51 10E6/UL (ref 3.8–5.2)
SODIUM SERPL-SCNC: 141 MMOL/L (ref 135–145)
WBC # BLD AUTO: 4.1 10E3/UL (ref 4–11)

## 2024-02-07 PROCEDURE — 85025 COMPLETE CBC W/AUTO DIFF WBC: CPT

## 2024-02-07 PROCEDURE — 99214 OFFICE O/P EST MOD 30 MIN: CPT

## 2024-02-07 PROCEDURE — 82374 ASSAY BLOOD CARBON DIOXIDE: CPT

## 2024-02-07 PROCEDURE — 250N000011 HC RX IP 250 OP 636: Mod: JZ

## 2024-02-07 PROCEDURE — 36592 COLLECT BLOOD FROM PICC: CPT

## 2024-02-07 PROCEDURE — 250N000011 HC RX IP 250 OP 636: Performed by: INTERNAL MEDICINE

## 2024-02-07 PROCEDURE — 96365 THER/PROPH/DIAG IV INF INIT: CPT

## 2024-02-07 PROCEDURE — 99211 OFF/OP EST MAY X REQ PHY/QHP: CPT

## 2024-02-07 PROCEDURE — 258N000003 HC RX IP 258 OP 636

## 2024-02-07 PROCEDURE — 86900 BLOOD TYPING SEROLOGIC ABO: CPT

## 2024-02-07 PROCEDURE — 82040 ASSAY OF SERUM ALBUMIN: CPT

## 2024-02-07 RX ORDER — HEPARIN SODIUM (PORCINE) LOCK FLUSH IV SOLN 100 UNIT/ML 100 UNIT/ML
5 SOLUTION INTRAVENOUS
Status: CANCELLED | OUTPATIENT
Start: 2024-03-26

## 2024-02-07 RX ORDER — HEPARIN SODIUM,PORCINE 10 UNIT/ML
5-20 VIAL (ML) INTRAVENOUS DAILY PRN
Status: CANCELLED | OUTPATIENT
Start: 2024-03-26

## 2024-02-07 RX ORDER — HEPARIN SODIUM,PORCINE 10 UNIT/ML
5 VIAL (ML) INTRAVENOUS
Status: DISCONTINUED | OUTPATIENT
Start: 2024-02-07 | End: 2024-02-07 | Stop reason: HOSPADM

## 2024-02-07 RX ADMIN — Medication 5 ML: at 10:16

## 2024-02-07 RX ADMIN — MICAFUNGIN SODIUM 300 MG: 100 INJECTION, POWDER, LYOPHILIZED, FOR SOLUTION INTRAVENOUS at 10:26

## 2024-02-07 ASSESSMENT — PAIN SCALES - GENERAL: PAINLEVEL: NO PAIN (0)

## 2024-02-07 NOTE — PROGRESS NOTES
Infusion Nursing Note:  Hortencia Baldwin presents today for Micafungin infusion.    Patient seen by provider today: Yes: Jt Vaz, JEN   present during visit today: Not Applicable.    Note: Lab results monitored. Pt received Micafungin infusion, tolerated well. CVC dressing changed. Pt discharged with no further clinical concerns.       Intravenous Access:  Gastelum.    Treatment Conditions:  Lab Results   Component Value Date    HGB 8.6 (L) 02/07/2024    WBC 4.1 02/07/2024    ANEU 2.9 02/03/2024    ANEUTAUTO 3.0 02/07/2024    PLT 37 (LL) 02/07/2024        Lab Results   Component Value Date     02/07/2024    POTASSIUM 3.4 02/07/2024    MAG 1.8 02/04/2024    CR 0.45 (L) 02/07/2024    NIKO 9.0 02/07/2024    BILITOTAL 0.4 02/07/2024    ALBUMIN 3.9 02/07/2024     (H) 02/07/2024    AST 81 (H) 02/07/2024       Results reviewed, labs MET treatment parameters, ok to proceed with treatment.      Post Infusion Assessment:  Patient tolerated infusion without incident.  Blood return noted pre and post infusion.       Discharge Plan:   Patient and/or family verbalized understanding of discharge instructions and all questions answered.  Patient discharged in stable condition.    Radha Calvo RN

## 2024-02-07 NOTE — PROGRESS NOTES
BMT  Progress Note    Patient ID:  Irma Foreman is a 53 year old female with a PMH of MDS, warm AIHA, transfusion and transfusion dependent anemia presented to  to undergo a myeloablative allogeneic transplant. She was recently COVID+ on 12/26 but tested negative on 1/2 with resolution of nearly all URI symptoms. Undergoing MA (Bu/Flu) 6/8 URD Allo transplant, day +27     HPI:     Irma is doing fine. She is eating well, drinking about 1.5 L of fluids throughout the day, no nausea, stools are formed. Rash has resolved. Hands are a little shaky with the prednisone. Medbox filled for the week, she and her daughter will manage it at home. Instructed to start Bactrim on Monday.         Review of Systems                                                                                                                                         Negative unless stated in the HPI.   PHYSICAL EXAM                                                                                                                                                     KPS: 70      General: NAD  Eyes: KEVON, sclera anicteric   ENT: No oral lesions noted other then a healing lateral tongue ulcer.   Lungs: CTAB   Cardiovascular: RRR, no M/R/G.   Lymphatics: No edema.   Skin: No rash.   Neuro: A&O   Additional Findings: Gastelum site NT, no drainage.    Acute GVHD          2/5/2024    09:39   Acute GVHD   New evidence of Acute Heykk-Aybodk-Kevm Disease developed since last entry? No         LABS AND IMAGING: I have assessed all abnormal lab values for their clinical significance and any values considered clinically significant have been addressed in the assessment and plan.       US Abdominal with Doppler (1/18/24)  Impression:   1. Patent Doppler evaluation of the abdomen with antegrade flow throughout.  2. Elevated hepatic artery resistive index of 0.81, nonspecific though can be seen with hepatic venous congestion which could be related to venous occlusive  disease..     SYSTEMS-BASED ASSESSMENT AND PLAN      Hortencia Baldwin is a 53 year old female with a history of MDS, undergoing MA (Bu/Flu) 6/8 URD Allo transplant, day +27        BMT/IEC PROTOCOL for MT 2015-29   - Chemo protocol:  Day -6 through day -1: Keppra and Allopurinol   Day -5 through -2: Bu/Flu.   Day -1: Rest day   Day 0: Transplant. Recipient: O+, CMV+. Donor: O+, CMV-. Cell dose 6.5 x10^6.   - Restaging plan: per protocol   BM bx completed 2/6 -- Day 28 review with Dr. Garay on 2/9.      HEME/COAG  - Pancytopenia due to chemotherapy/BMT  # Iron overload: hx of transfusion dependent anemia, pre-transplant ferritin around 5,000. Recommend phlebotomy post transplant when retic count is restored and Hgb >10    # Platelet refractoriness: HLA platelets when available.   - Transfusion parameters: hemoglobin <7, platelets <10  - GCSF PRN for ANC < 1.     IMMUNOCOMPROMISED  Prophylaxis plan:   ACV/letermovir. CMV <35 (2/6/24)   Micafungin through day +45 (current smoker) - scheduled in clinic MWF thru 2/26.  cefpodoxime while neutropenic (changed from levaquin due to prolonged QTc as below)   Bactrim to start at day +28 (instructed to start 2/12)     RISK OF GVHD  - Prophylaxis: PT Cy, Tac/MMF started 1/16. **initially avoided sirolimus d/t high risk VOD**. Tac drip discontinued 1/31, sirolimus started.  - 2/4 level therapeutic on 2mg daily. Will get level 2/9.   - Pink maculopapular rash. TCM prescribed at discharge and prednisone 40 mg x 5 days (2/3-2/7). Resolved.     CARDIOVASCULAR  # Chest pressure: new on 1/15, worse with sitting up/activity, better while laying flat. RESOLVED.   EKG: new QR pattern in V1, suggests right ventricular conduction delay  Troponin negative x1   Echo (1/15): LVEF 55-60%, no pericardial effusion present. Cardiology consulted: no further recs   #Prolonged QTc to 507 on 1/22. EKG (1/30) NSR, Qtc 454.  # HTN: On norvasc 5mg daily.     GI/NUTRITION  # Elevated LFT's: ALT & AST,  bilirubin WNL. TPN discontinued (2/2), recently started sirolimus (previously on Tac d/t concern for VOD). No abdominal pain/fluid retention. On pred 40mg daily currently. Recent U/S. Acute hepatitis panel negative. CMV <35. Adeno pending. Known iron overload in setting of MA BMT monitor for now. Liver bx pending course. GVH/VOD/infectious/iron overload w/ MA chemo??  ALT/AST trending down 2/7   # Epigastric pain (1/18): RUQ ultrasound showing patent doppler throughout, no ascites, nonspecific elevated hepatic artery restrictive index (see full impression above).                - Ulcer prophylaxis: PPI  - VOD ppx: Ursodiol   - Mucositis:  oxy 5 mg Q4H prn -- discharged with #10 tablets and chloraseptic throat spray.  # Nausea from chemo/radiation: Zyprexa 5 mg HS (1/29), compazine PRN, Lorazepam prn. Zofran qAM (QT okay after changing lev).        DERM:  # Foot, hand warts. Curbside derm on 1/8, no recs while inpatient, typically managed OP.    # rash as per above.    RTC:  2/9 labs, Dr. Garay, infusion jani       I spent 60 minutes in the care of this patient today, which included time necessary for preparation for the visit, obtaining history, ordering medications/tests/procedures as medically indicated, review of pertinent medical literature, counseling of the patient, communication of recommendations to the care team, and documentation time.    Jt Vaz PA-C

## 2024-02-07 NOTE — NURSING NOTE
Chief Complaint   Patient presents with    Blood Draw     Labs drawn from cvc by rn.  VS taken.     Labs drawn from CVC by rn.  Good blood return noted in both lumens.  Both lumens flushed with NS and heparin.  Pt tolerated well.  VS taken.  Pt checked in for next appt.    Anika Robert RN

## 2024-02-07 NOTE — LETTER
2/7/2024         RE: oHrtencia Baldwin  [address]  [address]  [facility] [address]        Dear Colleague,    Thank you for referring your patient, Hortencia Baldwin, to the [facility] [place]. Please see a copy of my visit note below.    BMT  Progress Note    Patient ID:  Irma Foreman is a 53 year old female with a PMH of MDS, warm AIHA, transfusion and transfusion dependent anemia presented to  to undergo a myeloablative allogeneic transplant. She was recently COVID+ on 12/26 but tested negative on 1/2 with resolution of nearly all URI symptoms. Undergoing MA (Bu/Flu) 6/8 URD Allo transplant, day +27     HPI:     Irma is doing fine. She is eating well, drinking about 1.5 L of fluids throughout the day, no nausea, stools are formed. Rash has resolved. Hands are a little shaky with the prednisone. Medbox filled for the week, she and her daughter will manage it at home. Instructed to start Bactrim on Monday.         Review of Systems                                                                                                                                         Negative unless stated in the HPI.   PHYSICAL EXAM                                                                                                                                                     KPS: 70      General: NAD  Eyes: KEVON, sclera anicteric   ENT: No oral lesions noted other then a healing lateral tongue ulcer.   Lungs: CTAB   Cardiovascular: RRR, no M/R/G.   Lymphatics: No edema.   Skin: No rash.   Neuro: A&O   Additional Findings: Gastelum site NT, no drainage.    Acute GVHD          2/5/2024    09:39   Acute GVHD   New evidence of Acute Tvtvs-Izcdsk-Mdbr Disease developed since last entry? No         LABS AND IMAGING: I have assessed all abnormal lab values for their clinical significance and any values considered clinically significant have been addressed in the assessment and  plan.       US Abdominal with Doppler (1/18/24)  Impression:   1. Patent Doppler evaluation of the abdomen with antegrade flow throughout.  2. Elevated hepatic artery resistive index of 0.81, nonspecific though can be seen with hepatic venous congestion which could be related to venous occlusive disease..     SYSTEMS-BASED ASSESSMENT AND PLAN      Hortencia Baldwin is a 53 year old female with a history of MDS, undergoing MA (Bu/Flu) 6/8 URD Allo transplant, day +27        BMT/IEC PROTOCOL for MT 2015-29   - Chemo protocol:  Day -6 through day -1: Keppra and Allopurinol   Day -5 through -2: Bu/Flu.   Day -1: Rest day   Day 0: Transplant. Recipient: O+, CMV+. Donor: O+, CMV-. Cell dose 6.5 x10^6.   - Restaging plan: per protocol   BM bx completed 2/6 -- Day 28 review with Dr. Garay on 2/9.      HEME/COAG  - Pancytopenia due to chemotherapy/BMT  # Iron overload: hx of transfusion dependent anemia, pre-transplant ferritin around 5,000. Recommend phlebotomy post transplant when retic count is restored and Hgb >10    # Platelet refractoriness: HLA platelets when available.   - Transfusion parameters: hemoglobin <7, platelets <10  - GCSF PRN for ANC < 1.     IMMUNOCOMPROMISED  Prophylaxis plan:   ACV/letermovir. CMV <35 (2/6/24)   Micafungin through day +45 (current smoker) - scheduled in clinic MWF thru 2/26.  cefpodoxime while neutropenic (changed from levaquin due to prolonged QTc as below)   Bactrim to start at day +28 (instructed to start 2/12)     RISK OF GVHD  - Prophylaxis: PT Cy, Tac/MMF started 1/16. **initially avoided sirolimus d/t high risk VOD**. Tac drip discontinued 1/31, sirolimus started.  - 2/4 level therapeutic on 2mg daily. Will get level 2/9.   - Pink maculopapular rash. TCM prescribed at discharge and prednisone 40 mg x 5 days (2/3-2/7). Resolved.     CARDIOVASCULAR  # Chest pressure: new on 1/15, worse with sitting up/activity, better while laying flat. RESOLVED.   EKG: new QR pattern in V1, suggests  right ventricular conduction delay  Troponin negative x1   Echo (1/15): LVEF 55-60%, no pericardial effusion present. Cardiology consulted: no further recs   #Prolonged QTc to 507 on 1/22. EKG (1/30) NSR, Qtc 454.  # HTN: On norvasc 5mg daily.     GI/NUTRITION  # Elevated LFT's: ALT & AST, bilirubin WNL. TPN discontinued (2/2), recently started sirolimus (previously on Tac d/t concern for VOD). No abdominal pain/fluid retention. On pred 40mg daily currently. Recent U/S. Acute hepatitis panel negative. CMV <35. Adeno pending. Known iron overload in setting of MA BMT monitor for now. Liver bx pending course. GVH/VOD/infectious/iron overload w/ MA chemo??  ALT/AST trending down 2/7   # Epigastric pain (1/18): RUQ ultrasound showing patent doppler throughout, no ascites, nonspecific elevated hepatic artery restrictive index (see full impression above).                - Ulcer prophylaxis: PPI  - VOD ppx: Ursodiol   - Mucositis:  oxy 5 mg Q4H prn -- discharged with #10 tablets and chloraseptic throat spray.  # Nausea from chemo/radiation: Zyprexa 5 mg HS (1/29), compazine PRN, Lorazepam prn. Zofran qAM (QT okay after changing lev).        DERM:  # Foot, hand warts. Curbside derm on 1/8, no recs while inpatient, typically managed OP.    # rash as per above.    RTC:  2/9 labs, Dr. Garay, infusion jnai       I spent 60 minutes in the care of this patient today, which included time necessary for preparation for the visit, obtaining history, ordering medications/tests/procedures as medically indicated, review of pertinent medical literature, counseling of the patient, communication of recommendations to the care team, and documentation time.    Jt Vaz PA-C

## 2024-02-07 NOTE — NURSING NOTE
"Oncology Rooming Note    February 7, 2024 10:31 AM   Hortencia Baldwin is a 53 year old female who presents for:    Chief Complaint   Patient presents with    Blood Draw     Labs drawn from cvc by rn.  VS taken.    Oncology Clinic Visit     Provider visit s/p BMT r/t MDS     Initial Vitals: /79 (BP Location: Right arm, Patient Position: Sitting, Cuff Size: Adult Large)   Pulse 111   Temp 98.3  F (36.8  C) (Oral)   Resp 16   Wt 79.4 kg (175 lb)   LMP 11/05/2017   SpO2 99%   BMI 32.00 kg/m   Estimated body mass index is 32 kg/m  as calculated from the following:    Height as of 1/5/24: 1.575 m (5' 2.01\").    Weight as of this encounter: 79.4 kg (175 lb). Body surface area is 1.86 meters squared.  No Pain (0) Comment: Data Unavailable   Patient's last menstrual period was 11/05/2017.  Allergies reviewed: Yes  Medications reviewed: Yes    Medications: Medication refills not needed today.  Pharmacy name entered into [employer]:    [id number] - [place], [place] - [address] AT [facility]  [id number] - [place], [place] - [address] AT [facility]    Frailty Screening:   Is the patient here for a new oncology consult visit in cancer care? 2. No      Clinical concerns: VSS. No clinical concerns at this time.       Eneida Obando RN              "

## 2024-02-09 ENCOUNTER — TELEPHONE (OUTPATIENT)
Dept: TRANSPLANT | Facility: CLINIC | Age: 54
End: 2024-02-09

## 2024-02-09 ENCOUNTER — INFUSION THERAPY VISIT (OUTPATIENT)
Dept: TRANSPLANT | Facility: CLINIC | Age: 54
End: 2024-02-09
Attending: INTERNAL MEDICINE
Payer: COMMERCIAL

## 2024-02-09 ENCOUNTER — OFFICE VISIT (OUTPATIENT)
Dept: TRANSPLANT | Facility: CLINIC | Age: 54
End: 2024-02-09
Attending: STUDENT IN AN ORGANIZED HEALTH CARE EDUCATION/TRAINING PROGRAM
Payer: COMMERCIAL

## 2024-02-09 ENCOUNTER — APPOINTMENT (OUTPATIENT)
Dept: LAB | Facility: CLINIC | Age: 54
End: 2024-02-09
Attending: STUDENT IN AN ORGANIZED HEALTH CARE EDUCATION/TRAINING PROGRAM
Payer: COMMERCIAL

## 2024-02-09 VITALS
WEIGHT: 173.5 LBS | DIASTOLIC BLOOD PRESSURE: 85 MMHG | OXYGEN SATURATION: 99 % | BODY MASS INDEX: 31.73 KG/M2 | RESPIRATION RATE: 16 BRPM | SYSTOLIC BLOOD PRESSURE: 131 MMHG | TEMPERATURE: 98.4 F | HEART RATE: 117 BPM

## 2024-02-09 DIAGNOSIS — D46.9 MDS (MYELODYSPLASTIC SYNDROME) (H): Primary | ICD-10-CM

## 2024-02-09 LAB
ALBUMIN SERPL BCG-MCNC: 4.1 G/DL (ref 3.5–5.2)
ALP SERPL-CCNC: 118 U/L (ref 40–150)
ALT SERPL W P-5'-P-CCNC: 106 U/L (ref 0–50)
ANION GAP SERPL CALCULATED.3IONS-SCNC: 13 MMOL/L (ref 7–15)
AST SERPL W P-5'-P-CCNC: 57 U/L (ref 0–45)
BASOPHILS # BLD AUTO: ABNORMAL 10*3/UL
BASOPHILS # BLD MANUAL: 0.1 10E3/UL (ref 0–0.2)
BASOPHILS NFR BLD AUTO: ABNORMAL %
BASOPHILS NFR BLD MANUAL: 4 %
BILIRUB SERPL-MCNC: 0.3 MG/DL
BUN SERPL-MCNC: 7.9 MG/DL (ref 6–20)
CALCIUM SERPL-MCNC: 9.4 MG/DL (ref 8.6–10)
CHLORIDE SERPL-SCNC: 101 MMOL/L (ref 98–107)
CMV DNA SPEC NAA+PROBE-ACNC: <35 IU/ML
CMV DNA SPEC NAA+PROBE-LOG#: <1.5 {LOG_COPIES}/ML
CREAT SERPL-MCNC: 0.49 MG/DL (ref 0.51–0.95)
CULTURE HARVEST COMPLETE DATE: NORMAL
DACRYOCYTES BLD QL SMEAR: SLIGHT
DEPRECATED HCO3 PLAS-SCNC: 25 MMOL/L (ref 22–29)
EGFRCR SERPLBLD CKD-EPI 2021: >90 ML/MIN/1.73M2
EOSINOPHIL # BLD AUTO: ABNORMAL 10*3/UL
EOSINOPHIL # BLD MANUAL: 0.1 10E3/UL (ref 0–0.7)
EOSINOPHIL NFR BLD AUTO: ABNORMAL %
EOSINOPHIL NFR BLD MANUAL: 4 %
ERYTHROCYTE [DISTWIDTH] IN BLOOD BY AUTOMATED COUNT: 24.1 % (ref 10–15)
GLUCOSE SERPL-MCNC: 138 MG/DL (ref 70–99)
HCT VFR BLD AUTO: 29.7 % (ref 35–47)
HGB BLD-MCNC: 9.8 G/DL (ref 11.7–15.7)
IMM GRANULOCYTES # BLD: ABNORMAL 10*3/UL
IMM GRANULOCYTES NFR BLD: ABNORMAL %
INTERPRETATION: NORMAL
LYMPHOCYTES # BLD AUTO: ABNORMAL 10*3/UL
LYMPHOCYTES # BLD MANUAL: 0 10E3/UL (ref 0.8–5.3)
LYMPHOCYTES NFR BLD AUTO: ABNORMAL %
LYMPHOCYTES NFR BLD MANUAL: 1 %
MCH RBC QN AUTO: 34.1 PG (ref 26.5–33)
MCHC RBC AUTO-ENTMCNC: 33 G/DL (ref 31.5–36.5)
MCV RBC AUTO: 104 FL (ref 78–100)
METAMYELOCYTES # BLD MANUAL: 0 10E3/UL
METAMYELOCYTES NFR BLD MANUAL: 1 %
MONOCYTES # BLD AUTO: ABNORMAL 10*3/UL
MONOCYTES # BLD MANUAL: 0.3 10E3/UL (ref 0–1.3)
MONOCYTES NFR BLD AUTO: ABNORMAL %
MONOCYTES NFR BLD MANUAL: 11 %
MYELOCYTES # BLD MANUAL: 0.1 10E3/UL
MYELOCYTES NFR BLD MANUAL: 2 %
NEUTROPHILS # BLD AUTO: ABNORMAL 10*3/UL
NEUTROPHILS # BLD MANUAL: 2.2 10E3/UL (ref 1.6–8.3)
NEUTROPHILS NFR BLD AUTO: ABNORMAL %
NEUTROPHILS NFR BLD MANUAL: 77 %
NRBC # BLD AUTO: 0 10E3/UL
NRBC BLD AUTO-RTO: 1 /100
PLAT MORPH BLD: ABNORMAL
PLATELET # BLD AUTO: 30 10E3/UL (ref 150–450)
POTASSIUM SERPL-SCNC: 3.3 MMOL/L (ref 3.4–5.3)
PROT SERPL-MCNC: 6.7 G/DL (ref 6.4–8.3)
RBC # BLD AUTO: 2.87 10E6/UL (ref 3.8–5.2)
RBC MORPH BLD: ABNORMAL
SIROLIMUS BLD-MCNC: 8.4 UG/L (ref 5–15)
SODIUM SERPL-SCNC: 139 MMOL/L (ref 135–145)
TME LAST DOSE: NORMAL H
TME LAST DOSE: NORMAL H
WBC # BLD AUTO: 2.8 10E3/UL (ref 4–11)

## 2024-02-09 PROCEDURE — 258N000003 HC RX IP 258 OP 636: Performed by: STUDENT IN AN ORGANIZED HEALTH CARE EDUCATION/TRAINING PROGRAM

## 2024-02-09 PROCEDURE — 99213 OFFICE O/P EST LOW 20 MIN: CPT | Mod: 27 | Performed by: STUDENT IN AN ORGANIZED HEALTH CARE EDUCATION/TRAINING PROGRAM

## 2024-02-09 PROCEDURE — 99215 OFFICE O/P EST HI 40 MIN: CPT | Performed by: STUDENT IN AN ORGANIZED HEALTH CARE EDUCATION/TRAINING PROGRAM

## 2024-02-09 PROCEDURE — 250N000011 HC RX IP 250 OP 636: Mod: JZ

## 2024-02-09 PROCEDURE — 36592 COLLECT BLOOD FROM PICC: CPT

## 2024-02-09 PROCEDURE — 85007 BL SMEAR W/DIFF WBC COUNT: CPT

## 2024-02-09 PROCEDURE — 96365 THER/PROPH/DIAG IV INF INIT: CPT

## 2024-02-09 PROCEDURE — 82040 ASSAY OF SERUM ALBUMIN: CPT

## 2024-02-09 PROCEDURE — 80195 ASSAY OF SIROLIMUS: CPT

## 2024-02-09 PROCEDURE — 85027 COMPLETE CBC AUTOMATED: CPT

## 2024-02-09 PROCEDURE — 258N000003 HC RX IP 258 OP 636

## 2024-02-09 PROCEDURE — 250N000013 HC RX MED GY IP 250 OP 250 PS 637: Performed by: STUDENT IN AN ORGANIZED HEALTH CARE EDUCATION/TRAINING PROGRAM

## 2024-02-09 PROCEDURE — 250N000011 HC RX IP 250 OP 636: Performed by: STUDENT IN AN ORGANIZED HEALTH CARE EDUCATION/TRAINING PROGRAM

## 2024-02-09 PROCEDURE — 93005 ELECTROCARDIOGRAM TRACING: CPT | Mod: RTG

## 2024-02-09 PROCEDURE — G0463 HOSPITAL OUTPT CLINIC VISIT: HCPCS

## 2024-02-09 PROCEDURE — 93000 ELECTROCARDIOGRAM COMPLETE: CPT | Performed by: INTERNAL MEDICINE

## 2024-02-09 PROCEDURE — 96361 HYDRATE IV INFUSION ADD-ON: CPT

## 2024-02-09 RX ORDER — POTASSIUM CHLORIDE 1.5 G/1.58G
40 POWDER, FOR SOLUTION ORAL ONCE
Status: COMPLETED | OUTPATIENT
Start: 2024-02-09 | End: 2024-02-09

## 2024-02-09 RX ORDER — HEPARIN SODIUM,PORCINE 10 UNIT/ML
5 VIAL (ML) INTRAVENOUS
Status: DISCONTINUED | OUTPATIENT
Start: 2024-02-09 | End: 2024-02-15 | Stop reason: HOSPADM

## 2024-02-09 RX ORDER — HEPARIN SODIUM (PORCINE) LOCK FLUSH IV SOLN 100 UNIT/ML 100 UNIT/ML
5 SOLUTION INTRAVENOUS
Status: CANCELLED | OUTPATIENT
Start: 2024-03-28

## 2024-02-09 RX ORDER — HEPARIN SODIUM,PORCINE 10 UNIT/ML
5-20 VIAL (ML) INTRAVENOUS DAILY PRN
Status: CANCELLED | OUTPATIENT
Start: 2024-03-28

## 2024-02-09 RX ADMIN — Medication 5 ML: at 09:55

## 2024-02-09 RX ADMIN — POTASSIUM CHLORIDE 40 MEQ: 1.5 POWDER, FOR SOLUTION ORAL at 11:08

## 2024-02-09 RX ADMIN — MICAFUNGIN SODIUM 300 MG: 100 INJECTION, POWDER, LYOPHILIZED, FOR SOLUTION INTRAVENOUS at 10:35

## 2024-02-09 RX ADMIN — SODIUM CHLORIDE 1000 ML: 9 INJECTION, SOLUTION INTRAVENOUS at 11:06

## 2024-02-09 ASSESSMENT — PAIN SCALES - GENERAL: PAINLEVEL: MODERATE PAIN (4)

## 2024-02-09 NOTE — PROGRESS NOTES
Infusion Nursing Note:  Hortencia Baldwin presents today for scheduled Micafungin.    Patient seen by provider today: Yes: Dr. Garay   present during visit today: Not Applicable.    Note: HR 130s in lab and 110-120s at rest in infusion. Otherwise vitally stable. Pt reports she does feel like her heart is racing at times, mostly with activity. She states she is drinking at least 1L water per day maybe more, but has noticed that her appetite/thirst has decreased since stopping steroids. Dr. Garay notified. VORB 2/9/24 at 1103 Dr. Garay/Evita Walls RN- Administer 1L 0.9% NS. Labs monitored; K+ 3.3- meets parameters for replacement. Pt received 1L 0.9% NS, 300 mg Micafungin IV, and 40 mEq K+ PO.      Intravenous Access:  Gastelum.    Treatment Conditions:  Lab Results   Component Value Date     02/09/2024    POTASSIUM 3.3 (L) 02/09/2024    MAG 1.8 02/04/2024    CR 0.49 (L) 02/09/2024    NIKO 9.4 02/09/2024    BILITOTAL 0.3 02/09/2024    ALBUMIN 4.1 02/09/2024     (H) 02/09/2024    AST 57 (H) 02/09/2024       Results reviewed, labs MET treatment parameters, ok to proceed with treatment.      Post Infusion Assessment:  Patient tolerated infusion without incident.  Blood return noted pre and post infusion.  Site patent and intact, free from redness, edema or discomfort.  No evidence of extravasations.       Discharge Plan:   Patient discharged in stable condition accompanied by: self.  Departure Mode: Ambulatory.      Evita Walls, RN

## 2024-02-09 NOTE — NURSING NOTE
Chief Complaint   Patient presents with    Blood Draw     Labs drawn from cvc by rn.  VS taken.     Labs drawn from CVC by rn.  Good blood return noted in both lumens.  Both lumens flushed with NS and heparin.  Pt tolerated well.  VS taken (HR 120s-130s relayed to provider).  Pt checked in for next appt.    Anika Robert RN

## 2024-02-09 NOTE — PROGRESS NOTES
BMT/Cell Therapy Follow Up    Date of service: Feb 9, 2024     Referring provider: Dr.Evan Ramey, [facility]     Patient ID: Hortencia Baldwin is a 53 year old y/o female who is day +29 post allogenic stem cell transplant. Date of transplant: 1/11/2024    Diagnosis MDS w/ SF3B1 BMT type Allogenic  CMV  Donor -  Recipient +    Prep: MA Bu/Flu Donor type  6/8 URD Blood Type Donor and recipient O+   GVHD Ppx PTCy/ siro/ MMF Graft source PBSCT Toxo IgG Negative   Protocol [id number] BMT MD Dr. Yi         Data    Pre-transplant disease history  52 y/o F with history of longstanding macrocytic anemia (Hb 10-11, -110 dating back to 1997) presented in Nov 2021 for lightheadedness. CBC showed acute on chronic anemia with Hb 4.7, ; normal WBC and platelet count. Bone marrow biopsy (12/09/2021) was consistent with MDS with low blasts and SF3B1 mutation. Hypercellular marrow (70%) with single lineage dysplasia (dyserythropoiesis), increased ringed sideroblasts and no increase in blasts. Flow cytometry showed mixed lymphocytes. Cytogenetics 46,XX,del(20). NGS: SF3B1 (45%), ASXL1 (6%).   Received Epo (March 2022 - April 2022) but had minimal response after 4 weekly doses. Luspatercept from April 2022 to April 2023 with inadequate response.   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 Treated with weekly rituximab x 4 and steroid taper (Sept to Dec 2022). LDH and bilirubin started rising after steroid taper, therefore treated with MMF 500mg BID (Dec 2022 to May 2023) with resolution of hemolysis (negative KATINA, normal LDH, bilirubin). However, she continued to be transfusion dependant therefore her anemia was determined to be related more to MDS than autoimmune hemolysis.   Repeat bone marrow biopsy (5/11/2023) showed persistent MDS. Hypercellular marrow (90%) but now with dysplasia in all three lineages, cytogenetics 46, XX, del (20). Received  decitabine- cedazurdine X 4 cycles (May 2023 to Dec 2023). Complicated by neutropenia requiring dose reduction and pRBC transfusions every 2-3 weeks. Pre-transplant bmbx (12/7/23) showed persistent MDS with multi lineage dysplasia (dyserythropoesis and dysgranulopoesis), 17% ringed sideroblasts, increased marrow storage iron. Cytogenetics: 46,XX,del(20). Flow cytometry and NGS not done.    She was considered for allogenic transplant despite low risk MDS by IPSS-R at diagnosis due to transfusion dependence, ASXL1 mutation and development of multi-lineage dysplasia on bmbx in May 2023 suggesting clonal evolution.   She underwent allogenic stem cell transplant on 1/11/24: myeloablative conditioning with Bu/Flu, 6/8 unrelated donor peripheral blood stem cell graft, cell dose: 6.50E+06. ABO matched, CMV donor negative, recipient positive. GVHD ppx with PTCy, MMF and tacrolimus (avoiding sirolimus due to high risk of VOD, given elevated liver enzymes prior to transplant and suspected iron overload).          INTERVAL HISTORY     Hortencia Baldwin returns for a scheduled BMT clinic visit. Seen during infusion. She continues to have a low appetite. Nausea especially when she takes medications.   She endorses feeling like her heart is racing when she walks, not at rest. Tachycardic in clinic today, given 1L IVF. Encouraged her to increase fluid intake. Hb is around 10, therefore should not be causing these symptoms. EKG normal.   Transaminases are elevated but seem to be down trending today.   She had a pink maculopapular rash during admission for which she was given pred 40mg X 5 days (2/3-2/7/24). No rash today. Formed stools.     ROS:  As above    Medications  Current Outpatient Medications   Medication Sig Dispense Refill    acyclovir (ZOVIRAX) 800 MG tablet Take 1 tablet (800 mg) by mouth 2 times daily 60 tablet 3    amLODIPine (NORVASC) 5 MG tablet Take 1 tablet (5 mg) by mouth daily for 30 days 30 tablet 0    letermovir  (PREVYMIS) 480 MG TABS tablet Take 1 tablet (480 mg) by mouth daily 30 tablet 2    OLANZapine (ZYPREXA) 5 MG tablet Take 1 tablet (5 mg) by mouth at bedtime 15 tablet 0    ondansetron (ZOFRAN ODT) 4 MG ODT tab Take 1 tablet (4 mg) by mouth daily before breakfast (Patient not taking: Reported on 2/12/2024) 30 tablet 1    pantoprazole (PROTONIX) 40 MG EC tablet Take 1 tablet (40 mg) by mouth every morning (before breakfast) 30 tablet 0    sirolimus (GENERIC EQUIVALENT) 2 MG tablet Take 1 tablet (2 mg) by mouth daily 30 tablet 1    ursodiol (ACTIGALL) 300 MG capsule Take 1 capsule (300 mg) by mouth 3 times daily 108 capsule 0    Omega-3 Fatty Acids (FISH OIL) 1200 MG capsule Take 1,200 mg by mouth daily (Patient not taking: Reported on 2/7/2024)      oxyCODONE (ROXICODONE) 5 MG tablet Take 1 tablet (5 mg) by mouth every 4 hours as needed for moderate pain 10 tablet 0    phenazopyridine (PYRIDIUM) 200 MG tablet Take 1 tablet (200 mg) by mouth 3 times daily as needed for irritation or pain (urinary discomfort) 21 tablet 0    sulfamethoxazole-trimethoprim (BACTRIM DS) 800-160 MG tablet Take 1 tablet by mouth Every Mon, Tues two times daily      triamcinolone (KENALOG) 0.1 % external cream Apply topically 2 times daily (Patient not taking: Reported on 2/6/2024) 453.6 g 0         EXAM      /85 (BP Location: Right arm, Patient Position: Sitting, Cuff Size: Adult Large)   Pulse 117   Temp 98.4  F (36.9  C) (Oral)   Resp 16   Wt 78.7 kg (173 lb 8 oz)   LMP 11/05/2017   SpO2 99%   BMI 31.73 kg/m    Wt Readings from Last 4 Encounters:   02/14/24 77.2 kg (170 lb 3.2 oz)   02/12/24 77.4 kg (170 lb 11.2 oz)   02/09/24 78.7 kg (173 lb 8 oz)   02/07/24 79.4 kg (175 lb)       KPS: 70% Cannot do active work, but can care for self    General: Alert, awake  Eyes: Conjunctiva normal  Mouth and Throat: No mucositis  Respiratory: Normal respiratory effort.  Cardiovascular: Normal perfusion, no significant edema.  Abdomen: No  distention or mass.  Neurological: Grossly normal motor and sensory function.  Skin: No changes in color, texture, or new lesions.  Psych: Mood and affect are appropriate.  Access: [facility]     LABS / PATHOLOGY/ IMAGING     Data      Blood Counts  Recent Labs   Lab Test 02/14/24  0940 02/12/24  1301 02/09/24  0959   WBC 3.6* 4.0 2.8*   HGB 9.5* 10.4* 9.8*   PLT 49* 36* 30*   NEUTROPHIL 42 71 77   ANEU 1.5* 2.8 2.2   LYMPH 3 2 1   ALYM 0.1* 0.1* 0.0*       Basic Panel  Recent Labs   Lab Test 02/14/24  0940 02/12/24  1301 02/09/24  0959    136 139   POTASSIUM 3.2* 3.4 3.3*   CHLORIDE 106 100 101   CO2 22 24 25   BUN 3.7* 6.9 7.9   CR 0.45* 0.49* 0.49*   * 157* 138*        Calcium, Magnesium, Phosphorus  Recent Labs   Lab Test 02/14/24  0940 02/12/24  1301 02/09/24  0959 02/05/24  0815 02/04/24  0432 02/03/24  1420 02/03/24  0420 02/02/24  0324 02/01/24  0359   NIKO 9.1 9.0 9.4   < > 9.2  --  9.4 9.2 9.0   MAG  --   --   --   --  1.8 1.9 1.5* 1.7 1.7   PHOS  --   --   --   --   --   --  3.8 4.0 3.8    < > = values in this interval not displayed.        LFTs  Recent Labs   Lab Test 02/14/24  0940 02/12/24  1301 02/09/24  0959   ALKPHOS 112 120 118   * 191* 57*   * 152* 106*   BILITOTAL 0.3 0.5 0.3   ALBUMIN 3.8 4.0 4.1       Immunosuppression  Recent Labs   Lab Test 02/12/24  1301 02/09/24  0959 02/04/24  0756   RAPAMY 9.5 8.4 9.4       Recent Labs   Lab Test 01/31/24  0913 01/29/24  0941 01/28/24  0820   TACROL 7.9 12.5 12.0       ID  Recent Labs   Lab Test 02/12/24  1300 02/09/24  0959 02/06/24  0723   CMVQNT Not Detected <35* <35*       Recent Labs   Lab Test 01/02/24  1432 12/19/23  1347 05/30/17  1155   SONY 4,341* 5,191* 20       Chimerism  Lab Results   Component Value Date    SPECDES  02/06/2024     Bone Marrow: ACD Syringe      LDRESULTS  02/06/2024     RESULTS:    POST BONE MARROW  DONOR: (DEBORAH, [id number])  100 %     RECIPIENT:  0 %    These results are accurate  +/-5%.                LDRESULTS  02/01/2024     RESULTS:     POST CD3+ FRACTION  DONOR: (DEBORAH, [id number])  87 %     RECIPIENT:  13 %    These results are accurate +/-5%.                LDRESULTS  02/01/2024     RESULTS:     POST CD33/66b+(Myeloid) FRACTION  DONOR: (DEBORAH, [id number])  100 %     RECIPIENT:  0 %    These results are accurate +/-5%.                   Pathology  Bone marrow biopsy:   Lab Results   Component Value Date    FINALDX  02/06/2024     Bone marrow, posterior iliac crest, left decalcified trephine biopsy and touch imprint; left aspirate clot, direct aspirate smear, and concentrated aspirate smear; and peripheral smear:    -Hypercellular marrow (small, suboptimal biopsy with crush artifact, cellularity estimated at 70 to 80%) showing trilineage hematopoiesis with erythroid skewing, megaloblastoid change and subtle  nuclear irregularities in erythroid precursors, 3% ring sideroblasts, no definitive dysplasia in granulocytes or megakaryocytes, slightly increased eosinophils (5%), no increase in blasts (1%)  -See comment  - Peripheral blood showing moderate normochromic, macrocytic anemia; rare helmet cells; moderate leukopenia; lymphopenia; marked thrombocytopenia      COMDX  02/06/2024     There is no immunophenotypic evidence of acute myeloid leukemia or another high-grade myeloid neoplasm. Final interpretation requires correlation with results of other ancillary studies, morphologic, and clinical features.          Flow cytology:   Lab Results   Component Value Date    FLINTERP  02/06/2024     A. Iliac Crest, Bone Marrow Aspirate, Left:  -No increase in myeloid blasts and no abnormal myeloid blast population  See comment        COMDX  02/06/2024     There is no immunophenotypic evidence of acute myeloid leukemia or another high-grade myeloid neoplasm. Final interpretation requires correlation with results of other ancillary studies, morphologic, and clinical features.                    ASSESSMENT AND PLAN     BMT: D29   [id number] (according to but not on) with Bu/Flu  6/8 URD, peripheral blood stem cell graft, cell dose: 6.5 x10^6.     Disease status: Bone marrow biopsy at D28, D100, 6 months, 1 year  2/6/24 (~D26): Hypercellular marrow (70-80%), trilineage hemopoiesis with rare nuclear irregularities in terminal erythroid precursors of uncertain significance, 3% ringed sideroblasts (in the context of iron overload is not considered dysplastic finding). Flow showed no increase in myeloid blasts. Cytogenetics and NGS pending.     Graft status/chimerism: D28, D100, 6 months,1 year  2/6/24 (~D26): Bone marrow 100%, peripheral blood (2/1/24) CD33 100%, CD3 87%    GVHD  # Current immunosuppression is MMF and sirolimus   - Serum level of sirolimus on 2/9/24 is 8.4. Repeat due on 2/12/24  - MMF to discontinue at D35    # GVHD grade  Skin: No changes. Skin score 0.  GI: No symptoms. GI score 0.  Liver: LFTs elevated, but unclear reason. Score 0   The current overall acute GVHD grade is 0.    Heme/ Coag  # ABO matched (both O+).   Continues to have mild cytopenias, but stable/ improving.   Platelet refractoriness: HLA platelets when available.   Transfuse for Hb <7, platelets < 10, G-CSF PRN for ANC < 1    # Iron overload: Hx of transfusion dependent anemia, pre-transplant ferritin around 5,000. Recommend phlebotomy post transplant when retic count is restored and Hgb >10       ID  # Prophylaxis  Fungal: micafungin 300mg 3x/week until D+45 (due to smoking hx). No nodules on pre-transplant CT Chest.   PJP: Bactrim from D28. Toxo IgG negative   Viral: Acyclovir 800 mg bid  CMV: Letermovir 480mg from D14 to D100  Bacterial : completed cefpodoxime (prolonged Qtc)    # Monitoring  CMV: Monitor weekly/ every 2 weeks till D180.   EBV: Monitor every 2 weeks. Negative on 2/6/24.   IgG: Check serum IgG level at D100 followed by q3 months, and consider IVIG repletion for IgG levels <400 mg/dL.    GI  #  Elevated transaminases  AST and ALT elevated beginning 2/2/24 (D22). Notably, she was on tacrolimus for GVHD ppx but changed to sirolimus on 1/31/24. Infectious workup (2/6/24) was negative. No imaging done since LFT elevation.   - Ddx include drug induced liver injury vs GVHD. Concern for pre-existing iron overload. No clinical concern for VOD. GVHD less likely with no concomitant significant skin rash today or lower GI symptoms.     - RUQ US with dopplers. Consider liver biopsy.     # Nausea/poor appetite: Suspect due to regimen related toxicities/ MMF. Would expect to improve over the coming few weeks. If not, consider upper GI GVHD. Continue Olanzapine 5mg at bedtime.     # Prophylaxis   Ulcer prophylaxis: Pantoprazole  VOD prophylaxis: Ursodiol. Might need to continue beyond D75 if LFTs still elevated    Derm  Pink maculopapular rash w/pruritus on back, chest and abdomen beginning 1/31/24 (D21). Topical steroids and was given prednisone 40 mg x 5 days (2/3-2/7/24) with complete resolution. 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      CV  HTN: Amlodipine 5mg daily     Renal  Dysuria     Post-transplant vaccinations  Flu vaccination after D60, then annually   COVID vaccine after D100  No smoking     SUMMARY  - RUQ US with dopplers for elevated LFTs  - Check retic count Q2 weeks    Disposition. Return in 1 month, sooner if needed.    I spent 60 minutes in the care of this patient today, which included time necessary for preparation for the visit, obtaining history, ordering medications/tests/procedures as medically indicated, review of pertinent medical literature, counseling of the patient, communication of recommendations to the care team, and documentation time.    Coco Garay  Department of Hematology, Oncology and Transplantation  [facility] Pager 1300/Text via George

## 2024-02-09 NOTE — TELEPHONE ENCOUNTER
Called [employer] at the request of the patient regarding her short term disability claim. She was told her approval would run out 2/10, the patient is only 29 days post transplant and was recently discharged from inpatient care. Writer spoke with representative from [employer] and discussed the care plan for the patient, including near daily visits to clinic including lab, infusion and provider visits. The patient is immunocompromised and is not permitted to return to work until she is post day 100, and even then it will depend on her immunosuppression needs. The representative stated that there is a form they will send to us. Once it has been returned to them, they will take up to a day to process it, then up to 5 days to review it. Writer offered fax additional documentation to support to patient's care plan and needs today to get the ball rolling. The representative state once the extension is approved, they can backdate the payments.

## 2024-02-09 NOTE — NURSING NOTE
"Oncology Rooming Note    February 9, 2024 10:40 AM   Hortencia Baldwin is a 53 year old female who presents for:    Chief Complaint   Patient presents with    Blood Draw     Labs drawn from cvc by rn.  VS taken.    Oncology Clinic Visit     Return; hx MDS s/p BMT     Initial Vitals: /85 (BP Location: Right arm, Patient Position: Sitting, Cuff Size: Adult Large)   Pulse (!) 127   Temp 98.4  F (36.9  C) (Oral)   Resp 16   Wt 78.7 kg (173 lb 8 oz)   LMP 11/05/2017   SpO2 99%   BMI 31.73 kg/m   Estimated body mass index is 31.73 kg/m  as calculated from the following:    Height as of 1/5/24: 1.575 m (5' 2.01\").    Weight as of this encounter: 78.7 kg (173 lb 8 oz). Body surface area is 1.86 meters squared.  Moderate Pain (4) Comment: Data Unavailable   Patient's last menstrual period was 11/05/2017.  Allergies reviewed: Yes  Medications reviewed: Yes    Medications: Medication refills not needed today.  Pharmacy name entered into RetSKU:    [id number] - [place], [place] - [address] [facility]  [id number] - [place], [place] - [address] [facility] AT [facility]    Frailty Screening:   Is the patient here for a new oncology consult visit in cancer care? 2. No      Clinical concerns: HR elevated in lab to 120-130s, pt does feel like heart is racing sometimes especially when walking. Does note less appetite/ability to drink since stopping steroids but drinking at least 1L per day, maybe more.  Dr. Garay was notified.      Evita Walls RN              "

## 2024-02-09 NOTE — LETTER
2/9/2024         RE: Hortencia Baldwin  [address]  [address]  [facility] [address]        Dear Colleague,    Thank you for referring your patient, Hortencia Bladwin, to the [facility] BLOOD AND MARROW TRANSPLANT PROGRAM [place]. Please see a copy of my visit note below.    BMT/Cell Therapy Follow Up    Date of service: Feb 9, 2024     Referring provider: Dr.Evan Ramey [facility]     Patient ID: Hortencia Baldwin is a 53 year old y/o female who is day +29 post allogenic stem cell transplant. Date of transplant: 1/11/2024    Diagnosis MDS w/ SF3B1 BMT type Allogenic  CMV  Donor -  Recipient +    Prep: MA Bu/Flu Donor type  6/8 URD Blood Type Donor and recipient O+   GVHD Ppx PTCy/ siro/ MMF Graft source PBSCT Toxo IgG Negative   Protocol [id number] BMT MD Dr. Yi         Data   Pre-transplant disease history  52 y/o F with history of longstanding macrocytic anemia (Hb 10-11, -110 dating back to 1997) presented in Nov 2021 for lightheadedness. CBC showed acute on chronic anemia with Hb 4.7, ; normal WBC and platelet count. Bone marrow biopsy (12/09/2021) was consistent with MDS with low blasts and SF3B1 mutation. Hypercellular marrow (70%) with single lineage dysplasia (dyserythropoiesis), increased ringed sideroblasts and no increase in blasts. Flow cytometry showed mixed lymphocytes. Cytogenetics 46,XX,del(20). NGS: SF3B1 (45%), ASXL1 (6%).   Received Epo (March 2022 - April 2022) but had minimal response after 4 weekly doses. Luspatercept from April 2022 to April 2023 with inadequate response.   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 Treated with weekly rituximab x 4 and steroid taper (Sept to Dec 2022). LDH and bilirubin started rising after steroid taper, therefore treated with MMF 500mg BID (Dec 2022 to May 2023) with resolution of hemolysis (negative KATINA, normal LDH, bilirubin). However, she continued to be  transfusion dependant therefore her anemia was determined to be related more to MDS than autoimmune hemolysis.   Repeat bone marrow biopsy (5/11/2023) showed persistent MDS. Hypercellular marrow (90%) but now with dysplasia in all three lineages, cytogenetics 46, XX, del (20). Received decitabine- cedazurdine X 4 cycles (May 2023 to Dec 2023). Complicated by neutropenia requiring dose reduction and pRBC transfusions every 2-3 weeks. Pre-transplant bmbx (12/7/23) showed persistent MDS with multi lineage dysplasia (dyserythropoesis and dysgranulopoesis), 17% ringed sideroblasts, increased marrow storage iron. Cytogenetics: 46,XX,del(20). Flow cytometry and NGS not done.    She was considered for allogenic transplant despite low risk MDS by IPSS-R at diagnosis due to transfusion dependence, ASXL1 mutation and development of multi-lineage dysplasia on bmbx in May 2023 suggesting clonal evolution.   She underwent allogenic stem cell transplant on 1/11/24: myeloablative conditioning with Bu/Flu, 6/8 unrelated donor peripheral blood stem cell graft, cell dose: 6.50E+06. ABO matched, CMV donor negative, recipient positive. GVHD ppx with PTCy, MMF and tacrolimus (avoiding sirolimus due to high risk of VOD, given elevated liver enzymes prior to transplant and suspected iron overload).          INTERVAL HISTORY     Hortencia Baldwin returns for a scheduled BMT clinic visit. Seen during infusion. She continues to have a low appetite. Nausea especially when she takes medications.   She endorses feeling like her heart is racing when she walks, not at rest. Tachycardic in clinic today, given 1L IVF. Encouraged her to increase fluid intake. Hb is around 10, therefore should not be causing these symptoms. EKG normal.   Transaminases are elevated but seem to be down trending today.   She had a pink maculopapular rash during admission for which she was given pred 40mg X 5 days (2/3-2/7/24). No rash today. Formed stools.     ROS:  As  above    Medications  Current Outpatient Medications   Medication Sig Dispense Refill    acyclovir (ZOVIRAX) 800 MG tablet Take 1 tablet (800 mg) by mouth 2 times daily 60 tablet 3    amLODIPine (NORVASC) 5 MG tablet Take 1 tablet (5 mg) by mouth daily for 30 days 30 tablet 0    letermovir (PREVYMIS) 480 MG TABS tablet Take 1 tablet (480 mg) by mouth daily 30 tablet 2    OLANZapine (ZYPREXA) 5 MG tablet Take 1 tablet (5 mg) by mouth at bedtime 15 tablet 0    ondansetron (ZOFRAN ODT) 4 MG ODT tab Take 1 tablet (4 mg) by mouth daily before breakfast (Patient not taking: Reported on 2/12/2024) 30 tablet 1    pantoprazole (PROTONIX) 40 MG EC tablet Take 1 tablet (40 mg) by mouth every morning (before breakfast) 30 tablet 0    sirolimus (GENERIC EQUIVALENT) 2 MG tablet Take 1 tablet (2 mg) by mouth daily 30 tablet 1    ursodiol (ACTIGALL) 300 MG capsule Take 1 capsule (300 mg) by mouth 3 times daily 108 capsule 0    Omega-3 Fatty Acids (FISH OIL) 1200 MG capsule Take 1,200 mg by mouth daily (Patient not taking: Reported on 2/7/2024)      oxyCODONE (ROXICODONE) 5 MG tablet Take 1 tablet (5 mg) by mouth every 4 hours as needed for moderate pain 10 tablet 0    phenazopyridine (PYRIDIUM) 200 MG tablet Take 1 tablet (200 mg) by mouth 3 times daily as needed for irritation or pain (urinary discomfort) 21 tablet 0    sulfamethoxazole-trimethoprim (BACTRIM DS) 800-160 MG tablet Take 1 tablet by mouth Every Mon, Tues two times daily      triamcinolone (KENALOG) 0.1 % external cream Apply topically 2 times daily (Patient not taking: Reported on 2/6/2024) 453.6 g 0         EXAM      /85 (BP Location: Right arm, Patient Position: Sitting, Cuff Size: Adult Large)   Pulse 117   Temp 98.4  F (36.9  C) (Oral)   Resp 16   Wt 78.7 kg (173 lb 8 oz)   LMP 11/05/2017   SpO2 99%   BMI 31.73 kg/m    Wt Readings from Last 4 Encounters:   02/14/24 77.2 kg (170 lb 3.2 oz)   02/12/24 77.4 kg (170 lb 11.2 oz)   02/09/24 78.7 kg (173 lb  8 oz)   02/07/24 79.4 kg (175 lb)       KPS: 70% Cannot do active work, but can care for self    General: Alert, awake  Eyes: Conjunctiva normal  Mouth and Throat: No mucositis  Respiratory: Normal respiratory effort.  Cardiovascular: Normal perfusion, no significant edema.  Abdomen: No distention or mass.  Neurological: Grossly normal motor and sensory function.  Skin: No changes in color, texture, or new lesions.  Psych: Mood and affect are appropriate.  Access: [facility]     LABS / PATHOLOGY/ IMAGING     Data     Blood Counts  Recent Labs   Lab Test 02/14/24  0940 02/12/24  1301 02/09/24  0959   WBC 3.6* 4.0 2.8*   HGB 9.5* 10.4* 9.8*   PLT 49* 36* 30*   NEUTROPHIL 42 71 77   ANEU 1.5* 2.8 2.2   LYMPH 3 2 1   ALYM 0.1* 0.1* 0.0*       Basic Panel  Recent Labs   Lab Test 02/14/24  0940 02/12/24  1301 02/09/24  0959    136 139   POTASSIUM 3.2* 3.4 3.3*   CHLORIDE 106 100 101   CO2 22 24 25   BUN 3.7* 6.9 7.9   CR 0.45* 0.49* 0.49*   * 157* 138*        Calcium, Magnesium, Phosphorus  Recent Labs   Lab Test 02/14/24  0940 02/12/24  1301 02/09/24  0959 02/05/24  0815 02/04/24  0432 02/03/24  1420 02/03/24  0420 02/02/24  0324 02/01/24  0359   NIKO 9.1 9.0 9.4   < > 9.2  --  9.4 9.2 9.0   MAG  --   --   --   --  1.8 1.9 1.5* 1.7 1.7   PHOS  --   --   --   --   --   --  3.8 4.0 3.8    < > = values in this interval not displayed.        LFTs  Recent Labs   Lab Test 02/14/24  0940 02/12/24  1301 02/09/24  0959   ALKPHOS 112 120 118   * 191* 57*   * 152* 106*   BILITOTAL 0.3 0.5 0.3   ALBUMIN 3.8 4.0 4.1       Immunosuppression  Recent Labs   Lab Test 02/12/24  1301 02/09/24  0959 02/04/24  0756   RAPAMY 9.5 8.4 9.4       Recent Labs   Lab Test 01/31/24  0913 01/29/24  0941 01/28/24  0820   TACROL 7.9 12.5 12.0       ID  Recent Labs   Lab Test 02/12/24  1300 02/09/24  0959 02/06/24  0723   CMVQNT Not Detected <35* <35*       Recent Labs   Lab Test 01/02/24  1432 12/19/23  1347 05/30/17  1155   SONY  4,341* 5,191* 20       Chimerism  Lab Results   Component Value Date    SPECDES  02/06/2024     Bone Marrow: ACD Syringe      LDRESULTS  02/06/2024     RESULTS:    POST BONE MARROW  DONOR: (DEBORAH, [id number])  100 %     RECIPIENT:  0 %    These results are accurate +/-5%.                LDRESULTS  02/01/2024     RESULTS:     POST CD3+ FRACTION  DONOR: (DEBORAH, [id number])  87 %     RECIPIENT:  13 %    These results are accurate +/-5%.                LDRESULTS  02/01/2024     RESULTS:     POST CD33/66b+(Myeloid) FRACTION  DONOR: (DEBORAH, [id number])  100 %     RECIPIENT:  0 %    These results are accurate +/-5%.                   Pathology  Bone marrow biopsy:   Lab Results   Component Value Date    FINALDX  02/06/2024     Bone marrow, posterior iliac crest, left decalcified trephine biopsy and touch imprint; left aspirate clot, direct aspirate smear, and concentrated aspirate smear; and peripheral smear:    -Hypercellular marrow (small, suboptimal biopsy with crush artifact, cellularity estimated at 70 to 80%) showing trilineage hematopoiesis with erythroid skewing, megaloblastoid change and subtle  nuclear irregularities in erythroid precursors, 3% ring sideroblasts, no definitive dysplasia in granulocytes or megakaryocytes, slightly increased eosinophils (5%), no increase in blasts (1%)  -See comment  - Peripheral blood showing moderate normochromic, macrocytic anemia; rare helmet cells; moderate leukopenia; lymphopenia; marked thrombocytopenia      COMDX  02/06/2024     There is no immunophenotypic evidence of acute myeloid leukemia or another high-grade myeloid neoplasm. Final interpretation requires correlation with results of other ancillary studies, morphologic, and clinical features.          Flow cytology:   Lab Results   Component Value Date    FLINTERP  02/06/2024     A. Iliac Crest, Bone Marrow Aspirate, Left:  -No increase in myeloid blasts and no abnormal myeloid blast  population  See comment        COMDX  02/06/2024     There is no immunophenotypic evidence of acute myeloid leukemia or another high-grade myeloid neoplasm. Final interpretation requires correlation with results of other ancillary studies, morphologic, and clinical features.                   ASSESSMENT AND PLAN     BMT: D29   [id number] (according to but not on) with Bu/Flu  6/8 URD, peripheral blood stem cell graft, cell dose: 6.5 x10^6.     Disease status: Bone marrow biopsy at D28, D100, 6 months, 1 year  2/6/24 (~D26): Hypercellular marrow (70-80%), trilineage hemopoiesis with rare nuclear irregularities in terminal erythroid precursors of uncertain significance, 3% ringed sideroblasts (in the context of iron overload is not considered dysplastic finding). Flow showed no increase in myeloid blasts. Cytogenetics and NGS pending.     Graft status/chimerism: D28, D100, 6 months,1 year  2/6/24 (~D26): Bone marrow 100%, peripheral blood (2/1/24) CD33 100%, CD3 87%    GVHD  # Current immunosuppression is MMF and sirolimus   - Serum level of sirolimus on 2/9/24 is 8.4. Repeat due on 2/12/24  - MMF to discontinue at D35    # GVHD grade  Skin: No changes. Skin score 0.  GI: No symptoms. GI score 0.  Liver: LFTs normal. Liver score 0.  The current overall acute GVHD grade is 0.    Heme/ Coag  # ABO matched (both O+).   Continues to have mild cytopenias, but stable/ improving.   Platelet refractoriness: HLA platelets when available.   Transfuse for Hb <7, platelets < 10, G-CSF PRN for ANC < 1    # Iron overload: Hx of transfusion dependent anemia, pre-transplant ferritin around 5,000. Recommend phlebotomy post transplant when retic count is restored and Hgb >10       ID  # Prophylaxis  Fungal: micafungin 300mg 3x/week until D+45 (due to smoking hx). No nodules on pre-transplant CT Chest.   PJP: Bactrim from D28. Toxo IgG negative   Viral: Acyclovir 800 mg bid  CMV: Letermovir 480mg from D14 to D100  Bacterial : completed  cefpodoxime (prolonged Qtc)    # Monitoring  CMV: Monitor weekly/ every 2 weeks till D180.   EBV: Monitor every 2 weeks. Negative on 2/6/24.   IgG: Check serum IgG level at D100 followed by q3 months, and consider IVIG repletion for IgG levels <400 mg/dL.    GI  # Elevated transaminases  AST and ALT elevated beginning 2/2/24 (D22). Notably, she was on tacrolimus for GVHD ppx but changed to sirolimus on 1/31/24. Infectious workup (2/6/24) was negative. No imaging done since LFT elevation.   - Ddx include drug induced liver injury vs GVHD. Concern for pre-existing iron overload. No clinical concern for VOD. GVHD less likely with no concomitant significant skin rash today or lower GI symptoms.     - RUQ US with dopplers. Consider liver biopsy.     # Nausea/poor appetite: Suspect due to regimen related toxicities/ MMF. Would expect to improve over the coming few weeks. If not, consider upper GI GVHD. Continue Olanzapine 5mg at bedtime.     # Prophylaxis   Ulcer prophylaxis: Pantoprazole  VOD prophylaxis: Ursodiol. Might need to continue beyond D75 if LFTs still elevated    Derm  Pink maculopapular rash w/pruritus on back, chest and abdomen beginning 1/31/24 (D21). Topical steroids and was given prednisone 40 mg x 5 days (2/3-2/7/24) with complete resolution. 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      CV  HTN: Amlodipine 5mg daily     Renal  Dysuria     Post-transplant vaccinations  Flu vaccination after D60, then annually   COVID vaccine after D100  No smoking     SUMMARY  - RUQ US with dopplers for elevated LFTs  - Check retic count Q2 weeks    Disposition. Return in 1 month, sooner if needed.    I spent 60 minutes in the care of this patient today, which included time necessary for preparation for the visit, obtaining history, ordering medications/tests/procedures as medically indicated, review of pertinent medical literature, counseling of the  patient, communication of recommendations to the care team, and documentation time.    Coco Garay  Department of Hematology, Oncology and Transplantation  [facility] Pager 1300/Text via 9+

## 2024-02-12 ENCOUNTER — ONCOLOGY VISIT (OUTPATIENT)
Dept: TRANSPLANT | Facility: CLINIC | Age: 54
End: 2024-02-12
Attending: STUDENT IN AN ORGANIZED HEALTH CARE EDUCATION/TRAINING PROGRAM
Payer: COMMERCIAL

## 2024-02-12 ENCOUNTER — INFUSION THERAPY VISIT (OUTPATIENT)
Dept: TRANSPLANT | Facility: CLINIC | Age: 54
End: 2024-02-12
Attending: INTERNAL MEDICINE
Payer: COMMERCIAL

## 2024-02-12 ENCOUNTER — APPOINTMENT (OUTPATIENT)
Dept: LAB | Facility: CLINIC | Age: 54
End: 2024-02-12
Attending: STUDENT IN AN ORGANIZED HEALTH CARE EDUCATION/TRAINING PROGRAM
Payer: COMMERCIAL

## 2024-02-12 VITALS
DIASTOLIC BLOOD PRESSURE: 81 MMHG | WEIGHT: 170.7 LBS | RESPIRATION RATE: 16 BRPM | OXYGEN SATURATION: 99 % | SYSTOLIC BLOOD PRESSURE: 128 MMHG | BODY MASS INDEX: 31.21 KG/M2 | HEART RATE: 118 BPM | TEMPERATURE: 99.1 F

## 2024-02-12 DIAGNOSIS — R30.0 DYSURIA: Primary | ICD-10-CM

## 2024-02-12 DIAGNOSIS — D46.9 MDS (MYELODYSPLASTIC SYNDROME) (H): ICD-10-CM

## 2024-02-12 DIAGNOSIS — Z94.81 STATUS POST BONE MARROW TRANSPLANT (H): ICD-10-CM

## 2024-02-12 DIAGNOSIS — D46.9 MDS (MYELODYSPLASTIC SYNDROME) (H): Primary | ICD-10-CM

## 2024-02-12 LAB
ALBUMIN SERPL BCG-MCNC: 4 G/DL (ref 3.5–5.2)
ALBUMIN UR-MCNC: 30 MG/DL
ALP SERPL-CCNC: 120 U/L (ref 40–150)
ALT SERPL W P-5'-P-CCNC: 152 U/L (ref 0–50)
ANION GAP SERPL CALCULATED.3IONS-SCNC: 12 MMOL/L (ref 7–15)
APPEARANCE UR: ABNORMAL
AST SERPL W P-5'-P-CCNC: 191 U/L (ref 0–45)
ATRIAL RATE - MUSE: 106 BPM
BASOPHILS # BLD AUTO: ABNORMAL 10*3/UL
BASOPHILS # BLD MANUAL: 0.1 10E3/UL (ref 0–0.2)
BASOPHILS NFR BLD AUTO: ABNORMAL %
BASOPHILS NFR BLD MANUAL: 2 %
BILIRUB SERPL-MCNC: 0.5 MG/DL
BILIRUB UR QL STRIP: NEGATIVE
BUN SERPL-MCNC: 6.9 MG/DL (ref 6–20)
CALCIUM SERPL-MCNC: 9 MG/DL (ref 8.6–10)
CAOX CRY #/AREA URNS HPF: ABNORMAL /HPF
CHLORIDE SERPL-SCNC: 100 MMOL/L (ref 98–107)
COLOR UR AUTO: YELLOW
CREAT SERPL-MCNC: 0.49 MG/DL (ref 0.51–0.95)
DEPRECATED HCO3 PLAS-SCNC: 24 MMOL/L (ref 22–29)
DIASTOLIC BLOOD PRESSURE - MUSE: NORMAL MMHG
EGFRCR SERPLBLD CKD-EPI 2021: >90 ML/MIN/1.73M2
EOSINOPHIL # BLD AUTO: ABNORMAL 10*3/UL
EOSINOPHIL # BLD MANUAL: 0.6 10E3/UL (ref 0–0.7)
EOSINOPHIL NFR BLD AUTO: ABNORMAL %
EOSINOPHIL NFR BLD MANUAL: 15 %
ERYTHROCYTE [DISTWIDTH] IN BLOOD BY AUTOMATED COUNT: 22.5 % (ref 10–15)
GLUCOSE SERPL-MCNC: 157 MG/DL (ref 70–99)
GLUCOSE UR STRIP-MCNC: NEGATIVE MG/DL
HCT VFR BLD AUTO: 30.4 % (ref 35–47)
HGB BLD-MCNC: 10.4 G/DL (ref 11.7–15.7)
HGB UR QL STRIP: NEGATIVE
IMM GRANULOCYTES # BLD: ABNORMAL 10*3/UL
IMM GRANULOCYTES NFR BLD: ABNORMAL %
INTERPRETATION ECG - MUSE: NORMAL
KETONES UR STRIP-MCNC: NEGATIVE MG/DL
LEUKOCYTE ESTERASE UR QL STRIP: NEGATIVE
LYMPHOCYTES # BLD AUTO: ABNORMAL 10*3/UL
LYMPHOCYTES # BLD MANUAL: 0.1 10E3/UL (ref 0.8–5.3)
LYMPHOCYTES NFR BLD AUTO: ABNORMAL %
LYMPHOCYTES NFR BLD MANUAL: 2 %
MCH RBC QN AUTO: 34.2 PG (ref 26.5–33)
MCHC RBC AUTO-ENTMCNC: 34.2 G/DL (ref 31.5–36.5)
MCV RBC AUTO: 100 FL (ref 78–100)
METAMYELOCYTES # BLD MANUAL: 0 10E3/UL
METAMYELOCYTES NFR BLD MANUAL: 1 %
MONOCYTES # BLD AUTO: ABNORMAL 10*3/UL
MONOCYTES # BLD MANUAL: 0.3 10E3/UL (ref 0–1.3)
MONOCYTES NFR BLD AUTO: ABNORMAL %
MONOCYTES NFR BLD MANUAL: 8 %
MUCOUS THREADS #/AREA URNS LPF: PRESENT /LPF
MYELOCYTES # BLD MANUAL: 0 10E3/UL
MYELOCYTES NFR BLD MANUAL: 1 %
NEUTROPHILS # BLD AUTO: ABNORMAL 10*3/UL
NEUTROPHILS # BLD MANUAL: 2.8 10E3/UL (ref 1.6–8.3)
NEUTROPHILS NFR BLD AUTO: ABNORMAL %
NEUTROPHILS NFR BLD MANUAL: 71 %
NITRATE UR QL: NEGATIVE
NRBC # BLD AUTO: 0 10E3/UL
NRBC BLD AUTO-RTO: 1 /100
P AXIS - MUSE: 49 DEGREES
PH UR STRIP: 6 [PH] (ref 5–7)
PLAT MORPH BLD: ABNORMAL
PLATELET # BLD AUTO: 36 10E3/UL (ref 150–450)
POTASSIUM SERPL-SCNC: 3.4 MMOL/L (ref 3.4–5.3)
PR INTERVAL - MUSE: 146 MS
PROT SERPL-MCNC: 6.9 G/DL (ref 6.4–8.3)
QRS DURATION - MUSE: 78 MS
QT - MUSE: 320 MS
QTC - MUSE: 425 MS
R AXIS - MUSE: 20 DEGREES
RBC # BLD AUTO: 3.04 10E6/UL (ref 3.8–5.2)
RBC MORPH BLD: ABNORMAL
RBC URINE: 5 /HPF
SIROLIMUS BLD-MCNC: 9.5 UG/L (ref 5–15)
SODIUM SERPL-SCNC: 136 MMOL/L (ref 135–145)
SP GR UR STRIP: 1.03 (ref 1–1.03)
SQUAMOUS EPITHELIAL: 1 /HPF
SYSTOLIC BLOOD PRESSURE - MUSE: NORMAL MMHG
T AXIS - MUSE: 35 DEGREES
TME LAST DOSE: NORMAL H
TME LAST DOSE: NORMAL H
TRANSITIONAL EPI: 1 /HPF
UROBILINOGEN UR STRIP-MCNC: NORMAL MG/DL
VENTRICULAR RATE- MUSE: 106 BPM
WBC # BLD AUTO: 4 10E3/UL (ref 4–11)
WBC URINE: 13 /HPF

## 2024-02-12 PROCEDURE — 80195 ASSAY OF SIROLIMUS: CPT

## 2024-02-12 PROCEDURE — 85007 BL SMEAR W/DIFF WBC COUNT: CPT

## 2024-02-12 PROCEDURE — 250N000011 HC RX IP 250 OP 636: Performed by: STUDENT IN AN ORGANIZED HEALTH CARE EDUCATION/TRAINING PROGRAM

## 2024-02-12 PROCEDURE — 96365 THER/PROPH/DIAG IV INF INIT: CPT

## 2024-02-12 PROCEDURE — 87086 URINE CULTURE/COLONY COUNT: CPT | Performed by: PHYSICIAN ASSISTANT

## 2024-02-12 PROCEDURE — 80053 COMPREHEN METABOLIC PANEL: CPT

## 2024-02-12 PROCEDURE — 250N000011 HC RX IP 250 OP 636: Mod: JZ

## 2024-02-12 PROCEDURE — 258N000003 HC RX IP 258 OP 636

## 2024-02-12 PROCEDURE — 36415 COLL VENOUS BLD VENIPUNCTURE: CPT

## 2024-02-12 PROCEDURE — 99215 OFFICE O/P EST HI 40 MIN: CPT

## 2024-02-12 PROCEDURE — 87799 DETECT AGENT NOS DNA QUANT: CPT | Performed by: PHYSICIAN ASSISTANT

## 2024-02-12 PROCEDURE — 81001 URINALYSIS AUTO W/SCOPE: CPT | Performed by: PHYSICIAN ASSISTANT

## 2024-02-12 PROCEDURE — 99213 OFFICE O/P EST LOW 20 MIN: CPT

## 2024-02-12 PROCEDURE — 85027 COMPLETE CBC AUTOMATED: CPT

## 2024-02-12 RX ORDER — SULFAMETHOXAZOLE/TRIMETHOPRIM 800-160 MG
1 TABLET ORAL
COMMUNITY
Start: 2024-02-12 | End: 2024-03-06

## 2024-02-12 RX ORDER — HEPARIN SODIUM,PORCINE 10 UNIT/ML
5 VIAL (ML) INTRAVENOUS ONCE
Status: COMPLETED | OUTPATIENT
Start: 2024-02-12 | End: 2024-02-12

## 2024-02-12 RX ORDER — HEPARIN SODIUM (PORCINE) LOCK FLUSH IV SOLN 100 UNIT/ML 100 UNIT/ML
5 SOLUTION INTRAVENOUS
Status: CANCELLED | OUTPATIENT
Start: 2024-03-28

## 2024-02-12 RX ORDER — PHENAZOPYRIDINE HYDROCHLORIDE 200 MG/1
200 TABLET, FILM COATED ORAL 3 TIMES DAILY PRN
Qty: 21 TABLET | Refills: 0 | Status: SHIPPED | OUTPATIENT
Start: 2024-02-12 | End: 2024-03-06

## 2024-02-12 RX ORDER — HEPARIN SODIUM,PORCINE 10 UNIT/ML
5-20 VIAL (ML) INTRAVENOUS DAILY PRN
Status: CANCELLED | OUTPATIENT
Start: 2024-03-28

## 2024-02-12 RX ADMIN — HEPARIN, PORCINE (PF) 10 UNIT/ML INTRAVENOUS SYRINGE 5 ML: at 12:59

## 2024-02-12 RX ADMIN — MICAFUNGIN SODIUM 300 MG: 100 INJECTION, POWDER, LYOPHILIZED, FOR SOLUTION INTRAVENOUS at 13:09

## 2024-02-12 RX ADMIN — HEPARIN, PORCINE (PF) 10 UNIT/ML INTRAVENOUS SYRINGE 5 ML: at 12:58

## 2024-02-12 ASSESSMENT — PAIN SCALES - GENERAL: PAINLEVEL: NO PAIN (0)

## 2024-02-12 NOTE — LETTER
2/12/2024         RE: Hortencia Baldwin  [address]  [address]  [place] [address]        Dear Colleague,    Thank you for referring your patient, Hortencia Baldwin, to the [facility] BLOOD AND MARROW TRANSPLANT PROGRAM [place]. Please see a copy of my visit note below.    BMT  Progress Note    Patient ID:  Irma Foreman is a 53 year old female with a PMH of MDS, warm AIHA, transfusion and transfusion dependent anemia presented to  to undergo a myeloablative allogeneic transplant. She was recently COVID+ on 12/26 but tested negative on 1/2 with resolution of nearly all URI symptoms. Undergoing MA (Bu/Flu) 6/8 URD Allo transplant, day +32     HPI: Here for follow-up and jani. She endorses low appetite and nausea a few times daily. Not taking any antiemetics except Zyprexa at hs. No emesis. Stools are soft/loose, about twice daily; no abd pain. No fevers. Med questions answered and med box reviewed with some corrections. Siro moved to 2p slot as she took her meds this am and siro level is pending (not trough). She also notes some urinary urge/frequency the past couple of days. Mild dysuria.      Review of Systems                                                                                                                                         Negative unless stated in the HPI.   PHYSICAL EXAM                                                                                                                                                     Lab Results   Component Value Date    WBC 4.0 02/12/2024    ANEU 2.8 02/12/2024    HGB 10.4 (L) 02/12/2024    HCT 30.4 (L) 02/12/2024    PLT 36 (LL) 02/12/2024     02/12/2024    POTASSIUM 3.4 02/12/2024    CHLORIDE 100 02/12/2024    CO2 24 02/12/2024     (H) 02/12/2024    BUN 6.9 02/12/2024    CR 0.49 (L) 02/12/2024    MAG 1.8 02/04/2024    INR 1.04 01/29/2024    BILITOTAL 0.5 02/12/2024     (H) 02/12/2024     (H) 02/12/2024     ALKPHOS 120 02/12/2024    PROTTOTAL 6.9 02/12/2024    ALBUMIN 4.0 02/12/2024     Wt Readings from Last 4 Encounters:   02/12/24 77.4 kg (170 lb 11.2 oz)   02/09/24 78.7 kg (173 lb 8 oz)   02/07/24 79.4 kg (175 lb)   02/06/24 79.4 kg (175 lb)     KPS: 80      General: NAD  Eyes: sclera anicteric   ENT: OP moist without lesions.   Lungs: CTAB   Cardiovascular: RRR, no M/R/G.   Lymphatics: No edema.   Skin: resolving rash upper chest; faint petechial lesions bilateral LE   Neuro: A&O   Access: R Gastelum site NT, no drainage.    Acute GVHD          2/12/2024    15:00 2/5/2024    09:39   Acute GVHD   New evidence of Acute Pceok-Igvcla-Bqae Disease developed since last entry? No No         LABS AND IMAGING: I have assessed all abnormal lab values for their clinical significance and any values considered clinically significant have been addressed in the assessment and plan.       US Abdominal with Doppler (1/18/24)  Impression:   1. Patent Doppler evaluation of the abdomen with antegrade flow throughout.  2. Elevated hepatic artery resistive index of 0.81, nonspecific though can be seen with hepatic venous congestion which could be related to venous occlusive disease..     SYSTEMS-BASED ASSESSMENT AND PLAN      Hortencia Baldwin is a 53 year old female with a history of MDS, undergoing MA (Bu/Flu) 6/8 URD Allo transplant, day +32        BMT/IEC PROTOCOL for MT 2015-29   - Chemo protocol:  Day -6 through day -1: Keppra and Allopurinol   Day -5 through -2: Bu/Flu.   Day -1: Rest day   Day 0: Transplant. Recipient: O+, CMV+. Donor: O+, CMV-. Cell dose 6.5 x10^6.   - Restaging plan: per protocol   BMbx 2/6 -- Day 28 review with Dr. Garay on 2/9-note pending as of today.   Flow neg; 100% donor     HEME/COAG  - anemia, thrombocytopenia 2/2 due to chemotherapy/BMT.   # Iron overload: hx of transfusion dependent anemia, pre-transplant ferritin around 5,000. Recommend phlebotomy post transplant when retic count is restored and Hgb  >10    # Platelet refractoriness: HLA platelets when available.   - Transfusion parameters: hemoglobin <7, platelets <10  - GCSF PRN for ANC < 1.     IMMUNOCOMPROMISED  Afebrile  Prophylaxis plan:   ACV/letermovir viral testing ordered as below.  Micafungin through day +45 (current smoker) - scheduled in clinic MWF thru 2/26.  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 **initially avoided sirolimus d/t high risk VOD**. Tac drip discontinued 1/31, sirolimus started.  - 2/4 level therapeutic on 2mg daily. Did not hold dose today; check on Wed (siro moved to 2p slot in med box as of 2/12).  - Pink maculopapular rash.TCM prescribed at discharge and prednisone 40 mg x 5 days (2/3-2/7)     CARDIOVASCULAR  # Chest pressure: new on 1/15, worse with sitting up/activity, better while laying flat. RESOLVED.   EKG: new QR pattern in V1, suggests right ventricular conduction delay  Troponin negative x1   Echo (1/15): LVEF 55-60%, no pericardial effusion present. Cardiology consulted: no further recs   #Prolonged QTc to 507 on 1/22. EKG (1/30) NSR, Qtc 454.  # HTN: On norvasc 5mg daily.     GI/NUTRITION  # transaminitis: TPN discontinued (2/2), recently started sirolimus (previously on Tac d/t concern for VOD). No abdominal pain/fluid retention. Recent U/S. Recent viral testing neg. Known iron overload in setting of MA BMT monitor for now. Liver bx pending course. GVH/VOD/infectious/iron overload w/ MA chemo?  - 2/12: med box corrected to TID ursodiol (was in there bid)  # Epigastric pain (1/18): RUQ ultrasound showing patent doppler throughout, no ascites, nonspecific elevated hepatic artery restrictive index (see full impression above).                - Ulcer prophylaxis: PPI  - VOD ppx: Ursodiol   - Mucositis:  oxy 5 mg Q4H prn -- discharged with #10 tablets and chloraseptic throat spray.  #  Nausea from chemo/radiation: Zyprexa 5 mg HS (1/29), compazine PRN, Lorazepam prn. 2/12 asked her to trial Zofran qam and prn.      #urinary urge/frequency: 2/12 ;check UA and urine BK. UA shows WBC, cloudy. UC pending. Rx Pyridium prn.      DERM:  # Foot, hand warts. Curbside derm on 1/8, no recs while inpatient, typically managed OP.    # rash as per above.    Summary  Jani  UC, BK pending; Pyridium prn in the meantime  Med box checked/updated. Last dose MMF will be Thurs am (D35; will not refill just for 1 dose).  Confirmed she is taking Bactrim starting this week.  RTC Wed for jani, labs, fu        I spent 45 minutes in the care of this patient today, which included time necessary for preparation for the visit, obtaining history, ordering medications/tests/procedures as medically indicated, review of pertinent medical literature, counseling of the patient, communication of recommendations to the care team, and documentation time.    Elva Etienne PA-C

## 2024-02-12 NOTE — PROGRESS NOTES
BMT  Progress Note    Patient ID:  Irma Foreman is a 53 year old female with a PMH of MDS, warm AIHA, transfusion and transfusion dependent anemia presented to  to undergo a myeloablative allogeneic transplant. She was recently COVID+ on 12/26 but tested negative on 1/2 with resolution of nearly all URI symptoms. Undergoing MA (Bu/Flu) 6/8 URD Allo transplant, day +32     HPI: Here for follow-up and jani. She endorses low appetite and nausea a few times daily. Not taking any antiemetics except Zyprexa at hs. No emesis. Stools are soft/loose, about twice daily; no abd pain. No fevers. Med questions answered and med box reviewed with some corrections. Siro moved to 2p slot as she took her meds this am and siro level is pending (not trough). She also notes some urinary urge/frequency the past couple of days. Mild dysuria.      Review of Systems                                                                                                                                         Negative unless stated in the HPI.   PHYSICAL EXAM                                                                                                                                                     Lab Results   Component Value Date    WBC 4.0 02/12/2024    ANEU 2.8 02/12/2024    HGB 10.4 (L) 02/12/2024    HCT 30.4 (L) 02/12/2024    PLT 36 (LL) 02/12/2024     02/12/2024    POTASSIUM 3.4 02/12/2024    CHLORIDE 100 02/12/2024    CO2 24 02/12/2024     (H) 02/12/2024    BUN 6.9 02/12/2024    CR 0.49 (L) 02/12/2024    MAG 1.8 02/04/2024    INR 1.04 01/29/2024    BILITOTAL 0.5 02/12/2024     (H) 02/12/2024     (H) 02/12/2024    ALKPHOS 120 02/12/2024    PROTTOTAL 6.9 02/12/2024    ALBUMIN 4.0 02/12/2024     Wt Readings from Last 4 Encounters:   02/12/24 77.4 kg (170 lb 11.2 oz)   02/09/24 78.7 kg (173 lb 8 oz)   02/07/24 79.4 kg (175 lb)   02/06/24 79.4 kg (175 lb)     KPS: 80      General: NAD  Eyes: sclera anicteric    ENT: OP moist without lesions.   Lungs: CTAB   Cardiovascular: RRR, no M/R/G.   Lymphatics: No edema.   Skin: resolving rash upper chest; faint petechial lesions bilateral LE   Neuro: A&O   Access: R Gastelum site NT, no drainage.    Acute GVHD          2/12/2024    15:00 2/5/2024    09:39   Acute GVHD   New evidence of Acute Wxaol-Mlffoa-Blgy Disease developed since last entry? No No         LABS AND IMAGING: I have assessed all abnormal lab values for their clinical significance and any values considered clinically significant have been addressed in the assessment and plan.       US Abdominal with Doppler (1/18/24)  Impression:   1. Patent Doppler evaluation of the abdomen with antegrade flow throughout.  2. Elevated hepatic artery resistive index of 0.81, nonspecific though can be seen with hepatic venous congestion which could be related to venous occlusive disease..     SYSTEMS-BASED ASSESSMENT AND PLAN      Hortencia Baldwin is a 53 year old female with a history of MDS, undergoing MA (Bu/Flu) 6/8 URD Allo transplant, day +32        BMT/IEC PROTOCOL for MT 2015-29   - Chemo protocol:  Day -6 through day -1: Keppra and Allopurinol   Day -5 through -2: Bu/Flu.   Day -1: Rest day   Day 0: Transplant. Recipient: O+, CMV+. Donor: O+, CMV-. Cell dose 6.5 x10^6.   - Restaging plan: per protocol   BMbx 2/6 -- Day 28 review with Dr. Garay on 2/9-note pending as of today.   Flow neg; 100% donor     HEME/COAG  - anemia, thrombocytopenia 2/2 due to chemotherapy/BMT.   # Iron overload: hx of transfusion dependent anemia, pre-transplant ferritin around 5,000. Recommend phlebotomy post transplant when retic count is restored and Hgb >10    # Platelet refractoriness: HLA platelets when available.   - Transfusion parameters: hemoglobin <7, platelets <10  - GCSF PRN for ANC < 1.     IMMUNOCOMPROMISED  Afebrile  Prophylaxis plan:   ACV/letermovir viral testing ordered as below.  Micafungin through day +45 (current smoker) -  scheduled in clinic MW thru 2/26.  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 **initially avoided sirolimus d/t high risk VOD**. Tac drip discontinued 1/31, sirolimus started.  - 2/4 level therapeutic on 2mg daily. Did not hold dose today; check on Wed (siro moved to 2p slot in med box as of 2/12).  - Pink maculopapular rash.TCM prescribed at discharge and prednisone 40 mg x 5 days (2/3-2/7)     CARDIOVASCULAR  # Chest pressure: new on 1/15, worse with sitting up/activity, better while laying flat. RESOLVED.   EKG: new QR pattern in V1, suggests right ventricular conduction delay  Troponin negative x1   Echo (1/15): LVEF 55-60%, no pericardial effusion present. Cardiology consulted: no further recs   #Prolonged QTc to 507 on 1/22. EKG (1/30) NSR, Qtc 454.  # HTN: On norvasc 5mg daily.     GI/NUTRITION  # transaminitis: TPN discontinued (2/2), recently started sirolimus (previously on Tac d/t concern for VOD). No abdominal pain/fluid retention. Recent U/S. Recent viral testing neg. Known iron overload in setting of MA BMT monitor for now. Liver bx pending course. GVH/VOD/infectious/iron overload w/ MA chemo?  - 2/12: med box corrected to TID ursodiol (was in there bid)  # Epigastric pain (1/18): RUQ ultrasound showing patent doppler throughout, no ascites, nonspecific elevated hepatic artery restrictive index (see full impression above).                - Ulcer prophylaxis: PPI  - VOD ppx: Ursodiol   - Mucositis:  oxy 5 mg Q4H prn -- discharged with #10 tablets and chloraseptic throat spray.  # Nausea from chemo/radiation: Zyprexa 5 mg HS (1/29), compazine PRN, Lorazepam prn. 2/12 asked her to trial Zofran qam and prn.      #urinary urge/frequency: 2/12 ;check UA and urine BK. UA shows WBC, cloudy. UC pending. Rx Pyridium prn.      DERM:  # Foot, hand warts. Curbside derm on 1/8, no  recs while inpatient, typically managed OP.    # rash as per above.    Summary  Jani  UC, BK pending; Pyridium prn in the meantime  Med box checked/updated. Last dose MMF will be Thurs am (D35; will not refill just for 1 dose).  Confirmed she is taking Bactrim starting this week.  RTC Wed for jani, labs, fu        I spent 45 minutes in the care of this patient today, which included time necessary for preparation for the visit, obtaining history, ordering medications/tests/procedures as medically indicated, review of pertinent medical literature, counseling of the patient, communication of recommendations to the care team, and documentation time.    Elva Etienne PA-C

## 2024-02-12 NOTE — NURSING NOTE
Chief Complaint   Patient presents with    Blood Draw     [facility] blood draw with heparin flush by lab RN. Vitals taken and appointment arrived     Yoana Olmedo RN

## 2024-02-12 NOTE — PROGRESS NOTES
Infusion Nursing Note:  Hortencia Baldwin presents today for scheduled Micafungin.    Patient seen by provider today: Yes: Elva Etienne, JEN   present during visit today: Not Applicable.    Note: Lab results monitored; no other infusions needed today. Micafungin infused over 1 hour without complication.      Intravenous Access:  Gastelum.  +BR noted pre and post infusion    Treatment Conditions:  Not Applicable.      Post Infusion Assessment:  Patient tolerated infusion without incident.       Discharge Plan:   Patient discharged in stable condition accompanied by: self.      Eneida Obando RN

## 2024-02-13 LAB
BACTERIA UR CULT: NORMAL
BK VIRUS SPECIMEN TYPE: ABNORMAL
BKV DNA # SPEC NAA+PROBE: ABNORMAL IU/ML
BKV DNA SPEC NAA+PROBE-LOG#: >8 {LOG_COPIES}/ML
BLD PROD TYP BPU: NORMAL
BLOOD COMPONENT TYPE: NORMAL
CMV DNA SPEC NAA+PROBE-ACNC: NOT DETECTED IU/ML
CODING SYSTEM: NORMAL
UNIT ABO/RH: NORMAL
UNIT NUMBER: NORMAL
UNIT STATUS: NORMAL
UNIT TYPE ISBT: 5100

## 2024-02-13 RX ORDER — HEPARIN SODIUM,PORCINE 10 UNIT/ML
5-20 VIAL (ML) INTRAVENOUS DAILY PRN
OUTPATIENT
Start: 2024-02-13

## 2024-02-13 RX ORDER — HEPARIN SODIUM (PORCINE) LOCK FLUSH IV SOLN 100 UNIT/ML 100 UNIT/ML
5 SOLUTION INTRAVENOUS
OUTPATIENT
Start: 2024-02-13

## 2024-02-13 RX ORDER — DIPHENHYDRAMINE HYDROCHLORIDE 50 MG/ML
50 INJECTION INTRAMUSCULAR; INTRAVENOUS
Start: 2024-02-13

## 2024-02-13 RX ORDER — EPINEPHRINE 1 MG/ML
0.3 INJECTION, SOLUTION INTRAMUSCULAR; SUBCUTANEOUS EVERY 5 MIN PRN
OUTPATIENT
Start: 2024-02-13

## 2024-02-14 ENCOUNTER — TELEPHONE (OUTPATIENT)
Dept: TRANSPLANT | Facility: CLINIC | Age: 54
End: 2024-02-14

## 2024-02-14 ENCOUNTER — INFUSION THERAPY VISIT (OUTPATIENT)
Dept: TRANSPLANT | Facility: CLINIC | Age: 54
End: 2024-02-14
Attending: INTERNAL MEDICINE
Payer: COMMERCIAL

## 2024-02-14 ENCOUNTER — APPOINTMENT (OUTPATIENT)
Dept: LAB | Facility: CLINIC | Age: 54
End: 2024-02-14
Attending: STUDENT IN AN ORGANIZED HEALTH CARE EDUCATION/TRAINING PROGRAM
Payer: COMMERCIAL

## 2024-02-14 ENCOUNTER — ONCOLOGY VISIT (OUTPATIENT)
Dept: TRANSPLANT | Facility: CLINIC | Age: 54
End: 2024-02-14
Attending: STUDENT IN AN ORGANIZED HEALTH CARE EDUCATION/TRAINING PROGRAM
Payer: COMMERCIAL

## 2024-02-14 VITALS
HEART RATE: 104 BPM | TEMPERATURE: 98.2 F | SYSTOLIC BLOOD PRESSURE: 131 MMHG | BODY MASS INDEX: 31.12 KG/M2 | WEIGHT: 170.2 LBS | RESPIRATION RATE: 16 BRPM | OXYGEN SATURATION: 99 % | DIASTOLIC BLOOD PRESSURE: 87 MMHG

## 2024-02-14 DIAGNOSIS — Z94.81 STATUS POST BONE MARROW TRANSPLANT (H): Primary | ICD-10-CM

## 2024-02-14 DIAGNOSIS — D46.9 MDS (MYELODYSPLASTIC SYNDROME) (H): Primary | ICD-10-CM

## 2024-02-14 DIAGNOSIS — Z94.81 STATUS POST BONE MARROW TRANSPLANT (H): ICD-10-CM

## 2024-02-14 LAB
ALBUMIN SERPL BCG-MCNC: 3.8 G/DL (ref 3.5–5.2)
ALP SERPL-CCNC: 112 U/L (ref 40–150)
ALT SERPL W P-5'-P-CCNC: 147 U/L (ref 0–50)
ANION GAP SERPL CALCULATED.3IONS-SCNC: 12 MMOL/L (ref 7–15)
AST SERPL W P-5'-P-CCNC: 139 U/L (ref 0–45)
BASOPHILS # BLD AUTO: ABNORMAL 10*3/UL
BASOPHILS # BLD MANUAL: 0.1 10E3/UL (ref 0–0.2)
BASOPHILS NFR BLD AUTO: ABNORMAL %
BASOPHILS NFR BLD MANUAL: 4 %
BILIRUB SERPL-MCNC: 0.3 MG/DL
BUN SERPL-MCNC: 3.7 MG/DL (ref 6–20)
CALCIUM SERPL-MCNC: 9.1 MG/DL (ref 8.6–10)
CHLORIDE SERPL-SCNC: 106 MMOL/L (ref 98–107)
CREAT SERPL-MCNC: 0.45 MG/DL (ref 0.51–0.95)
DACRYOCYTES BLD QL SMEAR: SLIGHT
DEPRECATED HCO3 PLAS-SCNC: 22 MMOL/L (ref 22–29)
EGFRCR SERPLBLD CKD-EPI 2021: >90 ML/MIN/1.73M2
EOSINOPHIL # BLD AUTO: ABNORMAL 10*3/UL
EOSINOPHIL # BLD MANUAL: 1.3 10E3/UL (ref 0–0.7)
EOSINOPHIL NFR BLD AUTO: ABNORMAL %
EOSINOPHIL NFR BLD MANUAL: 35 %
ERYTHROCYTE [DISTWIDTH] IN BLOOD BY AUTOMATED COUNT: 22.5 % (ref 10–15)
GLUCOSE SERPL-MCNC: 124 MG/DL (ref 70–99)
HCT VFR BLD AUTO: 28.7 % (ref 35–47)
HGB BLD-MCNC: 9.5 G/DL (ref 11.7–15.7)
IMM GRANULOCYTES # BLD: ABNORMAL 10*3/UL
IMM GRANULOCYTES NFR BLD: ABNORMAL %
LYMPHOCYTES # BLD AUTO: ABNORMAL 10*3/UL
LYMPHOCYTES # BLD MANUAL: 0.1 10E3/UL (ref 0.8–5.3)
LYMPHOCYTES NFR BLD AUTO: ABNORMAL %
LYMPHOCYTES NFR BLD MANUAL: 3 %
MCH RBC QN AUTO: 33.5 PG (ref 26.5–33)
MCHC RBC AUTO-ENTMCNC: 33.1 G/DL (ref 31.5–36.5)
MCV RBC AUTO: 101 FL (ref 78–100)
METAMYELOCYTES # BLD MANUAL: 0.1 10E3/UL
METAMYELOCYTES NFR BLD MANUAL: 4 %
MONOCYTES # BLD AUTO: ABNORMAL 10*3/UL
MONOCYTES # BLD MANUAL: 0.3 10E3/UL (ref 0–1.3)
MONOCYTES NFR BLD AUTO: ABNORMAL %
MONOCYTES NFR BLD MANUAL: 8 %
MYELOCYTES # BLD MANUAL: 0.1 10E3/UL
MYELOCYTES NFR BLD MANUAL: 4 %
NEUTROPHILS # BLD AUTO: ABNORMAL 10*3/UL
NEUTROPHILS # BLD MANUAL: 1.5 10E3/UL (ref 1.6–8.3)
NEUTROPHILS NFR BLD AUTO: ABNORMAL %
NEUTROPHILS NFR BLD MANUAL: 42 %
NRBC # BLD AUTO: 0 10E3/UL
NRBC # BLD AUTO: 0.1 10E3/UL
NRBC BLD AUTO-RTO: 1 /100
NRBC BLD MANUAL-RTO: 4 %
PLAT MORPH BLD: ABNORMAL
PLATELET # BLD AUTO: 49 10E3/UL (ref 150–450)
POLYCHROMASIA BLD QL SMEAR: SLIGHT
POTASSIUM SERPL-SCNC: 3.2 MMOL/L (ref 3.4–5.3)
PROT SERPL-MCNC: 6.6 G/DL (ref 6.4–8.3)
RBC # BLD AUTO: 2.84 10E6/UL (ref 3.8–5.2)
RBC MORPH BLD: ABNORMAL
SODIUM SERPL-SCNC: 140 MMOL/L (ref 135–145)
WBC # BLD AUTO: 3.6 10E3/UL (ref 4–11)

## 2024-02-14 PROCEDURE — 250N000011 HC RX IP 250 OP 636: Performed by: STUDENT IN AN ORGANIZED HEALTH CARE EDUCATION/TRAINING PROGRAM

## 2024-02-14 PROCEDURE — 99215 OFFICE O/P EST HI 40 MIN: CPT

## 2024-02-14 PROCEDURE — 99211 OFF/OP EST MAY X REQ PHY/QHP: CPT

## 2024-02-14 PROCEDURE — 85027 COMPLETE CBC AUTOMATED: CPT

## 2024-02-14 PROCEDURE — 36415 COLL VENOUS BLD VENIPUNCTURE: CPT

## 2024-02-14 PROCEDURE — 85007 BL SMEAR W/DIFF WBC COUNT: CPT

## 2024-02-14 PROCEDURE — 258N000003 HC RX IP 258 OP 636

## 2024-02-14 PROCEDURE — 250N000013 HC RX MED GY IP 250 OP 250 PS 637

## 2024-02-14 PROCEDURE — 80053 COMPREHEN METABOLIC PANEL: CPT

## 2024-02-14 PROCEDURE — 250N000011 HC RX IP 250 OP 636: Mod: JZ

## 2024-02-14 PROCEDURE — 36592 COLLECT BLOOD FROM PICC: CPT

## 2024-02-14 PROCEDURE — 96365 THER/PROPH/DIAG IV INF INIT: CPT

## 2024-02-14 RX ORDER — HEPARIN SODIUM,PORCINE 10 UNIT/ML
5-20 VIAL (ML) INTRAVENOUS DAILY PRN
Status: CANCELLED | OUTPATIENT
Start: 2024-03-31

## 2024-02-14 RX ORDER — HEPARIN SODIUM (PORCINE) LOCK FLUSH IV SOLN 100 UNIT/ML 100 UNIT/ML
5 SOLUTION INTRAVENOUS
Status: CANCELLED | OUTPATIENT
Start: 2024-03-31

## 2024-02-14 RX ORDER — HEPARIN SODIUM,PORCINE 10 UNIT/ML
5 VIAL (ML) INTRAVENOUS
Status: DISCONTINUED | OUTPATIENT
Start: 2024-02-14 | End: 2024-02-14 | Stop reason: HOSPADM

## 2024-02-14 RX ORDER — POTASSIUM CHLORIDE 1500 MG/1
40 TABLET, EXTENDED RELEASE ORAL ONCE
Status: COMPLETED | OUTPATIENT
Start: 2024-02-14 | End: 2024-02-14

## 2024-02-14 RX ADMIN — Medication 5 ML: at 09:34

## 2024-02-14 RX ADMIN — POTASSIUM CHLORIDE 40 MEQ: 1500 TABLET, EXTENDED RELEASE ORAL at 11:02

## 2024-02-14 RX ADMIN — Medication 5 ML: at 09:33

## 2024-02-14 RX ADMIN — MICAFUNGIN SODIUM 300 MG: 100 INJECTION, POWDER, LYOPHILIZED, FOR SOLUTION INTRAVENOUS at 09:56

## 2024-02-14 ASSESSMENT — PAIN SCALES - GENERAL: PAINLEVEL: NO PAIN (0)

## 2024-02-14 NOTE — LETTER
2/14/2024         RE: Hortencia Baldwin  [address]  [address]  [place] [address]        Dear Colleague,    Thank you for referring your patient, Hortencia Baldwin, to the [facility]LOOD AND MARROW TRANSPLANT PRO[facility] [place]. Please see a copy of my visit note below.    BMT  Progress Note    Patient ID:  Irma Foreman is a 53 year old female with a PMH of MDS, warm AIHA, transfusion and transfusion dependent anemia presented to  to undergo a myeloablative allogeneic transplant. She was recently COVID+ on 12/26 but tested negative on 1/2 with resolution of nearly all URI symptoms. Undergoing MA (Bu/Flu) 6/8 URD Allo transplant, day +34     HPI:     Here for follow-up and jani. Reports urinary urgency and some dysuria. Her BK urine is positive, Ucx negative. She was using pyridium once/day. She reports nausea when taking her meds, appetite is low but able to eat and keep her weight stable. She was having diarrhea but said her stools are formed today. No rash.     Review of Systems                                                                                                                                         Negative unless stated in the HPI.   PHYSICAL EXAM                                                                                                                                                     Lab Results   Component Value Date    WBC 4.0 02/12/2024    ANEU 2.8 02/12/2024    HGB 10.4 (L) 02/12/2024    HCT 30.4 (L) 02/12/2024    PLT 36 (LL) 02/12/2024     02/12/2024    POTASSIUM 3.4 02/12/2024    CHLORIDE 100 02/12/2024    CO2 24 02/12/2024     (H) 02/12/2024    BUN 6.9 02/12/2024    CR 0.49 (L) 02/12/2024    MAG 1.8 02/04/2024    INR 1.04 01/29/2024    BILITOTAL 0.5 02/12/2024     (H) 02/12/2024     (H) 02/12/2024    ALKPHOS 120 02/12/2024    PROTTOTAL 6.9 02/12/2024    ALBUMIN 4.0 02/12/2024     Wt Readings from Last 4 Encounters:   02/14/24 77.2 kg  (170 lb 3.2 oz)   02/12/24 77.4 kg (170 lb 11.2 oz)   02/09/24 78.7 kg (173 lb 8 oz)   02/07/24 79.4 kg (175 lb)     KPS: 80      General: NAD  Eyes: sclera anicteric   ENT: OP moist without lesions.   Lungs: CTAB   Cardiovascular: RRR, no M/R/G.   Lymphatics: No edema.   Skin: faint petechial lesions bilateral LE   Neuro: A&O   Access: R Gastelum site NT, no drainage.    Acute GVHD          2/12/2024    15:00 2/5/2024    09:39   Acute GVHD   New evidence of Acute Punzp-Xjnabr-Mige Disease developed since last entry? No No         LABS AND IMAGING: I have assessed all abnormal lab values for their clinical significance and any values considered clinically significant have been addressed in the assessment and plan.       US Abdominal with Doppler (1/18/24)  Impression:   1. Patent Doppler evaluation of the abdomen with antegrade flow throughout.  2. Elevated hepatic artery resistive index of 0.81, nonspecific though can be seen with hepatic venous congestion which could be related to venous occlusive disease..     SYSTEMS-BASED ASSESSMENT AND PLAN      Hortencia aBldwin is a 53 year old female with a history of MDS, undergoing MA (Bu/Flu) 6/8 URD Allo transplant, day +34        BMT/IEC PROTOCOL for MT 2015-29   - Chemo protocol:  Day -6 through day -1: Keppra and Allopurinol   Day -5 through -2: Bu/Flu.   Day -1: Rest day   Day 0: Transplant. Recipient: O+, CMV+. Donor: O+, CMV-. Cell dose 6.5 x10^6.   - Restaging plan: per protocol   BMbx 2/6 -- Day 28 review with Dr. Garay on 2/9-note pending.    Flow neg; 100% donor     HEME/COAG  - anemia, thrombocytopenia 2/2 due to chemotherapy/BMT.   # Iron overload: hx of transfusion dependent anemia, pre-transplant ferritin around 5,000. Recommend phlebotomy post transplant when retic count is restored and Hgb >10    # Platelet refractoriness: HLA platelets when available.   - Transfusion parameters: hemoglobin <7, platelets <10  - GCSF PRN for ANC < 1.      IMMUNOCOMPROMISED  Afebrile  Prophylaxis plan:   ACV/letermovir viral testing ordered as below.  Micafungin through day +45 (current smoker) - scheduled in clinic MW thru 2/26.  cefpodoxime while neutropenic (changed from levaquin due to prolonged QTc as below)   Bactrim   CMV: ND (2/12)   EBV: ND (2/6)      RISK OF GVHD  - Prophylaxis: PT Cy, Tac/MMF started 1/16. **initially avoided sirolimus d/t high risk VOD**. Tac drip discontinued 1/31, sirolimus started.  - 2/4 level therapeutic on 2mg daily. Level pending 2/14.   - Pink maculopapular rash.TCM prescribed at discharge and prednisone 40 mg x 5 days (2/3-2/7). Resolved.      CARDIOVASCULAR  # Chest pressure: new on 1/15, worse with sitting up/activity, better while laying flat. RESOLVED.   EKG: new QR pattern in V1, suggests right ventricular conduction delay  Troponin negative x1   Echo (1/15): LVEF 55-60%, no pericardial effusion present. Cardiology consulted: no further recs   #Prolonged QTc to 507 on 1/22. EKG (1/30) NSR, Qtc 454.  # HTN: On norvasc 5mg daily.     GI/NUTRITION  # transaminitis: TPN discontinued (2/2), recently started sirolimus (previously on Tac d/t concern for VOD). No abdominal pain/fluid retention. Recent U/S. Recent viral testing neg. Known iron overload in setting of MA BMT monitor for now. Liver bx pending course. GVH/VOD/infectious/iron overload w/ MA chemo?  - 2/12: med box corrected to TID ursodiol (was in there bid)  # Epigastric pain (1/18): RUQ ultrasound showing patent doppler throughout, no ascites, nonspecific elevated hepatic artery restrictive index (see full impression above).                - Ulcer prophylaxis: PPI  - VOD ppx: Ursodiol   # Nausea from chemo/radiation: Zyprexa 5 mg HS (1/29), compazine PRN, Lorazepam prn. 2/12 asked her to trial Zofran qam and prn.      #urinary urge/frequency: 2/12 ; BK urine >100 million copies. UA: RBC, Wbc, & calcium oxylate crystals. Ucx negative.  Rx Pyridium.      DERM:  #  Foot, hand warts. Curbside derm on 1/8, no recs while inpatient, typically managed OP.    # rash as per above.    Summary:   - Urine BK+, encouraged to use Pyridum 3x/day for next 3 days   - Message sent SW regarding grants   - K+ 40 mEq PO   - ALT & AST mildly improved         I spent 40 minutes in the care of this patient today, which included time necessary for preparation for the visit, obtaining history, ordering medications/tests/procedures as medically indicated, review of pertinent medical literature, counseling of the patient, communication of recommendations to the care team, and documentation time.    Jt Vaz PA-C [phone]

## 2024-02-14 NOTE — PROGRESS NOTES
BMT  Progress Note    Patient ID:  Irma Foreman is a 53 year old female with a PMH of MDS, warm AIHA, transfusion and transfusion dependent anemia presented to  to undergo a myeloablative allogeneic transplant. She was recently COVID+ on 12/26 but tested negative on 1/2 with resolution of nearly all URI symptoms. Undergoing MA (Bu/Flu) 6/8 URD Allo transplant, day +34     HPI:     Here for follow-up and jani. Reports urinary urgency and some dysuria. Her BK urine is positive, Ucx negative. She was using pyridium once/day. She reports nausea when taking her meds, appetite is low but able to eat and keep her weight stable. She was having diarrhea but said her stools are formed today. No rash.     Addendum 2/16: Spoke with Dr. Garay and she plans to do a liver biopsy to r/o GVHD of the liver. Scheduling TBD.     Review of Systems                                                                                                                                         Negative unless stated in the HPI.   PHYSICAL EXAM                                                                                                                                                     Lab Results   Component Value Date    WBC 4.0 02/12/2024    ANEU 2.8 02/12/2024    HGB 10.4 (L) 02/12/2024    HCT 30.4 (L) 02/12/2024    PLT 36 (LL) 02/12/2024     02/12/2024    POTASSIUM 3.4 02/12/2024    CHLORIDE 100 02/12/2024    CO2 24 02/12/2024     (H) 02/12/2024    BUN 6.9 02/12/2024    CR 0.49 (L) 02/12/2024    MAG 1.8 02/04/2024    INR 1.04 01/29/2024    BILITOTAL 0.5 02/12/2024     (H) 02/12/2024     (H) 02/12/2024    ALKPHOS 120 02/12/2024    PROTTOTAL 6.9 02/12/2024    ALBUMIN 4.0 02/12/2024     Wt Readings from Last 4 Encounters:   02/14/24 77.2 kg (170 lb 3.2 oz)   02/12/24 77.4 kg (170 lb 11.2 oz)   02/09/24 78.7 kg (173 lb 8 oz)   02/07/24 79.4 kg (175 lb)     KPS: 80      General: NAD  Eyes: sclera anicteric   ENT: OP  moist without lesions.   Lungs: CTAB   Cardiovascular: RRR, no M/R/G.   Lymphatics: No edema.   Skin: faint petechial lesions bilateral LE   Neuro: A&O   Access: R Gastelum site NT, no drainage.    Acute GVHD          2/12/2024    15:00 2/5/2024    09:39   Acute GVHD   New evidence of Acute Pemau-Nczoxw-Kuok Disease developed since last entry? No No         LABS AND IMAGING: I have assessed all abnormal lab values for their clinical significance and any values considered clinically significant have been addressed in the assessment and plan.       US Abdominal with Doppler (1/18/24)  Impression:   1. Patent Doppler evaluation of the abdomen with antegrade flow throughout.  2. Elevated hepatic artery resistive index of 0.81, nonspecific though can be seen with hepatic venous congestion which could be related to venous occlusive disease..     SYSTEMS-BASED ASSESSMENT AND PLAN      Hortencia Baldwin is a 53 year old female with a history of MDS, undergoing MA (Bu/Flu) 6/8 URD Allo transplant, day +34        BMT/IEC PROTOCOL for MT 2015-29   - Chemo protocol:  Day -6 through day -1: Keppra and Allopurinol   Day -5 through -2: Bu/Flu.   Day -1: Rest day   Day 0: Transplant. Recipient: O+, CMV+. Donor: O+, CMV-. Cell dose 6.5 x10^6.   - Restaging plan: per protocol   BMbx 2/6 -- Day 28 review with Dr. Garay on 2/9-note pending.    Flow neg; 100% donor     HEME/COAG  - anemia, thrombocytopenia 2/2 due to chemotherapy/BMT.   # Iron overload: hx of transfusion dependent anemia, pre-transplant ferritin around 5,000. Recommend phlebotomy post transplant when retic count is restored and Hgb >10    # Platelet refractoriness: HLA platelets when available.   - Transfusion parameters: hemoglobin <7, platelets <10  - GCSF PRN for ANC < 1.     IMMUNOCOMPROMISED  Afebrile  Prophylaxis plan:   ACV/letermovir viral testing ordered as below.  Micafungin through day +45 (current smoker) - scheduled in clinic MWF thru 2/26.  cefpodoxime while  neutropenic (changed from levaquin due to prolonged QTc as below)   Bactrim   CMV: ND (2/12)   EBV: ND (2/6)      RISK OF GVHD  - Prophylaxis: PT Cy, Tac/MMF started 1/16. **initially avoided sirolimus d/t high risk VOD**. Tac drip discontinued 1/31, sirolimus started.  - 2/4 level therapeutic on 2mg daily. Level pending 2/14.   - Pink maculopapular rash.TCM prescribed at discharge and prednisone 40 mg x 5 days (2/3-2/7). Resolved.      CARDIOVASCULAR  # Chest pressure: new on 1/15, worse with sitting up/activity, better while laying flat. RESOLVED.   EKG: new QR pattern in V1, suggests right ventricular conduction delay  Troponin negative x1   Echo (1/15): LVEF 55-60%, no pericardial effusion present. Cardiology consulted: no further recs   #Prolonged QTc to 507 on 1/22. EKG (1/30) NSR, Qtc 454.  # HTN: On norvasc 5mg daily.     GI/NUTRITION  # transaminitis: TPN discontinued (2/2), recently started sirolimus (previously on Tac d/t concern for VOD). No abdominal pain/fluid retention. Recent U/S. Recent viral testing neg. Known iron overload in setting of MA BMT monitor for now. Liver bx pending course. GVH/VOD/infectious/iron overload w/ MA chemo?  - 2/12: med box corrected to TID ursodiol (was in there bid)  # Epigastric pain (1/18): RUQ ultrasound showing patent doppler throughout, no ascites, nonspecific elevated hepatic artery restrictive index (see full impression above).                - Ulcer prophylaxis: PPI  - VOD ppx: Ursodiol   # Nausea from chemo/radiation: Zyprexa 5 mg HS (1/29), compazine PRN, Lorazepam prn. 2/12 asked her to trial Zofran qam and prn.      #urinary urge/frequency: 2/12 ; BK urine >100 million copies. UA: RBC, Wbc, & calcium oxylate crystals. Ucx negative.  Rx Pyridium.      DERM:  # Foot, hand warts. Curbside derm on 1/8, no recs while inpatient, typically managed OP.    # rash as per above.    Summary:   - Urine BK+, encouraged to use Pyridum 3x/day for next 3 days   - Message sent  SW regarding grants   - K+ 40 mEq PO   - ALT & AST mildly improved         I spent 40 minutes in the care of this patient today, which included time necessary for preparation for the visit, obtaining history, ordering medications/tests/procedures as medically indicated, review of pertinent medical literature, counseling of the patient, communication of recommendations to the care team, and documentation time.    Jt Vaz PA-C [phone]

## 2024-02-14 NOTE — TELEPHONE ENCOUNTER
Short term disability Physician statement signed by Dr Garay and faxed to [phone]     February 14, 2024 4:11 PM -  [email or web address]:

## 2024-02-14 NOTE — TELEPHONE ENCOUNTER
BMT CLINICAL SOCIAL WORK NOTE:    Focus: Resources    Data: Pt is a 53 year old female currently day +34 s/p allo BMT    Interventions: Clinical  (CSW) received call from Pt to assist with questions on her freda information. We discussed that [employer]MD[employer] noted her check was deposited, the pt discussed with her boyfriend and after some confusions they were able to confirm they did deposit the check. We also discussed that we applied for the post-transplant freda as well and she was approved and to watch for the freda in the mail within the next 2 weeks. Pt shared that she is also wondering about her disability forms, CSW sent a message to Dr. Garay's NMDP. CSW encouraged Pt to contact CSW for support, questions and/or resources.    Assessment: Pt presented as friendly and open.  Pt appears to be coping well at this time. Pt continues to be supported by her boyfriend and family.     Plan: CSW will continue to work with Pt and family to provide supportive counseling and assist with resources as needed. CSW will continue to collaborate with multidisciplinary team regarding Pt's plan of care.     CLIFTON Ryan, Riverview Psychiatric CenterSW  [facility]  Phone: [phone]  Vocera- BMT SW 3

## 2024-02-14 NOTE — NURSING NOTE
"Oncology Rooming Note    February 14, 2024 9:54 AM   Hortencia Baldwin is a 53 year old female who presents for:    Chief Complaint   Patient presents with    Blood Draw     Labs drawn from cvc by rn.  VS taken.    Oncology Clinic Visit     Hx of MDS     Initial Vitals: /87 (BP Location: Right arm, Patient Position: Sitting, Cuff Size: Adult Regular)   Pulse 104   Temp 98.2  F (36.8  C) (Oral)   Resp 16   Wt 77.2 kg (170 lb 3.2 oz)   LMP 11/05/2017   SpO2 99%   BMI 31.12 kg/m   Estimated body mass index is 31.12 kg/m  as calculated from the following:    Height as of 1/5/24: 1.575 m (5' 2.01\").    Weight as of this encounter: 77.2 kg (170 lb 3.2 oz). Body surface area is 1.84 meters squared.  No Pain (0) Comment: Data Unavailable   Patient's last menstrual period was 11/05/2017.  Allergies reviewed: Yes  Medications reviewed: Yes    Medications: Medication refills not needed today.  Pharmacy name entered into [employer]:    [id number] - [place], [place] - [address] AT [facility]  [id number] - [place], [place] - [address] [facility]    Frailty Screening:   Is the patient here for a new oncology consult visit in cancer care? 2. No      Clinical concerns: Would like to speak to       Sarai Ramos RN              "

## 2024-02-14 NOTE — NURSING NOTE
Chief Complaint   Patient presents with    Blood Draw     Labs drawn from cvc by rn.  VS taken.     Labs drawn from CVC by rn (did not draw sirolimus level as pt took med).  Good blood return noted in both lumens.  Both lumens flushed with NS and heparin.  Pt tolerated well.  VS taken.  Pt checked in for next appt.    Anika Robert RN

## 2024-02-15 ENCOUNTER — DOCUMENTATION ONLY (OUTPATIENT)
Dept: TRANSPLANT | Facility: CLINIC | Age: 54
End: 2024-02-15
Payer: COMMERCIAL

## 2024-02-15 ENCOUNTER — TELEPHONE (OUTPATIENT)
Dept: TRANSPLANT | Facility: CLINIC | Age: 54
End: 2024-02-15
Payer: COMMERCIAL

## 2024-02-15 DIAGNOSIS — Z94.81 STATUS POST BONE MARROW TRANSPLANT (H): Primary | ICD-10-CM

## 2024-02-15 RX ORDER — LEVOFLOXACIN 750 MG/1
750 TABLET, FILM COATED ORAL DAILY
Qty: 5 TABLET | Refills: 0 | Status: SHIPPED | OUTPATIENT
Start: 2024-02-15 | End: 2024-02-15

## 2024-02-15 RX ORDER — LEVOFLOXACIN 250 MG/1
750 TABLET, FILM COATED ORAL DAILY
Qty: 15 TABLET | Refills: 0 | Status: ON HOLD | OUTPATIENT
Start: 2024-02-15 | End: 2024-02-25

## 2024-02-15 NOTE — PROGRESS NOTES
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   - Elevated liver enzymes since 2/2/24 (when tac was changed to sirolimus).    - Eosinophilia (2/14/24)  - Diarrhea on and off: 2 small episodes of loose stool today. Also having transient sharp abdominal pains.   - Rash for the past 3-4 days, non-pruritic.  - Dysuria, without hematuria or blood clots. 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 UA with calcium oxalate crystals. Ucx (2/12): mixed urogenital odilon. No improvement with pyridium. Unable to tolerate oxycodone due to bad dreams.    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 IR-guided liver biopsy ordered. Will need platelet transfusion and INR check prior to procedure. I would suggest platelet threshold of 80.   - Diarrhea could be due to infections or MMF. C.diff and enteric GI panel ordered. Last day of MMF today. Will follow up on Monday. For continued diarrhea, please schedule for sigmoidoscopy with biopsy  - Dysuria: will rx levaquin 250mg for 5 days for possible bacterial UTI. Qtc normal.     Discussed with patient.     Coco Garay  Department of Hematology, Oncology and Transplantation  Amcom Pager [phone]/Text via George

## 2024-02-15 NOTE — TELEPHONE ENCOUNTER
Called patient to update the progress on the short term disability request. Physician attestation has been faxed 2/14. Writer spoke with [employer] on 2/9. Pt should contact them for follow up. They said it can take up to 5 days to process request once it is received.

## 2024-02-16 ENCOUNTER — DOCUMENTATION ONLY (OUTPATIENT)
Dept: TRANSPLANT | Facility: CLINIC | Age: 54
End: 2024-02-16
Payer: COMMERCIAL

## 2024-02-16 ENCOUNTER — TELEPHONE (OUTPATIENT)
Dept: ONCOLOGY | Facility: CLINIC | Age: 54
End: 2024-02-16
Payer: COMMERCIAL

## 2024-02-16 ENCOUNTER — LAB (OUTPATIENT)
Dept: LAB | Facility: CLINIC | Age: 54
End: 2024-02-16
Attending: STUDENT IN AN ORGANIZED HEALTH CARE EDUCATION/TRAINING PROGRAM
Payer: COMMERCIAL

## 2024-02-16 ENCOUNTER — INFUSION THERAPY VISIT (OUTPATIENT)
Dept: TRANSPLANT | Facility: CLINIC | Age: 54
End: 2024-02-16
Attending: INTERNAL MEDICINE
Payer: COMMERCIAL

## 2024-02-16 ENCOUNTER — HOSPITAL ENCOUNTER (OUTPATIENT)
Facility: CLINIC | Age: 54
End: 2024-02-16

## 2024-02-16 VITALS
BODY MASS INDEX: 31.58 KG/M2 | HEART RATE: 86 BPM | WEIGHT: 172.7 LBS | OXYGEN SATURATION: 98 % | RESPIRATION RATE: 16 BRPM

## 2024-02-16 DIAGNOSIS — Z94.81 STATUS POST BONE MARROW TRANSPLANT (H): ICD-10-CM

## 2024-02-16 DIAGNOSIS — D46.9 MDS (MYELODYSPLASTIC SYNDROME) (H): Primary | ICD-10-CM

## 2024-02-16 LAB
ANION GAP SERPL CALCULATED.3IONS-SCNC: 9 MMOL/L (ref 7–15)
BASOPHILS # BLD AUTO: ABNORMAL 10*3/UL
BASOPHILS # BLD MANUAL: 0.2 10E3/UL (ref 0–0.2)
BASOPHILS NFR BLD AUTO: ABNORMAL %
BASOPHILS NFR BLD MANUAL: 4 %
BUN SERPL-MCNC: 5 MG/DL (ref 6–20)
CALCIUM SERPL-MCNC: 9 MG/DL (ref 8.6–10)
CHLORIDE SERPL-SCNC: 106 MMOL/L (ref 98–107)
CREAT SERPL-MCNC: 0.39 MG/DL (ref 0.51–0.95)
DEPRECATED HCO3 PLAS-SCNC: 27 MMOL/L (ref 22–29)
EGFRCR SERPLBLD CKD-EPI 2021: >90 ML/MIN/1.73M2
EOSINOPHIL # BLD AUTO: ABNORMAL 10*3/UL
EOSINOPHIL # BLD MANUAL: 0.8 10E3/UL (ref 0–0.7)
EOSINOPHIL NFR BLD AUTO: ABNORMAL %
EOSINOPHIL NFR BLD MANUAL: 17 %
ERYTHROCYTE [DISTWIDTH] IN BLOOD BY AUTOMATED COUNT: 22.5 % (ref 10–15)
GLUCOSE SERPL-MCNC: 88 MG/DL (ref 70–99)
HCT VFR BLD AUTO: 27.5 % (ref 35–47)
HGB BLD-MCNC: 9.3 G/DL (ref 11.7–15.7)
IMM GRANULOCYTES # BLD: ABNORMAL 10*3/UL
IMM GRANULOCYTES NFR BLD: ABNORMAL %
LYMPHOCYTES # BLD AUTO: ABNORMAL 10*3/UL
LYMPHOCYTES # BLD MANUAL: 0.3 10E3/UL (ref 0.8–5.3)
LYMPHOCYTES NFR BLD AUTO: ABNORMAL %
LYMPHOCYTES NFR BLD MANUAL: 7 %
MCH RBC QN AUTO: 34.4 PG (ref 26.5–33)
MCHC RBC AUTO-ENTMCNC: 33.8 G/DL (ref 31.5–36.5)
MCV RBC AUTO: 102 FL (ref 78–100)
METAMYELOCYTES # BLD MANUAL: 0.2 10E3/UL
METAMYELOCYTES NFR BLD MANUAL: 4 %
MONOCYTES # BLD AUTO: ABNORMAL 10*3/UL
MONOCYTES # BLD MANUAL: 0.4 10E3/UL (ref 0–1.3)
MONOCYTES NFR BLD AUTO: ABNORMAL %
MONOCYTES NFR BLD MANUAL: 8 %
MYELOCYTES # BLD MANUAL: 0.2 10E3/UL
MYELOCYTES NFR BLD MANUAL: 4 %
NEUTROPHILS # BLD AUTO: ABNORMAL 10*3/UL
NEUTROPHILS # BLD MANUAL: 2.7 10E3/UL (ref 1.6–8.3)
NEUTROPHILS NFR BLD AUTO: ABNORMAL %
NEUTROPHILS NFR BLD MANUAL: 56 %
NRBC # BLD AUTO: 0 10E3/UL
NRBC BLD AUTO-RTO: 1 /100
PLAT MORPH BLD: ABNORMAL
PLATELET # BLD AUTO: 60 10E3/UL (ref 150–450)
POLYCHROMASIA BLD QL SMEAR: SLIGHT
POTASSIUM SERPL-SCNC: 3.6 MMOL/L (ref 3.4–5.3)
RBC # BLD AUTO: 2.7 10E6/UL (ref 3.8–5.2)
RBC MORPH BLD: ABNORMAL
RETICS # AUTO: 0.13 10E6/UL (ref 0.03–0.1)
RETICS/RBC NFR AUTO: 4.7 % (ref 0.5–2)
SODIUM SERPL-SCNC: 142 MMOL/L (ref 135–145)
WBC # BLD AUTO: 4.9 10E3/UL (ref 4–11)

## 2024-02-16 PROCEDURE — 36592 COLLECT BLOOD FROM PICC: CPT

## 2024-02-16 PROCEDURE — 85014 HEMATOCRIT: CPT

## 2024-02-16 PROCEDURE — 250N000011 HC RX IP 250 OP 636: Mod: JZ

## 2024-02-16 PROCEDURE — 82040 ASSAY OF SERUM ALBUMIN: CPT

## 2024-02-16 PROCEDURE — 250N000011 HC RX IP 250 OP 636: Performed by: INTERNAL MEDICINE

## 2024-02-16 PROCEDURE — 85007 BL SMEAR W/DIFF WBC COUNT: CPT

## 2024-02-16 PROCEDURE — 96365 THER/PROPH/DIAG IV INF INIT: CPT

## 2024-02-16 PROCEDURE — 85045 AUTOMATED RETICULOCYTE COUNT: CPT

## 2024-02-16 PROCEDURE — 258N000003 HC RX IP 258 OP 636

## 2024-02-16 PROCEDURE — 80048 BASIC METABOLIC PNL TOTAL CA: CPT

## 2024-02-16 PROCEDURE — 80053 COMPREHEN METABOLIC PANEL: CPT

## 2024-02-16 RX ORDER — HEPARIN SODIUM,PORCINE 10 UNIT/ML
5 VIAL (ML) INTRAVENOUS ONCE
Status: COMPLETED | OUTPATIENT
Start: 2024-02-16 | End: 2024-02-16

## 2024-02-16 RX ORDER — HEPARIN SODIUM (PORCINE) LOCK FLUSH IV SOLN 100 UNIT/ML 100 UNIT/ML
5 SOLUTION INTRAVENOUS
Status: CANCELLED | OUTPATIENT
Start: 2024-04-02

## 2024-02-16 RX ORDER — HEPARIN SODIUM,PORCINE 10 UNIT/ML
5-20 VIAL (ML) INTRAVENOUS DAILY PRN
Status: CANCELLED | OUTPATIENT
Start: 2024-04-02

## 2024-02-16 RX ADMIN — HEPARIN, PORCINE (PF) 10 UNIT/ML INTRAVENOUS SYRINGE 5 ML: at 10:56

## 2024-02-16 RX ADMIN — MICAFUNGIN SODIUM 300 MG: 100 INJECTION, POWDER, LYOPHILIZED, FOR SOLUTION INTRAVENOUS at 11:11

## 2024-02-16 ASSESSMENT — PAIN SCALES - GENERAL: PAINLEVEL: NO PAIN (0)

## 2024-02-16 NOTE — PROGRESS NOTES
Infusion Nursing Note:  Hortencia Baldwin presents today for scheduled Micafungin.    Patient seen by provider today: No   present during visit today: Not Applicable.    Note: Meds and allergies reviewed. Pt reports feeling well today but has ongoing urinary discomfort. Levaquin Rx ordered by Dr. Garay yesterday; pt instructed to  at pharmacy and start today. Pt verbalized understanding. Pt having loose stools the last few days but feels this is improving and stools are more soft vs loose now. Stool studies ordered by Dr. Garay and pt given stool collection kit and collection instructions and will collect and bring to 1st floor lab on Monday if able. Otherwise no acute concerns. Labs monitored; no additional infusion needs identified. Pt received 300 mg Micafungin V.      Intravenous Access:  Gastelum.    Treatment Conditions:  Not Applicable.      Post Infusion Assessment:  Patient tolerated infusion without incident.  Blood return noted pre and post infusion.  Site patent and intact, free from redness, edema or discomfort.  No evidence of extravasations.       Discharge Plan:   Patient discharged in stable condition accompanied by: self.  Departure Mode: Ambulatory.      Evita Walls RN

## 2024-02-17 NOTE — TELEPHONE ENCOUNTER
Hematology/Oncology Fellow Note  02/16/2024    I was contacted by Mrs. Irma Foreman via page. She has been having dysuria and started taking Levaquin + Phenazopyridine today. Her most bothersome symptom is dysuria and asked if she should change antibiotics. I mentioned to her that usually antibiotics can take up to 48 hours to start to have some alleviation of symptoms. I asked her to trial unsweetened cranberry juice + heat pad (on bladder) to see if this alleviates her symptoms. I mentioned that she could call back again tomorrow if she's still not feeling significant alleviation of symptoms. She was agreeable tot his plan.      Mani Hale DO   Hematology/Oncology/BMT Fellow PGY4  Pager: [phone]

## 2024-02-18 ENCOUNTER — HOSPITAL ENCOUNTER (OUTPATIENT)
Facility: CLINIC | Age: 54
Discharge: HOME | End: 2024-02-18
Attending: STUDENT IN AN ORGANIZED HEALTH CARE EDUCATION/TRAINING PROGRAM
Payer: COMMERCIAL

## 2024-02-18 LAB
ALBUMIN SERPL BCG-MCNC: 3.9 G/DL (ref 3.5–5.2)
ALP SERPL-CCNC: 110 U/L (ref 40–150)
ALT SERPL W P-5'-P-CCNC: 127 U/L (ref 0–50)
AST SERPL W P-5'-P-CCNC: 99 U/L (ref 0–45)
BILIRUB DIRECT SERPL-MCNC: <0.2 MG/DL (ref 0–0.3)
BILIRUB SERPL-MCNC: 0.3 MG/DL
PROT SERPL-MCNC: 6.4 G/DL (ref 6.4–8.3)

## 2024-02-18 PROCEDURE — 87493 C DIFF AMPLIFIED PROBE: CPT | Mod: XU

## 2024-02-18 PROCEDURE — 87507 IADNA-DNA/RNA PROBE TQ 12-25: CPT

## 2024-02-19 ENCOUNTER — ONCOLOGY VISIT (OUTPATIENT)
Dept: TRANSPLANT | Facility: CLINIC | Age: 54
End: 2024-02-19
Attending: INTERNAL MEDICINE
Payer: COMMERCIAL

## 2024-02-19 ENCOUNTER — APPOINTMENT (OUTPATIENT)
Dept: LAB | Facility: CLINIC | Age: 54
End: 2024-02-19
Attending: INTERNAL MEDICINE
Payer: COMMERCIAL

## 2024-02-19 VITALS
OXYGEN SATURATION: 97 % | TEMPERATURE: 98.1 F | BODY MASS INDEX: 31.45 KG/M2 | WEIGHT: 172 LBS | SYSTOLIC BLOOD PRESSURE: 133 MMHG | HEART RATE: 114 BPM | RESPIRATION RATE: 16 BRPM | DIASTOLIC BLOOD PRESSURE: 79 MMHG

## 2024-02-19 DIAGNOSIS — D46.9 MDS (MYELODYSPLASTIC SYNDROME) (H): Primary | ICD-10-CM

## 2024-02-19 LAB
ADV 40+41 DNA STL QL NAA+NON-PROBE: NEGATIVE
ALBUMIN SERPL BCG-MCNC: 4 G/DL (ref 3.5–5.2)
ALP SERPL-CCNC: 117 U/L (ref 40–150)
ALT SERPL W P-5'-P-CCNC: 87 U/L (ref 0–50)
ANION GAP SERPL CALCULATED.3IONS-SCNC: 10 MMOL/L (ref 7–15)
AST SERPL W P-5'-P-CCNC: 63 U/L (ref 0–45)
ASTRO TYP 1-8 RNA STL QL NAA+NON-PROBE: NEGATIVE
BASOPHILS # BLD AUTO: ABNORMAL 10*3/UL
BASOPHILS # BLD MANUAL: 0 10E3/UL (ref 0–0.2)
BASOPHILS NFR BLD AUTO: ABNORMAL %
BASOPHILS NFR BLD MANUAL: 0 %
BILIRUB SERPL-MCNC: 0.3 MG/DL
BUN SERPL-MCNC: 6.6 MG/DL (ref 6–20)
C CAYETANENSIS DNA STL QL NAA+NON-PROBE: NEGATIVE
C DIFF TOX B STL QL: NEGATIVE
CALCIUM SERPL-MCNC: 9.2 MG/DL (ref 8.6–10)
CAMPYLOBACTER DNA SPEC NAA+PROBE: NEGATIVE
CHLORIDE SERPL-SCNC: 105 MMOL/L (ref 98–107)
CREAT SERPL-MCNC: 0.44 MG/DL (ref 0.51–0.95)
CRYPTOSP DNA STL QL NAA+NON-PROBE: NEGATIVE
DEPRECATED HCO3 PLAS-SCNC: 28 MMOL/L (ref 22–29)
E COLI O157 DNA STL QL NAA+NON-PROBE: NORMAL
E HISTOLYT DNA STL QL NAA+NON-PROBE: NEGATIVE
EAEC ASTA GENE ISLT QL NAA+PROBE: NEGATIVE
EC STX1+STX2 GENES STL QL NAA+NON-PROBE: NEGATIVE
EGFRCR SERPLBLD CKD-EPI 2021: >90 ML/MIN/1.73M2
EOSINOPHIL # BLD AUTO: ABNORMAL 10*3/UL
EOSINOPHIL # BLD MANUAL: 0.6 10E3/UL (ref 0–0.7)
EOSINOPHIL NFR BLD AUTO: ABNORMAL %
EOSINOPHIL NFR BLD MANUAL: 10 %
EPEC EAE GENE STL QL NAA+NON-PROBE: NEGATIVE
ERYTHROCYTE [DISTWIDTH] IN BLOOD BY AUTOMATED COUNT: 21.8 % (ref 10–15)
ETEC LTA+ST1A+ST1B TOX ST NAA+NON-PROBE: NEGATIVE
G LAMBLIA DNA STL QL NAA+NON-PROBE: NEGATIVE
GLUCOSE SERPL-MCNC: 96 MG/DL (ref 70–99)
HCT VFR BLD AUTO: 30.9 % (ref 35–47)
HGB BLD-MCNC: 10 G/DL (ref 11.7–15.7)
IMM GRANULOCYTES # BLD: ABNORMAL 10*3/UL
IMM GRANULOCYTES NFR BLD: ABNORMAL %
LYMPHOCYTES # BLD AUTO: ABNORMAL 10*3/UL
LYMPHOCYTES # BLD MANUAL: 1 10E3/UL (ref 0.8–5.3)
LYMPHOCYTES NFR BLD AUTO: ABNORMAL %
LYMPHOCYTES NFR BLD MANUAL: 17 %
MCH RBC QN AUTO: 33.7 PG (ref 26.5–33)
MCHC RBC AUTO-ENTMCNC: 32.4 G/DL (ref 31.5–36.5)
MCV RBC AUTO: 104 FL (ref 78–100)
METAMYELOCYTES # BLD MANUAL: 0.1 10E3/UL
METAMYELOCYTES NFR BLD MANUAL: 1 %
MONOCYTES # BLD AUTO: ABNORMAL 10*3/UL
MONOCYTES # BLD MANUAL: 0.9 10E3/UL (ref 0–1.3)
MONOCYTES NFR BLD AUTO: ABNORMAL %
MONOCYTES NFR BLD MANUAL: 16 %
MYELOCYTES # BLD MANUAL: 0.2 10E3/UL
MYELOCYTES NFR BLD MANUAL: 3 %
NEUTROPHILS # BLD AUTO: ABNORMAL 10*3/UL
NEUTROPHILS # BLD MANUAL: 3.1 10E3/UL (ref 1.6–8.3)
NEUTROPHILS NFR BLD AUTO: ABNORMAL %
NEUTROPHILS NFR BLD MANUAL: 53 %
NOROVIRUS GI+II RNA STL QL NAA+NON-PROBE: NEGATIVE
NRBC # BLD AUTO: 0 10E3/UL
NRBC BLD AUTO-RTO: 1 /100
P SHIGELLOIDES DNA STL QL NAA+NON-PROBE: NEGATIVE
PLAT MORPH BLD: ABNORMAL
PLATELET # BLD AUTO: 74 10E3/UL (ref 150–450)
POTASSIUM SERPL-SCNC: 3.2 MMOL/L (ref 3.4–5.3)
PROT SERPL-MCNC: 6.4 G/DL (ref 6.4–8.3)
RBC # BLD AUTO: 2.97 10E6/UL (ref 3.8–5.2)
RBC MORPH BLD: ABNORMAL
RVA RNA STL QL NAA+NON-PROBE: NEGATIVE
SALMONELLA SP RPOD STL QL NAA+PROBE: NEGATIVE
SAPO I+II+IV+V RNA STL QL NAA+NON-PROBE: NEGATIVE
SHIGELLA SP+EIEC IPAH ST NAA+NON-PROBE: NEGATIVE
SODIUM SERPL-SCNC: 143 MMOL/L (ref 135–145)
V CHOLERAE DNA SPEC QL NAA+PROBE: NEGATIVE
VIBRIO DNA SPEC NAA+PROBE: NEGATIVE
WBC # BLD AUTO: 5.8 10E3/UL (ref 4–11)
Y ENTEROCOL DNA STL QL NAA+PROBE: NEGATIVE

## 2024-02-19 PROCEDURE — 250N000011 HC RX IP 250 OP 636: Performed by: INTERNAL MEDICINE

## 2024-02-19 PROCEDURE — 84155 ASSAY OF PROTEIN SERUM: CPT

## 2024-02-19 PROCEDURE — 85007 BL SMEAR W/DIFF WBC COUNT: CPT

## 2024-02-19 PROCEDURE — 250N000011 HC RX IP 250 OP 636: Performed by: STUDENT IN AN ORGANIZED HEALTH CARE EDUCATION/TRAINING PROGRAM

## 2024-02-19 PROCEDURE — 99213 OFFICE O/P EST LOW 20 MIN: CPT | Mod: 25

## 2024-02-19 PROCEDURE — 250N000013 HC RX MED GY IP 250 OP 250 PS 637: Performed by: STUDENT IN AN ORGANIZED HEALTH CARE EDUCATION/TRAINING PROGRAM

## 2024-02-19 PROCEDURE — 96365 THER/PROPH/DIAG IV INF INIT: CPT

## 2024-02-19 PROCEDURE — 82040 ASSAY OF SERUM ALBUMIN: CPT

## 2024-02-19 PROCEDURE — 250N000011 HC RX IP 250 OP 636: Mod: JZ

## 2024-02-19 PROCEDURE — 99215 OFFICE O/P EST HI 40 MIN: CPT

## 2024-02-19 PROCEDURE — 258N000003 HC RX IP 258 OP 636

## 2024-02-19 PROCEDURE — 96367 TX/PROPH/DG ADDL SEQ IV INF: CPT

## 2024-02-19 PROCEDURE — 85027 COMPLETE CBC AUTOMATED: CPT

## 2024-02-19 RX ORDER — POTASSIUM CHLORIDE 1500 MG/1
20 TABLET, EXTENDED RELEASE ORAL 2 TIMES DAILY
Qty: 30 TABLET | Refills: 0 | Status: SHIPPED | OUTPATIENT
Start: 2024-02-19 | End: 2024-03-01

## 2024-02-19 RX ORDER — OXYBUTYNIN CHLORIDE 5 MG/1
5 TABLET ORAL 3 TIMES DAILY
Qty: 90 TABLET | Refills: 0 | Status: ON HOLD | OUTPATIENT
Start: 2024-02-19 | End: 2024-02-25

## 2024-02-19 RX ORDER — HEPARIN SODIUM,PORCINE 10 UNIT/ML
5 VIAL (ML) INTRAVENOUS
Status: DISCONTINUED | OUTPATIENT
Start: 2024-02-19 | End: 2024-02-19 | Stop reason: HOSPADM

## 2024-02-19 RX ORDER — POTASSIUM CHLORIDE 1500 MG/1
20 TABLET, EXTENDED RELEASE ORAL ONCE
Status: COMPLETED | OUTPATIENT
Start: 2024-02-19 | End: 2024-02-19

## 2024-02-19 RX ORDER — HEPARIN SODIUM (PORCINE) LOCK FLUSH IV SOLN 100 UNIT/ML 100 UNIT/ML
5 SOLUTION INTRAVENOUS
Status: CANCELLED | OUTPATIENT
Start: 2024-04-02

## 2024-02-19 RX ORDER — HEPARIN SODIUM,PORCINE 10 UNIT/ML
5-20 VIAL (ML) INTRAVENOUS DAILY PRN
Status: CANCELLED | OUTPATIENT
Start: 2024-04-02

## 2024-02-19 RX ORDER — POTASSIUM CHLORIDE 29.8 MG/ML
20 INJECTION INTRAVENOUS ONCE
Status: COMPLETED | OUTPATIENT
Start: 2024-02-19 | End: 2024-02-19

## 2024-02-19 RX ADMIN — MICAFUNGIN SODIUM 300 MG: 100 INJECTION, POWDER, LYOPHILIZED, FOR SOLUTION INTRAVENOUS at 12:34

## 2024-02-19 RX ADMIN — Medication 5 ML: at 12:02

## 2024-02-19 RX ADMIN — POTASSIUM CHLORIDE 20 MEQ: 1500 TABLET, EXTENDED RELEASE ORAL at 13:13

## 2024-02-19 RX ADMIN — POTASSIUM CHLORIDE 20 MEQ: 29.8 INJECTION, SOLUTION INTRAVENOUS at 13:13

## 2024-02-19 RX ADMIN — Medication 5 ML: at 12:03

## 2024-02-19 ASSESSMENT — PAIN SCALES - GENERAL: PAINLEVEL: NO PAIN (0)

## 2024-02-19 NOTE — NURSING NOTE
"Oncology Rooming Note    February 19, 2024 12:32 PM   Hortencia Baldwin is a 53 year old female who presents for:    Chief Complaint   Patient presents with    Blood Draw     Vitals taken, CVC labs drawn, heparin locked, checked into the next appt    Oncology Clinic Visit     Return clinic visit dx MDS     Initial Vitals: /79 (BP Location: Left arm, Patient Position: Sitting, Cuff Size: Adult Regular)   Pulse 114   Temp 98.1  F (36.7  C) (Oral)   Resp 16   Wt 78 kg (172 lb)   LMP 11/05/2017   SpO2 97%   BMI 31.45 kg/m   Estimated body mass index is 31.45 kg/m  as calculated from the following:    Height as of 1/5/24: 1.575 m (5' 2.01\").    Weight as of this encounter: 78 kg (172 lb). Body surface area is 1.85 meters squared.  No Pain (0) Comment: Data Unavailable   Patient's last menstrual period was 11/05/2017.  Allergies reviewed: Yes  Medications reviewed: Yes    Medications: Medication refills not needed today.  Pharmacy name entered into cicayda:    [id number] - [place], [place] - [address] AT [facility]  [id number] - [place], [place] - [address] AT [facility]    Frailty Screening:   Is the patient here for a new oncology consult visit in cancer care? 2. No      Clinical concerns: Patient reports worsening pain with urination, small clots. Taking Levaquin  - last day of Levaquin is 2/20.       Dari Rodas RN              "

## 2024-02-19 NOTE — PROGRESS NOTES
BMT  Progress Note  2/19/24      Patient ID:  Irma Foreman is a 53 year old female with a PMH of MDS, warm AIHA, transfusion and transfusion dependent anemia presented to  to undergo a myeloablative allogeneic transplant. She was recently COVID+ on 12/26 but tested negative on 1/2 with resolution of nearly all URI symptoms. Undergoing MA (Bu/Flu) 6/8 URD Allo transplant, day +39     HPI:   Irma notes improved sx, less diarrhea off MMF. She was still able to collect a loose sample for enteric/ c diff. No fevers.  She has a pain on her right side. Notes it only when she is lying flat. No pain with eating, or breathing deeply. No recent coughing fits.  Still has dysuria, hematuria and clots in her urine. She feels she is able to get relief after urinating and empty mostly.   Eating okay, no n/v.  No rash.     Review of Systems                                                                                                                                         Negative unless stated in the HPI.       PHYSICAL EXAM                                                                                                                                                     Lab Results   Component Value Date    WBC 4.9 02/16/2024    ANEU 2.7 02/16/2024    HGB 9.3 (L) 02/16/2024    HCT 27.5 (L) 02/16/2024    PLT 60 (L) 02/16/2024     02/16/2024    POTASSIUM 3.6 02/16/2024    CHLORIDE 106 02/16/2024    CO2 27 02/16/2024    GLC 88 02/16/2024    BUN 5.0 (L) 02/16/2024    CR 0.39 (L) 02/16/2024    MAG 1.8 02/04/2024    INR 1.04 01/29/2024    BILITOTAL 0.3 02/16/2024    AST 99 (H) 02/16/2024     (H) 02/16/2024    ALKPHOS 110 02/16/2024    PROTTOTAL 6.4 02/16/2024    ALBUMIN 3.9 02/16/2024     Wt Readings from Last 4 Encounters:   02/16/24 78.3 kg (172 lb 11.2 oz)   02/14/24 77.2 kg (170 lb 3.2 oz)   02/12/24 77.4 kg (170 lb 11.2 oz)   02/09/24 78.7 kg (173 lb 8 oz)     KPS: 80   General: NAD  Eyes: sclera anicteric   ENT:  OP moist without lesions.   Lungs: CTAB   Cardiovascular: RRR, no M/R/G.   Lymphatics: No edema.   Abd: soft, nontender, non distended. NO RUQ tenderness to deep palpation  Skin: faint petechial lesions bilateral LE   MSK: hard lump on her right lower ribs, tender to palpation  Neuro: A&O   Access: R Gastelum site NT, no drainage.    Acute GVHD          2/14/2024    16:34 2/12/2024    15:00 2/5/2024    09:39   Acute GVHD   New evidence of Acute Qkrux-Xteskq-Ikub Disease developed since last entry? No No No         LABS AND IMAGING: I have assessed all abnormal lab values for their clinical significance and any values considered clinically significant have been addressed in the assessment and plan.       US Abdominal with Doppler (1/18/24)  Impression:   1. Patent Doppler evaluation of the abdomen with antegrade flow throughout.  2. Elevated hepatic artery resistive index of 0.81, nonspecific though can be seen with hepatic venous congestion which could be related to venous occlusive disease..     SYSTEMS-BASED ASSESSMENT AND PLAN      Hortencia Baldwin is a 53 year old female with a history of MDS, undergoing MA (Bu/Flu) 6/8 URD Allo transplant, day +39        BMT/IEC PROTOCOL for MT 2015-29   - Chemo protocol:  Day -6 through day -1: Keppra and Allopurinol   Day -5 through -2: Bu/Flu.   Day -1: Rest day   Day 0: Transplant. Recipient: O+, CMV+. Donor: O+, CMV-. Cell dose 6.5 x10^6.   - Restaging plan: per protocol   BMbx 2/6 -- Day 28 review with Dr. Garay on 2/9-note pending.    Flow neg; 100% donor     HEME/COAG  - anemia, thrombocytopenia 2/2 due to chemotherapy/BMT.   # Iron overload: hx of transfusion dependent anemia, pre-transplant ferritin around 5,000. Recommend phlebotomy post transplant when retic count is restored and Hgb >10    # Platelet refractoriness: HLA platelets when available.   - Transfusion parameters: hemoglobin <7, platelets <10  - GCSF PRN for ANC < 1.      IMMUNOCOMPROMISED  Afebrile  Prophylaxis plan:   ACV/letermovir viral testing ordered as below.  Micafungin through day +45 (current smoker) - scheduled in clinic MWF thru 2/26.  cefpodoxime while neutropenic (changed from levaquin due to prolonged QTc as below)   Bactrim   CMV: ND (2/12)   EBV: ND (2/6)      RISK OF GVHD  - Prophylaxis: PT Cy, Tac/MMF started 1/16. **initially avoided sirolimus d/t high risk VOD**. Tac drip discontinued 1/31, sirolimus started.  - 2/4 level therapeutic on 2mg daily. Hasn't held, will bring med box and not take any meds before labs 2/21  - Pink maculopapular rash.TCM prescribed at discharge and prednisone 40 mg x 5 days (2/3-2/7). Resolved.     Dr Garay's note 2/15  - Elevated liver enzymes since 2/2/24 (when tac was changed to sirolimus). - improving rapidly  - Eosinophilia (2/14/24)  - Diarrhea on and off: 2 small episodes of loose stool today. Also having transient sharp abdominal pains. Bony prominence and pain palpable on 2/19 on exam. Ordered rib xray 2/21  - Rash for the past 3-4 days, non-pruritic. - resolved  - Dysuria, without hematuria or blood clots. 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 UA with calcium oxalate crystals. Ucx (2/12): mixed urogenital odilon. No improvement with pyridium. Unable to tolerate oxycodone due to bad dreams.  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 IR-guided liver biopsy ordered. Will need platelet transfusion and INR check prior to procedure. I would suggest platelet threshold of 80. This is scheduled and will be available should it be needed. RUQ US still planned for tomorrow 2/20  - Diarrhea could be due to infections or MMF. C.diff and enteric in process. Improvement of MMF, less diarrhea   For continued diarrhea, please schedule for sigmoidoscopy with biopsy     CARDIOVASCULAR  # Chest pressure: new on 1/15, worse with sitting  up/activity, better while laying flat. RESOLVED.   EKG: new QR pattern in V1, suggests right ventricular conduction delay  Troponin negative x1   Echo (1/15): LVEF 55-60%, no pericardial effusion present. Cardiology consulted: no further recs   #Prolonged QTc to 507 on 1/22. EKG (1/30) NSR, Qtc 454.  # HTN: On norvasc 5mg daily.     GI/NUTRITION  # transaminitis: TPN discontinued (2/2), recently started sirolimus (previously on Tac d/t concern for VOD). No abdominal pain/fluid retention. Recent U/S. Recent viral testing neg. Known iron overload in setting of MA BMT monitor for now. Liver bx pending course. GVH/VOD/infectious/iron overload w/ MA chemo?  - 2/12: med box corrected to TID ursodiol (was in there bid)  # Epigastric pain (1/18): RUQ ultrasound showing patent doppler throughout, no ascites, nonspecific elevated hepatic artery restrictive index (see full impression above).                - Ulcer prophylaxis: PPI  - VOD ppx: Ursodiol   # Nausea from chemo/radiation: Zyprexa 5 mg HS (1/29), compazine PRN, Lorazepam prn. 2/12 asked her to trial Zofran qam and prn.        #urinary urge/frequency: 2/12  BK urine >100 million copies. UA: RBC, Wbc, & calcium oxylate crystals. Ucx negative.  Rx Pyridium.    2/15 levaquin 250mg for 5 days for possible bacterial UTI. Qtc normal.   2/19 will try Oxybutynin       DERM:  # Foot, hand warts. Curbside derm on 1/8, no recs while inpatient, typically managed OP.    # rash as per above.      Summary/RTC  - No rash  - Diarrhea improved off MMF, infectious studies pending  - ALT & AST significantly improved  - keep RUQ US as planned 2/20  - Ordered rib xray 2/21    - Complete levaquin for possible bacterial cystitis     - Queenie infused  - 20meq IV potassium  - Start 20meq oral potassium daily  - Oxybutynin for dysuria    I spent 40 minutes in the care of this patient today, which included time necessary for preparation for the visit, obtaining history, ordering  medications/tests/procedures as medically indicated, review of pertinent medical literature, counseling of the patient, communication of recommendations to the care team, and documentation time.    Ijeoma Loza PAC  [phone]

## 2024-02-19 NOTE — PATIENT INSTRUCTIONS
Do not take any meds on Wednesday morning before your labs are drawn    Please bring your med bottles and the box to your appointment Wednesday    Stop pyridium (pill that makes your urine orange). You can restart this if you feel it was helping    Try oxybutynin, three times daily. You can stop this if you don't feel it is helping    Take one 20meq potassium tablet daily

## 2024-02-19 NOTE — PROGRESS NOTES
Infusion Nursing Note:  Hortencia Baldwin presents today for scheduled infusion.    Patient seen by provider today: Yes: Ijeoma Loza PA-C   present during visit today: Not Applicable.    Note: Irma arrived reporting worsening urinary frequency and burning. She noticed a small clot in her urine this morning. She continues to take Levaquin PO per order. Assessment completed by JEN. Home medication and allergies reviewed. Labs monitored patient received potassium 20 mEq IV and potassium 20 mEq PO for level of 3.2. Micafungin 300 mg IV given per order.       Intravenous Access:  Gastelum.    Treatment Conditions:  Lab Results   Component Value Date    HGB 10.0 (L) 02/19/2024    WBC 5.8 02/19/2024    ANEU 3.1 02/19/2024    ANEUTAUTO 3.0 02/07/2024    PLT 74 (L) 02/19/2024        Lab Results   Component Value Date     02/19/2024    POTASSIUM 3.2 (L) 02/19/2024    MAG 1.8 02/04/2024    CR 0.44 (L) 02/19/2024    NIKO 9.2 02/19/2024    BILITOTAL 0.3 02/19/2024    ALBUMIN 4.0 02/19/2024    ALT 87 (H) 02/19/2024    AST 63 (H) 02/19/2024         Post Infusion Assessment:  Patient tolerated infusion without incident.  Blood return noted pre and post infusion.  Site patent and intact, free from redness, edema or discomfort.  No evidence of extravasations.       Discharge Plan:   AVS to patient via MYCHART.  Patient will return 2/21 for next appointment.   Patient discharged in stable condition accompanied by: self.  Departure Mode: Ambulatory.      Dari Rodas RN

## 2024-02-19 NOTE — LETTER
2/19/2024         RE: Hortencia Baldwin  [address]  [address]  [place] [address]        Dear Colleague,    Thank you for referring your patient, Hortencia Baldwin, to the [facility] [place]. Please see a copy of my visit note below.      BMT  Progress Note  2/19/24      Patient ID:  Irma Foreman is a 53 year old female with a PMH of MDS, warm AIHA, transfusion and transfusion dependent anemia presented to  to undergo a myeloablative allogeneic transplant. She was recently COVID+ on 12/26 but tested negative on 1/2 with resolution of nearly all URI symptoms. Undergoing MA (Bu/Flu) 6/8 URD Allo transplant, day +39     HPI:   Irma notes improved sx, less diarrhea off MMF. She was still able to collect a loose sample for enteric/ c diff. No fevers.  She has a pain on her right side. Notes it only when she is lying flat. No pain with eating, or breathing deeply. No recent coughing fits.  Still has dysuria, hematuria and clots in her urine. She feels she is able to get relief after urinating and empty mostly.   Eating okay, no n/v.  No rash.     Review of Systems                                                                                                                                         Negative unless stated in the HPI.       PHYSICAL EXAM                                                                                                                                                     Lab Results   Component Value Date    WBC 4.9 02/16/2024    ANEU 2.7 02/16/2024    HGB 9.3 (L) 02/16/2024    HCT 27.5 (L) 02/16/2024    PLT 60 (L) 02/16/2024     02/16/2024    POTASSIUM 3.6 02/16/2024    CHLORIDE 106 02/16/2024    CO2 27 02/16/2024    GLC 88 02/16/2024    BUN 5.0 (L) 02/16/2024    CR 0.39 (L) 02/16/2024    MAG 1.8 02/04/2024    INR 1.04 01/29/2024    BILITOTAL 0.3 02/16/2024    AST 99 (H) 02/16/2024     (H) 02/16/2024    ALKPHOS 110  02/16/2024    PROTTOTAL 6.4 02/16/2024    ALBUMIN 3.9 02/16/2024     Wt Readings from Last 4 Encounters:   02/16/24 78.3 kg (172 lb 11.2 oz)   02/14/24 77.2 kg (170 lb 3.2 oz)   02/12/24 77.4 kg (170 lb 11.2 oz)   02/09/24 78.7 kg (173 lb 8 oz)     KPS: 80   General: NAD  Eyes: sclera anicteric   ENT: OP moist without lesions.   Lungs: CTAB   Cardiovascular: RRR, no M/R/G.   Lymphatics: No edema.   Abd: soft, nontender, non distended. NO RUQ tenderness to deep palpation  Skin: faint petechial lesions bilateral LE   MSK: hard lump on her right lower ribs, tender to palpation  Neuro: A&O   Access: R Gastelum site NT, no drainage.    Acute GVHD          2/14/2024    16:34 2/12/2024    15:00 2/5/2024    09:39   Acute GVHD   New evidence of Acute Brxlm-Paucap-Coja Disease developed since last entry? No No No         LABS AND IMAGING: I have assessed all abnormal lab values for their clinical significance and any values considered clinically significant have been addressed in the assessment and plan.       US Abdominal with Doppler (1/18/24)  Impression:   1. Patent Doppler evaluation of the abdomen with antegrade flow throughout.  2. Elevated hepatic artery resistive index of 0.81, nonspecific though can be seen with hepatic venous congestion which could be related to venous occlusive disease..     SYSTEMS-BASED ASSESSMENT AND PLAN      Hortencia Baldwin is a 53 year old female with a history of MDS, undergoing MA (Bu/Flu) 6/8 URD Allo transplant, day +39        BMT/IEC PROTOCOL for MT 2015-29   - Chemo protocol:  Day -6 through day -1: Keppra and Allopurinol   Day -5 through -2: Bu/Flu.   Day -1: Rest day   Day 0: Transplant. Recipient: O+, CMV+. Donor: O+, CMV-. Cell dose 6.5 x10^6.   - Restaging plan: per protocol   BMbx 2/6 -- Day 28 review with Dr. Garay on 2/9-note pending.    Flow neg; 100% donor     HEME/COAG  - anemia, thrombocytopenia 2/2 due to chemotherapy/BMT.   # Iron overload: hx of transfusion dependent  anemia, pre-transplant ferritin around 5,000. Recommend phlebotomy post transplant when retic count is restored and Hgb >10    # Platelet refractoriness: HLA platelets when available.   - Transfusion parameters: hemoglobin <7, platelets <10  - GCSF PRN for ANC < 1.     IMMUNOCOMPROMISED  Afebrile  Prophylaxis plan:   ACV/letermovir viral testing ordered as below.  Micafungin through day +45 (current smoker) - scheduled in clinic MW thru 2/26.  cefpodoxime while neutropenic (changed from levaquin due to prolonged QTc as below)   Bactrim   CMV: ND (2/12)   EBV: ND (2/6)      RISK OF GVHD  - Prophylaxis: PT Cy, Tac/MMF started 1/16. **initially avoided sirolimus d/t high risk VOD**. Tac drip discontinued 1/31, sirolimus started.  - 2/4 level therapeutic on 2mg daily. Hasn't held, will bring med box and not take any meds before labs 2/21  - Pink maculopapular rash.TCM prescribed at discharge and prednisone 40 mg x 5 days (2/3-2/7). Resolved.     Dr Garay's note 2/15  - Elevated liver enzymes since 2/2/24 (when tac was changed to sirolimus). - improving rapidly  - Eosinophilia (2/14/24)  - Diarrhea on and off: 2 small episodes of loose stool today. Also having transient sharp abdominal pains. Bony prominence and pain palpable on 2/19 on exam. Ordered rib xray 2/21  - Rash for the past 3-4 days, non-pruritic. - resolved  - Dysuria, without hematuria or blood clots. 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 UA with calcium oxalate crystals. Ucx (2/12): mixed urogenital odilon. No improvement with pyridium. Unable to tolerate oxycodone due to bad dreams.  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 IR-guided liver biopsy ordered. Will need platelet transfusion and INR check prior to procedure. I would suggest platelet threshold of 80. This is scheduled and will be available should it be needed. RUQ US still planned for tomorrow  2/20  - Diarrhea could be due to infections or MMF. C.diff and enteric in process. Improvement of MMF, less diarrhea   For continued diarrhea, please schedule for sigmoidoscopy with biopsy     CARDIOVASCULAR  # Chest pressure: new on 1/15, worse with sitting up/activity, better while laying flat. RESOLVED.   EKG: new QR pattern in V1, suggests right ventricular conduction delay  Troponin negative x1   Echo (1/15): LVEF 55-60%, no pericardial effusion present. Cardiology consulted: no further recs   #Prolonged QTc to 507 on 1/22. EKG (1/30) NSR, Qtc 454.  # HTN: On norvasc 5mg daily.     GI/NUTRITION  # transaminitis: TPN discontinued (2/2), recently started sirolimus (previously on Tac d/t concern for VOD). No abdominal pain/fluid retention. Recent U/S. Recent viral testing neg. Known iron overload in setting of MA BMT monitor for now. Liver bx pending course. GVH/VOD/infectious/iron overload w/ MA chemo?  - 2/12: med box corrected to TID ursodiol (was in there bid)  # Epigastric pain (1/18): RUQ ultrasound showing patent doppler throughout, no ascites, nonspecific elevated hepatic artery restrictive index (see full impression above).                - Ulcer prophylaxis: PPI  - VOD ppx: Ursodiol   # Nausea from chemo/radiation: Zyprexa 5 mg HS (1/29), compazine PRN, Lorazepam prn. 2/12 asked her to trial Zofran qam and prn.        #urinary urge/frequency: 2/12  BK urine >100 million copies. UA: RBC, Wbc, & calcium oxylate crystals. Ucx negative.  Rx Pyridium.    2/15 levaquin 250mg for 5 days for possible bacterial UTI. Qtc normal.   2/19 will try Oxybutynin       DERM:  # Foot, hand warts. Curbside derm on 1/8, no recs while inpatient, typically managed OP.    # rash as per above.      Summary/RTC  - No rash  - Diarrhea improved off MMF, infectious studies pending  - ALT & AST significantly improved  - keep RUQ US as planned 2/20  - Ordered rib xray 2/21    - Complete levaquin for possible bacterial cystitis     -  Queenie infused  - 20meq IV potassium  - Start 20meq oral potassium daily  - Oxybutynin for dysuria    I spent 40 minutes in the care of this patient today, which included time necessary for preparation for the visit, obtaining history, ordering medications/tests/procedures as medically indicated, review of pertinent medical literature, counseling of the patient, communication of recommendations to the care team, and documentation time.    Ijeoma Loza [facility]  [phone]

## 2024-02-19 NOTE — NURSING NOTE
Chief Complaint   Patient presents with    Blood Draw     Vitals taken, CVC labs drawn, heparin locked, checked into the next appt     /79 (BP Location: Left arm, Patient Position: Sitting, Cuff Size: Adult Regular)   Pulse 114   Temp 98.1  F (36.7  C) (Oral)   Resp 16   Wt 78 kg (172 lb)   LMP 11/05/2017   SpO2 97%   BMI 31.45 kg/m    Carlos Eduardo Barger RN on 2/19/2024 at 12:07 PM

## 2024-02-20 ENCOUNTER — TELEPHONE (OUTPATIENT)
Dept: INTERVENTIONAL RADIOLOGY/VASCULAR | Facility: CLINIC | Age: 54
End: 2024-02-20

## 2024-02-20 ENCOUNTER — TELEPHONE (OUTPATIENT)
Dept: TRANSPLANT | Facility: CLINIC | Age: 54
End: 2024-02-20

## 2024-02-20 ENCOUNTER — ANCILLARY PROCEDURE (OUTPATIENT)
Dept: ULTRASOUND IMAGING | Facility: CLINIC | Age: 54
End: 2024-02-20
Attending: STUDENT IN AN ORGANIZED HEALTH CARE EDUCATION/TRAINING PROGRAM
Payer: COMMERCIAL

## 2024-02-20 DIAGNOSIS — D46.9 MDS (MYELODYSPLASTIC SYNDROME) (H): ICD-10-CM

## 2024-02-20 LAB — CMV DNA SPEC NAA+PROBE-ACNC: NOT DETECTED IU/ML

## 2024-02-20 PROCEDURE — 76705 ECHO EXAM OF ABDOMEN: CPT | Mod: XU | Performed by: RADIOLOGY

## 2024-02-20 PROCEDURE — 93975 VASCULAR STUDY: CPT | Performed by: RADIOLOGY

## 2024-02-20 RX ORDER — SODIUM CHLORIDE 9 MG/ML
INJECTION, SOLUTION INTRAVENOUS CONTINUOUS
Status: CANCELLED | OUTPATIENT
Start: 2024-02-20

## 2024-02-20 RX ORDER — LIDOCAINE 40 MG/G
CREAM TOPICAL
Status: CANCELLED | OUTPATIENT
Start: 2024-02-20

## 2024-02-20 NOTE — TELEPHONE ENCOUNTER
BMT Nurse Triage - Generic/Other Symptoms     Treating Provider: Jarrod      Date of last office visit: 2/19    Onset of symptoms: 2/12     Duration of symptoms: 1 week    Brief description of symptoms: Patient reports bladder pain, urgency, passing blood clots when she voids. Pt was seen by BMT JEN 2/12 and 2/19 for BMT follow up and UA/UC was sent. Positive for BK virus. Pt was started on levaquin 2/15. Symptoms persisted, Oxybutynin prescribed. Pt has not been able to  Oxybutynin, Pharmacy did not have in stock yesterday. Pt will return to pharmacy today to . Patient reports that that she had not been taking the pyridium.  Pt denies fever, back pain. Pt reports that there is blood and clots when she wipes. Pt reports it has been 4 years since her last menstrual cycle, but she does still have her reproductive organs. She reports the blood seems to be coming from her bladder/urethra.     Instructed patient to increase fluid intake to help flush out her bladder. She should start taking pyridium for symptom relief and to start the oxybutynin as soon as possible. She should call back if she starts having a fever, has back pain, or stops being able to pass urine. The patient is scheduled for follow up tomorrow with JEN.

## 2024-02-21 ENCOUNTER — APPOINTMENT (OUTPATIENT)
Dept: LAB | Facility: CLINIC | Age: 54
DRG: 920 | End: 2024-02-21
Attending: STUDENT IN AN ORGANIZED HEALTH CARE EDUCATION/TRAINING PROGRAM
Payer: COMMERCIAL

## 2024-02-21 ENCOUNTER — ONCOLOGY VISIT (OUTPATIENT)
Dept: TRANSPLANT | Facility: CLINIC | Age: 54
DRG: 920 | End: 2024-02-21
Attending: STUDENT IN AN ORGANIZED HEALTH CARE EDUCATION/TRAINING PROGRAM
Payer: COMMERCIAL

## 2024-02-21 ENCOUNTER — HOSPITAL ENCOUNTER (INPATIENT)
Facility: CLINIC | Age: 54
LOS: 8 days | Discharge: HOME | DRG: 920 | End: 2024-02-29
Attending: INTERNAL MEDICINE | Admitting: STUDENT IN AN ORGANIZED HEALTH CARE EDUCATION/TRAINING PROGRAM
Payer: COMMERCIAL

## 2024-02-21 ENCOUNTER — INFUSION THERAPY VISIT (OUTPATIENT)
Dept: TRANSPLANT | Facility: CLINIC | Age: 54
DRG: 920 | End: 2024-02-21
Attending: INTERNAL MEDICINE
Payer: COMMERCIAL

## 2024-02-21 ENCOUNTER — ANCILLARY PROCEDURE (OUTPATIENT)
Dept: GENERAL RADIOLOGY | Facility: CLINIC | Age: 54
End: 2024-02-21
Attending: STUDENT IN AN ORGANIZED HEALTH CARE EDUCATION/TRAINING PROGRAM
Payer: COMMERCIAL

## 2024-02-21 VITALS
DIASTOLIC BLOOD PRESSURE: 84 MMHG | SYSTOLIC BLOOD PRESSURE: 152 MMHG | HEART RATE: 104 BPM | OXYGEN SATURATION: 97 % | TEMPERATURE: 98.1 F | RESPIRATION RATE: 16 BRPM

## 2024-02-21 VITALS
BODY MASS INDEX: 31.41 KG/M2 | DIASTOLIC BLOOD PRESSURE: 99 MMHG | TEMPERATURE: 98.2 F | RESPIRATION RATE: 18 BRPM | WEIGHT: 171.8 LBS | OXYGEN SATURATION: 98 % | SYSTOLIC BLOOD PRESSURE: 144 MMHG | HEART RATE: 120 BPM

## 2024-02-21 DIAGNOSIS — R82.79 BK VIRURIA: Primary | ICD-10-CM

## 2024-02-21 DIAGNOSIS — D46.9 MDS (MYELODYSPLASTIC SYNDROME) (H): ICD-10-CM

## 2024-02-21 DIAGNOSIS — D46.9 MDS (MYELODYSPLASTIC SYNDROME) (H): Primary | ICD-10-CM

## 2024-02-21 DIAGNOSIS — N30.91 HEMORRHAGIC CYSTITIS: ICD-10-CM

## 2024-02-21 DIAGNOSIS — Z94.81 S/P ALLOGENEIC BONE MARROW TRANSPLANT (H): ICD-10-CM

## 2024-02-21 LAB
ABO/RH(D): NORMAL
ALBUMIN SERPL BCG-MCNC: 4.2 G/DL (ref 3.5–5.2)
ALBUMIN UR-MCNC: 300 MG/DL
ALP SERPL-CCNC: 126 U/L (ref 40–150)
ALT SERPL W P-5'-P-CCNC: 75 U/L (ref 0–50)
ANION GAP SERPL CALCULATED.3IONS-SCNC: 11 MMOL/L (ref 7–15)
ANTIBODY SCREEN: NEGATIVE
APPEARANCE UR: ABNORMAL
APTT PPP: 36 SECONDS (ref 22–38)
AST SERPL W P-5'-P-CCNC: 54 U/L (ref 0–45)
BASOPHILS # BLD AUTO: ABNORMAL 10*3/UL
BASOPHILS # BLD MANUAL: 0.1 10E3/UL (ref 0–0.2)
BASOPHILS NFR BLD AUTO: ABNORMAL %
BASOPHILS NFR BLD MANUAL: 1 %
BILIRUB DIRECT SERPL-MCNC: <0.2 MG/DL (ref 0–0.3)
BILIRUB SERPL-MCNC: 0.3 MG/DL
BILIRUB UR QL STRIP: NEGATIVE
BUN SERPL-MCNC: 4.2 MG/DL (ref 6–20)
CALCIUM SERPL-MCNC: 9.9 MG/DL (ref 8.6–10)
CHLORIDE SERPL-SCNC: 102 MMOL/L (ref 98–107)
CMV DNA SPEC NAA+PROBE-ACNC: NOT DETECTED IU/ML
COLOR UR AUTO: ABNORMAL
CREAT SERPL-MCNC: 0.53 MG/DL (ref 0.51–0.95)
DEPRECATED HCO3 PLAS-SCNC: 25 MMOL/L (ref 22–29)
EGFRCR SERPLBLD CKD-EPI 2021: >90 ML/MIN/1.73M2
EOSINOPHIL # BLD AUTO: ABNORMAL 10*3/UL
EOSINOPHIL # BLD MANUAL: 0.3 10E3/UL (ref 0–0.7)
EOSINOPHIL NFR BLD AUTO: ABNORMAL %
EOSINOPHIL NFR BLD MANUAL: 4 %
ERYTHROCYTE [DISTWIDTH] IN BLOOD BY AUTOMATED COUNT: 21.7 % (ref 10–15)
FIBRINOGEN PPP-MCNC: 588 MG/DL (ref 170–490)
GLUCOSE SERPL-MCNC: 112 MG/DL (ref 70–99)
GLUCOSE UR STRIP-MCNC: NEGATIVE MG/DL
HCT VFR BLD AUTO: 35 % (ref 35–47)
HGB BLD-MCNC: 11.3 G/DL (ref 11.7–15.7)
HGB UR QL STRIP: ABNORMAL
IMM GRANULOCYTES # BLD: ABNORMAL 10*3/UL
IMM GRANULOCYTES NFR BLD: ABNORMAL %
INR PPP: 0.96 (ref 0.85–1.15)
KETONES UR STRIP-MCNC: NEGATIVE MG/DL
LEUKOCYTE ESTERASE UR QL STRIP: ABNORMAL
LYMPHOCYTES # BLD AUTO: ABNORMAL 10*3/UL
LYMPHOCYTES # BLD MANUAL: 0.8 10E3/UL (ref 0.8–5.3)
LYMPHOCYTES NFR BLD AUTO: ABNORMAL %
LYMPHOCYTES NFR BLD MANUAL: 11 %
MCH RBC QN AUTO: 33.9 PG (ref 26.5–33)
MCHC RBC AUTO-ENTMCNC: 32.3 G/DL (ref 31.5–36.5)
MCV RBC AUTO: 105 FL (ref 78–100)
METAMYELOCYTES # BLD MANUAL: 0.1 10E3/UL
METAMYELOCYTES NFR BLD MANUAL: 1 %
MONOCYTES # BLD AUTO: ABNORMAL 10*3/UL
MONOCYTES # BLD MANUAL: 0.7 10E3/UL (ref 0–1.3)
MONOCYTES NFR BLD AUTO: ABNORMAL %
MONOCYTES NFR BLD MANUAL: 9 %
MUCOUS THREADS #/AREA URNS LPF: PRESENT /LPF
MYELOCYTES # BLD MANUAL: 0.2 10E3/UL
MYELOCYTES NFR BLD MANUAL: 2 %
NEUTROPHILS # BLD AUTO: ABNORMAL 10*3/UL
NEUTROPHILS # BLD MANUAL: 5.5 10E3/UL (ref 1.6–8.3)
NEUTROPHILS NFR BLD AUTO: ABNORMAL %
NEUTROPHILS NFR BLD MANUAL: 72 %
NITRATE UR QL: NEGATIVE
NRBC # BLD AUTO: 0 10E3/UL
NRBC BLD AUTO-RTO: 0 /100
PH UR STRIP: 7 [PH] (ref 5–7)
PLAT MORPH BLD: ABNORMAL
PLATELET # BLD AUTO: 83 10E3/UL (ref 150–450)
POTASSIUM SERPL-SCNC: 4 MMOL/L (ref 3.4–5.3)
PROT SERPL-MCNC: 7 G/DL (ref 6.4–8.3)
RBC # BLD AUTO: 3.33 10E6/UL (ref 3.8–5.2)
RBC MORPH BLD: ABNORMAL
RBC URINE: >182 /HPF
ROULEAUX BLD QL SMEAR: PRESENT
SIROLIMUS BLD-MCNC: 9.3 UG/L (ref 5–15)
SODIUM SERPL-SCNC: 138 MMOL/L (ref 135–145)
SP GR UR STRIP: 1.01 (ref 1–1.03)
SPECIMEN EXPIRATION DATE: NORMAL
TME LAST DOSE: NORMAL H
TME LAST DOSE: NORMAL H
UROBILINOGEN UR STRIP-MCNC: NORMAL MG/DL
WBC # BLD AUTO: 7.7 10E3/UL (ref 4–11)
WBC URINE: >182 /HPF

## 2024-02-21 PROCEDURE — 87799 DETECT AGENT NOS DNA QUANT: CPT

## 2024-02-21 PROCEDURE — 99207 PR SERVICE NOT STAFFED W/SUPERV PROV: CPT | Performed by: STUDENT IN AN ORGANIZED HEALTH CARE EDUCATION/TRAINING PROGRAM

## 2024-02-21 PROCEDURE — 96366 THER/PROPH/DIAG IV INF ADDON: CPT

## 2024-02-21 PROCEDURE — 81001 URINALYSIS AUTO W/SCOPE: CPT

## 2024-02-21 PROCEDURE — 85610 PROTHROMBIN TIME: CPT

## 2024-02-21 PROCEDURE — 250N000013 HC RX MED GY IP 250 OP 250 PS 637

## 2024-02-21 PROCEDURE — 80195 ASSAY OF SIROLIMUS: CPT

## 2024-02-21 PROCEDURE — 96375 TX/PRO/DX INJ NEW DRUG ADDON: CPT

## 2024-02-21 PROCEDURE — 99223 1ST HOSP IP/OBS HIGH 75: CPT | Mod: AI

## 2024-02-21 PROCEDURE — 96365 THER/PROPH/DIAG IV INF INIT: CPT

## 2024-02-21 PROCEDURE — 85007 BL SMEAR W/DIFF WBC COUNT: CPT

## 2024-02-21 PROCEDURE — 250N000012 HC RX MED GY IP 250 OP 636 PS 637

## 2024-02-21 PROCEDURE — 250N000011 HC RX IP 250 OP 636: Mod: JZ

## 2024-02-21 PROCEDURE — 250N000011 HC RX IP 250 OP 636: Performed by: STUDENT IN AN ORGANIZED HEALTH CARE EDUCATION/TRAINING PROGRAM

## 2024-02-21 PROCEDURE — 258N000003 HC RX IP 258 OP 636

## 2024-02-21 PROCEDURE — 85730 THROMBOPLASTIN TIME PARTIAL: CPT

## 2024-02-21 PROCEDURE — 85027 COMPLETE CBC AUTOMATED: CPT

## 2024-02-21 PROCEDURE — 99222 1ST HOSP IP/OBS MODERATE 55: CPT | Performed by: INTERNAL MEDICINE

## 2024-02-21 PROCEDURE — 86900 BLOOD TYPING SEROLOGIC ABO: CPT

## 2024-02-21 PROCEDURE — 99215 OFFICE O/P EST HI 40 MIN: CPT

## 2024-02-21 PROCEDURE — 206N000001 HC R&B BMT UMMC

## 2024-02-21 PROCEDURE — 87086 URINE CULTURE/COLONY COUNT: CPT

## 2024-02-21 PROCEDURE — 36592 COLLECT BLOOD FROM PICC: CPT

## 2024-02-21 PROCEDURE — 85384 FIBRINOGEN ACTIVITY: CPT

## 2024-02-21 PROCEDURE — 99418 PROLNG IP/OBS E/M EA 15 MIN: CPT

## 2024-02-21 PROCEDURE — 71100 X-RAY EXAM RIBS UNI 2 VIEWS: CPT | Mod: RT | Performed by: RADIOLOGY

## 2024-02-21 PROCEDURE — 99213 OFFICE O/P EST LOW 20 MIN: CPT | Mod: 25

## 2024-02-21 PROCEDURE — 80076 HEPATIC FUNCTION PANEL: CPT

## 2024-02-21 RX ORDER — LORAZEPAM 2 MG/ML
.5-1 INJECTION INTRAMUSCULAR EVERY 4 HOURS PRN
Status: DISCONTINUED | OUTPATIENT
Start: 2024-02-21 | End: 2024-02-29 | Stop reason: HOSPADM

## 2024-02-21 RX ORDER — NALOXONE HYDROCHLORIDE 0.4 MG/ML
0.2 INJECTION, SOLUTION INTRAMUSCULAR; INTRAVENOUS; SUBCUTANEOUS
Status: DISCONTINUED | OUTPATIENT
Start: 2024-02-21 | End: 2024-02-29 | Stop reason: HOSPADM

## 2024-02-21 RX ORDER — HYDROMORPHONE HYDROCHLORIDE 1 MG/ML
0.5 INJECTION, SOLUTION INTRAMUSCULAR; INTRAVENOUS; SUBCUTANEOUS
Status: DISCONTINUED | OUTPATIENT
Start: 2024-02-21 | End: 2024-02-29 | Stop reason: HOSPADM

## 2024-02-21 RX ORDER — POTASSIUM CHLORIDE 750 MG/1
20 TABLET, EXTENDED RELEASE ORAL 2 TIMES DAILY
Status: DISCONTINUED | OUTPATIENT
Start: 2024-02-21 | End: 2024-02-29 | Stop reason: HOSPADM

## 2024-02-21 RX ORDER — HEPARIN SODIUM,PORCINE 10 UNIT/ML
5-20 VIAL (ML) INTRAVENOUS DAILY PRN
Status: CANCELLED | OUTPATIENT
Start: 2024-04-04

## 2024-02-21 RX ORDER — HEPARIN SODIUM,PORCINE 10 UNIT/ML
5 VIAL (ML) INTRAVENOUS
Status: DISCONTINUED | OUTPATIENT
Start: 2024-02-21 | End: 2024-02-21 | Stop reason: HOSPADM

## 2024-02-21 RX ORDER — ONDANSETRON 4 MG/1
4 TABLET, ORALLY DISINTEGRATING ORAL
Status: DISCONTINUED | OUTPATIENT
Start: 2024-02-22 | End: 2024-02-29 | Stop reason: HOSPADM

## 2024-02-21 RX ORDER — LORAZEPAM 0.5 MG/1
.5-1 TABLET ORAL EVERY 4 HOURS PRN
Status: DISCONTINUED | OUTPATIENT
Start: 2024-02-21 | End: 2024-02-29 | Stop reason: HOSPADM

## 2024-02-21 RX ORDER — ACETAMINOPHEN 325 MG/1
325-650 TABLET ORAL EVERY 4 HOURS PRN
Status: DISCONTINUED | OUTPATIENT
Start: 2024-02-21 | End: 2024-02-29 | Stop reason: HOSPADM

## 2024-02-21 RX ORDER — PROCHLORPERAZINE MALEATE 5 MG
5-10 TABLET ORAL EVERY 6 HOURS PRN
Status: DISCONTINUED | OUTPATIENT
Start: 2024-02-21 | End: 2024-02-29 | Stop reason: HOSPADM

## 2024-02-21 RX ORDER — PANTOPRAZOLE SODIUM 40 MG/1
40 TABLET, DELAYED RELEASE ORAL
Status: DISCONTINUED | OUTPATIENT
Start: 2024-02-22 | End: 2024-02-29 | Stop reason: HOSPADM

## 2024-02-21 RX ORDER — NALOXONE HYDROCHLORIDE 0.4 MG/ML
0.4 INJECTION, SOLUTION INTRAMUSCULAR; INTRAVENOUS; SUBCUTANEOUS
Status: DISCONTINUED | OUTPATIENT
Start: 2024-02-21 | End: 2024-02-29 | Stop reason: HOSPADM

## 2024-02-21 RX ORDER — SODIUM CHLORIDE 9 MG/ML
INJECTION, SOLUTION INTRAVENOUS CONTINUOUS
Status: DISCONTINUED | OUTPATIENT
Start: 2024-02-21 | End: 2024-02-23

## 2024-02-21 RX ORDER — SIROLIMUS 2 MG/1
2 TABLET, FILM COATED ORAL DAILY
Status: DISCONTINUED | OUTPATIENT
Start: 2024-02-21 | End: 2024-02-21

## 2024-02-21 RX ORDER — CEFEPIME HYDROCHLORIDE 2 G/1
2 INJECTION, POWDER, FOR SOLUTION INTRAVENOUS
Status: DISCONTINUED | OUTPATIENT
Start: 2024-02-21 | End: 2024-02-29 | Stop reason: HOSPADM

## 2024-02-21 RX ORDER — OXYBUTYNIN CHLORIDE 5 MG/1
5 TABLET ORAL 3 TIMES DAILY
Status: DISCONTINUED | OUTPATIENT
Start: 2024-02-21 | End: 2024-02-22

## 2024-02-21 RX ORDER — OLANZAPINE 2.5 MG/1
5 TABLET, FILM COATED ORAL AT BEDTIME
Status: DISCONTINUED | OUTPATIENT
Start: 2024-02-21 | End: 2024-02-29 | Stop reason: HOSPADM

## 2024-02-21 RX ORDER — MORPHINE SULFATE 2 MG/ML
2 INJECTION, SOLUTION INTRAMUSCULAR; INTRAVENOUS ONCE
Status: COMPLETED | OUTPATIENT
Start: 2024-02-21 | End: 2024-02-21

## 2024-02-21 RX ORDER — HEPARIN SODIUM (PORCINE) LOCK FLUSH IV SOLN 100 UNIT/ML 100 UNIT/ML
5 SOLUTION INTRAVENOUS
Status: CANCELLED | OUTPATIENT
Start: 2024-04-04

## 2024-02-21 RX ORDER — ACYCLOVIR 800 MG/1
800 TABLET ORAL 2 TIMES DAILY
Status: DISCONTINUED | OUTPATIENT
Start: 2024-02-21 | End: 2024-02-29 | Stop reason: HOSPADM

## 2024-02-21 RX ORDER — OXYCODONE HYDROCHLORIDE 5 MG/1
5 TABLET ORAL EVERY 4 HOURS PRN
Status: DISCONTINUED | OUTPATIENT
Start: 2024-02-21 | End: 2024-02-29 | Stop reason: HOSPADM

## 2024-02-21 RX ORDER — SULFAMETHOXAZOLE/TRIMETHOPRIM 800-160 MG
1 TABLET ORAL
Status: DISCONTINUED | OUTPATIENT
Start: 2024-02-26 | End: 2024-02-29 | Stop reason: HOSPADM

## 2024-02-21 RX ORDER — URSODIOL 300 MG/1
300 CAPSULE ORAL 3 TIMES DAILY
Status: DISCONTINUED | OUTPATIENT
Start: 2024-02-21 | End: 2024-02-29 | Stop reason: HOSPADM

## 2024-02-21 RX ORDER — AMLODIPINE BESYLATE 5 MG/1
5 TABLET ORAL DAILY
Status: DISCONTINUED | OUTPATIENT
Start: 2024-02-21 | End: 2024-02-29 | Stop reason: HOSPADM

## 2024-02-21 RX ADMIN — Medication 5 ML: at 08:27

## 2024-02-21 RX ADMIN — OXYBUTYNIN CHLORIDE 5 MG: 5 TABLET ORAL at 14:31

## 2024-02-21 RX ADMIN — SODIUM CHLORIDE: 9 INJECTION, SOLUTION INTRAVENOUS at 14:31

## 2024-02-21 RX ADMIN — OXYBUTYNIN CHLORIDE 5 MG: 5 TABLET ORAL at 20:31

## 2024-02-21 RX ADMIN — URSODIOL 300 MG: 300 CAPSULE ORAL at 20:36

## 2024-02-21 RX ADMIN — POTASSIUM CHLORIDE 20 MEQ: 750 TABLET, EXTENDED RELEASE ORAL at 15:55

## 2024-02-21 RX ADMIN — OLANZAPINE 5 MG: 2.5 TABLET, FILM COATED ORAL at 21:45

## 2024-02-21 RX ADMIN — POTASSIUM CHLORIDE 20 MEQ: 750 TABLET, EXTENDED RELEASE ORAL at 20:31

## 2024-02-21 RX ADMIN — URSODIOL 300 MG: 300 CAPSULE ORAL at 14:31

## 2024-02-21 RX ADMIN — ACYCLOVIR 800 MG: 800 TABLET ORAL at 20:31

## 2024-02-21 RX ADMIN — AMLODIPINE BESYLATE 5 MG: 5 TABLET ORAL at 15:55

## 2024-02-21 RX ADMIN — MICAFUNGIN SODIUM 300 MG: 100 INJECTION, POWDER, LYOPHILIZED, FOR SOLUTION INTRAVENOUS at 08:32

## 2024-02-21 RX ADMIN — SODIUM CHLORIDE: 9 INJECTION, SOLUTION INTRAVENOUS at 21:45

## 2024-02-21 RX ADMIN — MORPHINE SULFATE 2 MG: 2 INJECTION, SOLUTION INTRAMUSCULAR; INTRAVENOUS at 10:08

## 2024-02-21 RX ADMIN — ACYCLOVIR 800 MG: 800 TABLET ORAL at 15:55

## 2024-02-21 RX ADMIN — LETERMOVIR 480 MG: 480 TABLET, FILM COATED ORAL at 15:55

## 2024-02-21 RX ADMIN — Medication 5 ML: at 08:26

## 2024-02-21 RX ADMIN — SIROLIMUS 2 MG: 2 TABLET, FILM COATED ORAL at 15:55

## 2024-02-21 ASSESSMENT — ACTIVITIES OF DAILY LIVING (ADL)
ADLS_ACUITY_SCORE: 37
ADLS_ACUITY_SCORE: 20
DIFFICULTY_COMMUNICATING: NO
ADLS_ACUITY_SCORE: 20
ADLS_ACUITY_SCORE: 20
FALL_HISTORY_WITHIN_LAST_SIX_MONTHS: NO
TOILETING_ISSUES: NO
ADLS_ACUITY_SCORE: 20
CHANGE_IN_FUNCTIONAL_STATUS_SINCE_ONSET_OF_CURRENT_ILLNESS/INJURY: NO
ADLS_ACUITY_SCORE: 37
HEARING_DIFFICULTY_OR_DEAF: NO
ADLS_ACUITY_SCORE: 20
CONCENTRATING,_REMEMBERING_OR_MAKING_DECISIONS_DIFFICULTY: NO
DOING_ERRANDS_INDEPENDENTLY_DIFFICULTY: NO
ADLS_ACUITY_SCORE: 20
WALKING_OR_CLIMBING_STAIRS_DIFFICULTY: NO
DIFFICULTY_EATING/SWALLOWING: NO
WEAR_GLASSES_OR_BLIND: NO
DRESSING/BATHING_DIFFICULTY: NO

## 2024-02-21 ASSESSMENT — PAIN SCALES - GENERAL
PAINLEVEL: EXTREME PAIN (8)
PAINLEVEL: WORST PAIN (10)

## 2024-02-21 NOTE — PHARMACY-ADMISSION MEDICATION HISTORY
Pharmacist Admission Medication History    Admission medication history is complete. The information provided in this note is only as accurate as the sources available at the time of the update.    Information Source(s): Patient via in-person    Pertinent Information: Recent discharge [facility] med reviewed    Changes made to PTA medication list:  Added: None  Deleted: None  Changed: None    Allergies reviewed with patient and updates made in EHR: yes    Medication History Completed By: Oni Zelaya RPH 2/21/2024 12:44 PM    No outpatient medications have been marked as taking for the 2/21/24 encounter (Hospital Encounter).

## 2024-02-21 NOTE — PROGRESS NOTES
Infusion Nursing Note:  Hortencia Baldwin presents today for scheduled infusion.    Patient seen by provider today: Yes: Ijeoma Loza   present during visit today: Not Applicable.    Note: Labs monitored. Queenie infused per treatment protocol. Dressing changed today. Patient is c/o 10/10 pain bladder pain. She has pressure when urinating and intense burning after. She denies fevers, chills and vomiting. She does take Zofran every morning for nausea. Unable to collect urine sample during her visit.     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 Per Ijeoma, plan is to admit patient for BK uremia pain management. 2mg Morphine IVP given at 1008. Patient did have some relief from this and rated pain 7/10 30 minutes after administration.     Writer called [facility] to give report. VSS, pt discharged from infusion center with daughter.      Intravenous Access:  Gastelum.    Treatment Conditions:  Lab Results   Component Value Date    HGB 11.3 (L) 02/21/2024    WBC 7.7 02/21/2024    ANEU 5.5 02/21/2024    ANEUTAUTO 3.0 02/07/2024    PLT 83 (L) 02/21/2024        Lab Results   Component Value Date     02/21/2024    POTASSIUM 4.0 02/21/2024    MAG 1.8 02/04/2024    CR 0.53 02/21/2024    NIKO 9.9 02/21/2024    BILITOTAL 0.3 02/21/2024    ALBUMIN 4.2 02/21/2024    ALT 75 (H) 02/21/2024    AST 54 (H) 02/21/2024       Results reviewed, labs MET treatment parameters, ok to proceed with treatment.      Post Infusion Assessment:  Patient tolerated infusion without incident.  Patient tolerated injection without incident.       Discharge Plan:   Patient discharged in stable condition accompanied by: self and daughter.  Departure Mode: Ambulatory.      Sarai Ramos RN

## 2024-02-21 NOTE — LETTER
2/21/2024         RE: Hortencia Baldwin  [address]  [address]  [place] [address]        Dear Colleague,    Thank you for referring your patient, Hortencia Baldwin, to the [facility] [place]. Please see a copy of my visit note below.      BMT  Progress Note  2/21/24      Patient ID:  Irma Foreman is a 53 year old female with a PMH of MDS, warm AIHA, transfusion and transfusion dependent anemia presented to  to undergo a myeloablative allogeneic transplant. She was recently COVID+ on 12/26 but tested negative on 1/2 with resolution of nearly all URI symptoms. Undergoing MA (Bu/Flu) 6/8 URD Allo transplant, day +41     HPI: She returns for micafungin and follow up of painful bloody urination.  Continues to have bloody urine with clots. Now difficult to start a stream, passing larger clots. Very painful, urinating every 15 minutes at night, more frequently even during clinic visit. She describes sweating when she starts to urinate due to 10/10 pain.   She previously was having pain on her right side noticeable when she was lying flat. No pain with eating, or breathing deeply, no radiating pain. No recent coughing fits. Rib xray neg.  Eating okay, no vomiting or diarrhea. Some nausea with her meds.  No rash.   No fever. No cough or cold symptoms.      Review of Systems                                                                                                                                         Negative unless stated in the HPI.       PHYSICAL EXAM                                                                                                                                                     Lab Results   Component Value Date    WBC 7.7 02/21/2024    ANEU 5.5 02/21/2024    HGB 11.3 (L) 02/21/2024    HCT 35.0 02/21/2024    PLT 83 (L) 02/21/2024     02/21/2024    POTASSIUM 4.0 02/21/2024    CHLORIDE 102 02/21/2024    CO2 25 02/21/2024      (H) 02/21/2024    BUN 4.2 (L) 02/21/2024    CR 0.53 02/21/2024    MAG 1.8 02/04/2024    INR 1.04 01/29/2024    BILITOTAL 0.3 02/21/2024    AST 54 (H) 02/21/2024    ALT 75 (H) 02/21/2024    ALKPHOS 126 02/21/2024    PROTTOTAL 7.0 02/21/2024    ALBUMIN 4.2 02/21/2024     Wt Readings from Last 4 Encounters:   02/21/24 77.9 kg (171 lb 12.8 oz)   02/19/24 78 kg (172 lb)   02/16/24 78.3 kg (172 lb 11.2 oz)   02/14/24 77.2 kg (170 lb 3.2 oz)     KPS: 70  General: NAD  Eyes: sclera anicteric   ENT: OP moist without lesions.   Lungs: CTAB   Cardiovascular: RRR, no M/R/G.   Lymphatics: No edema.   Abd: soft, nontender, non distended. NO RUQ tenderness to deep palpation  Skin: faint petechial lesions bilateral LE   MSK: 2/19 hard lump on her right lower ribs, tender to palpation, now unable to palpate, area  2/21  Neuro: A&O   Access: R Gastelum site NT, no drainage.    Acute GVHD          2/14/2024    16:34 2/12/2024    15:00 2/5/2024    09:39   Acute GVHD   New evidence of Acute Xehhh-Scmnsu-Exzu Disease developed since last entry? No No No         LABS AND IMAGING: I have assessed all abnormal lab values for their clinical significance and any values considered clinically significant have been addressed in the assessment and plan.       US Abdominal with Doppler (1/18/24)  Impression:   1. Patent Doppler evaluation of the abdomen with antegrade flow throughout.  2. Elevated hepatic artery resistive index of 0.81, nonspecific though can be seen with hepatic venous congestion which could be related to venous occlusive disease..    US Abdominal with Doppler (2/20/24)  Impression:   1. Hepatomegaly with increased hepatic parenchymal echogenicity, suggesting hepatic steatosis. No focal hepatic lesion.  2. Normal Doppler evaluation.     SYSTEMS-BASED ASSESSMENT AND PLAN      Hortencia Baldwin is a 53 year old female with a history of MDS, undergoing MA (Bu/Flu) 6/8 URD Allo transplant, day +41     BMT/IEC PROTOCOL for MT  2015-29   - Chemo protocol:  Day -6 through day -1: Keppra and Allopurinol   Day -5 through -2: Bu/Flu.   Day -1: Rest day   Day 0: Transplant. Recipient: O+, CMV+. Donor: O+, CMV-. Cell dose 6.5 x10^6.   - Restaging plan: per protocol   BMbx 2/6 -- Day 28 review with Dr. Garay on 2/9-note pending.   Flow neg; 100% donor     HEME/COAG  - anemia, thrombocytopenia 2/2 due to chemotherapy/BMT.   # Iron overload: hx of transfusion dependent anemia, pre-transplant ferritin around 5,000. Recommend phlebotomy post transplant when retic count is restored and Hgb >10    # Platelet refractoriness: HLA platelets when available.   - Transfusion parameters: hemoglobin <7, platelets <10. Plt and hgb continue to improve despite hematuria  - GCSF PRN for ANC < 1.     IMMUNOCOMPROMISED  Afebrile  BK uremia: Dysuria, without hematuria or blood clots. 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 UA with calcium oxalate crystals. Ucx (2/12): mixed urogenital odilon. No improvement with pyridium. Unable to tolerate oxycodone due to bad dreams. Dr Garay gave 3 day course levaquin without improvement. Oxybutynin without improvement.    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 **initially avoided sirolimus d/t high risk VOD**. Tac drip discontinued 1/31, sirolimus started.  - 2/4 level therapeutic on 2mg daily. Finally held for level today 2/21, in process  - Pink maculopapular rash.TCM prescribed at discharge and prednisone 40 mg x 5 days (2/3-2/7). Resolved.     Dr Garay's note 2/15  - Elevated liver enzymes since 2/2/24 (when tac was changed to sirolimus). - improving rapidly  - Eosinophilia (2/14/24)  - Diarrhea on and off: 2 small episodes of loose stool infectious work up neg. - diarrhea resolved  - Rash for the past 3-4 days, non-pruritic. - resolved  Dr Garay's Recommendation: constellation  of symptoms concerning for GVHD, especially persistently elevated liver enzymes and new eosinophilia. IR-guided liver biopsy scheduled for 2/22. 2/20 RUQ US suggestive of hepatic steatosis, normal dopplers. Potential to cancel biopsy    CARDIOVASCULAR  # Chest pressure: new on 1/15, worse with sitting up/activity, better while laying flat. RESOLVED.   EKG: new QR pattern in V1, suggests right ventricular conduction delay  Troponin negative x1   Echo (1/15): LVEF 55-60%, no pericardial effusion present. Cardiology consulted: no further recs   #Prolonged QTc to 507 on 1/22. EKG (1/30) NSR, Qtc 454.  # HTN: On norvasc 5mg daily.     GI/NUTRITION  # transaminitis: TPN discontinued (2/2), recently started sirolimus (previously on Tac d/t concern for VOD). No abdominal pain/fluid retention. Recent U/S. Recent viral testing neg. Known iron overload in setting of MA BMT monitor for now. Liver bx pending course. GVH/VOD/infectious/iron overload w/ MA chemo?  - 2/12: med box corrected to TID ursodiol (was in there bid), perhaps this correction helped improve LFTs?  # Epigastric pain (1/18): RUQ ultrasound showing patent doppler throughout, no ascites, nonspecific elevated hepatic artery restrictive index (see full impression above).                - Ulcer prophylaxis: PPI  - VOD ppx: Ursodiol. See liver US 2/21 results above         #urinary urge/frequency: 2/12  BK urine >100 million copies. UA: RBC, Wbc, & calcium oxylate crystals. Ucx negative.  Rx Pyridium.    2/15 levaquin 250mg for 5 days for possible bacterial UTI. Qtc normal.   2/19 will try Oxybutynin  2/21 significantly worse in frequency, size of clots and difficulty to start urination or empty bladder, tenderness, inability to sleep.       DERM:  # Foot, hand warts. Curbside derm on 1/8, no recs while inpatient, typically managed OP.    # rash as per above.      Summary: admit to [facility] for pain control, likely catheterization, intravesicular cidofovir to be  determined by inpt team    Ijeoma Loza PAC  [phone]

## 2024-02-21 NOTE — CONSULTS
"[facility]    Transplant Infectious Diseases Inpatient Consultation      Hortencia Baldwin MRN# [id number]   YOB: 1970 Age: 53 year old   Date of Admission and time: 2/21/2024 10:30 AM     Reason for consult: I was asked by Dr. Bonds to evaluate this patient for BK viruria.             Recommendations:   There is no effective therapy for BKV-induced HC other than restoration of immune status and BKV-specific T-cells. Thus my recommendations would be to:   - Decrease sirolimus as much as safely possible.   - Intravesical (through booker catheter) cidofovir 5 mg/kg in 60 mL normal saline instilled in bladder over 15 min and booker clamped for 1 hr twice a week, until the resolution of symptoms.   - If symptoms do not respond to the above mentioned approaches the we can try IV cidofovir 1 mg/kg without probenecid weekly, until the resolution of symptoms.         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   On sirolimus for GVHD ppx. MMF discontinued on 2/14/24.   Presented with hematuria, dysuria, urinary blood clots, polyuria due to BKV HC.         Active Problems and Infectious Diseases Issues:   Stage 3 BKV-induced HC.    Other than minimizing IS to allow for BKV-specific CD4 recovery, there is no effective therapy for BKV-induced HC.   IV or intravesical cidofovir is used but the evidence that it is effective is weak. In one study, patients who received cidofovir had worse outcomes \"[email or web address]%20Natural%20History%20of%20BK%20Polyomavirus%20and%20the%20Host%20Immune%20Response%20After%20Stem%20Cell%20Transplantation.pdf\".     Cidofovir IV might result in renal failure.     Transaminitis since 2/2/24.   Improving.     Right plantar wart  Preceded chemotherapy and HSCT. Did not worsen after chemotherapy and HSCT.   Monitor.         Old Problems and Infectious Diseases Issues: "   COVID-19 12/26/23     Other Infectious Disease issues include:  - QTc: 425 as of 2/9/24.   - PJP prophylaxis: on bactrim.   - on letermovir, ACV and micafungin for ppx.   - Serostatus: CMV R+, EBV R+, HSV1+/2+, VZV ?  - Gamma globulin status: ?      Thank you very much Dr. Bonds for involving me in the care of Mrs. Hortencia Baldwin. Please do not hesitate to call me for any question.     Attestation:  Total duration of visit including chart review, reviewing labs and imaging, interviewing and examining the patient, documentation, and sending communication to the primary treating team, all at the same day of this encounter, is: 60 minutes.     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 3-4 days prior to admission she started to experience gross hematuria with blood clots. The dysuria and polyuria are significant and disrupting the patient's daily activities.   No fever.   UA was c/w hematuria and pyuria. Urinary BKV was 6.7 log on 1/26/24 repeat was >8 log on 2/12/24.   MMF was discontinued on 2/14/24 due to diarrhea. Diarrhea resolved after stopping MMF.     Due to significant dysuria, pain, polyuria every few minutes, the patient was admitted to the hospital.       Exposure History:  Was born [place]. Imm[place] to [place] in the 80s. Lived in [facility] in an apartment. No farm exposure. Has a dog and a fish. She does not clean the aquarium.   Used to work in a  factory.   No known TB exposure other than the country of origin.   Did travel to [place] years ago.   Did travel to [facility] few years ago where she stayed for 2-3 weeks. No other international travel.   No significant outdoors  activities.           Review of Systems:      As mentioned in the HPI otherwise negative by reviewing constitutional symptoms, central and peripheral neurological systems, respiratory system, cardiac system, GI system,  system, musculoskeletal, skin, allergy, and lymphatics.                  Past Medical History:     Past Medical History:   Diagnosis Date    Left medial knee pain     Medial epicondylitis, right elbow     Obesity (BMI 30-39.9)     Right elbow pain             Past Surgical History:     Past Surgical History:   Procedure Laterality Date    EXCISE LESION TRUNK N/A 2/5/2018    Procedure: EXCISE LESION TRUNK;;  Surgeon: Avani Szymanski MD;  Location: [facility]    EXCISE MASS LOWER EXTREMITY Bilateral 5/31/2017    Procedure: EXCISE MASS LOWER EXTREMITY;  EXCISION OF LIPOMAS RIGHT BUTTOCKS RIGHT LOWER THIGH, LEFT UPPER OUTER THIGH;  Surgeon: Avain Szymanski MD;  Location: [facility]    EXCISE MASS LOWER EXTREMITY Right 2/5/2018    Procedure: EXCISE MASS LOWER EXTREMITY;  EXCISION OF RIGHT UPPER ANTERIOR THIGH SOFT TISSUE MASS,EXCISION OF LEFT LOWER BACK SOFT TISSUE MASS;  Surgeon: Avani Szymanski MD;  Location: [facility]    IR CVC TUNNEL PLACEMENT > 5 YRS OF AGE  1/5/2024    LUMPECTOMY BREAST Left 1990s    benign            Social History:     Social History     Tobacco Use    Smoking status: Every Day     Packs/day: 0.50     Years: 10.00     Additional pack years: 0.00     Total pack years: 5.00     Types: Cigarettes     Passive exposure: Current    Smokeless tobacco: Never   Substance Use Topics    Alcohol use: Yes     Comment: 1-2 drinks/month            Family History:   I have reviewed this patient's family history  Family History   Problem Relation Age of Onset    Hypertension Mother     Diabetes No family hx of     Cerebrovascular Disease No family hx of     Myocardial Infarction No family hx of     Coronary Artery Disease Early Onset No family hx of     Breast Cancer No family hx of     Ovarian Cancer  No family hx of     Colon Cancer No family hx of             Immunizations:     Immunization History   Administered Date(s) Administered    COVID-19 MONOVALENT 12+ (Pfizer) 03/26/2021, 04/22/2021, 11/23/2021    TDAP Vaccine (Adacel) 12/22/2017            Allergies:     Allergies   Allergen Reactions    Blood Transfusion Related (Informational Only) Other (See Comments)     Patient has a history of a clinically significant antibody against RBC antigens.  A delay in compatible RBCs may occur.             Medications:   Medications that Require Transfusion:    sodium chloride 100 mL/hr at 02/21/24 1431     Scheduled Medications:    acyclovir  800 mg Oral BID    amLODIPine  5 mg Oral Daily    letermovir  480 mg Oral Daily    [START ON 2/23/2024] micafungin  300 mg Intravenous Q Mon Wed Fri AM    OLANZapine  5 mg Oral At Bedtime    [START ON 2/22/2024] ondansetron  4 mg Oral QAM AC    oxyBUTYnin  5 mg Oral TID    [START ON 2/22/2024] pantoprazole  40 mg Oral QAM AC    potassium chloride ER  20 mEq Oral BID    sirolimus  2 mg Oral Daily    [START ON 2/26/2024] sulfamethoxazole-trimethoprim  1 tablet Oral Q Mon Tues BID    ursodiol  300 mg Oral TID               Physical Exam:   Temp: 97.8  F (36.6  C) Temp src: Oral BP: (!) 147/92 Pulse: 97   Resp: 16 SpO2: 97 % O2 Device: None (Room air)      Wt Readings from Last 4 Encounters:   02/21/24 77.6 kg (171 lb 1.6 oz)   02/21/24 77.9 kg (171 lb 12.8 oz)   02/19/24 78 kg (172 lb)   02/16/24 78.3 kg (172 lb 11.2 oz)     Constitutional: awake, alert, cooperative, no apparent distress and appears at stated age, well nourished.   Head, ENT, Eyes, and Neck: Normocephalic, sinuses non-tender to palpation, external ears without lesions, moist buccal mucosa without oral thrush or ulcers, tonsils without swelling, erythema, or exudate, no tenderness palpating teeth, good dentition, gums without necrosis or abscesses.   PERRL, EOMI, pink conjunctivae, non-icteric sclera.   Neck supple  "without rigidity.   Neurologic: Patient is moving all extremities without focal deficit, no focal sensory loss.   Lungs: CTA bilaterally, no accessory muscle use, no dullness to percussion and no abnormal tactile fremitus.   CVS: RRR, normal S1/S2, no murmur, PMI was not displaced.   Abdomen: non-tender, non-distended, no masses, no bruit, no shifting dullness, normal BS.   Musculoskeletal: no pitting edema of bilateral lower extremities, no ulcers, normal ROM of all joints, no swelling or erythema of any of joints and no tenderness to palpation.   Skin: no induration, fluctuation or discharge at the port in the right chest wall. No rash but with discoloration of Bi hands and feet. Wart on the right plantar surface.             Data:   No results found for: \"ACD4\"    Inflammatory Markers    No lab results found.    Immune Globulin Studies   No lab results found.    Metabolic Studies       Recent Labs   Lab Test 02/21/24  0816 02/19/24  1203 02/16/24  1054 02/14/24  0940 02/12/24  1301 02/09/24  0959 02/05/24  0815 02/04/24  0432 02/03/24  1420 02/03/24  0420 02/02/24  0324 01/22/24  0328 01/22/24  0055 01/15/24  1618 01/15/24  1248    143 142 140 136 139   < > 139  --  138 140   < >  --    < > 135   POTASSIUM 4.0 3.2* 3.6 3.2* 3.4 3.3*   < > 3.8  --  4.2 4.6   < >  --    < > 3.6   CHLORIDE 102 105 106 106 100 101   < > 102  --  100 102   < >  --    < >  --    CO2 25 28 27 22 24 25   < > 29  --  30* 28   < >  --    < >  --    ANIONGAP 11 10 9 12 12 13   < > 8  --  8 10   < >  --    < >  --    BUN 4.2* 6.6 5.0* 3.7* 6.9 7.9   < > 11.3  --  12.2 16.5   < >  --    < >  --    CR 0.53 0.44* 0.39* 0.45* 0.49* 0.49*   < > 0.36*  --  0.40* 0.40*   < >  --    < >  --    GFRESTIMATED >90 >90 >90 >90 >90 >90   < > >90  --  >90 >90   < >  --    < >  --    * 96 88 124* 157* 138*   < > 106*  --  100* 109*   < >  --    < >  --    NIKO 9.9 9.2 9.0 9.1 9.0 9.4   < > 9.2  --  9.4 9.2   < >  --    < >  --    PHOS  --   --   " --   --   --   --   --   --   --  3.8 4.0   < >  --    < >  --    MAG  --   --   --   --   --   --   --  1.8 1.9 1.5* 1.7   < >  --    < >  --    LACT  --   --   --   --   --   --   --   --   --   --   --   --  0.5*  --  0.9    < > = values in this interval not displayed.       Hepatic Studies    Recent Labs   Lab Test 02/21/24  0816 02/19/24  1203 02/16/24  1054 02/14/24  0940 02/12/24  1301 02/09/24  0959 01/02/24  1432 12/26/23  1709   BILITOTAL 0.3 0.3 0.3 0.3 0.5 0.3   < > 0.4   ALKPHOS 126 117 110 112 120 118   < > 176*   ALBUMIN 4.2 4.0 3.9 3.8 4.0 4.1   < > 4.3   AST 54* 63* 99* 139* 191* 57*   < > 104*   ALT 75* 87* 127* 147* 152* 106*   < > 295*   LDH  --   --   --   --   --   --   --  244    < > = values in this interval not displayed.       Pancreatitis testing    Recent Labs   Lab Test 01/29/24  0337   TRIG 249*       Hematology Studies      Recent Labs   Lab Test 02/21/24  0816 02/19/24  1203 02/16/24  1054 02/14/24  0940 02/12/24  1301 02/09/24  0959   WBC 7.7 5.8 4.9 3.6* 4.0 2.8*   ANEU 5.5 3.1 2.7 1.5* 2.8 2.2   ALYM 0.8 1.0 0.3* 0.1* 0.1* 0.0*   TRACY 0.7 0.9 0.4 0.3 0.3 0.3   AEOS 0.3 0.6 0.8* 1.3* 0.6 0.1   HGB 11.3* 10.0* 9.3* 9.5* 10.4* 9.8*   HCT 35.0 30.9* 27.5* 28.7* 30.4* 29.7*   PLT 83* 74* 60* 49* 36* 30*       Clotting Studies    Recent Labs   Lab Test 02/21/24  1238 01/29/24  0337 01/22/24  0328 01/15/24  0235 01/08/24  0500 01/05/24  1147 12/19/23  1347   INR 0.96 1.04 1.16* 1.03   < > 1.01 0.93   PTT 36  --   --   --   --  28 28    < > = values in this interval not displayed.       Arterial Blood Gas Testing  No lab results found.     Urine Studies     Recent Labs   Lab Test 02/21/24  1220 02/12/24  1425 01/25/24  1824 01/13/24  1532 01/11/24  2323   URINEPH 7.0 6.0 6.5 6.5 7.0   NITRITE Negative Negative Negative Negative Negative   LEUKEST Large* Negative Negative Negative Negative   WBCU >182* 13* <1 1 <1       Vancomycin Levels     No lab results found.    Invalid input(s):  "\"VANCO\"    Tobramycin levels     No lab results found.    Gentamicin levels    No lab results found.    Tacrolimus levels    Invalid input(s): \"TACROLIMUS\", \"TAC\", \"TACR\"      Latest Ref Rng & Units 2/21/2024     8:16 AM 2/19/2024    12:03 PM 2/16/2024    10:54 AM 2/14/2024     9:40 AM 2/12/2024     1:01 PM   Transplant Immunosuppression Labs   Creat 0.51 - 0.95 mg/dL 0.53  0.44  0.39  0.45  0.49    Urea Nitrogen 6.0 - 20.0 mg/dL 4.2  6.6  5.0  3.7  6.9    WBC 4.0 - 11.0 10e3/uL 7.7  5.8  4.9  3.6  4.0    Neutrophil % 72  53  56  42  71    ANEU 1.6 - 8.3 10e3/uL 5.5  3.1  2.7  1.5  2.8        Cyclosporine levels    Invalid input(s): \"CYCLOSPORINE\", \"CYC\"    Mycophenolate levels    Invalid input(s): \"MYPA\", \"MYP\"    Sirolimus levels    Invalid input(s): \"SIROLIMUS\", \"SIR\", \"RAPA\"    CSF testing   No lab results found.      Microbiology:  None.   Last check of C difficile  C Difficile Toxin B by PCR   Date Value Ref Range Status   02/18/2024 Negative Negative Final     Comment:     A negative result does not exclude actual disease due to C. difficile and may be due to improper collection, handling and storage of the specimen or the number of organisms in the specimen is below the detection limit of the assay.       Virology:  CMV viral loads  No results found for: \"CMVRESINST\", \"26172\", \"94294\", \"35147\", \"00551\", \"CMVQAL\"  CMV viral loads    Recent Labs   Lab Test 02/09/24  0959   CMVLOG <1.5       CMV viral loads    CMV DNA IU/mL   Date Value Ref Range Status   02/21/2024 Not Detected Not Detected IU/mL Final   02/19/2024 Not Detected Not Detected IU/mL Final   02/12/2024 Not Detected Not Detected IU/mL Final   02/09/2024 <35 (A) Not Detected IU/mL Final     Comment:     CMV DNA detected, less than 35 IU/mL   02/06/2024 <35 (A) Not Detected IU/mL Final     Comment:     CMV DNA detected, less than 35 IU/mL   02/02/2024 <35 (A) Not Detected IU/mL Final     Comment:     CMV DNA detected, less than 35 IU/mL   02/01/2024 " "<35 (A) Not Detected IU/mL Final     Comment:     CMV DNA detected, less than 35 IU/mL   01/26/2024 Not Detected Not Detected IU/mL Final   01/19/2024 Not Detected Not Detected IU/mL Final     CMV log   Date Value Ref Range Status   02/09/2024 <1.5  Final   02/06/2024 <1.5  Final   02/02/2024 <1.5  Final   02/01/2024 <1.5  Final       CMV resistance testing  No lab results found.  No results found for: \"CMVCID\", \"CMVFOS\", \"CMVGAN\", \"CMVDRUGRES\"     HHV-6 DNA copies/mL   Date Value Ref Range Status   02/06/2024 Not Detected Not Detected copies/mL Final       EBV DNA Copies/mL   Date Value Ref Range Status   02/06/2024 Not Detected Not Detected copies/mL Final       CMV Antibody IgG   Date Value Ref Range Status   12/19/2023 Positive, suggests recent or past exposure. (A) No detectable antibody.  Final   11/07/2023 Positive, suggests recent or past exposure. (A) No detectable antibody.  Final       No results found for: \"EBIG2\", \"EBIGM\", \"TOXG\"          Halima Talley MD  [facility]  Contact information available via [facility] Paging/Directory     02/21/2024    "

## 2024-02-21 NOTE — H&P
BMT History & Physical       Patient Demographics   Patient ID:  Hortencia Baldwin   Age:  53 year old   Sex:  female  Reason for Admission/CC: Hematuria, BK Viruria  Date:  2/21/2024  Service: BMT   Informant:  Patient and Chart  Resuscitation Status: Full Code    Patient ID: Irma Foreman is a 53 year old female with a PMH of MDS, warm AIHA, transfusion and transfusion dependent anemia presented to  to undergo a myeloablative allogeneic transplant. She was recently COVID+ on 12/26 but tested negative on 1/2 with resolution of nearly all URI symptoms. Undergoing MA (Bu/Flu) 6/8 URD Allo transplant, day +41. Readmitted 2/21 with hematuria, dysuria and clots.    Transplant Essential Data:   Diagnosis MDS  BMTCT Type ALLO    Prep Regimen Bu/Flu   Donor Match and  Source 6/8 URD    GVHD Prophylaxis PT Cy, Tac/MMF  Primary BMT MD Garay     Clinical Trials MT 2015-29 (according to but not on trial)      HPI: Irma presents today after being seen in the clinic with intense pain and burning with urination. She sees blood and clots in her urine and with wiping. No blood in her stool. Hemoglobin is stable. She has not slept as she is voiding so frequently, about every 20-30 minutes. It is excruciating for her. It is difficult to start the stream at times. She reports eating ok, no vomiting or diarrhea, though meds sometimes do cause nausea. She reports no other pain this morning. Previously noted rash has resolved. No fevers. She has been drinking some- has some fear of drinking fluids as her urination is already so frequent and painful. No new cough/cold symtoms.       Donor Characteristics     Self or Related or Unrelated Donor: Unrelated donor   Donor Match: 6/8  Donor Age: 28  Donor Sex: M  Donor ABO/Rh: O+  Donor CMV Serostatus: Negative     Donor-Specific Antibodies:  Recent Labs   Lab Test 01/18/24  1406 12/18/23  0928   NU8DTDUVH A:1 2 69 B:7 8 13 27 37 42 44 45 47 48 51 55 56 57 58 60 62 63 67 75 76 77 81 82 Cw:9  A:1 2 23 24 25 32 69 B:7 8 13 27 37 38 42 44 45 47 48 49 51 52 53 55 56 57 58 59 60 61 62 63 67 73 75 76 77 81 82 Cw:8 9   BF8ZHLEOJC A:23 24 25 32B:38 41 46 49 52 53 54 59 61 64 71 73 78Cw:1 8 10 A:80B:41 46 54 64 78Cw:1 10   FA9DAJDFL  --  DR:7 8 9 11 12 13 14 17 18 DRw:52 DQ:7 8 9   AY7QNHBCHN  --  DR:103 16DRw:51         Virtual Crossmatch:    Recent Labs   Lab Test 12/18/23  0928   RESVXMT1 DSA Absent   DSAVXMT1 None   RESVXMB1 DSA Present   DSAVXMB1 HLA DRB5 qiz=0073         Blood Counts       Recent Labs   Lab Test 02/21/24  0816 02/19/24  1203 02/16/24  1054 02/09/24  0959 02/07/24  1022 02/06/24  0723 02/05/24  0815   HGB 11.3* 10.0* 9.3*   < > 8.6* 8.6* 8.4*   HCT 35.0 30.9* 27.5*   < > 25.8* 26.3* 26.3*   WBC 7.7 5.8 4.9   < > 4.1 2.8* 3.3*   ANEUTAUTO  --   --   --   --  3.0 1.7 2.1   ALYMPAUTO  --   --   --   --  0.1* 0.1* 0.1*   AMONOAUTO  --   --   --   --  0.8 0.7 0.8   AEOSAUTO  --   --   --   --  0.1 0.1 0.1   ABSBASO  --   --   --   --  0.1 0.1 0.1   NRBCMAN 0.0 0.0 0.0   < > 0.0 0.0 0.0   PLT 83* 74* 60*   < > 37* 42* 45*    < > = values in this interval not displayed.         Recent Labs   Lab Test 02/07/24  1022   ABORH O POS         No lab results found.      Chemistries     Basic Panel  Recent Labs   Lab Test 02/21/24  0816 02/19/24  1203 02/16/24  1054    143 142   POTASSIUM 4.0 3.2* 3.6   CHLORIDE 102 105 106   CO2 25 28 27   BUN 4.2* 6.6 5.0*   CR 0.53 0.44* 0.39*   * 96 88        Calcium, Magnesium, Phosphorus  Recent Labs   Lab Test 02/21/24  0816 02/19/24  1203 02/16/24  1054 02/05/24  0815 02/04/24  0432 02/03/24  1420 02/03/24  0420 02/02/24  0324 02/01/24  0359   NIKO 9.9 9.2 9.0   < > 9.2  --  9.4 9.2 9.0   MAG  --   --   --   --  1.8 1.9 1.5* 1.7 1.7   PHOS  --   --   --   --   --   --  3.8 4.0 3.8    < > = values in this interval not displayed.        LFTs  Recent Labs   Lab Test 02/21/24  0816 02/19/24  1203 02/16/24  1054   BILITOTAL 0.3 0.3 0.3   ALKPHOS 126 117 110    AST 54* 63* 99*   ALT 75* 87* 127*   ALBUMIN 4.2 4.0 3.9       LDH  Recent Labs   Lab Test 12/26/23  1709          B2-Microglobulin  No lab results found.    Vitamin D  No lab results found.      Urine Studies       Recent Labs   Lab Test 02/12/24  1425   COLOR Yellow   APPEARANCE Slightly Cloudy*   URINEGLC Negative   URINEBILI Negative   URINEKETONE Negative   SG 1.028   UBLD Negative   URINEPH 6.0   PROTEIN 30*   UUROI Normal   NITRITE Negative   LEUKEST Negative   MUCUS Present*   RBCU 5*   WBCU 13*   USQEI 1       Creatinine Clearance    No lab results found.      Infectious Disease Markers     [facility] IDM    Recent Labs   Lab Test 12/19/23  1347   TCRUZI Non-reactive       CMV  Recent Labs   Lab Test 12/19/23  1347   CMVIGG Positive, suggests recent or past exposure.*         EBV    Recent Labs   Lab Test 12/19/23  1347   EBVCAG Positive*       HSV 1/2    Recent Labs   Lab Test 12/19/23  1347   Z3HOSKP 35.90*   H1IGG Positive.  IgG antibody to HSV-1 detected.*   L8ENFMM 5.88*   H2IGG Positive.  IgG antibody to HSV-2 detected.*       COVID    Recent Labs   Lab Test 01/02/24  1432   [id number] Negative         Bone Marrow Biopsy       Morphology    Final Diagnosis   Bone marrow, posterior iliac crest, left decalcified trephine biopsy and touch imprint; left aspirate clot, direct aspirate smear, and concentrated aspirate smear; and peripheral smear:     -Hypercellular marrow (small, suboptimal biopsy with crush artifact, cellularity estimated at 70 to 80%) showing trilineage hematopoiesis with erythroid skewing, megaloblastoid change and subtle  nuclear irregularities in erythroid precursors, 3% ring sideroblasts, no definitive dysplasia in granulocytes or megakaryocytes, slightly increased eosinophils (5%), no increase in blasts (1%)  -See comment  - Peripheral blood showing moderate normochromic, macrocytic anemia; rare helmet cells; moderate leukopenia; lymphopenia; marked  thrombocytopenia   Electronically signed by Juan Carlos Logan MD on 2/7/2024 at  6:19 PM   Comment    The patient carried pretransplant diagnosis of myelodysplastic neoplasm/myelodysplastic syndrome with SF3B1 mutation, with no increase in blasts, with dysplasia noted predominantly in the erythroid lineage .  The current morphologic findings show rare nuclear irregularities, seen mostly in the terminal erythroid precursors, which are of uncertain significance, the abnormalities are mild and not definitive for MDS in the context of recent transplantation and related therapy.  The finding of 3% ring sideroblasts, which were the hallmark of prior MDS in this patient, is concerning, but in the context of iron overload and numerous sideroblasts (73%!)  It is not considered to be a definitively dysplastic finding.   Correlation with results of molecular and cytogenetic studies is required for complete evaluation of this case.  Please consider the sensitivity of respective molecular and cytogenetic studies.  Concurrent flow cytometry revealed no increase in myeloid blasts and no abnormal myeloid blast population.  Of note this patient's MDS was characterized by no increase in blasts prior to the transplant.   Clinical Information    From Epic electronic medical record; 53-year-old female with a history of MDS with SF3B1 mutation, with ringed sideroblasts, no increase in blasts,  who is now 26 days status post allogeneic peripheral blood stem cell transplant.  Pretransplant bone marrow biopsy which was performed at an outside hospital on December 7, 2023 and was reviewed here (UO 24-12) showed persistent/recurrent MDS with no increase in blasts, by report a  chromosomal abnormality involving 20q loss was identified in single  cell at that time.  In the posttransplant course GCSF was given until neutrophil recovery on January 26.   Peripheral Hematologic Data    CBC WITH DIFFERENTIAL(02/06/2024 07:34 AM CST):                                         RESULT VALUE REF. RANGE UNITS    WBC Count    Hemoglobin    Hematocrit    Platelet Count    RBC Count    MCV    MCH   MCHC   RDW   2.8                  ( L )     8.6                  ( L )      26.3                  ( L )   42                  ( LL )   2.53                  ( L )        104                  ( H )      34.0                  ( H )     32.7                 (NORMAL)      23.9                  ( H ) 4.0-11.0  11.7-15.7  35.0-47.0  150-450  3.80-5.20    26.5-33.0  31.5-36.5  10.0-15.0 10e3/uL  g/dL  %  10e3/uL  10e6/uL  fL  pg  g/dL  %   % Neutrophils  % Lymphocytes  % Monocytes  % Eosinophils  % Basophils  % Immature Granulocytes  Absolute Neutrophils   Absolute Lymphocytes   Absolute Monocytes  Absolute Eosinophils  Absolute Basophils  Absolute Immature Granulocytes  NRBCs per 100 WBC  Absolute NRBCs 63  4  24  5  3  1  1.7                 (NORMAL)   0.1                  ( L )  0.7                 (NORMAL)  0.1                 (NORMAL)  0.1                 (NORMAL)  0.0                 (NORMAL)  0               (NORMAL)  0.0                 () N/A  N/A  N/A  N/A  N/A  N/A  1.6-8.3  0.8-5.3  0.0-1.3  0.0-0.7  0.0-0.2  <=0.4  <1  <=0.0 %  %  %  %  %  %  10e3/uL  10e3/uL  10e3/uL  10e3/uL  10e3/uL  10e3/uL  /100  10e3/uL       Microscopic Description    PERIPHERAL BLOOD SMEAR MORPHOLOGY:  The red blood cells appear normochromic.  Poikilocytosis includes rare helmet cells.  Polychromasia appears to be increased and rare circulating nucleated red blood cells is seen.  Rouleaux formation is not increased. The morphology of the platelets is normal.  Granulocytes show, for the most part, normal cytoplasmic granulation however rare forms with hyposegmented nuclei are seen.  No increased circulating blasts are seen.     Bone marrow aspirates and trephine core biopsy touch imprints are reviewed.     BONE MARROW DIFFERENTIAL (500 cells on direct aspirate smears)  Percent (%) Cell  Population Reference Range (%)   1 Blasts  (0 - 1)   1 Neutrophil promyelocytes (2 - 4)   48 Neutrophils and precursors (54 - 63)   40 Erythroid precursors (18 - 24)   1 Monocytes (1 - 1.5)   5 Eosinophils (1 - 3))   2 Basophils (0 - 1))   2 Lymphocytes (8 - 12)   0 Plasma cells (0 - 1.5)      The aspirate smears are adequate for evaluation.     The granulocytic precursors show complete maturation and no definitive dysplasia, although very rare forms with hyposegmented nuclei and relatively less well granulated cytoplasm are seen but in the context of recent issues with the Patel-Giemsa stain in our laboratory this is of uncertain reproducibility.. Erythroid maturation is complete, the erythroid precursors show megaloblastoid changes and mild nuclear irregularities seen predominantly in terminal stages, involving less than 10% of the erythroid precursors and hands of uncertain significance. Megakaryocytes are rarely seen in the touch imprint slides with normal morphologic spectrum.  Occasional hemosiderin laden macrophages are seen.  IRON STAINS:   Dacie iron stain on concentrate smears:   Iron stain shows 73% sideroblasts.  Many of the sideroblasts show numerous iron granules, but they do not align around the nucleus and do not circumscribe sufficient part of the nucleus to be considered ring sideroblasts.  Ring sideroblasts meeting the definition per WHO classification are estimated at 4% of erythroblasts.     CLOT SECTIONS:   The clot sections show fibrin and blood.  No hematopoietic particles are seen.     TREPHINE SECTIONS:  Hematoxylin and eosin stains are reviewed. . The quality of the trephine core biopsy is suboptimal with small size and crush artifact, the evaluation of cellularity may not be representative on such crush biopsy but it is estimated at 70 to 80%.  The cellular composition reflects the aspirate smear differential, showing trilineage hematopoiesis with complete maturation, erythroid skewing  but no inversion of myeloid to erythroid ratio. The number of megakaryocytes appears slightly decreased relative to the abundance of erythroid precursors.  IMMUNOHISTOCHEMISTRY:  Immunohistochemical stains are performed on the paraffin-embedded trephine core with appropriate controls.     CD34 shows rare immature hematopoietic precursors.  CD61 shows megakaryocytes which are relatively few for the degree of marrow cellularity but showed normal more spectrum of morphologies and no pathologic clustering.     Flow Cytometry    Flow Interpretation   A. Iliac Crest, Bone Marrow Aspirate, Left:  -No increase in myeloid blasts and no abnormal myeloid blast population  See comment      Electronically signed by Juan Carlos Logan MD on 2/7/2024 at  5:27 PM   Comment    There is no immunophenotypic evidence of acute myeloid leukemia or another high-grade myeloid neoplasm. Final interpretation requires correlation with results of other ancillary studies, morphologic, and clinical features.         Molecular Studies  RESULTS:     POST BONE MARROW  DONOR: (DEBORAH, [id number])  100 %      RECIPIENT:  0 %     These results are accurate +/-5%.     Chest X-Ray - 2 view     Results for orders placed during the hospital encounter of 01/05/24    XR CHEST 2 VIEWS    Status: Normal 1/15/2024    Narrative  Exam: XR CHEST 2 VIEWS, 1/15/2024 2:11 PM    Comparison: 1/12/2024    History: Hx MDS s/p BMT, persistent fevers, on cefepime, smoking  history    Findings:  PA and lateral views of the chest. Right IJ approach central venous  catheter projects over the superior vena cava just superior to the  cavoatrial junction. Trachea is midline. Cardiomediastinal silhouette  is within normal limits. No focal airspace opacity. No pneumothorax or  pleural effusion. The visualized upper abdomen is unremarkable. No  acute osseous abnormalities.    Impression  Impression: Clear chest.    I have personally reviewed the examination and initial  interpretation  and I agree with the findings.    MAYRA MAYBERRY MD      SYSTEM ID:  [id number]        Chest CT without Contrast       Results for orders placed during the hospital encounter of 12/20/23    CT CHEST W/O CONTRAST    Status: Normal 12/20/2023    Narrative  CT chest without contrast    INDICATION: Pre bone marrow transplant workup. Myelodysplastic  syndrome.    COMPARISON: None available on PACS.    FINDINGS: No contrast. Included thyroid appears normal. Breast tissues  appear grossly unremarkable. Focal atherosclerosis of the right  brachiocephalic artery as well as aortic arch. Trace coronary artery  calcification in the left anterior descending artery. Heart size  normal. Thoracic aorta and main pulmonary artery calibers are normal.  No pleural or pericardial effusion. Included upper abdomen shows trace  abdominal aortic catheter is chronic calcification. No suspicious  upper abdominal mass.  Bone detail show mild osteopenia. Degenerative spurring in the lower  thoracic spine.    Detail of the lungs shows no suspicious nodule or other opacity or  abnormal air collection.    Impression  IMPRESSION: Atherosclerotic calcifications including coronary artery  calcifications. Osteopenia.    RACHEL VILLALPANDO MD      SYSTEM ID:  [id number]        PFTs     FVC%  Recent Labs   Lab Test 12/27/23  1202   [id number] 90       FEV1%  Recent Labs   Lab Test 12/27/23  1202   20016 91       DLCO%  Recent Labs   Lab Test 12/27/23  1202   09826 119       EKG       ECG results from 02/09/24   EKG 12-lead complete w/read - Clinics - NOW     Value    Systolic Blood Pressure     Diastolic Blood Pressure     Ventricular Rate 106    Atrial Rate 106    MA Interval 146    QRS Duration 78        QTc 425    P Axis 49    R AXIS 20    T Axis 35    Interpretation ECG      Sinus tachycardia  Possible Left atrial enlargement  Borderline ECG  When compared with ECG of 30-JAN-2024 11:34,  No significant change was found  Confirmed by  MD MIKE, JOCELYNE ([id number]) on 2024 9:50:09 AM           ECHOCARDIOGRAM       Results for orders placed during the hospital encounter of 24    ECHOCARDIOGRAM COMPLETE    Status: Normal 1/15/2024    Narrative  [id number]  [id number]  [id number]  [id number]    [facility],[place]  Echocardiography Laboratory  [address] [place] [address]    Name: MARSHALL CABRERA  MRN: [id number]  : 1970  Study Date: 01/15/2024 11:47 AM  Age: 53 yrs  Gender: Female  Patient Location: [facility]  Reason For Study: Chest Discomfort, Chest Pressure  Ordering Physician: BEN LINO  Referring Physician: DANIELA BAILEY  Performed By: Bernarda Martino    BSA: 1.8 m2  Height: 62 in  Weight: 184 lb  ______________________________________________________________________________  Procedure  Complete Portable Echo Adult. Echocardiogram with two-dimensional, color and  spectral Doppler performed.  ______________________________________________________________________________  Interpretation Summary  Left ventricular size, wall motion and function are normal. The ejection  fraction is 55-60%.  Right ventricular function, chamber size, wall motion, and thickness are  normal.  No pericardial effusion is present.    This study was compared with the study from 2023. No significant changes  noted.  ______________________________________________________________________________  Left Ventricle  Left ventricular size, wall motion and function are normal. The ejection  fraction is 55-60%. Left ventricular diastolic function is normal. Technical  difficulties may impact upon the accuracy of determination of global peak  longitudinal strain in this study.    Right Ventricle  Right ventricular function, chamber size, wall motion, and thickness are  normal.    Atria  Both atria appear normal.    Mitral Valve  The mitral valve is normal.    Aortic Valve  Aortic valve is normal in  structure and function. The aortic valve is  tricuspid.    Tricuspid Valve  The tricuspid valve is normal. Mild to moderate tricuspid insufficiency is  present. The right ventricular systolic pressure is approximated at 27.2 mmHg  plus the right atrial pressure. Pulmonary artery systolic pressure is normal.    Pulmonic Valve  The pulmonic valve is normal.    Vessels  The aorta root is normal. The thoracic aorta is normal. The pulmonary artery  cannot be assessed. The inferior vena cava was normal in size with preserved  respiratory variability.    Pericardium  No pericardial effusion is present.    Compared to Previous Study  No significant changes noted. This study was compared with the study from  2023 .  ______________________________________________________________________________  MMode/2D Measurements & Calculations  TAPSE: 1.7 cm    Doppler Measurements & Calculations  TR max saud: 260.9 cm/sec  TR max P.2 mmHg  RV S Saud: 11.4 cm/sec    ______________________________________________________________________________  Report approved by: MD Hayden Keys 01/15/2024 12:49 PM          I have assessed all abnormal lab values for their clinical significance and any values considered clinically significant have been addressed in the assessment and plan    Family History:   Family History   Problem Relation Age of Onset    Hypertension Mother     Diabetes No family hx of     Cerebrovascular Disease No family hx of     Myocardial Infarction No family hx of     Coronary Artery Disease Early Onset No family hx of     Breast Cancer No family hx of     Ovarian Cancer No family hx of     Colon Cancer No family hx of        Social History:   Social History     Socioeconomic History    Marital status: Single     Spouse name: Not on file    Number of children: Not on file    Years of education: Not on file    Highest education level: Not on file   Occupational History    Occupation: Assembly for  [employer]   Tobacco Use    Smoking status: Every Day     Packs/day: 0.50     Years: 10.00     Additional pack years: 0.00     Total pack years: 5.00     Types: Cigarettes     Passive exposure: Current    Smokeless tobacco: Never   Substance and Sexual Activity    Alcohol use: Yes     Comment: 1-2 drinks/month    Drug use: No    Sexual activity: Yes     Partners: Male   Other Topics Concern    Parent/sibling w/ CABG, MI or angioplasty before 65F 55M? Not Asked   Social History Narrative    .    3 kids - 2 adult, one is 14 (as of 2017).     Exercises 1-2x/week.      Social Determinants of Health     Financial Resource Strain: Not on file   Food Insecurity: Not on file   Transportation Needs: Not on file   Physical Activity: Not on file   Stress: Not on file   Social Connections: Not on file   Interpersonal Safety: Not on file   Housing Stability: Not on file       Past Medical History:   Past Medical History:   Diagnosis Date    Left medial knee pain     Medial epicondylitis, right elbow     Obesity (BMI 30-39.9)     Right elbow pain         Past Surgical History:   Past Surgical History:   Procedure Laterality Date    EXCISE LESION TRUNK N/A 2/5/2018    Procedure: EXCISE LESION TRUNK;;  Surgeon: Avani Szymanski MD;  Location: [facility]    EXCISE MASS LOWER EXTREMITY Bilateral 5/31/2017    Procedure: EXCISE MASS LOWER EXTREMITY;  EXCISION OF LIPOMAS RIGHT BUTTOCKS RIGHT LOWER THIGH, LEFT UPPER OUTER THIGH;  Surgeon: Avani Szymanski MD;  Location: [facility]    EXCISE MASS LOWER EXTREMITY Right 2/5/2018    Procedure: EXCISE MASS LOWER EXTREMITY;  EXCISION OF RIGHT UPPER ANTERIOR THIGH SOFT TISSUE MASS,EXCISION OF LEFT LOWER BACK SOFT TISSUE MASS;  Surgeon: Avani Szymanski MD;  Location: [facility]    IR CVC TUNNEL PLACEMENT > 5 YRS OF AGE  1/5/2024    LUMPECTOMY BREAST Left 1990s    benign       Allergies:   Allergies   Allergen Reactions    Blood Transfusion Related (Informational Only) Other (See Comments)      Patient has a history of a clinically significant antibody against RBC antigens.  A delay in compatible RBCs may occur.       Home Medications      Prior to Admission medications    Medication Sig Start Date End Date Taking? Authorizing Provider   acyclovir (ZOVIRAX) 800 MG tablet Take 1 tablet (800 mg) by mouth 2 times daily 2/2/24   King Vaz PA-C   amLODIPine (NORVASC) 5 MG tablet Take 1 tablet (5 mg) by mouth daily for 30 days 2/4/24 3/5/24  Gilbert Bains PA-C   letermovir (PREVYMIS) 480 MG TABS tablet Take 1 tablet (480 mg) by mouth daily 2/3/24   King Vaz PA-C   levofloxacin (LEVAQUIN) 250 MG tablet Take 3 tablets (750 mg) by mouth daily for 5 days 2/15/24 2/20/24  Coco Garay MBBS   OLANZapine (ZYPREXA) 5 MG tablet Take 1 tablet (5 mg) by mouth at bedtime  Patient not taking: Reported on 2/19/2024 2/2/24   King Vaz PA-C   Omega-3 Fatty Acids (FISH OIL) 1200 MG capsule Take 1,200 mg by mouth daily  Patient not taking: Reported on 2/7/2024    Reported, Patient   ondansetron (ZOFRAN ODT) 4 MG ODT tab Take 1 tablet (4 mg) by mouth daily before breakfast 2/3/24   King Vaz PA-C   oxyBUTYnin (DITROPAN) 5 MG tablet Take 1 tablet (5 mg) by mouth 3 times daily 2/19/24   Ijeoma Loza PA-C   pantoprazole (PROTONIX) 40 MG EC tablet Take 1 tablet (40 mg) by mouth every morning (before breakfast) 2/3/24   King Vaz PA-C   phenazopyridine (PYRIDIUM) 200 MG tablet Take 1 tablet (200 mg) by mouth 3 times daily as needed for irritation or pain (urinary discomfort) 2/12/24   Elva Etienne PA-C   potassium chloride ER (KLOR-CON M) 20 MEQ CR tablet Take 1 tablet (20 mEq) by mouth 2 times daily 2/19/24   Ijeoma Loza PA-C   sirolimus (GENERIC EQUIVALENT) 2 MG tablet Take 1 tablet (2 mg) by mouth daily 2/3/24   King Vaz PA-C   sulfamethoxazole-trimethoprim (BACTRIM DS) 800-160 MG tablet Take 1 tablet by mouth Every Mon, Tues two times daily  "2/12/24   Elva Etienne PA-C   triamcinolone (KENALOG) 0.1 % external cream Apply topically 2 times daily  Patient not taking: Reported on 2/6/2024 2/4/24   Gilbert Bains PA-C   ursodiol (ACTIGALL) 300 MG capsule Take 1 capsule (300 mg) by mouth 3 times daily 2/2/24   King Vaz PA-C       Review of Systems    Review of Systems:  CONSTITUTIONAL: NEGATIVE for fever, chills, change in weight  INTEGUMENTARY/SKIN: NEGATIVE for worrisome rashes, moles or lesions  EYES: NEGATIVE for vision changes or irritation  ENT/MOUTH: NEGATIVE for ear, mouth and throat problems  RESP: NEGATIVE for significant cough or SOB  CV: NEGATIVE for chest pain, palpitations or peripheral edema  GI: POSITIVE for nausea   female: decreased urinary stream, dysuria, frequency, hematuria, hesitancy, and urgency  MUSCULOSKELETAL: NEGATIVE for significant arthralgias or myalgia  NEURO: NEGATIVE for weakness, dizziness or paresthesias  ENDOCRINE: NEGATIVE for temperature intolerance, skin/hair changes  HEME/ALLERGY: NEGATIVE for bleeding problems  PSYCHIATRIC: NEGATIVE for changes in mood or affect    PHYSICAL EXAM      Weight     Wt Readings from Last 3 Encounters:   02/21/24 77.9 kg (171 lb 12.8 oz)   02/19/24 78 kg (172 lb)   02/16/24 78.3 kg (172 lb 11.2 oz)        KPS: 70    BP (!) 149/99 (BP Location: Left arm, Cuff Size: Adult Regular)   Pulse 116   Temp 97.9  F (36.6  C) (Oral)   Resp 20   Ht 1.565 m (5' 1.61\")   Wt 77.6 kg (171 lb 1.6 oz)   [facility] 11/05/2017   SpO2 97%   BMI 31.69 kg/m       General: NAD   Eyes: KEVON, sclera anicteric   Nose/Mouth/Throat: OP clear, buccal mucosa moist, no ulcerations   Lungs: CTA bilaterally  Cardiovascular: RRR, no M/R/G   Abdominal/Rectal: +BS, soft, NT, ND,   Lymphatics: No edema  Skin: faint petechial lesions bilateral LE    Neuro: A&O   Additional Findings: Gastelum site NT, no drainage.    Current aGVHD staging:  Skin 0, UGI 0, LGI 0, Liver 0 (keep in note through day +180 " for allos)      LABS AND IMAGING: I have assessed all abnormal lab values for their clinical significance and any values considered clinically significant have been addressed in the assessment and plan.        Lab Results   Component Value Date    WBC 7.7 02/21/2024    ANEUTAUTO 3.0 02/07/2024    HGB 11.3 (L) 02/21/2024    HCT 35.0 02/21/2024    PLT 83 (L) 02/21/2024     02/21/2024    POTASSIUM 4.0 02/21/2024    CHLORIDE 102 02/21/2024    CO2 25 02/21/2024     (H) 02/21/2024    BUN 4.2 (L) 02/21/2024    CR 0.53 02/21/2024    MAG 1.8 02/04/2024    INR 1.04 01/29/2024           SYSTEMS-BASED ASSESSMENT AND PLAN     Hortencia Baldwin is a 53 year old female with a history of MDS, undergoing MA (Bu/Flu) 6/8 URD Allo transplant, day +41.      BMT/IEC PROTOCOL for MT 2015-29   - Chemo protocol:  Day -6 through day -1: Keppra and Allopurinol   Day -5 through -2: Bu/Flu.   Day -1: Rest day   Day 0: Transplant. Recipient: O+, CMV+. Donor: O+, CMV-. Cell dose 6.5 x10^6.   - Restaging plan: per protocol   BMbx 2/6 -- Day 28 review with Dr. Garay on 2/9-note pending.   Flow neg; 100% donor. Chimerisms: CD33 is 100% donor, CD3 is 87% donor. Bone marrow 100% donor.      HEME/COAG  - Anemia, thrombocytopenia 2/2 due to chemotherapy/BMT, improving  # Iron overload: hx of transfusion dependent anemia, pre-transplant ferritin around 5,000. Recommend phlebotomy post transplant when retic count is restored and Hgb >10    # Platelet refractoriness: HLA platelets when available.   - Transfusion parameters: hemoglobin <7, platelets <10. Plt and hgb continue to improve despite hematuria  - GCSF PRN for ANC < 1.     IMMUNOCOMPROMISED  BK viruria: Dysuria, without hematuria or blood clots. 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 UA with calcium oxalate crystals. Ucx (2/12): mixed urogenital odilon. Trial of pyridium, Levaquin 250mg for 5 days for possible bacterial UTI without change in symptoms. Continue oxybutinin for now.    - Consult urology,  appreciate recs.   - Consult ID to discuss cidofovir, appreciate recs   - IVF NS at 100cc/hr   - repeat BK urine, urine adenovirus pending   - Serum BK   - Repeat UA/UC: UA with large leukocyte esterase, large blood, RBC, WBC. Completed course of levaquin 2/20, await culture given BK cystitis symptoms.        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 **initially avoided sirolimus d/t high risk VOD**. Tac drip discontinued 1/31, sirolimus started.  - 2/4 level therapeutic on 2mg daily. Finally held for level today 2/21, in process. Goal siro level is ~6.   - Pink maculopapular rash.TCM prescribed at discharge and prednisone 40 mg x 5 days (2/3-2/7). Resolved.      Dr Garay's note 2/15  - Elevated liver enzymes since 2/2/24, with eosinophilia noted 2/14 (when tac was changed to sirolimus). - improving rapidly  - Diarrhea on and off: 2 small episodes of loose stool infectious work up neg. - diarrhea resolved  - Rash for 3-4 days last week, non-pruritic. - resolved  Per Dr Garay: constellation of symptoms concerning for GVHD, especially persistently elevated liver enzymes and new eosinophilia. IR-guided liver biopsy scheduled for 2/23. 2/20 RUQ US suggestive of hepatic steatosis, normal dopplers. 2/21: Consider holding off on biopsy for now in the setting of acute issues as above, especially with improving LFTs and reassuring US. Biopsy is scheduled 2/23, not yet cancelled. Please cancel once final decision made.      CARDIOVASCULAR  # Chest pressure: new on 1/15. Resolved. See previous discharge summary dated 2/4/24 for details.   #Prolonged QTc to 507 on 1/22. EKG (1/30) NSR, Qtc 454.  # HTN: On norvasc 5mg daily.      GI/NUTRITION  # transaminitis: TPN discontinued (2/2), recently started sirolimus (previously on Tac d/t concern for VOD). No abdominal  pain/fluid retention. U/S . Recent viral testing neg. Known iron overload in setting of MA BMT monitor for now. Liver bx pending course. GVH/VOD/infectious/iron overload w/ MA chemo?  - 2/12: med box corrected to TID ursodiol (was in BID), perhaps this correction helped improve LFTs?  # Epigastric pain (1/18): RUQ ultrasound showing patent doppler throughout, no ascites, nonspecific elevated hepatic artery restrictive index (see full impression above).                - Ulcer prophylaxis: PPI  - VOD ppx: Ursodiol. See liver US 2/21 results above        #urinary urge/frequency: 2/12 BK urine >100 million copies. UA: RBC, Wbc, & calcium oxylate crystals. Ucx negative. Rx Pyridium.  See above.       DERM:  # Foot, hand warts. Curbside derm on 1/8, no recs while inpatient, typically managed OP.    # rash as per above.    Known issues that I take into account for medical decisions, with salient changes to the plan considering these complexities noted above.    Patient Active Problem List   Diagnosis    Left medial knee pain    Obesity (BMI 30-39.9)    Anemia, unspecified type    Macrocytic anemia    MDS (myelodysplastic syndrome) (H)     Clinically Significant Risk Factors Present on Admission        # Hypokalemia: Lowest K = 3.2 mmol/L in last 2 days, will replace as needed         # Thrombocytopenia: Lowest platelets = 74 in last 2 days, will monitor for bleeding                      I spent 80 minutes in the care of this patient today, which included time necessary for preparation for the visit, obtaining history, ordering medications/tests/procedures as medically indicated, review of pertinent medical literature, counseling of the patient, communication of recommendations to the care team, and documentation time.    Ronit Ruth NP

## 2024-02-21 NOTE — CONSULTS
Urologic Surgery Consultation Note  [facility] MRN# [id number]   Age: 53 year old YOB: 1970     Date of Admission:  2/21/2024    Reason for consult: Hematuria, BK cystitis       Requesting physician: Nestor Bonds        Level of consult: Consult, follow and place orders           Assessment:   Irma Foreman is a 53 year old female with a PMH of MDS, warm AIHA, transfusion and transfusion dependent anemia, s/p allo transplant day 41.  She presents with 2-week of dysuria, and 2 days of hematuria.  She has known BK pyuria.  She is emptying the bladder adequately, with PVR 22 mL.  Her urine color is watermelon colored.  She is not neutropenic or have leukocytosis, and hemoglobin stable she is hemodynamically stable.  There is no urgent surgical intervention indicated.         Recommendations:   -No indication for urgent surgical intervention  -Given that she is emptying adequately, she does not need catheterization  -Treatment for BK virus cystitis per ID team  -Agree with oxybutynin as needed for symptom control  -Can consider Pyridium as needed for dysuria.  -Well-hydrated, monitor PVRs as needed if there are concerns for retention  -Urology service to sign off.          History of Present Illness:   CC: Hematuria, dysuria     History is obtained from the patient    Irma is a 53-year-old female with the past medical history stated anemia section as above, she presents with 2 weeks history of dysuria, and most more recently 2 days history of hematuria, she states that she is emptying the bladder adequately.  She denies any fevers chills flank pain or other changes in her urinary symptoms.  She does not have any baseline urinary symptoms including frequency urgency, incontinence.  She was currently has recently underwent a bone marrow transplant, now admitted because of concerns for hematuria.  She has not had any other systemic symptoms denies chest pain, headache,  lightheadedness.          Past Medical History:     Past Medical History:   Diagnosis Date    Left medial knee pain     Medial epicondylitis, right elbow     Obesity (BMI 30-39.9)     Right elbow pain              Past Surgical History:     Past Surgical History:   Procedure Laterality Date    EXCISE LESION TRUNK N/A 2/5/2018    Procedure: EXCISE LESION TRUNK;;  Surgeon: Avani Szymanski MD;  Location: [facility]    EXCISE MASS LOWER EXTREMITY Bilateral 5/31/2017    Procedure: EXCISE MASS LOWER EXTREMITY;  EXCISION OF LIPOMAS RIGHT BUTTOCKS RIGHT LOWER THIGH, LEFT UPPER OUTER THIGH;  Surgeon: Avani Szymanski MD;  Location: [facility]    EXCISE MASS LOWER EXTREMITY Right 2/5/2018    Procedure: EXCISE MASS LOWER EXTREMITY;  EXCISION OF RIGHT UPPER ANTERIOR THIGH SOFT TISSUE MASS,EXCISION OF LEFT LOWER BACK SOFT TISSUE MASS;  Surgeon: Avani Szymanski MD;  Location: [facility]    IR CVC TUNNEL PLACEMENT > 5 YRS OF AGE  1/5/2024    LUMPECTOMY BREAST Left 1990s    benign             Social History:     Social History     Socioeconomic History    Marital status: Single     Spouse name: Not on file    Number of children: Not on file    Years of education: Not on file    Highest education level: Not on file   Occupational History    Occupation: [employer]   Tobacco Use    Smoking status: Every Day     Packs/day: 0.50     Years: 10.00     Additional pack years: 0.00     Total pack years: 5.00     Types: Cigarettes     Passive exposure: Current    Smokeless tobacco: Never   Substance and Sexual Activity    Alcohol use: Yes     Comment: 1-2 drinks/month    Drug use: No    Sexual activity: Yes     Partners: Male   Other Topics Concern    Parent/sibling w/ CABG, MI or angioplasty before 65F 55M? Not Asked   Social History Narrative    .    3 kids - 2 adult, one is 14 (as of 2017).     Exercises 1-2x/week.      Social Determinants of Health     Financial Resource Strain: Not on file   Food Insecurity: Not on  file   Transportation Needs: Not on file   Physical Activity: Not on file   Stress: Not on file   Social Connections: Not on file   Interpersonal Safety: Not on file   Housing Stability: Not on file             Family History:     Family History   Problem Relation Age of Onset    Hypertension Mother     Diabetes No family hx of     Cerebrovascular Disease No family hx of     Myocardial Infarction No family hx of     Coronary Artery Disease Early Onset No family hx of     Breast Cancer No family hx of     Ovarian Cancer No family hx of     Colon Cancer No family hx of              Allergies:      Allergies   Allergen Reactions    Blood Transfusion Related (Informational Only) Other (See Comments)     Patient has a history of a clinically significant antibody against RBC antigens.  A delay in compatible RBCs may occur.             Medications:   [COMPLETED] morphine (PF) injection 2 mg    acyclovir (ZOVIRAX) 800 MG tablet, Take 1 tablet (800 mg) by mouth 2 times daily  amLODIPine (NORVASC) 5 MG tablet, Take 1 tablet (5 mg) by mouth daily for 30 days  letermovir (PREVYMIS) 480 MG TABS tablet, Take 1 tablet (480 mg) by mouth daily  [] levofloxacin (LEVAQUIN) 250 MG tablet, Take 3 tablets (750 mg) by mouth daily for 5 days  OLANZapine (ZYPREXA) 5 MG tablet, Take 1 tablet (5 mg) by mouth at bedtime (Patient not taking: Reported on 2024)  Omega-3 Fatty Acids (FISH OIL) 1200 MG capsule, Take 1,200 mg by mouth daily (Patient not taking: Reported on 2024)  ondansetron (ZOFRAN ODT) 4 MG ODT tab, Take 1 tablet (4 mg) by mouth daily before breakfast  oxyBUTYnin (DITROPAN) 5 MG tablet, Take 1 tablet (5 mg) by mouth 3 times daily  pantoprazole (PROTONIX) 40 MG EC tablet, Take 1 tablet (40 mg) by mouth every morning (before breakfast)  phenazopyridine (PYRIDIUM) 200 MG tablet, Take 1 tablet (200 mg) by mouth 3 times daily as needed for irritation or pain (urinary discomfort)  potassium chloride ER (KLOR-CON M) 20  "MEQ CR tablet, Take 1 tablet (20 mEq) by mouth 2 times daily  sirolimus (GENERIC EQUIVALENT) 2 MG tablet, Take 1 tablet (2 mg) by mouth daily  sulfamethoxazole-trimethoprim (BACTRIM DS) 800-160 MG tablet, Take 1 tablet by mouth Every Mon, Tues two times daily  triamcinolone (KENALOG) 0.1 % external cream, Apply topically 2 times daily (Patient not taking: Reported on 2/6/2024)  ursodiol (ACTIGALL) 300 MG capsule, Take 1 capsule (300 mg) by mouth 3 times daily              Review of Systems:      All other review of systems negative, except for what is mentioned above        Physical Exam:   BP (!) 149/99 (BP Location: Left arm, Cuff Size: Adult Regular)   Pulse 116   Temp 97.9  F (36.6  C) (Oral)   Resp 20   Ht 1.565 m (5' 1.61\")   Wt 77.6 kg (171 lb 1.6 oz)   LMP 11/05/2017   SpO2 97%   BMI 31.69 kg/m    GEN: AAO*3, not in acute distress, lying comfortably  HEENT: atraumatic, mucosa moist  CVS: normal heart rate, perfusing extremeties  RESP: no resp distress, satting well on RA  ABD: soft, nontender, nondistended  : Deferred  EXT: Extremities atraumatic, grossly normal range of motion  NEURO/PSYCH: CN grossly normal, no focal weakness, normal mood and thought process              Data:     Recent Labs   Lab Test 02/21/24  0816 02/19/24  1203 02/16/24  1054   WBC 7.7 5.8 4.9   HGB 11.3* 10.0* 9.3*   CR 0.53 0.44* 0.39*         No results found for this or any previous visit from the past 365 days.           Discussed with staff surgeon Dr. Mcdonough, who agrees with the assessment and plans    Isaac Goldberg MD   PGY-2 Urology  [facility]          "

## 2024-02-21 NOTE — NURSING NOTE
Chief Complaint   Patient presents with    Blood Draw     Labs drawn via CVC by RN. Flushed with saline and heparin. Pt tolerated well. Vitals taken. Patient checked into next appointment.       Chari Riley RN

## 2024-02-21 NOTE — PROGRESS NOTES
BMT  Progress Note  2/21/24      Patient ID:  Irma Foreman is a 53 year old female with a PMH of MDS, warm AIHA, transfusion and transfusion dependent anemia presented to  to undergo a myeloablative allogeneic transplant. She was recently COVID+ on 12/26 but tested negative on 1/2 with resolution of nearly all URI symptoms. Undergoing MA (Bu/Flu) 6/8 URD Allo transplant, day +41     HPI: She returns for micafungin and follow up of painful bloody urination.  Continues to have bloody urine with clots. Now difficult to start a stream, passing larger clots. Very painful, urinating every 15 minutes at night, more frequently even during clinic visit. She describes sweating when she starts to urinate due to 10/10 pain.   She previously was having pain on her right side noticeable when she was lying flat. No pain with eating, or breathing deeply, no radiating pain. No recent coughing fits. Rib xray neg.  Eating okay, no vomiting or diarrhea. Some nausea with her meds.  No rash.   No fever. No cough or cold symptoms.      Review of Systems                                                                                                                                         Negative unless stated in the HPI.       PHYSICAL EXAM                                                                                                                                                     Lab Results   Component Value Date    WBC 7.7 02/21/2024    ANEU 5.5 02/21/2024    HGB 11.3 (L) 02/21/2024    HCT 35.0 02/21/2024    PLT 83 (L) 02/21/2024     02/21/2024    POTASSIUM 4.0 02/21/2024    CHLORIDE 102 02/21/2024    CO2 25 02/21/2024     (H) 02/21/2024    BUN 4.2 (L) 02/21/2024    CR 0.53 02/21/2024    MAG 1.8 02/04/2024    INR 1.04 01/29/2024    BILITOTAL 0.3 02/21/2024    AST 54 (H) 02/21/2024    ALT 75 (H) 02/21/2024    ALKPHOS 126 02/21/2024    PROTTOTAL 7.0 02/21/2024    ALBUMIN 4.2 02/21/2024     Wt Readings from Last 4  Encounters:   02/21/24 77.9 kg (171 lb 12.8 oz)   02/19/24 78 kg (172 lb)   02/16/24 78.3 kg (172 lb 11.2 oz)   02/14/24 77.2 kg (170 lb 3.2 oz)     KPS: 70  General: NAD  Eyes: sclera anicteric   ENT: OP moist without lesions.   Lungs: CTAB   Cardiovascular: RRR, no M/R/G.   Lymphatics: No edema.   Abd: soft, nontender, non distended. NO RUQ tenderness to deep palpation  Skin: faint petechial lesions bilateral LE   MSK: 2/19 hard lump on her right lower ribs, tender to palpation, now unable to palpate, area  2/21  Neuro: A&O   Access: R Gastelum site NT, no drainage.    Acute GVHD          2/14/2024    16:34 2/12/2024    15:00 2/5/2024    09:39   Acute GVHD   New evidence of Acute Edsun-Qkighw-Wdee Disease developed since last entry? No No No         LABS AND IMAGING: I have assessed all abnormal lab values for their clinical significance and any values considered clinically significant have been addressed in the assessment and plan.       US Abdominal with Doppler (1/18/24)  Impression:   1. Patent Doppler evaluation of the abdomen with antegrade flow throughout.  2. Elevated hepatic artery resistive index of 0.81, nonspecific though can be seen with hepatic venous congestion which could be related to venous occlusive disease..    US Abdominal with Doppler (2/20/24)  Impression:   1. Hepatomegaly with increased hepatic parenchymal echogenicity, suggesting hepatic steatosis. No focal hepatic lesion.  2. Normal Doppler evaluation.     SYSTEMS-BASED ASSESSMENT AND PLAN      Hortencia Baldwin is a 53 year old female with a history of MDS, undergoing MA (Bu/Flu) 6/8 URD Allo transplant, day +41     BMT/IEC PROTOCOL for MT 2015-29   - Chemo protocol:  Day -6 through day -1: Keppra and Allopurinol   Day -5 through -2: Bu/Flu.   Day -1: Rest day   Day 0: Transplant. Recipient: O+, CMV+. Donor: O+, CMV-. Cell dose 6.5 x10^6.   - Restaging plan: per protocol   BMbx 2/6 -- Day 28 review with Dr. Garay on 2/9-note  pending.   Flow neg; 100% donor     HEME/COAG  - anemia, thrombocytopenia 2/2 due to chemotherapy/BMT.   # Iron overload: hx of transfusion dependent anemia, pre-transplant ferritin around 5,000. Recommend phlebotomy post transplant when retic count is restored and Hgb >10    # Platelet refractoriness: HLA platelets when available.   - Transfusion parameters: hemoglobin <7, platelets <10. Plt and hgb continue to improve despite hematuria  - GCSF PRN for ANC < 1.     IMMUNOCOMPROMISED  Afebrile  BK uremia: Dysuria, without hematuria or blood clots. 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 UA with calcium oxalate crystals. Ucx (2/12): mixed urogenital odilon. No improvement with pyridium. Unable to tolerate oxycodone due to bad dreams. Dr Garay gave 3 day course levaquin without improvement. Oxybutynin without improvement.    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 **initially avoided sirolimus d/t high risk VOD**. Tac drip discontinued 1/31, sirolimus started.  - 2/4 level therapeutic on 2mg daily. Finally held for level today 2/21, in process  - Pink maculopapular rash.TCM prescribed at discharge and prednisone 40 mg x 5 days (2/3-2/7). Resolved.     Dr Garay's note 2/15  - Elevated liver enzymes since 2/2/24 (when tac was changed to sirolimus). - improving rapidly  - Eosinophilia (2/14/24)  - Diarrhea on and off: 2 small episodes of loose stool infectious work up neg. - diarrhea resolved  - Rash for the past 3-4 days, non-pruritic. - resolved  Dr Garay's Recommendation: constellation of symptoms concerning for GVHD, especially persistently elevated liver enzymes and new eosinophilia. IR-guided liver biopsy scheduled for 2/22. 2/20 RUQ US suggestive of hepatic steatosis, normal dopplers. Potential to cancel biopsy    CARDIOVASCULAR  # Chest pressure: new on 1/15, worse  with sitting up/activity, better while laying flat. RESOLVED.   EKG: new QR pattern in V1, suggests right ventricular conduction delay  Troponin negative x1   Echo (1/15): LVEF 55-60%, no pericardial effusion present. Cardiology consulted: no further recs   #Prolonged QTc to 507 on 1/22. EKG (1/30) NSR, Qtc 454.  # HTN: On norvasc 5mg daily.     GI/NUTRITION  # transaminitis: TPN discontinued (2/2), recently started sirolimus (previously on Tac d/t concern for VOD). No abdominal pain/fluid retention. Recent U/S. Recent viral testing neg. Known iron overload in setting of MA BMT monitor for now. Liver bx pending course. GVH/VOD/infectious/iron overload w/ MA chemo?  - 2/12: med box corrected to TID ursodiol (was in there bid), perhaps this correction helped improve LFTs?  # Epigastric pain (1/18): RUQ ultrasound showing patent doppler throughout, no ascites, nonspecific elevated hepatic artery restrictive index (see full impression above).                - Ulcer prophylaxis: PPI  - VOD ppx: Ursodiol. See liver US 2/21 results above         #urinary urge/frequency: 2/12  BK urine >100 million copies. UA: RBC, Wbc, & calcium oxylate crystals. Ucx negative.  Rx Pyridium.    2/15 levaquin 250mg for 5 days for possible bacterial UTI. Qtc normal.   2/19 will try Oxybutynin  2/21 significantly worse in frequency, size of clots and difficulty to start urination or empty bladder, tenderness, inability to sleep.       DERM:  # Foot, hand warts. Curbside derm on 1/8, no recs while inpatient, typically managed OP.    # rash as per above.      Summary: admit to 5C for pain control, likely catheterization, intravesicular cidofovir to be determined by inpt team    Ijeoma Loza PAC  [phone]

## 2024-02-21 NOTE — NURSING NOTE
"Oncology Rooming Note    February 21, 2024 8:38 AM   Hortencia Baldwin is a 53 year old female who presents for:    Chief Complaint   Patient presents with    Blood Draw     Labs drawn via CVC by RN. Flushed with saline and heparin. Pt tolerated well. Vitals taken. Patient checked into next appointment.      Oncology Clinic Visit     Hx of MDS     Initial Vitals: BP (!) 144/99 (BP Location: Right arm, Patient Position: Sitting, Cuff Size: Adult Regular)   Pulse 120   Temp 98.2  F (36.8  C) (Oral)   Resp 18   Wt 77.9 kg (171 lb 12.8 oz)   LMP 11/05/2017   SpO2 98%   BMI 31.41 kg/m   Estimated body mass index is 31.41 kg/m  as calculated from the following:    Height as of 1/5/24: 1.575 m (5' 2.01\").    Weight as of this encounter: 77.9 kg (171 lb 12.8 oz). Body surface area is 1.85 meters squared.  Worst Pain (10) Comment: Data Unavailable   Patient's last menstrual period was 11/05/2017.  Allergies reviewed: Yes  Medications reviewed: Yes    Medications: Medication refills not needed today.  Pharmacy name entered into [employer]:    [id number] - [place], [place] - [address] [facility]  [id number] - [place], [place] - [address] AT [facility]    Frailty Screening:   Is the patient here for a new oncology consult visit in cancer care? 2. No      Clinical concerns: Patient having symptoms of UTI. Has not had relief with Oxybutynin, and Pyridum.        Sarai Ramos, ZENIA              "

## 2024-02-22 ENCOUNTER — APPOINTMENT (OUTPATIENT)
Dept: PHYSICAL THERAPY | Facility: CLINIC | Age: 54
DRG: 920 | End: 2024-02-22
Payer: COMMERCIAL

## 2024-02-22 ENCOUNTER — DOCUMENTATION ONLY (OUTPATIENT)
Dept: TRANSPLANT | Facility: CLINIC | Age: 54
End: 2024-02-22
Payer: COMMERCIAL

## 2024-02-22 LAB
ALBUMIN SERPL BCG-MCNC: 3.8 G/DL (ref 3.5–5.2)
ALP SERPL-CCNC: 107 U/L (ref 40–150)
ALT SERPL W P-5'-P-CCNC: 60 U/L (ref 0–50)
ANION GAP SERPL CALCULATED.3IONS-SCNC: 11 MMOL/L (ref 7–15)
AST SERPL W P-5'-P-CCNC: 47 U/L (ref 0–45)
BACTERIA UR CULT: NORMAL
BASOPHILS # BLD AUTO: 0.1 10E3/UL (ref 0–0.2)
BASOPHILS NFR BLD AUTO: 2 %
BILIRUB DIRECT SERPL-MCNC: <0.2 MG/DL (ref 0–0.3)
BILIRUB SERPL-MCNC: 0.3 MG/DL
BK VIRUS DNA IU/ML, INSTRUMENT (6800): 1350 IU/ML
BK VIRUS SPECIMEN TYPE: ABNORMAL
BK VIRUS SPECIMEN TYPE: ABNORMAL
BKV DNA # SPEC NAA+PROBE: ABNORMAL IU/ML
BKV DNA SPEC NAA+PROBE-LOG#: 3.1 {LOG_COPIES}/ML
BKV DNA SPEC NAA+PROBE-LOG#: >8 {LOG_COPIES}/ML
BUN SERPL-MCNC: 4.9 MG/DL (ref 6–20)
C DIFF TOX B STL QL: NEGATIVE
CALCIUM SERPL-MCNC: 9.2 MG/DL (ref 8.6–10)
CHLORIDE SERPL-SCNC: 106 MMOL/L (ref 98–107)
CREAT SERPL-MCNC: 0.44 MG/DL (ref 0.51–0.95)
CULTURE HARVEST COMPLETE DATE: NORMAL
CULTURE HARVEST COMPLETE DATE: NORMAL
DEPRECATED HCO3 PLAS-SCNC: 23 MMOL/L (ref 22–29)
EBV DNA # SPEC NAA+PROBE: NOT DETECTED COPIES/ML
EGFRCR SERPLBLD CKD-EPI 2021: >90 ML/MIN/1.73M2
EOSINOPHIL # BLD AUTO: 0.4 10E3/UL (ref 0–0.7)
EOSINOPHIL NFR BLD AUTO: 5 %
ERYTHROCYTE [DISTWIDTH] IN BLOOD BY AUTOMATED COUNT: 21.7 % (ref 10–15)
FERRITIN SERPL-MCNC: ABNORMAL NG/ML (ref 11–328)
GLUCOSE SERPL-MCNC: 114 MG/DL (ref 70–99)
HCT VFR BLD AUTO: 30.4 % (ref 35–47)
HGB BLD-MCNC: 9.6 G/DL (ref 11.7–15.7)
IMM GRANULOCYTES # BLD: 0.3 10E3/UL
IMM GRANULOCYTES NFR BLD: 4 %
INTERPRETATION: NORMAL
ISCN: NORMAL
LYMPHOCYTES # BLD AUTO: 1.7 10E3/UL (ref 0.8–5.3)
LYMPHOCYTES NFR BLD AUTO: 21 %
MCH RBC QN AUTO: 33.4 PG (ref 26.5–33)
MCHC RBC AUTO-ENTMCNC: 31.6 G/DL (ref 31.5–36.5)
MCV RBC AUTO: 106 FL (ref 78–100)
METHODS: NORMAL
MONOCYTES # BLD AUTO: 1.3 10E3/UL (ref 0–1.3)
MONOCYTES NFR BLD AUTO: 16 %
NEUTROPHILS # BLD AUTO: 4.3 10E3/UL (ref 1.6–8.3)
NEUTROPHILS NFR BLD AUTO: 52 %
NRBC # BLD AUTO: 0 10E3/UL
NRBC BLD AUTO-RTO: 0 /100
PLATELET # BLD AUTO: 65 10E3/UL (ref 150–450)
POTASSIUM SERPL-SCNC: 3.8 MMOL/L (ref 3.4–5.3)
PROT SERPL-MCNC: 6 G/DL (ref 6.4–8.3)
RBC # BLD AUTO: 2.87 10E6/UL (ref 3.8–5.2)
SODIUM SERPL-SCNC: 140 MMOL/L (ref 135–145)
TRIGL SERPL-MCNC: 388 MG/DL
WBC # BLD AUTO: 8.1 10E3/UL (ref 4–11)

## 2024-02-22 PROCEDURE — 206N000001 HC R&B BMT UMMC

## 2024-02-22 PROCEDURE — 82248 BILIRUBIN DIRECT: CPT

## 2024-02-22 PROCEDURE — 250N000013 HC RX MED GY IP 250 OP 250 PS 637

## 2024-02-22 PROCEDURE — 97161 PT EVAL LOW COMPLEX 20 MIN: CPT | Mod: GP

## 2024-02-22 PROCEDURE — 250N000011 HC RX IP 250 OP 636: Mod: JZ

## 2024-02-22 PROCEDURE — 80048 BASIC METABOLIC PNL TOTAL CA: CPT

## 2024-02-22 PROCEDURE — 250N000011 HC RX IP 250 OP 636

## 2024-02-22 PROCEDURE — 97530 THERAPEUTIC ACTIVITIES: CPT | Mod: GP

## 2024-02-22 PROCEDURE — 80053 COMPREHEN METABOLIC PANEL: CPT

## 2024-02-22 PROCEDURE — 258N000003 HC RX IP 258 OP 636

## 2024-02-22 PROCEDURE — 51702 INSERT TEMP BLADDER CATH: CPT | Performed by: PHYSICIAN ASSISTANT

## 2024-02-22 PROCEDURE — 97110 THERAPEUTIC EXERCISES: CPT | Mod: GP

## 2024-02-22 PROCEDURE — 82728 ASSAY OF FERRITIN: CPT

## 2024-02-22 PROCEDURE — 84478 ASSAY OF TRIGLYCERIDES: CPT

## 2024-02-22 PROCEDURE — 87493 C DIFF AMPLIFIED PROBE: CPT

## 2024-02-22 PROCEDURE — 99418 PROLNG IP/OBS E/M EA 15 MIN: CPT

## 2024-02-22 PROCEDURE — 85025 COMPLETE CBC W/AUTO DIFF WBC: CPT

## 2024-02-22 PROCEDURE — 0T9B70Z DRAINAGE OF BLADDER WITH DRAINAGE DEVICE, VIA NATURAL OR ARTIFICIAL OPENING: ICD-10-PCS | Performed by: PHYSICIAN ASSISTANT

## 2024-02-22 PROCEDURE — 250N000012 HC RX MED GY IP 250 OP 636 PS 637: Performed by: INTERNAL MEDICINE

## 2024-02-22 PROCEDURE — 999N000111 HC STATISTIC OT IP EVAL DEFER

## 2024-02-22 PROCEDURE — 99233 SBSQ HOSP IP/OBS HIGH 50: CPT | Mod: FS

## 2024-02-22 PROCEDURE — 87081 CULTURE SCREEN ONLY: CPT

## 2024-02-22 RX ORDER — SALIVA STIMULANT COMB. NO.3
2 SPRAY, NON-AEROSOL (ML) MUCOUS MEMBRANE
Status: DISCONTINUED | OUTPATIENT
Start: 2024-02-22 | End: 2024-02-29 | Stop reason: HOSPADM

## 2024-02-22 RX ORDER — CIDOFOVIR DIHYDRATE 375 MG/5ML
5 INJECTION, SOLUTION INTRAVENOUS
Status: DISCONTINUED | OUTPATIENT
Start: 2024-02-22 | End: 2024-02-22

## 2024-02-22 RX ORDER — PHENAZOPYRIDINE HYDROCHLORIDE 200 MG/1
200 TABLET, FILM COATED ORAL
Status: DISCONTINUED | OUTPATIENT
Start: 2024-02-22 | End: 2024-02-26

## 2024-02-22 RX ORDER — OXYBUTYNIN CHLORIDE 5 MG/1
5 TABLET ORAL 4 TIMES DAILY
Status: DISCONTINUED | OUTPATIENT
Start: 2024-02-22 | End: 2024-02-29 | Stop reason: HOSPADM

## 2024-02-22 RX ADMIN — ACYCLOVIR 800 MG: 800 TABLET ORAL at 19:57

## 2024-02-22 RX ADMIN — LETERMOVIR 480 MG: 480 TABLET, FILM COATED ORAL at 07:47

## 2024-02-22 RX ADMIN — POTASSIUM CHLORIDE 20 MEQ: 750 TABLET, EXTENDED RELEASE ORAL at 19:57

## 2024-02-22 RX ADMIN — ONDANSETRON 4 MG: 4 TABLET, ORALLY DISINTEGRATING ORAL at 07:47

## 2024-02-22 RX ADMIN — OLANZAPINE 5 MG: 2.5 TABLET, FILM COATED ORAL at 22:43

## 2024-02-22 RX ADMIN — CIDOFOVIR DIHYDRATE 393.52 MG: 375 INJECTION, SOLUTION INTRAVENOUS at 13:01

## 2024-02-22 RX ADMIN — PHENAZOPYRIDINE HYDROCHLORIDE 200 MG: 200 TABLET ORAL at 17:00

## 2024-02-22 RX ADMIN — PANTOPRAZOLE SODIUM 40 MG: 40 TABLET, DELAYED RELEASE ORAL at 07:47

## 2024-02-22 RX ADMIN — URSODIOL 300 MG: 300 CAPSULE ORAL at 13:32

## 2024-02-22 RX ADMIN — POTASSIUM CHLORIDE 20 MEQ: 750 TABLET, EXTENDED RELEASE ORAL at 07:46

## 2024-02-22 RX ADMIN — SIROLIMUS 1.5 MG: 1 TABLET ORAL at 07:47

## 2024-02-22 RX ADMIN — OXYBUTYNIN CHLORIDE 5 MG: 5 TABLET ORAL at 13:01

## 2024-02-22 RX ADMIN — ACYCLOVIR 800 MG: 800 TABLET ORAL at 07:47

## 2024-02-22 RX ADMIN — AMLODIPINE BESYLATE 5 MG: 5 TABLET ORAL at 07:56

## 2024-02-22 RX ADMIN — OXYCODONE HYDROCHLORIDE 5 MG: 5 TABLET ORAL at 11:55

## 2024-02-22 RX ADMIN — OXYBUTYNIN CHLORIDE 5 MG: 5 TABLET ORAL at 16:58

## 2024-02-22 RX ADMIN — URSODIOL 300 MG: 300 CAPSULE ORAL at 07:46

## 2024-02-22 RX ADMIN — LORAZEPAM 0.5 MG: 2 INJECTION, SOLUTION INTRAMUSCULAR; INTRAVENOUS at 13:32

## 2024-02-22 RX ADMIN — OXYBUTYNIN CHLORIDE 5 MG: 5 TABLET ORAL at 19:57

## 2024-02-22 RX ADMIN — SODIUM CHLORIDE: 9 INJECTION, SOLUTION INTRAVENOUS at 03:51

## 2024-02-22 RX ADMIN — URSODIOL 300 MG: 300 CAPSULE ORAL at 19:57

## 2024-02-22 RX ADMIN — HYDROMORPHONE HYDROCHLORIDE 0.5 MG: 1 INJECTION, SOLUTION INTRAMUSCULAR; INTRAVENOUS; SUBCUTANEOUS at 15:03

## 2024-02-22 RX ADMIN — PHENAZOPYRIDINE HYDROCHLORIDE 200 MG: 200 TABLET ORAL at 11:55

## 2024-02-22 RX ADMIN — OXYBUTYNIN CHLORIDE 5 MG: 5 TABLET ORAL at 07:47

## 2024-02-22 RX ADMIN — PHENAZOPYRIDINE HYDROCHLORIDE 200 MG: 200 TABLET ORAL at 09:31

## 2024-02-22 ASSESSMENT — ACTIVITIES OF DAILY LIVING (ADL)
ADLS_ACUITY_SCORE: 20
DEPENDENT_IADLS:: INDEPENDENT
ADLS_ACUITY_SCORE: 20

## 2024-02-22 NOTE — PLAN OF CARE
"Goal Outcome Evaluation:      Plan of Care Reviewed With: patient    Patient admitted from clinic with possible KBV     Blood pressure (!) 147/92, pulse 97, temperature 97.8  F (36.6  C), temperature source Oral, resp. rate 16, height 1.565 m (5' 1.61\"), weight 77.6 kg (171 lb 1.6 oz), last menstrual period 11/05/2017, SpO2 97%, not currently breastfeeding.    Neuro: A&Ox4.   Cardiac: No tele, Other vitals stable   Respiratory: Sating> 92% on RA.  GI/: Patient voiding very frequently small amounts of watermelon colored urine with few clots- patient encouraged to drink water, no BM, awaiting specimen  for stool sample  Diet/appetite: Tolerating regular diet. Eating well.  Activity: Up independently in room and to bathroom  Pain: At acceptable level on current regimen. Pain when urinating  Skin: No new deficits noted.  LDA's: Right chest Gastelum  Plan: Continue with POC. Notify primary team with changes.    "

## 2024-02-22 NOTE — PLAN OF CARE
Goal Outcome Evaluation:      Frequent urination with reddish/pink urine. Clots appear jelly- like. Reports pain with urination and urgency. No requests for pain medication. Commode placed at bedside for night time safety. Continue plan of care.

## 2024-02-22 NOTE — UTILIZATION REVIEW
Admission Status; Secondary Review Determination       Under the authority of the Utilization Management Committee, the utilization review process indicated a secondary review on the above patient. The review outcome is based on review of the medical records, discussions with staff, and applying clinical experience noted on the date of the review.     (x) Inpatient Status Appropriate - This patient's medical care is consistent with medical management for inpatient care and reasonable inpatient medical practice.     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 On day +41 post-transplant, she presented with hematuria, dysuria, and clots. Her bone marrow biopsy at day 28 showed 100% donor chimerism. She is experiencing anemia and thrombocytopenia due to chemotherapy/BMT, which are improving. Iron overload is noted with a pre-transplant ferritin around 5,000, and platelet refractoriness is being managed with HLA-matched platelets. She is immunocompromised with BK viruria, undergoing a urology consult, and ID consult for potential cidofovir treatment.  The patient's complex medical condition, including the recent allogeneic transplant, immunocompromised state with BK viruria, and potential for GVHD, necessitates inpatient care. This environment provides the necessary monitoring and immediate access to medical interventions, such as transfusions and adjustments in immunosuppressive therapy, to manage and mitigate complications effectively.   The expected length of stay at the time of admission was more than 2 nights because of the severity of illness, intensity of service provided, and risk for adverse outcome. Inpatient  admission is appropriate.         This document was produced using voice recognition software       The information on this document is developed by the utilization review team in order for the business office to ensure compliance. This only denotes the appropriateness of proper admission status and does not reflect the quality of care rendered.   The definitions of Inpatient Status and Observation Status used in making the determination above are those provided in the CMS Coverage Manual, Chapter 1 and Chapter 6, section 70.4.   Sincerely,   SHORTY MEADOWS MD   System Medical Director   Utilization Management   [facility].

## 2024-02-22 NOTE — PLAN OF CARE
Flowsheets (Taken 2/22/2024 1111)  Outcome Evaluation: IDT will follow for support and discharge needs pending clinical course  Plan of Care Reviewed With: patient  Overall Patient Progress: no change

## 2024-02-22 NOTE — PROGRESS NOTES
Urology Brief Note:    Hauser placement is desired for cidofovir administration.  Several nurses have tried unsuccessfully due to patient discomfort.      O;  External  reveals atrophic changes but no lesions, rashes, bleeding or discharge  Urethra is orthotopic.     Prepped and draped  16Fr latex Hauser placed without difficulty using sterile precautions  10cc in the Hauser balloon  Secured to left leg tension-free  Output of ~50cc light pink urine with occasional tiny blood clots.     A/P  1) BK virus positivity with cystitis, per the primary team  2) Desires Hauser for cidofovir administration    - Should patient need another Hauser, ok for nursing to re-attempt.   - Hauser cares per primary team  - Continue oxybutynin to help with bladder spasms that may be exacerbated by the Hauser. Could consider oxybutynin ER 5-10mg daily to reduce pill burden  - oxybutynin can cause constipation.  Keep bowels soft and regular  - OK for RNs to flush bladder PRN if there are concerns for Hauser blockage.  To do this, disconnect Hauser from its drainage tubing.  Use a 60cc catheter-tipped syringe to instill 60cc sterile normal saline then aspirate back to remove debris.  Repeat as necessary.     [facility]   [phone]

## 2024-02-22 NOTE — CONSULTS
Care Management Initial Consult    General Information  Assessment completed with: Patient,    Type of CM/SW Visit: Initial Assessment  Primary Care Provider verified and updated as needed: Yes   Readmission within the last 30 days: planned readmission   Return Category: Exacerbation of disease    Reason for Consult: care coordination/care conference, discharge planning  Advance Care Planning: Advance Care Planning Reviewed: no concerns identified, questions answered          Communication Assessment  Patient's communication style: spoken language (English or Bilingual)    Hearing Difficulty or Deaf: no   Wear Glasses or Blind: no    Cognitive  Cognitive/Neuro/Behavioral: WDL                      Living Environment:   People in home: significant other     Current living Arrangements: apartment      Able to return to prior arrangements: yes       Family/Social Support:  Care provided by: self, spouse/significant other  Provides care for: no one  Marital Status: Lives with Significant Other  Support System: Significant Other, Children          Description of Support System: Supportive, Involved    Support Assessment: Adequate family and caregiver support, Adequate social supports    Current Resources:   Patient receiving home care services: No  Community Resources: None  Equipment currently used at home: none  Supplies currently used at home: None    Employment/Financial:  Employment Status: employed full-time     Financial Concerns: other (see comments) (Pt says she will lose her job after FMLA--will be unemployed w/o income at that time.)   Referral to Financial Worker: No       Does the patient's insurance plan have a 3 day qualifying hospital stay waiver?  No    Lifestyle & Psychosocial Needs:  Social Determinants of Health     Food Insecurity: Not on file   Depression: Not at risk (12/19/2023)    PHQ-2     PHQ-2 Score: 2   Housing Stability: Not on file   Tobacco Use: High Risk (2/5/2024)    Patient History      Smoking Tobacco Use: Every Day     Smokeless Tobacco Use: Never     Passive Exposure: Current   Financial Resource Strain: Not on file   Alcohol Use: Not on file   Transportation Needs: Not on file   Physical Activity: Not on file   Interpersonal Safety: Not on file   Stress: Not on file   Social Connections: Not on file       Functional Status:  Prior to admission patient needed assistance:   Dependent ADLs: Independent  Dependent IADLs: Independent  Assesssment of Functional Status: Not at  functional baseline    Mental Health Status:  Mental Health Status: No Current Concerns       Chemical Dependency Status:  Chemical Dependency Status: No Current Concerns             Values/Beliefs:  Spiritual, Cultural Beliefs, Congregational Practices, Values that affect care: no               Additional Information:  Pt is a 52 y/o female s/p URD allogeneic transplant, currently day +42, readmitted due to concerns for hematuria.    Please see initial BMT psychosocial assessment dated 12/19/23 for additional details.      CLIFTON Demarco, LGSW   Clinical   [facility]  Adult Blood and Marrow Transplant and Cellular Therapy Program  Phone: [phone]  Vocera searchable - BMT SW 1

## 2024-02-22 NOTE — PROGRESS NOTES
BMT CLINICAL SOCIAL WORK NOTE :    Focus: Supportive Counseling/Resources/Discharge Planning    Data: Pt is a 53 year old female currently day +42 of her allogeneic transplant for MDS.    Interventions: Clinical  (CSW) met with pt and pt's partner Srinivas to assess coping, provide supportive counseling and assist with resources as needed. Pt shared she has been very uncomfortable and in pain d/t the hematuria and clots. She expressed gratitude for getting the booker catheter and feels better now. Pt reported she has not yet received the NMDP post transplant check in the mail. CSW asked when she was approved and pt was unable to give answer. She thinks it's been more than 2 weeks. CSW will check pt's folder/chart and follow up with NMDP if needed. Pt asked about other grants that she could apply for since she and Srinivas are still experiencing financial concerns. She reported she received the [employer] freda but doesn't believe she applied for [facility] yet. CSW will review and check in with pt again tomorrow regarding status of NMDP post transplant check and other freda options. Pt expressed appreciation and did not have any further questions or concerns at this time.  CSW provided empathic listening, validation of concerns, and encouragement. Pt was encouraged to contact CSW for ongoing support, questions, and/or resource needs.    Assessment: Pt presented as pleasant and comfortable.  Pt appears to be coping appropriately at this time. Pt continues to be supported by her partner and family.     Plan: CSW will continue to provide supportive counseling and assistance with resources as needed. CSW will continue to collaborate with multidisciplinary team regarding pt's plan of care.     CLIFTON Demarco, [facility]   Clinical   [facility]  Adult Blood and Marrow Transplant and Cellular Therapy Program  Phone: [phone]  George searchable - BMT SW 1

## 2024-02-22 NOTE — PROGRESS NOTES
Occupational Therapy: Orders received. Chart reviewed and discussed with physical therapy.? Occupational Therapy not indicated due to limited inpatient therapy needs identified at this time. PT following. Pt limited by frequent painful urination, needing to use to restroom ~3 times during physical therapy 30 min session.? Pt continues to be up IND at this time. Defer discharge recommendations to PT and medical team.? Will complete orders.

## 2024-02-22 NOTE — PLAN OF CARE
"BP (!) 144/72   Pulse 108   Temp 97.8  F (36.6  C) (Oral)   Resp 16   Ht 1.565 m (5' 1.61\")   Wt 77.6 kg (171 lb 1.6 oz)   LMP 11/05/2017   SpO2 97%   BMI 31.69 kg/m    Patient slept intermittently through the night, had urinary urgency and burning with urination.   Continues to have blood and blood shreds in urine, Commode at bedside.   Had a BM this morning and a stool sample sent last evening the was negative for CDiff.  No other complaints.   Continue with current POC  Problem: Adult Inpatient Plan of Care  Goal: Absence of Hospital-Acquired Illness or Injury  Intervention: Identify and Manage Fall Risk  Recent Flowsheet Documentation  Taken 2/22/2024 0400 by Reid Laura RN  Safety Promotion/Fall Prevention: safety round/check completed  Taken 2/22/2024 0200 by Reid Laura RN  Safety Promotion/Fall Prevention: safety round/check completed  Taken 2/22/2024 0012 by Reid Laura RN  Safety Promotion/Fall Prevention: safety round/check completed  Intervention: Prevent Skin Injury  Recent Flowsheet Documentation  Taken 2/22/2024 0400 by Reid Laura RN  Body Position: position changed independently  Taken 2/22/2024 0012 by Reid Laura RN  Body Position: position changed independently  Skin Protection: adhesive use limited  Device Skin Pressure Protection: adhesive use limited  Goal: Optimal Comfort and Wellbeing  Outcome: Progressing  Intervention: Monitor Pain and Promote Comfort  Recent Flowsheet Documentation  Taken 2/22/2024 0400 by Reid Laura RN  Pain Management Interventions: declines  Taken 2/22/2024 0012 by Reid Laura RN  Pain Management Interventions: declines   Goal Outcome Evaluation:                        "

## 2024-02-22 NOTE — PROGRESS NOTES
BMT Daily Progress Note   02/22/2024    Patient ID: Irma Foreman is a 53 year old female who is 42 s/p MA (Bu/Flu) 6/8 URD Allo transplant, day +41. Readmitted 2/21 with hematuria, dysuria and clots.     Transplant Essential Data:   Diagnosis MDS  BMTCT Type ALLO    Prep Regimen Bu/Flu   Donor Match and  Source 6/8 URD    GVHD Prophylaxis PT Cy, Tac/MMF  Primary BMT MD Garay     Clinical Trials MT 2015-29 (according to but not on trial)         INTERVAL  HISTORY     Irma is having a lot of pain that she describes as severe cramping, sharp pains in suprapubic during urination and for awhile following. Oxybutynin and pyridium did not help outpatient, she did not notice any changes in sensations. They were readded on admission, but she doesn't think they help. She continues to have blood in her urine, she thinks clots are more often, but she is having good UOP volume. Reported painful skin near labia that feels like rug burn, it is not bleeding. She is eating well, no N/V/D. She has slight RUQ tenderness that is intermittent, worse when she lays down and wraps around her back occasionally, no weight gain, dyspnea and U/S unremarkable to explain.  Review of Systems: 10 point ROS negative except as noted above.    Scheduled Medications   acyclovir  800 mg Oral BID    amLODIPine  5 mg Oral Daily    cidofovir (VISITIDE) 393.525 mg in sodium chloride 0.9 % 65.25 mL intermittent infusion  5 mg/kg INTRAVESICAL Once    Followed by    [START ON 2/26/2024] cidofovir (VISITIDE) 393.525 mg in sodium chloride 0.9 % 65.25 mL intermittent infusion  5 mg/kg INTRAVESICAL Once    letermovir  480 mg Oral Daily    [START ON 2/23/2024] micafungin  300 mg Intravenous Q Mon Wed Fri AM    OLANZapine  5 mg Oral At Bedtime    ondansetron  4 mg Oral QAM AC    oxyBUTYnin  10 mg Oral TID    pantoprazole  40 mg Oral QAM AC    phenazopyridine  200 mg Oral TID w/meals    potassium chloride jean pierre ER  20 mEq Oral BID    sirolimus  1.5 mg Oral Daily     [START ON 2/26/2024] sulfamethoxazole-trimethoprim  1 tablet Oral Q Mon Tues BID    ursodiol  300 mg Oral TID     Current Facility-Administered Medications   Medication    acetaminophen (TYLENOL) tablet 325-650 mg    acyclovir (ZOVIRAX) tablet 800 mg    amLODIPine (NORVASC) tablet 5 mg    artificial saliva (BIOTENE MT) solution 2 spray    ceFEPIme (MAXIPIME) 2 g vial to attach to  mL bag for ADULTS or 50 mL bag for PEDS    cidofovir (VISITIDE) 393.525 mg in sodium chloride 0.9 % 65.25 mL intermittent infusion    Followed by    [START ON 2/26/2024] cidofovir (VISITIDE) 393.525 mg in sodium chloride 0.9 % 65.25 mL intermittent infusion    HYDROmorphone (PF) (DILAUDID) injection 0.5 mg    letermovir (PREVYMIS) tablet 480 mg    LORazepam (ATIVAN) injection 0.5-1 mg    Or    LORazepam (ATIVAN) tablet 0.5-1 mg    [START ON 2/23/2024] micafungin (MYCAMINE) 300 mg in sodium chloride 0.9 % 100 mL intermittent infusion    naloxone (NARCAN) injection 0.2 mg    Or    naloxone (NARCAN) injection 0.4 mg    Or    naloxone (NARCAN) injection 0.2 mg    Or    naloxone (NARCAN) injection 0.4 mg    OLANZapine (zyPREXA) tablet 5 mg    ondansetron (ZOFRAN ODT) ODT tab 4 mg    oxyBUTYnin (DITROPAN) tablet 10 mg    oxyCODONE (ROXICODONE) tablet 5 mg    pantoprazole (PROTONIX) EC tablet 40 mg    phenazopyridine (PYRIDIUM) tablet 200 mg    potassium chloride jean pierre ER (KLOR-CON M10) CR tablet 20 mEq    prochlorperazine (COMPAZINE) injection 5-10 mg    Or    prochlorperazine (COMPAZINE) tablet 5-10 mg    sirolimus (GENERIC EQUIVALENT) tablet 1.5 mg    sodium chloride 0.9 % infusion    [START ON 2/26/2024] sulfamethoxazole-trimethoprim (BACTRIM DS) 800-160 MG per tablet 1 tablet    ursodiol (ACTIGALL) capsule 300 mg       PHYSICAL EXAM     Weight In/Out     Wt Readings from Last 3 Encounters:   02/22/24 78.7 kg (173 lb 9.6 oz)   02/21/24 77.9 kg (171 lb 12.8 oz)   02/19/24 78 kg (172 lb)      I/O last 3 completed shifts:  In: 5978.33  "[P.O.:480; I.V.:1148.33]  Out: 1675 [Urine:1675]       KPS:  80    BP (!) 173/108 (BP Location: Left arm, Cuff Size: Adult Regular)   Pulse (!) 121   Temp 97.5  F (36.4  C) (Axillary)   Resp 16   Ht 1.565 m (5' 1.61\")   Wt 78.7 kg (173 lb 9.6 oz)   LMP 11/05/2017   SpO2 95%   BMI 32.15 kg/m         General: NAD   Eyes: : KEVON, sclera anicteric   Lungs: CTA bilaterally  Cardiovascular: RRR, no M/R/G   Abdominal/Rectal: +BS, soft, RUQ tenderness on palpation and suprapubic tenderness, ND, NO CVA tenderness  : skin irritation surrounding vulva, erythema and small few mm area of skin abrasion bilaterally (2/22)  Lymphatics: no edema  Skin: no rashes or petechaie  Neuro: A&O   Additional Findings: R Gastelum site NT, no drainage    LABS AND IMAGING - PAST 24 HOURS     Results for orders placed or performed during the hospital encounter of 02/21/24 (from the past 24 hour(s))   UA with Microscopic   Result Value Ref Range    Color Urine Brown (A) Colorless, Straw, Light Yellow, Yellow    Appearance Urine Cloudy (A) Clear    Glucose Urine Negative Negative mg/dL    Bilirubin Urine Negative Negative    Ketones Urine Negative Negative mg/dL    Specific Gravity Urine 1.009 1.003 - 1.035    Blood Urine Large (A) Negative    pH Urine 7.0 5.0 - 7.0    Protein Albumin Urine 300 (A) Negative mg/dL    Urobilinogen Urine Normal Normal, 2.0 mg/dL    Nitrite Urine Negative Negative    Leukocyte Esterase Urine Large (A) Negative    Mucus Urine Present (A) None Seen /LPF    RBC Urine >182 (H) <=2 /HPF    WBC Urine >182 (H) <=5 /HPF   Urine Culture    Specimen: Urine, Clean Catch   Result Value Ref Range    Culture <10,000 CFU/mL Mixture of Urogenital Allie    INR   Result Value Ref Range    INR 0.96 0.85 - 1.15   Partial thromboplastin time   Result Value Ref Range    aPTT 36 22 - 38 Seconds   ABO/RH Type and Screen     Narrative    The following orders were created for panel order ABO/RH Type and Screen .  Procedure             "                   Abnormality         Status                     ---------                               -----------         ------                     Adult Type and Screen[[id number]]                            Final result                 Please view results for these tests on the individual orders.   Adult Type and Screen   Result Value Ref Range    ABO/RH(D) O POS     Antibody Screen Negative Negative    SPECIMEN EXPIRATION DATE [id number]    Fibrinogen activity   Result Value Ref Range    Fibrinogen Activity 588 (H) 170 - 490 mg/dL   C. difficile Toxin B PCR with reflex to C. difficile Antigen and Toxins A/B EIA    Specimen: Per Rectum; Stool   Result Value Ref Range    C Difficile Toxin B by PCR Negative Negative    Narrative    The [employer] Xpert C. difficile Assay, performed on the [employer]  Instrument Systems, is a qualitative in vitro diagnostic test for rapid detection of toxin B gene sequences from unformed (liquid or soft) stool specimens collected from patients suspected of having Clostridioides difficile infection (CDI). The test utilizes automated real-time polymerase chain reaction (PCR) to detect toxin gene sequences associated with toxin producing C. difficile. The Xpert C. difficile Assay is intended as an aid in the diagnosis of CDI.   Vancomycin Resistant Enterococcus (VRE) Culture    Specimen: Per Rectum; Stool   Result Value Ref Range    Culture Culture negative, monitoring continues    CBC with platelets differential    Narrative    The following orders were created for panel order CBC with platelets differential.  Procedure                               Abnormality         Status                     ---------                               -----------         ------                     CBC with platelets and d...[[id number]]  Abnormal            Final result                 Please view results for these tests on the individual orders.   Basic metabolic panel   Result Value Ref  Range    Sodium 140 135 - 145 mmol/L    Potassium 3.8 3.4 - 5.3 mmol/L    Chloride 106 98 - 107 mmol/L    Carbon Dioxide (CO2) 23 22 - 29 mmol/L    Anion Gap 11 7 - 15 mmol/L    Urea Nitrogen 4.9 (L) 6.0 - 20.0 mg/dL    Creatinine 0.44 (L) 0.51 - 0.95 mg/dL    GFR Estimate >90 >60 mL/min/1.73m2    Calcium 9.2 8.6 - 10.0 mg/dL    Glucose 114 (H) 70 - 99 mg/dL   CBC with platelets and differential   Result Value Ref Range    WBC Count 8.1 4.0 - 11.0 10e3/uL    RBC Count 2.87 (L) 3.80 - 5.20 10e6/uL    Hemoglobin 9.6 (L) 11.7 - 15.7 g/dL    Hematocrit 30.4 (L) 35.0 - 47.0 %     (H) 78 - 100 fL    MCH 33.4 (H) 26.5 - 33.0 pg    MCHC 31.6 31.5 - 36.5 g/dL    RDW 21.7 (H) 10.0 - 15.0 %    Platelet Count 65 (L) 150 - 450 10e3/uL    % Neutrophils 52 %    % Lymphocytes 21 %    % Monocytes 16 %    % Eosinophils 5 %    % Basophils 2 %    % Immature Granulocytes 4 %    NRBCs per 100 WBC 0 <1 /100    Absolute Neutrophils 4.3 1.6 - 8.3 10e3/uL    Absolute Lymphocytes 1.7 0.8 - 5.3 10e3/uL    Absolute Monocytes 1.3 0.0 - 1.3 10e3/uL    Absolute Eosinophils 0.4 0.0 - 0.7 10e3/uL    Absolute Basophils 0.1 0.0 - 0.2 10e3/uL    Absolute Immature Granulocytes 0.3 <=0.4 10e3/uL    Absolute NRBCs 0.0 10e3/uL   Hepatic panel   Result Value Ref Range    Protein Total 6.0 (L) 6.4 - 8.3 g/dL    Albumin 3.8 3.5 - 5.2 g/dL    Bilirubin Total 0.3 <=1.2 mg/dL    Alkaline Phosphatase 107 40 - 150 U/L    AST 47 (H) 0 - 45 U/L    ALT 60 (H) 0 - 50 U/L    Bilirubin Direct <0.20 0.00 - 0.30 mg/dL   Ferritin   Result Value Ref Range    Ferritin 10,961 (H) 11 - 328 ng/mL         ASSESSMENT BY SYSTEMS     Irma Foreman is a 53 year old female who is 42 s/p MA (Bu/Flu) 6/8 URD Allo transplant, day +42. Readmitted 2/21 with hematuria, dysuria and clots.     BMT/IEC PROTOCOL for MT 2015-29   - Chemo protocol:  Day -6 through day -1: Keppra and Allopurinol   Day -5 through -2: Bu/Flu.   Day -1: Rest day   Day 0: Transplant. Recipient: O+, CMV+. Donor:  O+, CMV-. Cell dose 6.5 x10^6.   - Restaging plan: per protocol   BMbx 2/6 -- Day 28 review with Dr. Garay on 2/9-note pending.   Flow neg; 100% donor. Chimerisms: CD33 is 100% donor, CD3 is 87% donor. Bone marrow 100% donor.      HEME/COAG  - Anemia, thrombocytopenia 2/2 due to chemotherapy/BMT, improving  #Iron overload: hx of transfusion dependent anemia, pre-transplant ferritin around 5,000. Recommend phlebotomy post transplant when retic count is restored and Hgb >10 . On admission ferritin >10k  #Hematuria- coags WNL, fibrinogen slightly elevated  # Platelet refractoriness: HLA platelets when available.   - Transfusion parameters: hemoglobin <7, platelets <10. Plt and hgb continue to improve despite hematuria  - GCSF PRN for ANC < 1.     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 UA shows large blood, LE, WBC, RBCs. Ucx (2/12): mixed urogenital odilon. Underwent 5 days Levaquin. Repeat UC pending. No empiric abx added on admission.              - Consult urology- recommended continuing pyridium, oxybutynin, on max dose               - Consult ID- intervesicular cidofovir 5mg/kg twice weekly               - IVF NS at 100cc/hr              - repeat BK urine, urine adenovirus pending              - Serum BK              -Keep I/S as low as safely possible                Prophylaxis plan:   ACV/letermovir   Micafungin through day +45 (current smoker) - MWF thru 2/26  Bactrim   CMV: ND (2/12)   EBV: ND (2/6)      RISK OF GVHD  - Pink maculopapular rash.TCM prescribed at discharge and prednisone 40 mg x 5 days (2/3-2/7). Resolved.   - Prophylaxis: PT Cy, Tac/MMF started 1/16. **initially avoided sirolimus d/t high risk VOD**. Tac drip discontinued 1/31, sirolimus started.  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 -on admission LFTs are rapidly improving to normal 2/22    -Rash for 3-4 days (2/11-2/16) , non-pruritic. - resolved  -2/20 RUQ US suggestive of hepatic steatosis, normal dopplers. Biopsy is scheduled 2/23, but was cancelled today as hepatic panel is essentially WNL, no tbili elevation, no rash, GI symptoms and U/S unremarkable.      CARDIOVASCULAR  # Chest pressure: new on 1/15. Resolved. See previous discharge summary dated 2/4/24 for details.   #Prolonged QTc to 507 on 1/22. EKG (1/30) NSR, Qtc 454.  # HTN: On norvasc 5mg daily.      GI/NUTRITION  # transaminitis: much improved, almost WNL. Etiology unclear. See gvhd   - 2/12: med box corrected to TID ursodiol (was in BID), perhaps this correction helped improve LFTs?  #Hypertriglyceridemia- 249 (1/29), recheck on admission   # Epigastric pain (1/18): RUQ ultrasound showing patent doppler throughout, no ascites, nonspecific elevated hepatic artery restrictive index (see full impression above).                - Ulcer prophylaxis: PPI  - VOD ppx: Ursodiol. See liver US 2/21 results above        #urinary urge/frequency: 2/12 BK urine >100 million copies. See ID above.       DERM:  # Foot, hand warts. Curbside derm on 1/8, no recs while inpatient, typically managed OP.    # rash as per above.     Known issues that I take into account for medical decisions, with salient changes to the plan considering these complexities noted above.         Patient Active Problem List   Diagnosis    Left medial knee pain    Obesity (BMI 30-39.9)    Anemia, unspecified type    Macrocytic anemia    MDS (myelodysplastic syndrome) (H)         Clinically Significant Risk Factors Present on Admission          # Hypokalemia: Lowest K = 3.2 mmol/L in last 2 days, will replace as needed         # Thrombocytopenia: Lowest platelets = 74 in last 2 days, will monitor for bleeding                          I spent 30 minutes in the care  of this patient today, which included time necessary for preparation for the visit, obtaining history, ordering medications/tests/procedures as medically indicated, review of pertinent medical literature, counseling of the patient, communication of recommendations to the care team, and documentation time.     Alice Ortega PA-C   [id number]

## 2024-02-23 ENCOUNTER — APPOINTMENT (OUTPATIENT)
Dept: PHYSICAL THERAPY | Facility: CLINIC | Age: 54
DRG: 920 | End: 2024-02-23
Attending: INTERNAL MEDICINE
Payer: COMMERCIAL

## 2024-02-23 LAB
ANION GAP SERPL CALCULATED.3IONS-SCNC: 7 MMOL/L (ref 7–15)
BASOPHILS # BLD AUTO: 0.1 10E3/UL (ref 0–0.2)
BASOPHILS NFR BLD AUTO: 1 %
BUN SERPL-MCNC: 4.1 MG/DL (ref 6–20)
CALCIUM SERPL-MCNC: 8.7 MG/DL (ref 8.6–10)
CHLORIDE SERPL-SCNC: 111 MMOL/L (ref 98–107)
CREAT SERPL-MCNC: 0.41 MG/DL (ref 0.51–0.95)
DEPRECATED HCO3 PLAS-SCNC: 25 MMOL/L (ref 22–29)
EGFRCR SERPLBLD CKD-EPI 2021: >90 ML/MIN/1.73M2
EOSINOPHIL # BLD AUTO: 0.2 10E3/UL (ref 0–0.7)
EOSINOPHIL NFR BLD AUTO: 4 %
ERYTHROCYTE [DISTWIDTH] IN BLOOD BY AUTOMATED COUNT: 21.6 % (ref 10–15)
GLUCOSE SERPL-MCNC: 102 MG/DL (ref 70–99)
HADV DNA # SPEC NAA+PROBE: NOT DETECTED COPIES/ML
HCT VFR BLD AUTO: 26.1 % (ref 35–47)
HGB BLD-MCNC: 8.2 G/DL (ref 11.7–15.7)
IMM GRANULOCYTES # BLD: 0.2 10E3/UL
IMM GRANULOCYTES NFR BLD: 3 %
LYMPHOCYTES # BLD AUTO: 1.2 10E3/UL (ref 0.8–5.3)
LYMPHOCYTES NFR BLD AUTO: 23 %
MAGNESIUM SERPL-MCNC: 1.9 MG/DL (ref 1.7–2.3)
MCH RBC QN AUTO: 33.3 PG (ref 26.5–33)
MCHC RBC AUTO-ENTMCNC: 31.4 G/DL (ref 31.5–36.5)
MCV RBC AUTO: 106 FL (ref 78–100)
MONOCYTES # BLD AUTO: 0.9 10E3/UL (ref 0–1.3)
MONOCYTES NFR BLD AUTO: 16 %
NEUTROPHILS # BLD AUTO: 2.9 10E3/UL (ref 1.6–8.3)
NEUTROPHILS NFR BLD AUTO: 53 %
NRBC # BLD AUTO: 0 10E3/UL
NRBC BLD AUTO-RTO: 0 /100
PHOSPHATE SERPL-MCNC: 3.4 MG/DL (ref 2.5–4.5)
PLATELET # BLD AUTO: 56 10E3/UL (ref 150–450)
POTASSIUM SERPL-SCNC: 3.6 MMOL/L (ref 3.4–5.3)
RBC # BLD AUTO: 2.46 10E6/UL (ref 3.8–5.2)
SIROLIMUS BLD-MCNC: 9.3 UG/L (ref 5–15)
SODIUM SERPL-SCNC: 143 MMOL/L (ref 135–145)
TME LAST DOSE: NORMAL H
TME LAST DOSE: NORMAL H
WBC # BLD AUTO: 5.4 10E3/UL (ref 4–11)

## 2024-02-23 PROCEDURE — 99418 PROLNG IP/OBS E/M EA 15 MIN: CPT

## 2024-02-23 PROCEDURE — 99233 SBSQ HOSP IP/OBS HIGH 50: CPT | Mod: FS

## 2024-02-23 PROCEDURE — 80048 BASIC METABOLIC PNL TOTAL CA: CPT

## 2024-02-23 PROCEDURE — 99232 SBSQ HOSP IP/OBS MODERATE 35: CPT | Performed by: INTERNAL MEDICINE

## 2024-02-23 PROCEDURE — 258N000003 HC RX IP 258 OP 636

## 2024-02-23 PROCEDURE — 250N000011 HC RX IP 250 OP 636

## 2024-02-23 PROCEDURE — 97110 THERAPEUTIC EXERCISES: CPT | Mod: GP

## 2024-02-23 PROCEDURE — 250N000013 HC RX MED GY IP 250 OP 250 PS 637

## 2024-02-23 PROCEDURE — 80195 ASSAY OF SIROLIMUS: CPT | Performed by: INTERNAL MEDICINE

## 2024-02-23 PROCEDURE — 206N000001 HC R&B BMT UMMC

## 2024-02-23 PROCEDURE — 250N000012 HC RX MED GY IP 250 OP 636 PS 637: Performed by: INTERNAL MEDICINE

## 2024-02-23 PROCEDURE — 84100 ASSAY OF PHOSPHORUS: CPT | Performed by: INTERNAL MEDICINE

## 2024-02-23 PROCEDURE — 83735 ASSAY OF MAGNESIUM: CPT | Performed by: INTERNAL MEDICINE

## 2024-02-23 PROCEDURE — 250N000011 HC RX IP 250 OP 636: Mod: JZ

## 2024-02-23 PROCEDURE — 85025 COMPLETE CBC W/AUTO DIFF WBC: CPT

## 2024-02-23 RX ORDER — FENOFIBRATE 54 MG/1
54 TABLET ORAL DAILY
Status: DISCONTINUED | OUTPATIENT
Start: 2024-02-23 | End: 2024-02-29 | Stop reason: HOSPADM

## 2024-02-23 RX ORDER — SALIVA STIMULANT COMB. NO.3
2 SPRAY, NON-AEROSOL (ML) MUCOUS MEMBRANE 4 TIMES DAILY PRN
Status: DISCONTINUED | OUTPATIENT
Start: 2024-02-23 | End: 2024-02-23

## 2024-02-23 RX ADMIN — POTASSIUM CHLORIDE 20 MEQ: 750 TABLET, EXTENDED RELEASE ORAL at 20:08

## 2024-02-23 RX ADMIN — MICAFUNGIN SODIUM 300 MG: 50 INJECTION, POWDER, LYOPHILIZED, FOR SOLUTION INTRAVENOUS at 09:05

## 2024-02-23 RX ADMIN — LETERMOVIR 480 MG: 480 TABLET, FILM COATED ORAL at 09:06

## 2024-02-23 RX ADMIN — OXYBUTYNIN CHLORIDE 5 MG: 5 TABLET ORAL at 09:03

## 2024-02-23 RX ADMIN — OLANZAPINE 5 MG: 2.5 TABLET, FILM COATED ORAL at 21:57

## 2024-02-23 RX ADMIN — URSODIOL 300 MG: 300 CAPSULE ORAL at 09:03

## 2024-02-23 RX ADMIN — PANTOPRAZOLE SODIUM 40 MG: 40 TABLET, DELAYED RELEASE ORAL at 09:03

## 2024-02-23 RX ADMIN — URSODIOL 300 MG: 300 CAPSULE ORAL at 20:08

## 2024-02-23 RX ADMIN — AMLODIPINE BESYLATE 5 MG: 5 TABLET ORAL at 09:04

## 2024-02-23 RX ADMIN — SIROLIMUS 1.5 MG: 1 TABLET ORAL at 09:04

## 2024-02-23 RX ADMIN — OXYBUTYNIN CHLORIDE 5 MG: 5 TABLET ORAL at 20:07

## 2024-02-23 RX ADMIN — FENOFIBRATE 54 MG: 54 TABLET ORAL at 09:43

## 2024-02-23 RX ADMIN — OXYBUTYNIN CHLORIDE 5 MG: 5 TABLET ORAL at 11:55

## 2024-02-23 RX ADMIN — PHENAZOPYRIDINE HYDROCHLORIDE 200 MG: 200 TABLET ORAL at 18:12

## 2024-02-23 RX ADMIN — SODIUM CHLORIDE: 9 INJECTION, SOLUTION INTRAVENOUS at 00:04

## 2024-02-23 RX ADMIN — ACYCLOVIR 800 MG: 800 TABLET ORAL at 20:08

## 2024-02-23 RX ADMIN — ACYCLOVIR 800 MG: 800 TABLET ORAL at 09:04

## 2024-02-23 RX ADMIN — OXYBUTYNIN CHLORIDE 5 MG: 5 TABLET ORAL at 16:00

## 2024-02-23 RX ADMIN — URSODIOL 300 MG: 300 CAPSULE ORAL at 14:29

## 2024-02-23 RX ADMIN — ONDANSETRON 4 MG: 4 TABLET, ORALLY DISINTEGRATING ORAL at 09:04

## 2024-02-23 RX ADMIN — Medication 2 SPRAY: at 18:13

## 2024-02-23 RX ADMIN — PHENAZOPYRIDINE HYDROCHLORIDE 200 MG: 200 TABLET ORAL at 11:55

## 2024-02-23 RX ADMIN — POTASSIUM CHLORIDE 20 MEQ: 750 TABLET, EXTENDED RELEASE ORAL at 09:04

## 2024-02-23 RX ADMIN — PHENAZOPYRIDINE HYDROCHLORIDE 200 MG: 200 TABLET ORAL at 09:04

## 2024-02-23 ASSESSMENT — ACTIVITIES OF DAILY LIVING (ADL)
ADLS_ACUITY_SCORE: 20

## 2024-02-23 NOTE — PROGRESS NOTES
Cross cover was paged for BK virus in blood. Discussed with ID and hemonc provider on call. No further intervention at this time

## 2024-02-23 NOTE — PROGRESS NOTES
"[facility]    Transplant Infectious Diseases Inpatient Progress Note      Hortencia Baldwin MRN# [id number]   YOB: 1970 Age: 53 year old   Date of Admission and time: 2/21/2024 10:30 AM             Recommendations:   Continue to monitor clinically while receiving intravesical cidofovir 5 mg/kg in 60 mL normal saline instilled in bladder over 15 min and booker clamped for 1 hr twice a week, until the resolution of symptoms.   Decrease sirolimus as much as safely possible.  If symptoms do not respond to the above mentioned approaches the we can try IV cidofovir 1 mg/kg/week without probenecid, until the resolution of symptoms.     Dr. Sales is available over the weekend for questions. Dr. Silva will assume the patient's care on Monday.         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   On sirolimus for GVHD ppx. MMF discontinued on 2/14/24.   Presented with hematuria, dysuria, urinary blood clots, polyuria due to BKV HC.         Active Problems and Infectious Diseases Issues:   Stage 3 BKV-induced HC.   BKV viremia.    Other than minimizing IS to allow for BKV-specific CD4 recovery, there is no effective therapy for BKV-induced HC.   IV or intravesical cidofovir is used but the evidence that it is effective is weak. In one study, patients who received cidofovir had worse outcomes. \"[email or web address]%20Natural%20History%20of%20BK%20Polyomavirus%20and%20the%20Host%20Immune%20Response%20After%20Stem%20Cell%20Transplantation.pdf\".     Cidofovir IV might result in renal failure.     We are using intravesical cidofovir and will monitor response.     The BK viremia is not unusual.     If there is adenovirus viruria with HC, the treatment is the same as above.   In the absence of CMV viremia, if there is CMV viruria with HC, the treatment is the same as above.     Transaminitis since 2/2/24. "   Improving.     Right plantar wart  Preceded chemotherapy and HSCT. Did not worsen after chemotherapy and HSCT.   Monitor.         Old Problems and Infectious Diseases Issues:   COVID-19 12/26/23     Other Infectious Disease issues include:  - QTc: 425 as of 2/9/24.   - PJP prophylaxis: on bactrim.   - on letermovir, ACV and micafungin for ppx.   - Serostatus: CMV R+, EBV R+, HSV1+/2+, VZV ?  - Gamma globulin status: ?      Attestation:  Total duration of visit including chart review, reviewing labs and imaging, interviewing and examining the patient, documentation, and sending communication to the primary treating team, all at the same day of this encounter, is: 25 minutes.     Halima Talley MD  [facility]  Contact information available via [facility] Paging/Directory    02/23/2024              Interim History:   Feels better. No fever.          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 3-4 days prior to admission she started to experience gross hematuria with blood clots. The dysuria and polyuria are significant and disrupting the patient's daily activities.   No fever.   UA was c/w hematuria and pyuria. Urinary BKV was 6.7 log on 1/26/24 repeat was >8 log on 2/12/24.   MMF was discontinued on 2/14/24 due to diarrhea. Diarrhea resolved after stopping MMF.     Due to significant dysuria, pain, polyuria every few minutes, the patient was admitted to the hospital.       Exposure History:  Was born [place]. Immig[place] to [place] in the 80s. Lived in [facility] [place] in an apartment. No farm exposure. Has a dog and a fish. She does not clean the aquarium.   Used to work in a  factory.   No known TB exposure other than the country of origin.   Did travel to [place] [facility] [facility] years ago.   Did travel to [facility] few years ago  where she stayed for 2-3 weeks. No other international travel.   No significant outdoors activities.           Review of Systems:      As mentioned in the HPI otherwise negative by reviewing constitutional symptoms, central and peripheral neurological systems, respiratory system, cardiac system, GI system,  system, musculoskeletal, skin, allergy, and lymphatics.                  Past Medical History:     Past Medical History:   Diagnosis Date    Left medial knee pain     Medial epicondylitis, right elbow     Obesity (BMI 30-39.9)     Right elbow pain             Past Surgical History:     Past Surgical History:   Procedure Laterality Date    EXCISE LESION TRUNK N/A 2/5/2018    Procedure: EXCISE LESION TRUNK;;  Surgeon: Avani Szymanski MD;  Location: [facility]    EXCISE MASS LOWER EXTREMITY Bilateral 5/31/2017    Procedure: EXCISE MASS LOWER EXTREMITY;  EXCISION OF LIPOMAS RIGHT BUTTOCKS RIGHT LOWER THIGH, LEFT UPPER OUTER THIGH;  Surgeon: Avani Szymanski MD;  Location: [facility]    EXCISE MASS LOWER EXTREMITY Right 2/5/2018    Procedure: EXCISE MASS LOWER EXTREMITY;  EXCISION OF RIGHT UPPER ANTERIOR THIGH SOFT TISSUE MASS,EXCISION OF LEFT LOWER BACK SOFT TISSUE MASS;  Surgeon: Avani Szymanski MD;  Location: [facility]    IR CVC TUNNEL PLACEMENT > 5 YRS OF AGE  1/5/2024    LUMPECTOMY BREAST Left 1990s    benign            Social History:     Social History     Tobacco Use    Smoking status: Every Day     Packs/day: 0.50     Years: 10.00     Additional pack years: 0.00     Total pack years: 5.00     Types: Cigarettes     Passive exposure: Current    Smokeless tobacco: Never   Substance Use Topics    Alcohol use: Yes     Comment: 1-2 drinks/month            Family History:   I have reviewed this patient's family history  Family History   Problem Relation Age of Onset    Hypertension Mother     Diabetes No family hx of     Cerebrovascular Disease No family hx of     Myocardial Infarction No family hx of     Coronary Artery  "Disease Early Onset No family hx of     Breast Cancer No family hx of     Ovarian Cancer No family hx of     Colon Cancer No family hx of             Immunizations:     Immunization History   Administered Date(s) Administered    COVID-19 MONOVALENT 12+ (Pfizer) 03/26/2021, 04/22/2021, 11/23/2021    TDAP Vaccine (Adacel) 12/22/2017            Allergies:     Allergies   Allergen Reactions    Blood Transfusion Related (Informational Only) Other (See Comments)     Patient has a history of a clinically significant antibody against RBC antigens.  A delay in compatible RBCs may occur.             Medications:   Medications that Require Transfusion:       Scheduled Medications:    acyclovir  800 mg Oral BID    amLODIPine  5 mg Oral Daily    [START ON 2/26/2024] cidofovir (VISITIDE) 393.525 mg in sodium chloride 0.9 % 65.25 mL intermittent infusion  5 mg/kg INTRAVESICAL Once    fenofibrate  54 mg Oral Daily    letermovir  480 mg Oral Daily    micafungin  300 mg Intravenous Q Mon Wed Fri AM    OLANZapine  5 mg Oral At Bedtime    ondansetron  4 mg Oral QAM AC    oxyBUTYnin  5 mg Oral 4x Daily    pantoprazole  40 mg Oral QAM AC    phenazopyridine  200 mg Oral TID w/meals    potassium chloride jean pierre ER  20 mEq Oral BID    sirolimus  1.5 mg Oral Daily    [START ON 2/26/2024] sulfamethoxazole-trimethoprim  1 tablet Oral Q Mon Tues BID    ursodiol  300 mg Oral TID               Physical Exam:   Temp: 97.7  F (36.5  C) Temp src: Oral BP: (!) 140/80 Pulse: 105   Resp: 16 SpO2: 96 % O2 Device: None (Room air) Oxygen Delivery: 1 LPM    Wt Readings from Last 4 Encounters:   02/23/24 78.7 kg (173 lb 8 oz)   02/21/24 77.9 kg (171 lb 12.8 oz)   02/19/24 78 kg (172 lb)   02/16/24 78.3 kg (172 lb 11.2 oz)     Constitutional: awake, alert, cooperative, no apparent distress and appears at stated age, well nourished.            Data:   No results found for: \"ACD4\"    Inflammatory Markers    No lab results found.    Immune Globulin Studies   No " lab results found.    Metabolic Studies       Recent Labs   Lab Test 02/23/24  0347 02/22/24  0350 02/21/24  0816 02/19/24  1203 02/16/24  1054 02/14/24  0940 02/05/24  0815 02/04/24  0432 02/03/24  1420 02/03/24  0420 01/22/24  0328 01/22/24  0055 01/15/24  1618 01/15/24  1248    140 138 143 142 140   < > 139  --  138   < >  --    < > 135   POTASSIUM 3.6 3.8 4.0 3.2* 3.6 3.2*   < > 3.8  --  4.2   < >  --    < > 3.6   CHLORIDE 111* 106 102 105 106 106   < > 102  --  100   < >  --    < >  --    CO2 25 23 25 28 27 22   < > 29  --  30*   < >  --    < >  --    ANIONGAP 7 11 11 10 9 12   < > 8  --  8   < >  --    < >  --    BUN 4.1* 4.9* 4.2* 6.6 5.0* 3.7*   < > 11.3  --  12.2   < >  --    < >  --    CR 0.41* 0.44* 0.53 0.44* 0.39* 0.45*   < > 0.36*  --  0.40*   < >  --    < >  --    GFRESTIMATED >90 >90 >90 >90 >90 >90   < > >90  --  >90   < >  --    < >  --    * 114* 112* 96 88 124*   < > 106*  --  100*   < >  --    < >  --    NIKO 8.7 9.2 9.9 9.2 9.0 9.1   < > 9.2  --  9.4   < >  --    < >  --    PHOS 3.4  --   --   --   --   --   --   --   --  3.8   < >  --    < >  --    MAG 1.9  --   --   --   --   --   --  1.8   < > 1.5*   < >  --    < >  --    LACT  --   --   --   --   --   --   --   --   --   --   --  0.5*  --  0.9    < > = values in this interval not displayed.       Hepatic Studies    Recent Labs   Lab Test 02/22/24  0350 02/21/24  0816 02/19/24  1203 02/16/24  1054 02/14/24  0940 02/12/24  1301 01/02/24  1432 12/26/23  1709   BILITOTAL 0.3 0.3 0.3 0.3 0.3 0.5   < > 0.4   ALKPHOS 107 126 117 110 112 120   < > 176*   ALBUMIN 3.8 4.2 4.0 3.9 3.8 4.0   < > 4.3   AST 47* 54* 63* 99* 139* 191*   < > 104*   ALT 60* 75* 87* 127* 147* 152*   < > 295*   LDH  --   --   --   --   --   --   --  244    < > = values in this interval not displayed.       Pancreatitis testing    Recent Labs   Lab Test 02/22/24  0350 01/29/24  0337   TRIG 388* 249*       Hematology Studies      Recent Labs   Lab Test 02/23/24  0347  "02/22/24  0350 02/21/24  0816 02/19/24  1203 02/16/24  1054 02/14/24  0940 02/12/24  1301 02/09/24  0959   WBC 5.4 8.1 7.7 5.8 4.9 3.6* 4.0 2.8*   ANEU  --   --  5.5 3.1 2.7 1.5* 2.8 2.2   ALYM  --   --  0.8 1.0 0.3* 0.1* 0.1* 0.0*   TRACY  --   --  0.7 0.9 0.4 0.3 0.3 0.3   AEOS  --   --  0.3 0.6 0.8* 1.3* 0.6 0.1   HGB 8.2* 9.6* 11.3* 10.0* 9.3* 9.5* 10.4* 9.8*   HCT 26.1* 30.4* 35.0 30.9* 27.5* 28.7* 30.4* 29.7*   PLT 56* 65* 83* 74* 60* 49* 36* 30*       Clotting Studies    Recent Labs   Lab Test 02/21/24  1238 01/29/24  0337 01/22/24  0328 01/15/24  0235 01/08/24  0500 01/05/24  1147 12/19/23  1347   INR 0.96 1.04 1.16* 1.03   < > 1.01 0.93   PTT 36  --   --   --   --  28 28    < > = values in this interval not displayed.       Arterial Blood Gas Testing  No lab results found.     Urine Studies     Recent Labs   Lab Test 02/21/24  1220 02/12/24  1425 01/25/24  1824 01/13/24  1532 01/11/24  2323   URINEPH 7.0 6.0 6.5 6.5 7.0   NITRITE Negative Negative Negative Negative Negative   LEUKEST Large* Negative Negative Negative Negative   WBCU >182* 13* <1 1 <1       Vancomycin Levels     No lab results found.    Invalid input(s): \"VANCO\"    Tobramycin levels     No lab results found.    Gentamicin levels    No lab results found.    Tacrolimus levels    Invalid input(s): \"TACROLIMUS\", \"TAC\", \"TACR\"      Latest Ref Rng & Units 2/23/2024     3:47 AM 2/22/2024     3:50 AM 2/21/2024     8:16 AM 2/19/2024    12:03 PM 2/16/2024    10:54 AM   Transplant Immunosuppression Labs   Creat 0.51 - 0.95 mg/dL 0.41  0.44  0.53  0.44  0.39    Urea Nitrogen 6.0 - 20.0 mg/dL 4.1  4.9  4.2  6.6  5.0    WBC 4.0 - 11.0 10e3/uL 5.4  8.1  7.7  5.8  4.9    Neutrophil % 53  52  72  53  56    ANEU 1.6 - 8.3 10e3/uL   5.5  3.1  2.7        Cyclosporine levels    Invalid input(s): \"CYCLOSPORINE\", \"CYC\"    Mycophenolate levels    Invalid input(s): \"MYPA\", \"MYP\"    Sirolimus levels    Invalid input(s): \"SIROLIMUS\", \"SIR\", \"RAPA\"    CSF testing   No " "lab results found.      Microbiology:  None.   Last check of C difficile  C Difficile Toxin B by PCR   Date Value Ref Range Status   02/22/2024 Negative Negative Final     Comment:     A negative result does not exclude actual disease due to C. difficile and may be due to improper collection, handling and storage of the specimen or the number of organisms in the specimen is below the detection limit of the assay.       Virology:  CMV viral loads  No results found for: \"CMVRESINST\", \"53910\", \"27585\", \"32848\", \"48384\", \"CMVQAL\"  CMV viral loads    Recent Labs   Lab Test 02/09/24  0959   CMVLOG <1.5       CMV viral loads    CMV DNA IU/mL   Date Value Ref Range Status   02/21/2024 Not Detected Not Detected IU/mL Final   02/19/2024 Not Detected Not Detected IU/mL Final   02/12/2024 Not Detected Not Detected IU/mL Final   02/09/2024 <35 (A) Not Detected IU/mL Final     Comment:     CMV DNA detected, less than 35 IU/mL   02/06/2024 <35 (A) Not Detected IU/mL Final     Comment:     CMV DNA detected, less than 35 IU/mL   02/02/2024 <35 (A) Not Detected IU/mL Final     Comment:     CMV DNA detected, less than 35 IU/mL   02/01/2024 <35 (A) Not Detected IU/mL Final     Comment:     CMV DNA detected, less than 35 IU/mL   01/26/2024 Not Detected Not Detected IU/mL Final   01/19/2024 Not Detected Not Detected IU/mL Final     CMV log   Date Value Ref Range Status   02/09/2024 <1.5  Final   02/06/2024 <1.5  Final   02/02/2024 <1.5  Final   02/01/2024 <1.5  Final       CMV resistance testing  No lab results found.  No results found for: \"CMVCID\", \"CMVFOS\", \"CMVGAN\", \"CMVDRUGRES\"     HHV-6 DNA copies/mL   Date Value Ref Range Status   02/06/2024 Not Detected Not Detected copies/mL Final       EBV DNA Copies/mL   Date Value Ref Range Status   02/21/2024 Not Detected Not Detected copies/mL Final   02/06/2024 Not Detected Not Detected copies/mL Final       CMV Antibody IgG   Date Value Ref Range Status   12/19/2023 Positive, suggests " "recent or past exposure. (A) No detectable antibody.  Final   11/07/2023 Positive, suggests recent or past exposure. (A) No detectable antibody.  Final       No results found for: \"EBIG2\", \"EBIGM\", \"TOXG\"          Halima Talley MD  [facility]  Contact information available via [facility] Paging/Directory     02/23/2024    " Ivermectin Counseling:  Patient instructed to take medication on an empty stomach with a full glass of water.  Patient informed of potential adverse effects including but not limited to nausea, diarrhea, dizziness, itching, and swelling of the extremities or lymph nodes.  The patient verbalized understanding of the proper use and possible adverse effects of ivermectin.  All of the patient's questions and concerns were addressed.

## 2024-02-23 NOTE — PROGRESS NOTES
BMT Daily Progress Note   02/23/2024    Patient ID: Irma Foreman is a 53 year old female who is 43 s/p MA (Bu/Flu) 6/8 URD Allo transplant, day 43. Readmitted 2/21 with hematuria, dysuria and clots.     Transplant Essential Data:   Diagnosis MDS  BMTCT Type ALLO    Prep Regimen Bu/Flu   Donor Match and  Source 6/8 URD    GVHD Prophylaxis PT Cy, Tac/MMF  Primary BMT MD Garay     Clinical Trials MT 2015-29 (according to but not on trial)         INTERVAL  HISTORY     Doing much better today. Booker has relieved the dysuria and she was able to get some rest last night. Good UOP, less bloody appearance to urine. When booker was placed a few clots were seen, but no clots since booker placed. She required 1L NC overnight after dropping ~90% while asleep. No hx sleep apnea. No dyspnea, cough throughout the day. She did not notice needing O2. She is tolerating regular diet and denies N/V/D.   Review of Systems: 10 point ROS negative except as noted above.    Scheduled Medications   acyclovir  800 mg Oral BID    amLODIPine  5 mg Oral Daily    [START ON 2/26/2024] cidofovir (VISITIDE) 393.525 mg in sodium chloride 0.9 % 65.25 mL intermittent infusion  5 mg/kg INTRAVESICAL Once    fenofibrate  54 mg Oral Daily    letermovir  480 mg Oral Daily    micafungin  300 mg Intravenous Q Mon Wed Fri AM    OLANZapine  5 mg Oral At Bedtime    ondansetron  4 mg Oral QAM AC    oxyBUTYnin  5 mg Oral 4x Daily    pantoprazole  40 mg Oral QAM AC    phenazopyridine  200 mg Oral TID w/meals    potassium chloride jean pierre ER  20 mEq Oral BID    sirolimus  1.5 mg Oral Daily    [START ON 2/26/2024] sulfamethoxazole-trimethoprim  1 tablet Oral Q Mon Tues BID    ursodiol  300 mg Oral TID     Current Facility-Administered Medications   Medication    acetaminophen (TYLENOL) tablet 325-650 mg    acyclovir (ZOVIRAX) tablet 800 mg    amLODIPine (NORVASC) tablet 5 mg    artificial saliva (BIOTENE MT) solution 2 spray    ceFEPIme (MAXIPIME) 2 g vial to attach to  " mL bag for ADULTS or 50 mL bag for PEDS    [START ON 2/26/2024] cidofovir (VISITIDE) 393.525 mg in sodium chloride 0.9 % 65.25 mL intermittent infusion    fenofibrate (LOFIBRA) tablet 54 mg    HYDROmorphone (PF) (DILAUDID) injection 0.5 mg    letermovir (PREVYMIS) tablet 480 mg    LORazepam (ATIVAN) injection 0.5-1 mg    Or    LORazepam (ATIVAN) tablet 0.5-1 mg    micafungin (MYCAMINE) 300 mg in sodium chloride 0.9 % 100 mL intermittent infusion    naloxone (NARCAN) injection 0.2 mg    Or    naloxone (NARCAN) injection 0.4 mg    Or    naloxone (NARCAN) injection 0.2 mg    Or    naloxone (NARCAN) injection 0.4 mg    OLANZapine (zyPREXA) tablet 5 mg    ondansetron (ZOFRAN ODT) ODT tab 4 mg    oxyBUTYnin (DITROPAN) tablet 5 mg    oxyCODONE (ROXICODONE) tablet 5 mg    pantoprazole (PROTONIX) EC tablet 40 mg    phenazopyridine (PYRIDIUM) tablet 200 mg    potassium chloride jean pierre ER (KLOR-CON M10) CR tablet 20 mEq    prochlorperazine (COMPAZINE) injection 5-10 mg    Or    prochlorperazine (COMPAZINE) tablet 5-10 mg    sirolimus (GENERIC EQUIVALENT) tablet 1.5 mg    [START ON 2/26/2024] sulfamethoxazole-trimethoprim (BACTRIM DS) 800-160 MG per tablet 1 tablet    ursodiol (ACTIGALL) capsule 300 mg       PHYSICAL EXAM     Weight In/Out     Wt Readings from Last 3 Encounters:   02/23/24 78.7 kg (173 lb 8 oz)   02/21/24 77.9 kg (171 lb 12.8 oz)   02/19/24 78 kg (172 lb)      I/O last 3 completed shifts:  In: 3020 [P.O.:720; I.V.:2300]  Out: 3500 [Urine:3500]       KPS:  80    /73 (BP Location: Left arm)   Pulse 105   Temp 97.7  F (36.5  C) (Oral)   Resp 16   Ht 1.565 m (5' 1.61\")   Wt 78.7 kg (173 lb 8 oz)   LMP 11/05/2017   SpO2 97%   BMI 32.13 kg/m         General: NAD   Eyes: : KEVON, sclera anicteric   Lungs: CTA bilaterally  Cardiovascular: RRR, no M/R/G   Abdominal/Rectal: +BS, soft, RUQ tenderness on palpation and suprapubic tenderness, ND, NO CVA tenderness  : skin irritation surrounding vulva, " erythema and small few mm area of skin abrasion bilaterally (2/22)  Lymphatics: no edema  Skin: no rashes or petechaie  Neuro: A&O   Additional Findings: R Gastelum site NT, no drainage    LABS AND IMAGING - PAST 24 HOURS     Results for orders placed or performed during the hospital encounter of 02/21/24 (from the past 24 hour(s))   CBC with platelets differential    Narrative    The following orders were created for panel order CBC with platelets differential.  Procedure                               Abnormality         Status                     ---------                               -----------         ------                     CBC with platelets and d...[[id number]]  Abnormal            Final result                 Please view results for these tests on the individual orders.   Basic metabolic panel   Result Value Ref Range    Sodium 143 135 - 145 mmol/L    Potassium 3.6 3.4 - 5.3 mmol/L    Chloride 111 (H) 98 - 107 mmol/L    Carbon Dioxide (CO2) 25 22 - 29 mmol/L    Anion Gap 7 7 - 15 mmol/L    Urea Nitrogen 4.1 (L) 6.0 - 20.0 mg/dL    Creatinine 0.41 (L) 0.51 - 0.95 mg/dL    GFR Estimate >90 >60 mL/min/1.73m2    Calcium 8.7 8.6 - 10.0 mg/dL    Glucose 102 (H) 70 - 99 mg/dL   Magnesium   Result Value Ref Range    Magnesium 1.9 1.7 - 2.3 mg/dL   Phosphorus   Result Value Ref Range    Phosphorus 3.4 2.5 - 4.5 mg/dL   CBC with platelets and differential   Result Value Ref Range    WBC Count 5.4 4.0 - 11.0 10e3/uL    RBC Count 2.46 (L) 3.80 - 5.20 10e6/uL    Hemoglobin 8.2 (L) 11.7 - 15.7 g/dL    Hematocrit 26.1 (L) 35.0 - 47.0 %     (H) 78 - 100 fL    MCH 33.3 (H) 26.5 - 33.0 pg    MCHC 31.4 (L) 31.5 - 36.5 g/dL    RDW 21.6 (H) 10.0 - 15.0 %    Platelet Count 56 (L) 150 - 450 10e3/uL    % Neutrophils 53 %    % Lymphocytes 23 %    % Monocytes 16 %    % Eosinophils 4 %    % Basophils 1 %    % Immature Granulocytes 3 %    NRBCs per 100 WBC 0 <1 /100    Absolute Neutrophils 2.9 1.6 - 8.3 10e3/uL    Absolute  Lymphocytes 1.2 0.8 - 5.3 10e3/uL    Absolute Monocytes 0.9 0.0 - 1.3 10e3/uL    Absolute Eosinophils 0.2 0.0 - 0.7 10e3/uL    Absolute Basophils 0.1 0.0 - 0.2 10e3/uL    Absolute Immature Granulocytes 0.2 <=0.4 10e3/uL    Absolute NRBCs 0.0 10e3/uL         ASSESSMENT BY SYSTEMS     Irma Foreman is a 53 year old female who is 42 s/p MA (Bu/Flu) 6/8 URD Allo transplant, day 43. Readmitted 2/21 with hematuria, dysuria and clots.     BMT/IEC PROTOCOL for MT 2015-29   - Chemo protocol:  Day -6 through day -1: Keppra and Allopurinol   Day -5 through -2: Bu/Flu.   Day -1: Rest day   Day 0: Transplant. Recipient: O+, CMV+. Donor: O+, CMV-. Cell dose 6.5 x10^6.   - Restaging plan: per protocol   BMbx 2/6 -- Day 28 review with Dr. Garay on 2/9-note pending.   Flow neg; 100% donor. Chimerisms: CD33 is 100% donor, CD3 is 87% donor. Bone marrow 100% donor.      HEME/COAG  - Anemia, thrombocytopenia 2/2 due to chemotherapy/BMT, improving  #Iron overload: hx of transfusion dependent anemia, pre-transplant ferritin around 5,000. Recommend phlebotomy post transplant when retic count is restored and Hgb >10 . On admission ferritin >10k  #Hematuria- coags WNL, fibrinogen slightly elevated  # Platelet refractoriness: HLA platelets when available.   - Transfusion parameters: hemoglobin <7, platelets <10. Plt and hgb continue to improve despite hematuria  - GCSF PRN for ANC < 1.     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 UA shows large blood, LE, WBC, RBCs. Ucx (2/12): mixed urogenital odilon. Underwent 5 days Levaquin. Repeat UC pending. No empiric abx added on admission.              - Consult urology- recommended continuing pyridium, oxybutynin, on max dose               - Consult ID- intervesicular cidofovir 5mg/kg twice weekly (Monday, Thursday), only two doses ordered. Frequency and duration depends on patient symptoms. Possible to do in clinic.                - IVF NS at 100cc/hr, discontinued 2/23 due to tolerating PO intake improved symptoms               - repeat BK urine >1M, urine adenovirus pending              - Serum BK 1350              -Keep I/S as low as safely possible                Prophylaxis plan:   ACV/letermovir   Micafungin through day +45 (current smoker) - MWF thru 2/26  Bactrim   CMV: ND (2/12)   EBV: ND (2/6)      RISK OF GVHD  - Pink maculopapular rash.TCM prescribed at discharge and prednisone 40 mg x 5 days (2/3-2/7). Resolved.   - Prophylaxis: PT Cy, Tac/MMF started 1/16. **initially avoided sirolimus d/t high risk VOD**. Tac drip discontinued 1/31, sirolimus started.  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 -on admission LFTs are rapidly improving to normal 2/22    -Rash for 3-4 days (2/11-2/16) , non-pruritic. - resolved  -2/20 RUQ US suggestive of hepatic steatosis, normal dopplers. Biopsy is scheduled 2/23, but was cancelled today as hepatic panel is essentially WNL, no tbili elevation, no rash, GI symptoms and U/S unremarkable.      CARDIOVASCULAR  # Chest pressure: new on 1/15. Resolved. See previous discharge summary dated 2/4/24 for details.   #Prolonged QTc to 507 on 1/22. EKG (1/30) NSR, Qtc 454.  # HTN: On norvasc 5mg daily.      GI/NUTRITION  # transaminitis: much improved, almost WNL. Etiology unclear. See gvhd   - 2/12: med box corrected to TID ursodiol (was in BID), perhaps this correction helped improve LFTs?  #Hypertriglyceridemia- 249 (1/29), 388 on admission. Start fenofibrate, trend.  # Epigastric pain (1/18): RUQ ultrasound showing patent doppler throughout, no ascites, nonspecific elevated hepatic artery restrictive index (see full impression above).                - Ulcer prophylaxis: PPI  - VOD ppx: Ursodiol. See  liver US 2/21 results above        #urinary urge/frequency: 2/12 BK urine >100 million copies. See ID above.       DERM:  # Foot, hand warts. Curbside derm on 1/8, no recs while inpatient, typically managed OP.    # rash as per above.     Known issues that I take into account for medical decisions, with salient changes to the plan considering these complexities noted above.         Patient Active Problem List   Diagnosis    Left medial knee pain    Obesity (BMI 30-39.9)    Anemia, unspecified type    Macrocytic anemia    MDS (myelodysplastic syndrome) (H)         Clinically Significant Risk Factors Present on Admission          # Hypokalemia: Lowest K = 3.2 mmol/L in last 2 days, will replace as needed         # Thrombocytopenia: Lowest platelets = 74 in last 2 days, will monitor for bleeding                          I spent 30 minutes in the care of this patient today, which included time necessary for preparation for the visit, obtaining history, ordering medications/tests/procedures as medically indicated, review of pertinent medical literature, counseling of the patient, communication of recommendations to the care team, and documentation time.     Alice Ortega PA-C   [id number]

## 2024-02-23 NOTE — PLAN OF CARE
"/67 (BP Location: Left arm)   Pulse 95   Temp 98.2  F (36.8  C) (Oral)   Resp 16   Ht 1.565 m (5' 1.61\")   Wt 78.7 kg (173 lb 9.6 oz)   LMP 11/05/2017   SpO2 96%   BMI 32.15 kg/m      Neuro: A&Ox4  Cardiac: No tele order, slightly tachy, but within parameters   Respiratory: Sating mid-high 90's on 1L NC during HS  GI/: Adequate urine output via booker cath. BM x1 this shift   Diet/appetite: Regular diet  Activity: Up ad ejff  Pain: Denied   Skin: No new deficits noted  LDA's: DL CVC - Red lumen infusing     Plan: Afebrile. Pt sating mid 90's on RA while awake, occasionally desats below 90% while asleep, MD notified, 1L NC during HS. Pt denied pain, nausea and dizziness. No pt complaints. No acute events overnight. Good UOP via booker cath, urine is pink/orange in color, no clots noted this shift. BM x1 per pt report. Continuous NS infusing at 100ml/hr in red lumen, purple line heparin locked. No replacements indicated. Continue with POC. Notify primary team with changes.    Problem: Adult Inpatient Plan of Care  Goal: Plan of Care Review  Description: The Plan of Care Review/Shift note should be completed every shift.  The Outcome Evaluation is a brief statement about your assessment that the patient is improving, declining, or no change.  This information will be displayed automatically on your shift  note.  Outcome: Progressing  Goal: Patient-Specific Goal (Individualized)  Description: You can add care plan individualizations to a care plan. Examples of Individualization might be:  \"Parent requests to be called daily at 9am for status\", \"I have a hard time hearing out of my right ear\", or \"Do not touch me to wake me up as it startles  me\".  Outcome: Progressing  Goal: Optimal Comfort and Wellbeing  Outcome: Progressing     Problem: Urinary Retention  Goal: Effective Urinary Elimination  Outcome: Progressing  Intervention: Promote Effective Urine Elimination  Recent Flowsheet Documentation  Taken " 2/22/2024 1923 by Genie Reed, RN  Urinary Elimination Promotion: catheter patency maintained   Goal Outcome Evaluation:

## 2024-02-23 NOTE — PROGRESS NOTES
BMT CLINICAL SOCIAL WORK NOTE :    Focus: Financial assistance/grants    Data: Pt is a 53 year old female currently day +43 of her allogeneic transplant for MDS.    Interventions: Clinical  (CSW) met with pt to follow up on freda questions and concerns from yesterday. Pt reported that she received the NMDP post transplant freda check in the mail today. She expressed appreciation for the assistance but does continue to endorse ongoing financial stress. CSW shared w/ pt that BMT SW Violet applied for [employer] freda on 01/08/24. Pt doesn't believe she has heard anything regarding this freda. CSW shared that it can take up to a few months to hear back. CSW informed pt that she will be notified if/when she is approved for financial assistance. CSW informed pt of S urgent need freda but that it is currently fully subscribed. CSW encouraged pt to look on [employer] website periodically to see if it has opened.  Pt expressed understanding and did not have any further questions or concerns at this time. Pt was encouraged to reach out for ongoing support, questions, and/or resource needs    Assessment: Pt presented as friendly and engaged.  Pt appears to be coping well at this time. Pt continues to be supported by her family and friends.     Plan: CSW will continue to provide supportive counseling and assistance with resources as needed. CSW will continue to collaborate with multidisciplinary team regarding pt's plan of care.      CLIFTON Demarco, [facility]   Clinical   [facility]  Adult Blood and Marrow Transplant and Cellular Therapy Program  Phone: [phone]  George searchable - BMT SW 1

## 2024-02-23 NOTE — PLAN OF CARE
"A&OX4, tachy, hypertensive to 175/111, asymptomatic, though times of high BP were concurrent with elevated pain levels. This afternoon, BP was 130/91 and reported tolerable pain level. Hauser catheter placed for instillation of cidofovir, and remains in place. Urine o/p red streaked with some clots noted. Received oxycodone, dilaudid, and lorazepam x1. Good appetite, no n/v. No BM. CVC in right chest wall with red lumen infusing NS at 100, purple lumen HL. Up ad jeff. Took long nap this evening. Enjoyed a visit from boyfriend.     Problem: Adult Inpatient Plan of Care  Goal: Plan of Care Review  Description: The Plan of Care Review/Shift note should be completed every shift.  The Outcome Evaluation is a brief statement about your assessment that the patient is improving, declining, or no change.  This information will be displayed automatically on your shift  note.  Outcome: Progressing  Goal: Patient-Specific Goal (Individualized)  Description: You can add care plan individualizations to a care plan. Examples of Individualization might be:  \"Parent requests to be called daily at 9am for status\", \"I have a hard time hearing out of my right ear\", or \"Do not touch me to wake me up as it startles  me\".  Outcome: Progressing  Goal: Absence of Hospital-Acquired Illness or Injury  Outcome: Progressing  Intervention: Identify and Manage Fall Risk  Recent Flowsheet Documentation  Taken 2/22/2024 1800 by Shellie Frederick, RN  Safety Promotion/Fall Prevention: safety round/check completed  Taken 2/22/2024 1700 by Shellie Frederick, RN  Safety Promotion/Fall Prevention: safety round/check completed  Taken 2/22/2024 1600 by Shellie Frederick, RN  Safety Promotion/Fall Prevention:   safety round/check completed   assistive device/personal items within reach   clutter free environment maintained   nonskid shoes/slippers when out of bed   patient and family education   room near nurse's station   room organization consistent  Taken 2/22/2024 " 1500 by Shellie Frederick RN  Safety Promotion/Fall Prevention: safety round/check completed  Taken 2/22/2024 1400 by Shellie Frederick RN  Safety Promotion/Fall Prevention: safety round/check completed  Taken 2/22/2024 1300 by Shellie Frederick RN  Safety Promotion/Fall Prevention: safety round/check completed  Taken 2/22/2024 1200 by Shellie Frederick RN  Safety Promotion/Fall Prevention:   safety round/check completed   assistive device/personal items within reach   clutter free environment maintained   nonskid shoes/slippers when out of bed   patient and family education   room near nurse's station   room organization consistent  Taken 2/22/2024 1100 by Shellie Frederick RN  Safety Promotion/Fall Prevention: safety round/check completed  Taken 2/22/2024 1000 by Shellie Frederick RN  Safety Promotion/Fall Prevention: safety round/check completed  Taken 2/22/2024 0900 by Shellie Frederick RN  Safety Promotion/Fall Prevention: safety round/check completed  Taken 2/22/2024 0850 by Shellie Frederick RN  Safety Promotion/Fall Prevention:   safety round/check completed   assistive device/personal items within reach   clutter free environment maintained   nonskid shoes/slippers when out of bed   patient and family education   room near nurse's station   room organization consistent  Taken 2/22/2024 0800 by Shellie Frederick RN  Safety Promotion/Fall Prevention: safety round/check completed  Taken 2/22/2024 0751 by Shellie Frederick RN  Safety Promotion/Fall Prevention:   assistive device/personal items within reach   clutter free environment maintained   nonskid shoes/slippers when out of bed   patient and family education   room near nurse's station   room organization consistent   safety round/check completed  Intervention: Prevent Skin Injury  Recent Flowsheet Documentation  Taken 2/22/2024 0850 by Shellie Frederick RN  Body Position: position changed independently  Intervention: Prevent and Manage VTE (Venous Thromboembolism) Risk  Recent Flowsheet  Documentation  Taken 2/22/2024 0850 by Shellie Frederick RN  VTE Prevention/Management: SCDs (sequential compression devices) off  Intervention: Prevent Infection  Recent Flowsheet Documentation  Taken 2/22/2024 1600 by Shellie Frederick RN  Infection Prevention:   hand hygiene promoted   environmental surveillance performed   equipment surfaces disinfected   personal protective equipment utilized   rest/sleep promoted   single patient room provided   visitors restricted/screened  Taken 2/22/2024 1200 by Shellie Frederick RN  Infection Prevention:   hand hygiene promoted   environmental surveillance performed   equipment surfaces disinfected   personal protective equipment utilized   rest/sleep promoted   single patient room provided   visitors restricted/screened  Taken 2/22/2024 0850 by Shellie Frederick RN  Infection Prevention:   hand hygiene promoted   environmental surveillance performed   equipment surfaces disinfected   personal protective equipment utilized   rest/sleep promoted   single patient room provided   visitors restricted/screened  Goal: Optimal Comfort and Wellbeing  Outcome: Progressing  Intervention: Monitor Pain and Promote Comfort  Recent Flowsheet Documentation  Taken 2/22/2024 0751 by Shellie Frederick RN  Pain Management Interventions: declines  Goal: Readiness for Transition of Care  Outcome: Progressing     Problem: Urinary Retention  Goal: Effective Urinary Elimination  Outcome: Progressing

## 2024-02-23 NOTE — PROGRESS NOTES
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 Currently being treated with intravesicular cidofovir per ID recs with plan for reducing and optimizing sirolimus dose. Recommended discussion with ID with treatment plan    Gay Orr MD  Hematology and Medical Oncology Fellow, PGY-5  [facility]  Pager: [phone]

## 2024-02-23 NOTE — PROGRESS NOTES
IR procedure for 02/23 for liver biopsy CANCELLED in [facility] appointment desk. Please see provider progress note and treatment team sticky note.     Natasha Barroso  BMT Nurse Coordinator  Phone: [phone]  Pager: [phone]

## 2024-02-24 LAB
ABO/RH(D): NORMAL
ANION GAP SERPL CALCULATED.3IONS-SCNC: 9 MMOL/L (ref 7–15)
ANTIBODY SCREEN: NEGATIVE
BACTERIA STL CULT: NORMAL
BASOPHILS # BLD AUTO: 0.1 10E3/UL (ref 0–0.2)
BASOPHILS NFR BLD AUTO: 1 %
BUN SERPL-MCNC: 3.6 MG/DL (ref 6–20)
CALCIUM SERPL-MCNC: 8.7 MG/DL (ref 8.6–10)
CHLORIDE SERPL-SCNC: 107 MMOL/L (ref 98–107)
CREAT SERPL-MCNC: 0.41 MG/DL (ref 0.51–0.95)
DEPRECATED HCO3 PLAS-SCNC: 28 MMOL/L (ref 22–29)
EGFRCR SERPLBLD CKD-EPI 2021: >90 ML/MIN/1.73M2
EOSINOPHIL # BLD AUTO: 0.4 10E3/UL (ref 0–0.7)
EOSINOPHIL NFR BLD AUTO: 7 %
ERYTHROCYTE [DISTWIDTH] IN BLOOD BY AUTOMATED COUNT: 20.4 % (ref 10–15)
GLUCOSE SERPL-MCNC: 101 MG/DL (ref 70–99)
HCT VFR BLD AUTO: 27.5 % (ref 35–47)
HGB BLD-MCNC: 8.5 G/DL (ref 11.7–15.7)
IMM GRANULOCYTES # BLD: 0.1 10E3/UL
IMM GRANULOCYTES NFR BLD: 3 %
LYMPHOCYTES # BLD AUTO: 1.3 10E3/UL (ref 0.8–5.3)
LYMPHOCYTES NFR BLD AUTO: 24 %
MAGNESIUM SERPL-MCNC: 1.8 MG/DL (ref 1.7–2.3)
MCH RBC QN AUTO: 33.3 PG (ref 26.5–33)
MCHC RBC AUTO-ENTMCNC: 30.9 G/DL (ref 31.5–36.5)
MCV RBC AUTO: 108 FL (ref 78–100)
MONOCYTES # BLD AUTO: 0.7 10E3/UL (ref 0–1.3)
MONOCYTES NFR BLD AUTO: 14 %
NEUTROPHILS # BLD AUTO: 2.7 10E3/UL (ref 1.6–8.3)
NEUTROPHILS NFR BLD AUTO: 51 %
NRBC # BLD AUTO: 0 10E3/UL
NRBC BLD AUTO-RTO: 0 /100
PHOSPHATE SERPL-MCNC: 3.5 MG/DL (ref 2.5–4.5)
PLATELET # BLD AUTO: 65 10E3/UL (ref 150–450)
POTASSIUM SERPL-SCNC: 3.6 MMOL/L (ref 3.4–5.3)
RBC # BLD AUTO: 2.55 10E6/UL (ref 3.8–5.2)
SODIUM SERPL-SCNC: 144 MMOL/L (ref 135–145)
SPECIMEN EXPIRATION DATE: NORMAL
WBC # BLD AUTO: 5.3 10E3/UL (ref 4–11)

## 2024-02-24 PROCEDURE — 250N000013 HC RX MED GY IP 250 OP 250 PS 637

## 2024-02-24 PROCEDURE — 86900 BLOOD TYPING SEROLOGIC ABO: CPT

## 2024-02-24 PROCEDURE — 99233 SBSQ HOSP IP/OBS HIGH 50: CPT | Mod: FS

## 2024-02-24 PROCEDURE — 206N000001 HC R&B BMT UMMC

## 2024-02-24 PROCEDURE — 250N000011 HC RX IP 250 OP 636

## 2024-02-24 PROCEDURE — 258N000003 HC RX IP 258 OP 636

## 2024-02-24 PROCEDURE — 80048 BASIC METABOLIC PNL TOTAL CA: CPT

## 2024-02-24 PROCEDURE — 99418 PROLNG IP/OBS E/M EA 15 MIN: CPT

## 2024-02-24 PROCEDURE — 85025 COMPLETE CBC W/AUTO DIFF WBC: CPT

## 2024-02-24 PROCEDURE — 83735 ASSAY OF MAGNESIUM: CPT | Performed by: INTERNAL MEDICINE

## 2024-02-24 PROCEDURE — 84100 ASSAY OF PHOSPHORUS: CPT | Performed by: INTERNAL MEDICINE

## 2024-02-24 PROCEDURE — 250N000011 HC RX IP 250 OP 636: Performed by: INTERNAL MEDICINE

## 2024-02-24 RX ORDER — HEPARIN SODIUM,PORCINE 10 UNIT/ML
5-10 VIAL (ML) INTRAVENOUS
Status: DISCONTINUED | OUTPATIENT
Start: 2024-02-24 | End: 2024-02-29 | Stop reason: HOSPADM

## 2024-02-24 RX ORDER — HEPARIN SODIUM,PORCINE 10 UNIT/ML
5-10 VIAL (ML) INTRAVENOUS EVERY 24 HOURS
Status: DISCONTINUED | OUTPATIENT
Start: 2024-02-24 | End: 2024-02-29 | Stop reason: HOSPADM

## 2024-02-24 RX ORDER — SODIUM CHLORIDE, SODIUM LACTATE, POTASSIUM CHLORIDE, CALCIUM CHLORIDE 600; 310; 30; 20 MG/100ML; MG/100ML; MG/100ML; MG/100ML
INJECTION, SOLUTION INTRAVENOUS CONTINUOUS
Status: DISCONTINUED | OUTPATIENT
Start: 2024-02-24 | End: 2024-02-26

## 2024-02-24 RX ADMIN — PANTOPRAZOLE SODIUM 40 MG: 40 TABLET, DELAYED RELEASE ORAL at 06:59

## 2024-02-24 RX ADMIN — URSODIOL 300 MG: 300 CAPSULE ORAL at 08:09

## 2024-02-24 RX ADMIN — URSODIOL 300 MG: 300 CAPSULE ORAL at 14:52

## 2024-02-24 RX ADMIN — PHENAZOPYRIDINE HYDROCHLORIDE 200 MG: 200 TABLET ORAL at 17:50

## 2024-02-24 RX ADMIN — PHENAZOPYRIDINE HYDROCHLORIDE 200 MG: 200 TABLET ORAL at 12:24

## 2024-02-24 RX ADMIN — SODIUM CHLORIDE, POTASSIUM CHLORIDE, SODIUM LACTATE AND CALCIUM CHLORIDE: 600; 310; 30; 20 INJECTION, SOLUTION INTRAVENOUS at 09:19

## 2024-02-24 RX ADMIN — PHENAZOPYRIDINE HYDROCHLORIDE 200 MG: 200 TABLET ORAL at 08:09

## 2024-02-24 RX ADMIN — POTASSIUM CHLORIDE 20 MEQ: 750 TABLET, EXTENDED RELEASE ORAL at 08:09

## 2024-02-24 RX ADMIN — OLANZAPINE 5 MG: 2.5 TABLET, FILM COATED ORAL at 23:04

## 2024-02-24 RX ADMIN — FENOFIBRATE 54 MG: 54 TABLET ORAL at 08:09

## 2024-02-24 RX ADMIN — ONDANSETRON 4 MG: 4 TABLET, ORALLY DISINTEGRATING ORAL at 06:59

## 2024-02-24 RX ADMIN — LETERMOVIR 480 MG: 480 TABLET, FILM COATED ORAL at 08:09

## 2024-02-24 RX ADMIN — AMLODIPINE BESYLATE 5 MG: 5 TABLET ORAL at 08:06

## 2024-02-24 RX ADMIN — POTASSIUM CHLORIDE 20 MEQ: 750 TABLET, EXTENDED RELEASE ORAL at 20:35

## 2024-02-24 RX ADMIN — Medication 2 SPRAY: at 12:30

## 2024-02-24 RX ADMIN — Medication 5 ML: at 20:48

## 2024-02-24 RX ADMIN — OXYCODONE HYDROCHLORIDE 5 MG: 5 TABLET ORAL at 12:28

## 2024-02-24 RX ADMIN — OXYBUTYNIN CHLORIDE 5 MG: 5 TABLET ORAL at 08:09

## 2024-02-24 RX ADMIN — OXYBUTYNIN CHLORIDE 5 MG: 5 TABLET ORAL at 20:36

## 2024-02-24 RX ADMIN — SODIUM CHLORIDE, POTASSIUM CHLORIDE, SODIUM LACTATE AND CALCIUM CHLORIDE: 600; 310; 30; 20 INJECTION, SOLUTION INTRAVENOUS at 20:47

## 2024-02-24 RX ADMIN — ACYCLOVIR 800 MG: 800 TABLET ORAL at 08:09

## 2024-02-24 RX ADMIN — OXYBUTYNIN CHLORIDE 5 MG: 5 TABLET ORAL at 16:50

## 2024-02-24 RX ADMIN — URSODIOL 300 MG: 300 CAPSULE ORAL at 20:36

## 2024-02-24 RX ADMIN — OXYBUTYNIN CHLORIDE 5 MG: 5 TABLET ORAL at 12:24

## 2024-02-24 RX ADMIN — ACYCLOVIR 800 MG: 800 TABLET ORAL at 20:35

## 2024-02-24 ASSESSMENT — ACTIVITIES OF DAILY LIVING (ADL)
ADLS_ACUITY_SCORE: 20

## 2024-02-24 NOTE — PROGRESS NOTES
BMT Daily Progress Note   02/24/2024    Patient ID: Irma Foreman is a 53 year old female who is 44 s/p MA (Bu/Flu) 6/8 URD Allo transplant, day 44. Readmitted 2/21 with hematuria, dysuria and clots.     Transplant Essential Data:   Diagnosis MDS  BMTCT Type ALLO    Prep Regimen Bu/Flu   Donor Match and  Source 6/8 URD    GVHD Prophylaxis PT Cy, Tac/MMF  Primary BMT MD Garay     Clinical Trials MT 2015-29 (according to but not on trial)         INTERVAL  HISTORY     Pain developed again over yesterday afternoon. She describes a lot of suprapubic pressure and cramping. Some small clots, urine color appears more orange than dark red as it has been. She is getting good UOP -3.5L. No CVA tenderness, Cr stable. Otherwise she is eating really well and no N/V/D rashes.  Review of Systems: 10 point ROS negative except as noted above.    Scheduled Medications   acyclovir  800 mg Oral BID    amLODIPine  5 mg Oral Daily    [START ON 2/26/2024] cidofovir (VISITIDE) 393.525 mg in sodium chloride 0.9 % 65.25 mL intermittent infusion  5 mg/kg INTRAVESICAL Once    fenofibrate  54 mg Oral Daily    letermovir  480 mg Oral Daily    micafungin  300 mg Intravenous Q Mon Wed Fri AM    OLANZapine  5 mg Oral At Bedtime    ondansetron  4 mg Oral QAM AC    oxyBUTYnin  5 mg Oral 4x Daily    pantoprazole  40 mg Oral QAM AC    phenazopyridine  200 mg Oral TID w/meals    potassium chloride jean pierre ER  20 mEq Oral BID    [START ON 2/25/2024] sirolimus  1.5 mg Oral Daily    [START ON 2/26/2024] sulfamethoxazole-trimethoprim  1 tablet Oral Q Mon Tues BID    ursodiol  300 mg Oral TID     Current Facility-Administered Medications   Medication    acetaminophen (TYLENOL) tablet 325-650 mg    acyclovir (ZOVIRAX) tablet 800 mg    amLODIPine (NORVASC) tablet 5 mg    artificial saliva (BIOTENE MT) solution 2 spray    ceFEPIme (MAXIPIME) 2 g vial to attach to  mL bag for ADULTS or 50 mL bag for PEDS    [START ON 2/26/2024] cidofovir (VISITIDE) 393.525 mg  "in sodium chloride 0.9 % 65.25 mL intermittent infusion    fenofibrate (LOFIBRA) tablet 54 mg    HYDROmorphone (PF) (DILAUDID) injection 0.5 mg    lactated ringers infusion    letermovir (PREVYMIS) tablet 480 mg    LORazepam (ATIVAN) injection 0.5-1 mg    Or    LORazepam (ATIVAN) tablet 0.5-1 mg    micafungin (MYCAMINE) 300 mg in sodium chloride 0.9 % 100 mL intermittent infusion    naloxone (NARCAN) injection 0.2 mg    Or    naloxone (NARCAN) injection 0.4 mg    Or    naloxone (NARCAN) injection 0.2 mg    Or    naloxone (NARCAN) injection 0.4 mg    OLANZapine (zyPREXA) tablet 5 mg    ondansetron (ZOFRAN ODT) ODT tab 4 mg    oxyBUTYnin (DITROPAN) tablet 5 mg    oxyCODONE (ROXICODONE) tablet 5 mg    pantoprazole (PROTONIX) EC tablet 40 mg    phenazopyridine (PYRIDIUM) tablet 200 mg    potassium chloride jean pierre ER (KLOR-CON M10) CR tablet 20 mEq    prochlorperazine (COMPAZINE) injection 5-10 mg    Or    prochlorperazine (COMPAZINE) tablet 5-10 mg    [START ON 2/25/2024] sirolimus (GENERIC EQUIVALENT) tablet 1.5 mg    [START ON 2/26/2024] sulfamethoxazole-trimethoprim (BACTRIM DS) 800-160 MG per tablet 1 tablet    ursodiol (ACTIGALL) capsule 300 mg       PHYSICAL EXAM     Weight In/Out     Wt Readings from Last 3 Encounters:   02/24/24 79 kg (174 lb 3.2 oz)   02/21/24 77.9 kg (171 lb 12.8 oz)   02/19/24 78 kg (172 lb)      I/O last 3 completed shifts:  In: 1600 [P.O.:1500; I.V.:100]  Out: 3725 [Urine:3725]       KPS:  80    BP (!) 162/95 (BP Location: Left arm)   Pulse 114   Temp 97.8  F (36.6  C) (Oral)   Resp 15   Ht 1.565 m (5' 1.61\")   Wt 79 kg (174 lb 3.2 oz)   LMP 11/05/2017   SpO2 96%   BMI 32.26 kg/m         General: NAD   Eyes: : KEVON, sclera anicteric   Lungs: CTA bilaterally  Cardiovascular: RRR, no M/R/G   Abdominal/Rectal: +BS, soft, RUQ tenderness on palpation and suprapubic tenderness, ND, NO CVA tenderness  : skin irritation surrounding vulva, erythema and small few mm area of skin abrasion " bilaterally (2/22)  Lymphatics: no edema  Skin: no rashes or petechaie  Neuro: A&O   Additional Findings: R Gastelum site NT, no drainage    LABS AND IMAGING - PAST 24 HOURS     Results for orders placed or performed during the hospital encounter of 02/21/24 (from the past 24 hour(s))   Sirolimus by Tandem Mass Spectrometry   Result Value Ref Range    Sirolimus by Tandem Mass Spectrometry 9.3 5.0 - 15.0 ug/L    Sirolimus Last Dose Date      Sirolimus Last Dose Time      Narrative    This test was developed and its performance characteristics determined by the [facility],  Special Chemistry Laboratory. It has not been cleared or approved by the FDA. The laboratory is regulated under CLIA as qualified to perform high-complexity testing. This test is used for clinical purposes. It should not be regarded as investigational or for research.   ABO/Rh type and screen    Narrative    The following orders were created for panel order ABO/Rh type and screen.  Procedure                               Abnormality         Status                     ---------                               -----------         ------                     Adult Type and Screen[[id number]]                            Final result                 Please view results for these tests on the individual orders.   Basic metabolic panel   Result Value Ref Range    Sodium 144 135 - 145 mmol/L    Potassium 3.6 3.4 - 5.3 mmol/L    Chloride 107 98 - 107 mmol/L    Carbon Dioxide (CO2) 28 22 - 29 mmol/L    Anion Gap 9 7 - 15 mmol/L    Urea Nitrogen 3.6 (L) 6.0 - 20.0 mg/dL    Creatinine 0.41 (L) 0.51 - 0.95 mg/dL    GFR Estimate >90 >60 mL/min/1.73m2    Calcium 8.7 8.6 - 10.0 mg/dL    Glucose 101 (H) 70 - 99 mg/dL   Adult Type and Screen   Result Value Ref Range    ABO/RH(D) O POS     Antibody Screen Negative Negative    SPECIMEN EXPIRATION DATE [id number]    CBC with platelets differential    Narrative    The following orders were  created for panel order CBC with platelets differential.  Procedure                               Abnormality         Status                     ---------                               -----------         ------                     CBC with platelets and d...[[id number]]  Abnormal            Final result                 Please view results for these tests on the individual orders.   CBC with platelets and differential   Result Value Ref Range    WBC Count 5.3 4.0 - 11.0 10e3/uL    RBC Count 2.55 (L) 3.80 - 5.20 10e6/uL    Hemoglobin 8.5 (L) 11.7 - 15.7 g/dL    Hematocrit 27.5 (L) 35.0 - 47.0 %     (H) 78 - 100 fL    MCH 33.3 (H) 26.5 - 33.0 pg    MCHC 30.9 (L) 31.5 - 36.5 g/dL    RDW 20.4 (H) 10.0 - 15.0 %    Platelet Count 65 (L) 150 - 450 10e3/uL    % Neutrophils 51 %    % Lymphocytes 24 %    % Monocytes 14 %    % Eosinophils 7 %    % Basophils 1 %    % Immature Granulocytes 3 %    NRBCs per 100 WBC 0 <1 /100    Absolute Neutrophils 2.7 1.6 - 8.3 10e3/uL    Absolute Lymphocytes 1.3 0.8 - 5.3 10e3/uL    Absolute Monocytes 0.7 0.0 - 1.3 10e3/uL    Absolute Eosinophils 0.4 0.0 - 0.7 10e3/uL    Absolute Basophils 0.1 0.0 - 0.2 10e3/uL    Absolute Immature Granulocytes 0.1 <=0.4 10e3/uL    Absolute NRBCs 0.0 10e3/uL         ASSESSMENT BY SYSTEMS     Irmacurtis Foreman is a 53 year old female who is 44 s/p MA (Bu/Flu) 6/8 URD Allo transplant. Readmitted 2/21 with hematuria, dysuria and clots.     BMT/IEC PROTOCOL for MT 2015-29   - Chemo protocol:  Day -6 through day -1: Keppra and Allopurinol   Day -5 through -2: Bu/Flu.   Day -1: Rest day   Day 0: Transplant. Recipient: O+, CMV+. Donor: O+, CMV-. Cell dose 6.5 x10^6.   - Restaging plan: per protocol   BMbx 2/6 -- Day 28 review with Dr. Garay on 2/9  Flow neg; 100% donor. Chimerisms: CD33 is 100% donor, CD3 is 87% donor. Bone marrow 100% donor.      HEME/COAG  - Anemia, thrombocytopenia 2/2 due to chemotherapy/BMT, improving  #Iron overload: hx of transfusion  dependent anemia, pre-transplant ferritin around 5,000. Recommend phlebotomy post transplant when retic count is restored and Hgb >10 . On admission ferritin >10k.   #Hematuria- coags WNL, fibrinogen slightly elevated  # Platelet refractoriness: HLA platelets when available.   - Transfusion parameters: hemoglobin <7, platelets <10. Plt and hgb continue to improve despite hematuria  - GCSF PRN for ANC < 1.     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 UA shows large blood, LE, WBC, RBCs. Ucx (2/12): mixed urogenital odilon. Underwent 5 days Levaquin. Repeat UC negative. No empiric abx added on admission.              - Consult urology- recommended continuing pyridium, oxybutynin, on max dose               - Consult ID- intervesicular cidofovir 5mg/kg twice weekly (Monday, Thursday), only two doses ordered. Frequency and duration depends on patient symptoms. Possible to do in clinic. If not improving symptoms can try IV cidofovir w/o preobenocide per ID.              - IVF NS at 100cc/hr, discontinued 2/23 due to tolerating PO intake improved symptoms, restarted 2/24 at 75mL/hr with worsening symptoms.              -Keep I/S as low as safely possible see GVHD                Prophylaxis plan:   ACV/letermovir   Micafungin through day +45 (current smoker) - MWF thru 2/26  Bactrim   CMV: ND (2/12)   EBV: ND (2/6)      RISK OF GVHD  - Pink maculopapular rash.TCM prescribed at discharge and prednisone 40 mg x 5 days (2/3-2/7). Resolved.   - Prophylaxis: PT Cy, Tac/MMF started 1/16. **initially avoided sirolimus d/t high risk VOD**. Tac drip discontinued 1/31, sirolimus started.  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 -on admission LFTs are rapidly improving to normal 2/22. Check M/Thurs.    -Rash for 3-4 days (2/11-2/16) , non-pruritic. - resolved  -2/20 RUQ US suggestive of hepatic steatosis, normal dopplers. Biopsy is scheduled 2/23, but was cancelled today as hepatic panel is essentially WNL, no tbili elevation, no rash, GI symptoms and U/S unremarkable.      CARDIOVASCULAR  # Chest pressure: new on 1/15. Resolved. See previous discharge summary dated 2/4/24 for details.   #Prolonged QTc to 507 on 1/22. EKG (1/30) NSR, Qtc 454.  # HTN: On norvasc 5mg daily. Worse with pain. Controlling symptoms improve BP. No PRNs available as concern for hypotension, renal injury in setting BK viremia, cystitis.     GI/NUTRITION  # transaminitis: much improved, almost WNL, trend M/Thur. Etiology unclear. See gvhd   #Hypertriglyceridemia- 249 (1/29), 388 on admission. Start fenofibrate, trend.  # Epigastric pain (1/18): RUQ ultrasound showing patent doppler throughout, no ascites, nonspecific elevated hepatic artery restrictive index (see full impression above).                - Ulcer prophylaxis: PPI  - VOD ppx: Ursodiol. See liver US 2/21 results above        #dysuria, urge/frequency: 2/12 BK urine >100 million, blood >1350 copies. Oxybuytnin 5mg QID, Pyridum 200mg TID. Oxycodone, Dilaudid PRN. See ID above.       DERM:  # Foot, hand warts. Curbside derm on 1/8, no recs while inpatient, typically managed OP.    # rash as per above.     Known issues that I take into account for medical decisions, with salient changes to the plan considering these complexities noted above.         Patient Active Problem List   Diagnosis    Left medial knee pain    Obesity (BMI 30-39.9)    Anemia, unspecified type    Macrocytic anemia    MDS (myelodysplastic syndrome) (H)         Clinically Significant Risk Factors Present on Admission          # Hypokalemia: Lowest K = 3.2 mmol/L in last 2 days, will replace as needed         #  Thrombocytopenia: Lowest platelets = 74 in last 2 days, will monitor for bleeding                          I spent 30 minutes in the care of this patient today, which included time necessary for preparation for the visit, obtaining history, ordering medications/tests/procedures as medically indicated, review of pertinent medical literature, counseling of the patient, communication of recommendations to the care team, and documentation time.     Alice Ortega PA-C   [id number]

## 2024-02-24 NOTE — PLAN OF CARE
"VSS. Denies pain, nausea, loose stools. C/O feeling \"fullness\" in bladder area and urge to urinate. Hauser patent, with clear, orange colored urine without visible clots or debris. Good urine output. No blood products required replacement this AM.    Problem: Adult Inpatient Plan of Care  Goal: Optimal Comfort and Wellbeing  Outcome: Not Progressing  Goal: Readiness for Transition of Care  Outcome: Not Progressing     Problem: Adult Inpatient Plan of Care  Goal: Plan of Care Review  Description: The Plan of Care Review/Shift note should be completed every shift.  The Outcome Evaluation is a brief statement about your assessment that the patient is improving, declining, or no change.  This information will be displayed automatically on your shift  note.  Outcome: Progressing  Goal: Patient-Specific Goal (Individualized)  Description: You can add care plan individualizations to a care plan. Examples of Individualization might be:  \"Parent requests to be called daily at 9am for status\", \"I have a hard time hearing out of my right ear\", or \"Do not touch me to wake me up as it startles  me\".  Outcome: Progressing  Goal: Absence of Hospital-Acquired Illness or Injury  Outcome: Progressing  Intervention: Identify and Manage Fall Risk  Recent Flowsheet Documentation  Taken 2/24/2024 0350 by Cynthia Selby RN  Safety Promotion/Fall Prevention: safety round/check completed  Taken 2/24/2024 0248 by Cynthia Selby RN  Safety Promotion/Fall Prevention: safety round/check completed  Taken 2/23/2024 2312 by Cynthia Selby RN  Safety Promotion/Fall Prevention: safety round/check completed  Taken 2/23/2024 2000 by Cynthia Selby RN  Safety Promotion/Fall Prevention: safety round/check completed  Intervention: Prevent Skin Injury  Recent Flowsheet Documentation  Taken 2/23/2024 2000 by Cynthia Selby, RN  Body Position: position changed independently  Skin Protection: adhesive use limited  Device Skin Pressure Protection: adhesive " use limited  Intervention: Prevent and Manage VTE (Venous Thromboembolism) Risk  Recent Flowsheet Documentation  Taken 2/23/2024 2000 by Cynthia Selby RN  VTE Prevention/Management: SCDs (sequential compression devices) off  Intervention: Prevent Infection  Recent Flowsheet Documentation  Taken 2/24/2024 0350 by Cynthia Selby RN  Infection Prevention:   environmental surveillance performed   hand hygiene promoted   equipment surfaces disinfected  Taken 2/23/2024 2312 by Cynthia Selby RN  Infection Prevention:   environmental surveillance performed   hand hygiene promoted   equipment surfaces disinfected  Taken 2/23/2024 2000 by Cynthia Selby RN  Infection Prevention:   environmental surveillance performed   hand hygiene promoted   equipment surfaces disinfected     Problem: Urinary Retention  Goal: Effective Urinary Elimination  Outcome: Progressing  Intervention: Promote Effective Urine Elimination  Recent Flowsheet Documentation  Taken 2/23/2024 2000 by Cynthia Selby RN  Urinary Elimination Promotion: catheter patency maintained   Goal Outcome Evaluation:

## 2024-02-24 NOTE — PLAN OF CARE
"BP (!) 140/80 (BP Location: Left arm)   Pulse 100   Temp 97.5  F (36.4  C) (Axillary)   Resp 16   Ht 1.565 m (5' 1.61\")   Wt 78.7 kg (173 lb 8 oz)   LMP 11/05/2017   SpO2 100%   BMI 32.13 kg/m       Alert & Oriented x4. Hypertensive, BP up to 156/93, not within notifying parameters. OVSS. Pt denies SOB, nausea, and pain. Hauser in place with orange/red output with some small clots. Hauser cares completed. Started on fenofibrate today. Pt reported dry mouth, biotene solution spray given. Continue with POC.       Problem: Adult Inpatient Plan of Care  Goal: Absence of Hospital-Acquired Illness or Injury  Intervention: Prevent Infection  Recent Flowsheet Documentation  Taken 2/23/2024 1600 by Mandy Wu  Infection Prevention:   single patient room provided   rest/sleep promoted   personal protective equipment utilized   hand hygiene promoted   equipment surfaces disinfected   environmental surveillance performed  Taken 2/23/2024 1200 by Mandy Wu  Infection Prevention:   single patient room provided   rest/sleep promoted   personal protective equipment utilized   hand hygiene promoted   equipment surfaces disinfected   environmental surveillance performed  Taken 2/23/2024 0904 by Mandy Wu  Infection Prevention:   single patient room provided   rest/sleep promoted   personal protective equipment utilized   hand hygiene promoted   equipment surfaces disinfected   environmental surveillance performed     Problem: Adult Inpatient Plan of Care  Goal: Optimal Comfort and Wellbeing  Outcome: Progressing     Problem: Urinary Retention  Goal: Effective Urinary Elimination  Outcome: Progressing     "

## 2024-02-25 LAB
ANION GAP SERPL CALCULATED.3IONS-SCNC: 8 MMOL/L (ref 7–15)
BASOPHILS # BLD AUTO: 0.1 10E3/UL (ref 0–0.2)
BASOPHILS NFR BLD AUTO: 1 %
BUN SERPL-MCNC: 3.5 MG/DL (ref 6–20)
CALCIUM SERPL-MCNC: 9.1 MG/DL (ref 8.6–10)
CHLORIDE SERPL-SCNC: 106 MMOL/L (ref 98–107)
CREAT SERPL-MCNC: 0.47 MG/DL (ref 0.51–0.95)
DEPRECATED HCO3 PLAS-SCNC: 30 MMOL/L (ref 22–29)
EGFRCR SERPLBLD CKD-EPI 2021: >90 ML/MIN/1.73M2
EOSINOPHIL # BLD AUTO: 0.4 10E3/UL (ref 0–0.7)
EOSINOPHIL NFR BLD AUTO: 8 %
ERYTHROCYTE [DISTWIDTH] IN BLOOD BY AUTOMATED COUNT: 20.1 % (ref 10–15)
GLUCOSE SERPL-MCNC: 93 MG/DL (ref 70–99)
HCT VFR BLD AUTO: 28.1 % (ref 35–47)
HGB BLD-MCNC: 8.8 G/DL (ref 11.7–15.7)
IMM GRANULOCYTES # BLD: 0.1 10E3/UL
IMM GRANULOCYTES NFR BLD: 2 %
LYMPHOCYTES # BLD AUTO: 1.3 10E3/UL (ref 0.8–5.3)
LYMPHOCYTES NFR BLD AUTO: 26 %
MAGNESIUM SERPL-MCNC: 1.9 MG/DL (ref 1.7–2.3)
MCH RBC QN AUTO: 33.8 PG (ref 26.5–33)
MCHC RBC AUTO-ENTMCNC: 31.3 G/DL (ref 31.5–36.5)
MCV RBC AUTO: 108 FL (ref 78–100)
MONOCYTES # BLD AUTO: 0.8 10E3/UL (ref 0–1.3)
MONOCYTES NFR BLD AUTO: 15 %
NEUTROPHILS # BLD AUTO: 2.4 10E3/UL (ref 1.6–8.3)
NEUTROPHILS NFR BLD AUTO: 48 %
NRBC # BLD AUTO: 0 10E3/UL
NRBC BLD AUTO-RTO: 1 /100
PHOSPHATE SERPL-MCNC: 3.8 MG/DL (ref 2.5–4.5)
PLATELET # BLD AUTO: 58 10E3/UL (ref 150–450)
POTASSIUM SERPL-SCNC: 3.7 MMOL/L (ref 3.4–5.3)
RBC # BLD AUTO: 2.6 10E6/UL (ref 3.8–5.2)
SODIUM SERPL-SCNC: 144 MMOL/L (ref 135–145)
WBC # BLD AUTO: 5 10E3/UL (ref 4–11)

## 2024-02-25 PROCEDURE — 80048 BASIC METABOLIC PNL TOTAL CA: CPT

## 2024-02-25 PROCEDURE — 250N000013 HC RX MED GY IP 250 OP 250 PS 637

## 2024-02-25 PROCEDURE — 83735 ASSAY OF MAGNESIUM: CPT | Performed by: INTERNAL MEDICINE

## 2024-02-25 PROCEDURE — 85025 COMPLETE CBC W/AUTO DIFF WBC: CPT

## 2024-02-25 PROCEDURE — 99233 SBSQ HOSP IP/OBS HIGH 50: CPT | Mod: FS

## 2024-02-25 PROCEDURE — 250N000012 HC RX MED GY IP 250 OP 636 PS 637

## 2024-02-25 PROCEDURE — 250N000011 HC RX IP 250 OP 636: Performed by: INTERNAL MEDICINE

## 2024-02-25 PROCEDURE — 206N000001 HC R&B BMT UMMC

## 2024-02-25 PROCEDURE — 84100 ASSAY OF PHOSPHORUS: CPT | Performed by: INTERNAL MEDICINE

## 2024-02-25 PROCEDURE — 250N000011 HC RX IP 250 OP 636

## 2024-02-25 PROCEDURE — 99418 PROLNG IP/OBS E/M EA 15 MIN: CPT

## 2024-02-25 PROCEDURE — 258N000003 HC RX IP 258 OP 636

## 2024-02-25 RX ORDER — OXYBUTYNIN CHLORIDE 5 MG/1
5 TABLET ORAL 4 TIMES DAILY
COMMUNITY
Start: 2024-02-25 | End: 2024-03-28

## 2024-02-25 RX ORDER — FENOFIBRATE 54 MG/1
54 TABLET ORAL DAILY
Qty: 60 TABLET | Refills: 0 | Status: SHIPPED | OUTPATIENT
Start: 2024-02-26 | End: 2024-05-09

## 2024-02-25 RX ORDER — SIROLIMUS 1 MG/1
TABLET, FILM COATED ORAL
Qty: 30 TABLET | Refills: 0 | Status: SHIPPED | OUTPATIENT
Start: 2024-02-25 | End: 2024-06-12

## 2024-02-25 RX ORDER — SIROLIMUS 0.5 MG/1
TABLET, FILM COATED ORAL
Qty: 30 TABLET | Refills: 0 | Status: SHIPPED | OUTPATIENT
Start: 2024-02-26 | End: 2024-04-26

## 2024-02-25 RX ORDER — OXYCODONE HYDROCHLORIDE 5 MG/1
5 TABLET ORAL EVERY 4 HOURS PRN
Qty: 15 TABLET | Refills: 0 | Status: SHIPPED | OUTPATIENT
Start: 2024-02-25 | End: 2024-04-26

## 2024-02-25 RX ADMIN — PANTOPRAZOLE SODIUM 40 MG: 40 TABLET, DELAYED RELEASE ORAL at 08:21

## 2024-02-25 RX ADMIN — ONDANSETRON 4 MG: 4 TABLET, ORALLY DISINTEGRATING ORAL at 08:21

## 2024-02-25 RX ADMIN — PHENAZOPYRIDINE HYDROCHLORIDE 200 MG: 200 TABLET ORAL at 13:16

## 2024-02-25 RX ADMIN — OXYBUTYNIN CHLORIDE 5 MG: 5 TABLET ORAL at 13:16

## 2024-02-25 RX ADMIN — PHENAZOPYRIDINE HYDROCHLORIDE 200 MG: 200 TABLET ORAL at 08:21

## 2024-02-25 RX ADMIN — URSODIOL 300 MG: 300 CAPSULE ORAL at 08:21

## 2024-02-25 RX ADMIN — OXYCODONE HYDROCHLORIDE 5 MG: 5 TABLET ORAL at 19:57

## 2024-02-25 RX ADMIN — ACYCLOVIR 800 MG: 800 TABLET ORAL at 08:21

## 2024-02-25 RX ADMIN — SIROLIMUS 1.5 MG: 1 TABLET ORAL at 08:21

## 2024-02-25 RX ADMIN — PHENAZOPYRIDINE HYDROCHLORIDE 200 MG: 200 TABLET ORAL at 17:02

## 2024-02-25 RX ADMIN — Medication 5 ML: at 19:24

## 2024-02-25 RX ADMIN — OXYBUTYNIN CHLORIDE 5 MG: 5 TABLET ORAL at 16:54

## 2024-02-25 RX ADMIN — POTASSIUM CHLORIDE 20 MEQ: 750 TABLET, EXTENDED RELEASE ORAL at 08:21

## 2024-02-25 RX ADMIN — OXYBUTYNIN CHLORIDE 5 MG: 5 TABLET ORAL at 19:29

## 2024-02-25 RX ADMIN — OLANZAPINE 5 MG: 2.5 TABLET, FILM COATED ORAL at 22:34

## 2024-02-25 RX ADMIN — AMLODIPINE BESYLATE 5 MG: 5 TABLET ORAL at 08:21

## 2024-02-25 RX ADMIN — URSODIOL 300 MG: 300 CAPSULE ORAL at 19:24

## 2024-02-25 RX ADMIN — POTASSIUM CHLORIDE 20 MEQ: 750 TABLET, EXTENDED RELEASE ORAL at 19:24

## 2024-02-25 RX ADMIN — OXYCODONE HYDROCHLORIDE 5 MG: 5 TABLET ORAL at 09:43

## 2024-02-25 RX ADMIN — SODIUM CHLORIDE, POTASSIUM CHLORIDE, SODIUM LACTATE AND CALCIUM CHLORIDE: 600; 310; 30; 20 INJECTION, SOLUTION INTRAVENOUS at 08:22

## 2024-02-25 RX ADMIN — SODIUM CHLORIDE, POTASSIUM CHLORIDE, SODIUM LACTATE AND CALCIUM CHLORIDE: 600; 310; 30; 20 INJECTION, SOLUTION INTRAVENOUS at 19:22

## 2024-02-25 RX ADMIN — LETERMOVIR 480 MG: 480 TABLET, FILM COATED ORAL at 08:21

## 2024-02-25 RX ADMIN — URSODIOL 300 MG: 300 CAPSULE ORAL at 14:42

## 2024-02-25 RX ADMIN — ACYCLOVIR 800 MG: 800 TABLET ORAL at 19:24

## 2024-02-25 RX ADMIN — OXYBUTYNIN CHLORIDE 5 MG: 5 TABLET ORAL at 08:21

## 2024-02-25 RX ADMIN — Medication 2 SPRAY: at 09:48

## 2024-02-25 RX ADMIN — FENOFIBRATE 54 MG: 54 TABLET ORAL at 08:21

## 2024-02-25 ASSESSMENT — ACTIVITIES OF DAILY LIVING (ADL)
ADLS_ACUITY_SCORE: 20

## 2024-02-25 NOTE — PLAN OF CARE
"/84 (BP Location: Left arm)   Pulse 89   Temp 97.8  F (36.6  C) (Oral)   Resp 16   Ht 1.565 m (5' 1.61\")   Wt 79 kg (174 lb 3.2 oz)   LMP 11/05/2017   SpO2 94%   BMI 32.26 kg/m      VSS except for hypertension. Patient had BPs in 160s earlier this shift; provider notified. Patient given x1 oxycodone per patient request. Complains of \"fullness\" in bladder area and urge to urinate. Hauser with red/orange colored urine with small blood clots. Good urine output. Continuous Lactated Ringers running at 75ml/hr. No electrolyte replacements needed; recheck tomorrow morning. Continue with plan of care. Notify primary team with any changes.     Goal Outcome Evaluation:      Plan of Care Reviewed With: patient    Overall Patient Progress: improving      Problem: Adult Inpatient Plan of Care  Goal: Plan of Care Review  Description: The Plan of Care Review/Shift note should be completed every shift.  The Outcome Evaluation is a brief statement about your assessment that the patient is improving, declining, or no change.  This information will be displayed automatically on your shift  note.  Outcome: Progressing  Flowsheets (Taken 2/24/2024 1836)  Plan of Care Reviewed With: patient  Overall Patient Progress: improving  Goal: Patient-Specific Goal (Individualized)  Description: You can add care plan individualizations to a care plan. Examples of Individualization might be:  \"Parent requests to be called daily at 9am for status\", \"I have a hard time hearing out of my right ear\", or \"Do not touch me to wake me up as it startles  me\".  Outcome: Progressing  Goal: Absence of Hospital-Acquired Illness or Injury  Outcome: Progressing  Intervention: Identify and Manage Fall Risk  Recent Flowsheet Documentation  Taken 2/24/2024 1530 by Fiona Mccray, RN  Safety Promotion/Fall Prevention: safety round/check completed  Taken 2/24/2024 1230 by Fiona Mccray, RN  Safety Promotion/Fall Prevention: safety round/check " completed  Taken 2/24/2024 0800 by Fiona Mccray RN  Safety Promotion/Fall Prevention: safety round/check completed  Intervention: Prevent Skin Injury  Recent Flowsheet Documentation  Taken 2/24/2024 0800 by Fiona Mccray RN  Body Position: position changed independently  Skin Protection: adhesive use limited  Device Skin Pressure Protection: adhesive use limited  Intervention: Prevent and Manage VTE (Venous Thromboembolism) Risk  Recent Flowsheet Documentation  Taken 2/24/2024 0800 by Fiona Mccray RN  VTE Prevention/Management: SCDs (sequential compression devices) off  Intervention: Prevent Infection  Recent Flowsheet Documentation  Taken 2/24/2024 1530 by Fiona Mccray RN  Infection Prevention:   environmental surveillance performed   hand hygiene promoted   equipment surfaces disinfected  Taken 2/24/2024 1230 by Fiona Mccray RN  Infection Prevention:   environmental surveillance performed   hand hygiene promoted   equipment surfaces disinfected  Taken 2/24/2024 0800 by Fiona Mccray RN  Infection Prevention:   environmental surveillance performed   hand hygiene promoted   equipment surfaces disinfected  Goal: Optimal Comfort and Wellbeing  Outcome: Progressing  Intervention: Monitor Pain and Promote Comfort  Recent Flowsheet Documentation  Taken 2/24/2024 1452 by Fiona Mccray RN  Pain Management Interventions: (scheduled pyridium and oxybutinin) declines  Taken 2/24/2024 1320 by Fiona Mccray RN  Pain Management Interventions:   rest   relaxation techniques promoted  Taken 2/24/2024 1228 by Fiona Mccray RN  Pain Management Interventions:   rest   medication (see MAR)  Taken 2/24/2024 1224 by Fiona Mccray RN  Pain Management Interventions: rest  Taken 2/24/2024 0830 by Fiona Mccray RN  Pain Management Interventions: (scheduled pyridium and oxybutinin) declines  Taken 2/24/2024 0741 by Fiona Mccray RN  Pain Management  Interventions: (scheduled pyridium and oxybutinin) declines  Goal: Readiness for Transition of Care  Outcome: Progressing     Problem: Urinary Retention  Goal: Effective Urinary Elimination  Outcome: Progressing  Intervention: Promote Effective Urine Elimination  Recent Flowsheet Documentation  Taken 2/24/2024 0800 by Fiona Mccray, RN  Urinary Elimination Promotion: catheter patency maintained

## 2024-02-25 NOTE — PROGRESS NOTES
BMT Daily Progress Note   02/25/2024    Patient ID: Irma Foreman is a 53 year old female who is 45 s/p MA (Bu/Flu) 6/8 URD Allo transplant, day 45. Readmitted 2/21 with hematuria, dysuria and clots.     Transplant Essential Data:   Diagnosis MDS  BMTCT Type ALLO    Prep Regimen Bu/Flu   Donor Match and  Source 6/8 URD    GVHD Prophylaxis PT Cy, Tac/MMF  Primary BMT MD Garay     Clinical Trials MT 2015-29 (according to but not on trial)         INTERVAL  HISTORY     Pain is maybe a little better- she still has the cramping, frequency symptoms that are maybe slightly improved. She still feels sweaty and pressure when she is urinating. Less clots, much less blood tinge color to urine. Otherwise eating well, no diarrhea, no rashes.   Review of Systems: 10 point ROS negative except as noted above.    Scheduled Medications   acyclovir  800 mg Oral BID    amLODIPine  5 mg Oral Daily    [START ON 2/26/2024] cidofovir (VISITIDE) 393.525 mg in sodium chloride 0.9 % 65.25 mL intermittent infusion  5 mg/kg INTRAVESICAL Once    fenofibrate  54 mg Oral Daily    heparin lock flush  5-10 mL Intracatheter Q24H    letermovir  480 mg Oral Daily    micafungin  300 mg Intravenous Q Mon Wed Fri AM    OLANZapine  5 mg Oral At Bedtime    ondansetron  4 mg Oral QAM AC    oxyBUTYnin  5 mg Oral 4x Daily    pantoprazole  40 mg Oral QAM AC    phenazopyridine  200 mg Oral TID w/meals    potassium chloride jean pierre ER  20 mEq Oral BID    sirolimus  1.5 mg Oral Daily    [START ON 2/26/2024] sulfamethoxazole-trimethoprim  1 tablet Oral Q Mon Tues BID    ursodiol  300 mg Oral TID     Current Facility-Administered Medications   Medication    acetaminophen (TYLENOL) tablet 325-650 mg    acyclovir (ZOVIRAX) tablet 800 mg    amLODIPine (NORVASC) tablet 5 mg    artificial saliva (BIOTENE MT) solution 2 spray    ceFEPIme (MAXIPIME) 2 g vial to attach to  mL bag for ADULTS or 50 mL bag for PEDS    [START ON 2/26/2024] cidofovir (VISITIDE) 393.525 mg in  "sodium chloride 0.9 % 65.25 mL intermittent infusion    fenofibrate (LOFIBRA) tablet 54 mg    heparin lock flush 10 UNIT/ML injection 5-10 mL    heparin lock flush 10 UNIT/ML injection 5-10 mL    HYDROmorphone (PF) (DILAUDID) injection 0.5 mg    lactated ringers infusion    letermovir (PREVYMIS) tablet 480 mg    LORazepam (ATIVAN) injection 0.5-1 mg    Or    LORazepam (ATIVAN) tablet 0.5-1 mg    micafungin (MYCAMINE) 300 mg in sodium chloride 0.9 % 100 mL intermittent infusion    naloxone (NARCAN) injection 0.2 mg    Or    naloxone (NARCAN) injection 0.4 mg    Or    naloxone (NARCAN) injection 0.2 mg    Or    naloxone (NARCAN) injection 0.4 mg    OLANZapine (zyPREXA) tablet 5 mg    ondansetron (ZOFRAN ODT) ODT tab 4 mg    oxyBUTYnin (DITROPAN) tablet 5 mg    oxyCODONE (ROXICODONE) tablet 5 mg    pantoprazole (PROTONIX) EC tablet 40 mg    phenazopyridine (PYRIDIUM) tablet 200 mg    potassium chloride jean pierre ER (KLOR-CON M10) CR tablet 20 mEq    prochlorperazine (COMPAZINE) injection 5-10 mg    Or    prochlorperazine (COMPAZINE) tablet 5-10 mg    sirolimus (GENERIC EQUIVALENT) tablet 1.5 mg    [START ON 2/26/2024] sulfamethoxazole-trimethoprim (BACTRIM DS) 800-160 MG per tablet 1 tablet    ursodiol (ACTIGALL) capsule 300 mg       PHYSICAL EXAM     Weight In/Out     Wt Readings from Last 3 Encounters:   02/25/24 78.3 kg (172 lb 11.2 oz)   02/21/24 77.9 kg (171 lb 12.8 oz)   02/19/24 78 kg (172 lb)      I/O last 3 completed shifts:  In: 2851.25 [P.O.:1375; I.V.:1476.25]  Out: 5000 [Urine:5000]       KPS:  80    BP (!) 142/84 (BP Location: Left arm)   Pulse 84   Temp 97.9  F (36.6  C) (Oral)   Resp 16   Ht 1.565 m (5' 1.61\")   Wt 78.3 kg (172 lb 11.2 oz)   LMP 11/05/2017   SpO2 93%   BMI 31.98 kg/m         General: NAD   Eyes: : KEVON, sclera anicteric   Lungs: CTA bilaterally  Cardiovascular: RRR, no M/R/G   Abdominal/Rectal: +BS, soft, no RUQ pain  : skin irritation surrounding vulva, erythema and small few mm " area of skin abrasion bilaterally (2/22), suprapubic tenderness with palpation  Lymphatics: no edema  Skin: no rashes or petechaie  Neuro: A&O   Additional Findings: R Gastelum site NT, no drainage    LABS AND IMAGING - PAST 24 HOURS     Results for orders placed or performed during the hospital encounter of 02/21/24 (from the past 24 hour(s))   CBC with platelets differential    Narrative    The following orders were created for panel order CBC with platelets differential.  Procedure                               Abnormality         Status                     ---------                               -----------         ------                     CBC with platelets and d...[[id number]]  Abnormal            Final result                 Please view results for these tests on the individual orders.   Basic metabolic panel   Result Value Ref Range    Sodium 144 135 - 145 mmol/L    Potassium 3.7 3.4 - 5.3 mmol/L    Chloride 106 98 - 107 mmol/L    Carbon Dioxide (CO2) 30 (H) 22 - 29 mmol/L    Anion Gap 8 7 - 15 mmol/L    Urea Nitrogen 3.5 (L) 6.0 - 20.0 mg/dL    Creatinine 0.47 (L) 0.51 - 0.95 mg/dL    GFR Estimate >90 >60 mL/min/1.73m2    Calcium 9.1 8.6 - 10.0 mg/dL    Glucose 93 70 - 99 mg/dL   Magnesium   Result Value Ref Range    Magnesium 1.9 1.7 - 2.3 mg/dL   Phosphorus   Result Value Ref Range    Phosphorus 3.8 2.5 - 4.5 mg/dL   CBC with platelets and differential   Result Value Ref Range    WBC Count 5.0 4.0 - 11.0 10e3/uL    RBC Count 2.60 (L) 3.80 - 5.20 10e6/uL    Hemoglobin 8.8 (L) 11.7 - 15.7 g/dL    Hematocrit 28.1 (L) 35.0 - 47.0 %     (H) 78 - 100 fL    MCH 33.8 (H) 26.5 - 33.0 pg    MCHC 31.3 (L) 31.5 - 36.5 g/dL    RDW 20.1 (H) 10.0 - 15.0 %    Platelet Count 58 (L) 150 - 450 10e3/uL    % Neutrophils 48 %    % Lymphocytes 26 %    % Monocytes 15 %    % Eosinophils 8 %    % Basophils 1 %    % Immature Granulocytes 2 %    NRBCs per 100 WBC 1 (H) <1 /100    Absolute Neutrophils 2.4 1.6 - 8.3 10e3/uL     Absolute Lymphocytes 1.3 0.8 - 5.3 10e3/uL    Absolute Monocytes 0.8 0.0 - 1.3 10e3/uL    Absolute Eosinophils 0.4 0.0 - 0.7 10e3/uL    Absolute Basophils 0.1 0.0 - 0.2 10e3/uL    Absolute Immature Granulocytes 0.1 <=0.4 10e3/uL    Absolute NRBCs 0.0 10e3/uL         ASSESSMENT BY SYSTEMS     Irma Foreman is a 53 year old female who is 45 s/p MA (Bu/Flu) 6/8 URD Allo transplant. Readmitted 2/21 with hematuria, dysuria and clots.     BMT/IEC PROTOCOL for MT 2015-29   - Chemo protocol:  Day -6 through day -1: Keppra and Allopurinol   Day -5 through -2: Bu/Flu.   Day -1: Rest day   Day 0: Transplant. Recipient: O+, CMV+. Donor: O+, CMV-. Cell dose 6.5 x10^6.   - Restaging plan: per protocol   BMbx 2/6 -- Day 28 review with Dr. Garay on 2/9  Flow neg; 100% donor. Chimerisms: CD33 is 100% donor, CD3 is 87% donor. Bone marrow 100% donor.      HEME/COAG  - Anemia, thrombocytopenia 2/2 due to chemotherapy/BMT, improving  #Iron overload: hx of transfusion dependent anemia, pre-transplant ferritin around 5,000. Recommend phlebotomy post transplant when retic count is restored and Hgb >10 . On admission ferritin >10k.   #Hematuria- coags WNL, fibrinogen slightly elevated  # Platelet refractoriness: HLA platelets when available.   - Transfusion parameters: hemoglobin <7, platelets <10. Plt and hgb continue to improve despite hematuria  - GCSF PRN for ANC < 1.     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 UA shows large blood, LE, WBC, RBCs. Ucx (2/12): mixed urogenital odilon. Underwent 5 days Levaquin. Repeat UC negative. No empiric abx added on admission.              - Consult urology- recommended continuing pyridium, oxybutynin, on max doses               - Consult ID- intervesicular cidofovir 5mg/kg twice weekly (Monday, Thursday), only two doses ordered. Frequency and duration depends on patient symptoms. Not possible to do in clinic. If  not improving symptoms can try IV cidofovir w/o preobenocide per ID.              - Hyperhydration-IVF NS at 100cc/hr, discontinued 2/23 due to tolerating PO intake improved symptoms, restarted (2/24-x) at 75mL/hr with worsening symptoms.              -Keep I/S as low as safely possible see GVHD   -2/25: Plan to administer intravesicular Cidofovir early on Monday 2/26, keep booker following dose for a few hours, if patient symptoms improved and seems that she could tolerate booker removal, will discharge in afternoon and can be followed in clinic.                Prophylaxis plan:   ACV/letermovir   Micafungin through day +45 (current smoker) - MWF thru 2/26, ordered to be given prior to discharge  Bactrim   CMV: ND (2/12)   EBV: ND (2/6)      RISK OF GVHD  - Pink maculopapular rash.TCM prescribed at discharge and prednisone 40 mg x 5 days (2/3-2/7). Resolved.   - Prophylaxis: PT Cy, Tac/MMF started 1/16. **initially avoided sirolimus d/t high risk VOD**. Tac drip discontinued 1/31, sirolimus started.  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 -on admission LFTs are rapidly improving to normal 2/22. Check M/Thurs.    -Rash for 3-4 days (2/11-2/16) , non-pruritic. - resolved  -2/20 RUQ US suggestive of hepatic steatosis, normal dopplers. Biopsy is scheduled 2/23, but was cancelled today as hepatic panel is essentially WNL, no tbili elevation, no rash, GI symptoms and U/S unremarkable.      CARDIOVASCULAR  # Chest pressure: new on 1/15. Resolved. See previous discharge summary dated 2/4/24 for details.   #Prolonged QTc to 507 on 1/22. EKG (1/30) NSR, Qtc 454.  # HTN: On norvasc 5mg daily. Worse with pain. Controlling symptoms improve BP. No PRNs available as  concern for hypotension, renal injury in setting BK viremia, cystitis.     GI/NUTRITION  #Transaminitis: much improved, almost WNL, trend M/Thur. Etiology unclear. See gvhd   #Hypertriglyceridemia- 249 (1/29), 388 on admission. Start fenofibrate, trend.  # Epigastric pain (1/18): RUQ ultrasound showing patent doppler throughout, no ascites, nonspecific elevated hepatic artery restrictive index (see full impression above).                - Ulcer prophylaxis: PPI  - VOD ppx: Ursodiol. See liver US 2/21 results above        #dysuria, urge/frequency: 2/12 BK urine >100 million, blood >1350 copies. Oxybuytnin 5mg QID, Pyridum 200mg TID. Oxycodone, Dilaudid PRN. See ID above.       DERM:  # Foot, hand warts. Curbside derm on 1/8, no recs while inpatient, typically managed OP.    # rash as per above.     Known issues that I take into account for medical decisions, with salient changes to the plan considering these complexities noted above.         Patient Active Problem List   Diagnosis    Left medial knee pain    Obesity (BMI 30-39.9)    Anemia, unspecified type    Macrocytic anemia    MDS (myelodysplastic syndrome) (H)         Clinically Significant Risk Factors Present on Admission          # Hypokalemia: Lowest K = 3.2 mmol/L in last 2 days, will replace as needed         # Thrombocytopenia: Lowest platelets = 74 in last 2 days, will monitor for bleeding                  Disposition: Possibility to discharge 2/26 based on patients symptoms. Discharge prepped.         I spent 30 minutes in the care of this patient today, which included time necessary for preparation for the visit, obtaining history, ordering medications/tests/procedures as medically indicated, review of pertinent medical literature, counseling of the patient, communication of recommendations to the care team, and documentation time.     Alice Ortega PA-C   [phone]

## 2024-02-25 NOTE — DISCHARGE SUMMARY
[facility] Discharge Summary   Hortencia Baldwin MRN# [id number]   Age: 53 year old  YOB: 1970   Date of Admission: 2/21/2024  Date of Discharge:  2/29/2024  Admitting Physician: SARA Singleton  Discharge Physician:  Clay Paez MD  Discharge Diagnoses:    S/p allogenic BMT  MDS  Hemorrhagic cystitis 2/2 BK cystitis  Anemia  Thrombocytopenia  Leukopenia  BK viremia  Transaminitis- resolved  Hypertriglyceridemia   Candidiasis of skin under left breast     Discharge Medications:         Medication List        Started      fenofibrate 54 MG tablet  Commonly known as: LOFIBRA  54 mg, Oral, DAILY     mirabegron 25 MG 24 hr tablet  Commonly known as: MYRBETRIQ  25 mg, Oral, DAILY     nystatin [id number] UNIT/GM external cream  Commonly known as: MYCOSTATIN  Topical, 2 TIMES DAILY     oxyCODONE 5 MG tablet  Commonly known as: ROXICODONE  5 mg, Oral, EVERY 4 HOURS PRN            Modified      oxyBUTYnin 5 MG tablet  Commonly known as: DITROPAN  What changed: when to take this     * sirolimus 1 MG tablet  Commonly known as: GENERIC EQUIVALENT  Take one (1mg) tablet in addition to one (0.5mg) tablet for a total of 1.5mg daily.  What changed: You were already taking a medication with the same name, and this prescription was added. Make sure you understand how and when to take each.     * sirolimus 0.5 MG tablet  Commonly known as: GENERIC EQUIVALENT  Take one (1mg) tablet in addition to one (0.5mg) tablet for a total of 1.5mg daily.  What changed:   medication strength  how much to take  how to take this  when to take this  additional instructions           * This list has 2 medication(s) that are the same as other medications prescribed for you. Read the directions carefully, and ask your doctor or other care provider to review them with you.                Discontinued      levofloxacin 250 MG tablet  Commonly known as: LEVAQUIN     triamcinolone 0.1 % external cream  Commonly known as: KENALOG             Brief History of Illness:    **Adopted from H&P  Transplant Essential Data:   Diagnosis MDS  BMTCT Type ALLO    Prep Regimen Bu/Flu   Donor Match and  Source 6/8 URD    GVHD Prophylaxis PT Cy, Tac/MMF  Primary BMT MD Garay     Clinical Trials MT 2015-29 (according to but not on trial)       HPI: Irma presents  after being seen in the clinic with intense pain and burning with urination. She sees blood and clots in her urine and with wiping. No blood in her stool. Hemoglobin is stable. She has not slept as she is voiding so frequently, about every 20-30 minutes. It is excruciating for her. It is difficult to start the stream at times. She reports eating ok, no vomiting or diarrhea, though meds sometimes do cause nausea. She reports no other pain this morning. Previously noted rash has resolved. No fevers. She has been drinking some- has some fear of drinking fluids as her urination is already so frequent and painful. No new cough/cold symtoms.   Her hospital course complicated by hemorrhagic cystitis, urology and ID consulted. She received 2 doses of intravesicular cidofovir with improvement of symptoms and improving BK viral levels. She also complained of RUQ pain and had recent history of elevated LFTs, US and CT showing inflammation of gallbladder, but she is non-toxic. LFTs resolved and her pain improved. She additionally complained of LUQ pain that was near her left breast, evidence of skin breakdown underneath breast. Started nystatin cream with some improvement. CT did show possible concern for developing pyelonephritis however in setting of being afebrile and counts did not empirically treat per ID recommendations. Urine culture of mixed urogenital odilon. On discharge, her RUQ and LUQ has resolved, her frequency and pain improving, she continues to have mild hematuria at times but significantly improved. Her sirolimus has been held in setting of wanting to reduce level to goal of 5-6 with concurrent BK  "Viruria. She will need this followed in the clinic.   Physical Exam:    BP (!) 143/78 (BP Location: Left arm)   Pulse 101   Temp 97.6  F (36.4  C) (Oral)   Resp 16   Ht 1.565 m (5' 1.61\")   Wt 78.3 kg (172 lb 11.2 oz)   LMP 11/05/2017   SpO2 98%   BMI 31.98 kg/m    # Discharge Pain Plan:   - During her hospitalization, Hortencia Sethi experienced pain due to BK Viruria.  The pain plan for discharge was discussed with Hortencia Sethi and the plan was created in a collaborative fashion.    - Opioids prescribed on discharge: Oxycodone  - Duration of opioids after discharge: Per CDC opioid prescribing guidelines, a 3 day prescription of opioids was provided.  - Bowel regimen: not needed    General: NAD   Eyes: : KEVON, sclera anicteric   Lungs: CTA bilaterally  Cardiovascular: RRR, no M/R/G   Abdominal/Rectal: +BS, soft, nontender.   : skin irritation surrounding vulva, erythema and small few mm area of skin abrasion bilaterally (2/22),   Lymphatics: no edema  Skin:Has mild skin irritation underneath left breast that appears to be moisture related skin breakdown, potential yeast infection.   Neuro: A&O   Additional Findings: R Gastelum site NT, no drainage    Lab Values     Lab Results   Component Value Date    WBC 5.4 02/29/2024    ANEU 5.5 02/21/2024    HGB 9.9 (L) 02/29/2024    HCT 31.2 (L) 02/29/2024    PLT 70 (L) 02/29/2024     02/29/2024    POTASSIUM 4.1 02/29/2024    CHLORIDE 103 02/29/2024    CO2 28 02/29/2024     (H) 02/29/2024    BUN 6.2 02/29/2024    CR 0.58 02/29/2024    MAG 2.1 02/29/2024    INR 0.88 02/26/2024       I have assessed all abnormal lab values for their clinical significance and any values considered clinically significant have been addressed in the assessment and plan.   Hospital Course:    BMT/IEC PROTOCOL for MT 2015-29   - Chemo protocol:  Day -6 through day -1: Keppra and Allopurinol   Day -5 through -2: Bu/Flu.   Day -1: Rest day   Day 0: Transplant. Recipient: O+, CMV+. Donor: O+, " CMV-. Cell dose 6.5 x10^6.   - Restaging plan: per protocol   BMbx 2/6 -- Day 28 review with Dr. Garay on 2/9  Flow neg; 100% donor. Chimerisms: CD33 is 100% donor, CD3 is 87% donor. Bone marrow 100% donor.      HEME/COAG  - Anemia, thrombocytopenia 2/2 due to chemotherapy/BMT, improving  #Iron overload: hx of transfusion dependent anemia, pre-transplant ferritin around 5,000. Recommend phlebotomy post transplant when retic count is restored and Hgb >10 . On admission ferritin >10k.   #Hematuria- resolving.   # Platelet refractoriness: HLA platelets when available.   - Transfusion parameters: hemoglobin <7, platelets <10. Plt and hgb continue to improve despite hematuria  - GCSF PRN for ANC < 1.     IMMUNOCOMPROMISED  #BK hemorrhagic cystitis, viremia.   Urology and ID following. See previous notes. Has received 2 doses of intravesicular Cidofovir 2/22 and 2/26. Symptoms drastically improving. Continues to have mild/mod pressure/pain with urination, relieved with pain management. Frequency and urgency improving, but continues to have multiple bouts.   - Checking weekly BK levels                  - BK level 2/28 - Urine - 11,800,000 (down from >100,000,000), Blood - 51 (down from 1,350)  - Previously received pyridium  - On max dose Oxybuytinin  - Started Myrbetriq 2/28.      Prophylaxis plan:   ACV/letermovir   Micafungin through day +45 (current smoker) - completed.   Bactrim   CMV: ND (2/12)   EBV: ND (2/6)      RISK OF GVHD  - Pink maculopapular rash.TCM prescribed at discharge and prednisone 40 mg x 5 days (2/3-2/7). Resolved.   - Prophylaxis: PT Cy, Tac/MMF started 1/16. **initially avoided sirolimus d/t high risk VOD**. Tac drip discontinued 1/31, sirolimus started.  - Siro level 2/28, 9.3 - sirolimus continues to be held. Plan to recheck in clinic on Friday, goal level of 5-6 with concurrent BK virus.         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 -on admission LFTs are rapidly improving to normal 2/22. Check M/Thurs.   -Rash for 3-4 days (2/11-2/16) , non-pruritic. - resolved  -2/20 RUQ US suggestive of hepatic steatosis, normal dopplers. Biopsy is scheduled 2/23, but was cancelled as hepatic panel is essentially WNL, no tbili elevation, no rash, GI symptoms and U/S unremarkable.      CARDIOVASCULAR  #Prolonged QTc to 507 on 1/22. EKG (1/30) NSR, Qtc 454.  # HTN: On norvasc 5mg daily. Worse with pain. Controlling symptoms improve BP     GI/NUTRITION  #Abdominal pain  - CT-CAP showing cystitis, possible pyelonephritis, and inflammation of gallbladder. Her LFTs remain wnl. Unclear what her LUQ pain is from, she does have mild skin breakdown underneath her breast which is near where her tenderness is. She continues to have mild RUQ pain with palpation. Will not pursure further workup. Will monitor for now.       #Transaminitis - resolved.   #Hypertriglyceridemia- 249 (1/29), 388 on 2/22 . Start fenofibrate, trend.  # Epigastric pain (1/18): RUQ ultrasound showing patent doppler throughout, no ascites, nonspecific elevated hepatic artery restrictive index (see full impression above).                - Ulcer prophylaxis: PPI  - VOD ppx: Ursodiol. See liver US 2/21 results above        #dysuria, urge/frequency 2/2 to BK Viruria   - Oxybuytnin 5mg QID,   - Oxycodone PRN   - Added Myrbetriq 2/28, noted improvement on 2/29.       DERM:  # Foot, hand warts. Curbside derm on 1/8, no recs while inpatient, typically managed OP.    # rash as per above.  #Moisture related dermatitis, candidiasis of skin underneath left breast  - Barrier creams and Nystatin cream for 7 days 2/28-3/6   Today's summary  - RTC 3/1   - Obtain sirolimus level ( Ordered from discharge med/rec - note, sirolimus has been held, trying to bring siro level down to 5-6) - Patient instructed to HOLD  AM dose tomorrow (3/1) . Please ensure she has resuming instructions after level comes back.   - Labs ordered  - Started Myrbetriq yesterday for continued bladder spasms/frequency.      CODE STATUS: Full code   Discharge Instructions and Follow-Up:    Discharge diet: Regular diet as tolerated  Discharge activity: Activity as tolerated   Discharge follow-up: Follow up with BMT Clinic as follows: 3/1 at 9:45am for labs, and provider visit.     Discharge Disposition:    Discharged to home.    Pili Wood PA-C  [phone]    Advice for Patients concerning COVID19:  a. Avoid contact with individuals:   i. Who are sick or have recently been sick  ii. Have traveled to high risk areas (per CDC guidelines) or have been on a cruise in the last 14 days  iii. Who were or could have been exposed to COVID-19   b. If experiencing symptoms such as: Fever, cough or shortness of breath contact BMT at [phone] Mon-Fri 8am-4:30pm or After Hours at [phone] (ask to speak to a BMT Fellow) for guidance on need for clinical assessment  c. Avoid all non- essential travel at this time; if traveling is necessary use mask (N-95)   d. Wear a mask when in public areas  d. Avoid crowded places, if possible  f. Follow CDC advice https://www.cdc.gov/coronavirus/2019-ncov/index.html and travel guidelines https://www.cdc.gov/coronavirus/2019-ncov/travelers/index.html

## 2024-02-25 NOTE — PLAN OF CARE
"Hours of care: 0700 - 1900    /75 (BP Location: Left arm)   Pulse 101   Temp 98.3  F (36.8  C) (Oral)   Resp 16   Ht 1.565 m (5' 1.61\")   Wt 78.3 kg (172 lb 11.2 oz)   LMP 11/05/2017   SpO2 98%   BMI 31.98 kg/m      Vital Signs: Afebrile. VSS except for hypertension with SBPs>160s; provider notified. SBPs in afternoon 130s -140s. Intermittent tachycardia.    Pain: Patient reports pain in urethral meatus which radiates to her abdomen.  Pain managed with scheduled oxybutynin and pyridium. Prn Oxycodone given x1 per patient request with improvement in pain.     Activity: Independent and up ad jeff in room. Activity adjusted per tolerance. Activity encouraged.  Diet/appetite: Tolerating high kcal and protein diet. Good appetite.    Neuro: A&Ox4. No new neuro deficit.  Cardiac:  Patient denies chest pain or heart palpitations.  Respiratory: Sating >95% on RA. Patient denies shortness of breath, feeling lightheaded and dizziness.  GI/: Adequate urine output. Urine appears orange. No visible blood clots. Booker in place. Patient reported leaking of booker at urethral meatus. Booker was flushed with reduction in leaking. Patient denies nausea, vomiting or diarrhea.   Skin: No new deficits noted.    LDA's: CVC dressing is CDI. LR infusing in red lumen.  Replacements: No replacements needed this shift based on RN managed electrolyte protocols and blood replacement parameters for patient.    Lab Results   Component Value Date    HGB 8.8 02/25/2024    HGB 10.4 03/19/2018     Platelet Count   Date Value Ref Range Status   02/25/2024 58 (L) 150 - 450 10e3/uL Final   03/19/2018 271 150 - 450 10e9/L Final      Phosphorus   Date Value Ref Range Status   02/25/2024 3.8 2.5 - 4.5 mg/dL Final      Potassium   Date Value Ref Range Status   02/25/2024 3.7 3.4 - 5.3 mmol/L Final   05/30/2017 3.7 3.4 - 5.3 mmol/L Final      Magnesium   Date Value Ref Range Status   02/25/2024 1.9 1.7 - 2.3 mg/dL Final       Plan: Continue with " "plan of care. Notify primary team with changes.     Goal Outcome Evaluation:      Plan of Care Reviewed With: patient    Overall Patient Progress: improving      Problem: Adult Inpatient Plan of Care  Goal: Plan of Care Review  Description: The Plan of Care Review/Shift note should be completed every shift.  The Outcome Evaluation is a brief statement about your assessment that the patient is improving, declining, or no change.  This information will be displayed automatically on your shift  note.  Outcome: Progressing  Flowsheets (Taken 2/25/2024 1702)  Plan of Care Reviewed With: patient  Overall Patient Progress: improving  Goal: Patient-Specific Goal (Individualized)  Description: You can add care plan individualizations to a care plan. Examples of Individualization might be:  \"Parent requests to be called daily at 9am for status\", \"I have a hard time hearing out of my right ear\", or \"Do not touch me to wake me up as it startles  me\".  Outcome: Progressing  Goal: Absence of Hospital-Acquired Illness or Injury  Outcome: Progressing  Intervention: Identify and Manage Fall Risk  Recent Flowsheet Documentation  Taken 2/25/2024 1530 by Fiona Mccray RN  Safety Promotion/Fall Prevention: safety round/check completed  Taken 2/25/2024 1230 by Fiona Mccray RN  Safety Promotion/Fall Prevention: safety round/check completed  Taken 2/25/2024 0830 by Fiona Mccray RN  Safety Promotion/Fall Prevention: safety round/check completed  Intervention: Prevent Skin Injury  Recent Flowsheet Documentation  Taken 2/25/2024 0830 by Fiona Mccray RN  Body Position: position changed independently  Skin Protection: adhesive use limited  Device Skin Pressure Protection: adhesive use limited  Intervention: Prevent and Manage VTE (Venous Thromboembolism) Risk  Recent Flowsheet Documentation  Taken 2/25/2024 0830 by Fiona Mccray RN  VTE Prevention/Management: SCDs (sequential compression devices) " off  Intervention: Prevent Infection  Recent Flowsheet Documentation  Taken 2/25/2024 1530 by Fiona Mccray RN  Infection Prevention:   environmental surveillance performed   hand hygiene promoted   equipment surfaces disinfected  Taken 2/25/2024 1230 by Fiona Mccray RN  Infection Prevention:   environmental surveillance performed   hand hygiene promoted   equipment surfaces disinfected  Taken 2/25/2024 0830 by Fiona Mccray RN  Infection Prevention:   environmental surveillance performed   hand hygiene promoted   equipment surfaces disinfected  Goal: Optimal Comfort and Wellbeing  Outcome: Progressing  Intervention: Monitor Pain and Promote Comfort  Recent Flowsheet Documentation  Taken 2/25/2024 1654 by Fiona Mccray RN  Pain Management Interventions:   rest   relaxation techniques promoted   medication (see MAR)  Taken 2/25/2024 1430 by Fiona Mccray RN  Pain Management Interventions:   rest   relaxation techniques promoted   medication (see MAR)  Taken 2/25/2024 1030 by Fiona Mccray RN  Pain Management Interventions:   rest   relaxation techniques promoted   medication (see MAR)  Taken 2/25/2024 0943 by Fiona Mccray RN  Pain Management Interventions:   rest   relaxation techniques promoted   medication (see MAR)  Taken 2/25/2024 0937 by Fiona Mccray RN  Pain Management Interventions:   rest   relaxation techniques promoted  Taken 2/25/2024 0821 by Fiona Mccray RN  Pain Management Interventions:   rest   relaxation techniques promoted  Goal: Readiness for Transition of Care  Outcome: Progressing     Problem: Urinary Retention  Goal: Effective Urinary Elimination  Outcome: Progressing

## 2024-02-25 NOTE — PLAN OF CARE
"VSS. Bladder discomfort and urge to urinate well controlled with scheduled medications. Patient declined any prn pain medications. Good urine output, small amount of sediment, no visible clots, less red tinged, more edilberto/orange. No replacements required this AM.    Problem: Adult Inpatient Plan of Care  Goal: Readiness for Transition of Care  Outcome: Not Progressing     Problem: Urinary Retention  Goal: Effective Urinary Elimination  Outcome: Not Progressing     Problem: Adult Inpatient Plan of Care  Goal: Plan of Care Review  Description: The Plan of Care Review/Shift note should be completed every shift.  The Outcome Evaluation is a brief statement about your assessment that the patient is improving, declining, or no change.  This information will be displayed automatically on your shift  note.  Outcome: Progressing  Goal: Patient-Specific Goal (Individualized)  Description: You can add care plan individualizations to a care plan. Examples of Individualization might be:  \"Parent requests to be called daily at 9am for status\", \"I have a hard time hearing out of my right ear\", or \"Do not touch me to wake me up as it startles  me\".  Outcome: Progressing  Goal: Absence of Hospital-Acquired Illness or Injury  Outcome: Progressing  Intervention: Identify and Manage Fall Risk  Recent Flowsheet Documentation  Taken 2/25/2024 0634 by Cynthia Selby RN  Safety Promotion/Fall Prevention: safety round/check completed  Taken 2/25/2024 0315 by Cynthia Selby, RN  Safety Promotion/Fall Prevention: safety round/check completed  Taken 2/25/2024 0116 by Cynthia Selby, RN  Safety Promotion/Fall Prevention: safety round/check completed  Taken 2/24/2024 2304 by yCnthia Selby, RN  Safety Promotion/Fall Prevention: safety round/check completed  Taken 2/24/2024 2023 by Cynthia Selby, RN  Safety Promotion/Fall Prevention: safety round/check completed  Intervention: Prevent Skin Injury  Recent Flowsheet Documentation  Taken 2/24/2024 " 2023 by Cynthia Selby RN  Body Position: position changed independently  Skin Protection: adhesive use limited  Device Skin Pressure Protection: adhesive use limited  Intervention: Prevent and Manage VTE (Venous Thromboembolism) Risk  Recent Flowsheet Documentation  Taken 2/24/2024 2023 by Cynthia Selby RN  VTE Prevention/Management: SCDs (sequential compression devices) off  Intervention: Prevent Infection  Recent Flowsheet Documentation  Taken 2/25/2024 0315 by Cynthia Selby RN  Infection Prevention:   environmental surveillance performed   hand hygiene promoted   equipment surfaces disinfected  Taken 2/24/2024 2304 by Cynthia Selby RN  Infection Prevention:   environmental surveillance performed   hand hygiene promoted   equipment surfaces disinfected  Taken 2/24/2024 2023 by Cynthia Selby RN  Infection Prevention:   environmental surveillance performed   hand hygiene promoted   equipment surfaces disinfected  Goal: Optimal Comfort and Wellbeing  Outcome: Progressing   Goal Outcome Evaluation:

## 2024-02-25 NOTE — PROVIDER NOTIFICATION
----- Message from Lucrecia Hsieh MD sent at 2/1/2023  9:18 AM EST -----  Reviewed ; generally all good ; the thyroid dose might need to be changed and one liver test is a bit elevated ; bbefore any chnages : repeat the b/w in 3-4 wks to confirm    (Ordered )   ----- Message -----  From: [facility] Results In  Sent: 1/31/2023  11:30 PM EST  To: Lucrecia Hsieh MD Provider CHLOE Higginbotham verbally notified of patient's BP of 175/98 with HR of 105 with activity and pain; scheduled amlodipine given x1.     Provider CHLOE Higginbotham verbally notified of patient's SBP>160 greater than one hour post amlodipine.     No new orders placed. Writer will continue to monitor patient and notify primary team with any changes.

## 2024-02-25 NOTE — PROVIDER NOTIFICATION
Provider CHLOE Higginbotham  verbally notified of patient's SBP >160s in AM; scheduled amlodipine given.     No new orders placed; notify primary team with any changes.

## 2024-02-26 PROBLEM — B34.8 BK VIREMIA: Status: ACTIVE | Noted: 2024-02-26

## 2024-02-26 PROBLEM — Z94.84 HISTORY OF PERIPHERAL STEM CELL TRANSPLANT (H): Status: ACTIVE | Noted: 2024-01-11

## 2024-02-26 PROBLEM — Z79.2 ADMINISTRATION OF LONG-TERM PROPHYLACTIC ANTIBIOTICS: Status: ACTIVE | Noted: 2024-02-26

## 2024-02-26 LAB
ALBUMIN SERPL BCG-MCNC: 3.4 G/DL (ref 3.5–5.2)
ALP SERPL-CCNC: 87 U/L (ref 40–150)
ALT SERPL W P-5'-P-CCNC: 42 U/L (ref 0–50)
ANION GAP SERPL CALCULATED.3IONS-SCNC: 9 MMOL/L (ref 7–15)
AST SERPL W P-5'-P-CCNC: 40 U/L (ref 0–45)
BASOPHILS # BLD AUTO: 0.1 10E3/UL (ref 0–0.2)
BASOPHILS NFR BLD AUTO: 1 %
BILIRUB DIRECT SERPL-MCNC: <0.2 MG/DL (ref 0–0.3)
BILIRUB SERPL-MCNC: 0.2 MG/DL
BUN SERPL-MCNC: 4.6 MG/DL (ref 6–20)
CALCIUM SERPL-MCNC: 9.4 MG/DL (ref 8.6–10)
CHLORIDE SERPL-SCNC: 105 MMOL/L (ref 98–107)
CREAT SERPL-MCNC: 0.54 MG/DL (ref 0.51–0.95)
DEPRECATED HCO3 PLAS-SCNC: 32 MMOL/L (ref 22–29)
EGFRCR SERPLBLD CKD-EPI 2021: >90 ML/MIN/1.73M2
EOSINOPHIL # BLD AUTO: 0.4 10E3/UL (ref 0–0.7)
EOSINOPHIL NFR BLD AUTO: 7 %
ERYTHROCYTE [DISTWIDTH] IN BLOOD BY AUTOMATED COUNT: 19.6 % (ref 10–15)
GLUCOSE SERPL-MCNC: 99 MG/DL (ref 70–99)
HCT VFR BLD AUTO: 29.1 % (ref 35–47)
HGB BLD-MCNC: 9.2 G/DL (ref 11.7–15.7)
IMM GRANULOCYTES # BLD: 0.1 10E3/UL
IMM GRANULOCYTES NFR BLD: 2 %
INR PPP: 0.88 (ref 0.85–1.15)
LYMPHOCYTES # BLD AUTO: 1.6 10E3/UL (ref 0.8–5.3)
LYMPHOCYTES NFR BLD AUTO: 30 %
MAGNESIUM SERPL-MCNC: 1.9 MG/DL (ref 1.7–2.3)
MCH RBC QN AUTO: 34.7 PG (ref 26.5–33)
MCHC RBC AUTO-ENTMCNC: 31.6 G/DL (ref 31.5–36.5)
MCV RBC AUTO: 110 FL (ref 78–100)
MONOCYTES # BLD AUTO: 0.8 10E3/UL (ref 0–1.3)
MONOCYTES NFR BLD AUTO: 15 %
NEUTROPHILS # BLD AUTO: 2.4 10E3/UL (ref 1.6–8.3)
NEUTROPHILS NFR BLD AUTO: 45 %
NRBC # BLD AUTO: 0 10E3/UL
NRBC BLD AUTO-RTO: 0 /100
PHOSPHATE SERPL-MCNC: 4.5 MG/DL (ref 2.5–4.5)
PLATELET # BLD AUTO: 64 10E3/UL (ref 150–450)
POTASSIUM SERPL-SCNC: 3.7 MMOL/L (ref 3.4–5.3)
PROT SERPL-MCNC: 5.5 G/DL (ref 6.4–8.3)
RBC # BLD AUTO: 2.65 10E6/UL (ref 3.8–5.2)
SIROLIMUS BLD-MCNC: 7.2 UG/L (ref 5–15)
SODIUM SERPL-SCNC: 146 MMOL/L (ref 135–145)
TME LAST DOSE: NORMAL H
TME LAST DOSE: NORMAL H
WBC # BLD AUTO: 5.3 10E3/UL (ref 4–11)

## 2024-02-26 PROCEDURE — 250N000011 HC RX IP 250 OP 636: Performed by: INTERNAL MEDICINE

## 2024-02-26 PROCEDURE — 99233 SBSQ HOSP IP/OBS HIGH 50: CPT | Mod: FS | Performed by: PHYSICIAN ASSISTANT

## 2024-02-26 PROCEDURE — 80053 COMPREHEN METABOLIC PANEL: CPT

## 2024-02-26 PROCEDURE — 258N000003 HC RX IP 258 OP 636

## 2024-02-26 PROCEDURE — 250N000011 HC RX IP 250 OP 636

## 2024-02-26 PROCEDURE — 206N000001 HC R&B BMT UMMC

## 2024-02-26 PROCEDURE — 84100 ASSAY OF PHOSPHORUS: CPT

## 2024-02-26 PROCEDURE — 85049 AUTOMATED PLATELET COUNT: CPT

## 2024-02-26 PROCEDURE — 250N000013 HC RX MED GY IP 250 OP 250 PS 637

## 2024-02-26 PROCEDURE — 99233 SBSQ HOSP IP/OBS HIGH 50: CPT | Mod: GC | Performed by: INTERNAL MEDICINE

## 2024-02-26 PROCEDURE — 82248 BILIRUBIN DIRECT: CPT

## 2024-02-26 PROCEDURE — 99418 PROLNG IP/OBS E/M EA 15 MIN: CPT | Performed by: PHYSICIAN ASSISTANT

## 2024-02-26 PROCEDURE — 83735 ASSAY OF MAGNESIUM: CPT

## 2024-02-26 PROCEDURE — 250N000011 HC RX IP 250 OP 636: Mod: JZ

## 2024-02-26 PROCEDURE — 85610 PROTHROMBIN TIME: CPT

## 2024-02-26 PROCEDURE — 250N000012 HC RX MED GY IP 250 OP 636 PS 637

## 2024-02-26 PROCEDURE — 80195 ASSAY OF SIROLIMUS: CPT | Performed by: INTERNAL MEDICINE

## 2024-02-26 RX ADMIN — OXYBUTYNIN CHLORIDE 5 MG: 5 TABLET ORAL at 16:06

## 2024-02-26 RX ADMIN — POTASSIUM CHLORIDE 20 MEQ: 750 TABLET, EXTENDED RELEASE ORAL at 08:17

## 2024-02-26 RX ADMIN — SODIUM CHLORIDE, POTASSIUM CHLORIDE, SODIUM LACTATE AND CALCIUM CHLORIDE: 600; 310; 30; 20 INJECTION, SOLUTION INTRAVENOUS at 08:03

## 2024-02-26 RX ADMIN — ACYCLOVIR 800 MG: 800 TABLET ORAL at 20:08

## 2024-02-26 RX ADMIN — SULFAMETHOXAZOLE AND TRIMETHOPRIM 1 TABLET: 800; 160 TABLET ORAL at 08:17

## 2024-02-26 RX ADMIN — OXYBUTYNIN CHLORIDE 5 MG: 5 TABLET ORAL at 11:55

## 2024-02-26 RX ADMIN — FENOFIBRATE 54 MG: 54 TABLET ORAL at 08:16

## 2024-02-26 RX ADMIN — OXYCODONE HYDROCHLORIDE 5 MG: 5 TABLET ORAL at 04:24

## 2024-02-26 RX ADMIN — OLANZAPINE 5 MG: 2.5 TABLET, FILM COATED ORAL at 22:10

## 2024-02-26 RX ADMIN — URSODIOL 300 MG: 300 CAPSULE ORAL at 14:10

## 2024-02-26 RX ADMIN — URSODIOL 300 MG: 300 CAPSULE ORAL at 08:17

## 2024-02-26 RX ADMIN — MICAFUNGIN SODIUM 300 MG: 50 INJECTION, POWDER, LYOPHILIZED, FOR SOLUTION INTRAVENOUS at 08:16

## 2024-02-26 RX ADMIN — PANTOPRAZOLE SODIUM 40 MG: 40 TABLET, DELAYED RELEASE ORAL at 08:17

## 2024-02-26 RX ADMIN — ACYCLOVIR 800 MG: 800 TABLET ORAL at 08:17

## 2024-02-26 RX ADMIN — OXYBUTYNIN CHLORIDE 5 MG: 5 TABLET ORAL at 20:08

## 2024-02-26 RX ADMIN — CIDOFOVIR DIHYDRATE 393.52 MG: 375 INJECTION, SOLUTION INTRAVENOUS at 09:16

## 2024-02-26 RX ADMIN — SULFAMETHOXAZOLE AND TRIMETHOPRIM 1 TABLET: 800; 160 TABLET ORAL at 20:07

## 2024-02-26 RX ADMIN — URSODIOL 300 MG: 300 CAPSULE ORAL at 20:08

## 2024-02-26 RX ADMIN — LETERMOVIR 480 MG: 480 TABLET, FILM COATED ORAL at 08:16

## 2024-02-26 RX ADMIN — POTASSIUM CHLORIDE 20 MEQ: 750 TABLET, EXTENDED RELEASE ORAL at 20:07

## 2024-02-26 RX ADMIN — OXYBUTYNIN CHLORIDE 5 MG: 5 TABLET ORAL at 08:17

## 2024-02-26 RX ADMIN — OXYCODONE HYDROCHLORIDE 5 MG: 5 TABLET ORAL at 09:27

## 2024-02-26 RX ADMIN — Medication 5 ML: at 12:58

## 2024-02-26 RX ADMIN — SIROLIMUS 1.5 MG: 1 TABLET ORAL at 08:17

## 2024-02-26 RX ADMIN — AMLODIPINE BESYLATE 5 MG: 5 TABLET ORAL at 08:17

## 2024-02-26 RX ADMIN — ONDANSETRON 4 MG: 4 TABLET, ORALLY DISINTEGRATING ORAL at 08:17

## 2024-02-26 RX ADMIN — PHENAZOPYRIDINE HYDROCHLORIDE 200 MG: 200 TABLET ORAL at 08:17

## 2024-02-26 ASSESSMENT — ACTIVITIES OF DAILY LIVING (ADL)
ADLS_ACUITY_SCORE: 20

## 2024-02-26 NOTE — PLAN OF CARE
"[id number]  Vitals:  Blood pressure 125/70, pulse 95, temperature 97.9  F (36.6  C), temperature source Oral, resp. rate 16, height 1.565 m (5' 1.61\"), weight 77.5 kg (170 lb 14.4 oz), last menstrual period 11/05/2017, SpO2 94%, not currently breastfeeding.    Neuro: A/Ox4, calls appropriately   Cardiac: Afebrile, VSS.    Respiratory: sating >92 on RA. denies SOB. LS clear bilaterally.   Diet/appetite: High kcal/high protein Diet. Good appetite.   GI/:  No BM this shift.  Booker cath okay to remove, pt requested removal in evening. Good UO via cath, urine clear and yellow. Pt expressed concerns over feelings of urine frequency return, inquiring if outpatient prescription of oxybutynin is possible.  Activity:  Up independetly  Pain: 4/10 UL abd pain upon palpitation. No interventions, provider notified.  Skin: WNL  Lines: CVC double lumen R jugular hep locked. Booker cath patent, ready for removal.  Plan: Remove booker cath, monitor voiding, potential discharge tomorrow. Notify care team of changes per POC.    Pt complaining of L upper abd \"pressure\" upon palpitation, hard bump identified, provider notified.      Problem: Adult Inpatient Plan of Care  Goal: Absence of Hospital-Acquired Illness or Injury  Outcome: Progressing  Intervention: Identify and Manage Fall Risk  Recent Flowsheet Documentation  Taken 2/26/2024 1603 by Adrianne Castellon RN  Safety Promotion/Fall Prevention: safety round/check completed  Intervention: Prevent Skin Injury  Recent Flowsheet Documentation  Taken 2/26/2024 1603 by Adrianne Castellon, RN  Body Position: position changed independently  Skin Protection: adhesive use limited  Device Skin Pressure Protection: adhesive use limited  Intervention: Prevent and Manage VTE (Venous Thromboembolism) Risk  Recent Flowsheet Documentation  Taken 2/26/2024 1603 by Adrianne Castellon, RN  VTE Prevention/Management: SCDs (sequential compression devices) off  Intervention: Prevent Infection  Recent Flowsheet " Documentation  Taken 2/26/2024 1603 by Adrianne Castellon, RN  Infection Prevention:   cohorting utilized   environmental surveillance performed   equipment surfaces disinfected   hand hygiene promoted   personal protective equipment utilized   rest/sleep promoted   single patient room provided     Problem: Adult Inpatient Plan of Care  Goal: Optimal Comfort and Wellbeing  Outcome: Progressing  Intervention: Monitor Pain and Promote Comfort  Recent Flowsheet Documentation  Taken 2/26/2024 1603 by Adrianne Castellon, RN  Pain Management Interventions:   rest   repositioned   medication offered but refused     Problem: Urinary Retention  Goal: Effective Urinary Elimination  Outcome: Progressing  Intervention: Promote Effective Urine Elimination  Recent Flowsheet Documentation  Taken 2/26/2024 1603 by Adrianne Castellon, RN  Urinary Elimination Promotion: catheter patency maintained

## 2024-02-26 NOTE — PLAN OF CARE
"VSS. Denies nausea, loose stools. Complaints of feeling urge to void/pressure in bladder relieved with PRN pain medicine given x2. Urine is orange colored with small clots. Good urine output.     Problem: Adult Inpatient Plan of Care  Goal: Plan of Care Review  Description: The Plan of Care Review/Shift note should be completed every shift.  The Outcome Evaluation is a brief statement about your assessment that the patient is improving, declining, or no change.  This information will be displayed automatically on your shift  note.  Outcome: Not Progressing  Goal: Patient-Specific Goal (Individualized)  Description: You can add care plan individualizations to a care plan. Examples of Individualization might be:  \"Parent requests to be called daily at 9am for status\", \"I have a hard time hearing out of my right ear\", or \"Do not touch me to wake me up as it startles  me\".  Outcome: Not Progressing  Goal: Readiness for Transition of Care  Outcome: Not Progressing     Problem: Adult Inpatient Plan of Care  Goal: Absence of Hospital-Acquired Illness or Injury  Outcome: Progressing  Intervention: Identify and Manage Fall Risk  Recent Flowsheet Documentation  Taken 2/26/2024 0140 by Cynthia Selby RN  Safety Promotion/Fall Prevention: safety round/check completed  Taken 2/25/2024 2243 by Cynthia Selby RN  Safety Promotion/Fall Prevention: safety round/check completed  Taken 2/25/2024 1922 by Cynthia Selby RN  Safety Promotion/Fall Prevention: safety round/check completed  Intervention: Prevent Skin Injury  Recent Flowsheet Documentation  Taken 2/25/2024 1922 by Cynthia Selby RN  Body Position: position changed independently  Skin Protection: adhesive use limited  Device Skin Pressure Protection: adhesive use limited  Intervention: Prevent and Manage VTE (Venous Thromboembolism) Risk  Recent Flowsheet Documentation  Taken 2/25/2024 1922 by Cynthia Selby, RN  VTE Prevention/Management: SCDs (sequential compression " devices) off  Intervention: Prevent Infection  Recent Flowsheet Documentation  Taken 2/25/2024 2243 by Cynthia Selby RN  Infection Prevention:   environmental surveillance performed   equipment surfaces disinfected   hand hygiene promoted  Taken 2/25/2024 1922 by Cynthia Selby RN  Infection Prevention:   environmental surveillance performed   equipment surfaces disinfected   hand hygiene promoted  Goal: Optimal Comfort and Wellbeing  Outcome: Progressing  Intervention: Monitor Pain and Promote Comfort  Recent Flowsheet Documentation  Taken 2/26/2024 0524 by Cynthia Selby RN  Pain Management Interventions: (sleeping)   quiet environment facilitated   relaxation techniques promoted   other (see comments)  Taken 2/26/2024 0424 by Cynthia Selby RN  Pain Management Interventions: medication (see MAR)  Taken 2/25/2024 2236 by Cynthia Selby RN  Pain Management Interventions: declines  Taken 2/25/2024 2057 by Cynthia Selby RN  Pain Management Interventions: declines  Taken 2/25/2024 1957 by Cynthia Selby RN  Pain Management Interventions: medication (see MAR)     Problem: Urinary Retention  Goal: Effective Urinary Elimination  Outcome: Progressing   Goal Outcome Evaluation:

## 2024-02-26 NOTE — PROGRESS NOTES
[facility]  Transplant Infectious Disease Progress Note     Patient:  Hortencia Baldwin, Date of birth 1970, Medical record number [id number]  Date of Visit:  02/26/2024         Assessment and Recommendations:   Recommendations:  Continue to monitor clinically while receiving intravesical cidofovir 5 mg/kg in 60 mL normal saline instilled in bladder over 15 min and booker clamped for 1 hr twice a week, until the resolution of symptoms.   Decrease sirolimus as much as safely possible.  If symptoms do not respond to the above mentioned approaches the we can try IV cidofovir 1 mg/kg/week without probenecid, until the resolution of symptoms.   Weekly BK virus serum (2/28)    Transplant Infectious Disease will continue to follow with you.      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 MMF discontinued on 2/14/24. Presented with hematuria, dysuria, urinary blood clots, polyuria due to BKV HC.     Infectious Disease issues include:    Stage 3 BKV-induced HC.   BKV viremia.    Other than minimizing IS to allow for BKV-specific CD4 recovery, there is no effective therapy for BKV-induced HC.   IV or intravesical cidofovir is used but the evidence that it is effective is weak. In one study, patients who received cidofovir had worse outcomes. Cidofovir IV might result in renal failure. We are using intravesical cidofovir and will monitor response. The BK viremia is not unusual. If there is adenovirus viruria with HC, the treatment is the same as above. In the absence of CMV viremia, if there is CMV viruria with HC, the treatment is the same as above.      Transaminitis since 2/2/24.   Improving.      Right plantar wart  Preceded chemotherapy and HSCT. Did not worsen after chemotherapy and HSCT.      - QTc interval: 425 (2/9/2024)  - Bacterial prophylaxis: None  - PCP prophylaxis: TMP/SMX  - Serostatus & viral prophylaxis: CMV R+, EBV R+,  HSV1+/2+, VZV ?   - Fungal prophylaxis: Micafungin  - Immunization status: Discuss further when able  - Gamma globulin status: Unknown  - Isolation status: Good hand hygiene.   - Code status: Full Code    Shay Walls MD  Infectious Diseases Fellow, PGY-4  Pager: [phone]  [employer] [facility]   Date: 2/26/2024         Interval History:     Feeling well overall except for bladder instillation of cidofovir, comes with discomfort from pressure. No fevers, chills, sweats, chest pain nor dyspnea. Eating well, no oral pain.      Transplants:  N/A, Postoperative day:  .  Coordinator No matching coordinators         Current Medications & Allergies:      acyclovir  800 mg Oral BID    amLODIPine  5 mg Oral Daily    fenofibrate  54 mg Oral Daily    heparin lock flush  5-10 mL Intracatheter Q24H    letermovir  480 mg Oral Daily    OLANZapine  5 mg Oral At Bedtime    ondansetron  4 mg Oral QAM AC    oxyBUTYnin  5 mg Oral 4x Daily    pantoprazole  40 mg Oral QAM AC    potassium chloride jean pierre ER  20 mEq Oral BID    sirolimus  1.5 mg Oral Daily    sulfamethoxazole-trimethoprim  1 tablet Oral Q Mon Tues BID    ursodiol  300 mg Oral TID       Infusions/Drips:      Allergies   Allergen Reactions    Blood Transfusion Related (Informational Only) Other (See Comments)     Patient has a history of a clinically significant antibody against RBC antigens.  A delay in compatible RBCs may occur.            Physical Exam:   Patient Vitals for the past 24 hrs:   BP Temp Temp src Pulse Resp SpO2 Weight   02/26/24 1150 -- -- -- -- -- 97 % --   02/26/24 1059 138/74 -- Oral 117 16 92 % --   02/26/24 0825 -- -- -- -- -- -- 77.5 kg (170 lb 14.4 oz)   02/26/24 0300 114/63 98.4  F (36.9  C) -- 90 16 91 % --   02/25/24 2236 (!) 147/85 97.8  F (36.6  C) Oral 99 -- 93 % --   02/25/24 1933 135/65 97.9  F (36.6  C) Oral 97 -- 92 % --   02/25/24 1605 137/75 98.3  F (36.8  C) Oral 101 16 -- --     Ranges for vital signs:  Temp:  [97.8  F (36.6  C)-98.4  F  "(36.9  C)] 98.4  F (36.9  C)  Pulse:  [] 117  Resp:  [16] 16  BP: (114-147)/(63-85) 138/74  SpO2:  [91 %-97 %] 97 %  Vitals:    02/24/24 0741 02/25/24 0731 02/26/24 0825   Weight: 79 kg (174 lb 3.2 oz) 78.3 kg (172 lb 11.2 oz) 77.5 kg (170 lb 14.4 oz)       Physical Examination:  GENERAL:  well-developed, well-nourished woman, resting in bed in no acute distress.  HEAD:  Head is normocephalic, atraumatic   EYES:  Eyes have anicteric sclerae without conjunctival injection   ENT:  Oropharynx is moist without exudates or ulcers. Tongue is midline  NECK:  Supple.   LUNGS:  Clear to auscultation bilateral.   CARDIOVASCULAR:  Regular rate and rhythm with no murmur  ABDOMEN:  Normal bowel sounds. Mild TTP over entire abdomen - associated with instillation of cidofovir only.  SKIN:  No acute rashes.  Line in place without any surrounding erythema or exudate.  NEUROLOGIC:  Grossly nonfocal. Active x4 extremities         Laboratory Data:     No results found for: \"ACD4\"    Inflammatory Markers  No lab results found.    Immune Globulin Studies   No lab results found.    Metabolic Studies       Recent Labs   Lab Test 02/26/24  0332 02/25/24  0305 01/22/24  0328 01/22/24  0055 01/15/24  1618 01/15/24  1248 01/15/24  1015   * 144   < >  --    < > 135  --    POTASSIUM 3.7 3.7   < >  --    < > 3.6  --    CHLORIDE 105 106   < >  --    < >  --   --    CO2 32* 30*   < >  --    < >  --   --    ANIONGAP 9 8   < >  --    < >  --   --    BUN 4.6* 3.5*   < >  --    < >  --   --    CR 0.54 0.47*   < >  --    < >  --   --    GFRESTIMATED >90 >90   < >  --    < >  --   --    GLC 99 93   < >  --    < >  --   --    NIKO 9.4 9.1   < >  --    < >  --   --    PHOS 4.5 3.8   < >  --    < >  --   --    MAG 1.9 1.9   < >  --    < >  --   --    LACT  --   --   --  0.5*  --  0.9  --    PCAL  --   --   --   --   --   --  1.08*    < > = values in this interval not displayed.       Hepatic Studies    Recent Labs   Lab Test 02/26/24  0332 " 02/22/24  0350 02/21/24  0816 01/02/24  1432 12/26/23  1709   BILITOTAL 0.2 0.3 0.3   < > 0.4   DBIL <0.20 <0.20 <0.20   < >  --    ALKPHOS 87 107 126   < > 176*   PROTTOTAL 5.5* 6.0* 7.0   < > 6.7   ALBUMIN 3.4* 3.8 4.2   < > 4.3   AST 40 47* 54*   < > 104*   ALT 42 60* 75*   < > 295*   LDH  --   --   --   --  244    < > = values in this interval not displayed.       Pancreatitis testing    Recent Labs   Lab Test 02/22/24  0350   TRIG 388*       Gout Labs      Recent Labs   Lab Test 01/05/24  1147 12/19/23  1347   URIC 4.8 4.4       Hematology Studies   Recent Labs   Lab Test 02/26/24  0332 02/25/24  0305 02/24/24  0356 02/23/24  0347 02/22/24  0350 02/21/24  0816 02/19/24  1203   WBC 5.3 5.0 5.3 5.4   < > 7.7 5.8   ANEU  --   --   --   --   --  5.5 3.1   ANEUTAUTO 2.4 2.4 2.7 2.9   < >  --   --    ALYM  --   --   --   --   --  0.8 1.0   ALYMPAUTO 1.6 1.3 1.3 1.2   < >  --   --    TRACY  --   --   --   --   --  0.7 0.9   AMONOAUTO 0.8 0.8 0.7 0.9   < >  --   --    AEOS  --   --   --   --   --  0.3 0.6   AEOSAUTO 0.4 0.4 0.4 0.2   < >  --   --    ABSBASO 0.1 0.1 0.1 0.1   < >  --   --    HGB 9.2* 8.8* 8.5* 8.2*   < > 11.3* 10.0*   HCT 29.1* 28.1* 27.5* 26.1*   < > 35.0 30.9*   PLT 64* 58* 65* 56*   < > 83* 74*    < > = values in this interval not displayed.       Clotting Studies    Recent Labs   Lab Test 02/26/24  0332 02/21/24  1238 01/29/24  0337 01/22/24  0328   INR 0.88 0.96 1.04 1.16*   PTT  --  36  --   --        Iron Testing    Recent Labs   Lab Test 02/26/24  0332 02/23/24  0347 02/22/24  0350 02/19/24  1203 02/16/24  1054 01/05/24  1147 01/02/24  1432 03/19/18  1338 03/19/18  1337 01/29/18  1544 05/30/17  1155   IRON  --   --   --   --   --   --  220*  --   --   --  95   FEB  --   --   --   --   --   --   --   --   --   --  357   IRONSAT  --   --   --   --   --   --   --   --   --   --  27   SONY  --   --  10,961*  --   --   --  4,341*   < >  --   --  20   *   < > 106*   < > 102*   < > 108*   < >  --   "  < > 112*   FOLIC  --   --   --   --   --   --   --   --  18.7  --   --    B12  --   --   --   --   --   --   --   --   --   --  776   RETP  --   --   --   --  4.7*  --   --    < >  --   --   --    RETICABSCT  --   --   --   --  0.127*  --   --    < >  --   --   --     < > = values in this interval not displayed.       Arterial Blood Gas Testing  No lab results found.     Thyroid Studies   No lab results found.    Invalid input(s): \"FT4\"    Urine Studies     Recent Labs   Lab Test 02/21/24  1220 02/12/24  1425 01/25/24  1824 01/13/24  1532 01/11/24  2323   URINEPH 7.0 6.0 6.5 6.5 7.0   NITRITE Negative Negative Negative Negative Negative   LEUKEST Large* Negative Negative Negative Negative   WBCU >182* 13* <1 1 <1       CSF testing   No lab results found.    Invalid input(s): \"CADAM\", \"EVPCR\", \"ENTPCR\", \"ENTEROVIRUS\"    Medication levels    Recent Labs   Lab Test 02/23/24  0903 02/02/24  0752 01/31/24  0913   TACROL  --   --  7.9   RAPAMY 9.3   < >  --     < > = values in this interval not displayed.       Body fluid stats  No lab results found.    Microbiology:  Fungal testing  No lab results found.    Invalid input(s): \"HIFUN\", \"FUNGL\"    Last Culture results   Culture   Date Value Ref Range Status   02/22/2024   Final    No Vancomycin Resistant Enterococcus species isolated   02/21/2024 <10,000 CFU/mL Mixture of Urogenital Allie  Final   02/12/2024 <10,000 CFU/mL Mixture of Urogenital Allie  Final   01/22/2024 No Growth  Final   01/22/2024 No Growth  Final   01/15/2024 No Growth  Final   01/15/2024 No Growth  Final   01/14/2024 No Growth  Final   01/14/2024 No Growth  Final   01/13/2024 No Growth  Final   01/13/2024 No Growth  Final   01/12/2024 No Growth  Final   01/12/2024 No Growth  Final   01/11/2024 No Growth  Final   01/11/2024 No Growth  Final   01/11/2024 No Growth  Final   01/05/2024   Final    No Vancomycin Resistant Enterococcus species isolated           Last checks of Clostridioides difficile " testing  Recent Labs   Lab Test 02/22/24  0006 02/18/24  1200 01/17/24  1504 01/08/24  1010   CDBPCT Negative Negative Negative Negative       Infection Studies to assess Diarrhea   Recent Labs   Lab Test 02/18/24  1200   BIEPEC Negative   BISTEC Negative   BISHEI Negative   BIEAEC Negative   BIETEC Negative   [id number] NA   BIADE Negative   BICRY Negative   BICAM Negative   BISAL Negative   BIVIBS Negative   BIVIBC Negative   BIYER Negative   BIGIA Negative   BINOR Negative   BIROT Negative   BIPLE Negative   BIENT Negative   BIAST Negative   BISAP Negative       Virology:  Coronavirus-19 testing    Recent Labs   Lab Test 01/02/24  1432 12/26/23  1709   [id number] Negative Positive*   CYCLETHRES  --  31.7       Respiratory virus (non-coronavirus-19) testing    Recent Labs   Lab Test 01/12/24  1459 12/26/23  1709   AFLU  --  Negative   IFLUA Not Detected Invalid*   FLUAH1 Not Detected Invalid*   [id number] Not Detected Invalid*   FLUAH3 Not Detected Invalid*   BFLU  --  Negative   IFLUB Not Detected Invalid*   PIV1 Not Detected Invalid*   PIV2 Not Detected Invalid*   PIV3 Not Detected Invalid*   PIV4 Not Detected Invalid*   RSVA Not Detected Invalid*   RSVB Not Detected Invalid*   HMPV Not Detected Invalid*   RHINEV Not Detected Invalid*   ADENOV Not Detected Invalid*   CORONA Not Detected Invalid*       CMV viral loads    Recent Labs   Lab Test 02/21/24  0816 02/19/24  1203 02/12/24  1300 02/09/24  0959   CMVQNT Not Detected Not Detected   < > <35*   CMVLOG  --   --   --  <1.5    < > = values in this interval not displayed.       CMV resistance testing  No lab results found.    HHV-6 DNA copies/mL   Date Value Ref Range Status   02/06/2024 Not Detected Not Detected copies/mL Final       EBV DNA Copies/mL   Date Value Ref Range Status   02/21/2024 Not Detected Not Detected copies/mL Final   02/06/2024 Not Detected Not Detected copies/mL Final       BK Virus Testing     Recent Labs   Lab Test 02/21/24  1603 02/21/24  1238  "02/12/24  1425 01/26/24  1045 01/13/24  1532   BKRESINST  --  1,350*  --  4,520,000* 2,890*   BKRES >100,000,000*  --  >100,000,000*  --   --    BKLOG >8.0 3.1 >8.0 6.7 3.5       Parvovirus Testing  No lab results found.    Invalid input(s): \"PRVRES\"    Adenovirus Testing    Recent Labs   Lab Test 02/21/24  1603 02/06/24  0723 01/26/24  1045 01/13/24  1532   ADRES Not Detected Not Detected Not Detected Not Detected       Imaging:  No results found for this or any previous visit (from the past 48 hour(s)).    "

## 2024-02-26 NOTE — PROGRESS NOTES
"BMT Daily Progress Note   02/26/2024    Patient ID: Irma Foreman is a 53 year old female who is 46 s/p MA (Bu/Flu) 6/8 URD Allo transplant, day 46. Readmitted 2/21 with hematuria, dysuria and clots.     Transplant Essential Data:   Diagnosis MDS  BMTCT Type ALLO    Prep Regimen Bu/Flu   Donor Match and  Source 6/8 URD    GVHD Prophylaxis PT Cy, Tac/MMF  Primary BMT MD Garay     Clinical Trials MT 2015-29 (according to but not on trial)         INTERVAL  HISTORY   Feels pain is well controlled with pain medicine and oxybuytinin. She continues to struggle with pressure, frequency and occassional clots. Plan to receive intravesicular cidofovir this AM. No alexandro bloody urine currently. She continues to have booker catheter in place. No fevers. No N/V/D. Tolerating PO intake well. Plan to discontinue pyridium today to monitor symptoms of BK Viruria. Consider discharging tomorrow if symptomatically stable.   Review of Systems: 10 point ROS negative except as noted above.  PHYSICAL EXAM     Weight In/Out     Wt Readings from Last 3 Encounters:   02/26/24 77.5 kg (170 lb 14.4 oz)   02/21/24 77.9 kg (171 lb 12.8 oz)   02/19/24 78 kg (172 lb)      I/O last 3 completed shifts:  In: 3875 [P.O.:2600; I.V.:1275]  Out: 5825 [Urine:5825]       KPS:  80    /63 (BP Location: Left arm)   Pulse 90   Temp 98.4  F (36.9  C)   Resp 16   Ht 1.565 m (5' 1.61\")   Wt 77.5 kg (170 lb 14.4 oz)   LMP 11/05/2017   SpO2 91%   BMI 31.65 kg/m         General: NAD   Eyes: : KEVON, sclera anicteric   Lungs: CTA bilaterally  Cardiovascular: RRR, no M/R/G   Abdominal/Rectal: +BS, soft, no RUQ pain  : skin irritation surrounding vulva, erythema and small few mm area of skin abrasion bilaterally (2/22), suprapubic tenderness with palpation  Lymphatics: no edema  Skin: no rashes or petechaie  Neuro: A&O   Additional Findings: R Gastelum site NT, no drainage  Lab Results   Component Value Date    WBC 5.3 02/26/2024    ANEU 5.5 02/21/2024    " HGB 9.2 (L) 02/26/2024    HCT 29.1 (L) 02/26/2024    PLT 64 (L) 02/26/2024     (H) 02/26/2024    POTASSIUM 3.7 02/26/2024    CHLORIDE 105 02/26/2024    CO2 32 (H) 02/26/2024    GLC 99 02/26/2024    BUN 4.6 (L) 02/26/2024    CR 0.54 02/26/2024    MAG 1.9 02/26/2024    INR 0.88 02/26/2024       ASSESSMENT BY SYSTEMS     Irma Foreman is a 53 year old female who is 46 s/p MA (Bu/Flu) 6/8 URD Allo transplant. Readmitted 2/21 with hematuria, dysuria and clots.     BMT/IEC PROTOCOL for MT 2015-29   - Chemo protocol:  Day -6 through day -1: Keppra and Allopurinol   Day -5 through -2: Bu/Flu.   Day -1: Rest day   Day 0: Transplant. Recipient: O+, CMV+. Donor: O+, CMV-. Cell dose 6.5 x10^6.   - Restaging plan: per protocol   BMbx 2/6 -- Day 28 review with Dr. Garay on 2/9  Flow neg; 100% donor. Chimerisms: CD33 is 100% donor, CD3 is 87% donor. Bone marrow 100% donor.      HEME/COAG  - Anemia, thrombocytopenia 2/2 due to chemotherapy/BMT, improving  #Iron overload: hx of transfusion dependent anemia, pre-transplant ferritin around 5,000. Recommend phlebotomy post transplant when retic count is restored and Hgb >10 . On admission ferritin >10k.   #Hematuria- coags WNL, fibrinogen slightly elevated  # Platelet refractoriness: HLA platelets when available.   - Transfusion parameters: hemoglobin <7, platelets <10. Plt and hgb continue to improve despite hematuria  - GCSF PRN for ANC < 1.     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 UA shows large blood, LE, WBC, RBCs. Ucx (2/12): mixed urogenital odilon. Underwent 5 days Levaquin. Repeat UC negative. No empiric abx added on admission.              - Consult urology- recommended continuing pyridium, oxybutynin, on max doses               - Consult ID- intervesicular cidofovir 5mg/kg twice weekly (Monday, Thursday), only two doses ordered. Frequency and duration depends on patient symptoms.  Not possible to do in clinic. If not improving symptoms can try IV cidofovir w/o preobenocide per ID.              - Hyperhydration-IVF NS at 100cc/hr, discontinued 2/23 due to tolerating PO intake improved symptoms, restarted (2/24-x) at 75mL/hr with worsening symptoms.              -Keep I/S as low as safely possible see GVHD   -2/25: Plan to administer intravesicular Cidofovir early on Monday 2/26, keep booker following dose for a few hours, if patient symptoms improved and seems that she could tolerate booker removal, will discharge tomorrow     Prophylaxis plan:   ACV/letermovir   Micafungin through day +45 (current smoker) - MWF thru 2/26, ordered to be given prior to discharge  Bactrim   CMV: ND (2/12)   EBV: ND (2/6)      RISK OF GVHD  - Pink maculopapular rash.TCM prescribed at discharge and prednisone 40 mg x 5 days (2/3-2/7). Resolved.   - Prophylaxis: PT Cy, Tac/MMF started 1/16. **initially avoided sirolimus d/t high risk VOD**. Tac drip discontinued 1/31, sirolimus started.  - Siro level 5-6, current dose 1.5mg (held x1 on 2/24 due to level =9.3), recheck Monday 2/26, 9.3, consider keeping sirolimus level lower with concurrent infection.      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 -on admission LFTs are rapidly improving to normal 2/22. Check M/Thurs.    -Rash for 3-4 days (2/11-2/16) , non-pruritic. - resolved  -2/20 RUQ US suggestive of hepatic steatosis, normal dopplers. Biopsy is scheduled 2/23, but was cancelled today as hepatic panel is essentially WNL, no tbili elevation, no rash, GI symptoms and U/S unremarkable.      CARDIOVASCULAR  # Chest pressure: new on 1/15. Resolved. See previous discharge summary dated 2/4/24 for details.   #Prolonged QTc to 507 on 1/22. EKG (1/30) NSR, Qtc 454.  # HTN: On norvasc 5mg daily. Worse with pain.  Controlling symptoms improve BP. No PRNs available as concern for hypotension, renal injury in setting BK viremia, cystitis.     GI/NUTRITION  #Transaminitis: much improved, almost WNL, trend M/Thur. Etiology unclear. See gvhd   #Hypertriglyceridemia- 249 (1/29), 388 on admission. Start fenofibrate, trend.  # Epigastric pain (1/18): RUQ ultrasound showing patent doppler throughout, no ascites, nonspecific elevated hepatic artery restrictive index (see full impression above).                - Ulcer prophylaxis: PPI  - VOD ppx: Ursodiol. See liver US 2/21 results above        #dysuria, urge/frequency: 2/12 BK urine >100 million, blood >1350 copies. Oxybuytnin 5mg QID, Pyridum 200mg TID. Oxycodone, Dilaudid PRN. See ID above.       DERM:  # Foot, hand warts. Curbside derm on 1/8, no recs while inpatient, typically managed OP.    # rash as per above.     Known issues that I take into account for medical decisions, with salient changes to the plan considering these complexities noted above.         Patient Active Problem List   Diagnosis    Left medial knee pain    Obesity (BMI 30-39.9)    Anemia, unspecified type    Macrocytic anemia    MDS (myelodysplastic syndrome) (H)         Clinically Significant Risk Factors Present on Admission          # Hypokalemia: Lowest K = 3.2 mmol/L in last 2 days, will replace as needed         # Thrombocytopenia: Lowest platelets = 74 in last 2 days, will monitor for bleeding                  Disposition: Possibility to discharge 2/27 based on patients symptoms. Discharge prepped.       I spent 30 minutes in the care of this patient today, which included time necessary for preparation for the visit, obtaining history, ordering medications/tests/procedures as medically indicated, review of pertinent medical literature, counseling of the patient, communication of recommendations to the care team, and documentation time.     Pili Wood PA-C

## 2024-02-26 NOTE — PROGRESS NOTES
Care Management Discharge Note    Discharge Date: 02/27/2024     Discharge Disposition: Home    Discharge Services: None    Discharge DME: None    Discharge Transportation: family or friend will provide    Private pay costs discussed: Not applicable    Does the patient's insurance plan have a 3 day qualifying hospital stay waiver?  No    PAS Confirmation Code: N/A    Patient/family educated on Medicare website which has current facility and service quality ratings: no    Education Provided on the Discharge Plan: Yes    Persons Notified of Discharge Plans: pt, treatment team, nursing staff, and social work    Patient/Family in Agreement with the Plan: yes    Handoff Referral Completed: Yes-[facility]    Additional Information:  Pt is a 53 year old female currently day +46 of her allogeneic transplant for MDS-readmitted due to concerns for hematuria.     Per Medical team, pt is anticipated to be medically stable for discharge tomorrow 2/27/2024. SW spoke with pt to assess coping and provide psychosocial support  as needed. Pt shared that she has no concerns/questions at this time and confirmed possible discharge tomorrow. SW provided empathetic listening, validation of concerns, and encouragement. SW encouraged pt to contact [facility] for support, questions and/or resources.     Assessment: Pt presented as pleasant. Pt continues to be supported by her partner and family.     Discharge Plan: Pt to discharge home in [place], [place] with partner Srinivas and children as primary caregiver(s).     Long Term BMT SW:  CLIFTON Demarco, MALACHI   Clinical   [facility]  Adult Blood and Marrow Transplant and Cellular Therapy Program  Phone: [phone]  Pager: [phone]      CLIFTON Henderson, MALACHI  [facility]  Adult Blood & Marrow Transplant   Phone: [phone]  VOCERA SEARCHABLE: BMT SW #4

## 2024-02-26 NOTE — PLAN OF CARE
Goal Outcome Evaluation:      Patient was given her cidofovir infusion into her bladder, clamped for one hour. Oxycodone for bladder discomfort while medication instilled. IV infusion stopped this afternoon and next nurse to pull booker catheter.

## 2024-02-27 ENCOUNTER — APPOINTMENT (OUTPATIENT)
Dept: PHYSICAL THERAPY | Facility: CLINIC | Age: 54
DRG: 920 | End: 2024-02-27
Attending: INTERNAL MEDICINE
Payer: COMMERCIAL

## 2024-02-27 ENCOUNTER — APPOINTMENT (OUTPATIENT)
Dept: CT IMAGING | Facility: CLINIC | Age: 54
DRG: 920 | End: 2024-02-27
Attending: PHYSICIAN ASSISTANT
Payer: COMMERCIAL

## 2024-02-27 LAB
ABO/RH(D): NORMAL
ALBUMIN UR-MCNC: 300 MG/DL
ANION GAP SERPL CALCULATED.3IONS-SCNC: 8 MMOL/L (ref 7–15)
ANTIBODY SCREEN: NEGATIVE
APPEARANCE UR: ABNORMAL
BASOPHILS # BLD AUTO: 0.1 10E3/UL (ref 0–0.2)
BASOPHILS NFR BLD AUTO: 2 %
BILIRUB UR QL STRIP: NEGATIVE
BK VIRUS DNA IU/ML, INSTRUMENT (6800): 51 IU/ML
BK VIRUS SPECIMEN TYPE: ABNORMAL
BKV DNA SPEC NAA+PROBE-LOG#: 1.7 {LOG_COPIES}/ML
BUN SERPL-MCNC: 5.4 MG/DL (ref 6–20)
CALCIUM SERPL-MCNC: 9.3 MG/DL (ref 8.6–10)
CHLORIDE SERPL-SCNC: 105 MMOL/L (ref 98–107)
COLOR UR AUTO: ABNORMAL
CREAT SERPL-MCNC: 0.58 MG/DL (ref 0.51–0.95)
DEPRECATED HCO3 PLAS-SCNC: 30 MMOL/L (ref 22–29)
EGFRCR SERPLBLD CKD-EPI 2021: >90 ML/MIN/1.73M2
EOSINOPHIL # BLD AUTO: 0.5 10E3/UL (ref 0–0.7)
EOSINOPHIL NFR BLD AUTO: 10 %
ERYTHROCYTE [DISTWIDTH] IN BLOOD BY AUTOMATED COUNT: 19.2 % (ref 10–15)
GLUCOSE SERPL-MCNC: 97 MG/DL (ref 70–99)
GLUCOSE UR STRIP-MCNC: NEGATIVE MG/DL
HCT VFR BLD AUTO: 28.8 % (ref 35–47)
HGB BLD-MCNC: 9 G/DL (ref 11.7–15.7)
HGB UR QL STRIP: ABNORMAL
IMM GRANULOCYTES # BLD: 0.1 10E3/UL
IMM GRANULOCYTES NFR BLD: 2 %
KETONES UR STRIP-MCNC: NEGATIVE MG/DL
LEUKOCYTE ESTERASE UR QL STRIP: ABNORMAL
LYMPHOCYTES # BLD AUTO: 1.5 10E3/UL (ref 0.8–5.3)
LYMPHOCYTES NFR BLD AUTO: 31 %
MAGNESIUM SERPL-MCNC: 2 MG/DL (ref 1.7–2.3)
MCH RBC QN AUTO: 34 PG (ref 26.5–33)
MCHC RBC AUTO-ENTMCNC: 31.3 G/DL (ref 31.5–36.5)
MCV RBC AUTO: 109 FL (ref 78–100)
MONOCYTES # BLD AUTO: 0.8 10E3/UL (ref 0–1.3)
MONOCYTES NFR BLD AUTO: 16 %
MUCOUS THREADS #/AREA URNS LPF: PRESENT /LPF
NEUTROPHILS # BLD AUTO: 1.9 10E3/UL (ref 1.6–8.3)
NEUTROPHILS NFR BLD AUTO: 39 %
NITRATE UR QL: NEGATIVE
NRBC # BLD AUTO: 0 10E3/UL
NRBC BLD AUTO-RTO: 0 /100
PH UR STRIP: 7 [PH] (ref 5–7)
PHOSPHATE SERPL-MCNC: 3.9 MG/DL (ref 2.5–4.5)
PLATELET # BLD AUTO: 60 10E3/UL (ref 150–450)
POTASSIUM SERPL-SCNC: 3.9 MMOL/L (ref 3.4–5.3)
RBC # BLD AUTO: 2.65 10E6/UL (ref 3.8–5.2)
RBC URINE: >182 /HPF
SIROLIMUS BLD-MCNC: 9.4 UG/L (ref 5–15)
SODIUM SERPL-SCNC: 143 MMOL/L (ref 135–145)
SP GR UR STRIP: 1.02 (ref 1–1.03)
SPECIMEN EXPIRATION DATE: NORMAL
SQUAMOUS EPITHELIAL: 3 /HPF
TME LAST DOSE: NORMAL H
TME LAST DOSE: NORMAL H
UROBILINOGEN UR STRIP-MCNC: NORMAL MG/DL
WBC # BLD AUTO: 4.8 10E3/UL (ref 4–11)
WBC URINE: 40 /HPF

## 2024-02-27 PROCEDURE — 250N000013 HC RX MED GY IP 250 OP 250 PS 637

## 2024-02-27 PROCEDURE — 86900 BLOOD TYPING SEROLOGIC ABO: CPT

## 2024-02-27 PROCEDURE — 87086 URINE CULTURE/COLONY COUNT: CPT | Performed by: PHYSICIAN ASSISTANT

## 2024-02-27 PROCEDURE — 250N000011 HC RX IP 250 OP 636: Performed by: INTERNAL MEDICINE

## 2024-02-27 PROCEDURE — 81001 URINALYSIS AUTO W/SCOPE: CPT | Performed by: PHYSICIAN ASSISTANT

## 2024-02-27 PROCEDURE — 99418 PROLNG IP/OBS E/M EA 15 MIN: CPT | Performed by: PHYSICIAN ASSISTANT

## 2024-02-27 PROCEDURE — 74177 CT ABD & PELVIS W/CONTRAST: CPT

## 2024-02-27 PROCEDURE — 99233 SBSQ HOSP IP/OBS HIGH 50: CPT | Mod: FS | Performed by: PHYSICIAN ASSISTANT

## 2024-02-27 PROCEDURE — 83735 ASSAY OF MAGNESIUM: CPT | Performed by: INTERNAL MEDICINE

## 2024-02-27 PROCEDURE — 80048 BASIC METABOLIC PNL TOTAL CA: CPT

## 2024-02-27 PROCEDURE — 250N000012 HC RX MED GY IP 250 OP 636 PS 637

## 2024-02-27 PROCEDURE — 250N000011 HC RX IP 250 OP 636

## 2024-02-27 PROCEDURE — 206N000001 HC R&B BMT UMMC

## 2024-02-27 PROCEDURE — 85025 COMPLETE CBC W/AUTO DIFF WBC: CPT

## 2024-02-27 PROCEDURE — 74177 CT ABD & PELVIS W/CONTRAST: CPT | Mod: 26 | Performed by: RADIOLOGY

## 2024-02-27 PROCEDURE — 97530 THERAPEUTIC ACTIVITIES: CPT | Mod: GP

## 2024-02-27 PROCEDURE — 84100 ASSAY OF PHOSPHORUS: CPT | Performed by: INTERNAL MEDICINE

## 2024-02-27 PROCEDURE — 99233 SBSQ HOSP IP/OBS HIGH 50: CPT | Mod: GC | Performed by: INTERNAL MEDICINE

## 2024-02-27 PROCEDURE — 80195 ASSAY OF SIROLIMUS: CPT | Performed by: INTERNAL MEDICINE

## 2024-02-27 PROCEDURE — 87799 DETECT AGENT NOS DNA QUANT: CPT | Performed by: PHYSICIAN ASSISTANT

## 2024-02-27 RX ORDER — IOPAMIDOL 755 MG/ML
105 INJECTION, SOLUTION INTRAVASCULAR ONCE
Status: COMPLETED | OUTPATIENT
Start: 2024-02-27 | End: 2024-02-27

## 2024-02-27 RX ADMIN — AMLODIPINE BESYLATE 5 MG: 5 TABLET ORAL at 07:50

## 2024-02-27 RX ADMIN — SULFAMETHOXAZOLE AND TRIMETHOPRIM 1 TABLET: 800; 160 TABLET ORAL at 19:47

## 2024-02-27 RX ADMIN — OXYBUTYNIN CHLORIDE 5 MG: 5 TABLET ORAL at 07:51

## 2024-02-27 RX ADMIN — ACYCLOVIR 800 MG: 800 TABLET ORAL at 19:47

## 2024-02-27 RX ADMIN — OXYCODONE HYDROCHLORIDE 5 MG: 5 TABLET ORAL at 15:57

## 2024-02-27 RX ADMIN — Medication 5 ML: at 14:16

## 2024-02-27 RX ADMIN — URSODIOL 300 MG: 300 CAPSULE ORAL at 19:47

## 2024-02-27 RX ADMIN — OXYBUTYNIN CHLORIDE 5 MG: 5 TABLET ORAL at 19:47

## 2024-02-27 RX ADMIN — IOPAMIDOL 105 ML: 755 INJECTION, SOLUTION INTRAVENOUS at 11:04

## 2024-02-27 RX ADMIN — FENOFIBRATE 54 MG: 54 TABLET ORAL at 07:50

## 2024-02-27 RX ADMIN — OXYCODONE HYDROCHLORIDE 5 MG: 5 TABLET ORAL at 11:45

## 2024-02-27 RX ADMIN — SULFAMETHOXAZOLE AND TRIMETHOPRIM 1 TABLET: 800; 160 TABLET ORAL at 07:51

## 2024-02-27 RX ADMIN — OLANZAPINE 5 MG: 2.5 TABLET, FILM COATED ORAL at 21:39

## 2024-02-27 RX ADMIN — OXYCODONE HYDROCHLORIDE 5 MG: 5 TABLET ORAL at 23:34

## 2024-02-27 RX ADMIN — HYDROMORPHONE HYDROCHLORIDE 0.5 MG: 1 INJECTION, SOLUTION INTRAMUSCULAR; INTRAVENOUS; SUBCUTANEOUS at 14:15

## 2024-02-27 RX ADMIN — POTASSIUM CHLORIDE 20 MEQ: 750 TABLET, EXTENDED RELEASE ORAL at 07:51

## 2024-02-27 RX ADMIN — LETERMOVIR 480 MG: 480 TABLET, FILM COATED ORAL at 07:51

## 2024-02-27 RX ADMIN — SIROLIMUS 1.5 MG: 1 TABLET ORAL at 07:50

## 2024-02-27 RX ADMIN — POTASSIUM CHLORIDE 20 MEQ: 750 TABLET, EXTENDED RELEASE ORAL at 19:47

## 2024-02-27 RX ADMIN — OXYBUTYNIN CHLORIDE 5 MG: 5 TABLET ORAL at 16:45

## 2024-02-27 RX ADMIN — URSODIOL 300 MG: 300 CAPSULE ORAL at 14:13

## 2024-02-27 RX ADMIN — Medication 5 ML: at 03:29

## 2024-02-27 RX ADMIN — Medication 10 ML: at 07:55

## 2024-02-27 RX ADMIN — ONDANSETRON 4 MG: 4 TABLET, ORALLY DISINTEGRATING ORAL at 07:51

## 2024-02-27 RX ADMIN — URSODIOL 300 MG: 300 CAPSULE ORAL at 07:55

## 2024-02-27 RX ADMIN — OXYBUTYNIN CHLORIDE 5 MG: 5 TABLET ORAL at 14:13

## 2024-02-27 RX ADMIN — PANTOPRAZOLE SODIUM 40 MG: 40 TABLET, DELAYED RELEASE ORAL at 07:51

## 2024-02-27 RX ADMIN — ACYCLOVIR 800 MG: 800 TABLET ORAL at 07:51

## 2024-02-27 ASSESSMENT — ACTIVITIES OF DAILY LIVING (ADL)
ADLS_ACUITY_SCORE: 20

## 2024-02-27 NOTE — PROVIDER NOTIFICATION
Provider notified:    Time of notification: 1519  Patient status: FYI, L upper abd tenderness 4/10 with palpation, hard mass noted.   Time of response: n/a  Orders received: n/a

## 2024-02-27 NOTE — PROGRESS NOTES
"BMT Daily Progress Note   02/27/2024    Patient ID: Irma Foreman is a 53 year old female who is 47 s/p MA (Bu/Flu) 6/8 URD Allo transplant, day 47. Readmitted 2/21 with hematuria, dysuria and clots.     Transplant Essential Data:   Diagnosis MDS  BMTCT Type ALLO    Prep Regimen Bu/Flu   Donor Match and  Source 6/8 URD    GVHD Prophylaxis PT Cy, Tac/MMF  Primary BMT MD Garay     Clinical Trials MT 2015-29 (according to but not on trial)         INTERVAL  HISTORY   Hauser removed this AM, stopped pyridium yesterday. She states she continues to have significant frequency and intermittent blood clots. Continues to have intermittent pressure/pain with urination but symptoms don't appear significantly worse than yesterday. She had yet to take her AM medications and thinks she would like to see how this morning goes prior to her discharging. Potential discharging this afternoon.  Her other new complaint is a new LUQ \"mass\" she noticed a few days ago, she admits she hasn't noticed it prior, no recent trauma or obvious injury. She states it is tender to the touch. She has remained afebrile and non-toxic appearing.   Review of Systems: 10 point ROS negative except as noted above.  PHYSICAL EXAM     Weight In/Out     Wt Readings from Last 3 Encounters:   02/27/24 77.7 kg (171 lb 6.4 oz)   02/21/24 77.9 kg (171 lb 12.8 oz)   02/19/24 78 kg (172 lb)      I/O last 3 completed shifts:  In: 1855 [P.O.:1080; I.V.:775]  Out: 3925 [Urine:3925]       KPS:  80    BP (!) 148/82 (BP Location: Left arm)   Pulse 101   Temp 97.4  F (36.3  C) (Axillary)   Resp 16   Ht 1.565 m (5' 1.61\")   Wt 77.7 kg (171 lb 6.4 oz)   LMP 11/05/2017   SpO2 92%   BMI 31.74 kg/m         General: NAD   Eyes: : KEVON, sclera anicteric   Lungs: CTA bilaterally  Cardiovascular: RRR, no M/R/G   Abdominal/Rectal: +BS, soft, no RUQ pain  : skin irritation surrounding vulva, erythema and small few mm area of skin abrasion bilaterally (2/22), suprapubic " tenderness with palpation  Lymphatics: no edema  Skin: no rashes or petechaie. Has mild possible left upper quadrant soft tissue mass that is quarter sized, no erythema, or purulence. Only tender to the touch.   Neuro: A&O   Additional Findings: R Gastelum site NT, no drainage  Lab Results   Component Value Date    WBC 4.8 02/27/2024    ANEU 5.5 02/21/2024    HGB 9.0 (L) 02/27/2024    HCT 28.8 (L) 02/27/2024    PLT 60 (L) 02/27/2024     02/27/2024    POTASSIUM 3.9 02/27/2024    CHLORIDE 105 02/27/2024    CO2 30 (H) 02/27/2024    GLC 97 02/27/2024    BUN 5.4 (L) 02/27/2024    CR 0.58 02/27/2024    MAG 2.0 02/27/2024    INR 0.88 02/26/2024       ASSESSMENT BY SYSTEMS     Irma Foreman is a 53 year old female who is 47 s/p MA (Bu/Flu) 6/8 URD Allo transplant. Readmitted 2/21 with hematuria, dysuria and clots.     BMT/IEC PROTOCOL for MT 2015-29   - Chemo protocol:  Day -6 through day -1: Keppra and Allopurinol   Day -5 through -2: Bu/Flu.   Day -1: Rest day   Day 0: Transplant. Recipient: O+, CMV+. Donor: O+, CMV-. Cell dose 6.5 x10^6.   - Restaging plan: per protocol   BMbx 2/6 -- Day 28 review with Dr. Garay on 2/9  Flow neg; 100% donor. Chimerisms: CD33 is 100% donor, CD3 is 87% donor. Bone marrow 100% donor.      HEME/COAG  - Anemia, thrombocytopenia 2/2 due to chemotherapy/BMT, improving  #Iron overload: hx of transfusion dependent anemia, pre-transplant ferritin around 5,000. Recommend phlebotomy post transplant when retic count is restored and Hgb >10 . On admission ferritin >10k.   #Hematuria- coags WNL, fibrinogen slightly elevated  # Platelet refractoriness: HLA platelets when available.   - Transfusion parameters: hemoglobin <7, platelets <10. Plt and hgb continue to improve despite hematuria  - GCSF PRN for ANC < 1.     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 UA shows large blood, LE, WBC, RBCs. 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 Underwent 5 days Levaquin. Repeat UC negative. No empiric abx added on admission.              - Consult urology- recommended continuing pyridium, oxybutynin, on max doses               - Consult ID- intervesicular cidofovir 5mg/kg twice weekly (Monday, Thursday), only two doses ordered. Frequency and duration depends on patient symptoms. Not possible to do in clinic. If not improving symptoms can try IV cidofovir w/o preobenocide per ID.              - Hyperhydration-IVF NS at 100cc/hr, discontinued 2/23 due to tolerating PO intake improved symptoms, restarted (2/24-x) at 75mL/hr with worsening symptoms.              -Keep I/S as low as safely possible see GVHD   -2/26:  intravesicular Cidofovir early on Monday 2/26. ID recommending to continue to receive intravesical cidofovir 5mg/kg in 60mL in normal saline instilled in bladder over 15min. Decrease siro level as safely possible. If no improvement of symptoms, consider IV cidofovir without probenicid. Weekly BK virus serum.       Prophylaxis plan:   ACV/letermovir   Micafungin through day +45 (current smoker) - MWF thru 2/26, ordered to be given prior to discharge  Bactrim   CMV: ND (2/12)   EBV: ND (2/6)      RISK OF GVHD  - Pink maculopapular rash.TCM prescribed at discharge and prednisone 40 mg x 5 days (2/3-2/7). Resolved.   - Prophylaxis: PT Cy, Tac/MMF started 1/16. **initially avoided sirolimus d/t high risk VOD**. Tac drip discontinued 1/31, sirolimus started.  - Siro level 2/26, 9.3, consider keeping sirolimus level lower with concurrent infection.      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 -on admission LFTs are rapidly improving to normal 2/22. Check M/Thurs.    -Rash for 3-4 days (2/11-2/16) , non-pruritic. - resolved  -2/20 RUQ US suggestive of hepatic steatosis, normal  dopplers. Biopsy is scheduled 2/23, but was cancelled today as hepatic panel is essentially WNL, no tbili elevation, no rash, GI symptoms and U/S unremarkable.      CARDIOVASCULAR  # Chest pressure: new on 1/15. Resolved. See previous discharge summary dated 2/4/24 for details.   #Prolonged QTc to 507 on 1/22. EKG (1/30) NSR, Qtc 454.  # HTN: On norvasc 5mg daily. Worse with pain. Controlling symptoms improve BP. No PRNs available as concern for hypotension, renal injury in setting BK viremia, cystitis.     GI/NUTRITION  #LUQ soft tissue tenderness   - Obtaining CT-Abdomen/pelvis - pending.     #Transaminitis: much improved, almost WNL, trend M/Thur. Etiology unclear. See gvhd   #Hypertriglyceridemia- 249 (1/29), 388 on admission. Start fenofibrate, trend.  # Epigastric pain (1/18): RUQ ultrasound showing patent doppler throughout, no ascites, nonspecific elevated hepatic artery restrictive index (see full impression above).                - Ulcer prophylaxis: PPI  - VOD ppx: Ursodiol. See liver US 2/21 results above        #dysuria, urge/frequency: 2/12 BK urine >100 million, blood >1350 copies. Oxybuytnin 5mg QID, Pyridum 200mg TID (stopped 2/26). Oxycodone, Dilaudid PRN. See ID above.       DERM:  # Foot, hand warts. Curbside derm on 1/8, no recs while inpatient, typically managed OP.    # rash as per above.     Known issues that I take into account for medical decisions, with salient changes to the plan considering these complexities noted above.         Patient Active Problem List   Diagnosis    Left medial knee pain    Obesity (BMI 30-39.9)    Anemia, unspecified type    Macrocytic anemia    MDS (myelodysplastic syndrome) (H)         Clinically Significant Risk Factors Present on Admission          # Hypokalemia: Lowest K = 3.2 mmol/L in last 2 days, will replace as needed         # Thrombocytopenia: Lowest platelets = 74 in last 2 days, will monitor for bleeding                  Disposition: Possibility to  discharge 2/27 based on patients symptoms. Discharge prepped.       I spent 30 minutes in the care of this patient today, which included time necessary for preparation for the visit, obtaining history, ordering medications/tests/procedures as medically indicated, review of pertinent medical literature, counseling of the patient, communication of recommendations to the care team, and documentation time.     Pili Wood PA-C

## 2024-02-27 NOTE — PROGRESS NOTES
Afternoon update:  Reviewed CT scan results regarding LUQ pain/tenderness and concern for soft tissue mass. No mass visualized but concern for possible developing pyelonephritis is seen. She has remained vitally stable and afebrile. Discussed with ID this afternoon - will hold on empirically treating and get repeat UA/UC.     Plan  - Repeat UA/UC for now  - If she were to clinically decompensate, would start empiric abx     Pili Wood PA-C   [phone]

## 2024-02-27 NOTE — PROGRESS NOTES
[facility]  Transplant Infectious Disease Progress Note     Patient:  Hortencia Baldwin, Date of birth 1970, Medical record number [id number]  Date of Visit:  02/27/2024         Assessment and Recommendations:   Recommendations:    Decrease sirolimus as much as safely possible.  If symptoms do not respond to the above mentioned approaches the we can try IV cidofovir 1 mg/kg/week without probenecid, until the resolution of symptoms.   Await weekly BK virus serum (in process)    Transplant Infectious Disease will continue to follow with you.      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 MMF discontinued on 2/14/24. Presented with hematuria, dysuria, urinary blood clots, polyuria due to BKV HC.     Infectious Disease issues include:    Stage 3 BKV-induced HC.   BKV viremia.    Other than minimizing IS to allow for BKV-specific CD4 recovery, there is no effective therapy for BKV-induced HC.   IV or intravesical cidofovir is used but the evidence that it is effective is weak. In one study, patients who received cidofovir had worse outcomes. Cidofovir IV might result in renal failure. We used intravesical cidofovir. The BK viremia is not unusual. Last cidofovir treatment was 2/26, no additional treatment at this time. Awaiting BK PCR testing. This should resolve as her immune function is reconstituted.      Transaminitis since 2/2/24.   Improving.      Right plantar wart  Preceded chemotherapy and HSCT. Did not worsen after chemotherapy and HSCT.      - QTc interval: 425 (2/9/2024)  - Bacterial prophylaxis: None  - PCP prophylaxis: TMP/SMX  - Serostatus & viral prophylaxis: CMV R+, EBV R+, HSV1+/2+, VZV ?   - Fungal prophylaxis: Micafungin  - Immunization status: Discuss further when able  - Gamma globulin status: Unknown  - Isolation status: Good hand hygiene.   - Code status: Full Code    Shay Walls MD  Infectious Diseases Fellow,  PGY-4  Pager: [phone]  Zapper danny   Date: 2/26/2024         Interval History:     Felling well except for ongoing sensation of urge to urinate. Urine has been blood tinged from her view, no clots. Nursing notes describe orange colored urine. No fevers, chills, sweats, chest pain, nor dyspnea.      Transplants:  N/A, Postoperative day:  .  Coordinator No matching coordinators         Current Medications & Allergies:      acyclovir  800 mg Oral BID    amLODIPine  5 mg Oral Daily    fenofibrate  54 mg Oral Daily    heparin lock flush  5-10 mL Intracatheter Q24H    letermovir  480 mg Oral Daily    OLANZapine  5 mg Oral At Bedtime    ondansetron  4 mg Oral QAM AC    oxyBUTYnin  5 mg Oral 4x Daily    pantoprazole  40 mg Oral QAM AC    potassium chloride jean pierre ER  20 mEq Oral BID    sirolimus  1.5 mg Oral Daily    sulfamethoxazole-trimethoprim  1 tablet Oral Q Mon Tues BID    ursodiol  300 mg Oral TID       Infusions/Drips:      Allergies   Allergen Reactions    Blood Transfusion Related (Informational Only) Other (See Comments)     Patient has a history of a clinically significant antibody against RBC antigens.  A delay in compatible RBCs may occur.            Physical Exam:   Patient Vitals for the past 24 hrs:   BP Temp Temp src Pulse Resp SpO2 Weight   02/27/24 1138 (!) 147/90 97.5  F (36.4  C) Oral 117 18 92 % --   02/27/24 1020 (!) 118/98 -- -- (!) 137 -- -- --   02/27/24 0822 (!) 148/82 97.4  F (36.3  C) Axillary 101 16 92 % 77.7 kg (171 lb 6.4 oz)   02/27/24 0325 121/68 97.9  F (36.6  C) Oral 90 16 93 % --   02/26/24 2323 130/83 98.4  F (36.9  C) Oral 101 16 92 % --   02/26/24 2005 123/71 98  F (36.7  C) Oral 94 16 94 % --   02/26/24 1603 125/70 97.9  F (36.6  C) Oral 95 16 94 % --     Ranges for vital signs:  Temp:  [97.4  F (36.3  C)-98.4  F (36.9  C)] 97.5  F (36.4  C)  Pulse:  [] 117  Resp:  [16-18] 18  BP: (118-148)/(68-98) 147/90  SpO2:  [92 %-94 %] 92 %  Vitals:    02/25/24 0731 02/26/24 0857  "02/27/24 0822   Weight: 78.3 kg (172 lb 11.2 oz) 77.5 kg (170 lb 14.4 oz) 77.7 kg (171 lb 6.4 oz)       Physical Examination:  GENERAL:  well-developed, well-nourished woman, resting in bed in no acute distress.  HEAD:  Head is normocephalic, atraumatic   EYES:  Eyes have anicteric sclerae without conjunctival injection   ENT:  Oropharynx is moist without exudates or ulcers. Tongue is midline  NECK:  Supple.   LUNGS:  Clear to auscultation bilateral.   CARDIOVASCULAR:  Regular rate and rhythm with no murmur  ABDOMEN:  Normal bowel sounds. Mild TTP over entire abdomen, palpation gives her sense of urge.  SKIN:  No acute rashes.  Line in place without any surrounding erythema or exudate.  NEUROLOGIC:  Grossly nonfocal. Active x4 extremities         Laboratory Data:     No results found for: \"ACD4\"    Inflammatory Markers  No lab results found.    Immune Globulin Studies   No lab results found.    Metabolic Studies       Recent Labs   Lab Test 02/27/24  0328 02/26/24  0332 01/22/24  0328 01/22/24  0055 01/15/24  1618 01/15/24  1248 01/15/24  1015    146*   < >  --    < > 135  --    POTASSIUM 3.9 3.7   < >  --    < > 3.6  --    CHLORIDE 105 105   < >  --    < >  --   --    CO2 30* 32*   < >  --    < >  --   --    ANIONGAP 8 9   < >  --    < >  --   --    BUN 5.4* 4.6*   < >  --    < >  --   --    CR 0.58 0.54   < >  --    < >  --   --    GFRESTIMATED >90 >90   < >  --    < >  --   --    GLC 97 99   < >  --    < >  --   --    NIKO 9.3 9.4   < >  --    < >  --   --    PHOS 3.9 4.5   < >  --    < >  --   --    MAG 2.0 1.9   < >  --    < >  --   --    LACT  --   --   --  0.5*  --  0.9  --    PCAL  --   --   --   --   --   --  1.08*    < > = values in this interval not displayed.       Hepatic Studies    Recent Labs   Lab Test 02/26/24  0332 02/22/24  0350 02/21/24  0816 01/02/24  1432 12/26/23  1709   BILITOTAL 0.2 0.3 0.3   < > 0.4   DBIL <0.20 <0.20 <0.20   < >  --    ALKPHOS 87 107 126   < > 176*   PROTTOTAL 5.5* " 6.0* 7.0   < > 6.7   ALBUMIN 3.4* 3.8 4.2   < > 4.3   AST 40 47* 54*   < > 104*   ALT 42 60* 75*   < > 295*   LDH  --   --   --   --  244    < > = values in this interval not displayed.       Pancreatitis testing    Recent Labs   Lab Test 02/22/24  0350   TRIG 388*       Gout Labs      Recent Labs   Lab Test 01/05/24  1147 12/19/23  1347   URIC 4.8 4.4       Hematology Studies   Recent Labs   Lab Test 02/27/24  0328 02/26/24  0332 02/25/24  0305 02/24/24  0356 02/22/24  0350 02/21/24  0816 02/19/24  1203   WBC 4.8 5.3 5.0 5.3   < > 7.7 5.8   ANEU  --   --   --   --   --  5.5 3.1   ANEUTAUTO 1.9 2.4 2.4 2.7   < >  --   --    ALYM  --   --   --   --   --  0.8 1.0   ALYMPAUTO 1.5 1.6 1.3 1.3   < >  --   --    TRACY  --   --   --   --   --  0.7 0.9   AMONOAUTO 0.8 0.8 0.8 0.7   < >  --   --    AEOS  --   --   --   --   --  0.3 0.6   AEOSAUTO 0.5 0.4 0.4 0.4   < >  --   --    ABSBASO 0.1 0.1 0.1 0.1   < >  --   --    HGB 9.0* 9.2* 8.8* 8.5*   < > 11.3* 10.0*   HCT 28.8* 29.1* 28.1* 27.5*   < > 35.0 30.9*   PLT 60* 64* 58* 65*   < > 83* 74*    < > = values in this interval not displayed.       Clotting Studies    Recent Labs   Lab Test 02/26/24  0332 02/21/24  1238 01/29/24  0337 01/22/24  0328   INR 0.88 0.96 1.04 1.16*   PTT  --  36  --   --        Iron Testing    Recent Labs   Lab Test 02/27/24  0328 02/23/24  0347 02/22/24  0350 02/19/24  1203 02/16/24  1054 01/05/24  1147 01/02/24  1432 03/19/18  1338 03/19/18  1337 01/29/18  1544 05/30/17  1155   IRON  --   --   --   --   --   --  220*  --   --   --  95   FEB  --   --   --   --   --   --   --   --   --   --  357   IRONSAT  --   --   --   --   --   --   --   --   --   --  27   SONY  --   --  10,961*  --   --   --  4,341*   < >  --   --  20   *   < > 106*   < > 102*   < > 108*   < >  --    < > 112*   FOLIC  --   --   --   --   --   --   --   --  18.7  --   --    B12  --   --   --   --   --   --   --   --   --   --  776   RETP  --   --   --   --  4.7*  --   --     "< >  --   --   --    RETICABSCT  --   --   --   --  0.127*  --   --    < >  --   --   --     < > = values in this interval not displayed.       Arterial Blood Gas Testing  No lab results found.     Thyroid Studies   No lab results found.    Invalid input(s): \"FT4\"    Urine Studies     Recent Labs   Lab Test 02/21/24  1220 02/12/24  1425 01/25/24  1824 01/13/24  1532 01/11/24  2323   URINEPH 7.0 6.0 6.5 6.5 7.0   NITRITE Negative Negative Negative Negative Negative   LEUKEST Large* Negative Negative Negative Negative   WBCU >182* 13* <1 1 <1       CSF testing   No lab results found.    Invalid input(s): \"CADAM\", \"EVPCR\", \"ENTPCR\", \"ENTEROVIRUS\"    Medication levels    Recent Labs   Lab Test 02/27/24  0758 02/02/24  0752 01/31/24  0913   TACROL  --   --  7.9   RAPAMY 9.4   < >  --     < > = values in this interval not displayed.       Body fluid stats  No lab results found.    Microbiology:  Fungal testing  No lab results found.    Invalid input(s): \"HIFUN\", \"FUNGL\"    Last Culture results   Culture   Date Value Ref Range Status   02/22/2024   Final    No Vancomycin Resistant Enterococcus species isolated   02/21/2024 <10,000 CFU/mL Mixture of Urogenital Allie  Final   02/12/2024 <10,000 CFU/mL Mixture of Urogenital Allie  Final   01/22/2024 No Growth  Final   01/22/2024 No Growth  Final   01/15/2024 No Growth  Final   01/15/2024 No Growth  Final   01/14/2024 No Growth  Final   01/14/2024 No Growth  Final   01/13/2024 No Growth  Final   01/13/2024 No Growth  Final   01/12/2024 No Growth  Final   01/12/2024 No Growth  Final   01/11/2024 No Growth  Final   01/11/2024 No Growth  Final   01/11/2024 No Growth  Final   01/05/2024   Final    No Vancomycin Resistant Enterococcus species isolated           Last checks of Clostridioides difficile testing  Recent Labs   Lab Test 02/22/24  0006 02/18/24  1200 01/17/24  1504 01/08/24  1010   CDBPCT Negative Negative Negative Negative       Infection Studies to assess Diarrhea "   Recent Labs   Lab Test 02/18/24  1200   BIEPEC Negative   BISTEC Negative   BISHEI Negative   BIEAEC Negative   BIETEC Negative   IVH574 NA   BIADE Negative   BICRY Negative   BICAM Negative   BISAL Negative   BIVIBS Negative   BIVIBC Negative   BIYER Negative   BIGIA Negative   BINOR Negative   BIROT Negative   BIPLE Negative   BIENT Negative   BIAST Negative   BISAP Negative       Virology:  Coronavirus-19 testing    Recent Labs   Lab Test 01/02/24  1432 12/26/23  1709   [id number] Negative Positive*   CYCLETHRES  --  31.7       Respiratory virus (non-coronavirus-19) testing    Recent Labs   Lab Test 01/12/24  1459 12/26/23  1709   AFLU  --  Negative   IFLUA Not Detected Invalid*   FLUAH1 Not Detected Invalid*   JH5210 Not Detected Invalid*   FLUAH3 Not Detected Invalid*   BFLU  --  Negative   IFLUB Not Detected Invalid*   PIV1 Not Detected Invalid*   PIV2 Not Detected Invalid*   PIV3 Not Detected Invalid*   PIV4 Not Detected Invalid*   RSVA Not Detected Invalid*   RSVB Not Detected Invalid*   HMPV Not Detected Invalid*   RHINEV Not Detected Invalid*   ADENOV Not Detected Invalid*   CORONA Not Detected Invalid*       CMV viral loads    Recent Labs   Lab Test 02/21/24  0816 02/19/24  1203 02/12/24  1300 02/09/24  0959   CMVQNT Not Detected Not Detected   < > <35*   CMVLOG  --   --   --  <1.5    < > = values in this interval not displayed.       CMV resistance testing  No lab results found.    HHV-6 DNA copies/mL   Date Value Ref Range Status   02/06/2024 Not Detected Not Detected copies/mL Final       EBV DNA Copies/mL   Date Value Ref Range Status   02/21/2024 Not Detected Not Detected copies/mL Final   02/06/2024 Not Detected Not Detected copies/mL Final       BK Virus Testing     Recent Labs   Lab Test 02/21/24  1603 02/21/24  1238 02/12/24  1425 01/26/24  1045 01/13/24  1532   BKRESINST  --  1,350*  --  4,520,000* 2,890*   BKRES >100,000,000*  --  >100,000,000*  --   --    BKLOG >8.0 3.1 >8.0 6.7 3.5  "      Parvovirus Testing  No lab results found.    Invalid input(s): \"PRVRES\"    Adenovirus Testing    Recent Labs   Lab Test 02/21/24  1603 02/06/24  0723 01/26/24  1045 01/13/24  1532   ADRES Not Detected Not Detected Not Detected Not Detected       Imaging:  No results found for this or any previous visit (from the past 48 hour(s)).    "

## 2024-02-27 NOTE — PLAN OF CARE
"/68 (BP Location: Left arm, Cuff Size: Adult Regular)   Pulse 90   Temp 97.9  F (36.6  C) (Oral)   Resp 16   Ht 1.565 m (5' 1.61\")   Wt 77.5 kg (170 lb 14.4 oz)   LMP 11/05/2017   SpO2 93%   BMI 31.65 kg/m       Neuro: A&Ox4.   Cardiac: Afebrile. OVSS.   Respiratory: Sating at 93% on RA. Denies SOB.   GI/: denies n/v. Hauser catheter, patent and draining with  Adequate urine output. Urine appears orange but no visible blood clot. Pt refused catheter removal, request it to be remove in morning. No BM on this shift.  Diet/appetite: high Kcal/high protein diet.  Activity:  independent/up at jeff.  Pain: At acceptable level on current regimen. Denied pain.   Skin: No new deficits noted.  LDA's: right CVC, heparin lock.     Plan: lab results came back, no replacement needed this morning. Continue with POC. Notify primary team with changes.   Problem: Adult Inpatient Plan of Care  Goal: Plan of Care Review  Description: The Plan of Care Review/Shift note should be completed every shift.  The Outcome Evaluation is a brief statement about your assessment that the patient is improving, declining, or no change.  This information will be displayed automatically on your shift  note.  Outcome: Progressing     Problem: Adult Inpatient Plan of Care  Goal: Patient-Specific Goal (Individualized)  Description: You can add care plan individualizations to a care plan. Examples of Individualization might be:  \"Parent requests to be called daily at 9am for status\", \"I have a hard time hearing out of my right ear\", or \"Do not touch me to wake me up as it startles  me\".  Outcome: Progressing     Problem: Urinary Retention  Goal: Effective Urinary Elimination  Outcome: Progressing   Goal Outcome Evaluation:                        "

## 2024-02-28 LAB
ALBUMIN SERPL BCG-MCNC: 3.9 G/DL (ref 3.5–5.2)
ALP SERPL-CCNC: 90 U/L (ref 40–150)
ALT SERPL W P-5'-P-CCNC: 41 U/L (ref 0–50)
ANION GAP SERPL CALCULATED.3IONS-SCNC: 10 MMOL/L (ref 7–15)
AST SERPL W P-5'-P-CCNC: 45 U/L (ref 0–45)
BACTERIA UR CULT: NORMAL
BASOPHILS # BLD AUTO: 0.1 10E3/UL (ref 0–0.2)
BASOPHILS NFR BLD AUTO: 2 %
BILIRUB DIRECT SERPL-MCNC: <0.2 MG/DL (ref 0–0.3)
BILIRUB SERPL-MCNC: 0.3 MG/DL
BK VIRUS DNA IU/ML, INSTRUMENT (6800): ABNORMAL IU/ML
BK VIRUS SPECIMEN TYPE: ABNORMAL
BKV DNA SPEC NAA+PROBE-LOG#: 7.1 {LOG_COPIES}/ML
BUN SERPL-MCNC: 7.1 MG/DL (ref 6–20)
CALCIUM SERPL-MCNC: 9.3 MG/DL (ref 8.6–10)
CHLORIDE SERPL-SCNC: 102 MMOL/L (ref 98–107)
CREAT SERPL-MCNC: 0.7 MG/DL (ref 0.51–0.95)
DEPRECATED HCO3 PLAS-SCNC: 28 MMOL/L (ref 22–29)
EGFRCR SERPLBLD CKD-EPI 2021: >90 ML/MIN/1.73M2
EOSINOPHIL # BLD AUTO: 0.4 10E3/UL (ref 0–0.7)
EOSINOPHIL NFR BLD AUTO: 7 %
ERYTHROCYTE [DISTWIDTH] IN BLOOD BY AUTOMATED COUNT: 18.5 % (ref 10–15)
GLUCOSE SERPL-MCNC: 101 MG/DL (ref 70–99)
HCT VFR BLD AUTO: 31.6 % (ref 35–47)
HGB BLD-MCNC: 9.9 G/DL (ref 11.7–15.7)
IMM GRANULOCYTES # BLD: 0.1 10E3/UL
IMM GRANULOCYTES NFR BLD: 1 %
LYMPHOCYTES # BLD AUTO: 2.1 10E3/UL (ref 0.8–5.3)
LYMPHOCYTES NFR BLD AUTO: 33 %
MAGNESIUM SERPL-MCNC: 2 MG/DL (ref 1.7–2.3)
MCH RBC QN AUTO: 33.9 PG (ref 26.5–33)
MCHC RBC AUTO-ENTMCNC: 31.3 G/DL (ref 31.5–36.5)
MCV RBC AUTO: 108 FL (ref 78–100)
MONOCYTES # BLD AUTO: 0.9 10E3/UL (ref 0–1.3)
MONOCYTES NFR BLD AUTO: 13 %
NEUTROPHILS # BLD AUTO: 2.8 10E3/UL (ref 1.6–8.3)
NEUTROPHILS NFR BLD AUTO: 44 %
NRBC # BLD AUTO: 0 10E3/UL
NRBC BLD AUTO-RTO: 0 /100
PHOSPHATE SERPL-MCNC: 3.5 MG/DL (ref 2.5–4.5)
PLATELET # BLD AUTO: 76 10E3/UL (ref 150–450)
POTASSIUM SERPL-SCNC: 4.3 MMOL/L (ref 3.4–5.3)
PROT SERPL-MCNC: 6.2 G/DL (ref 6.4–8.3)
RBC # BLD AUTO: 2.92 10E6/UL (ref 3.8–5.2)
SIROLIMUS BLD-MCNC: 9.4 UG/L (ref 5–15)
SODIUM SERPL-SCNC: 140 MMOL/L (ref 135–145)
TME LAST DOSE: NORMAL H
TME LAST DOSE: NORMAL H
WBC # BLD AUTO: 6.5 10E3/UL (ref 4–11)

## 2024-02-28 PROCEDURE — 250N000011 HC RX IP 250 OP 636

## 2024-02-28 PROCEDURE — 87799 DETECT AGENT NOS DNA QUANT: CPT | Performed by: PHYSICIAN ASSISTANT

## 2024-02-28 PROCEDURE — 80195 ASSAY OF SIROLIMUS: CPT | Performed by: PHYSICIAN ASSISTANT

## 2024-02-28 PROCEDURE — 250N000013 HC RX MED GY IP 250 OP 250 PS 637: Performed by: PHYSICIAN ASSISTANT

## 2024-02-28 PROCEDURE — 85049 AUTOMATED PLATELET COUNT: CPT

## 2024-02-28 PROCEDURE — 99233 SBSQ HOSP IP/OBS HIGH 50: CPT | Mod: FS | Performed by: PHYSICIAN ASSISTANT

## 2024-02-28 PROCEDURE — 250N000013 HC RX MED GY IP 250 OP 250 PS 637

## 2024-02-28 PROCEDURE — 99232 SBSQ HOSP IP/OBS MODERATE 35: CPT | Mod: GC | Performed by: INTERNAL MEDICINE

## 2024-02-28 PROCEDURE — 206N000001 HC R&B BMT UMMC

## 2024-02-28 PROCEDURE — 84100 ASSAY OF PHOSPHORUS: CPT | Performed by: INTERNAL MEDICINE

## 2024-02-28 PROCEDURE — 82248 BILIRUBIN DIRECT: CPT | Performed by: PHYSICIAN ASSISTANT

## 2024-02-28 PROCEDURE — 83735 ASSAY OF MAGNESIUM: CPT | Performed by: INTERNAL MEDICINE

## 2024-02-28 PROCEDURE — 250N000011 HC RX IP 250 OP 636: Performed by: INTERNAL MEDICINE

## 2024-02-28 PROCEDURE — 80048 BASIC METABOLIC PNL TOTAL CA: CPT

## 2024-02-28 RX ORDER — NYSTATIN 100000 U/G
CREAM TOPICAL 2 TIMES DAILY
Status: DISCONTINUED | OUTPATIENT
Start: 2024-02-28 | End: 2024-02-29 | Stop reason: HOSPADM

## 2024-02-28 RX ORDER — MIRABEGRON 25 MG/1
25 TABLET, FILM COATED, EXTENDED RELEASE ORAL DAILY
Status: DISCONTINUED | OUTPATIENT
Start: 2024-02-28 | End: 2024-02-29 | Stop reason: HOSPADM

## 2024-02-28 RX ADMIN — LETERMOVIR 480 MG: 480 TABLET, FILM COATED ORAL at 08:23

## 2024-02-28 RX ADMIN — OXYBUTYNIN CHLORIDE 5 MG: 5 TABLET ORAL at 12:37

## 2024-02-28 RX ADMIN — URSODIOL 300 MG: 300 CAPSULE ORAL at 08:13

## 2024-02-28 RX ADMIN — OXYBUTYNIN CHLORIDE 5 MG: 5 TABLET ORAL at 16:27

## 2024-02-28 RX ADMIN — OXYCODONE HYDROCHLORIDE 5 MG: 5 TABLET ORAL at 04:00

## 2024-02-28 RX ADMIN — OXYBUTYNIN CHLORIDE 5 MG: 5 TABLET ORAL at 20:04

## 2024-02-28 RX ADMIN — MIRABEGRON 25 MG: 25 TABLET, FILM COATED, EXTENDED RELEASE ORAL at 14:30

## 2024-02-28 RX ADMIN — Medication 5 ML: at 08:45

## 2024-02-28 RX ADMIN — PANTOPRAZOLE SODIUM 40 MG: 40 TABLET, DELAYED RELEASE ORAL at 08:14

## 2024-02-28 RX ADMIN — ACYCLOVIR 800 MG: 800 TABLET ORAL at 08:14

## 2024-02-28 RX ADMIN — POTASSIUM CHLORIDE 20 MEQ: 750 TABLET, EXTENDED RELEASE ORAL at 08:14

## 2024-02-28 RX ADMIN — URSODIOL 300 MG: 300 CAPSULE ORAL at 20:04

## 2024-02-28 RX ADMIN — NYSTATIN: 100000 CREAM TOPICAL at 22:17

## 2024-02-28 RX ADMIN — OLANZAPINE 5 MG: 2.5 TABLET, FILM COATED ORAL at 22:18

## 2024-02-28 RX ADMIN — FENOFIBRATE 54 MG: 54 TABLET ORAL at 08:23

## 2024-02-28 RX ADMIN — ACYCLOVIR 800 MG: 800 TABLET ORAL at 20:04

## 2024-02-28 RX ADMIN — URSODIOL 300 MG: 300 CAPSULE ORAL at 14:30

## 2024-02-28 RX ADMIN — OXYCODONE HYDROCHLORIDE 5 MG: 5 TABLET ORAL at 09:58

## 2024-02-28 RX ADMIN — Medication 5 ML: at 04:53

## 2024-02-28 RX ADMIN — AMLODIPINE BESYLATE 5 MG: 5 TABLET ORAL at 08:13

## 2024-02-28 RX ADMIN — OXYBUTYNIN CHLORIDE 5 MG: 5 TABLET ORAL at 08:14

## 2024-02-28 RX ADMIN — ONDANSETRON 4 MG: 4 TABLET, ORALLY DISINTEGRATING ORAL at 08:13

## 2024-02-28 RX ADMIN — POTASSIUM CHLORIDE 20 MEQ: 750 TABLET, EXTENDED RELEASE ORAL at 20:04

## 2024-02-28 ASSESSMENT — ACTIVITIES OF DAILY LIVING (ADL)
ADLS_ACUITY_SCORE: 20

## 2024-02-28 NOTE — PLAN OF CARE
Temp: 97.7  F (36.5  C) Temp src: Oral BP: (!) 141/84 Pulse: 114   Resp: 18 SpO2: 95 % O2 Device: None (Room air)      Goal Outcome Evaluation:  Intermittently hypertensive & tachycardic. Afebrile. Alert & oriented x4. Up independently; able to make all needs known. Adequate intake & output. Hauser removed. 4-10/10 abdominal pain; Oxycodone administered x2 and IV Dilaudid administered x1. UA/UC collected + resulted. Will continue with plan of care.    Problem: Adult Inpatient Plan of Care  Goal: Optimal Comfort and Wellbeing  Outcome: Not Progressing  Intervention: Monitor Pain and Promote Comfort  Recent Flowsheet Documentation  Taken 2/27/2024 1642 by Neeraj Cee, RN  Pain Management Interventions: medication offered but refused  Taken 2/27/2024 1415 by Neeraj Cee, RN  Pain Management Interventions: medication (see MAR)  Taken 2/27/2024 1145 by Neeraj Cee, RN  Pain Management Interventions: medication (see MAR)     Problem: Adult Inpatient Plan of Care  Goal: Readiness for Transition of Care  Outcome: Not Progressing     Problem: Urinary Retention  Goal: Effective Urinary Elimination  Outcome: Not Progressing

## 2024-02-28 NOTE — PLAN OF CARE
"/70 (BP Location: Left arm, Cuff Size: Adult Regular)   Pulse 114   Temp 97.6  F (36.4  C) (Oral)   Resp 18   Ht 1.565 m (5' 1.61\")   Wt 78.7 kg (173 lb 9.6 oz)   LMP 11/05/2017   SpO2 96%   BMI 32.15 kg/m      Hours of care: 0700 - 1900    Vital Signs: Afebrile. VSS except for hypertension and tachycardia; provider CHLOE Garg. No new orders placed.    Pain:  Patient reports urethral meatus and upper abdominal pain. Pain managed with prn oxycodone x1.     Activity: Independent and up ad jeff in room.Activity adjusted per tolerance.   Diet/appetite: Tolerating high kcal and protein diet. Good appetite.    Neuro: A&Ox4. No new neuro deficit. Patient denies dizziness.   Cardiac:  Patient denies chest pain or heart palpitations. RRR.   Respiratory: Sating >95% on RA. Patient denies shortness of breath, feeling lightheaded and dizziness.  GI/: Adequate urine output. Patient denies N/V/D.  Skin: No new deficits noted.    LDA's: CVC dressing is CDI. CVC caps and dressing changed.   Replacements: No replacements indicated this shift   Plan: Continue with plan of care. Notify primary team with changes.     Lab Results   Component Value Date    HGB 9.9 02/28/2024    HGB 10.4 03/19/2018     Platelet Count   Date Value Ref Range Status   02/28/2024 76 (L) 150 - 450 10e3/uL Final   03/19/2018 271 150 - 450 10e9/L Final      Phosphorus   Date Value Ref Range Status   02/28/2024 3.5 2.5 - 4.5 mg/dL Final      Potassium   Date Value Ref Range Status   02/28/2024 4.3 3.4 - 5.3 mmol/L Final   05/30/2017 3.7 3.4 - 5.3 mmol/L Final      Magnesium   Date Value Ref Range Status   02/28/2024 2.0 1.7 - 2.3 mg/dL Final      Goal Outcome Evaluation:      Overall Patient Progress: no change    Problem: Adult Inpatient Plan of Care  Goal: Plan of Care Review  Description: The Plan of Care Review/Shift note should be completed every shift.  The Outcome Evaluation is a brief statement about your assessment that the patient is " "improving, declining, or no change.  This information will be displayed automatically on your shift  note.  2/28/2024 1600 by Fiona Mccray RN  Outcome: Progressing  Flowsheets (Taken 2/28/2024 1600)  Plan of Care Reviewed With: patient  Overall Patient Progress: improving  2/28/2024 1600 by Fiona Mccray RN  Outcome: Progressing  Flowsheets (Taken 2/28/2024 1600)  Plan of Care Reviewed With: patient  Overall Patient Progress: improving  Goal: Patient-Specific Goal (Individualized)  Description: You can add care plan individualizations to a care plan. Examples of Individualization might be:  \"Parent requests to be called daily at 9am for status\", \"I have a hard time hearing out of my right ear\", or \"Do not touch me to wake me up as it startles  me\".  2/28/2024 1600 by Fiona Mccray RN  Outcome: Progressing  2/28/2024 1600 by Fiona Mccray RN  Outcome: Progressing  Goal: Absence of Hospital-Acquired Illness or Injury  2/28/2024 1600 by Fiona Mccray RN  Outcome: Progressing  2/28/2024 1600 by Fiona Mccray RN  Outcome: Progressing  Intervention: Identify and Manage Fall Risk  Recent Flowsheet Documentation  Taken 2/28/2024 1230 by Fiona Mccray RN  Safety Promotion/Fall Prevention: safety round/check completed  Taken 2/28/2024 0830 by Fiona Mccray RN  Safety Promotion/Fall Prevention: safety round/check completed  Intervention: Prevent Skin Injury  Recent Flowsheet Documentation  Taken 2/28/2024 0830 by Fiona Mccray RN  Body Position: position changed independently  Skin Protection: adhesive use limited  Device Skin Pressure Protection: adhesive use limited  Intervention: Prevent and Manage VTE (Venous Thromboembolism) Risk  Recent Flowsheet Documentation  Taken 2/28/2024 0830 by Fiona Mccray RN  VTE Prevention/Management: SCDs (sequential compression devices) off  Intervention: Prevent Infection  Recent Flowsheet Documentation  Taken 2/28/2024 " 1230 by Fiona Mccray RN  Infection Prevention:   cohorting utilized   environmental surveillance performed   equipment surfaces disinfected   hand hygiene promoted   personal protective equipment utilized   rest/sleep promoted   single patient room provided   visitors restricted/screened  Taken 2/28/2024 0830 by Fiona Mccray RN  Infection Prevention:   cohorting utilized   environmental surveillance performed   equipment surfaces disinfected   hand hygiene promoted   personal protective equipment utilized   rest/sleep promoted   single patient room provided   visitors restricted/screened  Goal: Optimal Comfort and Wellbeing  2/28/2024 1600 by Fiona Mccray RN  Outcome: Progressing  2/28/2024 1600 by Fiona Mccray RN  Outcome: Progressing  Intervention: Monitor Pain and Promote Comfort  Recent Flowsheet Documentation  Taken 2/28/2024 1434 by Fiona Mccray RN  Pain Management Interventions:   rest   relaxation techniques promoted  Taken 2/28/2024 1100 by Fiona Mccray RN  Pain Management Interventions:   rest   relaxation techniques promoted  Taken 2/28/2024 1040 by Fiona Mccray RN  Pain Management Interventions:   rest   relaxation techniques promoted  Taken 2/28/2024 0958 by Fiona Mccray RN  Pain Management Interventions:   rest   relaxation techniques promoted   medication (see MAR)  Taken 2/28/2024 0825 by Fiona Mccray RN  Pain Management Interventions:   rest   relaxation techniques promoted  Goal: Readiness for Transition of Care  2/28/2024 1600 by Fiona Mccray RN  Outcome: Progressing  2/28/2024 1600 by Fiona Mccray RN  Outcome: Progressing     Problem: Urinary Retention  Goal: Effective Urinary Elimination  2/28/2024 1600 by Fiona Mccray RN  Outcome: Progressing  2/28/2024 1600 by Finoa Mccray RN  Outcome: Progressing  Intervention: Promote Effective Urine Elimination  Recent Flowsheet Documentation  Taken  2/28/2024 0830 by Fiona Mccray, RN  Bowel Function Promotion: prompt response to urge promoted

## 2024-02-28 NOTE — PROVIDER NOTIFICATION
Provider Mandi updated to resulting CT and UA. Requested to review findings. No changes for now.    Scribe Attestation (For Scribes USE Only)... Attending Attestation (For Attendings USE Only).../Scribe Attestation (For Scribes USE Only)...

## 2024-02-28 NOTE — PROGRESS NOTES
[facility]  Transplant Infectious Disease Progress Note     Patient:  Hortencia Baldwin, Date of birth 1970, Medical record number [id number]  Date of Visit:  02/28/2024         Assessment and Recommendations:   Recommendations:    Continue supportive cares at this time  Decrease sirolimus as much as safely possible.  Await urine cultures      Transplant Infectious Disease will continue to follow with you.      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 MMF discontinued on 2/14/24. Presented with hematuria, dysuria, urinary blood clots, polyuria due to BKV HC.     Infectious Disease issues include:    Stage 3 BKV-induced HC.   BKV viremia.    Other than minimizing IS to allow for BKV-specific CD4 recovery, there is no effective therapy for BKV-induced HC.   IV or intravesical cidofovir is used but the evidence that it is effective is weak. In one study, patients who received cidofovir had worse outcomes. Cidofovir IV might result in renal failure. We used intravesical cidofovir. The BK viremia is not unusual. Last cidofovir treatment was 2/26, no additional treatment at this time. This should resolve as her immune function is reconstituted. BK virus blood PCR at 51, improved from 1350 the prior week.     Transaminitis since 2/2/24.   Improving.      Right plantar wart  Preceded chemotherapy and HSCT. Did not worsen after chemotherapy and HSCT.      - QTc interval: 425 (2/9/2024)  - Bacterial prophylaxis: None  - PCP prophylaxis: TMP/SMX  - Serostatus & viral prophylaxis: CMV R+, EBV R+, HSV1+/2+, VZV ?   - Fungal prophylaxis: Micafungin  - Immunization status: Discuss further when able  - Gamma globulin status: Unknown  - Isolation status: Good hand hygiene.   - Code status: Full Code    Shay Walls MD  Infectious Diseases Fellow, PGY-4  Pager: [phone]  [facility] danny   Date: 2/26/2024         Interval History:     Continues  to have urinary urgency. Still has some blood in her urine though feels it is less, still no more clots. No fevers, chills, sweats, chest, pain nor dyspnea.      Transplants:  N/A, Postoperative day:  .  Coordinator No matching coordinators         Current Medications & Allergies:      acyclovir  800 mg Oral BID    amLODIPine  5 mg Oral Daily    fenofibrate  54 mg Oral Daily    heparin lock flush  5-10 mL Intracatheter Q24H    letermovir  480 mg Oral Daily    OLANZapine  5 mg Oral At Bedtime    ondansetron  4 mg Oral QAM AC    oxyBUTYnin  5 mg Oral 4x Daily    pantoprazole  40 mg Oral QAM AC    potassium chloride jean pierre ER  20 mEq Oral BID    [Held by provider] sirolimus  1.5 mg Oral Daily    sulfamethoxazole-trimethoprim  1 tablet Oral Q Mon Tues BID    ursodiol  300 mg Oral TID       Infusions/Drips:      Allergies   Allergen Reactions    Blood Transfusion Related (Informational Only) Other (See Comments)     Patient has a history of a clinically significant antibody against RBC antigens.  A delay in compatible RBCs may occur.            Physical Exam:   Patient Vitals for the past 24 hrs:   BP Temp Temp src Pulse Resp SpO2 Weight   02/28/24 0825 -- -- -- -- 18 99 % --   02/28/24 0813 (!) 158/77 97.7  F (36.5  C) Oral 120 18 99 % 78.7 kg (173 lb 9.6 oz)   02/28/24 0354 128/76 97.7  F (36.5  C) Oral 97 16 94 % --   02/27/24 2338 (!) 141/78 98.3  F (36.8  C) Oral 107 16 95 % --   02/27/24 2035 118/60 98.1  F (36.7  C) Oral 93 16 95 % --   02/27/24 1701 (!) 141/84 97.7  F (36.5  C) Oral 114 18 95 % --   02/27/24 1138 (!) 147/90 97.5  F (36.4  C) Oral 117 18 92 % --   02/27/24 1020 (!) 118/98 -- -- (!) 137 -- -- --     Ranges for vital signs:  Temp:  [97.5  F (36.4  C)-98.3  F (36.8  C)] 97.7  F (36.5  C)  Pulse:  [] 120  Resp:  [16-18] 18  BP: (118-158)/(60-98) 158/77  SpO2:  [92 %-99 %] 99 %  Vitals:    02/26/24 0825 02/27/24 0822 02/28/24 0813   Weight: 77.5 kg (170 lb 14.4 oz) 77.7 kg (171 lb 6.4 oz) 78.7 kg  "(173 lb 9.6 oz)       Physical Examination:  GENERAL:  well-developed, well-nourished woman, resting in bed in no acute distress.  HEAD:  Head is normocephalic, atraumatic   EYES:  Eyes have anicteric sclerae without conjunctival injection   ENT:  Oropharynx is moist without exudates or ulcers. Tongue is midline  NECK:  Supple.   LUNGS:  Clear to auscultation bilateral.   CARDIOVASCULAR:  Regular rate and rhythm with no murmur  ABDOMEN:  Normal bowel sounds. Mild TTP over entire abdomen, palpation gives her sense of urgency  SKIN:  No acute rashes.  Line in place without any surrounding erythema or exudate.  NEUROLOGIC:  Grossly nonfocal. Active x4 extremities         Laboratory Data:     No results found for: \"ACD4\"    Inflammatory Markers  No lab results found.    Immune Globulin Studies   No lab results found.    Metabolic Studies       Recent Labs   Lab Test 02/28/24  0344 02/27/24  0328 01/22/24  0328 01/22/24  0055 01/15/24  1618 01/15/24  1248 01/15/24  1015    143   < >  --    < > 135  --    POTASSIUM 4.3 3.9   < >  --    < > 3.6  --    CHLORIDE 102 105   < >  --    < >  --   --    CO2 28 30*   < >  --    < >  --   --    ANIONGAP 10 8   < >  --    < >  --   --    BUN 7.1 5.4*   < >  --    < >  --   --    CR 0.70 0.58   < >  --    < >  --   --    GFRESTIMATED >90 >90   < >  --    < >  --   --    * 97   < >  --    < >  --   --    NIKO 9.3 9.3   < >  --    < >  --   --    PHOS 3.5 3.9   < >  --    < >  --   --    MAG 2.0 2.0   < >  --    < >  --   --    LACT  --   --   --  0.5*  --  0.9  --    PCAL  --   --   --   --   --   --  1.08*    < > = values in this interval not displayed.       Hepatic Studies    Recent Labs   Lab Test 02/26/24  0332 02/22/24  0350 02/21/24  0816 01/02/24  1432 12/26/23  1709   BILITOTAL 0.2 0.3 0.3   < > 0.4   DBIL <0.20 <0.20 <0.20   < >  --    ALKPHOS 87 107 126   < > 176*   PROTTOTAL 5.5* 6.0* 7.0   < > 6.7   ALBUMIN 3.4* 3.8 4.2   < > 4.3   AST 40 47* 54*   < > 104* "   ALT 42 60* 75*   < > 295*   LDH  --   --   --   --  244    < > = values in this interval not displayed.       Pancreatitis testing    Recent Labs   Lab Test 02/22/24  0350   TRIG 388*       Gout Labs      Recent Labs   Lab Test 01/05/24  1147 12/19/23  1347   URIC 4.8 4.4       Hematology Studies   Recent Labs   Lab Test 02/28/24  0344 02/27/24  0328 02/26/24  0332 02/25/24  0305 02/22/24  0350 02/21/24  0816 02/19/24  1203   WBC 6.5 4.8 5.3 5.0   < > 7.7 5.8   ANEU  --   --   --   --   --  5.5 3.1   ANEUTAUTO 2.8 1.9 2.4 2.4   < >  --   --    ALYM  --   --   --   --   --  0.8 1.0   ALYMPAUTO 2.1 1.5 1.6 1.3   < >  --   --    TRACY  --   --   --   --   --  0.7 0.9   AMONOAUTO 0.9 0.8 0.8 0.8   < >  --   --    AEOS  --   --   --   --   --  0.3 0.6   AEOSAUTO 0.4 0.5 0.4 0.4   < >  --   --    ABSBASO 0.1 0.1 0.1 0.1   < >  --   --    HGB 9.9* 9.0* 9.2* 8.8*   < > 11.3* 10.0*   HCT 31.6* 28.8* 29.1* 28.1*   < > 35.0 30.9*   PLT 76* 60* 64* 58*   < > 83* 74*    < > = values in this interval not displayed.       Clotting Studies    Recent Labs   Lab Test 02/26/24 0332 02/21/24  1238 01/29/24  0337 01/22/24  0328   INR 0.88 0.96 1.04 1.16*   PTT  --  36  --   --        Iron Testing    Recent Labs   Lab Test 02/28/24  0344 02/23/24  0347 02/22/24  0350 02/19/24  1203 02/16/24  1054 01/05/24  1147 01/02/24  1432 03/19/18  1338 03/19/18  1337 01/29/18  1544 05/30/17  1155   IRON  --   --   --   --   --   --  220*  --   --   --  95   FEB  --   --   --   --   --   --   --   --   --   --  357   IRONSAT  --   --   --   --   --   --   --   --   --   --  27   SONY  --   --  10,961*  --   --   --  4,341*   < >  --   --  20   *   < > 106*   < > 102*   < > 108*   < >  --    < > 112*   FOLIC  --   --   --   --   --   --   --   --  18.7  --   --    B12  --   --   --   --   --   --   --   --   --   --  776   RETP  --   --   --   --  4.7*  --   --    < >  --   --   --    RETICABSCT  --   --   --   --  0.127*  --   --    < >  --    "--   --     < > = values in this interval not displayed.       Arterial Blood Gas Testing  No lab results found.     Thyroid Studies   No lab results found.    Invalid input(s): \"FT4\"    Urine Studies     Recent Labs   Lab Test 02/27/24  1839 02/21/24  1220 02/12/24  1425 01/25/24  1824 01/13/24  1532   URINEPH 7.0 7.0 6.0 6.5 6.5   NITRITE Negative Negative Negative Negative Negative   LEUKEST Trace* Large* Negative Negative Negative   WBCU 40* >182* 13* <1 1       CSF testing   No lab results found.    Invalid input(s): \"CADAM\", \"EVPCR\", \"ENTPCR\", \"ENTEROVIRUS\"    Medication levels    Recent Labs   Lab Test 02/27/24  0758 02/02/24  0752 01/31/24  0913   TACROL  --   --  7.9   RAPAMY 9.4   < >  --     < > = values in this interval not displayed.       Body fluid stats  No lab results found.    Microbiology:  Fungal testing  No lab results found.    Invalid input(s): \"HIFUN\", \"FUNGL\"    Last Culture results   Culture   Date Value Ref Range Status   02/22/2024   Final    No Vancomycin Resistant Enterococcus species isolated   02/21/2024 <10,000 CFU/mL Mixture of Urogenital Allie  Final   02/12/2024 <10,000 CFU/mL Mixture of Urogenital Allie  Final   01/22/2024 No Growth  Final   01/22/2024 No Growth  Final   01/15/2024 No Growth  Final   01/15/2024 No Growth  Final   01/14/2024 No Growth  Final   01/14/2024 No Growth  Final   01/13/2024 No Growth  Final   01/13/2024 No Growth  Final   01/12/2024 No Growth  Final   01/12/2024 No Growth  Final   01/11/2024 No Growth  Final   01/11/2024 No Growth  Final   01/11/2024 No Growth  Final   01/05/2024   Final    No Vancomycin Resistant Enterococcus species isolated           Last checks of Clostridioides difficile testing  Recent Labs   Lab Test 02/22/24  0006 02/18/24  1200 01/17/24  1504 01/08/24  1010   CDBPCT Negative Negative Negative Negative       Infection Studies to assess Diarrhea   Recent Labs   Lab Test 02/18/24  1200   BIEPEC Negative   BISTEC Negative   BISHEI " Negative   BIEAEC Negative   BIETEC Negative   [id number] NA   BIADE Negative   BICRY Negative   BICAM Negative   BISAL Negative   BIVIBS Negative   BIVIBC Negative   BIYER Negative   BIGIA Negative   BINOR Negative   BIROT Negative   BIPLE Negative   BIENT Negative   BIAST Negative   BISAP Negative       Virology:  Coronavirus-19 testing    Recent Labs   Lab Test 01/02/24  1432 12/26/23  1709   [id number] Negative Positive*   CYCLETHRES  --  31.7       Respiratory virus (non-coronavirus-19) testing    Recent Labs   Lab Test 01/12/24  1459 12/26/23  1709   AFLU  --  Negative   IFLUA Not Detected Invalid*   FLUAH1 Not Detected Invalid*   [id number] Not Detected Invalid*   FLUAH3 Not Detected Invalid*   BFLU  --  Negative   IFLUB Not Detected Invalid*   PIV1 Not Detected Invalid*   PIV2 Not Detected Invalid*   PIV3 Not Detected Invalid*   PIV4 Not Detected Invalid*   RSVA Not Detected Invalid*   RSVB Not Detected Invalid*   HMPV Not Detected Invalid*   RHINEV Not Detected Invalid*   ADENOV Not Detected Invalid*   CORONA Not Detected Invalid*       CMV viral loads    Recent Labs   Lab Test 02/21/24  0816 02/19/24  1203 02/12/24  1300 02/09/24  0959   CMVQNT Not Detected Not Detected   < > <35*   CMVLOG  --   --   --  <1.5    < > = values in this interval not displayed.       CMV resistance testing  No lab results found.    HHV-6 DNA copies/mL   Date Value Ref Range Status   02/06/2024 Not Detected Not Detected copies/mL Final       EBV DNA Copies/mL   Date Value Ref Range Status   02/21/2024 Not Detected Not Detected copies/mL Final   02/06/2024 Not Detected Not Detected copies/mL Final       BK Virus Testing     Recent Labs   Lab Test 02/27/24  0758 02/21/24  1603 02/21/24  1238 02/12/24  1425 01/26/24  1045 01/13/24  1532   BKRESINST 51*  --  1,350*  --  4,520,000* 2,890*   BKRES  --  >100,000,000*  --  >100,000,000*  --   --    BKLOG 1.7 >8.0 3.1 >8.0 6.7 3.5       Parvovirus Testing  No lab results found.    Invalid  "input(s): \"PRVRES\"    Adenovirus Testing    Recent Labs   Lab Test 02/21/24  1603 02/06/24  0723 01/26/24  1045 01/13/24  1532   ADRES Not Detected Not Detected Not Detected Not Detected       Imaging:  Recent Results (from the past 48 hour(s))   CT Abdomen Pelvis w Contrast    Narrative    EXAMINATION: CT ABDOMEN PELVIS W CONTRAST, 2/27/2024 12:02 PM    INDICATION: s/p BMT, new LUQ pain, tenderness to palpation, concern  for soft tissue mass    COMPARISON STUDY: 2/20/2024    TECHNIQUE: CT scan of the abdomen and pelvis was performed on  multidetector CT scanner using volumetric acquisition technique and  images were reconstructed in multiple planes with variable thickness  and reviewed on dedicated workstations.     CONTRAST: 105 mL Isovue-370 injected IV without oral contrast    CT scan radiation dose is optimized to minimum requisite dose using  automated dose modulation techniques.    FINDINGS:    Lower thorax: Areas of basilar subsegmental atelectasis. Otherwise  unremarkable.    Liver: No mass. Accessory right hepatic vein. No intrahepatic biliary  ductal dilation.    Biliary System: Mildly distended gallbladder with substantial wall  thickening. Hyperattenuating structure in the gallbladder neck which  could represent a noncalcified gallstone versus mucosal fold (series 4  image 81), although it would be atypical for a gallstone to be  developed since prior ultrasound 2/20/2024.    Pancreas: No mass or pancreatic ductal dilation.    Adrenal glands: No mass or nodules    Spleen: Normal.    Kidneys: Areas of heterogeneous enhancement on the superior pole of  the left kidney. There is substantial left and mild right urothelial  enhancement. Mild stranding adjacent to the ureter, mainly in the  pelvis, series 4 image 190. No obstructing nephrolithiasis. No  hydronephrosis. No suspicious masses.    Gastrointestinal tract :Normal appendix. Normal caliber small bowel.    Mesentery/peritoneum/retroperitoneum: No " mass. No free air. Possible  trace pelvic fluid. Mild presacral edema.    Lymph nodes: No significant lymphadenopathy.    Vasculature: Patent major abdominal vasculature.    Pelvis: Substantial wall thickening of the urinary bladder with  mucosal hyperenhancement. Uterus and adnexa within normal limits.    Osseous structures: No aggressive or acute osseous lesion.      Soft tissues: Small fat containing ventral/umbilical hernia.      Impression    IMPRESSION:   1. Cystitis with findings suggesting ascending urinary tract infection  given the urothelial enhancement. Additionally, there is patchy  hypoenhancement of the left kidney which raises concern for developing  left pyelonephritis especially given history of left upper quadrant  pain and tenderness.  2. Substantial wall thickening of a mildly distended gallbladder.  Given recent ultrasound without gallstones, this is unlikely to  represent acute calculus cholecystitis, although acalculous  cholecystitis would be hard to rule out by imaging. Alternatively this  can be seen with overall third spacing of fluid rather than intrinsic  gallbladder pathology. Of note, no right upper quadrant symptoms  reported. If clinically indicated, consider repeated right upper  quadrant ultrasound.  3. No soft tissue masses are visualized.    I have personally reviewed the examination and initial interpretation  and I agree with the findings.    TELMA MONAHAN MD         SYSTEM ID:  [id number]

## 2024-02-28 NOTE — PROGRESS NOTES
"BMT Daily Progress Note   02/28/2024    Patient ID: Irma Foreman is a 53 year old female who is 48 s/p MA (Bu/Flu) 6/8 URD Allo transplant, day 48. Readmitted 2/21 with hematuria, dysuria and clots.     Transplant Essential Data:   Diagnosis MDS  BMTCT Type ALLO    Prep Regimen Bu/Flu   Donor Match and  Source 6/8 URD    GVHD Prophylaxis PT Cy, Tac/MMF  Primary BMT MD Garay     Clinical Trials MT 2015-29 (according to but not on trial)         INTERVAL  HISTORY   Continues to have significant frequency and bladder spasms. Few clots but overall orange in color urine. No significant change from yesterday. She does complain on continued LUQ pain, redness underneath her breast and feels some RUQ pain this morning similar to days ago.   Review of Systems: 10 point ROS negative except as noted above.  PHYSICAL EXAM     Weight In/Out     Wt Readings from Last 3 Encounters:   02/28/24 78.7 kg (173 lb 9.6 oz)   02/21/24 77.9 kg (171 lb 12.8 oz)   02/19/24 78 kg (172 lb)      I/O last 3 completed shifts:  In: 1410 [P.O.:1410]  Out: 300 [Urine:300]       KPS:  80    BP (!) 158/92 (BP Location: Left arm)   Pulse (!) 125   Temp 98.1  F (36.7  C) (Oral)   Resp 18   Ht 1.565 m (5' 1.61\")   Wt 78.7 kg (173 lb 9.6 oz)   LMP 11/05/2017   SpO2 98%   BMI 32.15 kg/m         General: NAD   Eyes: : KEVON, sclera anicteric   Lungs: CTA bilaterally  Cardiovascular: RRR, no M/R/G   Abdominal/Rectal: +BS, soft, LUQ and RUQ pain to palpation, + murphys, Left CVA tenderness   : skin irritation surrounding vulva, erythema and small few mm area of skin abrasion bilaterally (2/22), suprapubic tenderness with palpation  Lymphatics: no edema  Skin:Has mild skin irritation underneath left breast that appears to be moisture related skin breakdown, potential yeast infection.   Neuro: A&O   Additional Findings: R Gastelum site NT, no drainage  Lab Results   Component Value Date    WBC 6.5 02/28/2024    ANEU 5.5 02/21/2024    HGB 9.9 (L) " 02/28/2024    HCT 31.6 (L) 02/28/2024    PLT 76 (L) 02/28/2024     02/28/2024    POTASSIUM 4.3 02/28/2024    CHLORIDE 102 02/28/2024    CO2 28 02/28/2024     (H) 02/28/2024    BUN 7.1 02/28/2024    CR 0.70 02/28/2024    MAG 2.0 02/28/2024    INR 0.88 02/26/2024       ASSESSMENT BY SYSTEMS     Irma Foreman is a 53 year old female who is 48 s/p MA (Bu/Flu) 6/8 URD Allo transplant. Readmitted 2/21 with hematuria, dysuria and clots.     BMT/IEC PROTOCOL for MT 2015-29   - Chemo protocol:  Day -6 through day -1: Keppra and Allopurinol   Day -5 through -2: Bu/Flu.   Day -1: Rest day   Day 0: Transplant. Recipient: O+, CMV+. Donor: O+, CMV-. Cell dose 6.5 x10^6.   - Restaging plan: per protocol   BMbx 2/6 -- Day 28 review with Dr. Garay on 2/9  Flow neg; 100% donor. Chimerisms: CD33 is 100% donor, CD3 is 87% donor. Bone marrow 100% donor.      HEME/COAG  - Anemia, thrombocytopenia 2/2 due to chemotherapy/BMT, improving  #Iron overload: hx of transfusion dependent anemia, pre-transplant ferritin around 5,000. Recommend phlebotomy post transplant when retic count is restored and Hgb >10 . On admission ferritin >10k.   #Hematuria- coags WNL, fibrinogen slightly elevated  # Platelet refractoriness: HLA platelets when available.   - Transfusion parameters: hemoglobin <7, platelets <10. Plt and hgb continue to improve despite hematuria  - GCSF PRN for ANC < 1.     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 UA shows large blood, LE, WBC, RBCs. Ucx (2/12): mixed urogenital odilon. Underwent 5 days Levaquin. Repeat UC negative. No empiric abx added on admission.              - Consult urology- recommended continuing pyridium, oxybutynin, on max doses               - Consult ID- intervesicular cidofovir 5mg/kg twice weekly (Monday, Thursday), only two doses ordered. Frequency and duration depends on patient symptoms. Not possible to do  in clinic. If not improving symptoms can try IV cidofovir w/o preobenocide per ID.              - Hyperhydration-IVF NS at 100cc/hr, discontinued 2/23 due to tolerating PO intake improved symptoms, restarted (2/24-x) at 75mL/hr with worsening symptoms.              -Keep I/S as low as safely possible see GVHD   -2/26:  intravesicular Cidofovir early on Monday 2/26. ID recommending to continue to receive intravesical cidofovir 5mg/kg in 60mL in normal saline instilled in bladder over 15min. Decrease siro level as safely possible. If no improvement of symptoms, consider IV cidofovir without probenicid. Weekly BK virus serum.       Prophylaxis plan:   ACV/letermovir   Micafungin through day +45 (current smoker) - MWF thru 2/26, ordered to be given prior to discharge  Bactrim   CMV: ND (2/12)   EBV: ND (2/6)      RISK OF GVHD  - Pink maculopapular rash.TCM prescribed at discharge and prednisone 40 mg x 5 days (2/3-2/7). Resolved.   - Prophylaxis: PT Cy, Tac/MMF started 1/16. **initially avoided sirolimus d/t high risk VOD**. Tac drip discontinued 1/31, sirolimus started.  - Siro level 2/26, 9.3, consider keeping sirolimus level lower with concurrent infection. 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 -on admission LFTs are rapidly improving to normal 2/22. Check M/Thurs.    -Rash for 3-4 days (2/11-2/16) , non-pruritic. - resolved  -2/20 RUQ US suggestive of hepatic steatosis, normal dopplers. Biopsy is scheduled 2/23, but was cancelled today as hepatic panel is essentially WNL, no tbili elevation, no rash, GI symptoms and U/S unremarkable.   -2/28 - Continues to have RUQ pain, LFT's wnl. No significant change in pain she was having from before.      CARDIOVASCULAR  # Chest pressure: new on 1/15. Resolved. See previous discharge  summary dated 2/4/24 for details.   #Prolonged QTc to 507 on 1/22. EKG (1/30) NSR, Qtc 454.  # HTN: On norvasc 5mg daily. Worse with pain. Controlling symptoms improve BP. No PRNs available as concern for hypotension, renal injury in setting BK viremia, cystitis.     GI/NUTRITION  #Abdominal pain  - CT-CAP showing cystitis, possible pyelonephritis, and inflammation of gallbladder. Her LFTs remain wnl. Unclear what her LUQ pain is from, she does have mild skin breakdown underneath her breast which is near where her tenderness is. She continues to have mild RUQ pain with palpation. Will monitor for now.      #Transaminitis: much improved, almost WNL, trend M/Thur. Etiology unclear. See gvhd   #Hypertriglyceridemia- 249 (1/29), 388 on admission. Start fenofibrate, trend.  # Epigastric pain (1/18): RUQ ultrasound showing patent doppler throughout, no ascites, nonspecific elevated hepatic artery restrictive index (see full impression above).                - Ulcer prophylaxis: PPI  - VOD ppx: Ursodiol. See liver US 2/21 results above        #dysuria, urge/frequency: 2/12 BK urine >100 million, blood >1350 copies. Oxybuytnin 5mg QID, Pyridum 200mg TID (stopped 2/26). Oxycodone, Dilaudid PRN.   - Added Myrbetriq 2/28      DERM:  # Foot, hand warts. Curbside derm on 1/8, no recs while inpatient, typically managed OP.    # rash as per above.  #Moisture related dermatitis, candidiasis of skin underneath left breast  - Barrier creams and Nystatin cream.      Known issues that I take into account for medical decisions, with salient changes to the plan considering these complexities noted above.         Patient Active Problem List   Diagnosis    Left medial knee pain    Obesity (BMI 30-39.9)    Anemia, unspecified type    Macrocytic anemia    MDS (myelodysplastic syndrome) (H)         Clinically Significant Risk Factors Present on Admission          # Hypokalemia: Lowest K = 3.2 mmol/L in last 2 days, will replace as needed          # Thrombocytopenia: Lowest platelets = 74 in last 2 days, will monitor for bleeding                  Disposition: Possibility to discharge 2/28 based on patients symptoms. Discharge prepped.       I spent 30 minutes in the care of this patient today, which included time necessary for preparation for the visit, obtaining history, ordering medications/tests/procedures as medically indicated, review of pertinent medical literature, counseling of the patient, communication of recommendations to the care team, and documentation time.     Pili Wood PA-C

## 2024-02-28 NOTE — PROVIDER NOTIFICATION
"Provider Sabino Richter MD text paged via [employer] \"Patient has positive BK in urine. Previous BK from yesterday shows positive results in blood.\"    Provider replied \"Thanks! Doesn't look like any changes in care based on that\".     No new orders placed. Writer will continue to monitor patient and report any changes to primary team.   "

## 2024-02-29 VITALS
DIASTOLIC BLOOD PRESSURE: 80 MMHG | HEIGHT: 62 IN | BODY MASS INDEX: 31.43 KG/M2 | HEART RATE: 117 BPM | OXYGEN SATURATION: 94 % | TEMPERATURE: 97.7 F | SYSTOLIC BLOOD PRESSURE: 133 MMHG | WEIGHT: 170.8 LBS | RESPIRATION RATE: 18 BRPM

## 2024-02-29 LAB
ANION GAP SERPL CALCULATED.3IONS-SCNC: 9 MMOL/L (ref 7–15)
BASOPHILS # BLD AUTO: 0.1 10E3/UL (ref 0–0.2)
BASOPHILS NFR BLD AUTO: 2 %
BUN SERPL-MCNC: 6.2 MG/DL (ref 6–20)
CALCIUM SERPL-MCNC: 9.2 MG/DL (ref 8.6–10)
CHLORIDE SERPL-SCNC: 103 MMOL/L (ref 98–107)
CREAT SERPL-MCNC: 0.58 MG/DL (ref 0.51–0.95)
DEPRECATED HCO3 PLAS-SCNC: 28 MMOL/L (ref 22–29)
EGFRCR SERPLBLD CKD-EPI 2021: >90 ML/MIN/1.73M2
EOSINOPHIL # BLD AUTO: 0.3 10E3/UL (ref 0–0.7)
EOSINOPHIL NFR BLD AUTO: 6 %
ERYTHROCYTE [DISTWIDTH] IN BLOOD BY AUTOMATED COUNT: 18 % (ref 10–15)
GLUCOSE SERPL-MCNC: 107 MG/DL (ref 70–99)
HCT VFR BLD AUTO: 31.2 % (ref 35–47)
HGB BLD-MCNC: 9.9 G/DL (ref 11.7–15.7)
IMM GRANULOCYTES # BLD: 0.1 10E3/UL
IMM GRANULOCYTES NFR BLD: 2 %
LYMPHOCYTES # BLD AUTO: 1.8 10E3/UL (ref 0.8–5.3)
LYMPHOCYTES NFR BLD AUTO: 32 %
MAGNESIUM SERPL-MCNC: 2.1 MG/DL (ref 1.7–2.3)
MCH RBC QN AUTO: 34.3 PG (ref 26.5–33)
MCHC RBC AUTO-ENTMCNC: 31.7 G/DL (ref 31.5–36.5)
MCV RBC AUTO: 108 FL (ref 78–100)
MONOCYTES # BLD AUTO: 1 10E3/UL (ref 0–1.3)
MONOCYTES NFR BLD AUTO: 19 %
NEUTROPHILS # BLD AUTO: 2.2 10E3/UL (ref 1.6–8.3)
NEUTROPHILS NFR BLD AUTO: 39 %
NRBC # BLD AUTO: 0 10E3/UL
NRBC BLD AUTO-RTO: 0 /100
PHOSPHATE SERPL-MCNC: 3 MG/DL (ref 2.5–4.5)
PLATELET # BLD AUTO: 70 10E3/UL (ref 150–450)
POTASSIUM SERPL-SCNC: 4.1 MMOL/L (ref 3.4–5.3)
RBC # BLD AUTO: 2.89 10E6/UL (ref 3.8–5.2)
SODIUM SERPL-SCNC: 140 MMOL/L (ref 135–145)
WBC # BLD AUTO: 5.4 10E3/UL (ref 4–11)

## 2024-02-29 PROCEDURE — 250N000013 HC RX MED GY IP 250 OP 250 PS 637

## 2024-02-29 PROCEDURE — 85025 COMPLETE CBC W/AUTO DIFF WBC: CPT

## 2024-02-29 PROCEDURE — 250N000013 HC RX MED GY IP 250 OP 250 PS 637: Performed by: PHYSICIAN ASSISTANT

## 2024-02-29 PROCEDURE — 250N000011 HC RX IP 250 OP 636: Performed by: INTERNAL MEDICINE

## 2024-02-29 PROCEDURE — 80048 BASIC METABOLIC PNL TOTAL CA: CPT

## 2024-02-29 PROCEDURE — 250N000011 HC RX IP 250 OP 636

## 2024-02-29 PROCEDURE — 83735 ASSAY OF MAGNESIUM: CPT | Performed by: INTERNAL MEDICINE

## 2024-02-29 PROCEDURE — 84100 ASSAY OF PHOSPHORUS: CPT | Performed by: INTERNAL MEDICINE

## 2024-02-29 RX ORDER — MIRABEGRON 25 MG/1
25 TABLET, FILM COATED, EXTENDED RELEASE ORAL DAILY
Qty: 30 TABLET | Refills: 0 | Status: SHIPPED | OUTPATIENT
Start: 2024-02-29 | End: 2024-03-28

## 2024-02-29 RX ORDER — NYSTATIN 100000 U/G
CREAM TOPICAL 2 TIMES DAILY
Qty: 15 G | Refills: 0 | Status: SHIPPED | OUTPATIENT
Start: 2024-02-29 | End: 2024-03-06

## 2024-02-29 RX ADMIN — OXYCODONE HYDROCHLORIDE 5 MG: 5 TABLET ORAL at 10:29

## 2024-02-29 RX ADMIN — ONDANSETRON 4 MG: 4 TABLET, ORALLY DISINTEGRATING ORAL at 09:24

## 2024-02-29 RX ADMIN — MIRABEGRON 25 MG: 25 TABLET, FILM COATED, EXTENDED RELEASE ORAL at 09:24

## 2024-02-29 RX ADMIN — NYSTATIN: 100000 CREAM TOPICAL at 09:26

## 2024-02-29 RX ADMIN — ACYCLOVIR 800 MG: 800 TABLET ORAL at 09:24

## 2024-02-29 RX ADMIN — AMLODIPINE BESYLATE 5 MG: 5 TABLET ORAL at 09:24

## 2024-02-29 RX ADMIN — FENOFIBRATE 54 MG: 54 TABLET ORAL at 09:25

## 2024-02-29 RX ADMIN — URSODIOL 300 MG: 300 CAPSULE ORAL at 09:24

## 2024-02-29 RX ADMIN — OXYBUTYNIN CHLORIDE 5 MG: 5 TABLET ORAL at 09:24

## 2024-02-29 RX ADMIN — Medication 5 ML: at 02:42

## 2024-02-29 RX ADMIN — POTASSIUM CHLORIDE 20 MEQ: 750 TABLET, EXTENDED RELEASE ORAL at 09:24

## 2024-02-29 RX ADMIN — LETERMOVIR 480 MG: 480 TABLET, FILM COATED ORAL at 09:24

## 2024-02-29 RX ADMIN — PANTOPRAZOLE SODIUM 40 MG: 40 TABLET, DELAYED RELEASE ORAL at 09:24

## 2024-02-29 RX ADMIN — OXYCODONE HYDROCHLORIDE 5 MG: 5 TABLET ORAL at 02:39

## 2024-02-29 ASSESSMENT — ACTIVITIES OF DAILY LIVING (ADL)
ADLS_ACUITY_SCORE: 20

## 2024-02-29 NOTE — PLAN OF CARE
"/62 (BP Location: Left arm)   Pulse 102   Temp 97.9  F (36.6  C) (Oral)   Resp 18   Ht 1.565 m (5' 1.61\")   Wt 78.7 kg (173 lb 9.6 oz)   LMP 11/05/2017   SpO2 93%   BMI 32.15 kg/m     Neuro: A&Ox4. No new neuro deficit  Cardiac: Afebrile VSS.   Respiratory: Sating >92% on RA.  GI/: Adequate urine output, blood tinged. No BM this shift.   Diet/appetite: Tolerating reg diet.  Activity:  Independent   Pain: At acceptable level on current regimen. C/O pain with urination, PRN oxy x1 with moderate relief.   Skin: No new deficits noted.  LDA's: CVC Hep locked   Labs: No replacements this shift     Plan: Continue with POC. Notify primary team with changes.   Goal Outcome Evaluation:      Plan of Care Reviewed With: patient    Overall Patient Progress: no changeOverall Patient Progress: no change           "

## 2024-02-29 NOTE — DISCHARGE INSTRUCTIONS
Irma,   Please HOLD your sirolimus tablets UNTIL you have been seen in the clinic on 3/1 and they have seen your lab results. Follow their instructions on dose and when to resume tablets.       BMT Contact Information  For issues including fevers 100.5 or more:  Please call during the week: Monday through Friday between hours of 8:00 am and 4:30 pm- Call BMT office- [phone]  After hours/Weekends: Please call [facility]  and ask for BMT physician on call and the  will have the BMT Fellow Physician call you back: [phone]    [facility] phone #[phone]     Advice for Patients on COVID19:  a. Avoid contact with individuals:   i. Who are sick or have recently been sick  ii. Have traveled to high risk areas (per CDC guidelines) or have been on a cruise in the last 14 days  iii. Who were or could have been exposed to COVID-19   b. If experiencing symptoms such as: Fever, cough or shortness of breath contact BMT at [phone] Mon-Fri 8am-4:30pm or After Hours at [phone] (ask to speak to a BMT Fellow) for guidance on need for clinical assessment  c. Avoid all non- essential travel at this time; if traveling is necessary use mask (N-95)   d. Wear a mask when in public areas  d. Avoid crowded places, if possible  f. Follow CDC advice https://www.cdc.gov/coronavirus/2019-ncov/index.html and travel guidelines https://www.cdc.gov/coronavirus/2019-ncov/travelers/index.html

## 2024-02-29 NOTE — PROGRESS NOTES
Pt discharged to: Home  Via: private car  Time: 11:25 pm  Reason not before 11am or 2 hrs after order written: waiting for medications in discharge pharmacy.  Accompanied by: partner  Belongings: remain with patient  Teaching: discharge teaching completed per unit protocol; questions answered and concerns addressed.  Cap and line flushed with caregiver: N/A caregiver not present at discharge; readmission  Central line teach back questions complete: reinforced teaching; readmission  Pill box filled: N/A readmission  Clinic appointment: 03/01/2024  Report called/faxed: N/A  Local housing: N/A

## 2024-02-29 NOTE — PLAN OF CARE
Physical Therapy Discharge Summary    Reason for therapy discharge:    Discharged to home with outpatient therapy.    Progress towards therapy goal(s). See goals on Care Plan in [facility] electronic health record for goal details.  Goals partially met.  Barriers to achieving goals:   discharge from facility.    Therapy recommendation(s):    Continued therapy is recommended.  Rationale/Recommendations:  strength, endurance, fall prevention.

## 2024-02-29 NOTE — PROGRESS NOTES
Care Management Discharge Note    Discharge Date: 02/29/2024       Discharge Disposition: Home    Discharge Services: None    Discharge DME: None    Discharge Transportation: family or friend will provide    Private pay costs discussed: Not applicable    Does the patient's insurance plan have a 3 day qualifying hospital stay waiver?  No    PAS Confirmation Code: N/A  Patient/family educated on Medicare website which has current facility and service quality ratings: no    Education Provided on the Discharge Plan: Yes  Persons Notified of Discharge Plans: Pt  Patient/Family in Agreement with the Plan: yes    Handoff Referral Completed: Yes    Additional Information:  Per medical team the pt is stable for discharge today. Pt is day +49 s/p allo BMT, readmitted due to hematuria, dysuria and clots. Pt shared she is ready to discharge and feels comfortable with the plan. Her children and boyfriend continue to be her caregivers. Pt was updated that the [facility] freda was denied due to lack of funding, she is aware we can reapply in the future if she is interested.     CLIFTON Ryan, [facility]  Phone [phone]  Vocera- BMT SW 3

## 2024-02-29 NOTE — PROGRESS NOTES
"BMT Daily Progress Note   02/29/2024    Patient ID: Irma Foreman is a 53 year old female who is 49 s/p MA (Bu/Flu) 6/8 URD Allo transplant, day 49. Readmitted 2/21 with hematuria, dysuria and clots.     Transplant Essential Data:   Diagnosis MDS  BMTCT Type ALLO    Prep Regimen Bu/Flu   Donor Match and  Source 6/8 URD    GVHD Prophylaxis PT Cy, Tac/MMF  Primary BMT MD Garay     Clinical Trials MT 2015-29 (according to but not on trial)       INTERVAL  HISTORY   Doing better this AM. Denies abdominal pain. Feels the frequency is slightly improved as well as the discomfort, but still having frequency/urgency. Had some blood in urine yesterday, but not worsening from past several days. Her BK level came back decreased. Plan to discharge today.   Review of Systems: 10 point ROS negative except as noted above.  PHYSICAL EXAM     Weight In/Out     Wt Readings from Last 3 Encounters:   02/29/24 77.5 kg (170 lb 12.8 oz)   02/21/24 77.9 kg (171 lb 12.8 oz)   02/19/24 78 kg (172 lb)      I/O last 3 completed shifts:  In: 2070 [P.O.:2070]  Out: 1950 [Urine:1950]       KPS:  80    /80 (BP Location: Left arm)   Pulse 117   Temp 97.7  F (36.5  C) (Oral)   Resp 18   Ht 1.565 m (5' 1.61\")   Wt 77.5 kg (170 lb 12.8 oz)   LMP 11/05/2017   SpO2 94%   BMI 31.63 kg/m         General: NAD   Eyes: : KEVON, sclera anicteric   Lungs: CTA bilaterally  Cardiovascular: RRR, no M/R/G   Abdominal/Rectal: +BS, soft, nontender.   : skin irritation surrounding vulva, erythema and small few mm area of skin abrasion bilaterally (2/22),   Lymphatics: no edema  Skin:Has mild skin irritation underneath left breast that appears to be moisture related skin breakdown, potential yeast infection.   Neuro: A&O   Additional Findings: R Gastelum site NT, no drainage  Lab Results   Component Value Date    WBC 5.4 02/29/2024    ANEU 5.5 02/21/2024    HGB 9.9 (L) 02/29/2024    HCT 31.2 (L) 02/29/2024    PLT 70 (L) 02/29/2024     02/29/2024    " POTASSIUM 4.1 02/29/2024    CHLORIDE 103 02/29/2024    CO2 28 02/29/2024     (H) 02/29/2024    BUN 6.2 02/29/2024    CR 0.58 02/29/2024    MAG 2.1 02/29/2024    INR 0.88 02/26/2024       ASSESSMENT BY SYSTEMS     Irma Foreman is a 53 year old female who is 49 s/p MA (Bu/Flu) 6/8 URD Allo transplant. Readmitted 2/21 with hematuria, dysuria and clots, known BK Viruria.      BMT/IEC PROTOCOL for MT 2015-29   - Chemo protocol:  Day -6 through day -1: Keppra and Allopurinol   Day -5 through -2: Bu/Flu.   Day -1: Rest day   Day 0: Transplant. Recipient: O+, CMV+. Donor: O+, CMV-. Cell dose 6.5 x10^6.   - Restaging plan: per protocol   BMbx 2/6 -- Day 28 review with Dr. Gaary on 2/9  Flow neg; 100% donor. Chimerisms: CD33 is 100% donor, CD3 is 87% donor. Bone marrow 100% donor.      HEME/COAG  - Anemia, thrombocytopenia 2/2 due to chemotherapy/BMT, improving  #Iron overload: hx of transfusion dependent anemia, pre-transplant ferritin around 5,000. Recommend phlebotomy post transplant when retic count is restored and Hgb >10 . On admission ferritin >10k.   #Hematuria- resolving.   # Platelet refractoriness: HLA platelets when available.   - Transfusion parameters: hemoglobin <7, platelets <10. Plt and hgb continue to improve despite hematuria  - GCSF PRN for ANC < 1.     IMMUNOCOMPROMISED  #BK hemorrhagic cystitis, viremia.   Urology and ID following. See previous notes. Has received 2 doses of intravesicular Cidofovir 2/22 and 2/26. Symptoms drastically improving. Continues to have mild/mod pressure/pain with urination, relieved with pain management. Frequency and urgency improving, but continues to have multiple bouts.   - Checking weekly BK levels    - BK level 2/28 - Urine - 11,800,000 (down from >100,000,000), Blood - 51 (down from 1,350)  - Previously received pyridium  - On max dose Oxybuytinin  - Started Myrbetriq 2/28.     Prophylaxis plan:   ACV/letermovir   Micafungin through day +45 (current smoker) -  completed.   Bactrim   CMV: ND (2/12)   EBV: ND (2/6)      RISK OF GVHD  - Pink maculopapular rash.TCM prescribed at discharge and prednisone 40 mg x 5 days (2/3-2/7). Resolved.   - Prophylaxis: PT Cy, Tac/MMF started 1/16. **initially avoided sirolimus d/t high risk VOD**. Tac drip discontinued 1/31, sirolimus started.  - Siro level 2/28, 9.3 - sirolimus continues to be held. Plan to recheck in clinic on Friday, goal level of 5-6 with concurrent BK virus.        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 -on admission LFTs are rapidly improving to normal 2/22. Check M/Thurs.   -Rash for 3-4 days (2/11-2/16) , non-pruritic. - resolved  -2/20 RUQ US suggestive of hepatic steatosis, normal dopplers. Biopsy is scheduled 2/23, but was cancelled as hepatic panel is essentially WNL, no tbili elevation, no rash, GI symptoms and U/S unremarkable.      CARDIOVASCULAR  #Prolonged QTc to 507 on 1/22. EKG (1/30) NSR, Qtc 454.  # HTN: On norvasc 5mg daily. Worse with pain. Controlling symptoms improve BP     GI/NUTRITION  #Abdominal pain  - CT-CAP showing cystitis, possible pyelonephritis, and inflammation of gallbladder. Her LFTs remain wnl. Unclear what her LUQ pain is from, she does have mild skin breakdown underneath her breast which is near where her tenderness is. She continues to have mild RUQ pain with palpation. Will not pursure further workup. Will monitor for now.      #Transaminitis - resolved.   #Hypertriglyceridemia- 249 (1/29), 388 on 2/22 . Start fenofibrate, trend.  # Epigastric pain (1/18): RUQ ultrasound showing patent doppler throughout, no ascites, nonspecific elevated hepatic artery restrictive index (see full impression above).                - Ulcer prophylaxis: PPI  - VOD ppx: Ursodiol. See liver US 2/21 results above        #dysuria,  urge/frequency 2/2 to BK Viruria   - Oxybuytnin 5mg QID,   - Oxycodone PRN   - Added Myrbetriq 2/28, noted improvement on 2/29.       DERM:  # Foot, hand warts. Curbside derm on 1/8, no recs while inpatient, typically managed OP.    # rash as per above.  #Moisture related dermatitis, candidiasis of skin underneath left breast  - Barrier creams and Nystatin cream for 7 days 2/28-3/6     Known issues that I take into account for medical decisions, with salient changes to the plan considering these complexities noted above.         Patient Active Problem List   Diagnosis    Left medial knee pain    Obesity (BMI 30-39.9)    Anemia, unspecified type    Macrocytic anemia    MDS (myelodysplastic syndrome) (H)         Clinically Significant Risk Factors Present on Admission          # Hypokalemia: Lowest K = 3.2 mmol/L in last 2 days, will replace as needed         # Thrombocytopenia: Lowest platelets = 74 in last 2 days, will monitor for bleeding                  Disposition: Discharge today.     Today's summary  - RTC 3/1   - Obtain sirolimus level (note, sirolimus has been held, trying to bring siro level down to 5-6)  - Labs ordered  - Started Myrbetriq yesterday for continued bladder spasms/frequency.      I spent 30 minutes in the care of this patient today, which included time necessary for preparation for the visit, obtaining history, ordering medications/tests/procedures as medically indicated, review of pertinent medical literature, counseling of the patient, communication of recommendations to the care team, and documentation time.     Pili Wood PA-C

## 2024-02-29 NOTE — PLAN OF CARE
"/80 (BP Location: Left arm)   Pulse 117   Temp 97.7  F (36.5  C) (Oral)   Resp 18   Ht 1.565 m (5' 1.61\")   Wt 77.5 kg (170 lb 12.8 oz)   LMP 11/05/2017   SpO2 94%   BMI 31.63 kg/m      VSS on room air; alert and oriented x4. Prn oxycodone given x1 for pain with urination and abdominal pain with improvement in pain. Pain at acceptable regiment per patient. Independent. Voiding well. Discharge teaching completed per unit protocol. Continue with plan of care.     "

## 2024-02-29 NOTE — PROVIDER NOTIFICATION
"Time of notification: 10:28 PM  Provider notified: Delmy Reardon  Patient status:  Pt has some blood in urine.  I looked at previous charting and everyone's charting \"Francy\" I don't know if this is new or ongoing.   Temp:  [97.6  F (36.4  C)-98.3  F (36.8  C)] 97.9  F (36.6  C)  Pulse:  [] 107  Resp:  [16-18] 18  BP: (128-158)/(64-92) 135/64  SpO2:  [92 %-99 %] 94 %  Orders received:    "

## 2024-03-01 ENCOUNTER — PATIENT OUTREACH (OUTPATIENT)
Dept: CARE COORDINATION | Facility: CLINIC | Age: 54
End: 2024-03-01
Payer: MEDICAID

## 2024-03-01 ENCOUNTER — ONCOLOGY VISIT (OUTPATIENT)
Dept: TRANSPLANT | Facility: CLINIC | Age: 54
End: 2024-03-01
Attending: STUDENT IN AN ORGANIZED HEALTH CARE EDUCATION/TRAINING PROGRAM
Payer: COMMERCIAL

## 2024-03-01 ENCOUNTER — APPOINTMENT (OUTPATIENT)
Dept: LAB | Facility: CLINIC | Age: 54
End: 2024-03-01
Attending: STUDENT IN AN ORGANIZED HEALTH CARE EDUCATION/TRAINING PROGRAM
Payer: COMMERCIAL

## 2024-03-01 VITALS
RESPIRATION RATE: 18 BRPM | TEMPERATURE: 97.8 F | SYSTOLIC BLOOD PRESSURE: 129 MMHG | DIASTOLIC BLOOD PRESSURE: 84 MMHG | OXYGEN SATURATION: 96 % | HEART RATE: 108 BPM

## 2024-03-01 DIAGNOSIS — N30.91 HEMORRHAGIC CYSTITIS: ICD-10-CM

## 2024-03-01 DIAGNOSIS — Z94.81 S/P ALLOGENEIC BONE MARROW TRANSPLANT (H): ICD-10-CM

## 2024-03-01 DIAGNOSIS — D46.9 MDS (MYELODYSPLASTIC SYNDROME) (H): ICD-10-CM

## 2024-03-01 DIAGNOSIS — R82.79 BK VIRURIA: ICD-10-CM

## 2024-03-01 LAB
ALBUMIN UR-MCNC: 100 MG/DL
APPEARANCE UR: ABNORMAL
BILIRUB UR QL STRIP: NEGATIVE
COLOR UR AUTO: YELLOW
GLUCOSE UR STRIP-MCNC: 50 MG/DL
HGB UR QL STRIP: ABNORMAL
KETONES UR STRIP-MCNC: NEGATIVE MG/DL
LEUKOCYTE ESTERASE UR QL STRIP: ABNORMAL
MUCOUS THREADS #/AREA URNS LPF: PRESENT /LPF
NITRATE UR QL: NEGATIVE
PH UR STRIP: 6.5 [PH] (ref 5–7)
RBC URINE: >182 /HPF
RETICS # AUTO: 0.21 10E6/UL (ref 0.03–0.1)
RETICS/RBC NFR AUTO: 7.3 % (ref 0.5–2)
SIROLIMUS BLD-MCNC: 4.5 UG/L (ref 5–15)
SP GR UR STRIP: 1.02 (ref 1–1.03)
SQUAMOUS EPITHELIAL: 1 /HPF
TME LAST DOSE: ABNORMAL H
TME LAST DOSE: ABNORMAL H
UROBILINOGEN UR STRIP-MCNC: NORMAL MG/DL
WBC URINE: >182 /HPF

## 2024-03-01 PROCEDURE — 99214 OFFICE O/P EST MOD 30 MIN: CPT

## 2024-03-01 PROCEDURE — 81001 URINALYSIS AUTO W/SCOPE: CPT

## 2024-03-01 PROCEDURE — 250N000011 HC RX IP 250 OP 636: Performed by: STUDENT IN AN ORGANIZED HEALTH CARE EDUCATION/TRAINING PROGRAM

## 2024-03-01 PROCEDURE — 99213 OFFICE O/P EST LOW 20 MIN: CPT

## 2024-03-01 PROCEDURE — 87086 URINE CULTURE/COLONY COUNT: CPT

## 2024-03-01 PROCEDURE — 85045 AUTOMATED RETICULOCYTE COUNT: CPT

## 2024-03-01 PROCEDURE — 80195 ASSAY OF SIROLIMUS: CPT

## 2024-03-01 RX ORDER — HEPARIN SODIUM,PORCINE 10 UNIT/ML
5 VIAL (ML) INTRAVENOUS ONCE
Status: COMPLETED | OUTPATIENT
Start: 2024-03-01 | End: 2024-03-01

## 2024-03-01 RX ORDER — POTASSIUM CHLORIDE 1500 MG/1
20 TABLET, EXTENDED RELEASE ORAL DAILY
COMMUNITY
Start: 2024-03-01 | End: 2024-03-21

## 2024-03-01 RX ADMIN — Medication 5 ML: at 10:11

## 2024-03-01 RX ADMIN — Medication 5 ML: at 10:12

## 2024-03-01 ASSESSMENT — PAIN SCALES - GENERAL: PAINLEVEL: SEVERE PAIN (7)

## 2024-03-01 NOTE — LETTER
3/1/2024         RE: Hortencia Baldwin  [address]  [address]  [facility] [address]        Dear Colleague,    Thank you for referring your patient, Hortencia Baldwin, to the [facility]LOOD AND MARROW TRANSPLANT [facility] [place]. Please see a copy of my visit note below.    BMT Clinic Note   03/1/2024     Patient ID: Irma Foreman is a 53 year old female who is 50 s/p MA (Bu/Flu) 6/8 URD Allo transplant, day 50. Readmitted 2/21 with hematuria, dysuria and clots.     Transplant Essential Data:   Diagnosis MDS  BMTCT Type ALLO    Prep Regimen Bu/Flu   Donor Match and  Source 6/8 URD    GVHD Prophylaxis PT Cy, Tac/MMF  Primary BMT MD Garay     Clinical Trials MT 2015-29 (according to but not on trial)       INTERVAL  HISTORY     Irma is here for her hospital discharge follow up. She received intravesicular cidofovir for BK viruria and had a temporary booker catheter placed while in patient because she was passing large clots and had difficulty emptying her bladder.  Her urinary symptoms are still bothersome though tolerable. She said she has the urgency and pressure in her bladder all day long and will develop spasms when actively trying to urinate. She still has small blood clots in her urine. She uses the oxycodone at night to try to calm the pain down so that she can sleep. She was started on Myrbetriq and 4x/day oxybutynin. Pyridium was not helpful. She also developed a rash under her breasts that has been improving with Nystatin cream. Appetite is fine, no nausea, and no rash. She has itchy hands and feet but that issue proceeded transplant. Her sirolimus has been on hold since 2/28 to let her level fall from 9 to the 5-6 range.     Review of Systems: 10 point ROS negative except as noted above.  PHYSICAL EXAM      Weight In/Out          Wt Readings from Last 3 Encounters:   02/29/24 77.5 kg (170 lb 12.8 oz)   02/21/24 77.9 kg (171 lb 12.8 oz)   02/19/24 78 kg (172 lb)       I/O last 3 completed  "shifts:  In: 2070 [P.O.:2070]  Out: 1950 [Urine:1950]         KPS:  80     /80 (BP Location: Left arm)   Pulse 117   Temp 97.7  F (36.5  C) (Oral)   Resp 18   Ht 1.565 m (5' 1.61\")   Wt 77.5 kg (170 lb 12.8 oz)   LMP 11/05/2017   SpO2 94%   BMI 31.63 kg/m        General: NAD   Eyes: : KEVON, sclera anicteric   Lungs: CTA bilaterally  Cardiovascular: RRR, no M/R/G   Abdominal/Rectal: +BS, soft, nontender.   Lymphatics: no edema  Skin:Has mild skin irritation underneath left breast that appears to be moisture related skin breakdown, potential yeast infection.   Neuro: A&O   Additional Findings: R Gastelum site NT, no drainage        Lab Results   Component Value Date     WBC 5.4 02/29/2024     ANEU 5.5 02/21/2024     HGB 9.9 (L) 02/29/2024     HCT 31.2 (L) 02/29/2024     PLT 70 (L) 02/29/2024      02/29/2024     POTASSIUM 4.1 02/29/2024     CHLORIDE 103 02/29/2024     CO2 28 02/29/2024      (H) 02/29/2024     BUN 6.2 02/29/2024     CR 0.58 02/29/2024     MAG 2.1 02/29/2024     INR 0.88 02/26/2024         ASSESSMENT BY SYSTEMS      Irma Foreman is a 53 year old female who is 50 s/p MA (Bu/Flu) 6/8 URD Allo transplant. Readmitted 2/21 with hematuria, dysuria and clots, known BK Viruria.      BMT/IEC PROTOCOL for MT 2015-29   - Chemo protocol:  Day -6 through day -1: Keppra and Allopurinol   Day -5 through -2: Bu/Flu.   Day -1: Rest day   Day 0: Transplant. Recipient: O+, CMV+. Donor: O+, CMV-. Cell dose 6.5 x10^6.   - Restaging plan: per protocol   BMbx 2/6 -- Day 28 review with Dr. Garay on 2/9  Flow neg; 100% donor. Chimerisms: CD33 is 100% donor, CD3 is 87% donor. Bone marrow 100% donor.      HEME/COAG  - Anemia, thrombocytopenia 2/2 due to chemotherapy/BMT, improving  #Iron overload: hx of transfusion dependent anemia, pre-transplant ferritin around 5,000. Recommend phlebotomy post transplant when retic count is restored and Hgb >10 . On admission ferritin >10k.   #Hematuria- resolving.   # " Platelet refractoriness: HLA platelets when available.   - Transfusion parameters: hemoglobin <7, platelets <10. Plt and hgb continue to improve despite hematuria  - GCSF PRN for ANC < 1.     IMMUNOCOMPROMISED  #BK hemorrhagic cystitis, viremia.   Urology and ID following. See previous notes. Has received 2 doses of intravesicular Cidofovir 2/22 and 2/26. Symptoms drastically improving. Continues to have mild/mod pressure/pain with urination, relieved with pain management. Frequency and urgency improving, but continues to have multiple bouts.   - Checking weekly BK levels                  - BK level 2/28 - Urine - 11,800,000 (down from >100,000,000), Blood - 51 (down from 1,350)  - Previously received pyridium  - On max dose Oxybuytinin  - Started Myrbetriq 2/28.      Prophylaxis plan:   ACV/letermovir   Micafungin through day +45 (current smoker) - completed.   Bactrim   CMV: ND (2/21)   EBV: ND (2/21)      RISK OF GVHD  - Pink maculopapular rash.TCM prescribed at discharge and prednisone 40 mg x 5 days (2/3-2/7). Resolved.   - Prophylaxis: PT Cy, Tac/MMF started 1/16. **initially avoided sirolimus d/t high risk VOD**. Tac drip discontinued 1/31, sirolimus started.  - Siro level 2/28, 9.3 - sirolimus continues to be held. Plan to recheck in clinic on Friday, goal level of 5-6 with concurrent BK virus. Pharmacy aware to watch for level over the weekend.         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 -on admission LFTs are rapidly improving to normal 2/22. Check M/Thurs.   -Rash for 3-4 days (2/11-2/16) , non-pruritic. - resolved  -2/20 RUQ US suggestive of hepatic steatosis, normal dopplers. Biopsy is scheduled 2/23, but was cancelled as hepatic panel is essentially WNL, no tbili elevation, no rash, GI symptoms and U/S unremarkable.       CARDIOVASCULAR  #Prolonged QTc to 507 on 1/22. EKG (1/30) NSR, Qtc 454.  # HTN: On norvasc 5mg daily. Worse with pain. Controlling symptoms improve BP     GI/NUTRITION  #Abdominal pain  - CT-CAP showing cystitis, possible pyelonephritis, and inflammation of gallbladder. Her LFTs remain wnl. Unclear what her LUQ pain is from, she does have mild skin breakdown underneath her breast which is near where her tenderness is. She continues to have mild RUQ pain with palpation. Will not pursure further workup. Will monitor for now.       #Transaminitis - resolved.   #Hypertriglyceridemia- 249 (1/29), 388 on 2/22 . Start fenofibrate, trend.  # Epigastric pain (1/18): RUQ ultrasound showing patent doppler throughout, no ascites, nonspecific elevated hepatic artery restrictive index (see full impression above).                - Ulcer prophylaxis: PPI  - VOD ppx: Ursodiol. See liver US 2/21 results above        #dysuria, urge/frequency 2/2 to BK Viruria   - Oxybuytnin 5mg QID,   - Oxycodone PRN   - Added Myrbetriq 2/28, noted improvement on 2/29.       DERM:  # Foot, hand warts. Curbside derm on 1/8, no recs while inpatient, typically managed OP.    # rash as per above.  #Moisture related dermatitis, candidiasis of skin underneath left breast  - Barrier creams and Nystatin cream for 7 days 2/28-3/6     Summary:   - Siro level pending, pharmacy aware to watch for level over weekend and call Irma about re-starting   - continue Nystatin cream   - continue Myrbetriq and Oxybutynin   - change KCL from BID to daily   - IR referral sent for CVC removal   - RTC 3/4 for JEN and labs (assure that Siro was re-started appropriately)       I spent 30 minutes in the care of this patient today, which included time necessary for preparation for the visit, obtaining history, ordering medications/tests/procedures as medically indicated, review of pertinent medical literature, counseling of the patient, communication of recommendations to the  care team, and documentation time.     Jt Vaz PA-C

## 2024-03-01 NOTE — NURSING NOTE
"Oncology Rooming Note    March 1, 2024 10:33 AM   Hortencia Baldwin is a 53 year old female who presents for:    Chief Complaint   Patient presents with    Blood Draw     Labs drawn via cvc by RN in lab. VS taken.     Oncology Clinic Visit     MDS (myelodysplastic syndrome_     Initial Vitals: /84 (BP Location: Right arm, Patient Position: Sitting, Cuff Size: Adult Large)   Pulse 108   Temp 97.8  F (36.6  C) (Oral)   Resp 18   LMP 11/05/2017   SpO2 96%  Estimated body mass index is 31.63 kg/m  as calculated from the following:    Height as of 2/21/24: 1.565 m (5' 1.61\").    Weight as of 2/29/24: 77.5 kg (170 lb 12.8 oz). There is no height or weight on file to calculate BSA.  Severe Pain (7) Comment: Data Unavailable   Patient's last menstrual period was 11/05/2017.  Allergies reviewed: Yes  Medications reviewed: Yes    Medications: Medication refills not needed today.  Pharmacy name entered into [employer]:    [facility][id number] - [place], [place] - [address] AT [facility] [address] [facility]  [facility][id number] - [place], [place] - [address] AT [facility] [address] [place]    Frailty Screening:   Is the patient here for a new oncology consult visit in cancer care? 2. No      Clinical concerns: none       January Hopper              "

## 2024-03-01 NOTE — LETTER
[facility] CARE COORDINATION  [address] [place] [address]    March 1, 2024    Hortencia Baldwin  [address] [place] [address] [place] [address]      Dear Hortencia Sethi,    I am a clinic care coordinator who works with [facility] Steve Garner with the [facility]. I wanted to introduce myself and provide you with my contact information for you to be able to call me with any questions or concerns. Below is a description of clinic care coordination and how I can further assist you.       The clinic care coordination team is made up of a registered nurse, , financial resource worker and community health worker who understand the health care system. The goal of clinic care coordination is to help you manage your health and improve access to the health care system. Our team works alongside your provider to assist you in determining your health and social needs. We can help you obtain health care and community resources, providing you with necessary information and education. We can work with you through any barriers and develop a care plan that helps coordinate and strengthen the communication between you and your care team.  Our services are voluntary and are offered without charge to you personally.    Please feel free to contact me with any questions or concerns regarding care coordination and what we can offer.      We are focused on providing you with the highest-quality healthcare experience possible.    Sincerely,     Amauri Panchal MSN, RN, PHN, [facility]   Primary Care Clinical RN Care Coordinator  [facility]  3/1/2024   10:06 AM  [email or web address]  Office: [phone]

## 2024-03-01 NOTE — PROGRESS NOTES
"BMT Clinic Note   03/1/2024     Patient ID: Irma Foreman is a 53 year old female who is 50 s/p MA (Bu/Flu) 6/8 URD Allo transplant, day 50. Readmitted 2/21 with hematuria, dysuria and clots.     Transplant Essential Data:   Diagnosis MDS  BMTCT Type ALLO    Prep Regimen Bu/Flu   Donor Match and  Source 6/8 URD    GVHD Prophylaxis PT Cy, Tac/MMF  Primary BMT MD Garay     Clinical Trials MT 2015-29 (according to but not on trial)       INTERVAL  HISTORY     Irma is here for her hospital discharge follow up. She received intravesicular cidofovir for BK viruria and had a temporary booker catheter placed while in patient because she was passing large clots and had difficulty emptying her bladder.  Her urinary symptoms are still bothersome though tolerable. She said she has the urgency and pressure in her bladder all day long and will develop spasms when actively trying to urinate. She still has small blood clots in her urine. She uses the oxycodone at night to try to calm the pain down so that she can sleep. She was started on Myrbetriq and 4x/day oxybutynin. Pyridium was not helpful. She also developed a rash under her breasts that has been improving with Nystatin cream. Appetite is fine, no nausea, and no rash. She has itchy hands and feet but that issue proceeded transplant. Her sirolimus has been on hold since 2/28 to let her level fall from 9 to the 5-6 range.     Review of Systems: 10 point ROS negative except as noted above.  PHYSICAL EXAM      Weight In/Out          Wt Readings from Last 3 Encounters:   02/29/24 77.5 kg (170 lb 12.8 oz)   02/21/24 77.9 kg (171 lb 12.8 oz)   02/19/24 78 kg (172 lb)       I/O last 3 completed shifts:  In: 2070 [P.O.:2070]  Out: 1950 [Urine:1950]         KPS:  80     /80 (BP Location: Left arm)   Pulse 117   Temp 97.7  F (36.5  C) (Oral)   Resp 18   Ht 1.565 m (5' 1.61\")   Wt 77.5 kg (170 lb 12.8 oz)   LMP 11/05/2017   SpO2 94%   BMI 31.63 kg/m        General: NAD "   Eyes: : KEVON, sclera anicteric   Lungs: CTA bilaterally  Cardiovascular: RRR, no M/R/G   Abdominal/Rectal: +BS, soft, nontender.   Lymphatics: no edema  Skin:Has mild skin irritation underneath left breast that appears to be moisture related skin breakdown, potential yeast infection.   Neuro: A&O   Additional Findings: R Gastelum site NT, no drainage        Lab Results   Component Value Date     WBC 5.4 02/29/2024     ANEU 5.5 02/21/2024     HGB 9.9 (L) 02/29/2024     HCT 31.2 (L) 02/29/2024     PLT 70 (L) 02/29/2024      02/29/2024     POTASSIUM 4.1 02/29/2024     CHLORIDE 103 02/29/2024     CO2 28 02/29/2024      (H) 02/29/2024     BUN 6.2 02/29/2024     CR 0.58 02/29/2024     MAG 2.1 02/29/2024     INR 0.88 02/26/2024         ASSESSMENT BY SYSTEMS      Irma Foreman is a 53 year old female who is 50 s/p MA (Bu/Flu) 6/8 URD Allo transplant. Readmitted 2/21 with hematuria, dysuria and clots, known BK Viruria.      BMT/IEC PROTOCOL for MT 2015-29   - Chemo protocol:  Day -6 through day -1: Keppra and Allopurinol   Day -5 through -2: Bu/Flu.   Day -1: Rest day   Day 0: Transplant. Recipient: O+, CMV+. Donor: O+, CMV-. Cell dose 6.5 x10^6.   - Restaging plan: per protocol   BMbx 2/6 -- Day 28 review with Dr. Garay on 2/9  Flow neg; 100% donor. Chimerisms: CD33 is 100% donor, CD3 is 87% donor. Bone marrow 100% donor.      HEME/COAG  - Anemia, thrombocytopenia 2/2 due to chemotherapy/BMT, improving  #Iron overload: hx of transfusion dependent anemia, pre-transplant ferritin around 5,000. Recommend phlebotomy post transplant when retic count is restored and Hgb >10 . On admission ferritin >10k.   #Hematuria- resolving.   # Platelet refractoriness: HLA platelets when available.   - Transfusion parameters: hemoglobin <7, platelets <10. Plt and hgb continue to improve despite hematuria  - GCSF PRN for ANC < 1.     IMMUNOCOMPROMISED  #BK hemorrhagic cystitis, viremia.   Urology and ID following. See previous  notes. Has received 2 doses of intravesicular Cidofovir 2/22 and 2/26. Symptoms drastically improving. Continues to have mild/mod pressure/pain with urination, relieved with pain management. Frequency and urgency improving, but continues to have multiple bouts.   - Checking weekly BK levels                  - BK level 2/28 - Urine - 11,800,000 (down from >100,000,000), Blood - 51 (down from 1,350)  - Previously received pyridium  - On max dose Oxybuytinin  - Started Myrbetriq 2/28.      Prophylaxis plan:   ACV/letermovir   Micafungin through day +45 (current smoker) - completed.   Bactrim   CMV: ND (2/21)   EBV: ND (2/21)      RISK OF GVHD  - Pink maculopapular rash.TCM prescribed at discharge and prednisone 40 mg x 5 days (2/3-2/7). Resolved.   - Prophylaxis: PT Cy, Tac/MMF started 1/16. **initially avoided sirolimus d/t high risk VOD**. Tac drip discontinued 1/31, sirolimus started.  - Siro level 2/28, 9.3 - sirolimus continues to be held. Plan to recheck in clinic on Friday, goal level of 5-6 with concurrent BK virus. Pharmacy aware to watch for level over the weekend.         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 -on admission LFTs are rapidly improving to normal 2/22. Check M/Thurs.   -Rash for 3-4 days (2/11-2/16) , non-pruritic. - resolved  -2/20 RUQ US suggestive of hepatic steatosis, normal dopplers. Biopsy is scheduled 2/23, but was cancelled as hepatic panel is essentially WNL, no tbili elevation, no rash, GI symptoms and U/S unremarkable.      CARDIOVASCULAR  #Prolonged QTc to 507 on 1/22. EKG (1/30) NSR, Qtc 454.  # HTN: On norvasc 5mg daily. Worse with pain. Controlling symptoms improve BP     GI/NUTRITION  #Abdominal pain  - CT-CAP showing cystitis, possible pyelonephritis, and inflammation of gallbladder. Her LFTs remain wnl. Unclear  what her LUQ pain is from, she does have mild skin breakdown underneath her breast which is near where her tenderness is. She continues to have mild RUQ pain with palpation. Will not pursure further workup. Will monitor for now.       #Transaminitis - resolved.   #Hypertriglyceridemia- 249 (1/29), 388 on 2/22 . Start fenofibrate, trend.  # Epigastric pain (1/18): RUQ ultrasound showing patent doppler throughout, no ascites, nonspecific elevated hepatic artery restrictive index (see full impression above).                - Ulcer prophylaxis: PPI  - VOD ppx: Ursodiol. See liver US 2/21 results above        #dysuria, urge/frequency 2/2 to BK Viruria   - Oxybuytnin 5mg QID,   - Oxycodone PRN   - Added Myrbetriq 2/28, noted improvement on 2/29.       DERM:  # Foot, hand warts. Curbside derm on 1/8, no recs while inpatient, typically managed OP.    # rash as per above.  #Moisture related dermatitis, candidiasis of skin underneath left breast  - Barrier creams and Nystatin cream for 7 days 2/28-3/6     Summary:   - Siro level pending, pharmacy aware to watch for level over weekend and call Irma about re-starting   - continue Nystatin cream   - continue Myrbetriq and Oxybutynin   - change KCL from BID to daily   - IR referral sent for CVC removal   - RTC 3/4 for JEN and labs (assure that Siro was re-started appropriately)       I spent 30 minutes in the care of this patient today, which included time necessary for preparation for the visit, obtaining history, ordering medications/tests/procedures as medically indicated, review of pertinent medical literature, counseling of the patient, communication of recommendations to the care team, and documentation time.     Jt Vaz PA-C

## 2024-03-01 NOTE — NURSING NOTE
Chief Complaint   Patient presents with    Blood Draw     Labs drawn via cvc by RN in lab. VS taken.      Labs drawn via CVC by RN. Flushed with saline and heparin. Vitals taken. Pt checked into next appt.     Becca Lisa RN

## 2024-03-01 NOTE — PROGRESS NOTES
Clinic Care Coordination Contact  [facility]    Clinical Data: Care Coordinator Outreach    Outreach Documentation Number of Outreach Attempt   3/1/2024  10:06 AM 1       Left message with patient with call back information and requested return call.    Plan: Care Coordinator will send care coordination introduction letter with care coordinator contact information and explanation of care coordination services via mail. Care Coordinator will try to reach patient again in 1-2 business days.    Amauri Panchal MSN, RN, PHN, CCM   Primary Care Clinical RN Care Coordinator  [facility]  3/1/2024   10:07 AM  [email or web address]  Office: [phone]

## 2024-03-02 ENCOUNTER — TELEPHONE (OUTPATIENT)
Dept: TRANSPLANT | Facility: CLINIC | Age: 54
End: 2024-03-02
Payer: MEDICAID

## 2024-03-02 LAB — BACTERIA UR CULT: NORMAL

## 2024-03-02 NOTE — TELEPHONE ENCOUNTER
I spoke with Hortencia Baldwin regarding her sirolimus level from BMT clinic visit dated 3/1/24, which resulted as 4.5. Her dose as been on hold. Discussed with IP BMT team, will have Irma resume her sirolimus at a dose of 1 mg daily alternating with 0.5 mg daily. She will take 1 mg of her sirolimus today. Irma repeated these directions to me and voiced her understanding of this dose change.     Rickey Metzger, DemetriusD

## 2024-03-04 ENCOUNTER — APPOINTMENT (OUTPATIENT)
Dept: LAB | Facility: CLINIC | Age: 54
End: 2024-03-04
Attending: STUDENT IN AN ORGANIZED HEALTH CARE EDUCATION/TRAINING PROGRAM
Payer: COMMERCIAL

## 2024-03-04 ENCOUNTER — OFFICE VISIT (OUTPATIENT)
Dept: TRANSPLANT | Facility: CLINIC | Age: 54
End: 2024-03-04
Attending: STUDENT IN AN ORGANIZED HEALTH CARE EDUCATION/TRAINING PROGRAM
Payer: COMMERCIAL

## 2024-03-04 ENCOUNTER — ANCILLARY PROCEDURE (OUTPATIENT)
Dept: ULTRASOUND IMAGING | Facility: CLINIC | Age: 54
End: 2024-03-04
Payer: COMMERCIAL

## 2024-03-04 VITALS
SYSTOLIC BLOOD PRESSURE: 125 MMHG | DIASTOLIC BLOOD PRESSURE: 83 MMHG | TEMPERATURE: 98.1 F | RESPIRATION RATE: 16 BRPM | WEIGHT: 171.6 LBS | BODY MASS INDEX: 31.78 KG/M2 | OXYGEN SATURATION: 98 % | HEART RATE: 81 BPM

## 2024-03-04 DIAGNOSIS — D46.9 MDS (MYELODYSPLASTIC SYNDROME) (H): Primary | ICD-10-CM

## 2024-03-04 DIAGNOSIS — D46.9 MDS (MYELODYSPLASTIC SYNDROME) (H): ICD-10-CM

## 2024-03-04 DIAGNOSIS — Z94.81 S/P ALLOGENEIC BONE MARROW TRANSPLANT (H): ICD-10-CM

## 2024-03-04 LAB
ALBUMIN SERPL BCG-MCNC: 3.7 G/DL (ref 3.5–5.2)
ALP SERPL-CCNC: 87 U/L (ref 40–150)
ALT SERPL W P-5'-P-CCNC: 50 U/L (ref 0–50)
ANION GAP SERPL CALCULATED.3IONS-SCNC: 10 MMOL/L (ref 7–15)
AST SERPL W P-5'-P-CCNC: 58 U/L (ref 0–45)
BASOPHILS # BLD AUTO: 0.1 10E3/UL (ref 0–0.2)
BASOPHILS NFR BLD AUTO: 1 %
BILIRUB SERPL-MCNC: 0.2 MG/DL
BUN SERPL-MCNC: 4.7 MG/DL (ref 6–20)
CALCIUM SERPL-MCNC: 8.8 MG/DL (ref 8.6–10)
CHLORIDE SERPL-SCNC: 106 MMOL/L (ref 98–107)
CREAT SERPL-MCNC: 0.46 MG/DL (ref 0.51–0.95)
DEPRECATED HCO3 PLAS-SCNC: 25 MMOL/L (ref 22–29)
EGFRCR SERPLBLD CKD-EPI 2021: >90 ML/MIN/1.73M2
EOSINOPHIL # BLD AUTO: 0.2 10E3/UL (ref 0–0.7)
EOSINOPHIL NFR BLD AUTO: 3 %
ERYTHROCYTE [DISTWIDTH] IN BLOOD BY AUTOMATED COUNT: 16.3 % (ref 10–15)
GLUCOSE SERPL-MCNC: 92 MG/DL (ref 70–99)
HCT VFR BLD AUTO: 34.3 % (ref 35–47)
HGB BLD-MCNC: 10.8 G/DL (ref 11.7–15.7)
IMM GRANULOCYTES # BLD: 0.1 10E3/UL
IMM GRANULOCYTES NFR BLD: 2 %
LYMPHOCYTES # BLD AUTO: 1.5 10E3/UL (ref 0.8–5.3)
LYMPHOCYTES NFR BLD AUTO: 31 %
MCH RBC QN AUTO: 33.6 PG (ref 26.5–33)
MCHC RBC AUTO-ENTMCNC: 31.5 G/DL (ref 31.5–36.5)
MCV RBC AUTO: 107 FL (ref 78–100)
MONOCYTES # BLD AUTO: 0.6 10E3/UL (ref 0–1.3)
MONOCYTES NFR BLD AUTO: 13 %
NEUTROPHILS # BLD AUTO: 2.4 10E3/UL (ref 1.6–8.3)
NEUTROPHILS NFR BLD AUTO: 50 %
NRBC # BLD AUTO: 0 10E3/UL
NRBC BLD AUTO-RTO: 0 /100
PLATELET # BLD AUTO: 88 10E3/UL (ref 150–450)
POTASSIUM SERPL-SCNC: 3.6 MMOL/L (ref 3.4–5.3)
PROT SERPL-MCNC: 6 G/DL (ref 6.4–8.3)
RBC # BLD AUTO: 3.21 10E6/UL (ref 3.8–5.2)
SIROLIMUS BLD-MCNC: 6.6 UG/L (ref 5–15)
SODIUM SERPL-SCNC: 141 MMOL/L (ref 135–145)
TME LAST DOSE: NORMAL H
TME LAST DOSE: NORMAL H
WBC # BLD AUTO: 4.7 10E3/UL (ref 4–11)

## 2024-03-04 PROCEDURE — 85025 COMPLETE CBC W/AUTO DIFF WBC: CPT

## 2024-03-04 PROCEDURE — 36592 COLLECT BLOOD FROM PICC: CPT

## 2024-03-04 PROCEDURE — 87799 DETECT AGENT NOS DNA QUANT: CPT

## 2024-03-04 PROCEDURE — 82040 ASSAY OF SERUM ALBUMIN: CPT

## 2024-03-04 PROCEDURE — 99213 OFFICE O/P EST LOW 20 MIN: CPT

## 2024-03-04 PROCEDURE — 99215 OFFICE O/P EST HI 40 MIN: CPT

## 2024-03-04 PROCEDURE — 80195 ASSAY OF SIROLIMUS: CPT

## 2024-03-04 PROCEDURE — 93971 EXTREMITY STUDY: CPT | Mod: LT | Performed by: RADIOLOGY

## 2024-03-04 PROCEDURE — 250N000011 HC RX IP 250 OP 636: Performed by: STUDENT IN AN ORGANIZED HEALTH CARE EDUCATION/TRAINING PROGRAM

## 2024-03-04 RX ORDER — HEPARIN SODIUM,PORCINE 10 UNIT/ML
5 VIAL (ML) INTRAVENOUS ONCE
Status: COMPLETED | OUTPATIENT
Start: 2024-03-04 | End: 2024-03-04

## 2024-03-04 RX ORDER — CYCLOBENZAPRINE HCL 5 MG
TABLET ORAL
COMMUNITY
Start: 2023-09-19 | End: 2024-03-28

## 2024-03-04 RX ADMIN — Medication 5 ML: at 13:49

## 2024-03-04 ASSESSMENT — PAIN SCALES - GENERAL: PAINLEVEL: MODERATE PAIN (4)

## 2024-03-04 NOTE — NURSING NOTE
Chief Complaint   Patient presents with    Blood Draw     Labs drawn via cvc by RN in lab. VS taken.      Labs collected from CVC by RN. Vital signs taken. Pt checked in for appointment(s).    Shellie Casas RN

## 2024-03-04 NOTE — NURSING NOTE
"Oncology Rooming Note    March 4, 2024 2:10 PM   Hortencia Baldwin is a 53 year old female who presents for:    Chief Complaint   Patient presents with    Blood Draw     Labs drawn via cvc by RN in lab. VS taken.     Oncology Clinic Visit     myelodysplastic syndrome     Initial Vitals: /83 (BP Location: Right arm, Patient Position: Sitting, Cuff Size: Adult Regular)   Pulse 81   Temp 98.1  F (36.7  C) (Oral)   Resp 16   Wt 77.8 kg (171 lb 9.6 oz)   LMP 11/05/2017   SpO2 98%   BMI 31.78 kg/m   Estimated body mass index is 31.78 kg/m  as calculated from the following:    Height as of 2/21/24: 1.565 m (5' 1.61\").    Weight as of this encounter: 77.8 kg (171 lb 9.6 oz). Body surface area is 1.84 meters squared.  Moderate Pain (4) Comment: with urinary urgency   Patient's last menstrual period was 11/05/2017.  Allergies reviewed: Yes  Medications reviewed: Yes    Medications: Medication refills not needed today.  Pharmacy name entered into [employer]:    [facility][id number] - [place], [place] - [address] [facility]  [facility] #[id number] - [place], [place] - [address] AT [facility] [address]    Frailty Screening:   Is the patient here for a new oncology consult visit in cancer care? 2. No      Clinical concerns: pt was wondering If she should turn in or keep taking old sirolimus     Pt wonders when she can get central line removed.    Juan J Rosario              "

## 2024-03-04 NOTE — PROGRESS NOTES
BMT Clinic Note   03/04/2024       Patient ID: Irma Foreman is a 53 year old female who is 50 s/p MA (Bu/Flu) 6/8 URD Allo transplant, day 53. Readmitted 2/21 with hematuria, dysuria and clots.     Transplant Essential Data:   Diagnosis MDS  BMTCT Type ALLO    Prep Regimen Bu/Flu   Donor Match and  Source 6/8 URD    GVHD Prophylaxis PT Cy, Tac/MMF  Primary BMT MD Garay     Clinical Trials MT 2015-29 (according to but not on trial)       INTERVAL  HISTORY     Irma is doing well today, but continues to have urinary symptoms. They remain bothersome, but more tolerable. She notes longer between urinary episodes. No more blood in her urine, besides the occasional clot. She continues to use oxycodone, myrbetriq and oxybutinin. Rash under her breasts has entirely resolved. Eating and drinking well. No nausea, no vomiting. No rash. Siro resumed. She notes that her L leg is more swollen than her right- she notes pain when she walks up and down stairs and with activity.     Review of Systems: 10 point ROS negative except as noted above.  PHYSICAL EXAM      Weight In/Out      Wt Readings from Last 4 Encounters:   03/04/24 77.8 kg (171 lb 9.6 oz)   02/29/24 77.5 kg (170 lb 12.8 oz)   02/21/24 77.9 kg (171 lb 12.8 oz)   02/19/24 78 kg (172 lb)       I/O last 3 completed shifts:  In: 2070 [P.O.:2070]  Out: 1950 [Urine:1950]         KPS:  80     /83 (BP Location: Right arm, Patient Position: Sitting, Cuff Size: Adult Regular)   Pulse 81   Temp 98.1  F (36.7  C) (Oral)   Resp 16   Wt 77.8 kg (171 lb 9.6 oz)   LMP 11/05/2017   SpO2 98%   BMI 31.78 kg/m       General: NAD   Eyes: : KEVON, sclera anicteric   Lungs: CTA bilaterally  Cardiovascular: RRR, no M/R/G   Abdominal/Rectal: +BS, soft, nontender.   Lymphatics: no edema  Skin:Has mild skin irritation underneath left breast that appears to be moisture related skin breakdown, potential yeast infection.   Neuro: A&O   Additional Findings: R Gastelum site NT, no  drainage  Lab Results   Component Value Date    WBC 4.3 03/01/2024    ANEU 5.5 02/21/2024    HGB 9.8 (L) 03/01/2024    HCT 31.7 (L) 03/01/2024    PLT 78 (L) 03/01/2024     03/01/2024    POTASSIUM 3.7 03/01/2024    CHLORIDE 104 03/01/2024    CO2 27 03/01/2024     (H) 03/01/2024    BUN 5.9 (L) 03/01/2024    CR 0.51 03/01/2024    MAG 2.1 02/29/2024    INR 0.88 02/26/2024     ASSESSMENT BY SYSTEMS      Irma Foreman is a 53 year old female who is 50 s/p MA (Bu/Flu) 6/8 URD Allo transplant. Readmitted 2/21 with hematuria, dysuria and clots, known BK Viruria.      BMT/IEC PROTOCOL for MT 2015-29   - Chemo protocol:  Day -6 through day -1: Keppra and Allopurinol   Day -5 through -2: Bu/Flu.   Day -1: Rest day   Day 0: Transplant. Recipient: O+, CMV+. Donor: O+, CMV-. Cell dose 6.5 x10^6.   - Restaging plan: per protocol   BMbx 2/6 -- Day 28 review with Dr. Garay on 2/9  Flow neg; 100% donor. Chimerisms: CD33 is 100% donor, CD3 is 87% donor. Bone marrow 100% donor.      HEME/COAG  - Anemia, thrombocytopenia 2/2 due to chemotherapy/BMT, improving  #Iron overload: hx of transfusion dependent anemia, pre-transplant ferritin around 5,000. Recommend phlebotomy post transplant when retic count is restored and Hgb >10 . On admission ferritin >10k.   #Hematuria- resolving.   # Platelet refractoriness: HLA platelets when available.   - Transfusion parameters: hemoglobin <7, platelets <10. Plt and hgb continue to improve despite hematuria  - GCSF PRN for ANC < 1.     IMMUNOCOMPROMISED  #BK hemorrhagic cystitis, viremia.   Urology and ID following. See previous notes. Has received 2 doses of intravesicular Cidofovir 2/22 and 2/26. Symptoms drastically improving. Continues to have mild/mod pressure/pain with urination, relieved with pain management. Frequency and urgency improving, but continues to have multiple bouts.   - Checking weekly BK levels                  - BK level 2/28 - Urine - 11,800,000 (down from  >100,000,000), Blood - 51 (down from 1,350)  - Previously received pyridium  - On max dose Oxybuytinin  - Started Myrbetriq 2/28.   - 3/4: Symptomatic improvement     Prophylaxis plan:   ACV/letermovir   Micafungin through day +45 (current smoker) - completed.   Bactrim   CMV: ND (2/21)   EBV: ND (2/21)      RISK OF GVHD  - Pink maculopapular rash.TCM prescribed at discharge and prednisone 40 mg x 5 days (2/3-2/7). Resolved.   - Prophylaxis: PT Cy, Tac/MMF started 1/16. **initially avoided sirolimus d/t high risk VOD**. Tac drip discontinued 1/31, sirolimus started.  - Siro level 2/28, 9.3 - resumed 3/2 with 1mg alternating with 0.5mg. 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 -on admission LFTs are rapidly improving to normal 2/22. Check M/Thurs.   -Rash for 3-4 days (2/11-2/16) , non-pruritic. - resolved  -2/20 RUQ US suggestive of hepatic steatosis, normal dopplers. Biopsy is scheduled 2/23, but was cancelled as hepatic panel is essentially WNL, no tbili elevation, no rash, GI symptoms and U/S unremarkable.      CARDIOVASCULAR  #Prolonged QTc to 507 on 1/22. EKG (1/30) NSR, Qtc 454.  # HTN: On norvasc 5mg daily. Worse with pain. Controlling symptoms improve BP     GI/NUTRITION  #Abdominal pain  - CT-CAP showing cystitis, possible pyelonephritis, and inflammation of gallbladder. Her LFTs remain wnl. Unclear what her LUQ pain is from, she does have mild skin breakdown underneath her breast which is near where her tenderness is. She continues to have mild RUQ pain with palpation. Will not pursure further workup. Will monitor for now.       #Transaminitis - resolved.   #Hypertriglyceridemia- 249 (1/29), 388 on 2/22 . Start fenofibrate, trend.  # Epigastric pain (1/18): RUQ ultrasound showing patent doppler throughout, no  ascites, nonspecific elevated hepatic artery restrictive index (see full impression above).                - Ulcer prophylaxis: PPI  - VOD ppx: Ursodiol. See liver US 2/21 results above        #Dysuria, urge/frequency 2/2 to BK Viruria   - Oxybuytnin 5mg QID,   - Oxycodone PRN   - Added Myrbetriq 2/28, noted improvement on 2/29.       DERM:  # Foot, hand warts. Curbside derm on 1/8, no recs while inpatient, typically managed OP.    # rash as per above.  #Moisture related dermatitis, candidiasis of skin underneath left breast  - Barrier creams and Nystatin cream for 7 days 2/28-3/6. Resolved.     OTHER  - LLE swelling- LLE ultrasound to r/o DVT ordered. Will be completed this afternoon.     Summary:   - Siro resumed 3/2, level pending toda.y   - continue Myrbetriq and Oxybutynin   - LLE ultrasound to r/o DVT  - IR referral sent 3/1 for CVC removal, awaiting scheduling   - RTC 3/7 for JEN and labs      I spent 40 minutes in the care of this patient today, which included time necessary for preparation for the visit, obtaining history, ordering medications/tests/procedures as medically indicated, review of pertinent medical literature, counseling of the patient, communication of recommendations to the care team, and documentation time.     Ronit Ruth, CNP  Pager [phone]

## 2024-03-04 NOTE — LETTER
3/4/2024         RE: Hortencia Baldwin  [address]  [address]  [facility] [address]        Dear Colleague,    Thank you for referring your patient, Hortencia Baldwin, to the [facility] BLOOD AND MARROW TRANSPLANT PROGRAM [place]. Please see a copy of my visit note below.    BMT Clinic Note   03/1/2024     Patient ID: Irma Foreman is a 53 year old female who is 50 s/p MA (Bu/Flu) 6/8 URD Allo transplant, day 53. Readmitted 2/21 with hematuria, dysuria and clots.     Transplant Essential Data:   Diagnosis MDS  BMTCT Type ALLO    Prep Regimen Bu/Flu   Donor Match and  Source 6/8 URD    GVHD Prophylaxis PT Cy, Tac/MMF  Primary BMT MD Garay     Clinical Trials MT 2015-29 (according to but not on trial)       INTERVAL  HISTORY     Irma is doing well today, but continues to have urinary symptoms. They remain bothersome, but more tolerable. She notes longer between urinary episodes. No more blood in her urine, besides the occasional clot. She continues to use oxycodone, myrbetriq and oxybutinin. Rash under her breasts has entirely resolved. Eating and drinking well. No nausea, no vomiting. No rash. Siro resumed. She notes that her L leg is more swollen than her right- she notes pain when she walks up and down stairs and with activity.     Review of Systems: 10 point ROS negative except as noted above.  PHYSICAL EXAM      Weight In/Out      Wt Readings from Last 4 Encounters:   03/04/24 77.8 kg (171 lb 9.6 oz)   02/29/24 77.5 kg (170 lb 12.8 oz)   02/21/24 77.9 kg (171 lb 12.8 oz)   02/19/24 78 kg (172 lb)           I/O last 3 completed shifts:  In: 2070 [P.O.:2070]  Out: 1950 [Urine:1950]         KPS:  80     /83 (BP Location: Right arm, Patient Position: Sitting, Cuff Size: Adult Regular)   Pulse 81   Temp 98.1  F (36.7  C) (Oral)   Resp 16   Wt 77.8 kg (171 lb 9.6 oz)   LMP 11/05/2017   SpO2 98%   BMI 31.78 kg/m       General: NAD   Eyes: : KEVON, sclera anicteric   Lungs: CTA  bilaterally  Cardiovascular: RRR, no M/R/G   Abdominal/Rectal: +BS, soft, nontender.   Lymphatics: no edema  Skin:Has mild skin irritation underneath left breast that appears to be moisture related skin breakdown, potential yeast infection.   Neuro: A&O   Additional Findings: R Gastelum site NT, no drainage  Lab Results   Component Value Date    WBC 4.3 03/01/2024    ANEU 5.5 02/21/2024    HGB 9.8 (L) 03/01/2024    HCT 31.7 (L) 03/01/2024    PLT 78 (L) 03/01/2024     03/01/2024    POTASSIUM 3.7 03/01/2024    CHLORIDE 104 03/01/2024    CO2 27 03/01/2024     (H) 03/01/2024    BUN 5.9 (L) 03/01/2024    CR 0.51 03/01/2024    MAG 2.1 02/29/2024    INR 0.88 02/26/2024     ASSESSMENT BY SYSTEMS      Irma Foreman is a 53 year old female who is 50 s/p MA (Bu/Flu) 6/8 URD Allo transplant. Readmitted 2/21 with hematuria, dysuria and clots, known BK Viruria.      BMT/IEC PROTOCOL for MT 2015-29   - Chemo protocol:  Day -6 through day -1: Keppra and Allopurinol   Day -5 through -2: Bu/Flu.   Day -1: Rest day   Day 0: Transplant. Recipient: O+, CMV+. Donor: O+, CMV-. Cell dose 6.5 x10^6.   - Restaging plan: per protocol   BMbx 2/6 -- Day 28 review with Dr. Garay on 2/9  Flow neg; 100% donor. Chimerisms: CD33 is 100% donor, CD3 is 87% donor. Bone marrow 100% donor.      HEME/COAG  - Anemia, thrombocytopenia 2/2 due to chemotherapy/BMT, improving  #Iron overload: hx of transfusion dependent anemia, pre-transplant ferritin around 5,000. Recommend phlebotomy post transplant when retic count is restored and Hgb >10 . On admission ferritin >10k.   #Hematuria- resolving.   # Platelet refractoriness: HLA platelets when available.   - Transfusion parameters: hemoglobin <7, platelets <10. Plt and hgb continue to improve despite hematuria  - GCSF PRN for ANC < 1.     IMMUNOCOMPROMISED  #BK hemorrhagic cystitis, viremia.   Urology and ID following. See previous notes. Has received 2 doses of intravesicular Cidofovir 2/22 and  2/26. Symptoms drastically improving. Continues to have mild/mod pressure/pain with urination, relieved with pain management. Frequency and urgency improving, but continues to have multiple bouts.   - Checking weekly BK levels                  - BK level 2/28 - Urine - 11,800,000 (down from >100,000,000), Blood - 51 (down from 1,350)  - Previously received pyridium  - On max dose Oxybuytinin  - Started Myrbetriq 2/28.   - 3/4: Symptomatic improvement     Prophylaxis plan:   ACV/letermovir   Micafungin through day +45 (current smoker) - completed.   Bactrim   CMV: ND (2/21)   EBV: ND (2/21)      RISK OF GVHD  - Pink maculopapular rash.TCM prescribed at discharge and prednisone 40 mg x 5 days (2/3-2/7). Resolved.   - Prophylaxis: PT Cy, Tac/MMF started 1/16. **initially avoided sirolimus d/t high risk VOD**. Tac drip discontinued 1/31, sirolimus started.  - Siro level 2/28, 9.3 - resumed 3/2 with 1mg alternating with 0.5mg. 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 -on admission LFTs are rapidly improving to normal 2/22. Check M/Thurs.   -Rash for 3-4 days (2/11-2/16) , non-pruritic. - resolved  -2/20 RUQ US suggestive of hepatic steatosis, normal dopplers. Biopsy is scheduled 2/23, but was cancelled as hepatic panel is essentially WNL, no tbili elevation, no rash, GI symptoms and U/S unremarkable.      CARDIOVASCULAR  #Prolonged QTc to 507 on 1/22. EKG (1/30) NSR, Qtc 454.  # HTN: On norvasc 5mg daily. Worse with pain. Controlling symptoms improve BP     GI/NUTRITION  #Abdominal pain  - CT-CAP showing cystitis, possible pyelonephritis, and inflammation of gallbladder. Her LFTs remain wnl. Unclear what her LUQ pain is from, she does have mild skin breakdown underneath her breast which is near where her tenderness is. She  continues to have mild RUQ pain with palpation. Will not pursure further workup. Will monitor for now.       #Transaminitis - resolved.   #Hypertriglyceridemia- 249 (1/29), 388 on 2/22 . Start fenofibrate, trend.  # Epigastric pain (1/18): RUQ ultrasound showing patent doppler throughout, no ascites, nonspecific elevated hepatic artery restrictive index (see full impression above).                - Ulcer prophylaxis: PPI  - VOD ppx: Ursodiol. See liver US 2/21 results above        #Dysuria, urge/frequency 2/2 to BK Viruria   - Oxybuytnin 5mg QID,   - Oxycodone PRN   - Added Myrbetriq 2/28, noted improvement on 2/29.       DERM:  # Foot, hand warts. Curbside derm on 1/8, no recs while inpatient, typically managed OP.    # rash as per above.  #Moisture related dermatitis, candidiasis of skin underneath left breast  - Barrier creams and Nystatin cream for 7 days 2/28-3/6. Resolved.     OTHER  - LLE swelling- LLE ultrasound to r/o DVT ordered. Will be completed this afternoon.     Summary:   - Siro resumed 3/2, level pending toda.y   - continue Myrbetriq and Oxybutynin   - LLE ultrasound to r/o DVT  - IR referral sent 3/1 for CVC removal, awaiting scheduling   - RTC 3/7 for JEN and labs      I spent 40 minutes in the care of this patient today, which included time necessary for preparation for the visit, obtaining history, ordering medications/tests/procedures as medically indicated, review of pertinent medical literature, counseling of the patient, communication of recommendations to the care team, and documentation time.     Ronit Ruth, CNP  Pager [phone]

## 2024-03-05 ENCOUNTER — PATIENT OUTREACH (OUTPATIENT)
Dept: CARE COORDINATION | Facility: CLINIC | Age: 54
End: 2024-03-05
Payer: MEDICAID

## 2024-03-05 LAB
CMV DNA SPEC NAA+PROBE-ACNC: NOT DETECTED IU/ML
EBV DNA # SPEC NAA+PROBE: NOT DETECTED COPIES/ML

## 2024-03-05 NOTE — PROGRESS NOTES
Clinic Care Coordination Contact  [facility]/Voicemail    Clinical Data: Care Coordinator Outreach    Outreach Documentation Number of Outreach Attempt   3/1/2024  10:06 AM 1   3/5/2024   3:38 PM 2       Left message on patient's voicemail with call back information and requested return call.    Plan: Care Coordinator sent care coordination introduction letter on 3/1/24 via [employer]. Care Coordinator will do no further outreaches at this time.    Amauri Panchal MSN, RN, PHN, [facility]   Primary Care Clinical RN Care Coordinator  [facility]  3/5/2024   3:39 PM  [email or web address]  Office: [phone]

## 2024-03-06 ENCOUNTER — OFFICE VISIT (OUTPATIENT)
Dept: TRANSPLANT | Facility: CLINIC | Age: 54
End: 2024-03-06
Attending: STUDENT IN AN ORGANIZED HEALTH CARE EDUCATION/TRAINING PROGRAM
Payer: COMMERCIAL

## 2024-03-06 ENCOUNTER — APPOINTMENT (OUTPATIENT)
Dept: LAB | Facility: CLINIC | Age: 54
End: 2024-03-06
Attending: STUDENT IN AN ORGANIZED HEALTH CARE EDUCATION/TRAINING PROGRAM
Payer: COMMERCIAL

## 2024-03-06 VITALS
WEIGHT: 169.5 LBS | RESPIRATION RATE: 16 BRPM | HEART RATE: 91 BPM | DIASTOLIC BLOOD PRESSURE: 86 MMHG | OXYGEN SATURATION: 98 % | SYSTOLIC BLOOD PRESSURE: 144 MMHG | TEMPERATURE: 98.5 F | BODY MASS INDEX: 31.39 KG/M2

## 2024-03-06 DIAGNOSIS — D46.9 MDS (MYELODYSPLASTIC SYNDROME) (H): ICD-10-CM

## 2024-03-06 DIAGNOSIS — Z94.81 S/P ALLOGENEIC BONE MARROW TRANSPLANT (H): ICD-10-CM

## 2024-03-06 DIAGNOSIS — Z94.81 STATUS POST BONE MARROW TRANSPLANT (H): Primary | ICD-10-CM

## 2024-03-06 DIAGNOSIS — R82.79 BK VIRURIA: ICD-10-CM

## 2024-03-06 LAB
ALBUMIN SERPL BCG-MCNC: 4.1 G/DL (ref 3.5–5.2)
ALP SERPL-CCNC: 93 U/L (ref 40–150)
ALT SERPL W P-5'-P-CCNC: 34 U/L (ref 0–50)
ANION GAP SERPL CALCULATED.3IONS-SCNC: 10 MMOL/L (ref 7–15)
AST SERPL W P-5'-P-CCNC: 33 U/L (ref 0–45)
BASOPHILS # BLD AUTO: 0.1 10E3/UL (ref 0–0.2)
BASOPHILS NFR BLD AUTO: 1 %
BILIRUB SERPL-MCNC: 0.2 MG/DL
BK VIRUS DNA IU/ML, INSTRUMENT (6800): 406 IU/ML
BK VIRUS DNA IU/ML, INSTRUMENT (6800): ABNORMAL IU/ML
BK VIRUS SPECIMEN TYPE: ABNORMAL
BK VIRUS SPECIMEN TYPE: ABNORMAL
BKV DNA SPEC NAA+PROBE-LOG#: 2.6 {LOG_COPIES}/ML
BKV DNA SPEC NAA+PROBE-LOG#: 7.7 {LOG_COPIES}/ML
BUN SERPL-MCNC: 4.7 MG/DL (ref 6–20)
CALCIUM SERPL-MCNC: 9.5 MG/DL (ref 8.6–10)
CHLORIDE SERPL-SCNC: 104 MMOL/L (ref 98–107)
CREAT SERPL-MCNC: 0.54 MG/DL (ref 0.51–0.95)
DEPRECATED HCO3 PLAS-SCNC: 25 MMOL/L (ref 22–29)
EGFRCR SERPLBLD CKD-EPI 2021: >90 ML/MIN/1.73M2
EOSINOPHIL # BLD AUTO: 0.1 10E3/UL (ref 0–0.7)
EOSINOPHIL NFR BLD AUTO: 2 %
ERYTHROCYTE [DISTWIDTH] IN BLOOD BY AUTOMATED COUNT: 15.8 % (ref 10–15)
GLUCOSE SERPL-MCNC: 102 MG/DL (ref 70–99)
HCT VFR BLD AUTO: 36.1 % (ref 35–47)
HGB BLD-MCNC: 11.6 G/DL (ref 11.7–15.7)
IMM GRANULOCYTES # BLD: 0.1 10E3/UL
IMM GRANULOCYTES NFR BLD: 2 %
LYMPHOCYTES # BLD AUTO: 1.2 10E3/UL (ref 0.8–5.3)
LYMPHOCYTES NFR BLD AUTO: 20 %
MCH RBC QN AUTO: 33.7 PG (ref 26.5–33)
MCHC RBC AUTO-ENTMCNC: 32.1 G/DL (ref 31.5–36.5)
MCV RBC AUTO: 105 FL (ref 78–100)
MONOCYTES # BLD AUTO: 0.5 10E3/UL (ref 0–1.3)
MONOCYTES NFR BLD AUTO: 9 %
NEUTROPHILS # BLD AUTO: 4.1 10E3/UL (ref 1.6–8.3)
NEUTROPHILS NFR BLD AUTO: 66 %
NRBC # BLD AUTO: 0 10E3/UL
NRBC BLD AUTO-RTO: 0 /100
PLATELET # BLD AUTO: 88 10E3/UL (ref 150–450)
POTASSIUM SERPL-SCNC: 3.6 MMOL/L (ref 3.4–5.3)
PROT SERPL-MCNC: 6.6 G/DL (ref 6.4–8.3)
RBC # BLD AUTO: 3.44 10E6/UL (ref 3.8–5.2)
RETICS # AUTO: 0.2 10E6/UL (ref 0.03–0.1)
RETICS/RBC NFR AUTO: 5.9 % (ref 0.5–2)
SIROLIMUS BLD-MCNC: 5.2 UG/L (ref 5–15)
SODIUM SERPL-SCNC: 139 MMOL/L (ref 135–145)
TME LAST DOSE: NORMAL H
TME LAST DOSE: NORMAL H
WBC # BLD AUTO: 6.1 10E3/UL (ref 4–11)

## 2024-03-06 PROCEDURE — 85045 AUTOMATED RETICULOCYTE COUNT: CPT

## 2024-03-06 PROCEDURE — 82374 ASSAY BLOOD CARBON DIOXIDE: CPT

## 2024-03-06 PROCEDURE — 85025 COMPLETE CBC W/AUTO DIFF WBC: CPT

## 2024-03-06 PROCEDURE — 99213 OFFICE O/P EST LOW 20 MIN: CPT

## 2024-03-06 PROCEDURE — 87799 DETECT AGENT NOS DNA QUANT: CPT | Mod: 59

## 2024-03-06 PROCEDURE — 99215 OFFICE O/P EST HI 40 MIN: CPT

## 2024-03-06 PROCEDURE — 80195 ASSAY OF SIROLIMUS: CPT

## 2024-03-06 PROCEDURE — 36592 COLLECT BLOOD FROM PICC: CPT

## 2024-03-06 RX ORDER — ACYCLOVIR 800 MG/1
800 TABLET ORAL 2 TIMES DAILY
Qty: 60 TABLET | Refills: 3 | Status: SHIPPED | OUTPATIENT
Start: 2024-03-06 | End: 2024-04-09

## 2024-03-06 RX ORDER — HEPARIN SODIUM,PORCINE 10 UNIT/ML
5 VIAL (ML) INTRAVENOUS ONCE
Status: DISCONTINUED | OUTPATIENT
Start: 2024-03-06 | End: 2024-03-06 | Stop reason: HOSPADM

## 2024-03-06 RX ORDER — PANTOPRAZOLE SODIUM 40 MG/1
40 TABLET, DELAYED RELEASE ORAL
Qty: 30 TABLET | Refills: 0 | Status: SHIPPED | OUTPATIENT
Start: 2024-03-06 | End: 2024-03-21

## 2024-03-06 RX ORDER — SULFAMETHOXAZOLE/TRIMETHOPRIM 800-160 MG
1 TABLET ORAL
Qty: 48 TABLET | Refills: 1 | Status: SHIPPED | OUTPATIENT
Start: 2024-03-06 | End: 2024-03-21

## 2024-03-06 NOTE — PROGRESS NOTES
At patient's request, I filled 1 week of scheduled medications into her med box.     Current Outpatient Medications   Medication Sig Dispense Refill    acyclovir (ZOVIRAX) 800 MG tablet Take 1 tablet (800 mg) by mouth 2 times daily 60 tablet 3    cyclobenzaprine (FLEXERIL) 5 MG tablet       fenofibrate (LOFIBRA) 54 MG tablet Take 1 tablet (54 mg) by mouth daily 60 tablet 0    letermovir (PREVYMIS) 480 MG TABS tablet Take 1 tablet (480 mg) by mouth daily 30 tablet 2    mirabegron (MYRBETRIQ) 25 MG 24 hr tablet Take 1 tablet (25 mg) by mouth daily 30 tablet 0    nystatin (MYCOSTATIN) 461175 UNIT/GM external cream Apply topically 2 times daily for 6 days 15 g 00    ondansetron (ZOFRAN ODT) 4 MG ODT tab Take 1 tablet (4 mg) by mouth daily before breakfast (Patient taking differently: Take 4 mg by mouth every 8 hours as needed) 30 tablet 1    oxyBUTYnin (DITROPAN) 5 MG tablet Take 1 tablet (5 mg) by mouth 4 times daily      oxyCODONE (ROXICODONE) 5 MG tablet Take 1 tablet (5 mg) by mouth every 4 hours as needed for moderate pain 15 tablet 0    pantoprazole (PROTONIX) 40 MG EC tablet Take 1 tablet (40 mg) by mouth every morning (before breakfast) 30 tablet 0    potassium chloride jean pierre ER (KLOR-CON M20) 20 MEQ CR tablet Take 1 tablet (20 mEq) by mouth daily      sirolimus (GENERIC EQUIVALENT) 0.5 MG tablet Take one (1mg) tablet in addition to one (0.5mg) tablet for a total of 1.5mg daily. (Patient taking differently: Take 1 mg daily alternating with 0.5 mg daily. Dose as of 3/2.) 30 tablet 0    sirolimus (GENERIC EQUIVALENT) 1 MG tablet Take one (1mg) tablet in addition to one (0.5mg) tablet for a total of 1.5mg daily. (Patient taking differently: Take 1 mg daily alternating with 0.5 mg daily. Dose as of 3/2.) 30 tablet 0    sulfamethoxazole-trimethoprim (BACTRIM DS) 800-160 MG tablet Take 1 tablet by mouth Every Mon, Tues two times daily      ursodiol (ACTIGALL) 300 MG capsule Take 1 capsule (300 mg) by mouth 3 times  daily 108 capsule 0    amLODIPine (NORVASC) 5 MG tablet Take 1 tablet (5 mg) by mouth daily for 30 days 30 tablet 0        Pertinent labs considered today:  Lab Results   Component Value Date     03/06/2024     05/30/2017                 normal sodium 136-148  Lab Results   Component Value Date    POTASSIUM 3.6 03/06/2024    POTASSIUM 3.7 05/30/2017       normal potassium 3.5-5.2   Lab Results   Component Value Date    CHLORIDE 104 03/06/2024    CHLORIDE 107 05/30/2017           normal chloride    Lab Results   Component Value Date    CO2 25 03/06/2024    CO2 26 05/30/2017                normal CO2 20-32  Lab Results   Component Value Date    BUN 4.7 03/06/2024    BUN 11 05/30/2017                 normal BUN 5-24  Lab Results   Component Value Date     03/06/2024     02/01/2024    GLC 85 05/30/2017                 normal glucose   Lab Results   Component Value Date    CR 0.54 03/06/2024    CR 0.63 05/30/2017                 normal cr 0.8-1.5  Lab Results   Component Value Date    NIKO 9.5 03/06/2024    NIKO 8.1 05/30/2017               normal calcium  8.5-10.4  Lab Results   Component Value Date    BILITOTAL 0.2 03/06/2024    BILITOTAL 0.4 05/30/2017          normal bilirubin 0.2-1.3  Lab Results   Component Value Date    PROTTOTAL 6.6 03/06/2024    PROTTOTAL 6.9 05/30/2017          normal total protein 6.0-8.2  Lab Results   Component Value Date    ALBUMIN 4.1 03/06/2024    ALBUMIN 3.7 05/30/2017             normal albumin 3.2-4.5  Lab Results   Component Value Date    ALKPHOS 93 03/06/2024    ALKPHOS 72 05/30/2017            normal alkphos   Lab Results   Component Value Date    ALT 34 03/06/2024    ALT 16 05/30/2017         normal ALT 0-65  Lab Results   Component Value Date    AST 33 03/06/2024    AST 11 05/30/2017         normal AST 0-37    Lab Results   Component Value Date    HGB 11.6 03/06/2024    HGB 10.4 03/19/2018     Lab Results   Component Value Date    WBC 6.1  03/06/2024    WBC 5.4 03/19/2018      Lab Results   Component Value Date    PLT 88 03/06/2024     03/19/2018       Estimated Creatinine Clearance: 114.7 mL/min (based on SCr of 0.54 mg/dL).    Changes made to scheduled medication list today include:  Added: N/A  Deleted: Olanzapine, pyrimidine  Changed: Zofran changed to PRN    Actions taken by pharmacist (provider contacted, etc):None   Refills for acyclovir, and new john's for pantoprazole, amlodapine, bactrim were called in to the [facility] so enough medication is obtained to refill the med box next week, [facility] will pick these up on Monday, prior to the patient arriving in clinic (there were enough medications to refill the box for 1 week today). I gave Hortencia an updated medication list that indicated what meds were put in the med box.    Time spent in this activity: 30       Avery Arenas [facility], PharmD

## 2024-03-06 NOTE — NURSING NOTE
"Oncology Rooming Note    March 6, 2024 10:15 AM   Hortencia Baldwin is a 53 year old female who presents for:    Chief Complaint   Patient presents with    Oncology Clinic Visit     MDS (myelodysplastic syndrome)      Initial Vitals: BP (!) 144/86   Pulse 91   Temp 98.5  F (36.9  C) (Oral)   Resp 16   Wt 76.9 kg (169 lb 8 oz)   LMP 11/05/2017   SpO2 98%   BMI 31.39 kg/m   Estimated body mass index is 31.39 kg/m  as calculated from the following:    Height as of 2/21/24: 1.565 m (5' 1.61\").    Weight as of this encounter: 76.9 kg (169 lb 8 oz). Body surface area is 1.83 meters squared.  Data Unavailable Comment: Data Unavailable   Patient's last menstrual period was 11/05/2017.  Allergies reviewed: Yes  Medications reviewed: Yes    Medications: Medication refills not needed today.  Pharmacy name entered into [facility]:    [id number] - [place], [place] - [address] AT [facility]  [id number] - [place], [place] - [address] [facility]    Frailty Screening:   Is the patient here for a new oncology consult visit in cancer care? 2. No      Clinical concerns: no other complaints      Praveen Mcclellan"

## 2024-03-06 NOTE — PROGRESS NOTES
BMT Clinic Note   03/06/2024       Patient ID: Irma Foreman is a 53 year old female who is 50 s/p MA (Bu/Flu) 6/8 URD Allo transplant, day 55. Readmitted 2/21 with hematuria, dysuria and clots.     Transplant Essential Data:   Diagnosis MDS  BMTCT Type ALLO    Prep Regimen Bu/Flu   Donor Match and  Source 6/8 URD    GVHD Prophylaxis PT Cy, Tac/MMF  Primary BMT MD Garay     Clinical Trials MT 2015-29 (according to but not on trial)       INTERVAL  HISTORY     Irma is doing well today, but had a toothache yesterday on right upper tooth. Better today. She had an adjacent tooth extracted pre transplant.   Eating/drinking better. Mild nausea this morning because she hasn't eaten yet. Has zofran with her but hasn't been needing it.  No longer with a rash.  No respiratory sxs.  Legs still weak; l>r       Review of Systems: 10 point ROS negative except as noted above.  PHYSICAL EXAM      Weight In/Out      Wt Readings from Last 4 Encounters:   03/06/24 76.9 kg (169 lb 8 oz)   03/04/24 77.8 kg (171 lb 9.6 oz)   02/29/24 77.5 kg (170 lb 12.8 oz)   02/21/24 77.9 kg (171 lb 12.8 oz)       I/O last 3 completed shifts:  In: 2070 [P.O.:2070]  Out: 1950 [Urine:1950]         KPS:  80     BP (!) 144/86   Pulse 91   Temp 98.5  F (36.9  C) (Oral)   Resp 16   Wt 76.9 kg (169 lb 8 oz)   LMP 11/05/2017   SpO2 98%   BMI 31.39 kg/m       General: NAD   Eyes: : KEVON, sclera anicteric   Lungs: CTA bilaterally  Cardiovascular: RRR, no M/R/G   Abdominal/Rectal: +BS, soft, nontender.   Lymphatics: mild LE edema, l>r  Skin:Has mild skin irritation underneath left breast that appears to be moisture related skin breakdown, potential yeast infection-not seen 3/6.   Neuro: A&O   Additional Findings: R Gastelum site NT, no drainage  Lab Results   Component Value Date    WBC 6.1 03/06/2024    ANEU 5.5 02/21/2024    HGB 11.6 (L) 03/06/2024    HCT 36.1 03/06/2024    PLT 88 (L) 03/06/2024     03/06/2024    POTASSIUM 3.6 03/06/2024     CHLORIDE 104 03/06/2024    CO2 25 03/06/2024     (H) 03/06/2024    BUN 4.7 (L) 03/06/2024    CR 0.54 03/06/2024    MAG 2.1 02/29/2024    INR 0.88 02/26/2024     ASSESSMENT BY SYSTEMS      Irma Foreman is a 53 year old female who is 50 s/p MA (Bu/Flu) 6/8 URD Allo transplant. Readmitted 2/21 with hematuria, dysuria and clots, known BK Viruria.      BMT/IEC PROTOCOL for MT 2015-29   - Chemo protocol:  Day -6 through day -1: Keppra and Allopurinol   Day -5 through -2: Bu/Flu.   Day -1: Rest day   Day 0: Transplant. Recipient: O+, CMV+. Donor: O+, CMV-. Cell dose 6.5 x10^6.   - Restaging plan: per protocol   BMbx 2/6 -- Day 28 review with Dr. Garay on 2/9  Flow neg; 100% donor. Chimerisms: CD33 is 100% donor, CD3 is 87% donor. Bone marrow 100% donor.      HEME/COAG  - Anemia, thrombocytopenia 2/2 due to chemotherapy/BMT, improving  #Iron overload: hx of transfusion dependent anemia, pre-transplant ferritin around 5,000. Recommend phlebotomy post transplant when retic count is restored and Hgb >10 . On admission ferritin >10k.   #Hematuria- resolving.   # Platelet refractoriness: HLA platelets when available.   - Transfusion parameters: hemoglobin <7, platelets <10.   - GCSF PRN for ANC < 1.     IMMUNOCOMPROMISED  #BK hemorrhagic cystitis, viremia.   Urology and ID following. See previous notes. Has received 2 doses of intravesicular Cidofovir 2/22 and 2/26. Symptoms drastically improving. Continues to have mild/mod pressure/pain with urination, relieved with pain management. Frequency and urgency improving, but continues to have multiple bouts.   - Checking weekly BK levels                  - BK level 2/28 - Urine - 11,800,000 (down from >100,000,000), Blood - 51 (down from 1,350)  - Previously received pyridium  - On max dose Oxybuytinin  - Started Myrbetriq 2/28.   - 3/4: Symptomatic improvement     Prophylaxis plan:   ACV/letermovir   Micafungin through day +45 (current smoker) - completed.   Bactrim   CMV:  ND (2/21)   EBV: ND (2/21)      RISK OF GVHD  - Pink maculopapular rash.TCM prescribed at discharge and prednisone 40 mg x 5 days (2/3-2/7). Resolved.   - Prophylaxis: PT Cy, Tac/MMF started 1/16. **initially avoided sirolimus d/t high risk VOD**. Tac drip discontinued 1/31, sirolimus started.  - Siro level 2/28, 9.3 - resumed 3/2 with 1mg alternating with 0.5mg. 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 -on admission LFTs are rapidly improving to normal 2/22. Check M/Thurs.   -Rash for 3-4 days (2/11-2/16) , non-pruritic. - resolved  -2/20 RUQ US suggestive of hepatic steatosis, normal dopplers. Biopsy is scheduled 2/23, but was cancelled as hepatic panel is essentially WNL, no tbili elevation, no rash, GI symptoms and U/S unremarkable.      CARDIOVASCULAR  #Prolonged QTc to 507 on 1/22. EKG (1/30) NSR, Qtc 454.  # HTN: On norvasc 5mg daily. Worse with pain. Controlling symptoms improve BP     GI/NUTRITION  #Abdominal pain  - CT-CAP showing cystitis, possible pyelonephritis, and inflammation of gallbladder. Her LFTs remain wnl. Unclear what her LUQ pain is from, she does have mild skin breakdown underneath her breast which is near where her tenderness is. She continues to have mild RUQ pain with palpation. Will not pursure further workup. Will monitor for now.       #Transaminitis - resolved.   #Hypertriglyceridemia- 249 (1/29), 388 on 2/22 . Start fenofibrate, trend.  # Epigastric pain (1/18): RUQ ultrasound showing patent doppler throughout, no ascites, nonspecific elevated hepatic artery restrictive index (see full impression above).                - Ulcer prophylaxis: PPI  - VOD ppx: Ursodiol. See liver US 2/21 results above        #Dysuria, urge/frequency 2/2 to BK Viruria   - Oxybuytnin 5mg QID,   - Oxycodone PRN   - Added  Myrbetriq 2/28, noted improvement on 2/29.       DERM:  # Foot, hand warts. Curbside derm on 1/8, no recs while inpatient, typically managed OP.    # rash as per above.  #Moisture related dermatitis, candidiasis of skin underneath left breast  - Barrier creams and Nystatin cream for 7 days 2/28-3/6. Resolved.     OTHER  - LLE swelling- LLE ultrasound to r/o DVT -negative 3/4     Summary:   - pharmD med fill today; refill pantoprazole, bactrim, acyclovir  - Line removal Monday-add provider visit/labs prior with siro level  - 3/14 provider appt     I spent 40 minutes in the care of this patient today, which included time necessary for preparation for the visit, obtaining history, ordering medications/tests/procedures as medically indicated, review of pertinent medical literature, counseling of the patient, communication of recommendations to the care team, and documentation time.     Ibis Diaz NP

## 2024-03-06 NOTE — LETTER
3/6/2024         RE: Hortencia Baldwin  [address]  [address]  [facility] [address]        Dear Colleague,    Thank you for referring your patient, Hortencia Baldwin, to the [facility] BLOOD AND MARROW TRANSPLANT PROGRAM [place]. Please see a copy of my visit note below.    BMT Clinic Note   03/06/2024       Patient ID: Irma Foreman is a 53 year old female who is 50 s/p MA (Bu/Flu) 6/8 URD Allo transplant, day 55. Readmitted 2/21 with hematuria, dysuria and clots.     Transplant Essential Data:   Diagnosis MDS  BMTCT Type ALLO    Prep Regimen Bu/Flu   Donor Match and  Source 6/8 URD    GVHD Prophylaxis PT Cy, Tac/MMF  Primary BMT MD Garay     Clinical Trials MT 2015-29 (according to but not on trial)       INTERVAL  HISTORY     Irma is doing well today, but had a toothache yesterday on right upper tooth. Better today. She had an adjacent tooth extracted pre transplant.   Eating/drinking better. Mild nausea this morning because she hasn't eaten yet. Has zofran with her but hasn't been needing it.  No longer with a rash.  No respiratory sxs.  Legs still weak; l>r       Review of Systems: 10 point ROS negative except as noted above.  PHYSICAL EXAM      Weight In/Out      Wt Readings from Last 4 Encounters:   03/06/24 76.9 kg (169 lb 8 oz)   03/04/24 77.8 kg (171 lb 9.6 oz)   02/29/24 77.5 kg (170 lb 12.8 oz)   02/21/24 77.9 kg (171 lb 12.8 oz)       I/O last 3 completed shifts:  In: 2070 [P.O.:2070]  Out: 1950 [Urine:1950]         KPS:  80     BP (!) 144/86   Pulse 91   Temp 98.5  F (36.9  C) (Oral)   Resp 16   Wt 76.9 kg (169 lb 8 oz)   LMP 11/05/2017   SpO2 98%   BMI 31.39 kg/m       General: NAD   Eyes: : KEVON, sclera anicteric   Lungs: CTA bilaterally  Cardiovascular: RRR, no M/R/G   Abdominal/Rectal: +BS, soft, nontender.   Lymphatics: mild LE edema, l>r  Skin:Has mild skin irritation underneath left breast that appears to be moisture related skin breakdown, potential yeast infection-not  seen 3/6.   Neuro: A&O   Additional Findings: R Gastelum site NT, no drainage  Lab Results   Component Value Date    WBC 6.1 03/06/2024    ANEU 5.5 02/21/2024    HGB 11.6 (L) 03/06/2024    HCT 36.1 03/06/2024    PLT 88 (L) 03/06/2024     03/06/2024    POTASSIUM 3.6 03/06/2024    CHLORIDE 104 03/06/2024    CO2 25 03/06/2024     (H) 03/06/2024    BUN 4.7 (L) 03/06/2024    CR 0.54 03/06/2024    MAG 2.1 02/29/2024    INR 0.88 02/26/2024     ASSESSMENT BY SYSTEMS      Irma Foreman is a 53 year old female who is 50 s/p MA (Bu/Flu) 6/8 URD Allo transplant. Readmitted 2/21 with hematuria, dysuria and clots, known BK Viruria.      BMT/IEC PROTOCOL for MT 2015-29   - Chemo protocol:  Day -6 through day -1: Keppra and Allopurinol   Day -5 through -2: Bu/Flu.   Day -1: Rest day   Day 0: Transplant. Recipient: O+, CMV+. Donor: O+, CMV-. Cell dose 6.5 x10^6.   - Restaging plan: per protocol   BMbx 2/6 -- Day 28 review with Dr. Garay on 2/9  Flow neg; 100% donor. Chimerisms: CD33 is 100% donor, CD3 is 87% donor. Bone marrow 100% donor.      HEME/COAG  - Anemia, thrombocytopenia 2/2 due to chemotherapy/BMT, improving  #Iron overload: hx of transfusion dependent anemia, pre-transplant ferritin around 5,000. Recommend phlebotomy post transplant when retic count is restored and Hgb >10 . On admission ferritin >10k.   #Hematuria- resolving.   # Platelet refractoriness: HLA platelets when available.   - Transfusion parameters: hemoglobin <7, platelets <10.   - GCSF PRN for ANC < 1.     IMMUNOCOMPROMISED  #BK hemorrhagic cystitis, viremia.   Urology and ID following. See previous notes. Has received 2 doses of intravesicular Cidofovir 2/22 and 2/26. Symptoms drastically improving. Continues to have mild/mod pressure/pain with urination, relieved with pain management. Frequency and urgency improving, but continues to have multiple bouts.   - Checking weekly BK levels                  - BK level 2/28 - Urine - 11,800,000 (down  from >100,000,000), Blood - 51 (down from 1,350)  - Previously received pyridium  - On max dose Oxybuytinin  - Started Myrbetriq 2/28.   - 3/4: Symptomatic improvement     Prophylaxis plan:   ACV/letermovir   Micafungin through day +45 (current smoker) - completed.   Bactrim   CMV: ND (2/21)   EBV: ND (2/21)      RISK OF GVHD  - Pink maculopapular rash.TCM prescribed at discharge and prednisone 40 mg x 5 days (2/3-2/7). Resolved.   - Prophylaxis: PT Cy, Tac/MMF started 1/16. **initially avoided sirolimus d/t high risk VOD**. Tac drip discontinued 1/31, sirolimus started.  - Siro level 2/28, 9.3 - resumed 3/2 with 1mg alternating with 0.5mg. 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 -on admission LFTs are rapidly improving to normal 2/22. Check M/Thurs.   -Rash for 3-4 days (2/11-2/16) , non-pruritic. - resolved  -2/20 RUQ US suggestive of hepatic steatosis, normal dopplers. Biopsy is scheduled 2/23, but was cancelled as hepatic panel is essentially WNL, no tbili elevation, no rash, GI symptoms and U/S unremarkable.      CARDIOVASCULAR  #Prolonged QTc to 507 on 1/22. EKG (1/30) NSR, Qtc 454.  # HTN: On norvasc 5mg daily. Worse with pain. Controlling symptoms improve BP     GI/NUTRITION  #Abdominal pain  - CT-CAP showing cystitis, possible pyelonephritis, and inflammation of gallbladder. Her LFTs remain wnl. Unclear what her LUQ pain is from, she does have mild skin breakdown underneath her breast which is near where her tenderness is. She continues to have mild RUQ pain with palpation. Will not pursure further workup. Will monitor for now.       #Transaminitis - resolved.   #Hypertriglyceridemia- 249 (1/29), 388 on 2/22 . Start fenofibrate, trend.  # Epigastric pain (1/18): RUQ ultrasound showing patent doppler throughout, no ascites,  nonspecific elevated hepatic artery restrictive index (see full impression above).                - Ulcer prophylaxis: PPI  - VOD ppx: Ursodiol. See liver US 2/21 results above        #Dysuria, urge/frequency 2/2 to BK Viruria   - Oxybuytnin 5mg QID,   - Oxycodone PRN   - Added Myrbetriq 2/28, noted improvement on 2/29.       DERM:  # Foot, hand warts. Curbside derm on 1/8, no recs while inpatient, typically managed OP.    # rash as per above.  #Moisture related dermatitis, candidiasis of skin underneath left breast  - Barrier creams and Nystatin cream for 7 days 2/28-3/6. Resolved.     OTHER  - LLE swelling- LLE ultrasound to r/o DVT -negative 3/4     Summary:   - pharmD med fill today; refill pantoprazole, bactrim, acyclovir  - Line removal Monday-add provider visit/labs prior with siro level  - 3/14 provider appt     I spent 40 minutes in the care of this patient today, which included time necessary for preparation for the visit, obtaining history, ordering medications/tests/procedures as medically indicated, review of pertinent medical literature, counseling of the patient, communication of recommendations to the care team, and documentation time.     Ibis Diaz NP

## 2024-03-06 NOTE — LETTER
3/6/2024         RE: Hortencia Baldwin  [address]  [address]  [facility] [address]        Dear Colleague,    Thank you for referring your patient, Hortencia Baldwin, to the [facility] BLOOD AND MARROW TRANSPLANT PROGRAM [place]. Please see a copy of my visit note below.    At patient's request, I filled 1 week of scheduled medications into her med box.     Current Outpatient Medications   Medication Sig Dispense Refill    acyclovir (ZOVIRAX) 800 MG tablet Take 1 tablet (800 mg) by mouth 2 times daily 60 tablet 3    cyclobenzaprine (FLEXERIL) 5 MG tablet       fenofibrate (LOFIBRA) 54 MG tablet Take 1 tablet (54 mg) by mouth daily 60 tablet 0    letermovir (PREVYMIS) 480 MG TABS tablet Take 1 tablet (480 mg) by mouth daily 30 tablet 2    mirabegron (MYRBETRIQ) 25 MG 24 hr tablet Take 1 tablet (25 mg) by mouth daily 30 tablet 0    nystatin (MYCOSTATIN) 858023 UNIT/GM external cream Apply topically 2 times daily for 6 days 15 g 00    ondansetron (ZOFRAN ODT) 4 MG ODT tab Take 1 tablet (4 mg) by mouth daily before breakfast (Patient taking differently: Take 4 mg by mouth every 8 hours as needed) 30 tablet 1    oxyBUTYnin (DITROPAN) 5 MG tablet Take 1 tablet (5 mg) by mouth 4 times daily      oxyCODONE (ROXICODONE) 5 MG tablet Take 1 tablet (5 mg) by mouth every 4 hours as needed for moderate pain 15 tablet 0    pantoprazole (PROTONIX) 40 MG EC tablet Take 1 tablet (40 mg) by mouth every morning (before breakfast) 30 tablet 0    potassium chloride jean pierre ER (KLOR-CON M20) 20 MEQ CR tablet Take 1 tablet (20 mEq) by mouth daily      sirolimus (GENERIC EQUIVALENT) 0.5 MG tablet Take one (1mg) tablet in addition to one (0.5mg) tablet for a total of 1.5mg daily. (Patient taking differently: Take 1 mg daily alternating with 0.5 mg daily. Dose as of 3/2.) 30 tablet 0    sirolimus (GENERIC EQUIVALENT) 1 MG tablet Take one (1mg) tablet in addition to one (0.5mg) tablet for a total of 1.5mg daily. (Patient taking  differently: Take 1 mg daily alternating with 0.5 mg daily. Dose as of 3/2.) 30 tablet 0    sulfamethoxazole-trimethoprim (BACTRIM DS) 800-160 MG tablet Take 1 tablet by mouth Every Mon, Tues two times daily      ursodiol (ACTIGALL) 300 MG capsule Take 1 capsule (300 mg) by mouth 3 times daily 108 capsule 0    amLODIPine (NORVASC) 5 MG tablet Take 1 tablet (5 mg) by mouth daily for 30 days 30 tablet 0        Pertinent labs considered today:  Lab Results   Component Value Date     03/06/2024     05/30/2017                 normal sodium 136-148  Lab Results   Component Value Date    POTASSIUM 3.6 03/06/2024    POTASSIUM 3.7 05/30/2017       normal potassium 3.5-5.2   Lab Results   Component Value Date    CHLORIDE 104 03/06/2024    CHLORIDE 107 05/30/2017           normal chloride    Lab Results   Component Value Date    CO2 25 03/06/2024    CO2 26 05/30/2017                normal CO2 20-32  Lab Results   Component Value Date    BUN 4.7 03/06/2024    BUN 11 05/30/2017                 normal BUN 5-24  Lab Results   Component Value Date     03/06/2024     02/01/2024    GLC 85 05/30/2017                 normal glucose   Lab Results   Component Value Date    CR 0.54 03/06/2024    CR 0.63 05/30/2017                 normal cr 0.8-1.5  Lab Results   Component Value Date    NIKO 9.5 03/06/2024    NIKO 8.1 05/30/2017               normal calcium  8.5-10.4  Lab Results   Component Value Date    BILITOTAL 0.2 03/06/2024    BILITOTAL 0.4 05/30/2017          normal bilirubin 0.2-1.3  Lab Results   Component Value Date    PROTTOTAL 6.6 03/06/2024    PROTTOTAL 6.9 05/30/2017          normal total protein 6.0-8.2  Lab Results   Component Value Date    ALBUMIN 4.1 03/06/2024    ALBUMIN 3.7 05/30/2017             normal albumin 3.2-4.5  Lab Results   Component Value Date    ALKPHOS 93 03/06/2024    ALKPHOS 72 05/30/2017            normal alkphos   Lab Results   Component Value Date    ALT 34  03/06/2024    ALT 16 05/30/2017         normal ALT 0-65  Lab Results   Component Value Date    AST 33 03/06/2024    AST 11 05/30/2017         normal AST 0-37    Lab Results   Component Value Date    HGB 11.6 03/06/2024    HGB 10.4 03/19/2018     Lab Results   Component Value Date    WBC 6.1 03/06/2024    WBC 5.4 03/19/2018      Lab Results   Component Value Date    PLT 88 03/06/2024     03/19/2018       Estimated Creatinine Clearance: 114.7 mL/min (based on SCr of 0.54 mg/dL).    Changes made to scheduled medication list today include:  Added: N/A  Deleted: Olanzapine, pyrimidine  Changed: Zofran changed to PRN    Actions taken by pharmacist (provider contacted, etc):None   Refills for acyclovir, and [employer] for pantoprazole, amlodapine, bactrim were called in to the [facility] so enough medication is obtained to refill the med box next week, [facility] will pick these up on Monday, prior to the patient arriving in clinic (there were enough medications to refill the box for 1 week today). I gave [facility] an updated medication list that indicated what meds were put in the med box.    Time spent in this activity: 30       Avery Arenas [facility], PharmD

## 2024-03-07 DIAGNOSIS — D46.9 MDS (MYELODYSPLASTIC SYNDROME) (H): ICD-10-CM

## 2024-03-07 RX ORDER — AMLODIPINE BESYLATE 5 MG/1
5 TABLET ORAL DAILY
Qty: 30 TABLET | Refills: 1 | Status: SHIPPED | OUTPATIENT
Start: 2024-03-07 | End: 2024-03-07

## 2024-03-07 RX ORDER — AMLODIPINE BESYLATE 5 MG/1
5 TABLET ORAL DAILY
Qty: 30 TABLET | Refills: 1 | Status: SHIPPED | OUTPATIENT
Start: 2024-03-07 | End: 2024-04-09

## 2024-03-11 ENCOUNTER — OFFICE VISIT (OUTPATIENT)
Dept: TRANSPLANT | Facility: CLINIC | Age: 54
End: 2024-03-11
Attending: STUDENT IN AN ORGANIZED HEALTH CARE EDUCATION/TRAINING PROGRAM
Payer: COMMERCIAL

## 2024-03-11 ENCOUNTER — ANCILLARY PROCEDURE (OUTPATIENT)
Dept: INTERVENTIONAL RADIOLOGY/VASCULAR | Facility: CLINIC | Age: 54
End: 2024-03-11
Payer: MEDICAID

## 2024-03-11 DIAGNOSIS — D46.9 MDS (MYELODYSPLASTIC SYNDROME) (H): ICD-10-CM

## 2024-03-11 DIAGNOSIS — D46.9 MDS (MYELODYSPLASTIC SYNDROME) (H): Primary | ICD-10-CM

## 2024-03-11 DIAGNOSIS — Z94.81 S/P ALLOGENEIC BONE MARROW TRANSPLANT (H): ICD-10-CM

## 2024-03-11 PROCEDURE — 36589 REMOVAL TUNNELED CV CATH: CPT | Mod: RT | Performed by: PHYSICIAN ASSISTANT

## 2024-03-11 RX ORDER — LIDOCAINE HYDROCHLORIDE 10 MG/ML
5 INJECTION, SOLUTION EPIDURAL; INFILTRATION; INTRACAUDAL; PERINEURAL ONCE
Status: COMPLETED | OUTPATIENT
Start: 2024-03-11 | End: 2024-03-11

## 2024-03-11 RX ADMIN — LIDOCAINE HYDROCHLORIDE 5 ML: 10 INJECTION, SOLUTION EPIDURAL; INFILTRATION; INTRACAUDAL; PERINEURAL at 15:00

## 2024-03-11 NOTE — DISCHARGE INSTRUCTIONS
A collaboration between [facility] and [facility]  Experts in minimally invasive, targeted treatments performed using imaging guidance    Tunneled Central Venous Catheter Removal    Today you had your existing tunneled central venous catheter removed because it was no longer needed for treatment.    Your catheter was removed by Arleen Arshad PA-C    After you go home:  Avoid lying flat or bending at the waist for 2 hours following removal of the catheter to reduce risk of bleeding from catheter site.  Keep any applied tape/gauze dressings clean and dry. Change tape/gauze dressings if they get wet or soiled.  You may shower following the procedure, however cover and protect from moisture any tape/gauze dressings.   You may remove tape/gauze dressings after 3 days if the site looks like it is in the process of healing or scabbing over.  Avoid strenuous activities (elevating heart rate) or lifting more than 10 pounds for the next 3 days. Walking, elliptical and golf are examples of acceptable activities.  If there is bleeding or oozing from the procedure site, apply firm pressure to the area above your collar bone (where the catheter entered the bloodstream) for 10 minutes.  If the bleeding continues, continue to hold pressure and seek medical attention at the phone numbers below.  Mild procedure site discomfort can be treated with an ice pack and over-the-counter pain relievers.           Call our Interventional Radiology (IR) service if:  If you start bleeding from the procedure site. Our radiology provider can help you decide if you need to return to the hospital.  If you have new or worsening pain related to the procedure.  If you have concerning swelling at the procedure site.  If you develop hives or a rash or any unexplained itching.  If you have a fever of greater than 100.5  F and chills in the first 5 days after procedure.  Any other concerns related to your  procedure.    Contact Number:  [phone]  (IR control desk)  Monday - Friday 8:00 am - 4:30 pm    After hours for urgent concerns:  [phone]  After 4:30 pm Monday - Friday, Weekends and Holidays.   Ask for Interventional Radiology on-call.  Someone is available 24 hours a day.  [facility] toll free number:  [phone]

## 2024-03-11 NOTE — PROGRESS NOTES
Pt missed appt today as she was unaware of provider appt today. She is planning to get line removed at 230 in IR. She is scheduled on 3/14 for follow up.

## 2024-03-11 NOTE — PROGRESS NOTES
Interventional Radiology Brief Post Procedure Note    Procedure: IR Tunneled Central Venous Line Removal-RIGHT     Proceduralist: Arleen Arshad PA-C     Time Out: Prior to the start of the procedure and with procedural staff participation, I verbally confirmed the patient s identity using two indicators, relevant allergies, that the procedure was appropriate and matched the consent or emergent situation, and that the correct equipment/implants were available. Immediately prior to starting the procedure I conducted the Time Out with the procedural staff and re-confirmed the patient s name, procedure, and site/side. (The Joint Commission universal protocol was followed.)  Yes    Sedation: None. Local Anesthestic 1% lidocaine    Findings: Removal of RIGHT tunneled catheter in its entirety.      Estimated Blood Loss: Minimal    SPECIMENS: None    Complications: 1. None     Condition: Stable    Plan: Discharge    Comments: See dictated procedure note for full details.    Arleen Arshad PA-C

## 2024-03-11 NOTE — LETTER
3/11/2024         RE: Hortencia Baldwin  [address]  [address]  [facility] [address]        Dear Colleague,    Thank you for referring your patient, Hortencia Baldwin, to the [facility] BLOOD AND MARROW TRANSPLANT PROGRAM [place]. Please see a copy of my visit note below.    Pt missed appt today as she was unaware of provider appt today. She is planning to get line removed at 230 in IR. She is scheduled on 3/14 for follow up.      Again, thank you for allowing me to participate in the care of your patient.        Sincerely,        BMT Advanced Practice Provider

## 2024-03-14 ENCOUNTER — OFFICE VISIT (OUTPATIENT)
Dept: TRANSPLANT | Facility: CLINIC | Age: 54
End: 2024-03-14
Attending: STUDENT IN AN ORGANIZED HEALTH CARE EDUCATION/TRAINING PROGRAM
Payer: COMMERCIAL

## 2024-03-14 ENCOUNTER — TELEPHONE (OUTPATIENT)
Dept: TRANSPLANT | Facility: CLINIC | Age: 54
End: 2024-03-14

## 2024-03-14 ENCOUNTER — LAB (OUTPATIENT)
Dept: LAB | Facility: CLINIC | Age: 54
End: 2024-03-14
Attending: STUDENT IN AN ORGANIZED HEALTH CARE EDUCATION/TRAINING PROGRAM
Payer: COMMERCIAL

## 2024-03-14 ENCOUNTER — DOCUMENTATION ONLY (OUTPATIENT)
Dept: TRANSPLANT | Facility: CLINIC | Age: 54
End: 2024-03-14

## 2024-03-14 VITALS
DIASTOLIC BLOOD PRESSURE: 76 MMHG | SYSTOLIC BLOOD PRESSURE: 112 MMHG | RESPIRATION RATE: 16 BRPM | HEART RATE: 82 BPM | BODY MASS INDEX: 31.3 KG/M2 | OXYGEN SATURATION: 97 % | WEIGHT: 169 LBS | TEMPERATURE: 98.8 F

## 2024-03-14 DIAGNOSIS — D46.9 MDS (MYELODYSPLASTIC SYNDROME) (H): Primary | ICD-10-CM

## 2024-03-14 DIAGNOSIS — D46.9 MDS (MYELODYSPLASTIC SYNDROME) (H): ICD-10-CM

## 2024-03-14 LAB
ALBUMIN SERPL BCG-MCNC: 4.2 G/DL (ref 3.5–5.2)
ALP SERPL-CCNC: 95 U/L (ref 40–150)
ALT SERPL W P-5'-P-CCNC: 52 U/L (ref 0–50)
ANION GAP SERPL CALCULATED.3IONS-SCNC: 10 MMOL/L (ref 7–15)
AST SERPL W P-5'-P-CCNC: 54 U/L (ref 0–45)
BASOPHILS # BLD AUTO: 0.1 10E3/UL (ref 0–0.2)
BASOPHILS NFR BLD AUTO: 2 %
BILIRUB SERPL-MCNC: 0.2 MG/DL
BUN SERPL-MCNC: 6.8 MG/DL (ref 6–20)
CALCIUM SERPL-MCNC: 9.6 MG/DL (ref 8.6–10)
CHLORIDE SERPL-SCNC: 103 MMOL/L (ref 98–107)
CREAT SERPL-MCNC: 0.6 MG/DL (ref 0.51–0.95)
DEPRECATED HCO3 PLAS-SCNC: 27 MMOL/L (ref 22–29)
EGFRCR SERPLBLD CKD-EPI 2021: >90 ML/MIN/1.73M2
EOSINOPHIL # BLD AUTO: 0.1 10E3/UL (ref 0–0.7)
EOSINOPHIL NFR BLD AUTO: 5 %
ERYTHROCYTE [DISTWIDTH] IN BLOOD BY AUTOMATED COUNT: 14.2 % (ref 10–15)
FERRITIN SERPL-MCNC: 7899 NG/ML (ref 11–328)
GLUCOSE SERPL-MCNC: 87 MG/DL (ref 70–99)
HCT VFR BLD AUTO: 37.3 % (ref 35–47)
HGB BLD-MCNC: 11.9 G/DL (ref 11.7–15.7)
IMM GRANULOCYTES # BLD: 0 10E3/UL
IMM GRANULOCYTES NFR BLD: 1 %
LAB DIRECTOR DISCLAIMER: NORMAL
LAB DIRECTOR DISCLAIMER: NORMAL
LAB DIRECTOR INTERPRETATION: NORMAL
LAB DIRECTOR INTERPRETATION: NORMAL
LAB DIRECTOR METHODOLOGY: NORMAL
LAB DIRECTOR METHODOLOGY: NORMAL
LAB DIRECTOR RESULTS: NORMAL
LAB DIRECTOR RESULTS: NORMAL
LYMPHOCYTES # BLD AUTO: 1.3 10E3/UL (ref 0.8–5.3)
LYMPHOCYTES NFR BLD AUTO: 47 %
MCH RBC QN AUTO: 33.1 PG (ref 26.5–33)
MCHC RBC AUTO-ENTMCNC: 31.9 G/DL (ref 31.5–36.5)
MCV RBC AUTO: 104 FL (ref 78–100)
MONOCYTES # BLD AUTO: 0.4 10E3/UL (ref 0–1.3)
MONOCYTES NFR BLD AUTO: 15 %
NEUTROPHILS # BLD AUTO: 0.8 10E3/UL (ref 1.6–8.3)
NEUTROPHILS NFR BLD AUTO: 30 %
NRBC # BLD AUTO: 0 10E3/UL
NRBC BLD AUTO-RTO: 0 /100
PLATELET # BLD AUTO: 82 10E3/UL (ref 150–450)
POTASSIUM SERPL-SCNC: 4.1 MMOL/L (ref 3.4–5.3)
PROT SERPL-MCNC: 6.8 G/DL (ref 6.4–8.3)
RBC # BLD AUTO: 3.59 10E6/UL (ref 3.8–5.2)
SIROLIMUS BLD-MCNC: 5 UG/L (ref 5–15)
SODIUM SERPL-SCNC: 140 MMOL/L (ref 135–145)
SPECIMEN DESCRIPTION: NORMAL
SPECIMEN DESCRIPTION: NORMAL
TME LAST DOSE: NORMAL H
TME LAST DOSE: NORMAL H
WBC # BLD AUTO: 2.7 10E3/UL (ref 4–11)

## 2024-03-14 PROCEDURE — 36415 COLL VENOUS BLD VENIPUNCTURE: CPT

## 2024-03-14 PROCEDURE — 82784 ASSAY IGA/IGD/IGG/IGM EACH: CPT

## 2024-03-14 PROCEDURE — 80195 ASSAY OF SIROLIMUS: CPT

## 2024-03-14 PROCEDURE — G0452 MOLECULAR PATHOLOGY INTERPR: HCPCS | Mod: 26 | Performed by: STUDENT IN AN ORGANIZED HEALTH CARE EDUCATION/TRAINING PROGRAM

## 2024-03-14 PROCEDURE — 82728 ASSAY OF FERRITIN: CPT

## 2024-03-14 PROCEDURE — 99213 OFFICE O/P EST LOW 20 MIN: CPT

## 2024-03-14 PROCEDURE — 85025 COMPLETE CBC W/AUTO DIFF WBC: CPT

## 2024-03-14 PROCEDURE — 99417 PROLNG OP E/M EACH 15 MIN: CPT

## 2024-03-14 PROCEDURE — 87799 DETECT AGENT NOS DNA QUANT: CPT | Mod: XU

## 2024-03-14 PROCEDURE — 99215 OFFICE O/P EST HI 40 MIN: CPT | Mod: 24

## 2024-03-14 PROCEDURE — 80053 COMPREHEN METABOLIC PANEL: CPT

## 2024-03-14 PROCEDURE — 81268 CHIMERISM ANAL W/CELL SELECT: CPT

## 2024-03-14 PROCEDURE — 87799 DETECT AGENT NOS DNA QUANT: CPT

## 2024-03-14 ASSESSMENT — PAIN SCALES - GENERAL: PAINLEVEL: MODERATE PAIN (4)

## 2024-03-14 NOTE — TELEPHONE ENCOUNTER
Patient called to requested a pharmacy visit tomorrow. The patient said she is missing one of her medications. The patient said she was told she would be starting letermovir but she does not have that medication. She said she went to the pharmacy at the [facility] and they did not have it. In reviewing the patient's current prescriptions, she was prescribed letermovir, but it was sent to her local [facility]. I asked if she would prefer to have it transferred to [facility] pharmacy, but she said she would prefer to pick it up at the [facility] today. When asked if the patient still wanted to schedule a BMT Pharmacist visit tomorrow, the patient said she was confident she would be able to add the once daily dose to her pill box herself.

## 2024-03-14 NOTE — LETTER
3/14/2024         RE: Hortencia Baldwin  [address]  [address]  [place] [place] [facility] [address]        Dear Colleague,    Thank you for referring your patient, Hortencia Baldwin, to the [facility] [place]. Please see a copy of my visit note below.    BMT/Cell Therapy Follow Up    Date of service: Mar 14, 2024     Referring provider: Dr.Evan Ramey, [facility]     Patient ID: Hortencia Baldwin is a 53 year old y/o female who is day +63  post allogenic stem cell transplant. Date of transplant: 1/11/2024    Diagnosis MDS w/ SF3B1 BMT type Allogenic  CMV  Donor -  Recipient +    Prep: MA Bu/Flu Donor type  6/8 URD Blood Type Donor and recipient O+   GVHD Ppx PTCy/ siro/ MMF Graft source PBSCT Toxo IgG Negative   Protocol [id number] (not on) BMT MD Dr. Yi         Data   Pre-transplant disease history  52 y/o F with history of longstanding macrocytic anemia (Hb 10-11, -110 dating back to 1997) presented in Nov 2021 for lightheadedness. CBC showed acute on chronic anemia with Hb 4.7, ; normal WBC and platelet count. Bone marrow biopsy (12/09/2021) was consistent with MDS with low blasts and SF3B1 mutation. Hypercellular marrow (70%) with single lineage dysplasia (dyserythropoiesis), increased ringed sideroblasts and no increase in blasts. Flow cytometry showed mixed lymphocytes. Cytogenetics 46,XX,del(20). NGS: SF3B1 (45%), ASXL1 (6%).   Received Epo (March 2022 - April 2022) but had minimal response after 4 weekly doses. Luspatercept from April 2022 to April 2023 with inadequate response.   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 Treated with weekly rituximab x 4 and steroid taper (Sept to Dec 2022). LDH and bilirubin started rising after steroid taper, therefore treated with MMF 500mg BID (Dec 2022 to May 2023) with resolution of hemolysis (negative KATINA, normal LDH, bilirubin). However, she  continued to be transfusion dependant therefore her anemia was determined to be related more to MDS than autoimmune hemolysis.   Repeat bone marrow biopsy (5/11/2023) showed persistent MDS. Hypercellular marrow (90%) but now with dysplasia in all three lineages, cytogenetics 46, XX, del (20). Received decitabine- cedazurdine X 4 cycles (May 2023 to Dec 2023). Complicated by neutropenia requiring dose reduction and pRBC transfusions every 2-3 weeks. Pre-transplant bmbx (12/7/23) showed persistent MDS with multi lineage dysplasia (dyserythropoesis and dysgranulopoesis), 17% ringed sideroblasts, increased marrow storage iron. Cytogenetics: 46,XX,del(20). Flow cytometry and NGS not done.    She was considered for allogenic transplant despite low risk MDS by IPSS-R at diagnosis due to transfusion dependence, ASXL1 mutation and development of multi-lineage dysplasia on bmbx in May 2023 suggesting clonal evolution.     She underwent allogenic stem cell transplant on 1/11/24: myeloablative conditioning with Bu/Flu, 6/8 unrelated donor peripheral blood stem cell graft, cell dose: 6.50E+06. ABO matched, CMV donor negative, recipient positive. GVHD ppx with PTCy, MMF and tacrolimus (avoiding sirolimus due to high risk of VOD, given elevated liver enzymes prior to transplant and suspected iron overload).     Post transplant  BK virus hemorrhagic cystitis (around D41): 2 doses of intravesicular Cidofovir 2/22 and 2/26/24.         INTERVAL HISTORY     Hortencia Baldwin returns for a scheduled BMT clinic visit. She reports left leg and knee pain, especially when going down stairs.   Her appetite is better but she is able to eat small portions. Nauseous only when she has to take medications. Big pills sometimes gets stuck while swallowing but otherwise no dysphagia. She endorses dry mouth.   No rash. No diarrhea, bowel movements are normal. No abdominal pain. Dysuria has completely resolved.   Feels like she has a lump over the LUQ,  but none felt on palpation.     ROS:  As above    Medications  Current Outpatient Medications   Medication Sig Dispense Refill    acyclovir (ZOVIRAX) 800 MG tablet Take 1 tablet (800 mg) by mouth 2 times daily 60 tablet 3    amLODIPine (NORVASC) 5 MG tablet Take 1 tablet (5 mg) by mouth daily 30 tablet 1    cyclobenzaprine (FLEXERIL) 5 MG tablet       fenofibrate (LOFIBRA) 54 MG tablet Take 1 tablet (54 mg) by mouth daily 60 tablet 0    letermovir (PREVYMIS) 480 MG TABS tablet Take 1 tablet (480 mg) by mouth daily 30 tablet 2    mirabegron (MYRBETRIQ) 25 MG 24 hr tablet Take 1 tablet (25 mg) by mouth daily 30 tablet 0    ondansetron (ZOFRAN ODT) 4 MG ODT tab Take 1 tablet (4 mg) by mouth daily before breakfast (Patient taking differently: Take 4 mg by mouth every 8 hours as needed) 30 tablet 1    oxyBUTYnin (DITROPAN) 5 MG tablet Take 1 tablet (5 mg) by mouth 4 times daily      oxyCODONE (ROXICODONE) 5 MG tablet Take 1 tablet (5 mg) by mouth every 4 hours as needed for moderate pain 15 tablet 0    pantoprazole (PROTONIX) 40 MG EC tablet Take 1 tablet (40 mg) by mouth every morning (before breakfast) 30 tablet 0    potassium chloride jean pierre ER (KLOR-CON M20) 20 MEQ CR tablet Take 1 tablet (20 mEq) by mouth daily      sirolimus (GENERIC EQUIVALENT) 0.5 MG tablet Take one (1mg) tablet in addition to one (0.5mg) tablet for a total of 1.5mg daily. (Patient taking differently: Take 1 mg daily alternating with 0.5 mg daily. Dose as of 3/2.) 30 tablet 0    sirolimus (GENERIC EQUIVALENT) 1 MG tablet Take one (1mg) tablet in addition to one (0.5mg) tablet for a total of 1.5mg daily. (Patient taking differently: Take 1 mg daily alternating with 0.5 mg daily. Dose as of 3/2.) 30 tablet 0    sulfamethoxazole-trimethoprim (BACTRIM DS) 800-160 MG tablet Take 1 tablet by mouth Every Mon, Tues two times daily 48 tablet 1    ursodiol (ACTIGALL) 300 MG capsule Take 1 capsule (300 mg) by mouth 3 times daily 108 capsule 0         EXAM       LMP 11/05/2017   Wt Readings from Last 4 Encounters:   03/06/24 76.9 kg (169 lb 8 oz)   03/04/24 77.8 kg (171 lb 9.6 oz)   02/29/24 77.5 kg (170 lb 12.8 oz)   02/21/24 77.9 kg (171 lb 12.8 oz)       KPS: 80% Can perform normal activity with effort, some signs of disease    General: Alert, awake  Eyes: Conjunctiva normal  Mouth and Throat: No mucositis  Respiratory: Normal respiratory effort.  Cardiovascular: Normal perfusion, no significant edema.  Abdomen: No distention or mass.  Neurological: Grossly normal motor and sensory function.  Skin: No changes in color, texture, or new lesions.  Psych: Mood and affect are appropriate.     LABS / PATHOLOGY/ IMAGING     Data     Blood Counts  Recent Labs   Lab Test 03/21/24  1118 03/14/24  1222 03/06/24  1011 02/22/24  0350 02/21/24  0816 02/19/24  1203 02/16/24  1054   WBC 1.8* 2.7* 6.1   < > 7.7 5.8 4.9   HGB 12.8 11.9 11.6*   < > 11.3* 10.0* 9.3*   * 82* 88*   < > 83* 74* 60*   NEUTROPHIL 24 30 66   < > 72 53 56   ANEU  --   --   --   --  5.5 3.1 2.7   LYMPH 53 47 20   < > 11 17 7   ALYM  --   --   --   --  0.8 1.0 0.3*    < > = values in this interval not displayed.       Basic Panel  Recent Labs   Lab Test 03/06/24  1011 03/04/24  1357 03/01/24  1020    141 140   POTASSIUM 3.6 3.6 3.7   CHLORIDE 104 106 104   CO2 25 25 27   BUN 4.7* 4.7* 5.9*   CR 0.54 0.46* 0.51   * 92 143*        Calcium, Magnesium, Phosphorus  Recent Labs   Lab Test 03/06/24  1011 03/04/24  1357 03/01/24  1020 02/29/24  0244 02/28/24  0344 02/27/24  0328   NIKO 9.5 8.8 8.9 9.2 9.3 9.3   MAG  --   --   --  2.1 2.0 2.0   PHOS  --   --   --  3.0 3.5 3.9        LFTs  Recent Labs   Lab Test 03/06/24  1011 03/04/24  1357 03/01/24  1020   ALKPHOS 93 87 88   AST 33 58* 48*   ALT 34 50 40   BILITOTAL 0.2 0.2 0.2   ALBUMIN 4.1 3.7 3.7       Immunosuppression  Recent Labs   Lab Test 03/21/24  1118 03/14/24  1338 03/06/24  1011   RAPAMY 4.5* 5.0 5.2       ID  Recent Labs   Lab Test  03/04/24  1357 03/01/24  1020 02/21/24  0816   CMVQNT Not Detected <35* Not Detected       Recent Labs   Lab Test 03/14/24  1222 02/22/24  0350 01/02/24  1432 12/19/23  1347 05/30/17  1155   SONY 7,899* 10,961* 4,341* 5,191* 20     Chimerism  Lab Results   Component Value Date    SPECDES  02/06/2024     Bone Marrow: ACD Syringe      SPECDES  02/06/2024     Bone Marrow: ACD Syringe      LDRESULTS  02/06/2024     RESULTS:    POST BONE MARROW  DONOR: (DEBORAH, [id number])  100 %     RECIPIENT:  0 %    These results are accurate +/-5%.                LDRESULTS  02/01/2024     RESULTS:     POST CD3+ FRACTION  DONOR: (DEBORAH, [id number])  87 %     RECIPIENT:  13 %    These results are accurate +/-5%.                LDRESULTS  02/01/2024     RESULTS:     POST CD33/66b+(Myeloid) FRACTION  DONOR: (DEBORAH, [id number])  100 %     RECIPIENT:  0 %    These results are accurate +/-5%.                   Pathology  Bone marrow biopsy:   Lab Results   Component Value Date    FINALDX  02/06/2024     Bone marrow, posterior iliac crest, left decalcified trephine biopsy and touch imprint; left aspirate clot, direct aspirate smear, and concentrated aspirate smear; and peripheral smear:    -Hypercellular marrow (small, suboptimal biopsy with crush artifact, cellularity estimated at 70 to 80%) showing trilineage hematopoiesis with erythroid skewing, megaloblastoid change and subtle  nuclear irregularities in erythroid precursors, 3% ring sideroblasts, no definitive dysplasia in granulocytes or megakaryocytes, slightly increased eosinophils (5%), no increase in blasts (1%)  -See comment  - Peripheral blood showing moderate normochromic, macrocytic anemia; rare helmet cells; moderate leukopenia; lymphopenia; marked thrombocytopenia      COMDX  02/06/2024     There is no immunophenotypic evidence of acute myeloid leukemia or another high-grade myeloid neoplasm. Final interpretation requires correlation with results of  other ancillary studies, morphologic, and clinical features.          Flow cytology:   Lab Results   Component Value Date    FLINTERP  02/06/2024     A. Iliac Crest, Bone Marrow Aspirate, Left:  -No increase in myeloid blasts and no abnormal myeloid blast population  See comment        COMDX  02/06/2024     There is no immunophenotypic evidence of acute myeloid leukemia or another high-grade myeloid neoplasm. Final interpretation requires correlation with results of other ancillary studies, morphologic, and clinical features.                   ASSESSMENT AND PLAN     BMT: around 2 months   [id number] (according to but not on) with Bu/Flu  6/8 URD, peripheral blood stem cell graft, cell dose: 6.5 x10^6.     Disease status: Bone marrow biopsy at D28, D100, 6 months, 1 year  2/6/24 (~D26): Hypercellular marrow (70-80%), trilineage hemopoiesis with rare nuclear irregularities in terminal erythroid precursors of uncertain significance, 3% ringed sideroblasts (in the context of iron overload is not considered dysplastic finding). Flow showed no increase in myeloid blasts. Cytogenetics 46 XY, consistent with donor. FISH: Rate of loss of 20q within normal limits. NGS: No mutations detected    Graft status/chimerism: D28, D100, 6 months,1 year  2/6/24 (~D26): Bone marrow 100%, peripheral blood (2/1/24) CD33 100%, CD3 87%  3/14/24 (~D60): Peripheral blood CD3 and CD33 100%    GVHD  # Current immunosuppression is sirolimus   - Serum level of sirolimus on 2/9/24 is 5. Target 5-7 (on lower side due to recent infections)    # GVHD grade  Skin: No changes. Skin score 0.  GI: No symptoms. GI score 0.  Liver: LFTs elevated, but unclear reason. Score 0   The current overall acute GVHD grade is 0.    Heme/ Coag  # ABO matched (both O+).   Mild leukopenia and thrombocytopenia. Monitor.   Platelet refractoriness: HLA platelets when available.   Transfuse for Hb <7, platelets < 10, G-CSF PRN for ANC < 1    # Iron overload: Hx of  transfusion dependent anemia, pre-transplant ferritin around 5,000. 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 BK virus in blood was around 3000 and in urine was over >100,000,000. Received 2 doses of intravesicular Cidofovir 2/22 and 2/26/24.   - Symptoms have largely resolved. Will recheck BK virus levels in urine and blood.   - Currently on mirabegron and oxybutynin.    # Prophylaxis  PJP: Bactrim from D28. Toxo IgG negative   Viral: Acyclovir 800 mg bid  CMV: Letermovir 480mg from D14 to D100  Bacterial/fungal : completed cefpodoxime (prolonged Qtc) and micafungin    # Monitoring  CMV: Monitor weekly/ every 2 weeks till D180. 3/14 - not detected  EBV: Monitor every 2 weeks. 3/14 - not detected  IgG: Level 424 mg/dL on 3/14/24. Check serum IgG level q3 months, and consider IVIG repletion for IgG levels <400 mg/dL.    GI  # Elevated transaminases  AST and ALT elevated beginning 2/2/24 (D22). Notably, she was on tacrolimus for GVHD ppx but changed to sirolimus on 1/31/24. Infectious workup (2/6/24) was negative. US RUQ (2/20/24) showed hepatomegaly suggestive of steatosis but no other abnormalities.   - LFTs have almost normalized.     # Prophylaxis   Ulcer prophylaxis: Pantoprazole  VOD prophylaxis: Ursodiol. Might need to continue beyond D75 if LFTs still elevated    Derm  - Pink maculopapular rash w/pruritus on back, chest and abdomen beginning 1/31/24 (D21). Topical steroids and was given prednisone 40 mg x 5 days (2/3-2/7/24) with complete resolution. 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    - Intertrigo under left breast: Resolved with barrier cream and topical nystatin for 7 days (2/28-3/6/24)    CV  HTN: Amlodipine 5mg daily   Hypertriglyceridemia: Fenofibrate     Post-transplant vaccinations  Flu vaccination after D60,  then annually   COVID vaccine after D100  No smoking     SUMMARY  - letermovir refilled today  - follow up on labs  - RTC with JEN in 1 week     Disposition. Return in 1 month, sooner if needed.    I spent 60 minutes in the care of this patient today, which included time necessary for preparation for the visit, obtaining history, ordering medications/tests/procedures as medically indicated, review of pertinent medical literature, counseling of the patient, communication of recommendations to the care team, and documentation time.    Coco Garay  Department of Hematology, Oncology and Transplantation  [facility] Pager 1300/Text via George

## 2024-03-14 NOTE — NURSING NOTE
Chief Complaint   Patient presents with    Blood Draw     Vitals taken, venipuncture labs drawn, checked into next appt    /76 (BP Location: Left arm, Patient Position: Sitting, Cuff Size: Adult Large)   Pulse 82   Temp 98.8  F (37.1  C) (Oral)   Resp 16   Wt 76.7 kg (169 lb)   LMP 11/05/2017   SpO2 97%   BMI 31.30 kg/m    Carlos Eduardo Barger RN on 3/14/2024 at 12:18 PM

## 2024-03-14 NOTE — NURSING NOTE
"Oncology Rooming Note    March 14, 2024 12:32 PM   Hortencia Baldwin is a 53 year old female who presents for:    Chief Complaint   Patient presents with    Blood Draw     Vitals taken, venipuncture labs drawn, checked into next appt    Oncology Clinic Visit     MDS (myelodysplastic syndrome)      Initial Vitals: /76 (BP Location: Left arm, Patient Position: Sitting, Cuff Size: Adult Large)   Pulse 82   Temp 98.8  F (37.1  C) (Oral)   Resp 16   Wt 76.7 kg (169 lb)   LMP 11/05/2017   SpO2 97%   BMI 31.30 kg/m   Estimated body mass index is 31.3 kg/m  as calculated from the following:    Height as of 2/21/24: 1.565 m (5' 1.61\").    Weight as of this encounter: 76.7 kg (169 lb). Body surface area is 1.83 meters squared.  Moderate Pain (4) Comment: 3-4 left leg from knee to calf, when going up and down the steps   Patient's last menstrual period was 11/05/2017.  Allergies reviewed: Yes  Medications reviewed: Yes    Medications: Medication refills not needed today.  Pharmacy name entered into [facility]:    [id number] - [place], [place] - [address] [facility]  [id number] - [place], [place] - [address] [facility] AT [facility]    Frailty Screening:   Is the patient here for a new oncology consult visit in cancer care? 2. No      Clinical concerns: Pt may need refill on \"pink pills\" (wasn't sure of name).      Medina Schilling, EMT  3/14/2024              "

## 2024-03-14 NOTE — NURSING NOTE
Chief Complaint   Patient presents with    Labs Only     Labs drawn via  by RN in lab.        Labs collected from venipuncture by RN.     Trina Hunter RN

## 2024-03-14 NOTE — PROGRESS NOTES
BMT/Cell Therapy Follow Up    Date of service: Mar 14, 2024     Referring provider: Dr.Evan Ramey, [facility]     Patient ID: Hortencia Baldwin is a 53 year old y/o female who is day +63  post allogenic stem cell transplant. Date of transplant: 1/11/2024    Diagnosis MDS w/ SF3B1 BMT type Allogenic  CMV  Donor -  Recipient +    Prep: MA Bu/Flu Donor type  6/8 URD Blood Type Donor and recipient O+   GVHD Ppx PTCy/ siro/ MMF Graft source PBSCT Toxo IgG Negative   Protocol [id number] (not on) BMT MD Dr. Yi         Data    Pre-transplant disease history  54 y/o F with history of longstanding macrocytic anemia (Hb 10-11, -110 dating back to 1997) presented in Nov 2021 for lightheadedness. CBC showed acute on chronic anemia with Hb 4.7, ; normal WBC and platelet count. Bone marrow biopsy (12/09/2021) was consistent with MDS with low blasts and SF3B1 mutation. Hypercellular marrow (70%) with single lineage dysplasia (dyserythropoiesis), increased ringed sideroblasts and no increase in blasts. Flow cytometry showed mixed lymphocytes. Cytogenetics 46,XX,del(20). NGS: SF3B1 (45%), ASXL1 (6%).   Received Epo (March 2022 - April 2022) but had minimal response after 4 weekly doses. Luspatercept from April 2022 to April 2023 with inadequate response.   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 Treated with weekly rituximab x 4 and steroid taper (Sept to Dec 2022). LDH and bilirubin started rising after steroid taper, therefore treated with MMF 500mg BID (Dec 2022 to May 2023) with resolution of hemolysis (negative KATINA, normal LDH, bilirubin). However, she continued to be transfusion dependant therefore her anemia was determined to be related more to MDS than autoimmune hemolysis.   Repeat bone marrow biopsy (5/11/2023) showed persistent MDS. Hypercellular marrow (90%) but now with dysplasia in all three lineages, cytogenetics 46, XX, del (20).  Received decitabine- cedazurdine X 4 cycles (May 2023 to Dec 2023). Complicated by neutropenia requiring dose reduction and pRBC transfusions every 2-3 weeks. Pre-transplant bmbx (12/7/23) showed persistent MDS with multi lineage dysplasia (dyserythropoesis and dysgranulopoesis), 17% ringed sideroblasts, increased marrow storage iron. Cytogenetics: 46,XX,del(20). Flow cytometry and NGS not done.    She was considered for allogenic transplant despite low risk MDS by IPSS-R at diagnosis due to transfusion dependence, ASXL1 mutation and development of multi-lineage dysplasia on bmbx in May 2023 suggesting clonal evolution.     She underwent allogenic stem cell transplant on 1/11/24: myeloablative conditioning with Bu/Flu, 6/8 unrelated donor peripheral blood stem cell graft, cell dose: 6.50E+06. ABO matched, CMV donor negative, recipient positive. GVHD ppx with PTCy, MMF and tacrolimus (avoiding sirolimus due to high risk of VOD, given elevated liver enzymes prior to transplant and suspected iron overload).     Post transplant  BK virus hemorrhagic cystitis (around D41): 2 doses of intravesicular Cidofovir 2/22 and 2/26/24.         INTERVAL HISTORY     Hortencia Baldwin returns for a scheduled BMT clinic visit. She reports left leg and knee pain, especially when going down stairs.   Her appetite is better but she is able to eat small portions. Nauseous only when she has to take medications. Big pills sometimes gets stuck while swallowing but otherwise no dysphagia. She endorses dry mouth.   No rash. No diarrhea, bowel movements are normal. No abdominal pain. Dysuria has completely resolved.   Feels like she has a lump over the LUQ, but none felt on palpation.     ROS:  As above    Medications  Current Outpatient Medications   Medication Sig Dispense Refill    acyclovir (ZOVIRAX) 800 MG tablet Take 1 tablet (800 mg) by mouth 2 times daily 60 tablet 3    amLODIPine (NORVASC) 5 MG tablet Take 1 tablet (5 mg) by mouth daily  30 tablet 1    cyclobenzaprine (FLEXERIL) 5 MG tablet       fenofibrate (LOFIBRA) 54 MG tablet Take 1 tablet (54 mg) by mouth daily 60 tablet 0    letermovir (PREVYMIS) 480 MG TABS tablet Take 1 tablet (480 mg) by mouth daily 30 tablet 2    mirabegron (MYRBETRIQ) 25 MG 24 hr tablet Take 1 tablet (25 mg) by mouth daily 30 tablet 0    ondansetron (ZOFRAN ODT) 4 MG ODT tab Take 1 tablet (4 mg) by mouth daily before breakfast (Patient taking differently: Take 4 mg by mouth every 8 hours as needed) 30 tablet 1    oxyBUTYnin (DITROPAN) 5 MG tablet Take 1 tablet (5 mg) by mouth 4 times daily      oxyCODONE (ROXICODONE) 5 MG tablet Take 1 tablet (5 mg) by mouth every 4 hours as needed for moderate pain 15 tablet 0    pantoprazole (PROTONIX) 40 MG EC tablet Take 1 tablet (40 mg) by mouth every morning (before breakfast) 30 tablet 0    potassium chloride jean pierre ER (KLOR-CON M20) 20 MEQ CR tablet Take 1 tablet (20 mEq) by mouth daily      sirolimus (GENERIC EQUIVALENT) 0.5 MG tablet Take one (1mg) tablet in addition to one (0.5mg) tablet for a total of 1.5mg daily. (Patient taking differently: Take 1 mg daily alternating with 0.5 mg daily. Dose as of 3/2.) 30 tablet 0    sirolimus (GENERIC EQUIVALENT) 1 MG tablet Take one (1mg) tablet in addition to one (0.5mg) tablet for a total of 1.5mg daily. (Patient taking differently: Take 1 mg daily alternating with 0.5 mg daily. Dose as of 3/2.) 30 tablet 0    sulfamethoxazole-trimethoprim (BACTRIM DS) 800-160 MG tablet Take 1 tablet by mouth Every Mon, Tues two times daily 48 tablet 1    ursodiol (ACTIGALL) 300 MG capsule Take 1 capsule (300 mg) by mouth 3 times daily 108 capsule 0         EXAM      LMP 11/05/2017   Wt Readings from Last 4 Encounters:   03/06/24 76.9 kg (169 lb 8 oz)   03/04/24 77.8 kg (171 lb 9.6 oz)   02/29/24 77.5 kg (170 lb 12.8 oz)   02/21/24 77.9 kg (171 lb 12.8 oz)       KPS: 80% Can perform normal activity with effort, some signs of disease    General: Alert,  awake  Eyes: Conjunctiva normal  Mouth and Throat: No mucositis  Respiratory: Normal respiratory effort.  Cardiovascular: Normal perfusion, no significant edema.  Abdomen: No distention or mass.  Neurological: Grossly normal motor and sensory function.  Skin: No changes in color, texture, or new lesions.  Psych: Mood and affect are appropriate.     LABS / PATHOLOGY/ IMAGING     Data      Blood Counts  Recent Labs   Lab Test 03/21/24  1118 03/14/24  1222 03/06/24  1011 02/22/24  0350 02/21/24  0816 02/19/24  1203 02/16/24  1054   WBC 1.8* 2.7* 6.1   < > 7.7 5.8 4.9   HGB 12.8 11.9 11.6*   < > 11.3* 10.0* 9.3*   * 82* 88*   < > 83* 74* 60*   NEUTROPHIL 24 30 66   < > 72 53 56   ANEU  --   --   --   --  5.5 3.1 2.7   LYMPH 53 47 20   < > 11 17 7   ALYM  --   --   --   --  0.8 1.0 0.3*    < > = values in this interval not displayed.       Basic Panel  Recent Labs   Lab Test 03/06/24  1011 03/04/24  1357 03/01/24  1020    141 140   POTASSIUM 3.6 3.6 3.7   CHLORIDE 104 106 104   CO2 25 25 27   BUN 4.7* 4.7* 5.9*   CR 0.54 0.46* 0.51   * 92 143*        Calcium, Magnesium, Phosphorus  Recent Labs   Lab Test 03/06/24  1011 03/04/24  1357 03/01/24  1020 02/29/24  0244 02/28/24  0344 02/27/24  0328   NIKO 9.5 8.8 8.9 9.2 9.3 9.3   MAG  --   --   --  2.1 2.0 2.0   PHOS  --   --   --  3.0 3.5 3.9        LFTs  Recent Labs   Lab Test 03/06/24  1011 03/04/24  1357 03/01/24  1020   ALKPHOS 93 87 88   AST 33 58* 48*   ALT 34 50 40   BILITOTAL 0.2 0.2 0.2   ALBUMIN 4.1 3.7 3.7       Immunosuppression  Recent Labs   Lab Test 03/21/24  1118 03/14/24  1338 03/06/24  1011   RAPAMY 4.5* 5.0 5.2       ID  Recent Labs   Lab Test 03/04/24  1357 03/01/24  1020 02/21/24  0816   CMVQNT Not Detected <35* Not Detected       Recent Labs   Lab Test 03/14/24  1222 02/22/24  0350 01/02/24  1432 12/19/23  1347 05/30/17  1155   SONY 7,899* 10,961* 4,341* 5,191* 20     Chimerism  Lab Results   Component Value Date    SPECDES  02/06/2024      Bone Marrow: ACD Syringe      SPECDES  02/06/2024     Bone Marrow: ACD Syringe      LDRESULTS  02/06/2024     RESULTS:    POST BONE MARROW  DONOR: (DEBORAH, [id number])  100 %     RECIPIENT:  0 %    These results are accurate +/-5%.                LDRESULTS  02/01/2024     RESULTS:     POST CD3+ FRACTION  DONOR: (DEBORAH, [id number])  87 %     RECIPIENT:  13 %    These results are accurate +/-5%.                LDRESULTS  02/01/2024     RESULTS:     POST CD33/66b+(Myeloid) FRACTION  DONOR: (DEBORAH, [id number])  100 %     RECIPIENT:  0 %    These results are accurate +/-5%.                   Pathology  Bone marrow biopsy:   Lab Results   Component Value Date    FINALDX  02/06/2024     Bone marrow, posterior iliac crest, left decalcified trephine biopsy and touch imprint; left aspirate clot, direct aspirate smear, and concentrated aspirate smear; and peripheral smear:    -Hypercellular marrow (small, suboptimal biopsy with crush artifact, cellularity estimated at 70 to 80%) showing trilineage hematopoiesis with erythroid skewing, megaloblastoid change and subtle  nuclear irregularities in erythroid precursors, 3% ring sideroblasts, no definitive dysplasia in granulocytes or megakaryocytes, slightly increased eosinophils (5%), no increase in blasts (1%)  -See comment  - Peripheral blood showing moderate normochromic, macrocytic anemia; rare helmet cells; moderate leukopenia; lymphopenia; marked thrombocytopenia      COMDX  02/06/2024     There is no immunophenotypic evidence of acute myeloid leukemia or another high-grade myeloid neoplasm. Final interpretation requires correlation with results of other ancillary studies, morphologic, and clinical features.          Flow cytology:   Lab Results   Component Value Date    FLINTERP  02/06/2024     A. Iliac Crest, Bone Marrow Aspirate, Left:  -No increase in myeloid blasts and no abnormal myeloid blast population  See comment        COMDX   02/06/2024     There is no immunophenotypic evidence of acute myeloid leukemia or another high-grade myeloid neoplasm. Final interpretation requires correlation with results of other ancillary studies, morphologic, and clinical features.                   ASSESSMENT AND PLAN     BMT: around 2 months   [id number] (according to but not on) with Bu/Flu  6/8 URD, peripheral blood stem cell graft, cell dose: 6.5 x10^6.     Disease status: Bone marrow biopsy at D28, D100, 6 months, 1 year  2/6/24 (~D26): Hypercellular marrow (70-80%), trilineage hemopoiesis with rare nuclear irregularities in terminal erythroid precursors of uncertain significance, 3% ringed sideroblasts (in the context of iron overload is not considered dysplastic finding). Flow showed no increase in myeloid blasts. Cytogenetics 46 XY, consistent with donor. FISH: Rate of loss of 20q within normal limits. NGS: No mutations detected    Graft status/chimerism: D28, D100, 6 months,1 year  2/6/24 (~D26): Bone marrow 100%, peripheral blood (2/1/24) CD33 100%, CD3 87%  3/14/24 (~D60): Peripheral blood CD3 and CD33 100%    GVHD  # Current immunosuppression is sirolimus   - Serum level of sirolimus on 2/9/24 is 5. Target 5-7 (on lower side due to recent infections)    # GVHD grade  Skin: No changes. Skin score 0.  GI: No symptoms. GI score 0.  Liver: LFTs elevated, but unclear reason. Score 0   The current overall acute GVHD grade is 0.    Heme/ Coag  # ABO matched (both O+).   Mild leukopenia and thrombocytopenia. Monitor.   Platelet refractoriness: HLA platelets when available.   Transfuse for Hb <7, platelets < 10, G-CSF PRN for ANC < 1    # Iron overload: Hx of transfusion dependent anemia, pre-transplant ferritin around 5,000. 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 BK virus in blood was around 3000 and in urine was over >100,000,000.  Received 2 doses of intravesicular Cidofovir 2/22 and 2/26/24.   - Symptoms have largely resolved. Will recheck BK virus levels in urine and blood.   - Currently on mirabegron and oxybutynin.    # Prophylaxis  PJP: Bactrim from D28. Toxo IgG negative   Viral: Acyclovir 800 mg bid  CMV: Letermovir 480mg from D14 to D100  Bacterial/fungal : completed cefpodoxime (prolonged Qtc) and micafungin    # Monitoring  CMV: Monitor weekly/ every 2 weeks till D180. 3/14 - not detected  EBV: Monitor every 2 weeks. 3/14 - not detected  IgG: Level 424 mg/dL on 3/14/24. Check serum IgG level q3 months, and consider IVIG repletion for IgG levels <400 mg/dL.    GI  # Elevated transaminases  AST and ALT elevated beginning 2/2/24 (D22). Notably, she was on tacrolimus for GVHD ppx but changed to sirolimus on 1/31/24. Infectious workup (2/6/24) was negative. US RUQ (2/20/24) showed hepatomegaly suggestive of steatosis but no other abnormalities.   - LFTs have almost normalized.     # Prophylaxis   Ulcer prophylaxis: Pantoprazole  VOD prophylaxis: Ursodiol. Might need to continue beyond D75 if LFTs still elevated    Derm  - Pink maculopapular rash w/pruritus on back, chest and abdomen beginning 1/31/24 (D21). Topical steroids and was given prednisone 40 mg x 5 days (2/3-2/7/24) with complete resolution. 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    - Intertrigo under left breast: Resolved with barrier cream and topical nystatin for 7 days (2/28-3/6/24)    CV  HTN: Amlodipine 5mg daily   Hypertriglyceridemia: Fenofibrate     Post-transplant vaccinations  Flu vaccination after D60, then annually   COVID vaccine after D100  No smoking     SUMMARY  - letermovir refilled today  - follow up on labs  - RTC with JEN in 1 week     Disposition. Return in 1 month, sooner if needed.    I spent 60 minutes in the care of this patient today, which included time necessary for preparation for  the visit, obtaining history, ordering medications/tests/procedures as medically indicated, review of pertinent medical literature, counseling of the patient, communication of recommendations to the care team, and documentation time.    Coco Garay  Department of Hematology, Oncology and Transplantation  [facility] Pager 1300/Text via [employer]

## 2024-03-15 ENCOUNTER — OFFICE VISIT (OUTPATIENT)
Dept: TRANSPLANT | Facility: CLINIC | Age: 54
End: 2024-03-15
Attending: STUDENT IN AN ORGANIZED HEALTH CARE EDUCATION/TRAINING PROGRAM
Payer: COMMERCIAL

## 2024-03-15 DIAGNOSIS — D46.9 MDS (MYELODYSPLASTIC SYNDROME) (H): Primary | ICD-10-CM

## 2024-03-15 LAB
BK VIRUS DNA IU/ML, INSTRUMENT (6800): 445 IU/ML
BK VIRUS SPECIMEN TYPE: ABNORMAL
BKV DNA SPEC NAA+PROBE-LOG#: 2.6 {LOG_COPIES}/ML
CMV DNA SPEC NAA+PROBE-ACNC: NOT DETECTED IU/ML
EBV DNA # SPEC NAA+PROBE: NOT DETECTED COPIES/ML
IGG SERPL-MCNC: 424 MG/DL (ref 610–1616)

## 2024-03-15 NOTE — PROGRESS NOTES
At Dr. Garay's request, I reviewed 1 week of scheduled medications in her med box.     Current Outpatient Medications   Medication Sig Dispense Refill    acyclovir (ZOVIRAX) 800 MG tablet Take 1 tablet (800 mg) by mouth 2 times daily 60 tablet 3    amLODIPine (NORVASC) 5 MG tablet Take 1 tablet (5 mg) by mouth daily 30 tablet 1    cyclobenzaprine (FLEXERIL) 5 MG tablet       fenofibrate (LOFIBRA) 54 MG tablet Take 1 tablet (54 mg) by mouth daily 60 tablet 0    letermovir (PREVYMIS) 480 MG TABS tablet Take 1 tablet (480 mg) by mouth daily 30 tablet 2    mirabegron (MYRBETRIQ) 25 MG 24 hr tablet Take 1 tablet (25 mg) by mouth daily 30 tablet 0    ondansetron (ZOFRAN ODT) 4 MG ODT tab Take 1 tablet (4 mg) by mouth daily before breakfast (Patient taking differently: Take 4 mg by mouth every 8 hours as needed) 30 tablet 1    oxyBUTYnin (DITROPAN) 5 MG tablet Take 1 tablet (5 mg) by mouth 4 times daily      oxyCODONE (ROXICODONE) 5 MG tablet Take 1 tablet (5 mg) by mouth every 4 hours as needed for moderate pain 15 tablet 0    pantoprazole (PROTONIX) 40 MG EC tablet Take 1 tablet (40 mg) by mouth every morning (before breakfast) 30 tablet 0    potassium chloride jean pierre ER (KLOR-CON M20) 20 MEQ CR tablet Take 1 tablet (20 mEq) by mouth daily      sirolimus (GENERIC EQUIVALENT) 0.5 MG tablet Take one (1mg) tablet in addition to one (0.5mg) tablet for a total of 1.5mg daily. (Patient taking differently: Take 1 mg daily alternating with 0.5 mg daily. Dose as of 3/2.) 30 tablet 0    sirolimus (GENERIC EQUIVALENT) 1 MG tablet Take one (1mg) tablet in addition to one (0.5mg) tablet for a total of 1.5mg daily. (Patient taking differently: Take 1 mg daily alternating with 0.5 mg daily. Dose as of 3/2.) 30 tablet 0    sulfamethoxazole-trimethoprim (BACTRIM DS) 800-160 MG tablet Take 1 tablet by mouth Every Mon, Tues two times daily 48 tablet 1    ursodiol (ACTIGALL) 300 MG capsule Take 1 capsule (300 mg) by mouth 3 times daily 108  capsule 0        Pertinent labs considered today:  Lab Results   Component Value Date     03/14/2024     05/30/2017                 normal sodium 136-148  Lab Results   Component Value Date    POTASSIUM 4.1 03/14/2024    POTASSIUM 3.7 05/30/2017       normal potassium 3.5-5.2   Lab Results   Component Value Date    CHLORIDE 103 03/14/2024    CHLORIDE 107 05/30/2017           normal chloride    Lab Results   Component Value Date    CO2 27 03/14/2024    CO2 26 05/30/2017                normal CO2 20-32  Lab Results   Component Value Date    BUN 6.8 03/14/2024    BUN 11 05/30/2017                 normal BUN 5-24  Lab Results   Component Value Date    GLC 87 03/14/2024     02/01/2024    GLC 85 05/30/2017                 normal glucose   Lab Results   Component Value Date    CR 0.60 03/14/2024    CR 0.63 05/30/2017                 normal cr 0.8-1.5  Lab Results   Component Value Date    NIKO 9.6 03/14/2024    NIKO 8.1 05/30/2017               normal calcium  8.5-10.4  Lab Results   Component Value Date    BILITOTAL 0.2 03/14/2024    BILITOTAL 0.4 05/30/2017          normal bilirubin 0.2-1.3  Lab Results   Component Value Date    PROTTOTAL 6.8 03/14/2024    PROTTOTAL 6.9 05/30/2017          normal total protein 6.0-8.2  Lab Results   Component Value Date    ALBUMIN 4.2 03/14/2024    ALBUMIN 3.7 05/30/2017             normal albumin 3.2-4.5  Lab Results   Component Value Date    ALKPHOS 95 03/14/2024    ALKPHOS 72 05/30/2017            normal alkphos   Lab Results   Component Value Date    ALT 52 03/14/2024    ALT 16 05/30/2017         normal ALT 0-65  Lab Results   Component Value Date    AST 54 03/14/2024    AST 11 05/30/2017         normal AST 0-37    Lab Results   Component Value Date    HGB 11.9 03/14/2024    HGB 10.4 03/19/2018     Lab Results   Component Value Date    WBC 2.7 03/14/2024    WBC 5.4 03/19/2018      Lab Results   Component Value Date    PLT 82 03/14/2024      03/19/2018       Estimated Creatinine Clearance: 103.1 mL/min (based on SCr of 0.6 mg/dL).    Changes made to scheduled medication list today include:  Added: None  Deleted: None  Changed: None    Actions taken by pharmacist (provider contacted, etc):None   No refills were needed as enough medication is already obtained to refill the med box next week, prior to the patient arriving in clinic (there were enough medications to refill the box for 1 week today). I gave her an updated medication list that indicated what meds were put in the med box.       JUNAID STONE, [facility], PharmD

## 2024-03-15 NOTE — LETTER
3/15/2024         RE: Hortencia Baldwin  [address]  [address]  [place] [id number]        Dear Colleague,    Thank you for referring your patient, Hortencia Baldwin, to the [facility] [place]. Please see a copy of my visit note below.    At Dr. Garay's request, I reviewed 1 week of scheduled medications in her med box.     Current Outpatient Medications   Medication Sig Dispense Refill    acyclovir (ZOVIRAX) 800 MG tablet Take 1 tablet (800 mg) by mouth 2 times daily 60 tablet 3    amLODIPine (NORVASC) 5 MG tablet Take 1 tablet (5 mg) by mouth daily 30 tablet 1    cyclobenzaprine (FLEXERIL) 5 MG tablet       fenofibrate (LOFIBRA) 54 MG tablet Take 1 tablet (54 mg) by mouth daily 60 tablet 0    letermovir (PREVYMIS) 480 MG TABS tablet Take 1 tablet (480 mg) by mouth daily 30 tablet 2    mirabegron (MYRBETRIQ) 25 MG 24 hr tablet Take 1 tablet (25 mg) by mouth daily 30 tablet 0    ondansetron (ZOFRAN ODT) 4 MG ODT tab Take 1 tablet (4 mg) by mouth daily before breakfast (Patient taking differently: Take 4 mg by mouth every 8 hours as needed) 30 tablet 1    oxyBUTYnin (DITROPAN) 5 MG tablet Take 1 tablet (5 mg) by mouth 4 times daily      oxyCODONE (ROXICODONE) 5 MG tablet Take 1 tablet (5 mg) by mouth every 4 hours as needed for moderate pain 15 tablet 0    pantoprazole (PROTONIX) 40 MG EC tablet Take 1 tablet (40 mg) by mouth every morning (before breakfast) 30 tablet 0    potassium chloride jean pierre ER (KLOR-CON M20) 20 MEQ CR tablet Take 1 tablet (20 mEq) by mouth daily      sirolimus (GENERIC EQUIVALENT) 0.5 MG tablet Take one (1mg) tablet in addition to one (0.5mg) tablet for a total of 1.5mg daily. (Patient taking differently: Take 1 mg daily alternating with 0.5 mg daily. Dose as of 3/2.) 30 tablet 0    sirolimus (GENERIC EQUIVALENT) 1 MG tablet Take one (1mg) tablet in addition to one (0.5mg) tablet for a total of 1.5mg daily. (Patient taking differently:  Take 1 mg daily alternating with 0.5 mg daily. Dose as of 3/2.) 30 tablet 0    sulfamethoxazole-trimethoprim (BACTRIM DS) 800-160 MG tablet Take 1 tablet by mouth Every Mon, Tues two times daily 48 tablet 1    ursodiol (ACTIGALL) 300 MG capsule Take 1 capsule (300 mg) by mouth 3 times daily 108 capsule 0        Pertinent labs considered today:  Lab Results   Component Value Date     03/14/2024     05/30/2017                 normal sodium 136-148  Lab Results   Component Value Date    POTASSIUM 4.1 03/14/2024    POTASSIUM 3.7 05/30/2017       normal potassium 3.5-5.2   Lab Results   Component Value Date    CHLORIDE 103 03/14/2024    CHLORIDE 107 05/30/2017           normal chloride    Lab Results   Component Value Date    CO2 27 03/14/2024    CO2 26 05/30/2017                normal CO2 20-32  Lab Results   Component Value Date    BUN 6.8 03/14/2024    BUN 11 05/30/2017                 normal BUN 5-24  Lab Results   Component Value Date    GLC 87 03/14/2024     02/01/2024    GLC 85 05/30/2017                 normal glucose   Lab Results   Component Value Date    CR 0.60 03/14/2024    CR 0.63 05/30/2017                 normal cr 0.8-1.5  Lab Results   Component Value Date    NIKO 9.6 03/14/2024    NIKO 8.1 05/30/2017               normal calcium  8.5-10.4  Lab Results   Component Value Date    BILITOTAL 0.2 03/14/2024    BILITOTAL 0.4 05/30/2017          normal bilirubin 0.2-1.3  Lab Results   Component Value Date    PROTTOTAL 6.8 03/14/2024    PROTTOTAL 6.9 05/30/2017          normal total protein 6.0-8.2  Lab Results   Component Value Date    ALBUMIN 4.2 03/14/2024    ALBUMIN 3.7 05/30/2017             normal albumin 3.2-4.5  Lab Results   Component Value Date    ALKPHOS 95 03/14/2024    ALKPHOS 72 05/30/2017            normal alkphos   Lab Results   Component Value Date    ALT 52 03/14/2024    ALT 16 05/30/2017         normal ALT 0-65  Lab Results   Component Value Date    AST 54  03/14/2024    AST 11 05/30/2017         normal AST 0-37    Lab Results   Component Value Date    HGB 11.9 03/14/2024    HGB 10.4 03/19/2018     Lab Results   Component Value Date    WBC 2.7 03/14/2024    WBC 5.4 03/19/2018      Lab Results   Component Value Date    PLT 82 03/14/2024     03/19/2018       Estimated Creatinine Clearance: 103.1 mL/min (based on SCr of 0.6 mg/dL).    Changes made to scheduled medication list today include:  Added: None  Deleted: None  Changed: None    Actions taken by pharmacist (provider contacted, etc):None   No refills were needed as enough medication is already obtained to refill the med box next week, prior to the patient arriving in clinic (there were enough medications to refill the box for 1 week today). I gave her an updated medication list that indicated what meds were put in the med box.       JUNAID STONE, [facility], PharmD

## 2024-03-18 DIAGNOSIS — D46.9 MDS (MYELODYSPLASTIC SYNDROME) (H): ICD-10-CM

## 2024-03-18 RX ORDER — URSODIOL 300 MG/1
300 CAPSULE ORAL 3 TIMES DAILY
Qty: 108 CAPSULE | Refills: 0 | Status: SHIPPED | OUTPATIENT
Start: 2024-03-18 | End: 2024-03-21

## 2024-03-21 ENCOUNTER — OFFICE VISIT (OUTPATIENT)
Dept: TRANSPLANT | Facility: CLINIC | Age: 54
End: 2024-03-21
Attending: STUDENT IN AN ORGANIZED HEALTH CARE EDUCATION/TRAINING PROGRAM
Payer: COMMERCIAL

## 2024-03-21 ENCOUNTER — LAB (OUTPATIENT)
Dept: LAB | Facility: CLINIC | Age: 54
End: 2024-03-21
Attending: STUDENT IN AN ORGANIZED HEALTH CARE EDUCATION/TRAINING PROGRAM
Payer: COMMERCIAL

## 2024-03-21 VITALS
BODY MASS INDEX: 31.5 KG/M2 | SYSTOLIC BLOOD PRESSURE: 135 MMHG | RESPIRATION RATE: 16 BRPM | WEIGHT: 170.1 LBS | DIASTOLIC BLOOD PRESSURE: 84 MMHG | HEART RATE: 89 BPM | TEMPERATURE: 98 F | OXYGEN SATURATION: 98 %

## 2024-03-21 DIAGNOSIS — D46.9 MDS (MYELODYSPLASTIC SYNDROME) (H): Primary | ICD-10-CM

## 2024-03-21 LAB
ACANTHOCYTES BLD QL SMEAR: NORMAL
ALBUMIN SERPL BCG-MCNC: 4.4 G/DL (ref 3.5–5.2)
ALP SERPL-CCNC: 102 U/L (ref 40–150)
ALT SERPL W P-5'-P-CCNC: 46 U/L (ref 0–50)
ANION GAP SERPL CALCULATED.3IONS-SCNC: 10 MMOL/L (ref 7–15)
AST SERPL W P-5'-P-CCNC: 38 U/L (ref 0–45)
AUER BODIES BLD QL SMEAR: NORMAL
BASO STIPL BLD QL SMEAR: NORMAL
BASOPHILS # BLD AUTO: 0 10E3/UL (ref 0–0.2)
BASOPHILS NFR BLD AUTO: 2 %
BILIRUB SERPL-MCNC: 0.2 MG/DL
BITE CELLS BLD QL SMEAR: NORMAL
BLISTER CELLS BLD QL SMEAR: NORMAL
BUN SERPL-MCNC: 9 MG/DL (ref 6–20)
BURR CELLS BLD QL SMEAR: NORMAL
CALCIUM SERPL-MCNC: 9.7 MG/DL (ref 8.6–10)
CHLORIDE SERPL-SCNC: 104 MMOL/L (ref 98–107)
CREAT SERPL-MCNC: 0.62 MG/DL (ref 0.51–0.95)
DACRYOCYTES BLD QL SMEAR: NORMAL
DEPRECATED HCO3 PLAS-SCNC: 27 MMOL/L (ref 22–29)
EGFRCR SERPLBLD CKD-EPI 2021: >90 ML/MIN/1.73M2
ELLIPTOCYTES BLD QL SMEAR: NORMAL
EOSINOPHIL # BLD AUTO: 0 10E3/UL (ref 0–0.7)
EOSINOPHIL NFR BLD AUTO: 2 %
ERYTHROCYTE [DISTWIDTH] IN BLOOD BY AUTOMATED COUNT: 13.7 % (ref 10–15)
FRAGMENTS BLD QL SMEAR: NORMAL
GLUCOSE SERPL-MCNC: 93 MG/DL (ref 70–99)
HCT VFR BLD AUTO: 39.5 % (ref 35–47)
HGB BLD-MCNC: 12.8 G/DL (ref 11.7–15.7)
HGB C CRYSTALS: NORMAL
HOWELL-JOLLY BOD BLD QL SMEAR: NORMAL
IMM GRANULOCYTES # BLD: 0 10E3/UL
IMM GRANULOCYTES NFR BLD: 0 %
LYMPHOCYTES # BLD AUTO: 1 10E3/UL (ref 0.8–5.3)
LYMPHOCYTES NFR BLD AUTO: 53 %
MCH RBC QN AUTO: 33.1 PG (ref 26.5–33)
MCHC RBC AUTO-ENTMCNC: 32.4 G/DL (ref 31.5–36.5)
MCV RBC AUTO: 102 FL (ref 78–100)
MONOCYTES # BLD AUTO: 0.4 10E3/UL (ref 0–1.3)
MONOCYTES NFR BLD AUTO: 19 %
NEUTROPHILS # BLD AUTO: 0.4 10E3/UL (ref 1.6–8.3)
NEUTROPHILS NFR BLD AUTO: 24 %
NEUTS HYPERSEG BLD QL SMEAR: NORMAL
NRBC # BLD AUTO: 0 10E3/UL
NRBC BLD AUTO-RTO: 0 /100
PLAT MORPH BLD: NORMAL
PLATELET # BLD AUTO: 111 10E3/UL (ref 150–450)
POLYCHROMASIA BLD QL SMEAR: NORMAL
POTASSIUM SERPL-SCNC: 3.9 MMOL/L (ref 3.4–5.3)
PROT SERPL-MCNC: 7.2 G/DL (ref 6.4–8.3)
RBC # BLD AUTO: 3.87 10E6/UL (ref 3.8–5.2)
RBC AGGLUT BLD QL: NORMAL
RBC MORPH BLD: NORMAL
ROULEAUX BLD QL SMEAR: NORMAL
SICKLE CELLS BLD QL SMEAR: NORMAL
SIROLIMUS BLD-MCNC: 4.5 UG/L (ref 5–15)
SMUDGE CELLS BLD QL SMEAR: NORMAL
SODIUM SERPL-SCNC: 141 MMOL/L (ref 135–145)
SPHEROCYTES BLD QL SMEAR: NORMAL
STOMATOCYTES BLD QL SMEAR: NORMAL
TARGETS BLD QL SMEAR: NORMAL
TME LAST DOSE: ABNORMAL H
TME LAST DOSE: ABNORMAL H
TOXIC GRANULES BLD QL SMEAR: NORMAL
VARIANT LYMPHS BLD QL SMEAR: NORMAL
WBC # BLD AUTO: 1.8 10E3/UL (ref 4–11)

## 2024-03-21 PROCEDURE — 99213 OFFICE O/P EST LOW 20 MIN: CPT

## 2024-03-21 PROCEDURE — 99417 PROLNG OP E/M EACH 15 MIN: CPT

## 2024-03-21 PROCEDURE — 36415 COLL VENOUS BLD VENIPUNCTURE: CPT

## 2024-03-21 PROCEDURE — 250N000011 HC RX IP 250 OP 636: Mod: JZ

## 2024-03-21 PROCEDURE — 80053 COMPREHEN METABOLIC PANEL: CPT

## 2024-03-21 PROCEDURE — 80195 ASSAY OF SIROLIMUS: CPT

## 2024-03-21 PROCEDURE — 85025 COMPLETE CBC W/AUTO DIFF WBC: CPT

## 2024-03-21 PROCEDURE — 96372 THER/PROPH/DIAG INJ SC/IM: CPT

## 2024-03-21 PROCEDURE — 99215 OFFICE O/P EST HI 40 MIN: CPT | Mod: 24

## 2024-03-21 RX ORDER — ALBUTEROL SULFATE 5 MG/ML
2.5 SOLUTION RESPIRATORY (INHALATION)
Status: CANCELLED
Start: 2024-03-21

## 2024-03-21 RX ORDER — CEFPODOXIME PROXETIL 200 MG/1
200 TABLET, FILM COATED ORAL 2 TIMES DAILY
Qty: 60 TABLET | Refills: 0 | Status: SHIPPED | OUTPATIENT
Start: 2024-03-21 | End: 2024-03-28

## 2024-03-21 RX ORDER — HEPARIN SODIUM (PORCINE) LOCK FLUSH IV SOLN 100 UNIT/ML 100 UNIT/ML
5 SOLUTION INTRAVENOUS
Status: CANCELLED | OUTPATIENT
Start: 2024-03-21

## 2024-03-21 RX ORDER — HEPARIN SODIUM,PORCINE 10 UNIT/ML
5-20 VIAL (ML) INTRAVENOUS DAILY PRN
Status: CANCELLED | OUTPATIENT
Start: 2024-03-21

## 2024-03-21 RX ORDER — PENTAMIDINE ISETHIONATE 300 MG/300MG
300 INHALANT RESPIRATORY (INHALATION)
Status: CANCELLED
Start: 2024-03-21

## 2024-03-21 RX ORDER — ALBUTEROL SULFATE 5 MG/ML
2.5 SOLUTION RESPIRATORY (INHALATION)
Status: CANCELLED
Start: 2024-03-28

## 2024-03-21 RX ORDER — PENTAMIDINE ISETHIONATE 300 MG/300MG
300 INHALANT RESPIRATORY (INHALATION)
Status: CANCELLED
Start: 2024-03-28

## 2024-03-21 RX ADMIN — FILGRASTIM-AAFI 480 MCG: 480 INJECTION, SOLUTION INTRAVENOUS; SUBCUTANEOUS at 12:07

## 2024-03-21 ASSESSMENT — PAIN SCALES - GENERAL: PAINLEVEL: MILD PAIN (3)

## 2024-03-21 NOTE — PROGRESS NOTES
BMT/Cell Therapy Follow Up    Date of service: Mar 21, 2024       Patient ID: Hortencia Baldwin is a 53 year old y/o female who is day +70  post allogenic stem cell transplant. Date of transplant: 1/11/2024    Diagnosis MDS w/ SF3B1 BMT type Allogenic  CMV  Donor -  Recipient +    Prep: MA Bu/Flu Donor type  6/8 URD Blood Type Donor and recipient O+   GVHD Ppx PTCy/ siro/ MMF Graft source PBSCT Toxo IgG Negative   Protocol MT2015-29 (not on) BMT MD Dr. Garay            INTERVAL HISTORY     L knee pain continues.   No specific injury.  Ongoing for weeks.  No redness, not hot, no swelling in knee or leg.   Refer to ortho.    No infectious or GVHD symptoms.    Discussed new severe neutropenia. Wrote down multiple med changes.      Remainder of 8 point ROS negative      EXAM      /84 (BP Location: Right arm, Patient Position: Sitting, Cuff Size: Adult Large)   Pulse 89   Temp 98  F (36.7  C) (Oral)   Resp 16   Wt 77.2 kg (170 lb 1.6 oz)   LMP 11/05/2017   SpO2 98%   BMI 31.50 kg/m    Wt Readings from Last 4 Encounters:   03/21/24 77.2 kg (170 lb 1.6 oz)   03/14/24 76.7 kg (169 lb)   03/06/24 76.9 kg (169 lb 8 oz)   03/04/24 77.8 kg (171 lb 9.6 oz)       KPS: 70% Cannot do active work, but can care for self    General: Alert, awake  Eyes: Conjunctiva normal  Respiratory: Normal respiratory effort.  Cardiovascular: Normal perfusion, no  edema.  Neurological: Grossly normal motor and sensory function.  MSK: no redness/warmth/swelling to L knee or leg.   Skin: No changes in color, texture, or new lesions.  Psych: Mood and affect are appropriate.  No central access.      ASSESSMENT AND PLAN     BMT:  MT2015-29 (according to but not on) with Bu/Flu  6/8 URD, peripheral blood stem cell graft, cell dose: 6.5 x10^6.     Disease status: Bone marrow biopsy at D28, D100, 6 months, 1 year  2/6/24 (~D26): Hypercellular marrow (70-80%), trilineage hemopoiesis with rare nuclear irregularities in terminal erythroid precursors  of uncertain significance, 3% ringed sideroblasts (in the context of iron overload is not considered dysplastic finding). Flow showed no increase in myeloid blasts. Cytogenetics and NGS pending.     Graft status/chimerism: D28, D100, 6 months,1 year  2/6/24 (~D26): Bone marrow 100%, peripheral blood (2/1/24) CD33 100%, CD3 87%  - RFLP 3/14 100% donor CD3/CD33    GVHD  - siro 3/14 stable at 5.0. will add on level today.     Heme/ Coag  # ABO matched (both O+).   Continues to have some cytopenias. New severe Neutropenia: Other cell lines look good. EBV and CMV neg 3/14. BK continues to be ++ in urine and low level copies 445 in blood--no BK symptoms. RFLP 100% donor. Siro level has been stable on lower side ~5. Bactrim? See below stop bactrim. G given. Start vantin.    Platelet refractoriness: HLA platelets when available.   Transfuse for Hb <7, platelets < 10, G-CSF PRN for ANC < 1. Will give G today for prelim ANC 0.4.     # Iron overload: Hx of transfusion dependent anemia, pre-transplant ferritin around 5,000. Recommend phlebotomy post transplant when retic count is restored and Hgb >10.      ID  # Prophylaxis  Fungal: micafungin 300mg 3x/week until D+45 (due to smoking hx). No nodules on pre-transplant CT Chest.   PJP: Bactrim from D28. Toxo IgG negative. Stop with new severe neutropenia. Will request pentamidine for next week.   Viral: Acyclovir 800 mg bid  CMV: Letermovir 480mg from D14 to D100  Bacterial : completed cefpodoxime (prolonged Qtc). Start with severe neutropenia.      3/14  no repletion. Check Q3 months.     GI  # Elevated transaminases  AST and ALT elevated beginning 2/2/24 (D22). Notably, she was on tacrolimus for GVHD ppx but changed to sirolimus on 1/31/24. Infectious workup (2/6/24) was negative. No imaging done since LFT elevation.   - Ddx include drug induced liver injury vs GVHD. Concern for pre-existing iron overload. No clinical concern for VOD. GVHD less likely with no concomitant  significant skin rash today or lower GI symptoms.     - RUQ US with dopplers--fatty liver. Normal doppler. Consider liver biopsy.     # Nausea/poor appetite: Suspect due to regimen related toxicities/ MMF. Would expect to improve over the coming few weeks. If not, consider upper GI GVHD. Continue Olanzapine 5mg at bedtime.     # Prophylaxis   Ulcer prophylaxis: Pantoprazole can stop no reflux/GERD  VOD prophylaxis: Ursodiol. Will let her stop and just follow liver.      FEN:  She tells me still on 20meq K a day she can stop and re-eval next week. Has had stone cold normal K for weeks.     CV  HTN: Amlodipine 5mg daily     MSK:  - L knee pain  - refer to ortho walk-in. Requested  help her get appt.   - does not have evidence of septic joint or clot. More chronic issue.     RTC: 1 week.     I spent 60 minutes in the care of this patient today, which included time necessary for preparation for the visit, obtaining history, ordering medications/tests/procedures as medically indicated, review of pertinent medical literature, counseling of the patient, communication of recommendations to the care team, and documentation time.    Tuyet Cotton PA-C  [phone]

## 2024-03-21 NOTE — LETTER
3/21/2024         RE: Hortencia Baldwin  [address]  [address]  [facility] [address]        Dear Colleague,    Thank you for referring your patient, Hortencia Baldwin, to the [facility] BLOOD AND MARROW TRANSPLANT PROGRAM [place]. Please see a copy of my visit note below.    BMT/Cell Therapy Follow Up    Date of service: Mar 21, 2024       Patient ID: Hortencia Baldwin is a 53 year old y/o female who is day +70  post allogenic stem cell transplant. Date of transplant: 1/11/2024    Diagnosis MDS w/ SF3B1 BMT type Allogenic  CMV  Donor -  Recipient +    Prep: MA Bu/Flu Donor type  6/8 URD Blood Type Donor and recipient O+   GVHD Ppx PTCy/ siro/ MMF Graft source PBSCT Toxo IgG Negative   Protocol MT2015-29 (not on) BMT MD Dr. Garay            INTERVAL HISTORY     L knee pain continues.   No specific injury.  Ongoing for weeks.  No redness, not hot, no swelling in knee or leg.   Refer to ortho.    No infectious or GVHD symptoms.    Discussed new severe neutropenia. Wrote down multiple med changes.      Remainder of 8 point ROS negative      EXAM      /84 (BP Location: Right arm, Patient Position: Sitting, Cuff Size: Adult Large)   Pulse 89   Temp 98  F (36.7  C) (Oral)   Resp 16   Wt 77.2 kg (170 lb 1.6 oz)   LMP 11/05/2017   SpO2 98%   BMI 31.50 kg/m    Wt Readings from Last 4 Encounters:   03/21/24 77.2 kg (170 lb 1.6 oz)   03/14/24 76.7 kg (169 lb)   03/06/24 76.9 kg (169 lb 8 oz)   03/04/24 77.8 kg (171 lb 9.6 oz)       KPS: 70% Cannot do active work, but can care for self    General: Alert, awake  Eyes: Conjunctiva normal  Respiratory: Normal respiratory effort.  Cardiovascular: Normal perfusion, no  edema.  Neurological: Grossly normal motor and sensory function.  MSK: no redness/warmth/swelling to L knee or leg.   Skin: No changes in color, texture, or new lesions.  Psych: Mood and affect are appropriate.  No central access.      ASSESSMENT AND PLAN     BMT:  MT2015-29 (according to  but not on) with Bu/Flu  6/8 URD, peripheral blood stem cell graft, cell dose: 6.5 x10^6.     Disease status: Bone marrow biopsy at D28, D100, 6 months, 1 year  2/6/24 (~D26): Hypercellular marrow (70-80%), trilineage hemopoiesis with rare nuclear irregularities in terminal erythroid precursors of uncertain significance, 3% ringed sideroblasts (in the context of iron overload is not considered dysplastic finding). Flow showed no increase in myeloid blasts. Cytogenetics and NGS pending.     Graft status/chimerism: D28, D100, 6 months,1 year  2/6/24 (~D26): Bone marrow 100%, peripheral blood (2/1/24) CD33 100%, CD3 87%  - RFLP 3/14 100% donor CD3/CD33    GVHD  - siro 3/14 stable at 5.0. will add on level today.     Heme/ Coag  # ABO matched (both O+).   Continues to have some cytopenias. New severe Neutropenia: Other cell lines look good. EBV and CMV neg 3/14. BK continues to be ++ in urine and low level copies 445 in blood--no BK symptoms. RFLP 100% donor. Siro level has been stable on lower side ~5. Bactrim? See below stop bactrim. G given. Start vantin.    Platelet refractoriness: HLA platelets when available.   Transfuse for Hb <7, platelets < 10, G-CSF PRN for ANC < 1. Will give G today for prelim ANC 0.4.     # Iron overload: Hx of transfusion dependent anemia, pre-transplant ferritin around 5,000. Recommend phlebotomy post transplant when retic count is restored and Hgb >10.      ID  # Prophylaxis  Fungal: micafungin 300mg 3x/week until D+45 (due to smoking hx). No nodules on pre-transplant CT Chest.   PJP: Bactrim from D28. Toxo IgG negative. Stop with new severe neutropenia. Will request pentamidine for next week.   Viral: Acyclovir 800 mg bid  CMV: Letermovir 480mg from D14 to D100  Bacterial : completed cefpodoxime (prolonged Qtc). Start with severe neutropenia.      3/14  no repletion. Check Q3 months.     GI  # Elevated transaminases  AST and ALT elevated beginning 2/2/24 (D22). Notably, she was on  tacrolimus for GVHD ppx but changed to sirolimus on 1/31/24. Infectious workup (2/6/24) was negative. No imaging done since LFT elevation.   - Ddx include drug induced liver injury vs GVHD. Concern for pre-existing iron overload. No clinical concern for VOD. GVHD less likely with no concomitant significant skin rash today or lower GI symptoms.     - RUQ US with dopplers--fatty liver. Normal doppler. Consider liver biopsy.     # Nausea/poor appetite: Suspect due to regimen related toxicities/ MMF. Would expect to improve over the coming few weeks. If not, consider upper GI GVHD. Continue Olanzapine 5mg at bedtime.     # Prophylaxis   Ulcer prophylaxis: Pantoprazole can stop no reflux/GERD  VOD prophylaxis: Ursodiol. Will let her stop and just follow liver.      FEN:  She tells me still on 20meq K a day she can stop and re-eval next week. Has had stone cold normal K for weeks.     CV  HTN: Amlodipine 5mg daily     MSK:  - L knee pain  - refer to ortho walk-in. Requested  help her get appt.   - does not have evidence of septic joint or clot. More chronic issue.     RTC: 1 week.     I spent 60 minutes in the care of this patient today, which included time necessary for preparation for the visit, obtaining history, ordering medications/tests/procedures as medically indicated, review of pertinent medical literature, counseling of the patient, communication of recommendations to the care team, and documentation time.    Tuyet Cotton PA-C  [phone]

## 2024-03-21 NOTE — NURSING NOTE
Chief Complaint   Patient presents with    Oncology Clinic Visit     MDS (myelodysplastic syndrome)     Blood Draw     Labs drawn via  by vascular in lab. VS taken.      Labs collected from venipuncture by vascular. Vitals taken. Checked in for appointment(s).    Becca Lisa RN

## 2024-03-21 NOTE — NURSING NOTE
"Oncology Rooming Note    March 21, 2024 11:22 AM   Hortencia Baldwin is a 53 year old female who presents for:    Chief Complaint   Patient presents with    Oncology Clinic Visit     MDS (myelodysplastic syndrome)     Blood Draw     Labs drawn via  by vascular in lab. VS taken.      Initial Vitals: /84 (BP Location: Right arm, Patient Position: Sitting, Cuff Size: Adult Large)   Pulse 89   Temp 98  F (36.7  C) (Oral)   Resp 16   Wt 77.2 kg (170 lb 1.6 oz)   LMP 11/05/2017   SpO2 98%   BMI 31.50 kg/m   Estimated body mass index is 31.5 kg/m  as calculated from the following:    Height as of 2/21/24: 1.565 m (5' 1.61\").    Weight as of this encounter: 77.2 kg (170 lb 1.6 oz). Body surface area is 1.83 meters squared.  Mild Pain (3) Comment: Data Unavailable   Patient's last menstrual period was 11/05/2017.  Allergies reviewed: Yes  Medications reviewed: Yes    Medications: Medication refills not needed today.  Pharmacy name entered into [employer]:    [id number] - [place], [place] - [address] [facility]  [id number] - [place], [place] - [address] AT [facility] [address]    Frailty Screening:   Is the patient here for a new oncology consult visit in cancer care? 2. No      Clinical concerns: Pt is still experiencing L knee and lower leg pain.      Praveen Mcclellan              "

## 2024-03-22 LAB — CMV DNA SPEC NAA+PROBE-ACNC: NOT DETECTED IU/ML

## 2024-03-27 NOTE — TELEPHONE ENCOUNTER
DIAGNOSIS: left knee pain     APPOINTMENT DATE: 3.28.24   NOTES STATUS DETAILS   OFFICE NOTE from other specialist Internal 5.26.17  Lloyd  PT    5.5.17  Zurdo  IM   MEDICATION LIST Internal    XRAYS (IMAGES & REPORTS) Internal 5.5.17  XR Knee Standing Left

## 2024-03-28 ENCOUNTER — LAB (OUTPATIENT)
Dept: LAB | Facility: CLINIC | Age: 54
End: 2024-03-28
Attending: STUDENT IN AN ORGANIZED HEALTH CARE EDUCATION/TRAINING PROGRAM
Payer: COMMERCIAL

## 2024-03-28 ENCOUNTER — PRE VISIT (OUTPATIENT)
Dept: ORTHOPEDICS | Facility: CLINIC | Age: 54
End: 2024-03-28

## 2024-03-28 ENCOUNTER — OFFICE VISIT (OUTPATIENT)
Dept: TRANSPLANT | Facility: CLINIC | Age: 54
End: 2024-03-28
Attending: STUDENT IN AN ORGANIZED HEALTH CARE EDUCATION/TRAINING PROGRAM
Payer: COMMERCIAL

## 2024-03-28 VITALS
HEIGHT: 62 IN | DIASTOLIC BLOOD PRESSURE: 84 MMHG | OXYGEN SATURATION: 96 % | SYSTOLIC BLOOD PRESSURE: 133 MMHG | BODY MASS INDEX: 31.3 KG/M2 | HEART RATE: 90 BPM | RESPIRATION RATE: 16 BRPM | TEMPERATURE: 98.4 F | WEIGHT: 170.1 LBS

## 2024-03-28 DIAGNOSIS — R82.79 BK VIRURIA: ICD-10-CM

## 2024-03-28 DIAGNOSIS — Z94.81 S/P ALLOGENEIC BONE MARROW TRANSPLANT (H): ICD-10-CM

## 2024-03-28 DIAGNOSIS — D46.9 MDS (MYELODYSPLASTIC SYNDROME) (H): Primary | ICD-10-CM

## 2024-03-28 DIAGNOSIS — D46.9 MDS (MYELODYSPLASTIC SYNDROME) (H): ICD-10-CM

## 2024-03-28 LAB
ALBUMIN SERPL BCG-MCNC: 4.3 G/DL (ref 3.5–5.2)
ALP SERPL-CCNC: 104 U/L (ref 40–150)
ALT SERPL W P-5'-P-CCNC: 42 U/L (ref 0–50)
ANION GAP SERPL CALCULATED.3IONS-SCNC: 10 MMOL/L (ref 7–15)
AST SERPL W P-5'-P-CCNC: 46 U/L (ref 0–45)
BASOPHILS # BLD AUTO: ABNORMAL 10*3/UL
BASOPHILS # BLD MANUAL: 0 10E3/UL (ref 0–0.2)
BASOPHILS NFR BLD AUTO: ABNORMAL %
BASOPHILS NFR BLD MANUAL: 1 %
BILIRUB SERPL-MCNC: 0.2 MG/DL
BUN SERPL-MCNC: 8.9 MG/DL (ref 6–20)
CALCIUM SERPL-MCNC: 9.6 MG/DL (ref 8.6–10)
CHLORIDE SERPL-SCNC: 103 MMOL/L (ref 98–107)
CREAT SERPL-MCNC: 0.57 MG/DL (ref 0.51–0.95)
DEPRECATED HCO3 PLAS-SCNC: 29 MMOL/L (ref 22–29)
EGFRCR SERPLBLD CKD-EPI 2021: >90 ML/MIN/1.73M2
EOSINOPHIL # BLD AUTO: ABNORMAL 10*3/UL
EOSINOPHIL # BLD MANUAL: 0 10E3/UL (ref 0–0.7)
EOSINOPHIL NFR BLD AUTO: ABNORMAL %
EOSINOPHIL NFR BLD MANUAL: 1 %
ERYTHROCYTE [DISTWIDTH] IN BLOOD BY AUTOMATED COUNT: 13.5 % (ref 10–15)
GLUCOSE SERPL-MCNC: 86 MG/DL (ref 70–99)
HCT VFR BLD AUTO: 40.3 % (ref 35–47)
HGB BLD-MCNC: 13 G/DL (ref 11.7–15.7)
IMM GRANULOCYTES # BLD: ABNORMAL 10*3/UL
IMM GRANULOCYTES NFR BLD: ABNORMAL %
LYMPHOCYTES # BLD AUTO: ABNORMAL 10*3/UL
LYMPHOCYTES # BLD MANUAL: 1.3 10E3/UL (ref 0.8–5.3)
LYMPHOCYTES NFR BLD AUTO: ABNORMAL %
LYMPHOCYTES NFR BLD MANUAL: 26 %
MCH RBC QN AUTO: 32.6 PG (ref 26.5–33)
MCHC RBC AUTO-ENTMCNC: 32.3 G/DL (ref 31.5–36.5)
MCV RBC AUTO: 101 FL (ref 78–100)
METAMYELOCYTES # BLD MANUAL: 0.2 10E3/UL
METAMYELOCYTES NFR BLD MANUAL: 5 %
MONOCYTES # BLD AUTO: ABNORMAL 10*3/UL
MONOCYTES # BLD MANUAL: 0.9 10E3/UL (ref 0–1.3)
MONOCYTES NFR BLD AUTO: ABNORMAL %
MONOCYTES NFR BLD MANUAL: 18 %
MYELOCYTES # BLD MANUAL: 0.5 10E3/UL
MYELOCYTES NFR BLD MANUAL: 11 %
NEUTROPHILS # BLD AUTO: ABNORMAL 10*3/UL
NEUTROPHILS # BLD MANUAL: 1.8 10E3/UL (ref 1.6–8.3)
NEUTROPHILS NFR BLD AUTO: ABNORMAL %
NEUTROPHILS NFR BLD MANUAL: 36 %
NRBC # BLD AUTO: 0 10E3/UL
NRBC BLD AUTO-RTO: 0 /100
PLAT MORPH BLD: ABNORMAL
PLATELET # BLD AUTO: 134 10E3/UL (ref 150–450)
POTASSIUM SERPL-SCNC: 4.3 MMOL/L (ref 3.4–5.3)
PROMYELOCYTES # BLD MANUAL: 0.1 10E3/UL
PROMYELOCYTES NFR BLD MANUAL: 2 %
PROT SERPL-MCNC: 7 G/DL (ref 6.4–8.3)
RBC # BLD AUTO: 3.99 10E6/UL (ref 3.8–5.2)
RBC MORPH BLD: ABNORMAL
SIROLIMUS BLD-MCNC: 6.3 UG/L (ref 5–15)
SODIUM SERPL-SCNC: 142 MMOL/L (ref 135–145)
TME LAST DOSE: NORMAL H
TME LAST DOSE: NORMAL H
WBC # BLD AUTO: 4.9 10E3/UL (ref 4–11)

## 2024-03-28 PROCEDURE — 99213 OFFICE O/P EST LOW 20 MIN: CPT

## 2024-03-28 PROCEDURE — 80053 COMPREHEN METABOLIC PANEL: CPT

## 2024-03-28 PROCEDURE — 85027 COMPLETE CBC AUTOMATED: CPT

## 2024-03-28 PROCEDURE — 85007 BL SMEAR W/DIFF WBC COUNT: CPT

## 2024-03-28 PROCEDURE — 36415 COLL VENOUS BLD VENIPUNCTURE: CPT

## 2024-03-28 PROCEDURE — 94642 AEROSOL INHALATION TREATMENT: CPT | Performed by: INTERNAL MEDICINE

## 2024-03-28 PROCEDURE — 94640 AIRWAY INHALATION TREATMENT: CPT | Performed by: INTERNAL MEDICINE

## 2024-03-28 PROCEDURE — 80195 ASSAY OF SIROLIMUS: CPT

## 2024-03-28 PROCEDURE — 87799 DETECT AGENT NOS DNA QUANT: CPT

## 2024-03-28 PROCEDURE — 99215 OFFICE O/P EST HI 40 MIN: CPT

## 2024-03-28 RX ORDER — MIRABEGRON 25 MG/1
25 TABLET, FILM COATED, EXTENDED RELEASE ORAL DAILY
Qty: 30 TABLET | Refills: 0 | Status: SHIPPED | OUTPATIENT
Start: 2024-03-28 | End: 2024-04-26

## 2024-03-28 RX ORDER — HEPARIN SODIUM (PORCINE) LOCK FLUSH IV SOLN 100 UNIT/ML 100 UNIT/ML
5 SOLUTION INTRAVENOUS
OUTPATIENT
Start: 2024-04-28

## 2024-03-28 RX ORDER — ALBUTEROL SULFATE 5 MG/ML
2.5 SOLUTION RESPIRATORY (INHALATION)
Status: DISCONTINUED | OUTPATIENT
Start: 2024-03-28 | End: 2024-03-28 | Stop reason: HOSPADM

## 2024-03-28 RX ORDER — PENTAMIDINE ISETHIONATE 300 MG/300MG
300 INHALANT RESPIRATORY (INHALATION)
Status: CANCELLED
Start: 2024-04-28

## 2024-03-28 RX ORDER — ALBUTEROL SULFATE 5 MG/ML
2.5 SOLUTION RESPIRATORY (INHALATION)
Status: CANCELLED
Start: 2024-04-28

## 2024-03-28 RX ORDER — CEFPODOXIME PROXETIL 200 MG/1
200 TABLET, FILM COATED ORAL 2 TIMES DAILY
Qty: 60 TABLET | Refills: 0 | Status: SHIPPED | OUTPATIENT
Start: 2024-03-28 | End: 2024-03-28

## 2024-03-28 RX ORDER — HEPARIN SODIUM,PORCINE 10 UNIT/ML
5-20 VIAL (ML) INTRAVENOUS DAILY PRN
OUTPATIENT
Start: 2024-04-28

## 2024-03-28 RX ORDER — PENTAMIDINE ISETHIONATE 300 MG/300MG
300 INHALANT RESPIRATORY (INHALATION)
Status: DISCONTINUED | OUTPATIENT
Start: 2024-03-28 | End: 2024-03-28 | Stop reason: HOSPADM

## 2024-03-28 RX ADMIN — PENTAMIDINE ISETHIONATE 300 MG: 300 INHALANT RESPIRATORY (INHALATION) at 11:47

## 2024-03-28 ASSESSMENT — PAIN SCALES - GENERAL: PAINLEVEL: MODERATE PAIN (4)

## 2024-03-28 NOTE — NURSING NOTE
Chief Complaint   Patient presents with    Blood Draw     Vpt blood draw by vascular ultrasound. Vitals taken and appointment arrived     Pt. States that provider visit is cancelled for today  Yoana Olmedo RN

## 2024-03-28 NOTE — PROGRESS NOTES
"BMT/Cell Therapy Follow Up    Date of service: Mar 28, 2024       Patient ID: Hortencia Baldwin is a 53 year old y/o female who is day +77  post allogenic stem cell transplant. Date of transplant: 1/11/2024    Diagnosis MDS w/ SF3B1 BMT type Allogenic  CMV  Donor -  Recipient +    Prep: MA Bu/Flu Donor type  6/8 URD Blood Type Donor and recipient O+   GVHD Ppx PTCy/ siro/ MMF Graft source PBSCT Toxo IgG Negative   Protocol MT2015-29 (not on) BMT MD Dr. Garay            INTERVAL HISTORY     L knee pain continues.   No specific injury.  Ongoing for weeks.  No redness, not hot, no swelling in knee or leg.   referred to ortho.  She will see them next week.    No infectious or GVHD symptoms.       Remainder of 8 point ROS negative      EXAM      /84   Pulse 90   Temp 98.4  F (36.9  C) (Oral)   Resp 16   Ht 1.565 m (5' 1.61\")   Wt 77.2 kg (170 lb 1.6 oz)   LMP 11/05/2017   SpO2 96%   BMI 31.50 kg/m    Wt Readings from Last 4 Encounters:   03/28/24 77.2 kg (170 lb 1.6 oz)   03/21/24 77.2 kg (170 lb 1.6 oz)   03/14/24 76.7 kg (169 lb)   03/06/24 76.9 kg (169 lb 8 oz)       KPS: 70% Cannot do active work, but can care for self    General: Alert, awake  Eyes: Conjunctiva normal  Respiratory: Normal respiratory effort.  Cardiovascular: Normal perfusion, no  edema.  Neurological: Grossly normal motor and sensory function.  MSK: no redness/warmth/swelling to L knee or leg.   Skin: No changes in color, texture, or new lesions.  Psych: Mood and affect are appropriate.  No central access.      ASSESSMENT AND PLAN     BMT:  MT2015-29 (according to but not on) with Bu/Flu  6/8 URD, peripheral blood stem cell graft, cell dose: 6.5 x10^6.     Disease status: Bone marrow biopsy at D28, D100, 6 months, 1 year  2/6/24 (~D26): Hypercellular marrow (70-80%), trilineage hemopoiesis with rare nuclear irregularities in terminal erythroid precursors of uncertain significance, 3% ringed sideroblasts (in the context of iron overload " is not considered dysplastic finding). Flow showed no increase in myeloid blasts. Cytogenetics and NGS pending.     Graft status/chimerism: D28, D100, 6 months,1 year  2/6/24 (~D26): Bone marrow 100%, peripheral blood (2/1/24) CD33 100%, CD3 87%  - RFLP 3/14 100% donor CD3/CD33    GVHD  - siro stable at 5.0. Level pending. Keep on lower side as viruses, no GVH, day +77.     Heme/ Coag  # ABO matched (both O+).   Continues to have some cytopenias. Last week New severe Neutropenia: Other cell lines look good. EBV and CMV neg 3/14. BK continues to be ++ in urine and low level copies 445 in blood--no BK symptoms. RFLP 100% donor. Siro level has been stable on lower side ~5. Bactrim? See below stopped bactrim. G given. Started vantin. 3/28 Repeat viral PCR's pending today but with dose of G last week and stopping bactrim ANC normal today.     Platelet refractoriness: HLA platelets when available.   Transfuse for Hb <7, platelets < 10, G-CSF PRN for ANC < 1. ANC normalized post G last week.    # Iron overload: Hx of transfusion dependent anemia, pre-transplant ferritin around 5,000. Recommend phlebotomy post transplant when retic count is restored and Hgb >10.      ID  # Prophylaxis  Fungal: micafungin 300mg 3x/week until D+45 (due to smoking hx). No nodules on pre-transplant CT Chest.   PJP: Bactrim from D28. Toxo IgG negative. Stopped with new severe neutropenia. Got pentamidine today 3/28.  Viral: Acyclovir 800 mg bid  CMV: Letermovir 480mg from D14 to D100  Bacterial : completed cefpodoxime (prolonged Qtc). Started with severe neutropenia last week. 3/28 can stop with normal ANC    3/14  no repletion. Check Q3 months.     GI  # Elevated transaminases  AST and ALT elevated beginning 2/2/24 (D22). Notably, she was on tacrolimus for GVHD ppx but changed to sirolimus on 1/31/24. Infectious workup (2/6/24) was negative. No imaging done since LFT elevation.   - Ddx include drug induced liver injury vs GVHD. Concern  for pre-existing iron overload. No clinical concern for VOD. GVHD less likely with no concomitant significant skin rash today or lower GI symptoms.     - RUQ US with dopplers--fatty liver. Normal doppler. Consider liver biopsy. Off juanis.      FEN:  Monitor Cr/Lytes. K normal after stopping KCL last week.    CV  HTN: Amlodipine 5mg daily     MSK:  - L knee pain  - does not have evidence of septic joint or clot. More chronic issue.   - I referred to ortho has appt next week.     RTC: 1 week.     I spent 40 minutes in the care of this patient today, which included time necessary for preparation for the visit, obtaining history, ordering medications/tests/procedures as medically indicated, review of pertinent medical literature, counseling of the patient, communication of recommendations to the care team, and documentation time.    Tuyet Cotton PA-C  [phone]

## 2024-03-28 NOTE — PROGRESS NOTES
Hortencia Baldwin was seen today for a Pentamidine nebulizer tx ordered by Tuyet Cotton PA-C .    Patient was first given 2.5 mg of albuterol nebulizer, after which Pentamidine 300 mg (Lot # [id number]) mixed with 6cc Sterile H20 was administered through a filtered nebulizer.    Pre-treatment: SpO2 = 98%   HR = 90   BBS = Clear   Post-treatment: SpO2 = 97%  HR = 104  BBS = Clear    No adverse side effects noted by the patient.    This service today was provided under Dr. Woodard, who was available if needed.     Procedure was completed by Christopher Chavez.

## 2024-03-28 NOTE — LETTER
"    3/28/2024         RE: Hortencia Baldwin  [address]  [address]  [place] [address]        Dear Colleague,    Thank you for referring your patient, Hortencia Baldwin, to the [facility] BLOOD AND MARROW TRANSPLANT PROGRAM [place]. Please see a copy of my visit note below.    BMT/Cell Therapy Follow Up    Date of service: Mar 28, 2024       Patient ID: Hortencia Baldwin is a 53 year old y/o female who is day +77  post allogenic stem cell transplant. Date of transplant: 1/11/2024    Diagnosis MDS w/ SF3B1 BMT type Allogenic  CMV  Donor -  Recipient +    Prep: MA Bu/Flu Donor type  6/8 URD Blood Type Donor and recipient O+   GVHD Ppx PTCy/ siro/ MMF Graft source PBSCT Toxo IgG Negative   Protocol MT2015-29 (not on) BMT MD Dr. Garay            INTERVAL HISTORY     L knee pain continues.   No specific injury.  Ongoing for weeks.  No redness, not hot, no swelling in knee or leg.   referred to ortho.  She will see them next week.    No infectious or GVHD symptoms.       Remainder of 8 point ROS negative      EXAM      /84   Pulse 90   Temp 98.4  F (36.9  C) (Oral)   Resp 16   Ht 1.565 m (5' 1.61\")   Wt 77.2 kg (170 lb 1.6 oz)   LMP 11/05/2017   SpO2 96%   BMI 31.50 kg/m    Wt Readings from Last 4 Encounters:   03/28/24 77.2 kg (170 lb 1.6 oz)   03/21/24 77.2 kg (170 lb 1.6 oz)   03/14/24 76.7 kg (169 lb)   03/06/24 76.9 kg (169 lb 8 oz)       KPS: 70% Cannot do active work, but can care for self    General: Alert, awake  Eyes: Conjunctiva normal  Respiratory: Normal respiratory effort.  Cardiovascular: Normal perfusion, no  edema.  Neurological: Grossly normal motor and sensory function.  MSK: no redness/warmth/swelling to L knee or leg.   Skin: No changes in color, texture, or new lesions.  Psych: Mood and affect are appropriate.  No central access.      ASSESSMENT AND PLAN     BMT:  MT2015-29 (according to but not on) with Bu/Flu  6/8 URD, peripheral blood stem cell graft, cell dose: 6.5 " x10^6.     Disease status: Bone marrow biopsy at D28, D100, 6 months, 1 year  2/6/24 (~D26): Hypercellular marrow (70-80%), trilineage hemopoiesis with rare nuclear irregularities in terminal erythroid precursors of uncertain significance, 3% ringed sideroblasts (in the context of iron overload is not considered dysplastic finding). Flow showed no increase in myeloid blasts. Cytogenetics and NGS pending.     Graft status/chimerism: D28, D100, 6 months,1 year  2/6/24 (~D26): Bone marrow 100%, peripheral blood (2/1/24) CD33 100%, CD3 87%  - RFLP 3/14 100% donor CD3/CD33    GVHD  - siro stable at 5.0. Level pending. Keep on lower side as viruses, no GVH, day +77.     Heme/ Coag  # ABO matched (both O+).   Continues to have some cytopenias. Last week New severe Neutropenia: Other cell lines look good. EBV and CMV neg 3/14. BK continues to be ++ in urine and low level copies 445 in blood--no BK symptoms. RFLP 100% donor. Siro level has been stable on lower side ~5. Bactrim? See below stopped bactrim. G given. Started vantin. 3/28 Repeat viral PCR's pending today but with dose of G last week and stopping bactrim ANC normal today.     Platelet refractoriness: HLA platelets when available.   Transfuse for Hb <7, platelets < 10, G-CSF PRN for ANC < 1. ANC normalized post G last week.    # Iron overload: Hx of transfusion dependent anemia, pre-transplant ferritin around 5,000. Recommend phlebotomy post transplant when retic count is restored and Hgb >10.      ID  # Prophylaxis  Fungal: micafungin 300mg 3x/week until D+45 (due to smoking hx). No nodules on pre-transplant CT Chest.   PJP: Bactrim from D28. Toxo IgG negative. Stopped with new severe neutropenia. Got pentamidine today 3/28.  Viral: Acyclovir 800 mg bid  CMV: Letermovir 480mg from D14 to D100  Bacterial : completed cefpodoxime (prolonged Qtc). Started with severe neutropenia last week. 3/28 can stop with normal ANC    3/14  no repletion. Check Q3 months.      GI  # Elevated transaminases  AST and ALT elevated beginning 2/2/24 (D22). Notably, she was on tacrolimus for GVHD ppx but changed to sirolimus on 1/31/24. Infectious workup (2/6/24) was negative. No imaging done since LFT elevation.   - Ddx include drug induced liver injury vs GVHD. Concern for pre-existing iron overload. No clinical concern for VOD. GVHD less likely with no concomitant significant skin rash today or lower GI symptoms.     - RUQ US with dopplers--fatty liver. Normal doppler. Consider liver biopsy. Off juanis.      FEN:  Monitor Cr/Lytes. K normal after stopping KCL last week.    CV  HTN: Amlodipine 5mg daily     MSK:  - L knee pain  - does not have evidence of septic joint or clot. More chronic issue.   - I referred to ortho has appt next week.     RTC: 1 week.     I spent 40 minutes in the care of this patient today, which included time necessary for preparation for the visit, obtaining history, ordering medications/tests/procedures as medically indicated, review of pertinent medical literature, counseling of the patient, communication of recommendations to the care team, and documentation time.    Tuyet Cotton PA-C  [phone]

## 2024-03-28 NOTE — NURSING NOTE
"Oncology Rooming Note    March 28, 2024 12:20 PM   Hortencia Baldwin is a 53 year old female who presents for:    Chief Complaint   Patient presents with    Blood Draw     Vpt blood draw by vascular ultrasound. Vitals taken and appointment arrived    Oncology Clinic Visit     P RETURN - MDS     Initial Vitals: /84   Pulse 90   Temp 98.4  F (36.9  C) (Oral)   Resp 16   Ht 1.565 m (5' 1.61\")   Wt 77.2 kg (170 lb 1.6 oz)   LMP 11/05/2017   SpO2 96%   BMI 31.50 kg/m   Estimated body mass index is 31.5 kg/m  as calculated from the following:    Height as of this encounter: 1.565 m (5' 1.61\").    Weight as of this encounter: 77.2 kg (170 lb 1.6 oz). Body surface area is 1.83 meters squared.  Moderate Pain (4) Comment: left leg and hip   Patient's last menstrual period was 11/05/2017.  Allergies reviewed: Yes  Medications reviewed: Yes    Medications: Medication refills not needed today.  Pharmacy name entered into [employer]:    [facility] #[id number] - [place], [place] - [address] AT [facility]  [facility] #[id number] - [place], [place] - [address] AT [facility] [address]    Frailty Screening:   Is the patient here for a new oncology consult visit in cancer care? 2. No    Shady Tai LPN              "

## 2024-03-29 ENCOUNTER — DOCUMENTATION ONLY (OUTPATIENT)
Dept: TRANSPLANT | Facility: CLINIC | Age: 54
End: 2024-03-29
Payer: MEDICAID

## 2024-03-29 LAB
CMV DNA SPEC NAA+PROBE-ACNC: NOT DETECTED IU/ML
EBV DNA # SPEC NAA+PROBE: NOT DETECTED COPIES/ML

## 2024-04-01 DIAGNOSIS — M25.562 LEFT KNEE PAIN: Primary | ICD-10-CM

## 2024-04-02 LAB — HADV DNA # SPEC NAA+PROBE: NOT DETECTED COPIES/ML

## 2024-04-04 ENCOUNTER — APPOINTMENT (OUTPATIENT)
Dept: LAB | Facility: CLINIC | Age: 54
End: 2024-04-04
Attending: STUDENT IN AN ORGANIZED HEALTH CARE EDUCATION/TRAINING PROGRAM
Payer: COMMERCIAL

## 2024-04-04 ENCOUNTER — OFFICE VISIT (OUTPATIENT)
Dept: TRANSPLANT | Facility: CLINIC | Age: 54
End: 2024-04-04
Attending: STUDENT IN AN ORGANIZED HEALTH CARE EDUCATION/TRAINING PROGRAM
Payer: COMMERCIAL

## 2024-04-04 VITALS
BODY MASS INDEX: 31.45 KG/M2 | HEART RATE: 99 BPM | WEIGHT: 169.8 LBS | DIASTOLIC BLOOD PRESSURE: 87 MMHG | TEMPERATURE: 98.5 F | SYSTOLIC BLOOD PRESSURE: 125 MMHG | OXYGEN SATURATION: 98 % | RESPIRATION RATE: 16 BRPM

## 2024-04-04 DIAGNOSIS — Z94.81 S/P ALLOGENEIC BONE MARROW TRANSPLANT (H): ICD-10-CM

## 2024-04-04 DIAGNOSIS — D46.9 MDS (MYELODYSPLASTIC SYNDROME) (H): Primary | ICD-10-CM

## 2024-04-04 LAB
ALBUMIN SERPL BCG-MCNC: 4.3 G/DL (ref 3.5–5.2)
ALP SERPL-CCNC: 108 U/L (ref 40–150)
ALT SERPL W P-5'-P-CCNC: 41 U/L (ref 0–50)
ANION GAP SERPL CALCULATED.3IONS-SCNC: 12 MMOL/L (ref 7–15)
AST SERPL W P-5'-P-CCNC: 37 U/L (ref 0–45)
BASOPHILS # BLD AUTO: 0.1 10E3/UL (ref 0–0.2)
BASOPHILS NFR BLD AUTO: 1 %
BILIRUB SERPL-MCNC: 0.2 MG/DL
BUN SERPL-MCNC: 9.7 MG/DL (ref 6–20)
CALCIUM SERPL-MCNC: 9.3 MG/DL (ref 8.6–10)
CHLORIDE SERPL-SCNC: 105 MMOL/L (ref 98–107)
CREAT SERPL-MCNC: 0.6 MG/DL (ref 0.51–0.95)
DEPRECATED HCO3 PLAS-SCNC: 25 MMOL/L (ref 22–29)
EGFRCR SERPLBLD CKD-EPI 2021: >90 ML/MIN/1.73M2
EOSINOPHIL # BLD AUTO: 0 10E3/UL (ref 0–0.7)
EOSINOPHIL NFR BLD AUTO: 0 %
ERYTHROCYTE [DISTWIDTH] IN BLOOD BY AUTOMATED COUNT: 13.4 % (ref 10–15)
GLUCOSE SERPL-MCNC: 109 MG/DL (ref 70–99)
HCT VFR BLD AUTO: 39.9 % (ref 35–47)
HGB BLD-MCNC: 13 G/DL (ref 11.7–15.7)
IMM GRANULOCYTES # BLD: 0.1 10E3/UL
IMM GRANULOCYTES NFR BLD: 1 %
LYMPHOCYTES # BLD AUTO: 1.8 10E3/UL (ref 0.8–5.3)
LYMPHOCYTES NFR BLD AUTO: 27 %
MCH RBC QN AUTO: 32.3 PG (ref 26.5–33)
MCHC RBC AUTO-ENTMCNC: 32.6 G/DL (ref 31.5–36.5)
MCV RBC AUTO: 99 FL (ref 78–100)
MONOCYTES # BLD AUTO: 0.4 10E3/UL (ref 0–1.3)
MONOCYTES NFR BLD AUTO: 6 %
NEUTROPHILS # BLD AUTO: 4.3 10E3/UL (ref 1.6–8.3)
NEUTROPHILS NFR BLD AUTO: 65 %
NRBC # BLD AUTO: 0 10E3/UL
NRBC BLD AUTO-RTO: 0 /100
PLATELET # BLD AUTO: 149 10E3/UL (ref 150–450)
POTASSIUM SERPL-SCNC: 3.6 MMOL/L (ref 3.4–5.3)
PROT SERPL-MCNC: 7.2 G/DL (ref 6.4–8.3)
RBC # BLD AUTO: 4.03 10E6/UL (ref 3.8–5.2)
SIROLIMUS BLD-MCNC: 5.7 UG/L (ref 5–15)
SODIUM SERPL-SCNC: 142 MMOL/L (ref 135–145)
TME LAST DOSE: NORMAL H
TME LAST DOSE: NORMAL H
WBC # BLD AUTO: 6.6 10E3/UL (ref 4–11)

## 2024-04-04 PROCEDURE — 82374 ASSAY BLOOD CARBON DIOXIDE: CPT

## 2024-04-04 PROCEDURE — 80195 ASSAY OF SIROLIMUS: CPT

## 2024-04-04 PROCEDURE — 85025 COMPLETE CBC W/AUTO DIFF WBC: CPT

## 2024-04-04 PROCEDURE — 99214 OFFICE O/P EST MOD 30 MIN: CPT

## 2024-04-04 PROCEDURE — 36415 COLL VENOUS BLD VENIPUNCTURE: CPT

## 2024-04-04 PROCEDURE — 99213 OFFICE O/P EST LOW 20 MIN: CPT

## 2024-04-04 ASSESSMENT — PAIN SCALES - GENERAL: PAINLEVEL: MODERATE PAIN (4)

## 2024-04-04 NOTE — LETTER
4/4/2024         RE: Hortencia Baldwin  [address]  [address]  [place] [address]        Dear Colleague,    Thank you for referring your patient, Hortencia Baldwin, to the [facility] BLOOD AND MARROW TRANSPLANT PROGRAM [place]. Please see a copy of my visit note below.    BMT/Cell Therapy Follow Up    Date of service: Apr 4, 2024       Patient ID: Hortencia Baldwin is a 53 year old y/o female who is day +84  post allogenic stem cell transplant. Date of transplant: 1/11/2024    Diagnosis MDS w/ SF3B1 BMT type Allogenic  CMV  Donor -  Recipient +    Prep: MA Bu/Flu Donor type  6/8 URD Blood Type Donor and recipient O+   GVHD Ppx PTCy/ siro/ MMF Graft source PBSCT Toxo IgG Negative   Protocol [id number] (not on) BMT MD Dr. Garay            INTERVAL HISTORY     Irma continues to have pain in her L knee. She was unable to make her ortho appointment earlier this week due to the snow- she will call to reschedule with them today. No new specific injury, no change in pain or symptoms. No new infectious or GVHD symptoms.       Remainder of 8 point ROS negative      EXAM      /87 (BP Location: Right arm, Patient Position: Sitting, Cuff Size: Adult Regular)   Pulse 99   Temp 98.5  F (36.9  C) (Oral)   Resp 16   Wt 77 kg (169 lb 12.8 oz)   LMP 11/05/2017   SpO2 98%   BMI 31.45 kg/m      Wt Readings from Last 4 Encounters:   03/28/24 77.2 kg (170 lb 1.6 oz)   03/21/24 77.2 kg (170 lb 1.6 oz)   03/14/24 76.7 kg (169 lb)   03/06/24 76.9 kg (169 lb 8 oz)       KPS: 70% Cannot do active work, but can care for self    General: Alert, awake  Eyes: Conjunctiva normal  Respiratory: Normal respiratory effort. CTAB  Cardiovascular: Normal perfusion, no  edema. RRR  Neurological: Grossly normal motor and sensory function.  MSK: no redness/warmth/swelling to L knee or leg.   Skin: No changes in color, texture, or new lesions.  Psych: Mood and affect are appropriate.  No central access.      ASSESSMENT AND PLAN      BMT:  [id number] (according to but not on) with Bu/Flu  6/8 URD, peripheral blood stem cell graft, cell dose: 6.5 x10^6.     Disease status: Bone marrow biopsy at D28, D100, 6 months, 1 year  2/6/24 (~D26): Hypercellular marrow (70-80%), trilineage hemopoiesis with rare nuclear irregularities in terminal erythroid precursors of uncertain significance, 3% ringed sideroblasts (in the context of iron overload is not considered dysplastic finding). Flow showed no increase in myeloid blasts. Cytogenetics and NGS pending.     Graft status/chimerism: D28, D100, 6 months,1 year  2/6/24 (~D26): Bone marrow 100%, peripheral blood (2/1/24) CD33 100%, CD3 87%  - RFLP 3/14 100% donor CD3/CD33    GVHD  - siro stable at 6.3. Level pending. Keep on lower side as viruses, no GVH, day +84.     Heme/ Coag  # ABO matched (both O+).   Continues to have some cytopenias. Last week New severe Neutropenia: Other cell lines look good. EBV and CMV neg 3/14. BK continues to be ++ in urine and low level copies 445 in blood--no BK symptoms. RFLP 100% donor. Siro level has been stable on lower side ~5. Bactrim? See below stopped bactrim. G given. Started vantin. 3/28 Repeat viral PCR's negative. ANC normal today after stopping bactrim.     Platelet refractoriness: HLA platelets when available.   Transfuse for Hb <7, platelets < 10, G-CSF PRN for ANC < 1. ANC normalized post G. Last dose GCSF 3/21.    # Iron overload: Hx of transfusion dependent anemia, pre-transplant ferritin around 5,000. Recommend phlebotomy post transplant when retic count is restored and Hgb >10.      ID  # Prophylaxis  Fungal: micafungin 300mg 3x/week until D+45 (due to smoking hx). No nodules on pre-transplant CT Chest.   PJP: Bactrim from D28. Toxo IgG negative. Stopped with new severe neutropenia. Got pentamidine 3/28.  Viral: Acyclovir 800 mg bid  CMV: Letermovir 480mg from D14 to D100  Bacterial : completed cefpodoxime (prolonged Qtc). Started with severe neutropenia  last week. 3/28 can stop with normal ANC    3/14  no repletion. Check Q3 months.     GI  # Elevated transaminases  AST and ALT elevated beginning 2/2/24 (D22). Notably, she was on tacrolimus for GVHD ppx but changed to sirolimus on 1/31/24. Infectious workup (2/6/24) was negative. No imaging done since LFT elevation.   - Ddx include drug induced liver injury vs GVHD. Concern for pre-existing iron overload. No clinical concern for VOD. GVHD less likely with no concomitant significant skin rash today or lower GI symptoms.     - RUQ US with dopplers--fatty liver. Normal doppler. Consider liver biopsy. Off juanis.      FEN:  Monitor Cr/Lytes. K normal after stopping KCL.    CV  HTN: Amlodipine 5mg daily     MSK:  - L knee pain  - does not have evidence of septic joint or clot. More chronic issue.   - No show for ortho appointment- she can call to reschedule as needed.     RTC: 1 week.     I spent 30 minutes in the care of this patient today, which included time necessary for preparation for the visit, obtaining history, ordering medications/tests/procedures as medically indicated, review of pertinent medical literature, counseling of the patient, communication of recommendations to the care team, and documentation time.    Ronit Ruth, CNP  Pager [phone]

## 2024-04-04 NOTE — NURSING NOTE
Chief Complaint   Patient presents with    Blood Draw     Labs drawn with  by rn.  VS taken.     Labs drawn with  by rn.  Pt tolerated well.  VS taken.  Pt checked in for next appt.    Anika Robert RN

## 2024-04-04 NOTE — NURSING NOTE
"Oncology Rooming Note    April 4, 2024 12:43 PM   Hortencia Baldwin is a 53 year old female who presents for:    Chief Complaint   Patient presents with    Blood Draw     Labs drawn with  by rn.  VS taken.    Oncology Clinic Visit     [facility] RETURN - MDS      Initial Vitals: /87 (BP Location: Right arm, Patient Position: Sitting, Cuff Size: Adult Regular)   Pulse 99   Temp 98.5  F (36.9  C) (Oral)   Resp 16   Wt 77 kg (169 lb 12.8 oz)   LMP 11/05/2017   SpO2 98%   BMI 31.45 kg/m   Estimated body mass index is 31.45 kg/m  as calculated from the following:    Height as of 3/28/24: 1.565 m (5' 1.61\").    Weight as of this encounter: 77 kg (169 lb 12.8 oz). Body surface area is 1.83 meters squared.  Moderate Pain (4) Comment: Data Unavailable   Patient's last menstrual period was 11/05/2017.  Allergies reviewed: Yes  Medications reviewed: Yes    Medications: Medication refills not needed today.  Pharmacy name entered into [employer]:    [facility] #[id number] - [place], [place] - [address] AT [facility]  [facility] #[id number] - [place], [place] - [address] AT [facility] [address] [place]    Frailty Screening:   Is the patient here for a new oncology consult visit in cancer care? 2. No      Shady Tai LPN              "

## 2024-04-04 NOTE — PROGRESS NOTES
BMT/Cell Therapy Follow Up    Date of service: Apr 4, 2024       Patient ID: Hortencia Baldwin is a 53 year old y/o female who is day +84  post allogenic stem cell transplant. Date of transplant: 1/11/2024    Diagnosis MDS w/ SF3B1 BMT type Allogenic  CMV  Donor -  Recipient +    Prep: MA Bu/Flu Donor type  6/8 URD Blood Type Donor and recipient O+   GVHD Ppx PTCy/ siro/ MMF Graft source PBSCT Toxo IgG Negative   Protocol [id number] (not on) BMT MD Dr. Garay            INTERVAL HISTORY     Irma continues to have pain in her L knee. She was unable to make her ortho appointment earlier this week due to the snow- she will call to reschedule with them today. No new specific injury, no change in pain or symptoms. No new infectious or GVHD symptoms.       Remainder of 8 point ROS negative      EXAM      /87 (BP Location: Right arm, Patient Position: Sitting, Cuff Size: Adult Regular)   Pulse 99   Temp 98.5  F (36.9  C) (Oral)   Resp 16   Wt 77 kg (169 lb 12.8 oz)   LMP 11/05/2017   SpO2 98%   BMI 31.45 kg/m      Wt Readings from Last 4 Encounters:   03/28/24 77.2 kg (170 lb 1.6 oz)   03/21/24 77.2 kg (170 lb 1.6 oz)   03/14/24 76.7 kg (169 lb)   03/06/24 76.9 kg (169 lb 8 oz)       KPS: 70% Cannot do active work, but can care for self    General: Alert, awake  Eyes: Conjunctiva normal  Respiratory: Normal respiratory effort. CTAB  Cardiovascular: Normal perfusion, no  edema. RRR  Neurological: Grossly normal motor and sensory function.  MSK: no redness/warmth/swelling to L knee or leg.   Skin: No changes in color, texture, or new lesions.  Psych: Mood and affect are appropriate.  No central access.      ASSESSMENT AND PLAN     BMT:  [id number] (according to but not on) with Bu/Flu  6/8 URD, peripheral blood stem cell graft, cell dose: 6.5 x10^6.     Disease status: Bone marrow biopsy at D28, D100, 6 months, 1 year  2/6/24 (~D26): Hypercellular marrow (70-80%), trilineage hemopoiesis with rare nuclear  irregularities in terminal erythroid precursors of uncertain significance, 3% ringed sideroblasts (in the context of iron overload is not considered dysplastic finding). Flow showed no increase in myeloid blasts. Cytogenetics and NGS pending.     Graft status/chimerism: D28, D100, 6 months,1 year  2/6/24 (~D26): Bone marrow 100%, peripheral blood (2/1/24) CD33 100%, CD3 87%  - RFLP 3/14 100% donor CD3/CD33    GVHD  - siro stable at 6.3. Level pending. Keep on lower side as viruses, no GVH, day +84.     Heme/ Coag  # ABO matched (both O+).   Continues to have some cytopenias. Last week New severe Neutropenia: Other cell lines look good. EBV and CMV neg 3/14. BK continues to be ++ in urine and low level copies 445 in blood--no BK symptoms. RFLP 100% donor. Siro level has been stable on lower side ~5. Bactrim? See below stopped bactrim. G given. Started vantin. 3/28 Repeat viral PCR's negative. ANC normal today after stopping bactrim.     Platelet refractoriness: HLA platelets when available.   Transfuse for Hb <7, platelets < 10, G-CSF PRN for ANC < 1. ANC normalized post G. Last dose GCSF 3/21.    # Iron overload: Hx of transfusion dependent anemia, pre-transplant ferritin around 5,000. Recommend phlebotomy post transplant when retic count is restored and Hgb >10.      ID  # Prophylaxis  Fungal: micafungin 300mg 3x/week until D+45 (due to smoking hx). No nodules on pre-transplant CT Chest.   PJP: Bactrim from D28. Toxo IgG negative. Stopped with new severe neutropenia. Got pentamidine 3/28.  Viral: Acyclovir 800 mg bid  CMV: Letermovir 480mg from D14 to D100  Bacterial : completed cefpodoxime (prolonged Qtc). Started with severe neutropenia last week. 3/28 can stop with normal ANC    3/14  no repletion. Check Q3 months.     GI  # Elevated transaminases  AST and ALT elevated beginning 2/2/24 (D22). Notably, she was on tacrolimus for GVHD ppx but changed to sirolimus on 1/31/24. Infectious workup (2/6/24) was  negative. No imaging done since LFT elevation.   - Ddx include drug induced liver injury vs GVHD. Concern for pre-existing iron overload. No clinical concern for VOD. GVHD less likely with no concomitant significant skin rash today or lower GI symptoms.     - RUQ US with dopplers--fatty liver. Normal doppler. Consider liver biopsy. Off juanis.      FEN:  Monitor Cr/Lytes. K normal after stopping KCL.    CV  HTN: Amlodipine 5mg daily     MSK:  - L knee pain  - does not have evidence of septic joint or clot. More chronic issue.   - No show for ortho appointment- she can call to reschedule as needed.     RTC: 1 week.     I spent 30 minutes in the care of this patient today, which included time necessary for preparation for the visit, obtaining history, ordering medications/tests/procedures as medically indicated, review of pertinent medical literature, counseling of the patient, communication of recommendations to the care team, and documentation time.    Ronit Ruth, CNP  Pager [phone]

## 2024-04-05 LAB — CMV DNA SPEC NAA+PROBE-ACNC: NOT DETECTED IU/ML

## 2024-04-09 DIAGNOSIS — D46.9 MDS (MYELODYSPLASTIC SYNDROME) (H): ICD-10-CM

## 2024-04-09 RX ORDER — AMLODIPINE BESYLATE 5 MG/1
5 TABLET ORAL DAILY
Qty: 30 TABLET | Refills: 1 | Status: SHIPPED | OUTPATIENT
Start: 2024-04-09 | End: 2024-04-26

## 2024-04-09 RX ORDER — ACYCLOVIR 800 MG/1
800 TABLET ORAL 2 TIMES DAILY
Qty: 60 TABLET | Refills: 3 | Status: SHIPPED | OUTPATIENT
Start: 2024-04-09 | End: 2024-04-26

## 2024-04-10 NOTE — TELEPHONE ENCOUNTER
DIAGNOSIS: Left leg pain, pt indicated it could be her medication or her bone marrow transplant or something entirely different, HP open access, US venous duplex     APPOINTMENT DATE: 4/15/24   NOTES STATUS DETAILS   OFFICE NOTE from other specialist Internal 5.26.17  Lloyd  PT     5.5.17  Zurdo  IM   MEDICATION LIST Internal     XRAYS (IMAGES & REPORTS) Internal 5.5.17  XR Knee Standing Left

## 2024-04-11 ENCOUNTER — APPOINTMENT (OUTPATIENT)
Dept: LAB | Facility: CLINIC | Age: 54
End: 2024-04-11
Attending: STUDENT IN AN ORGANIZED HEALTH CARE EDUCATION/TRAINING PROGRAM
Payer: COMMERCIAL

## 2024-04-11 ENCOUNTER — OFFICE VISIT (OUTPATIENT)
Dept: TRANSPLANT | Facility: CLINIC | Age: 54
End: 2024-04-11
Attending: STUDENT IN AN ORGANIZED HEALTH CARE EDUCATION/TRAINING PROGRAM
Payer: COMMERCIAL

## 2024-04-11 VITALS
WEIGHT: 169.4 LBS | HEART RATE: 89 BPM | OXYGEN SATURATION: 97 % | DIASTOLIC BLOOD PRESSURE: 87 MMHG | RESPIRATION RATE: 16 BRPM | TEMPERATURE: 98.3 F | SYSTOLIC BLOOD PRESSURE: 137 MMHG | BODY MASS INDEX: 31.37 KG/M2

## 2024-04-11 DIAGNOSIS — D46.9 MDS (MYELODYSPLASTIC SYNDROME) (H): ICD-10-CM

## 2024-04-11 DIAGNOSIS — Z94.81 S/P ALLOGENEIC BONE MARROW TRANSPLANT (H): ICD-10-CM

## 2024-04-11 DIAGNOSIS — Z94.81 STATUS POST BONE MARROW TRANSPLANT (H): ICD-10-CM

## 2024-04-11 LAB
ALBUMIN SERPL BCG-MCNC: 4.6 G/DL (ref 3.5–5.2)
ALP SERPL-CCNC: 116 U/L (ref 40–150)
ALT SERPL W P-5'-P-CCNC: 52 U/L (ref 0–50)
ANION GAP SERPL CALCULATED.3IONS-SCNC: 12 MMOL/L (ref 7–15)
AST SERPL W P-5'-P-CCNC: 47 U/L (ref 0–45)
BASOPHILS # BLD AUTO: 0 10E3/UL (ref 0–0.2)
BASOPHILS NFR BLD AUTO: 1 %
BILIRUB SERPL-MCNC: 0.3 MG/DL
BUN SERPL-MCNC: 6.9 MG/DL (ref 6–20)
CALCIUM SERPL-MCNC: 9.9 MG/DL (ref 8.6–10)
CHLORIDE SERPL-SCNC: 103 MMOL/L (ref 98–107)
CREAT SERPL-MCNC: 0.56 MG/DL (ref 0.51–0.95)
DEPRECATED HCO3 PLAS-SCNC: 26 MMOL/L (ref 22–29)
EGFRCR SERPLBLD CKD-EPI 2021: >90 ML/MIN/1.73M2
EOSINOPHIL # BLD AUTO: 0.1 10E3/UL (ref 0–0.7)
EOSINOPHIL NFR BLD AUTO: 1 %
ERYTHROCYTE [DISTWIDTH] IN BLOOD BY AUTOMATED COUNT: 13.4 % (ref 10–15)
GLUCOSE SERPL-MCNC: 89 MG/DL (ref 70–99)
HCT VFR BLD AUTO: 40.9 % (ref 35–47)
HGB BLD-MCNC: 13.7 G/DL (ref 11.7–15.7)
IMM GRANULOCYTES # BLD: 0.1 10E3/UL
IMM GRANULOCYTES NFR BLD: 1 %
LYMPHOCYTES # BLD AUTO: 1.5 10E3/UL (ref 0.8–5.3)
LYMPHOCYTES NFR BLD AUTO: 32 %
MCH RBC QN AUTO: 32.2 PG (ref 26.5–33)
MCHC RBC AUTO-ENTMCNC: 33.5 G/DL (ref 31.5–36.5)
MCV RBC AUTO: 96 FL (ref 78–100)
MONOCYTES # BLD AUTO: 0.5 10E3/UL (ref 0–1.3)
MONOCYTES NFR BLD AUTO: 11 %
NEUTROPHILS # BLD AUTO: 2.6 10E3/UL (ref 1.6–8.3)
NEUTROPHILS NFR BLD AUTO: 54 %
NRBC # BLD AUTO: 0 10E3/UL
NRBC BLD AUTO-RTO: 0 /100
PLATELET # BLD AUTO: 120 10E3/UL (ref 150–450)
POTASSIUM SERPL-SCNC: 3.9 MMOL/L (ref 3.4–5.3)
PROT SERPL-MCNC: 7.6 G/DL (ref 6.4–8.3)
RBC # BLD AUTO: 4.25 10E6/UL (ref 3.8–5.2)
RETICS # AUTO: 0.07 10E6/UL (ref 0.03–0.1)
RETICS/RBC NFR AUTO: 1.7 % (ref 0.5–2)
SIROLIMUS BLD-MCNC: 6 UG/L (ref 5–15)
SODIUM SERPL-SCNC: 141 MMOL/L (ref 135–145)
TME LAST DOSE: NORMAL H
TME LAST DOSE: NORMAL H
WBC # BLD AUTO: 4.8 10E3/UL (ref 4–11)

## 2024-04-11 PROCEDURE — 36415 COLL VENOUS BLD VENIPUNCTURE: CPT

## 2024-04-11 PROCEDURE — 99214 OFFICE O/P EST MOD 30 MIN: CPT

## 2024-04-11 PROCEDURE — 87799 DETECT AGENT NOS DNA QUANT: CPT | Performed by: STUDENT IN AN ORGANIZED HEALTH CARE EDUCATION/TRAINING PROGRAM

## 2024-04-11 PROCEDURE — 85025 COMPLETE CBC W/AUTO DIFF WBC: CPT

## 2024-04-11 PROCEDURE — 80195 ASSAY OF SIROLIMUS: CPT

## 2024-04-11 PROCEDURE — 85045 AUTOMATED RETICULOCYTE COUNT: CPT

## 2024-04-11 PROCEDURE — 80053 COMPREHEN METABOLIC PANEL: CPT

## 2024-04-11 ASSESSMENT — PAIN SCALES - GENERAL: PAINLEVEL: MODERATE PAIN (4)

## 2024-04-11 NOTE — NURSING NOTE
"Oncology Rooming Note    April 11, 2024 11:52 AM   Hortencia Baldwin is a 53 year old female who presents for:    Chief Complaint   Patient presents with    Blood Draw     Labs drawn via venipuncture by Vascular Access Team in lab.    Oncology Clinic Visit     MDS (myelodysplastic syndrome)      Initial Vitals: /87 (BP Location: Right arm, Patient Position: Sitting, Cuff Size: Adult Regular)   Pulse 89   Temp 98.3  F (36.8  C) (Oral)   Resp 16   Wt 76.8 kg (169 lb 6.4 oz)   LMP 11/05/2017   SpO2 97%   BMI 31.37 kg/m   Estimated body mass index is 31.37 kg/m  as calculated from the following:    Height as of 3/28/24: 1.565 m (5' 1.61\").    Weight as of this encounter: 76.8 kg (169 lb 6.4 oz). Body surface area is 1.83 meters squared.  Moderate Pain (4) Comment: Data Unavailable   Patient's last menstrual period was 11/05/2017.  Allergies reviewed: Yes  Medications reviewed: Yes    Medications: Medication refills not needed today.  Pharmacy name entered into [employer]:    [facility] #[id number] - [place], [place] - [address] [facility]  [facility] #[id number] - [place], [place] - [address] AT [facility] [address]    Frailty Screening:   Is the patient here for a new oncology consult visit in cancer care? 2. No      Clinical concerns: no other complaints      Praveen Mcclellan"

## 2024-04-11 NOTE — NURSING NOTE
Chief Complaint   Patient presents with    Blood Draw     Labs drawn via venipuncture by Vascular Access Team in lab.     Labs collected from venipuncture by Vascular Access Team. Vitals taken. Checked in for appointment(s).     Mercedes Stephen RN

## 2024-04-11 NOTE — PROGRESS NOTES
BMT/Cell Therapy Follow Up    Date of service: Apr 11, 2024       Patient ID:   Hortencia Baldwin is a 53 year old female who is day +91  post allogenic stem cell transplant. Date of transplant: 1/11/2024    Diagnosis MDS w/ SF3B1 BMT type Allogenic  CMV  Donor -  Recipient +    Prep: MA Bu/Flu Donor type  6/8 URD Blood Type Donor and recipient O+   GVHD Ppx PTCy/ siro/ MMF Graft source PBSCT Toxo IgG Negative   Protocol [id number] (not on) BMT MD Dr. Garay            INTERVAL HISTORY     Irma continues to report L knee pain which feels like it radiates down the back of her leg. She has a small amount of swelling in the left foot which is unchanged over the last 3 weeks. She has an appointment with ortho on 4/15 for evaluation. Still low suspicion for clot. She also notes that she has intermittent right upper quadrant pain which is a very pin point to one area. This pain comes and goes without intervention and has been present for 4 weeks. She continues to have minimal nausea but is eating well. No diarrhea. No rashes.      ROS: ROS neg other than the symptoms noted above in the HPI.      EXAM      /87 (BP Location: Right arm, Patient Position: Sitting, Cuff Size: Adult Regular)   Pulse 89   Temp 98.3  F (36.8  C) (Oral)   Resp 16   Wt 76.8 kg (169 lb 6.4 oz)   LMP 11/05/2017   SpO2 97%   BMI 31.37 kg/m      Wt Readings from Last 4 Encounters:   04/11/24 76.8 kg (169 lb 6.4 oz)   04/04/24 77 kg (169 lb 12.8 oz)   03/28/24 77.2 kg (170 lb 1.6 oz)   03/21/24 77.2 kg (170 lb 1.6 oz)     KPS: 70% Cannot do active work, but can care for self    General: Alert, awake  Eyes: Conjunctiva normal  Respiratory: Normal respiratory effort. CTAB  Cardiovascular: Normal perfusion, no  edema. RRR  Abdomen: +BS. Soft. ND. She has point tenderness in one small area of the RUQ - this pain comes and goes without intervention. Not typically painful without pushing hard on it. No palpable mass or lump. Rest of abdominal exam  is benign.  Neurological: Grossly normal motor and sensory function.  MSK: no redness/warmth/swelling to L knee or leg. NT left knee. Trace edema in left foot, non pitting.  Skin: No changes in color, texture, or new lesions.  Psych: Mood and affect are appropriate.  No central access.     Acute GVHD          4/11/2024    12:15 3/28/2024    15:52 3/21/2024    15:53   Acute GVHD   New evidence of Acute Fsddu-Ywubwl-Lixj Disease developed since last entry? No No No       Laboratory Studies:     Lab Results   Component Value Date    WBC 4.8 04/11/2024    ANEU 1.8 03/28/2024    HGB 13.7 04/11/2024    HCT 40.9 04/11/2024     (L) 04/11/2024     04/11/2024    POTASSIUM 3.9 04/11/2024    CHLORIDE 103 04/11/2024    CO2 26 04/11/2024    GLC 89 04/11/2024    BUN 6.9 04/11/2024    CR 0.56 04/11/2024    MAG 2.1 02/29/2024    INR 0.88 02/26/2024      ASSESSMENT AND PLAN     BMT:  [id number] (according to but not on) with Bu/Flu  6/8 URD, peripheral blood stem cell graft, cell dose: 6.5 x10^6.     Disease status: Bone marrow biopsy at D28, D100, 6 months, 1 year  2/6/24 (~D26): Hypercellular marrow (70-80%), trilineage hemopoiesis with rare nuclear irregularities in terminal erythroid precursors of uncertain significance, 3% ringed sideroblasts (in the context of iron overload is not considered dysplastic finding). Flow showed no increase in myeloid blasts. Cytogenetics and NGS pending.     Graft status/chimerism: D28, D100, 6 months,1 year  2/6/24 (~D26): Bone marrow 100%, peripheral blood (2/1/24) CD33 100%, CD3 87%  - RFLP (3/14) 100% donor CD3/CD33    GVHD  - Current siro dose is 1 mg alternating 0.5 mg every other day. Keep on lower side as viruses.  (4/11) Siro level pending today -- will likely start siro taper in ~2 weeks.     Heme/ Coag  # ABO matched (both O+).   Continues to have some cytopenias. Last week New severe Neutropenia: Other cell lines look good. EBV and CMV neg 3/14. BK continues to be ++ in  urine and low level copies 445 in blood--no BK symptoms. RFLP 100% donor. Siro level has been stable on lower side ~5. Bactrim? See below stopped bactrim. G given. Started vantin. (3/28) Repeat viral PCR's negative. ANC normal after stopping bactrim.     Platelet refractoriness: HLA platelets when available.   Transfuse for Hb <7, platelets < 10, G-CSF PRN for ANC < 1. ANC normalized post G. Last dose GCSF 3/21.    # Iron overload: Hx of transfusion dependent anemia, pre-transplant ferritin around 5,000. Recommend phlebotomy post transplant when retic count is restored and Hgb >10.      ID  # Prophylaxis  Fungal: micafungin 300mg 3x/week until D+45 (due to smoking hx). No nodules on pre-transplant CT Chest.   PJP: Bactrim from D28. Toxo IgG negative. Stopped with new severe neutropenia. Got pentamidine 3/28 - requesting appt on 4/26.  Viral: Acyclovir 800 mg bid  CMV: Letermovir 480mg from D14 to D100  Bacterial : completed cefpodoxime (prolonged Qtc). Started with severe neutropenia last week. (3/28) can stop with normal ANC    (3/14)  no repletion. Check Q3 months.     GI  # Elevated transaminases  AST and ALT elevated beginning 2/2/24 (D22). Notably, she was on tacrolimus for GVHD ppx but changed to sirolimus on 1/31/24. Infectious workup (2/6/24) was negative. No imaging done since LFT elevation.   - Ddx include drug induced liver injury vs GVHD. Concern for pre-existing iron overload. No clinical concern for VOD. GVHD less likely with no concomitant significant skin rash today or lower GI symptoms.     - RUQ US with dopplers--fatty liver. Normal doppler. Consider liver biopsy. Off juanis.  (4/11) LFTs mildly elevated again - intermittent over the last few weeks. She has intermittent RUQ pain but this has been ongoing over the last 4 weeks. Not new and is more pin point to one area rather than the entire RUQ.     FEN:  Monitor Cr/Lytes. K normal after stopping KCL.    CV  HTN: Amlodipine 5mg daily      MSK:  # L knee pain/foot swelling: unchanged over last 3 weeks. Radiating px at times down the back of her leg. Rescheduled ortho appt for 4/15. No concerns for septic joint or DVT.    Today's Summary:  Requested pentamidine appt for 4/26  Continue with plan to see ortho on 4/15    RTC: (4/19) BM bx (4/26) Day 100 review with Dr. Garay    I spent 30 minutes in the care of this patient today, which included time necessary for preparation for the visit, obtaining history, ordering medications/tests/procedures as medically indicated, review of pertinent medical literature, counseling of the patient, communication of recommendations to the care team, and documentation time.    Gilbert Bains PA-C   *[phone]

## 2024-04-11 NOTE — LETTER
4/11/2024         RE: Hortencia Baldwin  [address]  [address]  [facility] [address]        Dear Colleague,    Thank you for referring your patient, Hortencia Baldwin, to the [facility] BLOOD AND MARROW TRANSPLANT PROGRAM [place]. Please see a copy of my visit note below.    BMT/Cell Therapy Follow Up    Date of service: Apr 11, 2024       Patient ID:   Hortencia Baldwin is a 53 year old female who is day +91  post allogenic stem cell transplant. Date of transplant: 1/11/2024    Diagnosis MDS w/ SF3B1 BMT type Allogenic  CMV  Donor -  Recipient +    Prep: MA Bu/Flu Donor type  6/8 URD Blood Type Donor and recipient O+   GVHD Ppx PTCy/ siro/ MMF Graft source PBSCT Toxo IgG Negative   Protocol [id number] (not on) BMT MD Dr. Garay            INTERVAL HISTORY     Irma continues to report L knee pain which feels like it radiates down the back of her leg. She has a small amount of swelling in the left foot which is unchanged over the last 3 weeks. She has an appointment with ortho on 4/15 for evaluation. Still low suspicion for clot. She also notes that she has intermittent right upper quadrant pain which is a very pin point to one area. This pain comes and goes without intervention and has been present for 4 weeks. She continues to have minimal nausea but is eating well. No diarrhea. No rashes.      ROS: ROS neg other than the symptoms noted above in the HPI.      EXAM      /87 (BP Location: Right arm, Patient Position: Sitting, Cuff Size: Adult Regular)   Pulse 89   Temp 98.3  F (36.8  C) (Oral)   Resp 16   Wt 76.8 kg (169 lb 6.4 oz)   LMP 11/05/2017   SpO2 97%   BMI 31.37 kg/m      Wt Readings from Last 4 Encounters:   04/11/24 76.8 kg (169 lb 6.4 oz)   04/04/24 77 kg (169 lb 12.8 oz)   03/28/24 77.2 kg (170 lb 1.6 oz)   03/21/24 77.2 kg (170 lb 1.6 oz)     KPS: 70% Cannot do active work, but can care for self    General: Alert, awake  Eyes: Conjunctiva normal  Respiratory: Normal  respiratory effort. CTAB  Cardiovascular: Normal perfusion, no  edema. RRR  Abdomen: +BS. Soft. ND. She has point tenderness in one small area of the RUQ - this pain comes and goes without intervention. Not typically painful without pushing hard on it. No palpable mass or lump. Rest of abdominal exam is benign.  Neurological: Grossly normal motor and sensory function.  MSK: no redness/warmth/swelling to L knee or leg. NT left knee. Trace edema in left foot, non pitting.  Skin: No changes in color, texture, or new lesions.  Psych: Mood and affect are appropriate.  No central access.     Acute GVHD          4/11/2024    12:15 3/28/2024    15:52 3/21/2024    15:53   Acute GVHD   New evidence of Acute Yqweu-Ndmmsf-Pyrr Disease developed since last entry? No No No       Laboratory Studies:     Lab Results   Component Value Date    WBC 4.8 04/11/2024    ANEU 1.8 03/28/2024    HGB 13.7 04/11/2024    HCT 40.9 04/11/2024     (L) 04/11/2024     04/11/2024    POTASSIUM 3.9 04/11/2024    CHLORIDE 103 04/11/2024    CO2 26 04/11/2024    GLC 89 04/11/2024    BUN 6.9 04/11/2024    CR 0.56 04/11/2024    MAG 2.1 02/29/2024    INR 0.88 02/26/2024      ASSESSMENT AND PLAN     BMT:  [id number] (according to but not on) with Bu/Flu  6/8 URD, peripheral blood stem cell graft, cell dose: 6.5 x10^6.     Disease status: Bone marrow biopsy at D28, D100, 6 months, 1 year  2/6/24 (~D26): Hypercellular marrow (70-80%), trilineage hemopoiesis with rare nuclear irregularities in terminal erythroid precursors of uncertain significance, 3% ringed sideroblasts (in the context of iron overload is not considered dysplastic finding). Flow showed no increase in myeloid blasts. Cytogenetics and NGS pending.     Graft status/chimerism: D28, D100, 6 months,1 year  2/6/24 (~D26): Bone marrow 100%, peripheral blood (2/1/24) CD33 100%, CD3 87%  - RFLP (3/14) 100% donor CD3/CD33    GVHD  - Current siro dose is 1 mg alternating 0.5 mg every other  day. Keep on lower side as viruses.  (4/11) Siro level pending today -- will likely start siro taper in ~2 weeks.     Heme/ Coag  # ABO matched (both O+).   Continues to have some cytopenias. Last week New severe Neutropenia: Other cell lines look good. EBV and CMV neg 3/14. BK continues to be ++ in urine and low level copies 445 in blood--no BK symptoms. RFLP 100% donor. Siro level has been stable on lower side ~5. Bactrim? See below stopped bactrim. G given. Started vantin. (3/28) Repeat viral PCR's negative. ANC normal after stopping bactrim.     Platelet refractoriness: HLA platelets when available.   Transfuse for Hb <7, platelets < 10, G-CSF PRN for ANC < 1. ANC normalized post G. Last dose GCSF 3/21.    # Iron overload: Hx of transfusion dependent anemia, pre-transplant ferritin around 5,000. Recommend phlebotomy post transplant when retic count is restored and Hgb >10.      ID  # Prophylaxis  Fungal: micafungin 300mg 3x/week until D+45 (due to smoking hx). No nodules on pre-transplant CT Chest.   PJP: Bactrim from D28. Toxo IgG negative. Stopped with new severe neutropenia. Got pentamidine 3/28 - requesting appt on 4/26.  Viral: Acyclovir 800 mg bid  CMV: Letermovir 480mg from D14 to D100  Bacterial : completed cefpodoxime (prolonged Qtc). Started with severe neutropenia last week. (3/28) can stop with normal ANC    (3/14)  no repletion. Check Q3 months.     GI  # Elevated transaminases  AST and ALT elevated beginning 2/2/24 (D22). Notably, she was on tacrolimus for GVHD ppx but changed to sirolimus on 1/31/24. Infectious workup (2/6/24) was negative. No imaging done since LFT elevation.   - Ddx include drug induced liver injury vs GVHD. Concern for pre-existing iron overload. No clinical concern for VOD. GVHD less likely with no concomitant significant skin rash today or lower GI symptoms.     - RUQ US with dopplers--fatty liver. Normal doppler. Consider liver biopsy. Off juanis.  (4/11) LFTs mildly  elevated again - intermittent over the last few weeks. She has intermittent RUQ pain but this has been ongoing over the last 4 weeks. Not new and is more pin point to one area rather than the entire RUQ.     FEN:  Monitor Cr/Lytes. K normal after stopping KCL.    CV  HTN: Amlodipine 5mg daily     MSK:  # L knee pain/foot swelling: unchanged over last 3 weeks. Radiating px at times down the back of her leg. Rescheduled ortho appt for 4/15. No concerns for septic joint or DVT.    Today's Summary:  Requested pentamidine appt for 4/26  Continue with plan to see ortho on 4/15    RTC: (4/19) BM bx (4/26) Day 100 review with Dr. Garay    I spent 30 minutes in the care of this patient today, which included time necessary for preparation for the visit, obtaining history, ordering medications/tests/procedures as medically indicated, review of pertinent medical literature, counseling of the patient, communication of recommendations to the care team, and documentation time.    Gilbert Bains PA-C   *[phone]

## 2024-04-12 LAB
BK VIRUS DNA IU/ML, INSTRUMENT (6800): ABNORMAL IU/ML
BK VIRUS SPECIMEN TYPE: ABNORMAL
BKV DNA SPEC NAA+PROBE-LOG#: 5.3 {LOG_COPIES}/ML
CMV DNA SPEC NAA+PROBE-ACNC: NOT DETECTED IU/ML

## 2024-04-15 ENCOUNTER — PRE VISIT (OUTPATIENT)
Dept: ORTHOPEDICS | Facility: CLINIC | Age: 54
End: 2024-04-15

## 2024-04-15 ENCOUNTER — OFFICE VISIT (OUTPATIENT)
Dept: ORTHOPEDICS | Facility: CLINIC | Age: 54
End: 2024-04-15
Payer: COMMERCIAL

## 2024-04-15 DIAGNOSIS — Z94.84 HISTORY OF PERIPHERAL STEM CELL TRANSPLANT (H): ICD-10-CM

## 2024-04-15 DIAGNOSIS — R60.9 DEPENDENT EDEMA: Primary | ICD-10-CM

## 2024-04-15 DIAGNOSIS — E66.9 OBESITY (BMI 30-39.9): ICD-10-CM

## 2024-04-15 DIAGNOSIS — D46.9 MDS (MYELODYSPLASTIC SYNDROME) (H): ICD-10-CM

## 2024-04-15 DIAGNOSIS — M54.32 SCIATICA OF LEFT SIDE: ICD-10-CM

## 2024-04-15 DIAGNOSIS — M51.369 LUMBAR DEGENERATIVE DISC DISEASE: ICD-10-CM

## 2024-04-15 PROCEDURE — 99203 OFFICE O/P NEW LOW 30 MIN: CPT | Performed by: FAMILY MEDICINE

## 2024-04-15 SDOH — HEALTH STABILITY: PHYSICAL HEALTH: ON AVERAGE, HOW MANY MINUTES DO YOU ENGAGE IN EXERCISE AT THIS LEVEL?: 0 MIN

## 2024-04-15 SDOH — HEALTH STABILITY: PHYSICAL HEALTH: ON AVERAGE, HOW MANY DAYS PER WEEK DO YOU ENGAGE IN MODERATE TO STRENUOUS EXERCISE (LIKE A BRISK WALK)?: 0 DAYS

## 2024-04-15 NOTE — PROGRESS NOTES
Sports Medicine Clinic Visit    PCP: Sukhwinder - [facility]    Hortencia Baldwin is a 53 year old female who is seen  as self referral presenting with left leg pain, intermittent    Patient notes a sharp electric sensation when she sneezes that she feels from the left posterior knee down the calf to the left foot.  It only bothers her when she sneezes.  She does not notice sciatica symptoms with long periods of sitting or driving.  She does not notice it with the standing.  No numbness or tingling in the lower extremity.    She has a tendency to notice swelling involving the left foot that improves after she has been sleeping in bed at night.  She will get more swelling as a day goes on.    Ultrasound of the left lower leg recently showed no DVT.    Injury: Pt notes no specific injury    Location of Pain: left posterior knee and lower leg  Duration of Pain: 1 month(s)  Rating of Pain: 8/10  Pain is better with: No Treatment tried to date  Pain is worse with: Walking  Additional Features: lack of knee flexion  Treatment so far consists of: No Treatment tried to date  Prior History of related problems: None    LMP 11/05/2017       Ultrasound left leg 3/4/2024 showed no evidence of DVT.    CT scan of the abdomen and pelvis 2/27/2024 suggested mild degenerative changes in the lower lumbar spine at L4-L5 L5-S1.      History of myelo dysplastic syndrome, 3-month status post allogenic stem cell transplant, date of transplant 1/11/2024.  Most recent oncology note 4/11/2024 reviewed by me          LEFT KNEE STANDING TWO VIEWS   5/5/2017  3:17 PM      HISTORY: Pain in left knee. Other chronic pain.     COMPARISON: None.                                                                      IMPRESSION: Bones are normally aligned. No knee effusion or fracture.     SALOMÓN RIGGS MD        PMH:  Past Medical History:   Diagnosis Date    Left medial knee pain     Medial epicondylitis, right elbow     Obesity (BMI 30-39.9)      Right elbow pain        Active problem list:  Patient Active Problem List   Diagnosis    Left medial knee pain    Obesity (BMI 30-39.9)    Anemia, unspecified type    Macrocytic anemia    MDS (myelodysplastic syndrome) (H)    BK viruria    History of peripheral stem cell transplant (H)    BK viremia    Administration of long-term prophylactic antibiotics       FH:  Family History   Problem Relation Age of Onset    Hypertension Mother     Diabetes No family hx of     Cerebrovascular Disease No family hx of     Myocardial Infarction No family hx of     Coronary Artery Disease Early Onset No family hx of     Breast Cancer No family hx of     Ovarian Cancer No family hx of     Colon Cancer No family hx of        SH:  Social History     Socioeconomic History    Marital status: Single     Spouse name: Not on file    Number of children: Not on file    Years of education: Not on file    Highest education level: Not on file   Occupational History    Occupation: Assembly for [employer]   Tobacco Use    Smoking status: Former     Current packs/day: 0.00     Average packs/day: 0.5 packs/day for 10.0 years (5.0 ttl pk-yrs)     Types: Cigarettes     Start date: 2014     Quit date: 2024     Years since quittin.2     Passive exposure: Current    Smokeless tobacco: Never   Substance and Sexual Activity    Alcohol use: Yes     Comment: 1-2 drinks/month    Drug use: No    Sexual activity: Yes     Partners: Male   Other Topics Concern    Parent/sibling w/ CABG, MI or angioplasty before 65F 55M? Not Asked   Social History Narrative    .    3 kids - 2 adult, one is 14 (as of 2017).     Exercises 1-2x/week.      Social Determinants of Health     Financial Resource Strain: Not on file   Food Insecurity: Not on file   Transportation Needs: Not on file   Physical Activity: Not on file   Stress: Not on file   Social Connections: Not on file   Interpersonal Safety: Not on file   Housing Stability: Not on file        MEDS:  See EMR, reviewed  ALL:  See EMR, reviewed    REVIEW OF SYSTEMS:  CONSTITUTIONAL:NEGATIVE for fever, chills, change in weight  INTEGUMENTARY/SKIN: NEGATIVE for worrisome rashes, moles or lesions  EYES: NEGATIVE for vision changes or irritation  ENT/MOUTH: NEGATIVE for ear, mouth and throat problems  RESP:NEGATIVE for significant cough or SOB  BREAST: NEGATIVE for masses, tenderness or discharge  CV: NEGATIVE for chest pain, palpitations or peripheral edema  GI: NEGATIVE for nausea, abdominal pain, heartburn, or change in bowel habits  :NEGATIVE for frequency, dysuria, or hematuria  :NEGATIVE for frequency, dysuria, or hematuria  NEURO: NEGATIVE for weakness, dizziness or paresthesias  ENDOCRINE: NEGATIVE for temperature intolerance, skin/hair changes  HEME/ALLERGY/IMMUNE: NEGATIVE for bleeding problems  PSYCHIATRIC: NEGATIVE for changes in mood or affect      Objective: Straight leg raise is negative bilaterally.  5 out of 5 strength bilaterally at hip, knee, ankle and foot.  No swelling in the left calf.  No palpable cords and Homans' sign is negative.  Sensation is intact in the bilateral lower extremities.  She has mild swelling in the left lower leg about the ankle and foot.  Nontender about the bony ankle or bony foot.  No pitting edema.    I personally reviewed with the patient the previous CT scan of her abdomen and pelvis including CT views of the lumbar spine that suggested mild degenerative disc and joint changes.    Assessment: Dependent edema, history of myelodysplastic syndrome status post transplant.  Lumbar spine degenerative disc and joint disease with intermittent sciatica    Plan: Patient would like to see physical therapy in this building for lumbar spine maintenance program.  She was given a referral to [facility] care for bilateral lower extremity medium grade below the knee LEONARD hose to assist with dependent edema.

## 2024-04-15 NOTE — COMMUNITY RESOURCES LIST (ENGLISH)
April 15, 2024           YOUR PERSONALIZED LIST OF SERVICES & PROGRAMS           & RECREATION    Sports      of [place] - Sports clubs and recreational activities  [address] [place] [address] (Distance: 0.8 miles)  Phone: [phone]  Website: [email or web address]  Language: English  Fee: Self pay  Accessibility: Ada accessible      of [place] [facility]  [address] [place] [address] (Distance: 2.1 miles)  Phone: [phone]  Website: http://www.[place].gov/ccc/index.php  Language: English  Fee: Free  Accessibility: Ada accessible, Translation services      [facility] - Adult Enrichment  Phone: [phone]  Website: [email or web address]  Language: English  Hours: Mon 7:30 AM - 4:00 PM Tue 7:30 AM - 4:00 PM Wed 7:30 AM - 4:00 PM Thu 7:30 AM - 4:00 PM Fri 7:30 AM - 4:00 PM    Classes/Groups      Park [employer] Recreation Board - Gym or workout facility - [facility] & Recreation [facility]  [address] [place] [address] (Distance: 12.4 miles)  Phone: [phone]  Language: English, Swiss  Fee: Free, Self pay  Accessibility: Translation services      [employer] / c[employer]aderation Board - Gym or workout facility - [facility] & Recreation [facility]  [address] [place] [address] (Distance: 10.0 miles)  Language: English  Fee: Free, Self pay  Accessibility: Ada accessible      [facility] - Care Coordination (Healthcare only)  Phone: [phone]  Website: [email or web address]  Language: English, Swiss  Hours: Wed 9:00 AM - 11:30 AM Thu 1:00 PM - 4:00 PM, 5:30 PM - 7:00 PM               IMPORTANT NUMBERS & WEBSITES        Emergency Services  911  .   [facility]  211 http://211unitedway.org  .   Poison Control  (231) 625-6413 http://mnpoison.org  http://wisconsinpoison.org  .     Suicide and Crisis Lifeline  988 http://988lifeline.org  .   Childhelp [place] Child Abuse Hotline  927.723.3585 http://Childhelphotline.org   .   National Sexual Assault Hotline  (771) 316-6899 (HOPE) http://Eventialsn.org   .     National Runaway Safeline  (222) 135-4058 (RUNAWAY) http://iExplore.Foap AB  .   Pregnancy & Postpartum Support  Call/text 136-182-4497  [place]: http://ppsupportmn.org  [place]: http://psichapters.com/wi  .   Substance Abuse National Helpline ([facility])  065-428-HELP (5308) http://Findtreatment.gov   .                DISCLAIMER: These resources have been generated via the [employer]. [employer] does not endorse any service providers mentioned in this resource list. [employer] does not guarantee that the services mentioned in this resource list will be available to you or will improve your health or wellness.    [facility]

## 2024-04-15 NOTE — LETTER
4/15/2024      RE: Hortencia Baldwin  [address]  [address]  [facility] [address]     Dear Colleague,    Thank you for referring your patient, Hortencia Baldwin, to the [facility] SPORTS MEDICINE CLINIC [place]. Please see a copy of my visit note below.    Sports Medicine Clinic Visit    PCP: Sukhwinder - [facility]    Hortencia Baldwin is a 53 year old female who is seen  as self referral presenting with left leg pain, intermittent    Patient notes a sharp electric sensation when she sneezes that she feels from the left posterior knee down the calf to the left foot.  It only bothers her when she sneezes.  She does not notice sciatica symptoms with long periods of sitting or driving.  She does not notice it with the standing.  No numbness or tingling in the lower extremity.    She has a tendency to notice swelling involving the left foot that improves after she has been sleeping in bed at night.  She will get more swelling as a day goes on.    Ultrasound of the left lower leg recently showed no DVT.    Injury: Pt notes no specific injury    Location of Pain: left posterior knee and lower leg  Duration of Pain: 1 month(s)  Rating of Pain: 8/10  Pain is better with: No Treatment tried to date  Pain is worse with: Walking  Additional Features: lack of knee flexion  Treatment so far consists of: No Treatment tried to date  Prior History of related problems: None    LMP 11/05/2017       Ultrasound left leg 3/4/2024 showed no evidence of DVT.    CT scan of the abdomen and pelvis 2/27/2024 suggested mild degenerative changes in the lower lumbar spine at L4-L5 L5-S1.      History of myelo dysplastic syndrome, 3-month status post allogenic stem cell transplant, date of transplant 1/11/2024.  Most recent oncology note 4/11/2024 reviewed by me          LEFT KNEE STANDING TWO VIEWS   5/5/2017  3:17 PM      HISTORY: Pain in left knee. Other chronic pain.     COMPARISON: None.                                                                       IMPRESSION: Bones are normally aligned. No knee effusion or fracture.     SALOMÓN RIGGS MD        PMH:  Past Medical History:   Diagnosis Date     Left medial knee pain      Medial epicondylitis, right elbow      Obesity (BMI 30-39.9)      Right elbow pain        Active problem list:  Patient Active Problem List   Diagnosis     Left medial knee pain     Obesity (BMI 30-39.9)     Anemia, unspecified type     Macrocytic anemia     MDS (myelodysplastic syndrome) (H)     BK viruria     History of peripheral stem cell transplant (H)     BK viremia     Administration of long-term prophylactic antibiotics       FH:  Family History   Problem Relation Age of Onset     Hypertension Mother      Diabetes No family hx of      Cerebrovascular Disease No family hx of      Myocardial Infarction No family hx of      Coronary Artery Disease Early Onset No family hx of      Breast Cancer No family hx of      Ovarian Cancer No family hx of      Colon Cancer No family hx of        SH:  Social History     Socioeconomic History     Marital status: Single     Spouse name: Not on file     Number of children: Not on file     Years of education: Not on file     Highest education level: Not on file   Occupational History     Occupation: Assembly [employer]   Tobacco Use     Smoking status: Former     Current packs/day: 0.00     Average packs/day: 0.5 packs/day for 10.0 years (5.0 ttl pk-yrs)     Types: Cigarettes     Start date: 2014     Quit date: 2024     Years since quittin.2     Passive exposure: Current     Smokeless tobacco: Never   Substance and Sexual Activity     Alcohol use: Yes     Comment: 1-2 drinks/month     Drug use: No     Sexual activity: Yes     Partners: Male   Other Topics Concern     Parent/sibling w/ CABG, MI or angioplasty before 65F 55M? Not Asked   Social History Narrative    .    3 kids - 2 adult, one is 14 (as of ).     Exercises 1-2x/week.       Social Determinants of Health     Financial Resource Strain: Not on file   Food Insecurity: Not on file   Transportation Needs: Not on file   Physical Activity: Not on file   Stress: Not on file   Social Connections: Not on file   Interpersonal Safety: Not on file   Housing Stability: Not on file       MEDS:  See EMR, reviewed  ALL:  See EMR, reviewed    REVIEW OF SYSTEMS:  CONSTITUTIONAL:NEGATIVE for fever, chills, change in weight  INTEGUMENTARY/SKIN: NEGATIVE for worrisome rashes, moles or lesions  EYES: NEGATIVE for vision changes or irritation  ENT/MOUTH: NEGATIVE for ear, mouth and throat problems  RESP:NEGATIVE for significant cough or SOB  BREAST: NEGATIVE for masses, tenderness or discharge  CV: NEGATIVE for chest pain, palpitations or peripheral edema  GI: NEGATIVE for nausea, abdominal pain, heartburn, or change in bowel habits  :NEGATIVE for frequency, dysuria, or hematuria  :NEGATIVE for frequency, dysuria, or hematuria  NEURO: NEGATIVE for weakness, dizziness or paresthesias  ENDOCRINE: NEGATIVE for temperature intolerance, skin/hair changes  HEME/ALLERGY/IMMUNE: NEGATIVE for bleeding problems  PSYCHIATRIC: NEGATIVE for changes in mood or affect      Objective: Straight leg raise is negative bilaterally.  5 out of 5 strength bilaterally at hip, knee, ankle and foot.  No swelling in the left calf.  No palpable cords and Homans' sign is negative.  Sensation is intact in the bilateral lower extremities.  She has mild swelling in the left lower leg about the ankle and foot.  Nontender about the bony ankle or bony foot.  No pitting edema.    I personally reviewed with the patient the previous CT scan of her abdomen and pelvis including CT views of the lumbar spine that suggested mild degenerative disc and joint changes.    Assessment: Dependent edema, history of myelodysplastic syndrome status post transplant.  Lumbar spine degenerative disc and joint disease with intermittent sciatica    Plan:  Patient would like to see physical therapy in this building for lumbar spine maintenance program.  She was given a referral to [facility] for bilateral lower extremity medium grade below the knee LEONARD hose to assist with dependent edema.                  Again, thank you for allowing me to participate in the care of your patient.      Sincerely,    Mani Farias MD

## 2024-04-15 NOTE — PATIENT INSTRUCTIONS
[facility] Locations  Business Hours: Monday - Friday 8 am-4:30 pm  Visit [email or web address]    [facility] [place]  [address]  [place] [address]  [phone]  Monday - Friday, 8 am to 4:30 pm    [facility]  [address], [address]  [place] [address]   [phone]  Monday - Friday 8 am to 4:30 pm    [facility]  [address], [address]  [place] [address]  [phone]  Monday - Friday 8 am to 4:30 pm    [facility] [place]  [address], [address]  [place] [address]  [phone]  Monday - Friday 8 am to 4:30 pm    [facility]  [address]  [place] [address]  [phone]  Monday - Friday 8 am to 4:30 pm    [facility]  [address]  [place] [address]  [phone]  Monday - Friday 8 am to 4:30 pm  *Closed daily noon to 1 pm for patient deliveries    [place]  [facility]  [address]  [place] [address]  [phone]  Monday - Friday 8 am to 5 pm

## 2024-04-18 ENCOUNTER — DOCUMENTATION ONLY (OUTPATIENT)
Dept: TRANSPLANT | Facility: CLINIC | Age: 54
End: 2024-04-18
Payer: MEDICAID

## 2024-04-19 ENCOUNTER — TELEPHONE (OUTPATIENT)
Dept: TRANSPLANT | Facility: CLINIC | Age: 54
End: 2024-04-19

## 2024-04-19 NOTE — TELEPHONE ENCOUNTER
"BMT CSW Telephone Encounter  Clinical Social Work  [facility]      Focus: Insurance    Data: Pt is a 53 year old female currently day +99 s/p allogeneic transplant for MDS.    Interventions: Clinical  (CSW) was notified by BMT PA that pt reported she will be losing her insurance coverage at the end of this month and is requesting assistance finding new coverage. Clinical  (CSW) spoke w/ pt via phone on 4/19/24 to assist with insurance questions and concerns.  Pt shared with CSW that she is currently receiving SSDI which amounts to about $1,500/month. CSW encouraged pt to apply for Medical Assistance and explained the process of doing so. CSW e-mailed pt the link to [employer] website/online portal and included phone numbers to call to discuss eligibility or if she has questions about the application process. CSW offered to go through the application w/ pt if she experiences difficulty or is unable to navigate the portal. Pt expressed appreciation for assistance and resources.  CSW asked how pt is doing and coping at this time. Pt shared that she is experiencing leg swelling and pain in addition to back pain. She shared that due to this pain, she is getting weaker and is being encouraged by the medical team to pursue OP PT. She expressed frustration, stating \"it's just one thing after another\". CSW validated pt's frustration and provided empathic listening and support. Pt denied any further questions or concerns at this time and expressed feeling well supported by her family and friends. Pt was encouraged to contact CSW for further support, questions and/or resources.    Assessment: Pt presented as pleasant and engaged in addition to stressed and anxious.  Pt appears to be coping appropriately at this time. Pt continues to be supported by her family and friends.     Plan: CSW will continue to work with Pt and family to provide supportive counseling and assist with resources as needed. CSW " will continue to collaborate with multidisciplinary team regarding pt's plan of care.     CLIFTON Demarco, [facility]  Clinical   [facility]  Adult Blood and Marrow Transplant and Cellular Therapy Program  Phone: [phone]  [employer] searchable - BMT SW 1

## 2024-04-22 ENCOUNTER — LAB (OUTPATIENT)
Dept: LAB | Facility: CLINIC | Age: 54
End: 2024-04-22
Payer: COMMERCIAL

## 2024-04-22 ENCOUNTER — TELEPHONE (OUTPATIENT)
Dept: TRANSPLANT | Facility: CLINIC | Age: 54
End: 2024-04-22

## 2024-04-22 ENCOUNTER — OFFICE VISIT (OUTPATIENT)
Dept: TRANSPLANT | Facility: CLINIC | Age: 54
End: 2024-04-22
Payer: COMMERCIAL

## 2024-04-22 VITALS
OXYGEN SATURATION: 100 % | WEIGHT: 171.2 LBS | RESPIRATION RATE: 18 BRPM | BODY MASS INDEX: 31.71 KG/M2 | DIASTOLIC BLOOD PRESSURE: 83 MMHG | SYSTOLIC BLOOD PRESSURE: 136 MMHG | TEMPERATURE: 98.1 F | HEART RATE: 75 BPM

## 2024-04-22 DIAGNOSIS — D46.9 MDS (MYELODYSPLASTIC SYNDROME) (H): Primary | ICD-10-CM

## 2024-04-22 LAB
ALBUMIN SERPL BCG-MCNC: 4.2 G/DL (ref 3.5–5.2)
ALP SERPL-CCNC: 112 U/L (ref 40–150)
ALT SERPL W P-5'-P-CCNC: 39 U/L (ref 0–50)
ANION GAP SERPL CALCULATED.3IONS-SCNC: 11 MMOL/L (ref 7–15)
AST SERPL W P-5'-P-CCNC: 31 U/L (ref 0–45)
BASOPHILS # BLD AUTO: 0 10E3/UL (ref 0–0.2)
BASOPHILS NFR BLD AUTO: 1 %
BILIRUB SERPL-MCNC: 0.2 MG/DL
BUN SERPL-MCNC: 10.8 MG/DL (ref 6–20)
CALCIUM SERPL-MCNC: 9.2 MG/DL (ref 8.6–10)
CHLORIDE SERPL-SCNC: 107 MMOL/L (ref 98–107)
CMV DNA SPEC NAA+PROBE-ACNC: NOT DETECTED IU/ML
CREAT SERPL-MCNC: 0.52 MG/DL (ref 0.51–0.95)
DEPRECATED HCO3 PLAS-SCNC: 24 MMOL/L (ref 22–29)
EGFRCR SERPLBLD CKD-EPI 2021: >90 ML/MIN/1.73M2
EOSINOPHIL # BLD AUTO: 0.1 10E3/UL (ref 0–0.7)
EOSINOPHIL NFR BLD AUTO: 2 %
ERYTHROCYTE [DISTWIDTH] IN BLOOD BY AUTOMATED COUNT: 13 % (ref 10–15)
GLUCOSE SERPL-MCNC: 108 MG/DL (ref 70–99)
HCT VFR BLD AUTO: 38.9 % (ref 35–47)
HGB BLD-MCNC: 12.7 G/DL (ref 11.7–15.7)
IMM GRANULOCYTES # BLD: 0.1 10E3/UL
IMM GRANULOCYTES NFR BLD: 2 %
INTERPRETATION: NORMAL
LAB DIRECTOR DISCLAIMER: NORMAL
LAB DIRECTOR INTERPRETATION: NORMAL
LAB DIRECTOR METHODOLOGY: NORMAL
LAB DIRECTOR RESULTS: NORMAL
LYMPHOCYTES # BLD AUTO: 1.3 10E3/UL (ref 0.8–5.3)
LYMPHOCYTES NFR BLD AUTO: 22 %
MCH RBC QN AUTO: 31.9 PG (ref 26.5–33)
MCHC RBC AUTO-ENTMCNC: 32.6 G/DL (ref 31.5–36.5)
MCV RBC AUTO: 98 FL (ref 78–100)
MONOCYTES # BLD AUTO: 0.4 10E3/UL (ref 0–1.3)
MONOCYTES NFR BLD AUTO: 7 %
NEUTROPHILS # BLD AUTO: 4 10E3/UL (ref 1.6–8.3)
NEUTROPHILS NFR BLD AUTO: 66 %
NRBC # BLD AUTO: 0 10E3/UL
NRBC BLD AUTO-RTO: 0 /100
PLATELET # BLD AUTO: 143 10E3/UL (ref 150–450)
POTASSIUM SERPL-SCNC: 3.7 MMOL/L (ref 3.4–5.3)
PROT SERPL-MCNC: 6.8 G/DL (ref 6.4–8.3)
RBC # BLD AUTO: 3.98 10E6/UL (ref 3.8–5.2)
SIGNIFICANT RESULTS: NORMAL
SODIUM SERPL-SCNC: 142 MMOL/L (ref 135–145)
SPECIMEN DESCRIPTION: NORMAL
TEST DETAILS, MDL: NORMAL
WBC # BLD AUTO: 6 10E3/UL (ref 4–11)

## 2024-04-22 PROCEDURE — G0452 MOLECULAR PATHOLOGY INTERPR: HCPCS | Mod: 26 | Performed by: PATHOLOGY

## 2024-04-22 PROCEDURE — 88342 IMHCHEM/IMCYTCHM 1ST ANTB: CPT | Mod: 26 | Performed by: PATHOLOGY

## 2024-04-22 PROCEDURE — 88305 TISSUE EXAM BY PATHOLOGIST: CPT | Mod: 26 | Performed by: PATHOLOGY

## 2024-04-22 PROCEDURE — 85097 BONE MARROW INTERPRETATION: CPT | Performed by: PATHOLOGY

## 2024-04-22 PROCEDURE — 85060 BLOOD SMEAR INTERPRETATION: CPT | Performed by: PATHOLOGY

## 2024-04-22 PROCEDURE — 88341 IMHCHEM/IMCYTCHM EA ADD ANTB: CPT | Mod: 26 | Performed by: PATHOLOGY

## 2024-04-22 PROCEDURE — 81450 HL NEO GSAP 5-50DNA/DNA&RNA: CPT

## 2024-04-22 PROCEDURE — 999N000127 HC STATISTIC PERIPHERAL IV START W US GUIDANCE

## 2024-04-22 PROCEDURE — 88311 DECALCIFY TISSUE: CPT | Mod: 26 | Performed by: PATHOLOGY

## 2024-04-22 PROCEDURE — 80053 COMPREHEN METABOLIC PANEL: CPT | Performed by: STUDENT IN AN ORGANIZED HEALTH CARE EDUCATION/TRAINING PROGRAM

## 2024-04-22 PROCEDURE — 84478 ASSAY OF TRIGLYCERIDES: CPT

## 2024-04-22 PROCEDURE — 81268 CHIMERISM ANAL W/CELL SELECT: CPT | Performed by: STUDENT IN AN ORGANIZED HEALTH CARE EDUCATION/TRAINING PROGRAM

## 2024-04-22 PROCEDURE — 82728 ASSAY OF FERRITIN: CPT

## 2024-04-22 PROCEDURE — 88184 FLOWCYTOMETRY/ TC 1 MARKER: CPT

## 2024-04-22 PROCEDURE — 88342 IMHCHEM/IMCYTCHM 1ST ANTB: CPT | Mod: TC,XU

## 2024-04-22 PROCEDURE — 250N000011 HC RX IP 250 OP 636

## 2024-04-22 PROCEDURE — 38222 DX BONE MARROW BX & ASPIR: CPT

## 2024-04-22 PROCEDURE — 999N000285 HC STATISTIC VASC ACCESS LAB DRAW WITH PIV START

## 2024-04-22 PROCEDURE — 88189 FLOWCYTOMETRY/READ 16 & >: CPT | Mod: GC | Performed by: PATHOLOGY

## 2024-04-22 PROCEDURE — 88237 TISSUE CULTURE BONE MARROW: CPT

## 2024-04-22 PROCEDURE — 85025 COMPLETE CBC W/AUTO DIFF WBC: CPT | Performed by: STUDENT IN AN ORGANIZED HEALTH CARE EDUCATION/TRAINING PROGRAM

## 2024-04-22 PROCEDURE — 38222 DX BONE MARROW BX & ASPIR: CPT | Mod: LT

## 2024-04-22 PROCEDURE — 88311 DECALCIFY TISSUE: CPT | Mod: TC

## 2024-04-22 PROCEDURE — 81267 CHIMERISM ANAL NO CELL SELEC: CPT | Mod: XU

## 2024-04-22 PROCEDURE — 88185 FLOWCYTOMETRY/TC ADD-ON: CPT

## 2024-04-22 RX ADMIN — MIDAZOLAM 2 MG: 1 INJECTION INTRAMUSCULAR; INTRAVENOUS at 10:11

## 2024-04-22 ASSESSMENT — PAIN SCALES - GENERAL: PAINLEVEL: SEVERE PAIN (7)

## 2024-04-22 NOTE — NURSING NOTE
"Oncology Rooming Note    April 22, 2024 9:57 AM   Hortencia Baldwin is a 53 year old female who presents for:    Chief Complaint   Patient presents with    Bone Marrow Biopsy     Bmbx; hx MDS s/p BMT     Initial Vitals: /89 (BP Location: Right arm, Patient Position: Sitting, Cuff Size: Adult Regular)   Pulse 94   Temp 98.1  F (36.7  C) (Oral)   Resp 18   Wt 77.7 kg (171 lb 3.2 oz)   LMP 11/05/2017   SpO2 99%   BMI 31.71 kg/m   Estimated body mass index is 31.71 kg/m  as calculated from the following:    Height as of 3/28/24: 1.565 m (5' 1.61\").    Weight as of this encounter: 77.7 kg (171 lb 3.2 oz). Body surface area is 1.84 meters squared.  Severe Pain (7) Comment: Data Unavailable   Patient's last menstrual period was 11/05/2017.  Allergies reviewed: Yes  Medications reviewed: Yes    Medications: MEDICATION REFILLS NEEDED TODAY. Provider was notified.  Pharmacy name entered into [facility]:    [id number] - [place], [place] - [address] AT [facility]  [id number] - [place], [place] - [address] [facility] AT [facility]    Frailty Screening:   Is the patient here for a new oncology consult visit in cancer care? 2. No      Clinical concerns: 7/10 left foot pain with mild swelling.  Jt Vaz PA-C was notified.      Evita Walls RN              "

## 2024-04-22 NOTE — NURSING NOTE
BMBX Teaching and Assessment       Teaching concerns addressed: Bone marrow biopsy and infection prevention.     Person(s) involved in teaching: Patient  Motivation Level  Asks Questions: Yes  Eager to Learn: Yes  Cooperative: Yes  Receptive (willing/able to accept information): Yes    Patient demonstrates understanding of the following:     Reason for the appointment, diagnosis and treatment plan: Yes  Knowledge of proper use of medications and conditions for which they are ordered (with special attention to potential side effects or drug interactions): Yes  Which situations necessitate calling provider and whom to contact: Yes    Teaching concerns addressed:   Reviewed activity restrictions if received premeds, potential for bleeding and actions to take if develops any of the issues below    Pain management techniques: Yes  Patient instructed on hand hygiene: Yes  How and/when to access community resources: Yes    Infection Control:  Patient demonstrates understanding of the following:   Bone marrow procedure site care taught: Yes  Signs and symptoms of infection taught: Yes       Instructional Materials Used/Given: Pt instructed to keep bmbx site clean and dry for 24hrs. Pt educated to monitor site for signs of infection such as redness, rash, oozing, puss, bleeding, pain, and elevated temp. Pt instructed to go to call the [facility] triage line or go to the ER if any signs of infection should occur. Pt educated to not operate machinery if receiving versed. Pt verbalized understanding.       Pre-procedure labs drawn via PIV start by vascular access. VSS. Meds and allergies reviewed.     Provider order received to administer Versed 2 mg IVP as premed for BMBX. Procedural consent discussed and pt's signature obtained.  Allergies reviewed.  PT currently alert and oriented to plan of care.  Pt lying prone in stretcher.  Call light w/in reach.  Provider and  at bedside.    Post procedure: Patient vital signs stable,  ambulating, site is clean, dry and intact prior to discharge and line removed. Pt discharged with boyfriend as .        Evita Walls RN

## 2024-04-22 NOTE — PROGRESS NOTES
BMT ONC Adult Bone Marrow Biopsy Procedure Note  April 22, 2024  /83 (BP Location: Right arm)   Pulse 75   Temp 98.1  F (36.7  C) (Oral)   Resp 18   Wt 77.7 kg (171 lb 3.2 oz)   LMP 11/05/2017   SpO2 100%   BMI 31.71 kg/m       Learning needs assessment complete within 12 months? YES    DIAGNOSIS: MDS     PROCEDURE: Unilateral Bone Marrow Biopsy and Unilateral Aspirate    LOCATION: [facility]    Patient s identification was positively verified by verbal identification and invasive procedure safety checklist was completed. Informed consent was obtained. Following the administration of Midazolam as pre-medication, patient was placed in the prone position and prepped and draped in a sterile manner. Approximately 15 cc of 1% Lidocaine was used over the left posterior iliac spine. Following this a 3 mm incision was made. Trephine bone marrow core(s) was (were) obtained from the [facility]. Bone marrow aspirates were obtained from the [facility]. Aspirates were sent for morphology, immunophenotyping, cytogenetics, and molecular diagnostics RFLP. A total of approximately 16 ml of marrow was aspirated. Following this procedure a sterile dressing was applied to the bone marrow biopsy site(s). The patient was placed in the supine position to maintain pressure on the biopsy site. Post-procedure wound care instructions were given.     Complications: NO    Pre-procedural pain: 0 out of 10 on the numeric pain rating scale.     Procedural pain: 6 out of 10 on the numeric pain rating scale.     Post-procedural pain assessment: 0 out of 10 on the numeric pain rating scale.     Interventions: NO    Length of procedure:21 minutes to 45 minutes    Procedure performed by: Jt Vaz PA-C [phone]

## 2024-04-22 NOTE — LETTER
4/22/2024         RE: Hortencia Baldwin  [address]  [address]  [place] [address]        Dear Colleague,    Thank you for referring your patient, Hortencia Baldwin, to the [facility] BLOOD AND MARROW TRANSPLANT PROGRAM [place]. Please see a copy of my visit note below.    BMT ONC Adult Bone Marrow Biopsy Procedure Note  April 22, 2024  /83 (BP Location: Right arm)   Pulse 75   Temp 98.1  F (36.7  C) (Oral)   Resp 18   Wt 77.7 kg (171 lb 3.2 oz)   LMP 11/05/2017   SpO2 100%   BMI 31.71 kg/m       Learning needs assessment complete within 12 months? YES    DIAGNOSIS: MDS     PROCEDURE: Unilateral Bone Marrow Biopsy and Unilateral Aspirate    [place] [facility]    Patient s identification was positively verified by verbal identification and invasive procedure safety checklist was completed. Informed consent was obtained. Following the administration of Midazolam as pre-medication, patient was placed in the prone position and prepped and draped in a sterile manner. Approximately 15 cc of 1% Lidocaine was used over the left posterior iliac spine. Following this a 3 mm incision was made. Trephine bone marrow core(s) was (were) obtained from the [facility]. Bone marrow aspirates were obtained from the [facility]. Aspirates were sent for morphology, immunophenotyping, cytogenetics, and molecular diagnostics RFLP. A total of approximately 16 ml of marrow was aspirated. Following this procedure a sterile dressing was applied to the bone marrow biopsy site(s). The patient was placed in the supine position to maintain pressure on the biopsy site. Post-procedure wound care instructions were given.     Complications: NO    Pre-procedural pain: 0 out of 10 on the numeric pain rating scale.     Procedural pain: 6 out of 10 on the numeric pain rating scale.     Post-procedural pain assessment: 0 out of 10 on the numeric pain rating scale.     Interventions: NO    Length of procedure:21 minutes to 45  minutes    Procedure performed by: Jt Vaz PA-C [phone]

## 2024-04-22 NOTE — TELEPHONE ENCOUNTER
Refill request received for ursodiol from [facility]. Pt no longer taking. Faxed back refill denial.

## 2024-04-23 LAB
PATH REPORT.COMMENTS IMP SPEC: NORMAL
PATH REPORT.FINAL DX SPEC: NORMAL
PATH REPORT.FINAL DX SPEC: NORMAL
PATH REPORT.GROSS SPEC: NORMAL
PATH REPORT.MICROSCOPIC SPEC OTHER STN: NORMAL
PATH REPORT.RELEVANT HX SPEC: NORMAL
PATH REPORT.RELEVANT HX SPEC: NORMAL

## 2024-04-26 ENCOUNTER — OFFICE VISIT (OUTPATIENT)
Dept: TRANSPLANT | Facility: CLINIC | Age: 54
End: 2024-04-26
Attending: STUDENT IN AN ORGANIZED HEALTH CARE EDUCATION/TRAINING PROGRAM
Payer: COMMERCIAL

## 2024-04-26 VITALS
OXYGEN SATURATION: 97 % | TEMPERATURE: 98.5 F | BODY MASS INDEX: 31.85 KG/M2 | RESPIRATION RATE: 16 BRPM | SYSTOLIC BLOOD PRESSURE: 128 MMHG | WEIGHT: 172 LBS | HEART RATE: 90 BPM | DIASTOLIC BLOOD PRESSURE: 84 MMHG

## 2024-04-26 DIAGNOSIS — D46.9 MDS (MYELODYSPLASTIC SYNDROME) (H): Primary | ICD-10-CM

## 2024-04-26 DIAGNOSIS — D46.9 MDS (MYELODYSPLASTIC SYNDROME) (H): ICD-10-CM

## 2024-04-26 DIAGNOSIS — Z94.81 S/P ALLOGENEIC BONE MARROW TRANSPLANT (H): ICD-10-CM

## 2024-04-26 LAB
FERRITIN SERPL-MCNC: 4257 NG/ML (ref 11–328)
TRIGL SERPL-MCNC: 220 MG/DL

## 2024-04-26 PROCEDURE — 94640 AIRWAY INHALATION TREATMENT: CPT | Performed by: INTERNAL MEDICINE

## 2024-04-26 PROCEDURE — 94642 AEROSOL INHALATION TREATMENT: CPT | Performed by: INTERNAL MEDICINE

## 2024-04-26 PROCEDURE — 99215 OFFICE O/P EST HI 40 MIN: CPT | Performed by: STUDENT IN AN ORGANIZED HEALTH CARE EDUCATION/TRAINING PROGRAM

## 2024-04-26 PROCEDURE — 99213 OFFICE O/P EST LOW 20 MIN: CPT | Performed by: STUDENT IN AN ORGANIZED HEALTH CARE EDUCATION/TRAINING PROGRAM

## 2024-04-26 RX ORDER — ALBUTEROL SULFATE 5 MG/ML
2.5 SOLUTION RESPIRATORY (INHALATION)
Start: 2024-04-28

## 2024-04-26 RX ORDER — ACYCLOVIR 800 MG/1
800 TABLET ORAL 2 TIMES DAILY
Qty: 180 TABLET | Refills: 2 | Status: SHIPPED | OUTPATIENT
Start: 2024-04-26 | End: 2024-04-30

## 2024-04-26 RX ORDER — PENTAMIDINE ISETHIONATE 300 MG/300MG
300 INHALANT RESPIRATORY (INHALATION)
Status: DISCONTINUED | OUTPATIENT
Start: 2024-04-26 | End: 2024-04-26 | Stop reason: HOSPADM

## 2024-04-26 RX ORDER — PENTAMIDINE ISETHIONATE 300 MG/300MG
300 INHALANT RESPIRATORY (INHALATION)
Start: 2024-04-28

## 2024-04-26 RX ORDER — AMLODIPINE BESYLATE 5 MG/1
5 TABLET ORAL DAILY
Qty: 90 TABLET | Refills: 1 | Status: SHIPPED | OUTPATIENT
Start: 2024-04-26 | End: 2024-04-30

## 2024-04-26 RX ORDER — ALBUTEROL SULFATE 5 MG/ML
2.5 SOLUTION RESPIRATORY (INHALATION)
Status: DISCONTINUED | OUTPATIENT
Start: 2024-04-26 | End: 2024-04-26 | Stop reason: HOSPADM

## 2024-04-26 RX ORDER — SULFAMETHOXAZOLE/TRIMETHOPRIM 800-160 MG
1 TABLET ORAL
Qty: 48 TABLET | Refills: 1 | Status: SHIPPED | OUTPATIENT
Start: 2024-04-29 | End: 2024-04-30

## 2024-04-26 RX ORDER — HEPARIN SODIUM (PORCINE) LOCK FLUSH IV SOLN 100 UNIT/ML 100 UNIT/ML
5 SOLUTION INTRAVENOUS
OUTPATIENT
Start: 2024-04-28

## 2024-04-26 RX ORDER — HEPARIN SODIUM,PORCINE 10 UNIT/ML
5-20 VIAL (ML) INTRAVENOUS DAILY PRN
OUTPATIENT
Start: 2024-04-28

## 2024-04-26 RX ORDER — SIROLIMUS 0.5 MG/1
TABLET, FILM COATED ORAL
Qty: 45 TABLET | Refills: 0 | Status: SHIPPED | OUTPATIENT
Start: 2024-04-26 | End: 2024-06-24

## 2024-04-26 RX ADMIN — PENTAMIDINE ISETHIONATE 300 MG: 300 INHALANT RESPIRATORY (INHALATION) at 11:42

## 2024-04-26 ASSESSMENT — PAIN SCALES - GENERAL: PAINLEVEL: SEVERE PAIN (7)

## 2024-04-26 NOTE — PATIENT INSTRUCTIONS
Sirolimus taper   Start bactrim from next week  Triamcinolone for rash on the back  Stop mybertriq and letermovir  Return visit in 1 month

## 2024-04-26 NOTE — LETTER
4/26/2024         RE: Hortencia Baldwin  [address]  [address]  [place] [address]        Dear Colleague,    Thank you for referring your patient, Hortencia Baldwin, to the [facility] BLOOD AND MARROW TRANSPLANT PROGRAM [place]. Please see a copy of my visit note below.    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 Date of transplant: 1/11/2024    Diagnosis MDS w/ SF3B1 BMT type Allogenic  CMV  Donor -  Recipient +    Prep: MA Bu/Flu Donor type  6/8 URD Blood Type Donor and recipient O+   GVHD Ppx PTCy/ siro/ MMF Graft source PBSCT Toxo IgG Negative   Protocol [id number] (not on) BMT MD Dr. Garay       Data   Pre-transplant disease history  Referring provider: Dr.Evan Ramey, [facility]   52 y/o F with history of longstanding macrocytic anemia (Hb 10-11, -110 dating back to 1997) presented in Nov 2021 for lightheadedness. CBC showed acute on chronic anemia with Hb 4.7, ; normal WBC and platelet count. Bone marrow biopsy (12/09/2021) was consistent with MDS with low blasts and SF3B1 mutation. Hypercellular marrow (70%) with single lineage dysplasia (dyserythropoiesis), increased ringed sideroblasts and no increase in blasts. Flow cytometry showed mixed lymphocytes. Cytogenetics 46,XX,del(20). NGS: SF3B1 (45%), ASXL1 (6%).   Received Epo (March 2022 - April 2022) but had minimal response after 4 weekly doses. Luspatercept from April 2022 to April 2023 with inadequate response.   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 Treated with weekly rituximab x 4 and steroid taper (Sept to Dec 2022). LDH and bilirubin started rising after steroid taper, therefore treated with MMF 500mg BID (Dec 2022 to May 2023) with resolution of hemolysis (negative KATINA, normal LDH,  bilirubin). However, she continued to be transfusion dependant therefore her anemia was determined to be related more to MDS than autoimmune hemolysis.   Repeat bone marrow biopsy (5/11/2023) showed persistent MDS. Hypercellular marrow (90%) but now with dysplasia in all three lineages, cytogenetics 46, XX, del (20). Received decitabine- cedazurdine X 4 cycles (May 2023 to Dec 2023). Complicated by neutropenia requiring dose reduction and pRBC transfusions every 2-3 weeks. Pre-transplant bmbx (12/7/23) showed persistent MDS with multi lineage dysplasia (dyserythropoesis and dysgranulopoesis), 17% ringed sideroblasts, increased marrow storage iron. Cytogenetics: 46,XX,del(20). Flow cytometry and NGS not done.    She was considered for allogenic transplant despite low risk MDS by IPSS-R at diagnosis due to transfusion dependence, ASXL1 mutation and development of multi-lineage dysplasia on bmbx in May 2023 suggesting clonal evolution.      She underwent allogenic stem cell transplant on 1/11/24: myeloablative conditioning with Bu/Flu, 6/8 unrelated donor peripheral blood stem cell graft, cell dose: 6.50E+06. ABO matched, CMV donor negative, recipient positive. GVHD ppx with PTCy, MMF and tacrolimus (avoiding sirolimus due to high risk of VOD, given elevated liver enzymes prior to transplant and suspected iron overload).      Post transplant  BK virus hemorrhagic cystitis (around D41): 2 doses of intravesicular Cidofovir 2/22 and 2/26/24.       INTERVAL HISTORY     Irma has been doing well. She reports of a rash and itching on her upper back, only mild erythema is visible on exam. No nausea, only with pills. Appetite is okay and weight has been stable. No abdominal cramps. She will get diarrhea once in a while and it resolves by itself.   She was seen by orthopedics for left knee pain and left ankle swelling. She was noted to have lubar degenerative disc disease. Was recommended physical therapy but has been unable to  follow up with them since her insurance will  at the end of this month.   She is almost out of all medications due to her impending end of insurance. Will reach out to / SW/ pharmacy to ensure she can obtain her medications.     ROS: Negative except as above.     Current Outpatient Medications   Medication Sig Dispense Refill    acyclovir (ZOVIRAX) 800 MG tablet Take 1 tablet (800 mg) by mouth 2 times daily 180 tablet 0    amLODIPine (NORVASC) 5 MG tablet Take 1 tablet (5 mg) by mouth daily 90 tablet 1    fenofibrate (LOFIBRA) 54 MG tablet Take 1 tablet (54 mg) by mouth daily 60 tablet 0    sirolimus (GENERIC EQUIVALENT) 0.5 MG tablet Take according to taper calendar provided by pharmacy. Taper to complete on 24. 45 tablet 0    sirolimus (GENERIC EQUIVALENT) 1 MG tablet Take one (1mg) tablet in addition to one (0.5mg) tablet for a total of 1.5mg daily. (Patient taking differently: Take 1 mg daily alternating with 0.5 mg daily. Dose as of 3/2.) 30 tablet 0    sulfamethoxazole-trimethoprim (BACTRIM DS) 800-160 MG tablet Take 1 tablet by mouth Every Mon, Tues two times daily 48 tablet 1         EXAM      /84   Pulse 90   Temp 98.5  F (36.9  C)   Resp 16   Wt 78 kg (172 lb)   LMP 2017   SpO2 97%   BMI 31.85 kg/m      Wt Readings from Last 4 Encounters:   24 78 kg (172 lb)   24 77.7 kg (171 lb 3.2 oz)   24 76.8 kg (169 lb 6.4 oz)   24 77 kg (169 lb 12.8 oz)     KPS: 80% Can perform normal activity with effort, some signs of disease    General: Alert, awake  Eyes: Conjunctiva normal  Respiratory: Normal respiratory effort.   Cardiovascular: Left ankle edema +   Abdomen: Soft, non tender   MSK: Left knee has no effusions and is non tender to palpation.   Skin: Mild erythema on upper back   Psych: Mood and affect are appropriate.  No central access.       Laboratory Studies:     Data   Blood Counts  Recent Labs   Lab Test 24  0951 24  1138  04/04/24  1218 03/28/24  1119 02/22/24  0350 02/21/24  0816 02/19/24  1203   WBC 6.0 4.8 6.6 4.9   < > 7.7 5.8   HGB 12.7 13.7 13.0 13.0   < > 11.3* 10.0*   * 120* 149* 134*   < > 83* 74*   NEUTROPHIL 66 54 65 36   < > 72 53   ANEU  --   --   --  1.8  --  5.5 3.1   LYMPH 22 32 27 26   < > 11 17   ALYM  --   --   --  1.3  --  0.8 1.0    < > = values in this interval not displayed.     Basic Panel  Recent Labs   Lab Test 04/22/24  0951 04/11/24  1138 04/04/24  1218    141 142   POTASSIUM 3.7 3.9 3.6   CHLORIDE 107 103 105   CO2 24 26 25   BUN 10.8 6.9 9.7   CR 0.52 0.56 0.60   * 89 109*        Calcium, Magnesium, Phosphorus  Recent Labs   Lab Test 04/22/24  0951 04/11/24  1138 04/04/24  1218 03/01/24  1020 02/29/24  0244 02/28/24  0344 02/27/24  0328   NIKO 9.2 9.9 9.3   < > 9.2 9.3 9.3   MAG  --   --   --   --  2.1 2.0 2.0   PHOS  --   --   --   --  3.0 3.5 3.9    < > = values in this interval not displayed.        LFTs  Recent Labs   Lab Test 04/22/24  0951 04/11/24  1138 04/04/24  1218   ALKPHOS 112 116 108   AST 31 47* 37   ALT 39 52* 41   BILITOTAL 0.2 0.3 0.2   ALBUMIN 4.2 4.6 4.3     Immunosuppression  Recent Labs   Lab Test 04/11/24  1138 04/04/24  1218 03/28/24  1119   RAPAMY 6.0 5.7 6.3       Recent Labs   Lab Test 04/22/24  0951 04/11/24  1138 04/04/24  1218   CMVQNT Not Detected Not Detected Not Detected       Recent Labs   Lab Test 03/14/24  1338   *       Recent Labs   Lab Test 04/22/24  0951 03/14/24  1222 02/22/24  0350 01/02/24  1432 12/19/23  1347 05/30/17  1155   SONY 4,257* 7,899* 10,961* 4,341* 5,191* 20       Chimerism  Lab Results   Component Value Date    SPECDES  04/22/2024     Bone Marrow: ACD Syringe      SPECDES  04/22/2024     Bone Marrow: ACD Syringe      LDRESULTS  04/22/2024     RESULTS:    POST BONE MARROW  DONOR: (DEBORAH, [id number])  100 %     RECIPIENT:  0 %    These results are accurate +/-5%.                LDRESULTS  04/22/2024     RESULTS:      POST CD3+ FRACTION  DONOR: (DEBORAH, [id number])  100 %     RECIPIENT:  0 %    These results are accurate +/-5%.                LDRESULTS  04/22/2024     RESULTS:     POST CD33/66b+(Myeloid) FRACTION  DONOR: (DEBORAH, [id number])  100 %     RECIPIENT:  0 %    These results are accurate +/-5%.                   Pathology  Bone marrow biopsy:   Lab Results   Component Value Date    FINALDX  04/22/2024     Bone marrow, posterior iliac crest, left decalcified trephine biopsy and touch imprint; direct aspirate smear, and concentrated aspirate smear; and peripheral blood smear:    - No definitive morphologic evidence for recurrent/persistent MDS  - Variable marrow cellularity (overall cellularity estimated at 40-50%) with  trilineage hematopoietic maturation, no overt dysplasia and no increase in blasts (<1%)  - Peripheral blood showing slight thrombocytopenia with normal platelet morphology  - See comment        COMDX  04/22/2024     Final interpretation requires correlation with results of other ancillary studies, morphologic, and clinical features.        Flow cytology:   Lab Results   Component Value Date    FLINTERP  04/22/2024     A. Iliac Crest, Bone Marrow Aspirate, Left:  -No increase in myeloid blasts and no abnormal myeloid blast population      COMDX  04/22/2024     Final interpretation requires correlation with results of other ancillary studies, morphologic, and clinical features.               ASSESSMENT AND PLAN     BMT for MDS w/SF3B1 mutation  [id number] (according to but not on) with Bu/Flu  6/8 URD, peripheral blood stem cell graft, cell dose: 6.5 x10^6.     Disease status: Bone marrow biopsy at D28, D100, 6 months, 1 year  2/6/24 (~D26): Hypercellular marrow (70-80%), trilineage hemopoiesis with rare nuclear irregularities in terminal erythroid precursors of uncertain significance, 3% ringed sideroblasts (in the context of iron overload is not considered dysplastic finding). Flow  showed no increase in myeloid blasts. Cytogenetics 46 XY, consistent with donor. FISH: Rate of loss of 20q within normal limits. NGS: No mutations detected  4/22/24 (D100): Overall cellularity 40-50% with no dysplasia. No ringed sideroblasts. Flow negative. Cytogenetics not done. NGS negative.      Graft status/chimerism: D28, D100, 6 months,1 year  2/6/24 (~D26): Bone marrow 100%, peripheral blood (2/1/24) CD33 100%, CD3 87%  3/14/24 (~D60): Peripheral blood CD3 and CD33 100%  4/22/24 (D100): Bone marrow 100%. Peripheral blood CD3 and CD33 100%    GVHD  # Current immunosuppression is sirolimus   - Taper sirolimus over 6 weeks     # No acute GVHD till date  Skin: No changes. Skin score 0.  GI: No symptoms. GI score 0.  Liver: LFTs normal. Score 0     Heme/ Coag  # Counts have recovered . ABO matched (both O+).     # Iron overload: Hx of transfusion dependent anemia, pre-transplant ferritin around 5,000. Ferritin (4/22/24) is 4257.   - Get iron studies and retic count at next lab draw  - Liver ferriscan to assess iron overload  - Plan to start phlebotomy pending results of above studies.     ID  # Prophylaxis  PJP: Pentamidine received 4/26. Resume Bactrim. Toxo IgG negative.  Viral: Acyclovir 800 mg bid  CMV: Letermovir completed     # Monitoring  CMV: Monitor weekly/ every 2 weeks till D180. 4/22 - not detected  EBV: Monitor every 2 weeks till D180. 3/28 - not detected. Needs to be drawn.  IgG: Level 424 mg/dL on 3/14/24. Check serum IgG level q3 months, and consider IVIG repletion for IgG levels <400 mg/dL. Next due around 6/14/24    # ID hx  Hemorrhagic cystitis/ pyelonephritis due to BK virus: Admitted around D41. BK virus in blood was around 3000 and in urine was over >100,000,000. Received 2 doses of intravesicular Cidofovir 2/22 and 2/26/24.   Symptoms have completely resolved. Stop oxybutynin and mirabegron.     GI  # Elevated transaminases: Resolved  AST and ALT elevated beginning 2/2/24 (D22). Notably, she  was on tacrolimus for GVHD ppx but changed to sirolimus on 1/31/24. Infectious workup (2/6/24) was negative. Liver US (2/20/24) showed hepatomegaly with increased hepatic parenchymal echogenicity, suggesting hepatic steatosis. Suspect drug induced elevation w/ steatosis and possibly iron overload.   - Has now resolved.     CV  HTN: Amlodipine 5mg daily   Hypertriglyceridemia: Continue fenofibrate    MSK:  # L knee pain and back pain: Has been seen by ortho and referred to physical therapy. Unable to schedule an appointment right now due to insurance.   # Left ankle swelling: Negative for DVT (3/4/24)    Today's Summary:  Taper siro over 6 weeks  Resume bactrim  Check EBV, retic count and iron studies with next set of labs  Liver MRI/ ferriscan   Use topical triamcinolone over areas of itching/ mild erythema on upper back.     I spent 40 minutes in the care of this patient today, which included time necessary for preparation for the visit, obtaining history, ordering medications/tests/procedures as medically indicated, review of pertinent medical literature, counseling of the patient, communication of recommendations to the care team, and documentation time.    Coco Garay  Department of Hematology, Oncology and Transplantation  [employer] Pager 1300/Text via [place]

## 2024-04-26 NOTE — PROGRESS NOTES
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 Date of transplant: 1/11/2024    Diagnosis MDS w/ SF3B1 BMT type Allogenic  CMV  Donor -  Recipient +    Prep: MA Bu/Flu Donor type  6/8 URD Blood Type Donor and recipient O+   GVHD Ppx PTCy/ siro/ MMF Graft source PBSCT Toxo IgG Negative   Protocol [id number] (not on) BMT MD Dr. Garay       Data    Pre-transplant disease history  Referring provider: Dr.Evan Ramey, [facility]   52 y/o F with history of longstanding macrocytic anemia (Hb 10-11, -110 dating back to 1997) presented in Nov 2021 for lightheadedness. CBC showed acute on chronic anemia with Hb 4.7, ; normal WBC and platelet count. Bone marrow biopsy (12/09/2021) was consistent with MDS with low blasts and SF3B1 mutation. Hypercellular marrow (70%) with single lineage dysplasia (dyserythropoiesis), increased ringed sideroblasts and no increase in blasts. Flow cytometry showed mixed lymphocytes. Cytogenetics 46,XX,del(20). NGS: SF3B1 (45%), ASXL1 (6%).   Received Epo (March 2022 - April 2022) but had minimal response after 4 weekly doses. Luspatercept from April 2022 to April 2023 with inadequate response.   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 Treated with weekly rituximab x 4 and steroid taper (Sept to Dec 2022). LDH and bilirubin started rising after steroid taper, therefore treated with MMF 500mg BID (Dec 2022 to May 2023) with resolution of hemolysis (negative KATINA, normal LDH, bilirubin). However, she continued to be transfusion dependant therefore her anemia was determined to be related more to MDS than autoimmune hemolysis.   Repeat bone marrow biopsy (5/11/2023) showed persistent MDS. Hypercellular marrow (90%) but now with dysplasia in all three lineages, cytogenetics  46, XX, del (20). Received decitabine- cedazurdine X 4 cycles (May 2023 to Dec 2023). Complicated by neutropenia requiring dose reduction and pRBC transfusions every 2-3 weeks. Pre-transplant bmbx (23) showed persistent MDS with multi lineage dysplasia (dyserythropoesis and dysgranulopoesis), 17% ringed sideroblasts, increased marrow storage iron. Cytogenetics: 46,XX,del(20). Flow cytometry and NGS not done.    She was considered for allogenic transplant despite low risk MDS by IPSS-R at diagnosis due to transfusion dependence, ASXL1 mutation and development of multi-lineage dysplasia on bmbx in May 2023 suggesting clonal evolution.      She underwent allogenic stem cell transplant on 24: myeloablative conditioning with Bu/Flu,  unrelated donor peripheral blood stem cell graft, cell dose: 6.50E+06. ABO matched, CMV donor negative, recipient positive. GVHD ppx with PTCy, MMF and tacrolimus (avoiding sirolimus due to high risk of VOD, given elevated liver enzymes prior to transplant and suspected iron overload).      Post transplant  BK virus hemorrhagic cystitis (around D41): 2 doses of intravesicular Cidofovir  and 24.       INTERVAL HISTORY     Irma has been doing well. She reports of a rash and itching on her upper back, only mild erythema is visible on exam. No nausea, only with pills. Appetite is okay and weight has been stable. No abdominal cramps. She will get diarrhea once in a while and it resolves by itself.   She was seen by orthopedics for left knee pain and left ankle swelling. She was noted to have lubar degenerative disc disease. Was recommended physical therapy but has been unable to follow up with them since her insurance will  at the end of this month.   She is almost out of all medications due to her impending end of insurance. Will reach out to / SW/ pharmacy to ensure she can obtain her medications.     ROS: Negative except as above.     Current Outpatient  Medications   Medication Sig Dispense Refill    acyclovir (ZOVIRAX) 800 MG tablet Take 1 tablet (800 mg) by mouth 2 times daily 180 tablet 0    amLODIPine (NORVASC) 5 MG tablet Take 1 tablet (5 mg) by mouth daily 90 tablet 1    fenofibrate (LOFIBRA) 54 MG tablet Take 1 tablet (54 mg) by mouth daily 60 tablet 0    sirolimus (GENERIC EQUIVALENT) 0.5 MG tablet Take according to taper calendar provided by pharmacy. Taper to complete on 6/16/24. 45 tablet 0    sirolimus (GENERIC EQUIVALENT) 1 MG tablet Take one (1mg) tablet in addition to one (0.5mg) tablet for a total of 1.5mg daily. (Patient taking differently: Take 1 mg daily alternating with 0.5 mg daily. Dose as of 3/2.) 30 tablet 0    sulfamethoxazole-trimethoprim (BACTRIM DS) 800-160 MG tablet Take 1 tablet by mouth Every Mon, Tues two times daily 48 tablet 1         EXAM      /84   Pulse 90   Temp 98.5  F (36.9  C)   Resp 16   Wt 78 kg (172 lb)   LMP 11/05/2017   SpO2 97%   BMI 31.85 kg/m      Wt Readings from Last 4 Encounters:   04/26/24 78 kg (172 lb)   04/22/24 77.7 kg (171 lb 3.2 oz)   04/11/24 76.8 kg (169 lb 6.4 oz)   04/04/24 77 kg (169 lb 12.8 oz)     KPS: 80% Can perform normal activity with effort, some signs of disease    General: Alert, awake  Eyes: Conjunctiva normal  Respiratory: Normal respiratory effort.   Cardiovascular: Left ankle edema +   Abdomen: Soft, non tender   MSK: Left knee has no effusions and is non tender to palpation.   Skin: Mild erythema on upper back   Psych: Mood and affect are appropriate.  No central access.       Laboratory Studies:     Data    Blood Counts  Recent Labs   Lab Test 04/22/24  0951 04/11/24  1138 04/04/24  1218 03/28/24  1119 02/22/24  0350 02/21/24  0816 02/19/24  1203   WBC 6.0 4.8 6.6 4.9   < > 7.7 5.8   HGB 12.7 13.7 13.0 13.0   < > 11.3* 10.0*   * 120* 149* 134*   < > 83* 74*   NEUTROPHIL 66 54 65 36   < > 72 53   ANEU  --   --   --  1.8  --  5.5 3.1   LYMPH 22 32 27 26   < > 11 17    ALYM  --   --   --  1.3  --  0.8 1.0    < > = values in this interval not displayed.     Basic Panel  Recent Labs   Lab Test 04/22/24  0951 04/11/24  1138 04/04/24  1218    141 142   POTASSIUM 3.7 3.9 3.6   CHLORIDE 107 103 105   CO2 24 26 25   BUN 10.8 6.9 9.7   CR 0.52 0.56 0.60   * 89 109*        Calcium, Magnesium, Phosphorus  Recent Labs   Lab Test 04/22/24  0951 04/11/24  1138 04/04/24  1218 03/01/24  1020 02/29/24  0244 02/28/24  0344 02/27/24  0328   NIKO 9.2 9.9 9.3   < > 9.2 9.3 9.3   MAG  --   --   --   --  2.1 2.0 2.0   PHOS  --   --   --   --  3.0 3.5 3.9    < > = values in this interval not displayed.        LFTs  Recent Labs   Lab Test 04/22/24  0951 04/11/24  1138 04/04/24  1218   ALKPHOS 112 116 108   AST 31 47* 37   ALT 39 52* 41   BILITOTAL 0.2 0.3 0.2   ALBUMIN 4.2 4.6 4.3     Immunosuppression  Recent Labs   Lab Test 04/11/24  1138 04/04/24  1218 03/28/24  1119   RAPAMY 6.0 5.7 6.3       Recent Labs   Lab Test 04/22/24  0951 04/11/24  1138 04/04/24  1218   CMVQNT Not Detected Not Detected Not Detected       Recent Labs   Lab Test 03/14/24  1338   *       Recent Labs   Lab Test 04/22/24  0951 03/14/24  1222 02/22/24  0350 01/02/24  1432 12/19/23  1347 05/30/17  1155   SONY 4,257* 7,899* 10,961* 4,341* 5,191* 20       Chimerism  Lab Results   Component Value Date    SPECDES  04/22/2024     Bone Marrow: ACD Syringe      SPECDES  04/22/2024     Bone Marrow: ACD Syringe      LDRESULTS  04/22/2024     RESULTS:    POST BONE MARROW  DONOR: (DEBORAH [id number])  100 %     RECIPIENT:  0 %    These results are accurate +/-5%.                LDRESULTS  04/22/2024     RESULTS:     POST CD3+ FRACTION  DONOR: (DEBORAH, [id number])  100 %     RECIPIENT:  0 %    These results are accurate +/-5%.                LDRESULTS  04/22/2024     RESULTS:     POST CD33/66b+(Myeloid) FRACTION  DONOR: (DEBORAH, [id number])  100 %     RECIPIENT:  0 %    These results are accurate  +/-5%.                   Pathology  Bone marrow biopsy:   Lab Results   Component Value Date    FINALDX  04/22/2024     Bone marrow, posterior iliac crest, left decalcified trephine biopsy and touch imprint; direct aspirate smear, and concentrated aspirate smear; and peripheral blood smear:    - No definitive morphologic evidence for recurrent/persistent MDS  - Variable marrow cellularity (overall cellularity estimated at 40-50%) with  trilineage hematopoietic maturation, no overt dysplasia and no increase in blasts (<1%)  - Peripheral blood showing slight thrombocytopenia with normal platelet morphology  - See comment        COMDX  04/22/2024     Final interpretation requires correlation with results of other ancillary studies, morphologic, and clinical features.        Flow cytology:   Lab Results   Component Value Date    FLINTERP  04/22/2024     A. Iliac Crest, Bone Marrow Aspirate, Left:  -No increase in myeloid blasts and no abnormal myeloid blast population      COMDX  04/22/2024     Final interpretation requires correlation with results of other ancillary studies, morphologic, and clinical features.               ASSESSMENT AND PLAN     BMT for MDS w/SF3B1 mutation  [id number] (according to but not on) with Bu/Flu  6/8 URD, peripheral blood stem cell graft, cell dose: 6.5 x10^6.     Disease status: Bone marrow biopsy at D28, D100, 6 months, 1 year  2/6/24 (~D26): Hypercellular marrow (70-80%), trilineage hemopoiesis with rare nuclear irregularities in terminal erythroid precursors of uncertain significance, 3% ringed sideroblasts (in the context of iron overload is not considered dysplastic finding). Flow showed no increase in myeloid blasts. Cytogenetics 46 XY, consistent with donor. FISH: Rate of loss of 20q within normal limits. NGS: No mutations detected  4/22/24 (D100): Overall cellularity 40-50% with no dysplasia. No ringed sideroblasts. Flow negative. Cytogenetics not done. NGS negative.      Graft  status/chimerism: D28, D100, 6 months,1 year  2/6/24 (~D26): Bone marrow 100%, peripheral blood (2/1/24) CD33 100%, CD3 87%  3/14/24 (~D60): Peripheral blood CD3 and CD33 100%  4/22/24 (D100): Bone marrow 100%. Peripheral blood CD3 and CD33 100%    GVHD  # Current immunosuppression is sirolimus   - Taper sirolimus over 6 weeks     # No acute GVHD till date  Skin: No changes. Skin score 0.  GI: No symptoms. GI score 0.  Liver: LFTs normal. Score 0     Heme/ Coag  # Counts have recovered . ABO matched (both O+).     # Iron overload: Hx of transfusion dependent anemia, pre-transplant ferritin around 5,000. Ferritin (4/22/24) is 4257.   - Get iron studies and retic count at next lab draw  - Liver ferriscan to assess iron overload  - Plan to start phlebotomy pending results of above studies.     ID  # Prophylaxis  PJP: Pentamidine received 4/26. Resume Bactrim. Toxo IgG negative.  Viral: Acyclovir 800 mg bid  CMV: Letermovir completed     # Monitoring  CMV: Monitor weekly/ every 2 weeks till D180. 4/22 - not detected  EBV: Monitor every 2 weeks till D180. 3/28 - not detected. Needs to be drawn.  IgG: Level 424 mg/dL on 3/14/24. Check serum IgG level q3 months, and consider IVIG repletion for IgG levels <400 mg/dL. Next due around 6/14/24    # ID hx  Hemorrhagic cystitis/ pyelonephritis due to BK virus: Admitted around D41. BK virus in blood was around 3000 and in urine was over >100,000,000. Received 2 doses of intravesicular Cidofovir 2/22 and 2/26/24.   Symptoms have completely resolved. Stop oxybutynin and mirabegron.     GI  # Elevated transaminases: Resolved  AST and ALT elevated beginning 2/2/24 (D22). Notably, she was on tacrolimus for GVHD ppx but changed to sirolimus on 1/31/24. Infectious workup (2/6/24) was negative. Liver US (2/20/24) showed hepatomegaly with increased hepatic parenchymal echogenicity, suggesting hepatic steatosis. Suspect drug induced elevation w/ steatosis and possibly iron overload.   -  Has now resolved.     CV  HTN: Amlodipine 5mg daily   Hypertriglyceridemia: Continue fenofibrate    MSK:  # L knee pain and back pain: Has been seen by ortho and referred to physical therapy. Unable to schedule an appointment right now due to insurance.   # Left ankle swelling: Negative for DVT (3/4/24)    Today's Summary:  Taper siro over 6 weeks  Resume bactrim  Check EBV, retic count and iron studies with next set of labs  Liver MRI/ ferriscan   Use topical triamcinolone over areas of itching/ mild erythema on upper back.     I spent 40 minutes in the care of this patient today, which included time necessary for preparation for the visit, obtaining history, ordering medications/tests/procedures as medically indicated, review of pertinent medical literature, counseling of the patient, communication of recommendations to the care team, and documentation time.    Coco Garay  Department of Hematology, Oncology and Transplantation  [facility] Pager 1300/Text via [employer]

## 2024-04-26 NOTE — PROGRESS NOTES
Hortencia Baldwin was seen today for a Pentamidine nebulizer tx ordered by Tuyet Cotton PA-C.    Patient was first given 2.5 mg / 3 ml of albuterol nebulizer (NDC 01513-911-07, Lot # [id number], Exp 10/31/2025), after which Pentamidine 300 mg (Lot # [id number]) mixed with 6cc Sterile H20 was administered through a filtered nebulizer.    Pre-treatment: SpO2 = 100%   HR = 86   BBS = Clear   Post-treatment: SpO2 = 100%  HR = 82  BBS = Clear    No adverse side effects noted by the patient.    This service today was provided under Dr. Perlman, who was available if needed.     Procedure was completed by Christopher Chavez.

## 2024-04-26 NOTE — NURSING NOTE
"Oncology Rooming Note    April 26, 2024 10:23 AM   Hortencia Baldwin is a 53 year old female who presents for:    Chief Complaint   Patient presents with    Oncology Clinic Visit     MDS (myelodysplastic syndrome)      Initial Vitals: /84   Pulse 90   Temp 98.5  F (36.9  C)   Resp 16   Wt 78 kg (172 lb)   LMP 11/05/2017   SpO2 97%   BMI 31.85 kg/m   Estimated body mass index is 31.85 kg/m  as calculated from the following:    Height as of 3/28/24: 1.565 m (5' 1.61\").    Weight as of this encounter: 78 kg (172 lb). Body surface area is 1.84 meters squared.  Severe Pain (7) Comment: Data Unavailable   Patient's last menstrual period was 11/05/2017.  Allergies reviewed: Yes  Medications reviewed: Yes    Medications: Medication refills not needed today.  Pharmacy name entered into [facility]:    [id number] - [place], [place] - [address] AT [facility] [facility]  [id number] - [place], [place] - [address] [facility] AT [facility]    Frailty Screening:   Is the patient here for a new oncology consult visit in cancer care? 2. No      Clinical concerns: Pt has medication and insurance coverage questions. Her L foot is still swollen and painful, it has been a month.       Praveen Mcclellan              "

## 2024-04-26 NOTE — PHARMACY-TAPERING SERVICE
Sirolimus taper   Sunday Monday Tuesday Wednesday Thursday Friday Saturday 21-Apr-2024 22-Apr-2024 23-Apr-2024 24-Apr-2024 25-Apr-2024 26-Apr-2024 27-Apr-2024   0.5 mg 1 mg 0.5 mg 1 mg 0.5 mg 1 mg 0.5 mg   28-Apr-2024 29-Apr-2024 30-Apr-2024 1-May-2024 2-May-2024 3-May-2024 4-May-2024   0.5 mg 1 mg 0.5 mg 0.5 mg 0.5 mg 1 mg 0.5 mg   5-May-2024 6-May-2024 7-May-2024 8-May-2024 9-May-2024 10-May-2024 11-May-2024   0.5 mg 0.5 mg 0.5 mg 1 mg 0.5 mg 0.5 mg 0.5 mg   12-May-2024 13-May-2024 14-May-2024 15-May-2024 16-May-2024 17-May-2024 18-May-2024   0.5 mg 0.5 mg 0.5 mg 0.5 mg 0.5 mg 0.5 mg 0.5 mg   19-May-2024 20-May-2024 21-May-2024 22-May-2024 23-May-2024 24-May-2024 25-May-2024   0.5 mg 0 0.5 mg 0.5 mg 0 0.5 mg 0.5 mg   26-May-2024 27-May-2024 28-May-2024 29-May-2024 30-May-2024 31-May-2024 1-Johnny-2024   0.5 mg 0 0.5 mg 0 0.5 mg 0 0.5 mg   2-Johnny-2024 3-Johnny-2024 4-Johnny-2024 5-Jun-2024 6-Jun-2024 7-Jun-2024 8-Jun-2024   0 0.5 mg 0 0.5 mg 0 0.5 mg 0   9-Johnny-2024 10-Johnny-2024 11-Johnny-2024 12-Jun-2024 13-Jun-2024 14-Jun-2024 15-Johnny-2024   0.5 mg 0 0 0.5 mg 0 0 0.5 mg   16-Johnny-2024 17-Johnny-2024 18-Johnny-2024 19-Johnny-2024 20-Johnny-2024 21-Johnny-2024 22-Johnny-2024   STOP

## 2024-04-27 ENCOUNTER — DOCUMENTATION ONLY (OUTPATIENT)
Dept: TRANSPLANT | Facility: CLINIC | Age: 54
End: 2024-04-27
Payer: MEDICAID

## 2024-04-29 ENCOUNTER — TELEPHONE (OUTPATIENT)
Dept: TRANSPLANT | Facility: CLINIC | Age: 54
End: 2024-04-29
Payer: MEDICAID

## 2024-04-29 LAB
CULTURE HARVEST COMPLETE DATE: NORMAL
INTERPRETATION: NORMAL

## 2024-04-29 NOTE — TELEPHONE ENCOUNTER
Received three faxes for Rx. Sirolimus taper sent, pharmacy had questions prior to filling. Taper calendar faxed to [facility]. Two refill requests received on the same day for refills of Myrbetriq, patient is no longer taking. Refill requested faxed back with denial.

## 2024-04-30 ENCOUNTER — TELEPHONE (OUTPATIENT)
Dept: TRANSPLANT | Facility: CLINIC | Age: 54
End: 2024-04-30
Payer: MEDICAID

## 2024-04-30 DIAGNOSIS — D46.9 MDS (MYELODYSPLASTIC SYNDROME) (H): ICD-10-CM

## 2024-04-30 DIAGNOSIS — Z94.81 S/P ALLOGENEIC BONE MARROW TRANSPLANT (H): Primary | ICD-10-CM

## 2024-04-30 RX ORDER — AMLODIPINE BESYLATE 5 MG/1
5 TABLET ORAL DAILY
Qty: 90 TABLET | Refills: 1 | Status: SHIPPED | OUTPATIENT
Start: 2024-04-30 | End: 2024-06-28

## 2024-04-30 RX ORDER — ACYCLOVIR 800 MG/1
800 TABLET ORAL 2 TIMES DAILY
Qty: 180 TABLET | Refills: 0 | Status: SHIPPED | OUTPATIENT
Start: 2024-04-30 | End: 2024-08-07

## 2024-04-30 RX ORDER — SULFAMETHOXAZOLE/TRIMETHOPRIM 800-160 MG
1 TABLET ORAL
Qty: 48 TABLET | Refills: 1 | Status: SHIPPED | OUTPATIENT
Start: 2024-04-30 | End: 2024-10-07

## 2024-04-30 NOTE — TELEPHONE ENCOUNTER
Email sent to patient stating:     I have sent prescriptions for your acyclovir, Bactrim and amlodipine to [facility]. This is an out of pocket pharmacy that has very discounted prices for these medications. I have sent a three month supply. It should total less than $40 dollars for these three drugs for 90 days.  Look for an email from [facility] regarding these prescriptions.    the sirolimus 0.5mg tablets and take according to the taper calendar.   I have moved your lab appointment to 4/22 at 8am. Please do not eat 10-12 hours before your appointment.  Water, tea or black coffee (with nothing added) is okay. Do not drink other fluids, diet soda or chew gum.  Please locate and complete the forms sent in the email from Yoana BARGER the  for Medical Assistance insurance. If you need help completing these forms please contact her to assist you.     BMT Office number [phone]    April 30, 2024 5:46 PM - Leia Anguiano RN:

## 2024-04-30 NOTE — PROGRESS NOTES
Pt called after being told she was unable to refill her medications due to it being to close to the last time she filled them, per her insurance. Called [facility], pt is on a Sirolimus taper and will need 0.5mg tablets to complete taper, this is a new prescription and she will be able to fill this. Pt does not have insurance coverage starting 5/1. Prescriptions for remaining medications are sent to [facility]. Pt can hold the fenofifbrate and we will recheck her lipids when she returns to clinic. She would prefer to have labs drawn the day before so she does not need to be fasting all morning prior to her follow up visit with Dr Garay on 5/23.

## 2024-05-02 ENCOUNTER — TELEPHONE (OUTPATIENT)
Dept: TRANSPLANT | Facility: CLINIC | Age: 54
End: 2024-05-02

## 2024-05-03 ENCOUNTER — TELEPHONE (OUTPATIENT)
Dept: TRANSPLANT | Facility: CLINIC | Age: 54
End: 2024-05-03
Payer: MEDICAID

## 2024-05-03 DIAGNOSIS — Z94.81 S/P ALLOGENEIC BONE MARROW TRANSPLANT (H): Primary | ICD-10-CM

## 2024-05-03 RX ORDER — SULFAMETHOXAZOLE/TRIMETHOPRIM 800-160 MG
1 TABLET ORAL
Qty: 4 TABLET | Refills: 0 | Status: SHIPPED | OUTPATIENT
Start: 2024-05-06 | End: 2024-05-08

## 2024-05-03 RX ORDER — ACYCLOVIR 800 MG/1
800 TABLET ORAL 2 TIMES DAILY
Qty: 14 TABLET | Refills: 0 | Status: SHIPPED | OUTPATIENT
Start: 2024-05-03 | End: 2024-05-23

## 2024-05-03 NOTE — TELEPHONE ENCOUNTER
Called patient to follow up on medications. Pt said she was able to log into [facility] and order the prescriptions. Those should be on their way to her in 7-10 business days.NC let patient know we have sent a weeks worth of the medications she was out of to the [facility] pharmacy. The patient stated she should have enough to get through the weekend and then she will  the remaining medications from [facility].

## 2024-05-09 DIAGNOSIS — Z94.81 S/P ALLOGENEIC BONE MARROW TRANSPLANT (H): ICD-10-CM

## 2024-05-09 DIAGNOSIS — D46.9 MDS (MYELODYSPLASTIC SYNDROME) (H): ICD-10-CM

## 2024-05-09 RX ORDER — FENOFIBRATE 54 MG/1
54 TABLET ORAL DAILY
Qty: 60 TABLET | Refills: 0 | Status: SHIPPED | OUTPATIENT
Start: 2024-05-09 | End: 2024-06-28

## 2024-05-15 ENCOUNTER — TELEPHONE (OUTPATIENT)
Dept: TRANSPLANT | Facility: CLINIC | Age: 54
End: 2024-05-15
Payer: MEDICAID

## 2024-05-15 NOTE — TELEPHONE ENCOUNTER
Pt called to request refill of Acyclovir. NC asked if the patient received her mail order medications from [facility]? Pt said she had not picked them up yet, but she thinks that medication should be with them. She will call back if its not.     May 15, 2024 10:27 AM - Leia Anguiano RN:

## 2024-05-22 ENCOUNTER — LAB (OUTPATIENT)
Dept: LAB | Facility: CLINIC | Age: 54
End: 2024-05-22
Attending: STUDENT IN AN ORGANIZED HEALTH CARE EDUCATION/TRAINING PROGRAM
Payer: MEDICAID

## 2024-05-22 DIAGNOSIS — D46.9 MDS (MYELODYSPLASTIC SYNDROME) (H): ICD-10-CM

## 2024-05-22 DIAGNOSIS — Z94.81 S/P ALLOGENEIC BONE MARROW TRANSPLANT (H): ICD-10-CM

## 2024-05-22 DIAGNOSIS — Z94.81 STATUS POST BONE MARROW TRANSPLANT (H): ICD-10-CM

## 2024-05-22 LAB
BASOPHILS # BLD AUTO: 0.1 10E3/UL (ref 0–0.2)
BASOPHILS NFR BLD AUTO: 1 %
CHOLEST SERPL-MCNC: 300 MG/DL
CMV DNA SPEC NAA+PROBE-ACNC: <35 IU/ML
CMV DNA SPEC NAA+PROBE-LOG#: <1.5 {LOG_COPIES}/ML
EOSINOPHIL # BLD AUTO: 0.2 10E3/UL (ref 0–0.7)
EOSINOPHIL NFR BLD AUTO: 4 %
ERYTHROCYTE [DISTWIDTH] IN BLOOD BY AUTOMATED COUNT: 13.9 % (ref 10–15)
FASTING STATUS PATIENT QL REPORTED: YES
HCT VFR BLD AUTO: 41.2 % (ref 35–47)
HDLC SERPL-MCNC: 60 MG/DL
HGB BLD-MCNC: 13.6 G/DL (ref 11.7–15.7)
IMM GRANULOCYTES # BLD: 0.1 10E3/UL
IMM GRANULOCYTES NFR BLD: 2 %
IRON BINDING CAPACITY (ROCHE): 235 UG/DL (ref 240–430)
IRON SATN MFR SERPL: 62 % (ref 15–46)
IRON SERPL-MCNC: 145 UG/DL (ref 37–145)
LDLC SERPL CALC-MCNC: 202 MG/DL
LYMPHOCYTES # BLD AUTO: 1 10E3/UL (ref 0.8–5.3)
LYMPHOCYTES NFR BLD AUTO: 20 %
MCH RBC QN AUTO: 31.6 PG (ref 26.5–33)
MCHC RBC AUTO-ENTMCNC: 33 G/DL (ref 31.5–36.5)
MCV RBC AUTO: 96 FL (ref 78–100)
MONOCYTES # BLD AUTO: 0.4 10E3/UL (ref 0–1.3)
MONOCYTES NFR BLD AUTO: 9 %
NEUTROPHILS # BLD AUTO: 3.3 10E3/UL (ref 1.6–8.3)
NEUTROPHILS NFR BLD AUTO: 64 %
NONHDLC SERPL-MCNC: 240 MG/DL
NRBC # BLD AUTO: 0 10E3/UL
NRBC BLD AUTO-RTO: 0 /100
PLATELET # BLD AUTO: 146 10E3/UL (ref 150–450)
RBC # BLD AUTO: 4.3 10E6/UL (ref 3.8–5.2)
RETICS # AUTO: 0.11 10E6/UL (ref 0.03–0.1)
RETICS/RBC NFR AUTO: 2.7 % (ref 0.5–2)
SIROLIMUS BLD-MCNC: 1.6 UG/L (ref 5–15)
TME LAST DOSE: ABNORMAL H
TME LAST DOSE: ABNORMAL H
TRIGL SERPL-MCNC: 190 MG/DL
WBC # BLD AUTO: 5.2 10E3/UL (ref 4–11)

## 2024-05-22 PROCEDURE — 80195 ASSAY OF SIROLIMUS: CPT

## 2024-05-22 PROCEDURE — 83718 ASSAY OF LIPOPROTEIN: CPT

## 2024-05-22 PROCEDURE — 82465 ASSAY BLD/SERUM CHOLESTEROL: CPT

## 2024-05-22 PROCEDURE — 85045 AUTOMATED RETICULOCYTE COUNT: CPT

## 2024-05-22 PROCEDURE — 83550 IRON BINDING TEST: CPT

## 2024-05-22 PROCEDURE — 85049 AUTOMATED PLATELET COUNT: CPT

## 2024-05-22 PROCEDURE — 36415 COLL VENOUS BLD VENIPUNCTURE: CPT

## 2024-05-22 PROCEDURE — 87799 DETECT AGENT NOS DNA QUANT: CPT

## 2024-05-22 NOTE — NURSING NOTE
Chief Complaint   Patient presents with    Labs Only     Labs drawn with  by vascular. Pt tolerated well.       Chari Riley RN

## 2024-05-23 ENCOUNTER — ANCILLARY PROCEDURE (OUTPATIENT)
Dept: ULTRASOUND IMAGING | Facility: CLINIC | Age: 54
End: 2024-05-23
Attending: STUDENT IN AN ORGANIZED HEALTH CARE EDUCATION/TRAINING PROGRAM
Payer: MEDICAID

## 2024-05-23 ENCOUNTER — ONCOLOGY VISIT (OUTPATIENT)
Dept: TRANSPLANT | Facility: CLINIC | Age: 54
End: 2024-05-23
Attending: STUDENT IN AN ORGANIZED HEALTH CARE EDUCATION/TRAINING PROGRAM
Payer: MEDICAID

## 2024-05-23 VITALS
BODY MASS INDEX: 31.3 KG/M2 | HEART RATE: 105 BPM | SYSTOLIC BLOOD PRESSURE: 123 MMHG | OXYGEN SATURATION: 96 % | RESPIRATION RATE: 16 BRPM | DIASTOLIC BLOOD PRESSURE: 80 MMHG | TEMPERATURE: 98.3 F | WEIGHT: 169 LBS

## 2024-05-23 DIAGNOSIS — D46.9 MDS (MYELODYSPLASTIC SYNDROME) (H): Primary | ICD-10-CM

## 2024-05-23 DIAGNOSIS — M79.89 LEG SWELLING: ICD-10-CM

## 2024-05-23 LAB — EBV DNA SERPL NAA+PROBE-ACNC: NOT DETECTED IU/ML

## 2024-05-23 PROCEDURE — 99213 OFFICE O/P EST LOW 20 MIN: CPT

## 2024-05-23 PROCEDURE — 93971 EXTREMITY STUDY: CPT | Mod: LT | Performed by: RADIOLOGY

## 2024-05-23 PROCEDURE — G0463 HOSPITAL OUTPT CLINIC VISIT: HCPCS

## 2024-05-23 PROCEDURE — 99215 OFFICE O/P EST HI 40 MIN: CPT

## 2024-05-23 ASSESSMENT — PAIN SCALES - GENERAL: PAINLEVEL: MODERATE PAIN (5)

## 2024-05-23 NOTE — PROGRESS NOTES
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 Date of transplant: 1/11/2024    Diagnosis MDS w/ SF3B1 BMT type Allogenic  CMV  Donor -  Recipient +    Prep: MA Bu/Flu Donor type  6/8 URD Blood Type Donor and recipient O+   GVHD Ppx PTCy/ siro/ MMF Graft source PBSCT Toxo IgG Negative   Protocol [id number] (not on) BMT MD Dr. Garay       Data    Pre-transplant disease history  Referring provider: Dr.Evan Ramey, [facility]   52 y/o F with history of longstanding macrocytic anemia (Hb 10-11, -110 dating back to 1997) presented in Nov 2021 for lightheadedness. CBC showed acute on chronic anemia with Hb 4.7, ; normal WBC and platelet count. Bone marrow biopsy (12/09/2021) was consistent with MDS with low blasts and SF3B1 mutation. Hypercellular marrow (70%) with single lineage dysplasia (dyserythropoiesis), increased ringed sideroblasts and no increase in blasts. Flow cytometry showed mixed lymphocytes. Cytogenetics 46,XX,del(20). NGS: SF3B1 (45%), ASXL1 (6%).   Received Epo (March 2022 - April 2022) but had minimal response after 4 weekly doses. Luspatercept from April 2022 to April 2023 with inadequate response.   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 Treated with weekly rituximab x 4 and steroid taper (Sept to Dec 2022). LDH and bilirubin started rising after steroid taper, therefore treated with MMF 500mg BID (Dec 2022 to May 2023) with resolution of hemolysis (negative KATINA, normal LDH, bilirubin). However, she continued to be transfusion dependant therefore her anemia was determined to be related more to MDS than autoimmune hemolysis.   Repeat bone marrow biopsy (5/11/2023) showed persistent MDS. Hypercellular marrow (90%) but now with dysplasia in all three lineages, cytogenetics  46, XX, del (20). Received decitabine- cedazurdine X 4 cycles (May 2023 to Dec 2023). Complicated by neutropenia requiring dose reduction and pRBC transfusions every 2-3 weeks. Pre-transplant bmbx (12/7/23) showed persistent MDS with multi lineage dysplasia (dyserythropoesis and dysgranulopoesis), 17% ringed sideroblasts, increased marrow storage iron. Cytogenetics: 46,XX,del(20). Flow cytometry and NGS not done.    She was considered for allogenic transplant despite low risk MDS by IPSS-R at diagnosis due to transfusion dependence, ASXL1 mutation and development of multi-lineage dysplasia on bmbx in May 2023 suggesting clonal evolution.      She underwent allogenic stem cell transplant on 1/11/24: myeloablative conditioning with Bu/Flu, 6/8 unrelated donor peripheral blood stem cell graft, cell dose: 6.50E+06. ABO matched, CMV donor negative, recipient positive. GVHD ppx with PTCy, MMF and tacrolimus (avoiding sirolimus due to high risk of VOD, given elevated liver enzymes prior to transplant and suspected iron overload).      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          INTERVAL HISTORY     Irma has been doing well. Main complaint is left ankle and dorsal foot continues to be swollen, per patient it is no worse than prior. More swollen at night. Has a little pain while walking.     Itching around neck and upper back, triamcinolone cream helps. Nausea sometimes, twice a week in the morning with the pills. Appetite ok. Weight stable. No abdominal pain, occasional RUQ discomfort. No relationship to food. 6/10 at the worst, lasts for a minute. No history of gall bladder stones. No diarrhea.     ROS: No eye dryness. No mouth sores, dry mouth sores. Only big pills get stuck when swallowing.    Current Outpatient Medications   Medication Sig Dispense Refill    acyclovir (ZOVIRAX) 800 MG tablet Take 1 tablet (800 mg) by mouth  2 times daily 180 tablet 0    amLODIPine (NORVASC) 5 MG tablet Take 1 tablet (5 mg) by mouth daily 90 tablet 1    fenofibrate (LOFIBRA) 54 MG tablet Take 1 tablet (54 mg) by mouth daily 60 tablet 0    sirolimus (GENERIC EQUIVALENT) 0.5 MG tablet Take according to taper calendar provided by pharmacy. Taper to complete on 6/16/24. 45 tablet 0    sirolimus (GENERIC EQUIVALENT) 1 MG tablet Take one (1mg) tablet in addition to one (0.5mg) tablet for a total of 1.5mg daily. (Patient taking differently: Take 1 mg daily alternating with 0.5 mg daily. Dose as of 3/2.) 30 tablet 0    sulfamethoxazole-trimethoprim (BACTRIM DS) 800-160 MG tablet Take 1 tablet by mouth Every Mon, Tues two times daily 48 tablet 1         EXAM      /80   Pulse 105   Temp 98.3  F (36.8  C)   Resp 16   Wt 76.7 kg (169 lb)   LMP 11/05/2017   SpO2 96%   BMI 31.30 kg/m      Wt Readings from Last 4 Encounters:   05/23/24 76.7 kg (169 lb)   04/26/24 78 kg (172 lb)   04/22/24 77.7 kg (171 lb 3.2 oz)   04/11/24 76.8 kg (169 lb 6.4 oz)     KPS: 80% Can perform normal activity with effort, some signs of disease    General: Alert, awake  Eyes: Conjunctiva normal  Respiratory: Normal respiratory effort.   Cardiovascular: Left ankle and foot edema +   Abdomen: Soft, non tender   Skin: Mild erythematous rash on upper back   Psych: Mood and affect are appropriate.  No central access.       Laboratory Studies:     Data    Blood Counts  Recent Labs   Lab Test 05/22/24  0815 04/22/24  0951 04/11/24  1138 04/04/24  1218 03/28/24  1119 02/22/24  0350 02/21/24  0816 02/19/24  1203   WBC 5.2 6.0 4.8   < > 4.9   < > 7.7 5.8   HGB 13.6 12.7 13.7   < > 13.0   < > 11.3* 10.0*   * 143* 120*   < > 134*   < > 83* 74*   NEUTROPHIL 64 66 54   < > 36   < > 72 53   ANEU  --   --   --   --  1.8  --  5.5 3.1   LYMPH 20 22 32   < > 26   < > 11 17   ALYM  --   --   --   --  1.3  --  0.8 1.0    < > = values in this interval not displayed.     Basic Panel  Recent  Labs   Lab Test 04/22/24  0951 04/11/24  1138 04/04/24  1218    141 142   POTASSIUM 3.7 3.9 3.6   CHLORIDE 107 103 105   CO2 24 26 25   BUN 10.8 6.9 9.7   CR 0.52 0.56 0.60   * 89 109*        Calcium, Magnesium, Phosphorus  Recent Labs   Lab Test 04/22/24  0951 04/11/24  1138 04/04/24  1218 03/01/24  1020 02/29/24  0244 02/28/24  0344 02/27/24  0328   NIKO 9.2 9.9 9.3   < > 9.2 9.3 9.3   MAG  --   --   --   --  2.1 2.0 2.0   PHOS  --   --   --   --  3.0 3.5 3.9    < > = values in this interval not displayed.        LFTs  Recent Labs   Lab Test 04/22/24  0951 04/11/24  1138 04/04/24  1218   ALKPHOS 112 116 108   AST 31 47* 37   ALT 39 52* 41   BILITOTAL 0.2 0.3 0.2   ALBUMIN 4.2 4.6 4.3     Immunosuppression  Recent Labs   Lab Test 05/22/24  0815 04/11/24  1138 04/04/24  1218   RAPAMY 1.6* 6.0 5.7       Recent Labs   Lab Test 05/22/24  0815 04/22/24  0951 04/11/24  1138   CMVQNT <35* Not Detected Not Detected       Recent Labs   Lab Test 03/14/24  1338   *       Recent Labs   Lab Test 04/22/24  0951 03/14/24  1222 02/22/24  0350 01/02/24  1432 12/19/23  1347 05/30/17  1155   SONY 4,257* 7,899* 10,961* 4,341* 5,191* 20            ASSESSMENT AND PLAN     BMT for MDS w/SF3B1 mutation  [id number] (according to but not on) with Bu/Flu  6/8 URD, peripheral blood stem cell graft, cell dose: 6.5 x10^6.     Disease status: Bone marrow biopsy at D28, D100, 6 months, 1 year  2/6/24 (~D26): Hypercellular marrow (70-80%), trilineage hemopoiesis with rare nuclear irregularities in terminal erythroid precursors of uncertain significance, 3% ringed sideroblasts (in the context of iron overload is not considered dysplastic finding). Flow showed no increase in myeloid blasts. Cytogenetics 46 XY, consistent with donor. FISH: Rate of loss of 20q within normal limits. NGS: No mutations detected  4/22/24 (D100): Overall cellularity 40-50% with no dysplasia. No ringed sideroblasts. Flow negative. Cytogenetics not done. NGS  negative.      Graft status/chimerism: D28, D100, 6 months,1 year  2/6/24 (~D26): Bone marrow 100%, peripheral blood (2/1/24) CD33 100%, CD3 87%  3/14/24 (~D60): Peripheral blood CD3 and CD33 100%  4/22/24 (D100): Bone marrow 100%. Peripheral blood CD3 and CD33 100%    GVHD  # Current immunosuppression is sirolimus   - Taper sirolimus over 6 weeks, end on June 15th      # No acute GVHD till date  Skin: No changes. Skin score 0.  GI: No symptoms. GI score 0.  Liver: LFTs normal. Score 0     Heme/ Coag  # Counts have recovered . ABO matched (both O+).     # Iron overload: Hx of transfusion dependent anemia, pre-transplant ferritin around 5,000. Ferritin (4/22/24) is 4257. Tsat 62%. Retic count 2.7%  - Liver ferriscan to assess iron overload  - Plan to start phlebotomy pending results of above studies.     ID  # Prophylaxis  PJP: Bactrim. Toxo IgG negative.  Viral: Acyclovir 800 mg bid  CMV: Letermovir completed     # Monitoring  CMV: Monitor every month till D180. 5/22 - not detected  EBV: Monitor every month till D180. 5/22 - not detected.   IgG: Level 424 mg/dL on 3/14/24. Check serum IgG level q3 months, and consider IVIG repletion for IgG levels <400 mg/dL. Next due around 6/14/24     GI  # Elevated transaminases: Resolved  AST and ALT elevated beginning 2/2/24 (D22). Notably, she was on tacrolimus for GVHD ppx but changed to sirolimus on 1/31/24. Infectious workup (2/6/24) was negative. Liver US (2/20/24) showed hepatomegaly with increased hepatic parenchymal echogenicity, suggesting hepatic steatosis. Suspect drug induced elevation w/ steatosis and possibly iron overload.   - CMP was not done today      CV  - HTN: Amlodipine 5mg daily   - Hyperlipidemia: Patient will try lifestyle modifications. Will also stop sirolimus soon. Consider Crestor 5mg daily if no change in 3 months. Continue fenofibrate. Refer to nutrition.     MSK:  # L knee pain and back pain: Has been seen by ortho and referred to physical  therapy. Unable to schedule an appointment right now due to insurance.   # Left ankle swelling:   - repeat US of the left leg (5/23) negative for DVT  - compression stockings  - Follow up with physical therapy when she has an appointment.     Derm  Mild erythematous rash on upper back with mild pruritus. Continue topicals.     Today's Summary:  Matt pending  Nutritionist referral for hyperlipidemia   US DVT  Compression stockings of left leg edema     RTC in 1 month with labs (CBC, CMP, EBV, CMV IgG)    I spent 40 minutes in the care of this patient today, which included time necessary for preparation for the visit, obtaining history, ordering medications/tests/procedures as medically indicated, review of pertinent medical literature, counseling of the patient, communication of recommendations to the care team, and documentation time.    Coco Garay  Department of Hematology, Oncology and Transplantation  [facility] Pager 1300/Text via George

## 2024-05-23 NOTE — PATIENT INSTRUCTIONS
"Patient Education   High Cholesterol: Care Instructions  Overview     Cholesterol is a type of fat in your blood. It is needed for many body functions, such as making new cells. Cholesterol is made by your body. It also comes from food you eat. High cholesterol means that you have too much of the fat in your blood. This raises your risk of a heart attack and stroke.  LDL and HDL are part of your total cholesterol. LDL is the \"bad\" cholesterol. High LDL can raise your risk for coronary artery disease, heart attack, and stroke. HDL is the \"good\" cholesterol. It helps clear bad cholesterol from the body. High HDL is linked with a lower risk of coronary artery disease, heart attack, and stroke.  Your cholesterol levels help your doctor find out your risk for having a heart attack or stroke. You and your doctor can talk about whether you need to lower your risk and what treatment is best for you.  Treatment options include a heart-healthy lifestyle and medicine. Both options can help lower your cholesterol and your risk. The way you choose to lower your risk will depend on how high your risk is for heart attack and stroke. It will also depend on how you feel about taking medicines.  Follow-up care is a key part of your treatment and safety. Be sure to make and go to all appointments, and call your doctor if you are having problems. It's also a good idea to know your test results and keep a list of the medicines you take.  How can you care for yourself at home?  Eat heart-healthy foods.  Eat fruits, vegetables, whole grains, beans, and other high-fiber foods.  Eat lean proteins, such as seafood, lean meats, beans, nuts, and soy products.  Eat healthy fats, such as canola and olive oil.  Choose foods that are low in saturated fat.  Limit sodium and alcohol.  Limit drinks and foods with added sugar.  Be physically active. Try to do moderate activity at least 2  hours a week. Or try to do vigorous activity at least 1  hours " "a week. You may want to walk or try other activities, such as running, swimming, cycling, or playing tennis or team sports.  Stay at a healthy weight or lose weight by making the changes in eating and physical activity listed above. Losing just a small amount of weight, even 5 to 10 pounds, can help reduce your risk for having a heart attack or stroke.  Do not smoke.  Manage other health problems. These include diabetes and high blood pressure. If you think you may have a problem with alcohol or drug use, talk to your doctor.  If you take medicine, take it exactly as prescribed. Call your doctor if you think you are having a problem with your medicine.  Check with your doctor or pharmacist before you use any other medicines, including over-the-counter medicines. Make sure your doctor knows all of the medicines, vitamins, herbal products, and supplements you take. Taking some medicines together can cause problems.  When should you call for help?  Watch closely for changes in your health, and be sure to contact your doctor if:    You need help making lifestyle changes.     You have questions about your medicine.   Where can you learn more?  Go to https://www.Bango.net/patiented  Enter I865 in the search box to learn more about \"High Cholesterol: Care Instructions.\"  Current as of: June 24, 2023               Content Version: 14.0    9949-5192 [employer].   Care instructions adapted under license by your healthcare professional. If you have questions about a medical condition or this instruction, always ask your healthcare professional. [employer] disclaims any warranty or liability for your use of this information.           Patient Education    Heart-Healthy Diet (01:47)  Your health professional recommends that you watch this short online health video.  Learn how to start eating foods that are good for your heart.  Purpose:  Shows how to cut back on unhealthy foods and switch to " heart-healthy foods like fruits, veggies, whole grains, and fish.  Goal:  Teaches ways to cut back on unhealthy foods and switch to heart-healthy foods.     How to watch the video    Scan the QR code   OR Visit the website    https://hwi.se/r/[email or web address]   Current as of: September 20, 2023               Content Version: 14.0    9265-8933 [employer].   Care instructions adapted under license by your healthcare professional. If you have questions about a medical condition or this instruction, always ask your healthcare professional. [employer] disclaims any warranty or liability for your use of this information.           https://www.heart.org/en/health-topics/cholesterol/prevention-and-treatment-of-high-cholesterol-hyperlipidemia

## 2024-05-23 NOTE — NURSING NOTE
"Oncology Rooming Note    May 23, 2024 11:51 AM   Hortencia Baldwin is a 53 year old female who presents for:    Chief Complaint   Patient presents with    Oncology Clinic Visit     MDS (myelodysplastic syndrome)      Initial Vitals: /80   Pulse 105   Temp 98.3  F (36.8  C)   Resp 16   Wt 76.7 kg (169 lb)   LMP 11/05/2017   SpO2 96%   BMI 31.30 kg/m   Estimated body mass index is 31.3 kg/m  as calculated from the following:    Height as of 3/28/24: 1.565 m (5' 1.61\").    Weight as of this encounter: 76.7 kg (169 lb). Body surface area is 1.83 meters squared.  Moderate Pain (5) Comment: Data Unavailable   Patient's last menstrual period was 11/05/2017.  Allergies reviewed: Yes  Medications reviewed: Yes    Medications: Medication refills not needed today.  Pharmacy name entered into Event[employer]:    [facility][id number] - [place], [place] - [address] AT [facility]  [facility] #[id number] - [place], [place] - [address] [facility] AT [facility] [employer] - [place] [address] [address] [facility] [address]    Frailty Screening:   Is the patient here for a new oncology consult visit in cancer care? 2. No      Clinical concerns: no other complaints      Praveen Mcclellan"

## 2024-05-23 NOTE — LETTER
5/23/2024         RE: Hortencia Baldwin  [address]  [facility] [address]        Dear Colleague,    Thank you for referring your patient, Hortencia Baldwin, to the [facility] BLOOD AND MARROW TRANSPLANT PROGRAM [place]. Please see a copy of my visit note below.      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 Date of transplant: 1/11/2024    Diagnosis MDS w/ SF3B1 BMT type Allogenic  CMV  Donor -  Recipient +    Prep: MA Bu/Flu Donor type  6/8 URD Blood Type Donor and recipient O+   GVHD Ppx PTCy/ siro/ MMF Graft source PBSCT Toxo IgG Negative   Protocol [id number] (not on) BMT MD Dr. Garay       Data   Pre-transplant disease history  Referring provider: Dr.Evan Ramey, [facility]   54 y/o F with history of longstanding macrocytic anemia (Hb 10-11, -110 dating back to 1997) presented in Nov 2021 for lightheadedness. CBC showed acute on chronic anemia with Hb 4.7, ; normal WBC and platelet count. Bone marrow biopsy (12/09/2021) was consistent with MDS with low blasts and SF3B1 mutation. Hypercellular marrow (70%) with single lineage dysplasia (dyserythropoiesis), increased ringed sideroblasts and no increase in blasts. Flow cytometry showed mixed lymphocytes. Cytogenetics 46,XX,del(20). NGS: SF3B1 (45%), ASXL1 (6%).   Received Epo (March 2022 - April 2022) but had minimal response after 4 weekly doses. Luspatercept from April 2022 to April 2023 with inadequate response.   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 Treated with weekly rituximab x 4 and steroid taper (Sept to Dec 2022). LDH and bilirubin started rising after steroid taper, therefore treated with MMF 500mg BID (Dec 2022 to May 2023) with resolution of hemolysis (negative KATINA, normal LDH,  CT ABDOMEN AND PELVIS WITH CONTRAST



HISTORY: sigmoid colon mass, possible mets.



COMPARISON: None at this facility



TECHNIQUE: Helical CT images of the abdomen and pelvis were obtained following administration of intr
avenous contrast. Sagittal and coronal reformatted images were reviewed. All CT scans at this [facility] are performed using CT dose reduction for ALARA by means of automated exposure control. 



CONTRAST: 100 ml of intravenous contrast administered.



FINDINGS:



Abdomen/pelvis:  There is a complex partially cystic mass in the pelvis with nodular enhancement pooja
uring up to 8.4 x 6.6 cm. The epicenter of this mass overlies the uterus/right adnexa. No normal uter
us or right ovary is visualized. This may represent a right ovarian neoplasm. There are scattered enh
ancing peritoneal deposits throughout the pelvis ranging from 1-2 cm in diameter. A larger peritoneal
 deposit is identified along the left anterior abdominal wall measuring 3.9 x 1.1 cm on image 57. The
re is moderate ascites throughout the abdomen. These findings suggest metastatic ovarian cancer.



There is circumferential thickening of the sigmoid colon however I am not entirely convinced a separa
te sigmoid mass is present. The sigmoid abnormality could be secondary to extension of ovarian cancer
. The sigmoid colon does appear moderately narrowed but no bowel obstruction is demonstrated.



The liver is unremarkable. No liver lesion is detected. The biliary system, pancreas, spleen, kidneys
 and adrenal glands are unremarkable.



There are mild distal aortic calcifications. No aneurysm.



The bladder is decompressed.



Lungs/bones:  Small pleural effusions layer posteriorly at the lung bases. The visualized lung bases 
are well-aerated. Normal heart size. No suspicious bony lesion is detected.





IMPRESSION:

Complex enhancing mass in the pelvis, peritoneal deposits and ascites suggest metastatic ovarian canc
er.

The sigmoid colon is abnormal as described above. It is unclear if this represents extension of OB/GY
N cancer or a separate sigmoid mass. I favor extension of ovarian cancer.

Small layering pleural effusions.



These findings were discussed with Dr. Caraballo of surgery at 1220 hours.



Signer Name: Gerard Klein Jr, MD 

Signed: 12/12/2019 12:31 PM

 Workstation Name: [id number] bilirubin). However, she continued to be transfusion dependant therefore her anemia was determined to be related more to MDS than autoimmune hemolysis.   Repeat bone marrow biopsy (5/11/2023) showed persistent MDS. Hypercellular marrow (90%) but now with dysplasia in all three lineages, cytogenetics 46, XX, del (20). Received decitabine- cedazurdine X 4 cycles (May 2023 to Dec 2023). Complicated by neutropenia requiring dose reduction and pRBC transfusions every 2-3 weeks. Pre-transplant bmbx (12/7/23) showed persistent MDS with multi lineage dysplasia (dyserythropoesis and dysgranulopoesis), 17% ringed sideroblasts, increased marrow storage iron. Cytogenetics: 46,XX,del(20). Flow cytometry and NGS not done.    She was considered for allogenic transplant despite low risk MDS by IPSS-R at diagnosis due to transfusion dependence, ASXL1 mutation and development of multi-lineage dysplasia on bmbx in May 2023 suggesting clonal evolution.      She underwent allogenic stem cell transplant on 1/11/24: myeloablative conditioning with Bu/Flu, 6/8 unrelated donor peripheral blood stem cell graft, cell dose: 6.50E+06. ABO matched, CMV donor negative, recipient positive. GVHD ppx with PTCy, MMF and tacrolimus (avoiding sirolimus due to high risk of VOD, given elevated liver enzymes prior to transplant and suspected iron overload).      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          INTERVAL HISTORY     Irma has been doing well. Main complaint is left ankle and dorsal foot continues to be swollen, per patient it is no worse than prior. More swollen at night. Has a little pain while walking.     Itching around neck and upper back, triamcinolone cream helps. Nausea sometimes, twice a week in the morning with the pills. Appetite ok. Weight stable. No abdominal pain, occasional RUQ discomfort. No relationship to food. 6/10 at the worst,  lasts for a minute. No history of gall bladder stones. No diarrhea.     ROS: No eye dryness. No mouth sores, dry mouth sores. Only big pills get stuck when swallowing.    Current Outpatient Medications   Medication Sig Dispense Refill    acyclovir (ZOVIRAX) 800 MG tablet Take 1 tablet (800 mg) by mouth 2 times daily 180 tablet 0    amLODIPine (NORVASC) 5 MG tablet Take 1 tablet (5 mg) by mouth daily 90 tablet 1    fenofibrate (LOFIBRA) 54 MG tablet Take 1 tablet (54 mg) by mouth daily 60 tablet 0    sirolimus (GENERIC EQUIVALENT) 0.5 MG tablet Take according to taper calendar provided by pharmacy. Taper to complete on 6/16/24. 45 tablet 0    sirolimus (GENERIC EQUIVALENT) 1 MG tablet Take one (1mg) tablet in addition to one (0.5mg) tablet for a total of 1.5mg daily. (Patient taking differently: Take 1 mg daily alternating with 0.5 mg daily. Dose as of 3/2.) 30 tablet 0    sulfamethoxazole-trimethoprim (BACTRIM DS) 800-160 MG tablet Take 1 tablet by mouth Every Mon, Tues two times daily 48 tablet 1         EXAM      /80   Pulse 105   Temp 98.3  F (36.8  C)   Resp 16   Wt 76.7 kg (169 lb)   LMP 11/05/2017   SpO2 96%   BMI 31.30 kg/m      Wt Readings from Last 4 Encounters:   05/23/24 76.7 kg (169 lb)   04/26/24 78 kg (172 lb)   04/22/24 77.7 kg (171 lb 3.2 oz)   04/11/24 76.8 kg (169 lb 6.4 oz)     KPS: 80% Can perform normal activity with effort, some signs of disease    General: Alert, awake  Eyes: Conjunctiva normal  Respiratory: Normal respiratory effort.   Cardiovascular: Left ankle and foot edema +   Abdomen: Soft, non tender   Skin: Mild erythematous rash on upper back   Psych: Mood and affect are appropriate.  No central access.       Laboratory Studies:     Data   Blood Counts  Recent Labs   Lab Test 05/22/24  0815 04/22/24  0951 04/11/24  1138 04/04/24  1218 03/28/24  1119 02/22/24  0350 02/21/24  0816 02/19/24  1203   WBC 5.2 6.0 4.8   < > 4.9   < > 7.7 5.8   HGB 13.6 12.7 13.7   < > 13.0   < >  11.3* 10.0*   * 143* 120*   < > 134*   < > 83* 74*   NEUTROPHIL 64 66 54   < > 36   < > 72 53   ANEU  --   --   --   --  1.8  --  5.5 3.1   LYMPH 20 22 32   < > 26   < > 11 17   ALYM  --   --   --   --  1.3  --  0.8 1.0    < > = values in this interval not displayed.     Basic Panel  Recent Labs   Lab Test 04/22/24  0951 04/11/24  1138 04/04/24  1218    141 142   POTASSIUM 3.7 3.9 3.6   CHLORIDE 107 103 105   CO2 24 26 25   BUN 10.8 6.9 9.7   CR 0.52 0.56 0.60   * 89 109*        Calcium, Magnesium, Phosphorus  Recent Labs   Lab Test 04/22/24  0951 04/11/24  1138 04/04/24  1218 03/01/24  1020 02/29/24  0244 02/28/24  0344 02/27/24  0328   NIKO 9.2 9.9 9.3   < > 9.2 9.3 9.3   MAG  --   --   --   --  2.1 2.0 2.0   PHOS  --   --   --   --  3.0 3.5 3.9    < > = values in this interval not displayed.        LFTs  Recent Labs   Lab Test 04/22/24  0951 04/11/24  1138 04/04/24  1218   ALKPHOS 112 116 108   AST 31 47* 37   ALT 39 52* 41   BILITOTAL 0.2 0.3 0.2   ALBUMIN 4.2 4.6 4.3     Immunosuppression  Recent Labs   Lab Test 05/22/24  0815 04/11/24  1138 04/04/24  1218   RAPAMY 1.6* 6.0 5.7       Recent Labs   Lab Test 05/22/24  0815 04/22/24  0951 04/11/24  1138   CMVQNT <35* Not Detected Not Detected       Recent Labs   Lab Test 03/14/24  1338   *       Recent Labs   Lab Test 04/22/24  0951 03/14/24  1222 02/22/24  0350 01/02/24  1432 12/19/23  1347 05/30/17  1155   SONY 4,257* 7,899* 10,961* 4,341* 5,191* 20            ASSESSMENT AND PLAN     BMT for MDS w/SF3B1 mutation  [id number] (according to but not on) with Bu/Flu  6/8 URD, peripheral blood stem cell graft, cell dose: 6.5 x10^6.     Disease status: Bone marrow biopsy at D28, D100, 6 months, 1 year  2/6/24 (~D26): Hypercellular marrow (70-80%), trilineage hemopoiesis with rare nuclear irregularities in terminal erythroid precursors of uncertain significance, 3% ringed sideroblasts (in the context of iron overload is not considered dysplastic  finding). Flow showed no increase in myeloid blasts. Cytogenetics 46 XY, consistent with donor. FISH: Rate of loss of 20q within normal limits. NGS: No mutations detected  4/22/24 (D100): Overall cellularity 40-50% with no dysplasia. No ringed sideroblasts. Flow negative. Cytogenetics not done. NGS negative.      Graft status/chimerism: D28, D100, 6 months,1 year  2/6/24 (~D26): Bone marrow 100%, peripheral blood (2/1/24) CD33 100%, CD3 87%  3/14/24 (~D60): Peripheral blood CD3 and CD33 100%  4/22/24 (D100): Bone marrow 100%. Peripheral blood CD3 and CD33 100%    GVHD  # Current immunosuppression is sirolimus   - Taper sirolimus over 6 weeks, end on June 15th      # No acute GVHD till date  Skin: No changes. Skin score 0.  GI: No symptoms. GI score 0.  Liver: LFTs normal. Score 0     Heme/ Coag  # Counts have recovered . ABO matched (both O+).     # Iron overload: Hx of transfusion dependent anemia, pre-transplant ferritin around 5,000. Ferritin (4/22/24) is 4257. Tsat 62%. Retic count 2.7%  - Liver ferriscan to assess iron overload  - Plan to start phlebotomy pending results of above studies.     ID  # Prophylaxis  PJP: Bactrim. Toxo IgG negative.  Viral: Acyclovir 800 mg bid  CMV: Letermovir completed     # Monitoring  CMV: Monitor every month till D180. 5/22 - not detected  EBV: Monitor every month till D180. 5/22 - not detected.   IgG: Level 424 mg/dL on 3/14/24. Check serum IgG level q3 months, and consider IVIG repletion for IgG levels <400 mg/dL. Next due around 6/14/24     GI  # Elevated transaminases: Resolved  AST and ALT elevated beginning 2/2/24 (D22). Notably, she was on tacrolimus for GVHD ppx but changed to sirolimus on 1/31/24. Infectious workup (2/6/24) was negative. Liver US (2/20/24) showed hepatomegaly with increased hepatic parenchymal echogenicity, suggesting hepatic steatosis. Suspect drug induced elevation w/ steatosis and possibly iron overload.   - CMP was not done today      CV  - HTN:  Amlodipine 5mg daily   - Hyperlipidemia: Patient will try lifestyle modifications. Will also stop sirolimus soon. Consider Crestor 5mg daily if no change in 3 months. Continue fenofibrate. Refer to nutrition.     MSK:  # L knee pain and back pain: Has been seen by ortho and referred to physical therapy. Unable to schedule an appointment right now due to insurance.   # Left ankle swelling:   - repeat US of the left leg (5/23) negative for DVT  - compression stockings  - Follow up with physical therapy when she has an appointment.     Derm  Mild erythematous rash on upper back with mild pruritus. Continue topicals.     Today's Summary:  Matt pending  Nutritionist referral for hyperlipidemia   US DVT  Compression stockings of left leg edema     RTC in 1 month with labs (CBC, CMP, EBV, CMV IgG)    I spent 40 minutes in the care of this patient today, which included time necessary for preparation for the visit, obtaining history, ordering medications/tests/procedures as medically indicated, review of pertinent medical literature, counseling of the patient, communication of recommendations to the care team, and documentation time.    Coco Garay  Department of Hematology, Oncology and Transplantation  [facility] Pager 1300/Text via George

## 2024-05-29 NOTE — PROGRESS NOTES
ARTHROSCOPY PARTIAL, MEDIAL, AND LATERAL MESICECTOMY (L) Operative Note    Date: 2024  OR Location: [facility] OR    Name: Rick Siddiqi : 1971, Age: 52 y.o., MRN: [id number], Sex: male    Diagnosis  Pre-op Diagnosis     * Other tear of medial meniscus, current injury, left knee, initial encounter [S83.242A] Post-op Diagnosis     * Other tear of medial meniscus, current injury, left knee, initial encounter [S83.242A]     Procedures  Arthroscopic partial medial and lateral meniscectomy 58[address]      Surgeons      * Zhang Grady - Primary    Resident/Fellow/Other Assistant:  Surgeons and Role:     * Sabas Cervantes PA-C - Assisting    Procedure Summary  Anesthesia: General  ASA: II  Anesthesia Staff: Anesthesiologist: Jonatan Milner DO  CRNA: RUIZ Zavala-CRNA  Estimated Blood Loss: 10mL  Intra-op Medications:   Administrations occurring from 0830 to 0915 on 24:   Medication Name Total Dose   BUPivacaine HCl (Marcaine) 0.5 % (5 mg/mL) injection 30 mL              Anesthesia Record               Intraprocedure I/O Totals          Intake    LR bolus 750.00 mL    Total Intake 750 mL          Specimen: No specimens collected     Staff:   Circulator: Carrol Ferguson Person: Marichuy           Drains and/or Catheters: * None in log *    Tourniquet Times:     Total Tourniquet Time Documented:  Thigh (Left) - 20 minutes  Total: Thigh (Left) - 20 minutes      Implants:     Findings: see procedure details    Indications: The patient is a 52-year-old male with a history of knee pain which has been unresponsive to conservative management. They were seen in clinic. An MRI was obtained which revealed complex tear posterior horn medial meniscus. We discussed continuing nonoperative management versus operative management. The patient elected to proceed with operative management. For detailed discussion of risks, benefits, and alternatives, please see the orthopedic clinic notes.    We reviewed today the usual risks  Infusion Nursing Note:  Hortencia Baldwin presents today for Queenie infusion and dressing change.    Patient seen by provider today: Yes: Jt Vaz   present during visit today: Not Applicable.    Note: Labs monitored. Queenie infused per treatment planned. Potassium 40 mEq PO tabs given to patient.       Intravenous Access:  Gastelum.    Treatment Conditions:  Lab Results   Component Value Date    HGB 9.5 (L) 02/14/2024    WBC 3.6 (L) 02/14/2024    ANEU 1.5 (L) 02/14/2024    ANEUTAUTO 3.0 02/07/2024    PLT 49 (LL) 02/14/2024        Lab Results   Component Value Date     02/14/2024    POTASSIUM 3.2 (L) 02/14/2024    MAG 1.8 02/04/2024    CR 0.45 (L) 02/14/2024    NIKO 9.1 02/14/2024    BILITOTAL 0.3 02/14/2024    ALBUMIN 3.8 02/14/2024     (H) 02/14/2024     (H) 02/14/2024       Results reviewed, labs MET treatment parameters, ok to proceed with treatment.      Post Infusion Assessment:  Patient tolerated infusion without incident.  Site patent and intact, free from redness, edema or discomfort.       Discharge Plan:   Departure Mode: Ambulatory.  Discharged by self.      Sarai Ramos RN     of arthroscopy, including bleeding, damage to neurovascular structures, postoperative stiffness, persistent pain, degenerative joint changes which may be progressive and require further treatment, wound healing complications, infection and development of a new or exacerbation of an existing medical comorbidity. We reviewed specifically the signs and symptoms of venous thromboembolic disease.     DESCRIPTION OF PROCEDURE:       On the date of surgery, the patient was identified in the preoperative holding area.  Surgical site was agreed upon, confirmed, and marked by the surgery team, nursing staff and the patient themself.  I marked the operative side. They were taken to the operating room and a surgical time-out was performed. They were positioned supine on the operating table with attention paid to padding all bony prominences. An anesthetic was administered by anesthesia staff. The limb was prepped and draped in the usual sterile fashion after a tourniquet was applied over soft padding. They received antibiotic prophylaxis within 30 minutes of incision and mechanical DVT prophylaxis to the nonoperative leg.  Attention was first turned to the diagnostic portion of the procedure.    Examination under anesthesia was performed which revealed stable exam to anterior and posterior drawer, Lachman, pivot shift and varus and valgus stress. There was full range of motion.    Diagnostic arthroscopy was then undertaken. The tourniquet was inflated and portal sites were marked utilizing anatomic landmarks.  A lateral viewing portal was established and then a medial working portal was established under direct visualization.  A probe was introduced and all structures were thoroughly probed and evaluated for pathology. Results of the diagnostic arthroscopy are as follows:      Suprapatellar pouch synovitis  Patella normal  Trochlea 4 Outerbridge change about the trochlear groove  Medial femoral condyle normal  Medial tibial  plateau normal  Lateral femoral condyle normal  Lateral tibial plateau normal  Medial meniscus complex tear medial meniscus that extended into about 60% of the periphery  Lateral meniscus white zone tear complex lateral meniscus posterior horn  Medial gutter normal  Lateral gutter normal  Notch synovitis  ACL normal   PCL normal  Posterior knee no loose bodies    Attention was then turned to the therapeutic portion of the arthroscopic procedure.    The medial meniscus was noted to have a complex tear involving the posterior horn and body.   Using a shaver and meniscal biters, we debrided the meniscus back until we obtained healthy and stable margins.  The meniscectomy extended ~65% to the periphery in the affected area.  A probe was then used to ensure all free edges are appropriately cleaned/resected.    The lateral meniscus was noted to have a white zone tear involving the posterior horn and body.   Using a shaver and meniscal biters, we debrided the meniscus back until we obtained healthy and stable margins.  The meniscectomy extended ~30% to the periphery in the affected area.  A probe was then used to ensure all free edges are appropriately cleaned/resected.     Chondroplasty was performed with a mechanized shaver of the chondral damage noted above.     Attention was turned to the inflamed/hypertrophic synovium of the notch, the anterior-medial and anterior- lateral compartments, and the suprapatellar pouch, and this was thoroughly debrided with a shaver.     The knee was copiously lavaged.  The arthroscope was removed.  The portals were closed with 3-0 inverted figure-of-eight sutures.  Xeroform and a sterile dressing were placed.  The tourniquet was deflated after application of an Ace wrap for compression.  The patient was awakened from anesthesia and taken to recovery room in good condition.      POSTOPERATIVE PLAN:   Date of discharge protocol with narcotics.    Early ambulation and mechanical compression  for DVT prevention.  Follow up in clinic in 2 weeks for incision check and to review arthroscopic findings.  Weight bear as tolerated. Crutches for ambulation assistance.         Complications:  None; patient tolerated the procedure well.    Disposition: PACU - hemodynamically stable.  Condition: stable     Sabas Cervantes PA-C was present for the entire case.  Given the nature of the procedure and disease process a skilled surgical assistant was necessary for the case.  The assistant was necessary for retraction and helped directly facilitate completion of the surgery.  A certified scrub tech was at the back table managing instruments and supplies for the surgical procedure.        Zhang Grady  Phone Number: [phone]

## 2024-05-31 ENCOUNTER — TELEPHONE (OUTPATIENT)
Dept: PHARMACY | Facility: CLINIC | Age: 54
End: 2024-05-31
Payer: MEDICAID

## 2024-05-31 NOTE — TELEPHONE ENCOUNTER
[facility] Recruitment: [facility] insurance     Referral outreach attempt #1 on May 31, 2024      Outcome: visit scheduled    [facility]olia Mims [facility]  -[facility]  [phone]

## 2024-06-12 ENCOUNTER — VIRTUAL VISIT (OUTPATIENT)
Dept: PHARMACY | Facility: CLINIC | Age: 54
End: 2024-06-12
Payer: MEDICAID

## 2024-06-12 DIAGNOSIS — I10 BENIGN ESSENTIAL HYPERTENSION: ICD-10-CM

## 2024-06-12 DIAGNOSIS — E78.2 MIXED HYPERLIPIDEMIA: ICD-10-CM

## 2024-06-12 DIAGNOSIS — D46.9 MDS (MYELODYSPLASTIC SYNDROME) (H): Primary | ICD-10-CM

## 2024-06-12 PROCEDURE — 99607 MTMS BY PHARM ADDL 15 MIN: CPT | Mod: 93 | Performed by: PHARMACIST

## 2024-06-12 PROCEDURE — 99605 MTMS BY PHARM NP 15 MIN: CPT | Mod: 93 | Performed by: PHARMACIST

## 2024-06-12 NOTE — PATIENT INSTRUCTIONS
"Recommendations from today's MTM visit:                                                    MTM (medication therapy management) is a service provided by a clinical pharmacist designed to help you get the most of out of your medicines.   Today we reviewed what your medicines are for, how to know if they are working, that your medicines are safe and how to make your medicine regimen as easy as possible.      Call the bone marrow transplant team and let them know about your left foot continues to be swollen. It could be from the amlodipine so you could see if they could decrease the dose or stop and monitor blood pressure.  At your bone marrow transplant visit discuss if you need to continue your blood pressure and cholesterol medicines and for how long.    Follow-up: as needed    It was great speaking with you today.  I value your experience and would be very thankful for your time in providing feedback in our clinic survey. In the next few days, you may receive an email or text message from [employer] with a link to a survey related to your  clinical pharmacist.\"     To schedule another MTM appointment, please call the clinic directly or you may call the MTM scheduling line at [phone] or toll-free at [phone].     My Clinical Pharmacist's contact information:                                                      Please feel free to contact me with any questions or concerns you have.      Jessy Enriquez, PharmD  Medication Therapy Management Pharmacist  [facility] - Monday and Wednesday 7:30 - 4:00      "

## 2024-06-12 NOTE — PROGRESS NOTES
Medication Therapy Management (MTM) Encounter    ASSESSMENT:                            Medication Adherence/Access: No issues identified    Hypertension:   Patient is meeting blood pressure goal of < 140/90mmHg. Could consider decreasing amlodipine to see if swelling improves. If high blood pressure is due to being on sirolimus then could consider stopping amlodipine with patient stopping sirolimus on 6/16/24 as planned.    Hyperlipidemia:   Stable. Continue to monitor and assess if she needs to continue after stopping sirolimus.    S/P Bone marrow transplant:   Stable. Managed by BMT and has follow-up scheduled later this month.    PLAN:                            Call the bone marrow transplant team and let them know about your left foot continues to be swollen. It could be from the amlodipine so you could see if they could decrease the dose or stop and monitor blood pressure.  At your bone marrow transplant visit discuss if you need to continue your blood pressure and cholesterol medicines and for how long.    Follow-up: No follow-ups on file.    SUBJECTIVE/OBJECTIVE:                          Irma Foreman is a 53 year old female seen for an initial visit. She was referred to me from [employer].      Reason for visit: comprehensive medication review.    Allergies/ADRs: Reviewed in chart  Past Medical History: Reviewed in chart  Tobacco: She reports that she quit smoking about 5 months ago. Her smoking use included cigarettes. She started smoking about 10 years ago. She has a 5 pack-year smoking history. She has been exposed to tobacco smoke. She has never used smokeless tobacco.  Alcohol: not currently using    Medication Adherence/Access: no issues reported. She uses a pil box.    Hypertension   Amlodipine 5 mg daily  Patient reports  once in a while will have dizziness but not bad She also reports her left foot only is swollen. It is better in the morning and worse in the evening.  Reports at  time it is worse.  Patient does not self-monitor blood pressure.         Hyperlipidemia   Fenofibrate 54 mg once daily  Patient reports no significant myalgias or other side effects.         S/P Bone marrow transplant:   Acyclovir 800 mg twice daily  Sirolimus 0.5 mg tapering with stop day of 6/16/24  Bactrim 800/160 mg twice daily    Follows with BMT and has follow-up schedule in July.    Today's Vitals: [facility] 11/05/2017   ----------------    I spent 17 minutes with this patient today. A copy of the visit note was provided to the patient's provider(s).    A summary of these recommendations was sent via [employer].    Jessy Enriquez, DemetriusD  Medication Therapy Management Pharmacist    Telemedicine Visit Details  Type of service:  Telephone visit  Start Time: 1:33 PM  End Time: 1:50 PM     Medication Therapy Recommendations  Benign essential hypertension    Current Medication: amLODIPine (NORVASC) 5 MG tablet   Rationale: Undesirable effect - Adverse medication event - Safety   Recommendation: Decrease Dose   Status: Contact Provider - Awaiting Response

## 2024-06-12 NOTE — Clinical Note
You have follow-up with this patient coming up but wanted to route for your awareness regarding swelling in foot that could be from amlodipine. See note for details.  Jessy Enriquez, DemetriusD Medication Therapy Management Pharmacist [facility] - Monday and Wednesday 7:30 - 4:00

## 2024-06-13 ENCOUNTER — HOSPITAL ENCOUNTER (OUTPATIENT)
Dept: MRI IMAGING | Facility: CLINIC | Age: 54
Discharge: HOME | End: 2024-06-13
Attending: STUDENT IN AN ORGANIZED HEALTH CARE EDUCATION/TRAINING PROGRAM | Admitting: STUDENT IN AN ORGANIZED HEALTH CARE EDUCATION/TRAINING PROGRAM
Payer: MEDICAID

## 2024-06-13 DIAGNOSIS — D46.9 MDS (MYELODYSPLASTIC SYNDROME) (H): ICD-10-CM

## 2024-06-13 PROCEDURE — 74181 MRI ABDOMEN W/O CONTRAST: CPT

## 2024-06-13 PROCEDURE — 74181 MRI ABDOMEN W/O CONTRAST: CPT | Mod: 26 | Performed by: RADIOLOGY

## 2024-06-24 ENCOUNTER — TELEPHONE (OUTPATIENT)
Dept: TRANSPLANT | Facility: CLINIC | Age: 54
End: 2024-06-24
Payer: MEDICAID

## 2024-06-24 NOTE — TELEPHONE ENCOUNTER
Received refill request from [facility] for sirolimus. Per Epic, patient was to complete siro taper June 15th. Called patient to confirm she completed her taper. She confirmed. Called [facility] and said medication not needed and removed medication from active med list.

## 2024-06-25 ENCOUNTER — TELEPHONE (OUTPATIENT)
Dept: PHARMACY | Facility: OTHER | Age: 54
End: 2024-06-25
Payer: MEDICAID

## 2024-06-25 NOTE — TELEPHONE ENCOUNTER
Called and left message to patient that we need a new updated insurance. [facility] and [facility] are not active. Will send a [employer] message to patient also.     See Dheeraj GONZALEZ   [phone]

## 2024-06-28 ENCOUNTER — ONCOLOGY VISIT (OUTPATIENT)
Dept: TRANSPLANT | Facility: CLINIC | Age: 54
End: 2024-06-28
Attending: STUDENT IN AN ORGANIZED HEALTH CARE EDUCATION/TRAINING PROGRAM
Payer: MEDICAID

## 2024-06-28 ENCOUNTER — APPOINTMENT (OUTPATIENT)
Dept: LAB | Facility: CLINIC | Age: 54
End: 2024-06-28
Attending: STUDENT IN AN ORGANIZED HEALTH CARE EDUCATION/TRAINING PROGRAM
Payer: MEDICAID

## 2024-06-28 VITALS
TEMPERATURE: 98.6 F | SYSTOLIC BLOOD PRESSURE: 114 MMHG | BODY MASS INDEX: 32.47 KG/M2 | RESPIRATION RATE: 16 BRPM | WEIGHT: 175.3 LBS | OXYGEN SATURATION: 97 % | DIASTOLIC BLOOD PRESSURE: 75 MMHG | HEART RATE: 87 BPM

## 2024-06-28 DIAGNOSIS — Z94.81 S/P ALLOGENEIC BONE MARROW TRANSPLANT (H): ICD-10-CM

## 2024-06-28 DIAGNOSIS — D46.9 MDS (MYELODYSPLASTIC SYNDROME) (H): ICD-10-CM

## 2024-06-28 LAB
ALBUMIN SERPL BCG-MCNC: 4.2 G/DL (ref 3.5–5.2)
ALP SERPL-CCNC: 134 U/L (ref 40–150)
ALT SERPL W P-5'-P-CCNC: 59 U/L (ref 0–50)
ANION GAP SERPL CALCULATED.3IONS-SCNC: 9 MMOL/L (ref 7–15)
AST SERPL W P-5'-P-CCNC: 43 U/L (ref 0–45)
BASOPHILS # BLD AUTO: 0.1 10E3/UL (ref 0–0.2)
BASOPHILS NFR BLD AUTO: 1 %
BILIRUB SERPL-MCNC: 0.3 MG/DL
BUN SERPL-MCNC: 16.6 MG/DL (ref 6–20)
CALCIUM SERPL-MCNC: 9.4 MG/DL (ref 8.6–10)
CHLORIDE SERPL-SCNC: 105 MMOL/L (ref 98–107)
CHOLEST SERPL-MCNC: 280 MG/DL
CMV DNA SPEC NAA+PROBE-ACNC: <35 IU/ML
CMV DNA SPEC NAA+PROBE-LOG#: <1.5 {LOG_COPIES}/ML
CREAT SERPL-MCNC: 0.66 MG/DL (ref 0.51–0.95)
DEPRECATED HCO3 PLAS-SCNC: 26 MMOL/L (ref 22–29)
EBV DNA SERPL NAA+PROBE-ACNC: NOT DETECTED IU/ML
EGFRCR SERPLBLD CKD-EPI 2021: >90 ML/MIN/1.73M2
EOSINOPHIL # BLD AUTO: 0.1 10E3/UL (ref 0–0.7)
EOSINOPHIL NFR BLD AUTO: 3 %
ERYTHROCYTE [DISTWIDTH] IN BLOOD BY AUTOMATED COUNT: 15.4 % (ref 10–15)
GLUCOSE SERPL-MCNC: 82 MG/DL (ref 70–99)
HCT VFR BLD AUTO: 40.4 % (ref 35–47)
HDLC SERPL-MCNC: 55 MG/DL
HGB BLD-MCNC: 13.4 G/DL (ref 11.7–15.7)
IGG SERPL-MCNC: 554 MG/DL (ref 610–1616)
IMM GRANULOCYTES # BLD: 0.1 10E3/UL
IMM GRANULOCYTES NFR BLD: 2 %
LDLC SERPL CALC-MCNC: 182 MG/DL
LYMPHOCYTES # BLD AUTO: 2.3 10E3/UL (ref 0.8–5.3)
LYMPHOCYTES NFR BLD AUTO: 43 %
MCH RBC QN AUTO: 33 PG (ref 26.5–33)
MCHC RBC AUTO-ENTMCNC: 33.2 G/DL (ref 31.5–36.5)
MCV RBC AUTO: 100 FL (ref 78–100)
MONOCYTES # BLD AUTO: 0.7 10E3/UL (ref 0–1.3)
MONOCYTES NFR BLD AUTO: 13 %
NEUTROPHILS # BLD AUTO: 2 10E3/UL (ref 1.6–8.3)
NEUTROPHILS NFR BLD AUTO: 38 %
NONHDLC SERPL-MCNC: 225 MG/DL
NRBC # BLD AUTO: 0 10E3/UL
NRBC BLD AUTO-RTO: 0 /100
PLATELET # BLD AUTO: 160 10E3/UL (ref 150–450)
POTASSIUM SERPL-SCNC: 4.4 MMOL/L (ref 3.4–5.3)
PROT SERPL-MCNC: 6.4 G/DL (ref 6.4–8.3)
RBC # BLD AUTO: 4.06 10E6/UL (ref 3.8–5.2)
SODIUM SERPL-SCNC: 140 MMOL/L (ref 135–145)
TRIGL SERPL-MCNC: 217 MG/DL
WBC # BLD AUTO: 5.3 10E3/UL (ref 4–11)

## 2024-06-28 PROCEDURE — 87799 DETECT AGENT NOS DNA QUANT: CPT | Performed by: STUDENT IN AN ORGANIZED HEALTH CARE EDUCATION/TRAINING PROGRAM

## 2024-06-28 PROCEDURE — G0463 HOSPITAL OUTPT CLINIC VISIT: HCPCS | Performed by: STUDENT IN AN ORGANIZED HEALTH CARE EDUCATION/TRAINING PROGRAM

## 2024-06-28 PROCEDURE — 85025 COMPLETE CBC W/AUTO DIFF WBC: CPT | Performed by: STUDENT IN AN ORGANIZED HEALTH CARE EDUCATION/TRAINING PROGRAM

## 2024-06-28 PROCEDURE — 99215 OFFICE O/P EST HI 40 MIN: CPT | Performed by: STUDENT IN AN ORGANIZED HEALTH CARE EDUCATION/TRAINING PROGRAM

## 2024-06-28 PROCEDURE — 99213 OFFICE O/P EST LOW 20 MIN: CPT | Performed by: STUDENT IN AN ORGANIZED HEALTH CARE EDUCATION/TRAINING PROGRAM

## 2024-06-28 PROCEDURE — 36415 COLL VENOUS BLD VENIPUNCTURE: CPT | Performed by: STUDENT IN AN ORGANIZED HEALTH CARE EDUCATION/TRAINING PROGRAM

## 2024-06-28 PROCEDURE — 80053 COMPREHEN METABOLIC PANEL: CPT | Performed by: STUDENT IN AN ORGANIZED HEALTH CARE EDUCATION/TRAINING PROGRAM

## 2024-06-28 PROCEDURE — 82784 ASSAY IGA/IGD/IGG/IGM EACH: CPT | Performed by: STUDENT IN AN ORGANIZED HEALTH CARE EDUCATION/TRAINING PROGRAM

## 2024-06-28 PROCEDURE — 82465 ASSAY BLD/SERUM CHOLESTEROL: CPT | Performed by: STUDENT IN AN ORGANIZED HEALTH CARE EDUCATION/TRAINING PROGRAM

## 2024-06-28 RX ORDER — HEPARIN SODIUM,PORCINE 10 UNIT/ML
5-20 VIAL (ML) INTRAVENOUS DAILY PRN
Status: CANCELLED | OUTPATIENT
Start: 2024-06-28

## 2024-06-28 RX ORDER — TRIAMCINOLONE ACETONIDE 0.25 MG/G
CREAM TOPICAL 2 TIMES DAILY
COMMUNITY

## 2024-06-28 RX ORDER — HEPARIN SODIUM (PORCINE) LOCK FLUSH IV SOLN 100 UNIT/ML 100 UNIT/ML
5 SOLUTION INTRAVENOUS
Status: CANCELLED | OUTPATIENT
Start: 2024-06-28

## 2024-06-28 ASSESSMENT — PAIN SCALES - GENERAL: PAINLEVEL: NO PAIN (0)

## 2024-06-28 NOTE — PROGRESS NOTES
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 Date of transplant: 1/11/2024    Diagnosis MDS w/ SF3B1 BMT type Allogenic  CMV  Donor -  Recipient +    Prep: MA Bu/Flu Donor type  6/8 URD Blood Type Donor and recipient O+   GVHD Ppx PTCy/ siro/ MMF Graft source PBSCT Toxo IgG Negative   Protocol [id number] (not on) BMT MD Dr. Garay       Data    Pre-transplant disease history  Referring provider: Dr.Evan Ramey, [facility]   54 y/o F with history of longstanding macrocytic anemia (Hb 10-11, -110 dating back to 1997) presented in Nov 2021 for lightheadedness. CBC showed acute on chronic anemia with Hb 4.7, ; normal WBC and platelet count. Bone marrow biopsy (12/09/2021) was consistent with MDS with low blasts and SF3B1 mutation. Hypercellular marrow (70%) with single lineage dysplasia (dyserythropoiesis), increased ringed sideroblasts and no increase in blasts. Flow cytometry showed mixed lymphocytes. Cytogenetics 46,XX,del(20). NGS: SF3B1 (45%), ASXL1 (6%).   Received Epo (March 2022 - April 2022) but had minimal response after 4 weekly doses. Luspatercept from April 2022 to April 2023 with inadequate response.   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 Treated with weekly rituximab x 4 and steroid taper (Sept to Dec 2022). LDH and bilirubin started rising after steroid taper, therefore treated with MMF 500mg BID (Dec 2022 to May 2023) with resolution of hemolysis (negative KATINA, normal LDH, bilirubin). However, she continued to be transfusion dependant therefore her anemia was determined to be related more to MDS than autoimmune hemolysis.   Repeat bone marrow biopsy (5/11/2023) showed persistent MDS. Hypercellular marrow (90%) but now with dysplasia in all three lineages, cytogenetics  46, XX, del (20). Received decitabine- cedazurdine X 4 cycles (May 2023 to Dec 2023). Complicated by neutropenia requiring dose reduction and pRBC transfusions every 2-3 weeks. Pre-transplant bmbx (12/7/23) showed persistent MDS with multi lineage dysplasia (dyserythropoesis and dysgranulopoesis), 17% ringed sideroblasts, increased marrow storage iron. Cytogenetics: 46,XX,del(20). Flow cytometry and NGS not done.    She was considered for allogenic transplant despite low risk MDS by IPSS-R at diagnosis due to transfusion dependence, ASXL1 mutation and development of multi-lineage dysplasia on bmbx in May 2023 suggesting clonal evolution.      She underwent allogenic stem cell transplant on 1/11/24: myeloablative conditioning with Bu/Flu, 6/8 unrelated donor peripheral blood stem cell graft, cell dose: 6.50E+06. ABO matched, CMV donor negative, recipient positive. GVHD ppx with PTCy, MMF and tacrolimus (avoiding sirolimus due to high risk of VOD, given elevated liver enzymes prior to transplant and suspected iron overload).      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          INTERVAL HISTORY     Irma has been doing well.   Left ankle swelling is improved. Pain in foot is improved, only at night.  Itching back and chest, still uses a topical steroid cream helps a little bit. No rash. Only scratch marks.   Still gets RUQ pain sometimes, dull pain that lasts for a couple of minutes.     ROS: No eye dryness. No mouth sores, dry mouth sores. Only big pills get stuck when swallowing.    Current Outpatient Medications   Medication Sig Dispense Refill    acyclovir (ZOVIRAX) 800 MG tablet Take 1 tablet (800 mg) by mouth 2 times daily 180 tablet 0    sulfamethoxazole-trimethoprim (BACTRIM DS) 800-160 MG tablet Take 1 tablet by mouth Every Mon, Tues two times daily 48 tablet 1    triamcinolone (KENALOG) 0.025 % cream Apply topically 2  times daily           EXAM      /75   Pulse 87   Temp 98.6  F (37  C) (Oral)   Resp 16   Wt 79.5 kg (175 lb 4.8 oz)   LMP 11/05/2017   SpO2 97%   BMI 32.47 kg/m      Wt Readings from Last 4 Encounters:   06/28/24 79.5 kg (175 lb 4.8 oz)   05/23/24 76.7 kg (169 lb)   04/26/24 78 kg (172 lb)   04/22/24 77.7 kg (171 lb 3.2 oz)     KPS: 80% Can perform normal activity with effort, some signs of disease    General: Alert, awake  Eyes: Conjunctiva normal  Respiratory: Normal respiratory effort.   Cardiovascular: Left ankle and foot edema + , improved from prior   Abdomen: Soft, non tender   Skin: No rash on back, only scratch marks +  Psych: Mood and affect are appropriate.  No central access.       Laboratory Studies:     Data    Blood Counts  Recent Labs   Lab Test 06/28/24  0849 05/22/24  0815 04/22/24  0951 04/04/24  1218 03/28/24  1119 02/22/24  0350 02/21/24  0816 02/19/24  1203   WBC 5.3 5.2 6.0   < > 4.9   < > 7.7 5.8   HGB 13.4 13.6 12.7   < > 13.0   < > 11.3* 10.0*    146* 143*   < > 134*   < > 83* 74*   NEUTROPHIL 38 64 66   < > 36   < > 72 53   ANEU  --   --   --   --  1.8  --  5.5 3.1   LYMPH 43 20 22   < > 26   < > 11 17   ALYM  --   --   --   --  1.3  --  0.8 1.0    < > = values in this interval not displayed.     Basic Panel  Recent Labs   Lab Test 04/22/24  0951 04/11/24  1138 04/04/24  1218    141 142   POTASSIUM 3.7 3.9 3.6   CHLORIDE 107 103 105   CO2 24 26 25   BUN 10.8 6.9 9.7   CR 0.52 0.56 0.60   * 89 109*       LFTs  Recent Labs   Lab Test 04/22/24  0951 04/11/24  1138 04/04/24  1218   ALKPHOS 112 116 108   AST 31 47* 37   ALT 39 52* 41   BILITOTAL 0.2 0.3 0.2   ALBUMIN 4.2 4.6 4.3     Immunosuppression  Recent Labs   Lab Test 05/22/24  0815 04/11/24  1138 04/04/24  1218   RAPAMY 1.6* 6.0 5.7       Recent Labs   Lab Test 05/22/24  0815 04/22/24  0951 04/11/24  1138   CMVQNT <35* Not Detected Not Detected       Recent Labs   Lab Test 03/14/24  1338   *        Recent Labs   Lab Test 04/22/24  0951 03/14/24  1222 02/22/24  0350 01/02/24  1432 12/19/23  1347 05/30/17  1155   SONY 4,257* 7,899* 10,961* 4,341* 5,191* 20            ASSESSMENT AND PLAN     BMT for MDS w/SF3B1 mutation  [id number] (according to but not on) with Bu/Flu  6/8 URD, peripheral blood stem cell graft, cell dose: 6.5 x10^6.     Disease status: Bone marrow biopsy at D28, D100, 6 months, 1 year  2/6/24 (~D26): Hypercellular marrow (70-80%), trilineage hemopoiesis with rare nuclear irregularities in terminal erythroid precursors of uncertain significance, 3% ringed sideroblasts (in the context of iron overload is not considered dysplastic finding). Flow showed no increase in myeloid blasts. Cytogenetics 46 XY, consistent with donor. FISH: Rate of loss of 20q within normal limits. NGS: No mutations detected  4/22/24 (D100): Overall cellularity 40-50% with no dysplasia. No ringed sideroblasts. Flow negative. Cytogenetics not done. NGS negative.      Graft status/chimerism: D28, D100, 6 months,1 year  2/6/24 (~D26): Bone marrow 100%, peripheral blood (2/1/24) CD33 100%, CD3 87%  3/14/24 (~D60): Peripheral blood CD3 and CD33 100%  4/22/24 (D100): Bone marrow 100%. Peripheral blood CD3 and CD33 100%    GVHD  # Current immunosuppression is none  - Sirolimus taper completed on 6/15/24     # No acute GVHD till date  Skin: No changes. Skin score 0.  GI: No symptoms. GI score 0.  Liver: LFTs normal. Score 0     Heme/ Coag  # Counts have recovered . ABO matched (both O+).     # Iron overload: Hx of transfusion dependent anemia, pre-transplant ferritin around 5,000. Ferritin (4/22/24) is 4257. Tsat 62%. Retic count 2.7%  - Liver ferriscan shows severe iron overload  - Will set up for phlebotomy every 4 weeks.     ID  # Prophylaxis  PJP: Bactrim. Toxo IgG negative.  Viral: Acyclovir 800 mg bid  CMV: Letermovir completed     # Monitoring  CMV: Monitor every month till undetectable. 6/28: <35  EBV: Monitor every  month till D180. 6/28- not detected.   IgG: Level 5544 mg/dL on 6/28/24. Check serum IgG level q3 months, and consider IVIG repletion for IgG levels <400 mg/dL.     GI  # Elevated transaminases: Resolved  AST and ALT elevated beginning 2/2/24 (D22). Notably, she was on tacrolimus for GVHD ppx but changed to sirolimus on 1/31/24. Infectious workup (2/6/24) was negative. Liver US (2/20/24) showed hepatomegaly with increased hepatic parenchymal echogenicity, suggesting hepatic steatosis. Suspect drug induced elevation w/ steatosis and possibly iron overload.     CV  - HTN: Stop amlodipine.  - Hyperlipidemia: Patient will try lifestyle modifications. Has stopped sirolimus recently. Consider Crestor 5mg daily if no change in 3 months. Stop fenofibrate.    MSK:  # L knee pain and back pain: Has been seen by ortho and referred to physical therapy. Unable to schedule an appointment right now due to insurance.   # Left ankle swelling:   - repeat US of the left leg (5/23) negative for DVT  - compression stockings  - Follow up with physical therapy when she has an appointment.     Derm  Mild erythematous rash on upper back with mild pruritus. Continue topicals.     Today's Summary:  Stop amlodipine  Measure BP at home at least twice a week  Stop fenofibrate  Lipid panel with next visit   Set up for phlebotomy     RTC in 1 month with labs (CBC, CMP, EBV, CMV)    I spent 40 minutes in the care of this patient today, which included time necessary for preparation for the visit, obtaining history, ordering medications/tests/procedures as medically indicated, review of pertinent medical literature, counseling of the patient, communication of recommendations to the care team, and documentation time.    Coco Garay  Department of Hematology, Oncology and Transplantation  [facility] Pager 1300/Text via [employer]

## 2024-06-28 NOTE — NURSING NOTE
"Oncology Rooming Note    June 28, 2024 8:56 AM   Hortencia Baldwin is a 53 year old female who presents for:    Chief Complaint   Patient presents with    Oncology Clinic Visit     Myelodysplastic syndrome     Initial Vitals: /75   Pulse 87   Temp 98.6  F (37  C) (Oral)   Resp 16   Wt 79.5 kg (175 lb 4.8 oz)   LMP 11/05/2017   SpO2 97%   BMI 32.47 kg/m   Estimated body mass index is 32.47 kg/m  as calculated from the following:    Height as of 3/28/24: 1.565 m (5' 1.61\").    Weight as of this encounter: 79.5 kg (175 lb 4.8 oz). Body surface area is 1.86 meters squared.  No Pain (0) Comment: Left foot pain and swelling improved   Patient's last menstrual period was 11/05/2017.  Allergies reviewed: Yes  Medications reviewed: Yes    Medications: Medication refills not needed today.  Pharmacy name entered into [facility]:    [id number] - [place], [place] - [address] AT [facility] [address] [address] [place] [id number] - [place], [place] - [address] [facility] [place] [address] [facility] [address]    Frailty Screening:   Is the patient here for a new oncology consult visit in cancer care? 2. No      Clinical concerns: None       Michelle Bauman              "

## 2024-06-28 NOTE — NURSING NOTE
Chief Complaint   Patient presents with    Oncology Clinic Visit     Myelodysplastic syndrome    Blood Draw     Labs drawn via VPT by lab RN     Venipuncture labs drawn by lab RN, vitals taken and appointment arrived.    Heidi Siegel RN

## 2024-06-28 NOTE — LETTER
6/28/2024      Hortencia Baldwin  [address]  [facility] [address]      Dear Colleague,    Thank you for referring your patient, Hortencia Baldwin, to the [facility] BLOOD AND MARROW TRANSPLANT PROGRAM [place]. Please see a copy of my visit note below.    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 Date of transplant: 1/11/2024    Diagnosis MDS w/ SF3B1 BMT type Allogenic  CMV  Donor -  Recipient +    Prep: MA Bu/Flu Donor type  6/8 URD Blood Type Donor and recipient O+   GVHD Ppx PTCy/ siro/ MMF Graft source PBSCT Toxo IgG Negative   Protocol [id number] (not on) BMT MD Dr. Garay       Data   Pre-transplant disease history  Referring provider: Dr.Evan Ramey, [facility]   54 y/o F with history of longstanding macrocytic anemia (Hb 10-11, -110 dating back to 1997) presented in Nov 2021 for lightheadedness. CBC showed acute on chronic anemia with Hb 4.7, ; normal WBC and platelet count. Bone marrow biopsy (12/09/2021) was consistent with MDS with low blasts and SF3B1 mutation. Hypercellular marrow (70%) with single lineage dysplasia (dyserythropoiesis), increased ringed sideroblasts and no increase in blasts. Flow cytometry showed mixed lymphocytes. Cytogenetics 46,XX,del(20). NGS: SF3B1 (45%), ASXL1 (6%).   Received Epo (March 2022 - April 2022) but had minimal response after 4 weekly doses. Luspatercept from April 2022 to April 2023 with inadequate response.   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 Treated with weekly rituximab x 4 and steroid taper (Sept to Dec 2022). LDH and bilirubin started rising after steroid taper, therefore treated with MMF 500mg BID (Dec 2022 to May 2023) with resolution of hemolysis (negative KATINA, normal LDH, bilirubin). However, she  continued to be transfusion dependant therefore her anemia was determined to be related more to MDS than autoimmune hemolysis.   Repeat bone marrow biopsy (5/11/2023) showed persistent MDS. Hypercellular marrow (90%) but now with dysplasia in all three lineages, cytogenetics 46, XX, del (20). Received decitabine- cedazurdine X 4 cycles (May 2023 to Dec 2023). Complicated by neutropenia requiring dose reduction and pRBC transfusions every 2-3 weeks. Pre-transplant bmbx (12/7/23) showed persistent MDS with multi lineage dysplasia (dyserythropoesis and dysgranulopoesis), 17% ringed sideroblasts, increased marrow storage iron. Cytogenetics: 46,XX,del(20). Flow cytometry and NGS not done.    She was considered for allogenic transplant despite low risk MDS by IPSS-R at diagnosis due to transfusion dependence, ASXL1 mutation and development of multi-lineage dysplasia on bmbx in May 2023 suggesting clonal evolution.      She underwent allogenic stem cell transplant on 1/11/24: myeloablative conditioning with Bu/Flu, 6/8 unrelated donor peripheral blood stem cell graft, cell dose: 6.50E+06. ABO matched, CMV donor negative, recipient positive. GVHD ppx with PTCy, MMF and tacrolimus (avoiding sirolimus due to high risk of VOD, given elevated liver enzymes prior to transplant and suspected iron overload).      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          INTERVAL HISTORY     Irma has been doing well.   Left ankle swelling is improved. Pain in foot is improved, only at night.  Itching back and chest, still uses a topical steroid cream helps a little bit. No rash. Only scratch marks.   Still gets RUQ pain sometimes, dull pain that lasts for a couple of minutes.     ROS: No eye dryness. No mouth sores, dry mouth sores. Only big pills get stuck when swallowing.    Current Outpatient Medications   Medication Sig Dispense Refill     acyclovir (ZOVIRAX) 800 MG tablet Take 1 tablet (800 mg) by mouth 2 times daily 180 tablet 0    sulfamethoxazole-trimethoprim (BACTRIM DS) 800-160 MG tablet Take 1 tablet by mouth Every Mon, Tues two times daily 48 tablet 1    triamcinolone (KENALOG) 0.025 % cream Apply topically 2 times daily           EXAM      /75   Pulse 87   Temp 98.6  F (37  C) (Oral)   Resp 16   Wt 79.5 kg (175 lb 4.8 oz)   LMP 11/05/2017   SpO2 97%   BMI 32.47 kg/m      Wt Readings from Last 4 Encounters:   06/28/24 79.5 kg (175 lb 4.8 oz)   05/23/24 76.7 kg (169 lb)   04/26/24 78 kg (172 lb)   04/22/24 77.7 kg (171 lb 3.2 oz)     KPS: 80% Can perform normal activity with effort, some signs of disease    General: Alert, awake  Eyes: Conjunctiva normal  Respiratory: Normal respiratory effort.   Cardiovascular: Left ankle and foot edema + , improved from prior   Abdomen: Soft, non tender   Skin: No rash on back, only scratch marks +  Psych: Mood and affect are appropriate.  No central access.       Laboratory Studies:     Data   Blood Counts  Recent Labs   Lab Test 06/28/24  0849 05/22/24  0815 04/22/24  0951 04/04/24  1218 03/28/24  1119 02/22/24  0350 02/21/24  0816 02/19/24  1203   WBC 5.3 5.2 6.0   < > 4.9   < > 7.7 5.8   HGB 13.4 13.6 12.7   < > 13.0   < > 11.3* 10.0*    146* 143*   < > 134*   < > 83* 74*   NEUTROPHIL 38 64 66   < > 36   < > 72 53   ANEU  --   --   --   --  1.8  --  5.5 3.1   LYMPH 43 20 22   < > 26   < > 11 17   ALYM  --   --   --   --  1.3  --  0.8 1.0    < > = values in this interval not displayed.     Basic Panel  Recent Labs   Lab Test 04/22/24  0951 04/11/24  1138 04/04/24  1218    141 142   POTASSIUM 3.7 3.9 3.6   CHLORIDE 107 103 105   CO2 24 26 25   BUN 10.8 6.9 9.7   CR 0.52 0.56 0.60   * 89 109*       LFTs  Recent Labs   Lab Test 04/22/24  0951 04/11/24  1138 04/04/24  1218   ALKPHOS 112 116 108   AST 31 47* 37   ALT 39 52* 41   BILITOTAL 0.2 0.3 0.2   ALBUMIN 4.2 4.6 4.3      Immunosuppression  Recent Labs   Lab Test 05/22/24  0815 04/11/24  1138 04/04/24  1218   RAPAMY 1.6* 6.0 5.7       Recent Labs   Lab Test 05/22/24  0815 04/22/24  0951 04/11/24  1138   CMVQNT <35* Not Detected Not Detected       Recent Labs   Lab Test 03/14/24  1338   *       Recent Labs   Lab Test 04/22/24  0951 03/14/24  1222 02/22/24  0350 01/02/24  1432 12/19/23  1347 05/30/17  1155   SONY 4,257* 7,899* 10,961* 4,341* 5,191* 20            ASSESSMENT AND PLAN     BMT for MDS w/SF3B1 mutation  [id number] (according to but not on) with Bu/Flu  6/8 URD, peripheral blood stem cell graft, cell dose: 6.5 x10^6.     Disease status: Bone marrow biopsy at D28, D100, 6 months, 1 year  2/6/24 (~D26): Hypercellular marrow (70-80%), trilineage hemopoiesis with rare nuclear irregularities in terminal erythroid precursors of uncertain significance, 3% ringed sideroblasts (in the context of iron overload is not considered dysplastic finding). Flow showed no increase in myeloid blasts. Cytogenetics 46 XY, consistent with donor. FISH: Rate of loss of 20q within normal limits. NGS: No mutations detected  4/22/24 (D100): Overall cellularity 40-50% with no dysplasia. No ringed sideroblasts. Flow negative. Cytogenetics not done. NGS negative.      Graft status/chimerism: D28, D100, 6 months,1 year  2/6/24 (~D26): Bone marrow 100%, peripheral blood (2/1/24) CD33 100%, CD3 87%  3/14/24 (~D60): Peripheral blood CD3 and CD33 100%  4/22/24 (D100): Bone marrow 100%. Peripheral blood CD3 and CD33 100%    GVHD  # Current immunosuppression is none  - Sirolimus taper completed on 6/15/24     # No acute GVHD till date  Skin: No changes. Skin score 0.  GI: No symptoms. GI score 0.  Liver: LFTs normal. Score 0     Heme/ Coag  # Counts have recovered . ABO matched (both O+).     # Iron overload: Hx of transfusion dependent anemia, pre-transplant ferritin around 5,000. Ferritin (4/22/24) is 4257. Tsat 62%. Retic count 2.7%  - Liver  ferriscan shows severe iron overload  - Will set up for phlebotomy every 4 weeks.     ID  # Prophylaxis  PJP: Bactrim. Toxo IgG negative.  Viral: Acyclovir 800 mg bid  CMV: Letermovir completed     # Monitoring  CMV: Monitor every month till undetectable. 6/28: <35  EBV: Monitor every month till D180. 6/28- not detected.   IgG: Level 5544 mg/dL on 6/28/24. Check serum IgG level q3 months, and consider IVIG repletion for IgG levels <400 mg/dL.     GI  # Elevated transaminases: Resolved  AST and ALT elevated beginning 2/2/24 (D22). Notably, she was on tacrolimus for GVHD ppx but changed to sirolimus on 1/31/24. Infectious workup (2/6/24) was negative. Liver US (2/20/24) showed hepatomegaly with increased hepatic parenchymal echogenicity, suggesting hepatic steatosis. Suspect drug induced elevation w/ steatosis and possibly iron overload.     CV  - HTN: Stop amlodipine.  - Hyperlipidemia: Patient will try lifestyle modifications. Has stopped sirolimus recently. Consider Crestor 5mg daily if no change in 3 months. Stop fenofibrate.    MSK:  # L knee pain and back pain: Has been seen by ortho and referred to physical therapy. Unable to schedule an appointment right now due to insurance.   # Left ankle swelling:   - repeat US of the left leg (5/23) negative for DVT  - compression stockings  - Follow up with physical therapy when she has an appointment.     Derm  Mild erythematous rash on upper back with mild pruritus. Continue topicals.     Today's Summary:  Stop amlodipine  Measure BP at home at least twice a week  Stop fenofibrate  Lipid panel with next visit   Set up for phlebotomy     RTC in 1 month with labs (CBC, CMP, EBV, CMV)    I spent 40 minutes in the care of this patient today, which included time necessary for preparation for the visit, obtaining history, ordering medications/tests/procedures as medically indicated, review of pertinent medical literature, counseling of the patient, communication of  recommendations to the care team, and documentation time.    Coco Garay  Department of Hematology, Oncology and Transplantation  [employer] Pager 1300/Text via [email or web address]

## 2024-06-30 RX ORDER — HEPARIN SODIUM (PORCINE) LOCK FLUSH IV SOLN 100 UNIT/ML 100 UNIT/ML
5 SOLUTION INTRAVENOUS
Status: CANCELLED | OUTPATIENT
Start: 2024-07-14

## 2024-06-30 RX ORDER — HEPARIN SODIUM,PORCINE 10 UNIT/ML
5-20 VIAL (ML) INTRAVENOUS DAILY PRN
Status: CANCELLED | OUTPATIENT
Start: 2024-07-14

## 2024-07-01 ENCOUNTER — APPOINTMENT (OUTPATIENT)
Dept: LAB | Facility: CLINIC | Age: 54
End: 2024-07-01
Attending: STUDENT IN AN ORGANIZED HEALTH CARE EDUCATION/TRAINING PROGRAM
Payer: MEDICAID

## 2024-07-01 ENCOUNTER — INFUSION THERAPY VISIT (OUTPATIENT)
Dept: TRANSPLANT | Facility: CLINIC | Age: 54
End: 2024-07-01
Attending: FAMILY MEDICINE
Payer: MEDICAID

## 2024-07-01 VITALS
TEMPERATURE: 98 F | DIASTOLIC BLOOD PRESSURE: 75 MMHG | OXYGEN SATURATION: 99 % | HEART RATE: 71 BPM | RESPIRATION RATE: 18 BRPM | SYSTOLIC BLOOD PRESSURE: 110 MMHG

## 2024-07-01 DIAGNOSIS — Z94.81 S/P ALLOGENEIC BONE MARROW TRANSPLANT (H): ICD-10-CM

## 2024-07-01 DIAGNOSIS — D46.9 MDS (MYELODYSPLASTIC SYNDROME) (H): Primary | ICD-10-CM

## 2024-07-01 LAB
CHOLEST SERPL-MCNC: 271 MG/DL
FASTING STATUS PATIENT QL REPORTED: YES
FERRITIN SERPL-MCNC: 4077 NG/ML (ref 11–328)
HCT VFR BLD AUTO: 39.8 % (ref 35–47)
HDLC SERPL-MCNC: 53 MG/DL
HGB BLD-MCNC: 13.2 G/DL (ref 11.7–15.7)
LDLC SERPL CALC-MCNC: 173 MG/DL
NONHDLC SERPL-MCNC: 218 MG/DL
TRIGL SERPL-MCNC: 227 MG/DL

## 2024-07-01 PROCEDURE — 99195 PHLEBOTOMY: CPT

## 2024-07-01 PROCEDURE — 80061 LIPID PANEL: CPT

## 2024-07-01 PROCEDURE — 96360 HYDRATION IV INFUSION INIT: CPT

## 2024-07-01 PROCEDURE — 82728 ASSAY OF FERRITIN: CPT | Performed by: STUDENT IN AN ORGANIZED HEALTH CARE EDUCATION/TRAINING PROGRAM

## 2024-07-01 PROCEDURE — 36415 COLL VENOUS BLD VENIPUNCTURE: CPT | Performed by: STUDENT IN AN ORGANIZED HEALTH CARE EDUCATION/TRAINING PROGRAM

## 2024-07-01 PROCEDURE — 258N000003 HC RX IP 258 OP 636: Performed by: STUDENT IN AN ORGANIZED HEALTH CARE EDUCATION/TRAINING PROGRAM

## 2024-07-01 PROCEDURE — 85014 HEMATOCRIT: CPT | Performed by: STUDENT IN AN ORGANIZED HEALTH CARE EDUCATION/TRAINING PROGRAM

## 2024-07-01 RX ADMIN — SODIUM CHLORIDE 500 ML: 9 INJECTION, SOLUTION INTRAVENOUS at 17:04

## 2024-07-01 ASSESSMENT — PAIN SCALES - GENERAL: PAINLEVEL: NO PAIN (0)

## 2024-07-01 NOTE — NURSING NOTE
Chief Complaint   Patient presents with    Labs Only     Labs drawn via piv by RN in lab, vitals completed, pt was checked in for next appt.     PIV placed by VAT in lab.    Eneida Laboy RN

## 2024-07-01 NOTE — PROGRESS NOTES
Infusion Nursing Note:  Hortencia Baldwin presents today for phlebotomy.    Patient seen by provider today: No   present during visit today: Not Applicable.    Note: Labs monitored, VSS pre phlebotomy. 500cc of blood removed. Pt became hypotensive and dizzy post procedure. See flowsheet. 500cc NS bolus administered. B/p recovered. Pt feeling better. Pt discharged in stable condition.       Intravenous Access:  Peripheral IV placed.    Treatment Conditions:  Lab Results   Component Value Date     06/28/2024    POTASSIUM 4.4 06/28/2024    MAG 2.1 02/29/2024    CR 0.66 06/28/2024    NIKO 9.4 06/28/2024    BILITOTAL 0.3 06/28/2024    ALBUMIN 4.2 06/28/2024    ALT 59 (H) 06/28/2024    AST 43 06/28/2024       Results reviewed, labs MET treatment parameters, ok to proceed with treatment.      Post Infusion Assessment:  Access discontinued per protocol.       Discharge Plan:   Patient discharged in stable condition accompanied by: .  Departure Mode: Ambulatory.      Max Ly RN

## 2024-07-08 ENCOUNTER — TELEPHONE (OUTPATIENT)
Dept: TRANSPLANT | Facility: CLINIC | Age: 54
End: 2024-07-08
Payer: MEDICAID

## 2024-07-08 NOTE — TELEPHONE ENCOUNTER
Received call from patient. She reports that she was eating rice with fried egg when she started to feel nauseas. She has not vomited but reports one episode of diarrhea since this began about an hour ago. She reports that her face is flushed but not swollen. Denies swelling to her lips, mouth, tongue. Pt denies eating anything new today or yesterday. She did report that the symptoms began after she drank some bitter melon drink. Pt had half a cup of bitter melon drink. She also  reports having a few pieces of cashew. No fever, face feels flush. Pt reports the feeling in her stomach is a little improved from when it began. Instructed patient to take an antihistamine given the sudden on set of symptoms while eating, concern for allergic reaction. Instructed patient to go to ER if she develops any tongue or lip swelling or itching for concern for airway compromise. Pt agreed with this plan and will call back if the symptoms do not improve.

## 2024-07-10 DIAGNOSIS — Z94.81 S/P ALLOGENEIC BONE MARROW TRANSPLANT (H): ICD-10-CM

## 2024-07-10 DIAGNOSIS — D46.9 MDS (MYELODYSPLASTIC SYNDROME) (H): ICD-10-CM

## 2024-07-10 RX ORDER — FENOFIBRATE 54 MG/1
TABLET ORAL
Qty: 60 TABLET | Refills: 0 | OUTPATIENT
Start: 2024-07-10

## 2024-07-14 RX ORDER — HEPARIN SODIUM (PORCINE) LOCK FLUSH IV SOLN 100 UNIT/ML 100 UNIT/ML
5 SOLUTION INTRAVENOUS
Status: CANCELLED | OUTPATIENT
Start: 2024-07-28

## 2024-07-14 RX ORDER — HEPARIN SODIUM,PORCINE 10 UNIT/ML
5-20 VIAL (ML) INTRAVENOUS DAILY PRN
Status: CANCELLED | OUTPATIENT
Start: 2024-07-28

## 2024-07-15 ENCOUNTER — INFUSION THERAPY VISIT (OUTPATIENT)
Dept: TRANSPLANT | Facility: CLINIC | Age: 54
End: 2024-07-15
Attending: NURSE PRACTITIONER
Payer: MEDICAID

## 2024-07-15 ENCOUNTER — LAB (OUTPATIENT)
Dept: LAB | Facility: CLINIC | Age: 54
End: 2024-07-15
Attending: NURSE PRACTITIONER
Payer: MEDICAID

## 2024-07-15 VITALS — OXYGEN SATURATION: 99 % | SYSTOLIC BLOOD PRESSURE: 117 MMHG | HEART RATE: 79 BPM | DIASTOLIC BLOOD PRESSURE: 82 MMHG

## 2024-07-15 VITALS
OXYGEN SATURATION: 99 % | BODY MASS INDEX: 33.39 KG/M2 | HEART RATE: 86 BPM | TEMPERATURE: 98 F | SYSTOLIC BLOOD PRESSURE: 129 MMHG | RESPIRATION RATE: 18 BRPM | WEIGHT: 180.3 LBS | DIASTOLIC BLOOD PRESSURE: 85 MMHG

## 2024-07-15 DIAGNOSIS — D46.9 MDS (MYELODYSPLASTIC SYNDROME) (H): Primary | ICD-10-CM

## 2024-07-15 DIAGNOSIS — Z94.81 S/P ALLOGENEIC BONE MARROW TRANSPLANT (H): ICD-10-CM

## 2024-07-15 LAB
ALBUMIN SERPL BCG-MCNC: 4.1 G/DL (ref 3.5–5.2)
ALP SERPL-CCNC: 122 U/L (ref 40–150)
ALT SERPL W P-5'-P-CCNC: 33 U/L (ref 0–50)
ANION GAP SERPL CALCULATED.3IONS-SCNC: 10 MMOL/L (ref 7–15)
AST SERPL W P-5'-P-CCNC: 26 U/L (ref 0–45)
BASOPHILS # BLD AUTO: 0.1 10E3/UL (ref 0–0.2)
BASOPHILS NFR BLD AUTO: 1 %
BILIRUB SERPL-MCNC: 0.3 MG/DL
BUN SERPL-MCNC: 14.6 MG/DL (ref 6–20)
CALCIUM SERPL-MCNC: 9.3 MG/DL (ref 8.6–10)
CHLORIDE SERPL-SCNC: 105 MMOL/L (ref 98–107)
CMV DNA SPEC NAA+PROBE-ACNC: <35 IU/ML
CMV DNA SPEC NAA+PROBE-LOG#: <1.5 {LOG_COPIES}/ML
CREAT SERPL-MCNC: 0.6 MG/DL (ref 0.51–0.95)
DEPRECATED HCO3 PLAS-SCNC: 26 MMOL/L (ref 22–29)
EGFRCR SERPLBLD CKD-EPI 2021: >90 ML/MIN/1.73M2
EOSINOPHIL # BLD AUTO: 0.1 10E3/UL (ref 0–0.7)
EOSINOPHIL NFR BLD AUTO: 2 %
ERYTHROCYTE [DISTWIDTH] IN BLOOD BY AUTOMATED COUNT: 15.4 % (ref 10–15)
FERRITIN SERPL-MCNC: 3176 NG/ML (ref 11–328)
GLUCOSE SERPL-MCNC: 92 MG/DL (ref 70–99)
HCT VFR BLD AUTO: 38.6 % (ref 35–47)
HGB BLD-MCNC: 13 G/DL (ref 11.7–15.7)
IMM GRANULOCYTES # BLD: 0.1 10E3/UL
IMM GRANULOCYTES NFR BLD: 2 %
INTERPRETATION: NORMAL
LAB DIRECTOR DISCLAIMER: NORMAL
LAB DIRECTOR INTERPRETATION: NORMAL
LAB DIRECTOR METHODOLOGY: NORMAL
LAB DIRECTOR RESULTS: NORMAL
LYMPHOCYTES # BLD AUTO: 2.7 10E3/UL (ref 0.8–5.3)
LYMPHOCYTES NFR BLD AUTO: 45 %
MCH RBC QN AUTO: 34.9 PG (ref 26.5–33)
MCHC RBC AUTO-ENTMCNC: 33.7 G/DL (ref 31.5–36.5)
MCV RBC AUTO: 104 FL (ref 78–100)
MONOCYTES # BLD AUTO: 0.6 10E3/UL (ref 0–1.3)
MONOCYTES NFR BLD AUTO: 10 %
NEUTROPHILS # BLD AUTO: 2.4 10E3/UL (ref 1.6–8.3)
NEUTROPHILS NFR BLD AUTO: 40 %
NRBC # BLD AUTO: 0 10E3/UL
NRBC BLD AUTO-RTO: 0 /100
PATH REPORT.COMMENTS IMP SPEC: NORMAL
PATH REPORT.FINAL DX SPEC: NORMAL
PATH REPORT.MICROSCOPIC SPEC OTHER STN: NORMAL
PATH REPORT.RELEVANT HX SPEC: NORMAL
PLATELET # BLD AUTO: 139 10E3/UL (ref 150–450)
POTASSIUM SERPL-SCNC: 4 MMOL/L (ref 3.4–5.3)
PROT SERPL-MCNC: 6.2 G/DL (ref 6.4–8.3)
RBC # BLD AUTO: 3.72 10E6/UL (ref 3.8–5.2)
SIGNIFICANT RESULTS: NORMAL
SODIUM SERPL-SCNC: 141 MMOL/L (ref 135–145)
SPECIMEN DESCRIPTION: NORMAL
TEST DETAILS, MDL: NORMAL
WBC # BLD AUTO: 6 10E3/UL (ref 4–11)

## 2024-07-15 PROCEDURE — 88311 DECALCIFY TISSUE: CPT | Mod: 26 | Performed by: STUDENT IN AN ORGANIZED HEALTH CARE EDUCATION/TRAINING PROGRAM

## 2024-07-15 PROCEDURE — 36415 COLL VENOUS BLD VENIPUNCTURE: CPT | Performed by: STUDENT IN AN ORGANIZED HEALTH CARE EDUCATION/TRAINING PROGRAM

## 2024-07-15 PROCEDURE — 81479 UNLISTED MOLECULAR PATHOLOGY: CPT

## 2024-07-15 PROCEDURE — 81268 CHIMERISM ANAL W/CELL SELECT: CPT

## 2024-07-15 PROCEDURE — 258N000003 HC RX IP 258 OP 636: Performed by: NURSE PRACTITIONER

## 2024-07-15 PROCEDURE — 88237 TISSUE CULTURE BONE MARROW: CPT

## 2024-07-15 PROCEDURE — G0452 MOLECULAR PATHOLOGY INTERPR: HCPCS | Mod: 26 | Performed by: STUDENT IN AN ORGANIZED HEALTH CARE EDUCATION/TRAINING PROGRAM

## 2024-07-15 PROCEDURE — G0452 MOLECULAR PATHOLOGY INTERPR: HCPCS | Mod: 26 | Performed by: PATHOLOGY

## 2024-07-15 PROCEDURE — 85097 BONE MARROW INTERPRETATION: CPT | Performed by: STUDENT IN AN ORGANIZED HEALTH CARE EDUCATION/TRAINING PROGRAM

## 2024-07-15 PROCEDURE — 250N000011 HC RX IP 250 OP 636: Performed by: NURSE PRACTITIONER

## 2024-07-15 PROCEDURE — 88342 IMHCHEM/IMCYTCHM 1ST ANTB: CPT | Mod: TC

## 2024-07-15 PROCEDURE — 88342 IMHCHEM/IMCYTCHM 1ST ANTB: CPT | Mod: 26 | Performed by: STUDENT IN AN ORGANIZED HEALTH CARE EDUCATION/TRAINING PROGRAM

## 2024-07-15 PROCEDURE — 81401 MOPATH PROCEDURE LEVEL 2: CPT

## 2024-07-15 PROCEDURE — 88305 TISSUE EXAM BY PATHOLOGIST: CPT | Mod: 26 | Performed by: STUDENT IN AN ORGANIZED HEALTH CARE EDUCATION/TRAINING PROGRAM

## 2024-07-15 PROCEDURE — 88185 FLOWCYTOMETRY/TC ADD-ON: CPT

## 2024-07-15 PROCEDURE — 88313 SPECIAL STAINS GROUP 2: CPT | Mod: 26 | Performed by: STUDENT IN AN ORGANIZED HEALTH CARE EDUCATION/TRAINING PROGRAM

## 2024-07-15 PROCEDURE — 80053 COMPREHEN METABOLIC PANEL: CPT

## 2024-07-15 PROCEDURE — 88271 CYTOGENETICS DNA PROBE: CPT

## 2024-07-15 PROCEDURE — 88189 FLOWCYTOMETRY/READ 16 & >: CPT | Mod: GC | Performed by: STUDENT IN AN ORGANIZED HEALTH CARE EDUCATION/TRAINING PROGRAM

## 2024-07-15 PROCEDURE — 38222 DX BONE MARROW BX & ASPIR: CPT | Mod: LT | Performed by: NURSE PRACTITIONER

## 2024-07-15 PROCEDURE — 38220 DX BONE MARROW ASPIRATIONS: CPT | Performed by: NURSE PRACTITIONER

## 2024-07-15 PROCEDURE — 38221 DX BONE MARROW BIOPSIES: CPT | Mod: LT | Performed by: NURSE PRACTITIONER

## 2024-07-15 PROCEDURE — 88341 IMHCHEM/IMCYTCHM EA ADD ANTB: CPT | Mod: 26 | Performed by: STUDENT IN AN ORGANIZED HEALTH CARE EDUCATION/TRAINING PROGRAM

## 2024-07-15 PROCEDURE — 81267 CHIMERISM ANAL NO CELL SELEC: CPT

## 2024-07-15 PROCEDURE — 81175 ASXL1 FULL GENE SEQUENCE: CPT

## 2024-07-15 PROCEDURE — 82728 ASSAY OF FERRITIN: CPT | Performed by: STUDENT IN AN ORGANIZED HEALTH CARE EDUCATION/TRAINING PROGRAM

## 2024-07-15 PROCEDURE — 85025 COMPLETE CBC W/AUTO DIFF WBC: CPT

## 2024-07-15 RX ADMIN — MIDAZOLAM HYDROCHLORIDE 2 MG: 1 INJECTION, SOLUTION INTRAMUSCULAR; INTRAVENOUS at 08:21

## 2024-07-15 RX ADMIN — SODIUM CHLORIDE 500 ML: 9 INJECTION, SOLUTION INTRAVENOUS at 10:39

## 2024-07-15 ASSESSMENT — PAIN SCALES - GENERAL: PAINLEVEL: NO PAIN (0)

## 2024-07-15 NOTE — PROGRESS NOTES
BMT ONC Adult Bone Marrow Biopsy Procedure Note  July 15, 2024  /85 (BP Location: Right arm, Patient Position: Sitting, Cuff Size: Adult Large)   Pulse 86   Temp 98  F (36.7  C) (Oral)   Resp 18   Wt 81.8 kg (180 lb 4.8 oz)   LMP 11/05/2017   SpO2 99%   BMI 33.39 kg/m       Learning needs assessment complete within 12 months? YES    DIAGNOSIS: MDS     PROCEDURE: Unilateral Bone Marrow Biopsy and Unilateral Aspirate    [place] [facility] [place] [facility]    Patient s identification was positively verified by verbal identification and invasive procedure safety checklist was completed. Informed consent was obtained. Following the administration of Midazolam as pre-medication, patient was placed in the prone position and prepped and draped in a sterile manner. Approximately 10 cc of 1% Lidocaine was used over the left posterior iliac spine. Following this a 3 mm incision was made. Trephine bone marrow core(s) was (were) obtained from the [facility]. Bone marrow aspirates were obtained from the [facility]. Aspirates were sent for morphology, immunophenotyping, cytogenetics, and molecular diagnostics. A total of approximately 25 ml of marrow was aspirated. Following this procedure a sterile dressing was applied to the bone marrow biopsy site(s). The patient was placed in the supine position to maintain pressure on the biopsy site. Post-procedure wound care instructions were given.     Complications: NO       Interventions: NO    Length of procedure:21 minutes to 45 minutes    Procedure performed by: Ibis Diaz NP

## 2024-07-15 NOTE — LETTER
7/15/2024      Hortencia Baldwin  [address]  [facility] [address]      Dear Colleague,    Thank you for referring your patient, Hortencia Baldwin, to the [facility] BLOOD AND MARROW TRANSPLANT PROGRAM [place]. Please see a copy of my visit note below.    BMT ONC Adult Bone Marrow Biopsy Procedure Note  July 15, 2024  /85 (BP Location: Right arm, Patient Position: Sitting, Cuff Size: Adult Large)   Pulse 86   Temp 98  F (36.7  C) (Oral)   Resp 18   Wt 81.8 kg (180 lb 4.8 oz)   LMP 11/05/2017   SpO2 99%   BMI 33.39 kg/m       Learning needs assessment complete within 12 months? YES    DIAGNOSIS: MDS     PROCEDURE: Unilateral Bone Marrow Biopsy and Unilateral Aspirate    [place] [facility] [place] [facility]    Patient s identification was positively verified by verbal identification and invasive procedure safety checklist was completed. Informed consent was obtained. Following the administration of Midazolam as pre-medication, patient was placed in the prone position and prepped and draped in a sterile manner. Approximately 10 cc of 1% Lidocaine was used over the left posterior iliac spine. Following this a 3 mm incision was made. Trephine bone marrow core(s) was (were) obtained from the IC. Bone marrow aspirates were obtained from the IC. Aspirates were sent for morphology, immunophenotyping, cytogenetics, and molecular diagnostics. A total of approximately 25 ml of marrow was aspirated. Following this procedure a sterile dressing was applied to the bone marrow biopsy site(s). The patient was placed in the supine position to maintain pressure on the biopsy site. Post-procedure wound care instructions were given.     Complications: NO       Interventions: NO    Length of procedure:21 minutes to 45 minutes    Procedure performed by: Ibis Diaz NP

## 2024-07-15 NOTE — NURSING NOTE
BMBX Teaching and Assessment       Teaching concerns addressed: Bone marrow biopsy and infection prevention.     Person(s) involved in teaching: Patient  Motivation Level  Asks Questions: Yes  Eager to Learn: Yes  Cooperative: Yes  Receptive (willing/able to accept information): Yes    Patient demonstrates understanding of the following:     Reason for the appointment, diagnosis and treatment plan: Yes  Knowledge of proper use of medications and conditions for which they are ordered (with special attention to potential side effects or drug interactions): Yes  Which situations necessitate calling provider and whom to contact: Yes    Teaching concerns addressed:   Reviewed activity restrictions if received premeds, potential for bleeding and actions to take if develops any of the issues below    Pain management techniques: Yes  Patient instructed on hand hygiene: Yes  How and/when to access community resources: Yes    Infection Control:  Patient demonstrates understanding of the following:   Bone marrow procedure site care taught: Yes  Signs and symptoms of infection taught: Yes       Instructional Materials Used/Given: Pt instructed to keep bmbx site clean and dry for 24hrs. Pt educated to monitor site for signs of infection such as redness, rash, oozing, puss, bleeding, pain, and elevated temp. Pt instructed to go to call the [facility] triage line or go to the ER if any signs of infection should occur. Pt educated to not operate machinery if receiving versed. Pt and partner verbalize understanding.     Pre-procedure labs drawn via PIV. Post procedure: Patient vital signs stable, ambulating, site is clean, dry and intact prior to discharge and line removed. Pt discharged with partner as .

## 2024-07-15 NOTE — NURSING NOTE
Chief Complaint   Patient presents with    Blood Draw     Hx MDS here for scheduled BMBx     Chief Complaint   Patient presents with    Blood Draw     Labs drawn via piv placement by vascular in lab. VS taken.        Labs drawn from PIV placed by vascular. Line flushed with saline. Vitals taken. Pt checked in for appointment(s).    Becca Lisa RN

## 2024-07-15 NOTE — PROGRESS NOTES
Infusion Nursing Note:  Hortencia Baldwin presents today for phlebotomy.    Patient seen by provider today: No   present during visit today: Not Applicable.     Note: Labs monitored, VSS pre phlebotomy. Ferritin level today is 3176. 500cc of blood removed. Pt became hypotensive and dizzy post procedure. See flowsheet. 500cc NS bolus administered. B/p recovered. Pt feeling better. Pt discharged in stable condition.         Intravenous Access:  Peripheral IV placed.            Post Infusion Assessment:  Access discontinued per protocol.         Discharge Plan:   Patient discharged in stable condition accompanied by: .  Departure Mode: Ambulatory.        Max Ly RN

## 2024-07-16 LAB
PATH REPORT.COMMENTS IMP SPEC: NORMAL
PATH REPORT.COMMENTS IMP SPEC: NORMAL
PATH REPORT.FINAL DX SPEC: NORMAL
PATH REPORT.GROSS SPEC: NORMAL
PATH REPORT.MICROSCOPIC SPEC OTHER STN: NORMAL
PATH REPORT.MICROSCOPIC SPEC OTHER STN: NORMAL
PATH REPORT.RELEVANT HX SPEC: NORMAL

## 2024-07-19 LAB
CULTURE HARVEST COMPLETE DATE: NORMAL
INTERPRETATION: NORMAL
INTERPRETATION: NORMAL

## 2024-08-02 ENCOUNTER — OFFICE VISIT (OUTPATIENT)
Dept: TRANSPLANT | Facility: CLINIC | Age: 54
End: 2024-08-02
Attending: STUDENT IN AN ORGANIZED HEALTH CARE EDUCATION/TRAINING PROGRAM
Payer: MEDICAID

## 2024-08-02 VITALS
WEIGHT: 182.1 LBS | BODY MASS INDEX: 33.73 KG/M2 | DIASTOLIC BLOOD PRESSURE: 87 MMHG | HEART RATE: 93 BPM | TEMPERATURE: 98.1 F | RESPIRATION RATE: 16 BRPM | OXYGEN SATURATION: 97 % | SYSTOLIC BLOOD PRESSURE: 129 MMHG

## 2024-08-02 DIAGNOSIS — Z94.81 S/P ALLOGENEIC BONE MARROW TRANSPLANT (H): Primary | ICD-10-CM

## 2024-08-02 PROCEDURE — 99213 OFFICE O/P EST LOW 20 MIN: CPT | Performed by: STUDENT IN AN ORGANIZED HEALTH CARE EDUCATION/TRAINING PROGRAM

## 2024-08-02 PROCEDURE — 99215 OFFICE O/P EST HI 40 MIN: CPT | Performed by: STUDENT IN AN ORGANIZED HEALTH CARE EDUCATION/TRAINING PROGRAM

## 2024-08-02 ASSESSMENT — PAIN SCALES - GENERAL: PAINLEVEL: NO PAIN (0)

## 2024-08-02 NOTE — NURSING NOTE
"Oncology Rooming Note    August 2, 2024 2:53 PM   Hortencia Baldwin is a 53 year old female who presents for:    Chief Complaint   Patient presents with    Oncology Clinic Visit     myelodysplastic syndrome     Initial Vitals: LMP 11/05/2017  Estimated body mass index is 33.39 kg/m  as calculated from the following:    Height as of 3/28/24: 1.565 m (5' 1.61\").    Weight as of 7/15/24: 81.8 kg (180 lb 4.8 oz). There is no height or weight on file to calculate BSA.  Data Unavailable Comment: Data Unavailable   Patient's last menstrual period was 11/05/2017.  Allergies reviewed: Yes  Medications reviewed: Yes    Medications: Medication refills not needed today.  Pharmacy name entered into [facility]:    [id number] - [place], [place] - [address] AT [facility] [address]  [id number] - [facility] [place] [address] [facility] [address] [facility] [address] [facility] [place] [address] [address] [place] [address] [address]  [facility][id number] - [facility], [place] - [address] AT [facility] OF [facility] [address]    Frailty Screening:   Is the patient here for a new oncology consult visit in cancer care? 2. No      Clinical concerns: pt has concerns about lump that formed on left aide of forehead. Says she noticed a couple weeks ago. Non painful and says she can't feel any sensation touching it..    Pt has concerns about left foot being swollen and feels weaker then the other.     - pt also has concerns about pump in abdomen that hurts when she pressed down on it.     Juan J Rosario              "

## 2024-08-02 NOTE — Clinical Note
8/2/2024      Hortencia Baldwin  [address]  [place] [address]      Dear Colleague,    Thank you for referring your patient, Hortencia Baldwin, to the [facility] [place]. Please see a copy of my visit note below.    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 Date of transplant: 1/11/2024    Diagnosis MDS w/ SF3B1 BMT type Allogenic  CMV  Donor -  Recipient +    Prep: MA Bu/Flu Donor type  6/8 URD Blood Type Donor and recipient O+   GVHD Ppx PTCy/ siro/ MMF Graft source PBSCT Toxo IgG Negative   Protocol [id number] (not on) BMT MD Dr. Garay       Data   Pre-transplant disease history  Referring provider: Dr.Evan Ramey, [facility]   52 y/o F with history of longstanding macrocytic anemia (Hb 10-11, -110 dating back to 1997) presented in Nov 2021 for lightheadedness. CBC showed acute on chronic anemia with Hb 4.7, ; normal WBC and platelet count. Bone marrow biopsy (12/09/2021) was consistent with MDS with low blasts and SF3B1 mutation. Hypercellular marrow (70%) with single lineage dysplasia (dyserythropoiesis), increased ringed sideroblasts and no increase in blasts. Flow cytometry showed mixed lymphocytes. Cytogenetics 46,XX,del(20). NGS: SF3B1 (45%), ASXL1 (6%).   Received Epo (March 2022 - April 2022) but had minimal response after 4 weekly doses. Luspatercept from April 2022 to April 2023 with inadequate response.   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 Treated with weekly rituximab x 4 and steroid taper (Sept to Dec 2022). LDH and bilirubin started rising after steroid taper, therefore treated with MMF 500mg BID (Dec 2022 to May 2023) with resolution of hemolysis (negative KATINA, normal LDH, bilirubin). However, she  continued to be transfusion dependant therefore her anemia was determined to be related more to MDS than autoimmune hemolysis.   Repeat bone marrow biopsy (5/11/2023) showed persistent MDS. Hypercellular marrow (90%) but now with dysplasia in all three lineages, cytogenetics 46, XX, del (20). Received decitabine- cedazurdine X 4 cycles (May 2023 to Dec 2023). Complicated by neutropenia requiring dose reduction and pRBC transfusions every 2-3 weeks. Pre-transplant bmbx (12/7/23) showed persistent MDS with multi lineage dysplasia (dyserythropoesis and dysgranulopoesis), 17% ringed sideroblasts, increased marrow storage iron. Cytogenetics: 46,XX,del(20). Flow cytometry and NGS not done.    She was considered for allogenic transplant despite low risk MDS by IPSS-R at diagnosis due to transfusion dependence, ASXL1 mutation and development of multi-lineage dysplasia on bmbx in May 2023 suggesting clonal evolution.      She underwent allogenic stem cell transplant on 1/11/24: myeloablative conditioning with Bu/Flu, 6/8 unrelated donor peripheral blood stem cell graft, cell dose: 6.50E+06. ABO matched, CMV donor negative, recipient positive. GVHD ppx with PTCy, MMF and tacrolimus (avoiding sirolimus due to high risk of VOD, given elevated liver enzymes prior to transplant and suspected iron overload).      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          INTERVAL HISTORY     Irma has been doing well.   Left ankle swelling is improved. Pain in foot is improved, only at night.  Itching back and chest, still uses a topical steroid cream helps a little bit. No rash. Only scratch marks.   Still gets RUQ pain sometimes, dull pain that lasts for a couple of minutes.     ROS: No eye dryness. No mouth sores, dry mouth sores. Only big pills get stuck when swallowing.    Current Outpatient Medications   Medication Sig Dispense Refill     acyclovir (ZOVIRAX) 800 MG tablet Take 1 tablet (800 mg) by mouth 2 times daily 180 tablet 0    sulfamethoxazole-trimethoprim (BACTRIM DS) 800-160 MG tablet Take 1 tablet by mouth Every Mon, Tues two times daily 48 tablet 1    triamcinolone (KENALOG) 0.025 % cream Apply topically 2 times daily           EXAM      /87 (BP Location: Right arm, Patient Position: Sitting, Cuff Size: Adult Regular)   Pulse 93   Temp 98.1  F (36.7  C) (Oral)   Resp 16   Wt 82.6 kg (182 lb 1.6 oz)   LMP 11/05/2017   SpO2 97%   BMI 33.73 kg/m      Wt Readings from Last 4 Encounters:   08/02/24 82.6 kg (182 lb 1.6 oz)   07/15/24 81.8 kg (180 lb 4.8 oz)   06/28/24 79.5 kg (175 lb 4.8 oz)   05/23/24 76.7 kg (169 lb)     KPS: 80% Can perform normal activity with effort, some signs of disease    General: Alert, awake  Eyes: Conjunctiva normal  Respiratory: Normal respiratory effort.   Cardiovascular: Left ankle and foot edema + , improved from prior   Abdomen: Soft, non tender   Skin: No rash on back, only scratch marks +  Psych: Mood and affect are appropriate.  No central access.       Laboratory Studies:     Data   Blood Counts  Recent Labs   Lab Test 07/15/24  0750 07/01/24  1459 06/28/24  0849 05/22/24  0815 04/04/24  1218 03/28/24  1119 02/22/24  0350 02/21/24  0816 02/19/24  1203   WBC 6.0  --  5.3 5.2   < > 4.9   < > 7.7 5.8   HGB 13.0 13.2 13.4 13.6   < > 13.0   < > 11.3* 10.0*   *  --  160 146*   < > 134*   < > 83* 74*   NEUTROPHIL 40  --  38 64   < > 36   < > 72 53   ANEU  --   --   --   --   --  1.8  --  5.5 3.1   LYMPH 45  --  43 20   < > 26   < > 11 17   ALYM  --   --   --   --   --  1.3  --  0.8 1.0    < > = values in this interval not displayed.     Basic Panel  Recent Labs   Lab Test 07/15/24  0750 06/28/24  0849 04/22/24  0951    140 142   POTASSIUM 4.0 4.4 3.7   CHLORIDE 105 105 107   CO2 26 26 24   BUN 14.6 16.6 10.8   CR 0.60 0.66 0.52   GLC 92 82 108*       LFTs  Recent Labs   Lab Test  07/15/24  0750 06/28/24  0849 04/22/24  0951   ALKPHOS 122 134 112   AST 26 43 31   ALT 33 59* 39   BILITOTAL 0.3 0.3 0.2   ALBUMIN 4.1 4.2 4.2     Immunosuppression  Recent Labs   Lab Test 05/22/24  0815 04/11/24  1138 04/04/24  1218   RAPAMY 1.6* 6.0 5.7       Recent Labs   Lab Test 07/15/24  0750 06/28/24  0849 05/22/24  0815   CMVQNT <35* <35* <35*       Recent Labs   Lab Test 06/28/24  0849 03/14/24  1338   * 424*       Recent Labs   Lab Test 07/15/24  0750 07/01/24  1459 04/22/24  0951 03/14/24  1222 02/22/24  0350 01/02/24  1432   SONY 3,176* 4,077* 4,257* 7,899* 10,961* 4,341*            ASSESSMENT AND PLAN     BMT for MDS w/SF3B1 mutation  [id number] (according to but not on) with Bu/Flu  6/8 URD, peripheral blood stem cell graft, cell dose: 6.5 x10^6.     Disease status: Bone marrow biopsy at D28, D100, 6 months, 1 year  2/6/24 (~D26): Hypercellular marrow (70-80%), trilineage hemopoiesis with rare nuclear irregularities in terminal erythroid precursors of uncertain significance, 3% ringed sideroblasts (in the context of iron overload is not considered dysplastic finding). Flow showed no increase in myeloid blasts. Cytogenetics 46 XY, consistent with donor. FISH: Rate of loss of 20q within normal limits. NGS: No mutations detected  4/22/24 (D100): Overall cellularity 40-50% with no dysplasia. No ringed sideroblasts. Flow negative. Cytogenetics not done. NGS negative.      Graft status/chimerism: D28, D100, 6 months,1 year  2/6/24 (~D26): Bone marrow 100%, peripheral blood (2/1/24) CD33 100%, CD3 87%  3/14/24 (~D60): Peripheral blood CD3 and CD33 100%  4/22/24 (D100): Bone marrow 100%. Peripheral blood CD3 and CD33 100%    GVHD  # Current immunosuppression is none  - Sirolimus taper completed on 6/15/24     # No acute GVHD till date  Skin: No changes. Skin score 0.  GI: No symptoms. GI score 0.  Liver: LFTs normal. Score 0     Heme/ Coag  # Counts have recovered . ABO matched (both O+).     # Iron  overload: Hx of transfusion dependent anemia, pre-transplant ferritin around 5,000. Ferritin (4/22/24) is 4257. Tsat 62%. Retic count 2.7%  - Liver ferriscan shows severe iron overload  - Phlebotomy done on 7/15   - (250-300 mL every 3-4 weeks as long as hematocrit is more than 35% until serum ferritin falls below 1000 ng/mL)  - Check ferritin every 4 weeks.    ID  # Prophylaxis  PJP: Bactrim. Toxo IgG negative.  Viral: Acyclovir 800 mg bid  CMV: Letermovir completed     # Monitoring  CMV: Monitor every month till undetectable. 6/28: <35  EBV: Monitor every month till D180. 6/28- not detected.   IgG: Level 5544 mg/dL on 6/28/24. Check serum IgG level q3 months, and consider IVIG repletion for IgG levels <400 mg/dL.     GI  # Elevated transaminases: Resolved  AST and ALT elevated beginning 2/2/24 (D22). Notably, she was on tacrolimus for GVHD ppx but changed to sirolimus on 1/31/24. Infectious workup (2/6/24) was negative. Liver US (2/20/24) showed hepatomegaly with increased hepatic parenchymal echogenicity, suggesting hepatic steatosis. Suspect drug induced elevation w/ steatosis and possibly iron overload.     CV  - HTN: Stop amlodipine.  - Hyperlipidemia: Patient will try lifestyle modifications. Has stopped sirolimus recently. Consider Crestor 5mg daily if no change in 3 months. Stop fenofibrate.    MSK:  # L knee pain and back pain: Has been seen by ortho and referred to physical therapy. Unable to schedule an appointment right now due to insurance.   # Left ankle swelling:   - repeat US of the left leg (5/23) negative for DVT  - compression stockings  - Follow up with physical therapy when she has an appointment.     Derm  Mild erythematous rash on upper back with mild pruritus. Continue topicals.     Today's Summary:  Stop amlodipine  Measure BP at home at least twice a week  Stop fenofibrate  Lipid panel with next visit   Set up for phlebotomy     RTC in 1 month with labs (CBC, CMP, EBV, CMV)    I spent 40  minutes in the care of this patient today, which included time necessary for preparation for the visit, obtaining history, ordering medications/tests/procedures as medically indicated, review of pertinent medical literature, counseling of the patient, communication of recommendations to the care team, and documentation time.    Coco Garay  Department of Hematology, Oncology and Transplantation  [facility] Pager 1300/Text via [employer]          Diffuse itching and stuck on lesion, AK on left temple  Left leg weakness. Was told that she had sciatica in the left leg.     - dermatology   - primary care physician  - PT       Again, thank you for allowing me to participate in the care of your patient.        Sincerely,        SARA Singleton

## 2024-08-02 NOTE — PROGRESS NOTES
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 Date of transplant: 1/11/2024    Diagnosis MDS w/ SF3B1 BMT type Allogenic  CMV  Donor -  Recipient +    Prep: MA Bu/Flu Donor type  6/8 URD Blood Type Donor and recipient O+   GVHD Ppx PTCy/ siro/ MMF Graft source PBSCT Toxo IgG Negative   Protocol [id number] (not on) BMT MD Dr. Garay       Data    Pre-transplant disease history  Referring provider: Dr.Evan Ramey, [facility]   52 y/o F with history of longstanding macrocytic anemia (Hb 10-11, -110 dating back to 1997) presented in Nov 2021 for lightheadedness. CBC showed acute on chronic anemia with Hb 4.7, ; normal WBC and platelet count. Bone marrow biopsy (12/09/2021) was consistent with MDS with low blasts and SF3B1 mutation. Hypercellular marrow (70%) with single lineage dysplasia (dyserythropoiesis), increased ringed sideroblasts and no increase in blasts. Flow cytometry showed mixed lymphocytes. Cytogenetics 46,XX,del(20). NGS: SF3B1 (45%), ASXL1 (6%).   Received Epo (March 2022 - April 2022) but had minimal response after 4 weekly doses. Luspatercept from April 2022 to April 2023 with inadequate response.   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 Treated with weekly rituximab x 4 and steroid taper (Sept to Dec 2022). LDH and bilirubin started rising after steroid taper, therefore treated with MMF 500mg BID (Dec 2022 to May 2023) with resolution of hemolysis (negative KATINA, normal LDH, bilirubin). However, she continued to be transfusion dependant therefore her anemia was determined to be related more to MDS than autoimmune hemolysis.   Repeat bone marrow biopsy (5/11/2023) showed persistent MDS. Hypercellular marrow (90%) but now with dysplasia in all three lineages, cytogenetics  46, XX, del (20). Received decitabine- cedazurdine X 4 cycles (May 2023 to Dec 2023). Complicated by neutropenia requiring dose reduction and pRBC transfusions every 2-3 weeks. Pre-transplant bmbx (12/7/23) showed persistent MDS with multi lineage dysplasia (dyserythropoesis and dysgranulopoesis), 17% ringed sideroblasts, increased marrow storage iron. Cytogenetics: 46,XX,del(20). Flow cytometry and NGS not done.    She was considered for allogenic transplant despite low risk MDS by IPSS-R at diagnosis due to transfusion dependence, ASXL1 mutation and development of multi-lineage dysplasia on bmbx in May 2023 suggesting clonal evolution.      She underwent allogenic stem cell transplant on 1/11/24: myeloablative conditioning with Bu/Flu, 6/8 unrelated donor peripheral blood stem cell graft, cell dose: 6.50E+06. ABO matched, CMV donor negative, recipient positive. GVHD ppx with PTCy, MMF and tacrolimus (avoiding sirolimus due to high risk of VOD, given elevated liver enzymes prior to transplant and suspected iron overload).      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          INTERVAL HISTORY     Irma has been doing well.   She notes diffuse itching. No new concerning rashes. She has a stuck on lesion over her left temple, possibly seborrheic keratosis. Will refer to derm.  She also notes left leg weakness. Has sciatica in her left leg. Was recently seen by ortho and was interested in pursuing physical therapy. Was unable to do that due to lack of insurance. Has insurance now, provided PT phone number  She also points out a area of tenderness in the right lower quadrant. On exam, it seems like she has a subcutaneous lipoma in that area and direct pressure over it elicits pain. She has several other scattered lipomas. Reassured  She has undergone two phlebotomies.      Plan  - Continue phlebotomy every 4 weeks. Check ferritin  prior to phlebotomy, continue till ferritin <1000 or anemia. Requests appointments on Mondays  - dermatology referral   - advised her to establish with primary care physician for monitoring of lipid levels   - follow up with PT for left leg sciatica.    - RTC on 9/23  with labs (CBC, CMP, EBV, CMV)    ROS: No eye dryness. No mouth sores, dry mouth sores. Only big pills get stuck when swallowing.    Current Outpatient Medications   Medication Sig Dispense Refill    acyclovir (ZOVIRAX) 800 MG tablet Take 1 tablet (800 mg) by mouth 2 times daily for 180 days 180 tablet 0    sulfamethoxazole-trimethoprim (BACTRIM DS) 800-160 MG tablet Take 1 tablet by mouth Every Mon, Tues two times daily 48 tablet 1    triamcinolone (KENALOG) 0.025 % cream Apply topically 2 times daily           EXAM      /87 (BP Location: Right arm, Patient Position: Sitting, Cuff Size: Adult Regular)   Pulse 93   Temp 98.1  F (36.7  C) (Oral)   Resp 16   Wt 82.6 kg (182 lb 1.6 oz)   LMP 11/05/2017   SpO2 97%   BMI 33.73 kg/m      Wt Readings from Last 4 Encounters:   08/02/24 82.6 kg (182 lb 1.6 oz)   07/15/24 81.8 kg (180 lb 4.8 oz)   06/28/24 79.5 kg (175 lb 4.8 oz)   05/23/24 76.7 kg (169 lb)     KPS: 80% Can perform normal activity with effort, some signs of disease    General: Alert, awake  Eyes: Conjunctiva normal  Respiratory: Normal respiratory effort.   Cardiovascular: Left ankle and foot edema + , improved from prior   Abdomen: Soft, point tenderness in the right lower quadrant corresponding to a subcutaneous lipoma   Skin: actinic keratosis like lesion on temple. No other rash  Psych: Mood and affect are appropriate.  No central access.       Laboratory Studies:     Data    Blood Counts  Recent Labs   Lab Test 07/15/24  0750 07/01/24  1459 06/28/24  0849 05/22/24  0815 04/04/24  1218 03/28/24  1119 02/22/24  0350 02/21/24  0816 02/19/24  1203   WBC 6.0  --  5.3 5.2   < > 4.9   < > 7.7 5.8   HGB 13.0 13.2 13.4 13.6   < > 13.0    < > 11.3* 10.0*   *  --  160 146*   < > 134*   < > 83* 74*   NEUTROPHIL 40  --  38 64   < > 36   < > 72 53   ANEU  --   --   --   --   --  1.8  --  5.5 3.1   LYMPH 45  --  43 20   < > 26   < > 11 17   ALYM  --   --   --   --   --  1.3  --  0.8 1.0    < > = values in this interval not displayed.     Basic Panel  Recent Labs   Lab Test 07/15/24  0750 06/28/24  0849 04/22/24  0951    140 142   POTASSIUM 4.0 4.4 3.7   CHLORIDE 105 105 107   CO2 26 26 24   BUN 14.6 16.6 10.8   CR 0.60 0.66 0.52   GLC 92 82 108*       LFTs  Recent Labs   Lab Test 07/15/24  0750 06/28/24  0849 04/22/24  0951   ALKPHOS 122 134 112   AST 26 43 31   ALT 33 59* 39   BILITOTAL 0.3 0.3 0.2   ALBUMIN 4.1 4.2 4.2       Recent Labs   Lab Test 07/15/24  0750 06/28/24  0849 05/22/24  0815   CMVQNT <35* <35* <35*       Recent Labs   Lab Test 06/28/24  0849 03/14/24  1338   * 424*       Recent Labs   Lab Test 07/15/24  0750 07/01/24  1459 04/22/24  0951 03/14/24  1222 02/22/24  0350 01/02/24  1432   SONY 3,176* 4,077* 4,257* 7,899* 10,961* 4,341*            ASSESSMENT AND PLAN     BMT for MDS w/SF3B1 mutation  [id number] (according to but not on) with Bu/Flu  6/8 URD, peripheral blood stem cell graft, cell dose: 6.5 x10^6.     Disease status: Bone marrow biopsy at D28, D100, 6 months, 1 year  2/6/24 (~D26): Hypercellular marrow (70-80%), trilineage hemopoiesis with rare nuclear irregularities in terminal erythroid precursors of uncertain significance, 3% ringed sideroblasts (in the context of iron overload is not considered dysplastic finding). Flow showed no increase in myeloid blasts. Cytogenetics 46 XY, consistent with donor. FISH: Rate of loss of 20q within normal limits. NGS: No mutations detected  4/22/24 (D100): Overall cellularity 40-50% with no dysplasia. No ringed sideroblasts. Flow negative. Cytogenetics not done. NGS negative.   7/15/24 (D180): CR, cytogenetics/ FISH not done. NGS negative      Graft status/chimerism: D28,  D100, 6 months,1 year  2/6/24 (~D26): Bone marrow 100%, peripheral blood (2/1/24) CD33 100%, CD3 87%  3/14/24 (~D60): Peripheral blood CD3 and CD33 100%  4/22/24 (D100): Bone marrow 100%. Peripheral blood CD3 and CD33 100%  7/15/24 (D180): Bone marrow 100%. Peripheral blood CD3 and CD33 100%    GVHD  # Current immunosuppression is none  - Sirolimus taper completed on 6/15/24     # No acute GVHD till date  Skin: No changes. Skin score 0.  GI: No symptoms. GI score 0.  Liver: LFTs normal. Score 0     Heme/ Coag  # Counts have recovered . ABO matched (both O+).     # Iron overload: Hx of transfusion dependent anemia, pre-transplant ferritin around 5,000. Ferritin (4/22/24) is 4257. Tsat 62%. Retic count 2.7%  - Liver ferriscan shows severe iron overload  - Started phlebotomy on 7/1/24, plan to continue until ferritin <1000.   - Phlebotomy on 7/1/24, 7/15/24, continue every 4 weeks     ID  # Prophylaxis  PJP: Bactrim. Toxo IgG negative.  Viral: Acyclovir 800 mg bid  CMV: Letermovir completed     # Monitoring  CMV: Monitor every month till undetectable. 8/12/24: 38  EBV: Monitor every month till D180. 8/12- not detected.   IgG: Level 544 mg/dL on 6/28/24. Check serum IgG level q3 months, and consider IVIG repletion for IgG levels <400 mg/dL.     GI  # Elevated transaminases: Resolved  AST and ALT elevated beginning 2/2/24 (D22). Notably, she was on tacrolimus for GVHD ppx but changed to sirolimus on 1/31/24. Infectious workup (2/6/24) was negative. Liver US (2/20/24) showed hepatomegaly with increased hepatic parenchymal echogenicity, suggesting hepatic steatosis. Suspect drug induced elevation w/ steatosis and possibly iron overload.     CV  - HTN: Off amlodipine.  - Hyperlipidemia: Patient will try lifestyle modifications. Follow up with PCP for further monitoring and management.     MSK:  # L knee pain and back pain: Has been seen by ortho and referred to physical therapy.   # Left ankle swelling:   - repeat US of the  left leg (5/23) negative for DVT  - compression stockings  - Follow up with physical therapy when she has an appointment.       I spent 40 minutes in the care of this patient today, which included time necessary for preparation for the visit, obtaining history, ordering medications/tests/procedures as medically indicated, review of pertinent medical literature, counseling of the patient, communication of recommendations to the care team, and documentation time.    Coco Garay  Department of Hematology, Oncology and Transplantation  [facility] Pager 1300/Text via [employer]

## 2024-08-02 NOTE — PATIENT INSTRUCTIONS
Call PT to set up an appointment [phone].  Referral to dermatology  Set up a visit with your primary oncologist and primary care physician

## 2024-08-02 NOTE — PROGRESS NOTES
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 Date of transplant: 1/11/2024    Diagnosis MDS w/ SF3B1 BMT type Allogenic  CMV  Donor -  Recipient +    Prep: MA Bu/Flu Donor type  6/8 URD Blood Type Donor and recipient O+   GVHD Ppx PTCy/ siro/ MMF Graft source PBSCT Toxo IgG Negative   Protocol [id number] (not on) BMT MD Dr. Garay       Data    Pre-transplant disease history  Referring provider: Dr.Evan Ramey, [facility]   52 y/o F with history of longstanding macrocytic anemia (Hb 10-11, -110 dating back to 1997) presented in Nov 2021 for lightheadedness. CBC showed acute on chronic anemia with Hb 4.7, ; normal WBC and platelet count. Bone marrow biopsy (12/09/2021) was consistent with MDS with low blasts and SF3B1 mutation. Hypercellular marrow (70%) with single lineage dysplasia (dyserythropoiesis), increased ringed sideroblasts and no increase in blasts. Flow cytometry showed mixed lymphocytes. Cytogenetics 46,XX,del(20). NGS: SF3B1 (45%), ASXL1 (6%).   Received Epo (March 2022 - April 2022) but had minimal response after 4 weekly doses. Luspatercept from April 2022 to April 2023 with inadequate response.   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 Treated with weekly rituximab x 4 and steroid taper (Sept to Dec 2022). LDH and bilirubin started rising after steroid taper, therefore treated with MMF 500mg BID (Dec 2022 to May 2023) with resolution of hemolysis (negative KATINA, normal LDH, bilirubin). However, she continued to be transfusion dependant therefore her anemia was determined to be related more to MDS than autoimmune hemolysis.   Repeat bone marrow biopsy (5/11/2023) showed persistent MDS. Hypercellular marrow (90%) but now with dysplasia in all three lineages, cytogenetics 46,  XX, del (20). Received decitabine- cedazurdine X 4 cycles (May 2023 to Dec 2023). Complicated by neutropenia requiring dose reduction and pRBC transfusions every 2-3 weeks. Pre-transplant bmbx (12/7/23) showed persistent MDS with multi lineage dysplasia (dyserythropoesis and dysgranulopoesis), 17% ringed sideroblasts, increased marrow storage iron. Cytogenetics: 46,XX,del(20). Flow cytometry and NGS not done.    She was considered for allogenic transplant despite low risk MDS by IPSS-R at diagnosis due to transfusion dependence, ASXL1 mutation and development of multi-lineage dysplasia on bmbx in May 2023 suggesting clonal evolution.      She underwent allogenic stem cell transplant on 1/11/24: myeloablative conditioning with Bu/Flu, 6/8 unrelated donor peripheral blood stem cell graft, cell dose: 6.50E+06. ABO matched, CMV donor negative, recipient positive. GVHD ppx with PTCy, MMF and tacrolimus (avoiding sirolimus due to high risk of VOD, given elevated liver enzymes prior to transplant and suspected iron overload).      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          INTERVAL HISTORY     Irma has been doing well.   Left ankle swelling is improved. Pain in foot is improved, only at night.  Itching back and chest, still uses a topical steroid cream helps a little bit. No rash. Only scratch marks.   Still gets RUQ pain sometimes, dull pain that lasts for a couple of minutes.     ROS: No eye dryness. No mouth sores, dry mouth sores. Only big pills get stuck when swallowing.    Current Outpatient Medications   Medication Sig Dispense Refill    acyclovir (ZOVIRAX) 800 MG tablet Take 1 tablet (800 mg) by mouth 2 times daily 180 tablet 0    sulfamethoxazole-trimethoprim (BACTRIM DS) 800-160 MG tablet Take 1 tablet by mouth Every Mon, Tues two times daily 48 tablet 1    triamcinolone (KENALOG) 0.025 % cream Apply topically 2  times daily           EXAM      /87 (BP Location: Right arm, Patient Position: Sitting, Cuff Size: Adult Regular)   Pulse 93   Temp 98.1  F (36.7  C) (Oral)   Resp 16   Wt 82.6 kg (182 lb 1.6 oz)   LMP 11/05/2017   SpO2 97%   BMI 33.73 kg/m      Wt Readings from Last 4 Encounters:   08/02/24 82.6 kg (182 lb 1.6 oz)   07/15/24 81.8 kg (180 lb 4.8 oz)   06/28/24 79.5 kg (175 lb 4.8 oz)   05/23/24 76.7 kg (169 lb)     KPS: 80% Can perform normal activity with effort, some signs of disease    General: Alert, awake  Eyes: Conjunctiva normal  Respiratory: Normal respiratory effort.   Cardiovascular: Left ankle and foot edema + , improved from prior   Abdomen: Soft, non tender   Skin: No rash on back, only scratch marks +  Psych: Mood and affect are appropriate.  No central access.       Laboratory Studies:     Data    Blood Counts  Recent Labs   Lab Test 07/15/24  0750 07/01/24  1459 06/28/24  0849 05/22/24  0815 04/04/24  1218 03/28/24  1119 02/22/24  0350 02/21/24  0816 02/19/24  1203   WBC 6.0  --  5.3 5.2   < > 4.9   < > 7.7 5.8   HGB 13.0 13.2 13.4 13.6   < > 13.0   < > 11.3* 10.0*   *  --  160 146*   < > 134*   < > 83* 74*   NEUTROPHIL 40  --  38 64   < > 36   < > 72 53   ANEU  --   --   --   --   --  1.8  --  5.5 3.1   LYMPH 45  --  43 20   < > 26   < > 11 17   ALYM  --   --   --   --   --  1.3  --  0.8 1.0    < > = values in this interval not displayed.     Basic Panel  Recent Labs   Lab Test 07/15/24  0750 06/28/24  0849 04/22/24  0951    140 142   POTASSIUM 4.0 4.4 3.7   CHLORIDE 105 105 107   CO2 26 26 24   BUN 14.6 16.6 10.8   CR 0.60 0.66 0.52   GLC 92 82 108*       LFTs  Recent Labs   Lab Test 07/15/24  0750 06/28/24  0849 04/22/24  0951   ALKPHOS 122 134 112   AST 26 43 31   ALT 33 59* 39   BILITOTAL 0.3 0.3 0.2   ALBUMIN 4.1 4.2 4.2     Immunosuppression  Recent Labs   Lab Test 05/22/24  0815 04/11/24  1138 04/04/24  1218   RAPAMY 1.6* 6.0 5.7       Recent Labs   Lab Test  07/15/24  0750 06/28/24  0849 05/22/24  0815   CMVQNT <35* <35* <35*       Recent Labs   Lab Test 06/28/24  0849 03/14/24  1338   * 424*       Recent Labs   Lab Test 07/15/24  0750 07/01/24  1459 04/22/24  0951 03/14/24  1222 02/22/24  0350 01/02/24  1432   SONY 3,176* 4,077* 4,257* 7,899* 10,961* 4,341*            ASSESSMENT AND PLAN     BMT for MDS w/SF3B1 mutation  [id number] (according to but not on) with Bu/Flu  6/8 URD, peripheral blood stem cell graft, cell dose: 6.5 x10^6.     Disease status: Bone marrow biopsy at D28, D100, 6 months, 1 year  2/6/24 (~D26): Hypercellular marrow (70-80%), trilineage hemopoiesis with rare nuclear irregularities in terminal erythroid precursors of uncertain significance, 3% ringed sideroblasts (in the context of iron overload is not considered dysplastic finding). Flow showed no increase in myeloid blasts. Cytogenetics 46 XY, consistent with donor. FISH: Rate of loss of 20q within normal limits. NGS: No mutations detected  4/22/24 (D100): Overall cellularity 40-50% with no dysplasia. No ringed sideroblasts. Flow negative. Cytogenetics not done. NGS negative.      Graft status/chimerism: D28, D100, 6 months,1 year  2/6/24 (~D26): Bone marrow 100%, peripheral blood (2/1/24) CD33 100%, CD3 87%  3/14/24 (~D60): Peripheral blood CD3 and CD33 100%  4/22/24 (D100): Bone marrow 100%. Peripheral blood CD3 and CD33 100%    GVHD  # Current immunosuppression is none  - Sirolimus taper completed on 6/15/24     # No acute GVHD till date  Skin: No changes. Skin score 0.  GI: No symptoms. GI score 0.  Liver: LFTs normal. Score 0     Heme/ Coag  # Counts have recovered . ABO matched (both O+).     # Iron overload: Hx of transfusion dependent anemia, pre-transplant ferritin around 5,000. Ferritin (4/22/24) is 4257. Tsat 62%. Retic count 2.7%  - Liver ferriscan shows severe iron overload  - Phlebotomy done on 7/15   - (250-300 mL every 3-4 weeks as long as hematocrit is more than 35% until  serum ferritin falls below 1000 ng/mL)  - Check ferritin every 4 weeks.    ID  # Prophylaxis  PJP: Bactrim. Toxo IgG negative.  Viral: Acyclovir 800 mg bid  CMV: Letermovir completed     # Monitoring  CMV: Monitor every month till undetectable. 6/28: <35  EBV: Monitor every month till D180. 6/28- not detected.   IgG: Level 5544 mg/dL on 6/28/24. Check serum IgG level q3 months, and consider IVIG repletion for IgG levels <400 mg/dL.     GI  # Elevated transaminases: Resolved  AST and ALT elevated beginning 2/2/24 (D22). Notably, she was on tacrolimus for GVHD ppx but changed to sirolimus on 1/31/24. Infectious workup (2/6/24) was negative. Liver US (2/20/24) showed hepatomegaly with increased hepatic parenchymal echogenicity, suggesting hepatic steatosis. Suspect drug induced elevation w/ steatosis and possibly iron overload.     CV  - HTN: Stop amlodipine.  - Hyperlipidemia: Patient will try lifestyle modifications. Has stopped sirolimus recently. Consider Crestor 5mg daily if no change in 3 months. Stop fenofibrate.    MSK:  # L knee pain and back pain: Has been seen by ortho and referred to physical therapy. Unable to schedule an appointment right now due to insurance.   # Left ankle swelling:   - repeat US of the left leg (5/23) negative for DVT  - compression stockings  - Follow up with physical therapy when she has an appointment.     Derm  Mild erythematous rash on upper back with mild pruritus. Continue topicals.     Today's Summary:  Stop amlodipine  Measure BP at home at least twice a week  Stop fenofibrate  Lipid panel with next visit   Set up for phlebotomy     RTC in 1 month with labs (CBC, CMP, EBV, CMV)    I spent 40 minutes in the care of this patient today, which included time necessary for preparation for the visit, obtaining history, ordering medications/tests/procedures as medically indicated, review of pertinent medical literature, counseling of the patient, communication of recommendations to  the care team, and documentation time.    Coco Garay  Department of Hematology, Oncology and Transplantation  Amcom Pager 1300/Text via [employer]

## 2024-08-04 ENCOUNTER — HEALTH MAINTENANCE LETTER (OUTPATIENT)
Age: 54
End: 2024-08-04

## 2024-08-05 ENCOUNTER — TELEPHONE (OUTPATIENT)
Dept: DERMATOLOGY | Facility: CLINIC | Age: 54
End: 2024-08-05
Payer: MEDICAID

## 2024-08-05 NOTE — TELEPHONE ENCOUNTER
This encounter is being sent to inform the clinic that this patient has a referral from Coco Garay MBBS in  BMT for the diagnoses of S/P allogeneic bone marrow transplant (H) [Z94.81] (post BMT, has diffuse itching and skin tag/ actinic keratosis on right temple which is new. no gvhd) and has requested that this patient be seen within 1-2 weeks.  Based on the availability of our provider(s), we are unable to accommodate this request.    Were all sites offered this patient?  Yes    Does scheduling algorithm request to schedule next available?  Patient has been scheduled for the first available opening with Alexis Pina MD on 9/25/24.  We have informed the patient that the clinic will review their referral and reach out if a sooner appointment is medically necessary.  May leave detailed message if calling back.

## 2024-08-06 NOTE — TELEPHONE ENCOUNTER
RN reviewed referral. Scheduled for soonest opening in September with Yovani, no sooner appointment necessary at this time.      Referral notes:  Diffuse itching and stuck on lesion, AK on left temple       Jessica Meyer RN on 8/6/2024 at 8:35 AM

## 2024-08-07 ENCOUNTER — TELEPHONE (OUTPATIENT)
Dept: TRANSPLANT | Facility: CLINIC | Age: 54
End: 2024-08-07
Payer: MEDICAID

## 2024-08-07 DIAGNOSIS — D46.9 MDS (MYELODYSPLASTIC SYNDROME) (H): ICD-10-CM

## 2024-08-07 DIAGNOSIS — Z94.81 S/P ALLOGENEIC BONE MARROW TRANSPLANT (H): ICD-10-CM

## 2024-08-07 RX ORDER — ACYCLOVIR 800 MG/1
800 TABLET ORAL 2 TIMES DAILY
Qty: 180 TABLET | Refills: 0 | Status: SHIPPED | OUTPATIENT
Start: 2024-08-07 | End: 2025-02-03

## 2024-08-07 NOTE — TELEPHONE ENCOUNTER
BMT CLINICAL SOCIAL WORK NOTE:    Focus:Resources    Data: Pt is a 53 year old female, currently +209 s/p allo BMT    Interventions: Clinical  (CSW) received a request to reach out to the pt regarding questions she has on insurance. SW called the pt and she mentioned she got a letter from the [facility] noting she needed to select a insurance provide beyond her Medicaid. CSW encouraged the pt to reach out to the [employer] line to ask for guidances if she needs it, but she would want to confirm that [facility] is in network for her. Pt expressed understanding. She also asked for a med refill which was sent to the [facility].     Assessment: Pt presented as friendly and open.  Pt appears to be coping well at this time. Pt continues to be supported by her friends and family.     Plan: CSW will continue to work with Pt and family to provide supportive counseling and assist with resources as needed. CSW will continue to collaborate with multidisciplinary team regarding Pt's plan of care.     CLIFTON Ryan, [facility]  Phone: [phone]  Vocera- BMT SW 3

## 2024-08-08 DIAGNOSIS — Z94.81 S/P ALLOGENEIC BONE MARROW TRANSPLANT (H): ICD-10-CM

## 2024-08-08 DIAGNOSIS — D46.9 MDS (MYELODYSPLASTIC SYNDROME) (H): Primary | ICD-10-CM

## 2024-08-11 RX ORDER — HEPARIN SODIUM (PORCINE) LOCK FLUSH IV SOLN 100 UNIT/ML 100 UNIT/ML
5 SOLUTION INTRAVENOUS
Status: CANCELLED | OUTPATIENT
Start: 2024-08-25

## 2024-08-11 RX ORDER — HEPARIN SODIUM,PORCINE 10 UNIT/ML
5-20 VIAL (ML) INTRAVENOUS DAILY PRN
Status: CANCELLED | OUTPATIENT
Start: 2024-08-25

## 2024-08-12 ENCOUNTER — APPOINTMENT (OUTPATIENT)
Dept: LAB | Facility: CLINIC | Age: 54
End: 2024-08-12
Attending: STUDENT IN AN ORGANIZED HEALTH CARE EDUCATION/TRAINING PROGRAM
Payer: MEDICAID

## 2024-08-12 ENCOUNTER — INFUSION THERAPY VISIT (OUTPATIENT)
Dept: INFUSION THERAPY | Facility: CLINIC | Age: 54
End: 2024-08-12
Attending: STUDENT IN AN ORGANIZED HEALTH CARE EDUCATION/TRAINING PROGRAM
Payer: MEDICAID

## 2024-08-12 VITALS
RESPIRATION RATE: 16 BRPM | DIASTOLIC BLOOD PRESSURE: 81 MMHG | WEIGHT: 184.4 LBS | HEART RATE: 89 BPM | SYSTOLIC BLOOD PRESSURE: 118 MMHG | TEMPERATURE: 98.3 F | OXYGEN SATURATION: 97 % | BODY MASS INDEX: 34.15 KG/M2

## 2024-08-12 DIAGNOSIS — D46.9 MDS (MYELODYSPLASTIC SYNDROME) (H): Primary | ICD-10-CM

## 2024-08-12 DIAGNOSIS — Z94.81 S/P ALLOGENEIC BONE MARROW TRANSPLANT (H): ICD-10-CM

## 2024-08-12 LAB
ALBUMIN SERPL BCG-MCNC: 4.1 G/DL (ref 3.5–5.2)
ALP SERPL-CCNC: 133 U/L (ref 40–150)
ALT SERPL W P-5'-P-CCNC: 37 U/L (ref 0–50)
ANION GAP SERPL CALCULATED.3IONS-SCNC: 11 MMOL/L (ref 7–15)
AST SERPL W P-5'-P-CCNC: 27 U/L (ref 0–45)
BASOPHILS # BLD AUTO: 0 10E3/UL (ref 0–0.2)
BASOPHILS NFR BLD AUTO: 1 %
BILIRUB SERPL-MCNC: 0.3 MG/DL
BUN SERPL-MCNC: 12.1 MG/DL (ref 6–20)
CALCIUM SERPL-MCNC: 9.1 MG/DL (ref 8.8–10.4)
CHLORIDE SERPL-SCNC: 104 MMOL/L (ref 98–107)
CREAT SERPL-MCNC: 0.57 MG/DL (ref 0.51–0.95)
EGFRCR SERPLBLD CKD-EPI 2021: >90 ML/MIN/1.73M2
EOSINOPHIL # BLD AUTO: 0.1 10E3/UL (ref 0–0.7)
EOSINOPHIL NFR BLD AUTO: 1 %
ERYTHROCYTE [DISTWIDTH] IN BLOOD BY AUTOMATED COUNT: 12.9 % (ref 10–15)
FERRITIN SERPL-MCNC: 2267 NG/ML (ref 11–328)
GLUCOSE SERPL-MCNC: 104 MG/DL (ref 70–99)
HCO3 SERPL-SCNC: 25 MMOL/L (ref 22–29)
HCT VFR BLD AUTO: 42.7 % (ref 35–47)
HGB BLD-MCNC: 14.3 G/DL (ref 11.7–15.7)
IMM GRANULOCYTES # BLD: 0.1 10E3/UL
IMM GRANULOCYTES NFR BLD: 1 %
LYMPHOCYTES # BLD AUTO: 2.1 10E3/UL (ref 0.8–5.3)
LYMPHOCYTES NFR BLD AUTO: 35 %
MCH RBC QN AUTO: 34.5 PG (ref 26.5–33)
MCHC RBC AUTO-ENTMCNC: 33.5 G/DL (ref 31.5–36.5)
MCV RBC AUTO: 103 FL (ref 78–100)
MONOCYTES # BLD AUTO: 0.6 10E3/UL (ref 0–1.3)
MONOCYTES NFR BLD AUTO: 10 %
NEUTROPHILS # BLD AUTO: 3.1 10E3/UL (ref 1.6–8.3)
NEUTROPHILS NFR BLD AUTO: 52 %
NRBC # BLD AUTO: 0 10E3/UL
NRBC BLD AUTO-RTO: 0 /100
PLATELET # BLD AUTO: 134 10E3/UL (ref 150–450)
POTASSIUM SERPL-SCNC: 4 MMOL/L (ref 3.4–5.3)
PROT SERPL-MCNC: 6.4 G/DL (ref 6.4–8.3)
RBC # BLD AUTO: 4.14 10E6/UL (ref 3.8–5.2)
SODIUM SERPL-SCNC: 140 MMOL/L (ref 135–145)
WBC # BLD AUTO: 5.9 10E3/UL (ref 4–11)

## 2024-08-12 PROCEDURE — 36415 COLL VENOUS BLD VENIPUNCTURE: CPT

## 2024-08-12 PROCEDURE — 82728 ASSAY OF FERRITIN: CPT | Performed by: STUDENT IN AN ORGANIZED HEALTH CARE EDUCATION/TRAINING PROGRAM

## 2024-08-12 PROCEDURE — 87799 DETECT AGENT NOS DNA QUANT: CPT

## 2024-08-12 PROCEDURE — 258N000003 HC RX IP 258 OP 636: Performed by: STUDENT IN AN ORGANIZED HEALTH CARE EDUCATION/TRAINING PROGRAM

## 2024-08-12 PROCEDURE — 82040 ASSAY OF SERUM ALBUMIN: CPT

## 2024-08-12 PROCEDURE — 99195 PHLEBOTOMY: CPT

## 2024-08-12 PROCEDURE — 85025 COMPLETE CBC W/AUTO DIFF WBC: CPT

## 2024-08-12 RX ADMIN — SODIUM CHLORIDE 500 ML: 9 INJECTION, SOLUTION INTRAVENOUS at 13:43

## 2024-08-12 ASSESSMENT — PAIN SCALES - GENERAL: PAINLEVEL: NO PAIN (0)

## 2024-08-12 NOTE — NURSING NOTE
Chief Complaint   Patient presents with    Blood Draw     Labs drawn via PIV placed by VAT. VS taken.     Labs drawn from PIV placed by VAT. Line flushed with saline. Vitals taken. Pt checked in for appointment(s).     Deidra Corbett RN

## 2024-08-12 NOTE — PATIENT INSTRUCTIONS
Dear Hortencia Baldwin    Thank you for choosing [facility] ([facility] for your phlebotomy.  The following information is a summary of our appointment as well as important reminders.      If you have any questions on your upcoming Specialty Infusion appointments, please call scheduling at [phone].  It was a pleasure taking care of you today.    Sincerely,    [facility]  [address] [place] [address]  [address]  Phone:  [phone]

## 2024-08-12 NOTE — PROGRESS NOTES
Infusion Nursing Note:  Hortencia Baldwin presents today for Phlebotomy.    Patient seen by provider today: No   present during visit today: Not Applicable.    Note: 500ml blood drained via phlebotomy  Phlebotomy start time: 1:22pm  End time: 1:47pm    500ml NS infused post phlebotomy d/t hx of dizziness and lightheadedness. Ok'd by Coco Garay.      Intravenous Access:  Peripheral IV placed and labs drawn in 2nd floor lab    Treatment Conditions:  Lab Results   Component Value Date    HGB 14.3 08/12/2024     Lab Results   Component Value Date    SONY 3,176 (H) 07/15/2024     Results reviewed, labs MET treatment parameters, ok to proceed with treatment.      Post Infusion Assessment:  Patient tolerated phlebotomy without incident.  Blood return noted pre and post infusion.  Site patent and intact, free from redness, edema or discomfort.  No evidence of extravasations.  Access discontinued per protocol.       Discharge Plan:   Discharge instructions reviewed with: Patient.  Patient and/or family verbalized understanding of discharge instructions and all questions answered.  AVS to patient via [employer]ged[employer]ART.  Patient will return 8/19 for next appointment.   Patient discharged in stable condition accompanied by: self and .  Departure Mode: Ambulatory.    Administrations This Visit       sodium chloride 0.9% BOLUS 500 mL       Admin Date  08/12/2024 Action  $New Bag Dose  500 mL Route  Intravenous Documented By  Raquel Grimm, ZENIA                  /81 (BP Location: Right arm, Patient Position: Sitting, Cuff Size: Adult Regular)   Pulse 89   Temp 98.3  F (36.8  C) (Oral)   Resp 16   Wt 83.6 kg (184 lb 6.4 oz)   LMP 11/05/2017   SpO2 97%   BMI 34.15 kg/m        Raquel Grimm, RN

## 2024-08-13 LAB
CMV DNA SPEC NAA+PROBE-ACNC: 38 IU/ML
CMV DNA SPEC NAA+PROBE-LOG#: 1.6 {LOG_COPIES}/ML
EBV DNA SERPL NAA+PROBE-ACNC: NOT DETECTED IU/ML

## 2024-09-22 RX ORDER — HEPARIN SODIUM (PORCINE) LOCK FLUSH IV SOLN 100 UNIT/ML 100 UNIT/ML
5 SOLUTION INTRAVENOUS
OUTPATIENT
Start: 2024-09-24

## 2024-09-22 RX ORDER — HEPARIN SODIUM,PORCINE 10 UNIT/ML
5-20 VIAL (ML) INTRAVENOUS DAILY PRN
OUTPATIENT
Start: 2024-09-24

## 2024-09-23 ENCOUNTER — APPOINTMENT (OUTPATIENT)
Dept: LAB | Facility: CLINIC | Age: 54
End: 2024-09-23
Attending: STUDENT IN AN ORGANIZED HEALTH CARE EDUCATION/TRAINING PROGRAM
Payer: MEDICAID

## 2024-09-23 ENCOUNTER — INFUSION THERAPY VISIT (OUTPATIENT)
Dept: TRANSPLANT | Facility: CLINIC | Age: 54
End: 2024-09-23
Attending: STUDENT IN AN ORGANIZED HEALTH CARE EDUCATION/TRAINING PROGRAM
Payer: MEDICAID

## 2024-09-23 VITALS
TEMPERATURE: 98.6 F | HEART RATE: 85 BPM | OXYGEN SATURATION: 99 % | DIASTOLIC BLOOD PRESSURE: 91 MMHG | SYSTOLIC BLOOD PRESSURE: 150 MMHG | WEIGHT: 189.7 LBS | RESPIRATION RATE: 16 BRPM | BODY MASS INDEX: 35.13 KG/M2

## 2024-09-23 DIAGNOSIS — Z94.81 S/P ALLOGENEIC BONE MARROW TRANSPLANT (H): ICD-10-CM

## 2024-09-23 DIAGNOSIS — D46.9 MDS (MYELODYSPLASTIC SYNDROME) (H): Primary | ICD-10-CM

## 2024-09-23 LAB
ALBUMIN SERPL BCG-MCNC: 4.1 G/DL (ref 3.5–5.2)
ALP SERPL-CCNC: 144 U/L (ref 40–150)
ALT SERPL W P-5'-P-CCNC: 76 U/L (ref 0–50)
ANION GAP SERPL CALCULATED.3IONS-SCNC: 9 MMOL/L (ref 7–15)
AST SERPL W P-5'-P-CCNC: 39 U/L (ref 0–45)
BASOPHILS # BLD AUTO: 0 10E3/UL (ref 0–0.2)
BASOPHILS NFR BLD AUTO: 1 %
BILIRUB SERPL-MCNC: 0.4 MG/DL
BUN SERPL-MCNC: 13.8 MG/DL (ref 6–20)
CALCIUM SERPL-MCNC: 9.2 MG/DL (ref 8.8–10.4)
CHLORIDE SERPL-SCNC: 103 MMOL/L (ref 98–107)
CMV DNA SPEC NAA+PROBE-ACNC: NOT DETECTED IU/ML
CREAT SERPL-MCNC: 0.57 MG/DL (ref 0.51–0.95)
EBV DNA SERPL NAA+PROBE-ACNC: NOT DETECTED IU/ML
EGFRCR SERPLBLD CKD-EPI 2021: >90 ML/MIN/1.73M2
EOSINOPHIL # BLD AUTO: 0.1 10E3/UL (ref 0–0.7)
EOSINOPHIL NFR BLD AUTO: 1 %
ERYTHROCYTE [DISTWIDTH] IN BLOOD BY AUTOMATED COUNT: 12 % (ref 10–15)
FERRITIN SERPL-MCNC: 2613 NG/ML (ref 11–328)
GLUCOSE SERPL-MCNC: 88 MG/DL (ref 70–99)
HCO3 SERPL-SCNC: 28 MMOL/L (ref 22–29)
HCT VFR BLD AUTO: 43.5 % (ref 35–47)
HGB BLD-MCNC: 14.6 G/DL (ref 11.7–15.7)
IMM GRANULOCYTES # BLD: 0 10E3/UL
IMM GRANULOCYTES NFR BLD: 1 %
LYMPHOCYTES # BLD AUTO: 2 10E3/UL (ref 0.8–5.3)
LYMPHOCYTES NFR BLD AUTO: 32 %
MCH RBC QN AUTO: 33.8 PG (ref 26.5–33)
MCHC RBC AUTO-ENTMCNC: 33.6 G/DL (ref 31.5–36.5)
MCV RBC AUTO: 101 FL (ref 78–100)
MONOCYTES # BLD AUTO: 0.5 10E3/UL (ref 0–1.3)
MONOCYTES NFR BLD AUTO: 8 %
NEUTROPHILS # BLD AUTO: 3.7 10E3/UL (ref 1.6–8.3)
NEUTROPHILS NFR BLD AUTO: 58 %
NRBC # BLD AUTO: 0 10E3/UL
NRBC BLD AUTO-RTO: 0 /100
PLATELET # BLD AUTO: 148 10E3/UL (ref 150–450)
POTASSIUM SERPL-SCNC: 3.7 MMOL/L (ref 3.4–5.3)
PROT SERPL-MCNC: 6.4 G/DL (ref 6.4–8.3)
RBC # BLD AUTO: 4.32 10E6/UL (ref 3.8–5.2)
SODIUM SERPL-SCNC: 140 MMOL/L (ref 135–145)
WBC # BLD AUTO: 6.5 10E3/UL (ref 4–11)

## 2024-09-23 PROCEDURE — 87799 DETECT AGENT NOS DNA QUANT: CPT

## 2024-09-23 PROCEDURE — 258N000003 HC RX IP 258 OP 636: Performed by: STUDENT IN AN ORGANIZED HEALTH CARE EDUCATION/TRAINING PROGRAM

## 2024-09-23 PROCEDURE — 99195 PHLEBOTOMY: CPT

## 2024-09-23 PROCEDURE — 85025 COMPLETE CBC W/AUTO DIFF WBC: CPT

## 2024-09-23 PROCEDURE — 36415 COLL VENOUS BLD VENIPUNCTURE: CPT

## 2024-09-23 PROCEDURE — 82728 ASSAY OF FERRITIN: CPT | Performed by: STUDENT IN AN ORGANIZED HEALTH CARE EDUCATION/TRAINING PROGRAM

## 2024-09-23 PROCEDURE — 82040 ASSAY OF SERUM ALBUMIN: CPT

## 2024-09-23 RX ADMIN — SODIUM CHLORIDE 500 ML: 9 INJECTION, SOLUTION INTRAVENOUS at 12:08

## 2024-09-23 ASSESSMENT — PAIN SCALES - GENERAL: PAINLEVEL: MODERATE PAIN (4)

## 2024-09-23 NOTE — NURSING NOTE
Chief Complaint   Patient presents with    Blood Draw     Labs drawn with piv start by rn.  VS taken.         Labs drawn with PIV start by rn.  Pt tolerated well.  VS taken and pt checked in for next appt.    Anika Robert RN

## 2024-09-23 NOTE — PROGRESS NOTES
Infusion Nursing Note:  Hortencia Baldwin presents today for therapeutic phlebotomy.    Patient seen by provider today: No   present during visit today: Not Applicable.    Note: VSS. Meds and allergies reviewed. Pt reports feeling well today but notes some chronic knee pain rated 4/10. Also reports intermittent nausea and diarrhea, but does not have any today. No infectious symptoms or acute concerns. Labs monitored; parameters met for phlebotomy. 500 mL blood removed via gravity phlebotomy. 500 mL 0.9% NS infused post phlebotomy. VSS post phlebotomy and pt reported feeling well prior to discharge.      Intravenous Access:  Peripheral IV placed.    Treatment Conditions:  Results reviewed, labs MET treatment parameters, ok to proceed with treatment.  Hgb 14.6 and Ferritin 2,613 today.    Post Infusion Assessment:  Patient tolerated phlebotomy and infusion without incident.  Blood return noted pre and post infusion.  Site patent and intact, free from redness, edema or discomfort.  No evidence of extravasations.  Access discontinued per protocol.       Discharge Plan:   Patient discharged in stable condition accompanied by: .  Departure Mode: Ambulatory.      Evita Walls RN

## 2024-09-25 ENCOUNTER — OFFICE VISIT (OUTPATIENT)
Dept: DERMATOLOGY | Facility: CLINIC | Age: 54
End: 2024-09-25
Payer: MEDICAID

## 2024-09-25 DIAGNOSIS — L81.4 SOLAR LENTIGO: ICD-10-CM

## 2024-09-25 DIAGNOSIS — D22.9 MULTIPLE MELANOCYTIC NEVI: ICD-10-CM

## 2024-09-25 DIAGNOSIS — L21.9 DERMATITIS, SEBORRHEIC: Primary | ICD-10-CM

## 2024-09-25 DIAGNOSIS — L82.1 SEBORRHEIC KERATOSIS: ICD-10-CM

## 2024-09-25 DIAGNOSIS — L30.9 HAND DERMATITIS: ICD-10-CM

## 2024-09-25 DIAGNOSIS — D18.01 CHERRY ANGIOMA: ICD-10-CM

## 2024-09-25 DIAGNOSIS — Z94.81 BONE MARROW TRANSPLANT STATUS (H): ICD-10-CM

## 2024-09-25 PROCEDURE — 99203 OFFICE O/P NEW LOW 30 MIN: CPT | Performed by: DERMATOLOGY

## 2024-09-25 RX ORDER — TACROLIMUS 1 MG/G
OINTMENT TOPICAL 2 TIMES DAILY
Qty: 60 G | Refills: 11 | Status: SHIPPED | OUTPATIENT
Start: 2024-09-25

## 2024-09-25 RX ORDER — BETAMETHASONE DIPROPIONATE 0.5 MG/G
1 CREAM TOPICAL
COMMUNITY
Start: 2024-09-03

## 2024-09-25 RX ORDER — BETAMETHASONE DIPROPIONATE 0.5 MG/ML
LOTION, AUGMENTED TOPICAL 2 TIMES DAILY
Qty: 60 ML | Refills: 11 | Status: SHIPPED | OUTPATIENT
Start: 2024-09-25

## 2024-09-25 RX ORDER — CHOLECALCIFEROL (VITAMIN D3) 1250 MCG
1250 CAPSULE ORAL
COMMUNITY
Start: 2024-09-06

## 2024-09-25 RX ORDER — TRIAMCINOLONE ACETONIDE 1 MG/G
OINTMENT TOPICAL 2 TIMES DAILY
Qty: 80 G | Refills: 5 | Status: SHIPPED | OUTPATIENT
Start: 2024-09-25

## 2024-09-25 NOTE — PROGRESS NOTES
[facility] Dermatology Note  FSE: 9/25/24  Encounter Date: Sep 25, 2024    Dermatology Problem List:    1.History of transplant (Bone Marrow)  2. Seborrheic dermatitis  - current tx: augmented betamethasone lotion, tacrolimus ointment, H&S shampoo  3. Hand dermatitis  - current tx: triamcinolone ointment  ______________________________________    Impression/Plan:  1. History of transplant (Bone Marrow)    2. Seborrheic dermatitis-face and scalp  - start augmented betamethasone lotion-apply to scalp twice a day  - start tacrolimus ointment-apply face twice a day  - start head and shoulders shampoo 2-3 times a week    3. Reassurance provided for benign lesions not treated today including cherry angiomata, solar lentigines, seborrheic keratoses, and banal-appearing melanocytic nevi.     4. Hand Dermatitis with post inflammatory pigmentation  - Start triamcinolone ointment-apply to hands twice a day    Follow-up in 1 year.       Staff Involved:  Staff/Scribe    Scribe Disclosure:   I, Joellen Martinez, am serving as a scribe; to document services personally performed by Alexis Pina MD -based on data collection and the provider's statements to me.     Provider Disclosure:   The documentation recorded by the scribe accurately reflects the services I personally performed and the decisions made by me.    Alexis Pina MD   of Dermatology  Department of Dermatology  [facility]        CC:   Chief Complaint   Patient presents with    Skin Check     [place] [facility] [place], her face is red and peeling and does itch, R cheek has spot by the nose, also has itching down the neck, also has darkening of skin on the hands- pt said this happened before the transplant as well.       History of Present Illness:  Ms. Hortencia Baldwni is a 53 year old female who presents as a new patient.    Here for skin check. She is complaining about  redness on face that is itchy and will not resolve. Has flaking on her scalp, which is itchy. Using primarily a conditioner on her scalp. She also, has discoloration on hands, that appeared before the transplant    Labs:  N/A    Physical exam:  Vitals: LMP 11/05/2017   GEN: This is a well developed, well-nourished female in no acute distress, in a pleasant mood.    SKIN: Delgado phototype III  - Full skin, which includes the head/face, both arms, chest, back, abdomen,both legs, genitalia and/or groin buttocks, digits and/or nails, was examined.  - There is macular erythema of the face and scalp with moderate flaky white scale.  - There are dome shaped bright red papules on the head/neck, trunk, extremities.   - Multiple regular brown pigmented macules and papules are identified on the head/neck, trunk, extremities.   - Scattered brown macules on sun exposed areas.  - There are waxy stuck on tan to brown papules on the head/neck, trunk, extremities.   - erythema and scaling on the scalp, forehead and nasolabial folds  - hyperpigmentation and scaling in the dorsal hands  - No other lesions of concern on areas examined.     Past Medical History:   Past Medical History:   Diagnosis Date    Left medial knee pain     Medial epicondylitis, right elbow     Obesity (BMI 30-39.9)     Right elbow pain      Past Surgical History:   Procedure Laterality Date    EXCISE LESION TRUNK N/A 2/5/2018    Procedure: EXCISE LESION TRUNK;;  Surgeon: Avani Szymanski MD;  Location: [facility]    EXCISE MASS LOWER EXTREMITY Bilateral 5/31/2017    Procedure: EXCISE MASS LOWER EXTREMITY;  EXCISION OF LIPOMAS RIGHT BUTTOCKS RIGHT LOWER THIGH, LEFT UPPER OUTER THIGH;  Surgeon: Avani Szymanski MD;  Location: [facility]    EXCISE MASS LOWER EXTREMITY Right 2/5/2018    Procedure: EXCISE MASS LOWER EXTREMITY;  EXCISION OF RIGHT UPPER ANTERIOR THIGH SOFT TISSUE MASS,EXCISION OF LEFT LOWER BACK SOFT TISSUE MASS;  Surgeon: Avani Szymanski MD;  Location:  [facility]    IR CVC TUNNEL PLACEMENT > 5 YRS OF AGE  1/5/2024    IR CVC TUNNEL REMOVAL RIGHT  3/11/2024    LUMPECTOMY BREAST Left 1990s    benign       Social History:   reports that she quit smoking about 8 months ago. Her smoking use included cigarettes. She started smoking about 10 years ago. She has a 5 pack-year smoking history. She has been exposed to tobacco smoke. She has never used smokeless tobacco. She reports current alcohol use. She reports that she does not use drugs.    Family History:  Family History   Problem Relation Age of Onset    Hypertension Mother     Diabetes No family hx of     Cerebrovascular Disease No family hx of     Myocardial Infarction No family hx of     Coronary Artery Disease Early Onset No family hx of     Breast Cancer No family hx of     Ovarian Cancer No family hx of     Colon Cancer No family hx of        Medications:  Current Outpatient Medications   Medication Sig Dispense Refill    acyclovir (ZOVIRAX) 800 MG tablet Take 1 tablet (800 mg) by mouth 2 times daily for 180 days 180 tablet 0    cholecalciferol (VITAMIN D3) 1250 mcg (38134 units) capsule Take 1,250 mcg by mouth every 7 days.      sulfamethoxazole-trimethoprim (BACTRIM DS) 800-160 MG tablet Take 1 tablet by mouth Every Mon, Tues two times daily 48 tablet 1    triamcinolone (KENALOG) 0.025 % cream Apply topically 2 times daily      betamethasone dipropionate (DIPROSONE) 0.05 % external cream Apply 1 Application topically. (Patient not taking: Reported on 9/25/2024)       Allergies   Allergen Reactions    Blood Transfusion Related (Informational Only) Other (See Comments)     Patient has a history of a clinically significant antibody against RBC antigens.  A delay in compatible RBCs may occur. Had a reaction to the platelets.

## 2024-09-25 NOTE — LETTER
9/25/2024      Hortencia Baldwin  [address]  [facility] [address]      Dear Colleague,    Thank you for referring your patient, Hortencia Baldwin, to the [facility]. Please see a copy of my visit note below.    [facility] Dermatology Note  FSE: 9/25/24  Encounter Date: Sep 25, 2024    Dermatology Problem List:    1.History of transplant (Bone Marrow)  2. Seborrheic dermatitis  - current tx: augmented betamethasone lotion, tacrolimus ointment, H&S shampoo  3. Hand dermatitis  - current tx: triamcinolone ointment  ______________________________________    Impression/Plan:  1. History of transplant (Bone Marrow)    2. Seborrheic dermatitis-face and scalp  - start augmented betamethasone lotion-apply to scalp twice a day  - start tacrolimus ointment-apply face twice a day  - start head and shoulders shampoo 2-3 times a week    3. Reassurance provided for benign lesions not treated today including cherry angiomata, solar lentigines, seborrheic keratoses, and banal-appearing melanocytic nevi.     4. Hand Dermatitis with post inflammatory pigmentation  - Start triamcinolone ointment-apply to hands twice a day    Follow-up in 1 year.       Staff Involved:  Staff/Scribe    Scribe Disclosure:   I, Joellen Martinez, am serving as a scribe; to document services personally performed by Alexis Pina MD -based on data collection and the provider's statements to me.     Provider Disclosure:   The documentation recorded by the scribe accurately reflects the services I personally performed and the decisions made by me.    Alexis Pina MD   of Dermatology  Department of Dermatology  [facility] [employer]        CC:   Chief Complaint   Patient presents with     Skin Check     Pt is here for [facility], had a bone marrow transplant, her face is red and peeling and does itch, R cheek has spot by the nose, also has itching down the neck,  also has darkening of skin on the hands- pt said this happened before the transplant as well.       History of Present Illness:  Ms. Hortencia Baldwin is a 53 year old female who presents as a new patient.    Here for skin check. She is complaining about redness on face that is itchy and will not resolve. Has flaking on her scalp, which is itchy. Using primarily a conditioner on her scalp. She also, has discoloration on hands, that appeared before the transplant    Labs:  N/A    Physical exam:  Vitals: LMP 11/05/2017   GEN: This is a well developed, well-nourished female in no acute distress, in a pleasant mood.    SKIN: Delgado phototype III  - Full skin, which includes the head/face, both arms, chest, back, abdomen,both legs, genitalia and/or groin buttocks, digits and/or nails, was examined.  - There is macular erythema of the face and scalp with moderate flaky white scale.  - There are dome shaped bright red papules on the head/neck, trunk, extremities.   - Multiple regular brown pigmented macules and papules are identified on the head/neck, trunk, extremities.   - Scattered brown macules on sun exposed areas.  - There are waxy stuck on tan to brown papules on the head/neck, trunk, extremities.   - erythema and scaling on the scalp, forehead and nasolabial folds  - hyperpigmentation and scaling in the dorsal hands  - No other lesions of concern on areas examined.     Past Medical History:   Past Medical History:   Diagnosis Date     Left medial knee pain      Medial epicondylitis, right elbow      Obesity (BMI 30-39.9)      Right elbow pain      Past Surgical History:   Procedure Laterality Date     EXCISE LESION TRUNK N/A 2/5/2018    Procedure: EXCISE LESION TRUNK;;  Surgeon: Avani Szymanski MD;  Location:  [facility]     EXCISE MASS LOWER EXTREMITY Bilateral 5/31/2017    Procedure: EXCISE MASS LOWER EXTREMITY;  EXCISION OF LIPOMAS RIGHT BUTTOCKS RIGHT LOWER THIGH, LEFT UPPER OUTER THIGH;  Surgeon: Stephon  MD Avani;  Location: [facility]     EXCISE MASS LOWER EXTREMITY Right 2/5/2018    Procedure: EXCISE MASS LOWER EXTREMITY;  EXCISION OF RIGHT UPPER ANTERIOR THIGH SOFT TISSUE MASS,EXCISION OF LEFT LOWER BACK SOFT TISSUE MASS;  Surgeon: Avani Szymanski MD;  Location: [facility]     IR CVC TUNNEL PLACEMENT > 5 YRS OF AGE  1/5/2024     IR CVC TUNNEL REMOVAL RIGHT  3/11/2024     LUMPECTOMY BREAST Left 1990s    benign       Social History:   reports that she quit smoking about 8 months ago. Her smoking use included cigarettes. She started smoking about 10 years ago. She has a 5 pack-year smoking history. She has been exposed to tobacco smoke. She has never used smokeless tobacco. She reports current alcohol use. She reports that she does not use drugs.    Family History:  Family History   Problem Relation Age of Onset     Hypertension Mother      Diabetes No family hx of      Cerebrovascular Disease No family hx of      Myocardial Infarction No family hx of      Coronary Artery Disease Early Onset No family hx of      Breast Cancer No family hx of      Ovarian Cancer No family hx of      Colon Cancer No family hx of        Medications:  Current Outpatient Medications   Medication Sig Dispense Refill     acyclovir (ZOVIRAX) 800 MG tablet Take 1 tablet (800 mg) by mouth 2 times daily for 180 days 180 tablet 0     cholecalciferol (VITAMIN D3) 1250 mcg (40146 units) capsule Take 1,250 mcg by mouth every 7 days.       sulfamethoxazole-trimethoprim (BACTRIM DS) 800-160 MG tablet Take 1 tablet by mouth Every Mon, Tues two times daily 48 tablet 1     triamcinolone (KENALOG) 0.025 % cream Apply topically 2 times daily       betamethasone dipropionate (DIPROSONE) 0.05 % external cream Apply 1 Application topically. (Patient not taking: Reported on 9/25/2024)       Allergies   Allergen Reactions     Blood Transfusion Related (Informational Only) Other (See Comments)     Patient has a history of a clinically significant antibody against RBC  antigens.  A delay in compatible RBCs may occur. Had a reaction to the platelets.                    Again, thank you for allowing me to participate in the care of your patient.        Sincerely,        Alexis Pina MD

## 2024-09-25 NOTE — PATIENT INSTRUCTIONS
Scalp:  Consider coal tar 1% shampoo (extra strength T/Gel or generic equivalent)  Consider zinc pyrithione conditioner (Head & Shoulders conditioner only, not combined product)    Moisturizers:  CeraVe, Cetaphil, Vanicream, Vaseline    Facial moisturizers:  CeraVe, Cetaphil, Vanicream    Soaps:  Dove for sensitive skin (bar soap), CeraVe, Cetaphil, Vanicream

## 2024-09-25 NOTE — NURSING NOTE
Hortencia Baldwin's goals for this visit include:   Chief Complaint   Patient presents with    Skin Check     Pt is here for [facility], had a bone marrow transplant, her face is red and peeling and does itch, R cheek has spot by the nose, also has itching down the neck, also has darkening of skin on the hands- pt said this happened before the transplant as well.       She requests these members of her care team be copied on today's visit information:     PCP: [facility]    Referring Provider:  SARA Singleton  [address] [place] [id number]    [facility] 11/05/2017     Do you need any medication refills at today's visit?     Mary Perez LPN on 9/25/2024 at 9:52 AM

## 2024-09-27 ENCOUNTER — ONCOLOGY VISIT (OUTPATIENT)
Dept: TRANSPLANT | Facility: CLINIC | Age: 54
End: 2024-09-27
Attending: STUDENT IN AN ORGANIZED HEALTH CARE EDUCATION/TRAINING PROGRAM
Payer: MEDICAID

## 2024-09-27 ENCOUNTER — APPOINTMENT (OUTPATIENT)
Dept: LAB | Facility: CLINIC | Age: 54
End: 2024-09-27
Attending: STUDENT IN AN ORGANIZED HEALTH CARE EDUCATION/TRAINING PROGRAM
Payer: MEDICAID

## 2024-09-27 VITALS
RESPIRATION RATE: 17 BRPM | OXYGEN SATURATION: 99 % | HEART RATE: 88 BPM | SYSTOLIC BLOOD PRESSURE: 133 MMHG | DIASTOLIC BLOOD PRESSURE: 85 MMHG | WEIGHT: 186.2 LBS | BODY MASS INDEX: 34.48 KG/M2 | TEMPERATURE: 98.1 F

## 2024-09-27 DIAGNOSIS — Z94.81 S/P ALLOGENEIC BONE MARROW TRANSPLANT (H): Primary | ICD-10-CM

## 2024-09-27 PROCEDURE — 99214 OFFICE O/P EST MOD 30 MIN: CPT | Performed by: STUDENT IN AN ORGANIZED HEALTH CARE EDUCATION/TRAINING PROGRAM

## 2024-09-27 PROCEDURE — G0463 HOSPITAL OUTPT CLINIC VISIT: HCPCS | Performed by: STUDENT IN AN ORGANIZED HEALTH CARE EDUCATION/TRAINING PROGRAM

## 2024-09-27 ASSESSMENT — PAIN SCALES - GENERAL: PAINLEVEL: MODERATE PAIN (5)

## 2024-09-27 NOTE — NURSING NOTE
"Oncology Rooming Note    September 27, 2024 2:20 PM   Hortencia Baldwin is a 53 year old female who presents for:    Chief Complaint   Patient presents with    Blood Draw     Per provider, no labs needed today.  VS taken.    Oncology Clinic Visit     MDS     Initial Vitals: /85   Pulse 88   Temp 98.1  F (36.7  C) (Oral)   Resp 17   Wt 84.5 kg (186 lb 3.2 oz)   LMP 11/05/2017   SpO2 99%   BMI 34.48 kg/m   Estimated body mass index is 34.48 kg/m  as calculated from the following:    Height as of 3/28/24: 1.565 m (5' 1.61\").    Weight as of this encounter: 84.5 kg (186 lb 3.2 oz). Body surface area is 1.92 meters squared.  Moderate Pain (5) Comment: Data Unavailable   Patient's last menstrual period was 11/05/2017.  Allergies reviewed: Yes  Medications reviewed: Yes    Medications: Medication refills not needed today.  Pharmacy name entered into [facility]:    [id number] - [place], [place] - [address] AT [facility] [address]  [id number] - [place], [place] - [address] [facility] [address] [facility] [employer] [place] [address] [address] [place] [address]  [facility] #[id number] - [place], [place] - [address] AT [facility] [address]    Frailty Screening:   Is the patient here for a new oncology consult visit in cancer care? 2. No      Clinical concerns:        Chari Feldman              "

## 2024-09-27 NOTE — NURSING NOTE
Chief Complaint   Patient presents with    Blood Draw     Per provider, no labs needed today.  VS taken.     As above.  Pt checked in for provider appt.    Anika Robert RN

## 2024-09-27 NOTE — LETTER
9/27/2024      Hortencia Baldwin  [address]  [facility] [address]      Dear Colleague,    Thank you for referring your patient, Hortencia Baldwin, to the [facility] BLOOD AND MARROW TRANSPLANT [facility] [place]. Please see a copy of my visit note below.    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 Date of transplant: 1/11/2024    Diagnosis MDS w/ SF3B1 BMT type Allogenic  CMV  Donor -  Recipient +    Prep: MA Bu/Flu Donor type  6/8 URD Blood Type Donor and recipient O+   GVHD Ppx PTCy/ siro/ MMF Graft source PBSCT Toxo IgG Negative   Protocol [id number] (not on) BMT MD Dr. Garay       Data   Pre-transplant disease history  Referring provider: Dr.Evan Ramey, [facility]   52 y/o F with history of longstanding macrocytic anemia (Hb 10-11, -110 dating back to 1997) presented in Nov 2021 for lightheadedness. CBC showed acute on chronic anemia with Hb 4.7, ; normal WBC and platelet count. Bone marrow biopsy (12/09/2021) was consistent with MDS with low blasts and SF3B1 mutation. Hypercellular marrow (70%) with single lineage dysplasia (dyserythropoiesis), increased ringed sideroblasts and no increase in blasts. Flow cytometry showed mixed lymphocytes. Cytogenetics 46,XX,del(20). NGS: SF3B1 (45%), ASXL1 (6%).   Received Epo (March 2022 - April 2022) but had minimal response after 4 weekly doses. Luspatercept from April 2022 to April 2023 with inadequate response.   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 Treated with weekly rituximab x 4 and steroid taper (Sept to Dec 2022). LDH and bilirubin started rising after steroid taper, therefore treated with MMF 500mg BID (Dec 2022 to May 2023) with resolution of hemolysis (negative KATINA, normal LDH, bilirubin). However, she  continued to be transfusion dependant therefore her anemia was determined to be related more to MDS than autoimmune hemolysis.   Repeat bone marrow biopsy (5/11/2023) showed persistent MDS. Hypercellular marrow (90%) but now with dysplasia in all three lineages, cytogenetics 46, XX, del (20). Received decitabine- cedazurdine X 4 cycles (May 2023 to Dec 2023). Complicated by neutropenia requiring dose reduction and pRBC transfusions every 2-3 weeks. Pre-transplant bmbx (12/7/23) showed persistent MDS with multi lineage dysplasia (dyserythropoesis and dysgranulopoesis), 17% ringed sideroblasts, increased marrow storage iron. Cytogenetics: 46,XX,del(20). Flow cytometry and NGS not done.    She was considered for allogenic transplant despite low risk MDS by IPSS-R at diagnosis due to transfusion dependence, ASXL1 mutation and development of multi-lineage dysplasia on bmbx in May 2023 suggesting clonal evolution.      She underwent allogenic stem cell transplant on 1/11/24: myeloablative conditioning with Bu/Flu, 6/8 unrelated donor peripheral blood stem cell graft, cell dose: 6.50E+06. ABO matched, CMV donor negative, recipient positive. GVHD ppx with PTCy, MMF and tacrolimus (avoiding sirolimus due to high risk of VOD, given elevated liver enzymes prior to transplant and suspected iron overload).      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          INTERVAL HISTORY     Irma has been doing well.   She has patches of dry skin on her face and itching on upper back. She was seen by dermatology and diagnosed with seborrheic dermatitis, has not started the topicals yet.   She has bilateral knee pain,has started PT.  Has noticed new headaches, heaviness on top of head, relieved with tylenol.   She has some nausea, but good appetite, no diarrhea.   Has established with PCP.    Plan  - Continue phlebotomy every 4 weeks  - Mild  thrombocytopenia and macrocytosis. Check Vitamin B12, foliate and chimerisms  - RTC as scheduled     Current Outpatient Medications   Medication Sig Dispense Refill     acyclovir (ZOVIRAX) 800 MG tablet Take 1 tablet (800 mg) by mouth 2 times daily for 180 days 180 tablet 0     betamethasone dipropionate (DIPROSONE) 0.05 % external cream Apply topically.       cholecalciferol (VITAMIN D3) 1250 mcg (08224 units) capsule Take 1,250 mcg by mouth every 7 days.       sulfamethoxazole-trimethoprim (BACTRIM DS) 800-160 MG tablet Take 1 tablet by mouth Every Mon, Tues two times daily 48 tablet 1     tacrolimus (PROTOPIC) 0.1 % external ointment Apply topically 2 times daily. face 60 g 11     triamcinolone (KENALOG) 0.1 % external ointment Apply topically 2 times daily. hands 80 g 5     augmented betamethasone dipropionate (DIPROLENE) 0.05 % external lotion Apply topically 2 times daily. scalp (Patient not taking: Reported on 9/27/2024) 60 mL 11     triamcinolone (KENALOG) 0.025 % cream Apply topically 2 times daily (Patient not taking: Reported on 9/27/2024)           EXAM      /85   Pulse 88   Temp 98.1  F (36.7  C) (Oral)   Resp 17   Wt 84.5 kg (186 lb 3.2 oz)   LMP 11/05/2017   SpO2 99%   BMI 34.48 kg/m      Wt Readings from Last 4 Encounters:   09/27/24 84.5 kg (186 lb 3.2 oz)   09/23/24 86 kg (189 lb 11.2 oz)   08/12/24 83.6 kg (184 lb 6.4 oz)   08/02/24 82.6 kg (182 lb 1.6 oz)     KPS: 80% Can perform normal activity with effort, some signs of disease    General: Alert, awake  Eyes: Conjunctiva normal  Respiratory: Normal respiratory effort.   Cardiovascular: Left ankle and foot edema + , improved from prior   Abdomen: Soft, point tenderness in the right lower quadrant corresponding to a subcutaneous lipoma   Skin: dry skin on face.  Psych: Mood and affect are appropriate.  No central access.       Laboratory Studies:     Data   Blood Counts  Recent Labs   Lab Test 09/23/24  0947 08/12/24  1229  07/15/24  0750 04/04/24  1218 03/28/24  1119 02/22/24  0350 02/21/24  0816 02/19/24  1203   WBC 6.5 5.9 6.0   < > 4.9   < > 7.7 5.8   HGB 14.6 14.3 13.0   < > 13.0   < > 11.3* 10.0*   * 134* 139*   < > 134*   < > 83* 74*   NEUTROPHIL 58 52 40   < > 36   < > 72 53   ANEU  --   --   --   --  1.8  --  5.5 3.1   LYMPH 32 35 45   < > 26   < > 11 17   ALYM  --   --   --   --  1.3  --  0.8 1.0    < > = values in this interval not displayed.     Basic Panel  Recent Labs   Lab Test 09/23/24  0947 08/12/24  1229 07/15/24  0750    140 141   POTASSIUM 3.7 4.0 4.0   CHLORIDE 103 104 105   CO2 28 25 26   BUN 13.8 12.1 14.6   CR 0.57 0.57 0.60   GLC 88 104* 92       LFTs  Recent Labs   Lab Test 09/23/24  0947 08/12/24  1229 07/15/24  0750   ALKPHOS 144 133 122   AST 39 27 26   ALT 76* 37 33   BILITOTAL 0.4 0.3 0.3   ALBUMIN 4.1 4.1 4.1       Recent Labs   Lab Test 09/23/24  0947 07/15/24  0750 06/28/24  0849   CMVQNT Not Detected <35* <35*       Recent Labs   Lab Test 06/28/24  0849 03/14/24  1338   * 424*       Recent Labs   Lab Test 09/23/24  0947 08/12/24  1229 07/15/24  0750 07/01/24  1459 04/22/24  0951 03/14/24  1222   SONY 2,613* 2,267* 3,176* 4,077* 4,257* 7,899*            ASSESSMENT AND PLAN     BMT for MDS w/SF3B1 mutation  [id number] (according to but not on) with Bu/Flu  6/8 URD, peripheral blood stem cell graft, cell dose: 6.5 x10^6.     Disease status: Bone marrow biopsy at D28, D100, 6 months, 1 year  2/6/24 (~D26): Hypercellular marrow (70-80%), trilineage hemopoiesis with rare nuclear irregularities in terminal erythroid precursors of uncertain significance, 3% ringed sideroblasts (in the context of iron overload is not considered dysplastic finding). Flow showed no increase in myeloid blasts. Cytogenetics 46 XY, consistent with donor. FISH: Rate of loss of 20q within normal limits. NGS: No mutations detected  4/22/24 (D100): Overall cellularity 40-50% with no dysplasia. No ringed sideroblasts.  Flow negative. Cytogenetics not done. NGS negative.   7/15/24 (D180): CR, cytogenetics/ FISH not done. NGS negative      Graft status/chimerism: D28, D100, 6 months,1 year  2/6/24 (~D26): Bone marrow 100%, peripheral blood (2/1/24) CD33 100%, CD3 87%  3/14/24 (~D60): Peripheral blood CD3 and CD33 100%  4/22/24 (D100): Bone marrow 100%. Peripheral blood CD3 and CD33 100%  7/15/24 (D180): Bone marrow 100%. Peripheral blood CD3 and CD33 100%    GVHD  # Current immunosuppression is none  - Sirolimus taper completed on 6/15/24     # No acute GVHD till date  Skin: No changes. Skin score 0.  GI: No symptoms. GI score 0.  Liver: LFTs normal. Score 0     Heme/ Coag  # ABO matched (both O+).   - Check chimerism, B12 and folic acid    # Iron overload: Hx of transfusion dependent anemia, pre-transplant ferritin around 5,000. Ferritin (4/22/24) is 4257. Tsat 62%. Retic count 2.7%  - Liver ferriscan shows severe iron overload  - Started phlebotomy on 7/1/24, plan to continue every 3-4 weeks until ferritin <1000.     ID  # Prophylaxis  PJP: Bactrim. Toxo IgG negative.  Viral: Acyclovir 800 mg bid  CMV: Letermovir completed     # Monitoring  CMV: Monitor every month till undetectable. 9/23/24: negative  EBV: Monitor every month till D180. 9/23/24: not detected.   IgG: Level 544 mg/dL on 6/28/24. Check serum IgG level q3 months, and consider IVIG repletion for IgG levels <400 mg/dL.     GI  # Elevated transaminases: Resolved  AST and ALT elevated beginning 2/2/24 (D22). Notably, she was on tacrolimus for GVHD ppx but changed to sirolimus on 1/31/24. Infectious workup (2/6/24) was negative. Liver US (2/20/24) showed hepatomegaly with increased hepatic parenchymal echogenicity, suggesting hepatic steatosis. Suspect drug induced elevation w/ steatosis and possibly iron overload.     CV  - HTN: Off amlodipine.  - Hyperlipidemia: Managed by PCP    MSK:  # L knee pain and back pain: Has been seen by ortho and referred to physical therapy.    # Left ankle swelling:   - repeat US of the left leg (5/23) negative for DVT  - compression stockings    I spent 30 minutes in the care of this patient today, which included time necessary for preparation for the visit, obtaining history, ordering medications/tests/procedures as medically indicated, review of pertinent medical literature, counseling of the patient, communication of recommendations to the care team, and documentation time.    Coco Garay  Department of Hematology, Oncology and Transplantation  [facility] Pager 1300/Text via iFLYER          Again, thank you for allowing me to participate in the care of your patient.        Sincerely,        SARA Singleton

## 2024-09-27 NOTE — PROGRESS NOTES
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 Date of transplant: 1/11/2024    Diagnosis MDS w/ SF3B1 BMT type Allogenic  CMV  Donor -  Recipient +    Prep: MA Bu/Flu Donor type  6/8 URD Blood Type Donor and recipient O+   GVHD Ppx PTCy/ siro/ MMF Graft source PBSCT Toxo IgG Negative   Protocol [id number] (not on) BMT MD Dr. Garay       Data    Pre-transplant disease history  Referring provider: Dr.Evan Ramey, [facility]   52 y/o F with history of longstanding macrocytic anemia (Hb 10-11, -110 dating back to 1997) presented in Nov 2021 for lightheadedness. CBC showed acute on chronic anemia with Hb 4.7, ; normal WBC and platelet count. Bone marrow biopsy (12/09/2021) was consistent with MDS with low blasts and SF3B1 mutation. Hypercellular marrow (70%) with single lineage dysplasia (dyserythropoiesis), increased ringed sideroblasts and no increase in blasts. Flow cytometry showed mixed lymphocytes. Cytogenetics 46,XX,del(20). NGS: SF3B1 (45%), ASXL1 (6%).   Received Epo (March 2022 - April 2022) but had minimal response after 4 weekly doses. Luspatercept from April 2022 to April 2023 with inadequate response.   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 Treated with weekly rituximab x 4 and steroid taper (Sept to Dec 2022). LDH and bilirubin started rising after steroid taper, therefore treated with MMF 500mg BID (Dec 2022 to May 2023) with resolution of hemolysis (negative KATINA, normal LDH, bilirubin). However, she continued to be transfusion dependant therefore her anemia was determined to be related more to MDS than autoimmune hemolysis.   Repeat bone marrow biopsy (5/11/2023) showed persistent MDS. Hypercellular marrow (90%) but now with dysplasia in all three lineages, cytogenetics  46, XX, del (20). Received decitabine- cedazurdine X 4 cycles (May 2023 to Dec 2023). Complicated by neutropenia requiring dose reduction and pRBC transfusions every 2-3 weeks. Pre-transplant bmbx (12/7/23) showed persistent MDS with multi lineage dysplasia (dyserythropoesis and dysgranulopoesis), 17% ringed sideroblasts, increased marrow storage iron. Cytogenetics: 46,XX,del(20). Flow cytometry and NGS not done.    She was considered for allogenic transplant despite low risk MDS by IPSS-R at diagnosis due to transfusion dependence, ASXL1 mutation and development of multi-lineage dysplasia on bmbx in May 2023 suggesting clonal evolution.      She underwent allogenic stem cell transplant on 1/11/24: myeloablative conditioning with Bu/Flu, 6/8 unrelated donor peripheral blood stem cell graft, cell dose: 6.50E+06. ABO matched, CMV donor negative, recipient positive. GVHD ppx with PTCy, MMF and tacrolimus (avoiding sirolimus due to high risk of VOD, given elevated liver enzymes prior to transplant and suspected iron overload).      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          INTERVAL HISTORY     Irma has been doing well.   She has patches of dry skin on her face and itching on upper back. She was seen by dermatology and diagnosed with seborrheic dermatitis, has not started the topicals yet.   She has bilateral knee pain,has started PT.  Has noticed new headaches, heaviness on top of head, relieved with tylenol.   She has some nausea, but good appetite, no diarrhea.   Has established with PCP.    Plan  - Continue phlebotomy every 4 weeks  - Mild thrombocytopenia and macrocytosis. Check Vitamin B12, foliate and chimerisms  - RTC as scheduled     Current Outpatient Medications   Medication Sig Dispense Refill    acyclovir (ZOVIRAX) 800 MG tablet Take 1 tablet (800 mg) by mouth 2 times daily for 180 days 180 tablet 0     betamethasone dipropionate (DIPROSONE) 0.05 % external cream Apply topically.      cholecalciferol (VITAMIN D3) 1250 mcg (94404 units) capsule Take 1,250 mcg by mouth every 7 days.      sulfamethoxazole-trimethoprim (BACTRIM DS) 800-160 MG tablet Take 1 tablet by mouth Every Mon, Tues two times daily 48 tablet 1    tacrolimus (PROTOPIC) 0.1 % external ointment Apply topically 2 times daily. face 60 g 11    triamcinolone (KENALOG) 0.1 % external ointment Apply topically 2 times daily. hands 80 g 5    augmented betamethasone dipropionate (DIPROLENE) 0.05 % external lotion Apply topically 2 times daily. scalp (Patient not taking: Reported on 9/27/2024) 60 mL 11    triamcinolone (KENALOG) 0.025 % cream Apply topically 2 times daily (Patient not taking: Reported on 9/27/2024)           EXAM      /85   Pulse 88   Temp 98.1  F (36.7  C) (Oral)   Resp 17   Wt 84.5 kg (186 lb 3.2 oz)   LMP 11/05/2017   SpO2 99%   BMI 34.48 kg/m      Wt Readings from Last 4 Encounters:   09/27/24 84.5 kg (186 lb 3.2 oz)   09/23/24 86 kg (189 lb 11.2 oz)   08/12/24 83.6 kg (184 lb 6.4 oz)   08/02/24 82.6 kg (182 lb 1.6 oz)     KPS: 80% Can perform normal activity with effort, some signs of disease    General: Alert, awake  Eyes: Conjunctiva normal  Respiratory: Normal respiratory effort.   Cardiovascular: Left ankle and foot edema + , improved from prior   Abdomen: Soft, point tenderness in the right lower quadrant corresponding to a subcutaneous lipoma   Skin: dry skin on face.  Psych: Mood and affect are appropriate.  No central access.       Laboratory Studies:     Data    Blood Counts  Recent Labs   Lab Test 09/23/24  0947 08/12/24  1229 07/15/24  0750 04/04/24  1218 03/28/24  1119 02/22/24  0350 02/21/24  0816 02/19/24  1203   WBC 6.5 5.9 6.0   < > 4.9   < > 7.7 5.8   HGB 14.6 14.3 13.0   < > 13.0   < > 11.3* 10.0*   * 134* 139*   < > 134*   < > 83* 74*   NEUTROPHIL 58 52 40   < > 36   < > 72 53   ANEU  --   --   --    --  1.8  --  5.5 3.1   LYMPH 32 35 45   < > 26   < > 11 17   ALYM  --   --   --   --  1.3  --  0.8 1.0    < > = values in this interval not displayed.     Basic Panel  Recent Labs   Lab Test 09/23/24  0947 08/12/24  1229 07/15/24  0750    140 141   POTASSIUM 3.7 4.0 4.0   CHLORIDE 103 104 105   CO2 28 25 26   BUN 13.8 12.1 14.6   CR 0.57 0.57 0.60   GLC 88 104* 92       LFTs  Recent Labs   Lab Test 09/23/24  0947 08/12/24  1229 07/15/24  0750   ALKPHOS 144 133 122   AST 39 27 26   ALT 76* 37 33   BILITOTAL 0.4 0.3 0.3   ALBUMIN 4.1 4.1 4.1       Recent Labs   Lab Test 09/23/24  0947 07/15/24  0750 06/28/24  0849   CMVQNT Not Detected <35* <35*       Recent Labs   Lab Test 06/28/24  0849 03/14/24  1338   * 424*       Recent Labs   Lab Test 09/23/24  0947 08/12/24  1229 07/15/24  0750 07/01/24  1459 04/22/24  0951 03/14/24  1222   SONY 2,613* 2,267* 3,176* 4,077* 4,257* 7,899*            ASSESSMENT AND PLAN     BMT for MDS w/SF3B1 mutation  [id number] (according to but not on) with Bu/Flu  6/8 URD, peripheral blood stem cell graft, cell dose: 6.5 x10^6.     Disease status: Bone marrow biopsy at D28, D100, 6 months, 1 year  2/6/24 (~D26): Hypercellular marrow (70-80%), trilineage hemopoiesis with rare nuclear irregularities in terminal erythroid precursors of uncertain significance, 3% ringed sideroblasts (in the context of iron overload is not considered dysplastic finding). Flow showed no increase in myeloid blasts. Cytogenetics 46 XY, consistent with donor. FISH: Rate of loss of 20q within normal limits. NGS: No mutations detected  4/22/24 (D100): Overall cellularity 40-50% with no dysplasia. No ringed sideroblasts. Flow negative. Cytogenetics not done. NGS negative.   7/15/24 (D180): CR, cytogenetics/ FISH not done. NGS negative      Graft status/chimerism: D28, D100, 6 months,1 year  2/6/24 (~D26): Bone marrow 100%, peripheral blood (2/1/24) CD33 100%, CD3 87%  3/14/24 (~D60): Peripheral blood CD3 and  CD33 100%  4/22/24 (D100): Bone marrow 100%. Peripheral blood CD3 and CD33 100%  7/15/24 (D180): Bone marrow 100%. Peripheral blood CD3 and CD33 100%    GVHD  # Current immunosuppression is none  - Sirolimus taper completed on 6/15/24     # No acute GVHD till date  Skin: No changes. Skin score 0.  GI: No symptoms. GI score 0.  Liver: LFTs normal. Score 0     Heme/ Coag  # ABO matched (both O+).   - Check chimerism, B12 and folic acid    # Iron overload: Hx of transfusion dependent anemia, pre-transplant ferritin around 5,000. Ferritin (4/22/24) is 4257. Tsat 62%. Retic count 2.7%  - Liver ferriscan shows severe iron overload  - Started phlebotomy on 7/1/24, plan to continue every 3-4 weeks until ferritin <1000.     ID  # Prophylaxis  PJP: Bactrim. Toxo IgG negative.  Viral: Acyclovir 800 mg bid  CMV: Letermovir completed     # Monitoring  CMV: Monitor every month till undetectable. 9/23/24: negative  EBV: Monitor every month till D180. 9/23/24: not detected.   IgG: Level 544 mg/dL on 6/28/24. Check serum IgG level q3 months, and consider IVIG repletion for IgG levels <400 mg/dL.     GI  # Elevated transaminases: Resolved  AST and ALT elevated beginning 2/2/24 (D22). Notably, she was on tacrolimus for GVHD ppx but changed to sirolimus on 1/31/24. Infectious workup (2/6/24) was negative. Liver US (2/20/24) showed hepatomegaly with increased hepatic parenchymal echogenicity, suggesting hepatic steatosis. Suspect drug induced elevation w/ steatosis and possibly iron overload.     CV  - HTN: Off amlodipine.  - Hyperlipidemia: Managed by PCP    MSK:  # L knee pain and back pain: Has been seen by ortho and referred to physical therapy.   # Left ankle swelling:   - repeat US of the left leg (5/23) negative for DVT  - compression stockings    I spent 30 minutes in the care of this patient today, which included time necessary for preparation for the visit, obtaining history, ordering medications/tests/procedures as medically  indicated, review of pertinent medical literature, counseling of the patient, communication of recommendations to the care team, and documentation time.    Coco Garay  Department of Hematology, Oncology and Transplantation  [employer] Pager 1300/Text via [employer]

## 2024-10-07 ENCOUNTER — LAB (OUTPATIENT)
Dept: LAB | Facility: CLINIC | Age: 54
End: 2024-10-07
Attending: STUDENT IN AN ORGANIZED HEALTH CARE EDUCATION/TRAINING PROGRAM
Payer: COMMERCIAL

## 2024-10-07 DIAGNOSIS — Z94.81 S/P ALLOGENEIC BONE MARROW TRANSPLANT (H): ICD-10-CM

## 2024-10-07 DIAGNOSIS — D46.9 MDS (MYELODYSPLASTIC SYNDROME) (H): ICD-10-CM

## 2024-10-07 LAB
FOLATE SERPL-MCNC: 6.7 NG/ML (ref 4.6–34.8)
LAB DIRECTOR DISCLAIMER: NORMAL
LAB DIRECTOR DISCLAIMER: NORMAL
LAB DIRECTOR INTERPRETATION: NORMAL
LAB DIRECTOR INTERPRETATION: NORMAL
LAB DIRECTOR METHODOLOGY: NORMAL
LAB DIRECTOR METHODOLOGY: NORMAL
LAB DIRECTOR RESULTS: NORMAL
LAB DIRECTOR RESULTS: NORMAL
LOCATION OF TASK: NORMAL
LOCATION OF TASK: NORMAL
SPECIMEN DESCRIPTION: NORMAL
SPECIMEN DESCRIPTION: NORMAL
VIT B12 SERPL-MCNC: 733 PG/ML (ref 232–1245)

## 2024-10-07 PROCEDURE — G0452 MOLECULAR PATHOLOGY INTERPR: HCPCS | Mod: 26 | Performed by: PATHOLOGY

## 2024-10-07 PROCEDURE — 82746 ASSAY OF FOLIC ACID SERUM: CPT

## 2024-10-07 PROCEDURE — 82607 VITAMIN B-12: CPT

## 2024-10-07 PROCEDURE — 81268 CHIMERISM ANAL W/CELL SELECT: CPT

## 2024-10-07 PROCEDURE — 36415 COLL VENOUS BLD VENIPUNCTURE: CPT

## 2024-10-07 RX ORDER — SULFAMETHOXAZOLE AND TRIMETHOPRIM 800; 160 MG/1; MG/1
1 TABLET ORAL
Qty: 48 TABLET | Refills: 0 | Status: SHIPPED | OUTPATIENT
Start: 2024-10-07 | End: 2024-11-08

## 2024-10-07 NOTE — NURSING NOTE
Chief Complaint   Patient presents with    Labs Only     Blood drawn via VPT by LPN.     PAYAL Melissa LPN

## 2024-10-26 RX ORDER — HEPARIN SODIUM (PORCINE) LOCK FLUSH IV SOLN 100 UNIT/ML 100 UNIT/ML
5 SOLUTION INTRAVENOUS
OUTPATIENT
Start: 2024-11-03

## 2024-10-26 RX ORDER — HEPARIN SODIUM,PORCINE 10 UNIT/ML
5-20 VIAL (ML) INTRAVENOUS DAILY PRN
OUTPATIENT
Start: 2024-11-03

## 2024-10-28 ENCOUNTER — INFUSION THERAPY VISIT (OUTPATIENT)
Dept: TRANSPLANT | Facility: CLINIC | Age: 54
End: 2024-10-28
Attending: STUDENT IN AN ORGANIZED HEALTH CARE EDUCATION/TRAINING PROGRAM
Payer: COMMERCIAL

## 2024-10-28 ENCOUNTER — APPOINTMENT (OUTPATIENT)
Dept: LAB | Facility: CLINIC | Age: 54
End: 2024-10-28
Attending: STUDENT IN AN ORGANIZED HEALTH CARE EDUCATION/TRAINING PROGRAM
Payer: COMMERCIAL

## 2024-10-28 VITALS
HEART RATE: 80 BPM | TEMPERATURE: 98.2 F | OXYGEN SATURATION: 98 % | RESPIRATION RATE: 16 BRPM | SYSTOLIC BLOOD PRESSURE: 109 MMHG | DIASTOLIC BLOOD PRESSURE: 75 MMHG

## 2024-10-28 DIAGNOSIS — D46.9 MDS (MYELODYSPLASTIC SYNDROME) (H): Primary | ICD-10-CM

## 2024-10-28 DIAGNOSIS — Z94.81 S/P ALLOGENEIC BONE MARROW TRANSPLANT (H): ICD-10-CM

## 2024-10-28 LAB
ALBUMIN SERPL BCG-MCNC: 4.2 G/DL (ref 3.5–5.2)
ALP SERPL-CCNC: 123 U/L (ref 40–150)
ALT SERPL W P-5'-P-CCNC: ABNORMAL U/L
ANION GAP SERPL CALCULATED.3IONS-SCNC: 10 MMOL/L (ref 7–15)
AST SERPL W P-5'-P-CCNC: 39 U/L (ref 0–45)
BILIRUB SERPL-MCNC: 0.3 MG/DL
BUN SERPL-MCNC: 11.8 MG/DL (ref 6–20)
CALCIUM SERPL-MCNC: 9.2 MG/DL (ref 8.8–10.4)
CHLORIDE SERPL-SCNC: 104 MMOL/L (ref 98–107)
CMV DNA SPEC NAA+PROBE-ACNC: NOT DETECTED IU/ML
CREAT SERPL-MCNC: 0.49 MG/DL (ref 0.51–0.95)
EBV DNA SERPL NAA+PROBE-ACNC: NOT DETECTED IU/ML
EGFRCR SERPLBLD CKD-EPI 2021: >90 ML/MIN/1.73M2
FERRITIN SERPL-MCNC: 2556 NG/ML (ref 11–328)
GLUCOSE SERPL-MCNC: 87 MG/DL (ref 70–99)
HCO3 SERPL-SCNC: 26 MMOL/L (ref 22–29)
HCT VFR BLD AUTO: 44.7 % (ref 35–47)
HGB BLD-MCNC: 15.3 G/DL (ref 11.7–15.7)
POTASSIUM SERPL-SCNC: 4.7 MMOL/L (ref 3.4–5.3)
PROT SERPL-MCNC: 6.8 G/DL (ref 6.4–8.3)
SODIUM SERPL-SCNC: 140 MMOL/L (ref 135–145)

## 2024-10-28 PROCEDURE — 87799 DETECT AGENT NOS DNA QUANT: CPT

## 2024-10-28 PROCEDURE — 84155 ASSAY OF PROTEIN SERUM: CPT

## 2024-10-28 PROCEDURE — 258N000003 HC RX IP 258 OP 636: Performed by: STUDENT IN AN ORGANIZED HEALTH CARE EDUCATION/TRAINING PROGRAM

## 2024-10-28 PROCEDURE — 96360 HYDRATION IV INFUSION INIT: CPT

## 2024-10-28 PROCEDURE — 36415 COLL VENOUS BLD VENIPUNCTURE: CPT

## 2024-10-28 PROCEDURE — 85018 HEMOGLOBIN: CPT | Performed by: STUDENT IN AN ORGANIZED HEALTH CARE EDUCATION/TRAINING PROGRAM

## 2024-10-28 PROCEDURE — 82728 ASSAY OF FERRITIN: CPT | Performed by: STUDENT IN AN ORGANIZED HEALTH CARE EDUCATION/TRAINING PROGRAM

## 2024-10-28 RX ADMIN — SODIUM CHLORIDE 500 ML: 9 INJECTION, SOLUTION INTRAVENOUS at 12:45

## 2024-11-08 DIAGNOSIS — D46.9 MDS (MYELODYSPLASTIC SYNDROME) (H): ICD-10-CM

## 2024-11-08 DIAGNOSIS — Z94.81 S/P ALLOGENEIC BONE MARROW TRANSPLANT (H): ICD-10-CM

## 2024-11-08 RX ORDER — SULFAMETHOXAZOLE AND TRIMETHOPRIM 800; 160 MG/1; MG/1
1 TABLET ORAL
Qty: 48 TABLET | Refills: 0 | Status: SHIPPED | OUTPATIENT
Start: 2024-11-11

## 2024-11-08 RX ORDER — ACYCLOVIR 800 MG/1
800 TABLET ORAL 2 TIMES DAILY
Qty: 180 TABLET | Refills: 0 | Status: SHIPPED | OUTPATIENT
Start: 2024-11-08

## 2024-11-15 ENCOUNTER — APPOINTMENT (OUTPATIENT)
Dept: LAB | Facility: CLINIC | Age: 54
End: 2024-11-15
Attending: STUDENT IN AN ORGANIZED HEALTH CARE EDUCATION/TRAINING PROGRAM
Payer: COMMERCIAL

## 2024-11-15 ENCOUNTER — ONCOLOGY VISIT (OUTPATIENT)
Dept: TRANSPLANT | Facility: CLINIC | Age: 54
End: 2024-11-15
Attending: STUDENT IN AN ORGANIZED HEALTH CARE EDUCATION/TRAINING PROGRAM
Payer: COMMERCIAL

## 2024-11-15 VITALS
HEART RATE: 85 BPM | OXYGEN SATURATION: 99 % | SYSTOLIC BLOOD PRESSURE: 131 MMHG | WEIGHT: 181.1 LBS | DIASTOLIC BLOOD PRESSURE: 78 MMHG | TEMPERATURE: 98 F | BODY MASS INDEX: 33.54 KG/M2 | RESPIRATION RATE: 16 BRPM

## 2024-11-15 DIAGNOSIS — R13.10 DYSPHAGIA, UNSPECIFIED TYPE: ICD-10-CM

## 2024-11-15 DIAGNOSIS — D46.9 MDS (MYELODYSPLASTIC SYNDROME) (H): ICD-10-CM

## 2024-11-15 DIAGNOSIS — Z94.81 S/P ALLOGENEIC BONE MARROW TRANSPLANT (H): ICD-10-CM

## 2024-11-15 DIAGNOSIS — Z13.820 OSTEOPOROSIS SCREENING: Primary | ICD-10-CM

## 2024-11-15 LAB
ALBUMIN SERPL BCG-MCNC: 4.5 G/DL (ref 3.5–5.2)
ALP SERPL-CCNC: 120 U/L (ref 40–150)
ALT SERPL W P-5'-P-CCNC: 54 U/L (ref 0–50)
ANION GAP SERPL CALCULATED.3IONS-SCNC: 11 MMOL/L (ref 7–15)
AST SERPL W P-5'-P-CCNC: 30 U/L (ref 0–45)
BASOPHILS # BLD AUTO: 0 10E3/UL (ref 0–0.2)
BASOPHILS NFR BLD AUTO: 1 %
BILIRUB SERPL-MCNC: 0.5 MG/DL
BUN SERPL-MCNC: 12.6 MG/DL (ref 6–20)
CALCIUM SERPL-MCNC: 9.6 MG/DL (ref 8.8–10.4)
CHLORIDE SERPL-SCNC: 103 MMOL/L (ref 98–107)
CREAT SERPL-MCNC: 0.55 MG/DL (ref 0.51–0.95)
EGFRCR SERPLBLD CKD-EPI 2021: >90 ML/MIN/1.73M2
EOSINOPHIL # BLD AUTO: 0 10E3/UL (ref 0–0.7)
EOSINOPHIL NFR BLD AUTO: 1 %
ERYTHROCYTE [DISTWIDTH] IN BLOOD BY AUTOMATED COUNT: 13.2 % (ref 10–15)
FERRITIN SERPL-MCNC: 2987 NG/ML (ref 11–328)
GLUCOSE SERPL-MCNC: 92 MG/DL (ref 70–99)
HCO3 SERPL-SCNC: 25 MMOL/L (ref 22–29)
HCT VFR BLD AUTO: 43 % (ref 35–47)
HGB BLD-MCNC: 14.9 G/DL (ref 11.7–15.7)
IMM GRANULOCYTES # BLD: 0 10E3/UL
IMM GRANULOCYTES NFR BLD: 1 %
LYMPHOCYTES # BLD AUTO: 2.8 10E3/UL (ref 0.8–5.3)
LYMPHOCYTES NFR BLD AUTO: 42 %
MCH RBC QN AUTO: 33.9 PG (ref 26.5–33)
MCHC RBC AUTO-ENTMCNC: 34.7 G/DL (ref 31.5–36.5)
MCV RBC AUTO: 98 FL (ref 78–100)
MONOCYTES # BLD AUTO: 0.5 10E3/UL (ref 0–1.3)
MONOCYTES NFR BLD AUTO: 8 %
NEUTROPHILS # BLD AUTO: 3.2 10E3/UL (ref 1.6–8.3)
NEUTROPHILS NFR BLD AUTO: 49 %
NRBC # BLD AUTO: 0 10E3/UL
NRBC BLD AUTO-RTO: 0 /100
PLATELET # BLD AUTO: 187 10E3/UL (ref 150–450)
POTASSIUM SERPL-SCNC: 4 MMOL/L (ref 3.4–5.3)
PROT SERPL-MCNC: 7.3 G/DL (ref 6.4–8.3)
RBC # BLD AUTO: 4.39 10E6/UL (ref 3.8–5.2)
SODIUM SERPL-SCNC: 139 MMOL/L (ref 135–145)
WBC # BLD AUTO: 6.5 10E3/UL (ref 4–11)

## 2024-11-15 PROCEDURE — 82728 ASSAY OF FERRITIN: CPT | Performed by: STUDENT IN AN ORGANIZED HEALTH CARE EDUCATION/TRAINING PROGRAM

## 2024-11-15 PROCEDURE — 85025 COMPLETE CBC W/AUTO DIFF WBC: CPT | Performed by: STUDENT IN AN ORGANIZED HEALTH CARE EDUCATION/TRAINING PROGRAM

## 2024-11-15 PROCEDURE — 82040 ASSAY OF SERUM ALBUMIN: CPT | Performed by: STUDENT IN AN ORGANIZED HEALTH CARE EDUCATION/TRAINING PROGRAM

## 2024-11-15 PROCEDURE — G0463 HOSPITAL OUTPT CLINIC VISIT: HCPCS | Performed by: STUDENT IN AN ORGANIZED HEALTH CARE EDUCATION/TRAINING PROGRAM

## 2024-11-15 PROCEDURE — 36415 COLL VENOUS BLD VENIPUNCTURE: CPT | Performed by: STUDENT IN AN ORGANIZED HEALTH CARE EDUCATION/TRAINING PROGRAM

## 2024-11-15 PROCEDURE — 87799 DETECT AGENT NOS DNA QUANT: CPT | Performed by: STUDENT IN AN ORGANIZED HEALTH CARE EDUCATION/TRAINING PROGRAM

## 2024-11-15 PROCEDURE — 80053 COMPREHEN METABOLIC PANEL: CPT | Performed by: STUDENT IN AN ORGANIZED HEALTH CARE EDUCATION/TRAINING PROGRAM

## 2024-11-15 ASSESSMENT — PAIN SCALES - GENERAL: PAINLEVEL_OUTOF10: NO PAIN (0)

## 2024-11-15 NOTE — NURSING NOTE
Chief Complaint   Patient presents with    Blood Draw     Labs drawn via  by RN in lab.  VS taken     Labs collected from venipuncture by RN. Vitals taken. Checked in for appointment(s).    Trina Hunter RN

## 2024-11-15 NOTE — LETTER
11/15/2024      Hortencia Baldwin  [address]  [facility] [address]      Dear Colleague,    Thank you for referring your patient, Hortencia Baldwin, to the [facility] [place]. Please see a copy of my visit note below.    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 Date of transplant: 1/11/2024    Diagnosis MDS w/ SF3B1 BMT type Allogenic  CMV  Donor -  Recipient +    Prep: MA Bu/Flu Donor type  6/8 URD Blood Type Donor and recipient O+   GVHD Ppx PTCy/ siro/ MMF Graft source PBSCT Toxo IgG Negative   Protocol [id number] (not on) BMT MD Dr. Garay       Data   Pre-transplant disease history  Referring provider: Dr.Evan Ramey, [facility]   52 y/o F with history of longstanding macrocytic anemia (Hb 10-11, -110 dating back to 1997) presented in Nov 2021 for lightheadedness. CBC showed acute on chronic anemia with Hb 4.7, ; normal WBC and platelet count. Bone marrow biopsy (12/09/2021) was consistent with MDS with low blasts and SF3B1 mutation. Hypercellular marrow (70%) with single lineage dysplasia (dyserythropoiesis), increased ringed sideroblasts and no increase in blasts. Flow cytometry showed mixed lymphocytes. Cytogenetics 46,XX,del(20). NGS: SF3B1 (45%), ASXL1 (6%).   Received Epo (March 2022 - April 2022) but had minimal response after 4 weekly doses. Luspatercept from April 2022 to April 2023 with inadequate response.   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 Treated with weekly rituximab x 4 and steroid taper (Sept to Dec 2022). LDH and bilirubin started rising after steroid taper, therefore treated with MMF 500mg BID (Dec 2022 to May 2023) with resolution of hemolysis (negative KATINA, normal LDH, bilirubin). However,  she continued to be transfusion dependant therefore her anemia was determined to be related more to MDS than autoimmune hemolysis.   Repeat bone marrow biopsy (5/11/2023) showed persistent MDS. Hypercellular marrow (90%) but now with dysplasia in all three lineages, cytogenetics 46, XX, del (20). Received decitabine- cedazurdine X 4 cycles (May 2023 to Dec 2023). Complicated by neutropenia requiring dose reduction and pRBC transfusions every 2-3 weeks. Pre-transplant bmbx (12/7/23) showed persistent MDS with multi lineage dysplasia (dyserythropoesis and dysgranulopoesis), 17% ringed sideroblasts, increased marrow storage iron. Cytogenetics: 46,XX,del(20). Flow cytometry and NGS not done.    She was considered for allogenic transplant despite low risk MDS by IPSS-R at diagnosis due to transfusion dependence, ASXL1 mutation and development of multi-lineage dysplasia on bmbx in May 2023 suggesting clonal evolution.      She underwent allogenic stem cell transplant on 1/11/24: myeloablative conditioning with Bu/Flu, 6/8 unrelated donor peripheral blood stem cell graft, cell dose: 6.50E+06. ABO matched, CMV donor negative, recipient positive. GVHD ppx with PTCy, MMF and tacrolimus (avoiding sirolimus due to high risk of VOD, given elevated liver enzymes prior to transplant and suspected iron overload).      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        INTERVAL HISTORY     Irma has been doing well.   - Complains of small bumps/ itching over her chin. On exam, has very scattered acneform rash  - Dry mouth but no sensitivity  - Main complaint is that she has noticed difficulty swallowing, but only with dry foods like breads. Does not happen each time she eats, but has noticed it once a day. Has to spit food out sometimes. Improves after she takes a sip of water. No reflux or heartburn  - No nausea, or vomiting. Lost a few pounds  over the past month unintentionally. Appetite has been good.     Plan  - continue phlebotomy every 4 weeks  - Will order EGD and swallow evaluation. No other signs of cGVHD, might be related to dry mouth.   - Schedule for - 1 year bone marrow biopsy and survivorship  - Established with PCP. Advised her to set up dental appt  - Advised her to get flu and COVID vaccine  - RTC after BMBx      Current Outpatient Medications   Medication Sig Dispense Refill     acyclovir (ZOVIRAX) 800 MG tablet Take 1 tablet (800 mg) by mouth 2 times daily. 180 tablet 0     betamethasone dipropionate (DIPROSONE) 0.05 % external cream Apply topically.       sulfamethoxazole-trimethoprim (BACTRIM DS) 800-160 MG tablet Take 1 tablet by mouth Every Mon, Tues two times daily. 48 tablet 0     triamcinolone (KENALOG) 0.1 % external ointment Apply topically 2 times daily. hands 80 g 5     augmented betamethasone dipropionate (DIPROLENE) 0.05 % external lotion Apply topically 2 times daily. scalp 60 mL 11     cholecalciferol (VITAMIN D3) 1250 mcg (28324 units) capsule Take 1,250 mcg by mouth every 7 days. (Patient not taking: Reported on 11/18/2024)       tacrolimus (PROTOPIC) 0.1 % external ointment Apply topically 2 times daily. face 60 g 11     triamcinolone (KENALOG) 0.025 % cream Apply topically 2 times daily (Patient not taking: Reported on 9/27/2024)           EXAM      /78   Pulse 85   Temp 98  F (36.7  C) (Oral)   Resp 16   Wt 82.1 kg (181 lb 1.6 oz)   LMP 11/05/2017   SpO2 99%   BMI 33.54 kg/m      Wt Readings from Last 4 Encounters:   11/18/24 83.3 kg (183 lb 11.2 oz)   11/15/24 82.1 kg (181 lb 1.6 oz)   09/27/24 84.5 kg (186 lb 3.2 oz)   09/23/24 86 kg (189 lb 11.2 oz)     KPS: 80% Can perform normal activity with effort, some signs of disease    General: Alert, awake  Eyes: Conjunctiva normal  Mouth: Moist mucosal membranes, pharynx normal  Respiratory: Normal respiratory effort.   Cardiovascular: Left ankle and foot  edema mild  Skin: acneform rash over chin  Psych: Mood and affect are appropriate.  No central access.       Laboratory Studies:     Data   Blood Counts  Recent Labs   Lab Test 11/18/24  1133 11/15/24  1301 10/28/24  1003 09/23/24  0947 08/12/24  1229 04/04/24  1218 03/28/24  1119 02/22/24  0350 02/21/24  0816 02/19/24  1203   WBC  --  6.5  --  6.5 5.9   < > 4.9   < > 7.7 5.8   HGB 13.8 14.9 15.3 14.6 14.3   < > 13.0   < > 11.3* 10.0*   PLT  --  187  --  148* 134*   < > 134*   < > 83* 74*   NEUTROPHIL  --  49  --  58 52   < > 36   < > 72 53   ANEU  --   --   --   --   --   --  1.8  --  5.5 3.1   LYMPH  --  42  --  32 35   < > 26   < > 11 17   ALYM  --   --   --   --   --   --  1.3  --  0.8 1.0    < > = values in this interval not displayed.     Basic Panel  Recent Labs   Lab Test 11/15/24  1301 10/28/24  1003 09/23/24  0947    140 140   POTASSIUM 4.0 4.7 3.7   CHLORIDE 103 104 103   CO2 25 26 28   BUN 12.6 11.8 13.8   CR 0.55 0.49* 0.57   GLC 92 87 88       LFTs  Recent Labs   Lab Test 11/15/24  1301 10/28/24  1003 09/23/24  0947 08/12/24  1229   ALKPHOS 120 123 144 133   AST 30 39 39 27   ALT 54*  --  76* 37   BILITOTAL 0.5 0.3 0.4 0.3   ALBUMIN 4.5 4.2 4.1 4.1       Recent Labs   Lab Test 11/15/24  1301 10/28/24  1003 09/23/24  0947   CMVQNT Not Detected Not Detected Not Detected       Recent Labs   Lab Test 06/28/24  0849 03/14/24  1338   * 424*       Recent Labs   Lab Test 11/18/24  1133 11/15/24  1301 10/28/24  1003 09/23/24  0947 08/12/24  1229 07/15/24  0750   SONY 2,328* 2,987* 2,556* 2,613* 2,267* 3,176*            ASSESSMENT AND PLAN     BMT for MDS w/SF3B1 mutation  [id number] (according to but not on) with Bu/Flu  6/8 URD, peripheral blood stem cell graft, cell dose: 6.5 x10^6.     Disease status: Bone marrow biopsy at D28, D100, 6 months, 1 year  2/6/24 (~D26): Hypercellular marrow (70-80%), trilineage hemopoiesis with rare nuclear irregularities in terminal erythroid precursors of uncertain  significance, 3% ringed sideroblasts (in the context of iron overload is not considered dysplastic finding). Flow showed no increase in myeloid blasts. Cytogenetics 46 XY, consistent with donor. FISH: Rate of loss of 20q within normal limits. NGS: No mutations detected  4/22/24 (D100): Overall cellularity 40-50% with no dysplasia. No ringed sideroblasts. Flow negative. Cytogenetics not done. NGS negative.   7/15/24 (D180): CR, cytogenetics/ FISH not done. NGS negative      Graft status/chimerism: D28, D100, 6 months,1 year  2/6/24 (~D26): Bone marrow 100%, peripheral blood (2/1/24) CD33 100%, CD3 87%  3/14/24 (~D60): Peripheral blood CD3 and CD33 100%  4/22/24 (D100): Bone marrow 100%. Peripheral blood CD3 and CD33 100%  7/15/24 (D180): Bone marrow 100%. Peripheral blood CD3 and CD33 100%  10/7/24: Peripheral blood CD3 and CD33 100%    GVHD  # Current immunosuppression is none  - Sirolimus taper completed on 6/15/24     # No acute GVHD till date  Skin: No changes. Skin score 0.  GI: No symptoms. GI score 0.  Liver: LFTs normal. Score 0     Heme/ Coag  # ABO matched (both O+).   - Mild macrocytosis: B12, folic acid normal (10/7/24)    # Iron overload: Hx of transfusion dependent anemia, pre-transplant ferritin around 5,000. Ferritin (4/22/24) is 4257. Tsat 62%. Retic count 2.7%  - Liver ferriscan shows severe iron overload  - Started phlebotomy on 7/1/24, plan to continue every 3-4 weeks until ferritin <1000.     ID  # Prophylaxis  PJP: Bactrim. Toxo IgG negative.  Viral: Acyclovir 800 mg bid  CMV: Letermovir completed     # Monitoring  CMV: Has been negative. No further monitoring needed.  EBV: Has been negative. No further monitoring needed.   IgG: Level 544 mg/dL on 6/28/24. Check serum IgG level q3 months, and consider IVIG repletion for IgG levels <400 mg/dL.     Derm  Seborrheic dermatitis  - current tx: augmented betamethasone lotion, tacrolimus ointment, H&S shampoo  Hand dermatitis  - current tx:  triamcinolone ointment  Follows with dermatology    GI  # Elevated transaminases: Resolved  AST and ALT elevated beginning 2/2/24 (D22). Notably, she was on tacrolimus for GVHD ppx but changed to sirolimus on 1/31/24. Infectious workup (2/6/24) was negative. Liver US (2/20/24) showed hepatomegaly with increased hepatic parenchymal echogenicity, suggesting hepatic steatosis. Suspect drug induced elevation w/ steatosis and possibly iron overload.     CV  - HTN: Off amlodipine.  - Hyperlipidemia: Managed by PCP    MSK:  # L knee pain and back pain: Improving with PT  # Left ankle swelling:   - repeat US of the left leg (5/23) negative for DVT  - compression stockings    Health Maintenance   DEXA and Vitamin D: At 1-year post transplant. Vitamin D on 9/3/24 was 15.9  PFT: At 1 year post transplant. Repeat if symptomatic.   Thyroid: Check TSH and free T4 at 1 year post-transplant, then yearly, through primary physician.   Lipids: Check lipid panel q6-12 months through primary physician.  Skin: Annual skin checks through Dermatology; established  Dental visit at 1 year     Post-transplant vaccinations  COVID, Flu     I spent 45 minutes in the care of this patient today, which included time necessary for preparation for the visit, obtaining history, ordering medications/tests/procedures as medically indicated, review of pertinent medical literature, counseling of the patient, communication of recommendations to the care team, and documentation time.    Coco Garay  Department of Hematology, Oncology and Transplantation  [facility] Pager 1300/Text via [employer]          Again, thank you for allowing me to participate in the care of your patient.        Sincerely,        SARA Singleton

## 2024-11-15 NOTE — NURSING NOTE
"Oncology Rooming Note    November 15, 2024 1:07 PM   Hortencia Baldwin is a 54 year old female who presents for:    Chief Complaint   Patient presents with    Blood Draw     Labs drawn via  by RN in lab.  VS taken    Oncology Clinic Visit     Myelodysplastic syndrome      Initial Vitals: /78   Pulse 85   Temp 98  F (36.7  C) (Oral)   Resp 16   Wt 82.1 kg (181 lb 1.6 oz)   LMP 11/05/2017   SpO2 99%   BMI 33.54 kg/m   Estimated body mass index is 33.54 kg/m  as calculated from the following:    Height as of 3/28/24: 1.565 m (5' 1.61\").    Weight as of this encounter: 82.1 kg (181 lb 1.6 oz). Body surface area is 1.89 meters squared.  No Pain (0) Comment: Data Unavailable   Patient's last menstrual period was 11/05/2017.  Allergies reviewed: Yes  Medications reviewed: Yes    Medications: Medication refills not needed today.  Pharmacy name entered into [facility]:    [id number] - [place], [place] - [address] AT [facility] [address]  [id number] - [place], [place] - [address] [facility] [address] [facility] [employer] [place] [address] [address] [place] [address]  [id number] - [place], [place] - [address] AT [facility] OF [facility] [address]    Frailty Screening:   Is the patient here for a new oncology consult visit in cancer care? 2. No      Clinical concerns:  none      Michelle Bauman              "

## 2024-11-15 NOTE — PROGRESS NOTES
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 Date of transplant: 1/11/2024    Diagnosis MDS w/ SF3B1 BMT type Allogenic  CMV  Donor -  Recipient +    Prep: MA Bu/Flu Donor type  6/8 URD Blood Type Donor and recipient O+   GVHD Ppx PTCy/ siro/ MMF Graft source PBSCT Toxo IgG Negative   Protocol [id number] (not on) BMT MD Dr. Garay       Data    Pre-transplant disease history  Referring provider: Dr.Evan Ramey, [facility]   54 y/o F with history of longstanding macrocytic anemia (Hb 10-11, -110 dating back to 1997) presented in Nov 2021 for lightheadedness. CBC showed acute on chronic anemia with Hb 4.7, ; normal WBC and platelet count. Bone marrow biopsy (12/09/2021) was consistent with MDS with low blasts and SF3B1 mutation. Hypercellular marrow (70%) with single lineage dysplasia (dyserythropoiesis), increased ringed sideroblasts and no increase in blasts. Flow cytometry showed mixed lymphocytes. Cytogenetics 46,XX,del(20). NGS: SF3B1 (45%), ASXL1 (6%).   Received Epo (March 2022 - April 2022) but had minimal response after 4 weekly doses. Luspatercept from April 2022 to April 2023 with inadequate response.   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 Treated with weekly rituximab x 4 and steroid taper (Sept to Dec 2022). LDH and bilirubin started rising after steroid taper, therefore treated with MMF 500mg BID (Dec 2022 to May 2023) with resolution of hemolysis (negative KATINA, normal LDH, bilirubin). However, she continued to be transfusion dependant therefore her anemia was determined to be related more to MDS than autoimmune hemolysis.   Repeat bone marrow biopsy (5/11/2023) showed persistent MDS. Hypercellular marrow (90%) but now with dysplasia in all three lineages, cytogenetics  46, XX, del (20). Received decitabine- cedazurdine X 4 cycles (May 2023 to Dec 2023). Complicated by neutropenia requiring dose reduction and pRBC transfusions every 2-3 weeks. Pre-transplant bmbx (12/7/23) showed persistent MDS with multi lineage dysplasia (dyserythropoesis and dysgranulopoesis), 17% ringed sideroblasts, increased marrow storage iron. Cytogenetics: 46,XX,del(20). Flow cytometry and NGS not done.    She was considered for allogenic transplant despite low risk MDS by IPSS-R at diagnosis due to transfusion dependence, ASXL1 mutation and development of multi-lineage dysplasia on bmbx in May 2023 suggesting clonal evolution.      She underwent allogenic stem cell transplant on 1/11/24: myeloablative conditioning with Bu/Flu, 6/8 unrelated donor peripheral blood stem cell graft, cell dose: 6.50E+06. ABO matched, CMV donor negative, recipient positive. GVHD ppx with PTCy, MMF and tacrolimus (avoiding sirolimus due to high risk of VOD, given elevated liver enzymes prior to transplant and suspected iron overload).      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          INTERVAL HISTORY     Irma has been doing well.     Itching over chin, small bumps.   Knee pain is better with PT. Has elbow pain.   Some difficulty swallowing only with dry food like bread. Not every time she eats, once a day. Has to spit food out sometimes. Ok after she takes a sip of water. No reflux or heartburn.   No nausea, vomiting. Used to have diarrhea once a day, getting better now. For about a month. It might be related to what she eats, after eating outside.   Dry mouth  Lost a few pounds, not trying to lose weight.     Plan  - phlebotomy continue  - egd and speech eval  - flu and covid shot : she will get from outside  - dentist  - 1 year bone marrow, survivorship, follow up, dxa and pft        She has patches of dry skin on her face and  itching on upper back. She was seen by dermatology and diagnosed with seborrheic dermatitis, has not started the topicals yet.   She has bilateral knee pain,has started PT.  Has noticed new headaches, heaviness on top of head, relieved with tylenol.   She has some nausea, but good appetite, no diarrhea.   Has established with PCP.    Plan  - Continue phlebotomy every 4 weeks  - Mild thrombocytopenia and macrocytosis. Check Vitamin B12, foliate and chimerisms  - RTC as scheduled     Current Outpatient Medications   Medication Sig Dispense Refill    acyclovir (ZOVIRAX) 800 MG tablet Take 1 tablet (800 mg) by mouth 2 times daily. 180 tablet 0    betamethasone dipropionate (DIPROSONE) 0.05 % external cream Apply topically.      sulfamethoxazole-trimethoprim (BACTRIM DS) 800-160 MG tablet Take 1 tablet by mouth Every Mon, Tues two times daily. 48 tablet 0    triamcinolone (KENALOG) 0.1 % external ointment Apply topically 2 times daily. hands 80 g 5    augmented betamethasone dipropionate (DIPROLENE) 0.05 % external lotion Apply topically 2 times daily. scalp (Patient not taking: Reported on 9/27/2024) 60 mL 11    cholecalciferol (VITAMIN D3) 1250 mcg (19621 units) capsule Take 1,250 mcg by mouth every 7 days. (Patient not taking: Reported on 11/15/2024)      tacrolimus (PROTOPIC) 0.1 % external ointment Apply topically 2 times daily. face (Patient not taking: Reported on 11/15/2024) 60 g 11    triamcinolone (KENALOG) 0.025 % cream Apply topically 2 times daily (Patient not taking: Reported on 11/15/2024)           EXAM      /78   Pulse 85   Temp 98  F (36.7  C) (Oral)   Resp 16   Wt 82.1 kg (181 lb 1.6 oz)   LMP 11/05/2017   SpO2 99%   BMI 33.54 kg/m      Wt Readings from Last 4 Encounters:   11/15/24 82.1 kg (181 lb 1.6 oz)   09/27/24 84.5 kg (186 lb 3.2 oz)   09/23/24 86 kg (189 lb 11.2 oz)   08/12/24 83.6 kg (184 lb 6.4 oz)     KPS: 80% Can perform normal activity with effort, some signs of  disease    General: Alert, awake  Eyes: Conjunctiva normal  Respiratory: Normal respiratory effort.   Cardiovascular: Left ankle and foot edema + , improved from prior   Abdomen: Soft, point tenderness in the right lower quadrant corresponding to a subcutaneous lipoma   Skin: dry skin on face.  Psych: Mood and affect are appropriate.  No central access.       Laboratory Studies:     Data    Blood Counts  Recent Labs   Lab Test 11/15/24  1301 10/28/24  1003 09/23/24  0947 08/12/24  1229 04/04/24  1218 03/28/24  1119 02/22/24  0350 02/21/24  0816 02/19/24  1203   WBC 6.5  --  6.5 5.9   < > 4.9   < > 7.7 5.8   HGB 14.9 15.3 14.6 14.3   < > 13.0   < > 11.3* 10.0*     --  148* 134*   < > 134*   < > 83* 74*   NEUTROPHIL 49  --  58 52   < > 36   < > 72 53   ANEU  --   --   --   --   --  1.8  --  5.5 3.1   LYMPH 42  --  32 35   < > 26   < > 11 17   ALYM  --   --   --   --   --  1.3  --  0.8 1.0    < > = values in this interval not displayed.     Basic Panel  Recent Labs   Lab Test 10/28/24  1003 09/23/24  0947 08/12/24  1229    140 140   POTASSIUM 4.7 3.7 4.0   CHLORIDE 104 103 104   CO2 26 28 25   BUN 11.8 13.8 12.1   CR 0.49* 0.57 0.57   GLC 87 88 104*       LFTs  Recent Labs   Lab Test 10/28/24  1003 09/23/24  0947 08/12/24  1229 07/15/24  0750   ALKPHOS 123 144 133 122   AST 39 39 27 26   ALT  --  76* 37 33   BILITOTAL 0.3 0.4 0.3 0.3   ALBUMIN 4.2 4.1 4.1 4.1       Recent Labs   Lab Test 10/28/24  1003 09/23/24  0947 07/15/24  0750   CMVQNT Not Detected Not Detected <35*       Recent Labs   Lab Test 06/28/24  0849 03/14/24  1338   * 424*       Recent Labs   Lab Test 10/28/24  1003 09/23/24  0947 08/12/24  1229 07/15/24  0750 07/01/24  1459 04/22/24  0951   SONY 2,556* 2,613* 2,267* 3,176* 4,077* 4,257*            ASSESSMENT AND PLAN     BMT for MDS w/SF3B1 mutation  [id number] (according to but not on) with Bu/Flu  6/8 URD, peripheral blood stem cell graft, cell dose: 6.5 x10^6.     Disease status:  Bone marrow biopsy at D28, D100, 6 months, 1 year  2/6/24 (~D26): Hypercellular marrow (70-80%), trilineage hemopoiesis with rare nuclear irregularities in terminal erythroid precursors of uncertain significance, 3% ringed sideroblasts (in the context of iron overload is not considered dysplastic finding). Flow showed no increase in myeloid blasts. Cytogenetics 46 XY, consistent with donor. FISH: Rate of loss of 20q within normal limits. NGS: No mutations detected  4/22/24 (D100): Overall cellularity 40-50% with no dysplasia. No ringed sideroblasts. Flow negative. Cytogenetics not done. NGS negative.   7/15/24 (D180): CR, cytogenetics/ FISH not done. NGS negative      Graft status/chimerism: D28, D100, 6 months,1 year  2/6/24 (~D26): Bone marrow 100%, peripheral blood (2/1/24) CD33 100%, CD3 87%  3/14/24 (~D60): Peripheral blood CD3 and CD33 100%  4/22/24 (D100): Bone marrow 100%. Peripheral blood CD3 and CD33 100%  7/15/24 (D180): Bone marrow 100%. Peripheral blood CD3 and CD33 100%    GVHD  # Current immunosuppression is none  - Sirolimus taper completed on 6/15/24     # No acute GVHD till date  Skin: No changes. Skin score 0.  GI: No symptoms. GI score 0.  Liver: LFTs normal. Score 0     Heme/ Coag  # ABO matched (both O+).   - Check chimerism, B12 and folic acid    # Iron overload: Hx of transfusion dependent anemia, pre-transplant ferritin around 5,000. Ferritin (4/22/24) is 4257. Tsat 62%. Retic count 2.7%  - Liver ferriscan shows severe iron overload  - Started phlebotomy on 7/1/24, plan to continue every 3-4 weeks until ferritin <1000.       ID  # Prophylaxis  PJP: Bactrim. Toxo IgG negative.  Viral: Acyclovir 800 mg bid  CMV: Letermovir completed     # Monitoring  CMV: Monitor every month till undetectable. 9/23/24: negative  EBV: Monitor every month till D180. 9/23/24: not detected.   IgG: Level 544 mg/dL on 6/28/24. Check serum IgG level q3 months, and consider IVIG repletion for IgG levels <400 mg/dL.      GI  # Elevated transaminases: Resolved  AST and ALT elevated beginning 2/2/24 (D22). Notably, she was on tacrolimus for GVHD ppx but changed to sirolimus on 1/31/24. Infectious workup (2/6/24) was negative. Liver US (2/20/24) showed hepatomegaly with increased hepatic parenchymal echogenicity, suggesting hepatic steatosis. Suspect drug induced elevation w/ steatosis and possibly iron overload.     CV  - HTN: Off amlodipine.  - Hyperlipidemia: Managed by PCP    MSK:  # L knee pain and back pain: Has been seen by ortho and referred to physical therapy.   # Left ankle swelling:   - repeat US of the left leg (5/23) negative for DVT  - compression stockings    I spent 30 minutes in the care of this patient today, which included time necessary for preparation for the visit, obtaining history, ordering medications/tests/procedures as medically indicated, review of pertinent medical literature, counseling of the patient, communication of recommendations to the care team, and documentation time.    Coco Garay  Department of Hematology, Oncology and Transplantation  [facility] Pager 1300/Text via [employer]

## 2024-11-15 NOTE — PLAN OF CARE
"Goal Outcome Evaluation:  /76 (BP Location: Left arm)   Pulse 97   Temp 97.7  F (36.5  C) (Oral)   Resp 16   Ht 1.565 m (5' 1.61\")   Wt 77.7 kg (171 lb 6.4 oz)   LMP 11/05/2017   SpO2 94%   BMI 31.74 kg/m     Intermittently hypertensive and tachy. RQU and LUQ is tender to touch and hard which is consistent from yesterday. All other vitals stable and afebrile. Oxycodone given 2x during shift prn per pt request. Pt rates pain 2/10 when resting and 6/10 when urinating. Pt requesting next dose of oxycodone with morning medication. Urgency when urinating. One episode of dizziness when standing and called out for assistance to bathroom. Reminded pt to get up slowly and call out again if she feels dizzy again. No replacements needed. Continue with plan of care.   Problem: Adult Inpatient Plan of Care  Goal: Plan of Care Review  Description: The Plan of Care Review/Shift note should be completed every shift.  The Outcome Evaluation is a brief statement about your assessment that the patient is improving, declining, or no change.  This information will be displayed automatically on your shift  note.  Outcome: Progressing  Goal: Patient-Specific Goal (Individualized)  Description: You can add care plan individualizations to a care plan. Examples of Individualization might be:  \"Parent requests to be called daily at 9am for status\", \"I have a hard time hearing out of my right ear\", or \"Do not touch me to wake me up as it startles  me\".  Outcome: Progressing  Goal: Absence of Hospital-Acquired Illness or Injury  Outcome: Progressing  Intervention: Identify and Manage Fall Risk  Recent Flowsheet Documentation  Taken 2/28/2024 0400 by Daly Velarde, RN  Safety Promotion/Fall Prevention: safety round/check completed  Taken 2/28/2024 0200 by Daly Velarde, RN  Safety Promotion/Fall Prevention: safety round/check completed  Taken 2/27/2024 2352 by Daly Velarde, RN  Safety Promotion/Fall Prevention: safety " round/check completed  Taken 2/27/2024 2038 by Daly Velarde RN  Safety Promotion/Fall Prevention: safety round/check completed  Intervention: Prevent Skin Injury  Recent Flowsheet Documentation  Taken 2/27/2024 2038 by Daly Velarde RN  Skin Protection: adhesive use limited  Device Skin Pressure Protection: adhesive use limited  Taken 2/27/2024 2035 by Daly Velarde RN  Body Position: position changed independently  Intervention: Prevent and Manage VTE (Venous Thromboembolism) Risk  Recent Flowsheet Documentation  Taken 2/27/2024 2038 by Daly Velarde RN  VTE Prevention/Management: SCDs (sequential compression devices) off  Intervention: Prevent Infection  Recent Flowsheet Documentation  Taken 2/27/2024 2038 by Daly Velarde RN  Infection Prevention:   cohorting utilized   environmental surveillance performed   equipment surfaces disinfected   hand hygiene promoted   personal protective equipment utilized   rest/sleep promoted   single patient room provided   visitors restricted/screened  Goal: Optimal Comfort and Wellbeing  Outcome: Progressing  Intervention: Monitor Pain and Promote Comfort  Recent Flowsheet Documentation  Taken 2/28/2024 0400 by Daly Velarde RN  Pain Management Interventions: medication offered but refused  Taken 2/28/2024 0354 by Daly Velarde RN  Pain Management Interventions: medication offered but refused  Taken 2/28/2024 0010 by Daly Velarde RN  Pain Management Interventions: medication (see MAR)  Taken 2/27/2024 2035 by Daly Velarde RN  Pain Management Interventions: medication offered but refused  Goal: Readiness for Transition of Care  Outcome: Progressing     Problem: Urinary Retention  Goal: Effective Urinary Elimination  Outcome: Progressing  Intervention: Promote Effective Urine Elimination  Recent Flowsheet Documentation  Taken 2/27/2024 2038 by Daly Velarde RN  Bowel Function Promotion: prompt response to urge promoted   Pt  requested oxycodone 2x prn during shift for bladder pain which at rest is a 2/10 but during urination increases to a 6/10 for pain. A&OX4, resting between cares. No replacements needed. Continue with plan of care.                        continue on antihypertensive medications

## 2024-11-16 LAB
CMV DNA SPEC NAA+PROBE-ACNC: NOT DETECTED IU/ML
SPECIMEN TYPE: NORMAL

## 2024-11-17 LAB — EBV DNA SERPL NAA+PROBE-ACNC: NOT DETECTED IU/ML

## 2024-11-17 RX ORDER — HEPARIN SODIUM,PORCINE 10 UNIT/ML
5-20 VIAL (ML) INTRAVENOUS DAILY PRN
OUTPATIENT
Start: 2024-11-25

## 2024-11-17 RX ORDER — HEPARIN SODIUM (PORCINE) LOCK FLUSH IV SOLN 100 UNIT/ML 100 UNIT/ML
5 SOLUTION INTRAVENOUS
OUTPATIENT
Start: 2024-11-25

## 2024-11-18 ENCOUNTER — INFUSION THERAPY VISIT (OUTPATIENT)
Dept: TRANSPLANT | Facility: CLINIC | Age: 54
End: 2024-11-18
Attending: STUDENT IN AN ORGANIZED HEALTH CARE EDUCATION/TRAINING PROGRAM
Payer: COMMERCIAL

## 2024-11-18 VITALS
TEMPERATURE: 98.1 F | OXYGEN SATURATION: 99 % | WEIGHT: 183.7 LBS | RESPIRATION RATE: 16 BRPM | HEART RATE: 74 BPM | DIASTOLIC BLOOD PRESSURE: 85 MMHG | BODY MASS INDEX: 34.02 KG/M2 | SYSTOLIC BLOOD PRESSURE: 140 MMHG

## 2024-11-18 DIAGNOSIS — Z94.81 S/P ALLOGENEIC BONE MARROW TRANSPLANT (H): ICD-10-CM

## 2024-11-18 DIAGNOSIS — D46.9 MDS (MYELODYSPLASTIC SYNDROME) (H): Primary | ICD-10-CM

## 2024-11-18 LAB
FERRITIN SERPL-MCNC: 2328 NG/ML (ref 11–328)
HCT VFR BLD AUTO: 40.5 % (ref 35–47)
HGB BLD-MCNC: 13.8 G/DL (ref 11.7–15.7)

## 2024-11-18 PROCEDURE — 99195 PHLEBOTOMY: CPT

## 2024-11-18 PROCEDURE — 96360 HYDRATION IV INFUSION INIT: CPT

## 2024-11-18 PROCEDURE — 36415 COLL VENOUS BLD VENIPUNCTURE: CPT | Performed by: STUDENT IN AN ORGANIZED HEALTH CARE EDUCATION/TRAINING PROGRAM

## 2024-11-18 PROCEDURE — 85018 HEMOGLOBIN: CPT | Performed by: STUDENT IN AN ORGANIZED HEALTH CARE EDUCATION/TRAINING PROGRAM

## 2024-11-18 PROCEDURE — 82728 ASSAY OF FERRITIN: CPT | Performed by: STUDENT IN AN ORGANIZED HEALTH CARE EDUCATION/TRAINING PROGRAM

## 2024-11-18 PROCEDURE — 258N000003 HC RX IP 258 OP 636: Performed by: STUDENT IN AN ORGANIZED HEALTH CARE EDUCATION/TRAINING PROGRAM

## 2024-11-18 RX ADMIN — SODIUM CHLORIDE 500 ML: 9 INJECTION, SOLUTION INTRAVENOUS at 13:50

## 2024-11-18 ASSESSMENT — PAIN SCALES - GENERAL: PAINLEVEL_OUTOF10: MODERATE PAIN (4)

## 2024-11-18 NOTE — PROGRESS NOTES
Infusion Nursing Note:    LN presents today for Therapeutic Phlebotomy.    Patient seen by provider today: No   present during visit today: Not Applicable.    Note: Pt's with ferritin level 2328 today hgb 13.8. Pt phlebotomized total of 500 mL in about 60 minutes without incident. VS stable prior to and following procedure. Pt denies dizziness or lightheadedness, and received 500 ml NS Bolus post infusion Encouraged to drink plenty of PO fluids. PIV removed and pt discharged with no further concerns.       Intravenous Access:  Peripheral IV placed.    Treatment Conditions:  Lab Results   Component Value Date    HGB 13.8 11/18/2024    WBC 6.5 11/15/2024    ANEU 1.8 03/28/2024    ANEUTAUTO 3.2 11/15/2024     11/15/2024            Post Infusion Assessment:  Patient tolerated infusion without incident.  Blood return noted pre and post infusion.  Site patent and intact, free from redness, edema or discomfort.  No evidence of extravasations.       Discharge Plan:   Patient and/or family verbalized understanding of discharge instructions and all questions answered.  Patient discharged in stable condition accompanied by: .

## 2024-11-18 NOTE — NURSING NOTE
Chief Complaint   Patient presents with    Blood Draw     Vitals, blood drawn and PIV placed by LPN. Pt checked into appt.      PAYAL Melissa LPN

## 2024-11-21 ENCOUNTER — TELEPHONE (OUTPATIENT)
Dept: GASTROENTEROLOGY | Facility: CLINIC | Age: 54
End: 2024-11-21
Payer: COMMERCIAL

## 2024-11-21 NOTE — TELEPHONE ENCOUNTER
"Endoscopy Scheduling Screen    Have you had any respiratory illness or flu-like symptoms in the last 10 days?  No    What is your communication preference for Instructions and/or Bowel Prep?   MyChart    What insurance is in the chart?  Other:  [employer]    Ordering/Referring Provider:   DANIELA BAILEY        (If ordering provider performs procedure, schedule with ordering provider unless otherwise instructed. )    BMI: Estimated body mass index is 34.02 kg/m  as calculated from the following:    Height as of 3/28/24: 1.565 m (5' 1.61\").    Weight as of 11/18/24: 83.3 kg (183 lb 11.2 oz).     Sedation Ordered  moderate sedation.   If patient BMI > 50 do not schedule in [facility].    If patient BMI > 45 do not schedule at [facility].    Are you taking methadone or Suboxone?  NO, No RN review required.    Have you been diagnosed and are being treated for severe PTSD or severe anxiety?  NO, No RN review required.    Are you taking any prescription medications for pain 3 or more times per week?   NO, No RN review required.    Do you have a history of malignant hyperthermia?  No    (Females) Are you currently pregnant?   No     Have you been diagnosed or told you have pulmonary hypertension?   No    Do you have an LVAD?  No    Have you been told you have moderate to severe sleep apnea?  No.    Have you been told you have COPD, asthma, or any other lung disease?  No    Do you have any heart conditions?  No     Have you ever had or are you waiting for an organ transplant?  No. Continue scheduling, no site restrictions. - BONE MARROW TRANSPLANT    Have you had a stroke or transient ischemic attack (TIA aka \"mini stroke\" in the last 6 months?   No    Have you been diagnosed with or been told you have cirrhosis of the liver?   No.    Are you currently on dialysis?   No    Do you need assistance transferring?   No    BMI: Estimated body mass index is 34.02 kg/m  as calculated from the following:    Height as of 3/28/24: 1.565 m (5' " "1.61\").    Weight as of 11/18/24: 83.3 kg (183 lb 11.2 oz).     Is patients BMI > 40 and scheduling location [facility]?  No    Do you take an injectable or oral medication for weight loss or diabetes (excluding insulin)?  No    Do you take the medication Naltrexone?  No    Do you take blood thinners?  No       Prep   Are you currently on dialysis or do you have chronic kidney disease?  No    Do you have a diagnosis of diabetes?  No    Do you have a diagnosis of cystic fibrosis (CF)?  No    On a regular basis do you go 3 -5 days between bowel movements?  No    BMI > 40?  No    Preferred Pharmacy:    [id number] - [place], [place] - [address] [facility]  [address] [address]  Phone: [phone] Fax: [phone]      Final Scheduling Details     Procedure scheduled  Upper endoscopy (EGD)    Surgeon:  ISAEL     Date of procedure:  12/23/2024     Pre-OP / PAC:   No - Not required for this site.    Location  [facility] - [facility] - Per order.    Sedation   Moderate Sedation - Per order.      Patient Reminders:   You will receive a call from a Nurse to review instructions and health history.  This assessment must be completed prior to your procedure.  Failure to complete the Nurse assessment may result in the procedure being cancelled.      On the day of your procedure, please designate an adult(s) who can drive you home stay with you for the next 24 hours. The medicines used in the exam will make you sleepy. You will not be able to drive.      You cannot take public transportation, ride share services, or non-medical taxi service without a responsible caregiver.  Medical transport services are allowed with the requirement that a responsible caregiver will receive you at your destination.  We require that drivers and caregivers are confirmed prior to your procedure.  "

## 2024-11-25 NOTE — OR NURSING
services called for Spanish intepreter for pt's .   scheduled for 11am-2pm,  services will call back to confirm appointment.   Medication:   sertraline (ZOLOFT) 100 MG tablet [Pharmacy Med Name: SERTRALINE  [facility] TABLET]   TAKE 1 TABLET BY MOUTH DAILY   Dispense: 30 tablet     Refills: 1          passed protocol.   Last office visit date: 07/16/2024 with Radha LYN   Last office visit with PCP: 02/23/2024  Next appointment scheduled?: No;    Number of refills given: 30 tablets and 1 refill given

## 2024-12-10 ENCOUNTER — TELEPHONE (OUTPATIENT)
Dept: TRANSPLANT | Facility: CLINIC | Age: 54
End: 2024-12-10
Payer: COMMERCIAL

## 2024-12-10 DIAGNOSIS — Z94.81 S/P ALLOGENEIC BONE MARROW TRANSPLANT (H): ICD-10-CM

## 2024-12-10 DIAGNOSIS — D46.9 MDS (MYELODYSPLASTIC SYNDROME) (H): ICD-10-CM

## 2024-12-10 RX ORDER — SULFAMETHOXAZOLE AND TRIMETHOPRIM 800; 160 MG/1; MG/1
1 TABLET ORAL
Qty: 48 TABLET | Refills: 0 | Status: SHIPPED | OUTPATIENT
Start: 2024-12-10

## 2024-12-10 RX ORDER — ACYCLOVIR 800 MG/1
800 TABLET ORAL 2 TIMES DAILY
Qty: 180 TABLET | Refills: 0 | Status: SHIPPED | OUTPATIENT
Start: 2024-12-10

## 2024-12-10 NOTE — TELEPHONE ENCOUNTER
"BMT Nurse Triage:     Treating Provider: Coco Garay      Date of last office visit: 11/15    Onset of symptoms: 2-3 days ago     Duration of symptoms: 2 days    Recently started any new medication (if yes, what medication): No, took benadryl because the patients face turned red yesterday after rubbing oil on her face. The benadryl provided relief and the patient has not had a recurrence of facial reddening.    Any new lotions, soaps or detergents: No    Is it itchy or painful? Denies itchiness, feels uncomfortable like a sunburn. Hortencia Sethi says there is no discoloration of her skin nor is there any rash like appearance on her head.    Unilateral? No   if yes, it is vesicular in appearance? N/A    New Sun exposure: No     Location on body: top of head feels like a \"sunburn\" (stinging and burning sensation) without any discoloration    Is it spreading? No     Is patient currently on immunosuppression:  No     Any Fever: No      Discussed with Dr. Garay and plan to advise Hortencia Sethi to use her steroid cream on her scalp BID and avoid any oils/dyes for the next few days. Also plan for follow up with Dr. Garay on 12/12. Hortencia Sethi understands this plan and has no further questions.          "

## 2024-12-12 ENCOUNTER — TELEPHONE (OUTPATIENT)
Dept: GASTROENTEROLOGY | Facility: CLINIC | Age: 54
End: 2024-12-12
Payer: COMMERCIAL

## 2024-12-12 ENCOUNTER — ONCOLOGY VISIT (OUTPATIENT)
Dept: TRANSPLANT | Facility: CLINIC | Age: 54
End: 2024-12-12
Attending: STUDENT IN AN ORGANIZED HEALTH CARE EDUCATION/TRAINING PROGRAM
Payer: COMMERCIAL

## 2024-12-12 ENCOUNTER — APPOINTMENT (OUTPATIENT)
Dept: LAB | Facility: CLINIC | Age: 54
End: 2024-12-12
Attending: STUDENT IN AN ORGANIZED HEALTH CARE EDUCATION/TRAINING PROGRAM
Payer: COMMERCIAL

## 2024-12-12 VITALS
HEART RATE: 92 BPM | OXYGEN SATURATION: 98 % | TEMPERATURE: 98.1 F | WEIGHT: 185.4 LBS | RESPIRATION RATE: 16 BRPM | DIASTOLIC BLOOD PRESSURE: 90 MMHG | BODY MASS INDEX: 34.34 KG/M2 | SYSTOLIC BLOOD PRESSURE: 132 MMHG

## 2024-12-12 DIAGNOSIS — Z94.81 S/P ALLOGENEIC BONE MARROW TRANSPLANT (H): ICD-10-CM

## 2024-12-12 DIAGNOSIS — D46.9 MDS (MYELODYSPLASTIC SYNDROME) (H): ICD-10-CM

## 2024-12-12 LAB
ALBUMIN SERPL BCG-MCNC: 4.4 G/DL (ref 3.5–5.2)
ALP SERPL-CCNC: 135 U/L (ref 40–150)
ALT SERPL W P-5'-P-CCNC: 36 U/L (ref 0–50)
ANION GAP SERPL CALCULATED.3IONS-SCNC: 13 MMOL/L (ref 7–15)
AST SERPL W P-5'-P-CCNC: 25 U/L (ref 0–45)
BASOPHILS # BLD AUTO: 0.1 10E3/UL (ref 0–0.2)
BASOPHILS NFR BLD AUTO: 1 %
BILIRUB SERPL-MCNC: 0.2 MG/DL
BUN SERPL-MCNC: 16.9 MG/DL (ref 6–20)
CALCIUM SERPL-MCNC: 9.5 MG/DL (ref 8.8–10.4)
CHLORIDE SERPL-SCNC: 103 MMOL/L (ref 98–107)
CREAT SERPL-MCNC: 0.55 MG/DL (ref 0.51–0.95)
EGFRCR SERPLBLD CKD-EPI 2021: >90 ML/MIN/1.73M2
EOSINOPHIL # BLD AUTO: 0.1 10E3/UL (ref 0–0.7)
EOSINOPHIL NFR BLD AUTO: 1 %
ERYTHROCYTE [DISTWIDTH] IN BLOOD BY AUTOMATED COUNT: 13.5 % (ref 10–15)
FERRITIN SERPL-MCNC: 2846 NG/ML (ref 11–328)
GLUCOSE SERPL-MCNC: 103 MG/DL (ref 70–99)
HCO3 SERPL-SCNC: 24 MMOL/L (ref 22–29)
HCT VFR BLD AUTO: 42.5 % (ref 35–47)
HGB BLD-MCNC: 14.6 G/DL (ref 11.7–15.7)
IMM GRANULOCYTES # BLD: 0.1 10E3/UL
IMM GRANULOCYTES NFR BLD: 1 %
LYMPHOCYTES # BLD AUTO: 2.5 10E3/UL (ref 0.8–5.3)
LYMPHOCYTES NFR BLD AUTO: 36 %
MCH RBC QN AUTO: 34.9 PG (ref 26.5–33)
MCHC RBC AUTO-ENTMCNC: 34.4 G/DL (ref 31.5–36.5)
MCV RBC AUTO: 102 FL (ref 78–100)
MONOCYTES # BLD AUTO: 0.5 10E3/UL (ref 0–1.3)
MONOCYTES NFR BLD AUTO: 7 %
NEUTROPHILS # BLD AUTO: 3.9 10E3/UL (ref 1.6–8.3)
NEUTROPHILS NFR BLD AUTO: 55 %
NRBC # BLD AUTO: 0 10E3/UL
NRBC BLD AUTO-RTO: 0 /100
PLATELET # BLD AUTO: 203 10E3/UL (ref 150–450)
POTASSIUM SERPL-SCNC: 4.1 MMOL/L (ref 3.4–5.3)
PROT SERPL-MCNC: 7.2 G/DL (ref 6.4–8.3)
RBC # BLD AUTO: 4.18 10E6/UL (ref 3.8–5.2)
SODIUM SERPL-SCNC: 140 MMOL/L (ref 135–145)
WBC # BLD AUTO: 7 10E3/UL (ref 4–11)

## 2024-12-12 PROCEDURE — 82728 ASSAY OF FERRITIN: CPT

## 2024-12-12 PROCEDURE — 82565 ASSAY OF CREATININE: CPT

## 2024-12-12 PROCEDURE — G0463 HOSPITAL OUTPT CLINIC VISIT: HCPCS

## 2024-12-12 PROCEDURE — 84450 TRANSFERASE (AST) (SGOT): CPT

## 2024-12-12 PROCEDURE — 99214 OFFICE O/P EST MOD 30 MIN: CPT

## 2024-12-12 PROCEDURE — 85041 AUTOMATED RBC COUNT: CPT

## 2024-12-12 PROCEDURE — 36415 COLL VENOUS BLD VENIPUNCTURE: CPT

## 2024-12-12 PROCEDURE — 84155 ASSAY OF PROTEIN SERUM: CPT

## 2024-12-12 PROCEDURE — 85004 AUTOMATED DIFF WBC COUNT: CPT

## 2024-12-12 ASSESSMENT — PAIN SCALES - GENERAL: PAINLEVEL_OUTOF10: NO PAIN (0)

## 2024-12-12 NOTE — TELEPHONE ENCOUNTER
Pre assessment completed for upcoming procedure.   (Please see previous telephone encounter notes for complete details)    Patient returned call.       Procedure details:    Approximate time and facility location reviewed.   Patient is aware that endoscopy team will be calling about 2 days prior to confirm arrival time.    Designated  policy reviewed and that  requests drivers to check in and stay on campus.   *Disclaimer - please notify the  RN GI staff with any  issues/concerns.     Instructed to have someone stay 6  hours post procedure.     Medication review:    Medications reviewed. Please see supporting documentation below. Holding recommendations discussed (if applicable).   N/A      Prep for procedure:     Procedure prep instructions reviewed. Patient stated they will review the bowel prep instructions in Ak?Lext. Writer answered all patient questions (if applicable). NPO instructions reviewed.    Patient was instructed to call if any questions or concerns arise.         Any additional information needed:  N/A      Patient verbalized understanding and had no questions or concerns at this time.      Heidi Holguin RN  Endoscopy Procedure Pre Assessment   [phone] option 2

## 2024-12-12 NOTE — NURSING NOTE
"Oncology Rooming Note    December 12, 2024 4:29 PM   Hortencia Baldwin is a 54 year old female who presents for:    Chief Complaint   Patient presents with    Blood Draw     Labs drawn via  by RN in lab.  VS taken    Oncology Clinic Visit     RTN for S/P  BMT for MDS     Initial Vitals: BP (!) 132/90   Pulse 92   Temp 98.1  F (36.7  C) (Oral)   Resp 16   Wt 84.1 kg (185 lb 6.4 oz)   LMP 11/05/2017   SpO2 98%   BMI 34.34 kg/m   Estimated body mass index is 34.34 kg/m  as calculated from the following:    Height as of 3/28/24: 1.565 m (5' 1.61\").    Weight as of this encounter: 84.1 kg (185 lb 6.4 oz). Body surface area is 1.91 meters squared.  No Pain (0) Comment: Data Unavailable   Patient's last menstrual period was 11/05/2017.  Allergies reviewed: Yes  Medications reviewed: Yes    Medications: Medication refills not needed today.  Pharmacy name entered into [facility]:    [id number] - [place], [place] - [address] AT [facility] [address]  [id number] - [place], [place] - [address] [facility] [address] [facility] [place] [facility] [place] [address]  [facility] #[id number] - [place], [place] - [address] AT [facility] [address]    Frailty Screening:   Is the patient here for a new oncology consult visit in cancer care? 2. No      Clinical concerns: none       Brooke Sofia MA             "

## 2024-12-12 NOTE — TELEPHONE ENCOUNTER
Pre visit planning completed.      Procedure details:    Patient scheduled for Upper endoscopy (EGD) on 12/23/24.     Approximate arrival time: 0850. Procedure time 0935.   *Ensure patient is aware that endoscopy team will be calling about 2 days prior to procedure date to confirm arrival time as this may change.     Facility location: [facility]; [address] Floor, [place] [address]. Check in location: 2nd Floor at Surgery desk.  *Disclaimer: Drivers are to check in with patient and stay on campus during procedure.     Sedation type: Conscious sedation     Pre op exam needed? No.    Indication for procedure:     11 months post BMT with new onset dysphagia         Chart review:     Electronic implanted devices? No    Recent diagnosis of diverticulitis within the last 6 weeks? No      Medication review:    Diabetic? No    Anticoagulants? No    Weight loss medication/injectable? No GLP-1 medication per patient's medication list. Nursing to verify with pre-assessment call.    Other medication HOLDING recommendations:  N/A      Prep for procedure:         Procedure information and instructions sent via [employer]         Peyton Lubin RN  Endoscopy Procedure Pre Assessment   [phone] option 2

## 2024-12-12 NOTE — LETTER
12/12/2024      Hortencia Baldwin  [address]  [facility] [address]      Dear Colleague,    Thank you for referring your patient, Hortencia Baldwin, to the [facility] BLOOD AND MARROW TRANSPLANT PROGRAM [place]. Please see a copy of my visit note below.    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 Date of transplant: 1/11/2024    Diagnosis MDS w/ SF3B1 BMT type Allogenic  CMV  Donor -  Recipient +    Prep: MA Bu/Flu Donor type  6/8 URD Blood Type Donor and recipient O+   GVHD Ppx PTCy/ siro/ MMF Graft source PBSCT Toxo IgG Negative   Protocol [id number] (not on) BMT MD Dr. Garay       Data   Pre-transplant disease history  Referring provider: Dr.Evan Ramey, [facility]   52 y/o F with history of longstanding macrocytic anemia (Hb 10-11, -110 dating back to 1997) presented in Nov 2021 for lightheadedness. CBC showed acute on chronic anemia with Hb 4.7, ; normal WBC and platelet count. Bone marrow biopsy (12/09/2021) was consistent with MDS with low blasts and SF3B1 mutation. Hypercellular marrow (70%) with single lineage dysplasia (dyserythropoiesis), increased ringed sideroblasts and no increase in blasts. Flow cytometry showed mixed lymphocytes. Cytogenetics 46,XX,del(20). NGS: SF3B1 (45%), ASXL1 (6%).   Received Epo (March 2022 - April 2022) but had minimal response after 4 weekly doses. Luspatercept from April 2022 to April 2023 with inadequate response.   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 Treated with weekly rituximab x 4 and steroid taper (Sept to Dec 2022). LDH and bilirubin started rising after steroid taper, therefore treated with MMF 500mg BID (Dec 2022 to May 2023) with resolution of hemolysis (negative KATINA, normal LDH, bilirubin). However,  she continued to be transfusion dependant therefore her anemia was determined to be related more to MDS than autoimmune hemolysis.   Repeat bone marrow biopsy (5/11/2023) showed persistent MDS. Hypercellular marrow (90%) but now with dysplasia in all three lineages, cytogenetics 46, XX, del (20). Received decitabine- cedazurdine X 4 cycles (May 2023 to Dec 2023). Complicated by neutropenia requiring dose reduction and pRBC transfusions every 2-3 weeks. Pre-transplant bmbx (12/7/23) showed persistent MDS with multi lineage dysplasia (dyserythropoesis and dysgranulopoesis), 17% ringed sideroblasts, increased marrow storage iron. Cytogenetics: 46,XX,del(20). Flow cytometry and NGS not done.    She was considered for allogenic transplant despite low risk MDS by IPSS-R at diagnosis due to transfusion dependence, ASXL1 mutation and development of multi-lineage dysplasia on bmbx in May 2023 suggesting clonal evolution.      She underwent allogenic stem cell transplant on 1/11/24: myeloablative conditioning with Bu/Flu, 6/8 unrelated donor peripheral blood stem cell graft, cell dose: 6.50E+06. ABO matched, CMV donor negative, recipient positive. GVHD ppx with PTCy, MMF and tacrolimus (avoiding sirolimus due to high risk of VOD, given elevated liver enzymes prior to transplant and suspected iron overload).      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        INTERVAL HISTORY     Irma presents for an add on visit.   Scalp burning for a couple of days, no rash. Better with tylenol. Has not used any new products. No rash.   Face became red a couple of days back, improved with benadryl; thinks she might have eaten something she was allergic to.   Still has dry mouth, difficulty swallowing bread and other dry foods.   - has not gotten flu and covid shot, will think about it.     Plan  - No rash. Encouraged her to follow up with derm.   -  Swallow eval scheduled. EGD not scheduled yet.   - continue phlebotomy every 4 weeks  - Schedule for - 1 year bone marrow biopsy and survivorship  - Established with PCP. Advised her to set up dental appt  - Advised her to get flu and COVID vaccine  - RTC after BMBx      Current Outpatient Medications   Medication Sig Dispense Refill     acyclovir (ZOVIRAX) 800 MG tablet Take 1 tablet (800 mg) by mouth 2 times daily. 180 tablet 0     augmented betamethasone dipropionate (DIPROLENE) 0.05 % external lotion Apply topically 2 times daily. scalp 60 mL 11     betamethasone dipropionate (DIPROSONE) 0.05 % external cream Apply topically.       sulfamethoxazole-trimethoprim (BACTRIM DS) 800-160 MG tablet Take 1 tablet by mouth Every Mon, Tues two times daily. 48 tablet 0     tacrolimus (PROTOPIC) 0.1 % external ointment Apply topically 2 times daily. face 60 g 11     triamcinolone (KENALOG) 0.1 % external ointment Apply topically 2 times daily. hands 80 g 5     cholecalciferol (VITAMIN D3) 1250 mcg (66834 units) capsule Take 1,250 mcg by mouth every 7 days. (Patient not taking: Reported on 11/15/2024)       triamcinolone (KENALOG) 0.025 % cream Apply topically 2 times daily (Patient not taking: Reported on 9/27/2024)           EXAM      BP (!) 132/90   Pulse 92   Temp 98.1  F (36.7  C) (Oral)   Resp 16   Wt 84.1 kg (185 lb 6.4 oz)   LMP 11/05/2017   SpO2 98%   BMI 34.34 kg/m      Wt Readings from Last 4 Encounters:   12/12/24 84.1 kg (185 lb 6.4 oz)   11/18/24 83.3 kg (183 lb 11.2 oz)   11/15/24 82.1 kg (181 lb 1.6 oz)   09/27/24 84.5 kg (186 lb 3.2 oz)     KPS: 80% Can perform normal activity with effort, some signs of disease    General: Alert, awake  Eyes: Conjunctiva normal  Mouth: Moist mucosal membranes, pharynx normal  Respiratory: Normal respiratory effort.   Cardiovascular: Left ankle and foot edema mild  Skin: acneform rash over chin  Psych: Mood and affect are appropriate.  No central access.        Laboratory Studies:     Data   Blood Counts  Recent Labs   Lab Test 12/12/24  1610 11/18/24  1133 11/15/24  1301 10/28/24  1003 09/23/24  0947 04/04/24  1218 03/28/24  1119 02/22/24  0350 02/21/24  0816 02/19/24  1203   WBC 7.0  --  6.5  --  6.5   < > 4.9   < > 7.7 5.8   HGB 14.6 13.8 14.9   < > 14.6   < > 13.0   < > 11.3* 10.0*     --  187  --  148*   < > 134*   < > 83* 74*   NEUTROPHIL 55  --  49  --  58   < > 36   < > 72 53   ANEU  --   --   --   --   --   --  1.8  --  5.5 3.1   LYMPH 36  --  42  --  32   < > 26   < > 11 17   ALYM  --   --   --   --   --   --  1.3  --  0.8 1.0    < > = values in this interval not displayed.     Basic Panel  Recent Labs   Lab Test 12/12/24  1610 11/15/24  1301 10/28/24  1003    139 140   POTASSIUM 4.1 4.0 4.7   CHLORIDE 103 103 104   CO2 24 25 26   BUN 16.9 12.6 11.8   CR 0.55 0.55 0.49*   * 92 87       LFTs  Recent Labs   Lab Test 12/12/24  1610 11/15/24  1301 10/28/24  1003 09/23/24  0947   ALKPHOS 135 120 123 144   AST 25 30 39 39   ALT 36 54*  --  76*   BILITOTAL 0.2 0.5 0.3 0.4   ALBUMIN 4.4 4.5 4.2 4.1       Recent Labs   Lab Test 11/15/24  1301 10/28/24  1003 09/23/24  0947   CMVQNT Not Detected Not Detected Not Detected       Recent Labs   Lab Test 06/28/24  0849 03/14/24  1338   * 424*       Recent Labs   Lab Test 12/12/24  1610 11/18/24  1133 11/15/24  1301 10/28/24  1003 09/23/24  0947 08/12/24  1229   SONY 2,846* 2,328* 2,987* 2,556* 2,613* 2,267*            ASSESSMENT AND PLAN     BMT for MDS w/SF3B1 mutation  [id number] (according to but not on) with Bu/Flu  6/8 URD, peripheral blood stem cell graft, cell dose: 6.5 x10^6.     Disease status: Bone marrow biopsy at D28, D100, 6 months, 1 year  2/6/24 (~D26): Hypercellular marrow (70-80%), trilineage hemopoiesis with rare nuclear irregularities in terminal erythroid precursors of uncertain significance, 3% ringed sideroblasts (in the context of iron overload is not considered dysplastic  finding). Flow showed no increase in myeloid blasts. Cytogenetics 46 XY, consistent with donor. FISH: Rate of loss of 20q within normal limits. NGS: No mutations detected  4/22/24 (D100): Overall cellularity 40-50% with no dysplasia. No ringed sideroblasts. Flow negative. Cytogenetics not done. NGS negative.   7/15/24 (D180): CR, cytogenetics/ FISH not done. NGS negative      Graft status/chimerism: D28, D100, 6 months,1 year  2/6/24 (~D26): Bone marrow 100%, peripheral blood (2/1/24) CD33 100%, CD3 87%  3/14/24 (~D60): Peripheral blood CD3 and CD33 100%  4/22/24 (D100): Bone marrow 100%. Peripheral blood CD3 and CD33 100%  7/15/24 (D180): Bone marrow 100%. Peripheral blood CD3 and CD33 100%  10/7/24: Peripheral blood CD3 and CD33 100%    GVHD  # Current immunosuppression is none  - Sirolimus taper completed on 6/15/24     # No acute GVHD till date  Skin: No changes. Skin score 0.  GI: No symptoms. GI score 0.  Liver: LFTs normal. Score 0     Heme/ Coag  # ABO matched (both O+).   - Mild macrocytosis: B12, folic acid normal (10/7/24)    # Iron overload: Hx of transfusion dependent anemia, pre-transplant ferritin around 5,000. Ferritin (4/22/24) is 4257. Tsat 62%. Retic count 2.7%  - Liver ferriscan shows severe iron overload  - Started phlebotomy on 7/1/24, plan to continue every 3-4 weeks until ferritin <1000.     ID  # Prophylaxis  PJP: Bactrim. Toxo IgG negative.  Viral: Acyclovir 800 mg bid  CMV: Letermovir completed     # Monitoring  CMV: Has been negative. No further monitoring needed.  EBV: Has been negative. No further monitoring needed.   IgG: Level 544 mg/dL on 6/28/24. Check serum IgG level q3 months, and consider IVIG repletion for IgG levels <400 mg/dL.     Derm  Seborrheic dermatitis  - current tx: augmented betamethasone lotion, tacrolimus ointment, H&S shampoo  Hand dermatitis  - current tx: triamcinolone ointment  Follows with dermatology    GI  # Elevated transaminases: Resolved  AST and ALT  elevated beginning 2/2/24 (D22). Notably, she was on tacrolimus for GVHD ppx but changed to sirolimus on 1/31/24. Infectious workup (2/6/24) was negative. Liver US (2/20/24) showed hepatomegaly with increased hepatic parenchymal echogenicity, suggesting hepatic steatosis. Suspect drug induced elevation w/ steatosis and possibly iron overload.     CV  - HTN: Off amlodipine.  - Hyperlipidemia: Managed by PCP    MSK:  # L knee pain and back pain: Improving with PT  # Left ankle swelling:   - repeat US of the left leg (5/23) negative for DVT  - compression stockings    Health Maintenance   DEXA and Vitamin D: At 1-year post transplant. Vitamin D on 9/3/24 was 15.9  PFT: At 1 year post transplant. Repeat if symptomatic.   Thyroid: Check TSH and free T4 at 1 year post-transplant, then yearly, through primary physician.   Lipids: Check lipid panel q6-12 months through primary physician.  Skin: Annual skin checks through Dermatology; established  Dental visit at 1 year     Post-transplant vaccinations  COVID, Flu     I spent 30 minutes in the care of this patient today, which included time necessary for preparation for the visit, obtaining history, ordering medications/tests/procedures as medically indicated, review of pertinent medical literature, counseling of the patient, communication of recommendations to the care team, and documentation time.    Coco Garay  Department of Hematology, Oncology and Transplantation  [employer] Pager 1300/Text via [employer]                Again, thank you for allowing me to participate in the care of your patient.        Sincerely,         BMT Auto Cell Therapy

## 2024-12-12 NOTE — TELEPHONE ENCOUNTER
Attempted to contact patient in order to complete pre assessment questions.     No answer. Left message to return call to [phone] option 2.    Callback communication sent via Myze.    Jessica Soliman RN

## 2024-12-12 NOTE — PROGRESS NOTES
Face red a couple of days back, improved with benadry;  Scalp burning for a couple of days, no rash. No topicals. Took tylenol, is better,  Has been using asian oil for headaches.    - needs to get egd scheduled  - has not gotten flu and covid shot         46, XX, del (20). Received decitabine- cedazurdine X 4 cycles (May 2023 to Dec 2023). Complicated by neutropenia requiring dose reduction and pRBC transfusions every 2-3 weeks. Pre-transplant bmbx (12/7/23) showed persistent MDS with multi lineage dysplasia (dyserythropoesis and dysgranulopoesis), 17% ringed sideroblasts, increased marrow storage iron. Cytogenetics: 46,XX,del(20). Flow cytometry and NGS not done.    She was considered for allogenic transplant despite low risk MDS by IPSS-R at diagnosis due to transfusion dependence, ASXL1 mutation and development of multi-lineage dysplasia on bmbx in May 2023 suggesting clonal evolution.      She underwent allogenic stem cell transplant on 1/11/24: myeloablative conditioning with Bu/Flu, 6/8 unrelated donor peripheral blood stem cell graft, cell dose: 6.50E+06. ABO matched, CMV donor negative, recipient positive. GVHD ppx with PTCy, MMF and tacrolimus (avoiding sirolimus due to high risk of VOD, given elevated liver enzymes prior to transplant and suspected iron overload).      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        INTERVAL HISTORY     Irma presents for an add on visit.   Scalp burning for a couple of days, no rash. Better with tylenol. Has not used any new products. No rash.   Face became red a couple of days back, improved with benadryl; thinks she might have eaten something she was allergic to.   Still has dry mouth, difficulty swallowing bread and other dry foods.   - has not gotten flu and covid shot, will think about it.     Plan  - No rash. Encouraged her to follow up with derm.   - Swallow eval scheduled. EGD not scheduled yet.   - continue phlebotomy every 4 weeks  - Schedule for - 1 year bone marrow biopsy and survivorship  - Established with PCP. Advised her to set up dental appt  - Advised her to get flu and COVID vaccine  - RTC after BMBx      Current  Outpatient Medications   Medication Sig Dispense Refill    acyclovir (ZOVIRAX) 800 MG tablet Take 1 tablet (800 mg) by mouth 2 times daily. 180 tablet 0    augmented betamethasone dipropionate (DIPROLENE) 0.05 % external lotion Apply topically 2 times daily. scalp 60 mL 11    betamethasone dipropionate (DIPROSONE) 0.05 % external cream Apply topically.      sulfamethoxazole-trimethoprim (BACTRIM DS) 800-160 MG tablet Take 1 tablet by mouth Every Mon, Tues two times daily. 48 tablet 0    tacrolimus (PROTOPIC) 0.1 % external ointment Apply topically 2 times daily. face 60 g 11    triamcinolone (KENALOG) 0.1 % external ointment Apply topically 2 times daily. hands 80 g 5    cholecalciferol (VITAMIN D3) 1250 mcg (36848 units) capsule Take 1,250 mcg by mouth every 7 days. (Patient not taking: Reported on 11/15/2024)      triamcinolone (KENALOG) 0.025 % cream Apply topically 2 times daily (Patient not taking: Reported on 9/27/2024)           EXAM      BP (!) 132/90   Pulse 92   Temp 98.1  F (36.7  C) (Oral)   Resp 16   Wt 84.1 kg (185 lb 6.4 oz)   LMP 11/05/2017   SpO2 98%   BMI 34.34 kg/m      Wt Readings from Last 4 Encounters:   12/12/24 84.1 kg (185 lb 6.4 oz)   11/18/24 83.3 kg (183 lb 11.2 oz)   11/15/24 82.1 kg (181 lb 1.6 oz)   09/27/24 84.5 kg (186 lb 3.2 oz)     KPS: 80% Can perform normal activity with effort, some signs of disease    General: Alert, awake  Eyes: Conjunctiva normal  Mouth: Moist mucosal membranes, pharynx normal  Respiratory: Normal respiratory effort.   Cardiovascular: Left ankle and foot edema mild  Skin: acneform rash over chin  Psych: Mood and affect are appropriate.  No central access.       Laboratory Studies:     Data    Blood Counts  Recent Labs   Lab Test 12/12/24  1610 11/18/24  1133 11/15/24  1301 10/28/24  1003 09/23/24  0947 04/04/24  1218 03/28/24  1119 02/22/24  0350 02/21/24  0816 02/19/24  1203   WBC 7.0  --  6.5  --  6.5   < > 4.9   < > 7.7 5.8   HGB 14.6 13.8 14.9   <  > 14.6   < > 13.0   < > 11.3* 10.0*     --  187  --  148*   < > 134*   < > 83* 74*   NEUTROPHIL 55  --  49  --  58   < > 36   < > 72 53   ANEU  --   --   --   --   --   --  1.8  --  5.5 3.1   LYMPH 36  --  42  --  32   < > 26   < > 11 17   ALYM  --   --   --   --   --   --  1.3  --  0.8 1.0    < > = values in this interval not displayed.     Basic Panel  Recent Labs   Lab Test 12/12/24  1610 11/15/24  1301 10/28/24  1003    139 140   POTASSIUM 4.1 4.0 4.7   CHLORIDE 103 103 104   CO2 24 25 26   BUN 16.9 12.6 11.8   CR 0.55 0.55 0.49*   * 92 87       LFTs  Recent Labs   Lab Test 12/12/24  1610 11/15/24  1301 10/28/24  1003 09/23/24  0947   ALKPHOS 135 120 123 144   AST 25 30 39 39   ALT 36 54*  --  76*   BILITOTAL 0.2 0.5 0.3 0.4   ALBUMIN 4.4 4.5 4.2 4.1       Recent Labs   Lab Test 11/15/24  1301 10/28/24  1003 09/23/24  0947   CMVQNT Not Detected Not Detected Not Detected       Recent Labs   Lab Test 06/28/24  0849 03/14/24  1338   * 424*       Recent Labs   Lab Test 12/12/24  1610 11/18/24  1133 11/15/24  1301 10/28/24  1003 09/23/24  0947 08/12/24  1229   SONY 2,846* 2,328* 2,987* 2,556* 2,613* 2,267*            ASSESSMENT AND PLAN     BMT for MDS w/SF3B1 mutation  [id number] (according to but not on) with Bu/Flu  6/8 URD, peripheral blood stem cell graft, cell dose: 6.5 x10^6.     Disease status: Bone marrow biopsy at D28, D100, 6 months, 1 year  2/6/24 (~D26): Hypercellular marrow (70-80%), trilineage hemopoiesis with rare nuclear irregularities in terminal erythroid precursors of uncertain significance, 3% ringed sideroblasts (in the context of iron overload is not considered dysplastic finding). Flow showed no increase in myeloid blasts. Cytogenetics 46 XY, consistent with donor. FISH: Rate of loss of 20q within normal limits. NGS: No mutations detected  4/22/24 (D100): Overall cellularity 40-50% with no dysplasia. No ringed sideroblasts. Flow negative. Cytogenetics not done. NGS  negative.   7/15/24 (D180): CR, cytogenetics/ FISH not done. NGS negative      Graft status/chimerism: D28, D100, 6 months,1 year  2/6/24 (~D26): Bone marrow 100%, peripheral blood (2/1/24) CD33 100%, CD3 87%  3/14/24 (~D60): Peripheral blood CD3 and CD33 100%  4/22/24 (D100): Bone marrow 100%. Peripheral blood CD3 and CD33 100%  7/15/24 (D180): Bone marrow 100%. Peripheral blood CD3 and CD33 100%  10/7/24: Peripheral blood CD3 and CD33 100%    GVHD  # Current immunosuppression is none  - Sirolimus taper completed on 6/15/24     # No acute GVHD till date  Skin: No changes. Skin score 0.  GI: No symptoms. GI score 0.  Liver: LFTs normal. Score 0     Heme/ Coag  # ABO matched (both O+).   - Mild macrocytosis: B12, folic acid normal (10/7/24)    # Iron overload: Hx of transfusion dependent anemia, pre-transplant ferritin around 5,000. Ferritin (4/22/24) is 4257. Tsat 62%. Retic count 2.7%  - Liver ferriscan shows severe iron overload  - Started phlebotomy on 7/1/24, plan to continue every 3-4 weeks until ferritin <1000.     ID  # Prophylaxis  PJP: Bactrim. Toxo IgG negative.  Viral: Acyclovir 800 mg bid  CMV: Letermovir completed     # Monitoring  CMV: Has been negative. No further monitoring needed.  EBV: Has been negative. No further monitoring needed.   IgG: Level 544 mg/dL on 6/28/24. Check serum IgG level q3 months, and consider IVIG repletion for IgG levels <400 mg/dL.     Derm  Seborrheic dermatitis  - current tx: augmented betamethasone lotion, tacrolimus ointment, H&S shampoo  Hand dermatitis  - current tx: triamcinolone ointment  Follows with dermatology    GI  # Elevated transaminases: Resolved  AST and ALT elevated beginning 2/2/24 (D22). Notably, she was on tacrolimus for GVHD ppx but changed to sirolimus on 1/31/24. Infectious workup (2/6/24) was negative. Liver US (2/20/24) showed hepatomegaly with increased hepatic parenchymal echogenicity, suggesting hepatic steatosis. Suspect drug induced elevation w/  steatosis and possibly iron overload.     CV  - HTN: Off amlodipine.  - Hyperlipidemia: Managed by PCP    MSK:  # L knee pain and back pain: Improving with PT  # Left ankle swelling:   - repeat US of the left leg (5/23) negative for DVT  - compression stockings    Health Maintenance   DEXA and Vitamin D: At 1-year post transplant. Vitamin D on 9/3/24 was 15.9  PFT: At 1 year post transplant. Repeat if symptomatic.   Thyroid: Check TSH and free T4 at 1 year post-transplant, then yearly, through primary physician.   Lipids: Check lipid panel q6-12 months through primary physician.  Skin: Annual skin checks through Dermatology; established  Dental visit at 1 year     Post-transplant vaccinations  COVID, Flu     I spent 30 minutes in the care of this patient today, which included time necessary for preparation for the visit, obtaining history, ordering medications/tests/procedures as medically indicated, review of pertinent medical literature, counseling of the patient, communication of recommendations to the care team, and documentation time.    Coco Garay  Department of Hematology, Oncology and Transplantation  [facility] Pager 1300/Text via [email or web address]

## 2024-12-14 RX ORDER — HEPARIN SODIUM (PORCINE) LOCK FLUSH IV SOLN 100 UNIT/ML 100 UNIT/ML
5 SOLUTION INTRAVENOUS
OUTPATIENT
Start: 2024-12-15

## 2024-12-14 RX ORDER — HEPARIN SODIUM,PORCINE 10 UNIT/ML
5-20 VIAL (ML) INTRAVENOUS DAILY PRN
OUTPATIENT
Start: 2024-12-15

## 2024-12-16 ENCOUNTER — INFUSION THERAPY VISIT (OUTPATIENT)
Dept: TRANSPLANT | Facility: CLINIC | Age: 54
End: 2024-12-16
Attending: STUDENT IN AN ORGANIZED HEALTH CARE EDUCATION/TRAINING PROGRAM
Payer: COMMERCIAL

## 2024-12-16 VITALS
SYSTOLIC BLOOD PRESSURE: 142 MMHG | WEIGHT: 188.6 LBS | RESPIRATION RATE: 16 BRPM | DIASTOLIC BLOOD PRESSURE: 84 MMHG | HEART RATE: 81 BPM | BODY MASS INDEX: 34.93 KG/M2 | OXYGEN SATURATION: 97 % | TEMPERATURE: 98.1 F

## 2024-12-16 DIAGNOSIS — Z94.81 S/P ALLOGENEIC BONE MARROW TRANSPLANT (H): ICD-10-CM

## 2024-12-16 DIAGNOSIS — D46.9 MDS (MYELODYSPLASTIC SYNDROME) (H): Primary | ICD-10-CM

## 2024-12-16 LAB
FERRITIN SERPL-MCNC: 2384 NG/ML (ref 11–328)
HCT VFR BLD AUTO: 39.9 % (ref 35–47)
HGB BLD-MCNC: 13.6 G/DL (ref 11.7–15.7)

## 2024-12-16 PROCEDURE — 85014 HEMATOCRIT: CPT | Performed by: STUDENT IN AN ORGANIZED HEALTH CARE EDUCATION/TRAINING PROGRAM

## 2024-12-16 PROCEDURE — 258N000003 HC RX IP 258 OP 636: Performed by: STUDENT IN AN ORGANIZED HEALTH CARE EDUCATION/TRAINING PROGRAM

## 2024-12-16 PROCEDURE — 82728 ASSAY OF FERRITIN: CPT | Performed by: STUDENT IN AN ORGANIZED HEALTH CARE EDUCATION/TRAINING PROGRAM

## 2024-12-16 PROCEDURE — 36415 COLL VENOUS BLD VENIPUNCTURE: CPT | Performed by: STUDENT IN AN ORGANIZED HEALTH CARE EDUCATION/TRAINING PROGRAM

## 2024-12-16 PROCEDURE — 96360 HYDRATION IV INFUSION INIT: CPT

## 2024-12-16 PROCEDURE — 99195 PHLEBOTOMY: CPT

## 2024-12-16 RX ADMIN — SODIUM CHLORIDE 500 ML: 9 INJECTION, SOLUTION INTRAVENOUS at 13:39

## 2024-12-16 ASSESSMENT — PAIN SCALES - GENERAL: PAINLEVEL_OUTOF10: NO PAIN (0)

## 2024-12-16 NOTE — PROGRESS NOTES
Infusion Nursing Note:  Hortencia Baldwin presents today for scheduled phlebotomy.    Patient seen by provider today: No   present during visit today: Not Applicable.    Note: Patient arrived for phlebotomy. Reports feeling well. No acute concerns. ONC toxicity assessment completed. Home medications and allergies reviewed. Parameters met to proceed with treatment. Patient phlebotomized for a total of 500 mL. VSS pre and post treatment. Patient received 500 mL IV bolus per order. Writer encourage patient to increased PO intake throughout the day.       Intravenous Access:  Peripheral IV placed.    Treatment Conditions:  Lab Results   Component Value Date    HGB 13.6 12/16/2024    WBC 7.0 12/12/2024    ANEU 1.8 03/28/2024    ANEUTAUTO 3.9 12/12/2024     12/12/2024            Post Infusion Assessment:  Patient tolerated infusion without incident.  Site patent and intact, free from redness, edema or discomfort.  Access discontinued per protocol.       Discharge Plan:   Patient discharged in stable condition accompanied by: self.  Departure Mode: Ambulatory.      Dari Rodas RN

## 2024-12-16 NOTE — NURSING NOTE
Chief Complaint   Patient presents with    Blood Draw     Vitals, blood drawn and PIV placed by TV, VAT. Pt checked into appt.      PAYAL Melissa LPN

## 2024-12-19 ENCOUNTER — TELEPHONE (OUTPATIENT)
Dept: GASTROENTEROLOGY | Facility: CLINIC | Age: 54
End: 2024-12-19
Payer: COMMERCIAL

## 2024-12-20 ENCOUNTER — ANESTHESIA EVENT (OUTPATIENT)
Dept: SURGERY | Facility: AMBULATORY SURGERY CENTER | Age: 54
End: 2024-12-20
Payer: COMMERCIAL

## 2024-12-22 RX ORDER — NALOXONE HYDROCHLORIDE 0.4 MG/ML
0.4 INJECTION, SOLUTION INTRAMUSCULAR; INTRAVENOUS; SUBCUTANEOUS
Status: CANCELLED | OUTPATIENT
Start: 2024-12-22

## 2024-12-22 RX ORDER — FLUMAZENIL 0.1 MG/ML
0.2 INJECTION, SOLUTION INTRAVENOUS
Status: CANCELLED | OUTPATIENT
Start: 2024-12-22 | End: 2024-12-23

## 2024-12-22 RX ORDER — PROCHLORPERAZINE MALEATE 10 MG
10 TABLET ORAL EVERY 6 HOURS PRN
Status: CANCELLED | OUTPATIENT
Start: 2024-12-22

## 2024-12-22 RX ORDER — ONDANSETRON 4 MG/1
4 TABLET, ORALLY DISINTEGRATING ORAL EVERY 6 HOURS PRN
Status: CANCELLED | OUTPATIENT
Start: 2024-12-22

## 2024-12-22 RX ORDER — NALOXONE HYDROCHLORIDE 0.4 MG/ML
0.2 INJECTION, SOLUTION INTRAMUSCULAR; INTRAVENOUS; SUBCUTANEOUS
Status: CANCELLED | OUTPATIENT
Start: 2024-12-22

## 2024-12-22 RX ORDER — ONDANSETRON 2 MG/ML
4 INJECTION INTRAMUSCULAR; INTRAVENOUS EVERY 6 HOURS PRN
Status: CANCELLED | OUTPATIENT
Start: 2024-12-22

## 2024-12-23 ENCOUNTER — HOSPITAL ENCOUNTER (OUTPATIENT)
Facility: AMBULATORY SURGERY CENTER | Age: 54
Discharge: HOME | End: 2024-12-23
Attending: INTERNAL MEDICINE
Payer: COMMERCIAL

## 2024-12-23 ENCOUNTER — ANESTHESIA (OUTPATIENT)
Dept: SURGERY | Facility: AMBULATORY SURGERY CENTER | Age: 54
End: 2024-12-23
Payer: COMMERCIAL

## 2024-12-23 VITALS
RESPIRATION RATE: 16 BRPM | SYSTOLIC BLOOD PRESSURE: 170 MMHG | HEART RATE: 77 BPM | TEMPERATURE: 97.6 F | OXYGEN SATURATION: 99 % | DIASTOLIC BLOOD PRESSURE: 92 MMHG

## 2024-12-23 VITALS — HEART RATE: 93 BPM

## 2024-12-23 LAB — UPPER GI ENDOSCOPY: NORMAL

## 2024-12-23 PROCEDURE — G8907 PT DOC NO EVENTS ON DISCHARG: HCPCS

## 2024-12-23 PROCEDURE — 88305 TISSUE EXAM BY PATHOLOGIST: CPT | Performed by: PATHOLOGY

## 2024-12-23 PROCEDURE — 43239 EGD BIOPSY SINGLE/MULTIPLE: CPT | Mod: XU

## 2024-12-23 PROCEDURE — 43249 ESOPH EGD DILATION <30 MM: CPT

## 2024-12-23 PROCEDURE — G8918 PT W/O PREOP ORDER IV AB PRO: HCPCS

## 2024-12-23 RX ORDER — LIDOCAINE 40 MG/G
CREAM TOPICAL
Status: DISCONTINUED | OUTPATIENT
Start: 2024-12-23 | End: 2024-12-24 | Stop reason: HOSPADM

## 2024-12-23 RX ORDER — SODIUM CHLORIDE, SODIUM LACTATE, POTASSIUM CHLORIDE, CALCIUM CHLORIDE 600; 310; 30; 20 MG/100ML; MG/100ML; MG/100ML; MG/100ML
INJECTION, SOLUTION INTRAVENOUS CONTINUOUS
Status: DISCONTINUED | OUTPATIENT
Start: 2024-12-23 | End: 2024-12-24 | Stop reason: HOSPADM

## 2024-12-23 RX ORDER — PROPOFOL 10 MG/ML
INJECTION, EMULSION INTRAVENOUS CONTINUOUS PRN
Status: DISCONTINUED | OUTPATIENT
Start: 2024-12-23 | End: 2024-12-23

## 2024-12-23 RX ORDER — PROPOFOL 10 MG/ML
INJECTION, EMULSION INTRAVENOUS PRN
Status: DISCONTINUED | OUTPATIENT
Start: 2024-12-23 | End: 2024-12-23

## 2024-12-23 RX ORDER — ONDANSETRON 2 MG/ML
4 INJECTION INTRAMUSCULAR; INTRAVENOUS
Status: DISCONTINUED | OUTPATIENT
Start: 2024-12-23 | End: 2024-12-24 | Stop reason: HOSPADM

## 2024-12-23 RX ORDER — LIDOCAINE HYDROCHLORIDE 20 MG/ML
INJECTION, SOLUTION INFILTRATION; PERINEURAL PRN
Status: DISCONTINUED | OUTPATIENT
Start: 2024-12-23 | End: 2024-12-23

## 2024-12-23 RX ADMIN — SODIUM CHLORIDE, SODIUM LACTATE, POTASSIUM CHLORIDE, CALCIUM CHLORIDE: 600; 310; 30; 20 INJECTION, SOLUTION INTRAVENOUS at 09:20

## 2024-12-23 RX ADMIN — PROPOFOL 120 MG: 10 INJECTION, EMULSION INTRAVENOUS at 09:26

## 2024-12-23 RX ADMIN — PROPOFOL 150 MCG/KG/MIN: 10 INJECTION, EMULSION INTRAVENOUS at 09:26

## 2024-12-23 RX ADMIN — LIDOCAINE HYDROCHLORIDE 60 MG: 20 INJECTION, SOLUTION INFILTRATION; PERINEURAL at 09:25

## 2024-12-23 ASSESSMENT — LIFESTYLE VARIABLES: TOBACCO_USE: 1

## 2024-12-23 NOTE — ANESTHESIA CARE TRANSFER NOTE
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    Anesthesia Type:   MAC     Note:    Oropharynx: oropharynx clear of all foreign objects  Level of Consciousness: awake  Oxygen Supplementation: room air      Dentition: dentition unchanged  Vital Signs Stable: post-procedure vital signs reviewed and stable  Report to RN Given: handoff report given  Patient transferred to: Phase II  Comments: To Phase II. Report to RN.  VSS Resp status stable.  Handoff Report: Identifed the Patient, Identified the Reponsible Provider, Reviewed the pertinent medical history, Discussed the surgical course, Reviewed Intra-OP anesthesia mangement and issues during anesthesia, Set expectations for post-procedure period and Allowed opportunity for questions and acknowledgement of understanding      Vitals:  Vitals Value Taken Time   BP     Temp     Pulse     Resp     SpO2         Electronically Signed By: NEEL Obrien CRNA  December 23, 2024  9:45 AM

## 2024-12-23 NOTE — ANESTHESIA POSTPROCEDURE EVALUATION
Patient: Hortencia Baldwin    Procedure: Procedure(s):  Combined Esophagoscopy, Gastroscopy, Duodenoscopy (Egd) With Co2 Insufflation  ESOPHAGOGASTRODUODENOSCOPY, WITH BIOPSY  ESOPHAGOGASTRODUODENOSCOPY, WITH BALLOON DILATION OF LESS THAN 30 MILLIMETERS       Anesthesia Type:  MAC    Note:  Disposition: Outpatient   Postop Pain Control: Uneventful            Sign Out: Well controlled pain   PONV: No   Neuro/Psych: Uneventful            Sign Out: Acceptable/Baseline neuro status   Airway/Respiratory: Uneventful            Sign Out: Acceptable/Baseline resp. status   CV/Hemodynamics: Uneventful            Sign Out: Acceptable CV status; No obvious hypovolemia; No obvious fluid overload   Other NRE: NONE   DID A NON-ROUTINE EVENT OCCUR? No           Last vitals:  Vitals Value Taken Time   /92 12/23/24 1005   Temp 97.6  F (36.4  C) 12/23/24 0948   Pulse 77 12/23/24 1005   Resp 16 12/23/24 1005   SpO2 99 % 12/23/24 1005       Electronically Signed By: Wilmer Bravo DO  December 23, 2024  10:19 AM

## 2024-12-23 NOTE — ANESTHESIA PREPROCEDURE EVALUATION
Anesthesia Pre-Procedure Evaluation    Patient: Hortencia Baldwin   MRN: [id number] : 1970        Procedure : Procedure(s):  Combined Esophagoscopy, Gastroscopy, Duodenoscopy (Egd) With Co2 Insufflation          Past Medical History:   Diagnosis Date    Left medial knee pain     Medial epicondylitis, right elbow     Obesity (BMI 30-39.9)     Right elbow pain       Past Surgical History:   Procedure Laterality Date    EXCISE LESION TRUNK N/A 2018    Procedure: EXCISE LESION TRUNK;;  Surgeon: Avani Szymanski MD;  Location: [facility]    EXCISE MASS LOWER EXTREMITY Bilateral 2017    Procedure: EXCISE MASS LOWER EXTREMITY;  EXCISION OF LIPOMAS RIGHT BUTTOCKS RIGHT LOWER THIGH, LEFT UPPER OUTER THIGH;  Surgeon: Avani Szymanski MD;  Location: [facility]    EXCISE MASS LOWER EXTREMITY Right 2018    Procedure: EXCISE MASS LOWER EXTREMITY;  EXCISION OF RIGHT UPPER ANTERIOR THIGH SOFT TISSUE MASS,EXCISION OF LEFT LOWER BACK SOFT TISSUE MASS;  Surgeon: Avani Szymanski MD;  Location: [facility]    IR CVC TUNNEL PLACEMENT > 5 YRS OF AGE  2024    IR CVC TUNNEL REMOVAL RIGHT  3/11/2024    LUMPECTOMY BREAST Left     benign      Allergies   Allergen Reactions    Blood Transfusion Related (Informational Only) Other (See Comments)     Patient has a history of a clinically significant antibody against RBC antigens.  A delay in compatible RBCs may occur. Had a reaction to the platelets.      Social History     Tobacco Use    Smoking status: Former     Current packs/day: 0.00     Average packs/day: 0.5 packs/day for 10.0 years (5.0 ttl pk-yrs)     Types: Cigarettes     Start date: 2014     Quit date: 2024     Years since quittin.9     Passive exposure: Current    Smokeless tobacco: Never   Substance Use Topics    Alcohol use: Yes     Comment: 1-2 drinks/month      Wt Readings from Last 1 Encounters:   24 85.5 kg (188 lb 9.6 oz)        Anesthesia Evaluation   Pt has not had prior anesthetic          ROS/MED HX  ENT/Pulmonary:  - neg pulmonary ROS   (+)                tobacco use, Past use,                       Neurologic:  - neg neurologic ROS     Cardiovascular:  - neg cardiovascular ROS     METS/Exercise Tolerance: >4 METS    Hematologic:  - neg hematologic  ROS     Musculoskeletal:  - neg musculoskeletal ROS     GI/Hepatic:  - neg GI/hepatic ROS     Renal/Genitourinary:  - neg Renal ROS     Endo:     (+)               Obesity,       Psychiatric/Substance Use:  - neg psychiatric ROS     Infectious Disease:  - neg infectious disease ROS     Malignancy: Comment: MDS (myelodysplastic syndrome)      Other:  - neg other ROS          Physical Exam    Airway  airway exam normal      Mallampati: I   TM distance: > 3 FB   Neck ROM: full   Mouth opening: > 3 cm    Respiratory Devices and Support         Dental       (+) Completely normal teeth      Cardiovascular   cardiovascular exam normal       Rhythm and rate: regular and normal     Pulmonary   pulmonary exam normal        breath sounds clear to auscultation       Other findings: [unfilled]     OUTSIDE LABS:  CBC:   Lab Results   Component Value Date    WBC 7.0 12/12/2024    WBC 6.5 11/15/2024    HGB 13.6 12/16/2024    HGB 14.6 12/12/2024    HCT 39.9 12/16/2024    HCT 42.5 12/12/2024     12/12/2024     11/15/2024     BMP:   Lab Results   Component Value Date     12/12/2024     11/15/2024    POTASSIUM 4.1 12/12/2024    POTASSIUM 4.0 11/15/2024    CHLORIDE 103 12/12/2024    CHLORIDE 103 11/15/2024    CO2 24 12/12/2024    CO2 25 11/15/2024    BUN 16.9 12/12/2024    BUN 12.6 11/15/2024    CR 0.55 12/12/2024    CR 0.55 11/15/2024     (H) 12/12/2024    GLC 92 11/15/2024     COAGS:   Lab Results   Component Value Date    PTT 36 02/21/2024    INR 0.88 02/26/2024    FIBR 588 (H) 02/21/2024     POC:   Lab Results   Component Value Date    HCG Negative 02/05/2018    HCGS Negative 12/19/2023     HEPATIC:   Lab Results   Component Value  Date    ALBUMIN 4.4 12/12/2024    PROTTOTAL 7.2 12/12/2024    ALT 36 12/12/2024    AST 25 12/12/2024    ALKPHOS 135 12/12/2024    BILITOTAL 0.2 12/12/2024     OTHER:   Lab Results   Component Value Date    LACT 0.5 (L) 01/22/2024    NIKO 9.5 12/12/2024    PHOS 3.0 02/29/2024    MAG 2.1 02/29/2024       Anesthesia Plan    ASA Status:  2    NPO Status:  NPO Appropriate    Anesthesia Type: MAC.     - Reason for MAC: Deep or markedly invasive procedure (G8)   Induction: Propofol.   Maintenance: N/A.        Consents    Anesthesia Plan(s) and associated risks, benefits, and realistic alternatives discussed. Questions answered and patient/representative(s) expressed understanding.     - Discussed:     - Discussed with:  Patient       Use of blood products discussed: No .     Postoperative Care            Comments:           H&P reviewed: Unable to attach H&P to encounter due to EHR limitations. H&P Update: appropriate H&P reviewed, patient examined. No interval changes since H&P (within 30 days).        Wilmer Bravo,     I have reviewed the pertinent notes and labs in the chart from the past 30 days and (re)examined the patient.  Any updates or changes from those notes are reflected in this note.                             # Financial/Environmental Concerns:

## 2024-12-24 ENCOUNTER — ANCILLARY PROCEDURE (OUTPATIENT)
Dept: GENERAL RADIOLOGY | Facility: CLINIC | Age: 54
End: 2024-12-24
Attending: STUDENT IN AN ORGANIZED HEALTH CARE EDUCATION/TRAINING PROGRAM
Payer: COMMERCIAL

## 2024-12-24 ENCOUNTER — THERAPY VISIT (OUTPATIENT)
Dept: SPEECH THERAPY | Facility: CLINIC | Age: 54
End: 2024-12-24
Attending: STUDENT IN AN ORGANIZED HEALTH CARE EDUCATION/TRAINING PROGRAM
Payer: COMMERCIAL

## 2024-12-24 DIAGNOSIS — Z94.81 S/P ALLOGENEIC BONE MARROW TRANSPLANT (H): ICD-10-CM

## 2024-12-24 DIAGNOSIS — R13.10 DYSPHAGIA, UNSPECIFIED TYPE: ICD-10-CM

## 2024-12-24 LAB
PATH REPORT.COMMENTS IMP SPEC: NORMAL
PATH REPORT.COMMENTS IMP SPEC: NORMAL
PATH REPORT.FINAL DX SPEC: NORMAL
PATH REPORT.GROSS SPEC: NORMAL
PATH REPORT.MICROSCOPIC SPEC OTHER STN: NORMAL
PATH REPORT.RELEVANT HX SPEC: NORMAL
PHOTO IMAGE: NORMAL

## 2024-12-24 PROCEDURE — 74230 X-RAY XM SWLNG FUNCJ C+: CPT | Mod: GC | Performed by: RADIOLOGY

## 2024-12-24 RX ORDER — BARIUM SULFATE 400 MG/ML
SUSPENSION ORAL ONCE
Status: COMPLETED | OUTPATIENT
Start: 2024-12-24 | End: 2024-12-24

## 2024-12-24 RX ADMIN — BARIUM SULFATE: 400 SUSPENSION ORAL at 10:24

## 2024-12-24 NOTE — PROGRESS NOTES
SPEECH LANGUAGE PATHOLOGY EVALUATION       Fall Risk Screen:  Fall screen completed by: SLP  Have you fallen 2 or more times in the past year?: No  Have you fallen and had an injury in the past year?: No  Is patient a fall risk?: No    Subjective        Presenting condition or subjective complaint:  Trouble swallowing drier foods. Feels like they can't even go down because they are too dry. Has some intermittent trouble with pills. Noted these changes once she was home following her bone marrow transplant  Date of onset:  June 2024    Relevant medical history:     Past Medical History:   Diagnosis Date    Left medial knee pain     Medial epicondylitis, right elbow     Obesity (BMI 30-39.9)     Right elbow pain      Patient Active Problem List   Diagnosis    Left medial knee pain    Obesity (BMI 30-39.9)    Anemia, unspecified type    Macrocytic anemia    MDS (myelodysplastic syndrome) (H)    BK viruria    History of peripheral stem cell transplant (H)    BK viremia    Administration of long-term prophylactic antibiotics    S/P allogeneic bone marrow transplant (H)       Dates & types of surgery:    Past Surgical History:   Procedure Laterality Date    EXCISE LESION TRUNK N/A 2/5/2018    Procedure: EXCISE LESION TRUNK;;  Surgeon: Avani Szymanski MD;  Location: [facility]    EXCISE MASS LOWER EXTREMITY Bilateral 5/31/2017    Procedure: EXCISE MASS LOWER EXTREMITY;  EXCISION OF LIPOMAS RIGHT BUTTOCKS RIGHT LOWER THIGH, LEFT UPPER OUTER THIGH;  Surgeon: Avani Szymanski MD;  Location: [facility]    EXCISE MASS LOWER EXTREMITY Right 2/5/2018    Procedure: EXCISE MASS LOWER EXTREMITY;  EXCISION OF RIGHT UPPER ANTERIOR THIGH SOFT TISSUE MASS,EXCISION OF LEFT LOWER BACK SOFT TISSUE MASS;  Surgeon: Avani Szymanski MD;  Location: [facility]    IR CVC TUNNEL PLACEMENT > 5 YRS OF AGE  1/5/2024    IR CVC TUNNEL REMOVAL RIGHT  3/11/2024    LUMPECTOMY BREAST Left 1990s    benign         Prior diagnostic imaging/testing results:       Prior  therapy history for the same diagnosis, illness or injury:     Denies previous evaluation or treatment for dysphagia    Patient goals for therapy:  no goals stated    Pain assessment: Pain denied     Objective     SWALLOW EVALUTION  Dysphagia history: Pt reports that sometimes when she eats solids, mostly dry foods like bread and meats, that she has a hard time swallowing. She often has to spit the food out off of her tongue; no coughing or vomiting. Sometimes liquids help. Pills can sometimes feel stuck at the sternal notch if large. She reports the onset of sx to be fter bone marrow transplant a few months ago - had been sick after transplant. No trouble with liquids. Had upper endoscopy feels swallowing is better, maybe something is stuck  Eats what ever is cooked but sometimes tries to eat soup. She likes bread and meat so she does eat. She reports occasional reflux sx; will take Tums occasionally. Denies pain and recent PNA/URI.    Current Diet/Method of Nutritional Intake: thin liquids (level 0), regular diet        CLINICAL SWALLOW EVALUATION  Oral Motor Function: Dentition: adequate dentition, some missing teeth  Secretion management: WFL  Mucosal quality: good  Mandibular function: intact  Oral labial function: WFL  Lingual function: WFL  Velar function: intact   Buccal function: intact  Laryngeal function: cough, throat clear, volitional swallow, voicing WFL     Level of assist required for feeding: no assistance needed   Textures Trialed:   Clinical Swallow Eval: Thin Liquids  Mode of presentation: cup, self-fed   Volume presented: 4 oz water  Preparatory Phase: WFL  Oral Phase: WFL  Pharyngeal phase of swallow: intact   Strategies trialed during procedure: none   Diagnostic statement: Pt demonstrated no overt clinical s/sx of aspiration/penetration on thin liquid trials.      ADDITIONAL EVAL COMPLETED TODAY : yes; rationale: objective evaluation of pharyngeal phase indicated based on presenting  symptoms    VIDEOFLUOROSCOPIC SWALLOW STUDY  Radiologist: Resident  Views Taken: left lateral, A/P   [place] [facility] [place] upright on chair/stool, standing for A/P    VFSS textures trialed:   VFSS Eval: Thin Liquids  Mode of Presentation: cup, self-fed   Order of Presentation: Series 2, 3, 4 (consecutive), 10, 12, 15 (A/P)  Preparatory Phase: WFL  Oral Phase: premature pharyngeal entry  Bolus Location When Swallow Initiated: posterior angle of ramus  Pharyngeal Phase: WFL  Rosenbeck's Penetration Aspiration Scale: 2 - contrast enters airway, remains above the vocal cords, no residue remains (penetration)  Response to Aspiration: n/a  Strategies and Compensations: none  Diagnostic Statement: Pt demonstrates flash penetration on thin liquid trials which fully clears the laryngeal vestibule. Trace oral and no pharyngeal residue after the completed swallow. Pt clears residual in the oral cavity with a second swallow. Residue likely due to oral dryness. A/P view demonstrates symmetric swallow with adequate bolus transit through the esophagus.     VFSS Eval: Mildly Thick Liquids  Mode of Presentation: cup, self-fed   Order of Presentation: Series 5, 6   Preparatory Phase: WFL  Oral Phase: residue in oral cavity  Bolus Location When Swallow Initiated: posterior angle of ramus  Pharyngeal Phase: WFL  Rosenbeck's Penetration Aspiration Scale: 1 - no aspiration, contrast does not enter airway  Response to Aspiration: n/a  Strategies and Compensations: double swallow  Diagnostic Statement: No aspiration/penetration noted on mildly thick liquid trials. Mild oral residue noted after the initial swallow which was cleared with second swallow.     VFSS Eval: Purees  Mode of Presentation: spoon, self-fed   Order of Presentation: Series 7, 8, 14 (A/P)  Preparatory Phase: WFL  Oral Phase: WFL  Bolus Location When Swallow Initiated: posterior angle of ramus  Pharyngeal Phase: WFL  Rosenbeck s  Penetration Aspiration Scale: 1 - no aspiration, contrast does not enter airway  Response to Aspiration: n/a  Strategies and Compensations: not applicable  Diagnostic Statement: Safe functional transit of contrast through all phases.     VFSS Eval: Solids  Mode of Presentation: self-fed   Order of Presentation: Series 9, 11   Preparatory Phase: WFL  Oral Phase: residue in oral cavity  Bolus Location When Swallow Initiated: valleculae  Pharyngeal Phase: WFL   Rosenbeck s Penetration Aspiration Scale: 1 - no aspiration, contrast does not enter airway  Response to Aspiration: n/a  Strategies and Compensations: liquid wash  Diagnostic Statement: Pt demonstrates trace residue along the base of the tongue which is cleared with a liquid wash. No aspiration/penetration noted on solid consistency.     VFSS Eval: Barium Tablet  Mode of Presentation: self-fed   Order of Presentation: Series 13  Preparatory Phase: WFL  Oral Phase: WFL  Pharyngeal Phase: WFL  Strategies and Compensations: none  Diagnostic Statement: Adequate transit of barium tablet through oral and pharyngeal phases.     ESOPHAGEAL PHASE OF SWALLOW  no observed or reported concerns related to esophageal function  esophageal sweep performed during today's videofluoroscopic exam     SWALLOW ASSESSMENT CLINICAL IMPRESSIONS AND RATIONALE  Diet Consistency Recommendations: thin liquids (level 0), regular diet    Recommended Feeding/Eating Techniques: small bolus size, slow rate of intake, alternate food and liquid intake, maintain upright sitting position for eating   Medication Administration Recommendations: Whole with liquid or puree if challenging  Instrumental Assessment Recommendations: none     Assessment & Plan   CLINICAL IMPRESSIONS   Medical Diagnosis: Allogeneic bone marrow transplant    Treatment Diagnosis: Safe and functional oropharyngeal swallow   Impression/Assessment: Pt is a 54 year old female with dysphagia complaints. The following significant  findings have been identified:  safe functional swallowing , characterized by mild oral residue due to oral dryness. No aspiration noted on any consistency presented. There was mild oral residue noted after the initial swallow which was cleared with second swallow with thin liquids and solids. Base of tongue retraction, velopharyngeal closure, hyolaryngeal excursion, epiglottic inversion, pharyngeal stripping wave, and PES opening are all WFL. Results and images were reviewed with pt. She was educated on safe swallow strategies, with emphasis on moistening foods and increasing water intake during meals for dry mouth. She verbalized understanding and agreement.      PLAN OF CARE  Evaluation only    Recommended Referrals to Other Professionals:  None  Education Assessment:   Learner/Method: Patient;Significant Other;Pictures/Video;Listening    Risks and benefits of evaluation/treatment have been explained.   Patient/Family/caregiver agrees with Plan of Care.     Evaluation Time:    SLP Eval: oral/pharyngeal swallow function, clinical minutes (28150): 10  SLP Eval: VideoFluoroscopic Swallow function Minutes (90806): 25     Present: Not applicable     Direct supervision provided during all patient contact and documentation process by Francheska Bains MS, CCC-SLP for Yamilet BRENNAN       Signing Clinician: Francheska Bains, SLP

## 2025-01-07 ENCOUNTER — VIRTUAL VISIT (OUTPATIENT)
Dept: TRANSPLANT | Facility: CLINIC | Age: 55
End: 2025-01-07
Attending: NURSE PRACTITIONER
Payer: COMMERCIAL

## 2025-01-07 VITALS — HEIGHT: 63 IN | BODY MASS INDEX: 31.89 KG/M2 | WEIGHT: 180 LBS

## 2025-01-07 DIAGNOSIS — Z94.81 S/P ALLOGENEIC BONE MARROW TRANSPLANT (H): Primary | ICD-10-CM

## 2025-01-07 DIAGNOSIS — D46.9 MDS (MYELODYSPLASTIC SYNDROME) (H): ICD-10-CM

## 2025-01-07 DIAGNOSIS — Z78.0 MENOPAUSE: ICD-10-CM

## 2025-01-07 DIAGNOSIS — L21.9 DERMATITIS, SEBORRHEIC: ICD-10-CM

## 2025-01-07 DIAGNOSIS — Z94.81 S/P ALLOGENEIC BONE MARROW TRANSPLANT (H): ICD-10-CM

## 2025-01-07 DIAGNOSIS — Z92.21 STATUS POST CHEMOTHERAPY: ICD-10-CM

## 2025-01-07 DIAGNOSIS — R61 NIGHT SWEATS: ICD-10-CM

## 2025-01-07 DIAGNOSIS — Z91.89 AT RISK FOR HYPOTHYROIDISM: ICD-10-CM

## 2025-01-07 DIAGNOSIS — Z91.89 AT HIGH RISK FOR OSTEOPOROSIS: ICD-10-CM

## 2025-01-07 PROCEDURE — 98007 SYNCH AUDIO-VIDEO EST HI 40: CPT | Performed by: NURSE PRACTITIONER

## 2025-01-07 RX ORDER — ACYCLOVIR 800 MG/1
800 TABLET ORAL 2 TIMES DAILY
Qty: 180 TABLET | Refills: 0 | Status: SHIPPED | OUTPATIENT
Start: 2025-01-07

## 2025-01-07 RX ORDER — TACROLIMUS 1 MG/G
OINTMENT TOPICAL 2 TIMES DAILY
Qty: 60 G | Refills: 11 | Status: SHIPPED | OUTPATIENT
Start: 2025-01-07

## 2025-01-07 RX ORDER — SULFAMETHOXAZOLE AND TRIMETHOPRIM 800; 160 MG/1; MG/1
1 TABLET ORAL
Qty: 48 TABLET | Refills: 0 | Status: SHIPPED | OUTPATIENT
Start: 2025-01-07

## 2025-01-07 ASSESSMENT — PAIN SCALES - GENERAL: PAINLEVEL_OUTOF10: SEVERE PAIN (7)

## 2025-01-07 NOTE — LETTER
1/7/2025      Hortencia Baldwin  [address]  [facility] [address]      Dear Colleague,    Thank you for referring your patient, Hortencia Baldwin, to the [facility] BLOOD AND MARROW TRANSPLANT PROGRAM [place]. Please see a copy of my visit note below.    Virtual Visit Details    Type of service:  Video Visit     Joined the call at 1/7/2025, 9:06:26 am.  Left the call at 1/7/2025, 10:01:08 am.  Provider was on the call for 54 minutes 42 seconds .      Originating Location (pt. Location): Home    Distant Location (provider location):  Off-site  Platform used for Video Visit: [facility]        BMT 1-Year Post-Allogeneic BMT   Survivorship Care Plan    Date: January 7, 2025    Treatment Team:   Patient Care Team:  Clinic, [facility] as PCP - Avani Hoang as Physician (Radiation Oncology)  Coco Garay MBBS as BMT Physician (Transplant)  Coco Garay MBBS as Assigned Cancer Care Provider  Mani Farias MD as Assigned Musculoskeletal Provider  Nathalie Enriquez RPH as Pharmacist (Pharmacist)  Nathalie Enriquez RPH as Assigned MTM Pharmacist  Fernando Xie RN as BMT Nurse Coordinator (Transplant)  Alexis Pina MD as MD (Dermatology)  Alexis Pina MD as Assigned Surgical Provider    BMT Transplant Date: Allo PBSC Txp; Day 362 (1/11/24)    Patient ID:  Hortencia Baldwin is a 54 year old female, currently day 362 of her 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    CC: Hortencia Baldwin was seen in the BMT Survivorship clinic on January 7, 2025 for a comprehensive, 1-year post allogeneic stem cell transplant, consultation and evaluation for transplant late effects. This is a 60-minute visit designed to educate patients about recommended follow-up care based on contemporary clinical practice guidelines, create a care plan that can be used as a guide to follow up care for the patient and their care team, and place any screening or diagnostic tests  "recommended for the screening of post-transplant complications based on patient risk and transplant type. All recommendations are outlined in the note below. Any tests not ordered during this visit are traditionally performed by the patient's PCP. All patients are advised to schedule a routine preventative care or \"Well Visit\" with their PCP if they have not done so already in the past year since transplant, to discuss these current and future recommendations.    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 Date of transplant: 1/11/2024     Diagnosis MDS w/ SF3B1 BMT type Allogenic  CMV  Donor -  Recipient +    Prep: MA Bu/Flu Donor type  6/8 URD Blood Type Donor and recipient O+   GVHD Ppx PTCy/ siro/ MMF Graft source PBSCT Toxo IgG Negative   Protocol [id number] (not on) BMT MD Dr. Garay             Data  Pre-transplant disease history  Referring provider: Dr.Evan Ramey, [facility]   54 y/o F with history of longstanding macrocytic anemia (Hb 10-11, -110 dating back to 1997) presented in Nov 2021 for lightheadedness. CBC showed acute on chronic anemia with Hb 4.7, ; normal WBC and platelet count. Bone marrow biopsy (12/09/2021) was consistent with MDS with low blasts and SF3B1 mutation. Hypercellular marrow (70%) with single lineage dysplasia (dyserythropoiesis), increased ringed sideroblasts and no increase in blasts. Flow cytometry showed mixed lymphocytes. Cytogenetics 46,XX,del(20). NGS: SF3B1 (45%), ASXL1 (6%).   Received Epo (March 2022 - April 2022) but had minimal response after 4 weekly doses. Luspatercept from April 2022 to April 2023 with inadequate response.   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 Treated with weekly rituximab x 4 and steroid taper (Sept to Dec 2022). LDH and bilirubin " started rising after steroid taper, therefore treated with MMF 500mg BID (Dec 2022 to May 2023) with resolution of hemolysis (negative KATINA, normal LDH, bilirubin). However, she continued to be transfusion dependant therefore her anemia was determined to be related more to MDS than autoimmune hemolysis.   Repeat bone marrow biopsy (5/11/2023) showed persistent MDS. Hypercellular marrow (90%) but now with dysplasia in all three lineages, cytogenetics 46, XX, del (20). Received decitabine- cedazurdine X 4 cycles (May 2023 to Dec 2023). Complicated by neutropenia requiring dose reduction and pRBC transfusions every 2-3 weeks. Pre-transplant bmbx (12/7/23) showed persistent MDS with multi lineage dysplasia (dyserythropoesis and dysgranulopoesis), 17% ringed sideroblasts, increased marrow storage iron. Cytogenetics: 46,XX,del(20). Flow cytometry and NGS not done.    She was considered for allogenic transplant despite low risk MDS by IPSS-R at diagnosis due to transfusion dependence, ASXL1 mutation and development of multi-lineage dysplasia on bmbx in May 2023 suggesting clonal evolution.      She underwent allogenic stem cell transplant on 1/11/24: myeloablative conditioning with Bu/Flu, 6/8 unrelated donor peripheral blood stem cell graft, cell dose: 6.50E+06. ABO matched, CMV donor negative, recipient positive. GVHD ppx with PTCy, MMF and tacrolimus (avoiding sirolimus due to high risk of VOD, given elevated liver enzymes prior to transplant and suspected iron overload).      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       HPI: Hortencia was very pleasant and engaged in our conversation. States she has been board and is excited to to go back to work. She has been going for long walks and using her stationary bike. Likes spending time with her boyfriend and family. Notes that she occasionally still suffers from dry mouth and diarrhea,  but overall symptoms have improved. She is eating well and did not have any nutritional concerns. She also notes taking long frequent walks for exercise and mental wellbeing. Expressed that she did not need any additional mental health resources at this time, however, she lost her  last year to cardiovascular disease and she still frequently grieves this.    Medical History:  Past Medical History:   Diagnosis Date     Left medial knee pain      Medial epicondylitis, right elbow      Obesity (BMI 30-39.9)      Right elbow pain        Surgical History:  Past Surgical History:   Procedure Laterality Date     COMBINED ESOPHAGOSCOPY, GASTROSCOPY, DUODENOSCOPY (EGD) WITH CO2 INSUFFLATION N/A 12/23/2024    Procedure: Combined Esophagoscopy, Gastroscopy, Duodenoscopy (Egd) With Co2 Insufflation;  Surgeon: Gennaro Lara MD;  Location: [facility] OR     ESOPHAGOSCOPY, GASTROSCOPY, DUODENOSCOPY (EGD), COMBINED N/A 12/23/2024    Procedure: ESOPHAGOGASTRODUODENOSCOPY, WITH BIOPSY;  Surgeon: Gennaro Lara MD;  Location: [facility] OR     EXCISE LESION TRUNK N/A 2/5/2018    Procedure: EXCISE LESION TRUNK;;  Surgeon: Avani Szymanski MD;  Location: [facility]     EXCISE MASS LOWER EXTREMITY Bilateral 5/31/2017    Procedure: EXCISE MASS LOWER EXTREMITY;  EXCISION OF LIPOMAS RIGHT BUTTOCKS RIGHT LOWER THIGH, LEFT UPPER OUTER THIGH;  Surgeon: Avani Szymanski MD;  Location: [facility]     EXCISE MASS LOWER EXTREMITY Right 2/5/2018    Procedure: EXCISE MASS LOWER EXTREMITY;  EXCISION OF RIGHT UPPER ANTERIOR THIGH SOFT TISSUE MASS,EXCISION OF LEFT LOWER BACK SOFT TISSUE MASS;  Surgeon: Avani Szymanski MD;  Location: [facility]     IR CVC TUNNEL PLACEMENT > 5 YRS OF AGE  1/5/2024     IR CVC TUNNEL REMOVAL RIGHT  3/11/2024     LUMPECTOMY BREAST Left 1990s    benign       Family History:  Family History   Problem Relation Age of Onset     Hypertension Mother      Diabetes No family hx of      Cerebrovascular Disease No family hx of       Myocardial Infarction No family hx of      Coronary Artery Disease Early Onset No family hx of      Breast Cancer No family hx of      Ovarian Cancer No family hx of      Colon Cancer No family hx of        Social History:  Social History     Socioeconomic History     Marital status: Single     Spouse name: Not on file     Number of children: Not on file     Years of education: Not on file     Highest education level: Not on file   Occupational History     Occupation: Assembly [employer]   Tobacco Use     Smoking status: Former     Current packs/day: 0.00     Average packs/day: 0.5 packs/day for 10.0 years (5.0 ttl pk-yrs)     Types: Cigarettes     Start date: 2014     Quit date: 2024     Years since quittin.0     Passive exposure: Current     Smokeless tobacco: Never   Vaping Use     Vaping status: Never Used   Substance and Sexual Activity     Alcohol use: Yes     Comment: 1-2 drinks/month     Drug use: No     Sexual activity: Yes     Partners: Male   Other Topics Concern     Parent/sibling w/ CABG, MI or angioplasty before 65F 55M? Not Asked   Social History Narrative    .    3 kids - 2 adult, one is 14 (as of ).     Exercises 1-2x/week.      Social Drivers of Health     Financial Resource Strain: Not on file   Food Insecurity: Not on file   Transportation Needs: Not on file   Physical Activity: Inactive (4/15/2024)    Exercise Vital Sign      Days of Exercise per Week: 0 days      Minutes of Exercise per Session: 0 min   Stress: Not on file   Social Connections: Not on file   Interpersonal Safety: Unknown (2024)    Interpersonal Safety      Do you feel physically and emotionally safe where you currently live?: Patient unable to answer      Within the past 12 months, have you been hit, slapped, kicked or otherwise physically hurt by someone?: Not on file      Within the past 12 months, have you been humiliated or emotionally abused in other ways by your partner or  ex-partner?: Not on file   Housing Stability: Not on file       Survivorship Care Plan:     This individualized care plan is designed to inform you and your healthcare team of the recommended follow-up visits, tests, health maintenance activities, and cancer screening you should receive after transplant.     General Health Maintenance:   Call the BMT clinic if you develop a skin rash, oral sores, eye pain or excessive dryness, shortness of breath, new cough, bleeding, diarrhea, nausea, or vomiting.   Vaccinations should be given at 1 and 2 years after your transplant, these may be given at your annual anniversary visits in the BMT clinic, by your primary care provider, or your local oncologist. An exception is the influenza vaccine, which can be given after day +60 post-transplant during influenza season. See table below for more information.   You can receive the COVID19 vaccine if you have not already, for more information on how to sign up for an appointment you can the [employer] vaccine website at [email or web address] or the        [place] Department of Health Vaccine Connector portal at https://[place].gov/covid19/vaccine/connector/connector.jsp  For general health concerns you can be seen by your primary care provider.   If you have questions about your transplant or follow up tests contact your BMT RN coordinator.      Vaccine Administration Schedule       Vaccinations Post-BMT: Please refer to our [facility] [place] BMT Vaccination Guidelines updated in March of 2021.         Bmkhq-ir-Qxbs-Disease Screening:    Immune System:  Risks Preventative Measures Recommendations   Infections, viral reactivations Continue to take prescribed medications to prevent infection if you are treated for arxxc-xr-aamm disease  Symptoms of a cold such as fever, cough, congestion, and shortness of breath should be reported to your primary care provider  Immunizations will be administered at 1 & 2 years  after transplant. See schedule above. Vaccines at anniversary visit next week     Eyes:   Risks Preventative Measures Recommendations   Cataracts, dry eyes, GVH of the eyes, viral infections, and other eye changes Yearly eye exam   Screening and treatment for high blood pressure or diabetes  Call if you have eye pain, visual changes, or floaters immediately  Call If you are experiencing dry eyes and symptoms do not improve with Refresh eye drops  Patient will schedule an annual routine exam with community ophthalmologist    States she will make an appointment with her eye doctor.     Mouth:  Risks Preventative Measures Recommendations   Dry mouth, oral GVH, cavities, and oral cancer Report any new symptoms such as dry mouth or painful sores   You can use over the counter Biotene for dry mouth or try sucking on sour candy before meals  Get a dental checkup every year and a cleaning every 6 months  If you have a prosthetic heart valve, central venous catheter or  port , or are still taking immunosuppression you may need to take an antibiotic before your dental visits. Dental referral    Placed referral for dental provider in [facility].       Lungs  Risks Preventative Measures Recommendations   Changes in function from chemotherapy or radiation; lung infections (pneumonia) Tell your provider about difficulty breathing, a cough, or new shortness of breath   Avoid use of tobacco products or smoking  Routine lung exams   Pulmonary Function Tests (Recommended annually post-transplant)    Placed order for PFT test.    Recommend her PCP order them annually or if symptomatic going forward.        Heart and Blood Vessels  Heart Disease Risk Score: The 10-year ASCVD risk score (Blanca MURRY, et al., 2019) is: 4.5%    Values used to calculate the score:      Age: 54 years      Sex: Female      Is Non- : No      Diabetic: No      Tobacco smoker: No      Systolic Blood Pressure: 170 mmHg      Is BP  treated: No      HDL Cholesterol: 53 mg/dL      Total Cholesterol: 271 mg/dL  Risks Preventative Measures Recommendations   Damage from chemotherapy and/or radiation; early development of heart valve disease  Ask your provider if you should receive consultation from a cardiologist (heart doctor) or have special screening  Follow a  heart healthy  lifestyle. For more information visit the National Heart, Lung, and Blood [place] website at: https://www.nhlbi.nih.gov/health/health-topics/topics/heart-healthy-lifestyle-changes   Don't smoke. If you currently smoke and are ready to quit, we can help you find ways to quit  Maintain a healthy weight  Exercise regularly  Avoid foods that have high amounts of:  Salt/sodium (less than 2,300 mg of sodium per day)  Saturated and trans fats  Limit alcohol to less than 1 drink for women and 2 drinks for men per day  Sugar such as soft drinks or sugary snacks Fasting Lipid Profile or Direct LDL (Recommended annually)       Hormones  Risks Preventative Measures Recommendations   Low thyroid function, low function of other glands (adrenals and others) Certain endocrine/hormone disorders are more common after transplant. For this reason, talk to your provider about:  Fatigue  Muscle weakness  Changes in cold tolerance  Reduced interest in sex  Erectile dysfunction   Certain tests may be used to monitor for hormone changes if you are experiencing symptoms. These tests are done at 1 year  If you have been on steroids you will need to slowly taper or decrease the dose as recommended by your provider/pharmacist. Do not stop taking steroids without consulting with your provider.   If you have been on steroids in the past, you may need steroids during periods of illness TSH with T4 reflex (Recommended 1 & 2 years post-transplant), Fasting Glucose (Recommended 6 mos & annually post-transplant), Hgb A1C (Recommended annually post-transplant), and Other referral to womens health clinic for  symptoms of menopause s/p transplant and general womens health care       Liver:  Risks Preventative Measures Recommendations      Damage from chemotherapy or other drugs, buildup of iron from blood transfusions, infections, and GVHD   Certain tests may be ordered at your next survivorship visit to evaluate liver function based on your individual risk factors.  Talk to your provider before taking herbal supplements or over the counter drugs like Tylenol.  Ask your doctor if you should be treated for iron overload  Avoid alcohol in excess     Hepatic Panel/LFTs (Recommended every 3-6 mos the 1st year post-transplant & annually) and Serum Ferritin (Recommended 1 year post-transplant)       Kidneys and Bladder:  Risks Preventative Measures Recommendations   Damage from chemotherapy or other drugs, infections, high blood pressure Monitoring blood tests of kidney function during follow up visits  Treating high blood pressure and diabetes   Drink adequate amounts of water  Report symptoms of infection such as frequent urination, pain with urination, foul odor to urine, or blood in the urine  Talk to your provider before taking herbal supplements or over the counter drugs like Ibuprofen Serum creatinine checked as part of anniversary labs or at least annually by primary provider       Nervous System:  Risks Preventative Measures Recommendations   Neuropathy from chemotherapy, changes in cognitive function Report changes in sensation or discomfort in feet or hands  Tell your provider about ongoing changes in memory, ability to concentrate, or inability to make decisions   None at this time       Muscle & Connective Tissue  Risks Preventative Measures Recommendations   Reduced muscle strength, reduced stamina, GVHD causing hardening of muscle tissues (scleroderma) or inflammation of tissue over muscles (fascitis)  Let your provider know if:  You notice changes in your muscle strength  Require extra assistance with daily  activities  Experience pain or difficult bending or moving joints  If you have been on steroids and are experiencing weakness particularly when standing up from a chair   Need help creating an exercise routine  Notice reduced range of motion of the arms, hips, or legs  Follow general guidelines for physical activity as recommended by the Office of Disease Prevention & Health Promotion:    Avoid Inactivity  Some physical activity is better than none -- any amount has benefits.    Do Aerobic Activity  Do aerobic physical activity in episodes of at least 10 minutes, as many times as possible per day. This could include going for walks or using the elliptical or stationary bike.  Ask your doctor what aerobic activities would be safe and helpful for you, and set a goal for yourself!    Strengthen Muscles  Do muscle-strengthening activities (such as lifting light weights or using resistance bands and/or going up and down stairs) that are moderate or high intensity and involve all major muscle groups at least 4 days a week. Bone Scan (Recommended 1 year) and Calcium & Vitamin D Levels (Recommended annually)     Emotional Health  Risks Preventative Measures Recommendations   Stress, depression, anxiety Talk to your provider about:  Changes in feelings, mood, or emotional wellbeing  Interest in support groups  If you are concerned about your caregivers emotional wellbeing  Desire to speak with a counselor for ongoing support  None at this time       Sexual Health  Risks Preventative Measures Recommendations   Reduced libido due to hormonal changes, erectile dysfunction, vaginal dryness, vaginal ciyed-ki-snsu disease, vaginal infections, sexually transmitted diseases Talk to your provider about:  Reduced libido or concerns regarding your sexual health  Men: Erectile dysfunction   Women: Vaginal dryness, pain during intercourse, vaginal bleeding after intercourse, changes in vaginal discharge that may indicate infection  (green/white/foul odor)   General Recommendations:  It is safe to have sex if your platelet count is > 50,000 and you feel physically and emotionally ready   Women can use water-based lubricants to reduce discomfort from dryness. Prescription topical estrogen may help as well.  Use barrier protection such as condoms to prevent sexually transmitted diseases, you are at higher risk for infections due to a weakened immune system  For more information check out the National Marrow Donor Program web page on this topic:  https://bethematch.org/patients-and-families/life-after-transplant/coping-with-life-after-transplant/relationships-and-sexual-health/  Referral to [facility] (Women with symptoms of vaginal GVH)         Cancer Screening:  Risks Preventative Measures Recommendations   Higher risk for the development of solid tumors, PTLD, and blood cancers Preform a full self skin exam monthly to assess for any changes in moles or signs of skin cancer  Minimize excessive sun exposure and wear sunscreen  Women should preform breast-self exams and report any changes in such as a new lump, discharge from nipples, and red or dimpled areas of the breast.   All women should be screened for breast cancer following the guidelines below:  Average Risk (no radiation exposure)   Age 20-40 years: Annual clinical breast exam  Age >40 years: Annual clinical breast exam & annual mammogram  Screening for colorectal cancer should begin at age 50.   All women should have a pap smear, assessment for vaginal GVHD, and HPV DNA test every year beginning at age 21  Men should perform a monthly testicular self-exam if they have been exposed to radiation as part of their cancer treatment.    Mammogram (Women, 1 year and annually based on radiation exposure/age), Fecal Occult Blood Test (Recommended annually), Colonoscopy (Recommended every 10 years after the age of 50), and Gynecology Referral    Next mammogram due October  2025    First colonoscopy scheduled for February of 2025.           Recommended Tests & Follow-Up:  Referrals & Tests Ordered Today:  Your BMT physician will review these tests and referral results. If you require treatment based on the results, a member of the BMT team will contact you.  Orders placed today:  Orders Placed This Encounter   Procedures     DXA scan     TSH     Vitamin D panel     Gynecology referral     Dental referral     General PFT Lab (Please always keep checked)     Pulmonary Function Test     (Note to providers: After signing orders use the refresh tool in the note window to display results)   Future Tests/Referrals:   All recommendations above should be completed within 2 months either by your primary care provider or local oncologist (cancer doctor).   Recommendations that were not ordered today should be completed by your:  Primary Care Provider     NEEL Steele CNP    I spent 45 minutes with the patient, over half of which was spent discussing preventative care strategies, self-management practices, and potential complications after transplant.      Information used for these recommendations was obtained from: SKYLER Sanders., DANAY Hewitt, JUAN Jeffery., GOYO Lopez., PENG Valdez., RONALDO Stroud.,   Jorge, KNadya M. (2013). Prevalence of Hematopoietic Cell Transplant Survivors in the [place]. Biology of Blood and Marrow Transplantation?: Journal of the American Society for Blood and Marrow Transplantation, 19(10), 9414-7359. doi 10.1016/j.bbmt.2013.07.020                                                    Again, thank you for allowing me to participate in the care of your patient.        Sincerely,        NEEL Steele CNP    Electronically signed

## 2025-01-07 NOTE — NURSING NOTE
Current patient location: [facility] [address]   [facility] [address]    Is the patient currently in the state of [place]? YES    Visit mode:VIDEO    If the visit is dropped, the patient can be reconnected by:VIDEO VISIT: Text to cell phone:   Telephone Information:   Mobile [phone]       Will anyone else be joining the visit? NO  (If patient encounters technical issues they should call [phone])    Are changes needed to the allergy or medication list? Pt stated no changes to allergies and Pt stated no med changes    Are refills needed on medications prescribed by this physician? NO    Rooming Documentation:  Questionnaire(s) completed    Reason for visit: TEO [id number]

## 2025-01-07 NOTE — PROGRESS NOTES
"    BMT 1-Year Post-Allogeneic BMT   Survivorship Care Plan    Date: January 7, 2025    Treatment Team:   Patient Care Team:  Clinic, [facility] as PCP - General  Avani Ordoñez as Physician (Radiation Oncology)  Coco Garay MBBS as BMT Physician (Transplant)  Coco Garay MBBS as Assigned Cancer Care Provider  Mani Farias MD as Assigned Musculoskeletal Provider  Nathalie Enriquez RPH as Pharmacist (Pharmacist)  Nathalie Enriquez RPH as Assigned MTM Pharmacist  Fernando Xie RN as BMT Nurse Coordinator (Transplant)  Alexis Pina MD as MD (Dermatology)  Alexis Pina MD as Assigned Surgical Provider    BMT Transplant Date: Allo PBSC Txp; Day 362 (1/11/24)    Patient ID:  Hortencia Baldwin is a 54 year old female, currently day 362 of her 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    CC: Hortencia Baldwin was seen in the BMT Survivorship clinic on January 7, 2025 for a comprehensive, 1-year post allogeneic stem cell transplant, consultation and evaluation for transplant late effects. This is a 60-minute visit designed to educate patients about recommended follow-up care based on contemporary clinical practice guidelines, create a care plan that can be used as a guide to follow up care for the patient and their care team, and place any screening or diagnostic tests recommended for the screening of post-transplant complications based on patient risk and transplant type. All recommendations are outlined in the note below. Any tests not ordered during this visit are traditionally performed by the patient's PCP. All patients are advised to schedule a routine preventative care or \"Well Visit\" with their PCP if they have not done so already in the past year since transplant, to discuss these current and future recommendations.    Hematology/Oncology Treatment History:     Patient ID: Hortencia Baldwin is a 54 year old female who is day +339  post allogenic stem cell transplant for " MDS with SF3B1 mutation. Date of transplant: 1/11/2024     Diagnosis MDS w/ SF3B1 BMT type Allogenic  CMV  Donor -  Recipient +    Prep: MA Bu/Flu Donor type  6/8 URD Blood Type Donor and recipient O+   GVHD Ppx PTCy/ siro/ MMF Graft source PBSCT Toxo IgG Negative   Protocol [id number] (not on) BMT MD Dr. Garay             Data  Pre-transplant disease history  Referring provider: Dr.Evan Ramey, [facility]   52 y/o F with history of longstanding macrocytic anemia (Hb 10-11, -110 dating back to 1997) presented in Nov 2021 for lightheadedness. CBC showed acute on chronic anemia with Hb 4.7, ; normal WBC and platelet count. Bone marrow biopsy (12/09/2021) was consistent with MDS with low blasts and SF3B1 mutation. Hypercellular marrow (70%) with single lineage dysplasia (dyserythropoiesis), increased ringed sideroblasts and no increase in blasts. Flow cytometry showed mixed lymphocytes. Cytogenetics 46,XX,del(20). NGS: SF3B1 (45%), ASXL1 (6%).   Received Epo (March 2022 - April 2022) but had minimal response after 4 weekly doses. Luspatercept from April 2022 to April 2023 with inadequate response.   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 Treated with weekly rituximab x 4 and steroid taper (Sept to Dec 2022). LDH and bilirubin started rising after steroid taper, therefore treated with MMF 500mg BID (Dec 2022 to May 2023) with resolution of hemolysis (negative KATINA, normal LDH, bilirubin). However, she continued to be transfusion dependant therefore her anemia was determined to be related more to MDS than autoimmune hemolysis.   Repeat bone marrow biopsy (5/11/2023) showed persistent MDS. Hypercellular marrow (90%) but now with dysplasia in all three lineages, cytogenetics 46, XX, del (20). Received decitabine- cedazurdine X 4 cycles (May 2023 to Dec 2023). Complicated by neutropenia requiring dose reduction and pRBC transfusions every 2-3  weeks. Pre-transplant bmbx (12/7/23) showed persistent MDS with multi lineage dysplasia (dyserythropoesis and dysgranulopoesis), 17% ringed sideroblasts, increased marrow storage iron. Cytogenetics: 46,XX,del(20). Flow cytometry and NGS not done.    She was considered for allogenic transplant despite low risk MDS by IPSS-R at diagnosis due to transfusion dependence, ASXL1 mutation and development of multi-lineage dysplasia on bmbx in May 2023 suggesting clonal evolution.      She underwent allogenic stem cell transplant on 1/11/24: myeloablative conditioning with Bu/Flu, 6/8 unrelated donor peripheral blood stem cell graft, cell dose: 6.50E+06. ABO matched, CMV donor negative, recipient positive. GVHD ppx with PTCy, MMF and tacrolimus (avoiding sirolimus due to high risk of VOD, given elevated liver enzymes prior to transplant and suspected iron overload).      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       HPI: Hortencia was very pleasant and engaged in our conversation. States she has been board and is excited to to go back to work. She has been going for long walks and using her stationary bike. Likes spending time with her boyfriend and family. Notes that she occasionally still suffers from dry mouth and diarrhea, but overall symptoms have improved. She is eating well and did not have any nutritional concerns. She also notes taking long frequent walks for exercise and mental wellbeing. Expressed that she did not need any additional mental health resources at this time, however, she lost her  last year to cardiovascular disease and she still frequently grieves this.    Medical History:  Past Medical History:   Diagnosis Date    Left medial knee pain     Medial epicondylitis, right elbow     Obesity (BMI 30-39.9)     Right elbow pain        Surgical History:  Past Surgical History:   Procedure Laterality Date    COMBINED  ESOPHAGOSCOPY, GASTROSCOPY, DUODENOSCOPY (EGD) WITH CO2 INSUFFLATION N/A 12/23/2024    Procedure: Combined Esophagoscopy, Gastroscopy, Duodenoscopy (Egd) With Co2 Insufflation;  Surgeon: Gennaro Lara MD;  Location: [facility]    ESOPHAGOSCOPY, GASTROSCOPY, DUODENOSCOPY (EGD), COMBINED N/A 12/23/2024    Procedure: ESOPHAGOGASTRODUODENOSCOPY, WITH BIOPSY;  Surgeon: Gennaro Lara MD;  Location: [facility] OR    EXCISE LESION TRUNK N/A 2/5/2018    Procedure: EXCISE LESION TRUNK;;  Surgeon: Avani Szymanski MD;  Location: [facility]    EXCISE MASS LOWER EXTREMITY Bilateral 5/31/2017    Procedure: EXCISE MASS LOWER EXTREMITY;  EXCISION OF LIPOMAS RIGHT BUTTOCKS RIGHT LOWER THIGH, LEFT UPPER OUTER THIGH;  Surgeon: Avani Szymanski MD;  Location:  [facility]    EXCISE MASS LOWER EXTREMITY Right 2/5/2018    Procedure: EXCISE MASS LOWER EXTREMITY;  EXCISION OF RIGHT UPPER ANTERIOR THIGH SOFT TISSUE MASS,EXCISION OF LEFT LOWER BACK SOFT TISSUE MASS;  Surgeon: Avani Szymanski MD;  Location:  [facility]    IR CVC TUNNEL PLACEMENT > 5 YRS OF AGE  1/5/2024    IR CVC TUNNEL REMOVAL RIGHT  3/11/2024    LUMPECTOMY BREAST Left 1990s    benign       Family History:  Family History   Problem Relation Age of Onset    Hypertension Mother     Diabetes No family hx of     Cerebrovascular Disease No family hx of     Myocardial Infarction No family hx of     Coronary Artery Disease Early Onset No family hx of     Breast Cancer No family hx of     Ovarian Cancer No family hx of     Colon Cancer No family hx of        Social History:  Social History     Socioeconomic History    Marital status: Single     Spouse name: Not on file    Number of children: Not on file    Years of education: Not on file    Highest education level: Not on file   Occupational History    Occupation: Assembly [employer]   Tobacco Use    Smoking status: Former     Current packs/day: 0.00     Average packs/day: 0.5 packs/day for 10.0 years (5.0 ttl pk-yrs)      Types: Cigarettes     Start date: 2014     Quit date: 2024     Years since quittin.0     Passive exposure: Current    Smokeless tobacco: Never   Vaping Use    Vaping status: Never Used   Substance and Sexual Activity    Alcohol use: Yes     Comment: 1-2 drinks/month    Drug use: No    Sexual activity: Yes     Partners: Male   Other Topics Concern    Parent/sibling w/ CABG, MI or angioplasty before 65F 55M? Not Asked   Social History Narrative    .    3 kids - 2 adult, one is 14 (as of 2017).     Exercises 1-2x/week.      Social Drivers of Health     Financial Resource Strain: Not on file   Food Insecurity: Not on file   Transportation Needs: Not on file   Physical Activity: Inactive (4/15/2024)    Exercise Vital Sign     Days of Exercise per Week: 0 days     Minutes of Exercise per Session: 0 min   Stress: Not on file   Social Connections: Not on file   Interpersonal Safety: Unknown (2024)    Interpersonal Safety     Do you feel physically and emotionally safe where you currently live?: Patient unable to answer     Within the past 12 months, have you been hit, slapped, kicked or otherwise physically hurt by someone?: Not on file     Within the past 12 months, have you been humiliated or emotionally abused in other ways by your partner or ex-partner?: Not on file   Housing Stability: Not on file       Survivorship Care Plan:     This individualized care plan is designed to inform you and your healthcare team of the recommended follow-up visits, tests, health maintenance activities, and cancer screening you should receive after transplant.     General Health Maintenance:   Call the BMT clinic if you develop a skin rash, oral sores, eye pain or excessive dryness, shortness of breath, new cough, bleeding, diarrhea, nausea, or vomiting.   Vaccinations should be given at 1 and 2 years after your transplant, these may be given at your annual anniversary visits in the BMT clinic, by your  primary care provider, or your local oncologist. An exception is the influenza vaccine, which can be given after day +60 post-transplant during influenza season. See table below for more information.   You can receive the COVID19 vaccine if you have not already, for more information on how to sign up for an appointment you can the [employer] vaccine website at [email or web address] or the        [place] Department of Health Vaccine Connector portal at https://[place].gov/covid19/vaccine/connector/connector.jsp  For general health concerns you can be seen by your primary care provider.   If you have questions about your transplant or follow up tests contact your BMT RN coordinator.      Vaccine Administration Schedule       Vaccinations Post-BMT: Please refer to our [facility] Vaccination Guidelines updated in March of 2021.         Xlalb-jo-Azef-Disease Screening:    Immune System:  Risks Preventative Measures Recommendations   Infections, viral reactivations Continue to take prescribed medications to prevent infection if you are treated for peglp-op-jgnh disease  Symptoms of a cold such as fever, cough, congestion, and shortness of breath should be reported to your primary care provider  Immunizations will be administered at 1 & 2 years after transplant. See schedule above. Vaccines at anniversary visit next week     Eyes:   Risks Preventative Measures Recommendations   Cataracts, dry eyes, GVH of the eyes, viral infections, and other eye changes Yearly eye exam   Screening and treatment for high blood pressure or diabetes  Call if you have eye pain, visual changes, or floaters immediately  Call If you are experiencing dry eyes and symptoms do not improve with Refresh eye drops  Patient will schedule an annual routine exam with community ophthalmologist    States she will make an appointment with her eye doctor.     Mouth:  Risks Preventative Measures Recommendations   Dry mouth,  oral GVH, cavities, and oral cancer Report any new symptoms such as dry mouth or painful sores   You can use over the counter Biotene for dry mouth or try sucking on sour candy before meals  Get a dental checkup every year and a cleaning every 6 months  If you have a prosthetic heart valve, central venous catheter or  port , or are still taking immunosuppression you may need to take an antibiotic before your dental visits. Dental referral    [facility]       Lungs  Risks Preventative Measures Recommendations   Changes in function from chemotherapy or radiation; lung infections (pneumonia) Tell your provider about difficulty breathing, a cough, or new shortness of breath   Avoid use of tobacco products or smoking  Routine lung exams   Pulmonary Function Tests (Recommended annually post-transplant)    Placed order for PFT test.    Recommend her PCP order them annually or if symptomatic going forward.        Heart and Blood Vessels  Heart Disease Risk Score: The 10-year ASCVD risk score (Blanca MURRY, et al., 2019) is: 4.5%    Values used to calculate the score:      Age: 54 years      Sex: Female      Is Non- : No      Diabetic: No      Tobacco smoker: No      Systolic Blood Pressure: 170 mmHg      Is BP treated: No      HDL Cholesterol: 53 mg/dL      Total Cholesterol: 271 mg/dL  Risks Preventative Measures Recommendations   Damage from chemotherapy and/or radiation; early development of heart valve disease  Ask your provider if you should receive consultation from a cardiologist (heart doctor) or have special screening  Follow a  heart healthy  lifestyle. For more information visit the National Heart, Lung, and Blood [place] website at: https://www.nhlbi.nih.gov/health/health-topics/topics/heart-healthy-lifestyle-changes   Don't smoke. If you currently smoke and are ready to quit, we can help you find ways to quit  Maintain a healthy weight  Exercise  regularly  Avoid foods that have high amounts of:  Salt/sodium (less than 2,300 mg of sodium per day)  Saturated and trans fats  Limit alcohol to less than 1 drink for women and 2 drinks for men per day  Sugar such as soft drinks or sugary snacks Fasting Lipid Profile or Direct LDL (Recommended annually)       Hormones  Risks Preventative Measures Recommendations   Low thyroid function, low function of other glands (adrenals and others) Certain endocrine/hormone disorders are more common after transplant. For this reason, talk to your provider about:  Fatigue  Muscle weakness  Changes in cold tolerance  Reduced interest in sex  Erectile dysfunction   Certain tests may be used to monitor for hormone changes if you are experiencing symptoms. These tests are done at 1 year  If you have been on steroids you will need to slowly taper or decrease the dose as recommended by your provider/pharmacist. Do not stop taking steroids without consulting with your provider.   If you have been on steroids in the past, you may need steroids during periods of illness TSH with T4 reflex (Recommended 1 & 2 years post-transplant), Fasting Glucose (Recommended 6 mos & annually post-transplant), Hgb A1C (Recommended annually post-transplant), and Other referral to womens health clinic for symptoms of menopause s/p transplant and general womens health care       Liver:  Risks Preventative Measures Recommendations      Damage from chemotherapy or other drugs, buildup of iron from blood transfusions, infections, and GVHD   Certain tests may be ordered at your next survivorship visit to evaluate liver function based on your individual risk factors.  Talk to your provider before taking herbal supplements or over the counter drugs like Tylenol.  Ask your doctor if you should be treated for iron overload  Avoid alcohol in excess     Hepatic Panel/LFTs (Recommended every 3-6 mos the 1st year post-transplant & annually) and Serum Ferritin  (Recommended 1 year post-transplant)       Kidneys and Bladder:  Risks Preventative Measures Recommendations   Damage from chemotherapy or other drugs, infections, high blood pressure Monitoring blood tests of kidney function during follow up visits  Treating high blood pressure and diabetes   Drink adequate amounts of water  Report symptoms of infection such as frequent urination, pain with urination, foul odor to urine, or blood in the urine  Talk to your provider before taking herbal supplements or over the counter drugs like Ibuprofen Serum creatinine checked as part of anniversary labs or at least annually by primary provider       Nervous System:  Risks Preventative Measures Recommendations   Neuropathy from chemotherapy, changes in cognitive function Report changes in sensation or discomfort in feet or hands  Tell your provider about ongoing changes in memory, ability to concentrate, or inability to make decisions   None at this time       Muscle & Connective Tissue  Risks Preventative Measures Recommendations   Reduced muscle strength, reduced stamina, GVHD causing hardening of muscle tissues (scleroderma) or inflammation of tissue over muscles (fascitis)  Let your provider know if:  You notice changes in your muscle strength  Require extra assistance with daily activities  Experience pain or difficult bending or moving joints  If you have been on steroids and are experiencing weakness particularly when standing up from a chair   Need help creating an exercise routine  Notice reduced range of motion of the arms, hips, or legs  Follow general guidelines for physical activity as recommended by the Office of Disease Prevention & Health Promotion:    Avoid Inactivity  Some physical activity is better than none -- any amount has benefits.    Do Aerobic Activity  Do aerobic physical activity in episodes of at least 10 minutes, as many times as possible per day. This could include going for walks or using the  elliptical or stationary bike.  Ask your doctor what aerobic activities would be safe and helpful for you, and set a goal for yourself!    Strengthen Muscles  Do muscle-strengthening activities (such as lifting light weights or using resistance bands and/or going up and down stairs) that are moderate or high intensity and involve all major muscle groups at least 4 days a week. Bone Scan (Recommended 1 year) and Calcium & Vitamin D Levels (Recommended annually)     Emotional Health  Risks Preventative Measures Recommendations   Stress, depression, anxiety Talk to your provider about:  Changes in feelings, mood, or emotional wellbeing  Interest in support groups  If you are concerned about your caregivers emotional wellbeing  Desire to speak with a counselor for ongoing support  None at this time       Sexual Health  Risks Preventative Measures Recommendations   Reduced libido due to hormonal changes, erectile dysfunction, vaginal dryness, vaginal fhjhz-ku-omcz disease, vaginal infections, sexually transmitted diseases Talk to your provider about:  Reduced libido or concerns regarding your sexual health  Men: Erectile dysfunction   Women: Vaginal dryness, pain during intercourse, vaginal bleeding after intercourse, changes in vaginal discharge that may indicate infection (green/white/foul odor)   General Recommendations:  It is safe to have sex if your platelet count is > 50,000 and you feel physically and emotionally ready   Women can use water-based lubricants to reduce discomfort from dryness. Prescription topical estrogen may help as well.  Use barrier protection such as condoms to prevent sexually transmitted diseases, you are at higher risk for infections due to a weakened immune system  For more information check out the National Marrow Donor Program web page on this topic:  https://bethematch.org/patients-and-families/life-after-transplant/coping-with-life-after-transplant/relationships-and-sexual-health/   Referral to [facility] (Women with symptoms of vaginal GVH)         Cancer Screening:  Risks Preventative Measures Recommendations   Higher risk for the development of solid tumors, PTLD, and blood cancers Preform a full self skin exam monthly to assess for any changes in moles or signs of skin cancer  Minimize excessive sun exposure and wear sunscreen  Women should preform breast-self exams and report any changes in such as a new lump, discharge from nipples, and red or dimpled areas of the breast.   All women should be screened for breast cancer following the guidelines below:  Average Risk (no radiation exposure)   Age 20-40 years: Annual clinical breast exam  Age >40 years: Annual clinical breast exam & annual mammogram  Screening for colorectal cancer should begin at age 50.   All women should have a pap smear, assessment for vaginal GVHD, and HPV DNA test every year beginning at age 21  Men should perform a monthly testicular self-exam if they have been exposed to radiation as part of their cancer treatment.    Mammogram (Women, 1 year and annually based on radiation exposure/age), Fecal Occult Blood Test (Recommended annually), Colonoscopy (Recommended every 10 years after the age of 50), and Gynecology Referral    Next mammogram due October 2025    First colonoscopy scheduled for February of 2025.           Recommended Tests & Follow-Up:  Referrals & Tests Ordered Today:  Your BMT physician will review these tests and referral results. If you require treatment based on the results, a member of the BMT team will contact you.  Orders placed today:  Orders Placed This Encounter   Procedures    DXA scan    TSH    Vitamin D panel    Gynecology referral    Dental referral    General PFT Lab (Please always keep checked)    Pulmonary Function Test     (Note to providers: After signing orders use the refresh tool in the note window to display results)   Future Tests/Referrals:   All  recommendations above should be completed within 2 months either by your primary care provider or local oncologist (cancer doctor).   Recommendations that were not ordered today should be completed by your:  Primary Care Provider     NEEL Steele CNP    I spent 45 minutes with the patient, over half of which was spent discussing preventative care strategies, self-management practices, and potential complications after transplant.      Information used for these recommendations was obtained from: SKYLER Sanders., DANAY Hewitt, SHELDON Jeffery, GOYO Lopez., PENG Valdez., RONALDO Stroud.,   Jorge, KNadya CAZARES. (2013). Prevalence of Hematopoietic Cell Transplant Survivors in the [place]. Biology of Blood and Marrow Transplantation?: Journal of the American Society for Blood and Marrow Transplantation, 19(10), 7385-3555. doi 10.1016/j.bbmt.2013.07.020

## 2025-01-07 NOTE — PROGRESS NOTES
Virtual Visit Details    Type of service:  Video Visit     Joined the call at 1/7/2025, 9:06:26 am.  Left the call at 1/7/2025, 10:01:08 am.  Provider was on the call for 54 minutes 42 seconds .      Originating Location (pt. Location): Home    Distant Location (provider location):  Off-site  Platform used for Video Visit: [employer]

## 2025-01-07 NOTE — PROGRESS NOTES
Hortencia Sethi called requesting refills of acyclovir, bactrim, and her tacromlimus cream. She confirmed she is taking the acyclovir-1 800 mg pill BID, and her bactrim-1 pill BID on Monday and Tuesday's. Refills sent

## 2025-01-13 ENCOUNTER — OFFICE VISIT (OUTPATIENT)
Dept: TRANSPLANT | Facility: CLINIC | Age: 55
End: 2025-01-13
Attending: STUDENT IN AN ORGANIZED HEALTH CARE EDUCATION/TRAINING PROGRAM
Payer: COMMERCIAL

## 2025-01-13 ENCOUNTER — APPOINTMENT (OUTPATIENT)
Dept: LAB | Facility: CLINIC | Age: 55
End: 2025-01-13
Attending: NURSE PRACTITIONER
Payer: COMMERCIAL

## 2025-01-13 VITALS
SYSTOLIC BLOOD PRESSURE: 134 MMHG | OXYGEN SATURATION: 97 % | HEART RATE: 73 BPM | WEIGHT: 186 LBS | DIASTOLIC BLOOD PRESSURE: 82 MMHG | TEMPERATURE: 98 F | BODY MASS INDEX: 33.48 KG/M2 | RESPIRATION RATE: 18 BRPM

## 2025-01-13 DIAGNOSIS — Z91.89 AT HIGH RISK FOR OSTEOPOROSIS: ICD-10-CM

## 2025-01-13 DIAGNOSIS — Z94.81 S/P ALLOGENEIC BONE MARROW TRANSPLANT (H): ICD-10-CM

## 2025-01-13 DIAGNOSIS — D46.9 MDS (MYELODYSPLASTIC SYNDROME) (H): Primary | ICD-10-CM

## 2025-01-13 DIAGNOSIS — Z91.89 AT RISK FOR HYPOTHYROIDISM: ICD-10-CM

## 2025-01-13 LAB
ALBUMIN SERPL BCG-MCNC: 4.3 G/DL (ref 3.5–5.2)
ALP SERPL-CCNC: 136 U/L (ref 40–150)
ALT SERPL W P-5'-P-CCNC: 44 U/L (ref 0–50)
ANION GAP SERPL CALCULATED.3IONS-SCNC: 11 MMOL/L (ref 7–15)
AST SERPL W P-5'-P-CCNC: 24 U/L (ref 0–45)
BASOPHILS # BLD AUTO: 0.1 10E3/UL (ref 0–0.2)
BASOPHILS NFR BLD AUTO: 1 %
BILIRUB SERPL-MCNC: 0.2 MG/DL
BUN SERPL-MCNC: 10.9 MG/DL (ref 6–20)
CALCIUM SERPL-MCNC: 9.3 MG/DL (ref 8.8–10.4)
CHLORIDE SERPL-SCNC: 104 MMOL/L (ref 98–107)
CHOLEST SERPL-MCNC: 276 MG/DL
CMV DNA SPEC NAA+PROBE-ACNC: <35 IU/ML
CMV DNA SPEC NAA+PROBE-LOG#: <1.5 {LOG_COPIES}/ML
CREAT SERPL-MCNC: 0.54 MG/DL (ref 0.51–0.95)
EGFRCR SERPLBLD CKD-EPI 2021: >90 ML/MIN/1.73M2
EOSINOPHIL # BLD AUTO: 0.1 10E3/UL (ref 0–0.7)
EOSINOPHIL NFR BLD AUTO: 2 %
ERYTHROCYTE [DISTWIDTH] IN BLOOD BY AUTOMATED COUNT: 13.2 % (ref 10–15)
FASTING STATUS PATIENT QL REPORTED: ABNORMAL
FASTING STATUS PATIENT QL REPORTED: NORMAL
GLUCOSE SERPL-MCNC: 78 MG/DL (ref 70–99)
HCO3 SERPL-SCNC: 27 MMOL/L (ref 22–29)
HCT VFR BLD AUTO: 41.9 % (ref 35–47)
HDLC SERPL-MCNC: 59 MG/DL
HGB BLD-MCNC: 14.4 G/DL (ref 11.7–15.7)
IMM GRANULOCYTES # BLD: 0 10E3/UL
IMM GRANULOCYTES NFR BLD: 1 %
LAB DIRECTOR DISCLAIMER: NORMAL
LAB DIRECTOR INTERPRETATION: NORMAL
LAB DIRECTOR METHODOLOGY: NORMAL
LAB DIRECTOR RESULTS: NORMAL
LDLC SERPL CALC-MCNC: 174 MG/DL
LYMPHOCYTES # BLD AUTO: 2.4 10E3/UL (ref 0.8–5.3)
LYMPHOCYTES NFR BLD AUTO: 33 %
MCH RBC QN AUTO: 34.6 PG (ref 26.5–33)
MCHC RBC AUTO-ENTMCNC: 34.4 G/DL (ref 31.5–36.5)
MCV RBC AUTO: 101 FL (ref 78–100)
MONOCYTES # BLD AUTO: 0.7 10E3/UL (ref 0–1.3)
MONOCYTES NFR BLD AUTO: 10 %
NEUTROPHILS # BLD AUTO: 3.8 10E3/UL (ref 1.6–8.3)
NEUTROPHILS NFR BLD AUTO: 54 %
NONHDLC SERPL-MCNC: 217 MG/DL
NRBC # BLD AUTO: 0 10E3/UL
NRBC BLD AUTO-RTO: 0 /100
PLATELET # BLD AUTO: 178 10E3/UL (ref 150–450)
POTASSIUM SERPL-SCNC: 3.7 MMOL/L (ref 3.4–5.3)
PROT SERPL-MCNC: 7 G/DL (ref 6.4–8.3)
RBC # BLD AUTO: 4.16 10E6/UL (ref 3.8–5.2)
SODIUM SERPL-SCNC: 142 MMOL/L (ref 135–145)
SPECIMEN TYPE: ABNORMAL
SPECIMEN TYPE: NORMAL
TRIGL SERPL-MCNC: 213 MG/DL
TSH SERPL DL<=0.005 MIU/L-ACNC: 0.74 UIU/ML (ref 0.3–4.2)
VIT D+METAB SERPL-MCNC: 27 NG/ML (ref 20–50)
WBC # BLD AUTO: 7 10E3/UL (ref 4–11)

## 2025-01-13 PROCEDURE — 88184 FLOWCYTOMETRY/ TC 1 MARKER: CPT | Performed by: STUDENT IN AN ORGANIZED HEALTH CARE EDUCATION/TRAINING PROGRAM

## 2025-01-13 PROCEDURE — 88341 IMHCHEM/IMCYTCHM EA ADD ANTB: CPT | Mod: 26 | Performed by: STUDENT IN AN ORGANIZED HEALTH CARE EDUCATION/TRAINING PROGRAM

## 2025-01-13 PROCEDURE — 80048 BASIC METABOLIC PNL TOTAL CA: CPT

## 2025-01-13 PROCEDURE — 88271 CYTOGENETICS DNA PROBE: CPT | Performed by: STUDENT IN AN ORGANIZED HEALTH CARE EDUCATION/TRAINING PROGRAM

## 2025-01-13 PROCEDURE — 84443 ASSAY THYROID STIM HORMONE: CPT

## 2025-01-13 PROCEDURE — 81268 CHIMERISM ANAL W/CELL SELECT: CPT | Performed by: STUDENT IN AN ORGANIZED HEALTH CARE EDUCATION/TRAINING PROGRAM

## 2025-01-13 PROCEDURE — 81401 MOPATH PROCEDURE LEVEL 2: CPT | Performed by: NURSE PRACTITIONER

## 2025-01-13 PROCEDURE — 82306 VITAMIN D 25 HYDROXY: CPT

## 2025-01-13 PROCEDURE — 88341 IMHCHEM/IMCYTCHM EA ADD ANTB: CPT | Mod: TC | Performed by: STUDENT IN AN ORGANIZED HEALTH CARE EDUCATION/TRAINING PROGRAM

## 2025-01-13 PROCEDURE — 88189 FLOWCYTOMETRY/READ 16 & >: CPT | Mod: GC | Performed by: STUDENT IN AN ORGANIZED HEALTH CARE EDUCATION/TRAINING PROGRAM

## 2025-01-13 PROCEDURE — 85097 BONE MARROW INTERPRETATION: CPT | Performed by: STUDENT IN AN ORGANIZED HEALTH CARE EDUCATION/TRAINING PROGRAM

## 2025-01-13 PROCEDURE — 80061 LIPID PANEL: CPT | Performed by: STUDENT IN AN ORGANIZED HEALTH CARE EDUCATION/TRAINING PROGRAM

## 2025-01-13 PROCEDURE — 88311 DECALCIFY TISSUE: CPT | Mod: 26 | Performed by: STUDENT IN AN ORGANIZED HEALTH CARE EDUCATION/TRAINING PROGRAM

## 2025-01-13 PROCEDURE — 88342 IMHCHEM/IMCYTCHM 1ST ANTB: CPT | Mod: TC | Performed by: STUDENT IN AN ORGANIZED HEALTH CARE EDUCATION/TRAINING PROGRAM

## 2025-01-13 PROCEDURE — 81175 ASXL1 FULL GENE SEQUENCE: CPT | Performed by: NURSE PRACTITIONER

## 2025-01-13 PROCEDURE — 81267 CHIMERISM ANAL NO CELL SELEC: CPT | Performed by: STUDENT IN AN ORGANIZED HEALTH CARE EDUCATION/TRAINING PROGRAM

## 2025-01-13 PROCEDURE — 88237 TISSUE CULTURE BONE MARROW: CPT | Performed by: STUDENT IN AN ORGANIZED HEALTH CARE EDUCATION/TRAINING PROGRAM

## 2025-01-13 PROCEDURE — 38222 DX BONE MARROW BX & ASPIR: CPT | Performed by: NURSE PRACTITIONER

## 2025-01-13 PROCEDURE — G0452 MOLECULAR PATHOLOGY INTERPR: HCPCS | Mod: 26 | Performed by: PATHOLOGY

## 2025-01-13 PROCEDURE — 88264 CHROMOSOME ANALYSIS 20-25: CPT | Performed by: STUDENT IN AN ORGANIZED HEALTH CARE EDUCATION/TRAINING PROGRAM

## 2025-01-13 PROCEDURE — 88342 IMHCHEM/IMCYTCHM 1ST ANTB: CPT | Mod: 26 | Performed by: STUDENT IN AN ORGANIZED HEALTH CARE EDUCATION/TRAINING PROGRAM

## 2025-01-13 PROCEDURE — 88305 TISSUE EXAM BY PATHOLOGIST: CPT | Mod: 26 | Performed by: STUDENT IN AN ORGANIZED HEALTH CARE EDUCATION/TRAINING PROGRAM

## 2025-01-13 PROCEDURE — 85014 HEMATOCRIT: CPT

## 2025-01-13 PROCEDURE — 38222 DX BONE MARROW BX & ASPIR: CPT | Mod: LT | Performed by: NURSE PRACTITIONER

## 2025-01-13 PROCEDURE — 250N000011 HC RX IP 250 OP 636: Performed by: NURSE PRACTITIONER

## 2025-01-13 PROCEDURE — 96374 THER/PROPH/DIAG INJ IV PUSH: CPT

## 2025-01-13 PROCEDURE — 82310 ASSAY OF CALCIUM: CPT

## 2025-01-13 PROCEDURE — 80053 COMPREHEN METABOLIC PANEL: CPT

## 2025-01-13 PROCEDURE — 88313 SPECIAL STAINS GROUP 2: CPT | Mod: 26 | Performed by: STUDENT IN AN ORGANIZED HEALTH CARE EDUCATION/TRAINING PROGRAM

## 2025-01-13 PROCEDURE — 88185 FLOWCYTOMETRY/TC ADD-ON: CPT | Performed by: STUDENT IN AN ORGANIZED HEALTH CARE EDUCATION/TRAINING PROGRAM

## 2025-01-13 PROCEDURE — 85048 AUTOMATED LEUKOCYTE COUNT: CPT

## 2025-01-13 PROCEDURE — 81479 UNLISTED MOLECULAR PATHOLOGY: CPT | Performed by: NURSE PRACTITIONER

## 2025-01-13 RX ADMIN — MIDAZOLAM 2 MG: 1 INJECTION INTRAMUSCULAR; INTRAVENOUS at 08:22

## 2025-01-13 ASSESSMENT — PAIN SCALES - GENERAL: PAINLEVEL_OUTOF10: MODERATE PAIN (4)

## 2025-01-13 NOTE — LETTER
1/13/2025      Hortencia Baldwin  [address]  [facility] [address]      Dear Colleague,    Thank you for referring your patient, Hortencia Baldwin, to the [facility] BLOOD AND MARROW TRANSPLANT PROGRAM [place]. Please see a copy of my visit note below.    BMT ONC Adult Bone Marrow Biopsy Procedure Note  January 13, 2025  BP (!) 140/85 (BP Location: Right arm, Patient Position: Sitting, Cuff Size: Adult Large)   Pulse 90   Temp 98  F (36.7  C) (Oral)   Resp 18   Wt 84.4 kg (186 lb)   LMP 11/05/2017   SpO2 98%   BMI 33.48 kg/m       Learning needs assessment complete within 12 months? YES    DIAGNOSIS: MDS     PROCEDURE: Unilateral Bone Marrow Biopsy and Unilateral Aspirate    LOCATION: [facility] [place] 2nd [facility]    Patient s identification was positively verified by verbal identification and invasive procedure safety checklist was completed. Informed consent was obtained. Following the administration of Midazolam as pre-medication, patient was placed in the prone position and prepped and draped in a sterile manner. Approximately 15 cc of 1% Lidocaine was used over the left posterior iliac spine. Following this a 3 mm incision was made. Trephine bone marrow core(s) was (were) obtained from the [facility]. Bone marrow aspirates were obtained from the IC. Aspirates were sent for morphology, immunophenotyping, cytogenetics, and molecular diagnostics RFLP. A total of approximately 20 ml of marrow was aspirated. Following this procedure a sterile dressing was applied to the bone marrow biopsy site(s). The patient was placed in the supine position to maintain pressure on the biopsy site. Post-procedure wound care instructions were given.     Complications: NO       Interventions: NO    Length of procedure:21 minutes to 45 minutes    Procedure performed by: Ibis Diaz NP      Again, thank you for allowing me to participate in the care of your patient.        Sincerely,        BMT Advanced Practice  Provider    Electronically signed

## 2025-01-13 NOTE — PROGRESS NOTES
BMT ONC Adult Bone Marrow Biopsy Procedure Note  January 13, 2025  BP (!) 140/85 (BP Location: Right arm, Patient Position: Sitting, Cuff Size: Adult Large)   Pulse 90   Temp 98  F (36.7  C) (Oral)   Resp 18   Wt 84.4 kg (186 lb)   LMP 11/05/2017   SpO2 98%   BMI 33.48 kg/m       Learning needs assessment complete within 12 months? YES    DIAGNOSIS: MDS     PROCEDURE: Unilateral Bone Marrow Biopsy and Unilateral Aspirate    [place] [facility] [place] [facility]    Patient s identification was positively verified by verbal identification and invasive procedure safety checklist was completed. Informed consent was obtained. Following the administration of Midazolam as pre-medication, patient was placed in the prone position and prepped and draped in a sterile manner. Approximately 15 cc of 1% Lidocaine was used over the left posterior iliac spine. Following this a 3 mm incision was made. Trephine bone marrow core(s) was (were) obtained from the [facility]. Bone marrow aspirates were obtained from the [facility]. Aspirates were sent for morphology, immunophenotyping, cytogenetics, and molecular diagnostics RFLP. A total of approximately 20 ml of marrow was aspirated. Following this procedure a sterile dressing was applied to the bone marrow biopsy site(s). The patient was placed in the supine position to maintain pressure on the biopsy site. Post-procedure wound care instructions were given.     Complications: NO       Interventions: NO    Length of procedure:21 minutes to 45 minutes    Procedure performed by: Ibis Diaz NP

## 2025-01-13 NOTE — NURSING NOTE
BMBX Teaching and Assessment       Teaching concerns addressed: Bone marrow biopsy and infection prevention.     Person(s) involved in teaching: Patient  Motivation Level  Asks Questions: Yes  Eager to Learn: Yes  Cooperative: Yes  Receptive (willing/able to accept information): Yes    Patient demonstrates understanding of the following:     Reason for the appointment, diagnosis and treatment plan: Yes  Knowledge of proper use of medications and conditions for which they are ordered (with special attention to potential side effects or drug interactions): Yes  Which situations necessitate calling provider and whom to contact: Yes    Teaching concerns addressed:   Reviewed activity restrictions if received premeds, potential for bleeding and actions to take if develops any of the issues below    Pain management techniques: Yes  Patient instructed on hand hygiene: Yes  How and/when to access community resources: Yes    Infection Control:  Patient demonstrates understanding of the following:   Bone marrow procedure site care taught: Yes  Signs and symptoms of infection taught: Yes       Instructional Materials Used/Given: Pt instructed to keep bmbx site clean and dry for 24hrs. Pt educated to monitor site for signs of infection such as redness, rash, oozing, puss, bleeding, pain, and elevated temp. Pt instructed to go to call the [facility] triage line or go to the ER if any signs of infection should occur. Pt educated to not operate machinery if receiving versed. Pt verbalized understanding.       Pre-procedure labs drawn via PIV start by vascular access. VSS. Meds and allergies reviewed. Pt requests Versed premedication and confirms she has a .     Provider order received to administer Versed 2 mg IVP as premed for BMBX. Procedural consent discussed and pt's signature obtained.  Allergies reviewed.  PT currently alert and oriented to plan of care.  Pt lying prone in stretcher.  Call light w/in reach.  Provider and lab  tech at bedside.    Post procedure: Patient vital signs stable, ambulating, site is clean, dry and intact prior to discharge and line removed. Pt discharged with boyfriend as .        Evita Walls RN

## 2025-01-14 ENCOUNTER — ANCILLARY PROCEDURE (OUTPATIENT)
Dept: BONE DENSITY | Facility: CLINIC | Age: 55
End: 2025-01-14
Attending: NURSE PRACTITIONER
Payer: COMMERCIAL

## 2025-01-14 DIAGNOSIS — Z94.81 S/P ALLOGENEIC BONE MARROW TRANSPLANT (H): ICD-10-CM

## 2025-01-14 DIAGNOSIS — D46.9 MDS (MYELODYSPLASTIC SYNDROME) (H): ICD-10-CM

## 2025-01-14 DIAGNOSIS — Z91.89 AT HIGH RISK FOR OSTEOPOROSIS: ICD-10-CM

## 2025-01-14 PROCEDURE — 77080 DXA BONE DENSITY AXIAL: CPT | Performed by: RADIOLOGY

## 2025-01-17 ENCOUNTER — OFFICE VISIT (OUTPATIENT)
Dept: TRANSPLANT | Facility: CLINIC | Age: 55
End: 2025-01-17
Attending: STUDENT IN AN ORGANIZED HEALTH CARE EDUCATION/TRAINING PROGRAM
Payer: COMMERCIAL

## 2025-01-17 VITALS
RESPIRATION RATE: 16 BRPM | TEMPERATURE: 98.3 F | BODY MASS INDEX: 33.35 KG/M2 | HEART RATE: 109 BPM | OXYGEN SATURATION: 97 % | SYSTOLIC BLOOD PRESSURE: 137 MMHG | WEIGHT: 185.3 LBS | DIASTOLIC BLOOD PRESSURE: 90 MMHG

## 2025-01-17 DIAGNOSIS — Z23 NEED FOR PROPHYLACTIC VACCINATION AND INOCULATION AGAINST INFLUENZA: ICD-10-CM

## 2025-01-17 DIAGNOSIS — D46.9 MDS (MYELODYSPLASTIC SYNDROME) (H): Primary | ICD-10-CM

## 2025-01-17 PROCEDURE — 250N000011 HC RX IP 250 OP 636: Performed by: STUDENT IN AN ORGANIZED HEALTH CARE EDUCATION/TRAINING PROGRAM

## 2025-01-17 PROCEDURE — 91320 SARSCV2 VAC 30MCG TRS-SUC IM: CPT | Performed by: STUDENT IN AN ORGANIZED HEALTH CARE EDUCATION/TRAINING PROGRAM

## 2025-01-17 PROCEDURE — G0008 ADMIN INFLUENZA VIRUS VAC: HCPCS | Performed by: STUDENT IN AN ORGANIZED HEALTH CARE EDUCATION/TRAINING PROGRAM

## 2025-01-17 PROCEDURE — 99215 OFFICE O/P EST HI 40 MIN: CPT | Performed by: STUDENT IN AN ORGANIZED HEALTH CARE EDUCATION/TRAINING PROGRAM

## 2025-01-17 PROCEDURE — 90673 RIV3 VACCINE NO PRESERV IM: CPT | Performed by: STUDENT IN AN ORGANIZED HEALTH CARE EDUCATION/TRAINING PROGRAM

## 2025-01-17 PROCEDURE — 90480 ADMN SARSCOV2 VAC 1/ONLY CMP: CPT | Performed by: STUDENT IN AN ORGANIZED HEALTH CARE EDUCATION/TRAINING PROGRAM

## 2025-01-17 PROCEDURE — G0463 HOSPITAL OUTPT CLINIC VISIT: HCPCS | Performed by: STUDENT IN AN ORGANIZED HEALTH CARE EDUCATION/TRAINING PROGRAM

## 2025-01-17 RX ORDER — CALCIUM CARBONATE/VITAMIN D3 600 MG-10
2 TABLET ORAL DAILY
Qty: 180 TABLET | Refills: 3 | Status: SHIPPED | OUTPATIENT
Start: 2025-01-17 | End: 2026-01-12

## 2025-01-17 RX ORDER — POLYETHYLENE GLYCOL 400 AND PROPYLENE GLYCOL 4; 3 MG/ML; MG/ML
1 SOLUTION/ DROPS OPHTHALMIC
Qty: 30 ML | Refills: 0 | Status: SHIPPED | OUTPATIENT
Start: 2025-01-17

## 2025-01-17 RX ADMIN — INFLUENZA A VIRUS A/WEST VIRGINIA/30/2022 (H1N1) RECOMBINANT HEMAGGLUTININ ANTIGEN, INFLUENZA A VIRUS A/MASSACHUSETTS/18/2022 (H3N2) RECOMBINANT HEMAGGLUTININ ANTIGEN, AND INFLUENZA B VIRUS B/AUSTRIA/1359417/2021 RECOMBINANT HEMAGGLUTININ ANTIGEN 0.5 ML: 45; 45; 45 INJECTION INTRAMUSCULAR at 15:36

## 2025-01-17 RX ADMIN — COVID-19 VACCINE, MRNA 30 MCG: 0.04 INJECTION, SUSPENSION INTRAMUSCULAR at 15:37

## 2025-01-17 ASSESSMENT — PAIN SCALES - GENERAL: PAINLEVEL_OUTOF10: MODERATE PAIN (5)

## 2025-01-17 NOTE — LETTER
1/17/2025      Hortencia Baldwin  [address]  [facility] [address]      Dear Colleague,    Thank you for referring your patient, Hortencia Baldwin, to the [facility] BLOOD AND MARROW TRANSPLANT PROGRAM [place]. Please see a copy of my visit note below.    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 Date of transplant: 1/11/2024    Diagnosis MDS w/ SF3B1 BMT type Allogenic  CMV  Donor -  Recipient +    Prep: MA Bu/Flu Donor type  6/8 URD Blood Type Donor and recipient O+   GVHD Ppx PTCy/ siro/ MMF Graft source PBSCT Toxo IgG Negative   Protocol [id number] (not on) BMT MD Dr. Garay       Data   Pre-transplant disease history  Referring provider: Dr.Evan Ramey, [facility]   52 y/o F with history of longstanding macrocytic anemia (Hb 10-11, -110 dating back to 1997) presented in Nov 2021 for lightheadedness. CBC showed acute on chronic anemia with Hb 4.7, ; normal WBC and platelet count. Bone marrow biopsy (12/09/2021) was consistent with MDS with low blasts and SF3B1 mutation. Hypercellular marrow (70%) with single lineage dysplasia (dyserythropoiesis), increased ringed sideroblasts and no increase in blasts. Flow cytometry showed mixed lymphocytes. Cytogenetics 46,XX,del(20). NGS: SF3B1 (45%), ASXL1 (6%).   Received Epo (March 2022 - April 2022) but had minimal response after 4 weekly doses. Luspatercept from April 2022 to April 2023 with inadequate response.   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 Treated with weekly rituximab x 4 and steroid taper (Sept to Dec 2022). LDH and bilirubin started rising after steroid taper, therefore treated with MMF 500mg BID (Dec 2022 to May 2023) with resolution of hemolysis (negative KATINA, normal LDH, bilirubin). However, she  continued to be transfusion dependant therefore her anemia was determined to be related more to MDS than autoimmune hemolysis.   Repeat bone marrow biopsy (5/11/2023) showed persistent MDS. Hypercellular marrow (90%) but now with dysplasia in all three lineages, cytogenetics 46, XX, del (20). Received decitabine- cedazurdine X 4 cycles (May 2023 to Dec 2023). Complicated by neutropenia requiring dose reduction and pRBC transfusions every 2-3 weeks. Pre-transplant bmbx (12/7/23) showed persistent MDS with multi lineage dysplasia (dyserythropoesis and dysgranulopoesis), 17% ringed sideroblasts, increased marrow storage iron. Cytogenetics: 46,XX,del(20). Flow cytometry and NGS not done.    She was considered for allogenic transplant despite low risk MDS by IPSS-R at diagnosis due to transfusion dependence, ASXL1 mutation and development of multi-lineage dysplasia on bmbx in May 2023 suggesting clonal evolution.      She underwent allogenic stem cell transplant on 1/11/24: myeloablative conditioning with Bu/Flu, 6/8 unrelated donor peripheral blood stem cell graft, cell dose: 6.50E+06. ABO matched, CMV donor negative, recipient positive. GVHD ppx with PTCy, MMF and tacrolimus (avoiding sirolimus due to high risk of VOD, given elevated liver enzymes prior to transplant and suspected iron overload).      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        INTERVAL HISTORY     Irma presents for a scheduled follow up visit.     Underwent EGD which did not show any abnormalities, entire esophagus was dilated. She feels her swallowing is better after undergoing the procedure.   SLP and video swallow study showed mild oral residue due to oral dryness. She was educated on safe swallow strategies, with emphasis on moistening foods and increasing water intake during meals for dry mouth.    ROS  - dry eyes  - dry mouth  - has some pruritus  around the chest, still uses hydrocortisone  - RUQ pain is unchanged and not related to food    Plan  - 1 year staging reviewed, ongoing CR  - Stop bactrim, continue acyclovir until both doses of shingrix vaccines have been given.   - Received COVID and flu vaccinations today. Will schedule for 12 and 14 month vaccinations.   - Repeat PFTs in 3 months   - Diet and exercise for hyperlipidemia.   - Increase frequency of phlebotomy to every 2 weeks.   - RTC in 3 months     Current Outpatient Medications   Medication Sig Dispense Refill     acyclovir (ZOVIRAX) 800 MG tablet Take 1 tablet (800 mg) by mouth 2 times daily. 180 tablet 0     augmented betamethasone dipropionate (DIPROLENE) 0.05 % external lotion Apply topically 2 times daily. scalp (Patient taking differently: Apply topically 2 times daily as needed. scalp) 60 mL 11     betamethasone dipropionate (DIPROSONE) 0.05 % external cream Apply topically.       cholecalciferol (VITAMIN D3) 1250 mcg (87867 units) capsule Take 1,250 mcg by mouth every 7 days. (Patient not taking: Reported on 1/7/2025)       sulfamethoxazole-trimethoprim (BACTRIM DS) 800-160 MG tablet Take 1 tablet by mouth Every Mon, Tues two times daily. 48 tablet 0     tacrolimus (PROTOPIC) 0.1 % external ointment Apply topically 2 times daily. face 60 g 11     triamcinolone (KENALOG) 0.025 % cream Apply topically 2 times daily (Patient not taking: Reported on 1/7/2025)       triamcinolone (KENALOG) 0.1 % external ointment Apply topically 2 times daily. hands (Patient not taking: Reported on 1/13/2025) 80 g 5         EXAM      BP (!) 137/90 (BP Location: Right arm, Patient Position: Sitting, Cuff Size: Adult Large)   Pulse 109   Temp 98.3  F (36.8  C) (Oral)   Resp 16   Wt 84.1 kg (185 lb 4.8 oz)   LMP 11/05/2017   SpO2 97%   BMI 33.35 kg/m      Wt Readings from Last 4 Encounters:   01/13/25 84.4 kg (186 lb)   01/07/25 81.6 kg (180 lb)   12/16/24 85.5 kg (188 lb 9.6 oz)   12/12/24 84.1 kg (185  lb 6.4 oz)     KPS: 80% Can perform normal activity with effort, some signs of disease    General: Alert, awake  Eyes: Conjunctiva normal  Mouth: Moist mucosal membranes, pharynx normal  Respiratory: Normal respiratory effort.   Cardiovascular: Regular rate and rhythm  Skin: Some redness over her upper back, unchanged .  Psych: Mood and affect are appropriate.  No central access.       Laboratory Studies:     Data   Blood Counts  Recent Labs   Lab Test 01/13/25  0758 12/16/24  1216 12/12/24  1610 11/18/24  1133 11/15/24  1301 04/04/24  1218 03/28/24  1119 02/22/24  0350 02/21/24  0816 02/19/24  1203   WBC 7.0  --  7.0  --  6.5   < > 4.9   < > 7.7 5.8   HGB 14.4 13.6 14.6   < > 14.9   < > 13.0   < > 11.3* 10.0*     --  203  --  187   < > 134*   < > 83* 74*   NEUTROPHIL 54  --  55  --  49   < > 36   < > 72 53   ANEU  --   --   --   --   --   --  1.8  --  5.5 3.1   LYMPH 33  --  36  --  42   < > 26   < > 11 17   ALYM  --   --   --   --   --   --  1.3  --  0.8 1.0    < > = values in this interval not displayed.     Basic Panel  Recent Labs   Lab Test 01/13/25  0758 12/12/24  1610 11/15/24  1301    140 139   POTASSIUM 3.7 4.1 4.0   CHLORIDE 104 103 103   CO2 27 24 25   BUN 10.9 16.9 12.6   CR 0.54 0.55 0.55   GLC 78 103* 92       LFTs  Recent Labs   Lab Test 01/13/25  0758 12/12/24  1610 11/15/24  1301   ALKPHOS 136 135 120   AST 24 25 30   ALT 44 36 54*   BILITOTAL 0.2 0.2 0.5   ALBUMIN 4.3 4.4 4.5       Recent Labs   Lab Test 01/13/25  0758 11/15/24  1301 10/28/24  1003   CMVQNT <35* Not Detected Not Detected       Recent Labs   Lab Test 06/28/24  0849 03/14/24  1338   * 424*       Recent Labs   Lab Test 01/13/25  0758 12/16/24  1216 12/12/24  1610 11/18/24  1133 11/15/24  1301 10/28/24  1003   SONY 2,782* 2,384* 2,846* 2,328* 2,987* 2,556*            ASSESSMENT AND PLAN     BMT for MDS w/SF3B1 mutation  [id number] (according to but not on) with Bu/Flu  6/8 URD, peripheral blood stem cell graft, cell  dose: 6.5 x10^6.     Disease status: Bone marrow biopsy at D28, D100, 6 months, 1 year  2/6/24 (~D26): Hypercellular marrow (70-80%), trilineage hemopoiesis with rare nuclear irregularities in terminal erythroid precursors of uncertain significance, 3% ringed sideroblasts (in the context of iron overload is not considered dysplastic finding). Flow showed no increase in myeloid blasts. Cytogenetics 46 XY, consistent with donor. FISH: Rate of loss of 20q within normal limits. NGS: No mutations detected  4/22/24 (D100): Overall cellularity 40-50% with no dysplasia. No ringed sideroblasts. Flow negative. Cytogenetics not done. NGS negative.   7/15/24 (D180): CR, cytogenetics/ FISH not done. NGS negative   1/13/2025 (1 year): CR, karyotype/FISH/ NGS negative.      Graft status/chimerism: D28, D100, 6 months,1 year  2/6/24 (~D26): Bone marrow 100%, peripheral blood (2/1/24) CD33 100%, CD3 87%  3/14/24 (~D60): Peripheral blood CD3 and CD33 100%  4/22/24 (D100): Bone marrow 100%. Peripheral blood CD3 and CD33 100%  7/15/24 (D180): Bone marrow 100%. Peripheral blood CD3 and CD33 100%  10/7/24: Peripheral blood CD3 and CD33 100%  1/13/2025 (1 year): Bone marrow 100%. Peripheral blood CD3 and CD33 100%    GVHD  # Current immunosuppression is none  - Sirolimus taper completed on 6/15/24     # No acute GVHD till date  Skin: No changes. Skin score 0.  GI: No symptoms. GI score 0.  Liver: LFTs normal. Score 0     Heme/ Coag  # ABO matched (both O+).   - Mild macrocytosis: B12, folic acid normal (10/7/24)    # Iron overload: Hx of transfusion dependent anemia, pre-transplant ferritin around 5,000. Ferritin (4/22/24) was 4257. Tsat 62%. Retic count 2.7%  - Liver ferriscan (6/13/24) shows severe iron overload 20.7 mg/g dry tissue.  - Started phlebotomy on 7/1/24  - Plan to increase the frequency of phlebotomy to every 2 weeks until ferritin <1000, continue as long as Hb >12.     ID  # Prophylaxis  Viral: Acyclovir 800 mg bid  PJP/  Toxo IgG negative. Completed bactrim   CMV: Letermovir completed     # Monitoring  CMV: VL <35 on 1/13/25. Repeat with next set of labs.  EBV: Has been negative. No further monitoring needed.   IgG: Level 544 mg/dL on 6/28/24. .     Derm  Seborrheic dermatitis  - was prescribed augmented betamethasone lotion, tacrolimus ointment, H&S shampoo by derm. Not using currently.   Hand dermatitis  - current tx: triamcinolone ointment  Follows with dermatology    GI  # Elevated transaminases: Resolved  AST and ALT elevated beginning 2/2/24 (D22). Notably, she was on tacrolimus for GVHD ppx but changed to sirolimus on 1/31/24. Infectious workup (2/6/24) was negative. Liver US (2/20/24) showed hepatomegaly with increased hepatic parenchymal echogenicity, suggesting hepatic steatosis. Suspect drug induced elevation w/ steatosis and possibly iron overload.     CV  - HTN: Off amlodipine.  - Hyperlipidemia: Managed by PCP    MSK:  # L knee pain and back pain: Improving with PT  # Left ankle swelling:   - repeat US of the left leg (5/23) negative for DVT  - compression stockings    Health Maintenance   Vitamin D was 27 (1/13/25). DXA normal (1/14/25), repeat every 2 years with PCP  PFT: 1/10/25: Non-specific spirometry pattern with normal lung volumes.  Per ATS guidelines, this suggests mild airflow obstruction; clinical correlation is recommended. Has been referred to pulm. Repeat PFTs in 3 months.   Thyroid: TSH (1/13/25) was normal , then yearly, through primary physician.   Lipids: Cholesterol 276, . Defer to PCP.   Skin: Annual skin checks through Dermatology; established  Dental visit at 1 year, she still needs to establish   She has established with PCP      Post-transplant vaccinations  - Received COVID and flu  - Will schedule for 12 and 14 month vaccinations.     I spent 45 minutes in the care of this patient today, which included time necessary for preparation for the visit, obtaining history, ordering  medications/tests/procedures as medically indicated, review of pertinent medical literature, counseling of the patient, communication of recommendations to the care team, and documentation time.    Coco Garay  Department of Hematology, Oncology and Transplantation  [facility] Pager 1300/Text via [employer]                Again, thank you for allowing me to participate in the care of your patient.        Sincerely,        SARA Singleton    Electronically signed

## 2025-01-17 NOTE — NURSING NOTE
"Oncology Rooming Note    January 17, 2025 2:43 PM   Horetncia Baldwin is a 54 year old female who presents for:    Chief Complaint   Patient presents with    Oncology Clinic Visit     MDS (myelodysplastic syndrome)      Initial Vitals: LMP 11/05/2017  Estimated body mass index is 33.48 kg/m  as calculated from the following:    Height as of 1/7/25: 1.588 m (5' 2.5\").    Weight as of 1/13/25: 84.4 kg (186 lb). There is no height or weight on file to calculate BSA.  Moderate Pain (5) Comment: Data Unavailable   Patient's last menstrual period was 11/05/2017.  Allergies reviewed: Yes  Medications reviewed: Yes    Medications: Medication refills not needed today.  Pharmacy name entered into [facility]:    [id number] - [place], [place] - [address] AT [facility] [address]  [id number] - [facility] [place] - [address] [facility] [address] [facility] - [place] [address] [address] [facility] [address]  [facility] #[id number] - [place], [place] - [address] AT [facility] OF [facility] [address]    Frailty Screening:   Is the patient here for a new oncology consult visit in cancer care? 2. No      Clinical concerns: none       Meron Nash              "

## 2025-01-17 NOTE — NURSING NOTE
S: Pt scheduled for Covid and flu vaccines.  B: Pt with hx of stem cell transplant.  A: Pt assessed by provider. Vaccine information sheet given to patient.Covid and influenza vaccines administered in left deltoid. Pt monitored for 15 mins post inj. See MAR for details of vaccines given.   R: Pt assessed post vaccines. Denies signs and symptoms of reactions. Pt discharged in stable condition.

## 2025-01-17 NOTE — PROGRESS NOTES
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 Date of transplant: 1/11/2024    Diagnosis MDS w/ SF3B1 BMT type Allogenic  CMV  Donor -  Recipient +    Prep: MA Bu/Flu Donor type  6/8 URD Blood Type Donor and recipient O+   GVHD Ppx PTCy/ siro/ MMF Graft source PBSCT Toxo IgG Negative   Protocol [id number] (not on) BMT MD Dr. Garay       Data    Pre-transplant disease history  Referring provider: Dr.Evan Ramey, [facility]   52 y/o F with history of longstanding macrocytic anemia (Hb 10-11, -110 dating back to 1997) presented in Nov 2021 for lightheadedness. CBC showed acute on chronic anemia with Hb 4.7, ; normal WBC and platelet count. Bone marrow biopsy (12/09/2021) was consistent with MDS with low blasts and SF3B1 mutation. Hypercellular marrow (70%) with single lineage dysplasia (dyserythropoiesis), increased ringed sideroblasts and no increase in blasts. Flow cytometry showed mixed lymphocytes. Cytogenetics 46,XX,del(20). NGS: SF3B1 (45%), ASXL1 (6%).   Received Epo (March 2022 - April 2022) but had minimal response after 4 weekly doses. Luspatercept from April 2022 to April 2023 with inadequate response.   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 Treated with weekly rituximab x 4 and steroid taper (Sept to Dec 2022). LDH and bilirubin started rising after steroid taper, therefore treated with MMF 500mg BID (Dec 2022 to May 2023) with resolution of hemolysis (negative KATINA, normal LDH, bilirubin). However, she continued to be transfusion dependant therefore her anemia was determined to be related more to MDS than autoimmune hemolysis.   Repeat bone marrow biopsy (5/11/2023) showed persistent MDS. Hypercellular marrow (90%) but now with dysplasia in all three lineages, cytogenetics  46, XX, del (20). Received decitabine- cedazurdine X 4 cycles (May 2023 to Dec 2023). Complicated by neutropenia requiring dose reduction and pRBC transfusions every 2-3 weeks. Pre-transplant bmbx (12/7/23) showed persistent MDS with multi lineage dysplasia (dyserythropoesis and dysgranulopoesis), 17% ringed sideroblasts, increased marrow storage iron. Cytogenetics: 46,XX,del(20). Flow cytometry and NGS not done.    She was considered for allogenic transplant despite low risk MDS by IPSS-R at diagnosis due to transfusion dependence, ASXL1 mutation and development of multi-lineage dysplasia on bmbx in May 2023 suggesting clonal evolution.      She underwent allogenic stem cell transplant on 1/11/24: myeloablative conditioning with Bu/Flu, 6/8 unrelated donor peripheral blood stem cell graft, cell dose: 6.50E+06. ABO matched, CMV donor negative, recipient positive. GVHD ppx with PTCy, MMF and tacrolimus (avoiding sirolimus due to high risk of VOD, given elevated liver enzymes prior to transplant and suspected iron overload).      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        INTERVAL HISTORY     Irma presents for a scheduled follow up visit.     Underwent EGD which did not show any abnormalities, entire esophagus was dilated. SLP and video swallow study showed mild oral residue due to oral dryness. She was educated on safe swallow strategies, with emphasis on moistening foods and increasing water intake during meals for dry mouth.    cGVHD ROS negative except  - dry eyes  - dry mouth  - still itches around chest, still uses hydrocortisone  - RUQ pain is unchanged and not related to food    Plan  - Vaccinations  - Repeat PFTs in 3 months   - Diet and exercise           Current Outpatient Medications   Medication Sig Dispense Refill    acyclovir (ZOVIRAX) 800 MG tablet Take 1 tablet (800 mg) by mouth 2 times daily. 180 tablet 0     augmented betamethasone dipropionate (DIPROLENE) 0.05 % external lotion Apply topically 2 times daily. scalp (Patient taking differently: Apply topically 2 times daily as needed. scalp) 60 mL 11    betamethasone dipropionate (DIPROSONE) 0.05 % external cream Apply topically.      cholecalciferol (VITAMIN D3) 1250 mcg (83826 units) capsule Take 1,250 mcg by mouth every 7 days. (Patient not taking: Reported on 1/7/2025)      sulfamethoxazole-trimethoprim (BACTRIM DS) 800-160 MG tablet Take 1 tablet by mouth Every Mon, Tues two times daily. 48 tablet 0    tacrolimus (PROTOPIC) 0.1 % external ointment Apply topically 2 times daily. face 60 g 11    triamcinolone (KENALOG) 0.025 % cream Apply topically 2 times daily (Patient not taking: Reported on 1/7/2025)      triamcinolone (KENALOG) 0.1 % external ointment Apply topically 2 times daily. hands (Patient not taking: Reported on 1/13/2025) 80 g 5         EXAM      LMP 11/05/2017     Wt Readings from Last 4 Encounters:   01/13/25 84.4 kg (186 lb)   01/07/25 81.6 kg (180 lb)   12/16/24 85.5 kg (188 lb 9.6 oz)   12/12/24 84.1 kg (185 lb 6.4 oz)     KPS: 80% Can perform normal activity with effort, some signs of disease    General: Alert, awake  Eyes: Conjunctiva normal  Mouth: Moist mucosal membranes, pharynx normal  Respiratory: Normal respiratory effort.   Cardiovascular: Left ankle and foot edema mild  Skin: acneform rash over chin  Psych: Mood and affect are appropriate.  No central access.       Laboratory Studies:     Data    Blood Counts  Recent Labs   Lab Test 01/13/25  0758 12/16/24  1216 12/12/24  1610 11/18/24  1133 11/15/24  1301 04/04/24  1218 03/28/24  1119 02/22/24  0350 02/21/24  0816 02/19/24  1203   WBC 7.0  --  7.0  --  6.5   < > 4.9   < > 7.7 5.8   HGB 14.4 13.6 14.6   < > 14.9   < > 13.0   < > 11.3* 10.0*     --  203  --  187   < > 134*   < > 83* 74*   NEUTROPHIL 54  --  55  --  49   < > 36   < > 72 53   ANEU  --   --   --   --   --   --  1.8   --  5.5 3.1   LYMPH 33  --  36  --  42   < > 26   < > 11 17   ALYM  --   --   --   --   --   --  1.3  --  0.8 1.0    < > = values in this interval not displayed.     Basic Panel  Recent Labs   Lab Test 01/13/25  0758 12/12/24  1610 11/15/24  1301    140 139   POTASSIUM 3.7 4.1 4.0   CHLORIDE 104 103 103   CO2 27 24 25   BUN 10.9 16.9 12.6   CR 0.54 0.55 0.55   GLC 78 103* 92       LFTs  Recent Labs   Lab Test 01/13/25  0758 12/12/24  1610 11/15/24  1301   ALKPHOS 136 135 120   AST 24 25 30   ALT 44 36 54*   BILITOTAL 0.2 0.2 0.5   ALBUMIN 4.3 4.4 4.5       Recent Labs   Lab Test 01/13/25  0758 11/15/24  1301 10/28/24  1003   CMVQNT <35* Not Detected Not Detected       Recent Labs   Lab Test 06/28/24  0849 03/14/24  1338   * 424*       Recent Labs   Lab Test 01/13/25  0758 12/16/24  1216 12/12/24  1610 11/18/24  1133 11/15/24  1301 10/28/24  1003   SONY 2,782* 2,384* 2,846* 2,328* 2,987* 2,556*            ASSESSMENT AND PLAN     BMT for MDS w/SF3B1 mutation  [id number] (according to but not on) with Bu/Flu  6/8 URD, peripheral blood stem cell graft, cell dose: 6.5 x10^6.     Disease status: Bone marrow biopsy at D28, D100, 6 months, 1 year  2/6/24 (~D26): Hypercellular marrow (70-80%), trilineage hemopoiesis with rare nuclear irregularities in terminal erythroid precursors of uncertain significance, 3% ringed sideroblasts (in the context of iron overload is not considered dysplastic finding). Flow showed no increase in myeloid blasts. Cytogenetics 46 XY, consistent with donor. FISH: Rate of loss of 20q within normal limits. NGS: No mutations detected  4/22/24 (D100): Overall cellularity 40-50% with no dysplasia. No ringed sideroblasts. Flow negative. Cytogenetics not done. NGS negative.   7/15/24 (D180): CR, cytogenetics/ FISH not done. NGS negative   1/13/2025 (1 year): CR, karyotype/FISH/ NGS is all pending.      Graft status/chimerism: D28, D100, 6 months,1 year  2/6/24 (~D26): Bone marrow 100%,  peripheral blood (2/1/24) CD33 100%, CD3 87%  3/14/24 (~D60): Peripheral blood CD3 and CD33 100%  4/22/24 (D100): Bone marrow 100%. Peripheral blood CD3 and CD33 100%  7/15/24 (D180): Bone marrow 100%. Peripheral blood CD3 and CD33 100%  10/7/24: Peripheral blood CD3 and CD33 100%  1/13/2025 (1 year): Bone marrow 100%. Peripheral blood CD3 and CD33 100%    GVHD  # Current immunosuppression is none  - Sirolimus taper completed on 6/15/24     # No acute GVHD till date  Skin: No changes. Skin score 0.  GI: No symptoms. GI score 0.  Liver: LFTs normal. Score 0     Heme/ Coag  # ABO matched (both O+).   - Mild macrocytosis: B12, folic acid normal (10/7/24)    # Iron overload: Hx of transfusion dependent anemia, pre-transplant ferritin around 5,000. Ferritin (4/22/24) was 4257. Tsat 62%. Retic count 2.7%  - Liver ferriscan shows severe iron overload  - Started phlebotomy on 7/1/24, plan to continue every 3-4 weeks until ferritin <1000.     ID  # Prophylaxis  PJP: Bactrim. Toxo IgG negative.  Viral: Acyclovir 800 mg bid  CMV: Letermovir completed     # Monitoring  CMV: Has been negative. No further monitoring needed.  EBV: Has been negative. No further monitoring needed.   IgG: Level 544 mg/dL on 6/28/24. Check serum IgG level q3 months, and consider IVIG repletion for IgG levels <400 mg/dL.     Derm  Seborrheic dermatitis  - current tx: augmented betamethasone lotion, tacrolimus ointment, H&S shampoo  Hand dermatitis  - current tx: triamcinolone ointment  Follows with dermatology    GI  # Elevated transaminases: Resolved  AST and ALT elevated beginning 2/2/24 (D22). Notably, she was on tacrolimus for GVHD ppx but changed to sirolimus on 1/31/24. Infectious workup (2/6/24) was negative. Liver US (2/20/24) showed hepatomegaly with increased hepatic parenchymal echogenicity, suggesting hepatic steatosis. Suspect drug induced elevation w/ steatosis and possibly iron overload.     CV  - HTN: Off amlodipine.  - Hyperlipidemia:  Managed by PCP    MSK:  # L knee pain and back pain: Improving with PT  # Left ankle swelling:   - repeat US of the left leg (5/23) negative for DVT  - compression stockings    Health Maintenance   Vitamin D was 27 (1/13/25). DXA normal (1/14/25)    PFT: 1/10/25: Non-specific spirometry pattern with normal lung volumes.  Per ATS guidelines, this suggests mild airflow obstruction; clinical correlation is recommended.     Thyroid: TSH (1/13/25) was normal , then yearly, through primary physician.     Lipids: Cholesterol 276,      Skin: Annual skin checks through Dermatology; established    Dental visit at 1 year     Post-transplant vaccinations  COVID, Flu     I spent 30 minutes in the care of this patient today, which included time necessary for preparation for the visit, obtaining history, ordering medications/tests/procedures as medically indicated, review of pertinent medical literature, counseling of the patient, communication of recommendations to the care team, and documentation time.    Coco Garay  Department of Hematology, Oncology and Transplantation  Amcom Pager 1300/Text via George               via Vocera

## 2025-01-20 ENCOUNTER — CARE COORDINATION (OUTPATIENT)
Dept: TRANSPLANT | Facility: CLINIC | Age: 55
End: 2025-01-20
Payer: COMMERCIAL

## 2025-01-20 ENCOUNTER — TELEPHONE (OUTPATIENT)
Dept: TRANSPLANT | Facility: CLINIC | Age: 55
End: 2025-01-20
Payer: COMMERCIAL

## 2025-01-21 DIAGNOSIS — R94.2 ABNORMAL PFT: ICD-10-CM

## 2025-01-21 DIAGNOSIS — Z94.84 HISTORY OF PERIPHERAL STEM CELL TRANSPLANT (H): Primary | ICD-10-CM

## 2025-01-22 LAB — INTERPRETATION SERPL IEP-IMP: NORMAL

## 2025-01-23 ENCOUNTER — TELEPHONE (OUTPATIENT)
Dept: TRANSPLANT | Facility: CLINIC | Age: 55
End: 2025-01-23
Payer: COMMERCIAL

## 2025-01-23 NOTE — TELEPHONE ENCOUNTER
Irma called in saying she still has a persistent headache. She denies any neurological deficits-unilateral weakness, vision changes, nor changes in speech. She also has a cough that is worse at night but denies fever, shortness of breath, nor chest pain. She says she tested negative for covid yesterday and has not had any emesis since calling on 1/20. Her congestion has also improved and is afebrile.     Dr. Garay updated but since Hortencia Sethi is off I/s and is not primarily being followed by BMT, I advised she reach out to Dr. Blakely's team for management of these symptoms or present to urgent care. She is going to call Dr. Blakely right after our conversation for further advice.    Hortencia Sethi also knows if her symptoms worsen (becomes short of breath or chest pain) that she should present to the ED.

## 2025-01-25 RX ORDER — HEPARIN SODIUM (PORCINE) LOCK FLUSH IV SOLN 100 UNIT/ML 100 UNIT/ML
5 SOLUTION INTRAVENOUS
OUTPATIENT
Start: 2025-01-26

## 2025-01-25 RX ORDER — HEPARIN SODIUM,PORCINE 10 UNIT/ML
5-20 VIAL (ML) INTRAVENOUS DAILY PRN
OUTPATIENT
Start: 2025-01-26

## 2025-01-27 ENCOUNTER — INFUSION THERAPY VISIT (OUTPATIENT)
Dept: TRANSPLANT | Facility: CLINIC | Age: 55
End: 2025-01-27
Attending: STUDENT IN AN ORGANIZED HEALTH CARE EDUCATION/TRAINING PROGRAM
Payer: COMMERCIAL

## 2025-01-27 ENCOUNTER — LAB (OUTPATIENT)
Dept: LAB | Facility: CLINIC | Age: 55
End: 2025-01-27
Attending: STUDENT IN AN ORGANIZED HEALTH CARE EDUCATION/TRAINING PROGRAM
Payer: COMMERCIAL

## 2025-01-27 VITALS
DIASTOLIC BLOOD PRESSURE: 69 MMHG | WEIGHT: 180 LBS | BODY MASS INDEX: 32.4 KG/M2 | RESPIRATION RATE: 18 BRPM | OXYGEN SATURATION: 96 % | TEMPERATURE: 98 F | HEART RATE: 79 BPM | SYSTOLIC BLOOD PRESSURE: 111 MMHG

## 2025-01-27 DIAGNOSIS — Z94.81 S/P ALLOGENEIC BONE MARROW TRANSPLANT (H): ICD-10-CM

## 2025-01-27 DIAGNOSIS — D46.9 MDS (MYELODYSPLASTIC SYNDROME) (H): Primary | ICD-10-CM

## 2025-01-27 DIAGNOSIS — D46.9 MDS (MYELODYSPLASTIC SYNDROME) (H): ICD-10-CM

## 2025-01-27 LAB
ALBUMIN SERPL BCG-MCNC: 4.1 G/DL (ref 3.5–5.2)
ALP SERPL-CCNC: 144 U/L (ref 40–150)
ALT SERPL W P-5'-P-CCNC: 160 U/L (ref 0–50)
ANION GAP SERPL CALCULATED.3IONS-SCNC: 11 MMOL/L (ref 7–15)
AST SERPL W P-5'-P-CCNC: 103 U/L (ref 0–45)
BASOPHILS # BLD AUTO: 0 10E3/UL (ref 0–0.2)
BASOPHILS NFR BLD AUTO: 0 %
BILIRUB SERPL-MCNC: 0.3 MG/DL
BUN SERPL-MCNC: 9.5 MG/DL (ref 6–20)
CALCIUM SERPL-MCNC: 9 MG/DL (ref 8.8–10.4)
CHLORIDE SERPL-SCNC: 102 MMOL/L (ref 98–107)
CREAT SERPL-MCNC: 0.57 MG/DL (ref 0.51–0.95)
EGFRCR SERPLBLD CKD-EPI 2021: >90 ML/MIN/1.73M2
EOSINOPHIL # BLD AUTO: 0 10E3/UL (ref 0–0.7)
EOSINOPHIL NFR BLD AUTO: 0 %
ERYTHROCYTE [DISTWIDTH] IN BLOOD BY AUTOMATED COUNT: 12.9 % (ref 10–15)
GLUCOSE SERPL-MCNC: 102 MG/DL (ref 70–99)
HCO3 SERPL-SCNC: 25 MMOL/L (ref 22–29)
HCT VFR BLD AUTO: 45.1 % (ref 35–47)
HGB BLD-MCNC: 15.2 G/DL (ref 11.7–15.7)
IMM GRANULOCYTES # BLD: 0 10E3/UL
IMM GRANULOCYTES NFR BLD: 1 %
IRON BINDING CAPACITY (ROCHE): ABNORMAL
IRON SATN MFR SERPL: ABNORMAL %
IRON SERPL-MCNC: 185 UG/DL (ref 37–145)
LYMPHOCYTES # BLD AUTO: 2.5 10E3/UL (ref 0.8–5.3)
LYMPHOCYTES NFR BLD AUTO: 48 %
MCH RBC QN AUTO: 34.2 PG (ref 26.5–33)
MCHC RBC AUTO-ENTMCNC: 33.7 G/DL (ref 31.5–36.5)
MCV RBC AUTO: 101 FL (ref 78–100)
MONOCYTES # BLD AUTO: 0.4 10E3/UL (ref 0–1.3)
MONOCYTES NFR BLD AUTO: 7 %
NEUTROPHILS # BLD AUTO: 2.3 10E3/UL (ref 1.6–8.3)
NEUTROPHILS NFR BLD AUTO: 43 %
NRBC # BLD AUTO: 0 10E3/UL
NRBC BLD AUTO-RTO: 0 /100
PLATELET # BLD AUTO: 119 10E3/UL (ref 150–450)
POTASSIUM SERPL-SCNC: 4 MMOL/L (ref 3.4–5.3)
PROT SERPL-MCNC: 6.7 G/DL (ref 6.4–8.3)
RBC # BLD AUTO: 4.45 10E6/UL (ref 3.8–5.2)
SODIUM SERPL-SCNC: 138 MMOL/L (ref 135–145)
WBC # BLD AUTO: 5.3 10E3/UL (ref 4–11)

## 2025-01-27 PROCEDURE — 36415 COLL VENOUS BLD VENIPUNCTURE: CPT

## 2025-01-27 PROCEDURE — 84460 ALANINE AMINO (ALT) (SGPT): CPT

## 2025-01-27 PROCEDURE — 82947 ASSAY GLUCOSE BLOOD QUANT: CPT

## 2025-01-27 PROCEDURE — 85018 HEMOGLOBIN: CPT

## 2025-01-27 PROCEDURE — 83540 ASSAY OF IRON: CPT

## 2025-01-27 PROCEDURE — 96360 HYDRATION IV INFUSION INIT: CPT

## 2025-01-27 PROCEDURE — 85049 AUTOMATED PLATELET COUNT: CPT

## 2025-01-27 PROCEDURE — 258N000003 HC RX IP 258 OP 636: Performed by: STUDENT IN AN ORGANIZED HEALTH CARE EDUCATION/TRAINING PROGRAM

## 2025-01-27 PROCEDURE — 85004 AUTOMATED DIFF WBC COUNT: CPT

## 2025-01-27 PROCEDURE — 83550 IRON BINDING TEST: CPT

## 2025-01-27 RX ADMIN — SODIUM CHLORIDE 500 ML: 9 INJECTION, SOLUTION INTRAVENOUS at 12:30

## 2025-01-27 ASSESSMENT — PAIN SCALES - GENERAL: PAINLEVEL_OUTOF10: NO PAIN (0)

## 2025-01-27 NOTE — NURSING NOTE
Chief Complaint   Patient presents with    Blood Draw     PIV placed, labs drawn, weight and VS obtained.     PIV placed using ultrasound, labs drawn. Weight and VS obtained. Patient checked in for next appointment and discharged back to [facility] in stable condition.    Eneida Obando RN

## 2025-01-27 NOTE — PROGRESS NOTES
Infusion Nursing Note:  Hortencia Baldwin presents today for scheduled phlebotomy.    Patient seen by provider today: No   present during visit today: Not Applicable.    Note: Patient arrived for phlebotomy. Patient reports recent dx of influenza A.  Continues to have congestion, cough, SOB with exertion, and fatigue. Started tamiflu on 1/24. Denies chest pain, N/V, fever. ONC toxicity assessment completed. Home medications and allergies reviewed. TORB @ 1880 Dr. Garay/Dari Rodas RN - OK to proceed with phlebotomy today. Parameters met to proceed with treatment. Reschedule 1 year vaccines for approx. 3 weeks or next phlebotomy visit. Give 500 mL NS bolus following phlebotomy. If symptoms worsen patient to be evaluated in ED. Draw CBC and CMP today.  Patient verbalized understanding of plan. Patient phlebotomized for a total of 500 mL. Patient reported lightheadedness following completion of phlebotomy. Patient received 500 mL IV bolus per order. VS following completing of IVF. Writer encourage patient to increased PO intake throughout the day. Patient verbalized understanding of need to present to ED if symptoms worsen.        Intravenous Access:  Peripheral IV placed.    Treatment Conditions:  Lab Results   Component Value Date    HGB 15.1 01/27/2025    HGB 15.2 01/27/2025    WBC 5.3 01/27/2025    ANEU 1.8 03/28/2024    ANEUTAUTO 2.3 01/27/2025     (L) 01/27/2025        Lab Results   Component Value Date     01/13/2025    POTASSIUM 3.7 01/13/2025    MAG 2.1 02/29/2024    CR 0.54 01/13/2025    NIKO 9.3 01/13/2025    BILITOTAL 0.2 01/13/2025    ALBUMIN 4.3 01/13/2025    ALT 44 01/13/2025    AST 24 01/13/2025         Post Infusion Assessment:  Patient tolerated infusion without incident.  Blood return noted pre and post infusion.  Site patent and intact, free from redness, edema or discomfort.  No evidence of extravasations.  Access discontinued per protocol.       Discharge Plan:   AVS to  patient via MYCHART.    Patient discharged in stable condition accompanied by: .  Departure Mode: Ambulatory.      Dari Rodas RN

## 2025-01-30 NOTE — CONFIDENTIAL NOTE
RECORDS RECEIVED FROM: internal    DATE RECEIVED: 4.4.25    NOTES STATUS DETAILS   OFFICE NOTE from referring provider  Kylah Choi APRN CNP Arndt, Patrick G, MD    MEDICATION LIST Internal     IMAGING  (NEED IMAGES AND REPORTS)     CT SCAN Internal  12.20.23   CHEST XRAY (CXR) ce [facility]- 1.23.25, 6.19.23, 3.23.23     Internal- 1.24.24, 1.15.24, 1.12.24,    TESTS     PULMONARY FUNCTION TESTING (PFT) Internal  1.10.25, 12.27.23       Action 1.30.25 sv    Action Taken Image request sent to [facility] for   CXR- 1.23.25, 6.19.23, 3.23.23

## 2025-02-04 ENCOUNTER — TELEPHONE (OUTPATIENT)
Dept: TRANSPLANT | Facility: CLINIC | Age: 55
End: 2025-02-04
Payer: COMMERCIAL

## 2025-02-04 NOTE — TELEPHONE ENCOUNTER
Called Hortencia Sethi to follow up on her flu symptoms per Dr. Zhu's request. Hortencia Sethi says she is afebrile and her congestion has improved, still coughing but less than she had been.     Also notified Hortencia Sethi of plan to increase frequency of phlebotomy to q2 weeks due to increasing iron(per dr zhu). Hortencia Sethi expressed understanding of the plan and has no outstanding questions.

## 2025-02-06 NOTE — PATIENT INSTRUCTIONS
If you have labs or imaging done, the results will automatically release in [employer] without an interpretation.  Your health care professional will review those results and send an interpretation with recommendations as soon as possible, but this may be 1-3 business days.    If you have any questions regarding your visit, please contact your care team.     [employer] [facility]: [phone]  [facility] CLINIC HOURS TELEPHONE NUMBER   DO. Loulou Fitch -Surgery Scheduler  Mely -     ZENIA Haney RN Kylie, RN [place]    Monday 8:30 am-5:00 pm  Wednesday 8:30 am-5:00 pm  Friday 8:30 am-5:00 pm    Typical Surgery day: Tuesday [facility]  [address] [place] [address]  Phone:  [phone]  Fax: [phone]   Appointment [phone]    Imaging Scheduling-All Locations [phone]    [facility]  [address] [place] [address] [place] [address] [phone]   **Surgeries** Our Surgery Schedulers will contact you to schedule. If you do not receive a call within 3 business days, please call [phone].  Urgent Care locations:  [facility] Monday-Friday   10 am - 8 pm  Saturday and    9 am - 5 pm [phone]   If you need a medication refill, please contact your pharmacy. Please allow 3 business days for your refill to be completed.  As always, Thank you for trusting us with your healthcare needs!  see additional instructions from your care team below

## 2025-02-09 RX ORDER — HEPARIN SODIUM (PORCINE) LOCK FLUSH IV SOLN 100 UNIT/ML 100 UNIT/ML
5 SOLUTION INTRAVENOUS
OUTPATIENT
Start: 2025-02-23

## 2025-02-09 RX ORDER — HEPARIN SODIUM,PORCINE 10 UNIT/ML
5-20 VIAL (ML) INTRAVENOUS DAILY PRN
OUTPATIENT
Start: 2025-02-23

## 2025-02-10 ENCOUNTER — INFUSION THERAPY VISIT (OUTPATIENT)
Dept: TRANSPLANT | Facility: CLINIC | Age: 55
End: 2025-02-10
Attending: STUDENT IN AN ORGANIZED HEALTH CARE EDUCATION/TRAINING PROGRAM
Payer: COMMERCIAL

## 2025-02-10 VITALS
OXYGEN SATURATION: 96 % | RESPIRATION RATE: 16 BRPM | BODY MASS INDEX: 32.51 KG/M2 | DIASTOLIC BLOOD PRESSURE: 85 MMHG | TEMPERATURE: 98 F | WEIGHT: 180.6 LBS | SYSTOLIC BLOOD PRESSURE: 126 MMHG | HEART RATE: 89 BPM

## 2025-02-10 DIAGNOSIS — Z94.81 S/P ALLOGENEIC BONE MARROW TRANSPLANT (H): ICD-10-CM

## 2025-02-10 DIAGNOSIS — D46.9 MDS (MYELODYSPLASTIC SYNDROME) (H): ICD-10-CM

## 2025-02-10 DIAGNOSIS — D46.9 MDS (MYELODYSPLASTIC SYNDROME) (H): Primary | ICD-10-CM

## 2025-02-10 LAB
ALBUMIN SERPL BCG-MCNC: 4.2 G/DL (ref 3.5–5.2)
ALP SERPL-CCNC: 134 U/L (ref 40–150)
ALT SERPL W P-5'-P-CCNC: 74 U/L (ref 0–50)
ANION GAP SERPL CALCULATED.3IONS-SCNC: 8 MMOL/L (ref 7–15)
AST SERPL W P-5'-P-CCNC: 39 U/L (ref 0–45)
BASOPHILS # BLD AUTO: 0.1 10E3/UL (ref 0–0.2)
BASOPHILS NFR BLD AUTO: 1 %
BILIRUB SERPL-MCNC: 0.3 MG/DL
BUN SERPL-MCNC: 9.8 MG/DL (ref 6–20)
CALCIUM SERPL-MCNC: 9.2 MG/DL (ref 8.8–10.4)
CHLORIDE SERPL-SCNC: 107 MMOL/L (ref 98–107)
CREAT SERPL-MCNC: 0.52 MG/DL (ref 0.51–0.95)
EGFRCR SERPLBLD CKD-EPI 2021: >90 ML/MIN/1.73M2
EOSINOPHIL # BLD AUTO: 0.1 10E3/UL (ref 0–0.7)
EOSINOPHIL NFR BLD AUTO: 1 %
ERYTHROCYTE [DISTWIDTH] IN BLOOD BY AUTOMATED COUNT: 13.3 % (ref 10–15)
FERRITIN SERPL-MCNC: 3792 NG/ML (ref 11–328)
GLUCOSE SERPL-MCNC: 107 MG/DL (ref 70–99)
HCO3 SERPL-SCNC: 29 MMOL/L (ref 22–29)
HCT VFR BLD AUTO: 40.6 % (ref 35–47)
HGB BLD-MCNC: 13.7 G/DL (ref 11.7–15.7)
IMM GRANULOCYTES # BLD: 0 10E3/UL
IMM GRANULOCYTES NFR BLD: 1 %
LYMPHOCYTES # BLD AUTO: 2.1 10E3/UL (ref 0.8–5.3)
LYMPHOCYTES NFR BLD AUTO: 37 %
MCH RBC QN AUTO: 34.3 PG (ref 26.5–33)
MCHC RBC AUTO-ENTMCNC: 33.7 G/DL (ref 31.5–36.5)
MCV RBC AUTO: 102 FL (ref 78–100)
MONOCYTES # BLD AUTO: 0.5 10E3/UL (ref 0–1.3)
MONOCYTES NFR BLD AUTO: 9 %
NEUTROPHILS # BLD AUTO: 3 10E3/UL (ref 1.6–8.3)
NEUTROPHILS NFR BLD AUTO: 52 %
NRBC # BLD AUTO: 0 10E3/UL
NRBC BLD AUTO-RTO: 0 /100
PLATELET # BLD AUTO: 153 10E3/UL (ref 150–450)
POTASSIUM SERPL-SCNC: 3.5 MMOL/L (ref 3.4–5.3)
PROT SERPL-MCNC: 6.9 G/DL (ref 6.4–8.3)
RBC # BLD AUTO: 4 10E6/UL (ref 3.8–5.2)
SODIUM SERPL-SCNC: 144 MMOL/L (ref 135–145)
WBC # BLD AUTO: 5.7 10E3/UL (ref 4–11)

## 2025-02-10 PROCEDURE — 96360 HYDRATION IV INFUSION INIT: CPT

## 2025-02-10 PROCEDURE — 80053 COMPREHEN METABOLIC PANEL: CPT

## 2025-02-10 PROCEDURE — 258N000003 HC RX IP 258 OP 636: Performed by: STUDENT IN AN ORGANIZED HEALTH CARE EDUCATION/TRAINING PROGRAM

## 2025-02-10 PROCEDURE — 85014 HEMATOCRIT: CPT

## 2025-02-10 PROCEDURE — 82728 ASSAY OF FERRITIN: CPT | Performed by: STUDENT IN AN ORGANIZED HEALTH CARE EDUCATION/TRAINING PROGRAM

## 2025-02-10 PROCEDURE — 85004 AUTOMATED DIFF WBC COUNT: CPT

## 2025-02-10 PROCEDURE — 36415 COLL VENOUS BLD VENIPUNCTURE: CPT

## 2025-02-10 RX ORDER — ACYCLOVIR 800 MG/1
800 TABLET ORAL 2 TIMES DAILY
Qty: 60 TABLET | Refills: 1 | Status: SHIPPED | OUTPATIENT
Start: 2025-02-10

## 2025-02-10 RX ADMIN — SODIUM CHLORIDE 500 ML: 9 INJECTION, SOLUTION INTRAVENOUS at 14:04

## 2025-02-10 ASSESSMENT — PAIN SCALES - GENERAL: PAINLEVEL_OUTOF10: NO PAIN (0)

## 2025-02-10 NOTE — PROGRESS NOTES
Infusion Nursing Note:  Hortencia Baldwin presents today for scheduled phlebotomy.    Patient seen by provider today: No   present during visit today: Not Applicable.    Note:  Patient arrived for phlebotomy. Patient reports feeling improved from recent influenza A diagnosis.  Continues to have congestion, cough,and fatigue but overall feeling improved. Denies chest pain, SOB, N/V, fever. ONC toxicity assessment completed. Home medications and allergies reviewed. Patient requesting to delay 1 year vaccines due to continued cough. TORB @ 9552 Dr. Garay/Dari Rodas RN - OK to postpone 1 yr BMT vaccinations to 2/25.  Patient verbalized understanding of plan. Patient phlebotomized for a total of 500 mL. Patient received 500 mL IV bolus per order. VSS following completion of IVF. Patient encouraged to increase fluid intake throughout the day .      Intravenous Access:  Peripheral IV placed.    Treatment Conditions:  Lab Results   Component Value Date    HGB 13.7 02/10/2025    WBC 5.7 02/10/2025    ANEU 1.8 03/28/2024    ANEUTAUTO 3.0 02/10/2025     02/10/2025            Post Infusion Assessment:  Patient tolerated infusion without incident.  Blood return noted pre and post infusion.  No evidence of extravasations.  Access discontinued per protocol.       Discharge Plan:   Patient discharged in stable condition accompanied by: .  Departure Mode: Ambulatory.      Dari Rodas RN

## 2025-02-10 NOTE — TELEPHONE ENCOUNTER
Resent rx for acyclovir as 12/14 mo vaccines are delayed. Confirmed with [facility] they only a month supply on 1/7 due to insurance issues.

## 2025-02-10 NOTE — NURSING NOTE
Chief Complaint   Patient presents with    Blood Draw     Labs drawn with PIV start by VAT. Pt tolerated well. Vitals taken. Patient checked into next appointment.       Chari Riley RN

## 2025-02-11 LAB
CMV DNA SPEC NAA+PROBE-ACNC: NOT DETECTED IU/ML
SPECIMEN TYPE: NORMAL

## 2025-02-12 ENCOUNTER — OFFICE VISIT (OUTPATIENT)
Dept: OBGYN | Facility: CLINIC | Age: 55
End: 2025-02-12
Attending: NURSE PRACTITIONER
Payer: COMMERCIAL

## 2025-02-12 VITALS — SYSTOLIC BLOOD PRESSURE: 142 MMHG | HEART RATE: 89 BPM | OXYGEN SATURATION: 98 % | DIASTOLIC BLOOD PRESSURE: 89 MMHG

## 2025-02-12 DIAGNOSIS — Z94.81 S/P ALLOGENEIC BONE MARROW TRANSPLANT (H): ICD-10-CM

## 2025-02-12 DIAGNOSIS — N95.1 SYMPTOMATIC MENOPAUSAL OR FEMALE CLIMACTERIC STATES: Primary | ICD-10-CM

## 2025-02-12 DIAGNOSIS — Z12.31 ENCOUNTER FOR SCREENING MAMMOGRAM FOR BREAST CANCER: ICD-10-CM

## 2025-02-12 DIAGNOSIS — D46.9 MDS (MYELODYSPLASTIC SYNDROME) (H): ICD-10-CM

## 2025-02-12 DIAGNOSIS — Z78.0 MENOPAUSE: ICD-10-CM

## 2025-02-12 DIAGNOSIS — R61 NIGHT SWEATS: ICD-10-CM

## 2025-02-12 NOTE — PROGRESS NOTES
This 53 y/o postmenopausal female, , presents as a new patient c/o daily hot flushes and night sweats.  She feels that these started about 2-3 years ago so she would like to know her treatment options given her hx of leukemia.  We discussed HRT options and she prefers to discuss this with her oncologist before making a decision.  Menopause occurred about 5-6 years ago and she denies any spotting or bleeding since.  BP (!) 142/89   Pulse 89   LMP 2017   SpO2 98%   Breastfeeding No   ROS:  10 systems were reviewed and the positives were listed under problems.  PE:  General- Healthy-appearing female in no acute distress  Eyes- No scleral injection or icterus  ENT- Mucus membranes moist  CV- No noticeable edema in extremities  Lymph- No noticeable cervical adenopathy  Respiratory- Non-labored breathing  Skin- No suspicious lesions or rashes visualized  Psych- Normal affect  Assessment - Menopausal symptoms, hx of leukemia  Plan - We discussed her treatment options and she will contact me with her decision if she chooses to start medication for menopausal symptoms.  She will be due for her next mammogram in 10/2025 so this order was placed.  Her next DEXA scan will be due in 2030, pap now, and colonoscopy is scheduled to be done on 2025.  However, she prefers to hold on pap smear collection until a later date.  All her questions and concerns were addressed.  30 minutes were spent today in chart review, the patient visit, review of tests, and documentation in regard to the issues noted above.

## 2025-02-13 ENCOUNTER — PATIENT OUTREACH (OUTPATIENT)
Dept: CARE COORDINATION | Facility: CLINIC | Age: 55
End: 2025-02-13
Payer: COMMERCIAL

## 2025-03-02 RX ORDER — HEPARIN SODIUM (PORCINE) LOCK FLUSH IV SOLN 100 UNIT/ML 100 UNIT/ML
5 SOLUTION INTRAVENOUS
OUTPATIENT
Start: 2025-03-10

## 2025-03-02 RX ORDER — HEPARIN SODIUM,PORCINE 10 UNIT/ML
5-20 VIAL (ML) INTRAVENOUS DAILY PRN
OUTPATIENT
Start: 2025-03-10

## 2025-03-03 ENCOUNTER — INFUSION THERAPY VISIT (OUTPATIENT)
Dept: TRANSPLANT | Facility: CLINIC | Age: 55
End: 2025-03-03
Payer: COMMERCIAL

## 2025-03-03 ENCOUNTER — ALLIED HEALTH/NURSE VISIT (OUTPATIENT)
Dept: TRANSPLANT | Facility: CLINIC | Age: 55
End: 2025-03-03
Payer: COMMERCIAL

## 2025-03-03 VITALS
WEIGHT: 181.1 LBS | TEMPERATURE: 98.7 F | OXYGEN SATURATION: 97 % | HEART RATE: 78 BPM | BODY MASS INDEX: 32.6 KG/M2 | RESPIRATION RATE: 16 BRPM | SYSTOLIC BLOOD PRESSURE: 150 MMHG | DIASTOLIC BLOOD PRESSURE: 90 MMHG

## 2025-03-03 DIAGNOSIS — Z94.81 S/P ALLOGENEIC BONE MARROW TRANSPLANT (H): ICD-10-CM

## 2025-03-03 DIAGNOSIS — D46.9 MDS (MYELODYSPLASTIC SYNDROME) (H): Primary | ICD-10-CM

## 2025-03-03 DIAGNOSIS — Z94.84 HISTORY OF PERIPHERAL STEM CELL TRANSPLANT (H): ICD-10-CM

## 2025-03-03 LAB — FERRITIN SERPL-MCNC: 2972 NG/ML (ref 11–328)

## 2025-03-03 PROCEDURE — 250N000021 HC RX MED A9270 GY (STAT IND- M) 250: Performed by: STUDENT IN AN ORGANIZED HEALTH CARE EDUCATION/TRAINING PROGRAM

## 2025-03-03 PROCEDURE — 90750 HZV VACC RECOMBINANT IM: CPT | Performed by: STUDENT IN AN ORGANIZED HEALTH CARE EDUCATION/TRAINING PROGRAM

## 2025-03-03 PROCEDURE — G0009 ADMIN PNEUMOCOCCAL VACCINE: HCPCS | Performed by: STUDENT IN AN ORGANIZED HEALTH CARE EDUCATION/TRAINING PROGRAM

## 2025-03-03 PROCEDURE — 90472 IMMUNIZATION ADMIN EACH ADD: CPT | Performed by: STUDENT IN AN ORGANIZED HEALTH CARE EDUCATION/TRAINING PROGRAM

## 2025-03-03 PROCEDURE — 90471 IMMUNIZATION ADMIN: CPT | Performed by: STUDENT IN AN ORGANIZED HEALTH CARE EDUCATION/TRAINING PROGRAM

## 2025-03-03 PROCEDURE — 250N000011 HC RX IP 250 OP 636: Performed by: STUDENT IN AN ORGANIZED HEALTH CARE EDUCATION/TRAINING PROGRAM

## 2025-03-03 PROCEDURE — 90697 DTAP-IPV-HIB-HEPB VACCINE IM: CPT | Performed by: STUDENT IN AN ORGANIZED HEALTH CARE EDUCATION/TRAINING PROGRAM

## 2025-03-03 PROCEDURE — 90677 PCV20 VACCINE IM: CPT | Performed by: STUDENT IN AN ORGANIZED HEALTH CARE EDUCATION/TRAINING PROGRAM

## 2025-03-03 PROCEDURE — 82728 ASSAY OF FERRITIN: CPT | Performed by: STUDENT IN AN ORGANIZED HEALTH CARE EDUCATION/TRAINING PROGRAM

## 2025-03-03 PROCEDURE — 258N000003 HC RX IP 258 OP 636: Performed by: STUDENT IN AN ORGANIZED HEALTH CARE EDUCATION/TRAINING PROGRAM

## 2025-03-03 RX ADMIN — SODIUM CHLORIDE 500 ML: 9 INJECTION, SOLUTION INTRAVENOUS at 12:51

## 2025-03-03 RX ADMIN — ZOSTER VACCINE RECOMBINANT, ADJUVANTED 0.5 ML: KIT at 13:27

## 2025-03-03 RX ADMIN — DIPHTHERIA AND TETANUS TOXOIDS AND ACELLULAR PERTUSSIS, INACTIVATED POLIOVIRUS, HAEMOPHILUS B CONJUGATE AND HEPATITIS B VACCINE 0.5 ML: 15; 5; 20; 20; 3; 5; 29; 7; 26; 10; 3 INJECTION, SUSPENSION INTRAMUSCULAR at 13:26

## 2025-03-03 RX ADMIN — PNEUMOCOCCAL 20-VALENT CONJUGATE VACCINE 0.5 ML
2.2; 2.2; 2.2; 2.2; 2.2; 2.2; 2.2; 2.2; 2.2; 2.2; 2.2; 2.2; 2.2; 2.2; 2.2; 2.2; 4.4; 2.2; 2.2; 2.2 INJECTION, SUSPENSION INTRAMUSCULAR at 13:26

## 2025-03-03 ASSESSMENT — PAIN SCALES - GENERAL: PAINLEVEL_OUTOF10: NO PAIN (0)

## 2025-03-03 NOTE — NURSING NOTE
Chief Complaint   Patient presents with    Blood Draw     IV placement with blood draw by ultrasound       Labs drawn via IV placed by ultrasound. Vitals taken and appointment arrived.    Yoana Olmedo RN

## 2025-03-03 NOTE — PROGRESS NOTES
Administrations This Visit       [id number]-Hepatitis B Recmb (VAXELIS) injection (pre-filled syringe) 0.5 mL       Admin Date  03/03/2025 Action  $Given Dose  0.5 mL Route  Intramuscular Documented By  Eneida Obando RN              pneumococcal (PREVNAR 20) injection 0.5 mL       Admin Date  03/03/2025 Action  $Given Dose  0.5 mL Route  Intramuscular Documented By  Eneida Obando RN              zoster vaccine recombinant adjuvanted (SHINGRIX) injection 0.5 mL       Admin Date  03/03/2025 Action  $Given Dose  0.5 mL Route  Intramuscular Documented By  Eneida Obando RN                  12mo vaccines given intramuscularly in bilateral deltoids. See MAR for details. Provided with VIS.  Patient tolerated well; discharged in stable condition.      Eneida Obando RN

## 2025-03-03 NOTE — PROGRESS NOTES
Infusion Nursing Note:  Hortencia Baldwin presents today for therapeutic phlebotomy .    Patient seen by provider today: No   present during visit today: Not Applicable.    Note: Lab results monitored, met treatment parameters. 500mL removed via PIV/phlebotomy without complication. Patient received 500cc NS bolus post phlebotomy per therapy plan.    Intravenous Access:  Peripheral IV placed.  +BR noted pre and post infusion  Removed prior to discharge    Treatment Conditions:  Lab Results   Component Value Date    HGB 13.9 03/03/2025    WBC 7.7 03/03/2025    ANEU 1.8 03/28/2024    ANEUTAUTO 4.3 03/03/2025     03/03/2025        Results reviewed, labs MET treatment parameters, ok to proceed with treatment.    Post Infusion Assessment:  Patient tolerated infusion without incident.     Discharge Plan:   Patient discharged in stable condition accompanied by: .  Departure Mode: Ambulatory.    Eneida Obando RN

## 2025-03-15 RX ORDER — HEPARIN SODIUM (PORCINE) LOCK FLUSH IV SOLN 100 UNIT/ML 100 UNIT/ML
5 SOLUTION INTRAVENOUS
OUTPATIENT
Start: 2025-03-23

## 2025-03-15 RX ORDER — HEPARIN SODIUM,PORCINE 10 UNIT/ML
5-20 VIAL (ML) INTRAVENOUS DAILY PRN
OUTPATIENT
Start: 2025-03-23

## 2025-03-17 ENCOUNTER — INFUSION THERAPY VISIT (OUTPATIENT)
Dept: TRANSPLANT | Facility: CLINIC | Age: 55
End: 2025-03-17
Payer: COMMERCIAL

## 2025-03-17 VITALS
RESPIRATION RATE: 16 BRPM | OXYGEN SATURATION: 99 % | WEIGHT: 182.5 LBS | HEART RATE: 77 BPM | BODY MASS INDEX: 32.85 KG/M2 | TEMPERATURE: 97.9 F | DIASTOLIC BLOOD PRESSURE: 83 MMHG | SYSTOLIC BLOOD PRESSURE: 136 MMHG

## 2025-03-17 DIAGNOSIS — D46.9 MDS (MYELODYSPLASTIC SYNDROME) (H): Primary | ICD-10-CM

## 2025-03-17 DIAGNOSIS — Z94.81 S/P ALLOGENEIC BONE MARROW TRANSPLANT (H): ICD-10-CM

## 2025-03-17 LAB — FERRITIN SERPL-MCNC: 2554 NG/ML (ref 11–328)

## 2025-03-17 PROCEDURE — 82728 ASSAY OF FERRITIN: CPT | Performed by: STUDENT IN AN ORGANIZED HEALTH CARE EDUCATION/TRAINING PROGRAM

## 2025-03-17 PROCEDURE — 36415 COLL VENOUS BLD VENIPUNCTURE: CPT | Performed by: STUDENT IN AN ORGANIZED HEALTH CARE EDUCATION/TRAINING PROGRAM

## 2025-03-17 PROCEDURE — 96360 HYDRATION IV INFUSION INIT: CPT

## 2025-03-17 PROCEDURE — 258N000003 HC RX IP 258 OP 636: Performed by: STUDENT IN AN ORGANIZED HEALTH CARE EDUCATION/TRAINING PROGRAM

## 2025-03-17 RX ADMIN — SODIUM CHLORIDE 500 ML: 9 INJECTION, SOLUTION INTRAVENOUS at 13:38

## 2025-03-17 ASSESSMENT — PAIN SCALES - GENERAL: PAINLEVEL_OUTOF10: MILD PAIN (3)

## 2025-03-17 NOTE — NURSING NOTE
Chief Complaint   Patient presents with    Blood Draw     Vitals, blood drawn and PIV placed by , vascular RN. Pt checked into appt.      PAYAL Meilssa LPN

## 2025-03-17 NOTE — PROGRESS NOTES
Infusion Nursing Note:   Hortencia Sethi presents today for Therapeutic Phlebotomy.    Patient seen by provider today: No   present during visit today: Not Applicable.    Note: Pt's with ferritin level 2,554 today. Pt phlebotomized total of 500 mL in about 40 minutes without incident. VS stable prior to and following procedure. Pt denies dizziness or lightheadedness, 500 ml bolus given per treatment guidelines. Encouraged to drink plenty of PO fluids.       Intravenous Access:  Peripheral IV placed.    Treatment Conditions:  Lab Results   Component Value Date    HGB 13.3 03/17/2025    WBC 7.1 03/17/2025    ANEU 1.8 03/28/2024    ANEUTAUTO 4.1 03/17/2025     03/17/2025        Results reviewed, labs MET treatment parameters, ok to proceed with treatment.      Post Infusion Assessment:  Patient tolerated infusion without incident.       Discharge Plan:   Patient and/or family verbalized understanding of discharge instructions and all questions answered.  Patient discharged in stable condition accompanied by: .

## 2025-04-04 ENCOUNTER — PRE VISIT (OUTPATIENT)
Dept: PULMONOLOGY | Facility: CLINIC | Age: 55
End: 2025-04-04
Payer: COMMERCIAL

## 2025-04-15 DIAGNOSIS — D46.9 MDS (MYELODYSPLASTIC SYNDROME) (H): ICD-10-CM

## 2025-04-15 DIAGNOSIS — Z94.81 S/P ALLOGENEIC BONE MARROW TRANSPLANT (H): ICD-10-CM

## 2025-04-15 RX ORDER — ACYCLOVIR 800 MG/1
800 TABLET ORAL 2 TIMES DAILY
Qty: 60 TABLET | Refills: 1 | Status: SHIPPED | OUTPATIENT
Start: 2025-04-15

## 2025-04-15 RX ORDER — CALCIUM CARBONATE/VITAMIN D3 600 MG-10
2 TABLET ORAL DAILY
Qty: 180 TABLET | Refills: 3 | Status: SHIPPED | OUTPATIENT
Start: 2025-04-15

## 2025-04-18 ENCOUNTER — ONCOLOGY VISIT (OUTPATIENT)
Dept: TRANSPLANT | Facility: CLINIC | Age: 55
End: 2025-04-18
Attending: STUDENT IN AN ORGANIZED HEALTH CARE EDUCATION/TRAINING PROGRAM
Payer: COMMERCIAL

## 2025-04-18 ENCOUNTER — LAB (OUTPATIENT)
Dept: LAB | Facility: CLINIC | Age: 55
End: 2025-04-18
Attending: STUDENT IN AN ORGANIZED HEALTH CARE EDUCATION/TRAINING PROGRAM
Payer: COMMERCIAL

## 2025-04-18 VITALS
BODY MASS INDEX: 32.58 KG/M2 | OXYGEN SATURATION: 97 % | WEIGHT: 181 LBS | SYSTOLIC BLOOD PRESSURE: 130 MMHG | HEART RATE: 84 BPM | TEMPERATURE: 98.4 F | RESPIRATION RATE: 16 BRPM | DIASTOLIC BLOOD PRESSURE: 86 MMHG

## 2025-04-18 DIAGNOSIS — D46.9 MDS (MYELODYSPLASTIC SYNDROME) (H): Primary | ICD-10-CM

## 2025-04-18 DIAGNOSIS — Z94.81 S/P ALLOGENEIC BONE MARROW TRANSPLANT (H): ICD-10-CM

## 2025-04-18 LAB
BASOPHILS # BLD AUTO: 0.1 10E3/UL (ref 0–0.2)
BASOPHILS NFR BLD AUTO: 1 %
EOSINOPHIL # BLD AUTO: 0.1 10E3/UL (ref 0–0.7)
EOSINOPHIL NFR BLD AUTO: 1 %
ERYTHROCYTE [DISTWIDTH] IN BLOOD BY AUTOMATED COUNT: 13.1 % (ref 10–15)
HCT VFR BLD AUTO: 42.4 % (ref 35–47)
HGB BLD-MCNC: 14.1 G/DL (ref 11.7–15.7)
IMM GRANULOCYTES # BLD: 0.1 10E3/UL
IMM GRANULOCYTES NFR BLD: 1 %
LYMPHOCYTES # BLD AUTO: 2.6 10E3/UL (ref 0.8–5.3)
LYMPHOCYTES NFR BLD AUTO: 28 %
MCH RBC QN AUTO: 34.1 PG (ref 26.5–33)
MCHC RBC AUTO-ENTMCNC: 33.3 G/DL (ref 31.5–36.5)
MCV RBC AUTO: 102 FL (ref 78–100)
MONOCYTES # BLD AUTO: 0.7 10E3/UL (ref 0–1.3)
MONOCYTES NFR BLD AUTO: 8 %
NEUTROPHILS # BLD AUTO: 5.6 10E3/UL (ref 1.6–8.3)
NEUTROPHILS NFR BLD AUTO: 61 %
NRBC # BLD AUTO: 0 10E3/UL
NRBC BLD AUTO-RTO: 0 /100
PLATELET # BLD AUTO: 198 10E3/UL (ref 150–450)
RBC # BLD AUTO: 4.14 10E6/UL (ref 3.8–5.2)
WBC # BLD AUTO: 9.2 10E3/UL (ref 4–11)

## 2025-04-18 PROCEDURE — 85004 AUTOMATED DIFF WBC COUNT: CPT | Performed by: STUDENT IN AN ORGANIZED HEALTH CARE EDUCATION/TRAINING PROGRAM

## 2025-04-18 PROCEDURE — 99215 OFFICE O/P EST HI 40 MIN: CPT | Performed by: STUDENT IN AN ORGANIZED HEALTH CARE EDUCATION/TRAINING PROGRAM

## 2025-04-18 PROCEDURE — G0463 HOSPITAL OUTPT CLINIC VISIT: HCPCS | Mod: 25 | Performed by: STUDENT IN AN ORGANIZED HEALTH CARE EDUCATION/TRAINING PROGRAM

## 2025-04-18 RX ORDER — ACYCLOVIR 800 MG/1
800 TABLET ORAL 2 TIMES DAILY
Qty: 60 TABLET | Refills: 3 | Status: SHIPPED | OUTPATIENT
Start: 2025-04-18

## 2025-04-18 RX ORDER — LYSINE HCL 500 MG
2 TABLET ORAL DAILY
COMMUNITY
Start: 2025-01-17 | End: 2025-05-04

## 2025-04-18 ASSESSMENT — PAIN SCALES - GENERAL: PAINLEVEL_OUTOF10: MODERATE PAIN (5)

## 2025-04-18 NOTE — LETTER
4/18/2025      Hortencia Baldwin  [address]  [facility] [address]      Dear Colleague,    Thank you for referring your patient, Hortencia Baldwin, to the [facility] BLOOD AND MARROW TRANSPLANT PROGRAM [place]. Please see a copy of my visit note below.    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 Date of transplant: 1/11/2024    Diagnosis MDS w/ SF3B1 BMT type Allogenic  CMV  Donor -  Recipient +    Prep: MA Bu/Flu Donor type  6/8 URD Blood Type Donor and recipient O+   GVHD Ppx PTCy/ siro/ MMF Graft source PBSCT Toxo IgG Negative   Protocol [id number] (not on) BMT MD Dr. Garay       Data   Pre-transplant disease history  Referring provider: Dr.Evan Ramey, [facility]   52 y/o F with history of longstanding macrocytic anemia (Hb 10-11, -110 dating back to 1997) presented in Nov 2021 for lightheadedness. CBC showed acute on chronic anemia with Hb 4.7, ; normal WBC and platelet count. Bone marrow biopsy (12/09/2021) was consistent with MDS with low blasts and SF3B1 mutation. Hypercellular marrow (70%) with single lineage dysplasia (dyserythropoiesis), increased ringed sideroblasts and no increase in blasts. Flow cytometry showed mixed lymphocytes. Cytogenetics 46,XX,del(20). NGS: SF3B1 (45%), ASXL1 (6%).   Received Epo (March 2022 - April 2022) but had minimal response after 4 weekly doses. Luspatercept from April 2022 to April 2023 with inadequate response.   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 Treated with weekly rituximab x 4 and steroid taper (Sept to Dec 2022). LDH and bilirubin started rising after steroid taper, therefore treated with MMF 500mg BID (Dec 2022 to May 2023) with resolution of hemolysis (negative KATINA, normal LDH, bilirubin). However, she  continued to be transfusion dependant therefore her anemia was determined to be related more to MDS than autoimmune hemolysis.   Repeat bone marrow biopsy (5/11/2023) showed persistent MDS. Hypercellular marrow (90%) but now with dysplasia in all three lineages, cytogenetics 46, XX, del (20). Received decitabine- cedazurdine X 4 cycles (May 2023 to Dec 2023). Complicated by neutropenia requiring dose reduction and pRBC transfusions every 2-3 weeks. Pre-transplant bmbx (12/7/23) showed persistent MDS with multi lineage dysplasia (dyserythropoesis and dysgranulopoesis), 17% ringed sideroblasts, increased marrow storage iron. Cytogenetics: 46,XX,del(20). Flow cytometry and NGS not done.    She was considered for allogenic transplant despite low risk MDS by IPSS-R at diagnosis due to transfusion dependence, ASXL1 mutation and development of multi-lineage dysplasia on bmbx in May 2023 suggesting clonal evolution.      She underwent allogenic stem cell transplant on 1/11/24: myeloablative conditioning with Bu/Flu, 6/8 unrelated donor peripheral blood stem cell graft, cell dose: 6.50E+06. ABO matched, CMV donor negative, recipient positive. GVHD ppx with PTCy, MMF and tacrolimus (avoiding sirolimus due to high risk of VOD, given elevated liver enzymes prior to transplant and suspected iron overload).      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        INTERVAL HISTORY     Irma presents for a scheduled follow up visit.     She is doing relatively well. Had URI 2 weeks ago, now has improved with minimal dry cough.   Only complains is her bilateral knee pain, which is worse with exertion and without stiffness.     Intermittent sharp pain over L chest, that is worse with palpation and exertion. No anginal type chest pain. Sometime she feels her heart beat but no palpitation.     Good appetite, denies nausea/vomiting, dysphagia, belly  pain. Has 1-2 firmed bowel movement daily.  No new skin rash or thickening. Chronic dry mouth that has been stable. No dry eyes.    She is currently unemployed and she misses her work. 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 She is uncertain whether she could go back to work with her knees.    She wonders whether she could be off acyclovir.    She plans to travel to [facility] for a couple weeks in the future, uncertain date.    Plan   - no sign of GVHD  - cardiac stress test due to left sided chest pain.  - She has hyperlipidemia, which has not changed in the past 6 months. She might benefit from a statin. Encouraged her to discuss with her PCP.    - Schedule for 14 month vaccinations   - Can stop acyclovir 2 months after the 2nd dose of Shringrix   - If she plans to travel to [facility], might need a hepatitis A vaccine/IVIG  - Recommend seeing a dentist  - Continue phlebotomy every 2 weeks.   - RTC in 2-3 months     Medications  Current Outpatient Medications   Medication Sig Dispense Refill     acyclovir (ZOVIRAX) 800 MG tablet Take 1 tablet (800 mg) by mouth 2 times daily. 60 tablet 3     calcium carbonate-vitamin D (CALCIUM + VITAMIN D3) 600-10 MG-MCG per tablet Take 2 tablets by mouth daily. 180 tablet 3     polyethylene glycol-propylene glycol (SYSTANE) 0.4-0.3 % SOLN ophthalmic solution Place 1 drop into both eyes every hour as needed for dry eyes. 30 mL 0         EXAM      /86 (Patient Position: Sitting, Cuff Size: Adult Regular)   Pulse 84   Temp 98.4  F (36.9  C) (Oral)   Resp 16   Wt 82.1 kg (181 lb)   LMP 11/05/2017   SpO2 97%   BMI 32.58 kg/m      Wt Readings from Last 4 Encounters:   04/18/25 82.3 kg (181 lb 6.4 oz)   03/17/25 82.8 kg (182 lb 8 oz)   03/03/25 82.1 kg (181 lb 1.6 oz)   02/10/25 81.9 kg (180 lb 9.6 oz)     KPS: 80% Can perform normal activity with effort, some signs of disease    General: Alert, awake  Eyes: Conjunctiva normal  Mouth:  No lichenoid changes   Respiratory: Normal respiratory effort. No wheezing  Cardiovascular: regular, no murmur. Tenderness at L chest pain.  Skin: no rash/redness  Psych: Mood and affect are appropriate.  No central access.       Laboratory Studies:     Data   Blood Counts  Recent Labs   Lab Test 04/18/25  1003 03/17/25  1226 03/03/25  1129 02/10/25  1306 04/04/24  1218 03/28/24  1119 02/22/24  0350 02/21/24  0816 02/19/24  1203   WBC  --  7.1 7.7 5.7   < > 4.9   < > 7.7 5.8   HGB 14.1 13.3 13.9 13.7   < > 13.0   < > 11.3* 10.0*   PLT  --  180 178 153   < > 134*   < > 83* 74*   NEUTROPHIL  --  59 56 52   < > 36   < > 72 53   ANEU  --   --   --   --   --  1.8  --  5.5 3.1   LYMPH  --  33 35 37   < > 26   < > 11 17   ALYM  --   --   --   --   --  1.3  --  0.8 1.0    < > = values in this interval not displayed.     Basic Panel  Recent Labs   Lab Test 03/03/25  1129 02/10/25  1306 01/27/25  1229    144 138   POTASSIUM 4.2 3.5 4.0   CHLORIDE 104 107 102   CO2 26 29 25   BUN 11.4 9.8 9.5   CR 0.52 0.52 0.57   GLC 87 107* 102*       LFTs  Recent Labs   Lab Test 03/03/25  1129 02/10/25  1306 01/27/25  1229   ALKPHOS 122 134 144   AST 29 39 103*   ALT 44 74* 160*   BILITOTAL 0.3 0.3 0.3   ALBUMIN 4.3 4.2 4.1       Recent Labs   Lab Test 03/17/25  1226 03/03/25  1129 02/10/25  1306   CMVQNT Not Detected Not Detected Not Detected       Recent Labs   Lab Test 06/28/24  0849 03/14/24  1338   * 424*       Recent Labs   Lab Test 03/17/25  1226 03/03/25  1129 02/10/25  1306 01/27/25  1108 01/13/25  0758 12/16/24  1216   SONY 2,554* 2,972* 3,792* 10,608* 2,782* 2,384*            ASSESSMENT AND PLAN     BMT for MDS w/SF3B1 mutation  [id number] (according to but not on) with Bu/Flu  6/8 URD, peripheral blood stem cell graft, cell dose: 6.5 x10^6.     Disease status: Bone marrow biopsy at D28, D100, 6 months, 1 year  2/6/24 (~D26): Hypercellular marrow (70-80%), trilineage hemopoiesis with rare nuclear irregularities in  terminal erythroid precursors of uncertain significance, 3% ringed sideroblasts (in the context of iron overload is not considered dysplastic finding). Flow showed no increase in myeloid blasts. Cytogenetics 46 XY, consistent with donor. FISH: Rate of loss of 20q within normal limits. NGS: No mutations detected  4/22/24 (D100): Overall cellularity 40-50% with no dysplasia. No ringed sideroblasts. Flow negative. Cytogenetics not done. NGS negative.   7/15/24 (D180): CR, cytogenetics/ FISH not done. NGS negative   1/13/2025 (1 year): CR, karyotype/FISH/ NGS negative.      Graft status/chimerism: D28, D100, 6 months,1 year  2/6/24 (~D26): Bone marrow 100%, peripheral blood (2/1/24) CD33 100%, CD3 87%  3/14/24 (~D60): Peripheral blood CD3 and CD33 100%  4/22/24 (D100): Bone marrow 100%. Peripheral blood CD3 and CD33 100%  7/15/24 (D180): Bone marrow 100%. Peripheral blood CD3 and CD33 100%  10/7/24: Peripheral blood CD3 and CD33 100%  1/13/2025 (1 year): Bone marrow 100%. Peripheral blood CD3 and CD33 100%    GVHD  # Current immunosuppression is none  - Sirolimus taper completed on 6/15/24     # No acute GVHD till date  Skin: No changes. Skin score 0.  GI: No symptoms. GI score 0.  Liver: LFTs normal. Score 0     Heme/ Coag  # ABO matched (both O+).   - Mild macrocytosis: B12, folic acid normal (10/7/24)    # Iron overload: Hx of transfusion dependent anemia, pre-transplant ferritin around 5,000. Ferritin (4/22/24) was 4257. Tsat 62%. Retic count 2.7%  - Liver ferriscan (6/13/24) shows severe iron overload 20.7 mg/g dry tissue.  - Started phlebotomy on 7/1/24, increased frequency to q 2 weeks since 1/2025.   - Continue q 2 weeks phlebotomy until ferritin <1000, continue as long as Hb >12.     ID  # Prophylaxis  Viral: Acyclovir 800 mg bid - until 2 months after 2nd dose zoster vaccine  PJP/ Toxo IgG negative. Completed bactrim   CMV: Letermovir completed     # Monitoring  CMV: Has been negative. No further monitoring  needed  EBV: Has been negative. No further monitoring needed.   IgG: Level 544 mg/dL on 6/28/24. .     Derm  Seborrheic dermatitis  - was prescribed augmented betamethasone lotion, tacrolimus ointment, H&S shampoo by derm. Not using currently.     Hand dermatitis  - current tx: triamcinolone ointment  Follows with dermatology    CV  # L sided chest pain  Had intermittently sharp pain over L chest that is worse with palpation and exertion. Not an anginal type chest pain. Sometime she feels her heart beat but no palpitation.   - Cardiac stress test  - Discuss about cholesterol level with PCP    # Hyperlipidemia: LDL was 174, HDL 59 and triglycerides 213 on 1/13/25.     MSK:  # L knee pain and back pain: Improving with PT  # Left ankle swelling:   - repeat US of the left leg (5/23) negative for DVT  - compression stockings    Health Maintenance   Vitamin D was 27 (1/13/25). DXA normal (1/14/25), repeat every 2 years with PCP  PFT: 1/10/25: Non-specific spirometry pattern with normal lung volumes.  Per ATS guidelines, this suggests mild airflow obstruction; clinical correlation is recommended. PFT (4/2025) are normal.   Thyroid: TSH (1/13/25) was normal , then yearly, through primary physician.   Lipids: Cholesterol 276, . Defer to PCP.   Skin: Annual skin checks through Dermatology; established  Dental visit at 1 year, she still needs to establish   She has established with PCP    Post-transplant vaccinations  - Received COVID and flu  - 12 months vaccine (CYjT-[id number]-PCV-Polio-Zoster) on 3/3/2025  - Discussing about hepatitis A vaccination for travelling to [place]    Evette Mosher MD  Internal Medicine, PGY-2    I spent 45 minutes in the care of this patient today, which included time necessary for preparation for the visit, obtaining history, ordering medications/tests/procedures as medically indicated, review of pertinent medical literature, counseling of the patient, communication of  recommendations to the care team, and documentation time.    Coco Garay  Department of Hematology, Oncology and Transplantation  [facility] Pager 1300/Text via Consignd                Again, thank you for allowing me to participate in the care of your patient.        Sincerely,        SARA Singleton    Electronically signed

## 2025-04-18 NOTE — NURSING NOTE
"Oncology Rooming Note    April 18, 2025 1:10 PM   Hortencia Baldwin is a 54 year old female who presents for:    Chief Complaint   Patient presents with    Oncology Clinic Visit     MDS     Initial Vitals: /86 (Patient Position: Sitting, Cuff Size: Adult Regular)   Pulse 84   Temp 98.4  F (36.9  C) (Oral)   Resp 16   Wt 82.1 kg (181 lb)   LMP 11/05/2017   SpO2 97%   BMI 32.58 kg/m   Estimated body mass index is 32.58 kg/m  as calculated from the following:    Height as of 1/7/25: 1.588 m (5' 2.5\").    Weight as of this encounter: 82.1 kg (181 lb). Body surface area is 1.9 meters squared.  Moderate Pain (5) Comment: Data Unavailable   Patient's last menstrual period was 11/05/2017.  Allergies reviewed: Yes  Medications reviewed: Yes    Medications: Medication refills not needed today.  Pharmacy name entered into [facility]:    [id number] - [place], [place] - [address] AT [facility] [address]  [facility][id number] - [place], [place] [address] [facility] [address] [facility] [employer] [place] [address] [address] [place] [address] [address]  [facility] #[id number] - [place], [place] - [address] AT [facility] OF [facility] [address]    Frailty Screening:   Is the patient here for a new oncology consult visit in cancer care? 2. No    PHQ9:  Did this patient require a PHQ9?: No      Clinical concerns: None       Tiffany Boles LPN  4/18/2025              "

## 2025-04-18 NOTE — PROGRESS NOTES
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 Date of transplant: 1/11/2024    Diagnosis MDS w/ SF3B1 BMT type Allogenic  CMV  Donor -  Recipient +    Prep: MA Bu/Flu Donor type  6/8 URD Blood Type Donor and recipient O+   GVHD Ppx PTCy/ siro/ MMF Graft source PBSCT Toxo IgG Negative   Protocol [id number] (not on) BMT MD Dr. Garay       Data    Pre-transplant disease history  Referring provider: Dr.Evan Ramey, [facility]   52 y/o F with history of longstanding macrocytic anemia (Hb 10-11, -110 dating back to 1997) presented in Nov 2021 for lightheadedness. CBC showed acute on chronic anemia with Hb 4.7, ; normal WBC and platelet count. Bone marrow biopsy (12/09/2021) was consistent with MDS with low blasts and SF3B1 mutation. Hypercellular marrow (70%) with single lineage dysplasia (dyserythropoiesis), increased ringed sideroblasts and no increase in blasts. Flow cytometry showed mixed lymphocytes. Cytogenetics 46,XX,del(20). NGS: SF3B1 (45%), ASXL1 (6%).   Received Epo (March 2022 - April 2022) but had minimal response after 4 weekly doses. Luspatercept from April 2022 to April 2023 with inadequate response.   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 Treated with weekly rituximab x 4 and steroid taper (Sept to Dec 2022). LDH and bilirubin started rising after steroid taper, therefore treated with MMF 500mg BID (Dec 2022 to May 2023) with resolution of hemolysis (negative KATINA, normal LDH, bilirubin). However, she continued to be transfusion dependant therefore her anemia was determined to be related more to MDS than autoimmune hemolysis.   Repeat bone marrow biopsy (5/11/2023) showed persistent MDS. Hypercellular marrow (90%) but now with dysplasia in all three lineages, cytogenetics  46, XX, del (20). Received decitabine- cedazurdine X 4 cycles (May 2023 to Dec 2023). Complicated by neutropenia requiring dose reduction and pRBC transfusions every 2-3 weeks. Pre-transplant bmbx (12/7/23) showed persistent MDS with multi lineage dysplasia (dyserythropoesis and dysgranulopoesis), 17% ringed sideroblasts, increased marrow storage iron. Cytogenetics: 46,XX,del(20). Flow cytometry and NGS not done.    She was considered for allogenic transplant despite low risk MDS by IPSS-R at diagnosis due to transfusion dependence, ASXL1 mutation and development of multi-lineage dysplasia on bmbx in May 2023 suggesting clonal evolution.      She underwent allogenic stem cell transplant on 1/11/24: myeloablative conditioning with Bu/Flu, 6/8 unrelated donor peripheral blood stem cell graft, cell dose: 6.50E+06. ABO matched, CMV donor negative, recipient positive. GVHD ppx with PTCy, MMF and tacrolimus (avoiding sirolimus due to high risk of VOD, given elevated liver enzymes prior to transplant and suspected iron overload).      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        INTERVAL HISTORY     Irma presents for a scheduled follow up visit. She had phlebotomy done already.    She is doing relatively well. Had URI 2 weeks ago, now has improved with minimal dry cough.   Only complains is her bilateral knee pain, which is worse with exertion and without stiffness.     Good appetite, denies nausea/vomiting, dysphagia, belly pain. Has 1-2 firmed bowel movement daily.  No new skin rash or thickening. Chronic dry mouth that has been stable. No dry eyes.  Breathing okay. Had intermittently sharp pain over L chest that is worse with palpation and exertion. No anginal type chest pain. Sometime she feels her heart beat but no palpitation.     She is currently unemployed and she misses her work. 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 She is uncertain whether she could go back to work with her knees.    She wonders whether she could be off acyclovir. She has a question about her lab tests, e.g. high MCV that is persistently in a high range.    She plans to travel to [place] for a couple weeks in the future, uncertain date.    Plan ***  - no sign of GVHD  - cardiac stress test  - Shringrix in 6/2025 (last 3/3/2025)  - Can stop acyclovir 1 month after the 2nd dose of Shringrix  - Recommend seeing a dentist  - Recommend discussing cholesterol with her PCP  - If she plans to travel to [place], might need a hepatitis A vaccine/IVIG  - Continue phlebotomy every 2 weeks.   - Pulm visit has not been scheduled ***  - RTC in 3 months     Current Outpatient Medications   Medication Sig Dispense Refill    acyclovir (ZOVIRAX) 800 MG tablet Take 1 tablet (800 mg) by mouth 2 times daily. 60 tablet 1    betamethasone dipropionate (DIPROSONE) 0.05 % external cream Apply topically. (Patient not taking: Reported on 1/27/2025)      calcium carbonate-vitamin D (CALCIUM + VITAMIN D3) 600-10 MG-MCG per tablet Take 2 tablets by mouth daily. 180 tablet 3    polyethylene glycol-propylene glycol (SYSTANE) 0.4-0.3 % SOLN ophthalmic solution Place 1 drop into both eyes every hour as needed for dry eyes. (Patient not taking: Reported on 1/27/2025) 30 mL 0    tacrolimus (PROTOPIC) 0.1 % external ointment Apply topically 2 times daily. face (Patient not taking: Reported on 3/17/2025) 60 g 11         EXAM      LMP 11/05/2017     Wt Readings from Last 4 Encounters:   04/18/25 82.3 kg (181 lb 6.4 oz)   03/17/25 82.8 kg (182 lb 8 oz)   03/03/25 82.1 kg (181 lb 1.6 oz)   02/10/25 81.9 kg (180 lb 9.6 oz)     KPS: 80% Can perform normal activity with effort, some signs of disease    General: Alert, awake  Eyes: Conjunctiva normal  Mouth: Moist mucosal membranes, pharynx normal  Respiratory: Normal respiratory effort. No  wheezing  Cardiovascular: regular, no murmur, borderline tachycardia HR ~100. Tenderness at L chest pain.  Skin: no rash/redness    Psych: Mood and affect are appropriate.  No central access.       Laboratory Studies:     Data    Blood Counts  Recent Labs   Lab Test 04/18/25  1003 03/17/25  1226 03/03/25  1129 02/10/25  1306 04/04/24  1218 03/28/24  1119 02/22/24  0350 02/21/24  0816 02/19/24  1203   WBC  --  7.1 7.7 5.7   < > 4.9   < > 7.7 5.8   HGB 14.1 13.3 13.9 13.7   < > 13.0   < > 11.3* 10.0*   PLT  --  180 178 153   < > 134*   < > 83* 74*   NEUTROPHIL  --  59 56 52   < > 36   < > 72 53   ANEU  --   --   --   --   --  1.8  --  5.5 3.1   LYMPH  --  33 35 37   < > 26   < > 11 17   ALYM  --   --   --   --   --  1.3  --  0.8 1.0    < > = values in this interval not displayed.     Basic Panel  Recent Labs   Lab Test 03/03/25  1129 02/10/25  1306 01/27/25  1229    144 138   POTASSIUM 4.2 3.5 4.0   CHLORIDE 104 107 102   CO2 26 29 25   BUN 11.4 9.8 9.5   CR 0.52 0.52 0.57   GLC 87 107* 102*       LFTs  Recent Labs   Lab Test 03/03/25  1129 02/10/25  1306 01/27/25  1229   ALKPHOS 122 134 144   AST 29 39 103*   ALT 44 74* 160*   BILITOTAL 0.3 0.3 0.3   ALBUMIN 4.3 4.2 4.1       Recent Labs   Lab Test 03/17/25  1226 03/03/25  1129 02/10/25  1306   CMVQNT Not Detected Not Detected Not Detected       Recent Labs   Lab Test 06/28/24  0849 03/14/24  1338   * 424*       Recent Labs   Lab Test 03/17/25  1226 03/03/25  1129 02/10/25  1306 01/27/25  1108 01/13/25  0758 12/16/24  1216   SONY 2,554* 2,972* 3,792* 10,608* 2,782* 2,384*            ASSESSMENT AND PLAN     BMT for MDS w/SF3B1 mutation  [id number] (according to but not on) with Bu/Flu  6/8 URD, peripheral blood stem cell graft, cell dose: 6.5 x10^6.     Disease status: Bone marrow biopsy at D28, D100, 6 months, 1 year  2/6/24 (~D26): Hypercellular marrow (70-80%), trilineage hemopoiesis with rare nuclear irregularities in terminal erythroid precursors of  uncertain significance, 3% ringed sideroblasts (in the context of iron overload is not considered dysplastic finding). Flow showed no increase in myeloid blasts. Cytogenetics 46 XY, consistent with donor. FISH: Rate of loss of 20q within normal limits. NGS: No mutations detected  4/22/24 (D100): Overall cellularity 40-50% with no dysplasia. No ringed sideroblasts. Flow negative. Cytogenetics not done. NGS negative.   7/15/24 (D180): CR, cytogenetics/ FISH not done. NGS negative   1/13/2025 (1 year): CR, karyotype/FISH/ NGS negative.      Graft status/chimerism: D28, D100, 6 months,1 year  2/6/24 (~D26): Bone marrow 100%, peripheral blood (2/1/24) CD33 100%, CD3 87%  3/14/24 (~D60): Peripheral blood CD3 and CD33 100%  4/22/24 (D100): Bone marrow 100%. Peripheral blood CD3 and CD33 100%  7/15/24 (D180): Bone marrow 100%. Peripheral blood CD3 and CD33 100%  10/7/24: Peripheral blood CD3 and CD33 100%  1/13/2025 (1 year): Bone marrow 100%. Peripheral blood CD3 and CD33 100%    GVHD  # Current immunosuppression is none  - Sirolimus taper completed on 6/15/24     # No acute GVHD till date  Skin: No changes. Skin score 0.  GI: No symptoms. GI score 0.  Liver: LFTs normal. Score 0     Heme/ Coag  # ABO matched (both O+).   - Mild macrocytosis: B12, folic acid normal (10/7/24)    # Iron overload: Hx of transfusion dependent anemia, pre-transplant ferritin around 5,000. Ferritin (4/22/24) was 4257. Tsat 62%. Retic count 2.7%  - Liver ferriscan (6/13/24) shows severe iron overload 20.7 mg/g dry tissue.  - Started phlebotomy on 7/1/24, increased frequency to q 2 weeks since 1/2025.   - Continue q 2 weeks phlebotomy until ferritin <1000, continue as long as Hb >12.     ID  # Prophylaxis  Viral: Acyclovir 800 mg bid - until 1 months after 2nd dose zoster vaccine  PJP/ Toxo IgG negative. Completed bactrim   CMV: Letermovir completed     # Monitoring  CMV: Has been negative. No further monitoring needed  EBV: Has been negative. No  further monitoring needed.   IgG: Level 544 mg/dL on 6/28/24. .     Derm  Seborrheic dermatitis  - was prescribed augmented betamethasone lotion, tacrolimus ointment, H&S shampoo by derm. Not using currently.   Hand dermatitis  - current tx: triamcinolone ointment  Follows with dermatology    GI  # Elevated transaminases: Resolved  AST and ALT elevated beginning 2/2/24 (D22). Notably, she was on tacrolimus for GVHD ppx but changed to sirolimus on 1/31/24. Infectious workup (2/6/24) was negative. Liver US (2/20/24) showed hepatomegaly with increased hepatic parenchymal echogenicity, suggesting hepatic steatosis. Suspect drug induced elevation w/ steatosis and possibly iron overload.     CV  # L sided chest pain  Had intermittently sharp pain over L chest that is worse with palpation and exertion. Not an anginal type chest pain. Sometime she feels her heart beat but no palpitation.   - Cardiac stress test  - Discuss about cholesterol level with PCP    - HTN: Off amlodipine.  - Hyperlipidemia: LDL was 174, HDL 59 and triglycerides 213 on 1/13/25.     MSK:  # L knee pain and back pain: Improving with PT  # Left ankle swelling:   - repeat US of the left leg (5/23) negative for DVT  - compression stockings    Health Maintenance   Vitamin D was 27 (1/13/25). DXA normal (1/14/25), repeat every 2 years with PCP  PFT: 1/10/25: Non-specific spirometry pattern with normal lung volumes.  Per ATS guidelines, this suggests mild airflow obstruction; clinical correlation is recommended. PFT (4/2025) with normal parameter. Has not been seen by pulm.   Awaiting pulmonary visit  Thyroid: TSH (1/13/25) was normal , then yearly, through primary physician.   Lipids: Cholesterol 276, . Defer to PCP.   Skin: Annual skin checks through Dermatology; established  Dental visit at 1 year, she still needs to establish   She has established with PCP    Post-transplant vaccinations  - Received COVID and flu  - 12 months vaccine  ([id number]-Zoster) on 3/3/2025  - Discussing about hepatitis A vaccination for travelling to [place]    Evette Mosher MD  Internal Medicine, PGY-2    I spent 45 minutes in the care of this patient today, which included time necessary for preparation for the visit, obtaining history, ordering medications/tests/procedures as medically indicated, review of pertinent medical literature, counseling of the patient, communication of recommendations to the care team, and documentation time.    Coco Garay  Department of Hematology, Oncology and Transplantation  [facility] Pager [phone]/Text via George

## 2025-04-30 NOTE — TELEPHONE ENCOUNTER
Received voice mail from patient regarding Sirolimus medication. When she went to  it from [facility], they told her they needed clarification. NC had spoken with the pharmacist on Monday to clarify the taper calendar. Taper calendar was sent to [facility] previously. NC called today to talk to pharmacist to find out what remaining issues. Pharmacist at [facility] stated they did receive the sirolimus taper calendar. Apparently the hold up was the [facility] didn't have inventory of sirolimus so they were awaiting resupply. The confirmed inventory and are able to fill this medication at this time. The pharmacist said they have 100 medications ahead to fill, they have been short staffed for 3 days, but they think it should be ready by noon.     Called the patient and stated she should go  the sirolimus today. Pt stated she was unable to fill the other medications, due to high out of pocket cost. NC called [facility] to follow up on the prescriptions that have been sent. The patient needs to create an online profile, then it will like to the email included in the perception. This can take 6-10 business days to fill so we may need to send some [place] [facility] [place] the mean time.     Emailed patient detailed instructions of how to set up her online account with [facility]. There is a patient help line that can help her create an account.       
appt in the next 1 to 2 months [MF]   Wed Apr 30, 2025   0001 Patient reports improvement after pain meds, reviewed need for ice elevation return for ultrasound tomorrow to evaluate for DVT given elevated D-dimer.  Reviewed medications follow-up with PCP for repeat kidney function in the next 2 days stop the diclofenac and no ibuprofen anti-inflammatories due to kidney function. [MF]      ED Course User Index  [] FRANKO Martinez MD           12:03 AM    Pt has been re-examined and states that they are feeling better and have no new complaints.  Laboratory tests, medications, x-rays, diagnosis, follow up plan and return instructions have been reviewed and discussed with the patient and/or family. Pt and/or family were instructed on symptoms that may arise after discharge requiring re-evaluation by a physician. Pt and/or family have had the opportunity to ask questions about their care. Patient and/or family verbalized understanding and agreement with care plan, follow up and return instructions. Patient and/or family agree to return in 48 hours if their symptoms are not improving or immediately if they have any change in their condition.      Narcotics were prescribed, pt was advised not to drive or operate heavy machinery.          I have also put together some discharge instructions for patient that include: 1) educational information regarding their diagnosis, 2) how to care for their diagnosis at home, as well a 3) list of reasons why they would want to return to the ED prior to their follow-up appointment, should their condition change.       Waldemar Martinez MD        Medical Decision Making  Problems Addressed:  Dehydration: acute illness or injury  Right calf pain: acute illness or injury    Amount and/or Complexity of Data Reviewed  Independent Historian: spouse  External Data Reviewed: labs, radiology and notes.  Labs: ordered. Decision-making details documented in ED Course.  Radiology: ordered.

## 2025-05-09 ENCOUNTER — LAB (OUTPATIENT)
Dept: LAB | Facility: CLINIC | Age: 55
End: 2025-05-09
Attending: STUDENT IN AN ORGANIZED HEALTH CARE EDUCATION/TRAINING PROGRAM
Payer: COMMERCIAL

## 2025-05-22 DIAGNOSIS — Z94.81 S/P ALLOGENEIC BONE MARROW TRANSPLANT (H): ICD-10-CM

## 2025-05-22 DIAGNOSIS — D46.9 MDS (MYELODYSPLASTIC SYNDROME) (H): ICD-10-CM

## 2025-05-22 RX ORDER — CALCIUM CARBONATE/VITAMIN D3 600 MG-10
2 TABLET ORAL DAILY
Qty: 180 TABLET | Refills: 3 | Status: SHIPPED | OUTPATIENT
Start: 2025-05-22

## 2025-05-22 RX ORDER — ACYCLOVIR 800 MG/1
800 TABLET ORAL 2 TIMES DAILY
Qty: 60 TABLET | Refills: 3 | Status: SHIPPED | OUTPATIENT
Start: 2025-05-22

## 2025-05-29 DIAGNOSIS — D46.9 MDS (MYELODYSPLASTIC SYNDROME) (H): ICD-10-CM

## 2025-05-29 DIAGNOSIS — Z94.81 S/P ALLOGENEIC BONE MARROW TRANSPLANT (H): ICD-10-CM

## 2025-05-29 RX ORDER — ACYCLOVIR 800 MG/1
800 TABLET ORAL 2 TIMES DAILY
Qty: 134 TABLET | Refills: 0 | Status: SHIPPED | OUTPATIENT
Start: 2025-05-29

## 2025-06-06 ENCOUNTER — APPOINTMENT (OUTPATIENT)
Dept: LAB | Facility: CLINIC | Age: 55
End: 2025-06-06
Attending: STUDENT IN AN ORGANIZED HEALTH CARE EDUCATION/TRAINING PROGRAM
Payer: COMMERCIAL

## 2025-06-20 ENCOUNTER — LAB (OUTPATIENT)
Dept: LAB | Facility: CLINIC | Age: 55
End: 2025-06-20
Attending: STUDENT IN AN ORGANIZED HEALTH CARE EDUCATION/TRAINING PROGRAM
Payer: COMMERCIAL

## 2025-07-18 ENCOUNTER — APPOINTMENT (OUTPATIENT)
Dept: LAB | Facility: CLINIC | Age: 55
End: 2025-07-18
Attending: STUDENT IN AN ORGANIZED HEALTH CARE EDUCATION/TRAINING PROGRAM
Payer: COMMERCIAL

## 2025-07-18 ENCOUNTER — ONCOLOGY VISIT (OUTPATIENT)
Dept: TRANSPLANT | Facility: CLINIC | Age: 55
End: 2025-07-18
Attending: STUDENT IN AN ORGANIZED HEALTH CARE EDUCATION/TRAINING PROGRAM
Payer: COMMERCIAL

## 2025-07-18 VITALS
RESPIRATION RATE: 16 BRPM | HEART RATE: 80 BPM | OXYGEN SATURATION: 98 % | DIASTOLIC BLOOD PRESSURE: 87 MMHG | SYSTOLIC BLOOD PRESSURE: 139 MMHG | TEMPERATURE: 98.1 F | BODY MASS INDEX: 34.27 KG/M2 | WEIGHT: 190.4 LBS

## 2025-07-18 DIAGNOSIS — D46.9 MDS (MYELODYSPLASTIC SYNDROME) (H): ICD-10-CM

## 2025-07-18 LAB
ALBUMIN SERPL BCG-MCNC: 4 G/DL (ref 3.5–5.2)
ALP SERPL-CCNC: 118 U/L (ref 40–150)
ALT SERPL W P-5'-P-CCNC: 26 U/L (ref 0–50)
ANION GAP SERPL CALCULATED.3IONS-SCNC: 12 MMOL/L (ref 7–15)
AST SERPL W P-5'-P-CCNC: 26 U/L (ref 0–45)
BASOPHILS # BLD AUTO: 0 10E3/UL (ref 0–0.2)
BASOPHILS NFR BLD AUTO: 1 %
BILIRUB SERPL-MCNC: 0.3 MG/DL
BUN SERPL-MCNC: 9.8 MG/DL (ref 6–20)
CALCIUM SERPL-MCNC: 9.5 MG/DL (ref 8.8–10.4)
CHLORIDE SERPL-SCNC: 104 MMOL/L (ref 98–107)
CREAT SERPL-MCNC: 0.49 MG/DL (ref 0.51–0.95)
EGFRCR SERPLBLD CKD-EPI 2021: >90 ML/MIN/1.73M2
EOSINOPHIL # BLD AUTO: 0 10E3/UL (ref 0–0.7)
EOSINOPHIL NFR BLD AUTO: 1 %
ERYTHROCYTE [DISTWIDTH] IN BLOOD BY AUTOMATED COUNT: 13.3 % (ref 10–15)
GLUCOSE SERPL-MCNC: 106 MG/DL (ref 70–99)
HCO3 SERPL-SCNC: 23 MMOL/L (ref 22–29)
HCT VFR BLD AUTO: 41.6 % (ref 35–47)
HGB BLD-MCNC: 13.7 G/DL (ref 11.7–15.7)
IMM GRANULOCYTES # BLD: 0 10E3/UL
IMM GRANULOCYTES NFR BLD: 1 %
LYMPHOCYTES # BLD AUTO: 3.3 10E3/UL (ref 0.8–5.3)
LYMPHOCYTES NFR BLD AUTO: 39 %
MCH RBC QN AUTO: 33.2 PG (ref 26.5–33)
MCHC RBC AUTO-ENTMCNC: 32.9 G/DL (ref 31.5–36.5)
MCV RBC AUTO: 101 FL (ref 78–100)
MONOCYTES # BLD AUTO: 0.6 10E3/UL (ref 0–1.3)
MONOCYTES NFR BLD AUTO: 7 %
NEUTROPHILS # BLD AUTO: 4.4 10E3/UL (ref 1.6–8.3)
NEUTROPHILS NFR BLD AUTO: 53 %
NRBC # BLD AUTO: 0 10E3/UL
NRBC BLD AUTO-RTO: 0 /100
PLATELET # BLD AUTO: 190 10E3/UL (ref 150–450)
POTASSIUM SERPL-SCNC: 4.6 MMOL/L (ref 3.4–5.3)
PROT SERPL-MCNC: 7.1 G/DL (ref 6.4–8.3)
RBC # BLD AUTO: 4.13 10E6/UL (ref 3.8–5.2)
SODIUM SERPL-SCNC: 139 MMOL/L (ref 135–145)
WBC # BLD AUTO: 8.4 10E3/UL (ref 4–11)

## 2025-07-18 PROCEDURE — 80053 COMPREHEN METABOLIC PANEL: CPT | Performed by: STUDENT IN AN ORGANIZED HEALTH CARE EDUCATION/TRAINING PROGRAM

## 2025-07-18 PROCEDURE — 36415 COLL VENOUS BLD VENIPUNCTURE: CPT | Performed by: STUDENT IN AN ORGANIZED HEALTH CARE EDUCATION/TRAINING PROGRAM

## 2025-07-18 PROCEDURE — 99195 PHLEBOTOMY: CPT

## 2025-07-18 PROCEDURE — 85004 AUTOMATED DIFF WBC COUNT: CPT | Performed by: STUDENT IN AN ORGANIZED HEALTH CARE EDUCATION/TRAINING PROGRAM

## 2025-07-18 PROCEDURE — 82465 ASSAY BLD/SERUM CHOLESTEROL: CPT | Performed by: STUDENT IN AN ORGANIZED HEALTH CARE EDUCATION/TRAINING PROGRAM

## 2025-07-18 PROCEDURE — 99215 OFFICE O/P EST HI 40 MIN: CPT | Performed by: STUDENT IN AN ORGANIZED HEALTH CARE EDUCATION/TRAINING PROGRAM

## 2025-07-18 PROCEDURE — G0463 HOSPITAL OUTPT CLINIC VISIT: HCPCS | Performed by: STUDENT IN AN ORGANIZED HEALTH CARE EDUCATION/TRAINING PROGRAM

## 2025-07-18 RX ORDER — BETAMETHASONE DIPROPIONATE 0.5 MG/G
1 CREAM TOPICAL PRN
COMMUNITY
Start: 2025-05-05

## 2025-07-18 ASSESSMENT — PAIN SCALES - GENERAL
PAINLEVEL_OUTOF10: SEVERE PAIN (9)
PAINLEVEL_OUTOF10: NO PAIN (0)

## 2025-07-18 NOTE — PROGRESS NOTES
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 Date of transplant: 1/11/2024    Diagnosis MDS w/ SF3B1 BMT type Allogenic  CMV  Donor -  Recipient +    Prep: MA Bu/Flu Donor type  6/8 URD Blood Type Donor and recipient O+   GVHD Ppx PTCy/ siro/ MMF Graft source PBSCT Toxo IgG Negative   Protocol [id number] (not on) BMT MD Dr. Garay       Data    Pre-transplant disease history  Referring provider: Dr.Evan Ramey, [facility]   52 y/o F with history of longstanding macrocytic anemia (Hb 10-11, -110 dating back to 1997) presented in Nov 2021 for lightheadedness. CBC showed acute on chronic anemia with Hb 4.7, ; normal WBC and platelet count. Bone marrow biopsy (12/09/2021) was consistent with MDS with low blasts and SF3B1 mutation. Hypercellular marrow (70%) with single lineage dysplasia (dyserythropoiesis), increased ringed sideroblasts and no increase in blasts. Flow cytometry showed mixed lymphocytes. Cytogenetics 46,XX,del(20). NGS: SF3B1 (45%), ASXL1 (6%).   Received Epo (March 2022 - April 2022) but had minimal response after 4 weekly doses. Luspatercept from April 2022 to April 2023 with inadequate response.   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 Treated with weekly rituximab x 4 and steroid taper (Sept to Dec 2022). LDH and bilirubin started rising after steroid taper, therefore treated with MMF 500mg BID (Dec 2022 to May 2023) with resolution of hemolysis (negative KATINA, normal LDH, bilirubin). However, she continued to be transfusion dependant therefore her anemia was determined to be related more to MDS than autoimmune hemolysis.   Repeat bone marrow biopsy (5/11/2023) showed persistent MDS. Hypercellular marrow (90%) but now with dysplasia in all three lineages, cytogenetics  46, XX, del (20). Received decitabine- cedazurdine X 4 cycles (May 2023 to Dec 2023). Complicated by neutropenia requiring dose reduction and pRBC transfusions every 2-3 weeks. Pre-transplant bmbx (12/7/23) showed persistent MDS with multi lineage dysplasia (dyserythropoesis and dysgranulopoesis), 17% ringed sideroblasts, increased marrow storage iron. Cytogenetics: 46,XX,del(20). Flow cytometry and NGS not done.    She was considered for allogenic transplant despite low risk MDS by IPSS-R at diagnosis due to transfusion dependence, ASXL1 mutation and development of multi-lineage dysplasia on bmbx in May 2023 suggesting clonal evolution.      She underwent allogenic stem cell transplant on 1/11/24: myeloablative conditioning with Bu/Flu, 6/8 unrelated donor peripheral blood stem cell graft, cell dose: 6.50E+06. ABO matched, CMV donor negative, recipient positive. GVHD ppx with PTCy, MMF and tacrolimus (avoiding sirolimus due to high risk of VOD, given elevated liver enzymes prior to transplant and suspected iron overload).      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        INTERVAL HISTORY     Irma presents for a scheduled follow up visit.     Main complaint is right knee pain, has getting worse. Difficulty climbing stairs. Has completed physical therapy which has not helped much. Gained 10lbs over the past 6 months. Likely due to osteoarthritis, would be interested in receiving intra-articular injections, she will discuss with her PCP.    Continues to get phlebotomy every 2 weeks, ferritin has been trending down as expected. No anemia.     Teeth cleaned on July 17, 2025; no cavities found, some bone loss and root exposure noted, advised deep cleaning for a couple of teeth.    She has skin bumps on scalp, unchanged, using topicals prescribed by dermatology. Also reports itching in vaginal area and dysparunia. Has not seen  GYN since transplant.   She continues to report sharp left sided chest pain which resolves spontaneously.     Mother passed away recently, was nearly 90yrs old.     Plan  - Continue phlebotomy every 2 weeks   - No concern for GVHD  - Has received two doses of shingrix, stop acyclovir.   - Schedule stress test due to intermittent chest pain. She will discuss statin therapy with her PCP due to elevated LDL and coronary artery calcifications seen on pre-transplant CT Chest. Lipid panel (non fasting) added on to labs today.   - GYN referral placed: she has already been seen by Dr. Sanders and can request follow up.    - RTC in 2 months   - disability paperwork needed by the end of the month      Medications  Current Outpatient Medications   Medication Sig Dispense Refill    acyclovir (ZOVIRAX) 800 MG tablet Take 1 tablet (800 mg) by mouth 2 times daily. 134 tablet 0    calcium carbonate-vitamin D (CALCIUM + VITAMIN D3) 600-10 MG-MCG per tablet Take 2 tablets by mouth daily. 180 tablet 3    magnesium 250 MG tablet Take 2 tablets by mouth daily.      polyethylene glycol-propylene glycol (SYSTANE) 0.4-0.3 % SOLN ophthalmic solution Place 1 drop into both eyes every hour as needed for dry eyes. 30 mL 0         EXAM      BP (!) 146/89   Pulse 82   Temp 97.7  F (36.5  C) (Oral)   Resp 17   Wt 86.4 kg (190 lb 6.4 oz)   LMP 11/05/2017   SpO2 97%   BMI 34.27 kg/m      Wt Readings from Last 4 Encounters:   07/18/25 86.4 kg (190 lb 6.4 oz)   06/20/25 85.5 kg (188 lb 9.6 oz)   06/06/25 83.6 kg (184 lb 6.4 oz)   05/09/25 83.1 kg (183 lb 1.6 oz)     KPS: 80% Can perform normal activity with effort, some signs of disease    General: Alert, awake  Eyes: Conjunctiva normal  Respiratory: Normal respiratory effort. No wheezing  Skin: no rash/redness on exposed skin  Psych: Mood and affect are appropriate.  No central access.       Laboratory Studies:     Data    Blood Counts  Recent Labs   Lab Test 07/18/25  0920 06/20/25  0911  06/06/25  0937 05/09/25  1425   WBC 8.4  --  7.1 7.3   HGB 13.7 12.9 14.2 14.5     --  183 183   NEUTROPHIL 53  --  52 56   ANEU 4.4  --  3.7 4.1   LYMPH 39  --  38 35   ALYM 3.3  --  2.7 2.6     Basic Panel  Recent Labs   Lab Test 07/18/25  0920 06/06/25  0937 05/09/25  1425    140 139   POTASSIUM 4.6 4.6 4.4   CHLORIDE 104 105 103   CO2 23 23 25   BUN 9.8 13.4 15.0   CR 0.49* 0.51 0.49*   * 100* 99       LFTs  Recent Labs   Lab Test 07/18/25  0920 06/06/25  0937 05/09/25  1425 04/18/25  1003 03/03/25  1129   ALKPHOS 118 127 134   < > 122   AST 26 31 36   < > 29   ALT 26 37  --   --  44   BILITOTAL 0.3 0.3 0.2   < > 0.3   ALBUMIN 4.0 4.2 4.4   < > 4.3    < > = values in this interval not displayed.       Recent Labs   Lab Test 04/18/25  1003 03/17/25  1226 03/03/25  1129   CMVQNT Not Detected Not Detected Not Detected       Recent Labs   Lab Test 06/28/24  0849 03/14/24  1338   * 424*       Recent Labs   Lab Test 07/18/25  0920 06/20/25  0911 06/06/25  0937 04/18/25  1003 03/17/25  1226 03/03/25  1129   SONY 1,470* 1,681* 1,754* 2,286* 2,554* 2,972*            ASSESSMENT AND PLAN     BMT for MDS w/SF3B1 mutation  [id number] (according to but not on) with Bu/Flu  6/8 URD, peripheral blood stem cell graft, cell dose: 6.5 x10^6.     Disease status: Bone marrow biopsy at D28, D100, 6 months, 1 year  2/6/24 (~D26): Hypercellular marrow (70-80%), trilineage hemopoiesis with rare nuclear irregularities in terminal erythroid precursors of uncertain significance, 3% ringed sideroblasts (in the context of iron overload is not considered dysplastic finding). Flow showed no increase in myeloid blasts. Cytogenetics 46 XY, consistent with donor. FISH: Rate of loss of 20q within normal limits. NGS: No mutations detected  4/22/24 (D100): Overall cellularity 40-50% with no dysplasia. No ringed sideroblasts. Flow negative. Cytogenetics not done. NGS negative.   7/15/24 (D180): CR, cytogenetics/ FISH not  done. NGS negative   1/13/2025 (1 year): CR, karyotype/FISH/ NGS negative.      Graft status/chimerism: D28, D100, 6 months,1 year  2/6/24 (~D26): Bone marrow 100%, peripheral blood (2/1/24) CD33 100%, CD3 87%  3/14/24 (~D60): Peripheral blood CD3 and CD33 100%  4/22/24 (D100): Bone marrow 100%. Peripheral blood CD3 and CD33 100%  7/15/24 (D180): Bone marrow 100%. Peripheral blood CD3 and CD33 100%  10/7/24: Peripheral blood CD3 and CD33 100%  1/13/2025 (1 year): Bone marrow 100%. Peripheral blood CD3 and CD33 100%    GVHD  # Current immunosuppression is none  - Sirolimus taper completed on 6/15/24     # No GVHD till date    Heme/ Coag  # ABO matched (both O+).   - Mild macrocytosis: B12, folic acid normal (10/7/24)    # Iron overload: Hx of transfusion dependent anemia, pre-transplant ferritin around 5,000. Ferritin (4/22/24) was 4257. Tsat 62%. Retic count 2.7%. Liver ferriscan (6/13/24) shows severe iron overload 20.7 mg/g dry tissue.  - Started phlebotomy on 7/1/24, increased frequency to q 2 weeks since 1/2025.   - Continue q 2 weeks phlebotomy until ferritin <1000, continue as long as Hb >12. Will plan to repeat MR coronado once ferritin is reduced to determine need for ongoing phlebotomy.     ID  # Prophylaxis  None     Derm  # Seborrheic dermatitis  - was prescribed augmented betamethasone lotion, tacrolimus ointment, H&S shampoo by derm.   # Hand dermatitis  - current tx: triamcinolone ointment  Follows with dermatology, continue yearly follow up.     CV  # L sided chest pain: Had intermittently sharp pain over L chest that is worse with palpation and exertion.   - Cardiac stress test    # Hyperlipidemia: LDL was 174, HDL 59 and triglycerides 213 on 1/13/25. Presence of coronary artery calcifications on pre-transplant CT Chest  - Lipid profile added on today.     MSK:  # L knee pain and back pain    # Left ankle swelling:   - US of the left leg (5/23/24) negative for DVT. Likely due to venous stasis.  -  compression stockings    Health Maintenance   # Vitamin D was 27 (1/13/25). DXA normal (1/14/25), repeat every 2 years with PCP  # PFT: 1/10/25: Non-specific spirometry pattern with normal lung volumes.  Per ATS guidelines, this suggests mild airflow obstruction; clinical correlation is recommended. PFT (4/2025) are normal. Repeat at 2 years post transplant.   # Thyroid: TSH (1/13/25) was normal , then yearly, through primary physician.   # Skin: Annual skin checks through Dermatology; established  # Dental visits yearly, has established   # She has established with PCP  - Mammogram done in October 2024, continue yearly  - Colonoscopy, unclear if this has been completed  # Gyn: Has been seen by Dr. Sanders. Pap smear, has not been done, advised follow up.     Post-transplant vaccinations  - Received COVID and flu  - 12 months vaccine ([id number]-Polio-Zoster) on 3/3/2025, 14 month on 6/6/25  - Hepatitis A vaccination if travelling to [place]    I spent 45 minutes in the care of this patient today, which included time necessary for preparation for the visit, obtaining history, ordering medications/tests/procedures as medically indicated, review of pertinent medical literature, counseling of the patient, communication of recommendations to the care team, and documentation time.    Coco Garay  Department of Hematology, Oncology and Transplantation  [facility] Pager 1300/Text via [employer]

## 2025-07-18 NOTE — LETTER
7/18/2025      Hortencia Baldwin  [address]  [facility] [place] [place] [address]      Dear Colleague,    Thank you for referring your patient, Hortencia Baldwin, to the [facility] [employer]. Please see a copy of my visit note below.    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 Date of transplant: 1/11/2024    Diagnosis MDS w/ SF3B1 BMT type Allogenic  CMV  Donor -  Recipient +    Prep: MA Bu/Flu Donor type  6/8 URD Blood Type Donor and recipient O+   GVHD Ppx PTCy/ siro/ MMF Graft source PBSCT Toxo IgG Negative   Protocol [id number] (not on) BMT MD Dr. Garay       Data   Pre-transplant disease history  Referring provider: Dr.Evan Ramey, [facility]   54 y/o F with history of longstanding macrocytic anemia (Hb 10-11, -110 dating back to 1997) presented in Nov 2021 for lightheadedness. CBC showed acute on chronic anemia with Hb 4.7, ; normal WBC and platelet count. Bone marrow biopsy (12/09/2021) was consistent with MDS with low blasts and SF3B1 mutation. Hypercellular marrow (70%) with single lineage dysplasia (dyserythropoiesis), increased ringed sideroblasts and no increase in blasts. Flow cytometry showed mixed lymphocytes. Cytogenetics 46,XX,del(20). NGS: SF3B1 (45%), ASXL1 (6%).   Received Epo (March 2022 - April 2022) but had minimal response after 4 weekly doses. Luspatercept from April 2022 to April 2023 with inadequate response.   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 Treated with weekly rituximab x 4 and steroid taper (Sept to Dec 2022). LDH and bilirubin started rising after steroid taper, therefore treated with MMF 500mg BID (Dec 2022 to May 2023) with resolution of hemolysis (negative KATINA, normal LDH, bilirubin). However, she  continued to be transfusion dependant therefore her anemia was determined to be related more to MDS than autoimmune hemolysis.   Repeat bone marrow biopsy (5/11/2023) showed persistent MDS. Hypercellular marrow (90%) but now with dysplasia in all three lineages, cytogenetics 46, XX, del (20). Received decitabine- cedazurdine X 4 cycles (May 2023 to Dec 2023). Complicated by neutropenia requiring dose reduction and pRBC transfusions every 2-3 weeks. Pre-transplant bmbx (12/7/23) showed persistent MDS with multi lineage dysplasia (dyserythropoesis and dysgranulopoesis), 17% ringed sideroblasts, increased marrow storage iron. Cytogenetics: 46,XX,del(20). Flow cytometry and NGS not done.    She was considered for allogenic transplant despite low risk MDS by IPSS-R at diagnosis due to transfusion dependence, ASXL1 mutation and development of multi-lineage dysplasia on bmbx in May 2023 suggesting clonal evolution.      She underwent allogenic stem cell transplant on 1/11/24: myeloablative conditioning with Bu/Flu, 6/8 unrelated donor peripheral blood stem cell graft, cell dose: 6.50E+06. ABO matched, CMV donor negative, recipient positive. GVHD ppx with PTCy, MMF and tacrolimus (avoiding sirolimus due to high risk of VOD, given elevated liver enzymes prior to transplant and suspected iron overload).      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        INTERVAL HISTORY     Irma presents for a scheduled follow up visit.     Main complaint is right knee pain, has getting worse. Difficulty climbing stairs. Has completed physical therapy which has not helped much. Gained 10lbs over the past 6 months. Likely due to osteoarthritis, would be interested in receiving intra-articular injections, she will discuss with her PCP.    Continues to get phlebotomy every 2 weeks, ferritin has been trending down as expected. No anemia.     Teeth  cleaned on July 17, 2025; no cavities found, some bone loss and root exposure noted, advised deep cleaning for a couple of teeth.    She has skin bumps on scalp, unchanged, using topicals prescribed by dermatology. Also reports itching in vaginal area and dysparunia. Has not seen GYN since transplant.   She continues to report sharp left sided chest pain which resolves spontaneously.     Mother passed away recently, was nearly 90yrs old.     Plan  - Continue phlebotomy every 2 weeks   - No concern for GVHD  - Has received two doses of shingrix, stop acyclovir.   - Schedule stress test due to intermittent chest pain. She will discuss statin therapy with her PCP due to elevated LDL and coronary artery calcifications seen on pre-transplant CT Chest. Lipid panel (non fasting) added on to labs today.   - GYN referral placed: she has already been seen by Dr. Sanders and can request follow up.    - RTC in 2 months   - disability paperwork needed by the end of the month      Medications  Current Outpatient Medications   Medication Sig Dispense Refill     acyclovir (ZOVIRAX) 800 MG tablet Take 1 tablet (800 mg) by mouth 2 times daily. 134 tablet 0     calcium carbonate-vitamin D (CALCIUM + VITAMIN D3) 600-10 MG-MCG per tablet Take 2 tablets by mouth daily. 180 tablet 3     magnesium 250 MG tablet Take 2 tablets by mouth daily.       polyethylene glycol-propylene glycol (SYSTANE) 0.4-0.3 % SOLN ophthalmic solution Place 1 drop into both eyes every hour as needed for dry eyes. 30 mL 0         EXAM      BP (!) 146/89   Pulse 82   Temp 97.7  F (36.5  C) (Oral)   Resp 17   Wt 86.4 kg (190 lb 6.4 oz)   LMP 11/05/2017   SpO2 97%   BMI 34.27 kg/m      Wt Readings from Last 4 Encounters:   07/18/25 86.4 kg (190 lb 6.4 oz)   06/20/25 85.5 kg (188 lb 9.6 oz)   06/06/25 83.6 kg (184 lb 6.4 oz)   05/09/25 83.1 kg (183 lb 1.6 oz)     KPS: 80% Can perform normal activity with effort, some signs of disease    General: Alert,  awake  Eyes: Conjunctiva normal  Respiratory: Normal respiratory effort. No wheezing  Skin: no rash/redness on exposed skin  Psych: Mood and affect are appropriate.  No central access.       Laboratory Studies:     Data   Blood Counts  Recent Labs   Lab Test 07/18/25  0920 06/20/25  0911 06/06/25  0937 05/09/25  1425   WBC 8.4  --  7.1 7.3   HGB 13.7 12.9 14.2 14.5     --  183 183   NEUTROPHIL 53  --  52 56   ANEU 4.4  --  3.7 4.1   LYMPH 39  --  38 35   ALYM 3.3  --  2.7 2.6     Basic Panel  Recent Labs   Lab Test 07/18/25  0920 06/06/25  0937 05/09/25  1425    140 139   POTASSIUM 4.6 4.6 4.4   CHLORIDE 104 105 103   CO2 23 23 25   BUN 9.8 13.4 15.0   CR 0.49* 0.51 0.49*   * 100* 99       LFTs  Recent Labs   Lab Test 07/18/25  0920 06/06/25  0937 05/09/25  1425 04/18/25  1003 03/03/25  1129   ALKPHOS 118 127 134   < > 122   AST 26 31 36   < > 29   ALT 26 37  --   --  44   BILITOTAL 0.3 0.3 0.2   < > 0.3   ALBUMIN 4.0 4.2 4.4   < > 4.3    < > = values in this interval not displayed.       Recent Labs   Lab Test 04/18/25  1003 03/17/25  1226 03/03/25  1129   CMVQNT Not Detected Not Detected Not Detected       Recent Labs   Lab Test 06/28/24  0849 03/14/24  1338   * 424*       Recent Labs   Lab Test 07/18/25  0920 06/20/25  0911 06/06/25  0937 04/18/25  1003 03/17/25  1226 03/03/25  1129   SONY 1,470* 1,681* 1,754* 2,286* 2,554* 2,972*            ASSESSMENT AND PLAN     BMT for MDS w/SF3B1 mutation  [id number] (according to but not on) with Bu/Flu  6/8 URD, peripheral blood stem cell graft, cell dose: 6.5 x10^6.     Disease status: Bone marrow biopsy at D28, D100, 6 months, 1 year  2/6/24 (~D26): Hypercellular marrow (70-80%), trilineage hemopoiesis with rare nuclear irregularities in terminal erythroid precursors of uncertain significance, 3% ringed sideroblasts (in the context of iron overload is not considered dysplastic finding). Flow showed no increase in myeloid blasts. Cytogenetics 46  XY, consistent with donor. FISH: Rate of loss of 20q within normal limits. NGS: No mutations detected  4/22/24 (D100): Overall cellularity 40-50% with no dysplasia. No ringed sideroblasts. Flow negative. Cytogenetics not done. NGS negative.   7/15/24 (D180): CR, cytogenetics/ FISH not done. NGS negative   1/13/2025 (1 year): CR, karyotype/FISH/ NGS negative.      Graft status/chimerism: D28, D100, 6 months,1 year  2/6/24 (~D26): Bone marrow 100%, peripheral blood (2/1/24) CD33 100%, CD3 87%  3/14/24 (~D60): Peripheral blood CD3 and CD33 100%  4/22/24 (D100): Bone marrow 100%. Peripheral blood CD3 and CD33 100%  7/15/24 (D180): Bone marrow 100%. Peripheral blood CD3 and CD33 100%  10/7/24: Peripheral blood CD3 and CD33 100%  1/13/2025 (1 year): Bone marrow 100%. Peripheral blood CD3 and CD33 100%    GVHD  # Current immunosuppression is none  - Sirolimus taper completed on 6/15/24     # No GVHD till date    Heme/ Coag  # ABO matched (both O+).   - Mild macrocytosis: B12, folic acid normal (10/7/24)    # Iron overload: Hx of transfusion dependent anemia, pre-transplant ferritin around 5,000. Ferritin (4/22/24) was 4257. Tsat 62%. Retic count 2.7%. Liver ferriscan (6/13/24) shows severe iron overload 20.7 mg/g dry tissue.  - Started phlebotomy on 7/1/24, increased frequency to q 2 weeks since 1/2025.   - Continue q 2 weeks phlebotomy until ferritin <1000, continue as long as Hb >12. Will plan to repeat MR cliff once ferritin is reduced to determine need for ongoing phlebotomy.     ID  # Prophylaxis  None     Derm  # Seborrheic dermatitis  - was prescribed augmented betamethasone lotion, tacrolimus ointment, H&S shampoo by derm.   # Hand dermatitis  - current tx: triamcinolone ointment  Follows with dermatology, continue yearly follow up.     CV  # L sided chest pain: Had intermittently sharp pain over L chest that is worse with palpation and exertion.   - Cardiac stress test    # Hyperlipidemia: LDL was 174, HDL 59  and triglycerides 213 on 1/13/25. Presence of coronary artery calcifications on pre-transplant CT Chest  - Lipid profile added on today.     MSK:  # L knee pain and back pain    # Left ankle swelling:   - US of the left leg (5/23/24) negative for DVT. Likely due to venous stasis.  - compression stockings    Health Maintenance   # Vitamin D was 27 (1/13/25). DXA normal (1/14/25), repeat every 2 years with PCP  # PFT: 1/10/25: Non-specific spirometry pattern with normal lung volumes.  Per ATS guidelines, this suggests mild airflow obstruction; clinical correlation is recommended. PFT (4/2025) are normal. Repeat at 2 years post transplant.   # Thyroid: TSH (1/13/25) was normal , then yearly, through primary physician.   # Skin: Annual skin checks through Dermatology; established  # Dental visits yearly, has established   # She has established with PCP  - Mammogram done in October 2024, continue yearly  - Colonoscopy, unclear if this has been completed  # Gyn: Has been seen by Dr. Sanders. Pap smear, has not been done, advised follow up.     Post-transplant vaccinations  - Received COVID and flu  - 12 months vaccine ([id number]-Polio-Zoster) on 3/3/2025, 14 month on 6/6/25  - Hepatitis A vaccination if travelling to [place]    I spent 45 minutes in the care of this patient today, which included time necessary for preparation for the visit, obtaining history, ordering medications/tests/procedures as medically indicated, review of pertinent medical literature, counseling of the patient, communication of recommendations to the care team, and documentation time.    Coco Garay  Department of Hematology, Oncology and Transplantation  [facility] Pager 1300/Text via [employer]                Again, thank you for allowing me to participate in the care of your patient.        Sincerely,        SARA Singleton    Electronically signed

## 2025-07-18 NOTE — NURSING NOTE
"Oncology Rooming Note    July 18, 2025 10:40 AM   Hortencia Baldwin is a 54 year old female who presents for:    Chief Complaint   Patient presents with    Blood Draw     Labs drawn with PIV start by VAT. Pt tolerated well. Vitals taken. Patient checked into next appointment.      Oncology Clinic Visit     BMT return s/p BMT r/t MDS     Initial Vitals: BP (!) 146/89   Pulse 82   Temp 97.7  F (36.5  C) (Oral)   Resp 17   Wt 86.4 kg (190 lb 6.4 oz)   LMP 11/05/2017   SpO2 97%   BMI 34.27 kg/m   Estimated body mass index is 34.27 kg/m  as calculated from the following:    Height as of 1/7/25: 1.588 m (5' 2.5\").    Weight as of this encounter: 86.4 kg (190 lb 6.4 oz). Body surface area is 1.95 meters squared.  No Pain (0) Comment: Data Unavailable   Patient's last menstrual period was 11/05/2017.  Allergies reviewed: Yes  Medications reviewed: Yes    Medications: Medication refills not needed today.  Pharmacy name entered into [facility]:    [facility][id number] - [place], [place] - [address] AT [facility] [address]  [id number] - [facility], [place] - [address] [id number] - [facility], [place] - [address] AT [facility] & [address]  [facility] #[id number] - [facility], [place] - [address] AT [address] & [facility]    PHQ9:  Did this patient require a PHQ9?: No      Clinical concerns: Right knee pain 9/10; was going to therapy but says it doesn't help; has not been taking any OTC pain medications.  Reports right eye seems blurrier, has family doctor visit next week, has not seen eye doctor in a long time.   Dr. Garay was NOT notified.      Kelly Santana RN             "

## 2025-07-18 NOTE — NURSING NOTE
"Oncology Rooming Note    July 18, 2025 12:51 PM   Hortencia Baldwin is a 54 year old female who presents for:    Chief Complaint   Patient presents with    Blood Draw     Labs drawn with PIV start by VAT. Pt tolerated well. Vitals taken. Patient checked into next appointment.      Oncology Clinic Visit     BMT return s/p BMT r/t MDS     Initial Vitals: /87 (BP Location: Right arm, Patient Position: Sitting, Cuff Size: Adult Large)   Pulse 80   Temp 98.1  F (36.7  C) (Oral)   Resp 16   Wt 86.4 kg (190 lb 6.4 oz)   LMP 11/05/2017   SpO2 98%   BMI 34.27 kg/m   Estimated body mass index is 34.27 kg/m  as calculated from the following:    Height as of 1/7/25: 1.588 m (5' 2.5\").    Weight as of this encounter: 86.4 kg (190 lb 6.4 oz). Body surface area is 1.95 meters squared.  Severe Pain (9) Comment: Data Unavailable   Patient's last menstrual period was 11/05/2017.  Allergies reviewed: Yes  Medications reviewed: Yes    Medications: Medication refills not needed today.  Pharmacy name entered into [facility]:    [facility] #[id number] - [place], [place] - [address] AT [facility] [address]  [id number] - [facility] - [address] [id number] - [facility], [place] - [address] AT [facility] & [address]  [facility] #[id number] - [facility], [place] - [address] AT [address] & [facility]    PHQ9:  Did this patient require a PHQ9?: No      Clinical concerns: none      Neeraj Arizmendi              "

## 2025-07-21 ENCOUNTER — PATIENT OUTREACH (OUTPATIENT)
Dept: CARE COORDINATION | Facility: CLINIC | Age: 55
End: 2025-07-21
Payer: COMMERCIAL

## 2025-07-21 DIAGNOSIS — D46.9 MDS (MYELODYSPLASTIC SYNDROME) (H): ICD-10-CM

## 2025-07-21 LAB
CHOLEST SERPL-MCNC: 261 MG/DL
HDLC SERPL-MCNC: 48 MG/DL
LDLC SERPL CALC-MCNC: 169 MG/DL
NONHDLC SERPL-MCNC: 213 MG/DL
TRIGL SERPL-MCNC: 221 MG/DL

## 2025-07-21 RX ORDER — CALCIUM CARBONATE/VITAMIN D3 600 MG-10
2 TABLET ORAL DAILY
Qty: 180 TABLET | Refills: 3 | Status: SHIPPED | OUTPATIENT
Start: 2025-07-21

## 2025-07-22 ENCOUNTER — CARE COORDINATION (OUTPATIENT)
Dept: TRANSPLANT | Facility: CLINIC | Age: 55
End: 2025-07-22
Payer: COMMERCIAL

## 2025-07-22 NOTE — PROGRESS NOTES
Survivorship note and Dr. Garay's most recent note faxed to PCP office at [phone].     Medical records faxed to [facility] at [phone]    Patient updated via [employer]

## 2025-07-23 DIAGNOSIS — D46.9 MDS (MYELODYSPLASTIC SYNDROME) (H): ICD-10-CM

## 2025-07-23 DIAGNOSIS — Z94.81 S/P ALLOGENEIC BONE MARROW TRANSPLANT (H): ICD-10-CM

## 2025-08-08 ENCOUNTER — APPOINTMENT (OUTPATIENT)
Dept: LAB | Facility: CLINIC | Age: 55
End: 2025-08-08
Attending: STUDENT IN AN ORGANIZED HEALTH CARE EDUCATION/TRAINING PROGRAM
Payer: COMMERCIAL

## 2025-08-19 DIAGNOSIS — Z94.81 S/P ALLOGENEIC BONE MARROW TRANSPLANT (H): ICD-10-CM

## 2025-08-19 DIAGNOSIS — D46.9 MDS (MYELODYSPLASTIC SYNDROME) (H): Primary | ICD-10-CM

## 2025-08-22 ENCOUNTER — APPOINTMENT (OUTPATIENT)
Dept: LAB | Facility: CLINIC | Age: 55
End: 2025-08-22
Attending: STUDENT IN AN ORGANIZED HEALTH CARE EDUCATION/TRAINING PROGRAM
Payer: COMMERCIAL

## 2025-09-03 ENCOUNTER — OFFICE VISIT (OUTPATIENT)
Dept: OBGYN | Facility: CLINIC | Age: 55
End: 2025-09-03
Attending: OBSTETRICS & GYNECOLOGY
Payer: COMMERCIAL

## 2025-09-03 VITALS
SYSTOLIC BLOOD PRESSURE: 138 MMHG | WEIGHT: 192 LBS | HEART RATE: 94 BPM | DIASTOLIC BLOOD PRESSURE: 87 MMHG | OXYGEN SATURATION: 98 % | BODY MASS INDEX: 34.56 KG/M2

## 2025-09-03 DIAGNOSIS — Z12.31 ENCOUNTER FOR SCREENING MAMMOGRAM FOR BREAST CANCER: ICD-10-CM

## 2025-09-03 DIAGNOSIS — Z12.11 SCREEN FOR COLON CANCER: ICD-10-CM

## 2025-09-03 DIAGNOSIS — N89.8 VAGINAL ITCHING: ICD-10-CM

## 2025-09-03 DIAGNOSIS — Z12.4 CERVICAL CANCER SCREENING: Primary | ICD-10-CM

## 2025-09-03 DIAGNOSIS — D46.9 MDS (MYELODYSPLASTIC SYNDROME) (H): ICD-10-CM

## 2025-09-03 DIAGNOSIS — B37.31 YEAST INFECTION OF THE VAGINA: Primary | ICD-10-CM

## 2025-09-03 LAB
CLUE CELLS: ABNORMAL
TRICHOMONAS, WET PREP: ABNORMAL
WBC'S/HIGH POWER FIELD, WET PREP: ABNORMAL
YEAST, WET PREP: PRESENT

## 2025-09-03 PROCEDURE — 87624 HPV HI-RISK TYP POOLED RSLT: CPT | Performed by: OBSTETRICS & GYNECOLOGY

## 2025-09-03 PROCEDURE — 99213 OFFICE O/P EST LOW 20 MIN: CPT | Performed by: OBSTETRICS & GYNECOLOGY

## 2025-09-03 PROCEDURE — 87210 SMEAR WET MOUNT SALINE/INK: CPT | Performed by: OBSTETRICS & GYNECOLOGY

## 2025-09-03 PROCEDURE — 87102 FUNGUS ISOLATION CULTURE: CPT | Performed by: OBSTETRICS & GYNECOLOGY

## 2025-09-03 RX ORDER — FLUCONAZOLE 150 MG/1
150 TABLET ORAL ONCE
Qty: 1 TABLET | Refills: 0 | Status: SHIPPED | OUTPATIENT
Start: 2025-09-03 | End: 2025-09-03

## 2025-09-04 ENCOUNTER — PATIENT OUTREACH (OUTPATIENT)
Dept: CARE COORDINATION | Facility: CLINIC | Age: 55
End: 2025-09-04
Payer: COMMERCIAL

## 2025-09-04 LAB
BACTERIA SPEC CULT: NORMAL
HPV HR 12 DNA CVX QL NAA+PROBE: NEGATIVE
HPV16 DNA CVX QL NAA+PROBE: NEGATIVE
HPV18 DNA CVX QL NAA+PROBE: NEGATIVE
HUMAN PAPILLOMA VIRUS FINAL DIAGNOSIS: NORMAL

## (undated) DEVICE — NDL 22GA 1.5"

## (undated) DEVICE — PACK MINOR SBA15MIFSE

## (undated) DEVICE — SU VICRYL 3-0 SH 27" J316H

## (undated) DEVICE — DRAPE LAP W/ARMBOARD 29410

## (undated) DEVICE — GLOVE PROTEXIS MICRO 6.0  2D73PM60

## (undated) DEVICE — GLOVE PROTEXIS BLUE W/NEU-THERA 6.5  2D73EB65

## (undated) DEVICE — ESU ELEC BLADE 2.75" COATED/INSULATED E1455

## (undated) DEVICE — GOWN IMPERVIOUS SPECIALTY XLG/XLONG 32474

## (undated) DEVICE — LINEN TOWEL PACK X5 5464

## (undated) DEVICE — SU MONOCRYL 4-0 PS-2 18" UND Y496G

## (undated) DEVICE — SYR 10ML SLIP TIP W/O NDL

## (undated) DEVICE — SOL NACL 0.9% IRRIG 1000ML BOTTLE 07138-09

## (undated) DEVICE — DRSG DRAIN 4X4" 7086

## (undated) DEVICE — NDL 19GA 1.5"

## (undated) DEVICE — DRSG STERI STRIP 1/2X4" R1547

## (undated) DEVICE — PREP CHLORAPREP 26ML TINTED ORANGE  260815

## (undated) DEVICE — DRSG GAUZE 4X4" 3033

## (undated) DEVICE — CATH BALLOON MERIT ESOPH FIVE-STAGE 17X21MMX180CM EX18

## (undated) DEVICE — SU DERMABOND MINI DHVM12

## (undated) DEVICE — INFLATION DEVICE BIG 60 ENDO-AN6012

## (undated) DEVICE — DECANTER VIAL 2006S

## (undated) RX ORDER — ALBUTEROL SULFATE 0.83 MG/ML
SOLUTION RESPIRATORY (INHALATION)
Status: DISPENSED
Start: 2024-03-28

## (undated) RX ORDER — FENTANYL CITRATE 50 UG/ML
INJECTION, SOLUTION INTRAMUSCULAR; INTRAVENOUS
Status: DISPENSED
Start: 2024-01-05

## (undated) RX ORDER — ONDANSETRON 2 MG/ML
INJECTION INTRAMUSCULAR; INTRAVENOUS
Status: DISPENSED
Start: 2018-02-05

## (undated) RX ORDER — PROPOFOL 10 MG/ML
INJECTION, EMULSION INTRAVENOUS
Status: DISPENSED
Start: 2018-02-05

## (undated) RX ORDER — PENTAMIDINE ISETHIONATE 300 MG/300MG
INHALANT RESPIRATORY (INHALATION)
Status: DISPENSED
Start: 2024-03-28

## (undated) RX ORDER — PROPOFOL 10 MG/ML
INJECTION, EMULSION INTRAVENOUS
Status: DISPENSED
Start: 2017-05-31

## (undated) RX ORDER — ALBUTEROL SULFATE 0.83 MG/ML
SOLUTION RESPIRATORY (INHALATION)
Status: DISPENSED
Start: 2024-04-26

## (undated) RX ORDER — LIDOCAINE HYDROCHLORIDE 10 MG/ML
INJECTION, SOLUTION EPIDURAL; INFILTRATION; INTRACAUDAL; PERINEURAL
Status: DISPENSED
Start: 2024-03-11

## (undated) RX ORDER — FENTANYL CITRATE 50 UG/ML
INJECTION, SOLUTION INTRAMUSCULAR; INTRAVENOUS
Status: DISPENSED
Start: 2017-05-31

## (undated) RX ORDER — LIDOCAINE HYDROCHLORIDE 10 MG/ML
INJECTION, SOLUTION EPIDURAL; INFILTRATION; INTRACAUDAL; PERINEURAL
Status: DISPENSED
Start: 2024-01-05

## (undated) RX ORDER — PENTAMIDINE ISETHIONATE 300 MG/300MG
INHALANT RESPIRATORY (INHALATION)
Status: DISPENSED
Start: 2024-04-26

## (undated) RX ORDER — HYDROCODONE BITARTRATE AND ACETAMINOPHEN 5; 325 MG/1; MG/1
TABLET ORAL
Status: DISPENSED
Start: 2017-05-31

## (undated) RX ORDER — DEXAMETHASONE SODIUM PHOSPHATE 4 MG/ML
INJECTION, SOLUTION INTRA-ARTICULAR; INTRALESIONAL; INTRAMUSCULAR; INTRAVENOUS; SOFT TISSUE
Status: DISPENSED
Start: 2018-02-05

## (undated) RX ORDER — FENTANYL CITRATE 50 UG/ML
INJECTION, SOLUTION INTRAMUSCULAR; INTRAVENOUS
Status: DISPENSED
Start: 2018-02-05

## (undated) RX ORDER — HEPARIN SODIUM (PORCINE) LOCK FLUSH IV SOLN 100 UNIT/ML 100 UNIT/ML
SOLUTION INTRAVENOUS
Status: DISPENSED
Start: 2024-01-05

## (undated) RX ORDER — CEFAZOLIN SODIUM 2 G/100ML
INJECTION, SOLUTION INTRAVENOUS
Status: DISPENSED
Start: 2024-01-05

## (undated) RX ORDER — SODIUM CHLORIDE 9 MG/ML
INJECTION, SOLUTION INTRAVENOUS
Status: DISPENSED
Start: 2024-01-05

## (undated) RX ORDER — LIDOCAINE HYDROCHLORIDE 20 MG/ML
INJECTION, SOLUTION EPIDURAL; INFILTRATION; INTRACAUDAL; PERINEURAL
Status: DISPENSED
Start: 2018-02-05